# Patient Record
Sex: FEMALE | Race: BLACK OR AFRICAN AMERICAN | NOT HISPANIC OR LATINO
[De-identification: names, ages, dates, MRNs, and addresses within clinical notes are randomized per-mention and may not be internally consistent; named-entity substitution may affect disease eponyms.]

---

## 2017-03-22 ENCOUNTER — APPOINTMENT (OUTPATIENT)
Dept: OTOLARYNGOLOGY | Facility: CLINIC | Age: 77
End: 2017-03-22

## 2017-04-07 ENCOUNTER — APPOINTMENT (OUTPATIENT)
Dept: CARDIOLOGY | Facility: CLINIC | Age: 77
End: 2017-04-07

## 2017-04-07 VITALS — SYSTOLIC BLOOD PRESSURE: 126 MMHG | DIASTOLIC BLOOD PRESSURE: 80 MMHG | HEART RATE: 70 BPM

## 2017-04-07 VITALS — BODY MASS INDEX: 27.29 KG/M2 | WEIGHT: 154 LBS | HEIGHT: 63 IN

## 2017-05-11 ENCOUNTER — APPOINTMENT (OUTPATIENT)
Dept: OTOLARYNGOLOGY | Facility: CLINIC | Age: 77
End: 2017-05-11

## 2017-06-04 ENCOUNTER — EMERGENCY (EMERGENCY)
Facility: HOSPITAL | Age: 77
LOS: 0 days | Discharge: HOME | End: 2017-06-05
Admitting: INTERNAL MEDICINE

## 2017-06-28 ENCOUNTER — APPOINTMENT (OUTPATIENT)
Dept: OTOLARYNGOLOGY | Facility: CLINIC | Age: 77
End: 2017-06-28

## 2017-06-28 DIAGNOSIS — I10 ESSENTIAL (PRIMARY) HYPERTENSION: ICD-10-CM

## 2017-06-28 DIAGNOSIS — F32.9 MAJOR DEPRESSIVE DISORDER, SINGLE EPISODE, UNSPECIFIED: ICD-10-CM

## 2017-06-28 DIAGNOSIS — R42 DIZZINESS AND GIDDINESS: ICD-10-CM

## 2017-06-28 DIAGNOSIS — Z79.82 LONG TERM (CURRENT) USE OF ASPIRIN: ICD-10-CM

## 2017-06-28 DIAGNOSIS — F41.9 ANXIETY DISORDER, UNSPECIFIED: ICD-10-CM

## 2017-06-28 DIAGNOSIS — Z88.8 ALLERGY STATUS TO OTHER DRUGS, MEDICAMENTS AND BIOLOGICAL SUBSTANCES STATUS: ICD-10-CM

## 2017-06-28 DIAGNOSIS — Z79.899 OTHER LONG TERM (CURRENT) DRUG THERAPY: ICD-10-CM

## 2017-07-12 ENCOUNTER — APPOINTMENT (OUTPATIENT)
Dept: OTOLARYNGOLOGY | Facility: CLINIC | Age: 77
End: 2017-07-12

## 2017-08-24 ENCOUNTER — EMERGENCY (EMERGENCY)
Facility: HOSPITAL | Age: 77
LOS: 0 days | Discharge: HOME | End: 2017-08-24

## 2017-08-24 DIAGNOSIS — R42 DIZZINESS AND GIDDINESS: ICD-10-CM

## 2017-08-24 DIAGNOSIS — F32.9 MAJOR DEPRESSIVE DISORDER, SINGLE EPISODE, UNSPECIFIED: ICD-10-CM

## 2017-08-24 DIAGNOSIS — Z79.899 OTHER LONG TERM (CURRENT) DRUG THERAPY: ICD-10-CM

## 2017-08-24 DIAGNOSIS — Z95.828 PRESENCE OF OTHER VASCULAR IMPLANTS AND GRAFTS: ICD-10-CM

## 2017-08-24 DIAGNOSIS — I10 ESSENTIAL (PRIMARY) HYPERTENSION: ICD-10-CM

## 2017-08-24 DIAGNOSIS — E78.5 HYPERLIPIDEMIA, UNSPECIFIED: ICD-10-CM

## 2017-08-24 DIAGNOSIS — Z79.82 LONG TERM (CURRENT) USE OF ASPIRIN: ICD-10-CM

## 2017-08-24 DIAGNOSIS — Z92.21 PERSONAL HISTORY OF ANTINEOPLASTIC CHEMOTHERAPY: ICD-10-CM

## 2017-08-24 DIAGNOSIS — Z88.8 ALLERGY STATUS TO OTHER DRUGS, MEDICAMENTS AND BIOLOGICAL SUBSTANCES: ICD-10-CM

## 2017-08-24 DIAGNOSIS — Z85.42 PERSONAL HISTORY OF MALIGNANT NEOPLASM OF OTHER PARTS OF UTERUS: ICD-10-CM

## 2017-08-28 ENCOUNTER — OUTPATIENT (OUTPATIENT)
Dept: OUTPATIENT SERVICES | Facility: HOSPITAL | Age: 77
LOS: 1 days | Discharge: HOME | End: 2017-08-28

## 2017-08-28 DIAGNOSIS — C55 MALIGNANT NEOPLASM OF UTERUS, PART UNSPECIFIED: ICD-10-CM

## 2017-09-01 ENCOUNTER — APPOINTMENT (OUTPATIENT)
Dept: CARDIOLOGY | Facility: CLINIC | Age: 77
End: 2017-09-01

## 2017-09-01 VITALS — BODY MASS INDEX: 27.29 KG/M2 | WEIGHT: 154 LBS | HEIGHT: 63 IN

## 2017-09-01 VITALS — DIASTOLIC BLOOD PRESSURE: 70 MMHG | SYSTOLIC BLOOD PRESSURE: 134 MMHG | HEART RATE: 64 BPM

## 2017-09-08 ENCOUNTER — OUTPATIENT (OUTPATIENT)
Dept: OUTPATIENT SERVICES | Facility: HOSPITAL | Age: 77
LOS: 1 days | Discharge: HOME | End: 2017-09-08

## 2017-09-08 DIAGNOSIS — Z12.31 ENCOUNTER FOR SCREENING MAMMOGRAM FOR MALIGNANT NEOPLASM OF BREAST: ICD-10-CM

## 2017-09-11 ENCOUNTER — OUTPATIENT (OUTPATIENT)
Dept: OUTPATIENT SERVICES | Facility: HOSPITAL | Age: 77
LOS: 1 days | Discharge: HOME | End: 2017-09-11

## 2017-09-12 ENCOUNTER — OUTPATIENT (OUTPATIENT)
Dept: OUTPATIENT SERVICES | Facility: HOSPITAL | Age: 77
LOS: 1 days | Discharge: HOME | End: 2017-09-12

## 2017-09-12 DIAGNOSIS — F33.1 MAJOR DEPRESSIVE DISORDER, RECURRENT, MODERATE: ICD-10-CM

## 2017-10-06 ENCOUNTER — OUTPATIENT (OUTPATIENT)
Dept: OUTPATIENT SERVICES | Facility: HOSPITAL | Age: 77
LOS: 1 days | Discharge: HOME | End: 2017-10-06

## 2017-10-06 DIAGNOSIS — F33.1 MAJOR DEPRESSIVE DISORDER, RECURRENT, MODERATE: ICD-10-CM

## 2017-10-09 ENCOUNTER — APPOINTMENT (OUTPATIENT)
Dept: OTOLARYNGOLOGY | Facility: CLINIC | Age: 77
End: 2017-10-09
Payer: COMMERCIAL

## 2017-10-09 VITALS — HEIGHT: 63 IN | WEIGHT: 154 LBS | BODY MASS INDEX: 27.29 KG/M2

## 2017-10-09 PROCEDURE — 99212 OFFICE O/P EST SF 10 MIN: CPT | Mod: 25

## 2017-10-09 PROCEDURE — 69210 REMOVE IMPACTED EAR WAX UNI: CPT

## 2017-10-10 ENCOUNTER — OTHER (OUTPATIENT)
Age: 77
End: 2017-10-10

## 2017-10-24 ENCOUNTER — APPOINTMENT (OUTPATIENT)
Dept: OTOLARYNGOLOGY | Facility: CLINIC | Age: 77
End: 2017-10-24
Payer: COMMERCIAL

## 2017-10-24 VITALS — WEIGHT: 160 LBS | BODY MASS INDEX: 27.31 KG/M2 | HEIGHT: 64 IN

## 2017-10-24 PROCEDURE — 69210 REMOVE IMPACTED EAR WAX UNI: CPT

## 2017-11-01 ENCOUNTER — INPATIENT (INPATIENT)
Facility: HOSPITAL | Age: 77
LOS: 0 days | Discharge: HOME | End: 2017-11-02
Attending: EMERGENCY MEDICINE | Admitting: INTERNAL MEDICINE

## 2017-11-01 DIAGNOSIS — E78.00 PURE HYPERCHOLESTEROLEMIA, UNSPECIFIED: ICD-10-CM

## 2017-11-01 DIAGNOSIS — I10 ESSENTIAL (PRIMARY) HYPERTENSION: ICD-10-CM

## 2017-11-01 DIAGNOSIS — R07.89 OTHER CHEST PAIN: ICD-10-CM

## 2017-11-01 DIAGNOSIS — R07.9 CHEST PAIN, UNSPECIFIED: ICD-10-CM

## 2017-11-01 DIAGNOSIS — Z85.42 PERSONAL HISTORY OF MALIGNANT NEOPLASM OF OTHER PARTS OF UTERUS: ICD-10-CM

## 2017-11-13 ENCOUNTER — OUTPATIENT (OUTPATIENT)
Dept: OUTPATIENT SERVICES | Facility: HOSPITAL | Age: 77
LOS: 1 days | Discharge: HOME | End: 2017-11-13

## 2017-11-13 DIAGNOSIS — I25.10 ATHEROSCLEROTIC HEART DISEASE OF NATIVE CORONARY ARTERY WITHOUT ANGINA PECTORIS: ICD-10-CM

## 2017-11-13 DIAGNOSIS — E78.5 HYPERLIPIDEMIA, UNSPECIFIED: ICD-10-CM

## 2017-12-16 ENCOUNTER — EMERGENCY (EMERGENCY)
Facility: HOSPITAL | Age: 77
LOS: 1 days | Discharge: HOME | End: 2017-12-16
Admitting: INTERNAL MEDICINE

## 2017-12-16 DIAGNOSIS — F41.9 ANXIETY DISORDER, UNSPECIFIED: ICD-10-CM

## 2017-12-16 DIAGNOSIS — F32.9 MAJOR DEPRESSIVE DISORDER, SINGLE EPISODE, UNSPECIFIED: ICD-10-CM

## 2017-12-16 DIAGNOSIS — Z79.899 OTHER LONG TERM (CURRENT) DRUG THERAPY: ICD-10-CM

## 2017-12-16 DIAGNOSIS — Z79.82 LONG TERM (CURRENT) USE OF ASPIRIN: ICD-10-CM

## 2017-12-16 DIAGNOSIS — I10 ESSENTIAL (PRIMARY) HYPERTENSION: ICD-10-CM

## 2017-12-16 DIAGNOSIS — E78.5 HYPERLIPIDEMIA, UNSPECIFIED: ICD-10-CM

## 2017-12-16 DIAGNOSIS — Z88.8 ALLERGY STATUS TO OTHER DRUGS, MEDICAMENTS AND BIOLOGICAL SUBSTANCES STATUS: ICD-10-CM

## 2018-01-09 ENCOUNTER — OUTPATIENT (OUTPATIENT)
Dept: OUTPATIENT SERVICES | Facility: HOSPITAL | Age: 78
LOS: 1 days | Discharge: HOME | End: 2018-01-09

## 2018-01-09 DIAGNOSIS — D50.9 IRON DEFICIENCY ANEMIA, UNSPECIFIED: ICD-10-CM

## 2018-01-09 DIAGNOSIS — M06.9 RHEUMATOID ARTHRITIS, UNSPECIFIED: ICD-10-CM

## 2018-01-09 DIAGNOSIS — C54.1 MALIGNANT NEOPLASM OF ENDOMETRIUM: ICD-10-CM

## 2018-01-09 DIAGNOSIS — R64 CACHEXIA: ICD-10-CM

## 2018-01-09 DIAGNOSIS — E55.9 VITAMIN D DEFICIENCY, UNSPECIFIED: ICD-10-CM

## 2018-01-09 DIAGNOSIS — D59.9 ACQUIRED HEMOLYTIC ANEMIA, UNSPECIFIED: ICD-10-CM

## 2018-01-09 DIAGNOSIS — I10 ESSENTIAL (PRIMARY) HYPERTENSION: ICD-10-CM

## 2018-01-10 ENCOUNTER — APPOINTMENT (OUTPATIENT)
Dept: OTOLARYNGOLOGY | Facility: CLINIC | Age: 78
End: 2018-01-10

## 2018-01-17 ENCOUNTER — EMERGENCY (EMERGENCY)
Facility: HOSPITAL | Age: 78
LOS: 0 days | Discharge: HOME | End: 2018-01-17
Admitting: INTERNAL MEDICINE

## 2018-01-17 DIAGNOSIS — I10 ESSENTIAL (PRIMARY) HYPERTENSION: ICD-10-CM

## 2018-01-17 DIAGNOSIS — E78.00 PURE HYPERCHOLESTEROLEMIA, UNSPECIFIED: ICD-10-CM

## 2018-01-17 DIAGNOSIS — Z90.710 ACQUIRED ABSENCE OF BOTH CERVIX AND UTERUS: ICD-10-CM

## 2018-01-17 DIAGNOSIS — R05 COUGH: ICD-10-CM

## 2018-01-17 DIAGNOSIS — F41.8 OTHER SPECIFIED ANXIETY DISORDERS: ICD-10-CM

## 2018-01-17 DIAGNOSIS — I25.10 ATHEROSCLEROTIC HEART DISEASE OF NATIVE CORONARY ARTERY WITHOUT ANGINA PECTORIS: ICD-10-CM

## 2018-01-17 DIAGNOSIS — Z79.899 OTHER LONG TERM (CURRENT) DRUG THERAPY: ICD-10-CM

## 2018-01-17 DIAGNOSIS — Z79.82 LONG TERM (CURRENT) USE OF ASPIRIN: ICD-10-CM

## 2018-01-17 DIAGNOSIS — Z98.890 OTHER SPECIFIED POSTPROCEDURAL STATES: ICD-10-CM

## 2018-01-17 DIAGNOSIS — Z88.8 ALLERGY STATUS TO OTHER DRUGS, MEDICAMENTS AND BIOLOGICAL SUBSTANCES STATUS: ICD-10-CM

## 2018-01-19 ENCOUNTER — APPOINTMENT (OUTPATIENT)
Dept: CARDIOLOGY | Facility: CLINIC | Age: 78
End: 2018-01-19

## 2018-02-05 ENCOUNTER — EMERGENCY (EMERGENCY)
Facility: HOSPITAL | Age: 78
LOS: 0 days | Discharge: HOME | End: 2018-02-05
Attending: EMERGENCY MEDICINE

## 2018-02-05 VITALS
SYSTOLIC BLOOD PRESSURE: 163 MMHG | OXYGEN SATURATION: 99 % | HEART RATE: 62 BPM | TEMPERATURE: 98 F | DIASTOLIC BLOOD PRESSURE: 83 MMHG | RESPIRATION RATE: 20 BRPM

## 2018-02-05 VITALS — WEIGHT: 167.99 LBS

## 2018-02-05 DIAGNOSIS — R10.30 LOWER ABDOMINAL PAIN, UNSPECIFIED: ICD-10-CM

## 2018-02-05 DIAGNOSIS — R14.3 FLATULENCE: ICD-10-CM

## 2018-02-05 DIAGNOSIS — Z95.1 PRESENCE OF AORTOCORONARY BYPASS GRAFT: ICD-10-CM

## 2018-02-05 DIAGNOSIS — I10 ESSENTIAL (PRIMARY) HYPERTENSION: ICD-10-CM

## 2018-02-05 DIAGNOSIS — Z90.710 ACQUIRED ABSENCE OF BOTH CERVIX AND UTERUS: ICD-10-CM

## 2018-02-05 DIAGNOSIS — E78.00 PURE HYPERCHOLESTEROLEMIA, UNSPECIFIED: ICD-10-CM

## 2018-02-05 DIAGNOSIS — Z88.8 ALLERGY STATUS TO OTHER DRUGS, MEDICAMENTS AND BIOLOGICAL SUBSTANCES STATUS: ICD-10-CM

## 2018-02-05 DIAGNOSIS — I25.10 ATHEROSCLEROTIC HEART DISEASE OF NATIVE CORONARY ARTERY WITHOUT ANGINA PECTORIS: ICD-10-CM

## 2018-02-05 DIAGNOSIS — R10.13 EPIGASTRIC PAIN: ICD-10-CM

## 2018-02-05 DIAGNOSIS — Z79.899 OTHER LONG TERM (CURRENT) DRUG THERAPY: ICD-10-CM

## 2018-02-05 DIAGNOSIS — Z98.890 OTHER SPECIFIED POSTPROCEDURAL STATES: Chronic | ICD-10-CM

## 2018-02-05 LAB
ANION GAP SERPL CALC-SCNC: 11 MMOL/L — SIGNIFICANT CHANGE UP (ref 7–14)
APPEARANCE UR: CLEAR — SIGNIFICANT CHANGE UP
APTT BLD: 26.4 SEC — LOW (ref 27–39.2)
BASOPHILS # BLD AUTO: 0.03 K/UL — SIGNIFICANT CHANGE UP (ref 0–0.2)
BASOPHILS NFR BLD AUTO: 0.4 % — SIGNIFICANT CHANGE UP (ref 0–1)
BILIRUB UR-MCNC: NEGATIVE — SIGNIFICANT CHANGE UP
BUN SERPL-MCNC: 20 MG/DL — SIGNIFICANT CHANGE UP (ref 10–20)
CALCIUM SERPL-MCNC: 9.1 MG/DL — SIGNIFICANT CHANGE UP (ref 8.5–10.1)
CHLORIDE SERPL-SCNC: 102 MMOL/L — SIGNIFICANT CHANGE UP (ref 98–110)
CO2 SERPL-SCNC: 28 MMOL/L — SIGNIFICANT CHANGE UP (ref 17–32)
COLOR SPEC: YELLOW — SIGNIFICANT CHANGE UP
CREAT SERPL-MCNC: 0.9 MG/DL — SIGNIFICANT CHANGE UP (ref 0.7–1.5)
DIFF PNL FLD: (no result)
EOSINOPHIL # BLD AUTO: 0.13 K/UL — SIGNIFICANT CHANGE UP (ref 0–0.7)
EOSINOPHIL NFR BLD AUTO: 1.9 % — SIGNIFICANT CHANGE UP (ref 0–8)
GLUCOSE SERPL-MCNC: 77 MG/DL — SIGNIFICANT CHANGE UP (ref 70–110)
GLUCOSE UR QL: NEGATIVE MG/DL — SIGNIFICANT CHANGE UP
HCT VFR BLD CALC: 42.1 % — SIGNIFICANT CHANGE UP (ref 37–47)
HGB BLD-MCNC: 13.8 G/DL — LOW (ref 14–18)
IMM GRANULOCYTES NFR BLD AUTO: 0.4 % — HIGH (ref 0.1–0.3)
INR BLD: 0.95 RATIO — SIGNIFICANT CHANGE UP (ref 0.65–1.3)
KETONES UR-MCNC: NEGATIVE — SIGNIFICANT CHANGE UP
LACTATE SERPL-SCNC: 1.1 MMOL/L — SIGNIFICANT CHANGE UP (ref 0.5–2.2)
LEUKOCYTE ESTERASE UR-ACNC: NEGATIVE — SIGNIFICANT CHANGE UP
LIDOCAIN IGE QN: 21 U/L — SIGNIFICANT CHANGE UP (ref 7–60)
LYMPHOCYTES # BLD AUTO: 2.16 K/UL — SIGNIFICANT CHANGE UP (ref 1.2–3.4)
LYMPHOCYTES # BLD AUTO: 32 % — SIGNIFICANT CHANGE UP (ref 20.5–51.1)
MCHC RBC-ENTMCNC: 27.3 PG — SIGNIFICANT CHANGE UP (ref 27–31)
MCHC RBC-ENTMCNC: 32.8 G/DL — SIGNIFICANT CHANGE UP (ref 32–37)
MCV RBC AUTO: 83.4 FL — SIGNIFICANT CHANGE UP (ref 81–91)
MONOCYTES # BLD AUTO: 0.63 K/UL — HIGH (ref 0.1–0.6)
MONOCYTES NFR BLD AUTO: 9.3 % — SIGNIFICANT CHANGE UP (ref 1.7–9.3)
NEUTROPHILS # BLD AUTO: 3.76 K/UL — SIGNIFICANT CHANGE UP (ref 1.4–6.5)
NEUTROPHILS NFR BLD AUTO: 56 % — SIGNIFICANT CHANGE UP (ref 42.2–75.2)
NITRITE UR-MCNC: NEGATIVE — SIGNIFICANT CHANGE UP
NRBC # BLD: 0 /100 WBCS — SIGNIFICANT CHANGE UP (ref 0–0)
PH UR: 7 — SIGNIFICANT CHANGE UP (ref 5–8)
PLATELET # BLD AUTO: 196 K/UL — SIGNIFICANT CHANGE UP (ref 130–400)
POTASSIUM SERPL-MCNC: 4.6 MMOL/L — SIGNIFICANT CHANGE UP (ref 3.5–5)
POTASSIUM SERPL-SCNC: 4.6 MMOL/L — SIGNIFICANT CHANGE UP (ref 3.5–5)
PROT UR-MCNC: NEGATIVE MG/DL — SIGNIFICANT CHANGE UP
PROTHROM AB SERPL-ACNC: 10.2 SEC — SIGNIFICANT CHANGE UP (ref 9.95–12.87)
RBC # BLD: 5.05 M/UL — SIGNIFICANT CHANGE UP (ref 4.2–5.4)
RBC # FLD: 14.6 % — HIGH (ref 11.5–14.5)
RBC CASTS # UR COMP ASSIST: SIGNIFICANT CHANGE UP /HPF
SODIUM SERPL-SCNC: 141 MMOL/L — SIGNIFICANT CHANGE UP (ref 135–146)
SP GR SPEC: 1.01 — SIGNIFICANT CHANGE UP (ref 1.01–1.03)
UROBILINOGEN FLD QL: 0.2 MG/DL — SIGNIFICANT CHANGE UP (ref 0.2–0.2)
WBC # BLD: 6.74 K/UL — SIGNIFICANT CHANGE UP (ref 4.8–10.8)
WBC # FLD AUTO: 6.74 K/UL — SIGNIFICANT CHANGE UP (ref 4.8–10.8)

## 2018-02-05 RX ORDER — FAMOTIDINE 10 MG/ML
20 INJECTION INTRAVENOUS ONCE
Qty: 0 | Refills: 0 | Status: COMPLETED | OUTPATIENT
Start: 2018-02-05 | End: 2018-02-05

## 2018-02-05 RX ORDER — SODIUM CHLORIDE 9 MG/ML
3 INJECTION INTRAMUSCULAR; INTRAVENOUS; SUBCUTANEOUS ONCE
Qty: 0 | Refills: 0 | Status: COMPLETED | OUTPATIENT
Start: 2018-02-05 | End: 2018-02-05

## 2018-02-05 RX ADMIN — FAMOTIDINE 20 MILLIGRAM(S): 10 INJECTION INTRAVENOUS at 13:24

## 2018-02-05 RX ADMIN — SODIUM CHLORIDE 3 MILLILITER(S): 9 INJECTION INTRAMUSCULAR; INTRAVENOUS; SUBCUTANEOUS at 13:01

## 2018-02-05 NOTE — ED PROVIDER NOTE - PROGRESS NOTE DETAILS
pt states feeling better, ate full meal in ED, labs wnl, plan to d/c, pt to f/u with dr rich. The patient was advised to return to the emergency department if any new symptoms developed, symptoms worsened or for any concerns. The patient was offered the opportunity to ask questions and verbalized that they understand the diagnosis and discharge instructions.

## 2018-02-05 NOTE — ED PROVIDER NOTE - PHYSICAL EXAMINATION
VSS, awake, alert, non-toxic appearing, lying comfortably in stretcher, in NAD, ears clear, no scleral icterus, oropharynx clear, mmm, no JVD or bruit, no jaundice, skin rash or lesions, chest CTAB, non-labored breathing, no w/r/r, +S1/S2, RRR, no m/r/g, abdomen soft, NT, ND, +BS, no scars, hernias or distention, no pulsatile masses or bruits appreciated, no CVA tenderness, no peripheral edema or deformities, alert, clear speech and steady gait.

## 2018-02-05 NOTE — ED PROVIDER NOTE - OBJECTIVE STATEMENT
76 y/o female h/o CAD, HTN, hypercholesterolemia, s/p CABG and SHAAN now presents with lower abdominal pain x 2 days. Pain is sharp, intermittent, lasts minutes, denies radiation, denies modifying factors, last bowel movements was last night, + passing stool and flatus, denies n/v/d, anorexia, respiratory sx, change in bowel habits or urinary sx, vaginal d/c or other associated complaints at present. Denies prior episodes.

## 2018-02-05 NOTE — ED ADULT NURSE NOTE - OBJECTIVE STATEMENT
Pt presents with epigastric burning, burning in mouth, excessive gas/belching, and b/l lower quadrant pain. Denies n/v/d.

## 2018-02-12 ENCOUNTER — APPOINTMENT (OUTPATIENT)
Dept: OTOLARYNGOLOGY | Facility: CLINIC | Age: 78
End: 2018-02-12
Payer: MEDICARE

## 2018-02-12 VITALS — WEIGHT: 160 LBS | BODY MASS INDEX: 27.31 KG/M2 | HEIGHT: 64 IN

## 2018-02-12 DIAGNOSIS — H91.90 UNSPECIFIED HEARING LOSS, UNSPECIFIED EAR: ICD-10-CM

## 2018-02-12 DIAGNOSIS — R05 COUGH: ICD-10-CM

## 2018-02-12 PROCEDURE — 31575 DIAGNOSTIC LARYNGOSCOPY: CPT

## 2018-02-12 PROCEDURE — 69210 REMOVE IMPACTED EAR WAX UNI: CPT

## 2018-02-12 PROCEDURE — 99213 OFFICE O/P EST LOW 20 MIN: CPT | Mod: 25

## 2018-02-12 RX ORDER — AMOXICILLIN 500 MG/1
500 CAPSULE ORAL
Qty: 21 | Refills: 0 | Status: DISCONTINUED | COMMUNITY
Start: 2017-11-17

## 2018-02-12 RX ORDER — ALPRAZOLAM 0.25 MG/1
0.25 TABLET ORAL
Qty: 5 | Refills: 0 | Status: DISCONTINUED | COMMUNITY
Start: 2017-12-22

## 2018-02-12 RX ORDER — CHOLECALCIFEROL (VITAMIN D3) 1250 MCG
1.25 MG CAPSULE ORAL
Qty: 6 | Refills: 0 | Status: DISCONTINUED | COMMUNITY
Start: 2017-09-15

## 2018-02-15 ENCOUNTER — OUTPATIENT (OUTPATIENT)
Dept: OUTPATIENT SERVICES | Facility: HOSPITAL | Age: 78
LOS: 1 days | Discharge: HOME | End: 2018-02-15

## 2018-02-15 DIAGNOSIS — K02.63 DENTAL CARIES ON SMOOTH SURFACE PENETRATING INTO PULP: ICD-10-CM

## 2018-02-15 DIAGNOSIS — Z98.890 OTHER SPECIFIED POSTPROCEDURAL STATES: Chronic | ICD-10-CM

## 2018-03-20 ENCOUNTER — OUTPATIENT (OUTPATIENT)
Dept: OUTPATIENT SERVICES | Facility: HOSPITAL | Age: 78
LOS: 1 days | Discharge: HOME | End: 2018-03-20

## 2018-03-20 DIAGNOSIS — Z98.890 OTHER SPECIFIED POSTPROCEDURAL STATES: Chronic | ICD-10-CM

## 2018-03-20 DIAGNOSIS — F33.1 MAJOR DEPRESSIVE DISORDER, RECURRENT, MODERATE: ICD-10-CM

## 2018-03-23 ENCOUNTER — EMERGENCY (EMERGENCY)
Facility: HOSPITAL | Age: 78
LOS: 0 days | Discharge: HOME | End: 2018-03-23
Attending: EMERGENCY MEDICINE

## 2018-03-23 VITALS
SYSTOLIC BLOOD PRESSURE: 148 MMHG | OXYGEN SATURATION: 100 % | DIASTOLIC BLOOD PRESSURE: 72 MMHG | HEART RATE: 64 BPM | RESPIRATION RATE: 18 BRPM | TEMPERATURE: 98 F

## 2018-03-23 DIAGNOSIS — Z98.41 CATARACT EXTRACTION STATUS, RIGHT EYE: ICD-10-CM

## 2018-03-23 DIAGNOSIS — M79.601 PAIN IN RIGHT ARM: ICD-10-CM

## 2018-03-23 DIAGNOSIS — M25.511 PAIN IN RIGHT SHOULDER: ICD-10-CM

## 2018-03-23 DIAGNOSIS — E78.00 PURE HYPERCHOLESTEROLEMIA, UNSPECIFIED: ICD-10-CM

## 2018-03-23 DIAGNOSIS — Z90.710 ACQUIRED ABSENCE OF BOTH CERVIX AND UTERUS: ICD-10-CM

## 2018-03-23 DIAGNOSIS — E78.5 HYPERLIPIDEMIA, UNSPECIFIED: ICD-10-CM

## 2018-03-23 DIAGNOSIS — R42 DIZZINESS AND GIDDINESS: ICD-10-CM

## 2018-03-23 DIAGNOSIS — I25.10 ATHEROSCLEROTIC HEART DISEASE OF NATIVE CORONARY ARTERY WITHOUT ANGINA PECTORIS: ICD-10-CM

## 2018-03-23 DIAGNOSIS — G62.9 POLYNEUROPATHY, UNSPECIFIED: ICD-10-CM

## 2018-03-23 DIAGNOSIS — Z98.890 OTHER SPECIFIED POSTPROCEDURAL STATES: Chronic | ICD-10-CM

## 2018-03-23 DIAGNOSIS — Z95.1 PRESENCE OF AORTOCORONARY BYPASS GRAFT: ICD-10-CM

## 2018-03-23 DIAGNOSIS — I10 ESSENTIAL (PRIMARY) HYPERTENSION: ICD-10-CM

## 2018-03-23 DIAGNOSIS — Z88.8 ALLERGY STATUS TO OTHER DRUGS, MEDICAMENTS AND BIOLOGICAL SUBSTANCES: ICD-10-CM

## 2018-03-23 LAB
ALBUMIN SERPL ELPH-MCNC: 4.3 G/DL — SIGNIFICANT CHANGE UP (ref 3.5–5.2)
ALP SERPL-CCNC: 67 U/L — SIGNIFICANT CHANGE UP (ref 30–115)
ALT FLD-CCNC: 13 U/L — SIGNIFICANT CHANGE UP (ref 0–41)
ANION GAP SERPL CALC-SCNC: 11 MMOL/L — SIGNIFICANT CHANGE UP (ref 7–14)
AST SERPL-CCNC: 41 U/L — SIGNIFICANT CHANGE UP (ref 0–41)
BASOPHILS # BLD AUTO: 0.04 K/UL — SIGNIFICANT CHANGE UP (ref 0–0.2)
BASOPHILS NFR BLD AUTO: 0.6 % — SIGNIFICANT CHANGE UP (ref 0–1)
BILIRUB SERPL-MCNC: 0.4 MG/DL — SIGNIFICANT CHANGE UP (ref 0.2–1.2)
BUN SERPL-MCNC: 16 MG/DL — SIGNIFICANT CHANGE UP (ref 10–20)
CALCIUM SERPL-MCNC: 8.9 MG/DL — SIGNIFICANT CHANGE UP (ref 8.5–10.1)
CHLORIDE SERPL-SCNC: 103 MMOL/L — SIGNIFICANT CHANGE UP (ref 98–110)
CK SERPL-CCNC: 127 U/L — SIGNIFICANT CHANGE UP (ref 0–225)
CK SERPL-CCNC: 200 U/L — SIGNIFICANT CHANGE UP (ref 0–225)
CO2 SERPL-SCNC: 29 MMOL/L — SIGNIFICANT CHANGE UP (ref 17–32)
CREAT SERPL-MCNC: 0.9 MG/DL — SIGNIFICANT CHANGE UP (ref 0.7–1.5)
EOSINOPHIL # BLD AUTO: 0.09 K/UL — SIGNIFICANT CHANGE UP (ref 0–0.7)
EOSINOPHIL NFR BLD AUTO: 1.3 % — SIGNIFICANT CHANGE UP (ref 0–8)
GAS PNL BLDV: SIGNIFICANT CHANGE UP
GLUCOSE SERPL-MCNC: 85 MG/DL — SIGNIFICANT CHANGE UP (ref 70–99)
HCT VFR BLD CALC: 39.2 % — SIGNIFICANT CHANGE UP (ref 37–47)
HGB BLD-MCNC: 12.6 G/DL — SIGNIFICANT CHANGE UP (ref 12–16)
IMM GRANULOCYTES NFR BLD AUTO: 0.3 % — SIGNIFICANT CHANGE UP (ref 0.1–0.3)
LIDOCAIN IGE QN: 34 U/L — SIGNIFICANT CHANGE UP (ref 7–60)
LYMPHOCYTES # BLD AUTO: 2.65 K/UL — SIGNIFICANT CHANGE UP (ref 1.2–3.4)
LYMPHOCYTES # BLD AUTO: 39.6 % — SIGNIFICANT CHANGE UP (ref 20.5–51.1)
MCHC RBC-ENTMCNC: 26.9 PG — LOW (ref 27–31)
MCHC RBC-ENTMCNC: 32.1 G/DL — SIGNIFICANT CHANGE UP (ref 32–37)
MCV RBC AUTO: 83.6 FL — SIGNIFICANT CHANGE UP (ref 81–99)
MONOCYTES # BLD AUTO: 0.7 K/UL — HIGH (ref 0.1–0.6)
MONOCYTES NFR BLD AUTO: 10.5 % — HIGH (ref 1.7–9.3)
NEUTROPHILS # BLD AUTO: 3.19 K/UL — SIGNIFICANT CHANGE UP (ref 1.4–6.5)
NEUTROPHILS NFR BLD AUTO: 47.7 % — SIGNIFICANT CHANGE UP (ref 42.2–75.2)
PLATELET # BLD AUTO: 190 K/UL — SIGNIFICANT CHANGE UP (ref 130–400)
POTASSIUM SERPL-MCNC: 6.5 MMOL/L — CRITICAL HIGH (ref 3.5–5)
POTASSIUM SERPL-SCNC: 6.5 MMOL/L — CRITICAL HIGH (ref 3.5–5)
PROT SERPL-MCNC: 7.7 G/DL — SIGNIFICANT CHANGE UP (ref 6–8)
RBC # BLD: 4.69 M/UL — SIGNIFICANT CHANGE UP (ref 4.2–5.4)
RBC # FLD: 14.7 % — HIGH (ref 11.5–14.5)
SODIUM SERPL-SCNC: 143 MMOL/L — SIGNIFICANT CHANGE UP (ref 135–146)
TROPONIN T SERPL-MCNC: <0.01 NG/ML — SIGNIFICANT CHANGE UP
TROPONIN T SERPL-MCNC: <0.01 NG/ML — SIGNIFICANT CHANGE UP
WBC # BLD: 6.69 K/UL — SIGNIFICANT CHANGE UP (ref 4.8–10.8)
WBC # FLD AUTO: 6.69 K/UL — SIGNIFICANT CHANGE UP (ref 4.8–10.8)

## 2018-03-23 RX ORDER — ASPIRIN/CALCIUM CARB/MAGNESIUM 324 MG
325 TABLET ORAL ONCE
Qty: 0 | Refills: 0 | Status: COMPLETED | OUTPATIENT
Start: 2018-03-23 | End: 2018-03-23

## 2018-03-23 RX ORDER — SODIUM CHLORIDE 9 MG/ML
3 INJECTION INTRAMUSCULAR; INTRAVENOUS; SUBCUTANEOUS ONCE
Qty: 0 | Refills: 0 | Status: COMPLETED | OUTPATIENT
Start: 2018-03-23 | End: 2018-03-23

## 2018-03-23 RX ADMIN — Medication 325 MILLIGRAM(S): at 15:50

## 2018-03-23 RX ADMIN — SODIUM CHLORIDE 3 MILLILITER(S): 9 INJECTION INTRAMUSCULAR; INTRAVENOUS; SUBCUTANEOUS at 15:50

## 2018-03-23 NOTE — ED ADULT TRIAGE NOTE - CHIEF COMPLAINT QUOTE
c/o dizziness in the shower today, no LOC, denies any dizziness at present time. c/o bilat shoulder pain, worsen upon movement. has hx of arthritis.

## 2018-03-23 NOTE — ED PROVIDER NOTE - PROGRESS NOTE DETAILS
Spoke with Dr Moura, knows pt well; last had nuclear stress test in November of 2017 that was normal. Would not cath based on current presentation. Rec 2 sets, d/c home to f/u with him in the office next week. Pt endorsed to Dr Holly to f/u second EKG and enzymes, dispo Discussed results with patient. Will f/u with Dr Moura this week. Ready for DC.

## 2018-03-23 NOTE — ED ADULT NURSE NOTE - OBJECTIVE STATEMENT
pt c/o dizziness in the shower this am, and she almost fell. pt also has right shoulder and arm pain

## 2018-03-23 NOTE — ED PROVIDER NOTE - NS ED ROS FT
Eyes:  No visual changes  ENMT:  No hearing changes  Cardiac:  No chest pain, SOB  Respiratory:  No cough or respiratory distress  GI:  No nausea, vomiting, diarrhea or abdominal pain.  :  No dysuria, frequency or burning.  MS:  No myalgia, muscle weakness, joint pain or back pain.  Neuro:  No headache or weakness.  No LOC.  Skin:  No skin rash.   Endocrine: No history of thyroid disease or diabetes

## 2018-03-23 NOTE — ED PROVIDER NOTE - ATTENDING CONTRIBUTION TO CARE
78yo woman h/o HTN, HLD, CAD s/p CABG, uterine CA s/p resection and chemotx presented with aching R shoulder pain intermittent over the past several weeks. No associated SOB, sx are nonexertional and relieved with use of heating pad. Pain is worse with movement. She has some discomfort in the L shoulder, but less severe than the right. 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 She became concerned about this unsteadiness because her shoulder has been bothering her and just "thought I should come get checked out." She denies chest pain, SOB, nausea, vomiting, change in energy. Otherwise feeling well. VS, exam as noted, pt very well appearing. EKG sinus tyra without ischemic changes. Shoulder pain seems more likely to be musculoskeletal in origin than cardiac; will check CXR, labs, d/w Dr Moura, reassess.

## 2018-03-23 NOTE — ED PROVIDER NOTE - MEDICAL DECISION MAKING DETAILS
Patient was endorsed to me to follow up second set of cardiac enzymes and d/c home if negative.  The patient remained well, enzymes were negative and she was d/c home, will follow up with Dr Moura, strict return instructions were given.

## 2018-03-23 NOTE — ED PROVIDER NOTE - OBJECTIVE STATEMENT
77 y F pmh including CAD, CABG, HTN, HLD, Uterine CA in remission, follows with [facility] for cardiology, cc dizziness while in shower that resolved as well as right arm pain that also resolved. no cp or sob, no syncope. no vomiting.

## 2018-03-26 ENCOUNTER — APPOINTMENT (OUTPATIENT)
Dept: OTOLARYNGOLOGY | Facility: CLINIC | Age: 78
End: 2018-03-26

## 2018-04-09 ENCOUNTER — APPOINTMENT (OUTPATIENT)
Dept: SURGERY | Facility: CLINIC | Age: 78
End: 2018-04-09
Payer: MEDICARE

## 2018-04-09 VITALS
HEIGHT: 63 IN | BODY MASS INDEX: 28.7 KG/M2 | SYSTOLIC BLOOD PRESSURE: 148 MMHG | WEIGHT: 162 LBS | DIASTOLIC BLOOD PRESSURE: 82 MMHG

## 2018-04-09 PROCEDURE — 99213 OFFICE O/P EST LOW 20 MIN: CPT

## 2018-04-10 ENCOUNTER — OUTPATIENT (OUTPATIENT)
Dept: OUTPATIENT SERVICES | Facility: HOSPITAL | Age: 78
LOS: 1 days | Discharge: HOME | End: 2018-04-10

## 2018-04-10 DIAGNOSIS — Z98.890 OTHER SPECIFIED POSTPROCEDURAL STATES: Chronic | ICD-10-CM

## 2018-04-10 DIAGNOSIS — Z01.21 ENCOUNTER FOR DENTAL EXAMINATION AND CLEANING WITH ABNORMAL FINDINGS: ICD-10-CM

## 2018-04-19 ENCOUNTER — OUTPATIENT (OUTPATIENT)
Dept: OUTPATIENT SERVICES | Facility: HOSPITAL | Age: 78
LOS: 1 days | Discharge: HOME | End: 2018-04-19

## 2018-04-19 VITALS
HEIGHT: 64 IN | SYSTOLIC BLOOD PRESSURE: 167 MMHG | DIASTOLIC BLOOD PRESSURE: 79 MMHG | HEART RATE: 66 BPM | WEIGHT: 160.06 LBS | TEMPERATURE: 98 F | RESPIRATION RATE: 18 BRPM | OXYGEN SATURATION: 100 %

## 2018-04-19 DIAGNOSIS — Z98.890 OTHER SPECIFIED POSTPROCEDURAL STATES: Chronic | ICD-10-CM

## 2018-04-19 DIAGNOSIS — Z01.818 ENCOUNTER FOR OTHER PREPROCEDURAL EXAMINATION: ICD-10-CM

## 2018-04-19 DIAGNOSIS — Z02.1 ENCOUNTER FOR PRE-EMPLOYMENT EXAMINATION: ICD-10-CM

## 2018-04-19 LAB
ALBUMIN SERPL ELPH-MCNC: 4.4 G/DL — SIGNIFICANT CHANGE UP (ref 3.5–5.2)
ALP SERPL-CCNC: 73 U/L — SIGNIFICANT CHANGE UP (ref 30–115)
ALT FLD-CCNC: 10 U/L — SIGNIFICANT CHANGE UP (ref 0–41)
ANION GAP SERPL CALC-SCNC: 13 MMOL/L — SIGNIFICANT CHANGE UP (ref 7–14)
APTT BLD: 27.4 SEC — SIGNIFICANT CHANGE UP (ref 27–39.2)
AST SERPL-CCNC: 19 U/L — SIGNIFICANT CHANGE UP (ref 0–41)
BASOPHILS # BLD AUTO: 0.03 K/UL — SIGNIFICANT CHANGE UP (ref 0–0.2)
BASOPHILS NFR BLD AUTO: 0.4 % — SIGNIFICANT CHANGE UP (ref 0–1)
BILIRUB SERPL-MCNC: 0.3 MG/DL — SIGNIFICANT CHANGE UP (ref 0.2–1.2)
BUN SERPL-MCNC: 22 MG/DL — HIGH (ref 10–20)
CALCIUM SERPL-MCNC: 9 MG/DL — SIGNIFICANT CHANGE UP (ref 8.5–10.1)
CHLORIDE SERPL-SCNC: 99 MMOL/L — SIGNIFICANT CHANGE UP (ref 98–110)
CO2 SERPL-SCNC: 30 MMOL/L — SIGNIFICANT CHANGE UP (ref 17–32)
CREAT SERPL-MCNC: 0.9 MG/DL — SIGNIFICANT CHANGE UP (ref 0.7–1.5)
EOSINOPHIL # BLD AUTO: 0.13 K/UL — SIGNIFICANT CHANGE UP (ref 0–0.7)
EOSINOPHIL NFR BLD AUTO: 1.9 % — SIGNIFICANT CHANGE UP (ref 0–8)
GLUCOSE SERPL-MCNC: 94 MG/DL — SIGNIFICANT CHANGE UP (ref 70–99)
HCT VFR BLD CALC: 39.7 % — SIGNIFICANT CHANGE UP (ref 37–47)
HGB BLD-MCNC: 12.6 G/DL — SIGNIFICANT CHANGE UP (ref 12–16)
IMM GRANULOCYTES NFR BLD AUTO: 0.3 % — SIGNIFICANT CHANGE UP (ref 0.1–0.3)
INR BLD: 0.95 RATIO — SIGNIFICANT CHANGE UP (ref 0.65–1.3)
LYMPHOCYTES # BLD AUTO: 2.37 K/UL — SIGNIFICANT CHANGE UP (ref 1.2–3.4)
LYMPHOCYTES # BLD AUTO: 33.9 % — SIGNIFICANT CHANGE UP (ref 20.5–51.1)
MCHC RBC-ENTMCNC: 26.7 PG — LOW (ref 27–31)
MCHC RBC-ENTMCNC: 31.7 G/DL — LOW (ref 32–37)
MCV RBC AUTO: 84.1 FL — SIGNIFICANT CHANGE UP (ref 81–99)
MONOCYTES # BLD AUTO: 0.68 K/UL — HIGH (ref 0.1–0.6)
MONOCYTES NFR BLD AUTO: 9.7 % — HIGH (ref 1.7–9.3)
NEUTROPHILS # BLD AUTO: 3.77 K/UL — SIGNIFICANT CHANGE UP (ref 1.4–6.5)
NEUTROPHILS NFR BLD AUTO: 53.8 % — SIGNIFICANT CHANGE UP (ref 42.2–75.2)
NRBC # BLD: 0 /100 WBCS — SIGNIFICANT CHANGE UP (ref 0–0)
PLATELET # BLD AUTO: 182 K/UL — SIGNIFICANT CHANGE UP (ref 130–400)
POTASSIUM SERPL-MCNC: 4.6 MMOL/L — SIGNIFICANT CHANGE UP (ref 3.5–5)
POTASSIUM SERPL-SCNC: 4.6 MMOL/L — SIGNIFICANT CHANGE UP (ref 3.5–5)
PROT SERPL-MCNC: 7.1 G/DL — SIGNIFICANT CHANGE UP (ref 6–8)
PROTHROM AB SERPL-ACNC: 10.3 SEC — SIGNIFICANT CHANGE UP (ref 9.95–12.87)
RBC # BLD: 4.72 M/UL — SIGNIFICANT CHANGE UP (ref 4.2–5.4)
RBC # FLD: 14.7 % — HIGH (ref 11.5–14.5)
SODIUM SERPL-SCNC: 142 MMOL/L — SIGNIFICANT CHANGE UP (ref 135–146)
WBC # BLD: 7 K/UL — SIGNIFICANT CHANGE UP (ref 4.8–10.8)
WBC # FLD AUTO: 7 K/UL — SIGNIFICANT CHANGE UP (ref 4.8–10.8)

## 2018-04-20 DIAGNOSIS — C55 MALIGNANT NEOPLASM OF UTERUS, PART UNSPECIFIED: ICD-10-CM

## 2018-04-24 ENCOUNTER — APPOINTMENT (OUTPATIENT)
Dept: CARDIOLOGY | Facility: CLINIC | Age: 78
End: 2018-04-24

## 2018-04-24 VITALS
HEIGHT: 63 IN | HEART RATE: 65 BPM | SYSTOLIC BLOOD PRESSURE: 120 MMHG | DIASTOLIC BLOOD PRESSURE: 70 MMHG | BODY MASS INDEX: 28.7 KG/M2 | WEIGHT: 162 LBS

## 2018-04-24 RX ORDER — NAPROXEN SODIUM 550 MG/1
550 TABLET ORAL
Qty: 10 | Refills: 0 | Status: DISCONTINUED | COMMUNITY
Start: 2017-05-12 | End: 2018-04-24

## 2018-04-24 RX ORDER — ASPIRIN 81 MG
6.5 TABLET, DELAYED RELEASE (ENTERIC COATED) ORAL
Qty: 1 | Refills: 0 | Status: DISCONTINUED | COMMUNITY
Start: 2017-10-09 | End: 2018-04-24

## 2018-04-24 RX ORDER — CEFUROXIME AXETIL 500 MG/1
500 TABLET ORAL
Qty: 20 | Refills: 0 | Status: DISCONTINUED | COMMUNITY
Start: 2017-06-13 | End: 2018-04-24

## 2018-04-24 RX ORDER — AMOXICILLIN 500 MG/1
500 TABLET, FILM COATED ORAL
Qty: 21 | Refills: 0 | Status: DISCONTINUED | COMMUNITY
Start: 2017-05-12 | End: 2018-04-24

## 2018-04-24 RX ORDER — OFLOXACIN 3 MG/ML
0.3 SOLUTION/ DROPS OPHTHALMIC
Qty: 5 | Refills: 0 | Status: DISCONTINUED | COMMUNITY
Start: 2017-11-27 | End: 2018-04-24

## 2018-05-02 ENCOUNTER — OUTPATIENT (OUTPATIENT)
Dept: OUTPATIENT SERVICES | Facility: HOSPITAL | Age: 78
LOS: 1 days | Discharge: HOME | End: 2018-05-02
Payer: COMMERCIAL

## 2018-05-02 ENCOUNTER — RESULT REVIEW (OUTPATIENT)
Age: 78
End: 2018-05-02

## 2018-05-02 VITALS
HEIGHT: 64 IN | SYSTOLIC BLOOD PRESSURE: 145 MMHG | RESPIRATION RATE: 20 BRPM | HEART RATE: 64 BPM | TEMPERATURE: 99 F | OXYGEN SATURATION: 100 % | WEIGHT: 160.06 LBS | DIASTOLIC BLOOD PRESSURE: 70 MMHG

## 2018-05-02 VITALS
SYSTOLIC BLOOD PRESSURE: 158 MMHG | TEMPERATURE: 98 F | RESPIRATION RATE: 21 BRPM | HEART RATE: 60 BPM | DIASTOLIC BLOOD PRESSURE: 65 MMHG

## 2018-05-02 DIAGNOSIS — I25.701 ATHEROSCLEROSIS OF CORONARY ARTERY BYPASS GRAFT(S), UNSPECIFIED, WITH ANGINA PECTORIS WITH DOCUMENTED SPASM: Chronic | ICD-10-CM

## 2018-05-02 DIAGNOSIS — Z98.890 OTHER SPECIFIED POSTPROCEDURAL STATES: Chronic | ICD-10-CM

## 2018-05-02 LAB — SURGICAL PATHOLOGY STUDY: SIGNIFICANT CHANGE UP

## 2018-05-02 PROCEDURE — 12032 INTMD RPR S/A/T/EXT 2.6-7.5: CPT | Mod: 59

## 2018-05-02 PROCEDURE — 36590 REMOVAL TUNNELED CV CATH: CPT

## 2018-05-02 RX ORDER — SODIUM CHLORIDE 9 MG/ML
1000 INJECTION, SOLUTION INTRAVENOUS
Qty: 0 | Refills: 0 | Status: DISCONTINUED | OUTPATIENT
Start: 2018-05-02 | End: 2018-05-17

## 2018-05-02 RX ORDER — ONDANSETRON 8 MG/1
4 TABLET, FILM COATED ORAL ONCE
Qty: 0 | Refills: 0 | Status: DISCONTINUED | OUTPATIENT
Start: 2018-05-02 | End: 2018-05-17

## 2018-05-02 RX ORDER — ACETAMINOPHEN WITH CODEINE 300MG-30MG
1 TABLET ORAL
Qty: 20 | Refills: 0 | OUTPATIENT
Start: 2018-05-02 | End: 2018-05-04

## 2018-05-02 RX ADMIN — SODIUM CHLORIDE 100 MILLILITER(S): 9 INJECTION, SOLUTION INTRAVENOUS at 09:13

## 2018-05-02 NOTE — CHART NOTE - NSCHARTNOTEFT_GEN_A_CORE
77y Female    quinine (Urticaria)      HPI:      PAST MEDICAL & SURGICAL HISTORY:  Glaucoma  Cataract of right eye  High cholesterol  Hypertension  Uterine cancer  Athscl CABG, unsp, w angina pectoris w documented spasm  H/O total hysterectomy        MEDICATIONS  (STANDING):    MEDICATIONS  (PRN):    Home Medications:  amLODIPine 5 mg oral tablet: 1 tab(s) orally once a day (02 May 2018 07:25)  brimonidine 0.1% ophthalmic solution: to each affected eye 2 times a day (02 May 2018 07:25)  dorzolamide 2% ophthalmic solution: 1 drop(s) to each affected eye 2 times a day (02 May 2018 07:25)  latanoprost 0.005% ophthalmic solution: 1 drop(s) to each affected eye 2 times a day (02 May 2018 07:25)  losartan 50 mg oral tablet: 1 tab(s) orally once a day (02 May 2018 07:25)  Low Dose ASA 81 mg oral tablet: 1 tab(s) orally once a day (02 May 2018 07:25)  simvastatin 40 mg oral tablet: 1 tab(s) orally once a day (at bedtime) (02 May 2018 07:25)      Allergies    quinine (Urticaria)    Intolerances        SOCIAL HISTORY:  NSNDND    FAMILY HISTORY:      Vital Signs Last 24 Hrs  T(C): 37.1 (02 May 2018 07:04), Max: 37.1 (02 May 2018 07:04)  T(F): 98.7 (02 May 2018 07:04), Max: 98.7 (02 May 2018 07:04)  HR: 64 (02 May 2018 07:04) (64 - 64)  BP: 145/70 (02 May 2018 07:04) (145/70 - 145/70)  BP(mean): --  RR: 20 (02 May 2018 07:04) (20 - 20)  SpO2: 100% (02 May 2018 07:04) (100% - 100%)    NPO: _x__ Yes  ____ No    Exam:  Drug Dosing Weight  Height (cm): 162.56 (02 May 2018 07:04)  Weight (kg): 72.6 (02 May 2018 07:04)  BMI (kg/m2): 27.5 (02 May 2018 07:04)  BSA (m2): 1.78 (02 May 2018 07:04)    MP: II  Teeth: Missing up front teeth  Mouth opening:    LABS:                        12.6   7.00  )-----------( 182      ( 19 Apr 2018 08:34 )             39.7                         12.6   6.69  )-----------( 190      ( 23 Mar 2018 13:53 )             39.2         04-19    142  |  99  |  22<H>  ----------------------------<  94  4.6   |  30  |  0.9  03-23    143  |  103  |  16  ----------------------------<  85  6.5<HH>   |  29  |  0.9    Ca    9.0      19 Apr 2018 08:34  Ca    8.9      23 Mar 2018 13:53    TPro  7.1  /  Alb  4.4  /  TBili  0.3  /  DBili  x   /  AST  19  /  ALT  10  /  AlkPhos  73  04-19  TPro  7.7  /  Alb  4.3  /  TBili  0.4  /  DBili  x   /  AST  41  /  ALT  13  /  AlkPhos  67  03-23          RADIOLOGY & ADDITIONAL STUDIES:      ASA _III___,  R/B/A MAC discussed with: _x___ patient, ____ guardian, Understand and Accepts,  Question Answered.

## 2018-05-02 NOTE — BRIEF OPERATIVE NOTE - PROCEDURE
<<-----Click on this checkbox to enter Procedure Removal of port from chest  05/02/2018  left chest  Active  [id number]

## 2018-05-04 DIAGNOSIS — I10 ESSENTIAL (PRIMARY) HYPERTENSION: ICD-10-CM

## 2018-05-04 DIAGNOSIS — Z85.42 PERSONAL HISTORY OF MALIGNANT NEOPLASM OF OTHER PARTS OF UTERUS: ICD-10-CM

## 2018-05-04 DIAGNOSIS — Z45.2 ENCOUNTER FOR ADJUSTMENT AND MANAGEMENT OF VASCULAR ACCESS DEVICE: ICD-10-CM

## 2018-05-04 DIAGNOSIS — E78.00 PURE HYPERCHOLESTEROLEMIA, UNSPECIFIED: ICD-10-CM

## 2018-05-07 ENCOUNTER — APPOINTMENT (OUTPATIENT)
Dept: SURGERY | Facility: CLINIC | Age: 78
End: 2018-05-07

## 2018-05-08 ENCOUNTER — MESSAGE (OUTPATIENT)
Age: 78
End: 2018-05-08

## 2018-05-14 ENCOUNTER — APPOINTMENT (OUTPATIENT)
Dept: SURGERY | Facility: CLINIC | Age: 78
End: 2018-05-14
Payer: MEDICARE

## 2018-05-14 VITALS
SYSTOLIC BLOOD PRESSURE: 128 MMHG | DIASTOLIC BLOOD PRESSURE: 76 MMHG | HEIGHT: 63 IN | BODY MASS INDEX: 28.7 KG/M2 | WEIGHT: 162 LBS

## 2018-05-14 PROCEDURE — 99024 POSTOP FOLLOW-UP VISIT: CPT

## 2018-06-12 ENCOUNTER — APPOINTMENT (OUTPATIENT)
Dept: OTOLARYNGOLOGY | Facility: CLINIC | Age: 78
End: 2018-06-12

## 2018-06-13 ENCOUNTER — OUTPATIENT (OUTPATIENT)
Dept: OUTPATIENT SERVICES | Facility: HOSPITAL | Age: 78
LOS: 1 days | Discharge: HOME | End: 2018-06-13

## 2018-06-13 DIAGNOSIS — Z98.890 OTHER SPECIFIED POSTPROCEDURAL STATES: Chronic | ICD-10-CM

## 2018-06-13 DIAGNOSIS — I25.701 ATHEROSCLEROSIS OF CORONARY ARTERY BYPASS GRAFT(S), UNSPECIFIED, WITH ANGINA PECTORIS WITH DOCUMENTED SPASM: Chronic | ICD-10-CM

## 2018-06-15 DIAGNOSIS — K05.4 PERIODONTOSIS: ICD-10-CM

## 2018-06-17 ENCOUNTER — EMERGENCY (EMERGENCY)
Facility: HOSPITAL | Age: 78
LOS: 0 days | Discharge: HOME | End: 2018-06-17
Attending: EMERGENCY MEDICINE | Admitting: EMERGENCY MEDICINE

## 2018-06-17 VITALS
SYSTOLIC BLOOD PRESSURE: 143 MMHG | OXYGEN SATURATION: 97 % | TEMPERATURE: 98 F | HEART RATE: 64 BPM | DIASTOLIC BLOOD PRESSURE: 67 MMHG | RESPIRATION RATE: 18 BRPM

## 2018-06-17 VITALS
OXYGEN SATURATION: 98 % | HEART RATE: 71 BPM | TEMPERATURE: 98 F | SYSTOLIC BLOOD PRESSURE: 137 MMHG | DIASTOLIC BLOOD PRESSURE: 70 MMHG | RESPIRATION RATE: 18 BRPM

## 2018-06-17 DIAGNOSIS — I25.701 ATHEROSCLEROSIS OF CORONARY ARTERY BYPASS GRAFT(S), UNSPECIFIED, WITH ANGINA PECTORIS WITH DOCUMENTED SPASM: Chronic | ICD-10-CM

## 2018-06-17 DIAGNOSIS — F32.9 MAJOR DEPRESSIVE DISORDER, SINGLE EPISODE, UNSPECIFIED: ICD-10-CM

## 2018-06-17 DIAGNOSIS — Z90.710 ACQUIRED ABSENCE OF BOTH CERVIX AND UTERUS: ICD-10-CM

## 2018-06-17 DIAGNOSIS — R10.30 LOWER ABDOMINAL PAIN, UNSPECIFIED: ICD-10-CM

## 2018-06-17 DIAGNOSIS — I10 ESSENTIAL (PRIMARY) HYPERTENSION: ICD-10-CM

## 2018-06-17 DIAGNOSIS — R11.0 NAUSEA: ICD-10-CM

## 2018-06-17 DIAGNOSIS — Z79.899 OTHER LONG TERM (CURRENT) DRUG THERAPY: ICD-10-CM

## 2018-06-17 DIAGNOSIS — Z79.1 LONG TERM (CURRENT) USE OF NON-STEROIDAL ANTI-INFLAMMATORIES (NSAID): ICD-10-CM

## 2018-06-17 DIAGNOSIS — Z88.8 ALLERGY STATUS TO OTHER DRUGS, MEDICAMENTS AND BIOLOGICAL SUBSTANCES: ICD-10-CM

## 2018-06-17 DIAGNOSIS — Z79.2 LONG TERM (CURRENT) USE OF ANTIBIOTICS: ICD-10-CM

## 2018-06-17 DIAGNOSIS — R42 DIZZINESS AND GIDDINESS: ICD-10-CM

## 2018-06-17 DIAGNOSIS — E78.00 PURE HYPERCHOLESTEROLEMIA, UNSPECIFIED: ICD-10-CM

## 2018-06-17 DIAGNOSIS — R53.1 WEAKNESS: ICD-10-CM

## 2018-06-17 DIAGNOSIS — Z98.890 OTHER SPECIFIED POSTPROCEDURAL STATES: Chronic | ICD-10-CM

## 2018-06-17 LAB
ALBUMIN SERPL ELPH-MCNC: 4 G/DL — SIGNIFICANT CHANGE UP (ref 3.5–5.2)
ALP SERPL-CCNC: 70 U/L — SIGNIFICANT CHANGE UP (ref 30–115)
ALT FLD-CCNC: 11 U/L — SIGNIFICANT CHANGE UP (ref 0–41)
ANION GAP SERPL CALC-SCNC: 11 MMOL/L — SIGNIFICANT CHANGE UP (ref 7–14)
APPEARANCE UR: CLEAR — SIGNIFICANT CHANGE UP
AST SERPL-CCNC: 19 U/L — SIGNIFICANT CHANGE UP (ref 0–41)
BASOPHILS # BLD AUTO: 0.02 K/UL — SIGNIFICANT CHANGE UP (ref 0–0.2)
BASOPHILS NFR BLD AUTO: 0.2 % — SIGNIFICANT CHANGE UP (ref 0–1)
BILIRUB SERPL-MCNC: 0.3 MG/DL — SIGNIFICANT CHANGE UP (ref 0.2–1.2)
BILIRUB UR-MCNC: NEGATIVE — SIGNIFICANT CHANGE UP
BUN SERPL-MCNC: 25 MG/DL — HIGH (ref 10–20)
CALCIUM SERPL-MCNC: 8.5 MG/DL — SIGNIFICANT CHANGE UP (ref 8.5–10.1)
CHLORIDE SERPL-SCNC: 102 MMOL/L — SIGNIFICANT CHANGE UP (ref 98–110)
CO2 SERPL-SCNC: 25 MMOL/L — SIGNIFICANT CHANGE UP (ref 17–32)
COLOR SPEC: YELLOW — SIGNIFICANT CHANGE UP
CREAT SERPL-MCNC: 0.9 MG/DL — SIGNIFICANT CHANGE UP (ref 0.7–1.5)
DIFF PNL FLD: (no result)
EOSINOPHIL # BLD AUTO: 0.05 K/UL — SIGNIFICANT CHANGE UP (ref 0–0.7)
EOSINOPHIL NFR BLD AUTO: 0.6 % — SIGNIFICANT CHANGE UP (ref 0–8)
GLUCOSE SERPL-MCNC: 95 MG/DL — SIGNIFICANT CHANGE UP (ref 70–99)
GLUCOSE UR QL: NEGATIVE MG/DL — SIGNIFICANT CHANGE UP
HCT VFR BLD CALC: 36.7 % — LOW (ref 37–47)
HGB BLD-MCNC: 11.9 G/DL — LOW (ref 12–16)
IMM GRANULOCYTES NFR BLD AUTO: 0.7 % — HIGH (ref 0.1–0.3)
KETONES UR-MCNC: NEGATIVE — SIGNIFICANT CHANGE UP
LACTATE SERPL-SCNC: 1.2 MMOL/L — SIGNIFICANT CHANGE UP (ref 0.5–2.2)
LEUKOCYTE ESTERASE UR-ACNC: NEGATIVE — SIGNIFICANT CHANGE UP
LYMPHOCYTES # BLD AUTO: 1.33 K/UL — SIGNIFICANT CHANGE UP (ref 1.2–3.4)
LYMPHOCYTES # BLD AUTO: 15.9 % — LOW (ref 20.5–51.1)
MCHC RBC-ENTMCNC: 27.2 PG — SIGNIFICANT CHANGE UP (ref 27–31)
MCHC RBC-ENTMCNC: 32.4 G/DL — SIGNIFICANT CHANGE UP (ref 32–37)
MCV RBC AUTO: 84 FL — SIGNIFICANT CHANGE UP (ref 81–99)
MONOCYTES # BLD AUTO: 0.61 K/UL — HIGH (ref 0.1–0.6)
MONOCYTES NFR BLD AUTO: 7.3 % — SIGNIFICANT CHANGE UP (ref 1.7–9.3)
NEUTROPHILS # BLD AUTO: 6.28 K/UL — SIGNIFICANT CHANGE UP (ref 1.4–6.5)
NEUTROPHILS NFR BLD AUTO: 75.3 % — HIGH (ref 42.2–75.2)
NITRITE UR-MCNC: NEGATIVE — SIGNIFICANT CHANGE UP
PH UR: 6 — SIGNIFICANT CHANGE UP (ref 5–8)
PLATELET # BLD AUTO: 166 K/UL — SIGNIFICANT CHANGE UP (ref 130–400)
POTASSIUM SERPL-MCNC: 5.1 MMOL/L — HIGH (ref 3.5–5)
POTASSIUM SERPL-SCNC: 5.1 MMOL/L — HIGH (ref 3.5–5)
PROT SERPL-MCNC: 6.7 G/DL — SIGNIFICANT CHANGE UP (ref 6–8)
PROT UR-MCNC: NEGATIVE MG/DL — SIGNIFICANT CHANGE UP
RBC # BLD: 4.37 M/UL — SIGNIFICANT CHANGE UP (ref 4.2–5.4)
RBC # FLD: 14.4 % — SIGNIFICANT CHANGE UP (ref 11.5–14.5)
SODIUM SERPL-SCNC: 138 MMOL/L — SIGNIFICANT CHANGE UP (ref 135–146)
SP GR SPEC: 1.01 — SIGNIFICANT CHANGE UP (ref 1.01–1.03)
TROPONIN T SERPL-MCNC: <0.01 NG/ML — SIGNIFICANT CHANGE UP
UROBILINOGEN FLD QL: 1 MG/DL (ref 0.2–0.2)
WBC # BLD: 8.35 K/UL — SIGNIFICANT CHANGE UP (ref 4.8–10.8)
WBC # FLD AUTO: 8.35 K/UL — SIGNIFICANT CHANGE UP (ref 4.8–10.8)

## 2018-06-17 RX ORDER — SODIUM CHLORIDE 9 MG/ML
1000 INJECTION, SOLUTION INTRAVENOUS ONCE
Qty: 0 | Refills: 0 | Status: COMPLETED | OUTPATIENT
Start: 2018-06-17 | End: 2018-06-17

## 2018-06-17 RX ORDER — ONDANSETRON 8 MG/1
4 TABLET, FILM COATED ORAL ONCE
Qty: 0 | Refills: 0 | Status: COMPLETED | OUTPATIENT
Start: 2018-06-17 | End: 2018-06-17

## 2018-06-17 RX ADMIN — ONDANSETRON 4 MILLIGRAM(S): 8 TABLET, FILM COATED ORAL at 11:56

## 2018-06-17 RX ADMIN — SODIUM CHLORIDE 1000 MILLILITER(S): 9 INJECTION, SOLUTION INTRAVENOUS at 11:56

## 2018-06-17 NOTE — ED ADULT TRIAGE NOTE - CHIEF COMPLAINT QUOTE
patient reports lower abdominal  with nausea onset 1 week ago after starting lyrica. no diarrhea or fever

## 2018-06-17 NOTE — ED PROVIDER NOTE - ATTENDING CONTRIBUTION TO CARE
77y f h/o ?cabg, htn, hl, glaucoma, uterine CA s/p SHAAN & chemo in 2016, glaucoma p/w generalized weakness x 1 week. Accomp by lightheadedness and nausea. Recently started taking lyrica for post-chemo NP a few d ago, as well as amoxicillin & motrin for tooth inf appx 1 week ago. Thinks multiple new meds contributing to her sx. Denies f/c, uri sx, cough, cp/sob, v/d, abd pain, back or flank pain, urinary sx, rash, edema. PE: elderly f wdwn nad, ncat, neck supple, rrr nl s1s2 no mrg, ctab no wrr, abd soft ntnd no palpable masses no rgr, no cvat, ext no cce dpi, neuro aaox3 grossly nf exam. a/p: gen weakness, nausea, lightheadedness, recent polypharmacy - ekg, cxr, labs, urine, reassess.

## 2018-06-17 NOTE — ED ADULT NURSE NOTE - OBJECTIVE STATEMENT
Patient C/O N/V x 1 week after starting new medications ( Amoxicillin, Lyrica, Ibuprofen ). Patient denies CP, SOB Constipation, diarrhea, fevers and chills. Last BM this Morning and asper patient large amount.

## 2018-06-17 NOTE — ED PROVIDER NOTE - NS ED ROS FT
Eyes:  No visual changes, eye pain or discharge.  ENMT:  no sore throat or runny nose. dental extraction one week ago.   Cardiac:  No chest pain, SOB   Respiratory:  No cough or respiratory distress.   GI:  + N, no v/d, + lower abdominal cramping pain x 1 week that has not resolved.   :  No dysuria, frequency or burning. no vaginal bleeding or discharge.   MS:  No , joint pain or back pain.  Neuro:  No headache or weakness.    Skin:  No skin rash.   Endocrine: No history of thyroid disease or diabetes.

## 2018-06-17 NOTE — ED PROVIDER NOTE - PROGRESS NOTE DETAILS
patient reexamined feeling better. WIll discharge, understands to follow up with pmd. return precautions given.

## 2018-06-17 NOTE — ED PROVIDER NOTE - PHYSICAL EXAMINATION
CONSTITUTIONAL: Well-developed; well-nourished; in no acute distress.   SKIN: warm, dry  HEAD: Normocephalic; atraumatic.  EYES: PERRL, no conjunctival erythema  ENT: No nasal discharge; airway clear. + tooth extraction no drainage, erythema or edema   NECK: Supple; non tender.  CARD: S1, S2 normal; Regular rate and rhythm.   RESP: No wheezes, rales or rhonchi.  ABD: soft non distended, mild suprapubic tenderness, no rebound or guarding, no cva tenderness   EXT: Normal ROM.    LYMPH: No acute cervical adenopathy.

## 2018-06-17 NOTE — ED PROVIDER NOTE - OBJECTIVE STATEMENT
76 yo F pmh of HTN, depression, uterine cancer with hysterectomy and chemo now in remission. Coming in with N and abdominal pain. States that she was recently started on lyrica. States that for the last week she has been having lower abdominal pain. no diarrhea, no fever, no vomiting, + Nausea. No cp, no sob. No urinary symptoms, no vaginal symptoms. Also reports dental extraction 1 week ago and was started on amoxicillin and motrin. No dental pain swelling or drainage.

## 2018-06-18 LAB
CULTURE RESULTS: SIGNIFICANT CHANGE UP
SPECIMEN SOURCE: SIGNIFICANT CHANGE UP

## 2018-06-20 ENCOUNTER — OUTPATIENT (OUTPATIENT)
Dept: OUTPATIENT SERVICES | Facility: HOSPITAL | Age: 78
LOS: 1 days | Discharge: HOME | End: 2018-06-20

## 2018-06-20 DIAGNOSIS — K05.4 PERIODONTOSIS: ICD-10-CM

## 2018-06-20 DIAGNOSIS — I25.701 ATHEROSCLEROSIS OF CORONARY ARTERY BYPASS GRAFT(S), UNSPECIFIED, WITH ANGINA PECTORIS WITH DOCUMENTED SPASM: Chronic | ICD-10-CM

## 2018-06-20 DIAGNOSIS — Z98.890 OTHER SPECIFIED POSTPROCEDURAL STATES: Chronic | ICD-10-CM

## 2018-06-25 ENCOUNTER — OUTPATIENT (OUTPATIENT)
Dept: OUTPATIENT SERVICES | Facility: HOSPITAL | Age: 78
LOS: 1 days | Discharge: HOME | End: 2018-06-25

## 2018-06-25 DIAGNOSIS — Z98.890 OTHER SPECIFIED POSTPROCEDURAL STATES: Chronic | ICD-10-CM

## 2018-06-25 DIAGNOSIS — I25.701 ATHEROSCLEROSIS OF CORONARY ARTERY BYPASS GRAFT(S), UNSPECIFIED, WITH ANGINA PECTORIS WITH DOCUMENTED SPASM: Chronic | ICD-10-CM

## 2018-06-27 ENCOUNTER — OUTPATIENT (OUTPATIENT)
Dept: OUTPATIENT SERVICES | Facility: HOSPITAL | Age: 78
LOS: 1 days | Discharge: HOME | End: 2018-06-27

## 2018-06-27 DIAGNOSIS — N39.0 URINARY TRACT INFECTION, SITE NOT SPECIFIED: ICD-10-CM

## 2018-06-27 DIAGNOSIS — I25.10 ATHEROSCLEROTIC HEART DISEASE OF NATIVE CORONARY ARTERY WITHOUT ANGINA PECTORIS: ICD-10-CM

## 2018-06-27 DIAGNOSIS — E78.00 PURE HYPERCHOLESTEROLEMIA, UNSPECIFIED: ICD-10-CM

## 2018-06-27 DIAGNOSIS — I10 ESSENTIAL (PRIMARY) HYPERTENSION: ICD-10-CM

## 2018-06-27 DIAGNOSIS — Z98.890 OTHER SPECIFIED POSTPROCEDURAL STATES: Chronic | ICD-10-CM

## 2018-06-27 DIAGNOSIS — I25.701 ATHEROSCLEROSIS OF CORONARY ARTERY BYPASS GRAFT(S), UNSPECIFIED, WITH ANGINA PECTORIS WITH DOCUMENTED SPASM: Chronic | ICD-10-CM

## 2018-06-29 ENCOUNTER — EMERGENCY (EMERGENCY)
Facility: HOSPITAL | Age: 78
LOS: 0 days | Discharge: HOME | End: 2018-06-29
Attending: EMERGENCY MEDICINE | Admitting: EMERGENCY MEDICINE

## 2018-06-29 VITALS
HEART RATE: 76 BPM | DIASTOLIC BLOOD PRESSURE: 76 MMHG | RESPIRATION RATE: 18 BRPM | OXYGEN SATURATION: 98 % | SYSTOLIC BLOOD PRESSURE: 165 MMHG | TEMPERATURE: 98 F

## 2018-06-29 VITALS
SYSTOLIC BLOOD PRESSURE: 150 MMHG | DIASTOLIC BLOOD PRESSURE: 72 MMHG | OXYGEN SATURATION: 99 % | RESPIRATION RATE: 17 BRPM | HEART RATE: 70 BPM | TEMPERATURE: 98 F

## 2018-06-29 DIAGNOSIS — Z79.1 LONG TERM (CURRENT) USE OF NON-STEROIDAL ANTI-INFLAMMATORIES (NSAID): ICD-10-CM

## 2018-06-29 DIAGNOSIS — Z79.84 LONG TERM (CURRENT) USE OF ORAL HYPOGLYCEMIC DRUGS: ICD-10-CM

## 2018-06-29 DIAGNOSIS — R10.9 UNSPECIFIED ABDOMINAL PAIN: ICD-10-CM

## 2018-06-29 DIAGNOSIS — Z79.811 LONG TERM (CURRENT) USE OF AROMATASE INHIBITORS: ICD-10-CM

## 2018-06-29 DIAGNOSIS — E78.00 PURE HYPERCHOLESTEROLEMIA, UNSPECIFIED: ICD-10-CM

## 2018-06-29 DIAGNOSIS — Z79.01 LONG TERM (CURRENT) USE OF ANTICOAGULANTS: ICD-10-CM

## 2018-06-29 DIAGNOSIS — Z79.2 LONG TERM (CURRENT) USE OF ANTIBIOTICS: ICD-10-CM

## 2018-06-29 DIAGNOSIS — K57.92 DIVERTICULITIS OF INTESTINE, PART UNSPECIFIED, WITHOUT PERFORATION OR ABSCESS WITHOUT BLEEDING: ICD-10-CM

## 2018-06-29 DIAGNOSIS — I25.701 ATHEROSCLEROSIS OF CORONARY ARTERY BYPASS GRAFT(S), UNSPECIFIED, WITH ANGINA PECTORIS WITH DOCUMENTED SPASM: Chronic | ICD-10-CM

## 2018-06-29 DIAGNOSIS — I10 ESSENTIAL (PRIMARY) HYPERTENSION: ICD-10-CM

## 2018-06-29 DIAGNOSIS — Z98.890 OTHER SPECIFIED POSTPROCEDURAL STATES: Chronic | ICD-10-CM

## 2018-06-29 LAB
ALBUMIN SERPL ELPH-MCNC: 3.9 G/DL — SIGNIFICANT CHANGE UP (ref 3.5–5.2)
ALP SERPL-CCNC: 69 U/L — SIGNIFICANT CHANGE UP (ref 30–115)
ALT FLD-CCNC: 10 U/L — SIGNIFICANT CHANGE UP (ref 0–41)
ANION GAP SERPL CALC-SCNC: 11 MMOL/L — SIGNIFICANT CHANGE UP (ref 7–14)
APPEARANCE UR: CLEAR — SIGNIFICANT CHANGE UP
AST SERPL-CCNC: 22 U/L — SIGNIFICANT CHANGE UP (ref 0–41)
BASOPHILS # BLD AUTO: 0.03 K/UL — SIGNIFICANT CHANGE UP (ref 0–0.2)
BASOPHILS NFR BLD AUTO: 0.5 % — SIGNIFICANT CHANGE UP (ref 0–1)
BILIRUB SERPL-MCNC: 0.3 MG/DL — SIGNIFICANT CHANGE UP (ref 0.2–1.2)
BILIRUB UR-MCNC: NEGATIVE — SIGNIFICANT CHANGE UP
BUN SERPL-MCNC: 17 MG/DL — SIGNIFICANT CHANGE UP (ref 10–20)
CALCIUM SERPL-MCNC: 8.6 MG/DL — SIGNIFICANT CHANGE UP (ref 8.5–10.1)
CHLORIDE SERPL-SCNC: 103 MMOL/L — SIGNIFICANT CHANGE UP (ref 98–110)
CO2 SERPL-SCNC: 28 MMOL/L — SIGNIFICANT CHANGE UP (ref 17–32)
COLOR SPEC: YELLOW — SIGNIFICANT CHANGE UP
CREAT SERPL-MCNC: 0.8 MG/DL — SIGNIFICANT CHANGE UP (ref 0.7–1.5)
DIFF PNL FLD: NEGATIVE — SIGNIFICANT CHANGE UP
EOSINOPHIL # BLD AUTO: 0.1 K/UL — SIGNIFICANT CHANGE UP (ref 0–0.7)
EOSINOPHIL NFR BLD AUTO: 1.7 % — SIGNIFICANT CHANGE UP (ref 0–8)
GLUCOSE SERPL-MCNC: 87 MG/DL — SIGNIFICANT CHANGE UP (ref 70–99)
GLUCOSE UR QL: NEGATIVE MG/DL — SIGNIFICANT CHANGE UP
HCT VFR BLD CALC: 36.5 % — LOW (ref 37–47)
HGB BLD-MCNC: 11.7 G/DL — LOW (ref 12–16)
IMM GRANULOCYTES NFR BLD AUTO: 0.3 % — SIGNIFICANT CHANGE UP (ref 0.1–0.3)
KETONES UR-MCNC: NEGATIVE — SIGNIFICANT CHANGE UP
LACTATE SERPL-SCNC: 0.8 MMOL/L — SIGNIFICANT CHANGE UP (ref 0.5–2.2)
LEUKOCYTE ESTERASE UR-ACNC: NEGATIVE — SIGNIFICANT CHANGE UP
LIDOCAIN IGE QN: 38 U/L — SIGNIFICANT CHANGE UP (ref 7–60)
LYMPHOCYTES # BLD AUTO: 2.34 K/UL — SIGNIFICANT CHANGE UP (ref 1.2–3.4)
LYMPHOCYTES # BLD AUTO: 38.6 % — SIGNIFICANT CHANGE UP (ref 20.5–51.1)
MCHC RBC-ENTMCNC: 26.7 PG — LOW (ref 27–31)
MCHC RBC-ENTMCNC: 32.1 G/DL — SIGNIFICANT CHANGE UP (ref 32–37)
MCV RBC AUTO: 83.3 FL — SIGNIFICANT CHANGE UP (ref 81–99)
MONOCYTES # BLD AUTO: 0.68 K/UL — HIGH (ref 0.1–0.6)
MONOCYTES NFR BLD AUTO: 11.2 % — HIGH (ref 1.7–9.3)
NEUTROPHILS # BLD AUTO: 2.89 K/UL — SIGNIFICANT CHANGE UP (ref 1.4–6.5)
NEUTROPHILS NFR BLD AUTO: 47.7 % — SIGNIFICANT CHANGE UP (ref 42.2–75.2)
NITRITE UR-MCNC: NEGATIVE — SIGNIFICANT CHANGE UP
NRBC # BLD: 0 /100 WBCS — SIGNIFICANT CHANGE UP (ref 0–0)
PH UR: 7 — SIGNIFICANT CHANGE UP (ref 5–8)
PLATELET # BLD AUTO: 204 K/UL — SIGNIFICANT CHANGE UP (ref 130–400)
POTASSIUM SERPL-MCNC: 4.7 MMOL/L — SIGNIFICANT CHANGE UP (ref 3.5–5)
POTASSIUM SERPL-SCNC: 4.7 MMOL/L — SIGNIFICANT CHANGE UP (ref 3.5–5)
PROT SERPL-MCNC: 6.9 G/DL — SIGNIFICANT CHANGE UP (ref 6–8)
PROT UR-MCNC: NEGATIVE MG/DL — SIGNIFICANT CHANGE UP
RBC # BLD: 4.38 M/UL — SIGNIFICANT CHANGE UP (ref 4.2–5.4)
RBC # FLD: 14.2 % — SIGNIFICANT CHANGE UP (ref 11.5–14.5)
SODIUM SERPL-SCNC: 142 MMOL/L — SIGNIFICANT CHANGE UP (ref 135–146)
SP GR SPEC: 1.02 — SIGNIFICANT CHANGE UP (ref 1.01–1.03)
UROBILINOGEN FLD QL: 1 MG/DL (ref 0.2–0.2)
WBC # BLD: 6.06 K/UL — SIGNIFICANT CHANGE UP (ref 4.8–10.8)
WBC # FLD AUTO: 6.06 K/UL — SIGNIFICANT CHANGE UP (ref 4.8–10.8)

## 2018-06-29 RX ORDER — MOXIFLOXACIN HYDROCHLORIDE TABLETS, 400 MG 400 MG/1
1 TABLET, FILM COATED ORAL
Qty: 20 | Refills: 0 | OUTPATIENT
Start: 2018-06-29 | End: 2018-07-08

## 2018-06-29 RX ORDER — METRONIDAZOLE 500 MG
1 TABLET ORAL
Qty: 30 | Refills: 0 | OUTPATIENT
Start: 2018-06-29 | End: 2018-07-08

## 2018-06-29 NOTE — ED PROVIDER NOTE - NS ED ROS FT
Constitutional:  See HPI.  Eyes:  No visual changes, eye pain or discharge.  ENMT:  No hearing changes, pain, discharge or infections. No neck pain or stiffness.  Cardiac:  No chest pain, SOB or edema. No chest pain with exertion.  Respiratory:  No cough or respiratory distress. No hemoptysis. No history of asthma or RAD.  GI: see hpi  :  No dysuria, frequency or burning.  MS:  No myalgia, muscle weakness, joint pain or back pain.  Neuro:  No headache or weakness.  No LOC.  Skin:  No skin rash.   Endocrine: No history of thyroid disease or diabetes.  Except as documented in the HPI,  all other systems are negative.

## 2018-06-29 NOTE — ED ADULT NURSE NOTE - CHPI ED SYMPTOMS NEG
no blood in stool/no vomiting/no burning urination/no diarrhea/no abdominal distension/no chills/no hematuria/no nausea/no fever/no dysuria

## 2018-06-29 NOTE — ED ADULT NURSE NOTE - PMH
Cataract of right eye    Glaucoma    High cholesterol    Hypertension    Left lower quadrant pain    Uterine cancer

## 2018-06-29 NOTE — ED PROVIDER NOTE - OBJECTIVE STATEMENT
78 Y/O F HTN, DYSLIPIDEMIA, CAD, S/P CABG, OVARIAN CA S/P SHAAN (S/P CHEMOTHERAPY) C/O 2 DAYS OF LLQ PAIN. PAIN IS MILD AND CONSTANT AND RADIATES TO THE L FLANK. + CONSTIPATION. LAST BM 5 DAYS AGO. PT WITH A H/O CONSTIPATION. NO BPR OR MELENA. NO FEVER, CHILLS. NO N/V. NORMAL URINATION. LAST COLONOSCOPY LAST YEAR WAS REPORTEDLY NORMAL.

## 2018-06-29 NOTE — ED PROVIDER NOTE - PHYSICAL EXAMINATION
CONSTITUTIONAL: Well-appearing; well-nourished; in no apparent distress.   HEAD: Normocephalic; atraumatic.   EYES: PERRL; EOM intact. Conjunctiva normal B/L.   ENT: Normal pharynx with no tonsillar hypertrophy. MMM.  NECK: Supple; non-tender; no cervical lymphadenopathy.   CHEST: Normal chest excursion with respiration.   CARDIOVASCULAR: Normal S1, S2; no murmurs, rubs, or gallops.   RESPIRATORY: Normal chest excursion with respiration; breath sounds clear and equal bilaterally; no wheezes, rhonchi, or rales.  GI/: Normal bowel sounds; non-distended; non-tender.  BACK: No evidence of trauma or deformity. Non-tender to palpation. No CVA tenderness.   EXT: Normal ROM in all four extremities; non-tender to palpation; distal pulses are normal. No leg edema B/L.   SKIN: Normal for age and race; warm; dry; good turgor.  NEURO: A & O x 4; CN 2-12 intact. Grossly unremarkable.

## 2018-06-29 NOTE — ED PROVIDER NOTE - PROGRESS NOTE DETAILS
ALL RESULTS D/W PT. UNABLE TO REACH DR. STARR, PTS GI. PT INSTRUCTED TO FOLLOW UP WITH GI OR OSKAR BURRIS NEXT WEEK AND INSTRUCTED ON ABX FOR DIVERTICULITIS. PT INSTRUCTED TO RETURN TO THE ED FOR WORSENING SXS, FEVER, VOMITING, OR ANY PROBLEMS/ CONCERNS. PT VERBALIZED UNDERSTANDING. CASE D/W DR. BURRIS, WILL FOLLOW PT AS AN OUTPT NEXT WEEK.

## 2018-07-11 ENCOUNTER — OUTPATIENT (OUTPATIENT)
Dept: OUTPATIENT SERVICES | Facility: HOSPITAL | Age: 78
LOS: 1 days | Discharge: HOME | End: 2018-07-11

## 2018-07-11 DIAGNOSIS — I10 ESSENTIAL (PRIMARY) HYPERTENSION: ICD-10-CM

## 2018-07-11 DIAGNOSIS — Z98.890 OTHER SPECIFIED POSTPROCEDURAL STATES: Chronic | ICD-10-CM

## 2018-07-11 DIAGNOSIS — I25.701 ATHEROSCLEROSIS OF CORONARY ARTERY BYPASS GRAFT(S), UNSPECIFIED, WITH ANGINA PECTORIS WITH DOCUMENTED SPASM: Chronic | ICD-10-CM

## 2018-08-13 ENCOUNTER — OUTPATIENT (OUTPATIENT)
Dept: OUTPATIENT SERVICES | Facility: HOSPITAL | Age: 78
LOS: 1 days | Discharge: HOME | End: 2018-08-13

## 2018-08-13 DIAGNOSIS — I25.701 ATHEROSCLEROSIS OF CORONARY ARTERY BYPASS GRAFT(S), UNSPECIFIED, WITH ANGINA PECTORIS WITH DOCUMENTED SPASM: Chronic | ICD-10-CM

## 2018-08-13 DIAGNOSIS — Z98.890 OTHER SPECIFIED POSTPROCEDURAL STATES: Chronic | ICD-10-CM

## 2018-08-13 PROBLEM — H26.9 UNSPECIFIED CATARACT: Chronic | Status: ACTIVE | Noted: 2018-02-05

## 2018-08-13 PROBLEM — E78.00 PURE HYPERCHOLESTEROLEMIA, UNSPECIFIED: Chronic | Status: ACTIVE | Noted: 2018-02-05

## 2018-08-13 PROBLEM — I10 ESSENTIAL (PRIMARY) HYPERTENSION: Chronic | Status: ACTIVE | Noted: 2018-02-05

## 2018-08-13 PROBLEM — H40.9 UNSPECIFIED GLAUCOMA: Chronic | Status: ACTIVE | Noted: 2018-04-19

## 2018-08-16 ENCOUNTER — APPOINTMENT (OUTPATIENT)
Dept: OTOLARYNGOLOGY | Facility: CLINIC | Age: 78
End: 2018-08-16

## 2018-08-29 ENCOUNTER — OUTPATIENT (OUTPATIENT)
Dept: OUTPATIENT SERVICES | Facility: HOSPITAL | Age: 78
LOS: 1 days | Discharge: HOME | End: 2018-08-29

## 2018-08-29 DIAGNOSIS — K08.409 PARTIAL LOSS OF TEETH, UNSPECIFIED CAUSE, UNSPECIFIED CLASS: ICD-10-CM

## 2018-08-29 DIAGNOSIS — Z98.890 OTHER SPECIFIED POSTPROCEDURAL STATES: Chronic | ICD-10-CM

## 2018-08-29 DIAGNOSIS — I25.701 ATHEROSCLEROSIS OF CORONARY ARTERY BYPASS GRAFT(S), UNSPECIFIED, WITH ANGINA PECTORIS WITH DOCUMENTED SPASM: Chronic | ICD-10-CM

## 2018-09-05 ENCOUNTER — INPATIENT (INPATIENT)
Facility: HOSPITAL | Age: 78
LOS: 11 days | Discharge: HOME HEALTH | End: 2018-09-17
Attending: THORACIC SURGERY (CARDIOTHORACIC VASCULAR SURGERY) | Admitting: THORACIC SURGERY (CARDIOTHORACIC VASCULAR SURGERY)
Payer: COMMERCIAL

## 2018-09-05 VITALS
DIASTOLIC BLOOD PRESSURE: 70 MMHG | OXYGEN SATURATION: 100 % | RESPIRATION RATE: 18 BRPM | HEART RATE: 76 BPM | SYSTOLIC BLOOD PRESSURE: 150 MMHG | TEMPERATURE: 99 F

## 2018-09-05 DIAGNOSIS — I25.701 ATHEROSCLEROSIS OF CORONARY ARTERY BYPASS GRAFT(S), UNSPECIFIED, WITH ANGINA PECTORIS WITH DOCUMENTED SPASM: Chronic | ICD-10-CM

## 2018-09-05 DIAGNOSIS — Z98.890 OTHER SPECIFIED POSTPROCEDURAL STATES: Chronic | ICD-10-CM

## 2018-09-05 LAB
ALBUMIN SERPL ELPH-MCNC: 4.1 G/DL — SIGNIFICANT CHANGE UP (ref 3.5–5.2)
ALP SERPL-CCNC: 81 U/L — SIGNIFICANT CHANGE UP (ref 30–115)
ALT FLD-CCNC: 10 U/L — SIGNIFICANT CHANGE UP (ref 0–41)
ANION GAP SERPL CALC-SCNC: 16 MMOL/L — HIGH (ref 7–14)
AST SERPL-CCNC: 18 U/L — SIGNIFICANT CHANGE UP (ref 0–41)
BASOPHILS # BLD AUTO: 0.03 K/UL — SIGNIFICANT CHANGE UP (ref 0–0.2)
BASOPHILS NFR BLD AUTO: 0.5 % — SIGNIFICANT CHANGE UP (ref 0–1)
BILIRUB SERPL-MCNC: <0.2 MG/DL — SIGNIFICANT CHANGE UP (ref 0.2–1.2)
BUN SERPL-MCNC: 25 MG/DL — HIGH (ref 10–20)
CALCIUM SERPL-MCNC: 8.8 MG/DL — SIGNIFICANT CHANGE UP (ref 8.5–10.1)
CHLORIDE SERPL-SCNC: 100 MMOL/L — SIGNIFICANT CHANGE UP (ref 98–110)
CO2 SERPL-SCNC: 24 MMOL/L — SIGNIFICANT CHANGE UP (ref 17–32)
CREAT SERPL-MCNC: 1.1 MG/DL — SIGNIFICANT CHANGE UP (ref 0.7–1.5)
EOSINOPHIL # BLD AUTO: 0.1 K/UL — SIGNIFICANT CHANGE UP (ref 0–0.7)
EOSINOPHIL NFR BLD AUTO: 1.5 % — SIGNIFICANT CHANGE UP (ref 0–8)
GLUCOSE SERPL-MCNC: 96 MG/DL — SIGNIFICANT CHANGE UP (ref 70–99)
HCT VFR BLD CALC: 36.5 % — LOW (ref 37–47)
HGB BLD-MCNC: 11.7 G/DL — LOW (ref 12–16)
IMM GRANULOCYTES NFR BLD AUTO: 0.2 % — SIGNIFICANT CHANGE UP (ref 0.1–0.3)
LIDOCAIN IGE QN: 36 U/L — SIGNIFICANT CHANGE UP (ref 7–60)
LYMPHOCYTES # BLD AUTO: 2.11 K/UL — SIGNIFICANT CHANGE UP (ref 1.2–3.4)
LYMPHOCYTES # BLD AUTO: 31.9 % — SIGNIFICANT CHANGE UP (ref 20.5–51.1)
MCHC RBC-ENTMCNC: 26.7 PG — LOW (ref 27–31)
MCHC RBC-ENTMCNC: 32.1 G/DL — SIGNIFICANT CHANGE UP (ref 32–37)
MCV RBC AUTO: 83.3 FL — SIGNIFICANT CHANGE UP (ref 81–99)
MONOCYTES # BLD AUTO: 0.75 K/UL — HIGH (ref 0.1–0.6)
MONOCYTES NFR BLD AUTO: 11.3 % — HIGH (ref 1.7–9.3)
NEUTROPHILS # BLD AUTO: 3.61 K/UL — SIGNIFICANT CHANGE UP (ref 1.4–6.5)
NEUTROPHILS NFR BLD AUTO: 54.6 % — SIGNIFICANT CHANGE UP (ref 42.2–75.2)
NRBC # BLD: 0 /100 WBCS — SIGNIFICANT CHANGE UP (ref 0–0)
NT-PROBNP SERPL-SCNC: 57 PG/ML — SIGNIFICANT CHANGE UP (ref 0–300)
PLATELET # BLD AUTO: 167 K/UL — SIGNIFICANT CHANGE UP (ref 130–400)
POTASSIUM SERPL-MCNC: 4.3 MMOL/L — SIGNIFICANT CHANGE UP (ref 3.5–5)
POTASSIUM SERPL-SCNC: 4.3 MMOL/L — SIGNIFICANT CHANGE UP (ref 3.5–5)
PROT SERPL-MCNC: 6.9 G/DL — SIGNIFICANT CHANGE UP (ref 6–8)
RBC # BLD: 4.38 M/UL — SIGNIFICANT CHANGE UP (ref 4.2–5.4)
RBC # FLD: 14.5 % — SIGNIFICANT CHANGE UP (ref 11.5–14.5)
SODIUM SERPL-SCNC: 140 MMOL/L — SIGNIFICANT CHANGE UP (ref 135–146)
TROPONIN T SERPL-MCNC: <0.01 NG/ML — SIGNIFICANT CHANGE UP
WBC # BLD: 6.61 K/UL — SIGNIFICANT CHANGE UP (ref 4.8–10.8)
WBC # FLD AUTO: 6.61 K/UL — SIGNIFICANT CHANGE UP (ref 4.8–10.8)

## 2018-09-05 RX ORDER — SIMVASTATIN 20 MG/1
40 TABLET, FILM COATED ORAL AT BEDTIME
Qty: 0 | Refills: 0 | Status: DISCONTINUED | OUTPATIENT
Start: 2018-09-05 | End: 2018-09-11

## 2018-09-05 RX ORDER — DORZOLAMIDE HYDROCHLORIDE 20 MG/ML
1 SOLUTION/ DROPS OPHTHALMIC THREE TIMES A DAY
Qty: 0 | Refills: 0 | Status: DISCONTINUED | OUTPATIENT
Start: 2018-09-05 | End: 2018-09-11

## 2018-09-05 RX ORDER — ACETAMINOPHEN 500 MG
650 TABLET ORAL EVERY 6 HOURS
Qty: 0 | Refills: 0 | Status: DISCONTINUED | OUTPATIENT
Start: 2018-09-05 | End: 2018-09-11

## 2018-09-05 RX ORDER — LOSARTAN POTASSIUM 100 MG/1
50 TABLET, FILM COATED ORAL DAILY
Qty: 0 | Refills: 0 | Status: DISCONTINUED | OUTPATIENT
Start: 2018-09-05 | End: 2018-09-10

## 2018-09-05 RX ORDER — ASPIRIN/CALCIUM CARB/MAGNESIUM 324 MG
325 TABLET ORAL ONCE
Qty: 0 | Refills: 0 | Status: COMPLETED | OUTPATIENT
Start: 2018-09-05 | End: 2018-09-05

## 2018-09-05 RX ORDER — AMLODIPINE BESYLATE 2.5 MG/1
5 TABLET ORAL DAILY
Qty: 0 | Refills: 0 | Status: DISCONTINUED | OUTPATIENT
Start: 2018-09-05 | End: 2018-09-10

## 2018-09-05 RX ORDER — AMOXICILLIN 250 MG/5ML
0 SUSPENSION, RECONSTITUTED, ORAL (ML) ORAL
Qty: 0 | Refills: 0 | COMMUNITY

## 2018-09-05 RX ORDER — ALPRAZOLAM 0.25 MG
0.25 TABLET ORAL ONCE
Qty: 0 | Refills: 0 | Status: DISCONTINUED | OUTPATIENT
Start: 2018-09-05 | End: 2018-09-05

## 2018-09-05 RX ORDER — ASPIRIN/CALCIUM CARB/MAGNESIUM 324 MG
81 TABLET ORAL DAILY
Qty: 0 | Refills: 0 | Status: DISCONTINUED | OUTPATIENT
Start: 2018-09-05 | End: 2018-09-11

## 2018-09-05 RX ORDER — IBUPROFEN 200 MG
0 TABLET ORAL
Qty: 0 | Refills: 0 | COMMUNITY

## 2018-09-05 RX ORDER — BRIMONIDINE TARTRATE 2 MG/MG
1 SOLUTION/ DROPS OPHTHALMIC
Qty: 0 | Refills: 0 | Status: DISCONTINUED | OUTPATIENT
Start: 2018-09-05 | End: 2018-09-11

## 2018-09-05 RX ORDER — HEPARIN SODIUM 5000 [USP'U]/ML
5000 INJECTION INTRAVENOUS; SUBCUTANEOUS EVERY 8 HOURS
Qty: 0 | Refills: 0 | Status: DISCONTINUED | OUTPATIENT
Start: 2018-09-05 | End: 2018-09-11

## 2018-09-05 RX ORDER — LATANOPROST 0.05 MG/ML
1 SOLUTION/ DROPS OPHTHALMIC; TOPICAL AT BEDTIME
Qty: 0 | Refills: 0 | Status: DISCONTINUED | OUTPATIENT
Start: 2018-09-05 | End: 2018-09-11

## 2018-09-05 RX ORDER — ONDANSETRON 8 MG/1
4 TABLET, FILM COATED ORAL EVERY 8 HOURS
Qty: 0 | Refills: 0 | Status: DISCONTINUED | OUTPATIENT
Start: 2018-09-05 | End: 2018-09-11

## 2018-09-05 RX ADMIN — Medication 325 MILLIGRAM(S): at 20:16

## 2018-09-05 NOTE — H&P ADULT - NSHPLABSRESULTS_GEN_ALL_CORE
LABS:                        11.7   6.61  )-----------( 167      ( 05 Sep 2018 20:11 )             36.5     09-05    140  |  100  |  25<H>  ----------------------------<  96  4.3   |  24  |  1.1    Ca    8.8      05 Sep 2018 20:11    TPro  6.9  /  Alb  4.1  /  TBili  <0.2  /  DBili  x   /  AST  18  /  ALT  10  /  AlkPhos  81  09-05          Troponin T, Serum: <0.01 ng/mL (09-05-18 @ 20:11)      CARDIAC MARKERS ( 05 Sep 2018 20:11 )  x     / <0.01 ng/mL / x     / x     / x          RADIOLOGY:

## 2018-09-05 NOTE — ED ADULT NURSE NOTE - OBJECTIVE STATEMENT
patient admitted to the ED with complaints of chest pain.  Patient denies n/v. Patient reports.  Patient denies SOB.

## 2018-09-05 NOTE — ED ADULT NURSE NOTE - NSIMPLEMENTINTERV_GEN_ALL_ED
Implemented All Universal Safety Interventions:  [place] to call system. Call bell, personal items and telephone within reach. Instruct patient to call for assistance. Room bathroom lighting operational. Non-slip footwear when patient is off stretcher. Physically safe environment: no spills, clutter or unnecessary equipment. Stretcher in lowest position, wheels locked, appropriate side rails in place.

## 2018-09-05 NOTE — H&P ADULT - NSHPPHYSICALEXAM_GEN_ALL_CORE
VITALS:   T(F): 99.1  HR: 76  BP: 150/70  RR: 18  SpO2: 100%    PHYSICAL EXAM:  GEN: No acute distress  LUNGS: Clear to auscultation bilaterally   HEART: S1/S2 present. RRR.   ABD: Soft, non-tender, non-distended. Bowel sounds present  EXT: NC/NC/NE/COLVIN  NEURO: AAOX3

## 2018-09-05 NOTE — ED ADULT NURSE NOTE - CHPI ED NUR SYMPTOMS NEG
no diaphoresis/no dizziness/no nausea/no chills/no back pain/no congestion/no vomiting/no shortness of breath

## 2018-09-05 NOTE — H&P ADULT - ATTENDING COMMENTS
Patient seen and examined independently. Agree with resident note/ history / physical exam and plan of care with following exceptions. Case discussed with house-staff, nursing and patient  today no pain, no sob   she states that she had negative stress test last year ( done for similar symptoms)     Vital Signs Last 24 Hrs  T(C): 36.1 (06 Sep 2018 07:28), Max: 37.3 (05 Sep 2018 16:45)  T(F): 97 (06 Sep 2018 07:28), Max: 99.1 (05 Sep 2018 16:45)  HR: 53 (06 Sep 2018 07:28) (53 - 76)  BP: 137/63 (06 Sep 2018 07:28) (137/63 - 181/77)  BP(mean): --  RR: 18 (06 Sep 2018 07:28) (17 - 18)  SpO2: 98% (06 Sep 2018 07:28) (98% - 100%)    Physical exam:   constitutional NAD, AAOX3, Respiratory  lungs CTA, CVS heart RRR, GI: abdomen Soft NT, ND, BS+, skin: intact  neuro exam non focal.                           11.7   6.61  )-----------( 167      ( 05 Sep 2018 20:11 )             36.5   09-05    140  |  100  |  25<H>  ----------------------------<  96  4.3   |  24  |  1.1    Ca    8.8      05 Sep 2018 20:11    TPro  6.9  /  Alb  4.1  /  TBili  <0.2  /  DBili  x   /  AST  18  /  ALT  10  /  AlkPhos  81  09-05    CARDIAC MARKERS ( 06 Sep 2018 09:07 )  x     / <0.01 ng/mL / 118 U/L / x     / 1.9 ng/mL  CARDIAC MARKERS ( 05 Sep 2018 20:11 )  x     / <0.01 ng/mL / x     / x     / x        ECG NL    Nuclear stress test ( nov 2017 )   Impression:   1. NORMAL ADENOSINE / REST MYOCARDIAL PERFUSION TOMOGRAPHY, WITH NO EVIDENCE  FOR ISCHEMIA DURING ADENOSINE INFUSION.   2. NORMAL RESTING LEFT VENTRICULAR WALL MOTION AND WALL THICKENING.  3. LEFT VENTRICULAR EJECTION FRACTION OF  75 % WHICH IS WITHIN RANGE OF NORMAL.    a/p  chest pain, neg for ACS, atypical. high risk pt, neg stress test one year ago  follow up cardio,   dc home when cleared by cardio ( poss dc today)     PAST MEDICAL & SURGICAL HISTORY:    Glaucoma  Cataract of right eye  High cholesterol  Hypertension  Uterine cancer  Athscl CABG, unsp, w angina pectoris w documented spasm  H/O total hysterectomy

## 2018-09-05 NOTE — ED PROVIDER NOTE - NS ED ROS FT
Review of Systems    Constitutional: (-) fever  Cardiovascular: (+) chest pain, (-) syncope  Respiratory: (-) cough, (-) shortness of breath  Gastrointestinal: (-) vomiting, (-) diarrhea  Musculoskeletal: (-) neck pain, (-) back pain  Integumentary: (-) rash

## 2018-09-05 NOTE — H&P ADULT - HISTORY OF PRESENT ILLNESS
78 F w/ pmh of HTN, HLD, CAD s/p CABG, Gluacoma, Diverticulosis, Uterine Ca s/p SHAAN & Chemo presenting to the ED c/o Chest pain. The CP is on the left side, radiated to the L arm, was a/w nausea & lasted for 2 minutes. She denies any sob, vomiting, diaphoresis, fevers, chills, syncope. 78 F w/ pmh of HTN, HLD, CAD s/p CABG, Gluacoma, Diverticulosis, Uterine Ca s/p SHAAN & Chemo presenting to the ED c/o Chest pain. The CP is on the left side, radiated to the L arm, was a/w nausea & lasted for 2 minutes & then resolved. She denies any sob, vomiting, diaphoresis, fevers, chills, syncope. Pt is asymptomatic in the ED.

## 2018-09-05 NOTE — H&P ADULT - NSHPOUTPATIENTPROVIDERS_GEN_ALL_CORE
PMD: Dr. Cruz  CArdiology: Dr. Moura  Oncology: Dr. Az Krueger PMD: Dr. Figueroa  Cardiology: Dr. Moura  Oncology: Dr. Az Krueger

## 2018-09-05 NOTE — ED PROVIDER NOTE - ATTENDING CONTRIBUTION TO CARE
77 yo hx of cad, brief cp episode. sx free in ED. in nad, ctab, s1s2, ab soft, nt. no calf tenderness. non focal. will do acs eval.

## 2018-09-05 NOTE — ED PROVIDER NOTE - PHYSICAL EXAMINATION
PHYSICAL EXAM:    GENERAL: Alert, appears stated age, well appearing, non-toxic  SKIN: Warm, pink and dry. MMM.   EYE: Normal lids/conjunctiva  ENT: Normal hearing, patent oropharynx   NECK: +supple. No meningismus  Pulm: Bilateral BS, normal resp effort, no wheezes, stridor, or retractions  CV: RRR, no M/R/G, 2+ pulses  Abd: soft, non-tender, non-distended, no hepatosplenomegaly. no CVA tenderness.   Mskel: no erythema, cyanosis, edema. no calf tenderness.   Neuro: AAOx3,  5/5 strength throughout. normal gait.

## 2018-09-05 NOTE — H&P ADULT - ASSESSMENT
78 F w/ pmh of HTN, HLD, CAD s/p CABG, Gluacoma, Diverticulosis, Uterine Ca s/p SHAAN & Chemo presenting to the ED c/o Chest pain radiating to L arm, sxs resolved by the time of arrival. VS are wnl, Labs negative ce x1, ekg showed no changes, pt admitted to r/o acs    # R/O ACS, CP resolved  - Repeat CEx2  - C/w ASA 81, pt received 325mg in ED  - Cardio eval    # HO HTN / HLD / CAD / CABG / Gluacoma  - C/w home meds    # DVTppx: Heparin  # DASHdiet  # Full code 78 F w/ pmh of HTN, HLD, CAD s/p CABG, Gluacoma, Diverticulosis, Uterine Ca s/p SHAAN & Chemo presenting to the ED c/o Chest pain radiating to L arm, sxs resolved by the time of arrival. VS are wnl, Labs negative ce x1, ekg showed no changes, pt admitted to r/o acs    # R/O ACS, CP resolved  - Repeat CEx2  - C/w ASA 81, pt received 325mg in ED  - Cardio eval    # HO HTN / HLD / CAD / CABG / Gluacoma  - C/w home meds    # DVTppx: Heparin  # DASHdiet, high fiber  # Full code

## 2018-09-06 LAB
CK MB CFR SERPL CALC: 1.9 NG/ML — SIGNIFICANT CHANGE UP (ref 0.6–6.3)
CK MB CFR SERPL CALC: 1.9 NG/ML — SIGNIFICANT CHANGE UP (ref 0.6–6.3)
CK SERPL-CCNC: 118 U/L — SIGNIFICANT CHANGE UP (ref 0–225)
CK SERPL-CCNC: 191 U/L — SIGNIFICANT CHANGE UP (ref 0–225)
TROPONIN T SERPL-MCNC: <0.01 NG/ML — SIGNIFICANT CHANGE UP

## 2018-09-06 RX ADMIN — HEPARIN SODIUM 5000 UNIT(S): 5000 INJECTION INTRAVENOUS; SUBCUTANEOUS at 13:04

## 2018-09-06 RX ADMIN — Medication 81 MILLIGRAM(S): at 13:04

## 2018-09-06 RX ADMIN — HEPARIN SODIUM 5000 UNIT(S): 5000 INJECTION INTRAVENOUS; SUBCUTANEOUS at 06:18

## 2018-09-06 RX ADMIN — HEPARIN SODIUM 5000 UNIT(S): 5000 INJECTION INTRAVENOUS; SUBCUTANEOUS at 22:46

## 2018-09-06 RX ADMIN — AMLODIPINE BESYLATE 5 MILLIGRAM(S): 2.5 TABLET ORAL at 06:11

## 2018-09-06 RX ADMIN — Medication 0.25 MILLIGRAM(S): at 00:06

## 2018-09-06 RX ADMIN — BRIMONIDINE TARTRATE 1 DROP(S): 2 SOLUTION/ DROPS OPHTHALMIC at 06:18

## 2018-09-06 RX ADMIN — BRIMONIDINE TARTRATE 1 DROP(S): 2 SOLUTION/ DROPS OPHTHALMIC at 18:18

## 2018-09-06 RX ADMIN — LOSARTAN POTASSIUM 50 MILLIGRAM(S): 100 TABLET, FILM COATED ORAL at 06:11

## 2018-09-06 RX ADMIN — SIMVASTATIN 40 MILLIGRAM(S): 20 TABLET, FILM COATED ORAL at 22:43

## 2018-09-06 RX ADMIN — DORZOLAMIDE HYDROCHLORIDE 1 DROP(S): 20 SOLUTION/ DROPS OPHTHALMIC at 06:18

## 2018-09-06 NOTE — PROGRESS NOTE ADULT - SUBJECTIVE AND OBJECTIVE BOX
Patient is a 78y old  Female who presents with a chief complaint of R/o ACS (06 Sep 2018 09:02)      PAST MEDICAL & SURGICAL HISTORY:  Left lower quadrant pain  Glaucoma  Cataract of right eye  High cholesterol  Hypertension  Uterine cancer  Athscl CABG, unsp, w angina pectoris w documented spasm  H/O total hysterectomy      MEDICATIONS  (STANDING):  amLODIPine   Tablet 5 milliGRAM(s) Oral daily  aspirin  chewable 81 milliGRAM(s) Oral daily  brimonidine 0.2% Ophthalmic Solution 1 Drop(s) Both EYES two times a day  dorzolamide 2% Ophthalmic Solution 1 Drop(s) Both EYES three times a day  heparin  Injectable 5000 Unit(s) SubCutaneous every 8 hours  latanoprost 0.005% Ophthalmic Solution 1 Drop(s) Both EYES at bedtime  losartan 50 milliGRAM(s) Oral daily  pregabalin 50 milliGRAM(s) Oral daily  simvastatin 40 milliGRAM(s) Oral at bedtime    MEDICATIONS  (PRN):  acetaminophen   Tablet .. 650 milliGRAM(s) Oral every 6 hours PRN Mild Pain (1 - 3), Moderate Pain (4 - 6)  ondansetron    Tablet 4 milliGRAM(s) Oral every 8 hours PRN Nausea and/or Vomiting      Overnight events:    Vital Signs Last 24 Hrs  T(C): 36.1 (06 Sep 2018 07:28), Max: 37.3 (05 Sep 2018 16:45)  T(F): 97 (06 Sep 2018 07:28), Max: 99.1 (05 Sep 2018 16:45)  HR: 53 (06 Sep 2018 07:28) (53 - 76)  BP: 137/63 (06 Sep 2018 07:28) (137/63 - 181/77)  BP(mean): --  RR: 18 (06 Sep 2018 07:28) (17 - 18)  SpO2: 98% (06 Sep 2018 07:28) (98% - 100%)  CAPILLARY BLOOD GLUCOSE        I&O's Summary    PHYSICAL EXAM:  GENERAL: NAD, speaks in full sentences, no signs of respiratory distress  HEAD:  Atraumatic, Normocephalic  EYES: EOMI, PERRLA, conjunctiva and sclera clear  NECK: Supple, No JVD  CHEST/LUNG: Clear to auscultation bilaterally; No wheeze; No crackles; No accessory muscles used   HEART: Regular rate and rhythm; No murmurs;   ABDOMEN: Soft, Nontender, Nondistended; Bowel sounds present; No guarding  EXTREMITIES:  2+ Peripheral Pulses, No cyanosis or edema  PSYCH: AAOx3  NEUROLOGY: non-focal  SKIN: No rashes or lesions        Labs:                        11.7   6.61  )-----------( 167      ( 05 Sep 2018 20:11 )             36.5             09-05    140  |  100  |  25<H>  ----------------------------<  96  4.3   |  24  |  1.1    Ca    8.8      05 Sep 2018 20:11    TPro  6.9  /  Alb  4.1  /  TBili  <0.2  /  DBili  x   /  AST  18  /  ALT  10  /  AlkPhos  81  09-05    LIVER FUNCTIONS - ( 05 Sep 2018 20:11 )  Alb: 4.1 g/dL / Pro: 6.9 g/dL / ALK PHOS: 81 U/L / ALT: 10 U/L / AST: 18 U/L / GGT: x                   CARDIAC MARKERS ( 05 Sep 2018 20:11 )  x     / <0.01 ng/mL / x     / x     / x                  Imaging:    ECG:    T(C): 36.1 (09-06-18 @ 07:28), Max: 37.3 (09-05-18 @ 16:45)  HR: 53 (09-06-18 @ 07:28) (53 - 76)  BP: 137/63 (09-06-18 @ 07:28) (137/63 - 181/77)  RR: 18 (09-06-18 @ 07:28) (17 - 18)  SpO2: 98% (09-06-18 @ 07:28) (98% - 100%) Patient is a 78y old  Female who presents with a chief complaint of R/o ACS (06 Sep 2018 09:02) has no pain at this time. no sob. no fever,      PAST MEDICAL & SURGICAL HISTORY:  Left lower quadrant pain  Glaucoma  Cataract of right eye  High cholesterol  Hypertension  Uterine cancer  Athscl CABG, unsp, w angina pectoris w documented spasm  H/O total hysterectomy      MEDICATIONS  (STANDING):  amLODIPine   Tablet 5 milliGRAM(s) Oral daily  aspirin  chewable 81 milliGRAM(s) Oral daily  brimonidine 0.2% Ophthalmic Solution 1 Drop(s) Both EYES two times a day  dorzolamide 2% Ophthalmic Solution 1 Drop(s) Both EYES three times a day  heparin  Injectable 5000 Unit(s) SubCutaneous every 8 hours  latanoprost 0.005% Ophthalmic Solution 1 Drop(s) Both EYES at bedtime  losartan 50 milliGRAM(s) Oral daily  pregabalin 50 milliGRAM(s) Oral daily  simvastatin 40 milliGRAM(s) Oral at bedtime    MEDICATIONS  (PRN):  acetaminophen   Tablet .. 650 milliGRAM(s) Oral every 6 hours PRN Mild Pain (1 - 3), Moderate Pain (4 - 6)  ondansetron    Tablet 4 milliGRAM(s) Oral every 8 hours PRN Nausea and/or Vomiting      Overnight events:    Vital Signs Last 24 Hrs  T(C): 36.1 (06 Sep 2018 07:28), Max: 37.3 (05 Sep 2018 16:45)  T(F): 97 (06 Sep 2018 07:28), Max: 99.1 (05 Sep 2018 16:45)  HR: 53 (06 Sep 2018 07:28) (53 - 76)  BP: 137/63 (06 Sep 2018 07:28) (137/63 - 181/77)  BP(mean): --  RR: 18 (06 Sep 2018 07:28) (17 - 18)  SpO2: 98% (06 Sep 2018 07:28) (98% - 100%)  CAPILLARY BLOOD GLUCOSE        I&O's Summary    PHYSICAL EXAM:  GENERAL: NAD, speaks in full sentences, no signs of respiratory distress  HEAD:  Atraumatic, Normocephalic  EYES: EOMI, PERRLA, conjunctiva and sclera clear  NECK: Supple, No JVD  CHEST/LUNG: Clear to auscultation bilaterally; No wheeze; No crackles; No accessory muscles used   HEART: Regular rate and rhythm; No murmurs;   ABDOMEN: Soft, Nontender, Nondistended; Bowel sounds present; No guarding  EXTREMITIES:  2+ Peripheral Pulses, No cyanosis or edema  PSYCH: AAOx3  NEUROLOGY: non-focal  SKIN: No rashes or lesions        Labs:                        11.7   6.61  )-----------( 167      ( 05 Sep 2018 20:11 )             36.5             09-05    140  |  100  |  25<H>  ----------------------------<  96  4.3   |  24  |  1.1    Ca    8.8      05 Sep 2018 20:11    TPro  6.9  /  Alb  4.1  /  TBili  <0.2  /  DBili  x   /  AST  18  /  ALT  10  /  AlkPhos  81  09-05    LIVER FUNCTIONS - ( 05 Sep 2018 20:11 )  Alb: 4.1 g/dL / Pro: 6.9 g/dL / ALK PHOS: 81 U/L / ALT: 10 U/L / AST: 18 U/L / GGT: x                   CARDIAC MARKERS ( 05 Sep 2018 20:11 )  x     / <0.01 ng/mL / x     / x     / x          < from: 12 Lead ECG (09.05.18 @ 16:54) >  Diagnosis Line Normal sinus rhythm    < end of copied text >

## 2018-09-06 NOTE — PROGRESS NOTE ADULT - ASSESSMENT
78 F w/ pmh of HTN, HLD, CAD s/p CABG, Gluacoma, Diverticulosis, Uterine Ca s/p SHAAN & Chemo presenting to the ED c/o Chest pain radiating to L arm, sxs resolved by the time of arrival. VS are wnl, Labs negative ce x1, ekg showed no changes, pt admitted to r/o acs    # Chest Pain R/O ACS  CP now resolved.   - Repeat CEx2  - C/w ASA 81, pt received 325mg in ED  - Cardio eval pending       # DVTppx: Heparin  # DASHdiet, high fiber  # Full code 78 F w/ pmh of HTN, HLD, CAD s/p CABG, Gluacoma, Diverticulosis, Uterine Ca s/p SHAAN & Chemo presenting to the ED c/o Chest pain radiating to L arm, sxs resolved by the time of arrival. VS are wnl, Labs negative ce x1, ekg showed no changes, pt admitted to r/o acs    # Chest Pain R/O ACS  CP now resolved.   - Repeat CEx2  - C/w ASA 81, pt received 325mg in ED  - Cardio eval pending   CTA today ( per cardio)       # DVTppx: Heparin  # DASHdiet, high fiber  # Full code

## 2018-09-06 NOTE — CONSULT NOTE ADULT - ASSESSMENT
78 F w/ pmh of HTN, HLD, CAD s/p double CABG (10/2011), Gluacoma, Diverticulosis, Uterine Ca s/p SHAAN & Chemo(2016,now in remission); presenting to the ED c/o L breast pain radiating to L arm,7/10, lasted for 2 minutes & then resolved. It started while watching TV around 1.30 pm. She denies any sob, nausea, vomiting, diaphoresis, fevers, chills, syncope. Pt didn't have recurrence of chest pain since coming here. Denies any similar complain in past month/any chest pain/SOB on exertion.     A & P:    #Atypical chest pain  - please get 2nd set CE  - serial EKG, c/w telemonitoring  - c/w asa 81, zocor, losartan      - will discuss case with Dr Moura -Chest pain  although atypical, the patient has had multiple visits to ER for chest pain. Nuclear stress test was negative for ischemia <1 year ago.   -CAD Severe LM disease nonobstructive disease of LAD and LCX  -Increased cholesterol.   -HTN  -Uterine CA   _ Diverticular disease ? mild diverticulitis  Plan:  Maintain current medication   In view of multiple admissions for CP, negative nuclear stress test will get CTA of the coronary arteries.   If grafts are patent may discharge and will need work up for noncardiac causes of chest pain  Discussed with fellow

## 2018-09-06 NOTE — CONSULT NOTE ADULT - SUBJECTIVE AND OBJECTIVE BOX
HPI:  78 F w/ pmh of HTN, HLD, CAD s/p double CABG (10/2011), Gluacoma, Diverticulosis, Uterine Ca s/p SHAAN & Chemo(2016,now in remission); presenting to the ED c/o L breast pain radiating to L arm,7/10, lasted for 2 minutes & then resolved. It started while watching TV around 1.30 pm. She denies any sob, nausea, vomiting, diaphoresis, fevers, chills, syncope. Pt didn't have recurrence of chest pain since coming here. Denies any similar complain in past month/any chest pain/SOB on exertion.       PAST MEDICAL & SURGICAL HISTORY  Left lower quadrant pain  Glaucoma  Cataract of right eye  High cholesterol  Hypertension  Uterine cancer  Athscl CABG, unsp, w angina pectoris w documented spasm  H/O total hysterectomy      FAMILY HISTORY:  FAMILY HISTORY:      SOCIAL HISTORY:  []smoker  []Alcohol  []Drug    ALLERGIES:  quinine (Urticaria)      MEDICATIONS:  MEDICATIONS  (STANDING):  amLODIPine   Tablet 5 milliGRAM(s) Oral daily  aspirin  chewable 81 milliGRAM(s) Oral daily  brimonidine 0.2% Ophthalmic Solution 1 Drop(s) Both EYES two times a day  dorzolamide 2% Ophthalmic Solution 1 Drop(s) Both EYES three times a day  heparin  Injectable 5000 Unit(s) SubCutaneous every 8 hours  latanoprost 0.005% Ophthalmic Solution 1 Drop(s) Both EYES at bedtime  losartan 50 milliGRAM(s) Oral daily  pregabalin 50 milliGRAM(s) Oral daily  simvastatin 40 milliGRAM(s) Oral at bedtime    MEDICATIONS  (PRN):  acetaminophen   Tablet .. 650 milliGRAM(s) Oral every 6 hours PRN Mild Pain (1 - 3), Moderate Pain (4 - 6)  ondansetron    Tablet 4 milliGRAM(s) Oral every 8 hours PRN Nausea and/or Vomiting      HOME MEDICATIONS:  Home Medications:  amLODIPine 5 mg oral tablet: 1 tab(s) orally once a day (2018 10:44)  brimonidine 0.1% ophthalmic solution: to each affected eye 2 times a day (2018 10:44)  dorzolamide 2% ophthalmic solution: 1 drop(s) to each affected eye 2 times a day (2018 10:44)  latanoprost 0.005% ophthalmic solution: 1 drop(s) to each affected eye 2 times a day (2018 10:44)  losartan 50 mg oral tablet: 1 tab(s) orally once a day (2018 10:44)  Low Dose ASA 81 mg oral tablet: 1 tab(s) orally once a day (02 May 2018 07:25)  Lyrica 50 mg oral capsule:  (2018 10:44)  simvastatin 40 mg oral tablet: 1 tab(s) orally once a day (at bedtime) (02 May 2018 07:25)      VITALS:   T(F): 97 ( @ 07:28), Max: 99.1 ( @ 16:45)  HR: 53 ( @ 07:28) (53 - 76)  BP: 137/63 ( @ 07:28) (137/63 - 181/77)  BP(mean): --  RR: 18 ( @ 07:28) (17 - 18)  SpO2: 98% ( @ 07:28) (98% - 100%)    I&O's Summary      REVIEW OF SYSTEMS:  CONSTITUTIONAL: No weakness, fevers or chills  EYES/ENT: No visual changes;  No vertigo or throat pain   NECK: No pain or stiffness  RESPIRATORY: No cough, wheezing, hemoptysis; No shortness of breath  CARDIOVASCULAR: see HPI  GASTROINTESTINAL: No abdominal or epigastric pain. No nausea, vomiting, or hematemesis; No diarrhea or constipation. No melena or hematochezia.  GENITOURINARY: No dysuria, frequency or hematuria  NEUROLOGICAL: No numbness or weakness  SKIN: No itching, no rashes    PHYSICAL EXAM:  NEURO: patient is awake , alert and oriented  GEN: Not in acute distress  NECK: no thyroid enlargement, no JVD  LUNGS: Clear to auscultation bilaterally   CARDIOVASCULAR: S1/S2 present, RRR , no murmus or rubs  ABD: Soft, non-tender, non-distended, +BS  EXT: No KASEY  SKIN: Intact    LABS:                        11.7   6.61  )-----------( 167      ( 05 Sep 2018 20:11 )             36.5     09-05    140  |  100  |  25<H>  ----------------------------<  96  4.3   |  24  |  1.1    Ca    8.8      05 Sep 2018 20:11    TPro  6.9  /  Alb  4.1  /  TBili  <0.2  /  DBili  x   /  AST  18  /  ALT  10  /  AlkPhos  81        Troponin T, Serum: <0.01 ng/mL (18 @ 20:11)    CARDIAC MARKERS ( 05 Sep 2018 20:11 )  x     / <0.01 ng/mL / x     / x     / x            Troponin trend:    Serum Pro-Brain Natriuretic Peptide: 57 pg/mL (18 @ 20:11)      Hemoglobin A1C   Thyroid      RADIOLOGY:  -CXR: < from: Xray Chest 1 View AP/PA (18 @ 22:10) >  No radiographic evidence of acute cardiopulmonary disease.    < end of copied text >    -STRESS TEST:--> unremarkable,normal EF  EC bpm, normal axis, NSR    TELEMETRY EVENTS: none HPI:  78 F w/ pmh of HTN, HLD, CAD s/p double CABG (10/2011) LIM[place] to LAD SVG to OM for LM disease, Gluacoma, Diverticulosis, Uterine Ca s/p SHAAN & Chemo(2016,now in remission); presenting to the ED c/o L breast pain radiating to L arm,7/10, lasted for 2 minutes & then resolved. It started while watching TV around 1.30 pm. She denies any sob, nausea, vomiting, diaphoresis, fevers, chills, syncope. Pt didn't have recurrence of chest pain since coming here. Denies any similar complain in past month/any chest pain/SOB on exertion. The pateint has been seen multiple times in the ER for chest pain /arm pain .       PAST MEDICAL & SURGICAL HISTORY  Left lower quadrant pain  Glaucoma  Cataract of right eye  High cholesterol  Hypertension  Uterine cancer  Athscl CABG, unsp, w angina pectoris w documented spasm  H/O total hysterectomy      FAMILY HISTORY:  FAMILY HISTORY:      SOCIAL HISTORY:  []smoker  []Alcohol  []Drug    ALLERGIES:  quinine (Urticaria)      MEDICATIONS:  MEDICATIONS  (STANDING):  amLODIPine   Tablet 5 milliGRAM(s) Oral daily  aspirin  chewable 81 milliGRAM(s) Oral daily  brimonidine 0.2% Ophthalmic Solution 1 Drop(s) Both EYES two times a day  dorzolamide 2% Ophthalmic Solution 1 Drop(s) Both EYES three times a day  heparin  Injectable 5000 Unit(s) SubCutaneous every 8 hours  latanoprost 0.005% Ophthalmic Solution 1 Drop(s) Both EYES at bedtime  losartan 50 milliGRAM(s) Oral daily  pregabalin 50 milliGRAM(s) Oral daily  simvastatin 40 milliGRAM(s) Oral at bedtime    MEDICATIONS  (PRN):  acetaminophen   Tablet .. 650 milliGRAM(s) Oral every 6 hours PRN Mild Pain (1 - 3), Moderate Pain (4 - 6)  ondansetron    Tablet 4 milliGRAM(s) Oral every 8 hours PRN Nausea and/or Vomiting      HOME MEDICATIONS:  Home Medications:  amLODIPine 5 mg oral tablet: 1 tab(s) orally once a day (2018 10:44)  brimonidine 0.1% ophthalmic solution: to each affected eye 2 times a day (2018 10:44)  dorzolamide 2% ophthalmic solution: 1 drop(s) to each affected eye 2 times a day (2018 10:44)  latanoprost 0.005% ophthalmic solution: 1 drop(s) to each affected eye 2 times a day (2018 10:44)  losartan 50 mg oral tablet: 1 tab(s) orally once a day (2018 10:44)  Low Dose ASA 81 mg oral tablet: 1 tab(s) orally once a day (02 May 2018 07:25)  Lyrica 50 mg oral capsule:  (2018 10:44)  simvastatin 40 mg oral tablet: 1 tab(s) orally once a day (at bedtime) (02 May 2018 07:25)      VITALS:   T(F): 97 ( @ 07:28), Max: 99.1 ( @ 16:45)  HR: 53 ( @ 07:28) (53 - 76)  BP: 137/63 ( @ 07:28) (137/63 - 181/77)  BP(mean): --  RR: 18 ( @ 07:28) (17 - 18)  SpO2: 98% ( @ 07:28) (98% - 100%)    I&O's Summary      REVIEW OF SYSTEMS:  CONSTITUTIONAL: No weakness, fevers or chills  EYES/ENT: No visual changes;  No vertigo or throat pain   NECK: No pain or stiffness  RESPIRATORY: No cough, wheezing, hemoptysis; No shortness of breath  CARDIOVASCULAR: see HPI  GASTROINTESTINAL: No abdominal or epigastric pain. No nausea, vomiting, or hematemesis; No diarrhea or constipation. No melena or hematochezia.  GENITOURINARY: No dysuria, frequency or hematuria  NEUROLOGICAL: No numbness or weakness  SKIN: No itching, no rashes    PHYSICAL EXAM:  NEURO: patient is awake , alert and oriented  GEN: Not in acute distress  NECK: no thyroid enlargement, no JVD  LUNGS: Clear to auscultation bilaterally   CARDIOVASCULAR: S1/S2 present, RRR , no murmus or rubs  ABD: Soft, non-tender, non-distended, +BS  EXT: No KASEY  SKIN: Intact    LABS:                        11.7   6.61  )-----------( 167      ( 05 Sep 2018 20:11 )             36.5     -    140  |  100  |  25<H>  ----------------------------<  96  4.3   |  24  |  1.1    Ca    8.8      05 Sep 2018 20:11    TPro  6.9  /  Alb  4.1  /  TBili  <0.2  /  DBili  x   /  AST  18  /  ALT  10  /  AlkPhos  81        Troponin T, Serum: <0.01 ng/mL (18 @ 20:11)    CARDIAC MARKERS ( 05 Sep 2018 20:11 )  x     / <0.01 ng/mL / x     / x     / x            Troponin trend:    Serum Pro-Brain Natriuretic Peptide: 57 pg/mL (18 @ 20:11)      Hemoglobin A1C   Thyroid      RADIOLOGY:  -CXR: < from: Xray Chest 1 View AP/PA (18 @ 22:10) >  No radiographic evidence of acute cardiopulmonary disease.    < end of copied text >    -STRESS TEST:--> unremarkable,normal EF  EC bpm, normal axis, NSR    TELEMETRY EVENTS: none

## 2018-09-07 LAB
ANION GAP SERPL CALC-SCNC: 12 MMOL/L — SIGNIFICANT CHANGE UP (ref 7–14)
BASOPHILS # BLD AUTO: 0.02 K/UL — SIGNIFICANT CHANGE UP (ref 0–0.2)
BASOPHILS NFR BLD AUTO: 0.4 % — SIGNIFICANT CHANGE UP (ref 0–1)
BUN SERPL-MCNC: 19 MG/DL — SIGNIFICANT CHANGE UP (ref 10–20)
CALCIUM SERPL-MCNC: 9 MG/DL — SIGNIFICANT CHANGE UP (ref 8.5–10.1)
CHLORIDE SERPL-SCNC: 104 MMOL/L — SIGNIFICANT CHANGE UP (ref 98–110)
CO2 SERPL-SCNC: 28 MMOL/L — SIGNIFICANT CHANGE UP (ref 17–32)
CREAT SERPL-MCNC: 0.9 MG/DL — SIGNIFICANT CHANGE UP (ref 0.7–1.5)
EOSINOPHIL # BLD AUTO: 0.09 K/UL — SIGNIFICANT CHANGE UP (ref 0–0.7)
EOSINOPHIL NFR BLD AUTO: 1.6 % — SIGNIFICANT CHANGE UP (ref 0–8)
GLUCOSE SERPL-MCNC: 97 MG/DL — SIGNIFICANT CHANGE UP (ref 70–99)
HCT VFR BLD CALC: 38.2 % — SIGNIFICANT CHANGE UP (ref 37–47)
HGB BLD-MCNC: 12.2 G/DL — SIGNIFICANT CHANGE UP (ref 12–16)
IMM GRANULOCYTES NFR BLD AUTO: 0.2 % — SIGNIFICANT CHANGE UP (ref 0.1–0.3)
LYMPHOCYTES # BLD AUTO: 2.12 K/UL — SIGNIFICANT CHANGE UP (ref 1.2–3.4)
LYMPHOCYTES # BLD AUTO: 37.5 % — SIGNIFICANT CHANGE UP (ref 20.5–51.1)
MCHC RBC-ENTMCNC: 26.7 PG — LOW (ref 27–31)
MCHC RBC-ENTMCNC: 31.9 G/DL — LOW (ref 32–37)
MCV RBC AUTO: 83.6 FL — SIGNIFICANT CHANGE UP (ref 81–99)
MONOCYTES # BLD AUTO: 0.76 K/UL — HIGH (ref 0.1–0.6)
MONOCYTES NFR BLD AUTO: 13.5 % — HIGH (ref 1.7–9.3)
NEUTROPHILS # BLD AUTO: 2.65 K/UL — SIGNIFICANT CHANGE UP (ref 1.4–6.5)
NEUTROPHILS NFR BLD AUTO: 46.8 % — SIGNIFICANT CHANGE UP (ref 42.2–75.2)
NRBC # BLD: 0 /100 WBCS — SIGNIFICANT CHANGE UP (ref 0–0)
PLATELET # BLD AUTO: 170 K/UL — SIGNIFICANT CHANGE UP (ref 130–400)
POTASSIUM SERPL-MCNC: 4.5 MMOL/L — SIGNIFICANT CHANGE UP (ref 3.5–5)
POTASSIUM SERPL-SCNC: 4.5 MMOL/L — SIGNIFICANT CHANGE UP (ref 3.5–5)
RBC # BLD: 4.57 M/UL — SIGNIFICANT CHANGE UP (ref 4.2–5.4)
RBC # FLD: 14.5 % — SIGNIFICANT CHANGE UP (ref 11.5–14.5)
SODIUM SERPL-SCNC: 144 MMOL/L — SIGNIFICANT CHANGE UP (ref 135–146)
WBC # BLD: 5.65 K/UL — SIGNIFICANT CHANGE UP (ref 4.8–10.8)
WBC # FLD AUTO: 5.65 K/UL — SIGNIFICANT CHANGE UP (ref 4.8–10.8)

## 2018-09-07 RX ORDER — ALPRAZOLAM 0.25 MG
0.25 TABLET ORAL ONCE
Qty: 0 | Refills: 0 | Status: DISCONTINUED | OUTPATIENT
Start: 2018-09-07 | End: 2018-09-07

## 2018-09-07 RX ADMIN — DORZOLAMIDE HYDROCHLORIDE 1 DROP(S): 20 SOLUTION/ DROPS OPHTHALMIC at 14:36

## 2018-09-07 RX ADMIN — BRIMONIDINE TARTRATE 1 DROP(S): 2 SOLUTION/ DROPS OPHTHALMIC at 05:32

## 2018-09-07 RX ADMIN — Medication 81 MILLIGRAM(S): at 11:31

## 2018-09-07 RX ADMIN — Medication 50 MILLIGRAM(S): at 11:31

## 2018-09-07 RX ADMIN — AMLODIPINE BESYLATE 5 MILLIGRAM(S): 2.5 TABLET ORAL at 06:53

## 2018-09-07 RX ADMIN — HEPARIN SODIUM 5000 UNIT(S): 5000 INJECTION INTRAVENOUS; SUBCUTANEOUS at 14:36

## 2018-09-07 RX ADMIN — Medication 0.25 MILLIGRAM(S): at 12:55

## 2018-09-07 RX ADMIN — HEPARIN SODIUM 5000 UNIT(S): 5000 INJECTION INTRAVENOUS; SUBCUTANEOUS at 05:33

## 2018-09-07 NOTE — PROGRESS NOTE ADULT - SUBJECTIVE AND OBJECTIVE BOX
Patient is a 78y old  Female who presents with a chief complaint of R/o ACS (06 Sep 2018 10:35)      PAST MEDICAL & SURGICAL HISTORY:  Left lower quadrant pain  Glaucoma  Cataract of right eye  High cholesterol  Hypertension  Uterine cancer  Athscl CABG, unsp, w angina pectoris w documented spasm  H/O total hysterectomy      MEDICATIONS  (STANDING):  amLODIPine   Tablet 5 milliGRAM(s) Oral daily  aspirin  chewable 81 milliGRAM(s) Oral daily  brimonidine 0.2% Ophthalmic Solution 1 Drop(s) Both EYES two times a day  dorzolamide 2% Ophthalmic Solution 1 Drop(s) Both EYES three times a day  heparin  Injectable 5000 Unit(s) SubCutaneous every 8 hours  latanoprost 0.005% Ophthalmic Solution 1 Drop(s) Both EYES at bedtime  losartan 50 milliGRAM(s) Oral daily  pregabalin 50 milliGRAM(s) Oral daily  simvastatin 40 milliGRAM(s) Oral at bedtime    MEDICATIONS  (PRN):  acetaminophen   Tablet .. 650 milliGRAM(s) Oral every 6 hours PRN Mild Pain (1 - 3), Moderate Pain (4 - 6)  ondansetron    Tablet 4 milliGRAM(s) Oral every 8 hours PRN Nausea and/or Vomiting      Overnight events:    Vital Signs Last 24 Hrs  T(C): 36.3 (07 Sep 2018 07:12), Max: 36.7 (06 Sep 2018 15:59)  T(F): 97.3 (07 Sep 2018 07:12), Max: 98.1 (06 Sep 2018 15:59)  HR: 56 (07 Sep 2018 07:12) (56 - 70)  BP: 128/62 (07 Sep 2018 07:12) (112/60 - 138/76)  BP(mean): --  RR: 18 (07 Sep 2018 07:12) (18 - 18)  SpO2: 100% (07 Sep 2018 07:12) (99% - 100%)  CAPILLARY BLOOD GLUCOSE        I&O's Summary      Patient is a 78y old  Female who presents with a chief complaint of R/o ACS (06 Sep 2018 10:35)      PAST MEDICAL & SURGICAL HISTORY:  Left lower quadrant pain  Glaucoma  Cataract of right eye  High cholesterol  Hypertension  Uterine cancer  Athscl CABG, unsp, w angina pectoris w documented spasm  H/O total hysterectomy      MEDICATIONS  (STANDING):  amLODIPine   Tablet 5 milliGRAM(s) Oral daily  aspirin  chewable 81 milliGRAM(s) Oral daily  brimonidine 0.2% Ophthalmic Solution 1 Drop(s) Both EYES two times a day  dorzolamide 2% Ophthalmic Solution 1 Drop(s) Both EYES three times a day  heparin  Injectable 5000 Unit(s) SubCutaneous every 8 hours  latanoprost 0.005% Ophthalmic Solution 1 Drop(s) Both EYES at bedtime  losartan 50 milliGRAM(s) Oral daily  pregabalin 50 milliGRAM(s) Oral daily  simvastatin 40 milliGRAM(s) Oral at bedtime    MEDICATIONS  (PRN):  acetaminophen   Tablet .. 650 milliGRAM(s) Oral every 6 hours PRN Mild Pain (1 - 3), Moderate Pain (4 - 6)  ondansetron    Tablet 4 milliGRAM(s) Oral every 8 hours PRN Nausea and/or Vomiting      Overnight events:    Vital Signs Last 24 Hrs  T(C): 36.3 (07 Sep 2018 07:12), Max: 36.7 (06 Sep 2018 15:59)  T(F): 97.3 (07 Sep 2018 07:12), Max: 98.1 (06 Sep 2018 15:59)      PHYSICAL EXAM:  GENERAL: NAD, speaks in full sentences, no signs of respiratory distress  HEAD:  Atraumatic, Normocephalic  EYES: EOMI, PERRLA, conjunctiva and sclera clear  NECK: Supple, No JVD  CHEST/LUNG: Clear to auscultation bilaterally; No wheeze; No crackles; No accessory muscles used. positive substernal chest tenderness on palpation.   HEART: s1s2 no murmur appreciated.    ABDOMEN: Soft, Nontender, Nondistended; Bowel sounds present; No guarding  EXTREMITIES:  2+ Peripheral Pulses, No cyanosis or edema  PSYCH: AAOx3  NEUROLOGY: non-focal  SKIN: No rashes or lesions      HR: 56 (07 Sep 2018 07:12) (56 - 70)  BP: 128/62 (07 Sep 2018 07:12) (112/60 - 138/76)  BP(mean): --  RR: 18 (07 Sep 2018 07:12) (18 - 18)  SpO2: 100% (07 Sep 2018 07:12) (99% - 100%)  CAPILLARY BLOOD GLUCOSE        I&O's Summary          Labs:                        12.2   5.65  )-----------( 170      ( 07 Sep 2018 08:14 )             38.2             09-07    144  |  104  |  19  ----------------------------<  97  4.5   |  28  |  0.9    Ca    9.0      07 Sep 2018 08:14    TPro  6.9  /  Alb  4.1  /  TBili  <0.2  /  DBili  x   /  AST  18  /  ALT  10  /  AlkPhos  81  09-05    LIVER FUNCTIONS - ( 05 Sep 2018 20:11 )  Alb: 4.1 g/dL / Pro: 6.9 g/dL / ALK PHOS: 81 U/L / ALT: 10 U/L / AST: 18 U/L / GGT: x                   CARDIAC MARKERS ( 06 Sep 2018 21:48 )  x     / <0.01 ng/mL / x     / x     / x      CARDIAC MARKERS ( 06 Sep 2018 12:40 )  x     / <0.01 ng/mL / 191 U/L / x     / 1.9 ng/mL  CARDIAC MARKERS ( 06 Sep 2018 09:07 )  x     / <0.01 ng/mL / 118 U/L / x     / 1.9 ng/mL  CARDIAC MARKERS ( 05 Sep 2018 20:11 )  x     / <0.01 ng/mL / x     / x     / x                  Imaging:    ECG:    T(C): 36.3 (09-07-18 @ 07:12), Max: 36.7 (09-06-18 @ 15:59)  HR: 56 (09-07-18 @ 07:12) (56 - 70)  BP: 128/62 (09-07-18 @ 07:12) (112/60 - 138/76)  RR: 18 (09-07-18 @ 07:12) (18 - 18)  SpO2: 100% (09-07-18 @ 07:12) (99% - 100%)            Labs:                        12.2   5.65  )-----------( 170      ( 07 Sep 2018 08:14 )             38.2             09-07    144  |  104  |  19  ----------------------------<  97  4.5   |  28  |  0.9    Ca    9.0      07 Sep 2018 08:14    TPro  6.9  /  Alb  4.1  /  TBili  <0.2  /  DBili  x   /  AST  18  /  ALT  10  /  AlkPhos  81  09-05    LIVER FUNCTIONS - ( 05 Sep 2018 20:11 )  Alb: 4.1 g/dL / Pro: 6.9 g/dL / ALK PHOS: 81 U/L / ALT: 10 U/L / AST: 18 U/L / GGT: x                   CARDIAC MARKERS ( 06 Sep 2018 21:48 )  x     / <0.01 ng/mL / x     / x     / x      CARDIAC MARKERS ( 06 Sep 2018 12:40 )  x     / <0.01 ng/mL / 191 U/L / x     / 1.9 ng/mL  CARDIAC MARKERS ( 06 Sep 2018 09:07 )  x     / <0.01 ng/mL / 118 U/L / x     / 1.9 ng/mL  CARDIAC MARKERS ( 05 Sep 2018 20:11 )  x     / <0.01 ng/mL / x     / x     / x                  Imaging:    ECG:    T(C): 36.3 (09-07-18 @ 07:12), Max: 36.7 (09-06-18 @ 15:59)  HR: 56 (09-07-18 @ 07:12) (56 - 70)  BP: 128/62 (09-07-18 @ 07:12) (112/60 - 138/76)  RR: 18 (09-07-18 @ 07:12) (18 - 18)  SpO2: 100% (09-07-18 @ 07:12) (99% - 100%)

## 2018-09-07 NOTE — PROGRESS NOTE ADULT - ASSESSMENT
78 F w/ pmh of HTN, HLD, CAD s/p CABG, Gluacoma, Diverticulosis, Uterine Ca s/p SHAAN & Chemo presenting to the ED c/o Chest pain radiating to L arm, sxs resolved by the time of arrival. VS are wnl, Labs negative ce x1, ekg showed no changes, pt admitted to r/o acs    # Chest Pain R/O ACS  CP now resolved.   - Repeat CEx2  - C/w ASA 81, pt received 325mg in ED  - Cardio re-evaluation pending followup   CTA today follow with results.       # DVTppx: Heparin  # DASHdiet, high fiber  # Full code

## 2018-09-08 LAB
ANION GAP SERPL CALC-SCNC: 15 MMOL/L — HIGH (ref 7–14)
BASOPHILS # BLD AUTO: 0.02 K/UL — SIGNIFICANT CHANGE UP (ref 0–0.2)
BASOPHILS NFR BLD AUTO: 0.4 % — SIGNIFICANT CHANGE UP (ref 0–1)
BUN SERPL-MCNC: 28 MG/DL — HIGH (ref 10–20)
CALCIUM SERPL-MCNC: 8.7 MG/DL — SIGNIFICANT CHANGE UP (ref 8.5–10.1)
CHLORIDE SERPL-SCNC: 104 MMOL/L — SIGNIFICANT CHANGE UP (ref 98–110)
CO2 SERPL-SCNC: 24 MMOL/L — SIGNIFICANT CHANGE UP (ref 17–32)
CREAT SERPL-MCNC: 1 MG/DL — SIGNIFICANT CHANGE UP (ref 0.7–1.5)
EOSINOPHIL # BLD AUTO: 0.12 K/UL — SIGNIFICANT CHANGE UP (ref 0–0.7)
EOSINOPHIL NFR BLD AUTO: 2.2 % — SIGNIFICANT CHANGE UP (ref 0–8)
GLUCOSE SERPL-MCNC: 101 MG/DL — HIGH (ref 70–99)
HCT VFR BLD CALC: 36.5 % — LOW (ref 37–47)
HGB BLD-MCNC: 11.9 G/DL — LOW (ref 12–16)
IMM GRANULOCYTES NFR BLD AUTO: 0.2 % — SIGNIFICANT CHANGE UP (ref 0.1–0.3)
LYMPHOCYTES # BLD AUTO: 1.86 K/UL — SIGNIFICANT CHANGE UP (ref 1.2–3.4)
LYMPHOCYTES # BLD AUTO: 34.3 % — SIGNIFICANT CHANGE UP (ref 20.5–51.1)
MCHC RBC-ENTMCNC: 27.1 PG — SIGNIFICANT CHANGE UP (ref 27–31)
MCHC RBC-ENTMCNC: 32.6 G/DL — SIGNIFICANT CHANGE UP (ref 32–37)
MCV RBC AUTO: 83.1 FL — SIGNIFICANT CHANGE UP (ref 81–99)
MONOCYTES # BLD AUTO: 0.9 K/UL — HIGH (ref 0.1–0.6)
MONOCYTES NFR BLD AUTO: 16.6 % — HIGH (ref 1.7–9.3)
NEUTROPHILS # BLD AUTO: 2.51 K/UL — SIGNIFICANT CHANGE UP (ref 1.4–6.5)
NEUTROPHILS NFR BLD AUTO: 46.3 % — SIGNIFICANT CHANGE UP (ref 42.2–75.2)
NRBC # BLD: 0 /100 WBCS — SIGNIFICANT CHANGE UP (ref 0–0)
PLATELET # BLD AUTO: 156 K/UL — SIGNIFICANT CHANGE UP (ref 130–400)
POTASSIUM SERPL-MCNC: 4.9 MMOL/L — SIGNIFICANT CHANGE UP (ref 3.5–5)
POTASSIUM SERPL-SCNC: 4.9 MMOL/L — SIGNIFICANT CHANGE UP (ref 3.5–5)
RBC # BLD: 4.39 M/UL — SIGNIFICANT CHANGE UP (ref 4.2–5.4)
RBC # FLD: 14.6 % — HIGH (ref 11.5–14.5)
SODIUM SERPL-SCNC: 143 MMOL/L — SIGNIFICANT CHANGE UP (ref 135–146)
WBC # BLD: 5.42 K/UL — SIGNIFICANT CHANGE UP (ref 4.8–10.8)
WBC # FLD AUTO: 5.42 K/UL — SIGNIFICANT CHANGE UP (ref 4.8–10.8)

## 2018-09-08 RX ADMIN — DORZOLAMIDE HYDROCHLORIDE 1 DROP(S): 20 SOLUTION/ DROPS OPHTHALMIC at 16:25

## 2018-09-08 RX ADMIN — BRIMONIDINE TARTRATE 1 DROP(S): 2 SOLUTION/ DROPS OPHTHALMIC at 17:23

## 2018-09-08 RX ADMIN — BRIMONIDINE TARTRATE 1 DROP(S): 2 SOLUTION/ DROPS OPHTHALMIC at 06:36

## 2018-09-08 RX ADMIN — LATANOPROST 1 DROP(S): 0.05 SOLUTION/ DROPS OPHTHALMIC; TOPICAL at 21:41

## 2018-09-08 RX ADMIN — Medication 50 MILLIGRAM(S): at 11:46

## 2018-09-08 RX ADMIN — LOSARTAN POTASSIUM 50 MILLIGRAM(S): 100 TABLET, FILM COATED ORAL at 06:38

## 2018-09-08 RX ADMIN — DORZOLAMIDE HYDROCHLORIDE 1 DROP(S): 20 SOLUTION/ DROPS OPHTHALMIC at 21:41

## 2018-09-08 RX ADMIN — HEPARIN SODIUM 5000 UNIT(S): 5000 INJECTION INTRAVENOUS; SUBCUTANEOUS at 06:39

## 2018-09-08 RX ADMIN — SIMVASTATIN 40 MILLIGRAM(S): 20 TABLET, FILM COATED ORAL at 21:41

## 2018-09-08 RX ADMIN — HEPARIN SODIUM 5000 UNIT(S): 5000 INJECTION INTRAVENOUS; SUBCUTANEOUS at 21:41

## 2018-09-08 RX ADMIN — HEPARIN SODIUM 5000 UNIT(S): 5000 INJECTION INTRAVENOUS; SUBCUTANEOUS at 16:25

## 2018-09-08 RX ADMIN — Medication 81 MILLIGRAM(S): at 11:46

## 2018-09-08 RX ADMIN — AMLODIPINE BESYLATE 5 MILLIGRAM(S): 2.5 TABLET ORAL at 06:38

## 2018-09-08 NOTE — PROGRESS NOTE ADULT - SUBJECTIVE AND OBJECTIVE BOX
BEKAH NELSON  78y Female    INTERVAL HPI/OVERNIGHT EVENTS:    No chest pain or other complaints.     T(F): 98.1 (09-08-18 @ 06:10), Max: 98.7 (09-07-18 @ 17:13)  HR: 61 (09-08-18 @ 06:10) (57 - 73)  BP: 135/85 (09-08-18 @ 06:10) (116/59 - 135/85)  RR: 18 (09-08-18 @ 06:10) (18 - 18)  SpO2: 98% (09-08-18 @ 01:30) (98% - 98%) on RA      PHYSICAL EXAM:  GENERAL: NAD  HEAD:  Normocephalic  EYES:  conjunctiva and sclera clear  ENMT: Moist mucous membranes  NECK: Supple, No JVD  NERVOUS SYSTEM:  Alert, awake, Good concentration  CHEST/LUNG: Clear to percussion bilaterally; No rales, rhonchi, wheezing  HEART: Regular rate and rhythm; No murmurs  ABDOMEN: Soft, Nontender, Nondistended; Bowel sounds present  EXTREMITIES:  No edema  SKIN: No rashes     Consultant(s) Notes Reviewed:  [x ] YES  [ ] NO  Care Discussed with Consultants/Other Providers [ x] YES  [ ] NO    MEDICATIONS  (STANDING):  amLODIPine   Tablet 5 milliGRAM(s) Oral daily  aspirin  chewable 81 milliGRAM(s) Oral daily  brimonidine 0.2% Ophthalmic Solution 1 Drop(s) Both EYES two times a day  dorzolamide 2% Ophthalmic Solution 1 Drop(s) Both EYES three times a day  heparin  Injectable 5000 Unit(s) SubCutaneous every 8 hours  latanoprost 0.005% Ophthalmic Solution 1 Drop(s) Both EYES at bedtime  losartan 50 milliGRAM(s) Oral daily  pregabalin 50 milliGRAM(s) Oral daily  simvastatin 40 milliGRAM(s) Oral at bedtime    MEDICATIONS  (PRN):  acetaminophen   Tablet .. 650 milliGRAM(s) Oral every 6 hours PRN Mild Pain (1 - 3), Moderate Pain (4 - 6)  ondansetron    Tablet 4 milliGRAM(s) Oral every 8 hours PRN Nausea and/or Vomiting    EKG reviewed  Telemetry reviewed    LABS:                        11.9   5.42  )-----------( 156      ( 08 Sep 2018 06:36 )             36.5     09-08    143  |  104  |  28<H>  ----------------------------<  101<H>  4.9   |  24  |  1.0    Ca    8.7      08 Sep 2018 06:36        CARDIAC MARKERS ( 06 Sep 2018 21:48 )  x     / <0.01 ng/mL / x     / x     / x              RADIOLOGY & ADDITIONAL TESTS:    Imaging or report Personally Reviewed:  [ x] YES  [ ] NO    < from: CT Heart with Coronaries (09.07.18 @ 10:25) >  IMPRESSION:     1. Patent LIMA to LAD graft.    2. Nonvisualization of the SVG to OM graft, which is presumably occluded.    3. Native vessels as above.    Findings discussed with Dr. Moura at 1:20 PM on 9/7/2018.      < end of copied text >      Case discussed with resident    Care discussed with pt

## 2018-09-08 NOTE — PROGRESS NOTE ADULT - ASSESSMENT
77 y/o woman with PMH of HTN, HLD, CAD s/p double CABG (10/2011) LIMA to LAD , SVG to OM for LM disease, Glaucoma, Diverticulosis, Uterine Ca s/p SHAAN & Chemo(2016,now in remission) presenting to the ED c/o Left breast pain radiating to Left arm, intensity 7/10, lasted for 2 minutes & then resolved. It started while watching TV around 1.30 pm.  She had been to the ER multiple times for chest pain. Nuclear stress test was negative for ischemia < 1 year ago per cardiology.    1. Chest pain - now resolved  CTA of coronary arteries showed possible occluded SVG to OM.   cardio f/u re: further management  telemetry    2. CAD s/p CABG - continue medical therapy    3. HTN - controlled    4. Glaucoma - continue eye drops    5. DVT prophylaxis

## 2018-09-09 LAB
ANION GAP SERPL CALC-SCNC: 14 MMOL/L — SIGNIFICANT CHANGE UP (ref 7–14)
BASOPHILS # BLD AUTO: 0.01 K/UL — SIGNIFICANT CHANGE UP (ref 0–0.2)
BASOPHILS NFR BLD AUTO: 0.2 % — SIGNIFICANT CHANGE UP (ref 0–1)
BUN SERPL-MCNC: 23 MG/DL — HIGH (ref 10–20)
CALCIUM SERPL-MCNC: 8.8 MG/DL — SIGNIFICANT CHANGE UP (ref 8.5–10.1)
CHLORIDE SERPL-SCNC: 101 MMOL/L — SIGNIFICANT CHANGE UP (ref 98–110)
CO2 SERPL-SCNC: 25 MMOL/L — SIGNIFICANT CHANGE UP (ref 17–32)
CREAT SERPL-MCNC: 0.9 MG/DL — SIGNIFICANT CHANGE UP (ref 0.7–1.5)
EOSINOPHIL # BLD AUTO: 0.13 K/UL — SIGNIFICANT CHANGE UP (ref 0–0.7)
EOSINOPHIL NFR BLD AUTO: 2.7 % — SIGNIFICANT CHANGE UP (ref 0–8)
GLUCOSE SERPL-MCNC: 98 MG/DL — SIGNIFICANT CHANGE UP (ref 70–99)
HCT VFR BLD CALC: 38.5 % — SIGNIFICANT CHANGE UP (ref 37–47)
HGB BLD-MCNC: 12.3 G/DL — SIGNIFICANT CHANGE UP (ref 12–16)
IMM GRANULOCYTES NFR BLD AUTO: 0.4 % — HIGH (ref 0.1–0.3)
LYMPHOCYTES # BLD AUTO: 2.08 K/UL — SIGNIFICANT CHANGE UP (ref 1.2–3.4)
LYMPHOCYTES # BLD AUTO: 43 % — SIGNIFICANT CHANGE UP (ref 20.5–51.1)
MCHC RBC-ENTMCNC: 26.7 PG — LOW (ref 27–31)
MCHC RBC-ENTMCNC: 31.9 G/DL — LOW (ref 32–37)
MCV RBC AUTO: 83.7 FL — SIGNIFICANT CHANGE UP (ref 81–99)
MONOCYTES # BLD AUTO: 0.57 K/UL — SIGNIFICANT CHANGE UP (ref 0.1–0.6)
MONOCYTES NFR BLD AUTO: 11.8 % — HIGH (ref 1.7–9.3)
NEUTROPHILS # BLD AUTO: 2.03 K/UL — SIGNIFICANT CHANGE UP (ref 1.4–6.5)
NEUTROPHILS NFR BLD AUTO: 41.9 % — LOW (ref 42.2–75.2)
NRBC # BLD: 0 /100 WBCS — SIGNIFICANT CHANGE UP (ref 0–0)
PLATELET # BLD AUTO: 161 K/UL — SIGNIFICANT CHANGE UP (ref 130–400)
POTASSIUM SERPL-MCNC: 4.5 MMOL/L — SIGNIFICANT CHANGE UP (ref 3.5–5)
POTASSIUM SERPL-SCNC: 4.5 MMOL/L — SIGNIFICANT CHANGE UP (ref 3.5–5)
RBC # BLD: 4.6 M/UL — SIGNIFICANT CHANGE UP (ref 4.2–5.4)
RBC # FLD: 14.6 % — HIGH (ref 11.5–14.5)
SODIUM SERPL-SCNC: 140 MMOL/L — SIGNIFICANT CHANGE UP (ref 135–146)
WBC # BLD: 4.84 K/UL — SIGNIFICANT CHANGE UP (ref 4.8–10.8)
WBC # FLD AUTO: 4.84 K/UL — SIGNIFICANT CHANGE UP (ref 4.8–10.8)

## 2018-09-09 RX ADMIN — AMLODIPINE BESYLATE 5 MILLIGRAM(S): 2.5 TABLET ORAL at 05:38

## 2018-09-09 RX ADMIN — Medication 50 MILLIGRAM(S): at 12:29

## 2018-09-09 RX ADMIN — BRIMONIDINE TARTRATE 1 DROP(S): 2 SOLUTION/ DROPS OPHTHALMIC at 05:37

## 2018-09-09 RX ADMIN — LATANOPROST 1 DROP(S): 0.05 SOLUTION/ DROPS OPHTHALMIC; TOPICAL at 21:35

## 2018-09-09 RX ADMIN — BRIMONIDINE TARTRATE 1 DROP(S): 2 SOLUTION/ DROPS OPHTHALMIC at 19:20

## 2018-09-09 RX ADMIN — HEPARIN SODIUM 5000 UNIT(S): 5000 INJECTION INTRAVENOUS; SUBCUTANEOUS at 13:48

## 2018-09-09 RX ADMIN — Medication 81 MILLIGRAM(S): at 11:18

## 2018-09-09 RX ADMIN — DORZOLAMIDE HYDROCHLORIDE 1 DROP(S): 20 SOLUTION/ DROPS OPHTHALMIC at 13:47

## 2018-09-09 RX ADMIN — DORZOLAMIDE HYDROCHLORIDE 1 DROP(S): 20 SOLUTION/ DROPS OPHTHALMIC at 21:35

## 2018-09-09 RX ADMIN — HEPARIN SODIUM 5000 UNIT(S): 5000 INJECTION INTRAVENOUS; SUBCUTANEOUS at 05:38

## 2018-09-09 RX ADMIN — HEPARIN SODIUM 5000 UNIT(S): 5000 INJECTION INTRAVENOUS; SUBCUTANEOUS at 21:36

## 2018-09-09 RX ADMIN — DORZOLAMIDE HYDROCHLORIDE 1 DROP(S): 20 SOLUTION/ DROPS OPHTHALMIC at 05:38

## 2018-09-09 RX ADMIN — SIMVASTATIN 40 MILLIGRAM(S): 20 TABLET, FILM COATED ORAL at 21:35

## 2018-09-09 RX ADMIN — LOSARTAN POTASSIUM 50 MILLIGRAM(S): 100 TABLET, FILM COATED ORAL at 05:38

## 2018-09-09 NOTE — PROGRESS NOTE ADULT - ASSESSMENT
78 F w/ pmh of HTN, HLD, CAD s/p CABG, Gluacoma, Diverticulosis, Uterine Ca s/p SHAAN & Chemo presenting to the ED c/o Chest pain radiating to L arm, sxs resolved by the time of arrival. VS are wnl, Labs negative ce x1, ekg showed no changes, pt admitted to r/o acs    # Chest Pain R/O ACS  CP now resolved.   - Repeat CEx2  - C/w ASA 81, pt received 325mg in ED  - Cardio re-evaluation pending followup   CTA positive will follow with Cardiac Cath on Monday   NPO after midnight       # DVTppx: Heparin  # DASHdiet, high fiber  # Full code 78 F w/ pmh of HTN, HLD, CAD s/p CABG, Gluacoma, Diverticulosis, Uterine Ca s/p SHAAN & Chemo presenting to the ED c/o Chest pain radiating to Left arm, sxs resolved by the time of arrival. VS are wnl, Labs negative ce x1, ekg showed no changes, pt admitted to r/o acs    # Chest Pain R/O ACS  CP now resolved.   - Repeat CEx2  - C/w ASA 81, pt received 325mg in ED  - Cardio re-evaluation pending followup   CTA positive will follow with Cardiac Cath on Monday   NPO after midnight       # DVTppx: Heparin  # DASHdiet, high fiber  # Full code

## 2018-09-09 NOTE — PROGRESS NOTE ADULT - SUBJECTIVE AND OBJECTIVE BOX
Patient is a 78y old  Female who presents with a chief complaint of R/o ACS (08 Sep 2018 14:18)      PAST MEDICAL & SURGICAL HISTORY:  Left lower quadrant pain  Glaucoma  Cataract of right eye  High cholesterol  Hypertension  Uterine cancer  Athscl CABG, unsp, w angina pectoris w documented spasm  H/O total hysterectomy      MEDICATIONS  (STANDING):  amLODIPine   Tablet 5 milliGRAM(s) Oral daily  aspirin  chewable 81 milliGRAM(s) Oral daily  brimonidine 0.2% Ophthalmic Solution 1 Drop(s) Both EYES two times a day  dorzolamide 2% Ophthalmic Solution 1 Drop(s) Both EYES three times a day  heparin  Injectable 5000 Unit(s) SubCutaneous every 8 hours  latanoprost 0.005% Ophthalmic Solution 1 Drop(s) Both EYES at bedtime  losartan 50 milliGRAM(s) Oral daily  pregabalin 50 milliGRAM(s) Oral daily  simvastatin 40 milliGRAM(s) Oral at bedtime    MEDICATIONS  (PRN):  acetaminophen   Tablet .. 650 milliGRAM(s) Oral every 6 hours PRN Mild Pain (1 - 3), Moderate Pain (4 - 6)  ondansetron    Tablet 4 milliGRAM(s) Oral every 8 hours PRN Nausea and/or Vomiting      Overnight events:    Vital Signs Last 24 Hrs  T(C): 36.1 (09 Sep 2018 06:01), Max: 37 (08 Sep 2018 15:02)  T(F): 96.9 (09 Sep 2018 06:01), Max: 98.6 (08 Sep 2018 15:02)  HR: 55 (09 Sep 2018 06:01) (55 - 61)  BP: 117/57 (09 Sep 2018 06:01) (112/62 - 120/58)  BP(mean): --  RR: 18 (09 Sep 2018 06:01) (18 - 18)  SpO2: --  CAPILLARY BLOOD GLUCOSE        I&O's Summary    08 Sep 2018 07:01  -  09 Sep 2018 06:39  --------------------------------------------------------  IN: 360 mL / OUT: 0 mL / NET: 360 mL      PHYSICAL EXAM:  GENERAL: NAD, speaks in full sentences, no signs of respiratory distress  HEAD:  Atraumatic, Normocephalic  EYES: EOMI, PERRLA, conjunctiva and sclera clear  NECK: Supple, No JVD  CHEST/LUNG: Clear to auscultation bilaterally; No wheeze; No crackles; No accessory muscles used  HEART: Regular rate and rhythm; No murmurs;   ABDOMEN: Soft, Nontender, Nondistended; Bowel sounds present; No guarding  EXTREMITIES:  2+ Peripheral Pulses, No cyanosis or edema  PSYCH: AAOx3  NEUROLOGY: non-focal  SKIN: No rashes or lesions        Labs:                        11.9   5.42  )-----------( 156      ( 08 Sep 2018 06:36 )             36.5             09-08    143  |  104  |  28<H>  ----------------------------<  101<H>  4.9   |  24  |  1.0    Ca    8.7      08 Sep 2018 06:36                            Imaging:    ECG:    T(C): 36.1 (09-09-18 @ 06:01), Max: 37 (09-08-18 @ 15:02)  HR: 55 (09-09-18 @ 06:01) (55 - 61)  BP: 117/57 (09-09-18 @ 06:01) (112/62 - 120/58)  RR: 18 (09-09-18 @ 06:01) (18 - 18)  SpO2: --

## 2018-09-09 NOTE — PROGRESS NOTE ADULT - ATTENDING COMMENTS
pt asymptomatic at this time, hemodyanmically stable, ACS is ruled out  CCTA done    if ccta negative, dc home and fu with cardio and pmd as outpt  time spent 35 min
Pt seen and examined independently. For cardiac cath tomorrow per resident note.   NO complaints. Continue telemetry - no events.     I reviewed the resident's note and I agree with the physical exam, assessment and plan with additions as above.

## 2018-09-10 LAB
ALBUMIN SERPL ELPH-MCNC: 4.3 G/DL — SIGNIFICANT CHANGE UP (ref 3.5–5.2)
ALP SERPL-CCNC: 88 U/L — SIGNIFICANT CHANGE UP (ref 30–115)
ALT FLD-CCNC: 14 U/L — SIGNIFICANT CHANGE UP (ref 0–41)
ANION GAP SERPL CALC-SCNC: 16 MMOL/L — HIGH (ref 7–14)
APPEARANCE UR: CLEAR — SIGNIFICANT CHANGE UP
APTT BLD: 32.6 SEC — SIGNIFICANT CHANGE UP (ref 27–39.2)
AST SERPL-CCNC: 21 U/L — SIGNIFICANT CHANGE UP (ref 0–41)
BASOPHILS # BLD AUTO: 0.02 K/UL — SIGNIFICANT CHANGE UP (ref 0–0.2)
BASOPHILS NFR BLD AUTO: 0.3 % — SIGNIFICANT CHANGE UP (ref 0–1)
BILIRUB SERPL-MCNC: 0.3 MG/DL — SIGNIFICANT CHANGE UP (ref 0.2–1.2)
BILIRUB UR-MCNC: NEGATIVE — SIGNIFICANT CHANGE UP
BLD GP AB SCN SERPL QL: SIGNIFICANT CHANGE UP
BUN SERPL-MCNC: 23 MG/DL — HIGH (ref 10–20)
CALCIUM SERPL-MCNC: 9.1 MG/DL — SIGNIFICANT CHANGE UP (ref 8.5–10.1)
CHLORIDE SERPL-SCNC: 99 MMOL/L — SIGNIFICANT CHANGE UP (ref 98–110)
CHOLEST SERPL-MCNC: 180 MG/DL — SIGNIFICANT CHANGE UP (ref 100–200)
CO2 SERPL-SCNC: 27 MMOL/L — SIGNIFICANT CHANGE UP (ref 17–32)
COLOR SPEC: YELLOW — SIGNIFICANT CHANGE UP
CREAT SERPL-MCNC: 0.9 MG/DL — SIGNIFICANT CHANGE UP (ref 0.7–1.5)
DIFF PNL FLD: NEGATIVE — SIGNIFICANT CHANGE UP
EOSINOPHIL # BLD AUTO: 0.05 K/UL — SIGNIFICANT CHANGE UP (ref 0–0.7)
EOSINOPHIL NFR BLD AUTO: 0.7 % — SIGNIFICANT CHANGE UP (ref 0–8)
ESTIMATED AVERAGE GLUCOSE: 123 MG/DL — HIGH (ref 68–114)
GLUCOSE SERPL-MCNC: 110 MG/DL — HIGH (ref 70–99)
GLUCOSE UR QL: NEGATIVE — SIGNIFICANT CHANGE UP
HBA1C BLD-MCNC: 5.9 % — HIGH (ref 4–5.6)
HCT VFR BLD CALC: 41.2 % — SIGNIFICANT CHANGE UP (ref 37–47)
HDLC SERPL-MCNC: 67 MG/DL — SIGNIFICANT CHANGE UP
HGB BLD-MCNC: 13.2 G/DL — SIGNIFICANT CHANGE UP (ref 12–16)
IMM GRANULOCYTES NFR BLD AUTO: 0.4 % — HIGH (ref 0.1–0.3)
INR BLD: 1.01 RATIO — SIGNIFICANT CHANGE UP (ref 0.65–1.3)
KETONES UR-MCNC: NEGATIVE — SIGNIFICANT CHANGE UP
LEUKOCYTE ESTERASE UR-ACNC: NEGATIVE — SIGNIFICANT CHANGE UP
LIPID PNL WITH DIRECT LDL SERPL: 107 MG/DL — SIGNIFICANT CHANGE UP (ref 4–129)
LYMPHOCYTES # BLD AUTO: 1.84 K/UL — SIGNIFICANT CHANGE UP (ref 1.2–3.4)
LYMPHOCYTES # BLD AUTO: 25.6 % — SIGNIFICANT CHANGE UP (ref 20.5–51.1)
MCHC RBC-ENTMCNC: 26.7 PG — LOW (ref 27–31)
MCHC RBC-ENTMCNC: 32 G/DL — SIGNIFICANT CHANGE UP (ref 32–37)
MCV RBC AUTO: 83.4 FL — SIGNIFICANT CHANGE UP (ref 81–99)
MONOCYTES # BLD AUTO: 0.64 K/UL — HIGH (ref 0.1–0.6)
MONOCYTES NFR BLD AUTO: 8.9 % — SIGNIFICANT CHANGE UP (ref 1.7–9.3)
MRSA PCR RESULT.: NEGATIVE — SIGNIFICANT CHANGE UP
NEUTROPHILS # BLD AUTO: 4.6 K/UL — SIGNIFICANT CHANGE UP (ref 1.4–6.5)
NEUTROPHILS NFR BLD AUTO: 64.1 % — SIGNIFICANT CHANGE UP (ref 42.2–75.2)
NITRITE UR-MCNC: NEGATIVE — SIGNIFICANT CHANGE UP
NRBC # BLD: 0 /100 WBCS — SIGNIFICANT CHANGE UP (ref 0–0)
PH UR: 6.5 — SIGNIFICANT CHANGE UP (ref 5–8)
PLATELET # BLD AUTO: 178 K/UL — SIGNIFICANT CHANGE UP (ref 130–400)
POTASSIUM SERPL-MCNC: 4.4 MMOL/L — SIGNIFICANT CHANGE UP (ref 3.5–5)
POTASSIUM SERPL-SCNC: 4.4 MMOL/L — SIGNIFICANT CHANGE UP (ref 3.5–5)
PROT SERPL-MCNC: 7.6 G/DL — SIGNIFICANT CHANGE UP (ref 6–8)
PROT UR-MCNC: NEGATIVE — SIGNIFICANT CHANGE UP
PROTHROM AB SERPL-ACNC: 10.9 SEC — SIGNIFICANT CHANGE UP (ref 9.95–12.87)
RBC # BLD: 4.94 M/UL — SIGNIFICANT CHANGE UP (ref 4.2–5.4)
RBC # FLD: 14.6 % — HIGH (ref 11.5–14.5)
SODIUM SERPL-SCNC: 142 MMOL/L — SIGNIFICANT CHANGE UP (ref 135–146)
SP GR SPEC: >=1.03 — SIGNIFICANT CHANGE UP (ref 1.01–1.03)
TOTAL CHOLESTEROL/HDL RATIO MEASUREMENT: 2.7 RATIO — LOW (ref 4–5.5)
TRIGL SERPL-MCNC: 69 MG/DL — SIGNIFICANT CHANGE UP (ref 10–149)
TYPE + AB SCN PNL BLD: SIGNIFICANT CHANGE UP
UROBILINOGEN FLD QL: 1 (ref 0.2–0.2)
WBC # BLD: 7.18 K/UL — SIGNIFICANT CHANGE UP (ref 4.8–10.8)
WBC # FLD AUTO: 7.18 K/UL — SIGNIFICANT CHANGE UP (ref 4.8–10.8)

## 2018-09-10 PROCEDURE — 93880 EXTRACRANIAL BILAT STUDY: CPT | Mod: 26

## 2018-09-10 RX ORDER — SODIUM CHLORIDE 9 MG/ML
1000 INJECTION INTRAMUSCULAR; INTRAVENOUS; SUBCUTANEOUS
Qty: 0 | Refills: 0 | Status: DISCONTINUED | OUTPATIENT
Start: 2018-09-10 | End: 2018-09-12

## 2018-09-10 RX ORDER — ALPRAZOLAM 0.25 MG
0.25 TABLET ORAL AT BEDTIME
Qty: 0 | Refills: 0 | Status: DISCONTINUED | OUTPATIENT
Start: 2018-09-10 | End: 2018-09-11

## 2018-09-10 RX ORDER — METOPROLOL TARTRATE 50 MG
12.5 TABLET ORAL ONCE
Qty: 0 | Refills: 0 | Status: COMPLETED | OUTPATIENT
Start: 2018-09-11 | End: 2018-09-11

## 2018-09-10 RX ORDER — CHLORHEXIDINE GLUCONATE 213 G/1000ML
15 SOLUTION TOPICAL ONCE
Qty: 0 | Refills: 0 | Status: COMPLETED | OUTPATIENT
Start: 2018-09-10 | End: 2018-09-11

## 2018-09-10 RX ORDER — ALPRAZOLAM 0.25 MG
0.25 TABLET ORAL ONCE
Qty: 0 | Refills: 0 | Status: DISCONTINUED | OUTPATIENT
Start: 2018-09-10 | End: 2018-09-10

## 2018-09-10 RX ORDER — CHLORHEXIDINE GLUCONATE 213 G/1000ML
1 SOLUTION TOPICAL ONCE
Qty: 0 | Refills: 0 | Status: COMPLETED | OUTPATIENT
Start: 2018-09-10 | End: 2018-09-10

## 2018-09-10 RX ADMIN — SODIUM CHLORIDE 125 MILLILITER(S): 9 INJECTION INTRAMUSCULAR; INTRAVENOUS; SUBCUTANEOUS at 11:16

## 2018-09-10 RX ADMIN — HEPARIN SODIUM 5000 UNIT(S): 5000 INJECTION INTRAVENOUS; SUBCUTANEOUS at 21:14

## 2018-09-10 RX ADMIN — BRIMONIDINE TARTRATE 1 DROP(S): 2 SOLUTION/ DROPS OPHTHALMIC at 19:13

## 2018-09-10 RX ADMIN — LOSARTAN POTASSIUM 50 MILLIGRAM(S): 100 TABLET, FILM COATED ORAL at 06:31

## 2018-09-10 RX ADMIN — Medication 81 MILLIGRAM(S): at 07:45

## 2018-09-10 RX ADMIN — LATANOPROST 1 DROP(S): 0.05 SOLUTION/ DROPS OPHTHALMIC; TOPICAL at 21:13

## 2018-09-10 RX ADMIN — DORZOLAMIDE HYDROCHLORIDE 1 DROP(S): 20 SOLUTION/ DROPS OPHTHALMIC at 06:31

## 2018-09-10 RX ADMIN — SIMVASTATIN 40 MILLIGRAM(S): 20 TABLET, FILM COATED ORAL at 21:14

## 2018-09-10 RX ADMIN — Medication 0.25 MILLIGRAM(S): at 12:34

## 2018-09-10 RX ADMIN — BRIMONIDINE TARTRATE 1 DROP(S): 2 SOLUTION/ DROPS OPHTHALMIC at 06:31

## 2018-09-10 RX ADMIN — AMLODIPINE BESYLATE 5 MILLIGRAM(S): 2.5 TABLET ORAL at 06:31

## 2018-09-10 RX ADMIN — CHLORHEXIDINE GLUCONATE 1 APPLICATION(S): 213 SOLUTION TOPICAL at 20:12

## 2018-09-10 RX ADMIN — Medication 0.25 MILLIGRAM(S): at 21:14

## 2018-09-10 RX ADMIN — DORZOLAMIDE HYDROCHLORIDE 1 DROP(S): 20 SOLUTION/ DROPS OPHTHALMIC at 15:38

## 2018-09-10 RX ADMIN — HEPARIN SODIUM 5000 UNIT(S): 5000 INJECTION INTRAVENOUS; SUBCUTANEOUS at 06:31

## 2018-09-10 NOTE — PROGRESS NOTE ADULT - ASSESSMENT
-CAD Severe LM disease nonobstructive disease of LAD and LCX and RI . LIMA to LAD and SVG to LCX are occluded .   -Increased cholesterol.   -HTN  -Uterine CA   _ Diverticular disease ? mild diverticulitis  Plan:  Maintain current medication   Maintain on Telemtry   CT surgical consult for reop CABG .

## 2018-09-10 NOTE — PRE-ANESTHESIA EVALUATION ADULT - NSRADCARDRESULTSFT_GEN_ALL_CORE
Echo reveals  1. Normal global left ventricular systolic function.   2. Mildly increased LV wall thickness.   3. Prominent moderaator band.   4. Mild-moderate tricuspid regurgitation.   5. Psap at least 50.   6. Pulmonic valve regurgitation.   7. There is mild aortic root calcification.

## 2018-09-10 NOTE — PROGRESS NOTE ADULT - SUBJECTIVE AND OBJECTIVE BOX
PRE-OPERATIVE DAY OF PROCEDURE EVALUATION NOTE:  I have personally seen and examined the patient. I agree with the history and physicial which I have reviewed and noted any changes below. signed electronically by Dr Sheng Senior 09-10-18 @ 08:02

## 2018-09-10 NOTE — PROGRESS NOTE ADULT - SUBJECTIVE AND OBJECTIVE BOX
Cardiac Surgery Pre-op Note:  CC: Patient is a 78y old  Female who presents with a chief complaint of R/o ACS (10 Sep 2018 11:27)      Referring Physician:                                                                                             Surgeon:  Procedure: (Date) (Procedure)    Allergies    quinine (Urticaria)    Intolerances      HPI:  78 F w/ pmh of HTN, HLD, CAD s/p CABG, Gluacoma, Diverticulosis, Uterine Ca s/p SHAAN & Chemo presenting to the ED c/o Chest pain. The CP is on the left side, radiated to the L arm, was a/w nausea & lasted for 2 minutes & then resolved. She denies any sob, vomiting, diaphoresis, fevers, chills, syncope. Pt is asymptomatic in the ED. (05 Sep 2018 22:58) Patient r/o for AMI, pt had a CTA which showed patent LIMA and occluded SVG to OM, however when cardiac cath was performed, it showed occluded LIMA to LAD as well as occluded SVG to OM      PAST MEDICAL & SURGICAL HISTORY:  Left lower quadrant pain  Glaucoma  Cataract of right eye  High cholesterol  Hypertension  Uterine cancer  Athscl CABG, unsp, w angina pectoris w documented spasm  H/O total hysterectomy      MEDICATIONS  (STANDING):  aspirin  chewable 81 milliGRAM(s) Oral daily  brimonidine 0.2% Ophthalmic Solution 1 Drop(s) Both EYES two times a day  chlorhexidine 0.12% Liquid 15 milliLiter(s) Swish and Spit once  chlorhexidine 4% Liquid 1 Application(s) Topical once  dorzolamide 2% Ophthalmic Solution 1 Drop(s) Both EYES three times a day  heparin  Injectable 5000 Unit(s) SubCutaneous every 8 hours  latanoprost 0.005% Ophthalmic Solution 1 Drop(s) Both EYES at bedtime  pregabalin 50 milliGRAM(s) Oral daily  simvastatin 40 milliGRAM(s) Oral at bedtime  sodium chloride 0.9%. 1000 milliLiter(s) (125 mL/Hr) IV Continuous <Continuous>    MEDICATIONS  (PRN):  acetaminophen   Tablet .. 650 milliGRAM(s) Oral every 6 hours PRN Mild Pain (1 - 3), Moderate Pain (4 - 6)  ALPRAZolam 0.25 milliGRAM(s) Oral at bedtime PRN anxiety  ondansetron    Tablet 4 milliGRAM(s) Oral every 8 hours PRN Nausea and/or Vomiting      Labs:                        13.2   7.18  )-----------( 178      ( 10 Sep 2018 11:04 )             41.2     09-10    142  |  99  |  23<H>  ----------------------------<  110<H>  4.4   |  27  |  0.9    Ca    9.1      10 Sep 2018 11:04    TPro  7.6  /  Alb  4.3  /  TBili  0.3  /  DBili  x   /  AST  21  /  ALT  14  /  AlkPhos  88  09-10    PT/INR - ( 10 Sep 2018 11:04 )   PT: 10.90 sec;   INR: 1.01 ratio         PTT - ( 10 Sep 2018 11:04 )  PTT:32.6 sec    Blood Type:   HGB A1C:   Prealbumin:   Pro-BNP: Serum Pro-Brain Natriuretic Peptide: 57 pg/mL (09-05 @ 20:11)    Thyroid Panel:   MRSA:  / MSSA:       CXR:     EKG:    Carotid Duplex:      PFT's:    Echocardiogram:    Cardiac catheterization:    Gen: WN/WD NAD  Neuro: A&Ox3, nonfocal  Pulm: CTA B/L  CV: RRR, S1S2 +systolic murmur  Abd: Soft, NT, ND +BS  Ext: No edema, + peripheral pulses    Cardic Surgery Risk Factors  CVA and/or carotid/cerebrovascular disease. Yes  No  Explain if Yes  Aortoiliac disease Yes  No  Explain if Yes  Previous MI Yes  No  Explain if Yes  Previos Cardiac Surgery Yes  No  Explain if Yes  Hemodynamics-Unstable or Shock Yes  No  Explain if Yes  Diabetis Yes  No  Explain if Yes  Hepatic Failure Yes  No  Explain if Yes  Renal failure and/or dialysis Yes  No  Explain if Yes  Heart failure-type-present or past Yes  No  Explain if Yes  COPD Yes  No  Explain if Yes  Immune System Deficiency Yes  No  Explain if Yes  Malignant Ventricular Arrhythmia Yes  No  Explain if Yes    STS Score:     Pt has AICD/PPM [ ] Yes  [x ] No             Brand Name:  Pre-op Beta Blocker ordered within 24 hrs of surgery (CABG ONLY)?  [x ] Yes  [ ] No  If not, Why?  Type & Cross  [ ] Yes  [ ] No  NPO after Midnight [x ] Yes  [ ] No  Pre-op ABX ordered, to be taped on chart:  [ x] Yes  [ ] No     Hibiclens/Peridex ordered [x ] Yes  [ ] No  Intraop on Hold: PRBCs, CXR, ARLIN [x ]   Consent obtained  [x ] Yes  [ ] No Cardiac Surgery Pre-op Note:  CC: Patient is a 78y old  Female who presents with a chief complaint of R/o ACS (10 Sep 2018 11:27)      Referring Physician:                   Dr. Moura                                                                          Surgeon:    Dr. Bah  Procedure: (Date) (Procedure) 9/11/2018  Coronary Artery Bypass Grafting    Allergies    quinine (Urticaria)    Intolerances      HPI:  78 F w/ pmh of HTN, HLD, CAD s/p CABG, Gluacoma, Diverticulosis, Uterine Ca s/p SHAAN & Chemo presenting to the ED c/o Chest pain. The CP is on the left side, radiated to the L arm, was a/w nausea & lasted for 2 minutes & then resolved. She denies any sob, vomiting, diaphoresis, fevers, chills, syncope. Pt is asymptomatic in the ED. (05 Sep 2018 22:58) Patient r/o for AMI, pt had a CTA which showed patent LIMA and occluded SVG to OM, however when cardiac cath was performed, it showed occluded LIMA to LAD as well as occluded SVG to OM      PAST MEDICAL & SURGICAL HISTORY:  Left lower quadrant pain  Glaucoma  Cataract of right eye  High cholesterol  Hypertension  Uterine cancer  Athscl CABG, unsp, w angina pectoris w documented spasm  H/O total hysterectomy      MEDICATIONS  (STANDING):  aspirin  chewable 81 milliGRAM(s) Oral daily  brimonidine 0.2% Ophthalmic Solution 1 Drop(s) Both EYES two times a day  chlorhexidine 0.12% Liquid 15 milliLiter(s) Swish and Spit once  chlorhexidine 4% Liquid 1 Application(s) Topical once  dorzolamide 2% Ophthalmic Solution 1 Drop(s) Both EYES three times a day  heparin  Injectable 5000 Unit(s) SubCutaneous every 8 hours  latanoprost 0.005% Ophthalmic Solution 1 Drop(s) Both EYES at bedtime  pregabalin 50 milliGRAM(s) Oral daily  simvastatin 40 milliGRAM(s) Oral at bedtime  sodium chloride 0.9%. 1000 milliLiter(s) (125 mL/Hr) IV Continuous <Continuous>    MEDICATIONS  (PRN):  acetaminophen   Tablet .. 650 milliGRAM(s) Oral every 6 hours PRN Mild Pain (1 - 3), Moderate Pain (4 - 6)  ALPRAZolam 0.25 milliGRAM(s) Oral at bedtime PRN anxiety  ondansetron    Tablet 4 milliGRAM(s) Oral every 8 hours PRN Nausea and/or Vomiting      Labs:                        13.2   7.18  )-----------( 178      ( 10 Sep 2018 11:04 )             41.2     09-10    142  |  99  |  23<H>  ----------------------------<  110<H>  4.4   |  27  |  0.9    Ca    9.1      10 Sep 2018 11:04    TPro  7.6  /  Alb  4.3  /  TBili  0.3  /  DBili  x   /  AST  21  /  ALT  14  /  AlkPhos  88  09-10    PT/INR - ( 10 Sep 2018 11:04 )   PT: 10.90 sec;   INR: 1.01 ratio         PTT - ( 10 Sep 2018 11:04 )  PTT:32.6 sec    Blood Type:   HGB A1C: pending  Prealbumin:   Pro-BNP: Serum Pro-Brain Natriuretic Peptide: 57 pg/mL (09-05 @ 20:11)    Thyroid Panel:   MRSA:  / MSSA:       CXR: < from: Xray Chest 1 View AP/PA (09.05.18 @ 22:10) >  Impression:      No radiographic evidence of acute cardiopulmonary disease.    < end of copied text >      EKG: < from: 12 Lead ECG (09.05.18 @ 16:54) >  Ventricular Rate 74 BPM    Atrial Rate 74 BPM    P-R Interval 130 ms    QRS Duration 78 ms    Q-T Interval 360 ms    QTC Calculation(Bezet) 399 ms    P Axis 25 degrees    R Axis 24 degrees    T Axis 44 degrees    Diagnosis Line Normal sinus rhythm  Normal ECG    < end of copied text >      Carotid Duplex:  < from: [facility] Duplex Carotid, Bilat (09.10.18 @ 13:21) >  Impression:    20-39% stenosis of the right internal carotid artery.    20-39% stenosis of the left internal carotid artery.    < end of copied text >      PFT's: FEV1 p    Echocardiogram: < from: Transthoracic Echocardiogram (09.10.18 @ 12:20) >  Summary:   1. Normal global left ventricular systolic function.   2. Mildly increased LV wall thickness.   3. Prominent moderaator band.   4. Mild-moderate tricuspid regurgitation.   5. Psap at least 50.   6. Pulmonic valve regurgitation.   7. There ismild aortic root calcification.    PHYSICIAN INTERPRETATION:  Left Ventricle: The left ventricular internal cavity size is normal. Left   ventricular wall thickness is mildly increased. Global LV systolic   function was normal.  Right Ventricle: Normal right ventricular size and function. Prominent   moderaator band.  Left Atrium: Mildly enlarged left atrium.  Right Atrium: Normal right atrial size.  Pericardium: There is no evidence of pericardial effusion.  Mitral Valve: Trace mitral valve regurgitation is seen. The mitral valve   is normal in structure.  Tricuspid Valve: Mild-moderate tricuspid regurgitation is visualized. The   tricuspid valve is normal. Psap at least 50.  Aortic Valve: No evidence of aortic valve regurgitation is seen. The   aortic valve is trileaflet. No evidence of aortic stenosis.  Pulmonic Valve: Mild pulmonic valve regurgitation.  Aorta: The aortic root is normal in size and structure. There is mild   aortic root calcification.    < end of copied text >    CT Chest:     Cardiac catheterization:     Left main: distal 75% lesion                           LAD: ostial 75% lesion                           [employer] has a 75% lesion                           LCX:  normal                           RCA: normal                           PDA:normal                           PLV:normal    Vital Signs Last 24 Hrs  T(C): 36.1 (10 Sep 2018 06:10), Max: 36.7 (09 Sep 2018 20:10)  T(F): 97 (10 Sep 2018 06:10), Max: 98.1 (09 Sep 2018 20:10)  HR: 54 (10 Sep 2018 06:10) (54 - 70)  BP: 140/75 (10 Sep 2018 06:10) (140/75 - 144/69)  RR: 18 (10 Sep 2018 06:10) (18 - 18)    Right arm bp: 153/72 Left arm bp; 169/80    Gen: WN/WD NAD  Neuro: A&Ox3, nonfocal  Pulm: CTA B/L  CV: RRR, S1S2 +systolic murmur  Abd: Soft, NT, ND +BS  Ext: No edema, + peripheral pulses    Cardic Surgery Risk Factors  CVA and/or carotid/cerebrovascular disease. Yes  No  Explain if Yes  Aortoiliac disease Yes  No  Explain if Yes  Previous MI Yes  No  Explain if Yes  Previos Cardiac Surgery Yes  No  Explain if Yes  Hemodynamics-Unstable or Shock Yes  No  Explain if Yes  Diabetis Yes  No  Explain if Yes  Hepatic Failure Yes  No  Explain if Yes  Renal failure and/or dialysis Yes  No  Explain if Yes  Heart failure-type-present or past Yes  No  Explain if Yes  COPD Yes  No  Explain if Yes  Immune System Deficiency Yes  No  Explain if Yes  Malignant Ventricular Arrhythmia Yes  No  Explain if Yes    STS Score:     Pt has AICD/PPM [ ] Yes  [x ] No             Brand Name:  Pre-op Beta Blocker ordered within 24 hrs of surgery (CABG ONLY)?  [x ] Yes  [ ] No  If not, Why?  Type & Cross  [ ] Yes  [ ] No  NPO after Midnight [x ] Yes  [ ] No  Pre-op ABX ordered, to be taped on chart:  [ x] Yes  [ ] No     Hibiclens/Peridex ordered [x ] Yes  [ ] No  Intraop on Hold: PRBCs, CXR, ARLIN [x ]   Consent obtained  [x ] Yes  [ ] No Cardiac Surgery Pre-op Note:  CC: Patient is a 78y old  Female who presents with a chief complaint of R/o ACS (10 Sep 2018 11:27)      Referring Physician:                   Dr. Moura                                                                          Surgeon:    Dr. Bah  Procedure: (Date) (Procedure) 9/11/2018  Coronary Artery Bypass Grafting    Allergies    quinine (Urticaria)    Intolerances      HPI:  78 F w/ pmh of HTN, HLD, CAD s/p CABG, Gluacoma, Diverticulosis, Uterine Ca s/p SHAAN & Chemo presenting to the ED c/o Chest pain. The CP is on the left side, radiated to the L arm, was a/w nausea & lasted for 2 minutes & then resolved. She denies any sob, vomiting, diaphoresis, fevers, chills, syncope. Pt is asymptomatic in the ED. (05 Sep 2018 22:58) Patient r/o for AMI, pt had a CTA which showed patent LIMA and occluded SVG to OM, however when cardiac cath was performed, it showed occluded LIMA to LAD as well as occluded SVG to OM      PAST MEDICAL & SURGICAL HISTORY:  Left lower quadrant pain  Glaucoma  Cataract of right eye  High cholesterol  Hypertension  Uterine cancer  Athscl CABG, unsp, w angina pectoris w documented spasm  H/O total hysterectomy      MEDICATIONS  (STANDING):  aspirin  chewable 81 milliGRAM(s) Oral daily  brimonidine 0.2% Ophthalmic Solution 1 Drop(s) Both EYES two times a day  chlorhexidine 0.12% Liquid 15 milliLiter(s) Swish and Spit once  chlorhexidine 4% Liquid 1 Application(s) Topical once  dorzolamide 2% Ophthalmic Solution 1 Drop(s) Both EYES three times a day  heparin  Injectable 5000 Unit(s) SubCutaneous every 8 hours  latanoprost 0.005% Ophthalmic Solution 1 Drop(s) Both EYES at bedtime  pregabalin 50 milliGRAM(s) Oral daily  simvastatin 40 milliGRAM(s) Oral at bedtime  sodium chloride 0.9%. 1000 milliLiter(s) (125 mL/Hr) IV Continuous <Continuous>    MEDICATIONS  (PRN):  acetaminophen   Tablet .. 650 milliGRAM(s) Oral every 6 hours PRN Mild Pain (1 - 3), Moderate Pain (4 - 6)  ALPRAZolam 0.25 milliGRAM(s) Oral at bedtime PRN anxiety  ondansetron    Tablet 4 milliGRAM(s) Oral every 8 hours PRN Nausea and/or Vomiting      Labs:                        13.2   7.18  )-----------( 178      ( 10 Sep 2018 11:04 )             41.2     09-10    142  |  99  |  23<H>  ----------------------------<  110<H>  4.4   |  27  |  0.9    Ca    9.1      10 Sep 2018 11:04    TPro  7.6  /  Alb  4.3  /  TBili  0.3  /  DBili  x   /  AST  21  /  ALT  14  /  AlkPhos  88  09-10    PT/INR - ( 10 Sep 2018 11:04 )   PT: 10.90 sec;   INR: 1.01 ratio         PTT - ( 10 Sep 2018 11:04 )  PTT:32.6 sec    Blood Type:   HGB A1C: pending  Prealbumin:   Pro-BNP: Serum Pro-Brain Natriuretic Peptide: 57 pg/mL (09-05 @ 20:11)    Thyroid Panel:   MRSA:  / MSSA:       CXR: < from: Xray Chest 1 View AP/PA (09.05.18 @ 22:10) >  Impression:      No radiographic evidence of acute cardiopulmonary disease.    < end of copied text >      EKG: < from: 12 Lead ECG (09.05.18 @ 16:54) >  Ventricular Rate 74 BPM    Atrial Rate 74 BPM    P-R Interval 130 ms    QRS Duration 78 ms    Q-T Interval 360 ms    QTC Calculation(Bezet) 399 ms    P Axis 25 degrees    R Axis 24 degrees    T Axis 44 degrees    Diagnosis Line Normal sinus rhythm  Normal ECG    < end of copied text >      Carotid Duplex:  < from: VA Duplex Carotid, Bilat (09.10.18 @ 13:21) >  Impression:    20-39% stenosis of the right internal carotid artery.    20-39% stenosis of the left internal carotid artery.    < end of copied text >      PFT's: FEV1 p    Echocardiogram: < from: Transthoracic Echocardiogram (09.10.18 @ 12:20) >  Summary:   1. Normal global left ventricular systolic function.   2. Mildly increased LV wall thickness.   3. Prominent moderaator band.   4. Mild-moderate tricuspid regurgitation.   5. Psap at least 50.   6. Pulmonic valve regurgitation.   7. There ismild aortic root calcification.    PHYSICIAN INTERPRETATION:  Left Ventricle: The left ventricular internal cavity size is normal. Left   ventricular wall thickness is mildly increased. Global LV systolic   function was normal.  Right Ventricle: Normal right ventricular size and function. Prominent   moderaator band.  Left Atrium: Mildly enlarged left atrium.  Right Atrium: Normal right atrial size.  Pericardium: There is no evidence of pericardial effusion.  Mitral Valve: Trace mitral valve regurgitation is seen. The mitral valve   is normal in structure.  Tricuspid Valve: Mild-moderate tricuspid regurgitation is visualized. The   tricuspid valve is normal. Psap at least 50.  Aortic Valve: No evidence of aortic valve regurgitation is seen. The   aortic valve is trileaflet. No evidence of aortic stenosis.  Pulmonic Valve: Mild pulmonic valve regurgitation.  Aorta: The aortic root is normal in size and structure. There is mild   aortic root calcification.    < end of copied text >    CT Chest:     Cardiac catheterization:     Left main: distal 75% lesion                           LAD: ostial 75% lesion                           [employer] has a 75% lesion                           LCX:  normal                           RCA: normal                           PDA:normal                           PLV:normal    Vital Signs Last 24 Hrs  T(C): 36.1 (10 Sep 2018 06:10), Max: 36.7 (09 Sep 2018 20:10)  T(F): 97 (10 Sep 2018 06:10), Max: 98.1 (09 Sep 2018 20:10)  HR: 54 (10 Sep 2018 06:10) (54 - 70)  BP: 140/75 (10 Sep 2018 06:10) (140/75 - 144/69)  RR: 18 (10 Sep 2018 06:10) (18 - 18)    Right arm bp: 153/72 Left arm bp; 169/80    5 meter walk:      Gen: WN/WD NAD  Neuro: A&Ox3, nonfocal  Pulm: CTA B/L  CV: RRR, S1S2  Abd: Soft, NT, ND +BS  Ext: No edema, + peripheral pulses    Cardic Surgery Risk Factors  CVA and/or carotid/cerebrovascular disease. Yes  No  Explain if Yes  Aortoiliac disease Yes  No  Explain if Yes  Previous MI Yes  No  Explain if Yes  Previous Cardiac Surgery Yes  No  Explain if Yes CABG 2011  Hemodynamics-Unstable or Shock Yes  No  Explain if Yes  Diabetes Yes  No  Explain if Yes  Hepatic Failure Yes  No  Explain if Yes  Renal failure and/or dialysis Yes  No  Explain if Yes  Heart failure-type-present or past Yes  No  Explain if Yes  COPD Yes  No  Explain if Yes  Immune System Deficiency Yes  No  Explain if Yes  Malignant Ventricular Arrhythmia Yes  No  Explain if Yes    STS Score:      Pt has AICD/PPM [ ] Yes  [x ] No             Brand Name:  Pre-op Beta Blocker ordered within 24 hrs of surgery (CABG ONLY)?  [x ] Yes  [ ] No  If not, Why?  Type & Cross  [ ] Yes  [ ] No  NPO after Midnight [x ] Yes  [ ] No  Pre-op ABX ordered, to be taped on chart:  [ x] Yes  [ ] No     Hibiclens/Peridex ordered [x ] Yes  [ ] No  Intraop on Hold: PRBCs, CXR, ARLIN [x ]   Consent obtained  [x ] Yes  [ ] No Cardiac Surgery Pre-op Note:  CC: Patient is a 78y old  Female who presents with a chief complaint of R/o ACS (10 Sep 2018 11:27)      Referring Physician:                   Dr. Moura                                                                          Surgeon:    Dr. Bah  Procedure: (Date) (Procedure) 9/11/2018  Coronary Artery Bypass Grafting    Allergies    quinine (Urticaria)    Intolerances      HPI:  78 F w/ pmh of HTN, HLD, CAD s/p CABG, Gluacoma, Diverticulosis, Uterine Ca s/p SHAAN & Chemo presenting to the ED c/o Chest pain. The CP is on the left side, radiated to the L arm, was a/w nausea & lasted for 2 minutes & then resolved. She denies any sob, vomiting, diaphoresis, fevers, chills, syncope. Pt is asymptomatic in the ED. (05 Sep 2018 22:58) Patient r/o for AMI, pt had a CTA which showed patent LIMA and occluded SVG to OM, however when cardiac cath was performed, it showed occluded LIMA to LAD as well as occluded SVG to OM.       PAST MEDICAL & SURGICAL HISTORY:  Left lower quadrant pain  Glaucoma  Cataract of right eye  High cholesterol  Hypertension  Uterine cancer  Athscl CABG, unsp, w angina pectoris w documented spasm  H/O total hysterectomy      MEDICATIONS  (STANDING):  aspirin  chewable 81 milliGRAM(s) Oral daily  brimonidine 0.2% Ophthalmic Solution 1 Drop(s) Both EYES two times a day  chlorhexidine 0.12% Liquid 15 milliLiter(s) Swish and Spit once  chlorhexidine 4% Liquid 1 Application(s) Topical once  dorzolamide 2% Ophthalmic Solution 1 Drop(s) Both EYES three times a day  heparin  Injectable 5000 Unit(s) SubCutaneous every 8 hours  latanoprost 0.005% Ophthalmic Solution 1 Drop(s) Both EYES at bedtime  pregabalin 50 milliGRAM(s) Oral daily  simvastatin 40 milliGRAM(s) Oral at bedtime  sodium chloride 0.9%. 1000 milliLiter(s) (125 mL/Hr) IV Continuous <Continuous>    MEDICATIONS  (PRN):  acetaminophen   Tablet .. 650 milliGRAM(s) Oral every 6 hours PRN Mild Pain (1 - 3), Moderate Pain (4 - 6)  ALPRAZolam 0.25 milliGRAM(s) Oral at bedtime PRN anxiety  ondansetron    Tablet 4 milliGRAM(s) Oral every 8 hours PRN Nausea and/or Vomiting      Labs:                        13.2   7.18  )-----------( 178      ( 10 Sep 2018 11:04 )             41.2     09-10    142  |  99  |  23<H>  ----------------------------<  110<H>  4.4   |  27  |  0.9    Ca    9.1      10 Sep 2018 11:04    TPro  7.6  /  Alb  4.3  /  TBili  0.3  /  DBili  x   /  AST  21  /  ALT  14  /  AlkPhos  88  09-10    PT/INR - ( 10 Sep 2018 11:04 )   PT: 10.90 sec;   INR: 1.01 ratio         PTT - ( 10 Sep 2018 11:04 )  PTT:32.6 sec    Blood Type:   HGB A1C: pending  Prealbumin:   Pro-BNP: Serum Pro-Brain Natriuretic Peptide: 57 pg/mL (09-05 @ 20:11)    Thyroid Panel:   MRSA:  / MSSA:       CXR: < from: Xray Chest 1 View AP/PA (09.05.18 @ 22:10) >  Impression:      No radiographic evidence of acute cardiopulmonary disease.    < end of copied text >      EKG: < from: 12 Lead ECG (09.05.18 @ 16:54) >  Ventricular Rate 74 BPM    Atrial Rate 74 BPM    P-R Interval 130 ms    QRS Duration 78 ms    Q-T Interval 360 ms    QTC Calculation(Bezet) 399 ms    P Axis 25 degrees    R Axis 24 degrees    T Axis 44 degrees    Diagnosis Line Normal sinus rhythm  Normal ECG    < end of copied text >      Carotid Duplex:  < from: [facility] Duplex Carotid, Bilat (09.10.18 @ 13:21) >  Impression:    20-39% stenosis of the right internal carotid artery.    20-39% stenosis of the left internal carotid artery.    < end of copied text >      PFT's: FEV1 58%    Echocardiogram: < from: Transthoracic Echocardiogram (09.10.18 @ 12:20) >  Summary:   1. Normal global left ventricular systolic function.   2. Mildly increased LV wall thickness.   3. Prominent moderaator band.   4. Mild-moderate tricuspid regurgitation.   5. Psap at least 50.   6. Pulmonic valve regurgitation.   7. There ismild aortic root calcification.    PHYSICIAN INTERPRETATION:  Left Ventricle: The left ventricular internal cavity size is normal. Left   ventricular wall thickness is mildly increased. Global LV systolic   function was normal.  Right Ventricle: Normal right ventricular size and function. Prominent   moderaator band.  Left Atrium: Mildly enlarged left atrium.  Right Atrium: Normal right atrial size.  Pericardium: There is no evidence of pericardial effusion.  Mitral Valve: Trace mitral valve regurgitation is seen. The mitral valve   is normal in structure.  Tricuspid Valve: Mild-moderate tricuspid regurgitation is visualized. The   tricuspid valve is normal. Psap at least 50.  Aortic Valve: No evidence of aortic valve regurgitation is seen. The   aortic valve is trileaflet. No evidence of aortic stenosis.  Pulmonic Valve: Mild pulmonic valve regurgitation.  Aorta: The aortic root is normal in size and structure. There is mild   aortic root calcification.    < end of copied text >    CT Chest:     Cardiac catheterization:     Left main: distal 75% lesion                           LAD: ostial 75% lesion                           [employer] has a 75% lesion                           LCX:  normal                           RCA: normal                           PDA:normal                           PLV:normal    Vital Signs Last 24 Hrs  T(C): 36.1 (10 Sep 2018 06:10), Max: 36.7 (09 Sep 2018 20:10)  T(F): 97 (10 Sep 2018 06:10), Max: 98.1 (09 Sep 2018 20:10)  HR: 54 (10 Sep 2018 06:10) (54 - 70)  BP: 140/75 (10 Sep 2018 06:10) (140/75 - 144/69)  RR: 18 (10 Sep 2018 06:10) (18 - 18)    Right arm bp: 153/72 Left arm bp; 169/80    5 meter walk:      Gen: WN/WD NAD  Neuro: A&Ox3, nonfocal  Pulm: CTA B/L  CV: RRR, S1S2  Abd: Soft, NT, ND +BS  Ext: No edema, + peripheral pulses    Cardic Surgery Risk Factors  CVA and/or carotid/cerebrovascular disease. Yes  No  Explain if Yes  Aortoiliac disease Yes  No  Explain if Yes  Previous MI Yes  No  Explain if Yes  Previous Cardiac Surgery Yes  No  Explain if Yes CABG 2011  Hemodynamics-Unstable or Shock Yes  No  Explain if Yes  Diabetes Yes  No  Explain if Yes  Hepatic Failure Yes  No  Explain if Yes  Renal failure and/or dialysis Yes  No  Explain if Yes  Heart failure-type-present or past Yes  No  Explain if Yes  COPD Yes  No  Explain if Yes FEV1 58%  Immune System Deficiency Yes  No  Explain if Yes  Malignant Ventricular Arrhythmia Yes  No  Explain if Yes    STS Score:  Risk of Mortality: 3.292%    Morbidity or Mortality: 25.794%    Long Length of Stay: 10.04%    Short Length of Stay: 21.72%    Permanent Stroke: 1.32%    Prolonged Ventilation: 22.524%    DSW Infection: 0.459%    Renal Failure: 4.152%    Reoperation: 7.292%    Pt has AICD/PPM [ ] Yes  [x ] No             Brand Name:  Pre-op Beta Blocker ordered within 24 hrs of surgery (CABG ONLY)?  [x ] Yes  [ ] No  If not, Why?  Type & Cross  [ ] Yes  [ ] No  NPO after Midnight [x ] Yes  [ ] No  Pre-op ABX ordered, to be taped on chart:  [ ] Yes  [ x] No     Hibiclens/Peridex ordered [x ] Yes  [ ] No  Intraop on Hold: PRBCs, CXR, ARLIN [x ]   Consent obtained  [x ] Yes  [ ] No Cardiac Surgery Pre-op Note:  CC: Patient is a 78y old  Female who presents with a chief complaint of R/o ACS (10 Sep 2018 11:27)      Referring Physician:                   Dr. Moura                                                                          Surgeon:    Dr. Bah  Procedure: (Date) (Procedure) 9/11/2018  Coronary Artery Bypass Grafting    Allergies    quinine (Urticaria)    Intolerances      HPI:  78 F w/ pmh of HTN, HLD, CAD s/p CABG, Gluacoma, Diverticulosis, Uterine Ca s/p SHAAN & Chemo presenting to the ED c/o Chest pain. The CP is on the left side, radiated to the L arm, was a/w nausea & lasted for 2 minutes & then resolved. She denies any sob, vomiting, diaphoresis, fevers, chills, syncope. Pt is asymptomatic in the ED. (05 Sep 2018 22:58) Patient r/o for AMI, pt had a CTA which showed patent LIMA and occluded SVG to OM, however when cardiac cath was performed, it showed occluded LIMA to LAD as well as occluded SVG to OM.       PAST MEDICAL & SURGICAL HISTORY:  Left lower quadrant pain  Glaucoma  Cataract of right eye  High cholesterol  Hypertension  Uterine cancer  Athscl CABG, unsp, w angina pectoris w documented spasm  H/O total hysterectomy      MEDICATIONS  (STANDING):  aspirin  chewable 81 milliGRAM(s) Oral daily  brimonidine 0.2% Ophthalmic Solution 1 Drop(s) Both EYES two times a day  chlorhexidine 0.12% Liquid 15 milliLiter(s) Swish and Spit once  chlorhexidine 4% Liquid 1 Application(s) Topical once  dorzolamide 2% Ophthalmic Solution 1 Drop(s) Both EYES three times a day  heparin  Injectable 5000 Unit(s) SubCutaneous every 8 hours  latanoprost 0.005% Ophthalmic Solution 1 Drop(s) Both EYES at bedtime  pregabalin 50 milliGRAM(s) Oral daily  simvastatin 40 milliGRAM(s) Oral at bedtime  sodium chloride 0.9%. 1000 milliLiter(s) (125 mL/Hr) IV Continuous <Continuous>    MEDICATIONS  (PRN):  acetaminophen   Tablet .. 650 milliGRAM(s) Oral every 6 hours PRN Mild Pain (1 - 3), Moderate Pain (4 - 6)  ALPRAZolam 0.25 milliGRAM(s) Oral at bedtime PRN anxiety  ondansetron    Tablet 4 milliGRAM(s) Oral every 8 hours PRN Nausea and/or Vomiting      Labs:                        13.2   7.18  )-----------( 178      ( 10 Sep 2018 11:04 )             41.2     09-10    142  |  99  |  23<H>  ----------------------------<  110<H>  4.4   |  27  |  0.9    Ca    9.1      10 Sep 2018 11:04    TPro  7.6  /  Alb  4.3  /  TBili  0.3  /  DBili  x   /  AST  21  /  ALT  14  /  AlkPhos  88  09-10    PT/INR - ( 10 Sep 2018 11:04 )   PT: 10.90 sec;   INR: 1.01 ratio         PTT - ( 10 Sep 2018 11:04 )  PTT:32.6 sec    Blood Type:   HGB A1C: pending  Prealbumin:   Pro-BNP: Serum Pro-Brain Natriuretic Peptide: 57 pg/mL (09-05 @ 20:11)    Thyroid Panel:   MRSA:  / MSSA:       CXR: < from: Xray Chest 1 View AP/PA (09.05.18 @ 22:10) >  Impression:      No radiographic evidence of acute cardiopulmonary disease.    < end of copied text >      EKG: < from: 12 Lead ECG (09.05.18 @ 16:54) >  Ventricular Rate 74 BPM    Atrial Rate 74 BPM    P-R Interval 130 ms    QRS Duration 78 ms    Q-T Interval 360 ms    QTC Calculation(Bezet) 399 ms    P Axis 25 degrees    R Axis 24 degrees    T Axis 44 degrees    Diagnosis Line Normal sinus rhythm  Normal ECG    < end of copied text >      Carotid Duplex:  < from: VA Duplex Carotid, Bilat (09.10.18 @ 13:21) >  Impression:    20-39% stenosis of the right internal carotid artery.    20-39% stenosis of the left internal carotid artery.    < end of copied text >      PFT's: FEV1 58%    Echocardiogram: < from: Transthoracic Echocardiogram (09.10.18 @ 12:20) >  Summary:   1. Normal global left ventricular systolic function.   2. Mildly increased LV wall thickness.   3. Prominent moderaator band.   4. Mild-moderate tricuspid regurgitation.   5. Psap at least 50.   6. Pulmonic valve regurgitation.   7. There ismild aortic root calcification.    PHYSICIAN INTERPRETATION:  Left Ventricle: The left ventricular internal cavity size is normal. Left   ventricular wall thickness is mildly increased. Global LV systolic   function was normal.  Right Ventricle: Normal right ventricular size and function. Prominent   moderaator band.  Left Atrium: Mildly enlarged left atrium.  Right Atrium: Normal right atrial size.  Pericardium: There is no evidence of pericardial effusion.  Mitral Valve: Trace mitral valve regurgitation is seen. The mitral valve   is normal in structure.  Tricuspid Valve: Mild-moderate tricuspid regurgitation is visualized. The   tricuspid valve is normal. Psap at least 50.  Aortic Valve: No evidence of aortic valve regurgitation is seen. The   aortic valve is trileaflet. No evidence of aortic stenosis.  Pulmonic Valve: Mild pulmonic valve regurgitation.  Aorta: The aortic root is normal in size and structure. There is mild   aortic root calcification.    < end of copied text >    CT Chest:     Cardiac catheterization:     Left main: distal 75% lesion                           LAD: ostial 75% lesion                           [employer] has a 75% lesion                           LCX:  normal                           RCA: normal                           PDA:normal                           PLV:normal    Vital Signs Last 24 Hrs  T(C): 36.1 (10 Sep 2018 06:10), Max: 36.7 (09 Sep 2018 20:10)  T(F): 97 (10 Sep 2018 06:10), Max: 98.1 (09 Sep 2018 20:10)  HR: 54 (10 Sep 2018 06:10) (54 - 70)  BP: 140/75 (10 Sep 2018 06:10) (140/75 - 144/69)  RR: 18 (10 Sep 2018 06:10) (18 - 18)    Right arm bp: 153/72 Left arm bp; 169/80    5 meter walk: 5.09, 5.02, 3.9      Gen: WN/WD NAD  Neuro: A&Ox3, nonfocal  Pulm: CTA B/L  CV: RRR, S1S2  Abd: Soft, NT, ND +BS  Ext: No edema, + peripheral pulses    Cardic Surgery Risk Factors  CVA and/or carotid/cerebrovascular disease. Yes  No  Explain if Yes  Aortoiliac disease Yes  No  Explain if Yes  Previous MI Yes  No  Explain if Yes  Previous Cardiac Surgery Yes  No  Explain if Yes CABG 2011  Hemodynamics-Unstable or Shock Yes  No  Explain if Yes  Diabetes Yes  No  Explain if Yes  Hepatic Failure Yes  No  Explain if Yes  Renal failure and/or dialysis Yes  No  Explain if Yes  Heart failure-type-present or past Yes  No  Explain if Yes  COPD Yes  No  Explain if Yes FEV1 58%  Immune System Deficiency Yes  No  Explain if Yes  Malignant Ventricular Arrhythmia Yes  No  Explain if Yes    STS Score:  Risk of Mortality: 3.292%    Morbidity or Mortality: 25.794%    Long Length of Stay: 10.04%    Short Length of Stay: 21.72%    Permanent Stroke: 1.32%    Prolonged Ventilation: 22.524%    DSW Infection: 0.459%    Renal Failure: 4.152%    Reoperation: 7.292%    Pt has AICD/PPM [ ] Yes  [x ] No             Brand Name:  Pre-op Beta Blocker ordered within 24 hrs of surgery (CABG ONLY)?  [x ] Yes  [ ] No  If not, Why?  Type & Cross  [ ] Yes  [ ] No  NPO after Midnight [x ] Yes  [ ] No  Pre-op ABX ordered, to be taped on chart:  [ ] Yes  [ x] No     Hibiclens/Peridex ordered [x ] Yes  [ ] No  Intraop on Hold: PRBCs, CXR, ARLIN [x ]   Consent obtained  [x ] Yes  [ ] No

## 2018-09-10 NOTE — PROGRESS NOTE ADULT - SUBJECTIVE AND OBJECTIVE BOX
The patient had cath today . LIMA to LAD and SVG to LCX were occluded.     PHYSICAL EXAM:  Vital Signs Last 24 Hrs  T(C): 36.1 (10 Sep 2018 06:10), Max: 37.3 (09 Sep 2018 14:12)  T(F): 97 (10 Sep 2018 06:10), Max: 99.1 (09 Sep 2018 14:12)  HR: 54 (10 Sep 2018 06:10) (54 - 70)  BP: 140/75 (10 Sep 2018 06:10) (118/52 - 144/69)  BP(mean): --  RR: 18 (10 Sep 2018 06:10) (16 - 18)  SpO2: --  Daily     Daily   I&O's Detail    09 Sep 2018 07:01  -  10 Sep 2018 07:00  --------------------------------------------------------  IN:    Oral Fluid: 360 mL  Total IN: 360 mL    OUT:  Total OUT: 0 mL    Total NET: 360 mL        GENERAL: NAD, well-groomed, well-developed  HEAD:  Atraumatic, Normocephalic  EYES: EOMI, PERRLA, conjunctiva and sclera clear  ENMT: No tonsillar erythema, exudates, or enlargement; Moist mucous membranes, Good dentition, No lesions  NECK: Supple, No JVD, Normal thyroid  NERVOUS SYSTEM:  Alert & Oriented X3, Good concentration; Motor Strength 5/5 B/L upper and lower extremities; DTRs 2+ intact and symmetric  CHEST/LUNG: Clear to percussion bilaterally; No rales, rhonchi, wheezing, or rubs  HEART: Regular rate and rhythm;   ABDOMEN: Soft, Nontender, Nondistended; Bowel sounds present  EXTREMITIES:  No edema  LYMPH: No lymphadenopathy noted  SKIN: No rashes or lesion        LABS:                        12.3   4.84  )-----------( 161      ( 09 Sep 2018 06:53 )             38.5     09-09    140  |  101  |  23<H>  ----------------------------<  98  4.5   |  25  |  0.9    Ca    8.8      09 Sep 2018 06:53        MEDICATIONS  (STANDING):  amLODIPine   Tablet 5 milliGRAM(s) Oral daily  aspirin  chewable 81 milliGRAM(s) Oral daily  brimonidine 0.2% Ophthalmic Solution 1 Drop(s) Both EYES two times a day  dorzolamide 2% Ophthalmic Solution 1 Drop(s) Both EYES three times a day  heparin  Injectable 5000 Unit(s) SubCutaneous every 8 hours  latanoprost 0.005% Ophthalmic Solution 1 Drop(s) Both EYES at bedtime  losartan 50 milliGRAM(s) Oral daily  pregabalin 50 milliGRAM(s) Oral daily  simvastatin 40 milliGRAM(s) Oral at bedtime  sodium chloride 0.9%. 1000 milliLiter(s) (125 mL/Hr) IV Continuous <Continuous>    MEDICATIONS  (PRN):  acetaminophen   Tablet .. 650 milliGRAM(s) Oral every 6 hours PRN Mild Pain (1 - 3), Moderate Pain (4 - 6)  ondansetron    Tablet 4 milliGRAM(s) Oral every 8 hours PRN Nausea and/or Vomiting

## 2018-09-10 NOTE — PROGRESS NOTE ADULT - SUBJECTIVE AND OBJECTIVE BOX
Preliminary Cardiac Catherization Post-Procedure Report:    PRE-OP DIAGNOSIS:  cad, s/p cabg, acs    PROCEDURE:     Left Heart Catheterization     LV Angiogram     Selective Coronary Angiogram     Bypass Graft Opacification    Description of Procedure:       Right Femoral Artery, Seldinger Technique       Primary Physician:  Sheng Senior M.D.  Cardiac Fellow:    ANESTHESIA TYPE  [x] Local/Regional    ESTIMATED BLOOD LOSS:     CONDITION  [ x ]Good    SPECIMENS REMOVED (IF APPLICABLE): Not Applicable    IMPLANTS (IF APPLICABLE): Not Applicable      FINDINGS:   Left Heart Catheterization:                         LVEDP: normal                         LV Pressure: 140/10-12                         CA Pressure: 140/80    LV Angiogram:  The LV is [normal] in size.                              There is overall normal lv LV systolic function.                             The EF is approx. 70%                             No mitral valve prolapse, insufficiency or calcification seen.     SELECTIVE CORONARY ANGIOGRAM:                           Left main: distal 75% lesion                           LAD: ostial 75% lesion                           [employer] has a 75% lesion                           LCX:  normal                           RCA: normal                           PDA:normal                           PLV:normal    BYPASS GRAFT OPACIFICATION:                           LIMA to the LAD is occluded                           Venous graft to the LCX is occluded      PERIPHERAL ANGIOGRAM was performed, which revealed no significant aorta-illiac disease.       Post Procedure Diagnosis/Impression: cad, lewft main plus ostial lad and ramus intermedians lesion                               PLAN OF CARE    [] Return to in-patient bed  [ ] CT Surgery consult called

## 2018-09-10 NOTE — PROGRESS NOTE ADULT - ASSESSMENT
HPI:  78 F w/ pmh of HTN, HLD, CAD s/p CABG, Gluacoma, Diverticulosis, Uterine Ca s/p SHAAN & Chemo presenting to the ED c/o Chest pain. The CP is on the left side, radiated to the L arm, was a/w nausea & lasted for 2 minutes & then resolved. She denies any sob, vomiting, diaphoresis, fevers, chills, syncope. Pt is asymptomatic in the ED. (05 Sep 2018 22:58)    # Chest Pain R/O ACS  -CP now resolved.   --Repeat CEx2  - on ASA 81, pt received 325mg in ED  -cardio (Dr. Moura) following- was at cath lab, recs CABG  -CTU c/s (Dr. Bah)- CABG in am  -NPO after midnight       # DVTppx: Heparin  # DASHdiet, high fiber  # Full code

## 2018-09-10 NOTE — PROGRESS NOTE ADULT - SUBJECTIVE AND OBJECTIVE BOX
SUBJECTIVE:    Patient is a 78y old Female who presents with a chief complaint of R/o ACS (10 Sep 2018 10:39)    Currently admitted to medicine with the primary diagnosis of Chest pain     Today is hospital day 5d. This morning she is resting comfortably in bed and reports no new issues or overnight events.     PAST MEDICAL & SURGICAL HISTORY  Left lower quadrant pain  Glaucoma  Cataract of right eye  High cholesterol  Hypertension  Uterine cancer  Athscl CABG, unsp, w angina pectoris w documented spasm  H/O total hysterectomy    SOCIAL HISTORY:  Negative for smoking/alcohol/drug use.     ALLERGIES:  quinine (Urticaria)    MEDICATIONS:  STANDING MEDICATIONS  ALPRAZolam 0.25 milliGRAM(s) Oral once  aspirin  chewable 81 milliGRAM(s) Oral daily  brimonidine 0.2% Ophthalmic Solution 1 Drop(s) Both EYES two times a day  chlorhexidine 0.12% Liquid 15 milliLiter(s) Swish and Spit once  chlorhexidine 4% Liquid 1 Application(s) Topical once  dorzolamide 2% Ophthalmic Solution 1 Drop(s) Both EYES three times a day  heparin  Injectable 5000 Unit(s) SubCutaneous every 8 hours  latanoprost 0.005% Ophthalmic Solution 1 Drop(s) Both EYES at bedtime  pregabalin 50 milliGRAM(s) Oral daily  simvastatin 40 milliGRAM(s) Oral at bedtime  sodium chloride 0.9%. 1000 milliLiter(s) IV Continuous <Continuous>    PRN MEDICATIONS  acetaminophen   Tablet .. 650 milliGRAM(s) Oral every 6 hours PRN  ALPRAZolam 0.25 milliGRAM(s) Oral at bedtime PRN  ondansetron    Tablet 4 milliGRAM(s) Oral every 8 hours PRN    VITALS:   T(F): 97  HR: 54  BP: 140/75  RR: 18  SpO2: --    LABS:                        12.3   4.84  )-----------( 161      ( 09 Sep 2018 06:53 )             38.5     09-09    140  |  101  |  23<H>  ----------------------------<  98  4.5   |  25  |  0.9    Ca    8.8      09 Sep 2018 06:53            RADIOLOGY:    PHYSICAL EXAM:  GEN: No acute distress  LUNGS: Clear to auscultation bilaterally   HEART: S1/S2 present. RRR.   ABD: Soft, non-tender, non-distended. Bowel sounds present  EXT: NC/NC/NE/2+PP/COLVIN  NEURO: AAOX3

## 2018-09-10 NOTE — CONSULT NOTE ADULT - ASSESSMENT
CTS ATTENDING    PATIENT INTERVIEWED AND EXAMINED  PATIENT'S GRAND-DAUGHTER INTERVIEWED BY TELEPHONE  PATIENT'S ANGIOGRAM AND CATH DATA, CTS AND ECG REVIEWED  PATIENT AWAR OF DR. RIVER'S RECOMMENDATION FOR CABG  PROCEDURE, RISKS, BENEFITS AND ALTERNATIVES EXPLAINED AND SHE AGREES TO PROCEED WITH REOP CABG TOMORROW.

## 2018-09-10 NOTE — CONSULT NOTE ADULT - SUBJECTIVE AND OBJECTIVE BOX
Surgeon: /José Antonio/ Olvin    Consult requesting by: Dr. Moura    HISTORY OF PRESENT ILLNESS:  78 F w/ pmh of HTN, HLD, CAD s/p CABG, Gluacoma, Diverticulosis, Uterine Ca s/p SHAAN & Chemo presenting to the ED c/o Chest pain. The CP is on the left side, radiated to the L arm, was a/w nausea & lasted for 2 minutes & then resolved. She denies any sob, vomiting, diaphoresis, fevers, chills, syncope. Pt is asymptomatic in the ED. (05 Sep 2018 22:58) Patient r/o for AMI, pt had a CTA which showed patent LIMA and occluded SVG to OM, however when cardiac cath was performed, it showed occluded LIMA to LAD as well as occluded SVG to ramus.       NYHA functional class    [x ] Class I (no limitation) [ ] Class II (slight limitation) [ ] Class III (marked limitation) [ ] Class IV (symptoms at rest)    PAST MEDICAL & SURGICAL HISTORY:  Left lower quadrant pain  Glaucoma  Cataract of right eye  High cholesterol  Hypertension  Uterine cancer  Athscl CABG, unsp, w angina pectoris w documented spasm  H/O total hysterectomy      MEDICATIONS  (STANDING):  amLODIPine   Tablet 5 milliGRAM(s) Oral daily  aspirin  chewable 81 milliGRAM(s) Oral daily  brimonidine 0.2% Ophthalmic Solution 1 Drop(s) Both EYES two times a day  dorzolamide 2% Ophthalmic Solution 1 Drop(s) Both EYES three times a day  heparin  Injectable 5000 Unit(s) SubCutaneous every 8 hours  latanoprost 0.005% Ophthalmic Solution 1 Drop(s) Both EYES at bedtime  losartan 50 milliGRAM(s) Oral daily  pregabalin 50 milliGRAM(s) Oral daily  simvastatin 40 milliGRAM(s) Oral at bedtime  sodium chloride 0.9%. 1000 milliLiter(s) (125 mL/Hr) IV Continuous <Continuous>    MEDICATIONS  (PRN):  acetaminophen   Tablet .. 650 milliGRAM(s) Oral every 6 hours PRN Mild Pain (1 - 3), Moderate Pain (4 - 6)  ondansetron    Tablet 4 milliGRAM(s) Oral every 8 hours PRN Nausea and/or Vomiting    Antiplatelet therapy:        none                   Last dose/amt:    Allergies    quinine (Urticaria)    Intolerances        SOCIAL HISTORY:  Smoker: [ ] Yes  [x ] No        PACK YEARS:                         WHEN QUIT?  ETOH use: [ ] Yes  [ x] No              FREQUENCY / QUANTITY:  Ilicit Drug use:  [ ] Yes  [ ]x No  Occupation: unemployed  Lives with: son, daughter and grandchildren  Assisted device use: none  5 meter walk test: 1____sec, 2____sec, 3___sec unable to perform at this time.   FAMILY HISTORY:      Review of Systems  CONSTITUTIONAL:  Fevers[ ] chills[ ] sweats[ ] fatigue[ ] weight loss[ ] weight gain [ ]                                     NEGATIVE [X ]   NEURO:  parathesias[ ] seizures [ ]  syncope [ ]  confusion [ ]                                                                                NEGATIVE[ X]   EYES: glasses[ ]  blurry vision[ ]  discharge[ ] pain[ ] glaucoma [ ]                                                                          NEGATIVE[X ]   ENMT:  difficulty hearing [ ]  vertigo[ ]  dysphagia[ ] epistaxis[ ] recent dental work [ ]                                    NEGATIVE[ X]   CV:  chest pain[ ] palpitations[ ] PINON [ ] diaphoresis [ ]                                                                                           NEGATIVE[ X]   RESPIRATORY:  wheezing[ ] SOB[ ] cough [ ] sputum[ ] hemoptysis[ ]                                                                  NEGATIVE[ ]   GI:  nausea[ ]  vommiting [ ]  diarrhea[ ] constipation [ ] melena [ ]                                                                      NEGATIVE[ X]   : hematuria[ ]  dysuria[ ] urgency[ ] incontinence[ ]                                                                                            NEGATIVE[ X]   MUSKULOSKELETAL:  arthritis[ ]  joint swelling [ ] muscle weakness [ ] Hx vein stripping [ ]                             NEGATIVE[X ]   SKIN/BREAST:  rash[ ] itching [ ]  hair loss[ ] masses[ ]                                                                                              NEGATIVE[ X]   PSYCH:  dementia [ ] depresion [ ] anxiety[ ]                                                                                                               NEGATIVE[X ]   HEME/LYMPH:  bruises easily[ ] enlarged lymph nodes[ ] tender lymph nodes[ ]                                               NEGATIVE[ X]   ENDOCRINE:  cold intolerance[ ] heat intolerance[ ] polydipsia[ ]                                                                          NEGATIVE[ X]     PHYSICAL EXAM  Vital Signs Last 24 Hrs  T(C): 36.1 (10 Sep 2018 06:10), Max: 37.3 (09 Sep 2018 14:12)  T(F): 97 (10 Sep 2018 06:10), Max: 99.1 (09 Sep 2018 14:12)  HR: 54 (10 Sep 2018 06:10) (54 - 70)  BP: 140/75 (10 Sep 2018 06:10) (118/52 - 144/69)  RR: 18 (10 Sep 2018 06:10) (16 - 18)  Right arm bp: 153/72 Left arm bp; 169/80    CONSTITUTIONAL:                                                                          WNL[ x]   Neuro: WNL[ x] Normal exam oriented to person/place & time with no focal motor or sensory  deficits. Other                     Eyes: WNL[x ]   Normal exam of conjunctiva & lids, pupils equally reactive. Other     ENT: WNL[ x]    Normal exam of nasal/oral mucosa with absence of cyanosis. Other  Neck: WNL[ x]  Normal exam of jugular veins, trachea & thyroid. Other  Chest: WNLx[ ] Normal lung exam with good air movement absence of wheezes, rales, or rhonchi: Other                                                                                CV:  Auscultation: normal [x ] S3[ ] S4[ ] Irregular [ ] Rub[ ] Clicks[ ]    Murmurs none:[x ]systolic [ ]  diastolic [ ] holosystolic [ ]  Carotids: No Bruits[ x] Other                 Abdominal Aorta: normal [ ] nonpalpable[ ]Other                                                                                      GI:           WNL[x ] Normal exam of abdomen, liver & spleen with no noted masses or tenderness. Other                                                                                                        Extremities: WNL[ x] Normal no evidence of cyanosis or deformity Edema: none[ x]trace[ ]1+[ ]2+[ ]3+[ ]4+[ ]  Lower Extremity Pulses: Right[2+ ] Left[ 2+]Varicosities[ ]  SKIN :WNL[ ] Normal exam to inspection & palation. Other: sternal scar healed well, right lower extremity vein harvest incision healed well                                                           LABS:                        12.3   4.84  )-----------( 161      ( 09 Sep 2018 06:53 )             38.5     09-09    140  |  101  |  23<H>  ----------------------------<  98  4.5   |  25  |  0.9    Ca    8.8      09 Sep 2018 06:53    CTA: < from: CT Heart with Coronaries (09.07.18 @ 10:25) >  IMPRESSION:     1. Patent LIMA to LAD graft.    2. Nonvisualization of the SVG to OM graft, which is presumably occluded.    < end of copied text >    	  Cardiac Cath: occluded LIMA to LAD, occluded SVG to OM    TTE / ARLIN:     Recommendation: (Procedures/Evaluations)  CT HEAD Nonn-Contrast:[  ]  CT Chest with /without contrast [ ]  Carotid Duplex :[x ]  MAGDA/PVR: [ ]  PFT : Simple PFT [x ]  Full [ ]  Renal Consult [ ]  Pulmonary Consult: [ ]   Vascular Consult [ ]    Dental Consult [ ]   Hem-Onc Consult [ ]   GI Consult [ ]   Other Consultations :    STS Score: Risk of Mortality: 2.37%   Morbidity or Mortality: 15.996%   Long Length of Stay: 6.325%   Short Length of Stay: 34.395%   Permanent Stroke: 1.32%   Prolonged Ventilation: 11.381%   DSW Infection: 0.255%   Renal Failure: 2.579%   Reoperation: 6.161%       Impression:    CAD [x ]  Valvular  disease [ ]   Aortic Disease [ ]   AMANDA: Yes[ ] No [ ]   CKD Stage I [ ] , Stage II [ ] , Stage III [ ], Stage IV [ ]   Anemia: Yes [ ], No [ ]  Diabetes :Yes [ ], No [ ]  Acute MI: Yes [ ], No [ ]   Heart Failure: Yes [ ] , No [ ] HFpEF [ ], HFrEF [ ]        Assessment/ Plan:  78 year-old female with PMH as described above, s/p CABGx2 in 2011, presents with left sided chest pain radiating down left arm at rest, and goes away on its own. Pt r/o AMI, cardiac cath revealed occluded LIMA to LAD and occluded SVG to OM; need redo CABG, will pre-op patient and discuss with ct surgeon. Surgeon: /José Antonio/ Olvin    Consult requesting by: Dr. Moura    HISTORY OF PRESENT ILLNESS:  78 F w/ pmh of HTN, HLD, CAD s/p CABG, Gluacoma, Diverticulosis, Uterine Ca s/p SHAAN & Chemo presenting to the ED c/o Chest pain. The CP is on the left side, radiated to the L arm, was a/w nausea & lasted for 2 minutes & then resolved. She denies any sob, vomiting, diaphoresis, fevers, chills, syncope. Pt is asymptomatic in the ED. (05 Sep 2018 22:58) Patient r/o for AMI, pt had a CTA which showed patent LIMA and occluded SVG to OM, however when cardiac cath was performed, it showed occluded LIMA to LAD as well as occluded SVG to Om.       NYHA functional class    [x ] Class I (no limitation) [ ] Class II (slight limitation) [ ] Class III (marked limitation) [ ] Class IV (symptoms at rest)    PAST MEDICAL & SURGICAL HISTORY:  Left lower quadrant pain  Glaucoma  Cataract of right eye  High cholesterol  Hypertension  Uterine cancer  Athscl CABG, unsp, w angina pectoris w documented spasm  H/O total hysterectomy      MEDICATIONS  (STANDING):  amLODIPine   Tablet 5 milliGRAM(s) Oral daily  aspirin  chewable 81 milliGRAM(s) Oral daily  brimonidine 0.2% Ophthalmic Solution 1 Drop(s) Both EYES two times a day  dorzolamide 2% Ophthalmic Solution 1 Drop(s) Both EYES three times a day  heparin  Injectable 5000 Unit(s) SubCutaneous every 8 hours  latanoprost 0.005% Ophthalmic Solution 1 Drop(s) Both EYES at bedtime  losartan 50 milliGRAM(s) Oral daily  pregabalin 50 milliGRAM(s) Oral daily  simvastatin 40 milliGRAM(s) Oral at bedtime  sodium chloride 0.9%. 1000 milliLiter(s) (125 mL/Hr) IV Continuous <Continuous>    MEDICATIONS  (PRN):  acetaminophen   Tablet .. 650 milliGRAM(s) Oral every 6 hours PRN Mild Pain (1 - 3), Moderate Pain (4 - 6)  ondansetron    Tablet 4 milliGRAM(s) Oral every 8 hours PRN Nausea and/or Vomiting    Antiplatelet therapy:        none                   Last dose/amt:    Allergies    quinine (Urticaria)    Intolerances        SOCIAL HISTORY:  Smoker: [ ] Yes  [x ] No        PACK YEARS:                         WHEN QUIT?  ETOH use: [ ] Yes  [ x] No              FREQUENCY / QUANTITY:  Ilicit Drug use:  [ ] Yes  [ ]x No  Occupation: unemployed  Lives with: son, daughter and grandchildren  Assisted device use: none  5 meter walk test: 1____sec, 2____sec, 3___sec unable to perform at this time.   FAMILY HISTORY:      Review of Systems  CONSTITUTIONAL:  Fevers[ ] chills[ ] sweats[ ] fatigue[ ] weight loss[ ] weight gain [ ]                                     NEGATIVE [X ]   NEURO:  parathesias[ ] seizures [ ]  syncope [ ]  confusion [ ]                                                                                NEGATIVE[ X]   EYES: glasses[ ]  blurry vision[ ]  discharge[ ] pain[ ] glaucoma [ ]                                                                          NEGATIVE[X ]   ENMT:  difficulty hearing [ ]  vertigo[ ]  dysphagia[ ] epistaxis[ ] recent dental work [ ]                                    NEGATIVE[ X]   CV:  chest pain[ ] palpitations[ ] PINON [ ] diaphoresis [ ]                                                                                           NEGATIVE[ X]   RESPIRATORY:  wheezing[ ] SOB[ ] cough [ ] sputum[ ] hemoptysis[ ]                                                                  NEGATIVE[ ]   GI:  nausea[ ]  vommiting [ ]  diarrhea[ ] constipation [ ] melena [ ]                                                                      NEGATIVE[ X]   : hematuria[ ]  dysuria[ ] urgency[ ] incontinence[ ]                                                                                            NEGATIVE[ X]   MUSKULOSKELETAL:  arthritis[ ]  joint swelling [ ] muscle weakness [ ] Hx vein stripping [ ]                             NEGATIVE[X ]   SKIN/BREAST:  rash[ ] itching [ ]  hair loss[ ] masses[ ]                                                                                              NEGATIVE[ X]   PSYCH:  dementia [ ] depresion [ ] anxiety[ ]                                                                                                               NEGATIVE[X ]   HEME/LYMPH:  bruises easily[ ] enlarged lymph nodes[ ] tender lymph nodes[ ]                                               NEGATIVE[ X]   ENDOCRINE:  cold intolerance[ ] heat intolerance[ ] polydipsia[ ]                                                                          NEGATIVE[ X]     PHYSICAL EXAM  Vital Signs Last 24 Hrs  T(C): 36.1 (10 Sep 2018 06:10), Max: 37.3 (09 Sep 2018 14:12)  T(F): 97 (10 Sep 2018 06:10), Max: 99.1 (09 Sep 2018 14:12)  HR: 54 (10 Sep 2018 06:10) (54 - 70)  BP: 140/75 (10 Sep 2018 06:10) (118/52 - 144/69)  RR: 18 (10 Sep 2018 06:10) (16 - 18)  Right arm bp: 153/72 Left arm bp; 169/80    CONSTITUTIONAL:                                                                          WNL[ x]   Neuro: WNL[ x] Normal exam oriented to person/place & time with no focal motor or sensory  deficits. Other                     Eyes: WNL[x ]   Normal exam of conjunctiva & lids, pupils equally reactive. Other     ENT: WNL[ x]    Normal exam of nasal/oral mucosa with absence of cyanosis. Other  Neck: WNL[ x]  Normal exam of jugular veins, trachea & thyroid. Other  Chest: WNLx[ ] Normal lung exam with good air movement absence of wheezes, rales, or rhonchi: Other                                                                                CV:  Auscultation: normal [x ] S3[ ] S4[ ] Irregular [ ] Rub[ ] Clicks[ ]    Murmurs none:[x ]systolic [ ]  diastolic [ ] holosystolic [ ]  Carotids: No Bruits[ x] Other                 Abdominal Aorta: normal [ ] nonpalpable[ ]Other                                                                                      GI:           WNL[x ] Normal exam of abdomen, liver & spleen with no noted masses or tenderness. Other                                                                                                        Extremities: WNL[ x] Normal no evidence of cyanosis or deformity Edema: none[ x]trace[ ]1+[ ]2+[ ]3+[ ]4+[ ]  Lower Extremity Pulses: Right[2+ ] Left[ 2+]Varicosities[ ]  SKIN :WNL[ ] Normal exam to inspection & palation. Other: sternal scar healed well, right lower extremity vein harvest incision healed well                                                           LABS:                        12.3   4.84  )-----------( 161      ( 09 Sep 2018 06:53 )             38.5     09-09    140  |  101  |  23<H>  ----------------------------<  98  4.5   |  25  |  0.9    Ca    8.8      09 Sep 2018 06:53    CTA: < from: CT Heart with Coronaries (09.07.18 @ 10:25) >  IMPRESSION:     1. Patent LIMA to LAD graft.    2. Nonvisualization of the SVG to OM graft, which is presumably occluded.    < end of copied text >    	  Cardiac Cath: occluded LIMA to LAD, occluded SVG to OM    TTE / ARLIN:     Recommendation: (Procedures/Evaluations)  CT HEAD Nonn-Contrast:[  ]  CT Chest with /without contrast [ ]  Carotid Duplex :[x ]  MAGDA/PVR: [ ]  PFT : Simple PFT [x ]  Full [ ]  Renal Consult [ ]  Pulmonary Consult: [ ]   Vascular Consult [ ]    Dental Consult [ ]   Hem-Onc Consult [ ]   GI Consult [ ]   Other Consultations :    STS Score: Risk of Mortality: 2.37%   Morbidity or Mortality: 15.996%   Long Length of Stay: 6.325%   Short Length of Stay: 34.395%   Permanent Stroke: 1.32%   Prolonged Ventilation: 11.381%   DSW Infection: 0.255%   Renal Failure: 2.579%   Reoperation: 6.161%       Impression:    CAD [x ]  Valvular  disease [ ]   Aortic Disease [ ]   AMANDA: Yes[ ] No [ ]   CKD Stage I [ ] , Stage II [ ] , Stage III [ ], Stage IV [ ]   Anemia: Yes [ ], No [ ]  Diabetes :Yes [ ], No [ ]  Acute MI: Yes [ ], No [ ]   Heart Failure: Yes [ ] , No [ ] HFpEF [ ], HFrEF [ ]        Assessment/ Plan:  78 year-old female with PMH as described above, s/p CABGx2 in 2011, presents with left sided chest pain radiating down left arm at rest, and goes away on its own. Pt r/o AMI, cardiac cath revealed occluded LIMA to LAD and occluded SVG to OM; need redo CABG, will pre-op patient and discuss with ct surgeon.

## 2018-09-11 ENCOUNTER — TRANSCRIPTION ENCOUNTER (OUTPATIENT)
Age: 78
End: 2018-09-11

## 2018-09-11 LAB
ALBUMIN SERPL ELPH-MCNC: 3.9 G/DL — SIGNIFICANT CHANGE UP (ref 3.5–5.2)
ALBUMIN SERPL ELPH-MCNC: 3.9 G/DL — SIGNIFICANT CHANGE UP (ref 3.5–5.2)
ALP SERPL-CCNC: 32 U/L — SIGNIFICANT CHANGE UP (ref 30–115)
ALP SERPL-CCNC: 76 U/L — SIGNIFICANT CHANGE UP (ref 30–115)
ALT FLD-CCNC: 11 U/L — SIGNIFICANT CHANGE UP (ref 0–41)
ALT FLD-CCNC: 7 U/L — SIGNIFICANT CHANGE UP (ref 0–41)
ANION GAP SERPL CALC-SCNC: 11 MMOL/L — SIGNIFICANT CHANGE UP (ref 7–14)
ANION GAP SERPL CALC-SCNC: 12 MMOL/L — SIGNIFICANT CHANGE UP (ref 7–14)
APTT BLD: 27.8 SEC — SIGNIFICANT CHANGE UP (ref 27–39.2)
AST SERPL-CCNC: 16 U/L — SIGNIFICANT CHANGE UP (ref 0–41)
AST SERPL-CCNC: 17 U/L — SIGNIFICANT CHANGE UP (ref 0–41)
BASOPHILS # BLD AUTO: 0.01 K/UL — SIGNIFICANT CHANGE UP (ref 0–0.2)
BASOPHILS # BLD AUTO: 0.01 K/UL — SIGNIFICANT CHANGE UP (ref 0–0.2)
BASOPHILS NFR BLD AUTO: 0.2 % — SIGNIFICANT CHANGE UP (ref 0–1)
BASOPHILS NFR BLD AUTO: 0.2 % — SIGNIFICANT CHANGE UP (ref 0–1)
BILIRUB SERPL-MCNC: 0.3 MG/DL — SIGNIFICANT CHANGE UP (ref 0.2–1.2)
BILIRUB SERPL-MCNC: 0.5 MG/DL — SIGNIFICANT CHANGE UP (ref 0.2–1.2)
BLD GP AB SCN SERPL QL: SIGNIFICANT CHANGE UP
BUN SERPL-MCNC: 18 MG/DL — SIGNIFICANT CHANGE UP (ref 10–20)
BUN SERPL-MCNC: 26 MG/DL — HIGH (ref 10–20)
CALCIUM SERPL-MCNC: 7.6 MG/DL — LOW (ref 8.5–10.1)
CALCIUM SERPL-MCNC: 8.9 MG/DL — SIGNIFICANT CHANGE UP (ref 8.5–10.1)
CHLORIDE SERPL-SCNC: 105 MMOL/L — SIGNIFICANT CHANGE UP (ref 98–110)
CHLORIDE SERPL-SCNC: 112 MMOL/L — HIGH (ref 98–110)
CO2 SERPL-SCNC: 19 MMOL/L — SIGNIFICANT CHANGE UP (ref 17–32)
CO2 SERPL-SCNC: 28 MMOL/L — SIGNIFICANT CHANGE UP (ref 17–32)
CREAT SERPL-MCNC: 0.7 MG/DL — SIGNIFICANT CHANGE UP (ref 0.7–1.5)
CREAT SERPL-MCNC: 1 MG/DL — SIGNIFICANT CHANGE UP (ref 0.7–1.5)
EOSINOPHIL # BLD AUTO: 0.04 K/UL — SIGNIFICANT CHANGE UP (ref 0–0.7)
EOSINOPHIL # BLD AUTO: 0.12 K/UL — SIGNIFICANT CHANGE UP (ref 0–0.7)
EOSINOPHIL NFR BLD AUTO: 0.6 % — SIGNIFICANT CHANGE UP (ref 0–8)
EOSINOPHIL NFR BLD AUTO: 1.9 % — SIGNIFICANT CHANGE UP (ref 0–8)
GAS PNL BLDA: SIGNIFICANT CHANGE UP
GAS PNL BLDA: SIGNIFICANT CHANGE UP
GLUCOSE BLDC GLUCOMTR-MCNC: 100 MG/DL — HIGH (ref 70–99)
GLUCOSE BLDC GLUCOMTR-MCNC: 119 MG/DL — HIGH (ref 70–99)
GLUCOSE BLDC GLUCOMTR-MCNC: 126 MG/DL — HIGH (ref 70–99)
GLUCOSE BLDC GLUCOMTR-MCNC: 131 MG/DL — HIGH (ref 70–99)
GLUCOSE BLDC GLUCOMTR-MCNC: 139 MG/DL — HIGH (ref 70–99)
GLUCOSE BLDC GLUCOMTR-MCNC: 89 MG/DL — SIGNIFICANT CHANGE UP (ref 70–99)
GLUCOSE SERPL-MCNC: 102 MG/DL — HIGH (ref 70–99)
GLUCOSE SERPL-MCNC: 96 MG/DL — SIGNIFICANT CHANGE UP (ref 70–99)
HCT VFR BLD CALC: 22.4 % — LOW (ref 37–47)
HCT VFR BLD CALC: 22.5 % — LOW (ref 37–47)
HCT VFR BLD CALC: 23.9 % — LOW (ref 37–47)
HCT VFR BLD CALC: 37.8 % — SIGNIFICANT CHANGE UP (ref 37–47)
HGB BLD-MCNC: 12.1 G/DL — SIGNIFICANT CHANGE UP (ref 12–16)
HGB BLD-MCNC: 7.3 G/DL — CRITICAL LOW (ref 12–16)
HGB BLD-MCNC: 7.4 G/DL — CRITICAL LOW (ref 12–16)
HGB BLD-MCNC: 7.9 G/DL — LOW (ref 12–16)
IMM GRANULOCYTES NFR BLD AUTO: 0.2 % — SIGNIFICANT CHANGE UP (ref 0.1–0.3)
IMM GRANULOCYTES NFR BLD AUTO: 0.6 % — HIGH (ref 0.1–0.3)
INR BLD: 1.56 RATIO — HIGH (ref 0.65–1.3)
LYMPHOCYTES # BLD AUTO: 0.75 K/UL — LOW (ref 1.2–3.4)
LYMPHOCYTES # BLD AUTO: 11.8 % — LOW (ref 20.5–51.1)
LYMPHOCYTES # BLD AUTO: 2.3 K/UL — SIGNIFICANT CHANGE UP (ref 1.2–3.4)
LYMPHOCYTES # BLD AUTO: 37.2 % — SIGNIFICANT CHANGE UP (ref 20.5–51.1)
MCHC RBC-ENTMCNC: 26.7 PG — LOW (ref 27–31)
MCHC RBC-ENTMCNC: 27 PG — SIGNIFICANT CHANGE UP (ref 27–31)
MCHC RBC-ENTMCNC: 27 PG — SIGNIFICANT CHANGE UP (ref 27–31)
MCHC RBC-ENTMCNC: 27.3 PG — SIGNIFICANT CHANGE UP (ref 27–31)
MCHC RBC-ENTMCNC: 32 G/DL — SIGNIFICANT CHANGE UP (ref 32–37)
MCHC RBC-ENTMCNC: 32.6 G/DL — SIGNIFICANT CHANGE UP (ref 32–37)
MCHC RBC-ENTMCNC: 32.9 G/DL — SIGNIFICANT CHANGE UP (ref 32–37)
MCHC RBC-ENTMCNC: 33.1 G/DL — SIGNIFICANT CHANGE UP (ref 32–37)
MCV RBC AUTO: 82.1 FL — SIGNIFICANT CHANGE UP (ref 81–99)
MCV RBC AUTO: 82.7 FL — SIGNIFICANT CHANGE UP (ref 81–99)
MCV RBC AUTO: 83 FL — SIGNIFICANT CHANGE UP (ref 81–99)
MCV RBC AUTO: 83.4 FL — SIGNIFICANT CHANGE UP (ref 81–99)
MONOCYTES # BLD AUTO: 0.35 K/UL — SIGNIFICANT CHANGE UP (ref 0.1–0.6)
MONOCYTES # BLD AUTO: 0.71 K/UL — HIGH (ref 0.1–0.6)
MONOCYTES NFR BLD AUTO: 11.5 % — HIGH (ref 1.7–9.3)
MONOCYTES NFR BLD AUTO: 5.5 % — SIGNIFICANT CHANGE UP (ref 1.7–9.3)
NEUTROPHILS # BLD AUTO: 3.04 K/UL — SIGNIFICANT CHANGE UP (ref 1.4–6.5)
NEUTROPHILS # BLD AUTO: 5.15 K/UL — SIGNIFICANT CHANGE UP (ref 1.4–6.5)
NEUTROPHILS NFR BLD AUTO: 49 % — SIGNIFICANT CHANGE UP (ref 42.2–75.2)
NEUTROPHILS NFR BLD AUTO: 81.3 % — HIGH (ref 42.2–75.2)
NRBC # BLD: 0 /100 WBCS — SIGNIFICANT CHANGE UP (ref 0–0)
PLATELET # BLD AUTO: 110 K/UL — LOW (ref 130–400)
PLATELET # BLD AUTO: 171 K/UL — SIGNIFICANT CHANGE UP (ref 130–400)
PLATELET # BLD AUTO: 60 K/UL — LOW (ref 130–400)
PLATELET # BLD AUTO: 73 K/UL — LOW (ref 130–400)
POTASSIUM SERPL-MCNC: 4.2 MMOL/L — SIGNIFICANT CHANGE UP (ref 3.5–5)
POTASSIUM SERPL-MCNC: 4.7 MMOL/L — SIGNIFICANT CHANGE UP (ref 3.5–5)
POTASSIUM SERPL-SCNC: 4.2 MMOL/L — SIGNIFICANT CHANGE UP (ref 3.5–5)
POTASSIUM SERPL-SCNC: 4.7 MMOL/L — SIGNIFICANT CHANGE UP (ref 3.5–5)
PROT SERPL-MCNC: 4.9 G/DL — LOW (ref 6–8)
PROT SERPL-MCNC: 6.9 G/DL — SIGNIFICANT CHANGE UP (ref 6–8)
PROTHROM AB SERPL-ACNC: 16.8 SEC — HIGH (ref 9.95–12.87)
RBC # BLD: 2.7 M/UL — LOW (ref 4.2–5.4)
RBC # BLD: 2.74 M/UL — LOW (ref 4.2–5.4)
RBC # BLD: 2.89 M/UL — LOW (ref 4.2–5.4)
RBC # BLD: 4.53 M/UL — SIGNIFICANT CHANGE UP (ref 4.2–5.4)
RBC # FLD: 14.2 % — SIGNIFICANT CHANGE UP (ref 11.5–14.5)
RBC # FLD: 14.4 % — SIGNIFICANT CHANGE UP (ref 11.5–14.5)
RBC # FLD: 14.5 % — SIGNIFICANT CHANGE UP (ref 11.5–14.5)
RBC # FLD: 14.6 % — HIGH (ref 11.5–14.5)
SODIUM SERPL-SCNC: 143 MMOL/L — SIGNIFICANT CHANGE UP (ref 135–146)
SODIUM SERPL-SCNC: 144 MMOL/L — SIGNIFICANT CHANGE UP (ref 135–146)
TYPE + AB SCN PNL BLD: SIGNIFICANT CHANGE UP
WBC # BLD: 11.29 K/UL — HIGH (ref 4.8–10.8)
WBC # BLD: 6.19 K/UL — SIGNIFICANT CHANGE UP (ref 4.8–10.8)
WBC # BLD: 6.22 K/UL — SIGNIFICANT CHANGE UP (ref 4.8–10.8)
WBC # BLD: 6.34 K/UL — SIGNIFICANT CHANGE UP (ref 4.8–10.8)
WBC # FLD AUTO: 11.29 K/UL — HIGH (ref 4.8–10.8)
WBC # FLD AUTO: 6.19 K/UL — SIGNIFICANT CHANGE UP (ref 4.8–10.8)
WBC # FLD AUTO: 6.22 K/UL — SIGNIFICANT CHANGE UP (ref 4.8–10.8)
WBC # FLD AUTO: 6.34 K/UL — SIGNIFICANT CHANGE UP (ref 4.8–10.8)

## 2018-09-11 RX ORDER — ONDANSETRON 8 MG/1
4 TABLET, FILM COATED ORAL ONCE
Qty: 0 | Refills: 0 | Status: DISCONTINUED | OUTPATIENT
Start: 2018-09-11 | End: 2018-09-17

## 2018-09-11 RX ORDER — PHENYLEPHRINE HYDROCHLORIDE 10 MG/ML
0.1 INJECTION INTRAVENOUS
Qty: 40 | Refills: 0 | Status: DISCONTINUED | OUTPATIENT
Start: 2018-09-11 | End: 2018-09-12

## 2018-09-11 RX ORDER — VASOPRESSIN 20 [USP'U]/ML
0.03 INJECTION INTRAVENOUS
Qty: 100 | Refills: 0 | Status: DISCONTINUED | OUTPATIENT
Start: 2018-09-11 | End: 2018-09-12

## 2018-09-11 RX ORDER — NICARDIPINE HYDROCHLORIDE 30 MG/1
5 CAPSULE, EXTENDED RELEASE ORAL
Qty: 40 | Refills: 0 | Status: DISCONTINUED | OUTPATIENT
Start: 2018-09-11 | End: 2018-09-12

## 2018-09-11 RX ORDER — NITROGLYCERIN 6.5 MG
15 CAPSULE, EXTENDED RELEASE ORAL
Qty: 50 | Refills: 0 | Status: DISCONTINUED | OUTPATIENT
Start: 2018-09-11 | End: 2018-09-12

## 2018-09-11 RX ORDER — CHLORHEXIDINE GLUCONATE 213 G/1000ML
5 SOLUTION TOPICAL EVERY 4 HOURS
Qty: 0 | Refills: 0 | Status: DISCONTINUED | OUTPATIENT
Start: 2018-09-11 | End: 2018-09-12

## 2018-09-11 RX ORDER — IPRATROPIUM BROMIDE 0.2 MG/ML
2 SOLUTION, NON-ORAL INHALATION EVERY 6 HOURS
Qty: 0 | Refills: 0 | Status: DISCONTINUED | OUTPATIENT
Start: 2018-09-11 | End: 2018-09-13

## 2018-09-11 RX ORDER — SODIUM CHLORIDE 9 MG/ML
1000 INJECTION INTRAMUSCULAR; INTRAVENOUS; SUBCUTANEOUS
Qty: 0 | Refills: 0 | Status: DISCONTINUED | OUTPATIENT
Start: 2018-09-11 | End: 2018-09-13

## 2018-09-11 RX ORDER — SENNA PLUS 8.6 MG/1
1 TABLET ORAL EVERY 12 HOURS
Qty: 0 | Refills: 0 | Status: DISCONTINUED | OUTPATIENT
Start: 2018-09-11 | End: 2018-09-17

## 2018-09-11 RX ORDER — DOCUSATE SODIUM 100 MG
100 CAPSULE ORAL THREE TIMES A DAY
Qty: 0 | Refills: 0 | Status: DISCONTINUED | OUTPATIENT
Start: 2018-09-11 | End: 2018-09-17

## 2018-09-11 RX ORDER — INSULIN HUMAN 100 [IU]/ML
1 INJECTION, SOLUTION SUBCUTANEOUS
Qty: 50 | Refills: 0 | Status: DISCONTINUED | OUTPATIENT
Start: 2018-09-11 | End: 2018-09-12

## 2018-09-11 RX ORDER — ASPIRIN/CALCIUM CARB/MAGNESIUM 324 MG
325 TABLET ORAL DAILY
Qty: 0 | Refills: 0 | Status: DISCONTINUED | OUTPATIENT
Start: 2018-09-11 | End: 2018-09-17

## 2018-09-11 RX ORDER — FAMOTIDINE 10 MG/ML
20 INJECTION INTRAVENOUS EVERY 12 HOURS
Qty: 0 | Refills: 0 | Status: DISCONTINUED | OUTPATIENT
Start: 2018-09-11 | End: 2018-09-12

## 2018-09-11 RX ORDER — MEPERIDINE HYDROCHLORIDE 50 MG/ML
25 INJECTION INTRAMUSCULAR; INTRAVENOUS; SUBCUTANEOUS ONCE
Qty: 0 | Refills: 0 | Status: DISCONTINUED | OUTPATIENT
Start: 2018-09-11 | End: 2018-09-12

## 2018-09-11 RX ORDER — MAGNESIUM SULFATE 500 MG/ML
1 VIAL (ML) INJECTION EVERY 12 HOURS
Qty: 0 | Refills: 0 | Status: DISCONTINUED | OUTPATIENT
Start: 2018-09-11 | End: 2018-09-17

## 2018-09-11 RX ORDER — DEXMEDETOMIDINE HYDROCHLORIDE IN 0.9% SODIUM CHLORIDE 4 UG/ML
0.5 INJECTION INTRAVENOUS
Qty: 200 | Refills: 0 | Status: DISCONTINUED | OUTPATIENT
Start: 2018-09-11 | End: 2018-09-12

## 2018-09-11 RX ORDER — NOREPINEPHRINE BITARTRATE/D5W 8 MG/250ML
0.05 PLASTIC BAG, INJECTION (ML) INTRAVENOUS
Qty: 8 | Refills: 0 | Status: DISCONTINUED | OUTPATIENT
Start: 2018-09-11 | End: 2018-09-12

## 2018-09-11 RX ORDER — OXYCODONE HYDROCHLORIDE 5 MG/1
10 TABLET ORAL EVERY 4 HOURS
Qty: 0 | Refills: 0 | Status: DISCONTINUED | OUTPATIENT
Start: 2018-09-11 | End: 2018-09-17

## 2018-09-11 RX ORDER — DEXTROSE 50 % IN WATER 50 %
25 SYRINGE (ML) INTRAVENOUS
Qty: 0 | Refills: 0 | Status: DISCONTINUED | OUTPATIENT
Start: 2018-09-11 | End: 2018-09-14

## 2018-09-11 RX ORDER — CEFAZOLIN SODIUM 1 G
1000 VIAL (EA) INJECTION EVERY 8 HOURS
Qty: 0 | Refills: 0 | Status: COMPLETED | OUTPATIENT
Start: 2018-09-11 | End: 2018-09-12

## 2018-09-11 RX ORDER — OXYCODONE HYDROCHLORIDE 5 MG/1
5 TABLET ORAL EVERY 4 HOURS
Qty: 0 | Refills: 0 | Status: DISCONTINUED | OUTPATIENT
Start: 2018-09-11 | End: 2018-09-17

## 2018-09-11 RX ORDER — ASPIRIN/CALCIUM CARB/MAGNESIUM 324 MG
300 TABLET ORAL ONCE
Qty: 0 | Refills: 0 | Status: COMPLETED | OUTPATIENT
Start: 2018-09-11 | End: 2018-09-11

## 2018-09-11 RX ORDER — ALBUTEROL 90 UG/1
2 AEROSOL, METERED ORAL EVERY 6 HOURS
Qty: 0 | Refills: 0 | Status: DISCONTINUED | OUTPATIENT
Start: 2018-09-11 | End: 2018-09-13

## 2018-09-11 RX ORDER — DEXTROSE 50 % IN WATER 50 %
50 SYRINGE (ML) INTRAVENOUS
Qty: 0 | Refills: 0 | Status: DISCONTINUED | OUTPATIENT
Start: 2018-09-11 | End: 2018-09-14

## 2018-09-11 RX ORDER — PROPOFOL 10 MG/ML
25 INJECTION, EMULSION INTRAVENOUS
Qty: 1000 | Refills: 0 | Status: DISCONTINUED | OUTPATIENT
Start: 2018-09-11 | End: 2018-09-12

## 2018-09-11 RX ADMIN — INSULIN HUMAN 1 UNIT(S)/HR: 100 INJECTION, SOLUTION SUBCUTANEOUS at 17:46

## 2018-09-11 RX ADMIN — CHLORHEXIDINE GLUCONATE 15 MILLILITER(S): 213 SOLUTION TOPICAL at 04:43

## 2018-09-11 RX ADMIN — FAMOTIDINE 20 MILLIGRAM(S): 10 INJECTION INTRAVENOUS at 17:10

## 2018-09-11 RX ADMIN — Medication 2 PUFF(S): at 19:48

## 2018-09-11 RX ADMIN — Medication 100 MILLIGRAM(S): at 23:00

## 2018-09-11 RX ADMIN — Medication 100 MILLIGRAM(S): at 17:09

## 2018-09-11 RX ADMIN — PHENYLEPHRINE HYDROCHLORIDE 1.36 MICROGRAM(S)/KG/MIN: 10 INJECTION INTRAVENOUS at 17:49

## 2018-09-11 RX ADMIN — SODIUM CHLORIDE 20 MILLILITER(S): 9 INJECTION INTRAMUSCULAR; INTRAVENOUS; SUBCUTANEOUS at 17:50

## 2018-09-11 RX ADMIN — Medication 6.81 MICROGRAM(S)/KG/MIN: at 17:49

## 2018-09-11 RX ADMIN — ALBUTEROL 2 PUFF(S): 90 AEROSOL, METERED ORAL at 19:48

## 2018-09-11 RX ADMIN — Medication 12.5 MILLIGRAM(S): at 04:43

## 2018-09-11 RX ADMIN — Medication 100 MILLIGRAM(S): at 23:01

## 2018-09-11 RX ADMIN — Medication 100 GRAM(S): at 17:51

## 2018-09-11 RX ADMIN — VASOPRESSIN 1.8 UNIT(S)/MIN: 20 INJECTION INTRAVENOUS at 17:50

## 2018-09-11 RX ADMIN — CHLORHEXIDINE GLUCONATE 5 MILLILITER(S): 213 SOLUTION TOPICAL at 23:00

## 2018-09-11 RX ADMIN — Medication 4.5 MICROGRAM(S)/MIN: at 17:48

## 2018-09-11 RX ADMIN — BRIMONIDINE TARTRATE 1 DROP(S): 2 SOLUTION/ DROPS OPHTHALMIC at 04:44

## 2018-09-11 RX ADMIN — CHLORHEXIDINE GLUCONATE 5 MILLILITER(S): 213 SOLUTION TOPICAL at 17:09

## 2018-09-11 RX ADMIN — DEXMEDETOMIDINE HYDROCHLORIDE IN 0.9% SODIUM CHLORIDE 9.07 MICROGRAM(S)/KG/HR: 4 INJECTION INTRAVENOUS at 17:46

## 2018-09-11 RX ADMIN — SENNA PLUS 1 TABLET(S): 8.6 TABLET ORAL at 23:00

## 2018-09-11 RX ADMIN — NICARDIPINE HYDROCHLORIDE 25 MG/HR: 30 CAPSULE, EXTENDED RELEASE ORAL at 17:47

## 2018-09-11 NOTE — DISCHARGE NOTE ADULT - PRINCIPAL DIAGNOSIS
Atherosclerosis of coronary artery bypass graft with angina pectoris with documented spasm, unspecified whether native or transplanted heart

## 2018-09-11 NOTE — DISCHARGE NOTE ADULT - MEDICATION SUMMARY - MEDICATIONS TO CHANGE
I will SWITCH the dose or number of times a day I take the medications listed below when I get home from the hospital:    Low Dose ASA 81 mg oral tablet  -- 1 tab(s) by mouth once a day

## 2018-09-11 NOTE — DISCHARGE NOTE ADULT - ABILITY TO HEAR (WITH HEARING AID OR HEARING APPLIANCE IF NORMALLY USED):
ED Fall





- General


Mode of Arrival: Medic


Information source: Patient, Relative - wife


TRAVEL OUTSIDE OF THE [place] IN LAST 30 DAYS: No





- HPI


Patient complains to provider of: Fall and weakness


Occurred: This morning


Context: Fell from sitting


Associated symptoms: Other - see notes above


Location of injury/pain: Other - see notes above





<SAM WINSLOW - Last Filed: 06/17/17 16:03>





<EDA PINK - Last Filed: 06/17/17 19:12>





- General


Chief Complaint: General Weakness


Stated Complaint: GENERAL WEAKNESS/FALLS


Time Seen by Provider: 06/17/17 13:40


Notes: 


71-year-old male with history of diabetes, dementia, CHF, COPD, abdominal 

aortic aneurysm, and a left hip replacement presents to the ED via EMS after 

the patient fell while getting out of his bed and experiencing generalized 

weakness earlier this morning.  Patient's wife states that she heard the 

patient yell at approximately 10:00 this morning and saw that the patient had 

fallen on his stomach with his left leg under his body.  Wife tried to help the 

patient up but was unable to so she called EMS.  EMS arrived and reports a 

blood glucose of 155 and a temperature 101.4F.  Reports that the patient may 

have scraped his right forearm as there is a new ecchymotic bruise there.  

Patient's left hip was replaced 6 months ago and wife is concerned that he may 

have hurt it when he fell.  Patient is currently on Plavix secondary to having 

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 Wife claims that the patient has had 

episodes of weakness like this in the past. Wife also states that the patient 

has had a bad appetite and is losing weight. 








PCP: Dr. Recinos  (SAM WINSLOW)





- Related data


Allergies/Adverse Reactions: 


 





Penicillins Allergy (Verified 12/29/15 12:58)


 unknown reaction from a child











Past Medical History





- General


Information source: Patient





- Social History


Smoking Status: Current Every Day Smoker


Chew tobacco use (# tins/day): No


Frequency of alcohol use: None


Drug Abuse: None


Family History: Reviewed & Not Pertinent





- Past Medical History


Cardiac Medical History: Reports: Hx Congestive Heart Failure, Hx Heart Attack 

- x2, last one approx 4 yrs ago, Hx Hypertension - medicated, Other - Abdominal 

aorta aneurysm


Pulmonary Medical History: Reports: Hx COPD


   Denies: Hx Asthma


Neurological Medical History: Denies: Hx Cerebrovascular Accident, Hx Seizures


Endocrine Medical History: Reports: Hx Diabetes Mellitus Type 2


GI Medical History: Denies: Hx Hepatitis, Hx Hiatal Hernia, Hx Ulcer


Musculoskeltal Medical History: Reports Other - left hip replacement


Infectious Medical History: Denies: Hx Hepatitis


Past Surgical History: Reports: Hx Open Heart Surgery, Hx Orthopedic Surgery - 

left hip replacement.  Denies: Hx Pacemaker





<SAM WINSLOW - Last Filed: 06/17/17 16:03>





Review of Systems





- Review of Systems


Constitutional: See HPI, Weight loss


EENT: No symptoms reported


Cardiovascular: No symptoms reported


Respiratory: No symptoms reported


Gastrointestinal: See HPI, Poor appetite


Genitourinary: No symptoms reported


Male Genitourinary: No symptoms reported


Musculoskeletal: No symptoms reported


Skin: No symptoms reported


Hematologic/Lymphatic: No symptoms reported


Neurological/Psychological: See HPI, Weakness - generalized


-: Yes All other systems reviewed and negative





<SAM WINSLOW - Last Filed: 06/17/17 16:03>





Physical Exam





<SAM WINSLOW - Last Filed: 06/17/17 16:03>





<EDA PINK - Last Filed: 06/17/17 19:12>





- Vital signs


Vitals: 


 











Temp Pulse Resp BP Pulse Ox


 


 100.1 F   115 H  22 H  117/84   94 


 


 06/17/17 12:57  06/17/17 12:57  06/17/17 12:57  06/17/17 12:57  06/17/17 12:57














- Notes


Notes: 


GENERAL: Alert, interacts well. No acute distress.


HEAD: Normocephalic, atraumatic.


EYES: Pupils equal, round, and reactive to light. Extraocular movements intact.


ENT: Oral mucosa moist, tongue midline.


NECK: Full range of motion. Supple. Trachea midline.


LUNGS: Clear to auscultation bilaterally, no wheezes, rales, or rhonchi. No 

respiratory distress.


HEART: Tachycardic with normal rhythm. No murmurs, gallops, or rubs.


ABDOMEN: Soft. Non-distended. Bowel sounds present in all 4 quadrants. 

Tenderness to palpation to the LLQ.


EXTREMITIES: Moves all 4 extremities spontaneously. No edema, radial and 

dorsalis pedis pulses 2/4 bilaterally. No cyanosis.


NEUROLOGICAL: Alert and oriented x3. Normal speech. Biceps and patellar DTRs 2+ 

bilaterally. 5/5 muscle strength to the bilateral lower extremities.


PSYCH: Normal affect, normal mood.


SKIN: Warm, dry, normal turgor. Superficial ecchymosis at various stages of 

healing that are worse on the right arm than left. Ecchymosis to the bilateral 

knees. No break in skin noted. (SAM WINSLOW)





Course





- Laboratory


Result Diagrams: 


 06/17/17 14:03





 06/17/17 14:03





<SAM WINSLOW - Last Filed: 06/17/17 16:03>





- Laboratory


Result Diagrams: 


 06/17/17 14:03





 06/17/17 14:03





<EDA PINK - Last Filed: 06/17/17 19:12>





- Re-evaluation


Re-evalutation: 





06/17/17 16:02


CBC shows leukocytosis of 15.7 with a bandemia of 6%, coags within normal limits

, venous blood gas grossly unremarkable, chemistries show increased creatinine 

at 1.59, lactic acid is elevated at 2.4, cardiac enzymes negative but 

detectable with a troponin of 0.015.  Urinalysis negative.  Chest x-ray shows 

some curly B-lines for some mild interstitial edema, CT scan of the head shows 

old lacunar infarct and small vessel disease but no acute bleed or abnormality.





Patient does fits sepsis criteria with his initial tachycardia and hypoxia as 

well as leukocytosis and elevated lactic acid.  Patient will not be given 30 cc/

kg of fluid at this time as he has a history of congestive heart failure and I 

do not wish to compromise his respiratory status.  Patient does have an EKG 

that shows some mild ischemic changes.  Patient was covered with Levaquin for 

sepsis of unknown origin.  CTA of the chest will be ordered to rule out 

pulmonary embolism as he still requires 4 L via nasal cannula to maintain his 

oxygen saturation at 93%.  CT scan of the abdomen and pelvis with IV contrast 

only will also be ordered as the patient is now complaining of some left lower 

quadrant abdominal pain and he does have left lower quadrant abdominal pain on 

palpation on exam.





Patient and family member were updated on the plan. (EDA PINK)





- Vital Signs


Vital signs: 


 











Temp Pulse Resp BP Pulse Ox


 


 100.1 F   115 H  20   117/84   93 


 


 06/17/17 12:57  06/17/17 12:57  06/17/17 16:00  06/17/17 12:57  06/17/17 16:00














- Laboratory


Laboratory results interpreted by me: 


 











  06/17/17 06/17/17 06/17/17





  14:03 14:03 14:03


 


WBC  15.7 H  


 


RDW  14.7 H  


 


Seg Neuts % (Manual)  86 H  


 


Band Neutrophils %  6 H  


 


Lymphocytes % (Manual)  3 L  


 


Abs Neuts (Manual)  14.4 H  


 


Sodium   135.3 L 


 


Chloride   95 L 


 


Creatinine   1.59 H 


 


Est GFR ( Amer)   52 L 


 


Est GFR (Non-Af Amer)   43 L 


 


Glucose   116 H 


 


Lactic Acid    2.4 H


 


NT-Pro-B Natriuret Pep   














  06/17/17 06/17/17





  14:03 18:30


 


WBC  


 


RDW  


 


Seg Neuts % (Manual)  


 


Band Neutrophils %  


 


Lymphocytes % (Manual)  


 


Abs Neuts (Manual)  


 


Sodium  


 


Chloride  


 


Creatinine  


 


Est GFR ( Amer)  


 


Est GFR (Non-Af Amer)  


 


Glucose  


 


Lactic Acid   2.4 H


 


NT-Pro-B Natriuret Pep  1760 H 














- EKG Interpretation by Me


Additional EKG results interpreted by me: 





06/17/17 14:51


EKG shows sinus rhythm at a rate of 95, normal axis, normal intervals, ST 

segment elevation with T-wave inversions in aVR, no other ST segment elevations

, there are ST segment depressions in V3 through V6, per my interpretation.  

Patient is not having any chest pain with this. (EDA PINK)





Discharge





<SAM WINSLOW - Last Filed: 06/17/17 16:03>





- Discharge


Admitting Provider: Hospitalist - [facility]


Unit Admitted: IMCU





<EDA PINK - Last Filed: 06/17/17 19:12>





- Discharge


Clinical Impression: 


 Acute on chronic respiratory failure with hypoxia





Sepsis


Qualifiers:


 Sepsis type: sepsis due to unspecified organism Qualified Code(s): A41.9 - 

Sepsis, unspecified organism





Acute renal failure


Qualifiers:


 Acute renal failure type: unspecified Qualified Code(s): N17.9 - Acute kidney 

failure, unspecified





Senile dementia


Qualifiers:


 Dementia behavioral disturbance: without behavioral disturbance Qualified Code(

s): F03.90 - Unspecified dementia without behavioral disturbance





Condition: Fair


Disposition: ADMITTED AS INPATIENT


Scribe Attestation: 





06/17/17 19:12


I personally performed the services described in the documentation, reviewed 

and edited the documentation which was dictated to the scribe in my presence, 

and it accurately records my words and actions. (EDA PINK)





Scribe Documentation





- Scribe


Written by Ciara:: Ciara Barakat, 06/17/2017 1446


acting as scribe for :: Peyton





<SAM WINSLOW - Last Filed: 06/17/17 16:03> Adequate: hears normal conversation without difficulty

## 2018-09-11 NOTE — DISCHARGE NOTE ADULT - [id number]_GEN_ALL_CORE
rehabilitation services/medication teaching and assessment/wound care and assessment/observation and assessment/teaching and training

## 2018-09-11 NOTE — DISCHARGE NOTE ADULT - PATIENT PORTAL LINK FT
You can access the [facility] Patient Portal, offered by [facility], by registering with the following website: [email or web address]

## 2018-09-11 NOTE — DISCHARGE NOTE ADULT - MEDICATION SUMMARY - MEDICATIONS TO STOP TAKING
I will STOP taking the medications listed below when I get home from the hospital:    losartan 50 mg oral tablet  -- 1 tab(s) by mouth once a day    amLODIPine 5 mg oral tablet  -- 1 tab(s) by mouth once a day    amoxicillin 500 mg oral capsule    ibuprofen 600 mg oral tablet    Cipro 500 mg oral tablet  -- 1 tab(s) by mouth 2 times a day MDD:2  -- Avoid prolonged or excessive exposure to direct and/or artificial sunlight while taking this medication.  Check with your doctor before becoming pregnant.  Do not take dairy products, antacids, or iron preparations within one hour of this medication.  Finish all this medication unless otherwise directed by prescriber.  Medication should be taken with plenty of water.    Flagyl 500 mg oral tablet  -- 1 tab(s) by mouth every 8 hours   -- Do not drink alcoholic beverages when taking this medication.  Finish all this medication unless otherwise directed by prescriber.  May discolor urine or feces.

## 2018-09-11 NOTE — PACU DISCHARGE NOTE - COMMENTS
patient transferred to CTU with monitors and manual ventilation. Vitals stable throughout transport. Patient signed out to CTU intensivist.  Post-op vitals: BP , HR , RR , O2Sat %, Temp C.O. , C.I.

## 2018-09-11 NOTE — DISCHARGE NOTE ADULT - HOSPITAL COURSE
78 F w/ pmh of HTN, HLD, CAD s/p CABG, Gluacoma, Diverticulosis, Uterine Ca s/p SHAAN & Chemo presenting to the ED c/o Chest pain. The CP is on the left side, radiated to the L arm, was a/w nausea & lasted for 2 minutes & then resolved. She denies any sob, vomiting, diaphoresis, fevers, chills, syncope. Pt is asymptomatic in the ED. (05 Sep 2018 22:58) Patient r/o for AMI, pt had a CTA which showed patent LIMA and occluded SVG to OM, however when cardiac cath was performed, it showed occluded LIMA to LAD as well as occluded SVG to Om.  On 9/11/2018 the patient underwent Re-do Coronary Artery Bypass Grafting x 2 (CABG x2).  Post-operatively ..... 78 F w/ pmh of HTN, HLD, CAD s/p CABG, Gluacoma, Diverticulosis, Uterine Ca s/p SHAAN & Chemo presenting to the ED c/o Chest pain. The CP is on the left side, radiated to the L arm, was a/w nausea & lasted for 2 minutes & then resolved. She denies any sob, vomiting, diaphoresis, fevers, chills, syncope. Pt is asymptomatic in the ED. (05 Sep 2018 22:58) Patient r/o for AMI, pt had a CTA which showed patent LIMA and occluded SVG to OM, however when cardiac cath was performed, it showed occluded LIMA to LAD as well as occluded SVG to Om.  On 9/11/2018 the patient underwent Re-do Coronary Artery Bypass Grafting x 2 (CABG x2).  Post-operatively patient remained hemodynamically stable and physically rehabilitated well. Patient was discharged home on post-operative day 6 with instructions to obtain an PA/Lateral CXR to monitor small, stable right sided pleural effusion.

## 2018-09-11 NOTE — PACU DISCHARGE NOTE - COMMENTS
S/p CABG.  Vitals in CTU, /55, HR82, RR12, Sat 100%, PAP 27/13, Temp 34.2 C, CO 3.5 and CI 2 SVR 1461

## 2018-09-11 NOTE — DISCHARGE NOTE ADULT - PLAN OF CARE
s/p CABG Please take all medications as prescribed and follow up w/CT surgery in 1 week, Cardiologist in 7-10 days and PMD in 2-4 weeks.

## 2018-09-11 NOTE — DISCHARGE NOTE ADULT - CARE PROVIDER_API CALL
Juaquin Bah), Surgery; Thoracic and Cardiac Surgery  [address] [place] [address]  [address]  [place] [address]  Phone: [phone]  Fax: [phone]    Manuel Moura), Cardiovascular Disease; Internal Medicine  [address] [place] [address] [address]  [place] [address]  Phone: [phone]  Fax: [phone]

## 2018-09-11 NOTE — DISCHARGE NOTE ADULT - CARE PLAN
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

## 2018-09-11 NOTE — DISCHARGE NOTE ADULT - NS AS ACTIVITY OBS
Showering allowed/Walking-Indoors allowed/No Heavy lifting/straining/Walking-Outdoors allowed/Do not drive or operate machinery/Stairs allowed

## 2018-09-11 NOTE — DISCHARGE NOTE ADULT - MEDICATION SUMMARY - MEDICATIONS TO TAKE
I will START or STAY ON the medications listed below when I get home from the hospital:    oxyCODONE-acetaminophen 5 mg-325 mg oral tablet  -- 1 tab(s) by mouth every 6 hours, As Needed -for moderate pain MDD:4  -- Caution federal law prohibits the transfer of this drug to any person other  than the person for whom it was prescribed.  May cause drowsiness.  Alcohol may intensify this effect.  Use care when operating dangerous machinery.  This prescription cannot be refilled.  This product contains acetaminophen.  Do not use  with any other product containing acetaminophen to prevent possible liver damage.  Using more of this medication than prescribed may cause serious breathing problems.    -- Indication: For s/p CABG    aspirin 325 mg oral delayed release tablet  -- 1 tab(s) by mouth once a day  -- Indication: For CAD    Lyrica 50 mg oral capsule  -- Indication: For s/p CABG    colchicine 0.6 mg oral tablet  -- 1 tab(s) by mouth every 12 hours  -- Indication: For pericarditis    simvastatin 40 mg oral tablet  -- 1 tab(s) by mouth once a day (at bedtime)  -- Indication: For HLD    metoprolol succinate 25 mg oral tablet, extended release  -- 12.5 milligram(s) by mouth every 12 hours   -- It is very important that you take or use this exactly as directed.  Do not skip doses or discontinue unless directed by your doctor.  May cause drowsiness.  Alcohol may intensify this effect.  Use care when operating dangerous machinery.  Some non-prescription drugs may aggravate your condition.  Read all labels carefully.  If a warning appears, check with your doctor before taking.  Swallow whole.  Do not crush.  Take with food or milk.  This drug may impair the ability to drive or operate machinery.  Use care until you become familiar with its effects.    -- Indication: For HTN    famotidine 20 mg oral tablet  -- 1 tab(s) by mouth 2 times a day  -- Indication: For GI prophylaxis     ferrous sulfate 325 mg (65 mg elemental iron) oral tablet  -- 1 tab(s) by mouth once a day   -- Indication: For Anemia     docusate sodium 100 mg oral capsule  -- 1 cap(s) by mouth 3 times a day  -- Indication: For GI prophylaxis     brimonidine 0.1% ophthalmic solution  -- to each affected eye 2 times a day  -- Indication: For Glaucoma     latanoprost 0.005% ophthalmic solution  -- 1 drop(s) to each affected eye 2 times a day  -- Indication: For Glaucoma     dorzolamide 2% ophthalmic solution  -- 1 drop(s) to each affected eye 2 times a day  -- Indication: For Glaucoma     folic acid 1 mg oral tablet  -- 1 tab(s) by mouth once a day  -- Indication: For Anemia

## 2018-09-12 LAB
ALBUMIN SERPL ELPH-MCNC: 4.1 G/DL — SIGNIFICANT CHANGE UP (ref 3.5–5.2)
ALP SERPL-CCNC: 33 U/L — SIGNIFICANT CHANGE UP (ref 30–115)
ALT FLD-CCNC: 9 U/L — SIGNIFICANT CHANGE UP (ref 0–41)
ANION GAP SERPL CALC-SCNC: 16 MMOL/L — HIGH (ref 7–14)
APTT BLD: 43.6 SEC — HIGH (ref 27–39.2)
AST SERPL-CCNC: 24 U/L — SIGNIFICANT CHANGE UP (ref 0–41)
BASE EXCESS BLDV CALC-SCNC: -7.4 MMOL/L — LOW (ref -2–2)
BASOPHILS # BLD AUTO: 0.01 K/UL — SIGNIFICANT CHANGE UP (ref 0–0.2)
BASOPHILS NFR BLD AUTO: 0.1 % — SIGNIFICANT CHANGE UP (ref 0–1)
BILIRUB SERPL-MCNC: 0.6 MG/DL — SIGNIFICANT CHANGE UP (ref 0.2–1.2)
BUN SERPL-MCNC: 15 MG/DL — SIGNIFICANT CHANGE UP (ref 10–20)
CA-I SERPL-SCNC: 1.01 MMOL/L — LOW (ref 1.12–1.3)
CALCIUM SERPL-MCNC: 7.7 MG/DL — LOW (ref 8.5–10.1)
CHLORIDE SERPL-SCNC: 107 MMOL/L — SIGNIFICANT CHANGE UP (ref 98–110)
CO2 SERPL-SCNC: 18 MMOL/L — SIGNIFICANT CHANGE UP (ref 17–32)
CREAT SERPL-MCNC: 0.8 MG/DL — SIGNIFICANT CHANGE UP (ref 0.7–1.5)
EOSINOPHIL # BLD AUTO: 0 K/UL — SIGNIFICANT CHANGE UP (ref 0–0.7)
EOSINOPHIL NFR BLD AUTO: 0 % — SIGNIFICANT CHANGE UP (ref 0–8)
GAS PNL BLDA: SIGNIFICANT CHANGE UP
GAS PNL BLDV: 142 MMOL/L — SIGNIFICANT CHANGE UP (ref 136–145)
GAS PNL BLDV: SIGNIFICANT CHANGE UP
GLUCOSE BLDC GLUCOMTR-MCNC: 115 MG/DL — HIGH (ref 70–99)
GLUCOSE BLDC GLUCOMTR-MCNC: 118 MG/DL — HIGH (ref 70–99)
GLUCOSE BLDC GLUCOMTR-MCNC: 120 MG/DL — HIGH (ref 70–99)
GLUCOSE BLDC GLUCOMTR-MCNC: 121 MG/DL — HIGH (ref 70–99)
GLUCOSE BLDC GLUCOMTR-MCNC: 124 MG/DL — HIGH (ref 70–99)
GLUCOSE BLDC GLUCOMTR-MCNC: 127 MG/DL — HIGH (ref 70–99)
GLUCOSE BLDC GLUCOMTR-MCNC: 154 MG/DL — HIGH (ref 70–99)
GLUCOSE SERPL-MCNC: 116 MG/DL — HIGH (ref 70–99)
HCO3 BLDV-SCNC: 19 MMOL/L — LOW (ref 22–29)
HCT VFR BLD CALC: 29.4 % — LOW (ref 37–47)
HCT VFR BLDA CALC: 30.4 % — LOW (ref 34–44)
HGB BLD CALC-MCNC: 9.9 G/DL — LOW (ref 14–18)
HGB BLD-MCNC: 9.8 G/DL — LOW (ref 12–16)
IMM GRANULOCYTES NFR BLD AUTO: 0.4 % — HIGH (ref 0.1–0.3)
INR BLD: 1.28 RATIO — SIGNIFICANT CHANGE UP (ref 0.65–1.3)
LACTATE BLDV-MCNC: 0.6 MMOL/L — SIGNIFICANT CHANGE UP (ref 0.5–1.6)
LYMPHOCYTES # BLD AUTO: 0.57 K/UL — LOW (ref 1.2–3.4)
LYMPHOCYTES # BLD AUTO: 7.7 % — LOW (ref 20.5–51.1)
MAGNESIUM SERPL-MCNC: 1.8 MG/DL — SIGNIFICANT CHANGE UP (ref 1.8–2.4)
MCHC RBC-ENTMCNC: 28.1 PG — SIGNIFICANT CHANGE UP (ref 27–31)
MCHC RBC-ENTMCNC: 33.3 G/DL — SIGNIFICANT CHANGE UP (ref 32–37)
MCV RBC AUTO: 84.2 FL — SIGNIFICANT CHANGE UP (ref 81–99)
MONOCYTES # BLD AUTO: 0.78 K/UL — HIGH (ref 0.1–0.6)
MONOCYTES NFR BLD AUTO: 10.5 % — HIGH (ref 1.7–9.3)
NEUTROPHILS # BLD AUTO: 6.01 K/UL — SIGNIFICANT CHANGE UP (ref 1.4–6.5)
NEUTROPHILS NFR BLD AUTO: 81.3 % — HIGH (ref 42.2–75.2)
NRBC # BLD: 0 /100 WBCS — SIGNIFICANT CHANGE UP (ref 0–0)
PCO2 BLDV: 40 MMHG — LOW (ref 41–51)
PH BLDV: 7.28 — SIGNIFICANT CHANGE UP (ref 7.26–7.43)
PLATELET # BLD AUTO: 64 K/UL — LOW (ref 130–400)
PO2 BLDV: 30 MMHG — SIGNIFICANT CHANGE UP (ref 20–40)
POTASSIUM BLDV-SCNC: 3.6 MMOL/L — SIGNIFICANT CHANGE UP (ref 3.3–5.6)
POTASSIUM SERPL-MCNC: 4.5 MMOL/L — SIGNIFICANT CHANGE UP (ref 3.5–5)
POTASSIUM SERPL-SCNC: 4.5 MMOL/L — SIGNIFICANT CHANGE UP (ref 3.5–5)
PROT SERPL-MCNC: 5.5 G/DL — LOW (ref 6–8)
PROTHROM AB SERPL-ACNC: 13.8 SEC — HIGH (ref 9.95–12.87)
RBC # BLD: 3.49 M/UL — LOW (ref 4.2–5.4)
RBC # FLD: 14.4 % — SIGNIFICANT CHANGE UP (ref 11.5–14.5)
SAO2 % BLDV: 55 % — SIGNIFICANT CHANGE UP
SODIUM SERPL-SCNC: 141 MMOL/L — SIGNIFICANT CHANGE UP (ref 135–146)
WBC # BLD: 7.4 K/UL — SIGNIFICANT CHANGE UP (ref 4.8–10.8)
WBC # FLD AUTO: 7.4 K/UL — SIGNIFICANT CHANGE UP (ref 4.8–10.8)

## 2018-09-12 RX ORDER — MAGNESIUM SULFATE 500 MG/ML
2 VIAL (ML) INJECTION ONCE
Qty: 0 | Refills: 0 | Status: COMPLETED | OUTPATIENT
Start: 2018-09-12 | End: 2018-09-12

## 2018-09-12 RX ORDER — METOPROLOL TARTRATE 50 MG
12.5 TABLET ORAL
Qty: 0 | Refills: 0 | Status: DISCONTINUED | OUTPATIENT
Start: 2018-09-12 | End: 2018-09-17

## 2018-09-12 RX ORDER — SIMVASTATIN 20 MG/1
40 TABLET, FILM COATED ORAL AT BEDTIME
Qty: 0 | Refills: 0 | Status: DISCONTINUED | OUTPATIENT
Start: 2018-09-12 | End: 2018-09-17

## 2018-09-12 RX ORDER — FAMOTIDINE 10 MG/ML
20 INJECTION INTRAVENOUS
Qty: 0 | Refills: 0 | Status: DISCONTINUED | OUTPATIENT
Start: 2018-09-12 | End: 2018-09-17

## 2018-09-12 RX ORDER — COLCHICINE 0.6 MG
0.6 TABLET ORAL EVERY 12 HOURS
Qty: 0 | Refills: 0 | Status: DISCONTINUED | OUTPATIENT
Start: 2018-09-12 | End: 2018-09-17

## 2018-09-12 RX ORDER — FENTANYL CITRATE 50 UG/ML
25 INJECTION INTRAVENOUS ONCE
Qty: 0 | Refills: 0 | Status: DISCONTINUED | OUTPATIENT
Start: 2018-09-12 | End: 2018-09-12

## 2018-09-12 RX ORDER — METOPROLOL TARTRATE 50 MG
25 TABLET ORAL
Qty: 0 | Refills: 0 | Status: DISCONTINUED | OUTPATIENT
Start: 2018-09-12 | End: 2018-09-12

## 2018-09-12 RX ORDER — CHLORHEXIDINE GLUCONATE 213 G/1000ML
1 SOLUTION TOPICAL
Qty: 0 | Refills: 0 | Status: DISCONTINUED | OUTPATIENT
Start: 2018-09-12 | End: 2018-09-17

## 2018-09-12 RX ORDER — INSULIN LISPRO 100/ML
VIAL (ML) SUBCUTANEOUS
Qty: 0 | Refills: 0 | Status: DISCONTINUED | OUTPATIENT
Start: 2018-09-12 | End: 2018-09-14

## 2018-09-12 RX ADMIN — Medication 100 MILLIGRAM(S): at 05:14

## 2018-09-12 RX ADMIN — Medication 0.6 MILLIGRAM(S): at 17:03

## 2018-09-12 RX ADMIN — Medication 325 MILLIGRAM(S): at 11:00

## 2018-09-12 RX ADMIN — CHLORHEXIDINE GLUCONATE 5 MILLILITER(S): 213 SOLUTION TOPICAL at 10:34

## 2018-09-12 RX ADMIN — OXYCODONE HYDROCHLORIDE 10 MILLIGRAM(S): 5 TABLET ORAL at 11:02

## 2018-09-12 RX ADMIN — Medication 25 MILLIGRAM(S): at 10:20

## 2018-09-12 RX ADMIN — CHLORHEXIDINE GLUCONATE 5 MILLILITER(S): 213 SOLUTION TOPICAL at 05:14

## 2018-09-12 RX ADMIN — Medication 100 MILLIGRAM(S): at 21:03

## 2018-09-12 RX ADMIN — Medication 50 MILLIGRAM(S): at 11:01

## 2018-09-12 RX ADMIN — OXYCODONE HYDROCHLORIDE 5 MILLIGRAM(S): 5 TABLET ORAL at 13:37

## 2018-09-12 RX ADMIN — Medication 100 GRAM(S): at 17:02

## 2018-09-12 RX ADMIN — FAMOTIDINE 20 MILLIGRAM(S): 10 INJECTION INTRAVENOUS at 17:03

## 2018-09-12 RX ADMIN — OXYCODONE HYDROCHLORIDE 10 MILLIGRAM(S): 5 TABLET ORAL at 10:34

## 2018-09-12 RX ADMIN — Medication 100 MILLIGRAM(S): at 14:52

## 2018-09-12 RX ADMIN — Medication 0.6 MILLIGRAM(S): at 11:02

## 2018-09-12 RX ADMIN — Medication 50 GRAM(S): at 10:20

## 2018-09-12 RX ADMIN — FENTANYL CITRATE 25 MICROGRAM(S): 50 INJECTION INTRAVENOUS at 06:11

## 2018-09-12 RX ADMIN — Medication 100 MILLIGRAM(S): at 05:13

## 2018-09-12 RX ADMIN — Medication 100 GRAM(S): at 05:13

## 2018-09-12 RX ADMIN — FAMOTIDINE 20 MILLIGRAM(S): 10 INJECTION INTRAVENOUS at 05:14

## 2018-09-12 RX ADMIN — OXYCODONE HYDROCHLORIDE 10 MILLIGRAM(S): 5 TABLET ORAL at 03:20

## 2018-09-12 RX ADMIN — CHLORHEXIDINE GLUCONATE 5 MILLILITER(S): 213 SOLUTION TOPICAL at 05:18

## 2018-09-12 RX ADMIN — FENTANYL CITRATE 25 MICROGRAM(S): 50 INJECTION INTRAVENOUS at 00:00

## 2018-09-12 RX ADMIN — OXYCODONE HYDROCHLORIDE 10 MILLIGRAM(S): 5 TABLET ORAL at 06:11

## 2018-09-12 RX ADMIN — OXYCODONE HYDROCHLORIDE 5 MILLIGRAM(S): 5 TABLET ORAL at 15:34

## 2018-09-12 RX ADMIN — SIMVASTATIN 40 MILLIGRAM(S): 20 TABLET, FILM COATED ORAL at 21:02

## 2018-09-12 NOTE — PHYSICAL THERAPY INITIAL EVALUATION ADULT - GENERAL OBSERVATIONS, REHAB EVAL
13:20- Pt encountered sitting in b/s chair with tele, IV, 2 CTs to suction, radial A-line, daughter @ b/s, in NAD. Pt with c/o pain @ surgical site 7/10 on VAS. Pt agreeable to PT after pain meds. 13:20-14:07 Pt encountered sitting in b/s chair with tele, IV, 2 CTs to suction, radial A-line, daughter @ b/s, in NAD. BP: 110/52 Pt with c/o pain @ surgical site 7/10 on VAS. Pt agreeable to PT after pain meds. ESTEBAN Rincon medicated pt. Pt reports pain decreased to 5/10 after pain meds. Pt left same as found with tele, IV, radial A-line, 2 CTs to suction, daughter @ b/s, in NAD. BP: 114/54, HR: 70bpm.

## 2018-09-12 NOTE — PROGRESS NOTE ADULT - SUBJECTIVE AND OBJECTIVE BOX
Over Night Events:  extubated POD#1 CABG      ROS:  See HPI    PHYSICAL EXAM    ICU Vital Signs Last 24 Hrs  T(C): 36.6 (12 Sep 2018 20:00), Max: 37.9 (12 Sep 2018 08:00)  T(F): 97.8 (12 Sep 2018 20:00), Max: 100.2 (12 Sep 2018 08:00)  HR: 78 (12 Sep 2018 20:00) (60 - 80)  BP: 90/46 (12 Sep 2018 16:30) (85/49 - 126/58)  BP(mean): 54 (12 Sep 2018 16:30) (54 - 77)  ABP: 102/46 (12 Sep 2018 20:00) (84/38 - 144/64)  ABP(mean): 64 (12 Sep 2018 20:00) (52 - 96)  RR: 18 (12 Sep 2018 20:00) (12 - 28)  SpO2: 99% (12 Sep 2018 20:00) (92% - 100%)      General:AO x3  HEENT:           Nl     Lymph Nodes: NO cervical LN   Lungs: Bilateral BS  Cardiovascular: Regular   Abdomen: Soft, Positive BS  Extremities: No clubbing   Skin: Nl  Neurological: Nl      LABS:                          9.8    7.40  )-----------( 64       ( 12 Sep 2018 01:50 )             29.4                                               09-12    141  |  107  |  15  ----------------------------<  116<H>  4.5   |  18  |  0.8    Ca    7.7<L>      12 Sep 2018 01:50  Mg     1.8     09-12    TPro  5.5<L>  /  Alb  4.1  /  TBili  0.6  /  DBili  x   /  AST  24  /  ALT  9   /  AlkPhos  33  09-12      PT/INR - ( 12 Sep 2018 01:50 )   PT: 13.80 sec;   INR: 1.28 ratio         PTT - ( 12 Sep 2018 01:50 )  PTT:43.6 sec                                                                                     LIVER FUNCTIONS - ( 12 Sep 2018 01:50 )  Alb: 4.1 g/dL / Pro: 5.5 g/dL / ALK PHOS: 33 U/L / ALT: 9 U/L / AST: 24 U/L / GGT: x                                                                                               Mode: CPAP with PS  FiO2: 35  PEEP: 5  PS: 5                                      ABG - ( 12 Sep 2018 03:40 )  pH, Arterial: 7.43  pH, Blood: x     /  pCO2: 30    /  pO2: 63    / HCO3: 20    / Base Excess: -4.0  /  SaO2: 94                  MEDICATIONS  (STANDING):  ALBUTerol    90 MICROgram(s) HFA Inhaler 2 Puff(s) Inhalation every 6 hours  aspirin enteric coated 325 milliGRAM(s) Oral daily  chlorhexidine 4% Liquid 1 Application(s) Topical <User Schedule>  colchicine 0.6 milliGRAM(s) Oral every 12 hours  dextrose 50% Injectable 50 milliLiter(s) IV Push every 15 minutes  dextrose 50% Injectable 25 milliLiter(s) IV Push every 15 minutes  docusate sodium 100 milliGRAM(s) Oral three times a day  famotidine    Tablet 20 milliGRAM(s) Oral two times a day  insulin lispro (HumaLOG) corrective regimen sliding scale   SubCutaneous three times a day before meals  ipratropium 17 MICROgram(s) HFA Inhaler 2 Puff(s) Inhalation every 6 hours  magnesium sulfate  IVPB 1 Gram(s) IV Intermittent every 12 hours  metoprolol tartrate 12.5 milliGRAM(s) Oral two times a day  pregabalin 50 milliGRAM(s) Oral daily  simvastatin 40 milliGRAM(s) Oral at bedtime  sodium chloride 0.9%. 1000 milliLiter(s) (20 mL/Hr) IV Continuous <Continuous>    MEDICATIONS  (PRN):  ondansetron  IVPB 4 milliGRAM(s) IV Intermittent once PRN Nausea and/or Vomiting  oxyCODONE    IR 5 milliGRAM(s) Oral every 4 hours PRN Mild Pain (1 - 3)  oxyCODONE    IR 10 milliGRAM(s) Oral every 4 hours PRN Moderate Pain (4 - 6)  senna 1 Tablet(s) Oral every 12 hours PRN Constipation      Xrays:                                                                                     ECHO    < from: Xray Chest 1 View- PORTABLE-Routine (09.12.18 @ 04:47) >  IMPRESSION:      Stable pulmonary vascular congestion and left pleural effusion/opacity.    < end of copied text >  IMPRESSION:

## 2018-09-12 NOTE — CONSULT NOTE ADULT - ASSESSMENT
IMPRESSION: Rehab of cardiac debilitation     PRECAUTIONS: [  ] Cardiac  [  ] Respiratory  [  ] Seizures [  ] Contact Isolation  [  ] Droplet Isolation  [  ] Other    Weight Bearing Status:     RECOMMENDATION:    Out of Bed to Chair     DVT/Decubiti Prophylaxis    REHAB PLAN:     [ x  ] Bedside P/T 3-5 times a week   [   ]   Bedside O/T  2-3 times a week             [   ] No Rehab Therapy Indicated                   [   ]  Speech Therapy   Conditioning/ROM                                    ADL  Bed Mobility                                               Conditioning/ROM  Transfers                                                     Bed Mobility  Sitting /Standing Balance                         Transfers                                        Gait Training                                               Sitting/Standing Balance  Stair Training [   ]Applicable                    Home equipment Eval                                                                        Splinting  [   ] Only      GOALS:   ADL   [   ]   Independent                    Transfers  [ x  ] Independent                          Ambulation  [ x  ] Independent     [ x   ] With device                            [   ]  CG                                                         [   ]  CG                                                                  [   ] CG                            [    ] Min A                                                   [   ] Min A                                                              [   ] Min  A          DISCHARGE PLAN:   [   ]  Good candidate for Intensive Rehabilitation/Hospital based-[facility]A [facility]                                             Will tolerate 3hrs Intensive Rehab Daily                                       [  x  ]  Short Term Rehab in Skilled Nursing Facility                               vs        [ x   ]  Home with Outpatient or VN services                                         [    ]  Possible Candidate for Intensive Hospital based Rehab

## 2018-09-12 NOTE — DIETITIAN INITIAL EVALUATION ADULT. - ENERGY NEEDS
Calories: 1423-9303 kcals/day (MSJ x 1.2-1.3)  Protein: 73-87 g/day (1-1.2 g/kg ABW)  Fluids: per CTU team

## 2018-09-12 NOTE — PHYSICAL THERAPY INITIAL EVALUATION ADULT - PERTINENT HX OF CURRENT PROBLEM, REHAB EVAL
Pt presented to ED with c/o chest pain, rules in for MI. cardiac cath was performed, it showed occluded LIMA to LAD as well as occluded SVG to OM. Pt now s/p CABGx2.

## 2018-09-12 NOTE — PROGRESS NOTE ADULT - SUBJECTIVE AND OBJECTIVE BOX
OPERATIVE PROCEDURE(s):                POD #                       SURGEON(s): CASEY Bah  SUBJECTIVE ASSESSMENT:78yFemale patient seen and examined at bedside.    Vital Signs Last 24 Hrs  T(F): 100 (12 Sep 2018 09:00), Max: 100.2 (12 Sep 2018 08:00)  HR: 76 (12 Sep 2018 09:00) (76 - 82)  BP: 126/58 (12 Sep 2018 08:00) (94/53 - 126/58)  BP(mean): 65 (11 Sep 2018 18:30) (65 - 82)  ABP: 144/64 (12 Sep 2018 09:00) (90/48 - 146/78)  ABP(mean): 88 (12 Sep 2018 09:00)  RR: 17 (12 Sep 2018 09:00) (11 - 24)  SpO2: 98% (12 Sep 2018 09:00) (95% - 100%)  CVP(mm Hg): 39 (12 Sep 2018 09:00)  CVP(cm H2O): --  CO: 5.9 (12 Sep 2018 08:00)  CI: 3.3 (12 Sep 2018 08:00)  PA: 34/19 (12 Sep 2018 09:00)  SVR: 934 (12 Sep 2018 08:00)  Mode: CPAP with PS  FiO2: 35  PEEP: 5  PS: 5    I&O's Detail    11 Sep 2018 07:01  -  12 Sep 2018 07:00  --------------------------------------------------------  IN:    Albumin 5%  - 500 mL: 500 mL    dexmedetomidine Infusion: 76.7 mL    insulin Infusion: 10 mL    IV PiggyBack: 400 mL    nitroglycerin  Infusion: 32 mL    norepinephrine Infusion: 33 mL    Packed Red Blood Cells: 250 mL    sodium chloride 0.9%.: 340 mL    vasopressin Infusion: 4.8 mL  Total IN: 1646.5 mL    OUT:    Chest Tube: 145 mL    Chest Tube: 32 mL    Chest Tube: 270 mL    Indwelling Catheter - Urethral: 1315 mL  Total OUT: 1762 mL        Net: I&O's Detail    11 Sep 2018 07:01  -  12 Sep 2018 07:00  --------------------------------------------------------  Total NET: -115.5 mL        CAPILLARY BLOOD GLUCOSE  120 (12 Sep 2018 09:00)  124 (12 Sep 2018 08:00)  127 (12 Sep 2018 05:00)  115 (12 Sep 2018 03:00)  120 (12 Sep 2018 01:00)  118 (12 Sep 2018 00:00)  107 (11 Sep 2018 22:00)  100 (11 Sep 2018 21:00)  89 (11 Sep 2018 20:00)  129 (11 Sep 2018 19:00)  119 (11 Sep 2018 18:00)  139 (11 Sep 2018 17:00)  131 (11 Sep 2018 16:00)  102 (11 Sep 2018 15:30)      POCT Blood Glucose.: 124 mg/dL (12 Sep 2018 08:13)  POCT Blood Glucose.: 121 mg/dL (12 Sep 2018 06:54)  POCT Blood Glucose.: 127 mg/dL (12 Sep 2018 05:06)  POCT Blood Glucose.: 115 mg/dL (12 Sep 2018 03:13)  POCT Blood Glucose.: 120 mg/dL (12 Sep 2018 01:04)  POCT Blood Glucose.: 118 mg/dL (11 Sep 2018 23:58)  POCT Blood Glucose.: 100 mg/dL (11 Sep 2018 21:04)  POCT Blood Glucose.: 89 mg/dL (11 Sep 2018 20:10)  POCT Blood Glucose.: 126 mg/dL (11 Sep 2018 18:51)  POCT Blood Glucose.: 119 mg/dL (11 Sep 2018 17:58)  POCT Blood Glucose.: 139 mg/dL (11 Sep 2018 16:57)  POCT Blood Glucose.: 131 mg/dL (11 Sep 2018 16:15)      Physical Exam:  General: NAD; A&Ox3  Cardiac: S1/S2, RRR, no murmur, no rubs  Lungs: unlabored respirations, CTA b/l, no wheeze, no rales, no crackles  Abdomen: Soft/NT/ND; positive bowel sounds x 4  Sternum: Intact, no click, incision healing well with no drainage  Incisions: Incisions clean/dry/intact  Extremities: No edema b/l lower extremities; good capillary refill; no cyanosis; palpable 1+ pedal pulses b/l    Central Venous Catheter: Yes[]  No[] , If Yes indication:                    Day #  Rowley Catheter: Yes  [] , No  [] , If yes indication:                                 Day #  NGT: Yes [] No [] ,    If Yes Placement:                                                   Day #  EPICARDIAL WIRES:  [] YES [] NO                                                            Day #  BOWEL MOVEMENT:  [] YES [] NO, If No, Timing since last BM Day #  CHEST TUBE(Left/Right):  [] YES [] NO, If yes -  AIR LEAKS:  [] YES [] NO        LABS:                        9.8<L>  7.40  )-----------( 64<L>    ( 12 Sep 2018 01:50 )             29.4<L>                        7.3<LL>  6.22  )-----------( 60<L>    ( 11 Sep 2018 18:33 )             22.4<L>    09-12    141  |  107  |  15  ----------------------------<  116<H>  4.5   |  18  |  0.8  09-11    143  |  112<H>  |  18  ----------------------------<  102<H>  4.2   |  19  |  0.7    Ca    7.7<L>      12 Sep 2018 01:50  Mg     1.8     09-12    TPro  5.5<L> [6.0 - 8.0]  /  Alb  4.1 [3.5 - 5.2]  /  TBili  0.6 [0.2 - 1.2]  /  DBili  x   /  AST  24 [0 - 41]  /  ALT  9 [0 - 41]  /  AlkPhos  33 [30 - 115]  09-12    PT/INR - ( 12 Sep 2018 01:50 )   PT: ;   INR: 1.28 ratio       PT/INR - ( 11 Sep 2018 15:25 )   PT: ;   INR: 1.56 ratio       PTT - ( 12 Sep 2018 01:50 )  PTT:43.6 sec, PTT - ( 11 Sep 2018 15:25 )  PTT:27.8 sec    Urinalysis Basic - ( 10 Sep 2018 18:00 )  Color: Yellow / Appearance: Clear / SG: >=1.030 / pH: x  Gluc: x / Ketone: Negative  / Bili: Negative / Urobili: 1.0   Blood: x / Protein: Negative / Nitrite: Negative   Leuk Esterase: Negative / RBC: x / WBC x   Sq Epi: x / Non Sq Epi: x / Bacteria: x    ABG - ( 12 Sep 2018 03:40 )  pH: 7.43  /  pCO2: 30    /  pO2: 63    / HCO3: 20    / Base Excess: -4.0  /  SaO2: 94    /  LA: 0.9      RADIOLOGY & ADDITIONAL TESTS:  CXR:   EKG:    Allergies  quinine (Urticaria)  Intolerances      MEDICATIONS  (STANDING):  ALBUTerol    90 MICROgram(s) HFA Inhaler 2 Puff(s) Inhalation every 6 hours  aspirin enteric coated 325 milliGRAM(s) Oral daily  chlorhexidine 0.12% Liquid 5 milliLiter(s) Swish and Spit every 4 hours  dexmedetomidine Infusion 0.5 MICROgram(s)/kG/Hr (9.075 mL/Hr) IV Continuous <Continuous>  dextrose 50% Injectable 50 milliLiter(s) IV Push every 15 minutes  dextrose 50% Injectable 25 milliLiter(s) IV Push every 15 minutes  docusate sodium 100 milliGRAM(s) Oral three times a day  famotidine Injectable 20 milliGRAM(s) IV Push every 12 hours  insulin Infusion 1 Unit(s)/Hr (1 mL/Hr) IV Continuous <Continuous>  ipratropium 17 MICROgram(s) HFA Inhaler 2 Puff(s) Inhalation every 6 hours  magnesium sulfate  IVPB 1 Gram(s) IV Intermittent every 12 hours  meperidine     Injectable 25 milliGRAM(s) IV Push once  niCARdipine Infusion 5 mG/Hr (25 mL/Hr) IV Continuous <Continuous>  nitroglycerin  Infusion 15 MICROgram(s)/Min (4.5 mL/Hr) IV Continuous <Continuous>  norepinephrine Infusion 0.05 MICROgram(s)/kG/Min (6.806 mL/Hr) IV Continuous <Continuous>  phenylephrine    Infusion 0.1 MICROgram(s)/kG/Min (1.361 mL/Hr) IV Continuous <Continuous>  propofol Infusion 25 MICROgram(s)/kG/Min (10.89 mL/Hr) IV Continuous <Continuous>  sodium chloride 0.9%. 1000 milliLiter(s) (20 mL/Hr) IV Continuous <Continuous>  sodium chloride 0.9%. 1000 milliLiter(s) (125 mL/Hr) IV Continuous <Continuous>  vasopressin Infusion 0.03 Unit(s)/Min (1.8 mL/Hr) IV Continuous <Continuous>    MEDICATIONS  (PRN):  ondansetron  IVPB 4 milliGRAM(s) IV Intermittent once PRN Nausea and/or Vomiting  oxyCODONE    IR 5 milliGRAM(s) Oral every 4 hours PRN Mild Pain (1 - 3)  oxyCODONE    IR 10 milliGRAM(s) Oral every 4 hours PRN Moderate Pain (4 - 6)  senna 1 Tablet(s) Oral every 12 hours PRN Constipation    Home Medications:  amLODIPine 5 mg oral tablet: 1 tab(s) orally once a day (17 Jun 2018 10:44)  brimonidine 0.1% ophthalmic solution: to each affected eye 2 times a day (17 Jun 2018 10:44)  dorzolamide 2% ophthalmic solution: 1 drop(s) to each affected eye 2 times a day (17 Jun 2018 10:44)  latanoprost 0.005% ophthalmic solution: 1 drop(s) to each affected eye 2 times a day (17 Jun 2018 10:44)  losartan 50 mg oral tablet: 1 tab(s) orally once a day (17 Jun 2018 10:44)  Low Dose ASA 81 mg oral tablet: 1 tab(s) orally once a day (02 May 2018 07:25)  Lyrica 50 mg oral capsule:  (17 Jun 2018 10:44)  simvastatin 40 mg oral tablet: 1 tab(s) orally once a day (at bedtime) (02 May 2018 07:25)    Pharmacologic DVT Prophylaxis [] YES, [] NO: Contraindication:  [] HEPARIN: Dose: XX mg  Q24H     [] LOVENOX: Dose: XX mg  Q24H  SCD's: YES b/l    GI Prophylaxis: Protonix [], Pepcid []    Post-Op Beta-Blockers: [] Yes, [] No: contraindication:  Post-Op Nitrate: [] Yes, [] No: contraindication:  Post-Op Aspirin:  [] Yes, [] No: contraindication:  Post-Op Statin:  [] Yes, [] No: contraindication:    Ambulation/Activity Status:    Assessment/Plan:  78y Female status-post  - Case and plan discussed with CTU Intensivist and CT Surgeon - Dr. Padilla/Olvin/José Antonio   - Continue CTU supportive care and ongoing plan of care as per continuing CTU rounds.   - Continue DVT/GI prophylaxis  - Incentive Spirometry 10 times an hour  - Continue to advance physical activity as tolerated and continue PT/OT as directed  1. CAD: Continue ASA, statin, BB  2. HTN:   3. A. Fib:   4. COPD/Hypoxia:   5. DM/Glucose Control:     Social Service Disposition: OPERATIVE PROCEDURE(s): s/p resternotomy for [id number]               POD # 1                      SURGEON(s): CASEY Bah  SUBJECTIVE ASSESSMENT: 78y Female patient seen and examined at bedside. Patient denies any acute complaints at this time.    Vital Signs Last 24 Hrs  T(F): 100 (12 Sep 2018 09:00), Max: 100.2 (12 Sep 2018 08:00)  HR: 76 (12 Sep 2018 09:00) (76 - 82)  BP: 126/58 (12 Sep 2018 08:00) (94/53 - 126/58)  BP(mean): 65 (11 Sep 2018 18:30) (65 - 82)  ABP: 144/64 (12 Sep 2018 09:00) (90/48 - 146/78)  ABP(mean): 88 (12 Sep 2018 09:00)  RR: 17 (12 Sep 2018 09:00) (11 - 24)  SpO2: 98% (12 Sep 2018 09:00) (95% - 100%)    Mode: CPAP with PS  FiO2: 35  PEEP: 5  PS: 5    I&O's Detail    11 Sep 2018 07:01  -  12 Sep 2018 07:00  --------------------------------------------------------  IN:    Albumin 5%  - 500 mL: 500 mL    dexmedetomidine Infusion: 76.7 mL    insulin Infusion: 10 mL    IV PiggyBack: 400 mL    nitroglycerin  Infusion: 32 mL    norepinephrine Infusion: 33 mL    Packed Red Blood Cells: 250 mL    sodium chloride 0.9%.: 340 mL    vasopressin Infusion: 4.8 mL  Total IN: 1646.5 mL    OUT:    Chest Tube: 145 mL    Chest Tube: 32 mL    Chest Tube: 270 mL    Indwelling Catheter - Urethral: 1315 mL  Total OUT: 1762 mL    Net: I&O's Detail    11 Sep 2018 07:01  -  12 Sep 2018 07:00  --------------------------------------------------------  Total NET: -115.5 mL        CAPILLARY BLOOD GLUCOSE  120 (12 Sep 2018 09:00)  124 (12 Sep 2018 08:00)  127 (12 Sep 2018 05:00)  115 (12 Sep 2018 03:00)  120 (12 Sep 2018 01:00)  118 (12 Sep 2018 00:00)  107 (11 Sep 2018 22:00)  100 (11 Sep 2018 21:00)  89 (11 Sep 2018 20:00)  129 (11 Sep 2018 19:00)  119 (11 Sep 2018 18:00)  139 (11 Sep 2018 17:00)  131 (11 Sep 2018 16:00)  102 (11 Sep 2018 15:30)      POCT Blood Glucose.: 124 mg/dL (12 Sep 2018 08:13)  POCT Blood Glucose.: 121 mg/dL (12 Sep 2018 06:54)  POCT Blood Glucose.: 127 mg/dL (12 Sep 2018 05:06)  POCT Blood Glucose.: 115 mg/dL (12 Sep 2018 03:13)  POCT Blood Glucose.: 120 mg/dL (12 Sep 2018 01:04)  POCT Blood Glucose.: 118 mg/dL (11 Sep 2018 23:58)  POCT Blood Glucose.: 100 mg/dL (11 Sep 2018 21:04)  POCT Blood Glucose.: 89 mg/dL (11 Sep 2018 20:10)  POCT Blood Glucose.: 126 mg/dL (11 Sep 2018 18:51)  POCT Blood Glucose.: 119 mg/dL (11 Sep 2018 17:58)  POCT Blood Glucose.: 139 mg/dL (11 Sep 2018 16:57)  POCT Blood Glucose.: 131 mg/dL (11 Sep 2018 16:15)      Physical Exam:  General: NAD; A&Ox3  Cardiac: S1/S2, RRR, no murmur, no rubs  Lungs: unlabored respirations, CTA b/l, no wheeze, no rales, no crackles  Abdomen: Soft/NT/ND; positive bowel sounds x 4  Sternum: Intact, no click, incision healing well with no drainage  Incisions: Incisions clean/dry/intact  Extremities: No edema b/l lower extremities; good capillary refill; no cyanosis; palpable 1+ pedal pulses b/l    Central Venous Catheter: Yes[]  No[] , If Yes indication:                    Day #  Rowley Catheter: Yes  [] , No  [] , If yes indication:                                 Day #  NGT: Yes [] No [] ,    If Yes Placement:                                                   Day #  EPICARDIAL WIRES:  [] YES [] NO                                                            Day #  BOWEL MOVEMENT:  [] YES [] NO, If No, Timing since last BM Day #  CHEST TUBE(Left/Right):  [] YES [] NO, If yes -  AIR LEAKS:  [] YES [] NO        LABS:                        9.8<L>  7.40  )-----------( 64<L>    ( 12 Sep 2018 01:50 )             29.4<L>                        7.3<LL>  6.22  )-----------( 60<L>    ( 11 Sep 2018 18:33 )             22.4<L>    09-12    141  |  107  |  15  ----------------------------<  116<H>  4.5   |  18  |  0.8  09-11    143  |  112<H>  |  18  ----------------------------<  102<H>  4.2   |  19  |  0.7    Ca    7.7<L>      12 Sep 2018 01:50  Mg     1.8     09-12    TPro  5.5<L> [6.0 - 8.0]  /  Alb  4.1 [3.5 - 5.2]  /  TBili  0.6 [0.2 - 1.2]  /  DBili  x   /  AST  24 [0 - 41]  /  ALT  9 [0 - 41]  /  AlkPhos  33 [30 - 115]  09-12    PT/INR - ( 12 Sep 2018 01:50 )   PT: ;   INR: 1.28 ratio       PT/INR - ( 11 Sep 2018 15:25 )   PT: ;   INR: 1.56 ratio       PTT - ( 12 Sep 2018 01:50 )  PTT:43.6 sec, PTT - ( 11 Sep 2018 15:25 )  PTT:27.8 sec    Urinalysis Basic - ( 10 Sep 2018 18:00 )  Color: Yellow / Appearance: Clear / SG: >=1.030 / pH: x  Gluc: x / Ketone: Negative  / Bili: Negative / Urobili: 1.0   Blood: x / Protein: Negative / Nitrite: Negative   Leuk Esterase: Negative / RBC: x / WBC x   Sq Epi: x / Non Sq Epi: x / Bacteria: x    ABG - ( 12 Sep 2018 03:40 )  pH: 7.43  /  pCO2: 30    /  pO2: 63    / HCO3: 20    / Base Excess: -4.0  /  SaO2: 94    /  LA: 0.9      RADIOLOGY & ADDITIONAL TESTS:  CXR:   EKG:    Allergies  quinine (Urticaria)  Intolerances      MEDICATIONS  (STANDING):  ALBUTerol    90 MICROgram(s) HFA Inhaler 2 Puff(s) Inhalation every 6 hours  aspirin enteric coated 325 milliGRAM(s) Oral daily  chlorhexidine 0.12% Liquid 5 milliLiter(s) Swish and Spit every 4 hours  dexmedetomidine Infusion 0.5 MICROgram(s)/kG/Hr (9.075 mL/Hr) IV Continuous <Continuous>  dextrose 50% Injectable 50 milliLiter(s) IV Push every 15 minutes  dextrose 50% Injectable 25 milliLiter(s) IV Push every 15 minutes  docusate sodium 100 milliGRAM(s) Oral three times a day  famotidine Injectable 20 milliGRAM(s) IV Push every 12 hours  insulin Infusion 1 Unit(s)/Hr (1 mL/Hr) IV Continuous <Continuous>  ipratropium 17 MICROgram(s) HFA Inhaler 2 Puff(s) Inhalation every 6 hours  magnesium sulfate  IVPB 1 Gram(s) IV Intermittent every 12 hours  meperidine     Injectable 25 milliGRAM(s) IV Push once  niCARdipine Infusion 5 mG/Hr (25 mL/Hr) IV Continuous <Continuous>  nitroglycerin  Infusion 15 MICROgram(s)/Min (4.5 mL/Hr) IV Continuous <Continuous>  norepinephrine Infusion 0.05 MICROgram(s)/kG/Min (6.806 mL/Hr) IV Continuous <Continuous>  phenylephrine    Infusion 0.1 MICROgram(s)/kG/Min (1.361 mL/Hr) IV Continuous <Continuous>  propofol Infusion 25 MICROgram(s)/kG/Min (10.89 mL/Hr) IV Continuous <Continuous>  sodium chloride 0.9%. 1000 milliLiter(s) (20 mL/Hr) IV Continuous <Continuous>  sodium chloride 0.9%. 1000 milliLiter(s) (125 mL/Hr) IV Continuous <Continuous>  vasopressin Infusion 0.03 Unit(s)/Min (1.8 mL/Hr) IV Continuous <Continuous>    MEDICATIONS  (PRN):  ondansetron  IVPB 4 milliGRAM(s) IV Intermittent once PRN Nausea and/or Vomiting  oxyCODONE    IR 5 milliGRAM(s) Oral every 4 hours PRN Mild Pain (1 - 3)  oxyCODONE    IR 10 milliGRAM(s) Oral every 4 hours PRN Moderate Pain (4 - 6)  senna 1 Tablet(s) Oral every 12 hours PRN Constipation    Home Medications:  amLODIPine 5 mg oral tablet: 1 tab(s) orally once a day (17 Jun 2018 10:44)  brimonidine 0.1% ophthalmic solution: to each affected eye 2 times a day (17 Jun 2018 10:44)  dorzolamide 2% ophthalmic solution: 1 drop(s) to each affected eye 2 times a day (17 Jun 2018 10:44)  latanoprost 0.005% ophthalmic solution: 1 drop(s) to each affected eye 2 times a day (17 Jun 2018 10:44)  losartan 50 mg oral tablet: 1 tab(s) orally once a day (17 Jun 2018 10:44)  Low Dose ASA 81 mg oral tablet: 1 tab(s) orally once a day (02 May 2018 07:25)  Lyrica 50 mg oral capsule:  (17 Jun 2018 10:44)  simvastatin 40 mg oral tablet: 1 tab(s) orally once a day (at bedtime) (02 May 2018 07:25)    Pharmacologic DVT Prophylaxis [] YES, [] NO: Contraindication:  [] HEPARIN: Dose: XX mg  Q24H     [] LOVENOX: Dose: XX mg  Q24H  SCD's: YES b/l    GI Prophylaxis: Protonix [], Pepcid []    Post-Op Beta-Blockers: [] Yes, [] No: contraindication:  Post-Op Nitrate: [] Yes, [] No: contraindication:  Post-Op Aspirin:  [] Yes, [] No: contraindication:  Post-Op Statin:  [] Yes, [] No: contraindication:    Ambulation/Activity Status:    Assessment/Plan:  78y Female status-post  - Case and plan discussed with CTU Intensivist and CT Surgeon - Dr. Padilla/Olvin/José Antonio   - Continue CTU supportive care and ongoing plan of care as per continuing CTU rounds.   - Continue DVT/GI prophylaxis  - Incentive Spirometry 10 times an hour  - Continue to advance physical activity as tolerated and continue PT/OT as directed  1. CAD: Continue ASA, statin, BB  2. HTN:   3. A. Fib:   4. COPD/Hypoxia:   5. DM/Glucose Control:     Social Service Disposition: OPERATIVE PROCEDURE(s): s/p resternotomy for [id number]               POD # 1                      SURGEON(s): CASEY Bah  SUBJECTIVE ASSESSMENT: 78y Female patient seen and examined at bedside. Patient denies any acute complaints at this time.    Vital Signs Last 24 Hrs  T(F): 100 (12 Sep 2018 09:00), Max: 100.2 (12 Sep 2018 08:00)  HR: 76 (12 Sep 2018 09:00) (76 - 82)  BP: 126/58 (12 Sep 2018 08:00) (94/53 - 126/58)  BP(mean): 65 (11 Sep 2018 18:30) (65 - 82)  ABP: 144/64 (12 Sep 2018 09:00) (90/48 - 146/78)  ABP(mean): 88 (12 Sep 2018 09:00)  RR: 17 (12 Sep 2018 09:00) (11 - 24)  SpO2: 98% (12 Sep 2018 09:00) (95% - 100%)    Mode: CPAP with PS  FiO2: 35  PEEP: 5  PS: 5    I&O's Detail    11 Sep 2018 07:01  -  12 Sep 2018 07:00  --------------------------------------------------------  IN:    Albumin 5%  - 500 mL: 500 mL    dexmedetomidine Infusion: 76.7 mL    insulin Infusion: 10 mL    IV PiggyBack: 400 mL    nitroglycerin  Infusion: 32 mL    norepinephrine Infusion: 33 mL    Packed Red Blood Cells: 250 mL    sodium chloride 0.9%.: 340 mL    vasopressin Infusion: 4.8 mL  Total IN: 1646.5 mL    OUT:    Chest Tube: 145 mL    Chest Tube: 32 mL    Chest Tube: 270 mL    Indwelling Catheter - Urethral: 1315 mL  Total OUT: 1762 mL    Net: I&O's Detail    11 Sep 2018 07:01  -  12 Sep 2018 07:00  --------------------------------------------------------  Total NET: -115.5 mL        CAPILLARY BLOOD GLUCOSE  120 (12 Sep 2018 09:00)  124 (12 Sep 2018 08:00)  127 (12 Sep 2018 05:00)  115 (12 Sep 2018 03:00)  120 (12 Sep 2018 01:00)  118 (12 Sep 2018 00:00)  107 (11 Sep 2018 22:00)  100 (11 Sep 2018 21:00)  89 (11 Sep 2018 20:00)  129 (11 Sep 2018 19:00)  119 (11 Sep 2018 18:00)  139 (11 Sep 2018 17:00)  131 (11 Sep 2018 16:00)  102 (11 Sep 2018 15:30)      POCT Blood Glucose.: 124 mg/dL (12 Sep 2018 08:13)  POCT Blood Glucose.: 121 mg/dL (12 Sep 2018 06:54)  POCT Blood Glucose.: 127 mg/dL (12 Sep 2018 05:06)  POCT Blood Glucose.: 115 mg/dL (12 Sep 2018 03:13)  POCT Blood Glucose.: 120 mg/dL (12 Sep 2018 01:04)  POCT Blood Glucose.: 118 mg/dL (11 Sep 2018 23:58)  POCT Blood Glucose.: 100 mg/dL (11 Sep 2018 21:04)  POCT Blood Glucose.: 89 mg/dL (11 Sep 2018 20:10)  POCT Blood Glucose.: 126 mg/dL (11 Sep 2018 18:51)  POCT Blood Glucose.: 119 mg/dL (11 Sep 2018 17:58)  POCT Blood Glucose.: 139 mg/dL (11 Sep 2018 16:57)  POCT Blood Glucose.: 131 mg/dL (11 Sep 2018 16:15)      Physical Exam:  General: NAD; A&Ox3  Cardiac: S1/S2, RRR, no murmur, no rubs  Lungs: unlabored respirations, CTA b/l, no wheeze, no rales, no crackles  Abdomen: Soft/NT/ND; positive bowel sounds x 4  Sternum: Intact, no click, incision healing well with no drainage  Incisions: Incisions clean/dry/intact  Extremities: No edema b/l lower extremities; good capillary refill; no cyanosis; palpable 1+ pedal pulses b/l    Central Venous Catheter: Yes[]  No[] , If Yes indication:                    Day #  Rowley Catheter: Yes  [] , No  [] , If yes indication:                                 Day #  NGT: Yes [] No [] ,    If Yes Placement:                                                   Day #  EPICARDIAL WIRES:  [] YES [] NO                                                            Day #  BOWEL MOVEMENT:  [] YES [] NO, If No, Timing since last BM Day #  CHEST TUBE(Left/Right):  [] YES [] NO, If yes -  AIR LEAKS:  [] YES [] NO        LABS:                        9.8<L>  7.40  )-----------( 64<L>    ( 12 Sep 2018 01:50 )             29.4<L>                        7.3<LL>  6.22  )-----------( 60<L>    ( 11 Sep 2018 18:33 )             22.4<L>    09-12    141  |  107  |  15  ----------------------------<  116<H>  4.5   |  18  |  0.8  09-11    143  |  112<H>  |  18  ----------------------------<  102<H>  4.2   |  19  |  0.7    Ca    7.7<L>      12 Sep 2018 01:50  Mg     1.8     09-12    TPro  5.5<L> [6.0 - 8.0]  /  Alb  4.1 [3.5 - 5.2]  /  TBili  0.6 [0.2 - 1.2]  /  DBili  x   /  AST  24 [0 - 41]  /  ALT  9 [0 - 41]  /  AlkPhos  33 [30 - 115]  09-12    PT/INR - ( 12 Sep 2018 01:50 )   PT: ;   INR: 1.28 ratio       PT/INR - ( 11 Sep 2018 15:25 )   PT: ;   INR: 1.56 ratio       PTT - ( 12 Sep 2018 01:50 )  PTT:43.6 sec, PTT - ( 11 Sep 2018 15:25 )  PTT:27.8 sec    Urinalysis Basic - ( 10 Sep 2018 18:00 )  Color: Yellow / Appearance: Clear / SG: >=1.030 / pH: x  Gluc: x / Ketone: Negative  / Bili: Negative / Urobili: 1.0   Blood: x / Protein: Negative / Nitrite: Negative   Leuk Esterase: Negative / RBC: x / WBC x   Sq Epi: x / Non Sq Epi: x / Bacteria: x    ABG - ( 12 Sep 2018 03:40 )  pH: 7.43  /  pCO2: 30    /  pO2: 63    / HCO3: 20    / Base Excess: -4.0  /  SaO2: 94    /  LA: 0.9      RADIOLOGY & ADDITIONAL TESTS:  CXR: no pTX  EKG: < from: 12 Lead ECG (09.12.18 @ 09:42) >  Ventricular Rate 75 BPM    Atrial Rate 75 BPM    P-R Interval 132 ms    QRS Duration 68 ms    Q-T Interval 360 ms    QTC Calculation(Bezet) 402 ms    R Axis 0 degrees    T Axis 15 degrees    Diagnosis Line Normal sinus rhythm  ST elevation consideranterolateral injury or acute infarct  ** ** ACUTE MI / STEMI ** **  Abnormal ECG    Confirmed by Tyler Brennan [id number]) on 9/12/2018 11:18:47 AM    < end of copied text >      Allergies  quinine (Urticaria)  Intolerances      MEDICATIONS  (STANDING):  ALBUTerol    90 MICROgram(s) HFA Inhaler 2 Puff(s) Inhalation every 6 hours  aspirin enteric coated 325 milliGRAM(s) Oral daily  chlorhexidine 0.12% Liquid 5 milliLiter(s) Swish and Spit every 4 hours  dexmedetomidine Infusion 0.5 MICROgram(s)/kG/Hr (9.075 mL/Hr) IV Continuous <Continuous>  dextrose 50% Injectable 50 milliLiter(s) IV Push every 15 minutes  dextrose 50% Injectable 25 milliLiter(s) IV Push every 15 minutes  docusate sodium 100 milliGRAM(s) Oral three times a day  famotidine Injectable 20 milliGRAM(s) IV Push every 12 hours  insulin Infusion 1 Unit(s)/Hr (1 mL/Hr) IV Continuous <Continuous>  ipratropium 17 MICROgram(s) HFA Inhaler 2 Puff(s) Inhalation every 6 hours  magnesium sulfate  IVPB 1 Gram(s) IV Intermittent every 12 hours  meperidine     Injectable 25 milliGRAM(s) IV Push once  niCARdipine Infusion 5 mG/Hr (25 mL/Hr) IV Continuous <Continuous>  nitroglycerin  Infusion 15 MICROgram(s)/Min (4.5 mL/Hr) IV Continuous <Continuous>  norepinephrine Infusion 0.05 MICROgram(s)/kG/Min (6.806 mL/Hr) IV Continuous <Continuous>  phenylephrine    Infusion 0.1 MICROgram(s)/kG/Min (1.361 mL/Hr) IV Continuous <Continuous>  propofol Infusion 25 MICROgram(s)/kG/Min (10.89 mL/Hr) IV Continuous <Continuous>  sodium chloride 0.9%. 1000 milliLiter(s) (20 mL/Hr) IV Continuous <Continuous>  sodium chloride 0.9%. 1000 milliLiter(s) (125 mL/Hr) IV Continuous <Continuous>  vasopressin Infusion 0.03 Unit(s)/Min (1.8 mL/Hr) IV Continuous <Continuous>    MEDICATIONS  (PRN):  ondansetron  IVPB 4 milliGRAM(s) IV Intermittent once PRN Nausea and/or Vomiting  oxyCODONE    IR 5 milliGRAM(s) Oral every 4 hours PRN Mild Pain (1 - 3)  oxyCODONE    IR 10 milliGRAM(s) Oral every 4 hours PRN Moderate Pain (4 - 6)  senna 1 Tablet(s) Oral every 12 hours PRN Constipation    Home Medications:  amLODIPine 5 mg oral tablet: 1 tab(s) orally once a day (17 Jun 2018 10:44)  brimonidine 0.1% ophthalmic solution: to each affected eye 2 times a day (17 Jun 2018 10:44)  dorzolamide 2% ophthalmic solution: 1 drop(s) to each affected eye 2 times a day (17 Jun 2018 10:44)  latanoprost 0.005% ophthalmic solution: 1 drop(s) to each affected eye 2 times a day (17 Jun 2018 10:44)  losartan 50 mg oral tablet: 1 tab(s) orally once a day (17 Jun 2018 10:44)  Low Dose ASA 81 mg oral tablet: 1 tab(s) orally once a day (02 May 2018 07:25)  Lyrica 50 mg oral capsule:  (17 Jun 2018 10:44)  simvastatin 40 mg oral tablet: 1 tab(s) orally once a day (at bedtime) (02 May 2018 07:25)    Pharmacologic DVT Prophylaxis [] YES, [x] NO: Contraindication: thrombocytopenia   [] HEPARIN: Dose: XX mg  Q24H     [] LOVENOX: Dose: XX mg  Q24H  SCD's: YES b/l    GI Prophylaxis: famotidine Injectable 20 milliGRAM(s) IV Push every 12 hours    Post-Op Beta-Blockers: [x] Yes, [] No: contraindication:  Post-Op Aspirin:  [] Yes, [x] No: contraindication: thrombocytopenia   Post-Op Statin:  [x] Yes, [] No: contraindication:    Ambulation/Activity Status:    Assessment/Plan:  78y Female status-post resternotomy for [id number]               POD # 1   - Case and plan discussed with CTU Intensivist and CT Surgeon - Dr. Bah   - Continue CTU supportive care and ongoing plan of care as per continuing CTU rounds.   - Continue DVT/GI prophylaxis  - Incentive Spirometry 10 times an hour  - Continue to advance physical activity as tolerated and continue PT/OT as directed  1. CAD: Continue to hold ASA for thrombocytopenia, start statin and BB  2. A. Fib ppx: continue magnesium sulfate  IVPB 1 Gram(s) IV Intermittent every 12 hours   4. DM/Glucose Control: continue insulin gtt.    Social Service Disposition: TBD.

## 2018-09-12 NOTE — PROGRESS NOTE ADULT - ASSESSMENT
Assessment/Plan:  78y Female PO tresternotomy for [id number]               POD # 1   - Continue CTU supportive care and ongoing plan of care as per continuing CTU rounds.   - Continue DVT/GI prophylaxis  - Incentive Spirometry 10 times an hour  - Continue to advance physical activity as tolerated and continue PT/OT as directed    1. CAD: Continue to hold ASA for thrombocytopenia, start statin and BBlockers  2. A. Fib prophylaxis : continue magnesium sulfate  IVPB 1 Gram(s) IV Intermittent every 12 hours   4. DM/Glucose Control: continue insulin drip   5- Thrombocytopenia  moniter Plt daily  6- Anemia of acute blood loss  monitor cbc daily

## 2018-09-12 NOTE — DIETITIAN INITIAL EVALUATION ADULT. - OTHER INFO
RD assessment as LOS. Pt s/p CABG yesterday, with poor appetite/PO intake today. However prior to surgery, pt had good heather/PO intake, consumed 100% of documented meals. Pt has poor dentition, would benefit from mechanical soft diet. Reports  lbs- wt stable. NKFA. Provided education on heart healthy diet, however pt not feeling well, will reinforce as needed at f/u. +BM 9/11.

## 2018-09-12 NOTE — CONSULT NOTE ADULT - SUBJECTIVE AND OBJECTIVE BOX
HPI:  78 F w/ pmh of HTN, HLD, CAD s/p CABG, [place], Diverticulosis, Uterine Ca s/p SHAAN & Chemo presenting to the ED c/o Chest pain. The CP is on the left side, radiated to the L arm, was a/w nausea & lasted for 2 minutes & then resolved. She denies any sob, vomiting, diaphoresis, fevers, chills, syncope. Pt is asymptomatic in the ED. (05 Sep 2018 22:58)      PAST MEDICAL & SURGICAL HISTORY:  Left lower quadrant pain  Glaucoma  Cataract of right eye  High cholesterol  Hypertension  Uterine cancer  Athscl CABG, unsp, w angina pectoris w documented spasm  H/O total hysterectomy      Hospital Course:    TODAY'S SUBJECTIVE & REVIEW OF SYMPTOMS:     Constitutional WNL   Cardio WNL   Resp WNL   GI WNL  Heme WNL  Endo WNL  Skin WNL  MSK pain  Neuro WNL  Cognitive WNL  Psych WNL      MEDICATIONS  (STANDING):  ALBUTerol    90 MICROgram(s) HFA Inhaler 2 Puff(s) Inhalation every 6 hours  aspirin enteric coated 325 milliGRAM(s) Oral daily  chlorhexidine 0.12% Liquid 5 milliLiter(s) Swish and Spit every 4 hours  colchicine 0.6 milliGRAM(s) Oral every 12 hours  dexmedetomidine Infusion 0.5 MICROgram(s)/kG/Hr (9.075 mL/Hr) IV Continuous <Continuous>  dextrose 50% Injectable 50 milliLiter(s) IV Push every 15 minutes  dextrose 50% Injectable 25 milliLiter(s) IV Push every 15 minutes  docusate sodium 100 milliGRAM(s) Oral three times a day  famotidine Injectable 20 milliGRAM(s) IV Push every 12 hours  insulin Infusion 1 Unit(s)/Hr (1 mL/Hr) IV Continuous <Continuous>  ipratropium 17 MICROgram(s) HFA Inhaler 2 Puff(s) Inhalation every 6 hours  magnesium sulfate  IVPB 1 Gram(s) IV Intermittent every 12 hours  meperidine     Injectable 25 milliGRAM(s) IV Push once  metoprolol tartrate 25 milliGRAM(s) Oral two times a day  niCARdipine Infusion 5 mG/Hr (25 mL/Hr) IV Continuous <Continuous>  nitroglycerin  Infusion 15 MICROgram(s)/Min (4.5 mL/Hr) IV Continuous <Continuous>  norepinephrine Infusion 0.05 MICROgram(s)/kG/Min (6.806 mL/Hr) IV Continuous <Continuous>  phenylephrine    Infusion 0.1 MICROgram(s)/kG/Min (1.361 mL/Hr) IV Continuous <Continuous>  pregabalin 50 milliGRAM(s) Oral daily  propofol Infusion 25 MICROgram(s)/kG/Min (10.89 mL/Hr) IV Continuous <Continuous>  simvastatin 40 milliGRAM(s) Oral at bedtime  sodium chloride 0.9%. 1000 milliLiter(s) (20 mL/Hr) IV Continuous <Continuous>  sodium chloride 0.9%. 1000 milliLiter(s) (125 mL/Hr) IV Continuous <Continuous>  vasopressin Infusion 0.03 Unit(s)/Min (1.8 mL/Hr) IV Continuous <Continuous>    MEDICATIONS  (PRN):  ondansetron  IVPB 4 milliGRAM(s) IV Intermittent once PRN Nausea and/or Vomiting  oxyCODONE    IR 5 milliGRAM(s) Oral every 4 hours PRN Mild Pain (1 - 3)  oxyCODONE    IR 10 milliGRAM(s) Oral every 4 hours PRN Moderate Pain (4 - 6)  senna 1 Tablet(s) Oral every 12 hours PRN Constipation      FAMILY HISTORY:      Allergies    quinine (Urticaria)    Intolerances        SOCIAL HISTORY:    [  ] Etoh  [  ] Smoking  [  ] Substance abuse     Home Environment:  [  ] Home Alone  [x  ] Lives with Family  [  ] Home Health Aid    Dwelling:  [  ] Apartment  [x  ] Private House  [  ] Adult Home  [  ] Skilled Nursing Facility      [  ] Short Term  [  ] Long Term  [ x ] Stairs       Elevator [  ]    FUNCTIONAL STATUS PTA: (Check all that apply)  Ambulation: [ x  ]Independent    [  ] Dependent     [  ] Non-Ambulatory  Assistive Device: [  ] SA Cane  [  ]  Q Cane  [  ] Walker  [  ]  Wheelchair  ADL : [ x ] Independent  [  ]  Dependent       Vital Signs Last 24 Hrs  T(C): 37.8 (12 Sep 2018 10:00), Max: 37.9 (12 Sep 2018 08:00)  T(F): 100 (12 Sep 2018 10:00), Max: 100.2 (12 Sep 2018 08:00)  HR: 78 (12 Sep 2018 10:00) (76 - 82)  BP: 126/58 (12 Sep 2018 08:00) (94/53 - 126/58)  BP(mean): 65 (11 Sep 2018 18:30) (65 - 82)  RR: 22 (12 Sep 2018 10:00) (11 - 24)  SpO2: 98% (12 Sep 2018 10:00) (95% - 100%)      PHYSICAL EXAM: Alert & Oriented X3  GENERAL: NAD, well-groomed, well-developed  HEAD:  Atraumatic, Normocephalic  CHEST/LUNG: Clear   HEART: S1S2+  ABDOMEN: Soft, Nontender  EXTREMITIES: no calf tenderness    NERVOUS SYSTEM:  Cranial Nerves 2-12 intact [  ] Abnormal  [  ]  ROM: WFL all extremities [x  ]  Abnormal [  ]  Motor Strength: WFL all extremities  [x  ]  Abnormal [  ]  Sensation: intact to light touch [ x ] Abnormal [  ]  Reflexes: Symmetric [  ]  Abnormal [  ]    FUNCTIONAL STATUS:  Bed Mobility: Independent [  ]  Supervision [  ]  Needs Assistance [x  ]  N/A [  ]  Transfers: Independent [  ]  Supervision [  ]  Needs Assistance [x  ]  N/A [  ]   Ambulation: Independent [  ]  Supervision [  ]  Needs Assistance [  ]  N/A [  ]  ADL: Independent [  ] Requires Assistance [  ] N/A [  ]      LABS:                        9.8    7.40  )-----------( 64       ( 12 Sep 2018 01:50 )             29.4     09-12    141  |  107  |  15  ----------------------------<  116<H>  4.5   |  18  |  0.8    Ca    7.7<L>      12 Sep 2018 01:50  Mg     1.8     09-12    TPro  5.5<L>  /  Alb  4.1  /  TBili  0.6  /  DBili  x   /  AST  24  /  ALT  9   /  AlkPhos  33  09-12    PT/INR - ( 12 Sep 2018 01:50 )   PT: 13.80 sec;   INR: 1.28 ratio         PTT - ( 12 Sep 2018 01:50 )  PTT:43.6 sec  Urinalysis Basic - ( 10 Sep 2018 18:00 )    Color: Yellow / Appearance: Clear / SG: >=1.030 / pH: x  Gluc: x / Ketone: Negative  / Bili: Negative / Urobili: 1.0   Blood: x / Protein: Negative / Nitrite: Negative   Leuk Esterase: Negative / RBC: x / WBC x   Sq Epi: x / Non Sq Epi: x / Bacteria: x        RADIOLOGY & ADDITIONAL STUDIES:    Assesment:

## 2018-09-12 NOTE — DIETITIAN INITIAL EVALUATION ADULT. - NS AS NUTRI INTERV MEALS SNACK
Add [facility] soft diet w/ thins to current order. will provide PO supplement at f/u if good PO intake does not resume

## 2018-09-13 LAB
ANION GAP SERPL CALC-SCNC: 13 MMOL/L — SIGNIFICANT CHANGE UP (ref 7–14)
BASOPHILS # BLD AUTO: 0.01 K/UL — SIGNIFICANT CHANGE UP (ref 0–0.2)
BASOPHILS NFR BLD AUTO: 0.1 % — SIGNIFICANT CHANGE UP (ref 0–1)
BUN SERPL-MCNC: 22 MG/DL — HIGH (ref 10–20)
CALCIUM SERPL-MCNC: 8.2 MG/DL — LOW (ref 8.5–10.1)
CHLORIDE SERPL-SCNC: 103 MMOL/L — SIGNIFICANT CHANGE UP (ref 98–110)
CO2 SERPL-SCNC: 21 MMOL/L — SIGNIFICANT CHANGE UP (ref 17–32)
CREAT SERPL-MCNC: 1.1 MG/DL — SIGNIFICANT CHANGE UP (ref 0.7–1.5)
EOSINOPHIL # BLD AUTO: 0.01 K/UL — SIGNIFICANT CHANGE UP (ref 0–0.7)
EOSINOPHIL NFR BLD AUTO: 0.1 % — SIGNIFICANT CHANGE UP (ref 0–8)
GAS PNL BLDA: SIGNIFICANT CHANGE UP
GLUCOSE BLDC GLUCOMTR-MCNC: 107 MG/DL — HIGH (ref 70–99)
GLUCOSE BLDC GLUCOMTR-MCNC: 120 MG/DL — HIGH (ref 70–99)
GLUCOSE BLDC GLUCOMTR-MCNC: 127 MG/DL — HIGH (ref 70–99)
GLUCOSE BLDC GLUCOMTR-MCNC: 127 MG/DL — HIGH (ref 70–99)
GLUCOSE BLDC GLUCOMTR-MCNC: 135 MG/DL — HIGH (ref 70–99)
GLUCOSE BLDC GLUCOMTR-MCNC: 137 MG/DL — HIGH (ref 70–99)
GLUCOSE BLDC GLUCOMTR-MCNC: 93 MG/DL — SIGNIFICANT CHANGE UP (ref 70–99)
GLUCOSE BLDC GLUCOMTR-MCNC: 97 MG/DL — SIGNIFICANT CHANGE UP (ref 70–99)
GLUCOSE SERPL-MCNC: 125 MG/DL — HIGH (ref 70–99)
HCT VFR BLD CALC: 28.8 % — LOW (ref 37–47)
HGB BLD-MCNC: 9.6 G/DL — LOW (ref 12–16)
IMM GRANULOCYTES NFR BLD AUTO: 0.6 % — HIGH (ref 0.1–0.3)
LYMPHOCYTES # BLD AUTO: 1.18 K/UL — LOW (ref 1.2–3.4)
LYMPHOCYTES # BLD AUTO: 10.3 % — LOW (ref 20.5–51.1)
MAGNESIUM SERPL-MCNC: 3.1 MG/DL — CRITICAL HIGH (ref 1.8–2.4)
MCHC RBC-ENTMCNC: 27.8 PG — SIGNIFICANT CHANGE UP (ref 27–31)
MCHC RBC-ENTMCNC: 33.3 G/DL — SIGNIFICANT CHANGE UP (ref 32–37)
MCV RBC AUTO: 83.5 FL — SIGNIFICANT CHANGE UP (ref 81–99)
MONOCYTES # BLD AUTO: 1.75 K/UL — HIGH (ref 0.1–0.6)
MONOCYTES NFR BLD AUTO: 15.2 % — HIGH (ref 1.7–9.3)
NEUTROPHILS # BLD AUTO: 8.49 K/UL — HIGH (ref 1.4–6.5)
NEUTROPHILS NFR BLD AUTO: 73.7 % — SIGNIFICANT CHANGE UP (ref 42.2–75.2)
NRBC # BLD: 0 /100 WBCS — SIGNIFICANT CHANGE UP (ref 0–0)
PLATELET # BLD AUTO: 81 K/UL — LOW (ref 130–400)
POTASSIUM SERPL-MCNC: 4.8 MMOL/L — SIGNIFICANT CHANGE UP (ref 3.5–5)
POTASSIUM SERPL-SCNC: 4.8 MMOL/L — SIGNIFICANT CHANGE UP (ref 3.5–5)
RBC # BLD: 3.45 M/UL — LOW (ref 4.2–5.4)
RBC # FLD: 15.1 % — HIGH (ref 11.5–14.5)
SODIUM SERPL-SCNC: 137 MMOL/L — SIGNIFICANT CHANGE UP (ref 135–146)
TSH RECEP AB FLD-ACNC: <0.5 IU/L — SIGNIFICANT CHANGE UP (ref 0–1.75)
WBC # BLD: 11.51 K/UL — HIGH (ref 4.8–10.8)
WBC # FLD AUTO: 11.51 K/UL — HIGH (ref 4.8–10.8)

## 2018-09-13 RX ORDER — HEPARIN SODIUM 5000 [USP'U]/ML
5000 INJECTION INTRAVENOUS; SUBCUTANEOUS EVERY 8 HOURS
Qty: 0 | Refills: 0 | Status: DISCONTINUED | OUTPATIENT
Start: 2018-09-13 | End: 2018-09-17

## 2018-09-13 RX ORDER — IPRATROPIUM/ALBUTEROL SULFATE 18-103MCG
3 AEROSOL WITH ADAPTER (GRAM) INHALATION EVERY 6 HOURS
Qty: 0 | Refills: 0 | Status: DISCONTINUED | OUTPATIENT
Start: 2018-09-13 | End: 2018-09-17

## 2018-09-13 RX ADMIN — CHLORHEXIDINE GLUCONATE 1 APPLICATION(S): 213 SOLUTION TOPICAL at 21:49

## 2018-09-13 RX ADMIN — Medication 0.6 MILLIGRAM(S): at 05:46

## 2018-09-13 RX ADMIN — Medication 325 MILLIGRAM(S): at 11:56

## 2018-09-13 RX ADMIN — Medication 12.5 MILLIGRAM(S): at 18:16

## 2018-09-13 RX ADMIN — SIMVASTATIN 40 MILLIGRAM(S): 20 TABLET, FILM COATED ORAL at 21:49

## 2018-09-13 RX ADMIN — FAMOTIDINE 20 MILLIGRAM(S): 10 INJECTION INTRAVENOUS at 18:16

## 2018-09-13 RX ADMIN — FAMOTIDINE 20 MILLIGRAM(S): 10 INJECTION INTRAVENOUS at 05:46

## 2018-09-13 RX ADMIN — Medication 100 MILLIGRAM(S): at 21:49

## 2018-09-13 RX ADMIN — OXYCODONE HYDROCHLORIDE 10 MILLIGRAM(S): 5 TABLET ORAL at 02:00

## 2018-09-13 RX ADMIN — OXYCODONE HYDROCHLORIDE 10 MILLIGRAM(S): 5 TABLET ORAL at 03:00

## 2018-09-13 RX ADMIN — HEPARIN SODIUM 5000 UNIT(S): 5000 INJECTION INTRAVENOUS; SUBCUTANEOUS at 13:28

## 2018-09-13 RX ADMIN — Medication 3 MILLILITER(S): at 19:53

## 2018-09-13 RX ADMIN — Medication 0.6 MILLIGRAM(S): at 18:16

## 2018-09-13 RX ADMIN — Medication 100 MILLIGRAM(S): at 13:28

## 2018-09-13 RX ADMIN — HEPARIN SODIUM 5000 UNIT(S): 5000 INJECTION INTRAVENOUS; SUBCUTANEOUS at 21:49

## 2018-09-13 RX ADMIN — Medication 50 MILLIGRAM(S): at 11:56

## 2018-09-13 RX ADMIN — OXYCODONE HYDROCHLORIDE 10 MILLIGRAM(S): 5 TABLET ORAL at 13:28

## 2018-09-13 RX ADMIN — OXYCODONE HYDROCHLORIDE 10 MILLIGRAM(S): 5 TABLET ORAL at 14:00

## 2018-09-13 RX ADMIN — Medication 100 MILLIGRAM(S): at 05:46

## 2018-09-13 RX ADMIN — Medication 12.5 MILLIGRAM(S): at 05:46

## 2018-09-13 NOTE — PROGRESS NOTE ADULT - SUBJECTIVE AND OBJECTIVE BOX
OPERATIVE PROCEDURE(s):                POD #                       SURGEON(s): CASEY Bah  SUBJECTIVE ASSESSMENT: 78y Female patient seen and examined at bedside.    Vital Signs Last 24 Hrs  T(F): 98.7 (13 Sep 2018 08:00), Max: 100 (12 Sep 2018 10:00)  HR: 72 (13 Sep 2018 09:00) (60 - 78)  BP: 99/51 (13 Sep 2018 08:00) (85/49 - 114/54)  BP(mean): 63 (13 Sep 2018 08:00) (54 - 77)  ABP: 112/44 (13 Sep 2018 09:00) (84/38 - 136/60)  ABP(mean): 66 (13 Sep 2018 09:00)  RR: 28 (13 Sep 2018 09:00) (9 - 28)  SpO2: 93% (13 Sep 2018 09:00) (92% - 100%)    I&O's Detail    12 Sep 2018 07:01  -  13 Sep 2018 07:00  --------------------------------------------------------  IN:    insulin Infusion: 8 mL    IV PiggyBack: 200 mL    Oral Fluid: 240 mL    sodium chloride 0.9%: 100 mL  Total IN: 548 mL    OUT:    Chest Tube: 230 mL    Chest Tube: 138 mL    Indwelling Catheter - Urethral: 174 mL    Voided: 200 mL  Total OUT: 742 mL    Net: I&O's Detail    11 Sep 2018 07:01  -  12 Sep 2018 07:00  --------------------------------------------------------  Total NET: -115.5 mL    12 Sep 2018 07:01  -  13 Sep 2018 07:00  --------------------------------------------------------  Total NET: -194 mL      CAPILLARY BLOOD GLUCOSE  127 (13 Sep 2018 07:00)  154 (12 Sep 2018 23:00)  135 (12 Sep 2018 16:00)  97 (12 Sep 2018 11:00)  97 (12 Sep 2018 10:43)  96 (12 Sep 2018 10:00)  120 (12 Sep 2018 09:00)  124 (12 Sep 2018 08:00)  127 (12 Sep 2018 05:00)      POCT Blood Glucose.: 127 mg/dL (13 Sep 2018 06:37)  POCT Blood Glucose.: 154 mg/dL (12 Sep 2018 23:11)      Physical Exam:  General: NAD; A&Ox3  Cardiac: S1/S2, RRR, no murmur, no rubs  Lungs: unlabored respirations, CTA b/l, no wheeze, no rales, no crackles  Abdomen: Soft/NT/ND; positive bowel sounds x 4  Sternum: Intact, no click, incision healing well with no drainage  Incisions: Incisions clean/dry/intact  Extremities: No edema b/l lower extremities; good capillary refill; no cyanosis; palpable 1+ pedal pulses b/l    Central Venous Catheter: Yes[x]  No[] , If Yes indication:                    Day #  Rowley Catheter: Yes  [] , No  [x] , If yes indication:                                 Day #  NGT: Yes [] No [x] ,    If Yes Placement:                                                   Day #  EPICARDIAL WIRES:  [x] YES [] NO                                                            Day #  BOWEL MOVEMENT:  [] YES [x] NO, If No, Timing since last BM Day #  CHEST TUBE(Left/Right):  [x] YES [] NO: R Chest Tube: 230 mL/ L Chest Tube: 138 mL, If yes -  AIR LEAKS:  [] YES [] NO        LABS:                        9.6<L>  11.51<H> )-----------( 81<L>    ( 13 Sep 2018 01:52 )             28.8<L>                        9.8<L>  7.40  )-----------( 64<L>    ( 12 Sep 2018 01:50 )             29.4<L>    09-13    137  |  103  |  22<H>  ----------------------------<  125<H>  4.8   |  21  |  1.1  09-12    141  |  107  |  15  ----------------------------<  116<H>  4.5   |  18  |  0.8    Ca    8.2<L>      13 Sep 2018 01:52  Mg     3.1     09-13    TPro  5.5<L> [6.0 - 8.0]  /  Alb  4.1 [3.5 - 5.2]  /  TBili  0.6 [0.2 - 1.2]  /  DBili  x   /  AST  24 [0 - 41]  /  ALT  9 [0 - 41]  /  AlkPhos  33 [30 - 115]  09-12    PT/INR - ( 12 Sep 2018 01:50 )   PT: ;   INR: 1.28 ratio       PT/INR - ( 11 Sep 2018 15:25 )   PT: ;   INR: 1.56 ratio       PTT - ( 12 Sep 2018 01:50 )  PTT:43.6 sec, PTT - ( 11 Sep 2018 15:25 )  PTT:27.8 sec    ABG - ( 13 Sep 2018 04:50 )  pH: 7.35  /  pCO2: 38    /  pO2: 73    / HCO3: 21    / Base Excess: -4.1  /  SaO2: 95    /  LA: 0.8        RADIOLOGY & ADDITIONAL TESTS:  CXR:   EKG:    Allergies  quinine (Urticaria)  Intolerances      MEDICATIONS  (STANDING):  ALBUTerol    90 MICROgram(s) HFA Inhaler 2 Puff(s) Inhalation every 6 hours  aspirin enteric coated 325 milliGRAM(s) Oral daily  chlorhexidine 4% Liquid 1 Application(s) Topical <User Schedule>  colchicine 0.6 milliGRAM(s) Oral every 12 hours  dextrose 50% Injectable 50 milliLiter(s) IV Push every 15 minutes  dextrose 50% Injectable 25 milliLiter(s) IV Push every 15 minutes  docusate sodium 100 milliGRAM(s) Oral three times a day  famotidine    Tablet 20 milliGRAM(s) Oral two times a day  insulin lispro (HumaLOG) corrective regimen sliding scale   SubCutaneous three times a day before meals  ipratropium 17 MICROgram(s) HFA Inhaler 2 Puff(s) Inhalation every 6 hours  magnesium sulfate  IVPB 1 Gram(s) IV Intermittent every 12 hours  metoprolol tartrate 12.5 milliGRAM(s) Oral two times a day  pregabalin 50 milliGRAM(s) Oral daily  simvastatin 40 milliGRAM(s) Oral at bedtime  sodium chloride 0.9%. 1000 milliLiter(s) (20 mL/Hr) IV Continuous <Continuous>    MEDICATIONS  (PRN):  ondansetron  IVPB 4 milliGRAM(s) IV Intermittent once PRN Nausea and/or Vomiting  oxyCODONE    IR 5 milliGRAM(s) Oral every 4 hours PRN Mild Pain (1 - 3)  oxyCODONE    IR 10 milliGRAM(s) Oral every 4 hours PRN Moderate Pain (4 - 6)  senna 1 Tablet(s) Oral every 12 hours PRN Constipation      Pharmacologic DVT Prophylaxis: [] YES, []NO: Contraindication:   [] HEPARIN: Dose: XX mg  Q24H    [] LOVENOX: Dose: XX mg  Q24H                 SCD's: YES b/l    GI Prophylaxis: Protonix [], Pepcid []    Post-Op Beta-Blockers: []Yes, []No: contraindication:  Post-Op Nitrate: []Yes, []No: contraindication:  Post-Op Aspirin: []Yes,  []No: contraindication:  Post-Op Statin: []Yes, []No: contraindication:      Ambulation/Activity Status:    Assessment/Plan:  78y Female status-post  - Case and plan discussed with CTU Intensivist and CT Surgeon - Dr. Padilla/Olvin/José Antonio   - Continue CTU supportive care and ongoing plan of care as per continuing CTU rounds.    - Continue DVT/GI prophylaxis  - Incentive Spirometry 10 times an hour  - Continue to advance physical activity as tolerated and continue PT/OT as directed  1. CAD: Continue ASA, statin, BB  2. HTN:   3. A. Fib:   4. COPD/Hypoxia:   5. DM/Glucose Control:     Social Service Disposition: OPERATIVE PROCEDURE(s):  s/p resternotomy for [id number]               POD # 2                      SURGEON(s): CASEY Bah  SUBJECTIVE ASSESSMENT: 78y Female patient seen and examined at bedside. Patient denies any acute complaints at this time.    Vital Signs Last 24 Hrs  T(F): 98.7 (13 Sep 2018 08:00), Max: 100 (12 Sep 2018 10:00)  HR: 72 (13 Sep 2018 09:00) (60 - 78)  BP: 99/51 (13 Sep 2018 08:00) (85/49 - 114/54)  BP(mean): 63 (13 Sep 2018 08:00) (54 - 77)  ABP: 112/44 (13 Sep 2018 09:00) (84/38 - 136/60)  ABP(mean): 66 (13 Sep 2018 09:00)  RR: 28 (13 Sep 2018 09:00) (9 - 28)  SpO2: 93% (13 Sep 2018 09:00) (92% - 100%)    I&O's Detail    12 Sep 2018 07:01  -  13 Sep 2018 07:00  --------------------------------------------------------  IN:    insulin Infusion: 8 mL    IV PiggyBack: 200 mL    Oral Fluid: 240 mL    sodium chloride 0.9%: 100 mL  Total IN: 548 mL    OUT:    Chest Tube: 230 mL    Chest Tube: 138 mL    Indwelling Catheter - Urethral: 174 mL    Voided: 200 mL  Total OUT: 742 mL    Net: I&O's Detail    11 Sep 2018 07:01  -  12 Sep 2018 07:00  --------------------------------------------------------  Total NET: -115.5 mL    12 Sep 2018 07:01  -  13 Sep 2018 07:00  --------------------------------------------------------  Total NET: -194 mL      CAPILLARY BLOOD GLUCOSE  127 (13 Sep 2018 07:00)  154 (12 Sep 2018 23:00)  135 (12 Sep 2018 16:00)  97 (12 Sep 2018 11:00)  97 (12 Sep 2018 10:43)  96 (12 Sep 2018 10:00)  120 (12 Sep 2018 09:00)  124 (12 Sep 2018 08:00)  127 (12 Sep 2018 05:00)      POCT Blood Glucose.: 127 mg/dL (13 Sep 2018 06:37)  POCT Blood Glucose.: 154 mg/dL (12 Sep 2018 23:11)      Physical Exam:  General: NAD; A&Ox3  Cardiac: S1/S2, RRR, no murmur, no rubs  Lungs: unlabored respirations, CTA b/l, no wheeze, no rales, no crackles  Abdomen: Soft/NT/ND; positive bowel sounds x 4  Sternum: Intact, no click, incision healing well with no drainage  Incisions: Incisions clean/dry/intact  Extremities: + trace edema b/l lower extremities; good capillary refill; no cyanosis; palpable 1+ pedal pulses b/l    Central Venous Catheter: Yes[x]  No[] , If Yes indication: IV Access      Day # 2  Rowley Catheter: Yes  [] , No  [x] , If yes indication:                               Day #  NGT: Yes [] No [x] ,    If Yes Placement:                                             Day #  EPICARDIAL WIRES:  [x] YES [] NO                                                     Day # 2  BOWEL MOVEMENT:  [] YES [x] NO, If No, Timing since last BM Day #  CHEST TUBE(Left/Right):  [x] YES [] NO: R Chest Tube: 230 mL/ L Chest Tube: 138 mL, If yes -  AIR LEAKS:  [] YES [x] NO        LABS:                        9.6<L>  11.51<H> )-----------( 81<L>    ( 13 Sep 2018 01:52 )             28.8<L>                        9.8<L>  7.40  )-----------( 64<L>    ( 12 Sep 2018 01:50 )             29.4<L>    09-13    137  |  103  |  22<H>  ----------------------------<  125<H>  4.8   |  21  |  1.1  09-12    141  |  107  |  15  ----------------------------<  116<H>  4.5   |  18  |  0.8    Ca    8.2<L>      13 Sep 2018 01:52  Mg     3.1     09-13    TPro  5.5<L> [6.0 - 8.0]  /  Alb  4.1 [3.5 - 5.2]  /  TBili  0.6 [0.2 - 1.2]  /  DBili  x   /  AST  24 [0 - 41]  /  ALT  9 [0 - 41]  /  AlkPhos  33 [30 - 115]  09-12    PT/INR - ( 12 Sep 2018 01:50 )   PT: ;   INR: 1.28 ratio       PT/INR - ( 11 Sep 2018 15:25 )   PT: ;   INR: 1.56 ratio       PTT - ( 12 Sep 2018 01:50 )  PTT:43.6 sec, PTT - ( 11 Sep 2018 15:25 )  PTT:27.8 sec    ABG - ( 13 Sep 2018 04:50 )  pH: 7.35  /  pCO2: 38    /  pO2: 73    / HCO3: 21    / Base Excess: -4.1  /  SaO2: 95    /  LA: 0.8        RADIOLOGY & ADDITIONAL TESTS:  CXR: < from: Xray Chest 1 View-PORTABLE IMMEDIATE (09.13.18 @ 12:20) >  INTERPRETATION:  Clinical History / Reason for exam: Shortness of breath.  Comparison : Chest radiograph September 13, 2018 at 4:53 AM.  Technique/Positioning: Single AP view.  Findings:  Support devices: Left IJ sheath, unchanged. Telemetry leads overlie chest.  Cardiac/mediastinum/hilum: Post sternotomy and CABG, unchanged.  Lung parenchyma/Pleura: Low lung volumes with bibasilar opacities, unchanged. No pneumothorax.  Skeleton/soft tissues: Unchanged.  Impression:    Low lung volumes and bibasilar opacities, unchanged.  < end of copied text >    EKG: < from: 12 Lead ECG (09.13.18 @ 08:24) >  Ventricular Rate 70 BPM  Atrial Rate 70 BPM  P-R Interval 126 ms  QRS Duration 80 ms  Q-T Interval 354 ms  QTC Calculation(Bezet) 382 ms  P Axis -10 degrees  R Axis -14 degrees  T Axis 7 degrees  Diagnosis Line Normal sinus rhythm  Minimal voltage criteria for LVH, may be normal variant  ST elevation, consider early repolarization, pericarditis, or injury  Borderline ECG  Confirmed by Kirill Rodríguez ([id number]) on 9/13/2018 9:23:15 AM  < end of copied text >      Allergies  quinine (Urticaria)  Intolerances      MEDICATIONS  (STANDING):  ALBUTerol    90 MICROgram(s) HFA Inhaler 2 Puff(s) Inhalation every 6 hours  aspirin enteric coated 325 milliGRAM(s) Oral daily  chlorhexidine 4% Liquid 1 Application(s) Topical <User Schedule>  colchicine 0.6 milliGRAM(s) Oral every 12 hours  dextrose 50% Injectable 50 milliLiter(s) IV Push every 15 minutes  dextrose 50% Injectable 25 milliLiter(s) IV Push every 15 minutes  docusate sodium 100 milliGRAM(s) Oral three times a day  famotidine    Tablet 20 milliGRAM(s) Oral two times a day  insulin lispro (HumaLOG) corrective regimen sliding scale   SubCutaneous three times a day before meals  ipratropium 17 MICROgram(s) HFA Inhaler 2 Puff(s) Inhalation every 6 hours  magnesium sulfate  IVPB 1 Gram(s) IV Intermittent every 12 hours  metoprolol tartrate 12.5 milliGRAM(s) Oral two times a day  pregabalin 50 milliGRAM(s) Oral daily  simvastatin 40 milliGRAM(s) Oral at bedtime  sodium chloride 0.9%. 1000 milliLiter(s) (20 mL/Hr) IV Continuous <Continuous>    MEDICATIONS  (PRN):  ondansetron  IVPB 4 milliGRAM(s) IV Intermittent once PRN Nausea and/or Vomiting  oxyCODONE    IR 5 milliGRAM(s) Oral every 4 hours PRN Mild Pain (1 - 3)  oxyCODONE    IR 10 milliGRAM(s) Oral every 4 hours PRN Moderate Pain (4 - 6)  senna 1 Tablet(s) Oral every 12 hours PRN Constipation      Pharmacologic DVT Prophylaxis: [x] YES, []NO: Contraindication:   [x] HEPARIN subq  [] LOVENOX: Dose: XX mg  Q24H                 SCD's: YES b/l    GI Prophylaxis: famotidine    Tablet 20 milliGRAM(s) Oral two times a day    Post-Op Beta-Blockers: [x]Yes, []No: contraindication:  Post-Op Aspirin: [x]Yes,  []No: contraindication:  Post-Op Statin: [x]Yes, []No: contraindication:      Ambulation/Activity Status: OOB/AMB    Assessment/Plan:  78y Female status-post resternotomy for [id number]               POD # 2   - Case and plan discussed with CTU Intensivist and CT Surgeon - Dr. Bah   - Continue CTU supportive care and ongoing plan of care as per continuing CTU rounds.   - Continue DVT/GI prophylaxis  - Incentive Spirometry 10 times an hour  - Continue to advance physical activity as tolerated and continue PT/OT as directed  1. CAD: Continue ASA, statin and BB  2. A. Fib ppx: continue magnesium sulfate  IVPB 1 Gram(s) IV Intermittent every 12 hours   4. DM/Glucose Control: continue RSS.    Social Service Disposition: TBD.

## 2018-09-13 NOTE — PROGRESS NOTE ADULT - ASSESSMENT
CTS ATTENDING    POD#2, S/P REOP CABGx2  AWAKE AND ALERT  TUBES AND LINES REMOVED  AMBULATED  GOOD COUGH EFFORT  GOOD U/O  PROGRESSING WELL, DAY#2

## 2018-09-13 NOTE — PROGRESS NOTE ADULT - SUBJECTIVE AND OBJECTIVE BOX
CTU Attending Progress Daily Note     13 Sep 2018 09:15  POD#        2   Procedure:  CABG    Patient seen as post-op critical care follow-up    HPI:  78 F w/ pmh of HTN, HLD, CAD s/p CABG, Gluacoma, Diverticulosis, Uterine Ca s/p SHAAN & Chemo presenting to the ED c/o Chest pain. The CP is on the left side, radiated to the L arm, was a/w nausea & lasted for 2 minutes & then resolved. She denies any sob, vomiting, diaphoresis, fevers, chills, syncope. Pt is asymptomatic in the ED. (05 Sep 2018 22:58)      Interval event for past 24 hr:  BEKAH NELSON  78y had no event.     Current Complains:  BEKAH NELSON has no new complaints    REVIEW OF SYSTEMS:  CONSTITUTIONAL:  [-] weakness, [-] fevers, [-] chills  EYES/ENT: [-] visual changes, [-] vertigo, [-] throat pain   NECK: [-] pain, [-] stiffness  RESPIRATORY: [-] cough, [-] wheezing, [-] hemoptysis, [-] shortness of breath  CARDIOVASCULAR: [-] chest pain, [-] palpitations, [-] orthopnea  GASTROINTESTINAL:    [-]abdominal pain, [-] nausea, [-] vomiting, [-] hematemesis, [-] diarrhea, [-] constipation, [-] melena, [-] hematochezia.  GENITOURINARY: [-] dysuria, [-] frequency, [-] hematuria  NEUROLOGICAL: [-] numbness, [-] weakness  SKIN: [-] itching, [-] burning, [-] rashes, [-] lesions   All other review of systems is negative unless indicated above.    [  ] Unable to assess ROS because :    OBJECTIVE:  ICU Vital Signs Last 24 Hrs  T(C): 37.1 (13 Sep 2018 08:00), Max: 37.8 (12 Sep 2018 10:00)  T(F): 98.7 (13 Sep 2018 08:00), Max: 100 (12 Sep 2018 10:00)  HR: 72 (13 Sep 2018 09:00) (60 - 78)  BP: 99/51 (13 Sep 2018 08:00) (85/49 - 114/54)  BP(mean): 63 (13 Sep 2018 08:00) (54 - 77)  ABP: 112/44 (13 Sep 2018 09:00) (84/38 - 136/60)  ABP(mean): 66 (13 Sep 2018 09:00) (52 - 84)  RR: 28 (13 Sep 2018 09:00) (9 - 28)  SpO2: 93% (13 Sep 2018 09:00) (92% - 100%)      I&O's Summary    12 Sep 2018 07:01  -  13 Sep 2018 07:00  --------------------------------------------------------  IN: 548 mL / OUT: 742 mL / NET: -194 mL    13 Sep 2018 07:01  -  13 Sep 2018 09:15  --------------------------------------------------------  IN: 240 mL / OUT: 34 mL / NET: 206 mL      Adult Advanced Hemodynamics Last 24 Hrs  CVP(mm Hg): --  CVP(cm H2O): --  CO: --  CI: --  PA: --  PA(mean): --  PCWP: --  SVR: --  SVRI: --  PVR: --  PVRI: --      PHYSICAL EXAM:  General: WN/WD NAD    HEENT:     [+] NCAT  [+] EOMI  [-] Conjuctival edema   [-] Icterus   [-] Thrush   [-] ETT  [-] NGT/OGT    Neck:         [+] FROM   [-] JVD     [-] Nodes     [-] Masses    [+] Mid-line trachea    [-] Tracheostomy    Chest:         [-] Sternal click   [-] Sternal drainage   [+] Pacing wires   [+] Chest tubes   [-] SubQ emphysema    Lungs:          [+] CTA   [-] Rhonchi   [-] Rales    [-] Wheezing    [-] Decreased BS    [-] Dullness R L    Cardiac:       [+] S1 [+] S2    [+] RRR   [-] Irregular   [-] S3   [-] S4    [-] Murmurs    [-] Rub    Abdomen:    [+] BS    [+] Soft    [+] Non-tender     [-] Distended    [-] Organomegaly  [-] PEG    Extremities:   [-] Cyanosis U/L   [-] Clubbing  U/L  [-] LE/UE Edema   [+] Capillary refill    [+] Pulses     Neuro:        [+] Awake   [+]  Alert   [-] Confused   [-] Lethargic   [-] Sedated   [-] Generalized Weakness    Skin:        [-] Rashes    [-] Erythema   [+] Normal incisions   [+] IV sites intact   [-] Sacral decubitus    Tubes: pleural  LINES: central    CAPILLARY BLOOD GLUCOSE  127 (13 Sep 2018 07:00)      POCT Blood Glucose.: 127 mg/dL (13 Sep 2018 06:37)    CAPILLARY BLOOD GLUCOSE  127 (13 Sep 2018 07:00)  154 (12 Sep 2018 23:00)  135 (12 Sep 2018 16:00)  97 (12 Sep 2018 11:00)  97 (12 Sep 2018 10:43)  96 (12 Sep 2018 10:00)      POCT Blood Glucose.: 127 mg/dL (13 Sep 2018 06:37)  POCT Blood Glucose.: 154 mg/dL (12 Sep 2018 23:11)      HOSPITAL MEDICATIONS:  MEDICATIONS  (STANDING):  ALBUTerol    90 MICROgram(s) HFA Inhaler 2 Puff(s) Inhalation every 6 hours  aspirin enteric coated 325 milliGRAM(s) Oral daily  chlorhexidine 4% Liquid 1 Application(s) Topical <User Schedule>  colchicine 0.6 milliGRAM(s) Oral every 12 hours  dextrose 50% Injectable 50 milliLiter(s) IV Push every 15 minutes  dextrose 50% Injectable 25 milliLiter(s) IV Push every 15 minutes  docusate sodium 100 milliGRAM(s) Oral three times a day  famotidine    Tablet 20 milliGRAM(s) Oral two times a day  insulin lispro (HumaLOG) corrective regimen sliding scale   SubCutaneous three times a day before meals  ipratropium 17 MICROgram(s) HFA Inhaler 2 Puff(s) Inhalation every 6 hours  magnesium sulfate  IVPB 1 Gram(s) IV Intermittent every 12 hours  metoprolol tartrate 12.5 milliGRAM(s) Oral two times a day  pregabalin 50 milliGRAM(s) Oral daily  simvastatin 40 milliGRAM(s) Oral at bedtime  sodium chloride 0.9%. 1000 milliLiter(s) (20 mL/Hr) IV Continuous <Continuous>    MEDICATIONS  (PRN):  ondansetron  IVPB 4 milliGRAM(s) IV Intermittent once PRN Nausea and/or Vomiting  oxyCODONE    IR 5 milliGRAM(s) Oral every 4 hours PRN Mild Pain (1 - 3)  oxyCODONE    IR 10 milliGRAM(s) Oral every 4 hours PRN Moderate Pain (4 - 6)  senna 1 Tablet(s) Oral every 12 hours PRN Constipation      LABS:  ABG - ( 13 Sep 2018 04:50 )  pH, Arterial: 7.35  pH, Blood: x     /  pCO2: 38    /  pO2: 73    / HCO3: 21    / Base Excess: -4.1  /  SaO2: 95                                      9.6    11.51 )-----------( 81       ( 13 Sep 2018 01:52 )             28.8     09-13    137  |  103  |  22<H>  ----------------------------<  125<H>  4.8   |  21  |  1.1    Ca    8.2<L>      13 Sep 2018 01:52  Mg     3.1     09-13    TPro  5.5<L>  /  Alb  4.1  /  TBili  0.6  /  DBili  x   /  AST  24  /  ALT  9   /  AlkPhos  33  09-12    PT/INR - ( 12 Sep 2018 01:50 )   PT: 13.80 sec;   INR: 1.28 ratio         PTT - ( 12 Sep 2018 01:50 )  PTT:43.6 sec        RADIOLOGY:  Reviewed and interpreted by me  CXR from 09-13-18 shows [+] mild congestion, [-] pneumothorax, [-] R/L effusion, [-] cardiomegaly,   NGT in place, S-G Catheter in place, R/L TLC in place, R/L Chest Tubes in place    ECG:  Reviewed and interpreted by me: Acute pericarditis      Assessment:  CAD SP CABG  Acute pericarditis  Acute blood loss anemia    PLAN:  Neuro: Pain control  Pulm: Encourage coughing, deep breathing and use of incentive spirometry. Wean off supplemental oxygen as able. Daily CXR.   Cardio: Monitor telemetry/alarms. Continue cardiac meds  GI: Tolerating diet. Continue stool softeners. Continue GI prophylaxis  Renal: monitor urine output, supplement electrolytes as needed  Vasc: Heparin SC/SCDs for DVT prophylaxis  Heme: Monitor H/H.   ID: Off antibiotics. Stable.  Endocrine: Monitor finger stick blood sugar  Physical Therapy: OOB/ambulate  Tubes: Monitor Chest tube output      Discussed with Cardiothoracic Team at AM rounds.

## 2018-09-14 LAB
ANION GAP SERPL CALC-SCNC: 13 MMOL/L — SIGNIFICANT CHANGE UP (ref 7–14)
BASOPHILS # BLD AUTO: 0.02 K/UL — SIGNIFICANT CHANGE UP (ref 0–0.2)
BASOPHILS NFR BLD AUTO: 0.2 % — SIGNIFICANT CHANGE UP (ref 0–1)
BUN SERPL-MCNC: 25 MG/DL — HIGH (ref 10–20)
CALCIUM SERPL-MCNC: 8.4 MG/DL — LOW (ref 8.5–10.1)
CHLORIDE SERPL-SCNC: 101 MMOL/L — SIGNIFICANT CHANGE UP (ref 98–110)
CO2 SERPL-SCNC: 22 MMOL/L — SIGNIFICANT CHANGE UP (ref 17–32)
CREAT SERPL-MCNC: 1 MG/DL — SIGNIFICANT CHANGE UP (ref 0.7–1.5)
EOSINOPHIL # BLD AUTO: 0.03 K/UL — SIGNIFICANT CHANGE UP (ref 0–0.7)
EOSINOPHIL NFR BLD AUTO: 0.3 % — SIGNIFICANT CHANGE UP (ref 0–8)
GLUCOSE BLDC GLUCOMTR-MCNC: 102 MG/DL — HIGH (ref 70–99)
GLUCOSE BLDC GLUCOMTR-MCNC: 80 MG/DL — SIGNIFICANT CHANGE UP (ref 70–99)
GLUCOSE SERPL-MCNC: 108 MG/DL — HIGH (ref 70–99)
HCT VFR BLD CALC: 27.9 % — LOW (ref 37–47)
HGB BLD-MCNC: 9.3 G/DL — LOW (ref 12–16)
IMM GRANULOCYTES NFR BLD AUTO: 0.7 % — HIGH (ref 0.1–0.3)
LYMPHOCYTES # BLD AUTO: 1.88 K/UL — SIGNIFICANT CHANGE UP (ref 1.2–3.4)
LYMPHOCYTES # BLD AUTO: 18.4 % — LOW (ref 20.5–51.1)
MAGNESIUM SERPL-MCNC: 2.7 MG/DL — HIGH (ref 1.8–2.4)
MCHC RBC-ENTMCNC: 27.7 PG — SIGNIFICANT CHANGE UP (ref 27–31)
MCHC RBC-ENTMCNC: 33.3 G/DL — SIGNIFICANT CHANGE UP (ref 32–37)
MCV RBC AUTO: 83 FL — SIGNIFICANT CHANGE UP (ref 81–99)
MONOCYTES # BLD AUTO: 1.34 K/UL — HIGH (ref 0.1–0.6)
MONOCYTES NFR BLD AUTO: 13.1 % — HIGH (ref 1.7–9.3)
NEUTROPHILS # BLD AUTO: 6.86 K/UL — HIGH (ref 1.4–6.5)
NEUTROPHILS NFR BLD AUTO: 67.3 % — SIGNIFICANT CHANGE UP (ref 42.2–75.2)
NRBC # BLD: 0 /100 WBCS — SIGNIFICANT CHANGE UP (ref 0–0)
PLATELET # BLD AUTO: 89 K/UL — LOW (ref 130–400)
POTASSIUM SERPL-MCNC: 4.4 MMOL/L — SIGNIFICANT CHANGE UP (ref 3.5–5)
POTASSIUM SERPL-SCNC: 4.4 MMOL/L — SIGNIFICANT CHANGE UP (ref 3.5–5)
RBC # BLD: 3.36 M/UL — LOW (ref 4.2–5.4)
RBC # FLD: 15.3 % — HIGH (ref 11.5–14.5)
SODIUM SERPL-SCNC: 136 MMOL/L — SIGNIFICANT CHANGE UP (ref 135–146)
WBC # BLD: 10.2 K/UL — SIGNIFICANT CHANGE UP (ref 4.8–10.8)
WBC # FLD AUTO: 10.2 K/UL — SIGNIFICANT CHANGE UP (ref 4.8–10.8)

## 2018-09-14 RX ORDER — BRIMONIDINE TARTRATE 2 MG/MG
1 SOLUTION/ DROPS OPHTHALMIC EVERY 12 HOURS
Qty: 0 | Refills: 0 | Status: DISCONTINUED | OUTPATIENT
Start: 2018-09-14 | End: 2018-09-17

## 2018-09-14 RX ORDER — LATANOPROST 0.05 MG/ML
1 SOLUTION/ DROPS OPHTHALMIC; TOPICAL
Qty: 0 | Refills: 0 | Status: DISCONTINUED | OUTPATIENT
Start: 2018-09-14 | End: 2018-09-17

## 2018-09-14 RX ORDER — MAGNESIUM HYDROXIDE 400 MG/1
30 TABLET, CHEWABLE ORAL DAILY
Qty: 0 | Refills: 0 | Status: DISCONTINUED | OUTPATIENT
Start: 2018-09-14 | End: 2018-09-17

## 2018-09-14 RX ORDER — DORZOLAMIDE HYDROCHLORIDE 20 MG/ML
1 SOLUTION/ DROPS OPHTHALMIC
Qty: 0 | Refills: 0 | Status: DISCONTINUED | OUTPATIENT
Start: 2018-09-14 | End: 2018-09-17

## 2018-09-14 RX ADMIN — Medication 50 MILLIGRAM(S): at 13:09

## 2018-09-14 RX ADMIN — FAMOTIDINE 20 MILLIGRAM(S): 10 INJECTION INTRAVENOUS at 05:49

## 2018-09-14 RX ADMIN — DORZOLAMIDE HYDROCHLORIDE 1 DROP(S): 20 SOLUTION/ DROPS OPHTHALMIC at 17:32

## 2018-09-14 RX ADMIN — Medication 100 MILLIGRAM(S): at 21:37

## 2018-09-14 RX ADMIN — Medication 3 MILLILITER(S): at 01:11

## 2018-09-14 RX ADMIN — Medication 100 MILLIGRAM(S): at 05:49

## 2018-09-14 RX ADMIN — MAGNESIUM HYDROXIDE 30 MILLILITER(S): 400 TABLET, CHEWABLE ORAL at 13:03

## 2018-09-14 RX ADMIN — Medication 325 MILLIGRAM(S): at 13:05

## 2018-09-14 RX ADMIN — Medication 100 MILLIGRAM(S): at 13:07

## 2018-09-14 RX ADMIN — HEPARIN SODIUM 5000 UNIT(S): 5000 INJECTION INTRAVENOUS; SUBCUTANEOUS at 05:49

## 2018-09-14 RX ADMIN — Medication 3 MILLILITER(S): at 07:58

## 2018-09-14 RX ADMIN — Medication 100 GRAM(S): at 05:48

## 2018-09-14 RX ADMIN — FAMOTIDINE 20 MILLIGRAM(S): 10 INJECTION INTRAVENOUS at 17:31

## 2018-09-14 RX ADMIN — SIMVASTATIN 40 MILLIGRAM(S): 20 TABLET, FILM COATED ORAL at 21:37

## 2018-09-14 RX ADMIN — Medication 12.5 MILLIGRAM(S): at 17:31

## 2018-09-14 RX ADMIN — LATANOPROST 1 DROP(S): 0.05 SOLUTION/ DROPS OPHTHALMIC; TOPICAL at 17:32

## 2018-09-14 RX ADMIN — Medication 3 MILLILITER(S): at 20:01

## 2018-09-14 RX ADMIN — HEPARIN SODIUM 5000 UNIT(S): 5000 INJECTION INTRAVENOUS; SUBCUTANEOUS at 13:07

## 2018-09-14 RX ADMIN — Medication 100 GRAM(S): at 17:33

## 2018-09-14 RX ADMIN — Medication 0.6 MILLIGRAM(S): at 17:31

## 2018-09-14 RX ADMIN — SENNA PLUS 1 TABLET(S): 8.6 TABLET ORAL at 13:08

## 2018-09-14 RX ADMIN — HEPARIN SODIUM 5000 UNIT(S): 5000 INJECTION INTRAVENOUS; SUBCUTANEOUS at 21:37

## 2018-09-14 RX ADMIN — BRIMONIDINE TARTRATE 1 DROP(S): 2 SOLUTION/ DROPS OPHTHALMIC at 17:32

## 2018-09-14 RX ADMIN — Medication 12.5 MILLIGRAM(S): at 05:49

## 2018-09-14 RX ADMIN — Medication 0.6 MILLIGRAM(S): at 05:49

## 2018-09-14 NOTE — PROGRESS NOTE ADULT - ASSESSMENT
CTS ATTENDING    POD#3 AFTER REOP CABG  AFEBRILE  AWAKE AND ALERT  TOLERATING DIET  AMBULATED  NEEDS TO COUGH AND MOBILIZE FURTHER  PLATELET COUNT RISING, NOW 89K    I ANTICIPATE PATIENT WILL BE DISCHARGE HOME APPROXIMATELY MONDAY.

## 2018-09-14 NOTE — PROGRESS NOTE ADULT - SUBJECTIVE AND OBJECTIVE BOX
OPERATIVE PROCEDURE(s):                POD #                       SURGEON(s): CASEY Bah  SUBJECTIVE ASSESSMENT: 78y Female patient seen and examined at bedside.    Vital Signs Last 24 Hrs  T(F): 99.2 (14 Sep 2018 05:01), Max: 99.2 (14 Sep 2018 05:01)  HR: 82 (14 Sep 2018 06:01) (68 - 84)  BP: 142/72 (14 Sep 2018 06:01) (91/53 - 146/70)  BP(mean): 103 (14 Sep 2018 06:01) (65 - 103)  ABP: 98/40 (13 Sep 2018 11:00) (98/40 - 118/44)  ABP(mean): 56 (13 Sep 2018 11:00)  RR: 19 (14 Sep 2018 06:01) (11 - 23)  SpO2: 97% (14 Sep 2018 06:01) (94% - 97%)    I&O's Detail    13 Sep 2018 07:01  -  14 Sep 2018 07:00  --------------------------------------------------------  IN:    IV PiggyBack: 100 mL    Oral Fluid: 980 mL  Total IN: 1080 mL    OUT:    Chest Tube: 30 mL    Chest Tube: 4 mL    Voided: 1150 mL  Total OUT: 1184 mL    Net: I&O's Detail    12 Sep 2018 07:01  -  13 Sep 2018 07:00  --------------------------------------------------------  Total NET: -194 mL    13 Sep 2018 07:01  -  14 Sep 2018 07:00  --------------------------------------------------------  Total NET: -104 mL      CAPILLARY BLOOD GLUCOSE  127 (13 Sep 2018 16:00)  137 (13 Sep 2018 11:00)  127 (13 Sep 2018 07:00)      POCT Blood Glucose.: 80 mg/dL (14 Sep 2018 05:30)  POCT Blood Glucose.: 107 mg/dL (13 Sep 2018 21:55)  POCT Blood Glucose.: 127 mg/dL (13 Sep 2018 16:47)  POCT Blood Glucose.: 137 mg/dL (13 Sep 2018 11:35)      Physical Exam:  General: NAD; A&Ox3  Cardiac: S1/S2, RRR, no murmur, no rubs  Lungs: unlabored respirations, CTA b/l, no wheeze, no rales, no crackles  Abdomen: Soft/NT/ND; positive bowel sounds x 4  Sternum: Intact, no click, incision healing well with no drainage  Incisions: Incisions clean/dry/intact  Extremities: No edema b/l lower extremities; good capillary refill; no cyanosis; palpable 1+ pedal pulses b/l    Central Venous Catheter: Yes[]  No[] , If Yes indication:                    Day #  Rowley Catheter: Yes  [] , No  [] , If yes indication:                                 Day #  NGT: Yes [] No [] ,    If Yes Placement:                                                   Day #  EPICARDIAL WIRES:  [] YES [] NO                                                            Day #  BOWEL MOVEMENT:  [x] YES [] NO, If No, Timing since last BM Day #  CHEST TUBE(Left/Right):  [] YES [] NO, If yes -  AIR LEAKS:  [] YES [] NO        LABS:                        9.3<L>  10.20 )-----------( 89<L>    ( 14 Sep 2018 01:35 )             27.9<L>                        9.6<L>  11.51<H> )-----------( 81<L>    ( 13 Sep 2018 01:52 )             28.8<L>    09-14    136  |  101  |  25<H>  ----------------------------<  108<H>  4.4   |  22  |  1.0  09-13    137  |  103  |  22<H>  ----------------------------<  125<H>  4.8   |  21  |  1.1    Ca    8.4<L>      14 Sep 2018 01:35  Mg     2.7     09-14      ABG - ( 13 Sep 2018 04:50 )  pH: 7.35  /  pCO2: 38    /  pO2: 73    / HCO3: 21    / Base Excess: -4.1  /  SaO2: 95    /  LA: 0.8        RADIOLOGY & ADDITIONAL TESTS:  CXR:   EKG:  Allergies    quinine (Urticaria)    Intolerances      MEDICATIONS  (STANDING):  ALBUTerol/ipratropium for Nebulization 3 milliLiter(s) Nebulizer every 6 hours  aspirin enteric coated 325 milliGRAM(s) Oral daily  chlorhexidine 4% Liquid 1 Application(s) Topical <User Schedule>  colchicine 0.6 milliGRAM(s) Oral every 12 hours  dextrose 50% Injectable 50 milliLiter(s) IV Push every 15 minutes  dextrose 50% Injectable 25 milliLiter(s) IV Push every 15 minutes  docusate sodium 100 milliGRAM(s) Oral three times a day  famotidine    Tablet 20 milliGRAM(s) Oral two times a day  heparin  Injectable 5000 Unit(s) SubCutaneous every 8 hours  insulin lispro (HumaLOG) corrective regimen sliding scale   SubCutaneous three times a day before meals  magnesium sulfate  IVPB 1 Gram(s) IV Intermittent every 12 hours  metoprolol tartrate 12.5 milliGRAM(s) Oral two times a day  pregabalin 50 milliGRAM(s) Oral daily  simvastatin 40 milliGRAM(s) Oral at bedtime    MEDICATIONS  (PRN):  ondansetron  IVPB 4 milliGRAM(s) IV Intermittent once PRN Nausea and/or Vomiting  oxyCODONE    IR 5 milliGRAM(s) Oral every 4 hours PRN Mild Pain (1 - 3)  oxyCODONE    IR 10 milliGRAM(s) Oral every 4 hours PRN Moderate Pain (4 - 6)  senna 1 Tablet(s) Oral every 12 hours PRN Constipation      Pharmacologic DVT Prophylaxis: [x] YES, []NO: Contraindication:   [x] HEPARIN: heparin  Injectable 5000 Unit(s) SubCutaneous every 8 hours  [] LOVENOX: Dose: XX mg  Q24H                 SCD's: YES b/l    GI Prophylaxis: famotidine    Tablet 20 milliGRAM(s) Oral two times a day    Post-Op Beta-Blockers: [x]Yes, []No: contraindication:  Post-Op Aspirin: [x]Yes,  []No: contraindication:  Post-Op Statin: [x]Yes, []No: contraindication:      Ambulation/Activity Status:    Assessment/Plan:  78y Female status-post  - Case and plan discussed with CTU Intensivist and CT Surgeon - Dr. Padilla/Olvin/José Antonio   - Continue CTU supportive care and ongoing plan of care as per continuing CTU rounds.    - Continue DVT/GI prophylaxis  - Incentive Spirometry 10 times an hour  - Continue to advance physical activity as tolerated and continue PT/OT as directed  1. CAD: Continue ASA, statin, BB  2. HTN:   3. A. Fib:   4. COPD/Hypoxia:   5. DM/Glucose Control:     Social Service Disposition: OPERATIVE PROCEDURE(s):  s/p resternotomy for [id number]               POD # 3                      SURGEON(s): CASEY Bah  SUBJECTIVE ASSESSMENT: 78y Female patient seen and examined at bedside. Patient denies any acute complaints at this time.      Vital Signs Last 24 Hrs  T(F): 99.2 (14 Sep 2018 05:01), Max: 99.2 (14 Sep 2018 05:01)  HR: 82 (14 Sep 2018 06:01) (68 - 84)  BP: 142/72 (14 Sep 2018 06:01) (91/53 - 146/70)  BP(mean): 103 (14 Sep 2018 06:01) (65 - 103)  ABP: 98/40 (13 Sep 2018 11:00) (98/40 - 118/44)  ABP(mean): 56 (13 Sep 2018 11:00)  RR: 19 (14 Sep 2018 06:01) (11 - 23)  SpO2: 97% (14 Sep 2018 06:01) (94% - 97%)    I&O's Detail    13 Sep 2018 07:01  -  14 Sep 2018 07:00  --------------------------------------------------------  IN:    IV PiggyBack: 100 mL    Oral Fluid: 980 mL  Total IN: 1080 mL    OUT:    Chest Tube: 30 mL    Chest Tube: 4 mL    Voided: 1150 mL  Total OUT: 1184 mL    Net: I&O's Detail    12 Sep 2018 07:01  -  13 Sep 2018 07:00  --------------------------------------------------------  Total NET: -194 mL    13 Sep 2018 07:01  -  14 Sep 2018 07:00  --------------------------------------------------------  Total NET: -104 mL      CAPILLARY BLOOD GLUCOSE  127 (13 Sep 2018 16:00)  137 (13 Sep 2018 11:00)  127 (13 Sep 2018 07:00)      POCT Blood Glucose.: 80 mg/dL (14 Sep 2018 05:30)  POCT Blood Glucose.: 107 mg/dL (13 Sep 2018 21:55)  POCT Blood Glucose.: 127 mg/dL (13 Sep 2018 16:47)  POCT Blood Glucose.: 137 mg/dL (13 Sep 2018 11:35)      Physical Exam:  General: NAD; A&Ox3  Cardiac: S1/S2, RRR, no murmur, no rubs  Lungs: unlabored respirations, CTA b/l, no wheeze, no rales, no crackles  Abdomen: Soft/NT/ND; positive bowel sounds x 4  Sternum: Intact, no click, incision healing well with no drainage  Incisions: Incisions clean/dry/intact  Extremities: + trace edema b/l lower extremities; good capillary refill; no cyanosis; palpable 1+ pedal pulses b/l    Central Venous Catheter: Yes[x]  No[] , If Yes indication: IV Access      Day # 3  Rowley Catheter: Yes  [] , No  [x] , If yes indication:                               Day #  NGT: Yes [] No [x] ,    If Yes Placement:                                             Day #  EPICARDIAL WIRES:  [x] YES [] NO                                                     Day # 3  BOWEL MOVEMENT:  [] YES [x] NO, If No, Timing since last BM Day #  CHEST TUBE(Left/Right):  [] YES [x] NO, If yes -  AIR LEAKS:  [] YES [] NO        LABS:                        9.3<L>  10.20 )-----------( 89<L>    ( 14 Sep 2018 01:35 )             27.9<L>                        9.6<L>  11.51<H> )-----------( 81<L>    ( 13 Sep 2018 01:52 )             28.8<L>    09-14    136  |  101  |  25<H>  ----------------------------<  108<H>  4.4   |  22  |  1.0  09-13    137  |  103  |  22<H>  ----------------------------<  125<H>  4.8   |  21  |  1.1    Ca    8.4<L>      14 Sep 2018 01:35  Mg     2.7     09-14    ABG - ( 13 Sep 2018 04:50 )  pH: 7.35  /  pCO2: 38    /  pO2: 73    / HCO3: 21    / Base Excess: -4.1  /  SaO2: 95    /  LA: 0.8        RADIOLOGY & ADDITIONAL TESTS:  CXR:   EKG: < from: 12 Lead ECG (09.14.18 @ 05:17) >  Ventricular Rate 85 BPM  Atrial Rate 85 BPM  P-R Interval 129 ms  QRS Duration 79 ms  Q-T Interval 400 ms  QTC Calculation(Bezet) 476 ms  P Axis 24 degrees  R Axis 32 degrees  T Axis 37 degrees  Diagnosis Line Normal sinus rhythm  Possible Acute pericarditis  Nonspecific T wave abnormality  Prolonged QT  Abnormal ECG    Confirmed by Kirill Rodríguez ([id number]) on 9/14/2018 5:41:47 AM    < end of copied text >      Allergies  quinine (Urticaria)  Intolerances      MEDICATIONS  (STANDING):  ALBUTerol/ipratropium for Nebulization 3 milliLiter(s) Nebulizer every 6 hours  aspirin enteric coated 325 milliGRAM(s) Oral daily  chlorhexidine 4% Liquid 1 Application(s) Topical <User Schedule>  colchicine 0.6 milliGRAM(s) Oral every 12 hours  dextrose 50% Injectable 50 milliLiter(s) IV Push every 15 minutes  dextrose 50% Injectable 25 milliLiter(s) IV Push every 15 minutes  docusate sodium 100 milliGRAM(s) Oral three times a day  famotidine    Tablet 20 milliGRAM(s) Oral two times a day  heparin  Injectable 5000 Unit(s) SubCutaneous every 8 hours  insulin lispro (HumaLOG) corrective regimen sliding scale   SubCutaneous three times a day before meals  magnesium sulfate  IVPB 1 Gram(s) IV Intermittent every 12 hours  metoprolol tartrate 12.5 milliGRAM(s) Oral two times a day  pregabalin 50 milliGRAM(s) Oral daily  simvastatin 40 milliGRAM(s) Oral at bedtime    MEDICATIONS  (PRN):  ondansetron  IVPB 4 milliGRAM(s) IV Intermittent once PRN Nausea and/or Vomiting  oxyCODONE    IR 5 milliGRAM(s) Oral every 4 hours PRN Mild Pain (1 - 3)  oxyCODONE    IR 10 milliGRAM(s) Oral every 4 hours PRN Moderate Pain (4 - 6)  senna 1 Tablet(s) Oral every 12 hours PRN Constipation      Pharmacologic DVT Prophylaxis: [x] YES, []NO: Contraindication:   [x] HEPARIN: heparin  Injectable 5000 Unit(s) SubCutaneous every 8 hours  [] LOVENOX: Dose: XX mg  Q24H                 SCD's: YES b/l    GI Prophylaxis: famotidine    Tablet 20 milliGRAM(s) Oral two times a day    Post-Op Beta-Blockers: [x]Yes, []No: contraindication:  Post-Op Aspirin: [x]Yes,  []No: contraindication:  Post-Op Statin: [x]Yes, []No: contraindication:      Ambulation/Activity Status:    Assessment/Plan:  78y Female status-post resternotomy for [id number]               POD # 3   - Case and plan discussed with CTU Intensivist and CT Surgeon - Dr. Bah   - Continue CTU supportive care and ongoing plan of care as per continuing CTU rounds.   - Continue DVT/GI prophylaxis  - Incentive Spirometry 10 times an hour  - Continue to advance physical activity as tolerated and continue PT/OT as directed  1. CAD: Continue ASA, statin and BB  2. A. Fib ppx: continue magnesium sulfate  IVPB 1 Gram(s) IV Intermittent every 12 hours   3. d/c RIJ cordis.  4. DM/Glucose Control: d/c RSS.    Social Service Disposition: TBD.

## 2018-09-14 NOTE — PROGRESS NOTE ADULT - SUBJECTIVE AND OBJECTIVE BOX
CTU Attending Progress Daily Note     14 Sep 2018 09:01  POD#   3            Procedure:  CABG    Patient seen as post-op critical care follow-up    HPI:  78 F w/ pmh of HTN, HLD, CAD s/p CABG, Gluacoma, Diverticulosis, Uterine Ca s/p SHAAN & Chemo presenting to the ED c/o Chest pain. The CP is on the left side, radiated to the L arm, was a/w nausea & lasted for 2 minutes & then resolved. She denies any sob, vomiting, diaphoresis, fevers, chills, syncope. Pt is asymptomatic in the ED. (05 Sep 2018 22:58)      Interval event for past 24 hr:  BEKAH NELSON  78y had no event.     Current Complains:  BEKAH NELSON has no new complaints    REVIEW OF SYSTEMS:  CONSTITUTIONAL:  [-] weakness, [-] fevers, [-] chills  EYES/ENT: [-] visual changes, [-] vertigo, [-] throat pain   NECK: [-] pain, [-] stiffness  RESPIRATORY: [-] cough, [-] wheezing, [-] hemoptysis, [-] shortness of breath  CARDIOVASCULAR: [-] chest pain, [-] palpitations, [-] orthopnea  GASTROINTESTINAL:    [-]abdominal pain, [-] nausea, [-] vomiting, [-] hematemesis, [-] diarrhea, [-] constipation, [-] melena, [-] hematochezia.  GENITOURINARY: [-] dysuria, [-] frequency, [-] hematuria  NEUROLOGICAL: [-] numbness, [-] weakness  SKIN: [-] itching, [-] burning, [-] rashes, [-] lesions   All other review of systems is negative unless indicated above.    [  ] Unable to assess ROS because :    OBJECTIVE:  ICU Vital Signs Last 24 Hrs  T(C): 37.3 (14 Sep 2018 05:01), Max: 37.3 (14 Sep 2018 05:01)  T(F): 99.2 (14 Sep 2018 05:01), Max: 99.2 (14 Sep 2018 05:01)  HR: 82 (14 Sep 2018 06:01) (68 - 84)  BP: 142/72 (14 Sep 2018 06:01) (91/53 - 146/70)  BP(mean): 103 (14 Sep 2018 06:01) (65 - 103)  ABP: 98/40 (13 Sep 2018 11:00) (98/40 - 118/44)  ABP(mean): 56 (13 Sep 2018 11:00) (56 - 68)  RR: 19 (14 Sep 2018 06:01) (11 - 23)  SpO2: 97% (14 Sep 2018 06:01) (94% - 97%)      I&O's Summary    13 Sep 2018 07:01  -  14 Sep 2018 07:00  --------------------------------------------------------  IN: 1080 mL / OUT: 1184 mL / NET: -104 mL      Adult Advanced Hemodynamics Last 24 Hrs  CVP(mm Hg): --  CVP(cm H2O): --  CO: --  CI: --  PA: --  PA(mean): --  PCWP: --  SVR: --  SVRI: --  PVR: --  PVRI: --      PHYSICAL EXAM:  General: WN/WD NAD    HEENT:     [+] NCAT  [+] EOMI  [-] Conjuctival edema   [-] Icterus   [-] Thrush   [-] ETT  [-] NGT/OGT    Neck:         [+] FROM   [-] JVD     [-] Nodes     [-] Masses    [+] Mid-line trachea    [-] Tracheostomy    Chest:         [-] Sternal click   [-] Sternal drainage   [+] Pacing wires   [+] Chest tubes   [-] SubQ emphysema    Lungs:          [+] CTA   [-] Rhonchi   [-] Rales    [-] Wheezing    [-] Decreased BS    [-] Dullness R L    Cardiac:       [+] S1 [+] S2    [+] RRR   [-] Irregular   [-] S3   [-] S4    [-] Murmurs    [-] Rub    Abdomen:    [+] BS    [+] Soft    [+] Non-tender     [-] Distended    [-] Organomegaly  [-] PEG    Extremities:   [-] Cyanosis U/L   [-] Clubbing  U/L  [-] LE/UE Edema   [+] Capillary refill    [+] Pulses     Neuro:        [+] Awake   [+]  Alert   [-] Confused   [-] Lethargic   [-] Sedated   [-] Generalized Weakness    Skin:        [-] Rashes    [-] Erythema   [+] Normal incisions   [+] IV sites intact   [-] Sacral decubitus      LINES: central    CAPILLARY BLOOD GLUCOSE  127 (13 Sep 2018 16:00)      POCT Blood Glucose.: 80 mg/dL (14 Sep 2018 05:30)    CAPILLARY BLOOD GLUCOSE  127 (13 Sep 2018 16:00)  137 (13 Sep 2018 11:00)      POCT Blood Glucose.: 80 mg/dL (14 Sep 2018 05:30)  POCT Blood Glucose.: 107 mg/dL (13 Sep 2018 21:55)  POCT Blood Glucose.: 127 mg/dL (13 Sep 2018 16:47)  POCT Blood Glucose.: 137 mg/dL (13 Sep 2018 11:35)      HOSPITAL MEDICATIONS:  MEDICATIONS  (STANDING):  ALBUTerol/ipratropium for Nebulization 3 milliLiter(s) Nebulizer every 6 hours  aspirin enteric coated 325 milliGRAM(s) Oral daily  chlorhexidine 4% Liquid 1 Application(s) Topical <User Schedule>  colchicine 0.6 milliGRAM(s) Oral every 12 hours  dextrose 50% Injectable 50 milliLiter(s) IV Push every 15 minutes  dextrose 50% Injectable 25 milliLiter(s) IV Push every 15 minutes  docusate sodium 100 milliGRAM(s) Oral three times a day  famotidine    Tablet 20 milliGRAM(s) Oral two times a day  heparin  Injectable 5000 Unit(s) SubCutaneous every 8 hours  insulin lispro (HumaLOG) corrective regimen sliding scale   SubCutaneous three times a day before meals  magnesium sulfate  IVPB 1 Gram(s) IV Intermittent every 12 hours  metoprolol tartrate 12.5 milliGRAM(s) Oral two times a day  pregabalin 50 milliGRAM(s) Oral daily  simvastatin 40 milliGRAM(s) Oral at bedtime    MEDICATIONS  (PRN):  ondansetron  IVPB 4 milliGRAM(s) IV Intermittent once PRN Nausea and/or Vomiting  oxyCODONE    IR 5 milliGRAM(s) Oral every 4 hours PRN Mild Pain (1 - 3)  oxyCODONE    IR 10 milliGRAM(s) Oral every 4 hours PRN Moderate Pain (4 - 6)  senna 1 Tablet(s) Oral every 12 hours PRN Constipation      LABS:  ABG - ( 13 Sep 2018 04:50 )  pH, Arterial: 7.35  pH, Blood: x     /  pCO2: 38    /  pO2: 73    / HCO3: 21    / Base Excess: -4.1  /  SaO2: 95                                      9.3    10.20 )-----------( 89       ( 14 Sep 2018 01:35 )             27.9     09-14    136  |  101  |  25<H>  ----------------------------<  108<H>  4.4   |  22  |  1.0    Ca    8.4<L>      14 Sep 2018 01:35  Mg     2.7     09-14              RADIOLOGY:  Reviewed and interpreted by me  CXR from 09-14-18 shows [+] mild congestion, [-] pneumothorax, [-] R/L effusion, [-] cardiomegaly,       ECG:  Reviewed and interpreted by me:   QTC:    Assessment:  CAD SP CABG    PAST MEDICAL & SURGICAL HISTORY:  Left lower quadrant pain  Glaucoma  Cataract of right eye  High cholesterol  Hypertension  Uterine cancer  Athscl CABG, unsp, w angina pectoris w documented spasm  H/O total hysterectomy      PLAN:  Neuro: Pain control  Pulm: Encourage coughing, deep breathing and use of incentive spirometry. Wean off supplemental oxygen as able. Daily CXR.   Cardio: Monitor telemetry/alarms. Continue cardiac meds  GI: Tolerating diet. Continue stool softeners. Continue GI prophylaxis  Renal: monitor urine output, supplement electrolytes as needed  Vasc: Heparin SC/SCDs for DVT prophylaxis  Heme: Monitor H/H.   ID: Off antibiotics. Stable.  Endocrine: Monitor finger stick blood sugar  Physical Therapy: OOB/ambulate        Discussed with Cardiothoracic Team at AM rounds.

## 2018-09-15 LAB
ANION GAP SERPL CALC-SCNC: 12 MMOL/L — SIGNIFICANT CHANGE UP (ref 7–14)
BASOPHILS # BLD AUTO: 0.02 K/UL — SIGNIFICANT CHANGE UP (ref 0–0.2)
BASOPHILS NFR BLD AUTO: 0.3 % — SIGNIFICANT CHANGE UP (ref 0–1)
BUN SERPL-MCNC: 21 MG/DL — HIGH (ref 10–20)
CALCIUM SERPL-MCNC: 7.9 MG/DL — LOW (ref 8.5–10.1)
CHLORIDE SERPL-SCNC: 105 MMOL/L — SIGNIFICANT CHANGE UP (ref 98–110)
CO2 SERPL-SCNC: 24 MMOL/L — SIGNIFICANT CHANGE UP (ref 17–32)
CREAT SERPL-MCNC: 0.8 MG/DL — SIGNIFICANT CHANGE UP (ref 0.7–1.5)
EOSINOPHIL # BLD AUTO: 0.27 K/UL — SIGNIFICANT CHANGE UP (ref 0–0.7)
EOSINOPHIL NFR BLD AUTO: 3.7 % — SIGNIFICANT CHANGE UP (ref 0–8)
GLUCOSE BLDC GLUCOMTR-MCNC: 99 MG/DL — SIGNIFICANT CHANGE UP (ref 70–99)
GLUCOSE SERPL-MCNC: 102 MG/DL — HIGH (ref 70–99)
HCT VFR BLD CALC: 24.2 % — LOW (ref 37–47)
HGB BLD-MCNC: 8.1 G/DL — LOW (ref 12–16)
IMM GRANULOCYTES NFR BLD AUTO: 1.1 % — HIGH (ref 0.1–0.3)
LYMPHOCYTES # BLD AUTO: 1.4 K/UL — SIGNIFICANT CHANGE UP (ref 1.2–3.4)
LYMPHOCYTES # BLD AUTO: 19.3 % — LOW (ref 20.5–51.1)
MAGNESIUM SERPL-MCNC: 2.6 MG/DL — HIGH (ref 1.8–2.4)
MCHC RBC-ENTMCNC: 27.5 PG — SIGNIFICANT CHANGE UP (ref 27–31)
MCHC RBC-ENTMCNC: 33.5 G/DL — SIGNIFICANT CHANGE UP (ref 32–37)
MCV RBC AUTO: 82 FL — SIGNIFICANT CHANGE UP (ref 81–99)
MONOCYTES # BLD AUTO: 1.03 K/UL — HIGH (ref 0.1–0.6)
MONOCYTES NFR BLD AUTO: 14.2 % — HIGH (ref 1.7–9.3)
NEUTROPHILS # BLD AUTO: 4.47 K/UL — SIGNIFICANT CHANGE UP (ref 1.4–6.5)
NEUTROPHILS NFR BLD AUTO: 61.4 % — SIGNIFICANT CHANGE UP (ref 42.2–75.2)
NRBC # BLD: 0 /100 WBCS — SIGNIFICANT CHANGE UP (ref 0–0)
PLATELET # BLD AUTO: 118 K/UL — LOW (ref 130–400)
POTASSIUM SERPL-MCNC: 4 MMOL/L — SIGNIFICANT CHANGE UP (ref 3.5–5)
POTASSIUM SERPL-SCNC: 4 MMOL/L — SIGNIFICANT CHANGE UP (ref 3.5–5)
RBC # BLD: 2.95 M/UL — LOW (ref 4.2–5.4)
RBC # FLD: 15.5 % — HIGH (ref 11.5–14.5)
SODIUM SERPL-SCNC: 141 MMOL/L — SIGNIFICANT CHANGE UP (ref 135–146)
WBC # BLD: 7.27 K/UL — SIGNIFICANT CHANGE UP (ref 4.8–10.8)
WBC # FLD AUTO: 7.27 K/UL — SIGNIFICANT CHANGE UP (ref 4.8–10.8)

## 2018-09-15 RX ORDER — FERROUS SULFATE 325(65) MG
325 TABLET ORAL DAILY
Qty: 0 | Refills: 0 | Status: DISCONTINUED | OUTPATIENT
Start: 2018-09-15 | End: 2018-09-17

## 2018-09-15 RX ORDER — FOLIC ACID 0.8 MG
1 TABLET ORAL DAILY
Qty: 0 | Refills: 0 | Status: DISCONTINUED | OUTPATIENT
Start: 2018-09-15 | End: 2018-09-17

## 2018-09-15 RX ADMIN — FAMOTIDINE 20 MILLIGRAM(S): 10 INJECTION INTRAVENOUS at 17:52

## 2018-09-15 RX ADMIN — Medication 3 MILLILITER(S): at 08:48

## 2018-09-15 RX ADMIN — HEPARIN SODIUM 5000 UNIT(S): 5000 INJECTION INTRAVENOUS; SUBCUTANEOUS at 22:25

## 2018-09-15 RX ADMIN — Medication 1 MILLIGRAM(S): at 15:26

## 2018-09-15 RX ADMIN — LATANOPROST 1 DROP(S): 0.05 SOLUTION/ DROPS OPHTHALMIC; TOPICAL at 17:52

## 2018-09-15 RX ADMIN — SIMVASTATIN 40 MILLIGRAM(S): 20 TABLET, FILM COATED ORAL at 22:25

## 2018-09-15 RX ADMIN — Medication 100 MILLIGRAM(S): at 06:02

## 2018-09-15 RX ADMIN — Medication 325 MILLIGRAM(S): at 12:12

## 2018-09-15 RX ADMIN — Medication 3 MILLILITER(S): at 15:10

## 2018-09-15 RX ADMIN — Medication 0.6 MILLIGRAM(S): at 17:53

## 2018-09-15 RX ADMIN — HEPARIN SODIUM 5000 UNIT(S): 5000 INJECTION INTRAVENOUS; SUBCUTANEOUS at 06:03

## 2018-09-15 RX ADMIN — Medication 100 GRAM(S): at 06:05

## 2018-09-15 RX ADMIN — Medication 12.5 MILLIGRAM(S): at 07:05

## 2018-09-15 RX ADMIN — FAMOTIDINE 20 MILLIGRAM(S): 10 INJECTION INTRAVENOUS at 06:03

## 2018-09-15 RX ADMIN — Medication 325 MILLIGRAM(S): at 15:26

## 2018-09-15 RX ADMIN — CHLORHEXIDINE GLUCONATE 1 APPLICATION(S): 213 SOLUTION TOPICAL at 22:20

## 2018-09-15 RX ADMIN — BRIMONIDINE TARTRATE 1 DROP(S): 2 SOLUTION/ DROPS OPHTHALMIC at 17:52

## 2018-09-15 RX ADMIN — Medication 0.6 MILLIGRAM(S): at 07:06

## 2018-09-15 RX ADMIN — Medication 100 GRAM(S): at 17:51

## 2018-09-15 RX ADMIN — Medication 50 MILLIGRAM(S): at 12:11

## 2018-09-15 RX ADMIN — HEPARIN SODIUM 5000 UNIT(S): 5000 INJECTION INTRAVENOUS; SUBCUTANEOUS at 15:26

## 2018-09-15 RX ADMIN — DORZOLAMIDE HYDROCHLORIDE 1 DROP(S): 20 SOLUTION/ DROPS OPHTHALMIC at 06:02

## 2018-09-15 RX ADMIN — Medication 12.5 MILLIGRAM(S): at 17:52

## 2018-09-15 RX ADMIN — BRIMONIDINE TARTRATE 1 DROP(S): 2 SOLUTION/ DROPS OPHTHALMIC at 06:02

## 2018-09-15 RX ADMIN — LATANOPROST 1 DROP(S): 0.05 SOLUTION/ DROPS OPHTHALMIC; TOPICAL at 06:03

## 2018-09-15 NOTE — PROGRESS NOTE ADULT - SUBJECTIVE AND OBJECTIVE BOX
CTU Attending Progress Daily Note     15 Sep 2018 14:05      Patient seen as post-op critical care follow-up    HPI:  78 F w/ pmh of HTN, HLD, CAD s/p CABG, Gluacoma, Diverticulosis, Uterine Ca s/p SHAAN & Chemo presenting to the ED c/o Chest pain. The CP is on the left side, radiated to the L arm, was a/w nausea & lasted for 2 minutes & then resolved. She denies any sob, vomiting, diaphoresis, fevers, chills, syncope. Pt is asymptomatic in the ED. (05 Sep 2018 22:58)      Interval event for past 24 hr:  BEKAH NELSON  78y had no event.     Current Complains:  BEKAH NELSON has no new complaints    REVIEW OF SYSTEMS:  CONSTITUTIONAL:  [-] weakness, [-] fevers, [-] chills  EYES/ENT: [-] visual changes, [-] vertigo, [-] throat pain   NECK: [-] pain, [-] stiffness  RESPIRATORY: [-] cough, [-] wheezing, [-] hemoptysis, [-] shortness of breath  CARDIOVASCULAR: [-] chest pain, [-] palpitations, [-] orthopnea  GASTROINTESTINAL:    [-]abdominal pain, [-] nausea, [-] vomiting, [-] hematemesis, [-] diarrhea, [-] constipation, [-] melena, [-] hematochezia.  GENITOURINARY: [-] dysuria, [-] frequency, [-] hematuria  NEUROLOGICAL: [-] numbness, [-] weakness  SKIN: [-] itching, [-] burning, [-] rashes, [-] lesions   All other review of systems is negative unless indicated above.    [  ] Unable to assess ROS because :    OBJECTIVE:  ICU Vital Signs Last 24 Hrs  T(C): 37.3 (15 Sep 2018 08:00), Max: 37.4 (14 Sep 2018 20:00)  T(F): 99.2 (15 Sep 2018 08:00), Max: 99.4 (14 Sep 2018 20:00)  HR: 80 (15 Sep 2018 13:00) (74 - 84)  BP: 146/63 (15 Sep 2018 13:00) (92/52 - 146/63)  BP(mean): 82 (15 Sep 2018 13:00) (61 - 108)  ABP: --  ABP(mean): --  RR: 20 (15 Sep 2018 13:00) (12 - 24)  SpO2: 100% (15 Sep 2018 13:00) (96% - 100%)      I&O's Summary    14 Sep 2018 07:01  -  15 Sep 2018 07:00  --------------------------------------------------------  IN: 1000 mL / OUT: 1250 mL / NET: -250 mL    15 Sep 2018 07:01  -  15 Sep 2018 14:05  --------------------------------------------------------  IN: 120 mL / OUT: 0 mL / NET: 120 mL      Adult Advanced Hemodynamics Last 24 Hrs  CVP(mm Hg): --  CVP(cm H2O): --  CO: --  CI: --  PA: --  PA(mean): --  PCWP: --  SVR: --  SVRI: --  PVR: --  PVRI: --      PHYSICAL EXAM:  General: WN/WD NAD    HEENT:     [+] NCAT  [+] EOMI  [-] Conjuctival edema   [-] Icterus   [-] Thrush   [-] ETT  [-] NGT/OGT    Neck:         [+] FROM   [-] JVD     [-] Nodes     [-] Masses    [+] Mid-line trachea    [-] Tracheostomy    Chest:         [-] Sternal click   [-] Sternal drainage   [-] Pacing wires   [-] Chest tubes   [-] SubQ emphysema    Lungs:          [+] CTA   [-] Rhonchi   [-] Rales    [-] Wheezing    [-] Decreased BS    [-] Dullness R L    Cardiac:       [+] S1 [+] S2    [+] RRR   [-] Irregular   [-] S3   [-] S4    [-] Murmurs    [-] Rub    Abdomen:    [+] BS    [+] Soft    [+] Non-tender     [-] Distended    [-] Organomegaly  [-] PEG    Extremities:   [-] Cyanosis U/L   [-] Clubbing  U/L  [-] LE/UE Edema   [+] Capillary refill    [+] Pulses     Neuro:        [+] Awake   [+]  Alert   [-] Confused   [-] Lethargic   [-] Sedated   [-] Generalized Weakness    Skin:        [-] Rashes    [-] Erythema   [+] Normal incisions   [+] IV sites intact   [-] Sacral decubitus        CAPILLARY BLOOD GLUCOSE  127 (13 Sep 2018 16:00)      POCT Blood Glucose.: 99 mg/dL (15 Sep 2018 06:25)    CAPILLARY BLOOD GLUCOSE      POCT Blood Glucose.: 99 mg/dL (15 Sep 2018 06:25)  POCT Blood Glucose.: 102 mg/dL (14 Sep 2018 21:34)      HOSPITAL MEDICATIONS:  MEDICATIONS  (STANDING):  ALBUTerol/ipratropium for Nebulization 3 milliLiter(s) Nebulizer every 6 hours  aspirin enteric coated 325 milliGRAM(s) Oral daily  brimonidine 0.2% Ophthalmic Solution 1 Drop(s) Both EYES every 12 hours  chlorhexidine 4% Liquid 1 Application(s) Topical <User Schedule>  colchicine 0.6 milliGRAM(s) Oral every 12 hours  docusate sodium 100 milliGRAM(s) Oral three times a day  dorzolamide 2% Ophthalmic Solution 1 Drop(s) Both EYES <User Schedule>  famotidine    Tablet 20 milliGRAM(s) Oral two times a day  ferrous    sulfate 325 milliGRAM(s) Oral daily  folic acid 1 milliGRAM(s) Oral daily  heparin  Injectable 5000 Unit(s) SubCutaneous every 8 hours  latanoprost 0.005% Ophthalmic Solution 1 Drop(s) Both EYES <User Schedule>  magnesium sulfate  IVPB 1 Gram(s) IV Intermittent every 12 hours  metoprolol tartrate 12.5 milliGRAM(s) Oral two times a day  pregabalin 50 milliGRAM(s) Oral daily  simvastatin 40 milliGRAM(s) Oral at bedtime    MEDICATIONS  (PRN):  magnesium hydroxide Suspension 30 milliLiter(s) Oral daily PRN Constipation  ondansetron  IVPB 4 milliGRAM(s) IV Intermittent once PRN Nausea and/or Vomiting  oxyCODONE    IR 5 milliGRAM(s) Oral every 4 hours PRN Mild Pain (1 - 3)  oxyCODONE    IR 10 milliGRAM(s) Oral every 4 hours PRN Moderate Pain (4 - 6)  senna 1 Tablet(s) Oral every 12 hours PRN Constipation      LABS:                          8.1    7.27  )-----------( 118      ( 15 Sep 2018 02:41 )             24.2     09-15    141  |  105  |  21<H>  ----------------------------<  102<H>  4.0   |  24  |  0.8    Ca    7.9<L>      15 Sep 2018 02:41  Mg     2.6     09-15              RADIOLOGY:  Reviewed and interpreted by me  CXR from 09-15-18 shows [+] mild congestion, [-] pneumothorax, [-] R/L effusion, [-] cardiomegaly,       Assessment:  CAD SP CABG    PAST MEDICAL & SURGICAL HISTORY:  Left lower quadrant pain  Glaucoma  Cataract of right eye  High cholesterol  Hypertension  Uterine cancer  Athscl CABG, unsp, w angina pectoris w documented spasm  H/O total hysterectomy      PLAN:  Neuro: Pain control  Pulm: Encourage coughing, deep breathing and use of incentive spirometry. Wean off supplemental oxygen as able. Daily CXR.   Cardio: Monitor telemetry/alarms. Continue cardiac meds  GI: Tolerating diet. Continue stool softeners. Continue GI prophylaxis  Renal: monitor urine output, supplement electrolytes as needed  Vasc: Heparin SC/SCDs for DVT prophylaxis  Heme: Monitor H/H.   ID: Off antibiotics. Stable.  Endocrine: Monitor finger stick blood sugar  Physical Therapy: OOB/ambulate        Discussed with Cardiothoracic Team at AM rounds.

## 2018-09-15 NOTE — PROGRESS NOTE ADULT - SUBJECTIVE AND OBJECTIVE BOX
OPERATIVE PROCEDURE(s):                POD #                       SURGEON(s): CASEY Bah  SUBJECTIVE ASSESSMENT: 78y Female patient seen and examined at bedside.    Vital Signs Last 24 Hrs  T(F): 98.6 (15 Sep 2018 03:00), Max: 99.4 (14 Sep 2018 20:00)  HR: 74 (15 Sep 2018 08:00) (74 - 86)  BP: 115/56 (15 Sep 2018 08:00) (92/52 - 135/61)  BP(mean): 79 (15 Sep 2018 08:00) (61 - 108)  RR: 17 (15 Sep 2018 08:00) (12 - 24)  SpO2: 99% (15 Sep 2018 08:00) (96% - 99%)    I&O's Detail    14 Sep 2018 07:01  -  15 Sep 2018 07:00  --------------------------------------------------------  IN:    IV PiggyBack: 200 mL    Oral Fluid: 800 mL  Total IN: 1000 mL    OUT:    Voided: 1250 mL  Total OUT: 1250 mL    Net: I&O's Detail    13 Sep 2018 07:01  -  14 Sep 2018 07:00  --------------------------------------------------------  Total NET: -104 mL    14 Sep 2018 07:01  -  15 Sep 2018 07:00  --------------------------------------------------------  Total NET: -250 mL      CAPILLARY BLOOD GLUCOSE  POCT Blood Glucose.: 99 mg/dL (15 Sep 2018 06:25)  POCT Blood Glucose.: 102 mg/dL (14 Sep 2018 21:34)      Physical Exam:  General: NAD; A&Ox3  Cardiac: S1/S2, RRR, no murmur, no rubs  Lungs: unlabored respirations, CTA b/l, no wheeze, no rales, no crackles  Abdomen: Soft/NT/ND; positive bowel sounds x 4  Sternum: Intact, no click, incision healing well with no drainage  Incisions: Incisions clean/dry/intact  Extremities: No edema b/l lower extremities; good capillary refill; no cyanosis; palpable 1+ pedal pulses b/l    Central Venous Catheter: Yes[]  No[] , If Yes indication:                    Day #  Rowley Catheter: Yes  [] , No  [] , If yes indication:                                 Day #  NGT: Yes [] No [] ,    If Yes Placement:                                                   Day #  EPICARDIAL WIRES:  [] YES [] NO                                                            Day #  BOWEL MOVEMENT:  [x] YES [] NO, If No, Timing since last BM Day #  CHEST TUBE(Left/Right):  [] YES [] NO, If yes -  AIR LEAKS:  [] YES [] NO        LABS:                        8.1<L>  7.27  )-----------( 118<L>    ( 15 Sep 2018 02:41 )             24.2<L>                        9.3<L>  10.20 )-----------( 89<L>    ( 14 Sep 2018 01:35 )             27.9<L>    09-15    141  |  105  |  21<H>  ----------------------------<  102<H>  4.0   |  24  |  0.8  09-14    136  |  101  |  25<H>  ----------------------------<  108<H>  4.4   |  22  |  1.0    Ca    7.9<L>      15 Sep 2018 02:41  Mg     2.6     09-15            RADIOLOGY & ADDITIONAL TESTS:  CXR:   EKG:  Allergies    quinine (Urticaria)    Intolerances      MEDICATIONS  (STANDING):  ALBUTerol/ipratropium for Nebulization 3 milliLiter(s) Nebulizer every 6 hours  aspirin enteric coated 325 milliGRAM(s) Oral daily  brimonidine 0.2% Ophthalmic Solution 1 Drop(s) Both EYES every 12 hours  chlorhexidine 4% Liquid 1 Application(s) Topical <User Schedule>  colchicine 0.6 milliGRAM(s) Oral every 12 hours  docusate sodium 100 milliGRAM(s) Oral three times a day  dorzolamide 2% Ophthalmic Solution 1 Drop(s) Both EYES <User Schedule>  famotidine    Tablet 20 milliGRAM(s) Oral two times a day  heparin  Injectable 5000 Unit(s) SubCutaneous every 8 hours  latanoprost 0.005% Ophthalmic Solution 1 Drop(s) Both EYES <User Schedule>  magnesium sulfate  IVPB 1 Gram(s) IV Intermittent every 12 hours  metoprolol tartrate 12.5 milliGRAM(s) Oral two times a day  pregabalin 50 milliGRAM(s) Oral daily  simvastatin 40 milliGRAM(s) Oral at bedtime    MEDICATIONS  (PRN):  magnesium hydroxide Suspension 30 milliLiter(s) Oral daily PRN Constipation  ondansetron  IVPB 4 milliGRAM(s) IV Intermittent once PRN Nausea and/or Vomiting  oxyCODONE    IR 5 milliGRAM(s) Oral every 4 hours PRN Mild Pain (1 - 3)  oxyCODONE    IR 10 milliGRAM(s) Oral every 4 hours PRN Moderate Pain (4 - 6)  senna 1 Tablet(s) Oral every 12 hours PRN Constipation      Pharmacologic DVT Prophylaxis: [] YES, []NO: Contraindication:   [] HEPARIN: Dose: XX mg  Q24H    [] LOVENOX: Dose: XX mg  Q24H                 SCD's: YES b/l    GI Prophylaxis: Protonix [], Pepcid []    Post-Op Beta-Blockers: []Yes, []No: contraindication:  Post-Op Nitrate: []Yes, []No: contraindication:  Post-Op Aspirin: []Yes,  []No: contraindication:  Post-Op Statin: []Yes, []No: contraindication:      Ambulation/Activity Status:    Assessment/Plan:  78y Female status-post  - Case and plan discussed with CTU Intensivist and CT Surgeon - Dr. Padilla/Olvin/José Antonio   - Continue CTU supportive care and ongoing plan of care as per continuing CTU rounds.    - Continue DVT/GI prophylaxis  - Incentive Spirometry 10 times an hour  - Continue to advance physical activity as tolerated and continue PT/OT as directed  1. CAD: Continue ASA, statin, BB  2. HTN:   3. A. Fib:   4. COPD/Hypoxia:   5. DM/Glucose Control:     Social Service Disposition: OPERATIVE PROCEDURE(s): s/p resternotomy for [id number]               POD # 4                      SURGEON(s): CASEY Bah  SUBJECTIVE ASSESSMENT: 78y Female patient seen and examined at bedside. Patient Ambulating well w/out walker and denies any acute complaints at this time.    Vital Signs Last 24 Hrs  T(F): 98.6 (15 Sep 2018 03:00), Max: 99.4 (14 Sep 2018 20:00)  HR: 74 (15 Sep 2018 08:00) (74 - 86)  BP: 115/56 (15 Sep 2018 08:00) (92/52 - 135/61)  BP(mean): 79 (15 Sep 2018 08:00) (61 - 108)  RR: 17 (15 Sep 2018 08:00) (12 - 24)  SpO2: 99% (15 Sep 2018 08:00) (96% - 99%)    I&O's Detail    14 Sep 2018 07:01  -  15 Sep 2018 07:00  --------------------------------------------------------  IN:    IV PiggyBack: 200 mL    Oral Fluid: 800 mL  Total IN: 1000 mL    OUT:    Voided: 1250 mL  Total OUT: 1250 mL    Net: I&O's Detail    13 Sep 2018 07:01  -  14 Sep 2018 07:00  --------------------------------------------------------  Total NET: -104 mL    14 Sep 2018 07:01  -  15 Sep 2018 07:00  --------------------------------------------------------  Total NET: -250 mL      CAPILLARY BLOOD GLUCOSE  POCT Blood Glucose.: 99 mg/dL (15 Sep 2018 06:25)  POCT Blood Glucose.: 102 mg/dL (14 Sep 2018 21:34)      Physical Exam:  General: NAD; A&Ox3  Cardiac: S1/S2, RRR, no murmur, no rubs  Lungs: unlabored respirations, CTA b/l, no wheeze, no rales, no crackles  Abdomen: Soft/NT/ND; positive bowel sounds x 4  Sternum: Intact, no click, incision healing well with no drainage  Incisions: Incisions clean/dry/intact  Extremities: + trace edema b/l lower extremities; good capillary refill; no cyanosis; palpable 1+ pedal pulses b/l    Central Venous Catheter: Yes[x]  No[] , If Yes indication:                     Day #   Rowley Catheter: Yes  [] , No  [x] , If yes indication:                               Day #  NGT: Yes [] No [x] ,    If Yes Placement:                                             Day #  EPICARDIAL WIRES:  [x] YES [] NO                                                     Day # 4  BOWEL MOVEMENT:  [] YES [x] NO, If No, Timing since last BM Day #  CHEST TUBE(Left/Right):  [] YES [x] NO, If yes -  AIR LEAKS:  [] YES [] NO        LABS:                        8.1<L>  7.27  )-----------( 118<L>    ( 15 Sep 2018 02:41 )             24.2<L>                        9.3<L>  10.20 )-----------( 89<L>    ( 14 Sep 2018 01:35 )             27.9<L>    09-15    141  |  105  |  21<H>  ----------------------------<  102<H>  4.0   |  24  |  0.8  09-14    136  |  101  |  25<H>  ----------------------------<  108<H>  4.4   |  22  |  1.0    Ca    7.9<L>      15 Sep 2018 02:41  Mg     2.6     09-15            RADIOLOGY & ADDITIONAL TESTS:  CXR:   EKG:< from: 12 Lead ECG (09.15.18 @ 09:04) >  Ventricular Rate 81 BPM    Atrial Rate 81 BPM    P-R Interval 124 ms    QRS Duration 82 ms    Q-T Interval 352 ms    QTC Calculation(Bezet) 408 ms    P Axis 30 degrees    R Axis 9 degrees    T Axis 1 degrees    Diagnosis Line Normal sinus rhythm  ST elevation consider lateral injury or acute infarct  ** ** ACUTE MI / STEMI ** ** Possible  Abnormal ECG    Confirmed by FOZIA OVALLE MD ([id number]) on 9/15/2018 9:44:22 AM    < end of copied text >    Allergies    quinine (Urticaria)    Intolerances      MEDICATIONS  (STANDING):  ALBUTerol/ipratropium for Nebulization 3 milliLiter(s) Nebulizer every 6 hours  aspirin enteric coated 325 milliGRAM(s) Oral daily  brimonidine 0.2% Ophthalmic Solution 1 Drop(s) Both EYES every 12 hours  chlorhexidine 4% Liquid 1 Application(s) Topical <User Schedule>  colchicine 0.6 milliGRAM(s) Oral every 12 hours  docusate sodium 100 milliGRAM(s) Oral three times a day  dorzolamide 2% Ophthalmic Solution 1 Drop(s) Both EYES <User Schedule>  famotidine    Tablet 20 milliGRAM(s) Oral two times a day  heparin  Injectable 5000 Unit(s) SubCutaneous every 8 hours  latanoprost 0.005% Ophthalmic Solution 1 Drop(s) Both EYES <User Schedule>  magnesium sulfate  IVPB 1 Gram(s) IV Intermittent every 12 hours  metoprolol tartrate 12.5 milliGRAM(s) Oral two times a day  pregabalin 50 milliGRAM(s) Oral daily  simvastatin 40 milliGRAM(s) Oral at bedtime    MEDICATIONS  (PRN):  magnesium hydroxide Suspension 30 milliLiter(s) Oral daily PRN Constipation  ondansetron  IVPB 4 milliGRAM(s) IV Intermittent once PRN Nausea and/or Vomiting  oxyCODONE    IR 5 milliGRAM(s) Oral every 4 hours PRN Mild Pain (1 - 3)  oxyCODONE    IR 10 milliGRAM(s) Oral every 4 hours PRN Moderate Pain (4 - 6)  senna 1 Tablet(s) Oral every 12 hours PRN Constipation      Pharmacologic DVT Prophylaxis: [x] YES, []NO: Contraindication:   [x] HEPARIN: heparin  Injectable 5000 Unit(s) SubCutaneous every 8 hours  [] LOVENOX: Dose: XX mg  Q24H                 SCD's: YES b/l    GI Prophylaxis: famotidine    Tablet 20 milliGRAM(s) Oral two times a day    Post-Op Beta-Blockers: [x]Yes, []No: contraindication:  Post-Op Aspirin: [x]Yes,  []No: contraindication:  Post-Op Statin: [x]Yes, []No: contraindication:      Ambulation/Activity Status:    Assessment/Plan:  78y Female status-post resternotomy for [id number]               POD # 4   - Case and plan discussed with CTU Intensivist and CT Surgeon - Dr. Bah   - Continue CTU supportive care and ongoing plan of care as per continuing CTU rounds.   - Continue DVT/GI prophylaxis  - Incentive Spirometry 10 times an hour  - Continue to advance physical activity as tolerated and continue PT/OT as directed  1. CAD: Continue ASA, statin and BB  2. A. Fib ppx: continue magnesium sulfate  IVPB 1 Gram(s) IV Intermittent every 12 hours   3. d/c RIJ cordis.  4. DM/Glucose Control: d/c RSS.    Social Service Disposition: Anticipate d/c Home 9/17

## 2018-09-15 NOTE — CHART NOTE - NSCHARTNOTEFT_GEN_A_CORE
Registered Dietitian Follow-Up (CTU-103a)    ***Scroll to the bottom for RD recommendation***    Patient Profile Reviewed                           Yes [x]   No []  Nutrition History Previously Obtained        Yes [x]  No []   Family at bedside      PERTINENT MEDICAL INFORMATIONS:  (1) admitted for ACS. p/w ED c.o chest pain on L side that radiated to L arm a/w nausea.  (2) s/p CABG, tolerating diet on stool softener, monitoring h/h. Off abx. monitoring telemetry.       PERTINENT SUBJECTIVE INFORMATION:  (1) Pt reported appetite has improved to 70% now, close to baseline, and working towards it. Doing well overall and denied supplements.      DIET ORDER:   CC snack with DASH/TLC        ANTHROPOMETRICS:  - Ht.  162.56cm  - Wt. 72.6 kg (no new weight, likely stable)  - BMI. 27.5  - IBW       PERTINENT LAB DATA:  PERTINENT MEDS:       PHYSICAL FINDINGS  - APPEARANCE:        alert and oriented hugging the heart pillow. No edema.   - GI FUNCTION:        none reported. LBM this 9/15 am  - TUBES:                       - ORAL/MOUTH:      none reported.  - SKIN:                        Surgical incision        NUTRITION REQUIREMENTS  WEIGHT USED:                          ABW 72.6 kg  ESTIMATED ENERGY NEEDS:       CONTINUE [  x]      ADJUST [  ]    ESTIMATED ENERGY NEEDS:         1535-3902 kcals/day (MSJ x 1.2-1.3)  ESTIMATED PROTEIN NEEDS:        73-87 g/day (1-1.2 g/kg ABW)  ESTIMATED FLUID NEEDS:             per CTU team    CURRENT NUTRIENT NEEDS:    likely meeting          [ x ] PREVIOUS NUTRITION DIAGNOSIS:   (1) Inadequate oral intake - much improved/ resolved            [  ] ONGOING        [  x] RESOLVED        NUTRITION INTERVENTION:    [  x] ORAL        [ ] EN/TF     GOAL/EXPECTED OUTCOME:     pt to continue to consume and tolerate >75% of all meals and snacks through LOS.  INDICATOR/MONITORING:       RD to monitor diet order, energy intake, body composition, nutrition focused physical findings  PATIENT's INTERVENTION:        Meals and snacks.     RECS: (1) Continue current diet.
The patient had a CTA of the coronary arteries performed. Discussed results with Dr. Whitt of radiology and with patient. CAMPBELL to LAD is patent.  SVG to OM was not visualized . The LM was heavily calcified and there is at least moderate stenosis . In view of recurrent admissions for CP, previously normal nuclear stress test,  will opt for cath . This is scheduled for monday . NPO after midnight sunday

## 2018-09-16 LAB
ANION GAP SERPL CALC-SCNC: 13 MMOL/L — SIGNIFICANT CHANGE UP (ref 7–14)
BASOPHILS # BLD AUTO: 0.03 K/UL — SIGNIFICANT CHANGE UP (ref 0–0.2)
BASOPHILS NFR BLD AUTO: 0.5 % — SIGNIFICANT CHANGE UP (ref 0–1)
BUN SERPL-MCNC: 16 MG/DL — SIGNIFICANT CHANGE UP (ref 10–20)
CALCIUM SERPL-MCNC: 8.2 MG/DL — LOW (ref 8.5–10.1)
CHLORIDE SERPL-SCNC: 106 MMOL/L — SIGNIFICANT CHANGE UP (ref 98–110)
CO2 SERPL-SCNC: 23 MMOL/L — SIGNIFICANT CHANGE UP (ref 17–32)
CREAT SERPL-MCNC: 0.7 MG/DL — SIGNIFICANT CHANGE UP (ref 0.7–1.5)
EOSINOPHIL # BLD AUTO: 0.35 K/UL — SIGNIFICANT CHANGE UP (ref 0–0.7)
EOSINOPHIL NFR BLD AUTO: 5.3 % — SIGNIFICANT CHANGE UP (ref 0–8)
GLUCOSE SERPL-MCNC: 105 MG/DL — HIGH (ref 70–99)
HCT VFR BLD CALC: 25.3 % — LOW (ref 37–47)
HGB BLD-MCNC: 8.7 G/DL — LOW (ref 12–16)
IMM GRANULOCYTES NFR BLD AUTO: 2.9 % — HIGH (ref 0.1–0.3)
LYMPHOCYTES # BLD AUTO: 1.35 K/UL — SIGNIFICANT CHANGE UP (ref 1.2–3.4)
LYMPHOCYTES # BLD AUTO: 20.4 % — LOW (ref 20.5–51.1)
MAGNESIUM SERPL-MCNC: 2.5 MG/DL — HIGH (ref 1.8–2.4)
MCHC RBC-ENTMCNC: 28.2 PG — SIGNIFICANT CHANGE UP (ref 27–31)
MCHC RBC-ENTMCNC: 34.4 G/DL — SIGNIFICANT CHANGE UP (ref 32–37)
MCV RBC AUTO: 82.1 FL — SIGNIFICANT CHANGE UP (ref 81–99)
MONOCYTES # BLD AUTO: 1.3 K/UL — HIGH (ref 0.1–0.6)
MONOCYTES NFR BLD AUTO: 19.6 % — HIGH (ref 1.7–9.3)
NEUTROPHILS # BLD AUTO: 3.4 K/UL — SIGNIFICANT CHANGE UP (ref 1.4–6.5)
NEUTROPHILS NFR BLD AUTO: 51.3 % — SIGNIFICANT CHANGE UP (ref 42.2–75.2)
NRBC # BLD: 0 /100 WBCS — SIGNIFICANT CHANGE UP (ref 0–0)
PLATELET # BLD AUTO: 147 K/UL — SIGNIFICANT CHANGE UP (ref 130–400)
POTASSIUM SERPL-MCNC: 4 MMOL/L — SIGNIFICANT CHANGE UP (ref 3.5–5)
POTASSIUM SERPL-SCNC: 4 MMOL/L — SIGNIFICANT CHANGE UP (ref 3.5–5)
RBC # BLD: 3.08 M/UL — LOW (ref 4.2–5.4)
RBC # FLD: 15.7 % — HIGH (ref 11.5–14.5)
SODIUM SERPL-SCNC: 142 MMOL/L — SIGNIFICANT CHANGE UP (ref 135–146)
WBC # BLD: 6.62 K/UL — SIGNIFICANT CHANGE UP (ref 4.8–10.8)
WBC # FLD AUTO: 6.62 K/UL — SIGNIFICANT CHANGE UP (ref 4.8–10.8)

## 2018-09-16 RX ORDER — ALPRAZOLAM 0.25 MG
0.25 TABLET ORAL EVERY 8 HOURS
Qty: 0 | Refills: 0 | Status: DISCONTINUED | OUTPATIENT
Start: 2018-09-16 | End: 2018-09-17

## 2018-09-16 RX ADMIN — Medication 100 GRAM(S): at 06:12

## 2018-09-16 RX ADMIN — Medication 50 MILLIGRAM(S): at 13:48

## 2018-09-16 RX ADMIN — LATANOPROST 1 DROP(S): 0.05 SOLUTION/ DROPS OPHTHALMIC; TOPICAL at 06:12

## 2018-09-16 RX ADMIN — HEPARIN SODIUM 5000 UNIT(S): 5000 INJECTION INTRAVENOUS; SUBCUTANEOUS at 21:48

## 2018-09-16 RX ADMIN — HEPARIN SODIUM 5000 UNIT(S): 5000 INJECTION INTRAVENOUS; SUBCUTANEOUS at 06:12

## 2018-09-16 RX ADMIN — OXYCODONE HYDROCHLORIDE 5 MILLIGRAM(S): 5 TABLET ORAL at 12:54

## 2018-09-16 RX ADMIN — Medication 12.5 MILLIGRAM(S): at 06:13

## 2018-09-16 RX ADMIN — SIMVASTATIN 40 MILLIGRAM(S): 20 TABLET, FILM COATED ORAL at 21:48

## 2018-09-16 RX ADMIN — Medication 325 MILLIGRAM(S): at 13:49

## 2018-09-16 RX ADMIN — Medication 3 MILLILITER(S): at 13:37

## 2018-09-16 RX ADMIN — Medication 1 MILLIGRAM(S): at 13:49

## 2018-09-16 RX ADMIN — Medication 12.5 MILLIGRAM(S): at 18:37

## 2018-09-16 RX ADMIN — Medication 3 MILLILITER(S): at 21:03

## 2018-09-16 RX ADMIN — CHLORHEXIDINE GLUCONATE 1 APPLICATION(S): 213 SOLUTION TOPICAL at 21:48

## 2018-09-16 RX ADMIN — Medication 100 GRAM(S): at 18:37

## 2018-09-16 RX ADMIN — Medication 3 MILLILITER(S): at 07:46

## 2018-09-16 RX ADMIN — FAMOTIDINE 20 MILLIGRAM(S): 10 INJECTION INTRAVENOUS at 18:37

## 2018-09-16 RX ADMIN — BRIMONIDINE TARTRATE 1 DROP(S): 2 SOLUTION/ DROPS OPHTHALMIC at 18:38

## 2018-09-16 RX ADMIN — FAMOTIDINE 20 MILLIGRAM(S): 10 INJECTION INTRAVENOUS at 06:12

## 2018-09-16 RX ADMIN — Medication 0.6 MILLIGRAM(S): at 06:11

## 2018-09-16 RX ADMIN — BRIMONIDINE TARTRATE 1 DROP(S): 2 SOLUTION/ DROPS OPHTHALMIC at 06:11

## 2018-09-16 RX ADMIN — Medication 325 MILLIGRAM(S): at 13:48

## 2018-09-16 RX ADMIN — Medication 0.25 MILLIGRAM(S): at 09:15

## 2018-09-16 RX ADMIN — Medication 0.6 MILLIGRAM(S): at 18:37

## 2018-09-16 RX ADMIN — OXYCODONE HYDROCHLORIDE 5 MILLIGRAM(S): 5 TABLET ORAL at 13:30

## 2018-09-16 NOTE — PROGRESS NOTE ADULT - SUBJECTIVE AND OBJECTIVE BOX
Patient is a 78y old  Female who presents with a chief complaint of R/o ACS (16 Sep 2018 13:51)      HPI:  78 F w/ pmh of HTN, HLD, CAD s/p CABG, Gluacoma, Diverticulosis, Uterine Ca s/p SHAAN & Chemo presenting to the ED c/o Chest pain. The CP is on the left side, radiated to the L arm, was a/w nausea & lasted for 2 minutes & then resolved. She denies any sob, vomiting, diaphoresis, fevers, chills, syncope. Pt is asymptomatic in the ED. (05 Sep 2018 22:58)    REVIEW OF SYSTEMS:  CONSTITUTIONAL:  [-] weakness, [-] fevers, [-] chills  EYES/ENT: [-] visual changes, [-] vertigo, [-] throat pain   NECK: [-] pain, [-] stiffness  RESPIRATORY: [-] cough, [-] wheezing, [-] hemoptysis, [-] shortness of breath  CARDIOVASCULAR: [-] chest pain, [-] palpitations, [-] orthopnea  GASTROINTESTINAL:    [-]abdominal pain, [-] nausea, [-] vomiting, [-] hematemesis, [-] diarrhea, [-] constipation, [-] melena, [-] hematochezia.  GENITOURINARY: [-] dysuria, [-] frequency, [-] hematuria  NEUROLOGICAL: [-] numbness, [-] weakness  SKIN: [-] itching, [-] burning, [-] rashes, [-] lesions   All other review of systems is negative unless indicated above.  PAST MEDICAL & SURGICAL HISTORY:  Left lower quadrant pain  Glaucoma  Cataract of right eye  High cholesterol  Hypertension  Uterine cancer  Athscl CABG, unsp, w angina pectoris w documented spasm  H/O total hysterectomy      SOCIAL HX:   Smoking                         ETOH                            Other        	    Allergies    quinine (Urticaria)    Intolerances          PHYSICAL EXAM    ICU Vital Signs Last 24 Hrs  T(C): 37.1 (16 Sep 2018 16:00), Max: 37.7 (16 Sep 2018 01:00)  T(F): 98.8 (16 Sep 2018 16:00), Max: 99.8 (16 Sep 2018 01:00)  HR: 78 (16 Sep 2018 19:00) (70 - 98)  BP: 98/59 (16 Sep 2018 19:00) (98/59 - 138/70)  BP(mean): 76 (16 Sep 2018 19:00) (64 - 103)  ABP: --  ABP(mean): --  RR: 25 (16 Sep 2018 15:00) (13 - 31)  SpO2: 100% (16 Sep 2018 19:00) (98% - 100%)            LABS:                          8.7    6.62  )-----------( 147      ( 16 Sep 2018 02:00 )             25.3                                               09-16    142  |  106  |  16  ----------------------------<  105<H>  4.0   |  23  |  0.7    Ca    8.2<L>      16 Sep 2018 02:00  Mg     2.5     09-16                                                                                                                                                                                                                           X-Rays                                                                                     ECHO  R effusion small      MEDICATIONS  (STANDING):  ALBUTerol/ipratropium for Nebulization 3 milliLiter(s) Nebulizer every 6 hours  aspirin enteric coated 325 milliGRAM(s) Oral daily  brimonidine 0.2% Ophthalmic Solution 1 Drop(s) Both EYES every 12 hours  chlorhexidine 4% Liquid 1 Application(s) Topical <User Schedule>  colchicine 0.6 milliGRAM(s) Oral every 12 hours  docusate sodium 100 milliGRAM(s) Oral three times a day  dorzolamide 2% Ophthalmic Solution 1 Drop(s) Both EYES <User Schedule>  famotidine    Tablet 20 milliGRAM(s) Oral two times a day  ferrous    sulfate 325 milliGRAM(s) Oral daily  folic acid 1 milliGRAM(s) Oral daily  heparin  Injectable 5000 Unit(s) SubCutaneous every 8 hours  latanoprost 0.005% Ophthalmic Solution 1 Drop(s) Both EYES <User Schedule>  magnesium sulfate  IVPB 1 Gram(s) IV Intermittent every 12 hours  metoprolol tartrate 12.5 milliGRAM(s) Oral two times a day  pregabalin 50 milliGRAM(s) Oral daily  simvastatin 40 milliGRAM(s) Oral at bedtime    MEDICATIONS  (PRN):  ALPRAZolam 0.25 milliGRAM(s) Oral every 8 hours PRN anxiety  magnesium hydroxide Suspension 30 milliLiter(s) Oral daily PRN Constipation  ondansetron  IVPB 4 milliGRAM(s) IV Intermittent once PRN Nausea and/or Vomiting  oxyCODONE    IR 5 milliGRAM(s) Oral every 4 hours PRN Mild Pain (1 - 3)  oxyCODONE    IR 10 milliGRAM(s) Oral every 4 hours PRN Moderate Pain (4 - 6)  senna 1 Tablet(s) Oral every 12 hours PRN Constipation      IMPRESSION:    PHYSICAL EXAM:  General: WN/WD NAD    HEENT:     [+] NCAT  [+] EOMI  [-] Conjuctival edema   [-] Icterus   [-] Thrush   [-] ETT  [-] NGT/OGT    Neck:         [+] FROM   [-] JVD     [-] Nodes     [-] Masses    [+] Mid-line trachea    [-] Tracheostomy    Chest:         [-] Sternal click   [-] Sternal drainage   [-] Pacing wires   [-] Chest tubes   [-] SubQ emphysema    Lungs:          [+] CTA   [-] Rhonchi   [-] Rales    [-] Wheezing    [-] Decreased BS    [-] Dullness R L    Cardiac:       [+] S1 [+] S2    [+] RRR   [-] Irregular   [-] S3   [-] S4    [-] Murmurs    [-] Rub    Abdomen:    [+] BS    [+] Soft    [+] Non-tender     [-] Distended    [-] Organomegaly  [-] PEG    Extremities:   [-] Cyanosis U/L   [-] Clubbing  U/L  [-] LE/UE Edema   [+] Capillary refill    [+] Pulses     Neuro:        [+] Awake   [+]  Alert   [-] Confused   [-] Lethargic   [-] Sedated   [-] Generalized Weakness    Skin:        [-] Rashes    [-] Erythema   [+] Normal incisions   [+] IV sites intact   [-] Sacral decubitus

## 2018-09-16 NOTE — PROGRESS NOTE ADULT - SUBJECTIVE AND OBJECTIVE BOX
OPERATIVE PROCEDURE(s):    Re-op  CABGx2           POD #      5                 78yFemale  SURGEON(s): CASEY Bah  SUBJECTIVE ASSESSMENT:  Patient has no complaints at this time.    Vital Signs Last 24 Hrs  T(F): 99.1 (16 Sep 2018 08:15), Max: 99.8 (16 Sep 2018 01:00)  HR: 72 (16 Sep 2018 09:00) (70 - 98)  BP: 113/54 (16 Sep 2018 09:00) (111/61 - 142/61)  BP(mean): 64 (16 Sep 2018 09:) (64 - 100)  RR: 31 (16 Sep 2018 06:00) (13 - 36)  SpO2: 100% (16 Sep 2018 09:) (98% - 100%)    I&O's Detail    15 Sep 2018 07:  -  16 Sep 2018 07:00  --------------------------------------------------------  IN:    IV PiggyBack: 100 mL    Oral Fluid: 240 mL  Total IN: 340 mL    OUT:    Voided: 400 mL  Total OUT: 400 mL        Net:   I&O's Detail    14 Sep 2018 07:01  -  15 Sep 2018 07:00  --------------------------------------------------------  Total NET: -250 mL      15 Sep 2018 07:  -  16 Sep 2018 07:00  --------------------------------------------------------  Total NET: -60 mL        Physical Exam:  General: NAD; A&Ox3  Cardiac: S1/S2, RRR, no murmur, no rubs  Lungs: unlabored respirations, CTA b/l, no wheeze, no rales, no crackles  Abdomen: Soft/NT/ND; positive bowel sounds x 4  Extremities: No edema b/l lower extremities; good capillary refill; no cyanosis; palpable 1+ pedal pulses b/l    Central Venous Catheter: Yes[]  No[x] , If Yes indication:           Day #  Rowley Catheter: Yes  [] , No  [x] , If yes indication:                      Day #  NGT: Yes [] No [x] ,    If Yes Placement:                                     Day #  EPICARDIAL WIRES:  [x] YES [] NO                                              Day #5 D/C'd today  BOWEL MOVEMENT:  [x] YES [] NO, If No, Timing since last BM:      Day #  CHEST TUBE(Left/Right):  [] YES [x] NO, If yes -  AIR LEAKS:  [] YES [] NO        LABS:                        8.7<L>  6.62  )-----------( 147      ( 16 Sep 2018 02:00 )             25.3<L>                        8.1<L>  7.27  )-----------( 118<L>    ( 15 Sep 2018 02:41 )             24.2<L>        142  |  106  |  16  ----------------------------<  105<H>  4.0   |  23  |  0.7  09-15    141  |  105  |  21<H>  ----------------------------<  102<H>  4.0   |  24  |  0.8    Ca    8.2<L>      16 Sep 2018 02:00  Mg     2.5           RADIOLOGY & ADDITIONAL TESTS:  CXR: Xray Chest 1 View- PORTABLE-Routine:   EXAM:  XR CHEST PORTABLE ROUTINE 1V            PROCEDURE DATE:  2018            INTERPRETATION:  Clinical History / Reason for exam: Shortness of breath.    Comparison : Chest radiograph September 15, 2018.    Technique/Positioning: Satisfactory.    Findings:    Support devices: None.    Cardiac/mediastinum/hilum: Stable. Status post a median sternotomy and   cardiomegaly.    Lung parenchyma/Pleura: Bilateral opacities, unchanged. No pneumothorax   is seen.    Skeleton/soft tissues: Stable    Impression:      Status post a median sternotomy and cardiomegaly.    Bilateral opacities.    No significant change from September 15, 2018.                  SARANYA GONZALEZ M.D., ATTENDING RADIOLOGIST  This document has been electronically signed. Sep 16 2018 10:43AM             (18 @ 04:04)    EK Lead ECG:   Ventricular Rate 73 BPM    Atrial Rate 73 BPM    P-R Interval 134 ms    QRS Duration 78 ms    Q-T Interval 370 ms    QTC Calculation(Bezet) 407 ms    P Axis 10 degrees    R Axis 10 degrees    T Axis -1 degrees    Diagnosis Line Normal sinus rhythm  ST elevation, consider early repolarization, pericarditis, or injury  Abnormal ECG    Confirmed by FOZIA OVALLE MD ([id number]) on 2018 12:06:19 PM (18 @ 09:25)    MEDICATIONS  (STANDING):  ALBUTerol/ipratropium for Nebulization 3 milliLiter(s) Nebulizer every 6 hours  aspirin enteric coated 325 milliGRAM(s) Oral daily  brimonidine 0.2% Ophthalmic Solution 1 Drop(s) Both EYES every 12 hours  chlorhexidine 4% Liquid 1 Application(s) Topical <User Schedule>  colchicine 0.6 milliGRAM(s) Oral every 12 hours  docusate sodium 100 milliGRAM(s) Oral three times a day  dorzolamide 2% Ophthalmic Solution 1 Drop(s) Both EYES <User Schedule>  famotidine    Tablet 20 milliGRAM(s) Oral two times a day  ferrous    sulfate 325 milliGRAM(s) Oral daily  folic acid 1 milliGRAM(s) Oral daily  heparin  Injectable 5000 Unit(s) SubCutaneous every 8 hours  latanoprost 0.005% Ophthalmic Solution 1 Drop(s) Both EYES <User Schedule>  magnesium sulfate  IVPB 1 Gram(s) IV Intermittent every 12 hours  metoprolol tartrate 12.5 milliGRAM(s) Oral two times a day  pregabalin 50 milliGRAM(s) Oral daily  simvastatin 40 milliGRAM(s) Oral at bedtime    MEDICATIONS  (PRN):  ALPRAZolam 0.25 milliGRAM(s) Oral every 8 hours PRN anxiety  magnesium hydroxide Suspension 30 milliLiter(s) Oral daily PRN Constipation  ondansetron  IVPB 4 milliGRAM(s) IV Intermittent once PRN Nausea and/or Vomiting  oxyCODONE    IR 5 milliGRAM(s) Oral every 4 hours PRN Mild Pain (1 - 3)  oxyCODONE    IR 10 milliGRAM(s) Oral every 4 hours PRN Moderate Pain (4 - 6)  senna 1 Tablet(s) Oral every 12 hours PRN Constipation    HEPARIN:  [x] YES [] NO  Dose: 5000 UNITS Q8H  SCD's: YES b/l  GI Prophylaxis: Protonix [], Pepcid [x], None [], (Contra-indication:.....)    Post-Op Aspirin: Yes [x],  No [], If No, then contra-indication:  Post-Op Statin: Yes [x], No[], If No, then contra-indication:  Post-Op Beta-Blockers: Yes [x], No [], If No, then contra-indication:    Allergies:  quinine (Urticaria)      Ambulation/Activity Status: Ambulates several times daily without assistance.    Assessment/Plan:  78y Female status-post re-op CABG  - Case and plan discussed with CTU Intensivist and CT Surgeon - Dr. Padilla/Olvin  - Continue CTU supportive care    - Continue DVT/GI prophylaxis  - Incentive Spirometry 10 times an hour  - Continue to advance physical activity as tolerated and continue PT/OT as directed  1. CAD: Continue ASA, statin, BB  2. Stairs today with rehab team  3. D/C planning to home tomorrow    Social Service Disposition:  Home

## 2018-09-16 NOTE — PROGRESS NOTE ADULT - ASSESSMENT
Assessment:  CAD S/P CABG    PAST MEDICAL & SURGICAL HISTORY:  Left lower quadrant pain  Glaucoma  Cataract of right eye  High cholesterol  Hypertension  Uterine cancer  Athscl CABG, unsp, w angina pectoris w documented spasm  H/O total hysterectomy      PLAN:  Neuro: Pain control  Pulm: Encourage coughing, deep breathing and use of incentive spirometry. Wean off supplemental oxygen as able. Daily CXR.   Cardio: Monitor telemetry/alarms. Continue cardiac meds  GI: Tolerating diet. Continue stool softeners. Continue GI prophylaxis  Renal: monitor urine output, supplement electrolytes as needed  Vasc: Heparin SC/SCDs for DVT prophylaxis  Heme: Monitor H/H.   ID: Off antibiotics. Stable.  Endocrine: Monitor finger stick blood sugar  Physical Therapy: OOB/ambulate

## 2018-09-17 VITALS
OXYGEN SATURATION: 96 % | SYSTOLIC BLOOD PRESSURE: 118 MMHG | HEART RATE: 76 BPM | RESPIRATION RATE: 27 BRPM | DIASTOLIC BLOOD PRESSURE: 78 MMHG | TEMPERATURE: 99 F

## 2018-09-17 LAB
ANION GAP SERPL CALC-SCNC: 15 MMOL/L — HIGH (ref 7–14)
BASOPHILS # BLD AUTO: 0.03 K/UL — SIGNIFICANT CHANGE UP (ref 0–0.2)
BASOPHILS NFR BLD AUTO: 0.3 % — SIGNIFICANT CHANGE UP (ref 0–1)
BUN SERPL-MCNC: 17 MG/DL — SIGNIFICANT CHANGE UP (ref 10–20)
CALCIUM SERPL-MCNC: 8.5 MG/DL — SIGNIFICANT CHANGE UP (ref 8.5–10.1)
CHLORIDE SERPL-SCNC: 101 MMOL/L — SIGNIFICANT CHANGE UP (ref 98–110)
CO2 SERPL-SCNC: 25 MMOL/L — SIGNIFICANT CHANGE UP (ref 17–32)
CREAT SERPL-MCNC: 0.8 MG/DL — SIGNIFICANT CHANGE UP (ref 0.7–1.5)
EOSINOPHIL # BLD AUTO: 0.34 K/UL — SIGNIFICANT CHANGE UP (ref 0–0.7)
EOSINOPHIL NFR BLD AUTO: 3.9 % — SIGNIFICANT CHANGE UP (ref 0–8)
GLUCOSE SERPL-MCNC: 100 MG/DL — HIGH (ref 70–99)
HCT VFR BLD CALC: 30.2 % — LOW (ref 37–47)
HGB BLD-MCNC: 10.1 G/DL — LOW (ref 12–16)
IMM GRANULOCYTES NFR BLD AUTO: 2.7 % — HIGH (ref 0.1–0.3)
LYMPHOCYTES # BLD AUTO: 1.99 K/UL — SIGNIFICANT CHANGE UP (ref 1.2–3.4)
LYMPHOCYTES # BLD AUTO: 22.7 % — SIGNIFICANT CHANGE UP (ref 20.5–51.1)
MAGNESIUM SERPL-MCNC: 2.3 MG/DL — SIGNIFICANT CHANGE UP (ref 1.8–2.4)
MCHC RBC-ENTMCNC: 28 PG — SIGNIFICANT CHANGE UP (ref 27–31)
MCHC RBC-ENTMCNC: 33.4 G/DL — SIGNIFICANT CHANGE UP (ref 32–37)
MCV RBC AUTO: 83.7 FL — SIGNIFICANT CHANGE UP (ref 81–99)
MONOCYTES # BLD AUTO: 1.35 K/UL — HIGH (ref 0.1–0.6)
MONOCYTES NFR BLD AUTO: 15.4 % — HIGH (ref 1.7–9.3)
NEUTROPHILS # BLD AUTO: 4.82 K/UL — SIGNIFICANT CHANGE UP (ref 1.4–6.5)
NEUTROPHILS NFR BLD AUTO: 55 % — SIGNIFICANT CHANGE UP (ref 42.2–75.2)
NRBC # BLD: 0 /100 WBCS — SIGNIFICANT CHANGE UP (ref 0–0)
PLATELET # BLD AUTO: 188 K/UL — SIGNIFICANT CHANGE UP (ref 130–400)
POTASSIUM SERPL-MCNC: 4.4 MMOL/L — SIGNIFICANT CHANGE UP (ref 3.5–5)
POTASSIUM SERPL-SCNC: 4.4 MMOL/L — SIGNIFICANT CHANGE UP (ref 3.5–5)
RBC # BLD: 3.61 M/UL — LOW (ref 4.2–5.4)
RBC # FLD: 15.6 % — HIGH (ref 11.5–14.5)
SODIUM SERPL-SCNC: 141 MMOL/L — SIGNIFICANT CHANGE UP (ref 135–146)
WBC # BLD: 8.77 K/UL — SIGNIFICANT CHANGE UP (ref 4.8–10.8)
WBC # FLD AUTO: 8.77 K/UL — SIGNIFICANT CHANGE UP (ref 4.8–10.8)

## 2018-09-17 RX ORDER — SIMVASTATIN 20 MG/1
1 TABLET, FILM COATED ORAL
Qty: 30 | Refills: 0
Start: 2018-09-17 | End: 2018-10-16

## 2018-09-17 RX ORDER — FOLIC ACID 0.8 MG
1 TABLET ORAL
Qty: 30 | Refills: 0
Start: 2018-09-17 | End: 2018-10-16

## 2018-09-17 RX ORDER — LOSARTAN POTASSIUM 100 MG/1
1 TABLET, FILM COATED ORAL
Qty: 0 | Refills: 0 | COMMUNITY

## 2018-09-17 RX ORDER — METOPROLOL TARTRATE 50 MG
1 TABLET ORAL
Qty: 0 | Refills: 0 | DISCHARGE
Start: 2018-09-17 | End: 2018-10-16

## 2018-09-17 RX ORDER — DOCUSATE SODIUM 100 MG
1 CAPSULE ORAL
Qty: 90 | Refills: 0
Start: 2018-09-17 | End: 2018-10-16

## 2018-09-17 RX ORDER — COLCHICINE 0.6 MG
1 TABLET ORAL
Qty: 28 | Refills: 0
Start: 2018-09-17 | End: 2018-09-30

## 2018-09-17 RX ORDER — ASPIRIN/CALCIUM CARB/MAGNESIUM 324 MG
1 TABLET ORAL
Qty: 0 | Refills: 0 | COMMUNITY

## 2018-09-17 RX ORDER — ASPIRIN/CALCIUM CARB/MAGNESIUM 324 MG
1 TABLET ORAL
Qty: 30 | Refills: 0
Start: 2018-09-17 | End: 2018-10-16

## 2018-09-17 RX ORDER — AMLODIPINE BESYLATE 2.5 MG/1
1 TABLET ORAL
Qty: 0 | Refills: 0 | COMMUNITY

## 2018-09-17 RX ORDER — METOPROLOL TARTRATE 50 MG
12.5 TABLET ORAL
Qty: 60 | Refills: 0
Start: 2018-09-17 | End: 2018-10-16

## 2018-09-17 RX ORDER — SIMVASTATIN 20 MG/1
1 TABLET, FILM COATED ORAL
Qty: 0 | Refills: 0 | COMMUNITY

## 2018-09-17 RX ORDER — FAMOTIDINE 10 MG/ML
1 INJECTION INTRAVENOUS
Qty: 60 | Refills: 0
Start: 2018-09-17 | End: 2018-10-16

## 2018-09-17 RX ORDER — FERROUS SULFATE 325(65) MG
1 TABLET ORAL
Qty: 30 | Refills: 0
Start: 2018-09-17 | End: 2018-10-16

## 2018-09-17 RX ADMIN — Medication 12.5 MILLIGRAM(S): at 05:08

## 2018-09-17 RX ADMIN — Medication 325 MILLIGRAM(S): at 12:25

## 2018-09-17 RX ADMIN — Medication 0.6 MILLIGRAM(S): at 05:07

## 2018-09-17 RX ADMIN — HEPARIN SODIUM 5000 UNIT(S): 5000 INJECTION INTRAVENOUS; SUBCUTANEOUS at 12:26

## 2018-09-17 RX ADMIN — OXYCODONE HYDROCHLORIDE 10 MILLIGRAM(S): 5 TABLET ORAL at 12:32

## 2018-09-17 RX ADMIN — HEPARIN SODIUM 5000 UNIT(S): 5000 INJECTION INTRAVENOUS; SUBCUTANEOUS at 05:08

## 2018-09-17 RX ADMIN — LATANOPROST 1 DROP(S): 0.05 SOLUTION/ DROPS OPHTHALMIC; TOPICAL at 05:10

## 2018-09-17 RX ADMIN — Medication 1 MILLIGRAM(S): at 11:44

## 2018-09-17 RX ADMIN — OXYCODONE HYDROCHLORIDE 5 MILLIGRAM(S): 5 TABLET ORAL at 05:45

## 2018-09-17 RX ADMIN — Medication 100 MILLIGRAM(S): at 05:08

## 2018-09-17 RX ADMIN — FAMOTIDINE 20 MILLIGRAM(S): 10 INJECTION INTRAVENOUS at 05:09

## 2018-09-17 RX ADMIN — OXYCODONE HYDROCHLORIDE 5 MILLIGRAM(S): 5 TABLET ORAL at 05:16

## 2018-09-17 RX ADMIN — BRIMONIDINE TARTRATE 1 DROP(S): 2 SOLUTION/ DROPS OPHTHALMIC at 05:07

## 2018-09-17 RX ADMIN — Medication 325 MILLIGRAM(S): at 11:44

## 2018-09-17 RX ADMIN — Medication 100 GRAM(S): at 06:23

## 2018-09-17 RX ADMIN — Medication 50 MILLIGRAM(S): at 11:48

## 2018-09-17 RX ADMIN — Medication 0.25 MILLIGRAM(S): at 12:32

## 2018-09-17 RX ADMIN — Medication 100 MILLIGRAM(S): at 12:26

## 2018-09-17 NOTE — PROGRESS NOTE ADULT - SUBJECTIVE AND OBJECTIVE BOX
CTU Attending Progress Daily Note     17 Sep 2018 09:34  POD#      6         Procedure:  C2 DIANA    Patient seen as post-op critical care follow-up    HPI:  78 F w/ pmh of HTN, HLD, CAD s/p CABG, Gluacoma, Diverticulosis, Uterine Ca s/p SHAAN & Chemo presenting to the ED c/o Chest pain. The CP is on the left side, radiated to the L arm, was a/w nausea & lasted for 2 minutes & then resolved. She denies any sob, vomiting, diaphoresis, fevers, chills, syncope. Pt is asymptomatic in the ED. (05 Sep 2018 22:58)      Interval event for past 24 hr:  BEKAH NELSON  78y had no event.     Current Complains:  BEKAH NELSON has no new complaints    REVIEW OF SYSTEMS:  CONSTITUTIONAL:  [-] weakness, [-] fevers, [-] chills  EYES/ENT: [-] visual changes, [-] vertigo, [-] throat pain   NECK: [-] pain, [-] stiffness  RESPIRATORY: [-] cough, [-] wheezing, [-] hemoptysis, [-] shortness of breath  CARDIOVASCULAR: [-] chest pain, [-] palpitations, [-] orthopnea  GASTROINTESTINAL:    [-]abdominal pain, [-] nausea, [-] vomiting, [-] hematemesis, [-] diarrhea, [-] constipation, [-] melena, [-] hematochezia.  GENITOURINARY: [-] dysuria, [-] frequency, [-] hematuria  NEUROLOGICAL: [-] numbness, [-] weakness  SKIN: [-] itching, [-] burning, [-] rashes, [-] lesions   All other review of systems is negative unless indicated above.    [  ] Unable to assess ROS because :    OBJECTIVE:  ICU Vital Signs Last 24 Hrs  T(C): 37.3 (17 Sep 2018 07:00), Max: 37.3 (16 Sep 2018 21:00)  T(F): 99.2 (17 Sep 2018 07:00), Max: 99.2 (16 Sep 2018 21:00)  HR: 66 (17 Sep 2018 08:00) (66 - 84)  BP: 122/65 (17 Sep 2018 08:00) (94/59 - 138/70)  BP(mean): 89 (17 Sep 2018 08:00) (69 - 103)  ABP: --  ABP(mean): --  RR: 17 (17 Sep 2018 08:00) (14 - 25)  SpO2: 98% (17 Sep 2018 08:00) (97% - 100%)      I&O's Summary    16 Sep 2018 07:01  -  17 Sep 2018 07:00  --------------------------------------------------------  IN: 1040 mL / OUT: 1200 mL / NET: -160 mL      Adult Advanced Hemodynamics Last 24 Hrs  CVP(mm Hg): --  CVP(cm H2O): --  CO: --  CI: --  PA: --  PA(mean): --  PCWP: --  SVR: --  SVRI: --  PVR: --  PVRI: --      PHYSICAL EXAM:  General: WN/WD NAD    HEENT:     [+] NCAT  [+] EOMI  [-] Conjuctival edema   [-] Icterus   [-] Thrush   [-] ETT  [-] NGT/OGT    Neck:         [+] FROM   [-] JVD     [-] Nodes     [-] Masses    [+] Mid-line trachea    [-] Tracheostomy    Chest:         [-] Sternal click   [-] Sternal drainage      [-] SubQ emphysema    Lungs:          [+] CTA   [-] Rhonchi   [-] Rales    [-] Wheezing    [-] Decreased BS    [-] Dullness R L    Cardiac:       [+] S1 [+] S2    [+] RRR   [-] Irregular   [-] S3   [-] S4    [-] Murmurs    [-] Rub    Abdomen:    [+] BS    [+] Soft    [+] Non-tender     [-] Distended    [-] Organomegaly  [-] PEG    Extremities:   [-] Cyanosis U/L   [-] Clubbing  U/L  [-] LE/UE Edema   [+] Capillary refill    [+] Pulses     Neuro:        [+] Awake   [+]  Alert   [-] Confused   [-] Lethargic   [-] Sedated   [-] Generalized Weakness    Skin:        [-] Rashes    [-] Erythema   [+] Normal incisions   [+] IV sites intact   [-] Sacral decubitus      HOSPITAL MEDICATIONS:  MEDICATIONS  (STANDING):  ALBUTerol/ipratropium for Nebulization 3 milliLiter(s) Nebulizer every 6 hours  aspirin enteric coated 325 milliGRAM(s) Oral daily  brimonidine 0.2% Ophthalmic Solution 1 Drop(s) Both EYES every 12 hours  chlorhexidine 4% Liquid 1 Application(s) Topical <User Schedule>  colchicine 0.6 milliGRAM(s) Oral every 12 hours  docusate sodium 100 milliGRAM(s) Oral three times a day  dorzolamide 2% Ophthalmic Solution 1 Drop(s) Both EYES <User Schedule>  famotidine    Tablet 20 milliGRAM(s) Oral two times a day  ferrous    sulfate 325 milliGRAM(s) Oral daily  folic acid 1 milliGRAM(s) Oral daily  heparin  Injectable 5000 Unit(s) SubCutaneous every 8 hours  latanoprost 0.005% Ophthalmic Solution 1 Drop(s) Both EYES <User Schedule>  magnesium sulfate  IVPB 1 Gram(s) IV Intermittent every 12 hours  metoprolol tartrate 12.5 milliGRAM(s) Oral two times a day  pregabalin 50 milliGRAM(s) Oral daily  simvastatin 40 milliGRAM(s) Oral at bedtime    MEDICATIONS  (PRN):  ALPRAZolam 0.25 milliGRAM(s) Oral every 8 hours PRN anxiety  magnesium hydroxide Suspension 30 milliLiter(s) Oral daily PRN Constipation  ondansetron  IVPB 4 milliGRAM(s) IV Intermittent once PRN Nausea and/or Vomiting  oxyCODONE    IR 5 milliGRAM(s) Oral every 4 hours PRN Mild Pain (1 - 3)  oxyCODONE    IR 10 milliGRAM(s) Oral every 4 hours PRN Moderate Pain (4 - 6)  senna 1 Tablet(s) Oral every 12 hours PRN Constipation      LABS:                          10.1   8.77  )-----------( 188      ( 17 Sep 2018 02:00 )             30.2     09-17    141  |  101  |  17  ----------------------------<  100<H>  4.4   |  25  |  0.8    Ca    8.5      17 Sep 2018 02:00  Mg     2.3     09-17              RADIOLOGY:  Reviewed and interpreted by me  CXR from 09-17-18 shows [+] mild congestion, [-] pneumothorax, [+] R effusion, [-] cardiomegaly,       ECG:  Reviewed and interpreted by me:       Assessment:  CAD SP CABG    PAST MEDICAL & SURGICAL HISTORY:  Left lower quadrant pain  Glaucoma  Cataract of right eye  High cholesterol  Hypertension  Uterine cancer  Athscl CABG, unsp, w angina pectoris w documented spasm  H/O total hysterectomy      PLAN:  Neuro: Pain control  Pulm: Encourage coughing, deep breathing and use of incentive spirometry. Daily CXR.   Cardio: Monitor telemetry/alarms. Continue cardiac meds  GI: Tolerating diet. Continue stool softeners. Continue GI prophylaxis  Renal: monitor urine output, supplement electrolytes as needed  Vasc: Heparin SC/SCDs for DVT prophylaxis  Heme: Monitor H/H.   ID: Off antibiotics. Stable.  Endocrine: Monitor finger stick blood sugar  Physical Therapy: OOB/ambulate        Discussed with Cardiothoracic Team at AM rounds.

## 2018-09-17 NOTE — PROGRESS NOTE ADULT - REASON FOR ADMISSION
R/o ACS

## 2018-09-17 NOTE — PROGRESS NOTE ADULT - SUBJECTIVE AND OBJECTIVE BOX
OPERATIVE PROCEDURE(s):                POD #                       SURGEON(s): CASEY Bah  SUBJECTIVE ASSESSMENT:78yFemale patient seen and examined at bedside.    Vital Signs Last 24 Hrs  T(F): 99.2 (17 Sep 2018 07:00), Max: 99.2 (16 Sep 2018 21:00)  HR: 66 (17 Sep 2018 08:00) (66 - 84)  BP: 122/65 (17 Sep 2018 08:00) (94/59 - 138/70)  BP(mean): 89 (17 Sep 2018 08:00) (69 - 103)  RR: 17 (17 Sep 2018 08:00) (14 - 25)  SpO2: 98% (17 Sep 2018 08:00) (97% - 100%)    I&O's Detail    16 Sep 2018 07:01  -  17 Sep 2018 07:00  --------------------------------------------------------  IN:    IV PiggyBack: 200 mL    Oral Fluid: 840 mL  Total IN: 1040 mL    OUT:    Voided: 1200 mL  Total OUT: 1200 mL    Net: I&O's Detail    15 Sep 2018 07:01  -  16 Sep 2018 07:00  --------------------------------------------------------  Total NET: -60 mL      16 Sep 2018 07:01  -  17 Sep 2018 07:00  --------------------------------------------------------  Total NET: -160 mL      Physical Exam:  General: NAD; A&Ox3  Cardiac: S1/S2, RRR, no murmur, no rubs  Lungs: unlabored respirations, CTA b/l, no wheeze, no rales, no crackles  Abdomen: Soft/NT/ND; positive bowel sounds x 4  Sternum: Intact, no click, incision healing well with no drainage  Incisions: Incisions clean/dry/intact  Extremities: No edema b/l lower extremities; good capillary refill; no cyanosis; palpable 1+ pedal pulses b/l    Central Venous Catheter: Yes[]  No[] , If Yes indication:                    Day #  Rowley Catheter: Yes  [] , No  [] , If yes indication:                                 Day #  NGT: Yes [] No [] ,    If Yes Placement:                                                   Day #  EPICARDIAL WIRES:  [] YES [] NO                                                            Day #  BOWEL MOVEMENT:  [] YES [] NO, If No, Timing since last BM Day #  CHEST TUBE(Left/Right):  [] YES [] NO, If yes -  AIR LEAKS:  [] YES [] NO        LABS:                        10.1<L>  8.77  )-----------( 188      ( 17 Sep 2018 02:00 )             30.2<L>                        8.7<L>  6.62  )-----------( 147      ( 16 Sep 2018 02:00 )             25.3<L>    09-17    141  |  101  |  17  ----------------------------<  100<H>  4.4   |  25  |  0.8    09-16    142  |  106  |  16  ----------------------------<  105<H>  4.0   |  23  |  0.7    Ca    8.5      17 Sep 2018 02:00  Mg     2.3     09-17            RADIOLOGY & ADDITIONAL TESTS:  CXR:   EKG:    Allergies  quinine (Urticaria)  Intolerances      MEDICATIONS  (STANDING):  ALBUTerol/ipratropium for Nebulization 3 milliLiter(s) Nebulizer every 6 hours  aspirin enteric coated 325 milliGRAM(s) Oral daily  brimonidine 0.2% Ophthalmic Solution 1 Drop(s) Both EYES every 12 hours  chlorhexidine 4% Liquid 1 Application(s) Topical <User Schedule>  colchicine 0.6 milliGRAM(s) Oral every 12 hours  docusate sodium 100 milliGRAM(s) Oral three times a day  dorzolamide 2% Ophthalmic Solution 1 Drop(s) Both EYES <User Schedule>  famotidine    Tablet 20 milliGRAM(s) Oral two times a day  ferrous    sulfate 325 milliGRAM(s) Oral daily  folic acid 1 milliGRAM(s) Oral daily  heparin  Injectable 5000 Unit(s) SubCutaneous every 8 hours  latanoprost 0.005% Ophthalmic Solution 1 Drop(s) Both EYES <User Schedule>  magnesium sulfate  IVPB 1 Gram(s) IV Intermittent every 12 hours  metoprolol tartrate 12.5 milliGRAM(s) Oral two times a day  pregabalin 50 milliGRAM(s) Oral daily  simvastatin 40 milliGRAM(s) Oral at bedtime    MEDICATIONS  (PRN):  ALPRAZolam 0.25 milliGRAM(s) Oral every 8 hours PRN anxiety  magnesium hydroxide Suspension 30 milliLiter(s) Oral daily PRN Constipation  ondansetron  IVPB 4 milliGRAM(s) IV Intermittent once PRN Nausea and/or Vomiting  oxyCODONE    IR 5 milliGRAM(s) Oral every 4 hours PRN Mild Pain (1 - 3)  oxyCODONE    IR 10 milliGRAM(s) Oral every 4 hours PRN Moderate Pain (4 - 6)  senna 1 Tablet(s) Oral every 12 hours PRN Constipation    Home Medications:  amLODIPine 5 mg oral tablet: 1 tab(s) orally once a day (17 Jun 2018 10:44)  brimonidine 0.1% ophthalmic solution: to each affected eye 2 times a day (17 Jun 2018 10:44)  dorzolamide 2% ophthalmic solution: 1 drop(s) to each affected eye 2 times a day (17 Jun 2018 10:44)  latanoprost 0.005% ophthalmic solution: 1 drop(s) to each affected eye 2 times a day (17 Jun 2018 10:44)  losartan 50 mg oral tablet: 1 tab(s) orally once a day (17 Jun 2018 10:44)  Low Dose ASA 81 mg oral tablet: 1 tab(s) orally once a day (02 May 2018 07:25)  Lyrica 50 mg oral capsule:  (17 Jun 2018 10:44)  simvastatin 40 mg oral tablet: 1 tab(s) orally once a day (at bedtime) (02 May 2018 07:25)    Pharmacologic DVT Prophylaxis [] YES, [] NO: Contraindication:  [] HEPARIN: Dose: XX mg  Q24H     [] LOVENOX: Dose: XX mg  Q24H  SCD's: YES b/l    GI Prophylaxis: Protonix [], Pepcid []    Post-Op Beta-Blockers: [] Yes, [] No: contraindication:  Post-Op Nitrate: [] Yes, [] No: contraindication:  Post-Op Aspirin: [] Yes, [] No: contraindication:  Post-Op Statin: [] Yes, [] No: contraindication:    Ambulation/Activity Status:    Assessment/Plan:  78y Female status-post  - Case and plan discussed with CTU Intensivist and CT Surgeon - Dr. Padilla/Olvin/José Antonio   - Continue CTU supportive care and ongoing plan of care as per continuing CTU rounds.   - Continue DVT/GI prophylaxis  - Incentive Spirometry 10 times an hour  - Continue to advance physical activity as tolerated and continue PT/OT as directed  1. CAD: Continue ASA, statin, BB  2. HTN:   3. A. Fib:   4. COPD/Hypoxia:   5. DM/Glucose Control:     Social Service Disposition:      amLODIPine 5 mg oral tablet: 1 tab(s) orally once a day  brimonidine 0.1% ophthalmic solution: to each affected eye 2 times a day  dorzolamide 2% ophthalmic solution: 1 drop(s) to each affected eye 2 times a day  latanoprost 0.005% ophthalmic solution: 1 drop(s) to each affected eye 2 times a day  losartan 50 mg oral tablet: 1 tab(s) orally once a day  Low Dose ASA 81 mg oral tablet: 1 tab(s) orally once a day  Lyrica 50 mg oral capsule:   simvastatin 40 mg oral tablet: 1 tab(s) orally once a day (at bedtime) OPERATIVE PROCEDURE(s):s/p resternotomy for [id number]               POD # 6                      SURGEON(s): CASEY Bah  SUBJECTIVE ASSESSMENT: 78y Female patient seen and examined at bedside. Patient Ambulating well w/out walker and denies any acute complaints at this time.    Vital Signs Last 24 Hrs  T(F): 99.2 (17 Sep 2018 07:00), Max: 99.2 (16 Sep 2018 21:00)  HR: 66 (17 Sep 2018 08:00) (66 - 84)  BP: 122/65 (17 Sep 2018 08:00) (94/59 - 138/70)  BP(mean): 89 (17 Sep 2018 08:00) (69 - 103)  RR: 17 (17 Sep 2018 08:00) (14 - 25)  SpO2: 98% (17 Sep 2018 08:00) (97% - 100%)    I&O's Detail    16 Sep 2018 07:01  -  17 Sep 2018 07:00  --------------------------------------------------------  IN:    IV PiggyBack: 200 mL    Oral Fluid: 840 mL  Total IN: 1040 mL    OUT:    Voided: 1200 mL  Total OUT: 1200 mL    Net: I&O's Detail    15 Sep 2018 07:01  -  16 Sep 2018 07:00  --------------------------------------------------------  Total NET: -60 mL    16 Sep 2018 07:01  -  17 Sep 2018 07:00  --------------------------------------------------------  Total NET: -160 mL      Physical Exam:  General: NAD; A&Ox3  Cardiac: S1/S2, RRR, no murmur, no rubs  Lungs: unlabored respirations, slightly decreased at right base, no wheeze, no rales, no crackles  Abdomen: Soft/NT/ND; positive bowel sounds x 4  Sternum: Intact, no click, incision healing well with no drainage  Incisions: Incisions clean/dry/intact  Extremities: + trace edema b/l lower extremities; good capillary refill; no cyanosis; palpable 1+ pedal pulses b/l    Central Venous Catheter: Yes[x]  No[] , If Yes indication:                     Day #   Rowley Catheter: Yes  [] , No  [x] , If yes indication:                               Day #  NGT: Yes [] No [x] ,    If Yes Placement:                                             Day #  EPICARDIAL WIRES:  [] YES [x] NO                                                     Day #   BOWEL MOVEMENT:  [] YES [x] NO, If No, Timing since last BM Day #  CHEST TUBE(Left/Right):  [] YES [x] NO, If yes -  AIR LEAKS:  [] YES [] NO         LABS:                        10.1<L>  8.77  )-----------( 188      ( 17 Sep 2018 02:00 )             30.2<L>                        8.7<L>  6.62  )-----------( 147      ( 16 Sep 2018 02:00 )             25.3<L>    09-17  141  |  101  |  17  ----------------------------<  100<H>  4.4   |  25  |  0.8    09-16  142  |  106  |  16  ----------------------------<  105<H>  4.0   |  23  |  0.7    Ca    8.5      17 Sep 2018 02:00  Mg     2.3     09-17      RADIOLOGY & ADDITIONAL TESTS:  CXR: < from: Xray Chest 1 View- PORTABLE-Routine (09.17.18 @ 05:07) >  Impression:      Unchanged bibasilar atelectasis.    < end of copied text >    EKG: NSR/QTC: 410 ms    Allergies  quinine (Urticaria)  Intolerances      MEDICATIONS  (STANDING):  ALBUTerol/ipratropium for Nebulization 3 milliLiter(s) Nebulizer every 6 hours  aspirin enteric coated 325 milliGRAM(s) Oral daily  brimonidine 0.2% Ophthalmic Solution 1 Drop(s) Both EYES every 12 hours  chlorhexidine 4% Liquid 1 Application(s) Topical <User Schedule>  colchicine 0.6 milliGRAM(s) Oral every 12 hours  docusate sodium 100 milliGRAM(s) Oral three times a day  dorzolamide 2% Ophthalmic Solution 1 Drop(s) Both EYES <User Schedule>  famotidine    Tablet 20 milliGRAM(s) Oral two times a day  ferrous    sulfate 325 milliGRAM(s) Oral daily  folic acid 1 milliGRAM(s) Oral daily  heparin  Injectable 5000 Unit(s) SubCutaneous every 8 hours  latanoprost 0.005% Ophthalmic Solution 1 Drop(s) Both EYES <User Schedule>  magnesium sulfate  IVPB 1 Gram(s) IV Intermittent every 12 hours  metoprolol tartrate 12.5 milliGRAM(s) Oral two times a day  pregabalin 50 milliGRAM(s) Oral daily  simvastatin 40 milliGRAM(s) Oral at bedtime    MEDICATIONS  (PRN):  ALPRAZolam 0.25 milliGRAM(s) Oral every 8 hours PRN anxiety  magnesium hydroxide Suspension 30 milliLiter(s) Oral daily PRN Constipation  ondansetron  IVPB 4 milliGRAM(s) IV Intermittent once PRN Nausea and/or Vomiting  oxyCODONE    IR 5 milliGRAM(s) Oral every 4 hours PRN Mild Pain (1 - 3)  oxyCODONE    IR 10 milliGRAM(s) Oral every 4 hours PRN Moderate Pain (4 - 6)  senna 1 Tablet(s) Oral every 12 hours PRN Constipation    Home Medications:  amLODIPine 5 mg oral tablet: 1 tab(s) orally once a day (17 Jun 2018 10:44)  brimonidine 0.1% ophthalmic solution: to each affected eye 2 times a day (17 Jun 2018 10:44)  dorzolamide 2% ophthalmic solution: 1 drop(s) to each affected eye 2 times a day (17 Jun 2018 10:44)  latanoprost 0.005% ophthalmic solution: 1 drop(s) to each affected eye 2 times a day (17 Jun 2018 10:44)  losartan 50 mg oral tablet: 1 tab(s) orally once a day (17 Jun 2018 10:44)  Low Dose ASA 81 mg oral tablet: 1 tab(s) orally once a day (02 May 2018 07:25)  Lyrica 50 mg oral capsule:  (17 Jun 2018 10:44)  simvastatin 40 mg oral tablet: 1 tab(s) orally once a day (at bedtime) (02 May 2018 07:25)    Pharmacologic DVT Prophylaxis: [x] YES, []NO: Contraindication:   [x] HEPARIN: heparin  Injectable 5000 Unit(s) SubCutaneous every 8 hours  [] LOVENOX: Dose: XX mg  Q24H                 SCD's: YES b/l    GI Prophylaxis: famotidine    Tablet 20 milliGRAM(s) Oral two times a day    Post-Op Beta-Blockers: [x]Yes, []No: contraindication:  Post-Op Aspirin: [x]Yes,  []No: contraindication:  Post-Op Statin: [x]Yes, []No: contraindication:      Ambulation/Activity Status:OOB.AMB    Assessment/Plan:  78y Female status-post resternotomy for [id number]               POD # 6   - Case and plan discussed with CTU Intensivist and CT Surgeon - Dr. Bah   - Continue CTU supportive care and ongoing plan of care as per continuing CTU rounds.   - Continue DVT/GI prophylaxis  - Incentive Spirometry 10 times an hour  - Continue to advance physical activity as tolerated and continue PT/OT as directed  1. CAD: Continue ASA, statin and BB.  2. Discharge home with follow up PA/LAteral CXR for stable small right pleural effusion

## 2018-09-26 DIAGNOSIS — J90 PLEURAL EFFUSION, NOT ELSEWHERE CLASSIFIED: ICD-10-CM

## 2018-09-26 DIAGNOSIS — Z92.21 PERSONAL HISTORY OF ANTINEOPLASTIC CHEMOTHERAPY: ICD-10-CM

## 2018-09-26 DIAGNOSIS — E78.00 PURE HYPERCHOLESTEROLEMIA, UNSPECIFIED: ICD-10-CM

## 2018-09-26 DIAGNOSIS — R07.9 CHEST PAIN, UNSPECIFIED: ICD-10-CM

## 2018-09-26 DIAGNOSIS — H26.9 UNSPECIFIED CATARACT: ICD-10-CM

## 2018-09-26 DIAGNOSIS — I30.9 ACUTE PERICARDITIS, UNSPECIFIED: ICD-10-CM

## 2018-09-26 DIAGNOSIS — Z85.42 PERSONAL HISTORY OF MALIGNANT NEOPLASM OF OTHER PARTS OF UTERUS: ICD-10-CM

## 2018-09-26 DIAGNOSIS — D69.6 THROMBOCYTOPENIA, UNSPECIFIED: ICD-10-CM

## 2018-09-26 DIAGNOSIS — Z90.710 ACQUIRED ABSENCE OF BOTH CERVIX AND UTERUS: ICD-10-CM

## 2018-09-26 DIAGNOSIS — I25.729 ATHEROSCLEROSIS OF AUTOLOGOUS ARTERY CORONARY ARTERY BYPASS GRAFT(S) WITH UNSPECIFIED ANGINA PECTORIS: ICD-10-CM

## 2018-09-26 DIAGNOSIS — I10 ESSENTIAL (PRIMARY) HYPERTENSION: ICD-10-CM

## 2018-09-26 DIAGNOSIS — I25.119 ATHEROSCLEROTIC HEART DISEASE OF NATIVE CORONARY ARTERY WITH UNSPECIFIED ANGINA PECTORIS: ICD-10-CM

## 2018-09-26 DIAGNOSIS — I25.84 CORONARY ATHEROSCLEROSIS DUE TO CALCIFIED CORONARY LESION: ICD-10-CM

## 2018-09-26 DIAGNOSIS — I25.719 ATHEROSCLEROSIS OF AUTOLOGOUS VEIN CORONARY ARTERY BYPASS GRAFT(S) WITH UNSPECIFIED ANGINA PECTORIS: ICD-10-CM

## 2018-09-26 DIAGNOSIS — H40.9 UNSPECIFIED GLAUCOMA: ICD-10-CM

## 2018-09-26 DIAGNOSIS — D62 ACUTE POSTHEMORRHAGIC ANEMIA: ICD-10-CM

## 2018-10-02 ENCOUNTER — APPOINTMENT (OUTPATIENT)
Dept: CARDIOLOGY | Facility: CLINIC | Age: 78
End: 2018-10-02

## 2018-10-02 VITALS — SYSTOLIC BLOOD PRESSURE: 130 MMHG | DIASTOLIC BLOOD PRESSURE: 74 MMHG | HEART RATE: 70 BPM

## 2018-10-02 VITALS — HEIGHT: 63 IN | BODY MASS INDEX: 27.29 KG/M2 | WEIGHT: 154 LBS

## 2018-10-03 ENCOUNTER — OUTPATIENT (OUTPATIENT)
Dept: OUTPATIENT SERVICES | Facility: HOSPITAL | Age: 78
LOS: 1 days | Discharge: HOME | End: 2018-10-03

## 2018-10-03 DIAGNOSIS — Z98.890 OTHER SPECIFIED POSTPROCEDURAL STATES: Chronic | ICD-10-CM

## 2018-10-03 DIAGNOSIS — I25.701 ATHEROSCLEROSIS OF CORONARY ARTERY BYPASS GRAFT(S), UNSPECIFIED, WITH ANGINA PECTORIS WITH DOCUMENTED SPASM: Chronic | ICD-10-CM

## 2018-10-15 ENCOUNTER — EMERGENCY (EMERGENCY)
Facility: HOSPITAL | Age: 78
LOS: 0 days | Discharge: HOME | End: 2018-10-15
Attending: EMERGENCY MEDICINE | Admitting: EMERGENCY MEDICINE

## 2018-10-15 VITALS
TEMPERATURE: 98 F | DIASTOLIC BLOOD PRESSURE: 73 MMHG | HEART RATE: 84 BPM | RESPIRATION RATE: 18 BRPM | SYSTOLIC BLOOD PRESSURE: 142 MMHG | OXYGEN SATURATION: 98 %

## 2018-10-15 DIAGNOSIS — I25.701 ATHEROSCLEROSIS OF CORONARY ARTERY BYPASS GRAFT(S), UNSPECIFIED, WITH ANGINA PECTORIS WITH DOCUMENTED SPASM: Chronic | ICD-10-CM

## 2018-10-15 DIAGNOSIS — Z88.8 ALLERGY STATUS TO OTHER DRUGS, MEDICAMENTS AND BIOLOGICAL SUBSTANCES: ICD-10-CM

## 2018-10-15 DIAGNOSIS — K21.9 GASTRO-ESOPHAGEAL REFLUX DISEASE WITHOUT ESOPHAGITIS: ICD-10-CM

## 2018-10-15 DIAGNOSIS — I25.10 ATHEROSCLEROTIC HEART DISEASE OF NATIVE CORONARY ARTERY WITHOUT ANGINA PECTORIS: ICD-10-CM

## 2018-10-15 DIAGNOSIS — R19.04 LEFT LOWER QUADRANT ABDOMINAL SWELLING, MASS AND LUMP: ICD-10-CM

## 2018-10-15 DIAGNOSIS — Z98.890 OTHER SPECIFIED POSTPROCEDURAL STATES: ICD-10-CM

## 2018-10-15 DIAGNOSIS — Z95.1 PRESENCE OF AORTOCORONARY BYPASS GRAFT: ICD-10-CM

## 2018-10-15 DIAGNOSIS — I10 ESSENTIAL (PRIMARY) HYPERTENSION: ICD-10-CM

## 2018-10-15 DIAGNOSIS — Z79.891 LONG TERM (CURRENT) USE OF OPIATE ANALGESIC: ICD-10-CM

## 2018-10-15 DIAGNOSIS — Z98.890 OTHER SPECIFIED POSTPROCEDURAL STATES: Chronic | ICD-10-CM

## 2018-10-15 DIAGNOSIS — R19.03 RIGHT LOWER QUADRANT ABDOMINAL SWELLING, MASS AND LUMP: ICD-10-CM

## 2018-10-15 DIAGNOSIS — Z79.899 OTHER LONG TERM (CURRENT) DRUG THERAPY: ICD-10-CM

## 2018-10-15 DIAGNOSIS — E78.5 HYPERLIPIDEMIA, UNSPECIFIED: ICD-10-CM

## 2018-10-15 DIAGNOSIS — Z79.82 LONG TERM (CURRENT) USE OF ASPIRIN: ICD-10-CM

## 2018-10-15 NOTE — ED PROVIDER NOTE - PROGRESS NOTE DETAILS
I personally evaluated the patient. I reviewed the Resident’s or Physician Assistant’s note (as assigned above), and agree with the findings and plan except as documented in my note.   79 Y/O F HTN, DYSLIPIDEMIA, ENDOMETRIAL CA, CAD, S/P CABG 9/11/18 C/O "KNOT IN MY STOMACH," WHICH SHE NOTED LAST NIGHT. MASS WHICH WAS IN LLQ IS NO LONGER PALPABLE. NO PAIN IN AREA. NO LLE EDEMA, PAIN, PARESTHESIAS. NO BACK PAIN. NO RASH. PT WITH NO COMPLAINTS CURRENTLY. VITALS NOTED. ALERT OX3 NAD LUNGS CLEAR B/L. RRR S1S2. STERNOTOMY HEALING WELL. NO EVIDENCE OF INFECTION. ABD- SOFT FLAT NONTENDER. NO MASSES. NO HERNIAS. NO TENDERNESS OVER INGUINAL CANAL NO INGUINAL LAD. LLE NVI. NO LEG EDEMA.2+ PEDAL PULSES B/L.

## 2018-10-15 NOTE — ED ADULT TRIAGE NOTE - CHIEF COMPLAINT QUOTE
feels a "knot" in her stomach since last night, had open heart surgery on september 11th, denies chest pain feels a "knot" in her left lower hip since last night, had open heart surgery on september 11th, denies chest pain

## 2018-10-15 NOTE — ED PROVIDER NOTE - PHYSICAL EXAMINATION
GEN: Well appearing, in no apparent distress.    HEAD:  Normocephalic, atraumatic.    EYES:  Clear conjunctivae without injection, drainage or discharge.    ENMT:  Nasal mucosa moist; mouth moist without ulcerations or lesions; throat moist without erythema, exudate, ulcerations or lesions.    NECK:  Supple, no masses. Normal ROM.    CARDIAC:  RRR, normal S1 and S2, no murmurs, rubs or gallops.    RESP:  Respiratory rate and effort appear normal; lungs are clear to auscultation bilaterally; no rhonchi, rales or wheezes.    ABDOMEN:  Soft, non-tender, non-distended, no masses. Normal BS throughout.    MUSCULOSKELETAL: No leg erythema, warmth, swelling, no calf tenderness. Patient able to ambulate without difficulty. No palpable mass.   NEURO:  AAO x 3. Motor 5/5. Sensory intact. CN II – XII intact.     SKIN:  Normal skin color for age and race, well-perfused; warm and dry.

## 2018-10-15 NOTE — ED PROVIDER NOTE - MEDICAL DECISION MAKING DETAILS
79 Y/O F WITH RLQ "LUMP" WHICH HAD RESOLVED UPON PRESENTATION TO THE ED. PHYSICAL EXAM NORMAL. PT WILL FOLLOW WITH PMD.

## 2018-10-15 NOTE — ED PROVIDER NOTE - OBJECTIVE STATEMENT
77 yo female with PMHx HTN, DLD, endometrial CA, CAD s/p CABG 9/11, GERD presents for "knot" in left hip. Patient states she was sitting and felt her hip and small few mm knot in the area. Patient states knot is gone now and she cannot palpate anything at this time. Knot was never painful. Patient denies fevers, chest pain, SOB, abdominal pain, nausea, vomiting, diarrhea, constipation, dizziness, weakness, recent travel, leg pain/swelling, changes in PO intake, changes in urine output, changes in mental status.

## 2018-10-15 NOTE — ED ADULT NURSE NOTE - CHIEF COMPLAINT QUOTE
feels a "knot" in her left lower hip since last night, had open heart surgery on september 11th, denies chest pain

## 2018-10-19 ENCOUNTER — LABORATORY RESULT (OUTPATIENT)
Age: 78
End: 2018-10-19

## 2018-10-19 ENCOUNTER — OUTPATIENT (OUTPATIENT)
Dept: OUTPATIENT SERVICES | Facility: HOSPITAL | Age: 78
LOS: 1 days | Discharge: HOME | End: 2018-10-19

## 2018-10-19 DIAGNOSIS — I25.701 ATHEROSCLEROSIS OF CORONARY ARTERY BYPASS GRAFT(S), UNSPECIFIED, WITH ANGINA PECTORIS WITH DOCUMENTED SPASM: Chronic | ICD-10-CM

## 2018-10-19 DIAGNOSIS — I10 ESSENTIAL (PRIMARY) HYPERTENSION: ICD-10-CM

## 2018-10-19 DIAGNOSIS — Z98.890 OTHER SPECIFIED POSTPROCEDURAL STATES: Chronic | ICD-10-CM

## 2018-10-19 DIAGNOSIS — E78.5 HYPERLIPIDEMIA, UNSPECIFIED: ICD-10-CM

## 2018-10-30 ENCOUNTER — APPOINTMENT (OUTPATIENT)
Dept: CARDIOLOGY | Facility: CLINIC | Age: 78
End: 2018-10-30

## 2018-10-30 VITALS
HEART RATE: 80 BPM | WEIGHT: 153 LBS | HEIGHT: 63 IN | BODY MASS INDEX: 27.11 KG/M2 | DIASTOLIC BLOOD PRESSURE: 72 MMHG | SYSTOLIC BLOOD PRESSURE: 120 MMHG

## 2018-11-01 ENCOUNTER — APPOINTMENT (OUTPATIENT)
Dept: OTOLARYNGOLOGY | Facility: CLINIC | Age: 78
End: 2018-11-01

## 2018-11-05 ENCOUNTER — MEDICATION RENEWAL (OUTPATIENT)
Age: 78
End: 2018-11-05

## 2018-11-19 ENCOUNTER — OUTPATIENT (OUTPATIENT)
Dept: OUTPATIENT SERVICES | Facility: HOSPITAL | Age: 78
LOS: 1 days | Discharge: HOME | End: 2018-11-19

## 2018-11-19 DIAGNOSIS — Z98.890 OTHER SPECIFIED POSTPROCEDURAL STATES: Chronic | ICD-10-CM

## 2018-11-19 DIAGNOSIS — I25.701 ATHEROSCLEROSIS OF CORONARY ARTERY BYPASS GRAFT(S), UNSPECIFIED, WITH ANGINA PECTORIS WITH DOCUMENTED SPASM: Chronic | ICD-10-CM

## 2018-11-19 DIAGNOSIS — F33.1 MAJOR DEPRESSIVE DISORDER, RECURRENT, MODERATE: ICD-10-CM

## 2018-11-21 ENCOUNTER — OUTPATIENT (OUTPATIENT)
Dept: OUTPATIENT SERVICES | Facility: HOSPITAL | Age: 78
LOS: 1 days | Discharge: HOME | End: 2018-11-21

## 2018-11-21 DIAGNOSIS — I25.701 ATHEROSCLEROSIS OF CORONARY ARTERY BYPASS GRAFT(S), UNSPECIFIED, WITH ANGINA PECTORIS WITH DOCUMENTED SPASM: Chronic | ICD-10-CM

## 2018-11-21 DIAGNOSIS — Z98.890 OTHER SPECIFIED POSTPROCEDURAL STATES: Chronic | ICD-10-CM

## 2018-12-03 ENCOUNTER — OUTPATIENT (OUTPATIENT)
Dept: OUTPATIENT SERVICES | Facility: HOSPITAL | Age: 78
LOS: 1 days | Discharge: HOME | End: 2018-12-03

## 2018-12-03 DIAGNOSIS — I25.701 ATHEROSCLEROSIS OF CORONARY ARTERY BYPASS GRAFT(S), UNSPECIFIED, WITH ANGINA PECTORIS WITH DOCUMENTED SPASM: Chronic | ICD-10-CM

## 2018-12-03 DIAGNOSIS — Z98.890 OTHER SPECIFIED POSTPROCEDURAL STATES: Chronic | ICD-10-CM

## 2018-12-03 DIAGNOSIS — F33.1 MAJOR DEPRESSIVE DISORDER, RECURRENT, MODERATE: ICD-10-CM

## 2018-12-04 ENCOUNTER — APPOINTMENT (OUTPATIENT)
Dept: CARDIOTHORACIC SURGERY | Facility: CLINIC | Age: 78
End: 2018-12-04

## 2018-12-04 VITALS
TEMPERATURE: 98.5 F | HEIGHT: 64 IN | RESPIRATION RATE: 12 BRPM | WEIGHT: 169 LBS | HEART RATE: 70 BPM | SYSTOLIC BLOOD PRESSURE: 145 MMHG | OXYGEN SATURATION: 97 % | DIASTOLIC BLOOD PRESSURE: 82 MMHG | BODY MASS INDEX: 28.85 KG/M2

## 2018-12-04 RX ORDER — CHLORHEXIDINE GLUCONATE 4 %
325 (65 FE) LIQUID (ML) TOPICAL DAILY
Refills: 0 | Status: DISCONTINUED | COMMUNITY
End: 2018-12-04

## 2018-12-04 RX ORDER — FOLIC ACID 1 MG/1
1 TABLET ORAL DAILY
Refills: 0 | Status: DISCONTINUED | COMMUNITY
End: 2018-12-04

## 2018-12-14 ENCOUNTER — OUTPATIENT (OUTPATIENT)
Dept: OUTPATIENT SERVICES | Facility: HOSPITAL | Age: 78
LOS: 1 days | Discharge: HOME | End: 2018-12-14

## 2018-12-14 DIAGNOSIS — I25.701 ATHEROSCLEROSIS OF CORONARY ARTERY BYPASS GRAFT(S), UNSPECIFIED, WITH ANGINA PECTORIS WITH DOCUMENTED SPASM: Chronic | ICD-10-CM

## 2018-12-14 DIAGNOSIS — K05.6 PERIODONTAL DISEASE, UNSPECIFIED: ICD-10-CM

## 2018-12-14 DIAGNOSIS — Z98.890 OTHER SPECIFIED POSTPROCEDURAL STATES: Chronic | ICD-10-CM

## 2019-01-03 ENCOUNTER — OUTPATIENT (OUTPATIENT)
Dept: OUTPATIENT SERVICES | Facility: HOSPITAL | Age: 79
LOS: 1 days | Discharge: HOME | End: 2019-01-03

## 2019-01-03 ENCOUNTER — LABORATORY RESULT (OUTPATIENT)
Age: 79
End: 2019-01-03

## 2019-01-03 DIAGNOSIS — E78.00 PURE HYPERCHOLESTEROLEMIA, UNSPECIFIED: ICD-10-CM

## 2019-01-03 DIAGNOSIS — Z98.890 OTHER SPECIFIED POSTPROCEDURAL STATES: Chronic | ICD-10-CM

## 2019-01-03 DIAGNOSIS — I10 ESSENTIAL (PRIMARY) HYPERTENSION: ICD-10-CM

## 2019-01-03 DIAGNOSIS — I25.701 ATHEROSCLEROSIS OF CORONARY ARTERY BYPASS GRAFT(S), UNSPECIFIED, WITH ANGINA PECTORIS WITH DOCUMENTED SPASM: Chronic | ICD-10-CM

## 2019-01-03 DIAGNOSIS — C55 MALIGNANT NEOPLASM OF UTERUS, PART UNSPECIFIED: ICD-10-CM

## 2019-01-03 DIAGNOSIS — I70.90 UNSPECIFIED ATHEROSCLEROSIS: ICD-10-CM

## 2019-01-03 DIAGNOSIS — Z00.00 ENCOUNTER FOR GENERAL ADULT MEDICAL EXAMINATION WITHOUT ABNORMAL FINDINGS: ICD-10-CM

## 2019-01-04 ENCOUNTER — APPOINTMENT (OUTPATIENT)
Dept: CARDIOLOGY | Facility: CLINIC | Age: 79
End: 2019-01-04

## 2019-01-04 VITALS
HEART RATE: 64 BPM | SYSTOLIC BLOOD PRESSURE: 120 MMHG | DIASTOLIC BLOOD PRESSURE: 66 MMHG | WEIGHT: 154 LBS | HEIGHT: 64 IN | BODY MASS INDEX: 26.29 KG/M2

## 2019-01-04 DIAGNOSIS — F32.9 MAJOR DEPRESSIVE DISORDER, SINGLE EPISODE, UNSPECIFIED: ICD-10-CM

## 2019-01-04 RX ORDER — SIMVASTATIN 80 MG/1
TABLET, FILM COATED ORAL
Refills: 0 | Status: DISCONTINUED | COMMUNITY
End: 2019-01-04

## 2019-01-04 NOTE — PHYSICAL EXAM
[General Appearance - Well Developed] : well developed [Normal Appearance] : normal appearance [Well Groomed] : well groomed [General Appearance - Well Nourished] : well nourished [No Deformities] : no deformities [General Appearance - In No Acute Distress] : no acute distress [Normal Conjunctiva] : the conjunctiva exhibited no abnormalities [Eyelids - No Xanthelasma] : the eyelids demonstrated no xanthelasmas [Normal Oral Mucosa] : normal oral mucosa [No Oral Pallor] : no oral pallor [No Oral Cyanosis] : no oral cyanosis [Respiration, Rhythm And Depth] : normal respiratory rhythm and effort [Exaggerated Use Of Accessory Muscles For Inspiration] : no accessory muscle use [Auscultation Breath Sounds / Voice Sounds] : lungs were clear to auscultation bilaterally [Abdomen Soft] : soft [Abdomen Tenderness] : non-tender [Abdomen Mass (___ Cm)] : no abdominal mass palpated [Abnormal Walk] : normal gait [Gait - Sufficient For Exercise Testing] : the gait was sufficient for exercise testing [Nail Clubbing] : no clubbing of the fingernails [Cyanosis, Localized] : no localized cyanosis [Petechial Hemorrhages (___cm)] : no petechial hemorrhages [] : no ischemic changes [Oriented To Time, Place, And Person] : oriented to person, place, and time [Affect] : the affect was normal [Mood] : the mood was normal [No Anxiety] : not feeling anxious [Normal Rate] : normal [Rhythm Regular] : regular [No Murmur] : no murmurs heard [1+] : left 1+ [No Pitting Edema] : no pitting edema present [FreeTextEntry1] : No JVD  [Rt] : no varicose veins of the right leg

## 2019-01-04 NOTE — HISTORY OF PRESENT ILLNESS
[FreeTextEntry1] : The patient was admitted for atypical chest gurvinder .Work up had lead to a cardiac cath . CAMPBELL was atretic and not functional. Had LM and ostial RI . The patient had redo CABG DIANA to LAD and SVG to OM. No complications. The patient was seen by CT surgery for apparent musculoskeletal pain . The patient had reproducible pain when turning in certain directions. She was given gabapentin with improvement

## 2019-01-04 NOTE — REASON FOR VISIT
[Coronary Artery Disease] : coronary artery disease [CABG Follow-up] : bypass graft [Hyperlipidemia] : hyperlipidemia [Hypertension] : hypertension

## 2019-01-04 NOTE — ASSESSMENT
[FreeTextEntry1] : The patient has had chest pan which is reproducible and thought to be frm the sternotomy wound. No angina like discomfort. SHe has had some depression . SHe is seeing psychiatry for this. The patient is not qute at goal with her cholesterol. She hasnot been taking her statin on a regular basis.

## 2019-01-04 NOTE — REVIEW OF SYSTEMS
[Feeling Fatigued] : feeling fatigued [Negative] : Heme/Lymph [Fever] : no fever [Headache] : no headache [Chills] : no chills

## 2019-01-07 ENCOUNTER — EMERGENCY (EMERGENCY)
Facility: HOSPITAL | Age: 79
LOS: 0 days | Discharge: HOME | End: 2019-01-08
Attending: EMERGENCY MEDICINE | Admitting: EMERGENCY MEDICINE

## 2019-01-07 VITALS
SYSTOLIC BLOOD PRESSURE: 138 MMHG | RESPIRATION RATE: 18 BRPM | DIASTOLIC BLOOD PRESSURE: 78 MMHG | TEMPERATURE: 99 F | HEART RATE: 76 BPM | OXYGEN SATURATION: 100 %

## 2019-01-07 VITALS
OXYGEN SATURATION: 100 % | DIASTOLIC BLOOD PRESSURE: 63 MMHG | TEMPERATURE: 99 F | HEART RATE: 60 BPM | SYSTOLIC BLOOD PRESSURE: 133 MMHG | RESPIRATION RATE: 18 BRPM

## 2019-01-07 DIAGNOSIS — R07.9 CHEST PAIN, UNSPECIFIED: ICD-10-CM

## 2019-01-07 DIAGNOSIS — I25.701 ATHEROSCLEROSIS OF CORONARY ARTERY BYPASS GRAFT(S), UNSPECIFIED, WITH ANGINA PECTORIS WITH DOCUMENTED SPASM: Chronic | ICD-10-CM

## 2019-01-07 DIAGNOSIS — Z79.891 LONG TERM (CURRENT) USE OF OPIATE ANALGESIC: ICD-10-CM

## 2019-01-07 DIAGNOSIS — Z02.9 ENCOUNTER FOR ADMINISTRATIVE EXAMINATIONS, UNSPECIFIED: ICD-10-CM

## 2019-01-07 DIAGNOSIS — Z79.84 LONG TERM (CURRENT) USE OF ORAL HYPOGLYCEMIC DRUGS: ICD-10-CM

## 2019-01-07 DIAGNOSIS — Z98.890 OTHER SPECIFIED POSTPROCEDURAL STATES: Chronic | ICD-10-CM

## 2019-01-07 DIAGNOSIS — I25.10 ATHEROSCLEROTIC HEART DISEASE OF NATIVE CORONARY ARTERY WITHOUT ANGINA PECTORIS: ICD-10-CM

## 2019-01-07 DIAGNOSIS — R07.89 OTHER CHEST PAIN: ICD-10-CM

## 2019-01-07 DIAGNOSIS — Z79.899 OTHER LONG TERM (CURRENT) DRUG THERAPY: ICD-10-CM

## 2019-01-07 DIAGNOSIS — Z95.1 PRESENCE OF AORTOCORONARY BYPASS GRAFT: ICD-10-CM

## 2019-01-07 DIAGNOSIS — E78.00 PURE HYPERCHOLESTEROLEMIA, UNSPECIFIED: ICD-10-CM

## 2019-01-07 DIAGNOSIS — K21.9 GASTRO-ESOPHAGEAL REFLUX DISEASE WITHOUT ESOPHAGITIS: ICD-10-CM

## 2019-01-07 DIAGNOSIS — I10 ESSENTIAL (PRIMARY) HYPERTENSION: ICD-10-CM

## 2019-01-07 DIAGNOSIS — Z79.82 LONG TERM (CURRENT) USE OF ASPIRIN: ICD-10-CM

## 2019-01-07 DIAGNOSIS — Z85.42 PERSONAL HISTORY OF MALIGNANT NEOPLASM OF OTHER PARTS OF UTERUS: ICD-10-CM

## 2019-01-07 LAB
ALBUMIN SERPL ELPH-MCNC: 4.1 G/DL
ALBUMIN SERPL ELPH-MCNC: 4.2 G/DL — SIGNIFICANT CHANGE UP (ref 3.5–5.2)
ALP BLD-CCNC: 81 U/L
ALP SERPL-CCNC: 86 U/L — SIGNIFICANT CHANGE UP (ref 30–115)
ALT FLD-CCNC: 10 U/L — SIGNIFICANT CHANGE UP (ref 0–41)
ALT SERPL-CCNC: 8 U/L
ANION GAP SERPL CALC-SCNC: 12 MMOL/L
ANION GAP SERPL CALC-SCNC: 17 MMOL/L — HIGH (ref 7–14)
AST SERPL-CCNC: 21 U/L
AST SERPL-CCNC: 25 U/L — SIGNIFICANT CHANGE UP (ref 0–41)
BASOPHILS # BLD AUTO: 0.02 K/UL — SIGNIFICANT CHANGE UP (ref 0–0.2)
BASOPHILS # BLD AUTO: 0.05 K/UL
BASOPHILS NFR BLD AUTO: 0.3 % — SIGNIFICANT CHANGE UP (ref 0–1)
BASOPHILS NFR BLD AUTO: 0.8 %
BILIRUB SERPL-MCNC: 0.4 MG/DL
BILIRUB SERPL-MCNC: <0.2 MG/DL — SIGNIFICANT CHANGE UP (ref 0.2–1.2)
BUN SERPL-MCNC: 20 MG/DL
BUN SERPL-MCNC: 25 MG/DL — HIGH (ref 10–20)
CALCIUM SERPL-MCNC: 8.6 MG/DL
CALCIUM SERPL-MCNC: 9 MG/DL — SIGNIFICANT CHANGE UP (ref 8.5–10.1)
CHLORIDE SERPL-SCNC: 97 MMOL/L — LOW (ref 98–110)
CHLORIDE SERPL-SCNC: 99 MMOL/L
CHOLEST SERPL-MCNC: 172 MG/DL
CHOLEST/HDLC SERPL: 2.6 RATIO
CO2 SERPL-SCNC: 25 MMOL/L — SIGNIFICANT CHANGE UP (ref 17–32)
CO2 SERPL-SCNC: 29 MMOL/L
CREAT SERPL-MCNC: 0.9 MG/DL
CREAT SERPL-MCNC: 1.1 MG/DL — SIGNIFICANT CHANGE UP (ref 0.7–1.5)
EOSINOPHIL # BLD AUTO: 0.07 K/UL
EOSINOPHIL # BLD AUTO: 0.07 K/UL — SIGNIFICANT CHANGE UP (ref 0–0.7)
EOSINOPHIL NFR BLD AUTO: 1.1 %
EOSINOPHIL NFR BLD AUTO: 1.1 % — SIGNIFICANT CHANGE UP (ref 0–8)
GLUCOSE SERPL-MCNC: 112 MG/DL — HIGH (ref 70–99)
GLUCOSE SERPL-MCNC: 83 MG/DL
HCT VFR BLD CALC: 39.8 % — SIGNIFICANT CHANGE UP (ref 37–47)
HCT VFR BLD CALC: 42.1 %
HDLC SERPL-MCNC: 65 MG/DL
HGB BLD-MCNC: 12.6 G/DL — SIGNIFICANT CHANGE UP (ref 12–16)
HGB BLD-MCNC: 13.3 G/DL
IMM GRANULOCYTES NFR BLD AUTO: 0.2 %
IMM GRANULOCYTES NFR BLD AUTO: 0.2 % — SIGNIFICANT CHANGE UP (ref 0.1–0.3)
LDLC SERPL CALC-MCNC: 104 MG/DL
LYMPHOCYTES # BLD AUTO: 2.22 K/UL — SIGNIFICANT CHANGE UP (ref 1.2–3.4)
LYMPHOCYTES # BLD AUTO: 2.6 K/UL
LYMPHOCYTES # BLD AUTO: 35.2 % — SIGNIFICANT CHANGE UP (ref 20.5–51.1)
LYMPHOCYTES NFR BLD AUTO: 40.1 %
MAN DIFF?: NORMAL
MCHC RBC-ENTMCNC: 25.7 PG — LOW (ref 27–31)
MCHC RBC-ENTMCNC: 26.5 PG
MCHC RBC-ENTMCNC: 31.6 G/DL
MCHC RBC-ENTMCNC: 31.7 G/DL — LOW (ref 32–37)
MCV RBC AUTO: 81.2 FL — SIGNIFICANT CHANGE UP (ref 81–99)
MCV RBC AUTO: 84 FL
MONOCYTES # BLD AUTO: 0.52 K/UL
MONOCYTES # BLD AUTO: 0.64 K/UL — HIGH (ref 0.1–0.6)
MONOCYTES NFR BLD AUTO: 10.1 % — HIGH (ref 1.7–9.3)
MONOCYTES NFR BLD AUTO: 8 %
NEUTROPHILS # BLD AUTO: 3.23 K/UL
NEUTROPHILS # BLD AUTO: 3.35 K/UL — SIGNIFICANT CHANGE UP (ref 1.4–6.5)
NEUTROPHILS NFR BLD AUTO: 49.8 %
NEUTROPHILS NFR BLD AUTO: 53.1 % — SIGNIFICANT CHANGE UP (ref 42.2–75.2)
PLATELET # BLD AUTO: 192 K/UL — SIGNIFICANT CHANGE UP (ref 130–400)
PLATELET # BLD AUTO: 215 K/UL
POTASSIUM SERPL-MCNC: 4.6 MMOL/L — SIGNIFICANT CHANGE UP (ref 3.5–5)
POTASSIUM SERPL-SCNC: 4.6 MMOL/L — SIGNIFICANT CHANGE UP (ref 3.5–5)
POTASSIUM SERPL-SCNC: 4.8 MMOL/L
PROT SERPL-MCNC: 7.3 G/DL
PROT SERPL-MCNC: 7.3 G/DL — SIGNIFICANT CHANGE UP (ref 6–8)
RBC # BLD: 4.9 M/UL — SIGNIFICANT CHANGE UP (ref 4.2–5.4)
RBC # BLD: 5.01 M/UL
RBC # FLD: 15.1 % — HIGH (ref 11.5–14.5)
RBC # FLD: 15.3 %
SODIUM SERPL-SCNC: 139 MMOL/L — SIGNIFICANT CHANGE UP (ref 135–146)
SODIUM SERPL-SCNC: 140 MMOL/L
TRIGL SERPL-MCNC: 67 MG/DL
TROPONIN T SERPL-MCNC: <0.01 NG/ML — SIGNIFICANT CHANGE UP
TROPONIN T SERPL-MCNC: <0.01 NG/ML — SIGNIFICANT CHANGE UP
WBC # BLD: 6.31 K/UL — SIGNIFICANT CHANGE UP (ref 4.8–10.8)
WBC # FLD AUTO: 6.31 K/UL — SIGNIFICANT CHANGE UP (ref 4.8–10.8)
WBC # FLD AUTO: 6.48 K/UL

## 2019-01-07 RX ORDER — ACETAMINOPHEN 500 MG
650 TABLET ORAL ONCE
Qty: 0 | Refills: 0 | Status: COMPLETED | OUTPATIENT
Start: 2019-01-07 | End: 2019-01-07

## 2019-01-07 RX ADMIN — Medication 650 MILLIGRAM(S): at 22:59

## 2019-01-07 NOTE — ED PROVIDER NOTE - ATTENDING CONTRIBUTION TO CARE
77 yo female with PMH CAD/CABG (9/11/2018) presents c/o right side chest pan. Pt sates the pain started today, lasting several minutes. Denies any change with movement or inspiration. Worse with palpation. no fevers, chills, cough or URI sx. Denies any dizziness, weakness, N/V/D or abdominal pain. Tolerating PO as usual.     VITAL SIGNS: noted  CONSTITUTIONAL: Well-developed; well-nourished; in no acute distress  HEAD: Normocephalic; atraumatic  EYES: PERRL, EOM intact; conjunctiva and sclera clear  ENT: No nasal discharge; airway clear. MMM  NECK: Supple; non tender. No JVD  CARD: S1, S2 normal; no murmurs, gallops, or rubs. Regular rate and rhythm  CHEST: + mild tenderness to right chest wall, no crepitus  RESP: CTAB/L, no wheezes, rales or rhonchi  ABD: Normal bowel sounds; soft; non-distended; non-tender; no hepatosplenomegaly. No CVA tenderness  EXT: Normal ROM. No calf tenderness or edema. Distal pulses intact  NEURO: Alert, oriented. Grossly unremarkable. No focal deficits  MS: No midline spinal tenderness

## 2019-01-07 NOTE — ED PROVIDER NOTE - NS ED ROS FT
Constitutional: No fever  Eyes:  No visual changes  ENMT:  No neck pain  Cardiac:  +chest pain. No palpitations  Respiratory:  No cough, SOB  GI:  No nausea, vomiting, diarrhea, abdominal pain.  :  No dysuria  MS:  No back pain.  Neuro:  No headache or lightheadedness  Skin:  No skin rash  Endocrine: No history of thyroid disease or diabetes  Except as documented in the HPI,  all other systems are negative

## 2019-01-07 NOTE — ED PROVIDER NOTE - PHYSICAL EXAMINATION
Vital signs reviewed  GENERAL: Patient nontoxic appearing, NAD  HEAD: NCAT  EYES: Anicteric  ENT: MMM  NECK: Supple, non tender  RESPIRATORY: Normal respiratory effort. CTA B/L. No wheezing, rales, rhonchi  CARDIOVASCULAR: Regular rate and rhythm.  ABDOMEN: Soft. Nondistended. Nontender. No guarding or rebound.   MUSCULOSKELETAL/EXTREMITIES: Brisk cap refill. 2+ radial pulses. No leg edema. Tenderness along mid-right sternum  SKIN:  Warm and dry. Sternotomy incision well healed, no erythema or drainage  NEURO: AAOx3. No gross FND.  PSYCHIATRIC: Cooperative. Affect appropriate.

## 2019-01-07 NOTE — ED PROVIDER NOTE - PROGRESS NOTE DETAILS
Pt was signed out to me by Dr. Walker at 4705.  All workup prior to this time was initiated by that provider.  Plan to review labs and imaging and reassess. Pt s/o to  Dr. Clemons to follow up repeat troponin, reassess and dispo. Anticipate possible discharge with close outpt follow up. rpt trop neg.  Pt states that her pain is very intermittent.  Does still have point ttp over R chest wall, but states that the pain is moving all over her chest.  Requested something more for pain.  Also rechecked EKG, no new changes.  Will reassess. Pt states pain resolved completely with toradol. Labs and imaging reviewed with pt. Return precautions given. Pt to f/u with cardiologist.

## 2019-01-07 NOTE — ED PROVIDER NOTE - CARE PROVIDER_API CALL
Manuel Moura), Cardiovascular Disease; Internal Medicine  [address] [place] [address]  Phone: [phone]  Fax: [phone]

## 2019-01-08 RX ORDER — KETOROLAC TROMETHAMINE 30 MG/ML
15 SYRINGE (ML) INJECTION ONCE
Qty: 0 | Refills: 0 | Status: DISCONTINUED | OUTPATIENT
Start: 2019-01-08 | End: 2019-01-08

## 2019-01-08 RX ADMIN — Medication 15 MILLIGRAM(S): at 00:28

## 2019-01-08 NOTE — ED ADULT NURSE NOTE - NSIMPLEMENTINTERV_GEN_ALL_ED
Implemented All Universal Safety Interventions:  [place] to call system. Call bell, personal items and telephone within reach. Instruct patient to call for assistance. Room bathroom lighting operational. Non-slip footwear when patient is off stretcher. Physically safe environment: no spills, clutter or unnecessary equipment. Stretcher in lowest position, wheels locked, appropriate side rails in place.

## 2019-01-16 ENCOUNTER — APPOINTMENT (OUTPATIENT)
Dept: OTOLARYNGOLOGY | Facility: CLINIC | Age: 79
End: 2019-01-16

## 2019-02-07 ENCOUNTER — APPOINTMENT (OUTPATIENT)
Dept: OTOLARYNGOLOGY | Facility: CLINIC | Age: 79
End: 2019-02-07

## 2019-02-08 ENCOUNTER — EMERGENCY (EMERGENCY)
Facility: HOSPITAL | Age: 79
LOS: 0 days | Discharge: LEFT BEFORE DISCHARGE | End: 2019-02-08
Admitting: EMERGENCY MEDICINE

## 2019-02-08 VITALS
TEMPERATURE: 98 F | OXYGEN SATURATION: 98 % | SYSTOLIC BLOOD PRESSURE: 140 MMHG | RESPIRATION RATE: 20 BRPM | HEART RATE: 86 BPM | DIASTOLIC BLOOD PRESSURE: 70 MMHG

## 2019-02-08 DIAGNOSIS — I25.701 ATHEROSCLEROSIS OF CORONARY ARTERY BYPASS GRAFT(S), UNSPECIFIED, WITH ANGINA PECTORIS WITH DOCUMENTED SPASM: Chronic | ICD-10-CM

## 2019-02-08 DIAGNOSIS — Z79.2 LONG TERM (CURRENT) USE OF ANTIBIOTICS: ICD-10-CM

## 2019-02-08 DIAGNOSIS — Z79.891 LONG TERM (CURRENT) USE OF OPIATE ANALGESIC: ICD-10-CM

## 2019-02-08 DIAGNOSIS — Z88.8 ALLERGY STATUS TO OTHER DRUGS, MEDICAMENTS AND BIOLOGICAL SUBSTANCES: ICD-10-CM

## 2019-02-08 DIAGNOSIS — Z98.890 OTHER SPECIFIED POSTPROCEDURAL STATES: Chronic | ICD-10-CM

## 2019-02-08 DIAGNOSIS — R53.1 WEAKNESS: ICD-10-CM

## 2019-02-08 DIAGNOSIS — Z79.899 OTHER LONG TERM (CURRENT) DRUG THERAPY: ICD-10-CM

## 2019-02-08 DIAGNOSIS — Z79.82 LONG TERM (CURRENT) USE OF ASPIRIN: ICD-10-CM

## 2019-02-08 DIAGNOSIS — Z79.1 LONG TERM (CURRENT) USE OF NON-STEROIDAL ANTI-INFLAMMATORIES (NSAID): ICD-10-CM

## 2019-04-03 ENCOUNTER — OUTPATIENT (OUTPATIENT)
Dept: OUTPATIENT SERVICES | Facility: HOSPITAL | Age: 79
LOS: 1 days | Discharge: HOME | End: 2019-04-03

## 2019-04-03 DIAGNOSIS — R07.9 CHEST PAIN, UNSPECIFIED: ICD-10-CM

## 2019-04-03 DIAGNOSIS — I70.90 UNSPECIFIED ATHEROSCLEROSIS: ICD-10-CM

## 2019-04-03 DIAGNOSIS — H61.23 IMPACTED CERUMEN, BILATERAL: ICD-10-CM

## 2019-04-03 DIAGNOSIS — Z98.890 OTHER SPECIFIED POSTPROCEDURAL STATES: Chronic | ICD-10-CM

## 2019-04-03 DIAGNOSIS — I25.10 ATHEROSCLEROTIC HEART DISEASE OF NATIVE CORONARY ARTERY WITHOUT ANGINA PECTORIS: ICD-10-CM

## 2019-04-03 DIAGNOSIS — I25.701 ATHEROSCLEROSIS OF CORONARY ARTERY BYPASS GRAFT(S), UNSPECIFIED, WITH ANGINA PECTORIS WITH DOCUMENTED SPASM: Chronic | ICD-10-CM

## 2019-04-03 DIAGNOSIS — F32.9 MAJOR DEPRESSIVE DISORDER, SINGLE EPISODE, UNSPECIFIED: ICD-10-CM

## 2019-04-03 DIAGNOSIS — I48.91 UNSPECIFIED ATRIAL FIBRILLATION: ICD-10-CM

## 2019-04-03 DIAGNOSIS — E78.5 HYPERLIPIDEMIA, UNSPECIFIED: ICD-10-CM

## 2019-04-05 LAB
ALBUMIN SERPL ELPH-MCNC: 4.3 G/DL
ALP BLD-CCNC: 73 U/L
ALT SERPL-CCNC: 10 U/L
ANION GAP SERPL CALC-SCNC: 13 MMOL/L
AST SERPL-CCNC: 18 U/L
BASOPHILS # BLD AUTO: 0.02 K/UL
BASOPHILS NFR BLD AUTO: 0.4 %
BILIRUB SERPL-MCNC: 0.4 MG/DL
BUN SERPL-MCNC: 20 MG/DL
CALCIUM SERPL-MCNC: 8.7 MG/DL
CHLORIDE SERPL-SCNC: 102 MMOL/L
CHOLEST SERPL-MCNC: 153 MG/DL
CHOLEST/HDLC SERPL: 2.6 RATIO
CO2 SERPL-SCNC: 29 MMOL/L
CREAT SERPL-MCNC: 0.8 MG/DL
EOSINOPHIL # BLD AUTO: 0.1 K/UL
EOSINOPHIL NFR BLD AUTO: 1.9 %
GLUCOSE SERPL-MCNC: 87 MG/DL
HCT VFR BLD CALC: 40.8 %
HDLC SERPL-MCNC: 59 MG/DL
HGB BLD-MCNC: 12.9 G/DL
IMM GRANULOCYTES NFR BLD AUTO: 0.2 %
LDLC SERPL CALC-MCNC: 89 MG/DL
LYMPHOCYTES # BLD AUTO: 1.86 K/UL
LYMPHOCYTES NFR BLD AUTO: 35 %
MAN DIFF?: NORMAL
MCHC RBC-ENTMCNC: 26.8 PG
MCHC RBC-ENTMCNC: 31.6 G/DL
MCV RBC AUTO: 84.6 FL
MONOCYTES # BLD AUTO: 0.58 K/UL
MONOCYTES NFR BLD AUTO: 10.9 %
NEUTROPHILS # BLD AUTO: 2.75 K/UL
NEUTROPHILS NFR BLD AUTO: 51.6 %
PLATELET # BLD AUTO: 175 K/UL
POTASSIUM SERPL-SCNC: 4.6 MMOL/L
PROT SERPL-MCNC: 7 G/DL
RBC # BLD: 4.82 M/UL
RBC # FLD: 15.8 %
SODIUM SERPL-SCNC: 144 MMOL/L
T4 FREE SERPL-MCNC: 1.2 NG/DL
TRIGL SERPL-MCNC: 68 MG/DL
TSH SERPL-ACNC: 1.92 UIU/ML
WBC # FLD AUTO: 5.32 K/UL

## 2019-04-25 ENCOUNTER — OUTPATIENT (OUTPATIENT)
Dept: OUTPATIENT SERVICES | Facility: HOSPITAL | Age: 79
LOS: 1 days | Discharge: HOME | End: 2019-04-25

## 2019-04-25 DIAGNOSIS — I25.701 ATHEROSCLEROSIS OF CORONARY ARTERY BYPASS GRAFT(S), UNSPECIFIED, WITH ANGINA PECTORIS WITH DOCUMENTED SPASM: Chronic | ICD-10-CM

## 2019-04-25 DIAGNOSIS — Z98.890 OTHER SPECIFIED POSTPROCEDURAL STATES: Chronic | ICD-10-CM

## 2019-04-25 DIAGNOSIS — Z00.00 ENCOUNTER FOR GENERAL ADULT MEDICAL EXAMINATION WITHOUT ABNORMAL FINDINGS: ICD-10-CM

## 2019-05-15 ENCOUNTER — OUTPATIENT (OUTPATIENT)
Dept: OUTPATIENT SERVICES | Facility: HOSPITAL | Age: 79
LOS: 1 days | Discharge: HOME | End: 2019-05-15
Payer: MEDICARE

## 2019-05-15 DIAGNOSIS — Z98.890 OTHER SPECIFIED POSTPROCEDURAL STATES: Chronic | ICD-10-CM

## 2019-05-15 DIAGNOSIS — Z12.31 ENCOUNTER FOR SCREENING MAMMOGRAM FOR MALIGNANT NEOPLASM OF BREAST: ICD-10-CM

## 2019-05-15 DIAGNOSIS — I25.701 ATHEROSCLEROSIS OF CORONARY ARTERY BYPASS GRAFT(S), UNSPECIFIED, WITH ANGINA PECTORIS WITH DOCUMENTED SPASM: Chronic | ICD-10-CM

## 2019-05-15 PROCEDURE — 77067 SCR MAMMO BI INCL CAD: CPT | Mod: 26

## 2019-05-15 PROCEDURE — 77063 BREAST TOMOSYNTHESIS BI: CPT | Mod: 26

## 2019-05-18 ENCOUNTER — APPOINTMENT (OUTPATIENT)
Dept: CARDIOLOGY | Facility: CLINIC | Age: 79
End: 2019-05-18
Payer: MEDICARE

## 2019-05-19 ENCOUNTER — EMERGENCY (EMERGENCY)
Facility: HOSPITAL | Age: 79
LOS: 0 days | Discharge: HOME | End: 2019-05-19
Attending: EMERGENCY MEDICINE | Admitting: EMERGENCY MEDICINE
Payer: MEDICARE

## 2019-05-19 VITALS
HEART RATE: 68 BPM | SYSTOLIC BLOOD PRESSURE: 135 MMHG | OXYGEN SATURATION: 98 % | DIASTOLIC BLOOD PRESSURE: 64 MMHG | RESPIRATION RATE: 18 BRPM

## 2019-05-19 VITALS
RESPIRATION RATE: 18 BRPM | TEMPERATURE: 98 F | HEART RATE: 65 BPM | DIASTOLIC BLOOD PRESSURE: 62 MMHG | SYSTOLIC BLOOD PRESSURE: 137 MMHG | OXYGEN SATURATION: 99 %

## 2019-05-19 DIAGNOSIS — Z98.890 OTHER SPECIFIED POSTPROCEDURAL STATES: Chronic | ICD-10-CM

## 2019-05-19 DIAGNOSIS — R42 DIZZINESS AND GIDDINESS: ICD-10-CM

## 2019-05-19 DIAGNOSIS — Z79.52 LONG TERM (CURRENT) USE OF SYSTEMIC STEROIDS: ICD-10-CM

## 2019-05-19 DIAGNOSIS — Z79.82 LONG TERM (CURRENT) USE OF ASPIRIN: ICD-10-CM

## 2019-05-19 DIAGNOSIS — E78.00 PURE HYPERCHOLESTEROLEMIA, UNSPECIFIED: ICD-10-CM

## 2019-05-19 DIAGNOSIS — Z79.899 OTHER LONG TERM (CURRENT) DRUG THERAPY: ICD-10-CM

## 2019-05-19 DIAGNOSIS — I10 ESSENTIAL (PRIMARY) HYPERTENSION: ICD-10-CM

## 2019-05-19 DIAGNOSIS — I25.701 ATHEROSCLEROSIS OF CORONARY ARTERY BYPASS GRAFT(S), UNSPECIFIED, WITH ANGINA PECTORIS WITH DOCUMENTED SPASM: Chronic | ICD-10-CM

## 2019-05-19 DIAGNOSIS — Z79.891 LONG TERM (CURRENT) USE OF OPIATE ANALGESIC: ICD-10-CM

## 2019-05-19 DIAGNOSIS — Z79.84 LONG TERM (CURRENT) USE OF ORAL HYPOGLYCEMIC DRUGS: ICD-10-CM

## 2019-05-19 DIAGNOSIS — Z79.02 LONG TERM (CURRENT) USE OF ANTITHROMBOTICS/ANTIPLATELETS: ICD-10-CM

## 2019-05-19 LAB
ALBUMIN SERPL ELPH-MCNC: 4 G/DL — SIGNIFICANT CHANGE UP (ref 3.5–5.2)
ALP SERPL-CCNC: 77 U/L — SIGNIFICANT CHANGE UP (ref 30–115)
ALT FLD-CCNC: 10 U/L — SIGNIFICANT CHANGE UP (ref 0–41)
ANION GAP SERPL CALC-SCNC: 12 MMOL/L — SIGNIFICANT CHANGE UP (ref 7–14)
APPEARANCE UR: CLEAR — SIGNIFICANT CHANGE UP
AST SERPL-CCNC: 19 U/L — SIGNIFICANT CHANGE UP (ref 0–41)
BILIRUB SERPL-MCNC: <0.2 MG/DL — SIGNIFICANT CHANGE UP (ref 0.2–1.2)
BILIRUB UR-MCNC: NEGATIVE — SIGNIFICANT CHANGE UP
BUN SERPL-MCNC: 23 MG/DL — HIGH (ref 10–20)
CALCIUM SERPL-MCNC: 8.6 MG/DL — SIGNIFICANT CHANGE UP (ref 8.5–10.1)
CHLORIDE SERPL-SCNC: 106 MMOL/L — SIGNIFICANT CHANGE UP (ref 98–110)
CO2 SERPL-SCNC: 24 MMOL/L — SIGNIFICANT CHANGE UP (ref 17–32)
COLOR SPEC: YELLOW — SIGNIFICANT CHANGE UP
CREAT SERPL-MCNC: 0.9 MG/DL — SIGNIFICANT CHANGE UP (ref 0.7–1.5)
DIFF PNL FLD: NEGATIVE — SIGNIFICANT CHANGE UP
GLUCOSE SERPL-MCNC: 88 MG/DL — SIGNIFICANT CHANGE UP (ref 70–99)
GLUCOSE UR QL: NEGATIVE MG/DL — SIGNIFICANT CHANGE UP
HCT VFR BLD CALC: 39.8 % — SIGNIFICANT CHANGE UP (ref 37–47)
HGB BLD-MCNC: 12.6 G/DL — SIGNIFICANT CHANGE UP (ref 12–16)
KETONES UR-MCNC: NEGATIVE — SIGNIFICANT CHANGE UP
LEUKOCYTE ESTERASE UR-ACNC: NEGATIVE — SIGNIFICANT CHANGE UP
MAGNESIUM SERPL-MCNC: 2.1 MG/DL — SIGNIFICANT CHANGE UP (ref 1.8–2.4)
MCHC RBC-ENTMCNC: 27 PG — SIGNIFICANT CHANGE UP (ref 27–31)
MCHC RBC-ENTMCNC: 31.7 G/DL — LOW (ref 32–37)
MCV RBC AUTO: 85.2 FL — SIGNIFICANT CHANGE UP (ref 81–99)
NITRITE UR-MCNC: NEGATIVE — SIGNIFICANT CHANGE UP
NRBC # BLD: 0 /100 WBCS — SIGNIFICANT CHANGE UP (ref 0–0)
PH UR: 7 — SIGNIFICANT CHANGE UP (ref 5–8)
PLATELET # BLD AUTO: 158 K/UL — SIGNIFICANT CHANGE UP (ref 130–400)
POTASSIUM SERPL-MCNC: 5 MMOL/L — SIGNIFICANT CHANGE UP (ref 3.5–5)
POTASSIUM SERPL-SCNC: 5 MMOL/L — SIGNIFICANT CHANGE UP (ref 3.5–5)
PROT SERPL-MCNC: 6.8 G/DL — SIGNIFICANT CHANGE UP (ref 6–8)
PROT UR-MCNC: NEGATIVE MG/DL — SIGNIFICANT CHANGE UP
RBC # BLD: 4.67 M/UL — SIGNIFICANT CHANGE UP (ref 4.2–5.4)
RBC # FLD: 15.1 % — HIGH (ref 11.5–14.5)
SODIUM SERPL-SCNC: 142 MMOL/L — SIGNIFICANT CHANGE UP (ref 135–146)
SP GR SPEC: 1.01 — SIGNIFICANT CHANGE UP (ref 1.01–1.03)
TROPONIN T SERPL-MCNC: <0.01 NG/ML — SIGNIFICANT CHANGE UP
UROBILINOGEN FLD QL: 0.2 MG/DL — SIGNIFICANT CHANGE UP (ref 0.2–0.2)
WBC # BLD: 5.6 K/UL — SIGNIFICANT CHANGE UP (ref 4.8–10.8)
WBC # FLD AUTO: 5.6 K/UL — SIGNIFICANT CHANGE UP (ref 4.8–10.8)

## 2019-05-19 PROCEDURE — 71046 X-RAY EXAM CHEST 2 VIEWS: CPT | Mod: 26

## 2019-05-19 PROCEDURE — 70450 CT HEAD/BRAIN W/O DYE: CPT | Mod: 26

## 2019-05-19 PROCEDURE — 93010 ELECTROCARDIOGRAM REPORT: CPT

## 2019-05-19 PROCEDURE — 99285 EMERGENCY DEPT VISIT HI MDM: CPT

## 2019-05-19 RX ORDER — HYDROXYZINE HCL 10 MG
1 TABLET ORAL
Qty: 30 | Refills: 0
Start: 2019-05-19 | End: 2019-05-28

## 2019-05-19 RX ORDER — SODIUM CHLORIDE 9 MG/ML
1000 INJECTION, SOLUTION INTRAVENOUS ONCE
Refills: 0 | Status: COMPLETED | OUTPATIENT
Start: 2019-05-19 | End: 2019-05-19

## 2019-05-19 RX ORDER — MECLIZINE HCL 12.5 MG
25 TABLET ORAL ONCE
Refills: 0 | Status: COMPLETED | OUTPATIENT
Start: 2019-05-19 | End: 2019-05-19

## 2019-05-19 RX ADMIN — SODIUM CHLORIDE 1000 MILLILITER(S): 9 INJECTION, SOLUTION INTRAVENOUS at 17:45

## 2019-05-19 RX ADMIN — Medication 25 MILLIGRAM(S): at 17:45

## 2019-05-19 NOTE — CONSULT NOTE ADULT - ASSESSMENT
Impression:  77 y/o female w PMH HTN, Hyperlipidemia, endometrial ca s/p chemo in 2016 presents with 2 episodes of dizziness while walking today. Also w/ chronic b/l LE neuropathy post chemotherapy in 2016.    Plan:  recommended MRI brain without and w renzo and head and neck vessel imaging, orthostatic vitals because of pt's risk factors as well as lack of clarity provided through pt's description of complaint. She is unwilling to stay in observation unit to complete testing and is leaving AMA. Discussed need for proper follow-up.

## 2019-05-19 NOTE — ED PROVIDER NOTE - CARE PROVIDER_API CALL
Jewel Perze)  EEGEpilepsy; Neurology  [address] [place] [address], [address]  [place] [address]  Phone: [phone]  Fax: [phone]  Follow Up Time: 7-10 Days

## 2019-05-19 NOTE — CONSULT NOTE ADULT - SUBJECTIVE AND OBJECTIVE BOX
HPI:  79 y/o female w PMH HTN, Hyperlipidemia, endometrial ca s/p chemo in 2016 presents with 2 episodes of dizziness while walking today. Both episodes lasted 5 min, no otalgia tinnitus, vision changes, nausea nor vomiting. + chronic b/l LE neuropathy post chemotherapy in 2016 for endometrial ca. Has seen Dr. Villarreal in the past, tried medication which pt is unsure of name of but it did not improve sx. At different times during evaluation pt described sx differently, at times describing dizziness as imbalance and at other times stating that sx were only because of decreased sensation in feet.    ROS:  Constitutional, Neurological, Psychiatric, Eyes, ENT, Cardiovascular, Respiratory, Gastrointestinal, Genitourinary, Musculoskeletal, Integumentary, Endocrine and Heme/Lymph are otherwise negative.     Vital Signs Last 24 Hrs  T(C): 36.7 (19 May 2019 16:23), Max: 36.7 (19 May 2019 16:23)  T(F): 98.1 (19 May 2019 16:23), Max: 98.1 (19 May 2019 16:23)  HR: 68 (19 May 2019 19:30) (65 - 68)  BP: 135/64 (19 May 2019 19:30) (132/61 - 137/62)  BP(mean): --  RR: 18 (19 May 2019 19:30) (16 - 18)  SpO2: 98% (19 May 2019 19:30) (98% - 100%)    Neuro Exam:  Orientation: oriented to person, oriented to place and oriented to time.   Attention: normal concentrating ability and visual attention was not decreased.   Language: no difficulty naming common objects, no difficulty repeating a phrase  Cranial Nerves: visual acuity intact bilaterally, visual fields full to confrontation, left pupil irregularly shaped-chronic post surgery, extraocular motion intact, facial sensation intact symmetrically, face symmetrical, hearing was intact bilaterally, tongue and palate midline, head turning and shoulder shrug symmetric and there was no tongue deviation with protrusion.   Motor: muscle tone was normal in all four extremities, muscle strength was normal in all four extremities and normal bulk in all four extremities.   Sensory exam: light touch was intact diminished on dorsum b/l feet. proprioception intact, pinprick intact, temp intact  Coordination: balance was intact. there was no past-pointing. no tremor present.   Deep tendon reflexes:   Biceps right 2+. Biceps left 2+.    Triceps right 2+. Triceps left 2+.  LOC  Brachioradialis right 2+. Brachioradialis left 2+.    Patella right 1+. Patella left 1+.    Ankle jerk right 0. Ankle jerk left 0.     Allergies    quinine (Urticaria)    Intolerances      MEDICATIONS  (STANDING):    MEDICATIONS  (PRN):      LABS:                        12.6   5.60  )-----------( 158      ( 19 May 2019 17:35 )             39.8     05-19    142  |  106  |  23<H>  ----------------------------<  88  5.0   |  24  |  0.9    Ca    8.6      19 May 2019 17:35  Mg     2.1     05-19    TPro  6.8  /  Alb  4.0  /  TBili  <0.2  /  DBili  x   /  AST  19  /  ALT  10  /  AlkPhos  77  05-19        Neuro Imaging:    < from: CT Head No Cont (05.19.19 @ 19:38) >    The ventricles and cerebral sulci are age-appropriate.    There is no evidence of acute intracranial hemorrhage, mass effect or   midline shift.    There are patchy subcortical white matter hypodensities consistent with   mild age-related chronic microvascular changes.    There is no fracture to the calvarium. Mastoid air cells are well aerated   bilaterally. The visualized portions of the sinuses are unremarkable.      IMPRESSION:    No CT evidence of acute intracranial pathology.     Chronic microvascular ischemic changes.    < end of copied text >

## 2019-05-19 NOTE — ED ADULT NURSE NOTE - CHPI ED NUR SYMPTOMS NEG
no loss of consciousness/no fever/no confusion/no vomiting/no numbness/no change in level of consciousness/no weakness/no blurred vision/no nausea

## 2019-05-19 NOTE — ED PROVIDER NOTE - PROGRESS NOTE DETAILS
ATTENDING NOTE:  77 y/o F with PMH depression, presents for eval of dizziness since yesterday. Pt reports she had 1 episode of dizziness that began while she was walking from living room to her bedroom whenshe started to feel off balanced, episode last 5 minutes then resolved. Pt had another episode of dizziness today with similar conditions so decided to come to the ED. No CP or fevers. Pt notes her  depression has worsened since February when her granddaughter was hit by a car and passed, also notes losing 2 of her nephews. Pt takes medication for depression. No SI/HI. PE: Well appearing, awake and alert. Cardio – S1S2. Resp - CTAB. Abdomen – soft, NTND. Ext - no calf swelling. Neuro – grossly unremarkable. A/P: Will get labs, CT, and reassess. ATTENDING NOTE:  79 y/o F with PMH depression, presents for eval of dizziness since yesterday. Pt reports she had 1 episode of dizziness that began while she was walking from living room to her bedroom whenshe started to feel off balanced, episode last 5 minutes then resolved. Pt had another episode of dizziness today with similar conditions so decided to come to the ED. No CP or fevers. Pt notes her  depression has worsened since February when her granddaughter was hit by a car and passed, also notes losing 2 of her nephews. Pt takes medication for depression. No SI/HI. PE: Well appearing, awake and alert. Cardio – S1S2. Resp - CTAB. Abdomen – soft, NTND. Ext - no calf swelling. Neuro  normal gait, motor 5/5 X4, sensation grossly intact. impression depression pt has mental health she follows with, no si/hi. dizzyness, current nihss zero. plan for labs, CT, and reassess. pt d/w attending re: need for observation for suspected tia. patient refused obs. discussed risks/benefits, will ama. pt refusing to sign ama paperwork

## 2019-05-19 NOTE — ED PROVIDER NOTE - OBJECTIVE STATEMENT
78yF with PMH CABG, HTN, anxiety on alprazolam, p/w depression and complaint of dizziness and buzzing in the ears. pt states she is unsure if dizziness is related to her depression but she has had hearing changes she attributed to depression in past. for instance she heard doorbell ringing in past only to find there is no one there. otherwise no avh, si, hi. no fnd ams fever chills .

## 2019-05-19 NOTE — ED PROVIDER NOTE - CLINICAL SUMMARY MEDICAL DECISION MAKING FREE TEXT BOX
dizzyness, ct head negative. plan to admit to [facility] for marlon arnold. pt to nyla shook  The patient wishes to leave against medical advice.  I have discussed the risks, benefits and alternatives (including the possibility of worsening of disease, pain, permanent disability, and/or death) with the patient and his/her family (if available).  The patient voices understanding of these risks, benefits, and alternatives and still wishes to sign out against medical advice.  The patient is awake, alert, oriented  x 3 and has demonstrated capacity to refuse/direct care.  I have advised the patient that they can and should return immediately should they develop any worse/different/additional symptoms, or if they change their mind and want to continue their care..  pt refused to sign paperwork

## 2019-05-19 NOTE — ED ADULT NURSE NOTE - NSIMPLEMENTINTERV_GEN_ALL_ED
Implemented All Universal Safety Interventions:  [place] to call system. Call bell, personal items and telephone within reach. Instruct patient to call for assistance. Room bathroom lighting operational. Non-slip footwear when patient is off stretcher. Physically safe environment: no spills, clutter or unnecessary equipment. Stretcher in lowest position, wheels locked, appropriate side rails in place.

## 2019-05-19 NOTE — ED ADULT TRIAGE NOTE - CHIEF COMPLAINT QUOTE
dizziness that started yesterday, states she just lost her grandchild in February and has depression as well and goes to the mental health clinic out patient , denies suicidal ideations and denies homicidal ideations, pt accompanied by family, pt concerned about her dizziness

## 2019-05-19 NOTE — ED ADULT NURSE REASSESSMENT NOTE - NS ED NURSE REASSESS COMMENT FT1
Patient in ct now . Patient assesses before sending to CT ,denied any dizziness,walked to bathroom ,iv patent . First litre of fluid finished .

## 2019-06-06 ENCOUNTER — APPOINTMENT (OUTPATIENT)
Dept: OTOLARYNGOLOGY | Facility: CLINIC | Age: 79
End: 2019-06-06
Payer: MEDICARE

## 2019-06-06 DIAGNOSIS — H61.23 IMPACTED CERUMEN, BILATERAL: ICD-10-CM

## 2019-06-06 PROCEDURE — 69210 REMOVE IMPACTED EAR WAX UNI: CPT

## 2019-06-06 PROCEDURE — 99212 OFFICE O/P EST SF 10 MIN: CPT | Mod: 25

## 2019-06-06 NOTE — HISTORY OF PRESENT ILLNESS
[FreeTextEntry1] : Patient presents today with c/o clogged ears. Patient denies any otalgia. No tinnitus.  \par She has a h/o cerumen impaction and last had her ears cleaned over 1 year ago.

## 2019-06-06 NOTE — ASSESSMENT
[FreeTextEntry1] : patient reports improvement in symptoms once ears cleaned. She will f/u annually.

## 2019-06-06 NOTE — PHYSICAL EXAM
[Normal] : mucosa is normal [Midline] : trachea located in midline position [de-identified] : bilateral cerumen impaction

## 2019-07-10 ENCOUNTER — OUTPATIENT (OUTPATIENT)
Dept: OUTPATIENT SERVICES | Facility: HOSPITAL | Age: 79
LOS: 1 days | Discharge: HOME | End: 2019-07-10

## 2019-07-10 DIAGNOSIS — K08.409 PARTIAL LOSS OF TEETH, UNSPECIFIED CAUSE, UNSPECIFIED CLASS: ICD-10-CM

## 2019-07-10 DIAGNOSIS — I25.701 ATHEROSCLEROSIS OF CORONARY ARTERY BYPASS GRAFT(S), UNSPECIFIED, WITH ANGINA PECTORIS WITH DOCUMENTED SPASM: Chronic | ICD-10-CM

## 2019-07-10 DIAGNOSIS — Z98.890 OTHER SPECIFIED POSTPROCEDURAL STATES: Chronic | ICD-10-CM

## 2019-07-10 DIAGNOSIS — K08.109 COMPLETE LOSS OF TEETH, UNSPECIFIED CAUSE, UNSPECIFIED CLASS: ICD-10-CM

## 2019-09-05 ENCOUNTER — INPATIENT (INPATIENT)
Facility: HOSPITAL | Age: 79
LOS: 0 days | Discharge: HOME | End: 2019-09-06
Attending: INTERNAL MEDICINE | Admitting: INTERNAL MEDICINE
Payer: MEDICARE

## 2019-09-05 VITALS
RESPIRATION RATE: 18 BRPM | TEMPERATURE: 98 F | SYSTOLIC BLOOD PRESSURE: 157 MMHG | HEART RATE: 64 BPM | DIASTOLIC BLOOD PRESSURE: 74 MMHG | OXYGEN SATURATION: 100 %

## 2019-09-05 DIAGNOSIS — Z98.890 OTHER SPECIFIED POSTPROCEDURAL STATES: Chronic | ICD-10-CM

## 2019-09-05 DIAGNOSIS — I25.701 ATHEROSCLEROSIS OF CORONARY ARTERY BYPASS GRAFT(S), UNSPECIFIED, WITH ANGINA PECTORIS WITH DOCUMENTED SPASM: Chronic | ICD-10-CM

## 2019-09-05 LAB
ALBUMIN SERPL ELPH-MCNC: 4.5 G/DL — SIGNIFICANT CHANGE UP (ref 3.5–5.2)
ALP SERPL-CCNC: 73 U/L — SIGNIFICANT CHANGE UP (ref 30–115)
ALT FLD-CCNC: 14 U/L — SIGNIFICANT CHANGE UP (ref 0–41)
ANION GAP SERPL CALC-SCNC: 12 MMOL/L — SIGNIFICANT CHANGE UP (ref 7–14)
AST SERPL-CCNC: 34 U/L — SIGNIFICANT CHANGE UP (ref 0–41)
BILIRUB SERPL-MCNC: 0.3 MG/DL — SIGNIFICANT CHANGE UP (ref 0.2–1.2)
BUN SERPL-MCNC: 22 MG/DL — HIGH (ref 10–20)
CALCIUM SERPL-MCNC: 8.9 MG/DL — SIGNIFICANT CHANGE UP (ref 8.5–10.1)
CHLORIDE SERPL-SCNC: 101 MMOL/L — SIGNIFICANT CHANGE UP (ref 98–110)
CO2 SERPL-SCNC: 29 MMOL/L — SIGNIFICANT CHANGE UP (ref 17–32)
CREAT SERPL-MCNC: 1.1 MG/DL — SIGNIFICANT CHANGE UP (ref 0.7–1.5)
GLUCOSE SERPL-MCNC: 98 MG/DL — SIGNIFICANT CHANGE UP (ref 70–99)
HCT VFR BLD CALC: 43.2 % — SIGNIFICANT CHANGE UP (ref 37–47)
HGB BLD-MCNC: 13.6 G/DL — SIGNIFICANT CHANGE UP (ref 12–16)
MCHC RBC-ENTMCNC: 26.5 PG — LOW (ref 27–31)
MCHC RBC-ENTMCNC: 31.5 G/DL — LOW (ref 32–37)
MCV RBC AUTO: 84.2 FL — SIGNIFICANT CHANGE UP (ref 81–99)
NRBC # BLD: 0 /100 WBCS — SIGNIFICANT CHANGE UP (ref 0–0)
PLATELET # BLD AUTO: 203 K/UL — SIGNIFICANT CHANGE UP (ref 130–400)
POTASSIUM SERPL-MCNC: 5.3 MMOL/L — HIGH (ref 3.5–5)
POTASSIUM SERPL-SCNC: 5.3 MMOL/L — HIGH (ref 3.5–5)
PROT SERPL-MCNC: 7.7 G/DL — SIGNIFICANT CHANGE UP (ref 6–8)
RBC # BLD: 5.13 M/UL — SIGNIFICANT CHANGE UP (ref 4.2–5.4)
RBC # FLD: 14.6 % — HIGH (ref 11.5–14.5)
SODIUM SERPL-SCNC: 142 MMOL/L — SIGNIFICANT CHANGE UP (ref 135–146)
TROPONIN T SERPL-MCNC: <0.01 NG/ML — SIGNIFICANT CHANGE UP
WBC # BLD: 6.5 K/UL — SIGNIFICANT CHANGE UP (ref 4.8–10.8)
WBC # FLD AUTO: 6.5 K/UL — SIGNIFICANT CHANGE UP (ref 4.8–10.8)

## 2019-09-05 PROCEDURE — 71046 X-RAY EXAM CHEST 2 VIEWS: CPT | Mod: 26

## 2019-09-05 PROCEDURE — 99285 EMERGENCY DEPT VISIT HI MDM: CPT

## 2019-09-05 PROCEDURE — 93010 ELECTROCARDIOGRAM REPORT: CPT

## 2019-09-05 RX ORDER — SERTRALINE 25 MG/1
25 TABLET, FILM COATED ORAL DAILY
Refills: 0 | Status: DISCONTINUED | OUTPATIENT
Start: 2019-09-05 | End: 2019-09-06

## 2019-09-05 RX ORDER — LATANOPROST 0.05 MG/ML
1 SOLUTION/ DROPS OPHTHALMIC; TOPICAL AT BEDTIME
Refills: 0 | Status: DISCONTINUED | OUTPATIENT
Start: 2019-09-05 | End: 2019-09-06

## 2019-09-05 RX ORDER — BRIMONIDINE TARTRATE 2 MG/MG
1 SOLUTION/ DROPS OPHTHALMIC
Refills: 0 | Status: DISCONTINUED | OUTPATIENT
Start: 2019-09-05 | End: 2019-09-06

## 2019-09-05 RX ORDER — ALPRAZOLAM 0.25 MG
0.5 TABLET ORAL ONCE
Refills: 0 | Status: DISCONTINUED | OUTPATIENT
Start: 2019-09-05 | End: 2019-09-05

## 2019-09-05 RX ORDER — ENOXAPARIN SODIUM 100 MG/ML
40 INJECTION SUBCUTANEOUS DAILY
Refills: 0 | Status: DISCONTINUED | OUTPATIENT
Start: 2019-09-05 | End: 2019-09-06

## 2019-09-05 RX ORDER — DORZOLAMIDE HYDROCHLORIDE 20 MG/ML
1 SOLUTION/ DROPS OPHTHALMIC EVERY 8 HOURS
Refills: 0 | Status: DISCONTINUED | OUTPATIENT
Start: 2019-09-05 | End: 2019-09-06

## 2019-09-05 RX ORDER — HYDROXYZINE HCL 10 MG
25 TABLET ORAL EVERY 8 HOURS
Refills: 0 | Status: DISCONTINUED | OUTPATIENT
Start: 2019-09-05 | End: 2019-09-06

## 2019-09-05 RX ORDER — ATORVASTATIN CALCIUM 80 MG/1
40 TABLET, FILM COATED ORAL AT BEDTIME
Refills: 0 | Status: DISCONTINUED | OUTPATIENT
Start: 2019-09-05 | End: 2019-09-06

## 2019-09-05 RX ORDER — ASPIRIN/CALCIUM CARB/MAGNESIUM 324 MG
324 TABLET ORAL DAILY
Refills: 0 | Status: DISCONTINUED | OUTPATIENT
Start: 2019-09-05 | End: 2019-09-06

## 2019-09-05 RX ORDER — CHLORHEXIDINE GLUCONATE 213 G/1000ML
1 SOLUTION TOPICAL
Refills: 0 | Status: DISCONTINUED | OUTPATIENT
Start: 2019-09-05 | End: 2019-09-06

## 2019-09-05 RX ORDER — AMLODIPINE BESYLATE 2.5 MG/1
5 TABLET ORAL DAILY
Refills: 0 | Status: DISCONTINUED | OUTPATIENT
Start: 2019-09-05 | End: 2019-09-06

## 2019-09-05 RX ORDER — METOPROLOL TARTRATE 50 MG
25 TABLET ORAL DAILY
Refills: 0 | Status: DISCONTINUED | OUTPATIENT
Start: 2019-09-05 | End: 2019-09-06

## 2019-09-05 RX ADMIN — Medication 324 MILLIGRAM(S): at 21:37

## 2019-09-05 RX ADMIN — Medication 0.5 MILLIGRAM(S): at 23:22

## 2019-09-05 NOTE — ED ADULT NURSE NOTE - CHPI ED NUR SYMPTOMS NEG
no syncope/no vomiting/no back pain/no fever/no nausea/no chills/no dizziness/no congestion/no diaphoresis/no shortness of breath

## 2019-09-05 NOTE — H&P ADULT - NSICDXPASTSURGICALHX_GEN_ALL_CORE_FT
PAST SURGICAL HISTORY:  Athscl CABG, unsp, w angina pectoris w documented spasm     H/O total hysterectomy

## 2019-09-05 NOTE — ED PROVIDER NOTE - PROGRESS NOTE DETAILS
AL: patient seen and evaluated by me, presenting with left sided chest pain since this afternoon. significant cardiac history with CABG, multiple stents, HTN, HLD. case endorsed to BRENDA William.

## 2019-09-05 NOTE — H&P ADULT - NSHPSOCIALHISTORY_GEN_ALL_CORE
Negative for cigarettes, EtOH, or illicit drugs.     Patient lives at home with family. Has home health aid. Is fully functional at baseline and ambulates independently.

## 2019-09-05 NOTE — H&P ADULT - ASSESSMENT
78 y/o  F with PMH of HTN, DLD, CAD s/p CABG X2, Glaucoma, Diverticulosis, Uterine Ca s/p SHAAN & Chemo - now in remission, presenting to the ED complaining of left sided, atypical chest pain.    # Atypical chest pain that occurred after lifting heavy pot - ddx: musculoskeletal vs. unstable angina - rule out ACS  # CAD s/p CABG X2,  - CP now resolved. Lasted for <2 minutes.   - serial ekg   - 1st set of cardiac enzymes negative. f/u next 2 sets  - f/u TTE  - patient does not take aspirin at home. Received 325 mg in ED  - Continue statin and beta-blocker  - f/u cardiology [place], Dr Moura    # HTN - stable  - continue home meds    # DLD  - f/u lipid profile  - continue statin     # Anxiety   - stat dose of xanax  - continue hydroxyzine     # Gluacoma  - continue home meds     # hx of Uterine Ca s/p SHAAN & Chemo - in remission  - outpatient follow up    # Obesity  - patient counselled on healthy diet and exercise.     DVT ppx: lovenox  gi ppx: not indicated  out of bed to chair  DASH diet   Dispo: from home  Full code

## 2019-09-05 NOTE — ED ADULT NURSE NOTE - OBJECTIVE STATEMENT
Pt. presents to ED with complaints of mild left sided suprasternal chest pain that started this am and lasted less then 2 minutes. Pt. with a past medical history of CABG, HTN, DLD, GERD, Depression and Uterine Ca. Pt. concerned that she might have another MI and decided to come to ED for eval. Denies any SOB or distress. Pt. Denies any headaches or dizziness.

## 2019-09-05 NOTE — ED PROVIDER NOTE - PHYSICAL EXAMINATION
Constitutional: Well developed, well nourished. NAD  Head: Normocephalic, atraumatic.  Eyes: PERRL, EOMI.  ENT: No nasal discharge. Mucous membranes dry.  Neck: Supple. Painless ROM.  Cardiovascular:  Regular rate and rhythm; minimal pain on palpation of anterior chest wall;   Pulmonary:  Lungs clear to auscultation bilaterally.   Abdominal: Soft. Nondistended. No rebound, guarding, rigidity.  Extremities. Pelvis stable. No lower extremity edema, symmetric calves.  Skin: No rashes, cyanosis.  Neuro: AAOx3. No focal neurological deficits.  Psych: Normal mood. Normal affect.

## 2019-09-05 NOTE — H&P ADULT - NSHPLABSRESULTS_GEN_ALL_CORE
13.6   6.50  )-----------( 203      ( 05 Sep 2019 19:50 )             43.2       09-05    142  |  101  |  22<H>  ----------------------------<  98  5.3<H>   |  29  |  1.1    Ca    8.9      05 Sep 2019 19:50    TPro  7.7  /  Alb  4.5  /  TBili  0.3  /  DBili  x   /  AST  34  /  ALT  14  /  AlkPhos  73  09-05

## 2019-09-05 NOTE — ED ADULT NURSE NOTE - NSIMPLEMENTINTERV_GEN_ALL_ED
Implemented All Universal Safety Interventions:  [place] to call system. Call bell, personal items and telephone within reach. Instruct patient to call for assistance. Room bathroom lighting operational. Non-slip footwear when patient is off stretcher. Physically safe environment: no spills, clutter or unnecessary equipment. Stretcher in lowest position, wheels locked, appropriate side rails in place.

## 2019-09-05 NOTE — H&P ADULT - NSICDXPASTMEDICALHX_GEN_ALL_CORE_FT
PAST MEDICAL HISTORY:  Cataract of right eye     Coronary artery disease s/p CABG X2    Glaucoma     High cholesterol     Hypertension     Uterine cancer s/p SHAAN and chemo

## 2019-09-05 NOTE — ED PROVIDER NOTE - OBJECTIVE STATEMENT
79 yold female to Ed Pmhx Cad with CaBG, Htn, DLD, Gerd, depression, hx uterine Ca s/p hossein c/o mild left side suprasternal cp started this am brief lasted <2 minutes - pt hx of cad became concerned about MI and came to ED for evaluation; pt denies n/v/diaphoresis, sob or radiation of pain; pt with cad on most recent ccta - currently being medically managed by Dr. Enrique;

## 2019-09-05 NOTE — H&P ADULT - HISTORY OF PRESENT ILLNESS
80 y/o  F with PMH of HTN, DLD, CAD s/p CABG X2, Gluacoma, Diverticulosis, Uterine Ca s/p SHAAN & Chemo - now in remission, presenting to the ED complaining of left sided chest pain. The episode occurred today after she lifted a heavy pot while cooking. The pain lasted approx 2 minutes and self-resolved. No recurrence of the pain. She said the pain did not radiate anyway. Was not associated with nausea, diaphoretic, or SOB. She denied fever/chills, headache, sore throat, palpitations, abdominal pain, nausea/vomiting/constipation, urinary symptoms or lower extremity swelling. She said she last saw her cardiologist, Dr Moura in his office 2 months ago.     In ED, patient was hemodynamically stable. 1st set of troponins were negative. She denied recurrence of her pain. To be admitted for further workup.

## 2019-09-05 NOTE — H&P ADULT - ATTENDING COMMENTS
Agree with resident's note, HPI, PE, assessment and plan.  Pt was seen and examined with resident during morning rounds.     80 y/o  F with PMH of HTN, DLD, CAD s/p CABG X2, Glaucoma, Diverticulosis, Uterine Ca s/p SHAAN & Chemo - now in remission, presenting to the ED complaining of sudden onset of left sided chest pain after she  heavy pot. Pain was 1/10, not radiating, she can't describe the quality of pain. Pain lasted about 30 min and completely resolved after she took Alive. At the time of interview no chest pain.     ROS: CONSTITUTIONAL: No weakness, fevers or chills  EYES/ENT: No visual changes;  No vertigo or throat pain   NECK: No pain or stiffness  RESPIRATORY: No cough, wheezing, hemoptysis; No shortness of breath  CARDIOVASCULAR: No chest pain or palpitations  GASTROINTESTINAL: No abdominal or epigastric pain. No nausea, vomiting, or hematemesis; No diarrhea or constipation. No melena or hematochezia.  GENITOURINARY: No dysuria, frequency or hematuria  NEUROLOGICAL: No numbness or weakness  SKIN: No itching, rashes     T(C): 36.6  HR: 63  BP: 123/66  RR: 20  SpO2: 98%    Physical exam:  NAD, looks very comfortable  HEENT: PERRL  NECK: supple, no JVD  RESP: CTA b/l, no crackles, rhonchi  CVS: S1S2, RRR, no chest wall tenderness  GI: abdomen soft NT, ND  Extremities: no c/c/edema  NEURO: AOx3, no focal deficit  H/L: no enlarged LN noted     LABS: Troponin T, Serum: <0.01 ng/mL (09-06-19 @ 05:52)  Troponin T, Serum: <0.01 ng/mL (09-05-19 @ 19:50)    CXR: No radiographic evidence of acute cardiopulmonary disease.    EKG: sinus bradycardia, no acute ischemic changes    A/P: # Noncardiac chest pain, ACS ruled out by negative CE and no changes on EKG. pain likely musculoskeletal   - pt is pain free  - cont home meds  - pt is clinically stable for discharge home with OP follow up with cardiologist

## 2019-09-05 NOTE — H&P ADULT - NSHPPHYSICALEXAM_GEN_ALL_CORE
GENERAL: Obese female in NAD, speaks in full sentences, no signs of respiratory distress, slightly anxious on exam  HEAD: Atraumatic  NECK: Supple  CHEST/LUNG: Clear to auscultation bilaterally; No wheeze or crackles  HEART: S1, S2; RRR; No murmurs, rubs, or gallops  ABDOMEN: BS+; Soft, Non-tender, Non-distended  EXTREMITIES:  2+ Peripheral Pulses, No clubbing, cyanosis, or edema  PSYCH: AAOx3, slightly anxious  NEUROLOGY: non-focal  SKIN: No rashes or lesions

## 2019-09-05 NOTE — ED PROVIDER NOTE - ATTENDING CONTRIBUTION TO CARE
80 yo f with pmh of cad with cabg, htn, gerd, uterine CA s/p hysterectomy, presents with c/o left chest pain.  pt says sharp and squeezing, lasted less than one minute.  came for eval of cp which has now resolved.  pt denies feeling dizzy, sob, nausea, or radiation of pain.  pt f/u with dr. anderson for cardio.  pt says was recently seen a few months ago and was told everything was fine.  exam: nad, ncat, perrl, eomi, mmm, rrr, ctab, abd soft, nt,nd aox3, no leg swelling or calf tenderness imp: pt with cad, cabg, here with cp, will r/o acs, ekg, cxr labs tele admission.

## 2019-09-06 ENCOUNTER — TRANSCRIPTION ENCOUNTER (OUTPATIENT)
Age: 79
End: 2019-09-06

## 2019-09-06 VITALS
RESPIRATION RATE: 20 BRPM | DIASTOLIC BLOOD PRESSURE: 66 MMHG | HEART RATE: 63 BPM | TEMPERATURE: 98 F | SYSTOLIC BLOOD PRESSURE: 123 MMHG

## 2019-09-06 LAB
ALBUMIN SERPL ELPH-MCNC: 4.5 G/DL — SIGNIFICANT CHANGE UP (ref 3.5–5.2)
ALP SERPL-CCNC: 79 U/L — SIGNIFICANT CHANGE UP (ref 30–115)
ALT FLD-CCNC: 11 U/L — SIGNIFICANT CHANGE UP (ref 0–41)
ANION GAP SERPL CALC-SCNC: 13 MMOL/L — SIGNIFICANT CHANGE UP (ref 7–14)
AST SERPL-CCNC: 20 U/L — SIGNIFICANT CHANGE UP (ref 0–41)
BASOPHILS # BLD AUTO: 0.02 K/UL — SIGNIFICANT CHANGE UP (ref 0–0.2)
BASOPHILS NFR BLD AUTO: 0.3 % — SIGNIFICANT CHANGE UP (ref 0–1)
BILIRUB SERPL-MCNC: 0.4 MG/DL — SIGNIFICANT CHANGE UP (ref 0.2–1.2)
BUN SERPL-MCNC: 18 MG/DL — SIGNIFICANT CHANGE UP (ref 10–20)
CALCIUM SERPL-MCNC: 9 MG/DL — SIGNIFICANT CHANGE UP (ref 8.5–10.1)
CHLORIDE SERPL-SCNC: 104 MMOL/L — SIGNIFICANT CHANGE UP (ref 98–110)
CHOLEST SERPL-MCNC: 162 MG/DL — SIGNIFICANT CHANGE UP (ref 100–200)
CO2 SERPL-SCNC: 27 MMOL/L — SIGNIFICANT CHANGE UP (ref 17–32)
CREAT SERPL-MCNC: 0.8 MG/DL — SIGNIFICANT CHANGE UP (ref 0.7–1.5)
EOSINOPHIL # BLD AUTO: 0.06 K/UL — SIGNIFICANT CHANGE UP (ref 0–0.7)
EOSINOPHIL NFR BLD AUTO: 1 % — SIGNIFICANT CHANGE UP (ref 0–8)
ESTIMATED AVERAGE GLUCOSE: 134 MG/DL — HIGH (ref 68–114)
GLUCOSE SERPL-MCNC: 100 MG/DL — HIGH (ref 70–99)
HBA1C BLD-MCNC: 6.3 % — HIGH (ref 4–5.6)
HCT VFR BLD CALC: 43.8 % — SIGNIFICANT CHANGE UP (ref 37–47)
HDLC SERPL-MCNC: 62 MG/DL — SIGNIFICANT CHANGE UP
HGB BLD-MCNC: 14.1 G/DL — SIGNIFICANT CHANGE UP (ref 12–16)
IMM GRANULOCYTES NFR BLD AUTO: 0.2 % — SIGNIFICANT CHANGE UP (ref 0.1–0.3)
LIPID PNL WITH DIRECT LDL SERPL: 83 MG/DL — SIGNIFICANT CHANGE UP (ref 4–129)
LYMPHOCYTES # BLD AUTO: 2.06 K/UL — SIGNIFICANT CHANGE UP (ref 1.2–3.4)
LYMPHOCYTES # BLD AUTO: 34.9 % — SIGNIFICANT CHANGE UP (ref 20.5–51.1)
MAGNESIUM SERPL-MCNC: 2.1 MG/DL — SIGNIFICANT CHANGE UP (ref 1.8–2.4)
MCHC RBC-ENTMCNC: 26.8 PG — LOW (ref 27–31)
MCHC RBC-ENTMCNC: 32.2 G/DL — SIGNIFICANT CHANGE UP (ref 32–37)
MCV RBC AUTO: 83.3 FL — SIGNIFICANT CHANGE UP (ref 81–99)
MONOCYTES # BLD AUTO: 0.52 K/UL — SIGNIFICANT CHANGE UP (ref 0.1–0.6)
MONOCYTES NFR BLD AUTO: 8.8 % — SIGNIFICANT CHANGE UP (ref 1.7–9.3)
NEUTROPHILS # BLD AUTO: 3.23 K/UL — SIGNIFICANT CHANGE UP (ref 1.4–6.5)
NEUTROPHILS NFR BLD AUTO: 54.8 % — SIGNIFICANT CHANGE UP (ref 42.2–75.2)
NRBC # BLD: 0 /100 WBCS — SIGNIFICANT CHANGE UP (ref 0–0)
PLATELET # BLD AUTO: 183 K/UL — SIGNIFICANT CHANGE UP (ref 130–400)
POTASSIUM SERPL-MCNC: 4.3 MMOL/L — SIGNIFICANT CHANGE UP (ref 3.5–5)
POTASSIUM SERPL-SCNC: 4.3 MMOL/L — SIGNIFICANT CHANGE UP (ref 3.5–5)
PROT SERPL-MCNC: 7.5 G/DL — SIGNIFICANT CHANGE UP (ref 6–8)
RBC # BLD: 5.26 M/UL — SIGNIFICANT CHANGE UP (ref 4.2–5.4)
RBC # FLD: 14.4 % — SIGNIFICANT CHANGE UP (ref 11.5–14.5)
SODIUM SERPL-SCNC: 144 MMOL/L — SIGNIFICANT CHANGE UP (ref 135–146)
TOTAL CHOLESTEROL/HDL RATIO MEASUREMENT: 2.6 RATIO — LOW (ref 4–5.5)
TRIGL SERPL-MCNC: 68 MG/DL — SIGNIFICANT CHANGE UP (ref 10–149)
TROPONIN T SERPL-MCNC: <0.01 NG/ML — SIGNIFICANT CHANGE UP
WBC # BLD: 5.9 K/UL — SIGNIFICANT CHANGE UP (ref 4.8–10.8)
WBC # FLD AUTO: 5.9 K/UL — SIGNIFICANT CHANGE UP (ref 4.8–10.8)

## 2019-09-06 PROCEDURE — 99236 HOSP IP/OBS SAME DATE HI 85: CPT

## 2019-09-06 RX ORDER — INFLUENZA VIRUS VACCINE 15; 15; 15; 15 UG/.5ML; UG/.5ML; UG/.5ML; UG/.5ML
0.5 SUSPENSION INTRAMUSCULAR ONCE
Refills: 0 | Status: DISCONTINUED | OUTPATIENT
Start: 2019-09-06 | End: 2019-09-06

## 2019-09-06 RX ORDER — ASPIRIN/CALCIUM CARB/MAGNESIUM 324 MG
1 TABLET ORAL
Qty: 30 | Refills: 0
Start: 2019-09-06 | End: 2019-10-05

## 2019-09-06 RX ADMIN — SERTRALINE 25 MILLIGRAM(S): 25 TABLET, FILM COATED ORAL at 13:36

## 2019-09-06 RX ADMIN — DORZOLAMIDE HYDROCHLORIDE 1 DROP(S): 20 SOLUTION/ DROPS OPHTHALMIC at 13:39

## 2019-09-06 RX ADMIN — Medication 324 MILLIGRAM(S): at 13:38

## 2019-09-06 RX ADMIN — AMLODIPINE BESYLATE 5 MILLIGRAM(S): 2.5 TABLET ORAL at 05:40

## 2019-09-06 RX ADMIN — ENOXAPARIN SODIUM 40 MILLIGRAM(S): 100 INJECTION SUBCUTANEOUS at 13:38

## 2019-09-06 RX ADMIN — BRIMONIDINE TARTRATE 1 DROP(S): 2 SOLUTION/ DROPS OPHTHALMIC at 05:40

## 2019-09-06 RX ADMIN — DORZOLAMIDE HYDROCHLORIDE 1 DROP(S): 20 SOLUTION/ DROPS OPHTHALMIC at 05:40

## 2019-09-06 NOTE — DISCHARGE NOTE NURSING/CASE MANAGEMENT/SOCIAL WORK - PATIENT PORTAL LINK FT
You can access the FollowMyHealth Patient Portal offered by [facility] by registering at the following website: [email or web address]. By joining [facility]’s FollowMyHealth portal, you will also be able to view your health information using other applications (apps) compatible with our system.

## 2019-09-06 NOTE — DISCHARGE NOTE PROVIDER - NSDCFUSCHEDAPPT_GEN_ALL_CORE_FT
BEKAH NELSON ; 10/10/2019 ; [facility] [address] BEKAH NELSON ; 10/10/2019 ; [facility] [address] BEKAH NELSON ; 10/10/2019 ; [facility] [address]

## 2019-09-06 NOTE — DISCHARGE NOTE PROVIDER - PROVIDER TOKENS
PROVIDER:[TOKEN:[[id number]],FOLLOWUP:[1-3 days]],PROVIDER:[TOKEN:[[id number]],FOLLOWUP:[2 weeks]]

## 2019-09-06 NOTE — DISCHARGE NOTE PROVIDER - NSDCCPCAREPLAN_GEN_ALL_CORE_FT
PRINCIPAL DISCHARGE DIAGNOSIS  Diagnosis: Chest pain  Assessment and Plan of Treatment: Your chest pain is likely caused by musculoskeletal etiology from lifting heavy object. Symptom has resolved with pain medication and your cardiac work up has been negative so far. Cardiology has evaluated you and it is PRINCIPAL DISCHARGE DIAGNOSIS  Diagnosis: Chest pain  Assessment and Plan of Treatment: Your chest pain is likely caused by musculoskeletal etiology from lifting heavy object. Symptom has resolved with pain medication and your cardiac work up has been negative so far. There is no events on telemetry monitor. You are stable to be discharged home and need to follow up outpatient with your PCP and Cardiologist.

## 2019-09-06 NOTE — DISCHARGE NOTE PROVIDER - HOSPITAL COURSE
78 y/o  F with PMH of HTN, DLD, CAD s/p CABG X2, Gluacoma, Diverticulosis, Uterine Ca s/p SHAAN & Chemo - now in remission, presenting to the ED complaining of left sided chest pain. The episode occurred today after she lifted a heavy pot while cooking. The pain lasted approx 2 minutes and self-resolved. No recurrence of the pain. She said the pain did not radiate anyway. Was not associated with nausea, diaphoretic, or SOB. She denied fever/chills, headache, sore throat, palpitations, abdominal pain, nausea/vomiting/constipation, urinary symptoms or lower extremity swelling. She said she last saw her cardiologist, Dr Moura in his office 2 months ago.         In ED, patient was hemodynamically stable. 1st set of troponin was negative. She denied recurrence of her pain. To be admitted for further workup.         Patient has been asymptomatic since admission. She denies any shortness of breath, nausea/vomiting, or diaphoresis. This pain was different than the cardiac chest pain she had when she had her bypass. Both sets of troponin were negative. Dr. Moura has evaluated her and thought that this is likely musculoskeletal. Patient is stable to be discharged home with outpatient follow up with PCP and Cardiologist. 78 y/o  F with PMH of HTN, DLD, CAD s/p CABG X2, Glu[place], Diverticulosis, Uterine Ca s/p SHAAN & Chemo - now in remission, presenting to the ED complaining of left sided chest pain. The episode occurred today after she lifted a heavy pot while cooking. The pain lasted approx 2 minutes and self-resolved. No recurrence of the pain. She said the pain did not radiate anyway. Was not associated with nausea, diaphoretic, or SOB. She denied fever/chills, headache, sore throat, palpitations, abdominal pain, nausea/vomiting/constipation, urinary symptoms or lower extremity swelling. She said she last saw her cardiologist, Dr Moura in his office 2 months ago.         In ED, patient was hemodynamically stable. 1st set of troponin was negative. She denied recurrence of her pain. To be admitted for further workup.         Patient has been asymptomatic since admission. She denies any shortness of breath, nausea/vomiting, or diaphoresis. This pain was different than the cardiac chest pain she had when she had her bypass. Both sets of troponin were negative. Therefore the symptom is likely musculoskeletal. Patient is stable to be discharged home with outpatient follow up with PCP and Cardiologist.

## 2019-09-06 NOTE — CONSULT NOTE ADULT - SUBJECTIVE AND OBJECTIVE BOX
HPI:  78 y/o  F with PMH of HTN, DLD, CAD s/p CABG X2, [place], Diverticulosis, Uterine Ca s/p SHAAN & Chemo - now in remission, presenting to the ED complaining of left sided chest pain. The episode occurred today after she lifted a heavy pot while cooking. The pain lasted approx 2 minutes and self-resolved. No recurrence of the pain. She said the pain did not radiate anyway. Was not associated with nausea, diaphoretic, or SOB. She denied fever/chills, headache, sore throat, palpitations, abdominal pain, nausea/vomiting/constipation, urinary symptoms or lower extremity swelling. She said she last saw her cardiologist, Dr Moura in his office 2 months ago.     In ED, patient was hemodynamically stable. 1st set of troponins were negative. She denied recurrence of her pain. To be admitted for further workup. (05 Sep 2019 23:11)      PAST MEDICAL & SURGICAL HISTORY  Coronary artery disease: s/p CABG X2  Glaucoma  Cataract of right eye  High cholesterol  Hypertension  Uterine cancer: s/p SHAAN and chemo  Athscl CABG, unsp, w angina pectoris w documented spasm  H/O total hysterectomy      FAMILY HISTORY:  FAMILY HISTORY:      SOCIAL HISTORY:  []smoker  []Alcohol  []Drug    ALLERGIES:  quinine (Urticaria)      MEDICATIONS:  MEDICATIONS  (STANDING):  amLODIPine   Tablet 5 milliGRAM(s) Oral daily  aspirin  chewable 324 milliGRAM(s) Oral daily  atorvastatin 40 milliGRAM(s) Oral at bedtime  brimonidine 0.2% Ophthalmic Solution 1 Drop(s) Both EYES two times a day  chlorhexidine 4% Liquid 1 Application(s) Topical <User Schedule>  dorzolamide 2% Ophthalmic Solution 1 Drop(s) Both EYES every 8 hours  enoxaparin Injectable 40 milliGRAM(s) SubCutaneous daily  influenza   Vaccine 0.5 milliLiter(s) IntraMuscular once  latanoprost 0.005% Ophthalmic Solution 1 Drop(s) Both EYES at bedtime  metoprolol succinate ER 25 milliGRAM(s) Oral daily  sertraline 25 milliGRAM(s) Oral daily    MEDICATIONS  (PRN):  hydrOXYzine hydrochloride 25 milliGRAM(s) Oral every 8 hours PRN Anxiety      HOME MEDICATIONS:  Home Medications:  amLODIPine 5 mg oral tablet: 1 tab(s) orally once a day (05 Sep 2019 23:18)  brimonidine 0.1% ophthalmic solution: to each affected eye 2 times a day (05 Sep 2019 23:18)  dorzolamide 2% ophthalmic solution: 1 drop(s) to each affected eye 2 times a day (05 Sep 2019 23:18)  latanoprost 0.005% ophthalmic solution: 1 drop(s) to each affected eye 2 times a day (05 Sep 2019 23:18)  metoprolol succinate 25 mg oral tablet, extended release: 1 tab(s) orally once a day (05 Sep 2019 23:20)  rosuvastatin 10 mg oral tablet: 1 tab(s) orally once a day (05 Sep 2019 23:18)  sertraline 25 mg oral tablet: 1 tab(s) orally once a day (05 Sep 2019 23:18)      VITALS:   T(F): 96.7 (09-06 @ 05:43), Max: 98.5 (09-05 @ 19:10)  HR: 61 (09-06 @ 05:43) (53 - 68)  BP: 147/100 (09-06 @ 05:43) (127/80 - 157/74)  BP(mean): --  RR: 18 (09-06 @ 05:43) (18 - 18)  SpO2: 98% (09-06 @ 08:44) (98% - 100%)    I&O's Summary      REVIEW OF SYSTEMS:  CONSTITUTIONAL: No weakness, fevers or chills  EYES: No visual changes  ENT: No vertigo or throat pain   NECK: No pain or stiffness  RESPIRATORY: No cough, wheezing, hemoptysis; No shortness of breath  CARDIOVASCULAR: No chest pain or palpitations  GASTROINTESTINAL: No abdominal or epigastric pain. No nausea, vomiting, or hematemesis; No diarrhea or constipation. No melena or hematochezia.  GENITOURINARY: No dysuria, frequency or hematuria  NEUROLOGICAL: No numbness or weakness  SKIN: No itching, no rashes  MSK: no    PHYSICAL EXAM:  NEURO: patient is awake , alert and oriented  GEN: Not in acute distress  NECK: no thyroid enlargement, no JVD  LUNGS: Clear to auscultation bilaterally   CARDIOVASCULAR: S1/S2 present, RRR , no murmurs or rubs, no carotid bruits,  + PP bilaterally  ABD: Soft, non-tender, non-distended, +BS  EXT: No KASEY  SKIN: Intact    LABS:                        14.1   5.90  )-----------( 183      ( 06 Sep 2019 05:52 )             43.8     09-06    144  |  104  |  18  ----------------------------<  100<H>  4.3   |  27  |  0.8    Ca    9.0      06 Sep 2019 05:52  Mg     2.1     09-06    TPro  7.5  /  Alb  4.5  /  TBili  0.4  /  DBili  x   /  AST  20  /  ALT  11  /  AlkPhos  79  09-06      Troponin T, Serum: <0.01 ng/mL (09-06-19 @ 05:52)  Troponin T, Serum: <0.01 ng/mL (09-05-19 @ 19:50)    CARDIAC MARKERS ( 06 Sep 2019 05:52 )  x     / <0.01 ng/mL / x     / x     / x      CARDIAC MARKERS ( 05 Sep 2019 19:50 )  x     / <0.01 ng/mL / x     / x     / x        Troponin trend:      09-06 Chol 162 LDL 83 HDL 62 Trig 68      RADIOLOGY:    CXR:   No radiographic evidence of acute cardiopulmonary disease.  Stress Test: 4-14 Persantine Thallium No ischemia.  11-17 Adenosine Thallium No ischemia   Echo: 6-3-15 Normal LV systolic function mild MR and TR RVSPw as 34 mmhg.    9-10-18 Normal LV systolic function mild TR RVSP was at least 50 mmhg   Cardiac Cath: 70% LM 60% LAD 50% prox LCX 60% RI RCA ok   CABG: LIMA to LAD SVG to OM   9-18 DIANA to LAD and SVG to om     ECG:  EKG: NSR Normal ECG      TELEMETRY EVENTS:    None

## 2019-09-06 NOTE — CONSULT NOTE ADULT - ASSESSMENT
78 y/o  F with PMH of HTN, DLD, CAD s/p CABG X2, Glaucoma Diverticulosis, Uterine Ca s/p SHAAN & Chemo presenting for evaluation of chest pain.  Last seen 01/04/19 in office, has appointment in one week.     IMPRESSION:     CAD s/p CABG x 2  Atypical chest pain    RECOMMENDATIONS:   Atypical chest pain that occurred after lifting heavy pot, likely non cardiac given point tenderness during event, relief with Naproxen at home.    CP now resolved, lasted for <2 minutes, No significant EKG changes, CE negative x 2.  She is prescribed to take 325 mg daily aspirin at home, counseled on compliance as she indicates she does not take this currently.    Continue statin and beta-blocker 78 y/o  F with PMH of HTN, DLD, CAD s/p CABG X2, Glaucoma Diverticulosis, Uterine Ca s/p SHAAN & Chemo presenting for evaluation of chest pain.  Last seen 01/04/19 in office, has appointment in one week.     IMPRESSION:     CAD s/p CABG x 2  Atypical chest pain    RECOMMENDATIONS:   Atypical chest pain that occurred after lifting heavy pot, likely non cardiac given point tenderness during event, relief with Naproxen at home.    CP now resolved, lasted for <2 minutes, No significant EKG changes, CE negative x 2.  She is prescribed to take 325 mg daily aspirin at home, counseled on compliance as she indicates she does not take this currently.    Continue statin and beta-blocker    ATTENDING NOTE TO FOLLOW

## 2019-09-06 NOTE — DISCHARGE NOTE PROVIDER - CARE PROVIDER_API CALL
Ashley Figueroa ()  Internal Medicine  [address] [facility], [address]  [place] [address]  Phone: [phone]  Fax: [phone]  Follow Up Time: 1-3 days    Manuel Moura)  Cardiovascular Disease; Internal Medicine  [address] [place] [address], [facility] [address]  [place] [address]  Phone: [phone]  Fax: [phone]  Follow Up Time: 2 weeks

## 2019-09-10 DIAGNOSIS — F41.9 ANXIETY DISORDER, UNSPECIFIED: ICD-10-CM

## 2019-09-10 DIAGNOSIS — Z92.21 PERSONAL HISTORY OF ANTINEOPLASTIC CHEMOTHERAPY: ICD-10-CM

## 2019-09-10 DIAGNOSIS — R07.89 OTHER CHEST PAIN: ICD-10-CM

## 2019-09-10 DIAGNOSIS — Z88.8 ALLERGY STATUS TO OTHER DRUGS, MEDICAMENTS AND BIOLOGICAL SUBSTANCES STATUS: ICD-10-CM

## 2019-09-10 DIAGNOSIS — K57.90 DIVERTICULOSIS OF INTESTINE, PART UNSPECIFIED, WITHOUT PERFORATION OR ABSCESS WITHOUT BLEEDING: ICD-10-CM

## 2019-09-10 DIAGNOSIS — E78.00 PURE HYPERCHOLESTEROLEMIA, UNSPECIFIED: ICD-10-CM

## 2019-09-10 DIAGNOSIS — I25.10 ATHEROSCLEROTIC HEART DISEASE OF NATIVE CORONARY ARTERY WITHOUT ANGINA PECTORIS: ICD-10-CM

## 2019-09-10 DIAGNOSIS — E66.9 OBESITY, UNSPECIFIED: ICD-10-CM

## 2019-09-10 DIAGNOSIS — F32.9 MAJOR DEPRESSIVE DISORDER, SINGLE EPISODE, UNSPECIFIED: ICD-10-CM

## 2019-09-10 DIAGNOSIS — K21.9 GASTRO-ESOPHAGEAL REFLUX DISEASE WITHOUT ESOPHAGITIS: ICD-10-CM

## 2019-09-10 DIAGNOSIS — H40.9 UNSPECIFIED GLAUCOMA: ICD-10-CM

## 2019-09-10 DIAGNOSIS — I10 ESSENTIAL (PRIMARY) HYPERTENSION: ICD-10-CM

## 2019-09-10 DIAGNOSIS — Z85.42 PERSONAL HISTORY OF MALIGNANT NEOPLASM OF OTHER PARTS OF UTERUS: ICD-10-CM

## 2019-09-10 DIAGNOSIS — R07.9 CHEST PAIN, UNSPECIFIED: ICD-10-CM

## 2019-09-10 DIAGNOSIS — H26.9 UNSPECIFIED CATARACT: ICD-10-CM

## 2019-09-10 DIAGNOSIS — Z95.1 PRESENCE OF AORTOCORONARY BYPASS GRAFT: ICD-10-CM

## 2019-09-10 DIAGNOSIS — Z90.710 ACQUIRED ABSENCE OF BOTH CERVIX AND UTERUS: ICD-10-CM

## 2019-10-10 ENCOUNTER — APPOINTMENT (OUTPATIENT)
Dept: CARDIOLOGY | Facility: CLINIC | Age: 79
End: 2019-10-10
Payer: MEDICARE

## 2019-10-10 VITALS
HEIGHT: 64 IN | BODY MASS INDEX: 27.49 KG/M2 | HEART RATE: 58 BPM | SYSTOLIC BLOOD PRESSURE: 140 MMHG | WEIGHT: 161 LBS | DIASTOLIC BLOOD PRESSURE: 80 MMHG

## 2019-10-10 DIAGNOSIS — I73.9 PERIPHERAL VASCULAR DISEASE, UNSPECIFIED: ICD-10-CM

## 2019-10-10 PROBLEM — I25.10 ATHEROSCLEROTIC HEART DISEASE OF NATIVE CORONARY ARTERY WITHOUT ANGINA PECTORIS: Chronic | Status: ACTIVE | Noted: 2019-09-05

## 2019-10-10 PROBLEM — C55 MALIGNANT NEOPLASM OF UTERUS, PART UNSPECIFIED: Chronic | Status: ACTIVE | Noted: 2018-02-05

## 2019-10-10 PROCEDURE — 99214 OFFICE O/P EST MOD 30 MIN: CPT

## 2019-10-10 PROCEDURE — 93000 ELECTROCARDIOGRAM COMPLETE: CPT

## 2019-10-10 RX ORDER — ASPIRIN 325 MG/1
325 TABLET, FILM COATED ORAL DAILY
Refills: 0 | Status: DISCONTINUED | COMMUNITY
End: 2019-10-10

## 2019-10-10 NOTE — ASSESSMENT
[FreeTextEntry1] : The patient has improved depression and anxiety after starting Sertraline. . The patient has had atypical chest pain. She has had 2 CABG procedures the last being LIMA to LAD and SVG to OM in 9-18 . . CP eas atypical and usually during a time of anxiety .  .

## 2019-10-10 NOTE — REASON FOR VISIT
[CABG Follow-up] : bypass graft [Coronary Artery Disease] : coronary artery disease [Hyperlipidemia] : hyperlipidemia [Hypertension] : hypertension

## 2019-10-10 NOTE — REVIEW OF SYSTEMS
[Feeling Fatigued] : feeling fatigued [Negative] : Heme/Lymph [Headache] : no headache [Fever] : no fever [Chills] : no chills

## 2019-10-10 NOTE — HISTORY OF PRESENT ILLNESS
[FreeTextEntry1] : The patient was admitted for atypical chest gurvinder n.Work up had lead to a cardiac cath . CAMPBELL was atretic and not functional. Had LM and ostial RI . The patient had redo CABG DIANA to LAD and SVG to OM. No complications. The patient was seen by CT surgery for apparent musculoskeletal pain . The patient had reproducible pain when turning in certain directions. She was given gabapentin with improvement \par The patient was seen again in the ER . Had fleeting chest pain which was not thought to be cardiac in origin . No further episodes . The patient has had anxiety and depression . She was started on Sertraline . This has improved her .

## 2019-10-10 NOTE — PHYSICAL EXAM
[General Appearance - Well Developed] : well developed [Normal Appearance] : normal appearance [Well Groomed] : well groomed [General Appearance - Well Nourished] : well nourished [No Deformities] : no deformities [General Appearance - In No Acute Distress] : no acute distress [Normal Conjunctiva] : the conjunctiva exhibited no abnormalities [Eyelids - No Xanthelasma] : the eyelids demonstrated no xanthelasmas [Normal Oral Mucosa] : normal oral mucosa [No Oral Pallor] : no oral pallor [No Oral Cyanosis] : no oral cyanosis [Respiration, Rhythm And Depth] : normal respiratory rhythm and effort [Exaggerated Use Of Accessory Muscles For Inspiration] : no accessory muscle use [Auscultation Breath Sounds / Voice Sounds] : lungs were clear to auscultation bilaterally [Abdomen Tenderness] : non-tender [Abdomen Soft] : soft [Abdomen Mass (___ Cm)] : no abdominal mass palpated [Abnormal Walk] : normal gait [Nail Clubbing] : no clubbing of the fingernails [Gait - Sufficient For Exercise Testing] : the gait was sufficient for exercise testing [Cyanosis, Localized] : no localized cyanosis [] : no ischemic changes [Petechial Hemorrhages (___cm)] : no petechial hemorrhages [Oriented To Time, Place, And Person] : oriented to person, place, and time [Affect] : the affect was normal [Mood] : the mood was normal [No Anxiety] : not feeling anxious [Normal Rate] : normal [Rhythm Regular] : regular [No Murmur] : no murmurs heard [1+] : right 1+ [No Pitting Edema] : no pitting edema present [FreeTextEntry1] : No JVD  [Rt] : no varicose veins of the right leg

## 2019-10-25 ENCOUNTER — EMERGENCY (EMERGENCY)
Facility: HOSPITAL | Age: 79
LOS: 0 days | Discharge: HOME | End: 2019-10-25
Attending: EMERGENCY MEDICINE | Admitting: EMERGENCY MEDICINE
Payer: MEDICARE

## 2019-10-25 VITALS
TEMPERATURE: 98 F | WEIGHT: 160.94 LBS | HEART RATE: 75 BPM | SYSTOLIC BLOOD PRESSURE: 145 MMHG | OXYGEN SATURATION: 100 % | DIASTOLIC BLOOD PRESSURE: 69 MMHG | RESPIRATION RATE: 18 BRPM

## 2019-10-25 VITALS
SYSTOLIC BLOOD PRESSURE: 137 MMHG | OXYGEN SATURATION: 100 % | RESPIRATION RATE: 18 BRPM | DIASTOLIC BLOOD PRESSURE: 63 MMHG | TEMPERATURE: 97 F | HEART RATE: 70 BPM

## 2019-10-25 DIAGNOSIS — Z88.8 ALLERGY STATUS TO OTHER DRUGS, MEDICAMENTS AND BIOLOGICAL SUBSTANCES STATUS: ICD-10-CM

## 2019-10-25 DIAGNOSIS — R10.9 UNSPECIFIED ABDOMINAL PAIN: ICD-10-CM

## 2019-10-25 DIAGNOSIS — Z98.890 OTHER SPECIFIED POSTPROCEDURAL STATES: Chronic | ICD-10-CM

## 2019-10-25 DIAGNOSIS — R14.0 ABDOMINAL DISTENSION (GASEOUS): ICD-10-CM

## 2019-10-25 DIAGNOSIS — R10.84 GENERALIZED ABDOMINAL PAIN: ICD-10-CM

## 2019-10-25 DIAGNOSIS — I25.701 ATHEROSCLEROSIS OF CORONARY ARTERY BYPASS GRAFT(S), UNSPECIFIED, WITH ANGINA PECTORIS WITH DOCUMENTED SPASM: Chronic | ICD-10-CM

## 2019-10-25 LAB
ALBUMIN SERPL ELPH-MCNC: 4.2 G/DL — SIGNIFICANT CHANGE UP (ref 3.5–5.2)
ALP SERPL-CCNC: 71 U/L — SIGNIFICANT CHANGE UP (ref 30–115)
ALT FLD-CCNC: 10 U/L — SIGNIFICANT CHANGE UP (ref 0–41)
ANION GAP SERPL CALC-SCNC: 10 MMOL/L — SIGNIFICANT CHANGE UP (ref 7–14)
AST SERPL-CCNC: 20 U/L — SIGNIFICANT CHANGE UP (ref 0–41)
BASOPHILS # BLD AUTO: 0.04 K/UL — SIGNIFICANT CHANGE UP (ref 0–0.2)
BASOPHILS NFR BLD AUTO: 0.5 % — SIGNIFICANT CHANGE UP (ref 0–1)
BILIRUB SERPL-MCNC: <0.2 MG/DL — SIGNIFICANT CHANGE UP (ref 0.2–1.2)
BUN SERPL-MCNC: 20 MG/DL — SIGNIFICANT CHANGE UP (ref 10–20)
CALCIUM SERPL-MCNC: 8.4 MG/DL — LOW (ref 8.5–10.1)
CHLORIDE SERPL-SCNC: 104 MMOL/L — SIGNIFICANT CHANGE UP (ref 98–110)
CO2 SERPL-SCNC: 28 MMOL/L — SIGNIFICANT CHANGE UP (ref 17–32)
CREAT SERPL-MCNC: 0.9 MG/DL — SIGNIFICANT CHANGE UP (ref 0.7–1.5)
EOSINOPHIL # BLD AUTO: 0.11 K/UL — SIGNIFICANT CHANGE UP (ref 0–0.7)
EOSINOPHIL NFR BLD AUTO: 1.5 % — SIGNIFICANT CHANGE UP (ref 0–8)
GLUCOSE SERPL-MCNC: 111 MG/DL — HIGH (ref 70–99)
HCT VFR BLD CALC: 39.9 % — SIGNIFICANT CHANGE UP (ref 37–47)
HGB BLD-MCNC: 12.9 G/DL — SIGNIFICANT CHANGE UP (ref 12–16)
IMM GRANULOCYTES NFR BLD AUTO: 0.1 % — SIGNIFICANT CHANGE UP (ref 0.1–0.3)
LACTATE SERPL-SCNC: 1.2 MMOL/L — SIGNIFICANT CHANGE UP (ref 0.5–2.2)
LIDOCAIN IGE QN: 93 U/L — HIGH (ref 7–60)
LYMPHOCYTES # BLD AUTO: 2.07 K/UL — SIGNIFICANT CHANGE UP (ref 1.2–3.4)
LYMPHOCYTES # BLD AUTO: 28.4 % — SIGNIFICANT CHANGE UP (ref 20.5–51.1)
MCHC RBC-ENTMCNC: 27.6 PG — SIGNIFICANT CHANGE UP (ref 27–31)
MCHC RBC-ENTMCNC: 32.3 G/DL — SIGNIFICANT CHANGE UP (ref 32–37)
MCV RBC AUTO: 85.3 FL — SIGNIFICANT CHANGE UP (ref 81–99)
MONOCYTES # BLD AUTO: 0.71 K/UL — HIGH (ref 0.1–0.6)
MONOCYTES NFR BLD AUTO: 9.8 % — HIGH (ref 1.7–9.3)
NEUTROPHILS # BLD AUTO: 4.34 K/UL — SIGNIFICANT CHANGE UP (ref 1.4–6.5)
NEUTROPHILS NFR BLD AUTO: 59.7 % — SIGNIFICANT CHANGE UP (ref 42.2–75.2)
NRBC # BLD: 0 /100 WBCS — SIGNIFICANT CHANGE UP (ref 0–0)
PLATELET # BLD AUTO: 177 K/UL — SIGNIFICANT CHANGE UP (ref 130–400)
POTASSIUM SERPL-MCNC: 4.2 MMOL/L — SIGNIFICANT CHANGE UP (ref 3.5–5)
POTASSIUM SERPL-SCNC: 4.2 MMOL/L — SIGNIFICANT CHANGE UP (ref 3.5–5)
PROT SERPL-MCNC: 7.1 G/DL — SIGNIFICANT CHANGE UP (ref 6–8)
RBC # BLD: 4.68 M/UL — SIGNIFICANT CHANGE UP (ref 4.2–5.4)
RBC # FLD: 14.6 % — HIGH (ref 11.5–14.5)
SODIUM SERPL-SCNC: 142 MMOL/L — SIGNIFICANT CHANGE UP (ref 135–146)
WBC # BLD: 7.28 K/UL — SIGNIFICANT CHANGE UP (ref 4.8–10.8)
WBC # FLD AUTO: 7.28 K/UL — SIGNIFICANT CHANGE UP (ref 4.8–10.8)

## 2019-10-25 PROCEDURE — 74177 CT ABD & PELVIS W/CONTRAST: CPT | Mod: 26

## 2019-10-25 PROCEDURE — 93010 ELECTROCARDIOGRAM REPORT: CPT

## 2019-10-25 PROCEDURE — 99284 EMERGENCY DEPT VISIT MOD MDM: CPT

## 2019-10-25 RX ORDER — CLONAZEPAM 1 MG
0.25 TABLET ORAL ONCE
Refills: 0 | Status: DISCONTINUED | OUTPATIENT
Start: 2019-10-25 | End: 2019-10-25

## 2019-10-25 RX ORDER — SODIUM CHLORIDE 9 MG/ML
1000 INJECTION INTRAMUSCULAR; INTRAVENOUS; SUBCUTANEOUS ONCE
Refills: 0 | Status: COMPLETED | OUTPATIENT
Start: 2019-10-25 | End: 2019-10-25

## 2019-10-25 RX ORDER — ACETAMINOPHEN 500 MG
650 TABLET ORAL ONCE
Refills: 0 | Status: COMPLETED | OUTPATIENT
Start: 2019-10-25 | End: 2019-10-25

## 2019-10-25 RX ADMIN — Medication 650 MILLIGRAM(S): at 06:53

## 2019-10-25 RX ADMIN — Medication 0.25 MILLIGRAM(S): at 05:06

## 2019-10-25 RX ADMIN — Medication 650 MILLIGRAM(S): at 04:50

## 2019-10-25 RX ADMIN — SODIUM CHLORIDE 1000 MILLILITER(S): 9 INJECTION INTRAMUSCULAR; INTRAVENOUS; SUBCUTANEOUS at 05:06

## 2019-10-25 NOTE — ED PROVIDER NOTE - CARE PROVIDER_API CALL
Ashley Figueroa (DO)  Internal Medicine  [address] [facility], [address]  [place] [address]  Phone: [phone]  Fax: [phone]  Follow Up Time:

## 2019-10-25 NOTE — ED ADULT NURSE NOTE - PMH
Cataract of right eye    Coronary artery disease  s/p CABG X2  Glaucoma    High cholesterol    Hypertension    Uterine cancer  s/p SHAAN and chemo

## 2019-10-25 NOTE — ED PROVIDER NOTE - PATIENT PORTAL LINK FT
You can access the FollowMyHealth Patient Portal offered by [facility] by registering at the following website: [email or web address]. By joining [employer]’s FollowMyHealth portal, you will also be able to view your health information using other applications (apps) compatible with our system.

## 2019-10-25 NOTE — ED PROVIDER NOTE - OBJECTIVE STATEMENT
79yoF with h/o HTN, HLD, CAD s/p CABG, presents with LLQ abdominal pain since yesterday, nonradiating. Associated increased flatulence and gassiness. Has h/o constipation but since using citramag 1 wk ago has not felt constipated. Denies vomiting, diarrhea, hematochezia, fever, dys/hematuria or other urinary symptoms, and all other symptoms. Also notes that she has depression and anxiety and has h/o anxiety attacks and has been having her usual attacks recently, has psychiatry f/u and has been treated with meds - no acute exacerbation today.

## 2019-10-25 NOTE — ED PROVIDER NOTE - PROGRESS NOTE DETAILS
Character and pt appearance of low suspicion for mesenteric ischemia or dissection. Character of low suspicion for pyelo or UTI. Abdomen benign with low suspicion for complicated diverticulitis. Pt with her chronic anxiety symptoms with no acute presentation, and has psych f/u. Pt very well appearing. Signed off care to Dr. CARLOS Diaz who will f/u CT and reassess.

## 2019-10-25 NOTE — ED ADULT NURSE NOTE - CHPI ED NUR SYMPTOMS NEG
no dysuria/no diarrhea/no fever/no hematuria/no nausea/no chills/no abdominal distension/no burning urination/no blood in stool/no vomiting

## 2019-10-25 NOTE — ED ADULT NURSE NOTE - NSIMPLEMENTINTERV_GEN_ALL_ED
Implemented All Universal Safety Interventions:  [place] to call system. Call bell, personal items and telephone within reach. Instruct patient to call for assistance. Room bathroom lighting operational. Non-slip footwear when patient is off stretcher. Physically safe environment: no spills, clutter or unnecessary equipment. Stretcher in lowest position, wheels locked, appropriate side rails in place.

## 2019-10-25 NOTE — ED PROCEDURE NOTE - ATTENDING CONTRIBUTION TO CARE
I was not physically present during this procedure, but agree with its being done. It states above that I was present only as that is a radio button that must be clicked in order for me to save this note.

## 2019-10-25 NOTE — ED PROVIDER NOTE - PHYSICAL EXAMINATION
Afebrile, hemodynamically stable, saturating well  NAD, well appearing  Head NCAT  EOMI grossly, anicteric  MMM  RRR, nml S1/S2, no m/r/g  Lungs CTAB, no w/r/r  Abd soft, NT, ND, nml BS, no rebound or guarding, no CVAT  AAO, CN's 3-12 grossly intact  COLVIN spontaneously, no leg cyanosis or edema  Skin warm, well perfused, no rashes or hives

## 2019-10-25 NOTE — ED PROVIDER NOTE - CLINICAL SUMMARY MEDICAL DECISION MAKING FREE TEXT BOX
78 yo woman with "upset stomach" for the past day.  Diffuse discomfort.  No nausea or vomiting.  No urinary symptoms.  Workup in ED ok.  In ED, states I feel completely better on reassessment.  She would like to go home and follow up with Dr. Figueroa and her psychiatrist as she feels this is related to her depression.  Considering normal labs and CT scan, cleared to go home.  Return for any new or worsening symptoms.

## 2019-12-12 ENCOUNTER — EMERGENCY (EMERGENCY)
Facility: HOSPITAL | Age: 79
LOS: 0 days | Discharge: HOME | End: 2019-12-12
Attending: EMERGENCY MEDICINE | Admitting: EMERGENCY MEDICINE
Payer: MEDICARE

## 2019-12-12 VITALS
DIASTOLIC BLOOD PRESSURE: 61 MMHG | OXYGEN SATURATION: 100 % | SYSTOLIC BLOOD PRESSURE: 116 MMHG | TEMPERATURE: 99 F | HEART RATE: 64 BPM | RESPIRATION RATE: 16 BRPM | WEIGHT: 164.02 LBS

## 2019-12-12 DIAGNOSIS — R35.0 FREQUENCY OF MICTURITION: ICD-10-CM

## 2019-12-12 DIAGNOSIS — R10.9 UNSPECIFIED ABDOMINAL PAIN: ICD-10-CM

## 2019-12-12 DIAGNOSIS — Z98.890 OTHER SPECIFIED POSTPROCEDURAL STATES: Chronic | ICD-10-CM

## 2019-12-12 DIAGNOSIS — I25.701 ATHEROSCLEROSIS OF CORONARY ARTERY BYPASS GRAFT(S), UNSPECIFIED, WITH ANGINA PECTORIS WITH DOCUMENTED SPASM: Chronic | ICD-10-CM

## 2019-12-12 DIAGNOSIS — Z88.8 ALLERGY STATUS TO OTHER DRUGS, MEDICAMENTS AND BIOLOGICAL SUBSTANCES STATUS: ICD-10-CM

## 2019-12-12 DIAGNOSIS — R10.30 LOWER ABDOMINAL PAIN, UNSPECIFIED: ICD-10-CM

## 2019-12-12 LAB
ALBUMIN SERPL ELPH-MCNC: 4.1 G/DL — SIGNIFICANT CHANGE UP (ref 3.5–5.2)
ALP SERPL-CCNC: 65 U/L — SIGNIFICANT CHANGE UP (ref 30–115)
ALT FLD-CCNC: 10 U/L — SIGNIFICANT CHANGE UP (ref 0–41)
ANION GAP SERPL CALC-SCNC: 13 MMOL/L — SIGNIFICANT CHANGE UP (ref 7–14)
APPEARANCE UR: CLEAR — SIGNIFICANT CHANGE UP
AST SERPL-CCNC: 22 U/L — SIGNIFICANT CHANGE UP (ref 0–41)
BACTERIA # UR AUTO: NEGATIVE — SIGNIFICANT CHANGE UP
BASOPHILS # BLD AUTO: 0.03 K/UL — SIGNIFICANT CHANGE UP (ref 0–0.2)
BASOPHILS NFR BLD AUTO: 0.5 % — SIGNIFICANT CHANGE UP (ref 0–1)
BILIRUB DIRECT SERPL-MCNC: <0.2 MG/DL — SIGNIFICANT CHANGE UP (ref 0–0.2)
BILIRUB INDIRECT FLD-MCNC: >0 MG/DL — LOW (ref 0.2–1.2)
BILIRUB SERPL-MCNC: 0.2 MG/DL — SIGNIFICANT CHANGE UP (ref 0.2–1.2)
BILIRUB UR-MCNC: NEGATIVE — SIGNIFICANT CHANGE UP
BUN SERPL-MCNC: 15 MG/DL — SIGNIFICANT CHANGE UP (ref 10–20)
CALCIUM SERPL-MCNC: 8.7 MG/DL — SIGNIFICANT CHANGE UP (ref 8.5–10.1)
CHLORIDE SERPL-SCNC: 105 MMOL/L — SIGNIFICANT CHANGE UP (ref 98–110)
CO2 SERPL-SCNC: 24 MMOL/L — SIGNIFICANT CHANGE UP (ref 17–32)
COLOR SPEC: YELLOW — SIGNIFICANT CHANGE UP
CREAT SERPL-MCNC: 0.8 MG/DL — SIGNIFICANT CHANGE UP (ref 0.7–1.5)
DIFF PNL FLD: ABNORMAL
EOSINOPHIL # BLD AUTO: 0.07 K/UL — SIGNIFICANT CHANGE UP (ref 0–0.7)
EOSINOPHIL NFR BLD AUTO: 1.3 % — SIGNIFICANT CHANGE UP (ref 0–8)
EPI CELLS # UR: 6 /HPF — HIGH (ref 0–5)
GLUCOSE SERPL-MCNC: 98 MG/DL — SIGNIFICANT CHANGE UP (ref 70–99)
GLUCOSE UR QL: NEGATIVE — SIGNIFICANT CHANGE UP
HCT VFR BLD CALC: 42 % — SIGNIFICANT CHANGE UP (ref 37–47)
HGB BLD-MCNC: 13.4 G/DL — SIGNIFICANT CHANGE UP (ref 12–16)
HYALINE CASTS # UR AUTO: 0 /LPF — SIGNIFICANT CHANGE UP (ref 0–7)
IMM GRANULOCYTES NFR BLD AUTO: 0.2 % — SIGNIFICANT CHANGE UP (ref 0.1–0.3)
KETONES UR-MCNC: NEGATIVE — SIGNIFICANT CHANGE UP
LACTATE SERPL-SCNC: 0.8 MMOL/L — SIGNIFICANT CHANGE UP (ref 0.7–2)
LEUKOCYTE ESTERASE UR-ACNC: NEGATIVE — SIGNIFICANT CHANGE UP
LIDOCAIN IGE QN: 28 U/L — SIGNIFICANT CHANGE UP (ref 7–60)
LYMPHOCYTES # BLD AUTO: 1.82 K/UL — SIGNIFICANT CHANGE UP (ref 1.2–3.4)
LYMPHOCYTES # BLD AUTO: 32.6 % — SIGNIFICANT CHANGE UP (ref 20.5–51.1)
MCHC RBC-ENTMCNC: 27.2 PG — SIGNIFICANT CHANGE UP (ref 27–31)
MCHC RBC-ENTMCNC: 31.9 G/DL — LOW (ref 32–37)
MCV RBC AUTO: 85.2 FL — SIGNIFICANT CHANGE UP (ref 81–99)
MONOCYTES # BLD AUTO: 0.65 K/UL — HIGH (ref 0.1–0.6)
MONOCYTES NFR BLD AUTO: 11.6 % — HIGH (ref 1.7–9.3)
NEUTROPHILS # BLD AUTO: 3 K/UL — SIGNIFICANT CHANGE UP (ref 1.4–6.5)
NEUTROPHILS NFR BLD AUTO: 53.8 % — SIGNIFICANT CHANGE UP (ref 42.2–75.2)
NITRITE UR-MCNC: NEGATIVE — SIGNIFICANT CHANGE UP
NRBC # BLD: 0 /100 WBCS — SIGNIFICANT CHANGE UP (ref 0–0)
PH UR: 6 — SIGNIFICANT CHANGE UP (ref 5–8)
PLATELET # BLD AUTO: 137 K/UL — SIGNIFICANT CHANGE UP (ref 130–400)
POTASSIUM SERPL-MCNC: 5 MMOL/L — SIGNIFICANT CHANGE UP (ref 3.5–5)
POTASSIUM SERPL-SCNC: 5 MMOL/L — SIGNIFICANT CHANGE UP (ref 3.5–5)
PROT SERPL-MCNC: 6.9 G/DL — SIGNIFICANT CHANGE UP (ref 6–8)
PROT UR-MCNC: SIGNIFICANT CHANGE UP
RBC # BLD: 4.93 M/UL — SIGNIFICANT CHANGE UP (ref 4.2–5.4)
RBC # FLD: 14.5 % — SIGNIFICANT CHANGE UP (ref 11.5–14.5)
RBC CASTS # UR COMP ASSIST: 8 /HPF — HIGH (ref 0–4)
SODIUM SERPL-SCNC: 142 MMOL/L — SIGNIFICANT CHANGE UP (ref 135–146)
SP GR SPEC: 1.02 — SIGNIFICANT CHANGE UP (ref 1.01–1.02)
UROBILINOGEN FLD QL: SIGNIFICANT CHANGE UP
WBC # BLD: 5.58 K/UL — SIGNIFICANT CHANGE UP (ref 4.8–10.8)
WBC # FLD AUTO: 5.58 K/UL — SIGNIFICANT CHANGE UP (ref 4.8–10.8)
WBC UR QL: 1 /HPF — SIGNIFICANT CHANGE UP (ref 0–5)

## 2019-12-12 PROCEDURE — 74018 RADEX ABDOMEN 1 VIEW: CPT | Mod: 26

## 2019-12-12 PROCEDURE — 74177 CT ABD & PELVIS W/CONTRAST: CPT | Mod: 26

## 2019-12-12 PROCEDURE — 99284 EMERGENCY DEPT VISIT MOD MDM: CPT

## 2019-12-12 RX ORDER — SODIUM CHLORIDE 9 MG/ML
1000 INJECTION, SOLUTION INTRAVENOUS ONCE
Refills: 0 | Status: COMPLETED | OUTPATIENT
Start: 2019-12-12 | End: 2019-12-12

## 2019-12-12 RX ORDER — ACETAMINOPHEN 500 MG
650 TABLET ORAL ONCE
Refills: 0 | Status: COMPLETED | OUTPATIENT
Start: 2019-12-12 | End: 2019-12-12

## 2019-12-12 RX ADMIN — SODIUM CHLORIDE 2000 MILLILITER(S): 9 INJECTION, SOLUTION INTRAVENOUS at 11:05

## 2019-12-12 RX ADMIN — Medication 650 MILLIGRAM(S): at 11:05

## 2019-12-12 NOTE — ED PROVIDER NOTE - CARE PROVIDER_API CALL
Nestor Luna)  Gastroenterology; Internal Medicine  [address] [place] [address]  Phone: [phone]  Fax: [phone]  Follow Up Time:     Pattie Calero)  Gastroenterology  [address] [place] [address]  Phone: [phone]  Fax: [phone]  Follow Up Time:

## 2019-12-12 NOTE — ED PROVIDER NOTE - NS ED ROS FT
Constitutional:  See HPI.   Eyes:  No visual changes, eye pain or discharge.  ENMT:  No hearing changes, pain, discharge or infections. No neck pain or stiffness.  Cardiac:  No chest pain, SOB or edema. No chest pain with exertion.  Respiratory:  No cough or respiratory distress. No hemoptysis.  GI:  No nausea, vomiting, diarrhea, + abdominal pain.  :  No dysuria, + frequency, +hematuria  MS:  No joint pain or back pain.  Neuro:  No LOC. No headache or weakness.    Skin:  No skin rash.  Except as in HPI, all other review of systems is negative

## 2019-12-12 NOTE — ED ADULT NURSE NOTE - NSIMPLEMENTINTERV_GEN_ALL_ED
Implemented All Universal Safety Interventions:  [place] to call system. Call bell, personal items and telephone within reach. Instruct patient to call for assistance. Room bathroom lighting operational. Non-slip footwear when patient is off stretcher. Physically safe environment: no spills, clutter or unnecessary equipment. Stretcher in lowest position, wheels locked, appropriate side rails in place.

## 2019-12-12 NOTE — ED PROVIDER NOTE - PATIENT PORTAL LINK FT
You can access the FollowMyHealth Patient Portal offered by [facility] by registering at the following website: [email or web address]. By joining [employer]’s FollowMyHealth portal, you will also be able to view your health information using other applications (apps) compatible with our system.

## 2019-12-12 NOTE — ED PROVIDER NOTE - PROGRESS NOTE DETAILS
ATTENDING NOTE: I personally evaluated the patient. I reviewed the Resident’s note (as assigned above), and agree with the findings and plan except as documented in my note.   80 y/o F with PMH of HTN, HLD, CAD s/p CAGB, uterine CA s/p hysterectomy in 2016, constipation, last took stool softener Tuesday and has a bowel movement, p/w abdominal discomfort whenever she tries to use the bathroom. Reports no SX at this time but due to HX of uterine CA, whenever she feels slight discomfort she comes straight to ED. Pt additionally reports HX of depression and anxiety, and anxiety episodes secondary to HX of uterine CA, has psych follow up has been treated with meds but currently denies any of these SX and doesn’t want to see psych. No alcohol or drug abuse. Last CT was 10/25 negative for acute pathology. No fever, chills, n/v, cp, sob, pleuritic cp, palpitations, diaphoresis, cough, ha/lh/dizziness, numbness/tingling, neck pain/ stiffness, diarrhea, constipation, melena/brbpr, urinary symptoms, trauma, weakness, edema, calf pain/swelling/erythema, sick contacts, recent travel or rash, no SI/HI, visual/auditory hallucinations.     Vital Signs: I have reviewed the initial vital signs. Constitutional: WDWN in nad. Sitting on stretcher in nad. Integumentary: No rash. ENT: MMM NECK: Supple, non-tender, no meningeal signs. Cardiovascular: RRR, radial pulses 2/4 b/l. No JVD. Respiratory: BS present b/l, ctabl, no wheezing or crackles, good resp effort and excursion, good air exchange,  no accessory muscle use, no stridor. Gastrointestinal: BS present throughout all 4 quadrants, soft, nd, nt, no rebound tenderness or guarding, no cvat. Musculoskeletal: FROM, no edema, no calf pain/swelling/erythema. Neurologic: AAOx3, motor 5/5 and sensation intact throughout UE and LE ext, CN II-XII intact, No facial droop or slurring of speech. No focal deficits.  Plan for labs, urine, IVF, KUB, reassess. Urine reviewed, will CT. Pt resting comfortably ATTENDING NOTE: I personally evaluated the patient. I reviewed the Resident’s note (as assigned above), and agree with the findings and plan except as documented in my note.   80 y/o F with PMH of HTN, HLD, CAD s/p CABG, uterine CA s/p hysterectomy in 2016, constipation, last took stool softener Tuesday and has a bowel movement, p/w abdominal discomfort whenever she tries to use the bathroom. Reports no SX at this time but due to HX of uterine CA, whenever she feels slight discomfort she comes straight to ED. Pt additionally reports HX of depression and anxiety, and anxiety episodes secondary to HX of uterine CA, has psych follow up has been treated with meds but currently denies any of these SX and doesn’t want to see psych. No alcohol or drug abuse. Last CT was 10/25 negative for acute pathology. No fever, chills, n/v, cp, sob, pleuritic cp, palpitations, diaphoresis, cough, ha/lh/dizziness, numbness/tingling, neck pain/ stiffness, diarrhea, constipation, melena/brbpr, urinary symptoms, trauma, weakness, edema, calf pain/swelling/erythema, sick contacts, recent travel or rash, no SI/HI, visual/auditory hallucinations. No Vaginal bleeding.  Vital Signs: I have reviewed the initial vital signs. Constitutional: WDWN in nad. Sitting on stretcher in nad. Integumentary: No rash. ENT: MMM NECK: Supple, non-tender, no meningeal signs. Cardiovascular: RRR, radial pulses 2/4 b/l. No JVD. Respiratory: BS present b/l, ctabl, no wheezing or crackles, no accessory muscle use, no stridor. Gastrointestinal: BS present throughout all 4 quadrants, soft, nd, nt, no rebound tenderness or guarding, no cvat. Musculoskeletal: FROM, no edema, no calf pain/swelling/erythema. Neurologic: AAOx3, motor 5/5 and sensation intact throughout UE and LE ext, CN II-XII intact, No facial droop or slurring of speech. No focal deficits.  Plan for labs, urine, IVF, KUB, reassess. pt aware of all labs, urine, pending ct results, resting comfortably, will continue to monitor and reassess. CT results reviewed, pt aware of results. will DC with urology follow up. Pt agrees with plan. Ready for DC.

## 2019-12-12 NOTE — ED PROVIDER NOTE - PHYSICAL EXAMINATION
CONSTITUTIONAL: Well-developed; well-nourished; in no acute distress.   SKIN: warm, dry  HEAD: Normocephalic; atraumatic.  EYES: PERRL, EOMI, no conjunctival erythema  ENT: No nasal discharge; airway clear.  NECK: Supple; non tender.  CARD: S1, S2 normal; no murmurs, gallops, or rubs. Regular rate and rhythm.   RESP: No wheezes, rales or rhonchi.  ABD: + Suprapubic tenderness  EXT: Normal ROM.  No clubbing, cyanosis or edema.   LYMPH: No acute cervical adenopathy.  NEURO: Alert, oriented, grossly unremarkable  PSYCH: Cooperative, appropriate.

## 2019-12-12 NOTE — ED PROVIDER NOTE - CLINICAL SUMMARY MEDICAL DECISION MAKING FREE TEXT BOX
pt aware of all labs and imaging,  reports no symptoms at this time, abd re exam soft ntnd no r/g/ tolerated po, aware of signs and symptoms to return for, follow up with pmd and gi, reports will follow up.

## 2019-12-12 NOTE — ED PROVIDER NOTE - OBJECTIVE STATEMENT
80 yo F with h/o HTN, HLD, CAD s/p CABG, Total hysterectomy from uterine cancer presents with Suprapubic abdominal pain since today. Pt reports sudden onset, non radiating, + urinary frequency no fever, no CP/SOB, no recent travel. Pt states her last bowel movement was tuesday but does not feel like this is the constipation.

## 2019-12-14 LAB
CULTURE RESULTS: SIGNIFICANT CHANGE UP
SPECIMEN SOURCE: SIGNIFICANT CHANGE UP

## 2019-12-22 ENCOUNTER — EMERGENCY (EMERGENCY)
Facility: HOSPITAL | Age: 79
LOS: 0 days | Discharge: HOME | End: 2019-12-22
Attending: STUDENT IN AN ORGANIZED HEALTH CARE EDUCATION/TRAINING PROGRAM | Admitting: STUDENT IN AN ORGANIZED HEALTH CARE EDUCATION/TRAINING PROGRAM
Payer: MEDICARE

## 2019-12-22 VITALS
TEMPERATURE: 98 F | OXYGEN SATURATION: 99 % | WEIGHT: 162.04 LBS | HEART RATE: 66 BPM | DIASTOLIC BLOOD PRESSURE: 83 MMHG | HEIGHT: 64 IN | SYSTOLIC BLOOD PRESSURE: 138 MMHG | RESPIRATION RATE: 17 BRPM

## 2019-12-22 VITALS
OXYGEN SATURATION: 100 % | SYSTOLIC BLOOD PRESSURE: 162 MMHG | HEART RATE: 63 BPM | DIASTOLIC BLOOD PRESSURE: 74 MMHG | TEMPERATURE: 97 F | RESPIRATION RATE: 18 BRPM

## 2019-12-22 DIAGNOSIS — Z79.82 LONG TERM (CURRENT) USE OF ASPIRIN: ICD-10-CM

## 2019-12-22 DIAGNOSIS — Z79.899 OTHER LONG TERM (CURRENT) DRUG THERAPY: ICD-10-CM

## 2019-12-22 DIAGNOSIS — R07.89 OTHER CHEST PAIN: ICD-10-CM

## 2019-12-22 DIAGNOSIS — Z98.890 OTHER SPECIFIED POSTPROCEDURAL STATES: Chronic | ICD-10-CM

## 2019-12-22 DIAGNOSIS — R07.9 CHEST PAIN, UNSPECIFIED: ICD-10-CM

## 2019-12-22 DIAGNOSIS — I10 ESSENTIAL (PRIMARY) HYPERTENSION: ICD-10-CM

## 2019-12-22 DIAGNOSIS — Z88.8 ALLERGY STATUS TO OTHER DRUGS, MEDICAMENTS AND BIOLOGICAL SUBSTANCES STATUS: ICD-10-CM

## 2019-12-22 DIAGNOSIS — I25.701 ATHEROSCLEROSIS OF CORONARY ARTERY BYPASS GRAFT(S), UNSPECIFIED, WITH ANGINA PECTORIS WITH DOCUMENTED SPASM: Chronic | ICD-10-CM

## 2019-12-22 LAB
ALBUMIN SERPL ELPH-MCNC: 4.4 G/DL — SIGNIFICANT CHANGE UP (ref 3.5–5.2)
ALP SERPL-CCNC: 78 U/L — SIGNIFICANT CHANGE UP (ref 30–115)
ALT FLD-CCNC: 10 U/L — SIGNIFICANT CHANGE UP (ref 0–41)
ANION GAP SERPL CALC-SCNC: 14 MMOL/L — SIGNIFICANT CHANGE UP (ref 7–14)
AST SERPL-CCNC: 27 U/L — SIGNIFICANT CHANGE UP (ref 0–41)
BASOPHILS # BLD AUTO: 0.04 K/UL — SIGNIFICANT CHANGE UP (ref 0–0.2)
BASOPHILS NFR BLD AUTO: 0.5 % — SIGNIFICANT CHANGE UP (ref 0–1)
BILIRUB SERPL-MCNC: 0.3 MG/DL — SIGNIFICANT CHANGE UP (ref 0.2–1.2)
BUN SERPL-MCNC: 21 MG/DL — HIGH (ref 10–20)
CALCIUM SERPL-MCNC: 9 MG/DL — SIGNIFICANT CHANGE UP (ref 8.5–10.1)
CHLORIDE SERPL-SCNC: 102 MMOL/L — SIGNIFICANT CHANGE UP (ref 98–110)
CO2 SERPL-SCNC: 25 MMOL/L — SIGNIFICANT CHANGE UP (ref 17–32)
CREAT SERPL-MCNC: 0.9 MG/DL — SIGNIFICANT CHANGE UP (ref 0.7–1.5)
EOSINOPHIL # BLD AUTO: 0.18 K/UL — SIGNIFICANT CHANGE UP (ref 0–0.7)
EOSINOPHIL NFR BLD AUTO: 2.5 % — SIGNIFICANT CHANGE UP (ref 0–8)
GLUCOSE SERPL-MCNC: 97 MG/DL — SIGNIFICANT CHANGE UP (ref 70–99)
HCT VFR BLD CALC: 40.3 % — SIGNIFICANT CHANGE UP (ref 37–47)
HGB BLD-MCNC: 12.8 G/DL — SIGNIFICANT CHANGE UP (ref 12–16)
IMM GRANULOCYTES NFR BLD AUTO: 0.3 % — SIGNIFICANT CHANGE UP (ref 0.1–0.3)
LYMPHOCYTES # BLD AUTO: 2.19 K/UL — SIGNIFICANT CHANGE UP (ref 1.2–3.4)
LYMPHOCYTES # BLD AUTO: 29.9 % — SIGNIFICANT CHANGE UP (ref 20.5–51.1)
MCHC RBC-ENTMCNC: 27.4 PG — SIGNIFICANT CHANGE UP (ref 27–31)
MCHC RBC-ENTMCNC: 31.8 G/DL — LOW (ref 32–37)
MCV RBC AUTO: 86.1 FL — SIGNIFICANT CHANGE UP (ref 81–99)
MONOCYTES # BLD AUTO: 0.75 K/UL — HIGH (ref 0.1–0.6)
MONOCYTES NFR BLD AUTO: 10.2 % — HIGH (ref 1.7–9.3)
NEUTROPHILS # BLD AUTO: 4.15 K/UL — SIGNIFICANT CHANGE UP (ref 1.4–6.5)
NEUTROPHILS NFR BLD AUTO: 56.6 % — SIGNIFICANT CHANGE UP (ref 42.2–75.2)
NRBC # BLD: 0 /100 WBCS — SIGNIFICANT CHANGE UP (ref 0–0)
NT-PROBNP SERPL-SCNC: 98 PG/ML — SIGNIFICANT CHANGE UP (ref 0–300)
PLATELET # BLD AUTO: 186 K/UL — SIGNIFICANT CHANGE UP (ref 130–400)
POTASSIUM SERPL-MCNC: 5.6 MMOL/L — HIGH (ref 3.5–5)
POTASSIUM SERPL-SCNC: 5.6 MMOL/L — HIGH (ref 3.5–5)
PROT SERPL-MCNC: 7.2 G/DL — SIGNIFICANT CHANGE UP (ref 6–8)
RBC # BLD: 4.68 M/UL — SIGNIFICANT CHANGE UP (ref 4.2–5.4)
RBC # FLD: 14.1 % — SIGNIFICANT CHANGE UP (ref 11.5–14.5)
SODIUM SERPL-SCNC: 141 MMOL/L — SIGNIFICANT CHANGE UP (ref 135–146)
TROPONIN T SERPL-MCNC: <0.01 NG/ML — SIGNIFICANT CHANGE UP
TROPONIN T SERPL-MCNC: <0.01 NG/ML — SIGNIFICANT CHANGE UP
WBC # BLD: 7.33 K/UL — SIGNIFICANT CHANGE UP (ref 4.8–10.8)
WBC # FLD AUTO: 7.33 K/UL — SIGNIFICANT CHANGE UP (ref 4.8–10.8)

## 2019-12-22 PROCEDURE — 93010 ELECTROCARDIOGRAM REPORT: CPT

## 2019-12-22 PROCEDURE — 71046 X-RAY EXAM CHEST 2 VIEWS: CPT | Mod: 26

## 2019-12-22 PROCEDURE — 99285 EMERGENCY DEPT VISIT HI MDM: CPT

## 2019-12-22 RX ORDER — ASPIRIN/CALCIUM CARB/MAGNESIUM 324 MG
162 TABLET ORAL ONCE
Refills: 0 | Status: COMPLETED | OUTPATIENT
Start: 2019-12-22 | End: 2019-12-22

## 2019-12-22 RX ORDER — ALPRAZOLAM 0.25 MG
0.25 TABLET ORAL ONCE
Refills: 0 | Status: DISCONTINUED | OUTPATIENT
Start: 2019-12-22 | End: 2019-12-22

## 2019-12-22 RX ADMIN — Medication 162 MILLIGRAM(S): at 13:22

## 2019-12-22 RX ADMIN — Medication 0.25 MILLIGRAM(S): at 14:50

## 2019-12-22 NOTE — ED PROVIDER NOTE - CLINICAL SUMMARY MEDICAL DECISION MAKING FREE TEXT BOX
pt w/ chest pain. pt remained pain free for entire course. Trop neg x1, EKG neg. CP was atypical, does not sound cardiac. Discussed all available results.  Pt and/ or family understand results, plan of care, outpt follow up as discussed, and signs and symptoms for ED return.  Pt/ family is comfortable with discharge.

## 2019-12-22 NOTE — ED PROVIDER NOTE - PATIENT PORTAL LINK FT
You can access the FollowMyHealth Patient Portal offered by [facility] by registering at the following website: [email or web address]. By joining [employer]’s FollowMyHealth portal, you will also be able to view your health information using other applications (apps) compatible with our system.

## 2019-12-22 NOTE — ED PROVIDER NOTE - NSFOLLOWUPINSTRUCTIONS_ED_ALL_ED_FT
Chest Pain    Chest pain can be caused by many different conditions which may or may not be dangerous. Causes include heartburn, lung infections, heart attack, blood clot in lungs, skin infections, strain or damage to muscle, cartilage, or bones, etc. In addition to a history and physical examination, an electrocardiogram (ECG) or other lab tests may have been performed to determine the cause of your chest pain. Follow up with your primary care provider or with a cardiologist as instructed.     SEEK IMMEDIATE MEDICAL CARE IF YOU HAVE ANY OF THE FOLLOWING SYMPTOMS: worsening chest pain, coughing up blood, unexplained back/neck/jaw pain, severe abdominal pain, dizziness or lightheadedness, fainting, shortness of breath, sweaty or clammy skin, vomiting, or racing heart beat. These symptoms may represent a serious problem that is an emergency. Do not wait to see if the symptoms will go away. Get medical help right away. Call 911 and do not drive yourself to the hospital.      Cough    Coughing is a reflex that clears your throat and your airways. Coughing helps to heal and protect your lungs. It is normal to cough occasionally, but a cough that happens with other symptoms or lasts a long time may be a sign of a condition that needs treatment. Coughing may be caused by infections, asthma or COPD, smoking, postnasal drip, gastroesophageal reflux, as well as other medical conditions. Take medicines only as instructed by your health care provider. Avoid environments or triggers that causes you to cough at work or at home.    SEEK IMMEDIATE MEDICAL CARE IF YOU HAVE ANY OF THE FOLLOWING SYMPTOMS: coughing up blood, shortness of breath, rapid heart rate, chest pain, unexplained weight loss or night sweats.

## 2019-12-22 NOTE — ED PROVIDER NOTE - PROGRESS NOTE DETAILS
SPOKE TO CARDIOLOGY FELLOW WHO STATES JAMILAIN NOT AVAILABLE TODAY. Remains asymptomatic in ED. Instructed to return to ed for continued chest pain and to follow up with dr anderson.

## 2019-12-22 NOTE — ED ADULT TRIAGE NOTE - CHIEF COMPLAINT QUOTE
"i'm having pain right in here (left side of chest)and im coughing a lot. I had open heart surgery twice so I had to come in"

## 2019-12-22 NOTE — ED ADULT NURSE NOTE - PRO INTERPRETER NEED 2
Called patient to notify. Patient's  asked that we call the Celebrex to [facility] instead. Called [facility] to cancel. Sent to [facility]   English

## 2019-12-22 NOTE — ED PROVIDER NOTE - OBJECTIVE STATEMENT
78 y/o F pmh CAD s/p cabg X2, HTN, DLD, p/w  L sided sharp, gradual onset, non-radiating chest pain, this 8am lasting for < 10 min the resolved. no other sx/ had similar sensation last night, resolved after moments. No leg swelling ,fever, cough, trauma. Pt has remained pain free. Cardio: [place] 78 y/o F pmh CAD s/p cabg X2, HTN, DLD, p/w  L sided sharp, gradual onset, non-radiating chest pain, this 8am lasting for < 10 min the resolved. no other sx/ had similar sensation last night, resolved after moments. No leg swelling ,fever, cough, trauma. Pt has remained pain free. Cardio: [place]

## 2019-12-22 NOTE — ED PROVIDER NOTE - CARE PROVIDER_API CALL
Manuel Moura)  Cardiovascular Disease; Internal Medicine  [address] [place] [address]  Phone: [phone]  Fax: [phone]  Follow Up Time:

## 2019-12-22 NOTE — ED ADULT NURSE NOTE - CHIEF COMPLAINT QUOTE
"i'm having pain right in here (left side of chest)and im coughing a lot. I had open heart surgery twice so I had to come in" ABI miller

## 2019-12-22 NOTE — ED ADULT NURSE NOTE - OBJECTIVE STATEMENT
Pt presents complaining of chest pressure/ pain on left side since yesterday. Pt states she had open heart surgery so she wanted to get checked out. Denies other symptoms.

## 2020-01-07 ENCOUNTER — OUTPATIENT (OUTPATIENT)
Dept: OUTPATIENT SERVICES | Facility: HOSPITAL | Age: 80
LOS: 1 days | Discharge: HOME | End: 2020-01-07
Payer: MEDICARE

## 2020-01-07 DIAGNOSIS — F41.9 ANXIETY DISORDER, UNSPECIFIED: ICD-10-CM

## 2020-01-07 DIAGNOSIS — I25.701 ATHEROSCLEROSIS OF CORONARY ARTERY BYPASS GRAFT(S), UNSPECIFIED, WITH ANGINA PECTORIS WITH DOCUMENTED SPASM: Chronic | ICD-10-CM

## 2020-01-07 DIAGNOSIS — Z98.890 OTHER SPECIFIED POSTPROCEDURAL STATES: Chronic | ICD-10-CM

## 2020-01-07 PROCEDURE — 99214 OFFICE O/P EST MOD 30 MIN: CPT | Mod: GC

## 2020-01-13 ENCOUNTER — APPOINTMENT (OUTPATIENT)
Dept: OTOLARYNGOLOGY | Facility: CLINIC | Age: 80
End: 2020-01-13

## 2020-02-04 ENCOUNTER — EMERGENCY (EMERGENCY)
Facility: HOSPITAL | Age: 80
LOS: 0 days | Discharge: HOME | End: 2020-02-05
Attending: EMERGENCY MEDICINE | Admitting: EMERGENCY MEDICINE
Payer: MEDICARE

## 2020-02-04 VITALS
TEMPERATURE: 98 F | HEART RATE: 69 BPM | OXYGEN SATURATION: 99 % | SYSTOLIC BLOOD PRESSURE: 133 MMHG | DIASTOLIC BLOOD PRESSURE: 65 MMHG | RESPIRATION RATE: 18 BRPM

## 2020-02-04 DIAGNOSIS — R07.9 CHEST PAIN, UNSPECIFIED: ICD-10-CM

## 2020-02-04 DIAGNOSIS — R07.89 OTHER CHEST PAIN: ICD-10-CM

## 2020-02-04 DIAGNOSIS — I25.701 ATHEROSCLEROSIS OF CORONARY ARTERY BYPASS GRAFT(S), UNSPECIFIED, WITH ANGINA PECTORIS WITH DOCUMENTED SPASM: Chronic | ICD-10-CM

## 2020-02-04 DIAGNOSIS — Z98.890 OTHER SPECIFIED POSTPROCEDURAL STATES: Chronic | ICD-10-CM

## 2020-02-04 DIAGNOSIS — Z95.1 PRESENCE OF AORTOCORONARY BYPASS GRAFT: ICD-10-CM

## 2020-02-04 DIAGNOSIS — I25.10 ATHEROSCLEROTIC HEART DISEASE OF NATIVE CORONARY ARTERY WITHOUT ANGINA PECTORIS: ICD-10-CM

## 2020-02-04 DIAGNOSIS — I10 ESSENTIAL (PRIMARY) HYPERTENSION: ICD-10-CM

## 2020-02-04 DIAGNOSIS — E78.5 HYPERLIPIDEMIA, UNSPECIFIED: ICD-10-CM

## 2020-02-04 DIAGNOSIS — Z95.1 PRESENCE OF AORTOCORONARY BYPASS GRAFT: Chronic | ICD-10-CM

## 2020-02-04 LAB
ALBUMIN SERPL ELPH-MCNC: 4.3 G/DL — SIGNIFICANT CHANGE UP (ref 3.5–5.2)
ALP SERPL-CCNC: 69 U/L — SIGNIFICANT CHANGE UP (ref 30–115)
ALT FLD-CCNC: 10 U/L — SIGNIFICANT CHANGE UP (ref 0–41)
ANION GAP SERPL CALC-SCNC: 13 MMOL/L — SIGNIFICANT CHANGE UP (ref 7–14)
AST SERPL-CCNC: 24 U/L — SIGNIFICANT CHANGE UP (ref 0–41)
BILIRUB SERPL-MCNC: 0.2 MG/DL — SIGNIFICANT CHANGE UP (ref 0.2–1.2)
BUN SERPL-MCNC: 21 MG/DL — HIGH (ref 10–20)
CALCIUM SERPL-MCNC: 8.8 MG/DL — SIGNIFICANT CHANGE UP (ref 8.5–10.1)
CHLORIDE SERPL-SCNC: 103 MMOL/L — SIGNIFICANT CHANGE UP (ref 98–110)
CO2 SERPL-SCNC: 24 MMOL/L — SIGNIFICANT CHANGE UP (ref 17–32)
CREAT SERPL-MCNC: 1 MG/DL — SIGNIFICANT CHANGE UP (ref 0.7–1.5)
GLUCOSE SERPL-MCNC: 97 MG/DL — SIGNIFICANT CHANGE UP (ref 70–99)
HCT VFR BLD CALC: 38.6 % — SIGNIFICANT CHANGE UP (ref 37–47)
HGB BLD-MCNC: 12.6 G/DL — SIGNIFICANT CHANGE UP (ref 12–16)
MCHC RBC-ENTMCNC: 27.8 PG — SIGNIFICANT CHANGE UP (ref 27–31)
MCHC RBC-ENTMCNC: 32.6 G/DL — SIGNIFICANT CHANGE UP (ref 32–37)
MCV RBC AUTO: 85.2 FL — SIGNIFICANT CHANGE UP (ref 81–99)
NRBC # BLD: 0 /100 WBCS — SIGNIFICANT CHANGE UP (ref 0–0)
PLATELET # BLD AUTO: 171 K/UL — SIGNIFICANT CHANGE UP (ref 130–400)
POTASSIUM SERPL-MCNC: 4.2 MMOL/L — SIGNIFICANT CHANGE UP (ref 3.5–5)
POTASSIUM SERPL-SCNC: 4.2 MMOL/L — SIGNIFICANT CHANGE UP (ref 3.5–5)
PROT SERPL-MCNC: 7.2 G/DL — SIGNIFICANT CHANGE UP (ref 6–8)
RBC # BLD: 4.53 M/UL — SIGNIFICANT CHANGE UP (ref 4.2–5.4)
RBC # FLD: 14.2 % — SIGNIFICANT CHANGE UP (ref 11.5–14.5)
SODIUM SERPL-SCNC: 140 MMOL/L — SIGNIFICANT CHANGE UP (ref 135–146)
TROPONIN T SERPL-MCNC: <0.01 NG/ML — SIGNIFICANT CHANGE UP
WBC # BLD: 5.45 K/UL — SIGNIFICANT CHANGE UP (ref 4.8–10.8)
WBC # FLD AUTO: 5.45 K/UL — SIGNIFICANT CHANGE UP (ref 4.8–10.8)

## 2020-02-04 PROCEDURE — 93010 ELECTROCARDIOGRAM REPORT: CPT

## 2020-02-04 PROCEDURE — 99220: CPT

## 2020-02-04 PROCEDURE — 71046 X-RAY EXAM CHEST 2 VIEWS: CPT | Mod: 26

## 2020-02-04 RX ORDER — CLONAZEPAM 1 MG
0.5 TABLET ORAL ONCE
Refills: 0 | Status: DISCONTINUED | OUTPATIENT
Start: 2020-02-04 | End: 2020-02-04

## 2020-02-04 RX ORDER — ASPIRIN/CALCIUM CARB/MAGNESIUM 324 MG
324 TABLET ORAL ONCE
Refills: 0 | Status: COMPLETED | OUTPATIENT
Start: 2020-02-04 | End: 2020-02-04

## 2020-02-04 RX ADMIN — Medication 0.5 MILLIGRAM(S): at 21:44

## 2020-02-04 RX ADMIN — Medication 324 MILLIGRAM(S): at 21:44

## 2020-02-04 NOTE — ED PROVIDER NOTE - NS ED ROS FT
Constitutional: no fever, chills, no recent weight loss, change in appetite or malaise  Eyes: no redness/discharge/pain/vision changes  ENT: no rhinorrhea/ear pain/sore throat  Cardiac: see HPI  Respiratory: No cough or respiratory distress  GI: No nausea, vomiting, diarrhea or abdominal pain.  : No dysuria, frequency, urgency or hematuria  MS: no pain to back or extremities, no loss of ROM, no weakness  Neuro: No headache or weakness. No LOC.  Skin: No skin rash.  Endocrine: No history of thyroid disease or diabetes.

## 2020-02-04 NOTE — ED CDU PROVIDER INITIAL DAY NOTE - OBJECTIVE STATEMENT
79y/o female with pmh of htn, hld, cad, cabg in 2011, pt. c/o left sided cp which started earlier today while at rest. denies sob, fever, cough, vomiting, abdominal pain. pt. is not a smoker. no family hx of cad. cardiologist dr. Moura.

## 2020-02-04 NOTE — ED PROVIDER NOTE - CLINICAL SUMMARY MEDICAL DECISION MAKING FREE TEXT BOX
79yo F history of HTN DL CAD CABG presenting with L sided AM since today. Intermittent. No exac/relieving factors. Self resolves. No shortness of breath. No dyspnea on exertion, orthopnea, weight gain, lower extremity edema. No history DVT/PE, no lower extremity swelling/pain, no recent travel/trauma, no exogenous hormone use, no known active malignancy. labs ekg imaging reviewed. will obs

## 2020-02-04 NOTE — ED CDU PROVIDER INITIAL DAY NOTE - ATTENDING CONTRIBUTION TO CARE
80 year old female placed in the EDOU for chest pain eval, had unremarkable cardiac enzymes, ekg and nuclear stress test. Patient is a candidate for OP follow up. No cp/sob, no n/v/d on discharge    CONSTITUTIONAL: Well-developed; well-nourished; in no acute distress. Sitting up and providing appropriate history and physical examination  SKIN: skin exam is warm and dry, no acute rash.  HEAD: Normocephalic; atraumatic.  EYES: PERRL, 3 mm bilateral, no nystagmus, EOM intact; conjunctiva and sclera clear.  ENT: No nasal discharge; airway clear.  NECK: Supple; non tender. + full passive ROM in all directions. No JVD  CARD: S1, S2 normal; no murmurs, gallops, or rubs. Regular rate and rhythm. + Symmetric Strong Pulses  RESP: No wheezes, rales or rhonchi. Good air movement bilaterally  ABD: soft; non-distended; non-tender. No Rebound, No Guarding, No signs of peritonitis, No CVA tenderness. No pulsatile abdominal mass. + Strong and Symmetric Pulses  EXT: Normal ROM. No clubbing, cyanosis or edema. Dp and Pt Pulses intact. Cap refill less than 3 seconds  NEURO: CN 2-12 intact, normal finger to nose, normal romberg, stable gait, no sensory or motor deficits, Alert, oriented, grossly unremarkable. No Focal deficits. GCS 15. NIH 0  PSYCH: Cooperative, appropriate.

## 2020-02-04 NOTE — ED PROVIDER NOTE - OBJECTIVE STATEMENT
81 yo female hx of HTN/HLD/CAD s/p CABG in 2016 present c/o left sided chest pain off/on since yesterday. pain usually lasted for 1-2 minutes. denies SOB/diaphoresis/weakness assoc with chest pain. denies pain in ED now. denies exogenous hormone use/recent hospitalization/hx of DVT. denies recent illness/fever/chill/HA/dizziness/sob/abd pain/n/v/d/urinary sxs.   Dr Moura is her cardiologist. last seen about 2 months ago. reported stress test last year was negative. 81 yo female hx of HTN/HLD/CAD s/p CABG in 2016 present c/o left sided chest pain off/on since yesterday. pain usually lasted for 1-2 minutes. denies SOB/diaphoresis/weakness assoc with chest pain. denies pain in ED now. denies exogenous hormone use/recent hospitalization/hx of DVT. denies recent illness/fever/chill/HA/dizziness/sob/abd pain/n/v/d/urinary sxs. Gradual onset, moderate.   Dr Moura is her cardiologist. last seen about 2 months ago. reported stress test last year was negative.

## 2020-02-04 NOTE — ED PROVIDER NOTE - ATTENDING CONTRIBUTION TO CARE
79yo F history of HTN DL CAD CABG presenting with L sided AM since today. Intermittent. No exac/relieving factors. Self resolves. No shortness of breath. No dyspnea on exertion, orthopnea, weight gain, lower extremity edema. No history DVT/PE, no lower extremity swelling/pain, no recent travel/trauma, no exogenous hormone use, no known active malignancy.     Constitutional: Well appearing. No acute distress. Non toxic.   Eyes: PERRLA. Extraocular movements intact, no entrapment. Conjunctiva normal.   ENT: No nasal discharge. Moist mucus membranes.  Neck: Supple, non tender, full range of motion.  CV: RRR no murmurs, rubs, or gallops. +S1S2.   Pulm: Clear to auscultation bilaterally. Normal work of breathing.  Abd: soft NT ND +BS.   Ext: Warm and well perfused x4, moving all extremities, no edema.   Psy: Cooperative, appropriate.   Skin: Warm, dry, no rash  Neuro: CN2-12 grossly intact no sensory or motor deficits throughout, no drift, no ataxia

## 2020-02-04 NOTE — ED CDU PROVIDER INITIAL DAY NOTE - MEDICAL DECISION MAKING DETAILS
80 year old female placed in the EDOU for chest pain eval, had unremarkable cardiac enzymes, ekg and nuclear stress test. Patient is a candidate for OP follow up. No cp/sob, no n/v/d on discharge

## 2020-02-05 VITALS
SYSTOLIC BLOOD PRESSURE: 138 MMHG | RESPIRATION RATE: 18 BRPM | OXYGEN SATURATION: 98 % | DIASTOLIC BLOOD PRESSURE: 71 MMHG | TEMPERATURE: 98 F | HEART RATE: 64 BPM

## 2020-02-05 LAB — TROPONIN T SERPL-MCNC: <0.01 NG/ML — SIGNIFICANT CHANGE UP

## 2020-02-05 PROCEDURE — 99217: CPT

## 2020-02-05 PROCEDURE — 93018 CV STRESS TEST I&R ONLY: CPT

## 2020-02-05 PROCEDURE — 93010 ELECTROCARDIOGRAM REPORT: CPT

## 2020-02-05 PROCEDURE — 78452 HT MUSCLE IMAGE SPECT MULT: CPT | Mod: 26

## 2020-02-05 PROCEDURE — 93016 CV STRESS TEST SUPVJ ONLY: CPT | Mod: 59

## 2020-02-05 NOTE — ED CDU PROVIDER DISPOSITION NOTE - NSFOLLOWUPINSTRUCTIONS_ED_ALL_ED_FT
F/U PMD and Dr. Gallo    Chest Pain    Chest pain can be caused by many different conditions which may or may not be dangerous. Causes include heartburn, lung infections, heart attack, blood clot in lungs, skin infections, strain or damage to muscle, cartilage, or bones, etc. In addition to a history and physical examination, an electrocardiogram (ECG) or other lab tests may have been performed to determine the cause of your chest pain. Follow up with your primary care provider or with a cardiologist as instructed.     SEEK IMMEDIATE MEDICAL CARE IF YOU HAVE ANY OF THE FOLLOWING SYMPTOMS: worsening chest pain, coughing up blood, unexplained back/neck/jaw pain, severe abdominal pain, dizziness or lightheadedness, fainting, shortness of breath, sweaty or clammy skin, vomiting, or racing heart beat. These symptoms may represent a serious problem that is an emergency. Do not wait to see if the symptoms will go away. Get medical help right away. Call 911 and do not drive yourself to the hospital.

## 2020-02-05 NOTE — ED CDU PROVIDER DISPOSITION NOTE - PATIENT PORTAL LINK FT
You can access the FollowMyHealth Patient Portal offered by [facility] by registering at the following website: [email or web address]. By joining [facility]’s FollowMyHealth portal, you will also be able to view your health information using other applications (apps) compatible with our system.

## 2020-02-05 NOTE — ED ADULT NURSE REASSESSMENT NOTE - STATUS
Pt on continuous cardiac monitoring and pulse ox monitoring WNL, NO complaints offered at this time.

## 2020-02-05 NOTE — ED CDU PROVIDER SUBSEQUENT DAY NOTE - PROGRESS NOTE DETAILS
pt. in nad, resting comfortably. no complaints at this hour. 2nd trop wnl. will continue to reassess. pt is NAD, no complaints, NO CP, NO SOB. Negative cardiac enzymes x2 and nl ekg. pt scheduled to go for Pharm Nuc. Will continue to monitor patient. pts pharm nuclear results back normal no stenosis, negative trops, nl ekg, will dispo and follow up with Dr. Moura out patient. pt denies cough , fever, chills , URI sx- f/u regarding x-ray suspicion of PNA

## 2020-02-10 NOTE — ED ADULT NURSE NOTE - NSFALLRSKUNASSIST_ED_ALL_ED
EXAM note    History  Capo Berrios is a 68year old male who presents to the office for:  Chief Complaint   Patient presents with   â¢ Pain     pt's neck and back pain today is 7/10. Pt states his anxiety has increased d/t increased pain. Presents to the office today follow-up of multiple chronic medical problems    Out of a 25 minutes face-to-face visit greater than 50% of this spent in counseling with patient and wife regarding medications and side effects expectations and options for chronic pain in the context of depression each her to care to the management of the other. Not feel that medication adjustments would be of value I would like to continue current plan    The following issues are considered  Encounter Diagnoses   Name Primary? â¢ Encounter for long-term (current) use of other medications Yes   â¢ Major depressive disorder with single episode, in partial remission (CMS/HCC)    â¢ Chronic bilateral low back pain with right-sided sciatica    â¢ Cervical facet joint syndrome    â¢ Stenosis, cervical spine, worse from C3-4 to C5-6    â¢ Cervical myelopathy (CMS/HCC)          As of this date of service, I have reviewed the past medical, family and social history sections, including the medications and allergies listed in the above medical record as well as the nursing notes as found in EPIC.     Patient Active Problem List   Diagnosis   â¢ Chronic airway obstruction, not elsewhere classified   â¢ Essential hypertension   â¢ Obesity, unspecified   â¢ Sprain and strain of other specified sites of shoulder and upper arm   â¢ Degeneration of lumbar intervertebral disc   â¢ Arthropathy of cervical facet joint   â¢ Cervical facet joint syndrome   â¢ Stenosis, cervical spine, worse from C3-4 to C5-6   â¢ Mild depression (CMS/HCC)   â¢ Fusion of spine of cervical region   â¢ Right knee pain   â¢ Syncopal episodes   â¢ Fusion of spine of lumbar region L3-4 and L4-5 10/4/2018, Dr. Chairez See Urinary retention   â¢ Steroid-induced hyperglycemia     No problems updated. Outpatient Medications Marked as Taking for the 2/7/20 encounter (Office Visit) with Jamarcus Kirkpatrick MD   Medication Sig Dispense Refill   â¢ DULoxetine (CYMBALTA) 30 MG capsule Take 1 capsule by mouth 2 times daily. 180 capsule 1   â¢ fluticasone (FLONASE) 50 MCG/ACT nasal spray USE 1 SPRAY IN EACH NOSTRIL DAILY 32 g 0   â¢ gabapentin (NEURONTIN) 300 MG capsule Take 1 capsule by mouth 2 times daily. 180 capsule 1   â¢ omeprazole (PRILOSEC) 40 MG capsule Take 1 capsule by mouth daily. 90 capsule 1   â¢ hydrochlorothiazide (HYDRODIURIL) 12.5 MG tablet Take 1 tablet by mouth daily. 90 tablet 1   â¢ fluticasone-salmeterol (ADVAIR DISKUS) 250-50 MCG/DOSE inhaler Inhale 1 puff into the lungs two times daily. Indications: Chronic Obstructive Lung Disease 180 each 3   â¢ umeclidinium bromide (INCRUSE ELLIPTA) 62.5 MCG/INH inhaler Inhale 1 puff into the lungs daily. Indications: Chronic Obstructive Lung Disease 90 each 3   â¢ albuterol 108 (90 Base) MCG/ACT inhaler Inhale 2 puffs into the lungs every 4 hours as needed for Shortness of Breath or Wheezing. Indications: Chronic Obstructive Lung Disease 3 Inhaler 3   â¢ lisinopril (ZESTRIL) 20 MG tablet Take 1 tablet by mouth 2 times daily. 180 tablet 1   â¢ albuterol (VENTOLIN) (2.5 MG/3ML) 0.083% nebulizer solution Take 3 mLs by nebulization every 4 hours as needed for Wheezing or Shortness of Breath. 360 mL 11   â¢ predniSONE (DELTASONE) 20 MG tablet Take 1 tablet by mouth daily. (Patient taking differently: Take 20 mg by mouth as needed. ) 7 tablet 0   â¢ acetaminophen (TYLENOL) 650 MG CR tablet Take 650 mg by mouth daily. 30 tablet 0   â¢ cholecalciferol (VITAMIN D3) 1000 UNITS tablet Take 1,000 Units by mouth daily. â¢ ARTIFICIAL TEARS 0.1-0.3 % Solution Place 1-2 drops into both eyes nightly as needed. â¢ Multiple Vitamin (MULTI VITAMIN MENS PO) Take 1 tablet by mouth daily.           Tobacco history:   reports that he quit smoking about 34 years ago. His smoking use included pipe and cigars. He quit after 4.00 years of use. He has never used smokeless tobacco.    Body mass index is 30.65 kg/mÂ². BMI ASSESSMENT/PLAN:  Patient is obese.     Follow-up in Six months       Last four GAD7 Assessments       GAD7 2/7/2020 2/6/2019 12/10/2018 11/2/2018   GAD7 Score 19 21 19 5   Feeling nervous, anxious or on edge Nearly every day Nearly every day Nearly every day Several days   Not being able to stop or control worrying Nearly every day Nearly every day Nearly every day Not at all   Worrying too much about different things Nearly every day Nearly every day Nearly every day Not at all   Trouble relaxing Nearly every day Nearly every day Nearly every day Nearly every day   Being so restless that it's hard to sit still More than half the days Nearly every day Nearly every day Not at all   Becoming easily annoyed or irritable Nearly every day Nearly every day Nearly every day Several days   Feeling afraid as if something awful might happen More than half the days Nearly every day Several days Not at all   Ability to handle work, home and other people - Very difficult Very difficult Somewhat difficult       Last four PHQ 2/9 Test Results  0: Not at all  1: Several days  2: More than half the days  3: Nearly every day       PHQ9 SCREENING FLOWSHEET 2/7/2020 7/31/2019 2/6/2019 12/10/2018   Adult PHQ2 Score 4 4 5 4   Adult PHQ9 Score 18 13 21 14   Little interest or pleasure in activity 2 2 3 Nearly every day   Feeling down, depressed or hopeless 2 2 2 Several days   Trouble falling or staying asleep or sleeping all the time 3 2 3 Nearly every day   Feeling tired or having little energy 3 3 3 Nearly every day   Poor appetite or overeating 2 0 3 More than half the days   Feeling bad about yourself or that you are a failure or have let yourself or family down 2 1 2 Not at all   Trouble concentrating on things such as reading hte newspaper or watching TV 2 1 3 Not at all   Moving or speaking slowly that other people have noticed or the opposite - being so fidgety or restless that you have been moving around a lot more than usual 2 2 2 More than half the days   Thoughts that you would be better off dead or of hurting yourself in some way 0 0 0 Not at all       Over the last 2 weeks, how often have you been bothered by the following problems? PHQ2 Score:  4  1. Little interest or pleasure in doing things?:  2  2. Feeling down, depressed, or hopeless?:  2     PHQ9 Score:  18  3. Trouble falling, staying asleep or sleeping too much?:  3  4. Feeling tired or having little energy?:  3  5. Poor appetite or overeating?:  2  6. Feeling bad about yourself - or that you are a failure or that you have let yourself or your family down?:  2  7. Trouble concentrating on things such as reading a newspaper or watching television?:  2  8. Moving or speaking so slowly that other people could have noticed? Or the opposite - being so fidgety or restless that you were moving around a lot more than usual?:  2  9. Thoughts that you would be better off dead, or of hurting yourself in some way?:  0         DEPRESSION ASSESSMENT/PLAN:  Start and/or continue medication. See orders for details. PAST SURGICAL HISTORY  Past Surgical History:   Procedure Laterality Date   â¢ Anterior cervical discectomy w/ fusion  3/2016    Dr. Ulises Howell, [facility], [facility] C5-6 extended from previous fusion C3-4, C4-5   â¢ Cervical fusion  05/15    C-3,c4   â¢ Colonoscopy w biopsy  09/30/2010, 11/11/15    repeat in 5 years (2020), 3 polyps   â¢ Hernia repair     â¢ Knee arthroscopy w/ laser Right 6/29/16   â¢ Lumbar fusion  10/04/2018    L3-4, L4-5 laminectomy and posterior fusion, Dr. Ulises Howell   â¢ Pain clinic      injections   â¢ [place] tooth extraction         FAMILY HISTORY  family history includes Arthritis in his father, mother, and sister; Asthma in his sister; Diabetes in his mother and sister;  Heart disease in his father; Kidney disease in his father; Stroke in his father. Recent laboratories include  [facility] on 07/26/2019   Component Date Value Ref Range Status   â¢ WBC 07/26/2019 6.7  4.2 - 11.0 K/mcL Final   â¢ RBC 07/26/2019 4.65  4.50 - 5.90 mil/mcL Final   â¢ HGB 07/26/2019 12.9* 13.0 - 17.0 g/dL Final   â¢ HCT 07/26/2019 40.9  39.0 - 51.0 % Final   â¢ MCV 07/26/2019 88.0  78.0 - 100.0 fl Final   â¢ MCH 07/26/2019 27.7  26.0 - 34.0 pg Final   â¢ MCHC 07/26/2019 31.5* 32.0 - 36.5 g/dL Final   â¢ RDW-CV 07/26/2019 14.6  11.0 - 15.0 % Final   â¢ PLT 07/26/2019 207  140 - 450 K/mcL Final   â¢ DIFF TYPE 07/26/2019 AUTOMATED DIFFERENTIAL   Final   â¢ Neutrophil 07/26/2019 50  % Final   â¢ LYMPH 07/26/2019 35  % Final   â¢ MONO 07/26/2019 9  % Final   â¢ EOSIN 07/26/2019 6  % Final   â¢ BASO 07/26/2019 0  % Final   â¢ Absolute Neutrophil 07/26/2019 3.4  1.8 - 7.7 K/mcL Final   â¢ Absolute Lymph 07/26/2019 2.3  1.0 - 4.0 K/mcL Final   â¢ Absolute Mono 07/26/2019 0.6  0.3 - 0.9 K/mcL Final   â¢ Absolute Eos 07/26/2019 0.4  0.1 - 0.5 K/mcL Final   â¢ Absolute Baso 07/26/2019 0.0  0.0 - 0.3 K/mcL Final   â¢ Fasting Status 07/26/2019 7  hrs Final   â¢ Sodium 07/26/2019 143  135 - 145 mmol/L Final   â¢ Potassium 07/26/2019 4.6  3.4 - 5.1 mmol/L Final   â¢ Chloride 07/26/2019 107  98 - 107 mmol/L Final   â¢ Carbon Dioxide 07/26/2019 27  21 - 32 mmol/L Final   â¢ Anion Gap 07/26/2019 14  10 - 20 mmol/L Final   â¢ Glucose 07/26/2019 94  65 - 99 mg/dL Final   â¢ BUN 07/26/2019 27* 6 - 20 mg/dL Final   â¢ Creatinine 07/26/2019 1.37* 0.67 - 1.17 mg/dL Final   â¢ GFR Estimate,  07/26/2019 57   Final    eGFR 30-59 mL/min/1.73m2 = Moderate decrease in kidney function. Stage 3 CKD (chronic kidney disease) or moderate kidney disease. â¢ GFR Estimate, Non  07/26/2019 49   Final    eGFR 30-59 mL/min/1.73m2 = Moderate decrease in kidney function. Stage 3 CKD (chronic kidney disease) or moderate kidney disease.    â¢ BUN/Creatinine Ratio "07/26/2019 20  7 - 25 Final   â¢ CALCIUM 07/26/2019 9.0  8.4 - 10.2 mg/dL Final   â¢ TOTAL BILIRUBIN 07/26/2019 0.4  0.2 - 1.0 mg/dL Final   â¢ AST/SGOT 07/26/2019 18  <38 Units/L Final   â¢ ALT/SGPT 07/26/2019 24  <64 Units/L Final   â¢ ALK PHOSPHATASE 07/26/2019 106  45 - 117 Units/L Final   â¢ TOTAL PROTEIN 07/26/2019 6.2* 6.4 - 8.2 g/dL Final   â¢ Albumin 07/26/2019 3.5* 3.6 - 5.1 g/dL Final   â¢ GLOBULIN 07/26/2019 2.7  2.0 - 4.0 g/dL Final   â¢ A/G Ratio, Serum 07/26/2019 1.3  1.0 - 2.4 Final       No results found for this or any previous visit (from the past 24 hour(s)). Review of systems    No headache numbness tingling or weakness        Physical Exam  Vital Signs:    Vitals:    02/07/20 1107   BP: 108/60   Pulse: 80   Weight: 96.9 kg   Height: 5' 10"" (1.778 m)     Body mass index is 30.65 kg/mÂ². Constitutional:  NAD  Integument:  Warm. Dry. HENT:  Normocephalic. Atraumatic. Neck: Supple. Eyes:      Cardiovascular:  Normal heart rate. Normal rhythm. Respiratory:   No respiratory distress. Abdomen: No obvious distention. Neurologic:  Alert . Ambulation and speech are intact  Musculoskeletal:    Psychiatric: Conversational.  Mental status grossly intact with normal mood and affect      Assessment and Plan  No problem-specific Assessment & Plan notes found for this encounter. Summer Weaver was seen today for pain. Diagnoses and all orders for this visit:    Encounter for long-term (current) use of other medications  -     LIPID PANEL WITH REFLEX; Future  -     COMPREHENSIVE METABOLIC PANEL; Future    Major depressive disorder with single episode, in partial remission (CMS/HCC)  -     DULoxetine (CYMBALTA) 30 MG capsule; Take 1 capsule by mouth 2 times daily. Chronic bilateral low back pain with right-sided sciatica    Cervical facet joint syndrome  -     DULoxetine (CYMBALTA) 30 MG capsule; Take 1 capsule by mouth 2 times daily.     Stenosis, cervical spine, worse from C3-4 to C5-6  -     " DULoxetine (CYMBALTA) 30 MG capsule; Take 1 capsule by mouth 2 times daily. Cervical myelopathy (CMS/HCC)  -     DULoxetine (CYMBALTA) 30 MG capsule; Take 1 capsule by mouth 2 times daily. May have medication refills x6 months  Health maintenance issues reviewed  Health Maintenance   Topic Date Due   â¢ Shingles Vaccine (1 of 2) 08/17/1991   â¢ DTaP/Tdap/Td Vaccine (1 - Tdap) 08/27/2009   â¢ DM/CKD Microalbumin  06/07/2019   â¢ Medicare Wellness 65+  07/31/2020   â¢ DM/CKD GFR  07/26/2020   â¢ Influenza Vaccine  Completed   â¢ Pneumococcal Vaccine 65+  Completed   â¢ Meningococcal Vaccine  Aged Out       The 10-year ASCVD risk score (Mayur Mclaughlin., et al., 2013) is: 26.4%    Values used to calculate the score:      Age: 68 years      Sex: Male      Is Non- : No      Diabetic: No      Tobacco smoker: No      Systolic Blood Pressure: 240 mmHg      Is BP treated: Yes      HDL Cholesterol: 39 mg/dL      Total Cholesterol: 163 mg/dL    Return in about 6 months (around 8/7/2020) for [facility] sub, Med check long fasting labs 2 days prior, flp, cmp. There are no Patient Instructions on file for this visit.     14  2/7/2020 11:08 AM  2/7/2020 12:18 PM      . no

## 2020-02-18 ENCOUNTER — APPOINTMENT (OUTPATIENT)
Dept: CARDIOLOGY | Facility: CLINIC | Age: 80
End: 2020-02-18
Payer: MEDICAID

## 2020-02-18 VITALS
HEIGHT: 64 IN | SYSTOLIC BLOOD PRESSURE: 120 MMHG | DIASTOLIC BLOOD PRESSURE: 70 MMHG | HEART RATE: 58 BPM | BODY MASS INDEX: 28 KG/M2 | WEIGHT: 164 LBS

## 2020-02-18 PROCEDURE — 99214 OFFICE O/P EST MOD 30 MIN: CPT

## 2020-02-18 PROCEDURE — 93000 ELECTROCARDIOGRAM COMPLETE: CPT

## 2020-02-18 RX ORDER — GABAPENTIN 100 MG/1
100 CAPSULE ORAL 3 TIMES DAILY
Qty: 21 | Refills: 0 | Status: DISCONTINUED | COMMUNITY
Start: 2018-12-04 | End: 2020-02-18

## 2020-02-18 RX ORDER — OXYCODONE AND ACETAMINOPHEN 5; 325 MG/1; MG/1
5-325 TABLET ORAL
Qty: 24 | Refills: 0 | Status: DISCONTINUED | COMMUNITY
End: 2020-02-18

## 2020-02-18 NOTE — PHYSICAL EXAM
[General Appearance - Well Developed] : well developed [Well Groomed] : well groomed [Normal Appearance] : normal appearance [General Appearance - Well Nourished] : well nourished [No Deformities] : no deformities [General Appearance - In No Acute Distress] : no acute distress [Normal Conjunctiva] : the conjunctiva exhibited no abnormalities [Normal Oral Mucosa] : normal oral mucosa [Eyelids - No Xanthelasma] : the eyelids demonstrated no xanthelasmas [No Oral Pallor] : no oral pallor [No Oral Cyanosis] : no oral cyanosis [Respiration, Rhythm And Depth] : normal respiratory rhythm and effort [Exaggerated Use Of Accessory Muscles For Inspiration] : no accessory muscle use [Auscultation Breath Sounds / Voice Sounds] : lungs were clear to auscultation bilaterally [Abdomen Soft] : soft [Abdomen Tenderness] : non-tender [Abdomen Mass (___ Cm)] : no abdominal mass palpated [Gait - Sufficient For Exercise Testing] : the gait was sufficient for exercise testing [Abnormal Walk] : normal gait [Nail Clubbing] : no clubbing of the fingernails [Cyanosis, Localized] : no localized cyanosis [Petechial Hemorrhages (___cm)] : no petechial hemorrhages [] : no ischemic changes [Oriented To Time, Place, And Person] : oriented to person, place, and time [Affect] : the affect was normal [Mood] : the mood was normal [Normal Rate] : normal [Rhythm Regular] : regular [No Anxiety] : not feeling anxious [No Murmur] : no murmurs heard [1+] : right 1+ [No Pitting Edema] : no pitting edema present [FreeTextEntry1] : No JVD  [Rt] : no varicose veins of the right leg

## 2020-02-18 NOTE — HISTORY OF PRESENT ILLNESS
[FreeTextEntry1] : The patient was seen in the ER 2 weeks ago with atypical CP . Had nuclear stress test which was said to be normal . HAs had CABG x2 . DIANA to LAD and SVG to [place] . at last cath . Known LM disease . Had nuclear stress test which was normal;. She has not had chest pain since that time The patient is still grieving the loss of her granchild who was killed by at car on [place] .

## 2020-02-18 NOTE — ASSESSMENT
[FreeTextEntry1] : The patient has had CABG twice . She had atypical chest pain . this may be musculoskeletal , although she has had occlusion of her grafts after the first CABG . She has been lifting heavy things and she was advised not to do this anymore .  .  .

## 2020-02-19 ENCOUNTER — APPOINTMENT (OUTPATIENT)
Dept: GASTROENTEROLOGY | Facility: CLINIC | Age: 80
End: 2020-02-19

## 2020-03-16 NOTE — ED ADULT TRIAGE NOTE - CCCP TRG CHIEF CMPLNT
Thank you for choosing [facility] and [facility] for your     care. You may receive a survey about your visit. We appreciate you taking time     to complete this survey as we use your feedback to improve our services. We     realize we are not perfect, but we strive to provide excellent care. abdominal pain

## 2020-05-26 ENCOUNTER — EMERGENCY (EMERGENCY)
Facility: HOSPITAL | Age: 80
LOS: 0 days | Discharge: HOME | End: 2020-05-27
Attending: EMERGENCY MEDICINE | Admitting: EMERGENCY MEDICINE
Payer: MEDICARE

## 2020-05-26 VITALS
WEIGHT: 188.94 LBS | TEMPERATURE: 99 F | SYSTOLIC BLOOD PRESSURE: 138 MMHG | OXYGEN SATURATION: 100 % | HEART RATE: 71 BPM | RESPIRATION RATE: 16 BRPM | DIASTOLIC BLOOD PRESSURE: 80 MMHG

## 2020-05-26 DIAGNOSIS — Z98.890 OTHER SPECIFIED POSTPROCEDURAL STATES: Chronic | ICD-10-CM

## 2020-05-26 DIAGNOSIS — I10 ESSENTIAL (PRIMARY) HYPERTENSION: ICD-10-CM

## 2020-05-26 DIAGNOSIS — R06.02 SHORTNESS OF BREATH: ICD-10-CM

## 2020-05-26 DIAGNOSIS — I25.10 ATHEROSCLEROTIC HEART DISEASE OF NATIVE CORONARY ARTERY WITHOUT ANGINA PECTORIS: ICD-10-CM

## 2020-05-26 DIAGNOSIS — Z95.1 PRESENCE OF AORTOCORONARY BYPASS GRAFT: Chronic | ICD-10-CM

## 2020-05-26 DIAGNOSIS — I25.701 ATHEROSCLEROSIS OF CORONARY ARTERY BYPASS GRAFT(S), UNSPECIFIED, WITH ANGINA PECTORIS WITH DOCUMENTED SPASM: Chronic | ICD-10-CM

## 2020-05-26 DIAGNOSIS — E78.5 HYPERLIPIDEMIA, UNSPECIFIED: ICD-10-CM

## 2020-05-26 DIAGNOSIS — F41.9 ANXIETY DISORDER, UNSPECIFIED: ICD-10-CM

## 2020-05-26 LAB
ALBUMIN SERPL ELPH-MCNC: 3.9 G/DL — SIGNIFICANT CHANGE UP (ref 3.5–5.2)
ALP SERPL-CCNC: 63 U/L — SIGNIFICANT CHANGE UP (ref 30–115)
ALT FLD-CCNC: 8 U/L — SIGNIFICANT CHANGE UP (ref 0–41)
ANION GAP SERPL CALC-SCNC: 10 MMOL/L — SIGNIFICANT CHANGE UP (ref 7–14)
AST SERPL-CCNC: 18 U/L — SIGNIFICANT CHANGE UP (ref 0–41)
BILIRUB SERPL-MCNC: <0.2 MG/DL — SIGNIFICANT CHANGE UP (ref 0.2–1.2)
BUN SERPL-MCNC: 20 MG/DL — SIGNIFICANT CHANGE UP (ref 10–20)
CALCIUM SERPL-MCNC: 8.4 MG/DL — LOW (ref 8.5–10.1)
CHLORIDE SERPL-SCNC: 104 MMOL/L — SIGNIFICANT CHANGE UP (ref 98–110)
CO2 SERPL-SCNC: 27 MMOL/L — SIGNIFICANT CHANGE UP (ref 17–32)
CREAT SERPL-MCNC: 1 MG/DL — SIGNIFICANT CHANGE UP (ref 0.7–1.5)
D DIMER BLD IA.RAPID-MCNC: 1174 NG/ML DDU — HIGH (ref 0–230)
GLUCOSE SERPL-MCNC: 109 MG/DL — HIGH (ref 70–99)
HCT VFR BLD CALC: 35.5 % — LOW (ref 37–47)
HGB BLD-MCNC: 11.7 G/DL — LOW (ref 12–16)
MCHC RBC-ENTMCNC: 27.9 PG — SIGNIFICANT CHANGE UP (ref 27–31)
MCHC RBC-ENTMCNC: 33 G/DL — SIGNIFICANT CHANGE UP (ref 32–37)
MCV RBC AUTO: 84.7 FL — SIGNIFICANT CHANGE UP (ref 81–99)
NRBC # BLD: 0 /100 WBCS — SIGNIFICANT CHANGE UP (ref 0–0)
NT-PROBNP SERPL-SCNC: 74 PG/ML — SIGNIFICANT CHANGE UP (ref 0–300)
PLATELET # BLD AUTO: 181 K/UL — SIGNIFICANT CHANGE UP (ref 130–400)
POTASSIUM SERPL-MCNC: 4.3 MMOL/L — SIGNIFICANT CHANGE UP (ref 3.5–5)
POTASSIUM SERPL-SCNC: 4.3 MMOL/L — SIGNIFICANT CHANGE UP (ref 3.5–5)
PROT SERPL-MCNC: 6.4 G/DL — SIGNIFICANT CHANGE UP (ref 6–8)
RBC # BLD: 4.19 M/UL — LOW (ref 4.2–5.4)
RBC # FLD: 14.8 % — HIGH (ref 11.5–14.5)
SODIUM SERPL-SCNC: 141 MMOL/L — SIGNIFICANT CHANGE UP (ref 135–146)
TROPONIN T SERPL-MCNC: <0.01 NG/ML — SIGNIFICANT CHANGE UP
WBC # BLD: 5.46 K/UL — SIGNIFICANT CHANGE UP (ref 4.8–10.8)
WBC # FLD AUTO: 5.46 K/UL — SIGNIFICANT CHANGE UP (ref 4.8–10.8)

## 2020-05-26 PROCEDURE — 36000 PLACE NEEDLE IN VEIN: CPT

## 2020-05-26 PROCEDURE — 93010 ELECTROCARDIOGRAM REPORT: CPT

## 2020-05-26 PROCEDURE — 99285 EMERGENCY DEPT VISIT HI MDM: CPT | Mod: 25

## 2020-05-26 PROCEDURE — 71045 X-RAY EXAM CHEST 1 VIEW: CPT | Mod: 26

## 2020-05-26 NOTE — ED ADULT NURSE NOTE - CAS EDN DISCHARGE ASSESSMENT
[FreeTextEntry1] : 1.Chlamydia: test of cure shows not eradicated, will treat with doxycycline 100 mg bid x 7 days. retest in 3-4 weeks. Patient counseled on safe sex practices. \par \par 2. Mononucleosis: recovering, d/w him supportive treatment. Given letter for return work. \par \par 
Alert and oriented to person, place and time

## 2020-05-26 NOTE — ED ADULT TRIAGE NOTE - CHIEF COMPLAINT QUOTE
Pt requests the med evaluation "I am taking deep breaths, is it normal?", denies SOB, no chest pain, no cough, no fever, able to speak in full sentences. Pt asks something for her anxiety and depression. Denies suicidal or homicidal ideation.

## 2020-05-27 PROCEDURE — 71275 CT ANGIOGRAPHY CHEST: CPT | Mod: 26

## 2020-05-27 NOTE — ED PROVIDER NOTE - PATIENT PORTAL LINK FT
You can access the FollowMyHealth Patient Portal offered by [facility] by registering at the following website: [email or web address]. By joining [employer]’s FollowMyHealth portal, you will also be able to view your health information using other applications (apps) compatible with our system.

## 2020-05-27 NOTE — ED PROVIDER NOTE - NS ED ROS FT
Review of Systems:  	•	CONSTITUTIONAL - no fever, no diaphoresis, no chills  	•	SKIN - no rash  	•	HEMATOLOGIC - no bleeding, no bruising  	•	EYES - no eye pain, no blurry vision  	•	ENT - no change in hearing, no sore throat, no ear pain or tinnitus  	•	RESPIRATORY -+ shortness of breath, no cough  	•	CARDIAC - no chest pain, no palpitations  	•	GI - no abd pain, no nausea, no vomiting, no diarrhea, no constipation  	•	GENITO-URINARY - no discharge, no dysuria; no hematuria, no increased urinary frequency  	•	MUSCULOSKELETAL - no joint paint, no swelling, no redness  	•	NEUROLOGIC - no weakness, no headache, no paresthesias, no LOC  	•	PSYCH - no anxiety, non suicidal, non homicidal, no hallucination, no depression

## 2020-05-27 NOTE — ED PROVIDER NOTE - PHYSICAL EXAMINATION
CONST: Well appearing in NAD  EYES: PERRL, EOMI, Sclera and conjunctiva clear  ENT: No nasal discharge. Oropharynx normal appearing, no erythema or exudates. No abscess or swelling. Uvula midline.   NECK: Non-tender, no meningeal signs. normal ROM. supple   CARD: Normal S1 S2; Normal rate and rhythm  RESP: Equal BS B/L, No wheezes, rhonchi or rales. No distress  GI: Soft, non-tender, non-distended. no cva tenderness. normal BS  MS: Normal ROM in all extremities. No midline spinal tenderness. pulses 2 +. no calf tenderness or swelling  SKIN: Warm, dry, no acute rashes. Good turgor  NEURO: A&Ox3, No focal deficits. Strength 5/5 with no sensory deficits. Steady gait.

## 2020-05-27 NOTE — ED PROVIDER NOTE - OBJECTIVE STATEMENT
80 year old female with pmhx of cad, anxiety and htn presents for evaluation. pt admits has been "taking deep breaths " over last few days, and I unsure if it is her anxiety. no chest pain, calf pain or swelling, fever, chills, cough, abd pain, or nausea, vomiting, diarrhea.

## 2020-05-27 NOTE — ED PROVIDER NOTE - ATTENDING CONTRIBUTION TO CARE
I personally evaluated the patient. I reviewed the Resident’s or Physician Assistant’s note (as assigned above), and agree with the findings and plan except as documented in my note.    80 year old female with pmhx of cad, anxiety and htn presents for evaluation. pt admits has been "taking deep breaths " over last few days, and I unsure if it is her anxiety. No CP, SOB. No nausea, vomiting. No leg pain. No abd pain. No palpitations.     CONSTITUTIONAL: Well-developed; well-nourished; in no acute distress. Sitting up and providing appropriate history and physical examination  SKIN: skin exam is warm and dry, no acute rash.  HEAD: Normocephalic; atraumatic.  EYES: PERRL, 3 mm bilateral, no nystagmus, EOM intact; conjunctiva and sclera clear.  ENT: No nasal discharge; airway clear.  NECK: Supple; non tender.+ full passive ROM in all directions. No JVD  CARD: S1, S2 normal; no murmurs, gallops, or rubs. Regular rate and rhythm. + Symmetric Strong Pulses  RESP: No wheezes, rales or rhonchi. Good air movement bilaterally  ABD: soft; non-distended; non-tender. No Rebound, No Gaurding, No signs of peritnitis, No CVA tenderness  EXT: Normal ROM. No clubbing, cyanosis or edema. Dp and Pt Pulses intact. Cap refill less than 3 seconds  NEURO: CN 2-12 intact, normal finger to nose, normal romberg, stable gait, no sensory or motor deficits, Alert, oriented, grossly unremarkable. No Focal deficits. GCS 15. NIH 0  PSYCH: Cooperative, appropriate.    Plan- labs, CXR, ECG, reassess

## 2020-06-09 ENCOUNTER — APPOINTMENT (OUTPATIENT)
Dept: CARDIOLOGY | Facility: CLINIC | Age: 80
End: 2020-06-09

## 2020-06-21 ENCOUNTER — RX RENEWAL (OUTPATIENT)
Age: 80
End: 2020-06-21

## 2020-06-21 RX ORDER — ASPIRIN 81 MG/1
81 TABLET, COATED ORAL
Qty: 90 | Refills: 3 | Status: ACTIVE | COMMUNITY
Start: 2020-06-21 | End: 1900-01-01

## 2020-07-07 ENCOUNTER — EMERGENCY (EMERGENCY)
Facility: HOSPITAL | Age: 80
LOS: 0 days | Discharge: HOME | End: 2020-07-07
Attending: EMERGENCY MEDICINE | Admitting: EMERGENCY MEDICINE
Payer: MEDICARE

## 2020-07-07 VITALS
WEIGHT: 186.95 LBS | TEMPERATURE: 98 F | RESPIRATION RATE: 20 BRPM | SYSTOLIC BLOOD PRESSURE: 145 MMHG | DIASTOLIC BLOOD PRESSURE: 75 MMHG | HEART RATE: 84 BPM | HEIGHT: 63 IN | OXYGEN SATURATION: 98 %

## 2020-07-07 DIAGNOSIS — I25.701 ATHEROSCLEROSIS OF CORONARY ARTERY BYPASS GRAFT(S), UNSPECIFIED, WITH ANGINA PECTORIS WITH DOCUMENTED SPASM: Chronic | ICD-10-CM

## 2020-07-07 DIAGNOSIS — Z85.42 PERSONAL HISTORY OF MALIGNANT NEOPLASM OF OTHER PARTS OF UTERUS: ICD-10-CM

## 2020-07-07 DIAGNOSIS — I25.10 ATHEROSCLEROTIC HEART DISEASE OF NATIVE CORONARY ARTERY WITHOUT ANGINA PECTORIS: ICD-10-CM

## 2020-07-07 DIAGNOSIS — Z88.8 ALLERGY STATUS TO OTHER DRUGS, MEDICAMENTS AND BIOLOGICAL SUBSTANCES: ICD-10-CM

## 2020-07-07 DIAGNOSIS — R10.9 UNSPECIFIED ABDOMINAL PAIN: ICD-10-CM

## 2020-07-07 DIAGNOSIS — Z95.1 PRESENCE OF AORTOCORONARY BYPASS GRAFT: Chronic | ICD-10-CM

## 2020-07-07 DIAGNOSIS — Z95.1 PRESENCE OF AORTOCORONARY BYPASS GRAFT: ICD-10-CM

## 2020-07-07 DIAGNOSIS — E78.5 HYPERLIPIDEMIA, UNSPECIFIED: ICD-10-CM

## 2020-07-07 DIAGNOSIS — Z98.890 OTHER SPECIFIED POSTPROCEDURAL STATES: Chronic | ICD-10-CM

## 2020-07-07 DIAGNOSIS — I10 ESSENTIAL (PRIMARY) HYPERTENSION: ICD-10-CM

## 2020-07-07 DIAGNOSIS — R14.2 ERUCTATION: ICD-10-CM

## 2020-07-07 LAB
ALBUMIN SERPL ELPH-MCNC: 4.1 G/DL — SIGNIFICANT CHANGE UP (ref 3.5–5.2)
ALP SERPL-CCNC: 66 U/L — SIGNIFICANT CHANGE UP (ref 30–115)
ALT FLD-CCNC: 7 U/L — SIGNIFICANT CHANGE UP (ref 0–41)
ANION GAP SERPL CALC-SCNC: 10 MMOL/L — SIGNIFICANT CHANGE UP (ref 7–14)
AST SERPL-CCNC: 19 U/L — SIGNIFICANT CHANGE UP (ref 0–41)
BILIRUB DIRECT SERPL-MCNC: <0.2 MG/DL — SIGNIFICANT CHANGE UP (ref 0–0.2)
BILIRUB INDIRECT FLD-MCNC: >0 MG/DL — LOW (ref 0.2–1.2)
BILIRUB SERPL-MCNC: 0.2 MG/DL — SIGNIFICANT CHANGE UP (ref 0.2–1.2)
BUN SERPL-MCNC: 18 MG/DL — SIGNIFICANT CHANGE UP (ref 10–20)
CALCIUM SERPL-MCNC: 9.1 MG/DL — SIGNIFICANT CHANGE UP (ref 8.5–10.1)
CHLORIDE SERPL-SCNC: 105 MMOL/L — SIGNIFICANT CHANGE UP (ref 98–110)
CO2 SERPL-SCNC: 28 MMOL/L — SIGNIFICANT CHANGE UP (ref 17–32)
CREAT SERPL-MCNC: 0.9 MG/DL — SIGNIFICANT CHANGE UP (ref 0.7–1.5)
GLUCOSE SERPL-MCNC: 111 MG/DL — HIGH (ref 70–99)
HCT VFR BLD CALC: 41.2 % — SIGNIFICANT CHANGE UP (ref 37–47)
HGB BLD-MCNC: 13.2 G/DL — SIGNIFICANT CHANGE UP (ref 12–16)
LACTATE SERPL-SCNC: 0.9 MMOL/L — SIGNIFICANT CHANGE UP (ref 0.7–2)
MCHC RBC-ENTMCNC: 26.8 PG — LOW (ref 27–31)
MCHC RBC-ENTMCNC: 32 G/DL — SIGNIFICANT CHANGE UP (ref 32–37)
MCV RBC AUTO: 83.7 FL — SIGNIFICANT CHANGE UP (ref 81–99)
NRBC # BLD: 0 /100 WBCS — SIGNIFICANT CHANGE UP (ref 0–0)
PLATELET # BLD AUTO: 232 K/UL — SIGNIFICANT CHANGE UP (ref 130–400)
POTASSIUM SERPL-MCNC: 4 MMOL/L — SIGNIFICANT CHANGE UP (ref 3.5–5)
POTASSIUM SERPL-SCNC: 4 MMOL/L — SIGNIFICANT CHANGE UP (ref 3.5–5)
PROT SERPL-MCNC: 6.7 G/DL — SIGNIFICANT CHANGE UP (ref 6–8)
RBC # BLD: 4.92 M/UL — SIGNIFICANT CHANGE UP (ref 4.2–5.4)
RBC # FLD: 14.6 % — HIGH (ref 11.5–14.5)
SODIUM SERPL-SCNC: 143 MMOL/L — SIGNIFICANT CHANGE UP (ref 135–146)
TROPONIN T SERPL-MCNC: <0.01 NG/ML — SIGNIFICANT CHANGE UP
WBC # BLD: 6.73 K/UL — SIGNIFICANT CHANGE UP (ref 4.8–10.8)
WBC # FLD AUTO: 6.73 K/UL — SIGNIFICANT CHANGE UP (ref 4.8–10.8)

## 2020-07-07 PROCEDURE — 74174 CTA ABD&PLVS W/CONTRAST: CPT | Mod: 26

## 2020-07-07 PROCEDURE — 99285 EMERGENCY DEPT VISIT HI MDM: CPT

## 2020-07-07 PROCEDURE — 93010 ELECTROCARDIOGRAM REPORT: CPT

## 2020-07-07 RX ORDER — FAMOTIDINE 10 MG/ML
40 INJECTION INTRAVENOUS ONCE
Refills: 0 | Status: DISCONTINUED | OUTPATIENT
Start: 2020-07-07 | End: 2020-07-07

## 2020-07-07 RX ORDER — FAMOTIDINE 10 MG/ML
20 INJECTION INTRAVENOUS ONCE
Refills: 0 | Status: COMPLETED | OUTPATIENT
Start: 2020-07-07 | End: 2020-07-07

## 2020-07-07 RX ADMIN — FAMOTIDINE 104 MILLIGRAM(S): 10 INJECTION INTRAVENOUS at 01:52

## 2020-07-07 NOTE — ED PROVIDER NOTE - PROGRESS NOTE DETAILS
Abd pain resolved. Pt feels better. Repeat abd exam- soft, nt. No guarding or rigidity. Patient is pain free now. Eager to leave. Will DC w PMD and GI f/u. Full DC instructions discussed and patient knows when to seek immediate medical attention. Patient has proper follow-up. All results discussed with the patient they may require further work-up. Limitations of ED work-up discussed. All  questions and concerns from patient or family addressed. Understanding of insturctions verbalized

## 2020-07-07 NOTE — ASU DISCHARGE PLAN (ADULT/PEDIATRIC). - LEAVE STERI-STRIP INTACT
Statement Selected Consent (Lip)/Introductory Paragraph: The rationale for Mohs was explained to the patient and consent was obtained. The risks, benefits and alternatives to therapy were discussed in detail. Specifically, the risks of lip deformity, changes in the oral aperture, infection, scarring, bleeding, prolonged wound healing, incomplete removal, allergy to anesthesia, nerve injury and recurrence were addressed. Prior to the procedure, the treatment site was clearly identified and confirmed by the patient. All components of Universal Protocol/PAUSE Rule completed.

## 2020-07-07 NOTE — ED ADULT NURSE NOTE - OBJECTIVE STATEMENT
Pt is 80 yr old female c/o generalized abd pain. PMH hyperlipidemia, CAD, HTN. Allergy to chloroquine.

## 2020-07-07 NOTE — ED PROVIDER NOTE - PATIENT PORTAL LINK FT
You can access the FollowMyHealth Patient Portal offered by [facility] by registering at the following website: [email or web address]. By joining [employer]’s FollowMyHealth portal, you will also be able to view your health information using other applications (apps) compatible with our system.

## 2020-07-07 NOTE — ED PROVIDER NOTE - OBJECTIVE STATEMENT
79 y/o F w pmh of uterine cancer, CABG presents w abd pain for few hrs. Describes it as having gasses. Reports belching. No n/v. No back pain.

## 2020-07-07 NOTE — ED PROVIDER NOTE - CARE PROVIDER_API CALL
Sd Wells  Gastroenterology  [address] [place] [address]  Phone: [phone]  Fax: [phone]  Follow Up Time:

## 2020-07-19 ENCOUNTER — EMERGENCY (EMERGENCY)
Facility: HOSPITAL | Age: 80
LOS: 0 days | Discharge: HOME | End: 2020-07-20
Attending: EMERGENCY MEDICINE | Admitting: EMERGENCY MEDICINE
Payer: MEDICARE

## 2020-07-19 VITALS — DIASTOLIC BLOOD PRESSURE: 74 MMHG | TEMPERATURE: 98 F | HEART RATE: 80 BPM | SYSTOLIC BLOOD PRESSURE: 145 MMHG

## 2020-07-19 VITALS
OXYGEN SATURATION: 98 % | WEIGHT: 154.98 LBS | DIASTOLIC BLOOD PRESSURE: 71 MMHG | HEIGHT: 63 IN | TEMPERATURE: 98 F | RESPIRATION RATE: 18 BRPM | SYSTOLIC BLOOD PRESSURE: 130 MMHG | HEART RATE: 92 BPM

## 2020-07-19 DIAGNOSIS — Z85.42 PERSONAL HISTORY OF MALIGNANT NEOPLASM OF OTHER PARTS OF UTERUS: ICD-10-CM

## 2020-07-19 DIAGNOSIS — E78.5 HYPERLIPIDEMIA, UNSPECIFIED: ICD-10-CM

## 2020-07-19 DIAGNOSIS — Z88.8 ALLERGY STATUS TO OTHER DRUGS, MEDICAMENTS AND BIOLOGICAL SUBSTANCES: ICD-10-CM

## 2020-07-19 DIAGNOSIS — I25.701 ATHEROSCLEROSIS OF CORONARY ARTERY BYPASS GRAFT(S), UNSPECIFIED, WITH ANGINA PECTORIS WITH DOCUMENTED SPASM: Chronic | ICD-10-CM

## 2020-07-19 DIAGNOSIS — I25.10 ATHEROSCLEROTIC HEART DISEASE OF NATIVE CORONARY ARTERY WITHOUT ANGINA PECTORIS: ICD-10-CM

## 2020-07-19 DIAGNOSIS — Z92.21 PERSONAL HISTORY OF ANTINEOPLASTIC CHEMOTHERAPY: ICD-10-CM

## 2020-07-19 DIAGNOSIS — R10.31 RIGHT LOWER QUADRANT PAIN: ICD-10-CM

## 2020-07-19 DIAGNOSIS — Z98.890 OTHER SPECIFIED POSTPROCEDURAL STATES: Chronic | ICD-10-CM

## 2020-07-19 DIAGNOSIS — Z86.59 PERSONAL HISTORY OF OTHER MENTAL AND BEHAVIORAL DISORDERS: ICD-10-CM

## 2020-07-19 DIAGNOSIS — Z95.1 PRESENCE OF AORTOCORONARY BYPASS GRAFT: ICD-10-CM

## 2020-07-19 DIAGNOSIS — Z95.1 PRESENCE OF AORTOCORONARY BYPASS GRAFT: Chronic | ICD-10-CM

## 2020-07-19 LAB
ALBUMIN SERPL ELPH-MCNC: 4.1 G/DL — SIGNIFICANT CHANGE UP (ref 3.5–5.2)
ALP SERPL-CCNC: 63 U/L — SIGNIFICANT CHANGE UP (ref 30–115)
ALT FLD-CCNC: 13 U/L — SIGNIFICANT CHANGE UP (ref 0–41)
ANION GAP SERPL CALC-SCNC: 12 MMOL/L — SIGNIFICANT CHANGE UP (ref 7–14)
APPEARANCE UR: CLEAR — SIGNIFICANT CHANGE UP
AST SERPL-CCNC: 41 U/L — SIGNIFICANT CHANGE UP (ref 0–41)
BASOPHILS # BLD AUTO: 0.03 K/UL — SIGNIFICANT CHANGE UP (ref 0–0.2)
BASOPHILS NFR BLD AUTO: 0.5 % — SIGNIFICANT CHANGE UP (ref 0–1)
BILIRUB DIRECT SERPL-MCNC: <0.2 MG/DL — SIGNIFICANT CHANGE UP (ref 0–0.2)
BILIRUB INDIRECT FLD-MCNC: SIGNIFICANT CHANGE UP MG/DL (ref 0.2–1.2)
BILIRUB SERPL-MCNC: <0.2 MG/DL — SIGNIFICANT CHANGE UP (ref 0.2–1.2)
BILIRUB UR-MCNC: NEGATIVE — SIGNIFICANT CHANGE UP
BUN SERPL-MCNC: 16 MG/DL — SIGNIFICANT CHANGE UP (ref 10–20)
CALCIUM SERPL-MCNC: 8.1 MG/DL — LOW (ref 8.5–10.1)
CHLORIDE SERPL-SCNC: 99 MMOL/L — SIGNIFICANT CHANGE UP (ref 98–110)
CO2 SERPL-SCNC: 27 MMOL/L — SIGNIFICANT CHANGE UP (ref 17–32)
COLOR SPEC: COLORLESS — SIGNIFICANT CHANGE UP
CREAT SERPL-MCNC: 1.1 MG/DL — SIGNIFICANT CHANGE UP (ref 0.7–1.5)
DIFF PNL FLD: SIGNIFICANT CHANGE UP
EOSINOPHIL # BLD AUTO: 0.1 K/UL — SIGNIFICANT CHANGE UP (ref 0–0.7)
EOSINOPHIL NFR BLD AUTO: 1.7 % — SIGNIFICANT CHANGE UP (ref 0–8)
GLUCOSE SERPL-MCNC: 115 MG/DL — HIGH (ref 70–99)
GLUCOSE UR QL: NEGATIVE — SIGNIFICANT CHANGE UP
HCT VFR BLD CALC: 38.3 % — SIGNIFICANT CHANGE UP (ref 37–47)
HGB BLD-MCNC: 12.4 G/DL — SIGNIFICANT CHANGE UP (ref 12–16)
IMM GRANULOCYTES NFR BLD AUTO: 0.2 % — SIGNIFICANT CHANGE UP (ref 0.1–0.3)
KETONES UR-MCNC: NEGATIVE — SIGNIFICANT CHANGE UP
LACTATE SERPL-SCNC: 1.5 MMOL/L — SIGNIFICANT CHANGE UP (ref 0.7–2)
LEUKOCYTE ESTERASE UR-ACNC: NEGATIVE — SIGNIFICANT CHANGE UP
LIDOCAIN IGE QN: 19 U/L — SIGNIFICANT CHANGE UP (ref 7–60)
LYMPHOCYTES # BLD AUTO: 1.11 K/UL — LOW (ref 1.2–3.4)
LYMPHOCYTES # BLD AUTO: 18.6 % — LOW (ref 20.5–51.1)
MCHC RBC-ENTMCNC: 27.6 PG — SIGNIFICANT CHANGE UP (ref 27–31)
MCHC RBC-ENTMCNC: 32.4 G/DL — SIGNIFICANT CHANGE UP (ref 32–37)
MCV RBC AUTO: 85.1 FL — SIGNIFICANT CHANGE UP (ref 81–99)
MONOCYTES # BLD AUTO: 0.79 K/UL — HIGH (ref 0.1–0.6)
MONOCYTES NFR BLD AUTO: 13.2 % — HIGH (ref 1.7–9.3)
NEUTROPHILS # BLD AUTO: 3.93 K/UL — SIGNIFICANT CHANGE UP (ref 1.4–6.5)
NEUTROPHILS NFR BLD AUTO: 65.8 % — SIGNIFICANT CHANGE UP (ref 42.2–75.2)
NITRITE UR-MCNC: NEGATIVE — SIGNIFICANT CHANGE UP
NRBC # BLD: 0 /100 WBCS — SIGNIFICANT CHANGE UP (ref 0–0)
PH UR: 7 — SIGNIFICANT CHANGE UP (ref 5–8)
PLATELET # BLD AUTO: 255 K/UL — SIGNIFICANT CHANGE UP (ref 130–400)
POTASSIUM SERPL-MCNC: 5.5 MMOL/L — HIGH (ref 3.5–5)
POTASSIUM SERPL-SCNC: 5.5 MMOL/L — HIGH (ref 3.5–5)
PROT SERPL-MCNC: 7 G/DL — SIGNIFICANT CHANGE UP (ref 6–8)
PROT UR-MCNC: NEGATIVE — SIGNIFICANT CHANGE UP
RBC # BLD: 4.5 M/UL — SIGNIFICANT CHANGE UP (ref 4.2–5.4)
RBC # FLD: 14.3 % — SIGNIFICANT CHANGE UP (ref 11.5–14.5)
SODIUM SERPL-SCNC: 138 MMOL/L — SIGNIFICANT CHANGE UP (ref 135–146)
SP GR SPEC: 1.02 — SIGNIFICANT CHANGE UP (ref 1.01–1.02)
TROPONIN T SERPL-MCNC: <0.01 NG/ML — SIGNIFICANT CHANGE UP
UROBILINOGEN FLD QL: SIGNIFICANT CHANGE UP
WBC # BLD: 5.97 K/UL — SIGNIFICANT CHANGE UP (ref 4.8–10.8)
WBC # FLD AUTO: 5.97 K/UL — SIGNIFICANT CHANGE UP (ref 4.8–10.8)

## 2020-07-19 PROCEDURE — 71045 X-RAY EXAM CHEST 1 VIEW: CPT | Mod: 26

## 2020-07-19 PROCEDURE — 74177 CT ABD & PELVIS W/CONTRAST: CPT | Mod: 26

## 2020-07-19 PROCEDURE — 99285 EMERGENCY DEPT VISIT HI MDM: CPT

## 2020-07-19 PROCEDURE — 93010 ELECTROCARDIOGRAM REPORT: CPT

## 2020-07-19 RX ORDER — SODIUM CHLORIDE 9 MG/ML
500 INJECTION INTRAMUSCULAR; INTRAVENOUS; SUBCUTANEOUS ONCE
Refills: 0 | Status: COMPLETED | OUTPATIENT
Start: 2020-07-19 | End: 2020-07-19

## 2020-07-19 RX ORDER — MORPHINE SULFATE 50 MG/1
2 CAPSULE, EXTENDED RELEASE ORAL ONCE
Refills: 0 | Status: DISCONTINUED | OUTPATIENT
Start: 2020-07-19 | End: 2020-07-19

## 2020-07-19 RX ORDER — IOHEXOL 300 MG/ML
30 INJECTION, SOLUTION INTRAVENOUS ONCE
Refills: 0 | Status: COMPLETED | OUTPATIENT
Start: 2020-07-19 | End: 2020-07-19

## 2020-07-19 RX ADMIN — IOHEXOL 30 MILLILITER(S): 300 INJECTION, SOLUTION INTRAVENOUS at 20:34

## 2020-07-19 RX ADMIN — SODIUM CHLORIDE 250 MILLILITER(S): 9 INJECTION INTRAMUSCULAR; INTRAVENOUS; SUBCUTANEOUS at 20:34

## 2020-07-19 RX ADMIN — MORPHINE SULFATE 2 MILLIGRAM(S): 50 CAPSULE, EXTENDED RELEASE ORAL at 20:34

## 2020-07-19 NOTE — ED ADULT NURSE NOTE - OBJECTIVE STATEMENT
Patient presents to ER in [facility] A&OX4 complaining of umbilical abdominal pain since July 7, 2020. Patient states was seen in ER for same complaint and was supposed to follow up with gastro but couldn't get  appointment till 07/28. Patient denies any sob, chest pain or fevers at this time.

## 2020-07-19 NOTE — ED ADULT TRIAGE NOTE - CHIEF COMPLAINT QUOTE
Patient complaining of umbilical abdominal pain x2weeks. Was seen ion ED for same complaint and told to see GI but can't get appointment till 07/28. Patient reports constipation.

## 2020-07-20 NOTE — ED PROVIDER NOTE - CARE PROVIDER_API CALL
YOUR GI DOCTOR,   Dr. Mariscal       07/28/2020  Phone: (   )    -  Fax: (   )    -  Follow Up Time:

## 2020-07-20 NOTE — ED PROVIDER NOTE - NSFOLLOWUPINSTRUCTIONS_ED_ALL_ED_FT
Acute Abdominal Pain    WHAT YOU NEED TO KNOW:    The cause of your abdominal pain may not be found. If a cause is found, treatment will depend on what the cause is.     DISCHARGE INSTRUCTIONS:    Return to the emergency department if:        You vomit blood or cannot stop vomiting.       You have blood in your bowel movement or it looks like tar.        You have bleeding from your rectum.        Your abdomen is larger than usual, more painful, and hard.        You have severe pain in your abdomen.        You stop passing gas and having bowel movements.        You feel weak, dizzy, or faint.    Contact your healthcare provider if:        You have a fever.       You have new signs and symptoms.       Your symptoms do not get better with treatment.        You have questions or concerns about your condition or care.    Medicines may be given to decrease pain, treat an infection, and manage your symptoms. Take your medicine as directed. Call your healthcare provider if you think your medicine is not helping or if you have side effects. Tell him if you are allergic to any medicine. Keep a list of the medicines, vitamins, and herbs you take. Include the amounts, and when and why you take them. Bring the list or the pill bottles to follow-up visits. Carry your medicine list with you in case of an emergency.    Manage your symptoms:        Apply heat on your abdomen for 20 to 30 minutes every 2 hours for as many days as directed. Heat helps decrease pain and muscle spasms.        Manage your stress. Stress may cause abdominal pain. Your healthcare provider may recommend relaxation techniques and deep breathing exercises to help decrease your stress. Your healthcare provider may recommend you talk to someone about your stress or anxiety, such as a counselor or a trusted friend. Get plenty of sleep and exercise regularly.        Limit or do not drink alcohol. Alcohol can make your abdominal pain worse. Ask your healthcare provider if it is safe for you to drink alcohol. Also ask how much is safe for you to drink.        Do not smoke. Nicotine and other chemicals in cigarettes can damage your esophagus and stomach. Ask your healthcare provider for information if you currently smoke and need help to quit. E-cigarettes or smokeless tobacco still contain nicotine. Talk to your healthcare provider before you use these products.        Make changes to the food you eat as directed: Do not eat foods that cause abdominal pain or other symptoms. Eat small meals more often.        Eat more high-fiber foods if you are constipated. High-fiber foods include fruits, vegetables, whole-grain foods, and legumes.        Do not eat foods that cause gas if you have bloating. Examples include broccoli, cabbage, and cauliflower. Do not drink soda or carbonated drinks, because these may also cause gas.        Do not eat foods or drinks that contain sorbitol or fructose if you have diarrhea and bloating. Some examples are fruit juices, candy, jelly, and sugar-free gum.        Do not eat high-fat foods, such as fried foods, cheeseburgers, hot dogs, and desserts.       Limit or do not drink caffeine. Caffeine may make symptoms, such as heart burn or nausea, worse.        Drink plenty of liquids to prevent dehydration from diarrhea or vomiting. Ask your healthcare provider how much liquid to drink each day and which liquids are best for you.     Follow up with your healthcare provider as directed: Write down your questions so you remember to ask them during your visits.

## 2020-07-20 NOTE — ED PROVIDER NOTE - PATIENT PORTAL LINK FT
You can access the FollowMyHealth Patient Portal offered by [facility] by registering at the following website: [email or web address]. By joining [employer]’s FollowMyHealth portal, you will also be able to view your health information using other applications (apps) compatible with our system.

## 2020-07-20 NOTE — ED PROVIDER NOTE - NS ED ROS FT
CONST: No fever, chills or bodyaches  EYES: No pain, redness, drainage or visual changes.  ENT: No ear pain or discharge, nasal discharge or congestion. No sore throat  CARD: No chest pain, palpitations  RESP: No SOB, cough, hemoptysis. No hx of asthma or COPD  GI: (+) abdominal pain, No N/V/D  : (+) discomfort while urinating. No blood in the urine.   MS: No joint pain, back pain or extremity pain/injury  SKIN: No rashes  NEURO: No headache, dizziness, paresthesias or LOC

## 2020-07-20 NOTE — ED PROVIDER NOTE - OBJECTIVE STATEMENT
82 y/o female with a PMH of utertine ca in 2016 removed by SHAAN and 5 months of chemoterapy, CABG and depression presents to the ED for evaluation of intermittent nonradiating dull rlq pain that began earlier today. Pt states she has had intermittent abdominal pain x 1 month and was seen in the ED ON 07/7/2020 for similar symptoms, and had a CTA of the abdomen which showed moderate ascites. Pt has an appt with Dr. Virgen on 07/28. Pt reports yesterday she took milk of magnesia which allowed her to have multiple bowel movements that relieved her pain. Pt reports discomfort while urinating. Pt denies fever, chills, back pain, chest pain, sob, n/v/d/c, recent trauma, recent abd surgeries, or sob.

## 2020-07-20 NOTE — ED PROVIDER NOTE - PHYSICAL EXAMINATION
Physical Exam    Vital Signs: I have reviewed the initial vital signs.  Constitutional: well-nourished, appears stated age, no acute distress  Eyes: Conjunctiva pink, Sclera clear  Cardiovascular: S1 and S2, regular rate, regular rhythm, well-perfused extremities, radial pulses equal and 2+ b/l.   Respiratory: unlabored respiratory effort, clear to auscultation bilaterally no wheezing, rales and rhonchi. pt is speaking full sentences. no accessory muscle use.   Gastrointestinal: soft, mild rlq tenderness, nondistended abdomen, no pulsatile mass, normal bowl sounds, no rebound, no guarding, negative psoas, negative obturator, negative murphys. no cva tenderness.  Musculoskeletal: supple neck, no lower extremity edema, no calf tenderness  Integumentary: warm, dry, no rash  Neurologic: awake, alert

## 2020-07-20 NOTE — ED PROVIDER NOTE - ATTENDING CONTRIBUTION TO CARE
Patient is c/o abd pain, RLQ area, associated with constipation, denies f/c/n/v/cp/sob.   Lungs: CTA  abd: +BS, +RLQ tenderness, ND, soft  a/P: Abdominal pain  labs, CT, reevaluation

## 2020-07-20 NOTE — ED PROVIDER NOTE - PROGRESS NOTE DETAILS
discussed radiology and lab results with pt. pt abdominal pain has resolved. abdominal exam is benign without tenderness to palpation. discussed preliminary results with pt. discussed with pt if there are changes she will be called. pt agrees to dc. f/u with her gi dr. szymanski on 07/28/2020. advised of return precaution such as fever, chills, vomiting, nausea, diarrhea, constipation, or urinary symptoms.

## 2020-07-20 NOTE — ED PROVIDER NOTE - PROVIDER TOKENS
FREE:[LAST:[YOUR GI DOCTOR],PHONE:[(   )    -],FAX:[(   )    -],ADDRESS:[Dr. Mariscal       07/28/2020]]

## 2020-07-23 RX ORDER — CEFUROXIME AXETIL 250 MG
1 TABLET ORAL
Qty: 14 | Refills: 0
Start: 2020-07-23 | End: 2020-07-29

## 2020-07-24 ENCOUNTER — EMERGENCY (EMERGENCY)
Facility: HOSPITAL | Age: 80
LOS: 0 days | Discharge: HOME | End: 2020-07-24
Attending: EMERGENCY MEDICINE | Admitting: EMERGENCY MEDICINE
Payer: COMMERCIAL

## 2020-07-24 VITALS
RESPIRATION RATE: 18 BRPM | DIASTOLIC BLOOD PRESSURE: 98 MMHG | HEART RATE: 79 BPM | SYSTOLIC BLOOD PRESSURE: 138 MMHG | OXYGEN SATURATION: 100 % | HEIGHT: 63 IN | TEMPERATURE: 99 F
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
## 2020-07-24 DIAGNOSIS — Z88.8 ALLERGY STATUS TO OTHER DRUGS, MEDICAMENTS AND BIOLOGICAL SUBSTANCES: ICD-10-CM
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
## 2020-07-24 DIAGNOSIS — S82.001A UNSPECIFIED FRACTURE OF RIGHT PATELLA, INITIAL ENCOUNTER FOR CLOSED FRACTURE: ICD-10-CM

## 2020-07-24 DIAGNOSIS — M54.2 CERVICALGIA: ICD-10-CM

## 2020-07-24 DIAGNOSIS — I25.701 ATHEROSCLEROSIS OF CORONARY ARTERY BYPASS GRAFT(S), UNSPECIFIED, WITH ANGINA PECTORIS WITH DOCUMENTED SPASM: Chronic | ICD-10-CM

## 2020-07-24 DIAGNOSIS — Z95.1 PRESENCE OF AORTOCORONARY BYPASS GRAFT: Chronic | ICD-10-CM

## 2020-07-24 DIAGNOSIS — Z98.890 OTHER SPECIFIED POSTPROCEDURAL STATES: Chronic | ICD-10-CM

## 2020-07-24 DIAGNOSIS — Z95.5 PRESENCE OF CORONARY ANGIOPLASTY IMPLANT AND GRAFT: ICD-10-CM

## 2020-07-24 DIAGNOSIS — F32.9 MAJOR DEPRESSIVE DISORDER, SINGLE EPISODE, UNSPECIFIED: ICD-10-CM

## 2020-07-24 DIAGNOSIS — I10 ESSENTIAL (PRIMARY) HYPERTENSION: ICD-10-CM

## 2020-07-24 DIAGNOSIS — E78.5 HYPERLIPIDEMIA, UNSPECIFIED: ICD-10-CM

## 2020-07-24 PROCEDURE — 70450 CT HEAD/BRAIN W/O DYE: CPT | Mod: 26

## 2020-07-24 PROCEDURE — 73562 X-RAY EXAM OF KNEE 3: CPT | Mod: 26,RT

## 2020-07-24 PROCEDURE — 71045 X-RAY EXAM CHEST 1 VIEW: CPT | Mod: 26

## 2020-07-24 PROCEDURE — 99291 CRITICAL CARE FIRST HOUR: CPT

## 2020-07-24 PROCEDURE — 72170 X-RAY EXAM OF PELVIS: CPT | Mod: 26

## 2020-07-24 PROCEDURE — 72125 CT NECK SPINE W/O DYE: CPT | Mod: 26

## 2020-07-24 RX ORDER — ACETAMINOPHEN 500 MG
2 TABLET ORAL
Qty: 56 | Refills: 0
Start: 2020-07-24 | End: 2020-07-30

## 2020-07-24 RX ORDER — METHOCARBAMOL 500 MG/1
1 TABLET, FILM COATED ORAL
Qty: 14 | Refills: 0
Start: 2020-07-24 | End: 2020-07-30

## 2020-07-24 RX ORDER — ACETAMINOPHEN 500 MG
975 TABLET ORAL ONCE
Refills: 0 | Status: COMPLETED | OUTPATIENT
Start: 2020-07-24 | End: 2020-07-24

## 2020-07-24 RX ORDER — METHOCARBAMOL 500 MG/1
1000 TABLET, FILM COATED ORAL ONCE
Refills: 0 | Status: COMPLETED | OUTPATIENT
Start: 2020-07-24 | End: 2020-07-24

## 2020-07-24 RX ADMIN — METHOCARBAMOL 1000 MILLIGRAM(S): 500 TABLET, FILM COATED ORAL at 11:54

## 2020-07-24 RX ADMIN — Medication 975 MILLIGRAM(S): at 11:53
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
## 2020-07-24 NOTE — ED PROVIDER NOTE - CARE PROVIDER_API CALL
Josemanuel Ledezma  Orthopaedic Surgery  [address] [place] [address]  Phone: [phone]  Fax: [phone]  Follow Up Time:     Clifton Garcia  ORTHOPAEDIC SURGERY  [address] [place] [id number]  Phone: [phone]  Fax: [phone]  Follow Up Time:

## 2020-07-24 NOTE — ED ADULT NURSE NOTE - NSIMPLEMENTINTERV_GEN_ALL_ED
Implemented All Fall with Harm Risk Interventions:  [place] to call system. Call bell, personal items and telephone within reach. Instruct patient to call for assistance. Room bathroom lighting operational. Non-slip footwear when patient is off stretcher. Physically safe environment: no spills, clutter or unnecessary equipment. Stretcher in lowest position, wheels locked, appropriate side rails in place. Provide visual cue, wrist band, yellow gown, etc. Monitor gait and stability. Monitor for mental status changes and reorient to person, place, and time. Review medications for side effects contributing to fall risk. Reinforce activity limits and safety measures with patient and family. Provide visual clues: red socks.

## 2020-07-24 NOTE — ED PROVIDER NOTE - CARE PROVIDERS DIRECT ADDRESSES
,DirectAddress_Unknown,[email or web address]

## 2020-07-24 NOTE — ED PROVIDER NOTE - NS ED ROS FT
Review of Systems:  	•	CONSTITUTIONAL - no fever, no diaphoresis  	•	SKIN - no rash, no lesions  	•	HEMATOLOGIC - no bleeding, no bruising  	•	EYES - no discharge, no injection  	•	ENT - no sore throat, no runny nose  	•	RESPIRATORY - no shortness of breath, no cough  	•	CARDIAC - no chest pain, no palpitations  	•	GI - no abd pain, no nausea, no vomiting, no diarrhea  	•	GENITO-URINARY - no dysuria, no hematuria  	•	MUSCULOSKELETAL - +neck pain, +knee pain  	•	NEUROLOGIC - no dizziness, no headache

## 2020-07-24 NOTE — ED PROVIDER NOTE - CARE PLAN
Assessment and plan of treatment:	Plan: Pain control, CT head, neck, cxr, pelvic xray, reassess. Principal Discharge DX:	Patellar fracture  Assessment and plan of treatment:	Plan: Pain control, CT head, neck, cxr, pelvic xray, reassess.

## 2020-07-24 NOTE — ED PROCEDURE NOTE - CPROC ED POST PROC CARE GUIDE1
Verbal/written post procedure instructions were given to patient/caregiver./Elevate the injured extremity as instructed./f/u ortho/Keep the cast/splint/dressing clean and dry.

## 2020-07-24 NOTE — ED PROCEDURE NOTE - NS ED PERI VASCULAR NEG
capillary refill time < 2 seconds/no cyanosis of extremity/no swelling/fingers/toes warm to touch/no paresthesia
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
## 2020-07-24 NOTE — ED PROVIDER NOTE - PATIENT PORTAL LINK FT
You can access the FollowMyHealth Patient Portal offered by [facility] by registering at the following website: [email or web address]. By joining [employer]’s FollowMyHealth portal, you will also be able to view your health information using other applications (apps) compatible with our system.

## 2020-08-06 ENCOUNTER — INPATIENT (INPATIENT)
Facility: HOSPITAL | Age: 80
LOS: 8 days | Discharge: HOME | End: 2020-08-15
Attending: INTERNAL MEDICINE | Admitting: INTERNAL MEDICINE
Payer: MEDICARE

## 2020-08-06 VITALS
TEMPERATURE: 99 F | RESPIRATION RATE: 20 BRPM | HEART RATE: 121 BPM | DIASTOLIC BLOOD PRESSURE: 69 MMHG | SYSTOLIC BLOOD PRESSURE: 126 MMHG | OXYGEN SATURATION: 98 % | WEIGHT: 151.9 LBS

## 2020-08-06 DIAGNOSIS — Z98.890 OTHER SPECIFIED POSTPROCEDURAL STATES: Chronic | ICD-10-CM

## 2020-08-06 DIAGNOSIS — I25.701 ATHEROSCLEROSIS OF CORONARY ARTERY BYPASS GRAFT(S), UNSPECIFIED, WITH ANGINA PECTORIS WITH DOCUMENTED SPASM: Chronic | ICD-10-CM

## 2020-08-06 DIAGNOSIS — Z95.1 PRESENCE OF AORTOCORONARY BYPASS GRAFT: Chronic | ICD-10-CM

## 2020-08-06 LAB
ALBUMIN SERPL ELPH-MCNC: 3.4 G/DL — LOW (ref 3.5–5.2)
ALP SERPL-CCNC: 68 U/L — SIGNIFICANT CHANGE UP (ref 30–115)
ALT FLD-CCNC: 12 U/L — SIGNIFICANT CHANGE UP (ref 0–41)
ANION GAP SERPL CALC-SCNC: 16 MMOL/L — HIGH (ref 7–14)
ANISOCYTOSIS BLD QL: SLIGHT — SIGNIFICANT CHANGE UP
APPEARANCE UR: CLEAR — SIGNIFICANT CHANGE UP
AST SERPL-CCNC: 33 U/L — SIGNIFICANT CHANGE UP (ref 0–41)
BASOPHILS # BLD AUTO: 0 K/UL — SIGNIFICANT CHANGE UP (ref 0–0.2)
BASOPHILS NFR BLD AUTO: 0 % — SIGNIFICANT CHANGE UP (ref 0–1)
BILIRUB DIRECT SERPL-MCNC: <0.2 MG/DL — SIGNIFICANT CHANGE UP (ref 0–0.2)
BILIRUB INDIRECT FLD-MCNC: >0.1 MG/DL — LOW (ref 0.2–1.2)
BILIRUB SERPL-MCNC: 0.3 MG/DL — SIGNIFICANT CHANGE UP (ref 0.2–1.2)
BILIRUB UR-MCNC: NEGATIVE — SIGNIFICANT CHANGE UP
BUN SERPL-MCNC: 16 MG/DL — SIGNIFICANT CHANGE UP (ref 10–20)
CALCIUM SERPL-MCNC: 8.5 MG/DL — SIGNIFICANT CHANGE UP (ref 8.5–10.1)
CHLORIDE SERPL-SCNC: 91 MMOL/L — LOW (ref 98–110)
CO2 SERPL-SCNC: 24 MMOL/L — SIGNIFICANT CHANGE UP (ref 17–32)
COLOR SPEC: SIGNIFICANT CHANGE UP
CREAT SERPL-MCNC: 1.1 MG/DL — SIGNIFICANT CHANGE UP (ref 0.7–1.5)
D DIMER BLD IA.RAPID-MCNC: 5329 NG/ML DDU — HIGH (ref 0–230)
DIFF PNL FLD: SIGNIFICANT CHANGE UP
EOSINOPHIL # BLD AUTO: 0.1 K/UL — SIGNIFICANT CHANGE UP (ref 0–0.7)
EOSINOPHIL NFR BLD AUTO: 1 % — SIGNIFICANT CHANGE UP (ref 0–8)
GIANT PLATELETS BLD QL SMEAR: PRESENT — SIGNIFICANT CHANGE UP
GLUCOSE SERPL-MCNC: 149 MG/DL — HIGH (ref 70–99)
GLUCOSE UR QL: NEGATIVE — SIGNIFICANT CHANGE UP
HCT VFR BLD CALC: 39.8 % — SIGNIFICANT CHANGE UP (ref 37–47)
HGB BLD-MCNC: 13.2 G/DL — SIGNIFICANT CHANGE UP (ref 12–16)
KETONES UR-MCNC: NEGATIVE — SIGNIFICANT CHANGE UP
LACTATE SERPL-SCNC: 2.2 MMOL/L — HIGH (ref 0.7–2)
LEUKOCYTE ESTERASE UR-ACNC: NEGATIVE — SIGNIFICANT CHANGE UP
LIDOCAIN IGE QN: 20 U/L — SIGNIFICANT CHANGE UP (ref 7–60)
LYMPHOCYTES # BLD AUTO: 0.32 K/UL — LOW (ref 1.2–3.4)
LYMPHOCYTES # BLD AUTO: 3.1 % — LOW (ref 20.5–51.1)
MACROCYTES BLD QL: SLIGHT — SIGNIFICANT CHANGE UP
MANUAL SMEAR VERIFICATION: SIGNIFICANT CHANGE UP
MCHC RBC-ENTMCNC: 26.2 PG — LOW (ref 27–31)
MCHC RBC-ENTMCNC: 33.2 G/DL — SIGNIFICANT CHANGE UP (ref 32–37)
MCV RBC AUTO: 79.1 FL — LOW (ref 81–99)
MICROCYTES BLD QL: SLIGHT — SIGNIFICANT CHANGE UP
MONOCYTES # BLD AUTO: 1.92 K/UL — HIGH (ref 0.1–0.6)
MONOCYTES NFR BLD AUTO: 18.6 % — HIGH (ref 1.7–9.3)
NEUTROPHILS # BLD AUTO: 7.64 K/UL — HIGH (ref 1.4–6.5)
NEUTROPHILS NFR BLD AUTO: 73.2 % — SIGNIFICANT CHANGE UP (ref 42.2–75.2)
NEUTS BAND # BLD: 1 % — SIGNIFICANT CHANGE UP (ref 0–6)
NITRITE UR-MCNC: NEGATIVE — SIGNIFICANT CHANGE UP
OVALOCYTES BLD QL SMEAR: SLIGHT — SIGNIFICANT CHANGE UP
PH UR: 6 — SIGNIFICANT CHANGE UP (ref 5–8)
PLAT MORPH BLD: NORMAL — SIGNIFICANT CHANGE UP
PLATELET # BLD AUTO: 553 K/UL — HIGH (ref 130–400)
POIKILOCYTOSIS BLD QL AUTO: SIGNIFICANT CHANGE UP
POTASSIUM SERPL-MCNC: 4.8 MMOL/L — SIGNIFICANT CHANGE UP (ref 3.5–5)
POTASSIUM SERPL-SCNC: 4.8 MMOL/L — SIGNIFICANT CHANGE UP (ref 3.5–5)
PROT SERPL-MCNC: 6.5 G/DL — SIGNIFICANT CHANGE UP (ref 6–8)
PROT UR-MCNC: NEGATIVE — SIGNIFICANT CHANGE UP
RBC # BLD: 5.03 M/UL — SIGNIFICANT CHANGE UP (ref 4.2–5.4)
RBC # FLD: 14.1 % — SIGNIFICANT CHANGE UP (ref 11.5–14.5)
RBC BLD AUTO: ABNORMAL
SMUDGE CELLS # BLD: PRESENT — SIGNIFICANT CHANGE UP
SODIUM SERPL-SCNC: 131 MMOL/L — LOW (ref 135–146)
SP GR SPEC: 1.03 — HIGH (ref 1.01–1.02)
TROPONIN T SERPL-MCNC: <0.01 NG/ML — SIGNIFICANT CHANGE UP
UROBILINOGEN FLD QL: SIGNIFICANT CHANGE UP
VARIANT LYMPHS # BLD: 3.1 % — SIGNIFICANT CHANGE UP (ref 0–5)
WBC # BLD: 10.3 K/UL — SIGNIFICANT CHANGE UP (ref 4.8–10.8)
WBC # FLD AUTO: 10.3 K/UL — SIGNIFICANT CHANGE UP (ref 4.8–10.8)

## 2020-08-06 PROCEDURE — 93010 ELECTROCARDIOGRAM REPORT: CPT

## 2020-08-06 PROCEDURE — 74177 CT ABD & PELVIS W/CONTRAST: CPT | Mod: 26

## 2020-08-06 PROCEDURE — 71275 CT ANGIOGRAPHY CHEST: CPT | Mod: 26

## 2020-08-06 PROCEDURE — 99285 EMERGENCY DEPT VISIT HI MDM: CPT

## 2020-08-06 PROCEDURE — 71045 X-RAY EXAM CHEST 1 VIEW: CPT | Mod: 26

## 2020-08-06 RX ORDER — ACETAMINOPHEN 500 MG
975 TABLET ORAL ONCE
Refills: 0 | Status: COMPLETED | OUTPATIENT
Start: 2020-08-06 | End: 2020-08-06

## 2020-08-06 RX ORDER — SODIUM CHLORIDE 9 MG/ML
2000 INJECTION, SOLUTION INTRAVENOUS ONCE
Refills: 0 | Status: COMPLETED | OUTPATIENT
Start: 2020-08-06 | End: 2020-08-06

## 2020-08-06 RX ORDER — ATORVASTATIN CALCIUM 80 MG/1
40 TABLET, FILM COATED ORAL AT BEDTIME
Refills: 0 | Status: DISCONTINUED | OUTPATIENT
Start: 2020-08-06 | End: 2020-08-09

## 2020-08-06 RX ORDER — METOPROLOL TARTRATE 50 MG
25 TABLET ORAL DAILY
Refills: 0 | Status: DISCONTINUED | OUTPATIENT
Start: 2020-08-06 | End: 2020-08-15

## 2020-08-06 RX ORDER — ASPIRIN/CALCIUM CARB/MAGNESIUM 324 MG
81 TABLET ORAL DAILY
Refills: 0 | Status: DISCONTINUED | OUTPATIENT
Start: 2020-08-06 | End: 2020-08-15

## 2020-08-06 RX ORDER — CLONAZEPAM 1 MG
0.5 TABLET ORAL DAILY
Refills: 0 | Status: DISCONTINUED | OUTPATIENT
Start: 2020-08-06 | End: 2020-08-10

## 2020-08-06 RX ORDER — ENOXAPARIN SODIUM 100 MG/ML
70 INJECTION SUBCUTANEOUS ONCE
Refills: 0 | Status: COMPLETED | OUTPATIENT
Start: 2020-08-06 | End: 2020-08-06

## 2020-08-06 RX ORDER — AMLODIPINE BESYLATE 2.5 MG/1
5 TABLET ORAL DAILY
Refills: 0 | Status: DISCONTINUED | OUTPATIENT
Start: 2020-08-06 | End: 2020-08-15

## 2020-08-06 RX ADMIN — Medication 975 MILLIGRAM(S): at 16:12

## 2020-08-06 RX ADMIN — ENOXAPARIN SODIUM 70 MILLIGRAM(S): 100 INJECTION SUBCUTANEOUS at 21:15

## 2020-08-06 RX ADMIN — SODIUM CHLORIDE 2000 MILLILITER(S): 9 INJECTION, SOLUTION INTRAVENOUS at 16:11

## 2020-08-06 NOTE — ED PROVIDER NOTE - NS ED ROS FT
General: No fever, chills, or weakness.  Eyes:  No visual changes, eye pain or discharge.  ENMT:  No hearing changes, pain, no sore throat or runny nose, no difficulty swallowing  Cardiac:  No chest pain, edema. No chest pain with exertion.  Respiratory: Dyspnea. no cough.  GI: Abd pain. No n/v/d.   :  No dysuria, frequency or burning.  MS:  No myalgia, muscle weakness, joint pain or back pain.  Neuro:  No headache.  No LOC. No change in ambulation. No dizziness.  Skin:  No skin rash.

## 2020-08-06 NOTE — ED PROVIDER NOTE - PHYSICAL EXAMINATION
Constitutional: Uncomfortable. NAD.   Head: Atraumatic.  Eyes: PERRLA. EOMI without discomfort.   ENT: No nasal discharge. Mucous membranes moist.  Neck: Supple. Painless ROM.  Cardiovascular: Regular rhythm. Regular rate. Normal S1 and S2. No murmurs. 2+ pulses in all extremities.   Pulmonary: RR 28. Lungs clear to auscultation bilaterally. No wheezing, rales, or rhonchi. Bilateral, equal lung expansion.   Abdominal: Soft. Nondistended. L sided tenderness.  Extremities. Pelvis stable. No lower extremity edema. Symmetric calves.  Skin: No rashes.   Neuro: AAOx3. No focal neurological deficits.

## 2020-08-06 NOTE — H&P ADULT - HISTORY OF PRESENT ILLNESS
This is a 81 year old F patient with past medical history of Uterine cancer s/p SHAAN, CAD s/p CABG, HTN and depression present to the ED for Shortness of breath and abdominal pain and distension. The history goes back to This is a 81 year old F patient with past medical history of Uterine cancer s/p SHAAN, CAD s/p CABG, HTN and depression present to the ED for abdominal pain. The history goes back to 2 weeks ago when the patient started to notice that her abdomen is getting bigger and that it was associated with discomfort that was generalized but worst in the flanks area. She saw her PMD at that time who prescribed antibiotics (Cipro and flagyl PO) which did not help at all as per the patient). the discomfort has been progressively getting worse until the day of admission where the patient started complaining of severe pain that interfere with her daily activity. She tried seeing her primary [facility] doctor but was not able to so she came to the ED. She denied any nausea, vomiting, constipation, fever, chills, night sweats, weight loss or any other symptoms.     In the ED patient had a CTA of the chest as she was complaining of Chest discomfort and she was found to have an Acute PE in the right Upper pulmonary artery segmental branch     In the ED Vital Signs   T(C): 36.9 (06 Aug 2020 23:31), Max: 37.2 (06 Aug 2020 15:11)  T(F): 98.4 (06 Aug 2020 23:31), Max: 98.9 (06 Aug 2020 15:11)  HR: 101 (06 Aug 2020 23:31) (101 - 121)  BP: 114/63 (06 Aug 2020 23:31) (114/63 - 127/72)  RR: 18 (06 Aug 2020 23:31) (18 - 20)  SpO2: 98% (06 Aug 2020 23:31) (98% - 99%)
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
## 2020-08-06 NOTE — H&P ADULT - ATTENDING COMMENTS
80 YO F with a PMH of Uterine CA s/p SHAAN, CAD s/p CABG, HTN, and depression who presented to the hospital with a c/o ABD pain and distention for the past x 2 weeks. Denies any N/V/D, hematemesis, black/bloody stools, fevers/chills, CP, SOB, or rashes. Denies any EtOH abuse. In the ED, CT-CAP w/ IV contrast showed right-sided PE, no right heart strain, and cardiac enzymes were negative. Pt started on therapeutic lovenox.     Physical exam shows pt in mild distress. Tachycardic (101), afebrile, not hypoxic on RA. A&Ox3. Non-focal neuro exam. Muscle strength/sensation intact. CTA B/L with no W/C/R. RRR, no M/G/R. ABD is distended and diffusely tender to palpation, not warm, no rashes, normoactive BSs. LEs without swelling. No rashes. Labs and radiology as above.     ABD pain and distention with large ascites, needs malignancy vs infection rule out. Pt states she is in immense pain despite pain meds and would like for us to drain her ABD. Explained the risks of a paracentesis, including the possibility of bleeding and she states "that is up to god, but I need this fluid removed". Consent signed and paracentesis performed without incident. Samples sent to lab.   -GI consult   -Repeat coags and CBC in the AM  -Monitor for signs of bleeding  -Monitor VSs.   -Monitor electrolytes and replace PRN    Incidental pulmonary embolism, likely provoked. High suspicion for underlying malignancy. No right heart strain. No cardiac enzyme elevation. Perform echo. Resume therapeutic AC in the AM.    Mild HAGMA from lactic acidosis. IVFs (LR). Repeat lactate and BMP in the AM.     Diabetes mellitus with hyperglycemia. A1c. FSs. Insulin PRN.     Hx of Uterine CA s/p SHAAN, CAD s/p CABG, HTN, and depression. Restart home meds. DVT PPX. Inform PCP of pt's admission to hospital. My note supersedes the residents note.

## 2020-08-06 NOTE — H&P ADULT - ASSESSMENT
This is a 81 year old F patient with past medical history of CAD s/p CABG, Uterine cancer s/p SHAAN, HTN and depression present to the ED for abdominal pain and chest discomfort.    #Abdominal pain and distension   -Patient has a distended abdomen with new ascites on the Ct scan of the abdomen   -patient has a history of Uterine cancer, would cell fluid for cytology   -Patient follows up with Dr. Mccain as an outpatient, she requested to see [facility]   -Patient does not have a history of liver disease, she did not take any new medication and no identifiable cause was identified in the history   -Liver enzymes are WNL, Will get a coagulation studies   - This is a 81 year old F patient with past medical history of CAD s/p CABG, Uterine cancer s/p SHAAN, HTN and depression present to the ED for abdominal pain and chest discomfort.    #Abdominal pain and distension   -Patient has a distended abdomen with new ascites on the Ct scan of the abdomen   -patient has a history of Uterine cancer, would cell fluid for cytology   -Patient follows up with Dr. Mccain as an outpatient, she requested to see [facility]   -Patient does not have a history of liver disease, she did not take any new medication and no identifiable cause was identified in the history   -Liver enzymes are WNL, Will get a coagulation studies   - GI consult     #Pulmonary embolism   - Segmental PE with normal Vital signs, Troponin is negative   -Start on Lovenox 70 mg BID     #CAD s/p CABG   -Continue with ASA     #HTN  -Continue with amlodipine and Metoprolol     #DLD  -Continue with statin, Liver enzymes are normal       DVT ppx: Lovenox   GI ppx: Protonix   Acute   DASH Diet

## 2020-08-06 NOTE — H&P ADULT - NSICDXPASTMEDICALHX_GEN_ALL_CORE_FT
PAST MEDICAL HISTORY:  CAD (coronary artery disease)     HLD (hyperlipidemia)     HTN (hypertension) Shoulder Immobilizer

## 2020-08-06 NOTE — ED ADULT TRIAGE NOTE - CHIEF COMPLAINT QUOTE
abdominal pain x 2 weeks. pt was diagnosed with UTI on cipro & flagyl. pt states " My GI told me I have water in my stomach and need to be drained"
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
## 2020-08-06 NOTE — H&P ADULT - NSHPPHYSICALEXAM_GEN_ALL_CORE
General:  Head and neck:  Heart:   Chest:  Abdomen:   Lower extremities:  Neuro:  Psych: General: NAD  Head and neck: No JVD, No LN felt   Heart: S1,S2 appreciated, no murmurs  Chest: Bilateral equal breath sounds, no wheeze   Abdomen: Soft, distended, BS+, nontender   Lower extremities:   Neuro: Nonfocal   Psych: Normal affect

## 2020-08-06 NOTE — H&P ADULT - NSHPLABSRESULTS_GEN_ALL_CORE
13.2   10.30 )-----------( 553      ( 06 Aug 2020 16:04 )             39.8       08-06    131<L>  |  91<L>  |  16  ----------------------------<  149<H>  4.8   |  24  |  1.1    Ca    8.5      06 Aug 2020 16:04    TPro  6.5  /  Alb  3.4<L>  /  TBili  0.3  /  DBili  <0.2  /  AST  33  /  ALT  12  /  AlkPhos  68  08-              Urinalysis Basic - ( 06 Aug 2020 19:35 )    Color: Light Yellow / Appearance: Clear / S.030 / pH: x  Gluc: x / Ketone: Negative  / Bili: Negative / Urobili: <2 mg/dL   Blood: x / Protein: Negative / Nitrite: Negative   Leuk Esterase: Negative / RBC: x / WBC x   Sq Epi: x / Non Sq Epi: x / Bacteria: x      Lactate Trend   @ 16:04 Lactate:2.2       CARDIAC MARKERS ( 06 Aug 2020 16:04 )  x     / <0.01 ng/mL / x     / x     / x        Culture Results:   10,000 - 49,000 CFU/mL Escherichia coli  <10,000 CFU/ml Normal Urogenital kayla present ( @ 23:25)      EXAM:  CT ABDOMEN AND PELVIS IC        EXAM:  CT ANGIO CHEST (W)AW IC            PROCEDURE DATE:  2020      IMPRESSION:    1. Intraluminal filling defect is seen within the segmental branches of the right lower lobe pulmonary artery, indicating acute pulmonary embolism. (-138; 10/51-). Filling defect in segmental branch of the right upper lobe pulmonaryartery. () There is a small filling defect within a left lower lobe pulmonary artery segmental branch (147; 10/59). No evidence of right heart strain. The right ventricle measures 3.5 cm; the left ventricle measures 3.7 cm. (RV:LV<1)    2. Compared with the previous CT scan of 2020, there is now a large amount of ascites throughout the abdomen and pelvis.

## 2020-08-06 NOTE — H&P ADULT - NEGATIVE CARDIOVASCULAR SYMPTOMS
no paroxysmal nocturnal dyspnea/no peripheral edema/no palpitations/no claudication/no orthopnea/no chest pain/no dyspnea on exertion

## 2020-08-07 LAB
ALBUMIN FLD-MCNC: 2.4 G/DL — SIGNIFICANT CHANGE UP
ALBUMIN SERPL ELPH-MCNC: 2.7 G/DL — LOW (ref 3.5–5.2)
ALP SERPL-CCNC: 54 U/L — SIGNIFICANT CHANGE UP (ref 30–115)
ALT FLD-CCNC: 9 U/L — SIGNIFICANT CHANGE UP (ref 0–41)
ANION GAP SERPL CALC-SCNC: 14 MMOL/L — SIGNIFICANT CHANGE UP (ref 7–14)
AST SERPL-CCNC: 21 U/L — SIGNIFICANT CHANGE UP (ref 0–41)
B PERT IGG+IGM PNL SER: ABNORMAL
BILIRUB SERPL-MCNC: 0.3 MG/DL — SIGNIFICANT CHANGE UP (ref 0.2–1.2)
BUN SERPL-MCNC: 12 MG/DL — SIGNIFICANT CHANGE UP (ref 10–20)
CALCIUM SERPL-MCNC: 8 MG/DL — LOW (ref 8.5–10.1)
CHLORIDE SERPL-SCNC: 98 MMOL/L — SIGNIFICANT CHANGE UP (ref 98–110)
CO2 SERPL-SCNC: 24 MMOL/L — SIGNIFICANT CHANGE UP (ref 17–32)
COLOR FLD: YELLOW — SIGNIFICANT CHANGE UP
COMMENT - FLUIDS: SIGNIFICANT CHANGE UP
CREAT SERPL-MCNC: 0.8 MG/DL — SIGNIFICANT CHANGE UP (ref 0.7–1.5)
FLUID INTAKE SUBSTANCE CLASS: SIGNIFICANT CHANGE UP
FLUID SEGMENTED GRANULOCYTES: SIGNIFICANT CHANGE UP %
GLUCOSE FLD-MCNC: 85 MG/DL — SIGNIFICANT CHANGE UP
GLUCOSE SERPL-MCNC: 97 MG/DL — SIGNIFICANT CHANGE UP (ref 70–99)
GRAM STN FLD: SIGNIFICANT CHANGE UP
HCT VFR BLD CALC: 33.1 % — LOW (ref 37–47)
HGB BLD-MCNC: 11.1 G/DL — LOW (ref 12–16)
LDH SERPL L TO P-CCNC: 491 U/L — SIGNIFICANT CHANGE UP
LYMPHOCYTES # FLD: SIGNIFICANT CHANGE UP
MCHC RBC-ENTMCNC: 26.2 PG — LOW (ref 27–31)
MCHC RBC-ENTMCNC: 33.5 G/DL — SIGNIFICANT CHANGE UP (ref 32–37)
MCV RBC AUTO: 78.1 FL — LOW (ref 81–99)
MESOTHL CELL # FLD: SIGNIFICANT CHANGE UP %
NRBC # BLD: 0 /100 WBCS — SIGNIFICANT CHANGE UP (ref 0–0)
PLATELET # BLD AUTO: 492 K/UL — HIGH (ref 130–400)
POTASSIUM SERPL-MCNC: 4.4 MMOL/L — SIGNIFICANT CHANGE UP (ref 3.5–5)
POTASSIUM SERPL-SCNC: 4.4 MMOL/L — SIGNIFICANT CHANGE UP (ref 3.5–5)
PROT FLD-MCNC: 4.2 G/DL — SIGNIFICANT CHANGE UP
PROT SERPL-MCNC: 5 G/DL — LOW (ref 6–8)
RBC # BLD: 4.24 M/UL — SIGNIFICANT CHANGE UP (ref 4.2–5.4)
RBC # FLD: 14.2 % — SIGNIFICANT CHANGE UP (ref 11.5–14.5)
RCV VOL RI: 4000 /UL — HIGH (ref 0–0)
SARS-COV-2 RNA SPEC QL NAA+PROBE: SIGNIFICANT CHANGE UP
SODIUM SERPL-SCNC: 136 MMOL/L — SIGNIFICANT CHANGE UP (ref 135–146)
SPECIMEN SOURCE: SIGNIFICANT CHANGE UP
TOTAL NUCLEATED CELL COUNT, BODY FLUID: 707 /UL — SIGNIFICANT CHANGE UP
TUBE TYPE: SIGNIFICANT CHANGE UP
WBC # BLD: 8.94 K/UL — SIGNIFICANT CHANGE UP (ref 4.8–10.8)
WBC # FLD AUTO: 8.94 K/UL — SIGNIFICANT CHANGE UP (ref 4.8–10.8)

## 2020-08-07 PROCEDURE — 99223 1ST HOSP IP/OBS HIGH 75: CPT | Mod: AI,25

## 2020-08-07 PROCEDURE — 49083 ABD PARACENTESIS W/IMAGING: CPT

## 2020-08-07 RX ORDER — APIXABAN 2.5 MG/1
2 TABLET, FILM COATED ORAL
Qty: 120 | Refills: 0
Start: 2020-08-07 | End: 2020-09-05

## 2020-08-07 RX ORDER — SODIUM CHLORIDE 9 MG/ML
1000 INJECTION INTRAMUSCULAR; INTRAVENOUS; SUBCUTANEOUS
Refills: 0 | Status: DISCONTINUED | OUTPATIENT
Start: 2020-08-07 | End: 2020-08-12

## 2020-08-07 RX ORDER — APIXABAN 2.5 MG/1
2 TABLET, FILM COATED ORAL
Qty: 28 | Refills: 0
Start: 2020-08-07 | End: 2020-08-13

## 2020-08-07 RX ORDER — ENOXAPARIN SODIUM 100 MG/ML
70 INJECTION SUBCUTANEOUS EVERY 12 HOURS
Refills: 0 | Status: DISCONTINUED | OUTPATIENT
Start: 2020-08-07 | End: 2020-08-11

## 2020-08-07 RX ADMIN — ENOXAPARIN SODIUM 70 MILLIGRAM(S): 100 INJECTION SUBCUTANEOUS at 05:31

## 2020-08-07 RX ADMIN — Medication 25 MILLIGRAM(S): at 05:32

## 2020-08-07 RX ADMIN — SODIUM CHLORIDE 60 MILLILITER(S): 9 INJECTION INTRAMUSCULAR; INTRAVENOUS; SUBCUTANEOUS at 19:03

## 2020-08-07 RX ADMIN — SODIUM CHLORIDE 60 MILLILITER(S): 9 INJECTION INTRAMUSCULAR; INTRAVENOUS; SUBCUTANEOUS at 03:13

## 2020-08-07 RX ADMIN — AMLODIPINE BESYLATE 5 MILLIGRAM(S): 2.5 TABLET ORAL at 05:32

## 2020-08-07 RX ADMIN — Medication 81 MILLIGRAM(S): at 16:11

## 2020-08-07 RX ADMIN — ENOXAPARIN SODIUM 70 MILLIGRAM(S): 100 INJECTION SUBCUTANEOUS at 19:01

## 2020-08-07 RX ADMIN — SODIUM CHLORIDE 60 MILLILITER(S): 9 INJECTION INTRAMUSCULAR; INTRAVENOUS; SUBCUTANEOUS at 21:01

## 2020-08-07 RX ADMIN — ATORVASTATIN CALCIUM 40 MILLIGRAM(S): 80 TABLET, FILM COATED ORAL at 21:36

## 2020-08-07 NOTE — PROGRESS NOTE ADULT - ASSESSMENT
Assessment and Plan:     # endometrial cancer locally advanced with 1 LN +.  s/p SHAAN/BSO... carbo and taxol>>> SCOTT   Last PET scan in 2018.    #now with Abdominal pain and distension    with ascite.  paracentesis  for dx and tx of symptoms.  check  and CEA    #Pulmonary embolism due to malignancies   -on Lovenox 70 mg BID   venous doppler to recommend    #CAD s/p CABG   -Continue with ASA     #HTN  -Continue with amlodipine and Metoprolol     #DLD  -Continue with statin, Liver enzymes are normal

## 2020-08-07 NOTE — PROGRESS NOTE ADULT - ASSESSMENT
This is a 81 year old F patient with past medical history of CAD s/p CABG, Uterine cancer s/p SHAAN, HTN and depression present to the ED for abdominal pain and chest discomfort.    #Ascites   - new onset ascites s/p 3 liters of cloudy bloody fluid drainage  - no evidence of metabolic encephalopathy  / most probably secondary to malignancy   - patient not known to have liver cirrhosis  -Patient has a distended abdomen with new ascites on the Ct scan of the abdomen   -f/u cytology   -gram stain did not show any organism  -fluid count showed RBC + lymphocytosis   -Liver enzymes are WNL  -d-dimers 5329   -malignancy is high on the differential especially in the setting of PE     #Pulmonary embolism   - Segmental PE with normal Vital signs, Troponin is negative   - start on lovenox 70 mg SC bid AND Will switch to apixaban 10 mg BID for 7 days then 5 mg bid if there is no intervention     #CAD s/p CABG   -Continue with ASA     #HTN  -Continue with amlodipine and Metoprolol     #DLD  -Continue with statin    DVT ppx: Lovenox   GI ppx: Protonix   Acute   DASH Diet

## 2020-08-07 NOTE — PROCEDURE NOTE - ADDITIONAL PROCEDURE DETAILS
Pt explained risks of procedure, including the possibility of infection, bleeding, and circulatory collapse. Pt acknowledged and performed teach back of her understanding of these risks. Consent signed and placed in the chart.

## 2020-08-08 ENCOUNTER — RESULT REVIEW (OUTPATIENT)
Age: 80
End: 2020-08-08

## 2020-08-08 LAB
AFP-TM SERPL-MCNC: <1.8 NG/ML — SIGNIFICANT CHANGE UP
ANION GAP SERPL CALC-SCNC: 14 MMOL/L — SIGNIFICANT CHANGE UP (ref 7–14)
BASOPHILS # BLD AUTO: 0.08 K/UL — SIGNIFICANT CHANGE UP (ref 0–0.2)
BASOPHILS NFR BLD AUTO: 0.9 % — SIGNIFICANT CHANGE UP (ref 0–1)
BUN SERPL-MCNC: 10 MG/DL — SIGNIFICANT CHANGE UP (ref 10–20)
CALCIUM SERPL-MCNC: 7.8 MG/DL — LOW (ref 8.5–10.1)
CEA SERPL-MCNC: 0.7 NG/ML — SIGNIFICANT CHANGE UP (ref 0–3.8)
CHLORIDE SERPL-SCNC: 101 MMOL/L — SIGNIFICANT CHANGE UP (ref 98–110)
CO2 SERPL-SCNC: 23 MMOL/L — SIGNIFICANT CHANGE UP (ref 17–32)
CREAT SERPL-MCNC: 0.7 MG/DL — SIGNIFICANT CHANGE UP (ref 0.7–1.5)
CULTURE RESULTS: SIGNIFICANT CHANGE UP
EOSINOPHIL # BLD AUTO: 0.18 K/UL — SIGNIFICANT CHANGE UP (ref 0–0.7)
EOSINOPHIL NFR BLD AUTO: 2.1 % — SIGNIFICANT CHANGE UP (ref 0–8)
GLUCOSE SERPL-MCNC: 79 MG/DL — SIGNIFICANT CHANGE UP (ref 70–99)
HCT VFR BLD CALC: 34 % — LOW (ref 37–47)
HGB BLD-MCNC: 11.1 G/DL — LOW (ref 12–16)
IMM GRANULOCYTES NFR BLD AUTO: 0.9 % — HIGH (ref 0.1–0.3)
LYMPHOCYTES # BLD AUTO: 0.79 K/UL — LOW (ref 1.2–3.4)
LYMPHOCYTES # BLD AUTO: 9.2 % — LOW (ref 20.5–51.1)
MCHC RBC-ENTMCNC: 26.4 PG — LOW (ref 27–31)
MCHC RBC-ENTMCNC: 32.6 G/DL — SIGNIFICANT CHANGE UP (ref 32–37)
MCV RBC AUTO: 80.8 FL — LOW (ref 81–99)
MONOCYTES # BLD AUTO: 1.48 K/UL — HIGH (ref 0.1–0.6)
MONOCYTES NFR BLD AUTO: 17.1 % — HIGH (ref 1.7–9.3)
NEUTROPHILS # BLD AUTO: 6.02 K/UL — SIGNIFICANT CHANGE UP (ref 1.4–6.5)
NEUTROPHILS NFR BLD AUTO: 69.8 % — SIGNIFICANT CHANGE UP (ref 42.2–75.2)
NRBC # BLD: 0 /100 WBCS — SIGNIFICANT CHANGE UP (ref 0–0)
PLATELET # BLD AUTO: 464 K/UL — HIGH (ref 130–400)
POTASSIUM SERPL-MCNC: 4.9 MMOL/L — SIGNIFICANT CHANGE UP (ref 3.5–5)
POTASSIUM SERPL-SCNC: 4.9 MMOL/L — SIGNIFICANT CHANGE UP (ref 3.5–5)
RBC # BLD: 4.21 M/UL — SIGNIFICANT CHANGE UP (ref 4.2–5.4)
RBC # FLD: 14.4 % — SIGNIFICANT CHANGE UP (ref 11.5–14.5)
SODIUM SERPL-SCNC: 138 MMOL/L — SIGNIFICANT CHANGE UP (ref 135–146)
SPECIMEN SOURCE: SIGNIFICANT CHANGE UP
WBC # BLD: 8.63 K/UL — SIGNIFICANT CHANGE UP (ref 4.8–10.8)
WBC # FLD AUTO: 8.63 K/UL — SIGNIFICANT CHANGE UP (ref 4.8–10.8)

## 2020-08-08 PROCEDURE — 88305 TISSUE EXAM BY PATHOLOGIST: CPT | Mod: 26

## 2020-08-08 PROCEDURE — 99233 SBSQ HOSP IP/OBS HIGH 50: CPT

## 2020-08-08 PROCEDURE — 88342 IMHCHEM/IMCYTCHM 1ST ANTB: CPT | Mod: 26,59

## 2020-08-08 PROCEDURE — 88360 TUMOR IMMUNOHISTOCHEM/MANUAL: CPT | Mod: 26

## 2020-08-08 PROCEDURE — 88112 CYTOPATH CELL ENHANCE TECH: CPT | Mod: 26

## 2020-08-08 PROCEDURE — 88341 IMHCHEM/IMCYTCHM EA ADD ANTB: CPT | Mod: 26,59

## 2020-08-08 PROCEDURE — 93970 EXTREMITY STUDY: CPT | Mod: 26

## 2020-08-08 RX ADMIN — AMLODIPINE BESYLATE 5 MILLIGRAM(S): 2.5 TABLET ORAL at 06:07

## 2020-08-08 RX ADMIN — Medication 25 MILLIGRAM(S): at 06:07

## 2020-08-08 RX ADMIN — ENOXAPARIN SODIUM 70 MILLIGRAM(S): 100 INJECTION SUBCUTANEOUS at 06:06

## 2020-08-08 RX ADMIN — ATORVASTATIN CALCIUM 40 MILLIGRAM(S): 80 TABLET, FILM COATED ORAL at 21:30

## 2020-08-08 RX ADMIN — Medication 81 MILLIGRAM(S): at 11:09

## 2020-08-08 RX ADMIN — ENOXAPARIN SODIUM 70 MILLIGRAM(S): 100 INJECTION SUBCUTANEOUS at 17:21

## 2020-08-08 NOTE — PROGRESS NOTE ADULT - SUBJECTIVE AND OBJECTIVE BOX
PROGRESS NOTE  Chief Complaint:  Patient is a 81y old  Female who presents with a chief complaint of abdominal pain (07 Aug 2020 18:16)      HPI:  This is a 81 year old F patient with past medical history of Uterine cancer s/p SHAAN, CAD s/p CABG, HTN and depression present to the ED for abdominal pain. The history goes back to 2 weeks ago when the patient started to notice that her abdomen is getting bigger and that it was associated with discomfort that was generalized but worst in the flanks area. She saw her PMD at that time who prescribed antibiotics (Cipro and flagyl PO) which did not help at all as per the patient). the discomfort has been progressively getting worse until the day of admission where the patient started complaining of severe pain that interfere with her daily activity. She tried seeing her primary [facility] doctor but was not able to so she came to the ED. She denied any nausea, vomiting, constipation, fever, chills, night sweats, weight loss or any other symptoms.     In the ED patient had a CTA of the chest as she was complaining of Chest discomfort and she was found to have an Acute PE in the right Upper pulmonary artery segmental branch     ALLERGIES:  chloroquine (Hives)      HOSPITAL MEDICATIONS:  MEDICATIONS  (STANDING):  amLODIPine   Tablet 5 milliGRAM(s) Oral daily  aspirin enteric coated 81 milliGRAM(s) Oral daily  atorvastatin 40 milliGRAM(s) Oral at bedtime  enoxaparin Injectable 70 milliGRAM(s) SubCutaneous every 12 hours  metoprolol succinate ER 25 milliGRAM(s) Oral daily  sodium chloride 0.9%. 1000 milliLiter(s) (60 mL/Hr) IV Continuous <Continuous>    MEDICATIONS  (PRN):  clonazePAM  Tablet 0.5 milliGRAM(s) Oral daily PRN anxiety      PMHX/PSHX:  HLD (hyperlipidemia)  HTN (hypertension)  CAD (coronary artery disease)  No pertinent past medical history  History of coronary artery bypass surgery      FAMILY HISTORY:  No pertinent family history in first degree relatives      SOCIAL HISTORY:    REVIEW OF SYSTEMS:     General:  No wt loss, fevers, chills, night sweats, fatigue,   Eyes:  Good vision, no reported pain  ENT:  No sore throat, pain, runny nose, dysphagia  CV:  No pain, palpitations, hypo/hypertension  Resp:  No dyspnea, cough, tachypnea, wheezing  GI:  No pain, No nausea, No vomiting, No diarrhea, No constipation, No weight loss, No fever, No pruritis, No rectal bleeding, No tarry stools, No dysphagia,  :  No pain, bleeding, incontinence, nocturia  Muscle:  No pain, weakness  Neuro:  No weakness, tingling, memory problems  Psych:  No fatigue, insomnia, mood problems, depression  Endocrine:  No polyuria, polydipsia, cold/heat intolerance  Heme:  No petechiae, ecchymosis, easy bruisability  Skin:  No rash, tattoos, scars, edema      PHYSICAL EXAM:   Vital Signs Last 24 Hrs  T(C): 36.7 (08 Aug 2020 05:49), Max: 37.7 (07 Aug 2020 15:56)  T(F): 98 (08 Aug 2020 05:49), Max: 99.8 (07 Aug 2020 15:56)  HR: 100 (08 Aug 2020 05:49) (94 - 100)  BP: 103/62 (08 Aug 2020 05:49) (103/62 - 137/77)  BP(mean): --  RR: 18 (08 Aug 2020 05:49) (18 - 20)  SpO2: 96% (07 Aug 2020 20:58) (96% - 100%)    GENERAL:  Appears stated age, well-groomed, well-nourished, no distress  HEENT:  NC/AT,  conjunctivae clear and pink, no thyromegaly, nodules, adenopathy, no JVD, sclera -anicteric  CHEST:  Full & symmetric excursion, no increased effort, breath sounds clear  HEART:  Regular rhythm, S1, S2, no murmur/rub/S3/S4, no abdominal bruit, no edema  ABDOMEN:  Soft, non-tender, non-distended, normoactive bowel sounds,  no masses ,no hepato-splenomegaly, no signs of chronic liver disease  EXTEREMITIES:  no cyanosis,clubbing or edema  SKIN:  No rash/erythema/ecchymoses/petechiae/wounds/abscess/warm/dry  NEURO:  Alert, oriented, no asterixis, no tremor, no encephalopathy    LABS:                        11.1   8.63  )-----------( 464      ( 08 Aug 2020 05:17 )             34.0     08-08    138  |  101  |  10  ----------------------------<  79  4.9   |  23  |  0.7    Ca    7.8<L>      08 Aug 2020 05:17    TPro  5.0<L>  /  Alb  2.7<L>  /  TBili  0.3  /  DBili  x   /  AST  21  /  ALT  9   /  AlkPhos  54  08-07    LIVER FUNCTIONS - ( 07 Aug 2020 05:54 )  Alb: 2.7 g/dL / Pro: 5.0 g/dL / ALK PHOS: 54 U/L / ALT: 9 U/L / AST: 21 U/L / GGT: x             Urinalysis Basic - ( 06 Aug 2020 19:35 )    Color: Light Yellow / Appearance: Clear / S.030 / pH: x  Gluc: x / Ketone: Negative  / Bili: Negative / Urobili: <2 mg/dL   Blood: x / Protein: Negative / Nitrite: Negative   Leuk Esterase: Negative / RBC: x / WBC x   Sq Epi: x / Non Sq Epi: x / Bacteria: x

## 2020-08-08 NOTE — PROGRESS NOTE ADULT - ASSESSMENT
1. This is a 81 year old F patient with past medical history of CAD s/p CABG, Uterine cancer s/p SHAAN, HTN and depression present to the ED for abdominal pain and chest discomfort.    #Abdominal pain and distension   -Patient has a distended abdomen with new ascites on the Ct scan of the abdomen   -patient has a history of Uterine cancer   -Patient follows up with Dr. Mccain as an outpatient, she requested to see [facility]   -Patient does not have a history of liver disease, she did not take any new medication and no identifiable cause was identified in the history   -Liver enzymes are WNL, Will get a coagulation studies   - GI consult pending   - S/P paracenteses - Fluid to follow      #Pulmonary embolism   - Segmental PE with normal Vital signs, Troponin is negative   -Start on Lovenox 70 mg BID     Follow up GI eval

## 2020-08-08 NOTE — PROGRESS NOTE ADULT - ASSESSMENT
This is a 81 year old F patient with past medical history of CAD s/p CABG, Uterine cancer s/p SHAAN, HTN and depression present to the ED for abdominal pain and chest discomfort.    #Ascites   - new onset ascites s/p 3 liters of cloudy bloody fluid drainage  - no evidence of metabolic encephalopathy  / most probably secondary to malignancy   - patient not known to have liver cirrhosis  -Patient has a distended abdomen with new ascites on the Ct scan of the abdomen   -f/u cytology   -gram stain did not show any organism  -fluid count showed RBC + lymphocytosis   -Liver enzymes are WNL  -d-dimers 5329   -malignancy is high on the differential especially in the setting of PE   - f/u GI    #Pulmonary embolism   - Segmental PE with normal Vital signs, Troponin is negative   - start on lovenox 70 mg SC bid AND Will switch to apixaban 10 mg BID for 7 days then 5 mg bid if there is no intervention   - f/u LE duplex    #CAD s/p CABG   -Continue with ASA     #HTN  -Continue with amlodipine and Metoprolol     #DLD  -Continue with statin      - d/c tele

## 2020-08-08 NOTE — PROGRESS NOTE ADULT - SUBJECTIVE AND OBJECTIVE BOX
History of Present Illness:  Reason for Admission: abdominal pain	  History of Present Illness: 	  This is a 81 year old F patient with past medical history of Uterine cancer s/p SHAAN, CAD s/p CABG, HTN and depression present to the ED for abdominal pain.  Pt underwent chemotherapy carbo and taxol 6 cycles and achieved SCOTT.  Pt had seen dr. Abdullahi last in 2018 without further f/p with nl PET scan.    The history goes back to 2 weeks ago when the patient started to notice that her abdomen is getting bigger and that it was associated with discomfort that was generalized but worst in the flanks area. She saw her PMD at that time who prescribed antibiotics (Cipro and flagyl PO) which did not help at all as per the patient). the discomfort has been progressively getting worse until the day of admission where the patient started complaining of severe pain that interfere with her daily activity. Pt states that she had UTI.   she was also  complaining of Chest discomfort and she was found to have an Acute PE in the right Upper pulmonary artery segmental branch .    Pt in tears today with possible recurrent dx              Physical Exam:   Physical Exam: General: NAD  Head and neck: No JVD, No LN felt   Heart: S1,S2 appreciated, no murmurs  Chest: Bilateral equal breath sounds, no wheeze   Abdomen: Soft, distended, BS+, nontender . improved after ascite   Lower extremities:   Neuro: Nonfocal  Psych: Normal affect	       Labs and Results:  Labs, Radiology, Cardiology, and Other Results:              13.2 >>11  10.30 )-----------( 553                  39.8     08-06   131<L>  |  91<L>  |  16  ----------------------------<  149<H>  4.8   |  24  |  1.1   Ca    8.5      06 Aug 2020 16:04   TPro  6.5  /  Alb  3.4<L>  /  TBili  0.3  /  DBili  <0.2  /  AST  33  /  ALT  12  /  AlkPhos  68  08-06

## 2020-08-08 NOTE — PROGRESS NOTE ADULT - SUBJECTIVE AND OBJECTIVE BOX
SUBJECTIVE:    Patient is a 81y old Female who presents with a chief complaint of abdominal pain (08 Aug 2020 12:45)    Currently admitted to medicine with the primary diagnosis of Pulmonary embolism     Today is hospital day 2d. This morning she is resting comfortably in bed and reports no new issues or overnight events.     PAST MEDICAL & SURGICAL HISTORY  HLD (hyperlipidemia)  HTN (hypertension)  CAD (coronary artery disease)  History of coronary artery bypass surgery    SOCIAL HISTORY:  Negative for smoking/alcohol/drug use.     ALLERGIES:  chloroquine (Hives)    MEDICATIONS:  STANDING MEDICATIONS  amLODIPine   Tablet 5 milliGRAM(s) Oral daily  aspirin enteric coated 81 milliGRAM(s) Oral daily  atorvastatin 40 milliGRAM(s) Oral at bedtime  enoxaparin Injectable 70 milliGRAM(s) SubCutaneous every 12 hours  metoprolol succinate ER 25 milliGRAM(s) Oral daily  sodium chloride 0.9%. 1000 milliLiter(s) IV Continuous <Continuous>    PRN MEDICATIONS  clonazePAM  Tablet 0.5 milliGRAM(s) Oral daily PRN    VITALS:   T(F): 98.7  HR: 101  BP: 130/58  RR: 18  SpO2: 96%    LABS:                        11.1   8.63  )-----------( 464      ( 08 Aug 2020 05:17 )             34.0     08-08    138  |  101  |  10  ----------------------------<  79  4.9   |  23  |  0.7    Ca    7.8<L>      08 Aug 2020 05:17    TPro  5.0<L>  /  Alb  2.7<L>  /  TBili  0.3  /  DBili  x   /  AST  21  /  ALT  9   /  AlkPhos  54  08-07      Urinalysis Basic - ( 06 Aug 2020 19:35 )    Color: Light Yellow / Appearance: Clear / S.030 / pH: x  Gluc: x / Ketone: Negative  / Bili: Negative / Urobili: <2 mg/dL   Blood: x / Protein: Negative / Nitrite: Negative   Leuk Esterase: Negative / RBC: x / WBC x   Sq Epi: x / Non Sq Epi: x / Bacteria: x            Culture - Body Fluid with Gram Stain (collected 07 Aug 2020 01:20)  Source: .Body Fluid Peritoneal Fluid  Gram Stain (07 Aug 2020 07:07):    polymorphonuclear leukocytes seen    No organisms seen    by cytocentrifuge  Preliminary Report (08 Aug 2020 08:41):    No growth      CARDIAC MARKERS ( 06 Aug 2020 16:04 )  x     / <0.01 ng/mL / x     / x     / x          RADIOLOGY:    PHYSICAL EXAM:  GEN: No acute distress  LUNGS: Clear to auscultation bilaterally   HEART: Regular  ABD: distended.  EXT: NC/NC/NE/2+PP/COLVIN/Skin Intact.   NEURO: AAOX3    Intravenous access:   NG tube:   Rowley Catheter:

## 2020-08-08 NOTE — PROGRESS NOTE ADULT - ASSESSMENT
Assessment and Plan:     # endometrial cancer locally advanced with 1 LN +.  s/p SHAAN/BSO... carbo and taxol>>> SCOTT   Last PET scan in 2018.    #now with Abdominal pain and distension    with ascite.  paracentesis  for dx and tx of symptoms... bloody   check  and CEA    #Pulmonary embolism due to malignancies   -on Lovenox 70 mg BID   venous doppler to recommend    #CAD s/p CABG   -Continue with ASA     #HTN  -Continue with amlodipine and Metoprolol     #DLD  -Continue with statin, Liver enzymes are normal Assessment and Plan:     # endometrial cancer locally advanced with 1 LN +.  s/p SHAAN/BSO... carbo and taxol>>> SCOTT   Last PET scan in 2018.    #now with Abdominal pain and distension    with ascite.  paracentesis  for dx and tx of symptoms... bloody   check  and CEA    #Pulmonary embolism due to malignancy   no respiratory distress   -on Lovenox 70 mg BID >>> to DOAC   venous doppler to recommend    #CAD s/p CABG   -Continue with ASA     #HTN  -Continue with amlodipine and Metoprolol     #DLD  -Continue with statin, Liver enzymes are normal     # plan to d/c and f/p as outpt with dr. Abdullahi once stable.   pt wishes to go back home if the disease is too far advanced.   discussed role and benefit of chemo.  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

## 2020-08-09 LAB
ALBUMIN SERPL ELPH-MCNC: 2.6 G/DL — LOW (ref 3.5–5.2)
ALP SERPL-CCNC: 50 U/L — SIGNIFICANT CHANGE UP (ref 30–115)
ALT FLD-CCNC: 9 U/L — SIGNIFICANT CHANGE UP (ref 0–41)
ANION GAP SERPL CALC-SCNC: 14 MMOL/L — SIGNIFICANT CHANGE UP (ref 7–14)
AST SERPL-CCNC: 23 U/L — SIGNIFICANT CHANGE UP (ref 0–41)
BASOPHILS # BLD AUTO: 0.05 K/UL — SIGNIFICANT CHANGE UP (ref 0–0.2)
BASOPHILS NFR BLD AUTO: 0.6 % — SIGNIFICANT CHANGE UP (ref 0–1)
BILIRUB SERPL-MCNC: 0.3 MG/DL — SIGNIFICANT CHANGE UP (ref 0.2–1.2)
BUN SERPL-MCNC: 7 MG/DL — LOW (ref 10–20)
CALCIUM SERPL-MCNC: 7.5 MG/DL — LOW (ref 8.5–10.1)
CHLORIDE SERPL-SCNC: 102 MMOL/L — SIGNIFICANT CHANGE UP (ref 98–110)
CO2 SERPL-SCNC: 21 MMOL/L — SIGNIFICANT CHANGE UP (ref 17–32)
CREAT SERPL-MCNC: 0.7 MG/DL — SIGNIFICANT CHANGE UP (ref 0.7–1.5)
EOSINOPHIL # BLD AUTO: 0.1 K/UL — SIGNIFICANT CHANGE UP (ref 0–0.7)
EOSINOPHIL NFR BLD AUTO: 1.2 % — SIGNIFICANT CHANGE UP (ref 0–8)
GLUCOSE SERPL-MCNC: 90 MG/DL — SIGNIFICANT CHANGE UP (ref 70–99)
HCT VFR BLD CALC: 34.2 % — LOW (ref 37–47)
HGB BLD-MCNC: 11.2 G/DL — LOW (ref 12–16)
IMM GRANULOCYTES NFR BLD AUTO: 0.5 % — HIGH (ref 0.1–0.3)
LYMPHOCYTES # BLD AUTO: 0.87 K/UL — LOW (ref 1.2–3.4)
LYMPHOCYTES # BLD AUTO: 10.6 % — LOW (ref 20.5–51.1)
MAGNESIUM SERPL-MCNC: 1.7 MG/DL — LOW (ref 1.8–2.4)
MCHC RBC-ENTMCNC: 25.9 PG — LOW (ref 27–31)
MCHC RBC-ENTMCNC: 32.7 G/DL — SIGNIFICANT CHANGE UP (ref 32–37)
MCV RBC AUTO: 79 FL — LOW (ref 81–99)
MONOCYTES # BLD AUTO: 1.15 K/UL — HIGH (ref 0.1–0.6)
MONOCYTES NFR BLD AUTO: 14 % — HIGH (ref 1.7–9.3)
NEUTROPHILS # BLD AUTO: 5.99 K/UL — SIGNIFICANT CHANGE UP (ref 1.4–6.5)
NEUTROPHILS NFR BLD AUTO: 73.1 % — SIGNIFICANT CHANGE UP (ref 42.2–75.2)
NRBC # BLD: 0 /100 WBCS — SIGNIFICANT CHANGE UP (ref 0–0)
PLATELET # BLD AUTO: 477 K/UL — HIGH (ref 130–400)
POTASSIUM SERPL-MCNC: 4.2 MMOL/L — SIGNIFICANT CHANGE UP (ref 3.5–5)
POTASSIUM SERPL-SCNC: 4.2 MMOL/L — SIGNIFICANT CHANGE UP (ref 3.5–5)
PROT SERPL-MCNC: 5 G/DL — LOW (ref 6–8)
RBC # BLD: 4.33 M/UL — SIGNIFICANT CHANGE UP (ref 4.2–5.4)
RBC # FLD: 14.3 % — SIGNIFICANT CHANGE UP (ref 11.5–14.5)
SODIUM SERPL-SCNC: 137 MMOL/L — SIGNIFICANT CHANGE UP (ref 135–146)
WBC # BLD: 8.2 K/UL — SIGNIFICANT CHANGE UP (ref 4.8–10.8)
WBC # FLD AUTO: 8.2 K/UL — SIGNIFICANT CHANGE UP (ref 4.8–10.8)

## 2020-08-09 PROCEDURE — 99233 SBSQ HOSP IP/OBS HIGH 50: CPT

## 2020-08-09 RX ADMIN — ENOXAPARIN SODIUM 70 MILLIGRAM(S): 100 INJECTION SUBCUTANEOUS at 05:22

## 2020-08-09 RX ADMIN — ENOXAPARIN SODIUM 70 MILLIGRAM(S): 100 INJECTION SUBCUTANEOUS at 17:52

## 2020-08-09 RX ADMIN — Medication 81 MILLIGRAM(S): at 11:18

## 2020-08-09 RX ADMIN — AMLODIPINE BESYLATE 5 MILLIGRAM(S): 2.5 TABLET ORAL at 05:22

## 2020-08-09 RX ADMIN — Medication 0.5 MILLIGRAM(S): at 01:11

## 2020-08-09 RX ADMIN — Medication 25 MILLIGRAM(S): at 05:22

## 2020-08-09 NOTE — PROGRESS NOTE ADULT - SUBJECTIVE AND OBJECTIVE BOX
History of Present Illness:  Reason for Admission: abdominal pain	  History of Present Illness: 	  This is a 81 year old F patient with past medical history of Uterine cancer s/p SHAAN, CAD s/p CABG, HTN and depression present to the ED for abdominal pain.  Pt underwent chemotherapy carbo and taxol 6 cycles and achieved SCOTT.  Pt had seen dr. Abdullahi last in 2018 without further f/p with nl PET scan.    The history goes back to 2 weeks ago when the patient started to notice that her abdomen is getting bigger and that it was associated with discomfort that was generalized but worst in the flanks area. She saw her PMD at that time who prescribed antibiotics (Cipro and flagyl PO) which did not help at all as per the patient). the discomfort has been progressively getting worse until the day of admission where the patient started complaining of severe pain that interfere with her daily activity. Pt states that she had UTI.   she was also  complaining of Chest discomfort and she was found to have an Acute PE in the right Upper pulmonary artery segmental branch .    Pt in tears today with possible recurrent dx              Physical Exam:   Physical Exam:  VSS General: NAD  Head and neck: No JVD, No LN felt   Heart: S1,S2 appreciated, no murmurs  Chest: Bilateral equal breath sounds, no wheeze   Abdomen: Soft, distended, BS+, nontender . improved after ascite   Lower extremities:   Neuro: Nonfocal  Psych: Normal affect	       Labs and Results:  Labs, Radiology, Cardiology, and Other Results:              13.2 >>11  10.30 )-----------( 553                  39.8     08-06   131<L>  |  91<L>  |  16  ----------------------------<  149<H>  4.8   |  24  |  1.1   Ca    8.5      06 Aug 2020 16:04   TPro  6.5  /  Alb  3.4<L>  /  TBili  0.3  /  DBili  <0.2  /  AST  33  /  ALT  12  /  AlkPhos  68  08-06

## 2020-08-09 NOTE — PROGRESS NOTE ADULT - SUBJECTIVE AND OBJECTIVE BOX
PROGRESS NOTE  Chief Complaint:  Patient is a 81y old  Female who presents with a chief complaint of abdominal pain (07 Aug 2020 18:16)      HPI:  This is a 81 year old F patient with past medical history of Uterine cancer s/p SHAAN, CAD s/p CABG, HTN and depression present to the ED for abdominal pain. The history goes back to 2 weeks ago when the patient started to notice that her abdomen is getting bigger and that it was associated with discomfort that was generalized but worst in the flanks area. She saw her PMD at that time who prescribed antibiotics (Cipro and flagyl PO) which did not help at all as per the patient). the discomfort has been progressively getting worse until the day of admission where the patient started complaining of severe pain that interfere with her daily activity. She tried seeing her primary [facility] doctor but was not able to so she came to the ED. She denied any nausea, vomiting, constipation, fever, chills, night sweats, weight loss or any other symptoms.     In the ED patient had a CTA of the chest as she was complaining of Chest discomfort and she was found to have an Acute PE in the right Upper pulmonary artery segmental branch     ALLERGIES:  chloroquine (Hives)      HOSPITAL MEDICATIONS:  MEDICATIONS  (STANDING):  amLODIPine   Tablet 5 milliGRAM(s) Oral daily  aspirin enteric coated 81 milliGRAM(s) Oral daily  atorvastatin 40 milliGRAM(s) Oral at bedtime  enoxaparin Injectable 70 milliGRAM(s) SubCutaneous every 12 hours  metoprolol succinate ER 25 milliGRAM(s) Oral daily  sodium chloride 0.9%. 1000 milliLiter(s) (60 mL/Hr) IV Continuous <Continuous>    MEDICATIONS  (PRN):  clonazePAM  Tablet 0.5 milliGRAM(s) Oral daily PRN anxiety      PMHX/PSHX:  HLD (hyperlipidemia)  HTN (hypertension)  CAD (coronary artery disease)  No pertinent past medical history  History of coronary artery bypass surgery      FAMILY HISTORY:  No pertinent family history in first degree relatives      SOCIAL HISTORY:    REVIEW OF SYSTEMS:     General:  No wt loss, fevers, chills, night sweats, fatigue,   Eyes:  Good vision, no reported pain  ENT:  No sore throat, pain, runny nose, dysphagia  CV:  No pain, palpitations, hypo/hypertension  Resp:  No dyspnea, cough, tachypnea, wheezing  GI:  No pain, No nausea, No vomiting, No diarrhea, No constipation, No weight loss, No fever, No pruritis, No rectal bleeding, No tarry stools, No dysphagia,  :  No pain, bleeding, incontinence, nocturia  Muscle:  No pain, weakness  Neuro:  No weakness, tingling, memory problems  Psych:  No fatigue, insomnia, mood problems, depression  Endocrine:  No polyuria, polydipsia, cold/heat intolerance  Heme:  No petechiae, ecchymosis, easy bruisability  Skin:  No rash, tattoos, scars, edema      PHYSICAL EXAM:   Vital Signs Last 24 Hrs  T(C): 37 (09 Aug 2020 08:00), Max: 37.5 (08 Aug 2020 16:06)  T(F): 98.6 (09 Aug 2020 08:00), Max: 99.5 (08 Aug 2020 16:06)  HR: 97 (09 Aug 2020 08:00) (95 - 103)  BP: 116/56 (09 Aug 2020 08:00) (116/56 - 136/65)  BP(mean): --  RR: 18 (09 Aug 2020 08:00) (16 - 18)  SpO2: --      GENERAL:  Tired looking   HEENT:  NC/AT,  conjunctivae clear and pink, no thyromegaly, nodules, adenopathy, no JVD, sclera -anicteric  CHEST:  Full & symmetric excursion, no increased effort, breath sounds clear  HEART:  Regular rhythm, S1, S2, no murmur/rub/S3/S4, no abdominal bruit, no edema  ABDOMEN:  distended   EXTREMITIES  no cyanosis, clubbing or edema  SKIN:  No rash/erythema/ecchymoses/petechiae/wounds/abscess/warm/dry  NEURO:  Alert, oriented, no asterixis, no tremor, no encephalopathy    LABS:                        11.1   8.63  )-----------( 464      ( 08 Aug 2020 05:17 )             34.0     08-08    138  |  101  |  10  ----------------------------<  79  4.9   |  23  |  0.7    Ca    7.8<L>      08 Aug 2020 05:17    TPro  5.0<L>  /  Alb  2.7<L>  /  TBili  0.3  /  DBili  x   /  AST  21  /  ALT  9   /  AlkPhos  54  08-07    LIVER FUNCTIONS - ( 07 Aug 2020 05:54 )  Alb: 2.7 g/dL / Pro: 5.0 g/dL / ALK PHOS: 54 U/L / ALT: 9 U/L / AST: 21 U/L / GGT: x

## 2020-08-09 NOTE — PROGRESS NOTE ADULT - ASSESSMENT
1. This is a 81 year old F patient with past medical history of CAD s/p CABG, Uterine cancer s/p SHAAN, HTN and depression present to the ED for abdominal pain and chest discomfort.    #Abdominal pain and distension   -Patient has a distended abdomen with new ascites on the Ct scan of the abdomen   -patient has a history of Uterine cancer   -Patient follows up with Dr. Mccain as an outpatient, she requested to see [facility]   -Patient does not have a history of liver disease, she did not take any new medication and no identifiable cause was identified in the history   - S/P paracenteses - Fluid to follow      #Pulmonary embolism   - Segmental PE with normal Vital signs, Troponin is negative   -Start on Lovenox 70 mg BID       s/p hemo onc - Follow CEA and    D/W patient in details

## 2020-08-09 NOTE — PROGRESS NOTE ADULT - ASSESSMENT
Assessment and Plan:     # endometrial cancer locally advanced with 1 LN +.  s/p SHAAN/BSO... carbo and taxol>>> SCOTT   Last PET scan in 2018.    #now with Abdominal pain and distension    with ascite.  paracentesis  for dx and tx of symptoms... bloody and pending cytology   check  and CEA    #Pulmonary embolism due to malignancy   no respiratory distress   -on Lovenox 70 mg BID >>> to DOAC   venous doppler ..neg    #CAD s/p CABG   -Continue with ASA     #HTN  -Continue with amlodipine and Metoprolol     #DLD  -Continue with statin, Liver enzymes are normal     # plan to d/c and f/p as outpt with dr. Abdullahi once stable.   Pt states that previous chemo took long time prior to its start due to delayed port placement.  pt wishes to go back home if the disease is too far advanced.   discussed role and benefit of chemo.  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   Informed pt that I will address above with dr. Abdullahi.

## 2020-08-10 LAB
ALBUMIN SERPL ELPH-MCNC: 2.5 G/DL — LOW (ref 3.5–5.2)
ALP SERPL-CCNC: 44 U/L — SIGNIFICANT CHANGE UP (ref 30–115)
ALT FLD-CCNC: 11 U/L — SIGNIFICANT CHANGE UP (ref 0–41)
ANION GAP SERPL CALC-SCNC: 13 MMOL/L — SIGNIFICANT CHANGE UP (ref 7–14)
AST SERPL-CCNC: 29 U/L — SIGNIFICANT CHANGE UP (ref 0–41)
BASOPHILS # BLD AUTO: 0.06 K/UL — SIGNIFICANT CHANGE UP (ref 0–0.2)
BASOPHILS NFR BLD AUTO: 0.8 % — SIGNIFICANT CHANGE UP (ref 0–1)
BILIRUB SERPL-MCNC: 0.2 MG/DL — SIGNIFICANT CHANGE UP (ref 0.2–1.2)
BUN SERPL-MCNC: 7 MG/DL — LOW (ref 10–20)
CALCIUM SERPL-MCNC: 7.3 MG/DL — LOW (ref 8.5–10.1)
CANCER AG125 SERPL-ACNC: 217 U/ML — HIGH
CEA SERPL-MCNC: 0.7 NG/ML — SIGNIFICANT CHANGE UP (ref 0–3.8)
CHLORIDE SERPL-SCNC: 103 MMOL/L — SIGNIFICANT CHANGE UP (ref 98–110)
CO2 SERPL-SCNC: 23 MMOL/L — SIGNIFICANT CHANGE UP (ref 17–32)
CREAT SERPL-MCNC: 0.6 MG/DL — LOW (ref 0.7–1.5)
EOSINOPHIL # BLD AUTO: 0.1 K/UL — SIGNIFICANT CHANGE UP (ref 0–0.7)
EOSINOPHIL NFR BLD AUTO: 1.4 % — SIGNIFICANT CHANGE UP (ref 0–8)
GLUCOSE SERPL-MCNC: 92 MG/DL — SIGNIFICANT CHANGE UP (ref 70–99)
HCT VFR BLD CALC: 34.7 % — LOW (ref 37–47)
HGB BLD-MCNC: 11.3 G/DL — LOW (ref 12–16)
IMM GRANULOCYTES NFR BLD AUTO: 0.7 % — HIGH (ref 0.1–0.3)
LYMPHOCYTES # BLD AUTO: 0.83 K/UL — LOW (ref 1.2–3.4)
LYMPHOCYTES # BLD AUTO: 11.5 % — LOW (ref 20.5–51.1)
MCHC RBC-ENTMCNC: 25.7 PG — LOW (ref 27–31)
MCHC RBC-ENTMCNC: 32.6 G/DL — SIGNIFICANT CHANGE UP (ref 32–37)
MCV RBC AUTO: 79 FL — LOW (ref 81–99)
MONOCYTES # BLD AUTO: 1.03 K/UL — HIGH (ref 0.1–0.6)
MONOCYTES NFR BLD AUTO: 14.3 % — HIGH (ref 1.7–9.3)
NEUTROPHILS # BLD AUTO: 5.13 K/UL — SIGNIFICANT CHANGE UP (ref 1.4–6.5)
NEUTROPHILS NFR BLD AUTO: 71.3 % — SIGNIFICANT CHANGE UP (ref 42.2–75.2)
NRBC # BLD: 0 /100 WBCS — SIGNIFICANT CHANGE UP (ref 0–0)
PLATELET # BLD AUTO: 424 K/UL — HIGH (ref 130–400)
POTASSIUM SERPL-MCNC: 4.1 MMOL/L — SIGNIFICANT CHANGE UP (ref 3.5–5)
POTASSIUM SERPL-SCNC: 4.1 MMOL/L — SIGNIFICANT CHANGE UP (ref 3.5–5)
PROT SERPL-MCNC: 4.9 G/DL — LOW (ref 6–8)
RBC # BLD: 4.39 M/UL — SIGNIFICANT CHANGE UP (ref 4.2–5.4)
RBC # FLD: 14.1 % — SIGNIFICANT CHANGE UP (ref 11.5–14.5)
SODIUM SERPL-SCNC: 139 MMOL/L — SIGNIFICANT CHANGE UP (ref 135–146)
WBC # BLD: 7.2 K/UL — SIGNIFICANT CHANGE UP (ref 4.8–10.8)
WBC # FLD AUTO: 7.2 K/UL — SIGNIFICANT CHANGE UP (ref 4.8–10.8)

## 2020-08-10 PROCEDURE — 99223 1ST HOSP IP/OBS HIGH 75: CPT

## 2020-08-10 PROCEDURE — 99233 SBSQ HOSP IP/OBS HIGH 50: CPT

## 2020-08-10 RX ORDER — LACTULOSE 10 G/15ML
10 SOLUTION ORAL
Refills: 0 | Status: COMPLETED | OUTPATIENT
Start: 2020-08-10 | End: 2020-08-11

## 2020-08-10 RX ADMIN — Medication 25 MILLIGRAM(S): at 05:26

## 2020-08-10 RX ADMIN — Medication 81 MILLIGRAM(S): at 11:18

## 2020-08-10 RX ADMIN — LACTULOSE 10 GRAM(S): 10 SOLUTION ORAL at 17:16

## 2020-08-10 RX ADMIN — ENOXAPARIN SODIUM 70 MILLIGRAM(S): 100 INJECTION SUBCUTANEOUS at 17:16

## 2020-08-10 RX ADMIN — ENOXAPARIN SODIUM 70 MILLIGRAM(S): 100 INJECTION SUBCUTANEOUS at 05:26

## 2020-08-10 RX ADMIN — AMLODIPINE BESYLATE 5 MILLIGRAM(S): 2.5 TABLET ORAL at 05:26

## 2020-08-10 RX ADMIN — Medication 0.5 MILLIGRAM(S): at 00:50

## 2020-08-10 NOTE — PHYSICAL THERAPY INITIAL EVALUATION ADULT - SPECIFY REASON(S)
Upon assessment , pt is independent with bed mobility, transfers and ambulation without an Assistive Device ; will not require skilled PT

## 2020-08-10 NOTE — PROGRESS NOTE ADULT - ATTENDING COMMENTS
Endometrial cancer with ascites complicated with new acute PE - Lovenox     D/W Dr. Osullivan/ Dr. Abdullahi - [place] and missed management of her treatment   Patient requested new and service Oncology. D/W Dr. Jaiden Osullivan - group. It is teresita to be followed with service     D/W GI - No intervention necessary now     Follow up oncology in hospital service

## 2020-08-10 NOTE — CONSULT NOTE ADULT - ASSESSMENT
This is a 81 year old F patient with past medical history of Uterine cancer s/p SHAAN, CAD s/p CABG, HTN and depression present to the ED for abdominal pain. GI service is consulted for management of the ascites.     # Ascites  DDx: malignancy is high on the list ( history of endometrial cancer, new onset PE), rule out others:  - patient is hemodynamically stable  - s/p paracentesis with removal of 3 L of fluid  - This is a 81 year old F patient with past medical history of Uterine cancer s/p SHAAN, CAD s/p CABG, HTN and depression present to the ED for abdominal pain. GI service is consulted for management of the ascites.     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 GI service is consulted for management of the ascites.     # New onset Ascites  DDx: malignancy is high on the list ( history of endometrial cancer, new onset PE), rule out others:  - patient is hemodynamically stable  - s/p paracentesis with removal of 3 L of fluid  - SBP ruled out, SAAG 0.2 (goes with peritoneal carcinomatosis), no cytology was sent on the first tap   - CT abdomen with normal liver    recommendations:  - repeat diagnostic paracentesis for cytology  - follow up CEA,     * Discussion with attending to follow This is a 81 year old F patient with past medical history of Uterine cancer s/p SHAAN, CAD s/p CABG, HTN and depression present to the ED for abdominal pain. GI service is consulted for management of the ascites.     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 GI service is consulted for management of the ascites.     # New onset Ascites  DDx: malignancy is high on the list ( history of endometrial cancer, new onset PE: thrombophilia), rule out others:  - patient is hemodynamically stable  - s/p paracentesis with removal of 3 L of fluid, awaiting cytology  - SBP ruled out, SAAG 0.2 which is in favor of non portal HTN ascites  - CT abdomen with normal liver, and possible peritoneal implants detected on previous CT scan.     recommendations:  - follow up cytology  - follow up CEA,   if cytology negative, pt will need diagnostic laparoscopy for tissue biopsy   Might benefit from EGD, colonoscopy to r/o primary GI malignancy unless cytology results dictates otherwise.

## 2020-08-10 NOTE — PHYSICAL THERAPY INITIAL EVALUATION ADULT - DIAGNOSIS, PT EVAL
Rehab of Debility , PE Alzheimer's dementia    Hypothyroid    Paranoid schizophrenia    Upper GI bleed    UTI (urinary tract infection)

## 2020-08-10 NOTE — PROGRESS NOTE ADULT - SUBJECTIVE AND OBJECTIVE BOX
History of Present Illness:  Reason for Admission: abdominal pain	  History of Present Illness: 	  This is a 81 year old F patient with past medical history of Uterine cancer s/p SHAAN, CAD s/p CABG, HTN and depression present to the ED for abdominal pain.  Pt underwent chemotherapy carbo and taxol 6 cycles and achieved SCOTT.  Pt had seen dr. Abdullahi last in 2018 without further f/p with nl PET scan.    The history goes back to 2 weeks ago when the patient started to notice that her abdomen is getting bigger and that it was associated with discomfort that was generalized but worst in the flanks area. She saw her PMD at that time who prescribed antibiotics (Cipro and flagyl PO) which did not help at all as per the patient). the discomfort has been progressively getting worse until the day of admission where the patient started complaining of severe pain that interfere with her daily activity. Pt states that she had UTI.   she was also  complaining of Chest discomfort and she was found to have an Acute PE in the right Upper pulmonary artery segmental branch .    Pt waiting to OOB.               Physical Exam:   Physical Exam:  VSS General: NAD  Head and neck: No JVD, No LN felt   Heart: S1,S2 appreciated, no murmurs  Chest: Bilateral equal breath sounds, no wheeze   Abdomen: Soft, distended, BS+, nontender . improved after ascite   Lower extremities:   Neuro: Nonfocal  Psych: Normal affect	       Labs and Results:  Labs, Radiology, Cardiology, and Other Results:              13.2 >>11  10.30 )-----------( 553                  39.8     08-06   131<L>  |  91<L>  |  16  ----------------------------<  149<H>  4.8   |  24  |  1.1   Ca    8.5      06 Aug 2020 16:04   TPro  6.5  /  Alb  3.4<L>  /  TBili  0.3  /  DBili  <0.2  /  AST  33  /  ALT  12  /  AlkPhos  68  08-06

## 2020-08-10 NOTE — PROGRESS NOTE ADULT - ASSESSMENT
Assessment and Plan:     # endometrial cancer locally advanced with 1 LN +.  s/p SHAAN/BSO... carbo and taxol>>> SCOTT   Last PET scan in 2018.    #now with Abdominal pain and distension    with ascite.  paracentesis  for dx and tx of symptoms... bloody and pending cytology   check    CEA unremarkable     #Pulmonary embolism due to malignancy   no respiratory distress   -on Lovenox 70 mg BID >>> to DOAC   venous doppler ..neg    #CAD s/p CABG   -Continue with ASA     #HTN  -Continue with amlodipine and Metoprolol     # OOB and ambulate   ? d/c planning.     # plan to d/c and f/p as outpt with dr. Abdullahi once stable.   Pt states that previous chemo took long time prior to its start due to delayed port placement.  pt wishes to go back home if the disease is too far advanced.   discussed role and benefit of chemo.  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   Informed pt that I will address above with dr. Abdullahi.

## 2020-08-10 NOTE — CONSULT NOTE ADULT - SUBJECTIVE AND OBJECTIVE BOX
Gastroenterology Consultation:    Patient is a 81y old  Female who presents with a chief complaint of abdominal pain (10 Aug 2020 08:33)    Admitted on: 08-06-20  HPI:  This is a 81 year old F patient with past medical history of Uterine cancer s/p SHAAN, CAD s/p CABG, HTN and depression present to the ED for abdominal pain. The history goes back to 2 weeks ago when the patient started to notice that her abdomen is getting bigger and that it was associated with discomfort that was generalized but worst in the flanks area. She saw her PMD at that time who prescribed antibiotics (Cipro and flagyl PO) which did not help at all as per the patient). the discomfort has been progressively getting worse until the day of admission where the patient started complaining of severe pain that interfere with her daily activity. She tried seeing her primary [facility] doctor but was not able to so she came to the ED. She denied any nausea, vomiting, constipation, fever, chills, night sweats, weight loss or any other symptoms. In the ED patient had a CTA of the chest as she was complaining of Chest discomfort and she was found to have an Acute PE in the right Upper pulmonary artery segmental branch     GI: patient denies nausea, vomiting, hematemesis, melena or hematochezia. endorses progressive abdominal distention. denies family history of cancer.     In the ED Vital Signs   T(C): 36.9 (06 Aug 2020 23:31), Max: 37.2 (06 Aug 2020 15:11)  T(F): 98.4 (06 Aug 2020 23:31), Max: 98.9 (06 Aug 2020 15:11)  HR: 101 (06 Aug 2020 23:31) (101 - 121)  BP: 114/63 (06 Aug 2020 23:31) (114/63 - 127/72)  RR: 18 (06 Aug 2020 23:31) (18 - 20)  SpO2: 98% (06 Aug 2020 23:31) (98% - 99%) (06 Aug 2020 23:18)      Prior records Reviewed (Y/N): Y  History obtained from person other than patient (Y/N): N    Prior EGD: none on records  Prior Colonoscopy:  none on records      PAST MEDICAL & SURGICAL HISTORY:  HLD (hyperlipidemia)  HTN (hypertension)  CAD (coronary artery disease)  History of coronary artery bypass surgery      FAMILY HISTORY:  No pertinent family history in first degree relatives      Social History:  Tobacco: denies  Alcohol: denies   Drugs: denies    Home Medications:  amLODIPine 5 mg oral tablet: 1 tab(s) orally once a day (06 Aug 2020 23:50)  aspirin 81 mg oral delayed release tablet: 1 tab(s) orally once a day (06 Aug 2020 23:50)  clonazePAM 0.5 mg oral tablet: 1 tab(s) orally once a day (06 Aug 2020 23:51)  metoprolol succinate 25 mg oral tablet, extended release: 1 tab(s) orally once a day (06 Aug 2020 23:50)  rosuvastatin 10 mg oral tablet: 1 tab(s) orally once a day (06 Aug 2020 23:51)    MEDICATIONS  (STANDING):  amLODIPine   Tablet 5 milliGRAM(s) Oral daily  aspirin enteric coated 81 milliGRAM(s) Oral daily  enoxaparin Injectable 70 milliGRAM(s) SubCutaneous every 12 hours  metoprolol succinate ER 25 milliGRAM(s) Oral daily  sodium chloride 0.9%. 1000 milliLiter(s) (60 mL/Hr) IV Continuous <Continuous>    MEDICATIONS  (PRN):  clonazePAM  Tablet 0.5 milliGRAM(s) Oral daily PRN anxiety      Allergies  chloroquine (Hives)      Review of Systems:   Constitutional:  No Fever, No Chills  ENT/Mouth:  No Hearing Changes,  No Difficulty Swallowing  Eyes:  No Eye Pain, No Vision Changes  Cardiovascular:  No Chest Pain, No Palpitations  Respiratory:  No Cough, No Dyspnea  Gastrointestinal:  As described in HPI  Musculoskeletal:  No Joint Swelling, No Back Pain  Skin:  No Skin Lesions, No Jaundice  Neuro:  No Syncope, No Dizziness  Heme/Lymph:  No Bruising, No Bleeding.          Physical Examination:  T(C): 37.4 (08-10-20 @ 07:44), Max: 37.4 (08-10-20 @ 07:44)  HR: 105 (08-10-20 @ 07:44) (96 - 105)  BP: 117/60 (08-10-20 @ 07:44) (109/54 - 138/80)  RR: 20 (08-10-20 @ 07:44) (18 - 20)  SpO2: 97% (08-10-20 @ 07:44) (97% - 97%)      08-08-20 @ 07:01  -  08-09-20 @ 07:00  --------------------------------------------------------  IN: 1220 mL / OUT: 3 mL / NET: 1217 mL    08-09-20 @ 07:01  -  08-10-20 @ 07:00  --------------------------------------------------------  IN: 480 mL / OUT: 0 mL / NET: 480 mL        Constitutional: No acute distress.  Eyes:. Conjunctivae are clear, Sclera is non-icteric.  Ears Nose and Throat: The external ears are normal appearing,  Oral mucosa is pink and moist.  Respiratory:  No signs of respiratory distress. Lung sounds are clear bilaterally.  Cardiovascular:  S1 S2, Regular rate and rhythm.  GI: Abdomen is soft, symmetric, and non-tender , with moderate distention. There are no visible lesions. Bowel sounds are present and normoactive in all four quadrants.   Neuro: No Tremor, No involuntary movements  Skin: No rashes, No Jaundice.          Data: (reviewed by attending)                        11.2   8.20  )-----------( 477      ( 09 Aug 2020 11:10 )             34.2     Hgb Trend:  11.2  08-09-20 @ 11:10  11.1  08-08-20 @ 05:17        08-09    137  |  102  |  7<L>  ----------------------------<  90  4.2   |  21  |  0.7    Ca    7.5<L>      09 Aug 2020 04:30  Mg     1.7     08-09    TPro  5.0<L>  /  Alb  2.6<L>  /  TBili  0.3  /  DBili  x   /  AST  23  /  ALT  9   /  AlkPhos  50  08-09    Liver panel trend:  TBili 0.3   /   AST 23   /   ALT 9   /   AlkP 50   /   Tptn 5.0   /   Alb 2.6    /   DBili --      08-09  TBili 0.3   /   AST 21   /   ALT 9   /   AlkP 54   /   Tptn 5.0   /   Alb 2.7    /   DBili --      08-07  TBili 0.3   /   AST 33   /   ALT 12   /   AlkP 68   /   Tptn 6.5   /   Alb 3.4    /   DBili <0.2      08-06        Radiology:(reviewed by attending)      < from: CT Abdomen and Pelvis w/ IV Cont (08.06.20 @ 19:33) >  IMPRESSION:    1. Intraluminal filling defect is seen within the segmental branches of the right lower lobe pulmonary artery, indicating acute pulmonary embolism. (6/132-138; 10/51-55). Filling defect in segmental branch of the right upper lobe pulmonaryartery. (6/77) There is a small filling defect within a left lower lobe pulmonary artery segmental branch (6/147; 10/59). No evidence of right heart strain. The right ventricle measures 3.5 cm; the left ventricle measures 3.7 cm. (RV:LV<1)    2. Compared with the previous CT scan of 5/27/2020, there is now a large amount of ascites throughout the abdomen and pelvis.    < end of copied text >

## 2020-08-10 NOTE — PHYSICAL THERAPY INITIAL EVALUATION ADULT - GENERAL OBSERVATIONS, REHAB EVAL
[id number] am Chart reviewed. Pt. seen semirecline in bed , in No apparent distress, + ascites, IV lock, Pt. agreed to activity/therapy.

## 2020-08-10 NOTE — PHYSICAL THERAPY INITIAL EVALUATION ADULT - PERTINENT HX OF CURRENT PROBLEM, REHAB EVAL
81 year old F patient with past medical history of CAD s/p CABG, Uterine cancer s/p SHAAN, HTN and depression present to the ED for abdominal pain and chest discomfort, found out with PE and now on Oral AC

## 2020-08-10 NOTE — PROGRESS NOTE ADULT - SUBJECTIVE AND OBJECTIVE BOX
Hospital Day:  4d    Patient is a 81y old  Female who presents with a chief complaint of 4d    Subjective:  pt seen and examined at bedside. No significant overnight events. pt reports being constipated in the past few days despite taking PRN laxative. Will start on scheduled dose of laxatives.     Past Medical Hx:   HLD (hyperlipidemia)  HTN (hypertension)  CAD (coronary artery disease)  No pertinent past medical history    Past Sx:  History of coronary artery bypass surgery    Allergies:  chloroquine (Hives)    Current Meds:   Standng Meds:  amLODIPine   Tablet 5 milliGRAM(s) Oral daily  aspirin enteric coated 81 milliGRAM(s) Oral daily  enoxaparin Injectable 70 milliGRAM(s) SubCutaneous every 12 hours  metoprolol succinate ER 25 milliGRAM(s) Oral daily  sodium chloride 0.9%. 1000 milliLiter(s) (60 mL/Hr) IV Continuous <Continuous>    PRN Meds:  clonazePAM  Tablet 0.5 milliGRAM(s) Oral daily PRN anxiety    HOME MEDICATIONS:  amLODIPine 5 mg oral tablet: 1 tab(s) orally once a day  aspirin 81 mg oral delayed release tablet: 1 tab(s) orally once a day  clonazePAM 0.5 mg oral tablet: 1 tab(s) orally once a day  metoprolol succinate 25 mg oral tablet, extended release: 1 tab(s) orally once a day  rosuvastatin 10 mg oral tablet: 1 tab(s) orally once a day      Vital Signs:   T(F): 99.4 (08-10-20 @ 07:44), Max: 99.4 (08-10-20 @ 07:44)  HR: 105 (08-10-20 @ 07:44) (96 - 105)  BP: 117/60 (08-10-20 @ 07:44) (109/54 - 138/80)  RR: 20 (08-10-20 @ 07:44) (18 - 20)  SpO2: 97% (08-10-20 @ 07:44) (97% - 97%)      20 @ 07:01  -  08-10-20 @ 07:00  --------------------------------------------------------  IN: 480 mL / OUT: 0 mL / NET: 480 mL        Physical Exam:   GENERAL: NAD  HEENT: NCAT  CHEST/LUNG: CTAB  HEART: Regular rate and rhythm; s1 s2 appreciated, No murmurs, rubs, or gallops  ABDOMEN: Soft, Nontender, Nondistended; Bowel sounds present  EXTREMITIES: No LE edema b/l  SKIN: no rashes, no new lesions  NERVOUS SYSTEM:  Alert & Oriented X3  LINES/CATHETERS:        Labs:                         11.2   8.20  )-----------( 477      ( 09 Aug 2020 11:10 )             34.2     Neutophil% 73.1, Lymphocyte% 10.6, Monocyte% 14.0, Bands% 0.5 20 @ 11:10    09 Aug 2020 04:30    137    |  102    |  7      ----------------------------<  90     4.2     |  21     |  0.7      Ca    7.5        09 Aug 2020 04:30  Mg     1.7       09 Aug 2020 04:30    TPro  5.0    /  Alb  2.6    /  TBili  0.3    /  DBili  x      /  AST  23     /  ALT  9      /  AlkPhos  50     09 Aug 2020 04:30        Amylase --, Lipase 20, 20 @ 16:04        Troponin <0.01, CKMB --, CK -- 20 @ 16:04        Urinalysis Basic - ( 06 Aug 2020 19:35 )    Color: Light Yellow / Appearance: Clear / S.030 / pH: x  Gluc: x / Ketone: Negative  / Bili: Negative / Urobili: <2 mg/dL   Blood: x / Protein: Negative / Nitrite: Negative   Leuk Esterase: Negative / RBC: x / WBC x   Sq Epi: x / Non Sq Epi: x / Bacteria: x    Radiology:   < from: CT Abdomen and Pelvis w/ IV Cont (20 @ 19:33) >  PRESSION:    1. Intraluminal filling defect is seen within the segmental branches of the right lower lobe pulmonary artery, indicating acute pulmonary embolism. (-138; 10/51-). Filling defect in segmental branch of the right upper lobe pulmonaryartery. () There is a small filling defect within a left lower lobe pulmonary artery segmental branch (; 10/59). No evidence of right heart strain. The right ventricle measures 3.5 cm; the left ventricle measures 3.7 cm. (RV:LV<1)    2. Compared with the previous CT scan of 2020, there is now a large amount of ascites throughout the abdomen and pelvis.        < end of copied text >    < from: [facility] Duplex Lower Ext Vein Scan, Bilat (08.08.20 @ 10:04) >  Impression:    No evidence of deep venous thrombosis or superficial thrombophlebitis in the bilateral lower extremities.  Left popliteal fossa fluid collection as measured above likely Baker's cyst    < end of copied text >      Assessment and Plan:   1. This is a 81 year old F patient with past medical history of CAD s/p CABG, Uterine cancer s/p SHAAN, HTN and depression present to the ED for abdominal pain and chest discomfort.    #Abdominal pain and distension   - new onset ascites s/p 3 liters of cloudy bloody fluid drainage  - no evidence of metabolic encephalopathy  / most probably secondary to malignancy   - patient not known to have liver cirrhosis  -Patient has a distended abdomen with new ascites on the Ct scan of the abdomen   -f/u cytology   -gram stain did not show any organism  -fluid count showed RBC + lymphocytosis   -Liver enzymes are WNL  -d-dimers 5329   -malignancy is high on the differential especially in the setting of PE   - f/u GI   -patient has a history of Uterine cancer   -Patient follows up with Dr. Mccain as an outpatient, she requested to see [facility] , Hem/onc  -Patient does not have a history of liver disease, she did not take any new medication and no identifiable cause was identified in the history.  -s/p hemo onc - Follow [facility]A and      #Pulmonary embolism   - Segmental PE with normal Vital signs, Troponin is negative   - on Lovenox 70 mg BID     #CAD s/p CABG   -Continue with ASA     #HTN  -Continue with amlodipine and Metoprolol     #DLD  -Continue with statin    DVT ppx: Enoxaparin 70 BID   GI ppx:  not needed  Code Status:  Full code  DISPO: acute for now Hospital Day:  4d    Patient is a 81y old  Female who presents with a chief complaint of 4d    Subjective:  pt seen and examined at bedside. No significant overnight events. pt reports being constipated in the past few days despite taking PRN laxative. Will start on scheduled dose of laxatives.     Past Medical Hx:   HLD (hyperlipidemia)  HTN (hypertension)  CAD (coronary artery disease)  No pertinent past medical history    Past Sx:  History of coronary artery bypass surgery    Allergies:  chloroquine (Hives)    Current Meds:   Standng Meds:  amLODIPine   Tablet 5 milliGRAM(s) Oral daily  aspirin enteric coated 81 milliGRAM(s) Oral daily  enoxaparin Injectable 70 milliGRAM(s) SubCutaneous every 12 hours  metoprolol succinate ER 25 milliGRAM(s) Oral daily  sodium chloride 0.9%. 1000 milliLiter(s) (60 mL/Hr) IV Continuous <Continuous>    PRN Meds:  clonazePAM  Tablet 0.5 milliGRAM(s) Oral daily PRN anxiety    HOME MEDICATIONS:  amLODIPine 5 mg oral tablet: 1 tab(s) orally once a day  aspirin 81 mg oral delayed release tablet: 1 tab(s) orally once a day  clonazePAM 0.5 mg oral tablet: 1 tab(s) orally once a day  metoprolol succinate 25 mg oral tablet, extended release: 1 tab(s) orally once a day  rosuvastatin 10 mg oral tablet: 1 tab(s) orally once a day      Vital Signs:   T(F): 99.4 (08-10-20 @ 07:44), Max: 99.4 (08-10-20 @ 07:44)  HR: 105 (08-10-20 @ 07:44) (96 - 105)  BP: 117/60 (08-10-20 @ 07:44) (109/54 - 138/80)  RR: 20 (08-10-20 @ 07:44) (18 - 20)  SpO2: 97% (08-10-20 @ 07:44) (97% - 97%)      20 @ 07:01  -  08-10-20 @ 07:00  --------------------------------------------------------  IN: 480 mL / OUT: 0 mL / NET: 480 mL        Physical Exam:   GENERAL: NAD, sitting comfortably in bed  HEENT: NCAT  CHEST/LUNG: CTAB  HEART: Regular rate and rhythm; s1 s2 appreciated, No murmurs, rubs, or gallops  ABDOMEN: Soft, Nontender, distended; Bowel sounds present  EXTREMITIES: No LE edema b/l  SKIN: no rashes, no new lesions  NERVOUS SYSTEM:  Alert & Oriented X3      Labs:                         11.2   8.20  )-----------( 477      ( 09 Aug 2020 11:10 )             34.2     Neutophil% 73.1, Lymphocyte% 10.6, Monocyte% 14.0, Bands% 0.5 20 @ 11:10    09 Aug 2020 04:30    137    |  102    |  7      ----------------------------<  90     4.2     |  21     |  0.7      Ca    7.5        09 Aug 2020 04:30  Mg     1.7       09 Aug 2020 04:30    TPro  5.0    /  Alb  2.6    /  TBili  0.3    /  DBili  x      /  AST  23     /  ALT  9      /  AlkPhos  50     09 Aug 2020 04:30        Amylase --, Lipase 20, 20 @ 16:04        Troponin <0.01, CKMB --, CK -- 20 @ 16:04        Urinalysis Basic - ( 06 Aug 2020 19:35 )    Color: Light Yellow / Appearance: Clear / S.030 / pH: x  Gluc: x / Ketone: Negative  / Bili: Negative / Urobili: <2 mg/dL   Blood: x / Protein: Negative / Nitrite: Negative   Leuk Esterase: Negative / RBC: x / WBC x   Sq Epi: x / Non Sq Epi: x / Bacteria: x    Radiology:   < from: CT Abdomen and Pelvis w/ IV Cont (20 @ 19:33) >  PRESSION:    1. Intraluminal filling defect is seen within the segmental branches of the right lower lobe pulmonary artery, indicating acute pulmonary embolism. (-138; 10/51-). Filling defect in segmental branch of the right upper lobe pulmonaryartery. () There is a small filling defect within a left lower lobe pulmonary artery segmental branch (6/147; 10/59). No evidence of right heart strain. The right ventricle measures 3.5 cm; the left ventricle measures 3.7 cm. (RV:LV<1)    2. Compared with the previous CT scan of 2020, there is now a large amount of ascites throughout the abdomen and pelvis.        < end of copied text >    < from: [facility] Duplex Lower Ext Vein Scan, Bilat (08.08.20 @ 10:04) >  Impression:    No evidence of deep venous thrombosis or superficial thrombophlebitis in the bilateral lower extremities.  Left popliteal fossa fluid collection as measured above likely Baker's cyst    < end of copied text >      Assessment and Plan:   1. This is a 81 year old F patient with past medical history of CAD s/p CABG, Uterine cancer s/p SHAAN, HTN and depression present to the ED for abdominal pain and chest discomfort.    #Abdominal pain and distension   - new onset ascites s/p 3 liters of cloudy bloody fluid drainage  - no evidence of metabolic encephalopathy  / most probably secondary to malignancy   - patient not known to have liver cirrhosis  -Patient has a distended abdomen with new ascites on the Ct scan of the abdomen   -f/u cytology   -gram stain did not show any organism  -fluid count showed RBC + lymphocytosis   -Liver enzymes are WNL  -d-dimers 5329   -malignancy is high on the differential especially in the setting of PE   - f/u GI   -patient has a history of Uterine cancer   -Patient follows up with Dr. Mccain as an outpatient, she requested to see [facility] , Hem/onc  -Patient does not have a history of liver disease, she did not take any new medication and no identifiable cause was identified in the history.  -s/p hemo onc - Follow [facility] and      #Pulmonary embolism   - Segmental PE with normal Vital signs, Troponin is negative   - on Lovenox 70 mg BID     #CAD s/p CABG   -Continue with ASA     #HTN  -Continue with amlodipine and Metoprolol     #DLD  -Continue with statin    DVT ppx: Enoxaparin 70 BID   GI ppx:  not needed  Code Status:  Full code  DISPO: acute for now

## 2020-08-11 LAB
ALBUMIN SERPL ELPH-MCNC: 2.7 G/DL — LOW (ref 3.5–5.2)
ALP SERPL-CCNC: 47 U/L — SIGNIFICANT CHANGE UP (ref 30–115)
ALT FLD-CCNC: 12 U/L — SIGNIFICANT CHANGE UP (ref 0–41)
ANION GAP SERPL CALC-SCNC: 13 MMOL/L — SIGNIFICANT CHANGE UP (ref 7–14)
APTT BLD: 36 SEC — SIGNIFICANT CHANGE UP (ref 27–39.2)
AST SERPL-CCNC: 29 U/L — SIGNIFICANT CHANGE UP (ref 0–41)
BASOPHILS # BLD AUTO: 0.06 K/UL — SIGNIFICANT CHANGE UP (ref 0–0.2)
BASOPHILS NFR BLD AUTO: 0.8 % — SIGNIFICANT CHANGE UP (ref 0–1)
BILIRUB SERPL-MCNC: 0.2 MG/DL — SIGNIFICANT CHANGE UP (ref 0.2–1.2)
BUN SERPL-MCNC: 6 MG/DL — LOW (ref 10–20)
CALCIUM SERPL-MCNC: 7.6 MG/DL — LOW (ref 8.5–10.1)
CHLORIDE SERPL-SCNC: 104 MMOL/L — SIGNIFICANT CHANGE UP (ref 98–110)
CO2 SERPL-SCNC: 23 MMOL/L — SIGNIFICANT CHANGE UP (ref 17–32)
CREAT SERPL-MCNC: 0.6 MG/DL — LOW (ref 0.7–1.5)
EOSINOPHIL # BLD AUTO: 0.2 K/UL — SIGNIFICANT CHANGE UP (ref 0–0.7)
EOSINOPHIL NFR BLD AUTO: 2.8 % — SIGNIFICANT CHANGE UP (ref 0–8)
GLUCOSE SERPL-MCNC: 90 MG/DL — SIGNIFICANT CHANGE UP (ref 70–99)
HCT VFR BLD CALC: 35.5 % — LOW (ref 37–47)
HGB BLD-MCNC: 11.6 G/DL — LOW (ref 12–16)
IMM GRANULOCYTES NFR BLD AUTO: 1 % — HIGH (ref 0.1–0.3)
INR BLD: 1.36 RATIO — HIGH (ref 0.65–1.3)
LYMPHOCYTES # BLD AUTO: 1.23 K/UL — SIGNIFICANT CHANGE UP (ref 1.2–3.4)
LYMPHOCYTES # BLD AUTO: 17.3 % — LOW (ref 20.5–51.1)
MCHC RBC-ENTMCNC: 26 PG — LOW (ref 27–31)
MCHC RBC-ENTMCNC: 32.7 G/DL — SIGNIFICANT CHANGE UP (ref 32–37)
MCV RBC AUTO: 79.4 FL — LOW (ref 81–99)
MONOCYTES # BLD AUTO: 0.98 K/UL — HIGH (ref 0.1–0.6)
MONOCYTES NFR BLD AUTO: 13.7 % — HIGH (ref 1.7–9.3)
NEUTROPHILS # BLD AUTO: 4.59 K/UL — SIGNIFICANT CHANGE UP (ref 1.4–6.5)
NEUTROPHILS NFR BLD AUTO: 64.4 % — SIGNIFICANT CHANGE UP (ref 42.2–75.2)
NRBC # BLD: 0 /100 WBCS — SIGNIFICANT CHANGE UP (ref 0–0)
PLATELET # BLD AUTO: 425 K/UL — HIGH (ref 130–400)
POTASSIUM SERPL-MCNC: 4.1 MMOL/L — SIGNIFICANT CHANGE UP (ref 3.5–5)
POTASSIUM SERPL-SCNC: 4.1 MMOL/L — SIGNIFICANT CHANGE UP (ref 3.5–5)
PROT SERPL-MCNC: 5.1 G/DL — LOW (ref 6–8)
PROTHROM AB SERPL-ACNC: 15.6 SEC — HIGH (ref 9.95–12.87)
RBC # BLD: 4.47 M/UL — SIGNIFICANT CHANGE UP (ref 4.2–5.4)
RBC # FLD: 14.3 % — SIGNIFICANT CHANGE UP (ref 11.5–14.5)
SODIUM SERPL-SCNC: 140 MMOL/L — SIGNIFICANT CHANGE UP (ref 135–146)
WBC # BLD: 7.13 K/UL — SIGNIFICANT CHANGE UP (ref 4.8–10.8)
WBC # FLD AUTO: 7.13 K/UL — SIGNIFICANT CHANGE UP (ref 4.8–10.8)

## 2020-08-11 PROCEDURE — 99223 1ST HOSP IP/OBS HIGH 75: CPT

## 2020-08-11 PROCEDURE — 99233 SBSQ HOSP IP/OBS HIGH 50: CPT

## 2020-08-11 RX ORDER — ENOXAPARIN SODIUM 100 MG/ML
70 INJECTION SUBCUTANEOUS EVERY 12 HOURS
Refills: 0 | Status: DISCONTINUED | OUTPATIENT
Start: 2020-08-12 | End: 2020-08-15

## 2020-08-11 RX ADMIN — Medication 81 MILLIGRAM(S): at 14:47

## 2020-08-11 RX ADMIN — Medication 25 MILLIGRAM(S): at 05:29

## 2020-08-11 RX ADMIN — AMLODIPINE BESYLATE 5 MILLIGRAM(S): 2.5 TABLET ORAL at 05:29

## 2020-08-11 RX ADMIN — ENOXAPARIN SODIUM 70 MILLIGRAM(S): 100 INJECTION SUBCUTANEOUS at 05:30

## 2020-08-11 RX ADMIN — LACTULOSE 10 GRAM(S): 10 SOLUTION ORAL at 05:30

## 2020-08-11 NOTE — CONSULT NOTE ADULT - ASSESSMENT
# New segmental PE and Abdominal pain/distension secondary to new onset ascites most likely secondary to malignancy.   # H/O Endometrial Cancer locally advanced with 1 LN+, S/p SHAAN/BSO and carbo and taxol>>> [facility]   - new onset ascites s/p 3 liters of cloudy bloody fluid drainage, bloody with SAAG 0.3   - Follow up cytology, CEA and .  -on Lovenox 70 mg BID >>> to [facility]   - Last PET scan in 2018. This is a 81 year old F patient with past medical history of Uterine cancer s/p SHAAN, CAD s/p CABG, HTN and depression present to the ED for abdominal pain and chest discomfort. found to have new onset ascites, s/p paracentesis with 3L removal, SAAG 0.3 and cytology is pending   In the ED patient had a CTA of the chest as she was complaining of Chest discomfort and she was found to have an Acute PE in the right Upper pulmonary artery segmental branch, started on Lovenox.     # New segmental PE and Abdominal pain/distension secondary to new onset ascites most likely secondary to malignancy.   # H/O Endometrial Cancer locally advanced with 1 LN+, S/p SHAAN/BSO and carbo and taxol for 5 months in 2016   - new onset ascites s/p 3 liters of cloudy bloody fluid drainage, bloody with SAAG 0.3   - Follow up cytology, CEA and .  - on Lovenox 70 mg BID, will recommend to continue with LOVENOX anticipating the need of recurrent paracentesis    - Last PET scan in 2018.  - will follow up as outpatient with cytology results. This is a 81 year old F patient with past medical history of Uterine cancer s/p SHAAN, CAD s/p CABG, HTN and depression present to the ED for abdominal pain and chest discomfort. found to have new onset ascites, s/p paracentesis with 3L removal, SAAG 0.3 and cytology is pending   In the ED patient had a CTA of the chest as she was complaining of Chest discomfort and she was found to have an Acute PE in the right Upper pulmonary artery segmental branch, started on Lovenox.     # New segmental PE and Abdominal pain/distension secondary to new onset ascites most likely secondary to malignancy. No DVT in b/l LE    # H/O Endometrial Cancer locally advanced with 1 LN+, S/p SHAAN/BSO and carbo and taxol for 5 months in 2016   - new onset ascites now s/p 3 liters of cloudy bloody fluid drainage, bloody with SAAG 0.3   - Follow up cytology, CEA and .  - on Lovenox 70 mg BID, will recommend to continue with LOVENOX anticipating the need of recurrent paracentesis    - Last PET scan in 2018.  If she requires another paracentesis please send repeat pathology as well.     After discharge she can follow with Dr Gutierrez within a week for further management

## 2020-08-11 NOTE — PROGRESS NOTE ADULT - ASSESSMENT
This is a 81 year old F patient with past medical history of Uterine cancer s/p SHAAN, CAD s/p CABG, HTN and depression present to the ED for abdominal pain. GI service is consulted for management of the ascites.     # Ascites  DDx: malignancy is high on the list ( history of endometrial cancer, new onset PE: thrombophilia), rule out others:  - s/p paracentesis with removal of 3 L of fluid, awaiting cytology  - SBP ruled out, SAAG 0.2 which is in favor of non portal HTN ascites  - CT abdomen with normal liver, and possible peritoneal implants detected on previous CT scan.   - Cancer Antigen, 125: 217: Method: U/mL (08.09.20 @ 16:18)  - Carcinoembryonic Antigen: 0.7    recommendations:  - follow up cytology  - if cytology negative, pt will need diagnostic laparoscopy for tissue biopsy   - Might benefit from EGD, colonoscopy to r/o primary GI malignancy unless cytology results dictates otherwise This is a 81 year old F patient with past medical history of Uterine cancer s/p SHAAN, CAD s/p CABG, HTN and depression present to the ED for abdominal pain. GI service is consulted for management of the ascites.     # Ascites  DDx: malignancy is high on the list ( history of endometrial cancer, new onset PE: thrombophilia), rule out others:  - s/p paracentesis with removal of 3 L of fluid, awaiting cytology  - SBP ruled out, SAAG 0.2 which is in favor of non portal HTN ascites  - CT abdomen with normal liver, and possible peritoneal implants detected on previous CT scan.   - Cancer Antigen, 125: 217: Method: U/mL (08.09.20 @ 16:18) > goes more with malignant process   - Carcinoembryonic Antigen: 0.7    recommendations:  - follow up cytology  - if cytology negative, pt will need diagnostic laparoscopy for tissue biopsy   - Might benefit from EGD, colonoscopy to r/o primary GI malignancy unless cytology results dictates otherwise    * Discussion with attending to follow This is a 81 year old F patient with past medical history of Uterine cancer s/p SHAAN, CAD s/p CABG, HTN and depression present to the ED for abdominal pain. GI service is consulted for management of the ascites.     # Ascites  DDx: malignancy is high on the list ( history of endometrial cancer, new onset PE: thrombophilia), rule out others:  - s/p paracentesis with removal of 3 L of fluid, awaiting cytology  - SBP ruled out, SAAG 0.2 which is in favor of non portal HTN ascites  - CT abdomen with normal liver, and possible peritoneal implants detected on previous CT scan.   - Cancer Antigen, 125: 217: Method: U/mL (08.09.20 @ 16:18) > goes more with malignant process   - Carcinoembryonic Antigen: 0.7    recommendations:  - follow up cytology  - if cytology negative, pt will need diagnostic laparoscopy for tissue biopsy   - Might benefit from EGD, colonoscopy to r/o primary GI malignancy unless cytology results dictates otherwise    * Discussed with attending This is a 81 year old F patient with past medical history of Uterine cancer s/p SHAAN, CAD s/p CABG, HTN and depression present to the ED for abdominal pain. GI service is consulted for management of the ascites.     # Ascites  DDx: malignancy is high on the list ( history of endometrial cancer, new onset PE: thrombophilia), rule out others:  - s/p paracentesis with removal of 3 L of fluid, awaiting cytology  - SBP ruled out, SAAG 0.2 which is in favor of non portal HTN ascites  - CT abdomen with normal liver, and possible peritoneal implants detected on previous CT scan.   - Cancer Antigen, 125: 217: Method: U/mL (08.09.20 @ 16:18) > goes more with malignant process   - Carcinoembryonic Antigen: 0.7    recommendations:  - follow up cytology  - if cytology negative, pt will need diagnostic laparoscopy for tissue biopsy   - Might benefit from EGD, colonoscopy to r/o primary GI malignancy unless cytology results dictates otherwise

## 2020-08-11 NOTE — PROGRESS NOTE ADULT - SUBJECTIVE AND OBJECTIVE BOX
Gastroenterology progress note:     Patient is a 81y old  Female who presents with a chief complaint of abdominal pain (10 Aug 2020 10:27)       Admitted on: 08-06-20    We are following the patient for: Ascites     Interval History: no over night events.     Patient's medical problems are stable    Prior records reviewed (Y/N): Y  History obtained from someone other than patient (Y/N): N      PAST MEDICAL & SURGICAL HISTORY:  HLD (hyperlipidemia)  HTN (hypertension)  CAD (coronary artery disease)  History of coronary artery bypass surgery      MEDICATIONS  (STANDING):  amLODIPine   Tablet 5 milliGRAM(s) Oral daily  aspirin enteric coated 81 milliGRAM(s) Oral daily  enoxaparin Injectable 70 milliGRAM(s) SubCutaneous every 12 hours  metoprolol succinate ER 25 milliGRAM(s) Oral daily  sodium chloride 0.9%. 1000 milliLiter(s) (60 mL/Hr) IV Continuous <Continuous>    MEDICATIONS  (PRN):  clonazePAM  Tablet 0.5 milliGRAM(s) Oral daily PRN anxiety      Allergies  chloroquine (Hives)      Review of Systems:   Constitutional:  No Fever, No Chills  ENT/Mouth:  No Hearing Changes,  No Difficulty Swallowing  Eyes:  No Eye Pain, No Vision Changes  Cardiovascular:  No Chest Pain, No Palpitations  Respiratory:  No Cough, No Dyspnea  Gastrointestinal:  As described in HPI  Musculoskeletal:  No Joint Swelling, No Back Pain  Skin:  No Skin Lesions, No Jaundice  Neuro:  No Syncope, No Dizziness  Heme/Lymph:  No Bruising, No Bleeding.    Physical Examination:  T(C): 37.5 (08-11-20 @ 07:40), Max: 37.5 (08-11-20 @ 04:49)  HR: 95 (08-11-20 @ 07:40) (93 - 103)  BP: 115/59 (08-11-20 @ 07:40) (114/57 - 122/57)  RR: 19 (08-11-20 @ 07:40) (19 - 20)  SpO2: --      08-10-20 @ 07:01  -  08-11-20 @ 07:00  --------------------------------------------------------  IN: 1170 mL / OUT: 0 mL / NET: 1170 mL      Constitutional: No acute distress.  Eyes:. Conjunctivae are clear, Sclera is non-icteric.  Ears Nose and Throat: The external ears are normal appearing,  Oral mucosa is pink and moist.  Respiratory:  No signs of respiratory distress. Lung sounds are clear bilaterally.  Cardiovascular:  S1 S2, Regular rate and rhythm.  GI: Abdomen is soft, symmetric, and non-tender , with moderate distention. There are no visible lesions. Bowel sounds are present and normoactive in all four quadrants.   Neuro: No Tremor, No involuntary movements  Skin: No rashes, No Jaundice.        Data: (reviewed by attending)                        11.6   7.13  )-----------( 425      ( 11 Aug 2020 05:45 )             35.5     Hgb trend:  11.6  08-11-20 @ 05:45  11.3  08-10-20 @ 11:14  11.2  08-09-20 @ 11:10        08-11    140  |  104  |  6<L>  ----------------------------<  90  4.1   |  23  |  0.6<L>    Ca    7.6<L>      11 Aug 2020 05:45    TPro  5.1<L>  /  Alb  2.7<L>  /  TBili  0.2  /  DBili  x   /  AST  29  /  ALT  12  /  AlkPhos  47  08-11    Liver panel trend:  TBili 0.2   /   AST 29   /   ALT 12   /   AlkP 47   /   Tptn 5.1   /   Alb 2.7    /   DBili --      08-11  TBili 0.2   /   AST 29   /   ALT 11   /   AlkP 44   /   Tptn 4.9   /   Alb 2.5    /   DBili --      08-10  TBili 0.3   /   AST 23   /   ALT 9   /   AlkP 50   /   Tptn 5.0   /   Alb 2.6    /   DBili --      08-09  TBili 0.3   /   AST 21   /   ALT 9   /   AlkP 54   /   Tptn 5.0   /   Alb 2.7    /   DBili --      08-07  TBili 0.3   /   AST 33   /   ALT 12   /   AlkP 68   /   Tptn 6.5   /   Alb 3.4    /   DBili <0.2      08-06      PT/INR - ( 11 Aug 2020 05:45 )   PT: 15.60 sec;   INR: 1.36 ratio         PTT - ( 11 Aug 2020 05:45 )  PTT:36.0 sec       Radiology: (reviewed by attending)    < from: CT Abdomen and Pelvis w/ IV Cont (08.06.20 @ 19:33) >  IMPRESSION:    1. Intraluminal filling defect is seen within the segmental branches of the right lower lobe pulmonary artery, indicating acute pulmonary embolism. (6/132-138; 10/51-55). Filling defect in segmental branch of the right upper lobe pulmonaryartery. (6/77) There is a small filling defect within a left lower lobe pulmonary artery segmental branch (6/147; 10/59). No evidence of right heart strain. The right ventricle measures 3.5 cm; the left ventricle measures 3.7 cm. (RV:LV<1)    2. Compared with the previous CT scan of 5/27/2020, there is now a large amount of ascites throughout the abdomen and pelvis.    < end of copied text >

## 2020-08-11 NOTE — CONSULT NOTE ADULT - SUBJECTIVE AND OBJECTIVE BOX
HEMATOLOGY ONCOLOGY CONSULT     Patient is a 81y old  Female who presents with a chief complaint of abdominal pain (11 Aug 2020 08:24)      HPI:  This is a 81 year old F patient with past medical history of Uterine cancer s/p SHAAN, CAD s/p CABG, HTN and depression present to the ED for abdominal pain and chest discomfort. found to have new onset ascites, s/p paracentesis with 3L removal, SAAG 0.3 and cytology is pending   In the ED patient had a CTA of the chest as she was complaining of Chest discomfort and she was found to have an Acute PE in the right Upper pulmonary artery segmental branch, started on Lovenox.     Uterine Cancer History     In the ED Vital Signs   T(C): 36.9 (06 Aug 2020 23:31), Max: 37.2 (06 Aug 2020 15:11)  T(F): 98.4 (06 Aug 2020 23:31), Max: 98.9 (06 Aug 2020 15:11)  HR: 101 (06 Aug 2020 23:31) (101 - 121)  BP: 114/63 (06 Aug 2020 23:31) (114/63 - 127/72)  RR: 18 (06 Aug 2020 23:31) (18 - 20)  SpO2: 98% (06 Aug 2020 23:31) (98% - 99%) (06 Aug 2020 23:18)       ROS:  Negative except for what mentioned in HPI    PAST MEDICAL & SURGICAL HISTORY:  HLD (hyperlipidemia)  HTN (hypertension)  CAD (coronary artery disease)  History of coronary artery bypass surgery      SOCIAL HISTORY:    FAMILY HISTORY:  No pertinent family history in first degree relatives      MEDICATIONS  (STANDING):  amLODIPine   Tablet 5 milliGRAM(s) Oral daily  aspirin enteric coated 81 milliGRAM(s) Oral daily  enoxaparin Injectable 70 milliGRAM(s) SubCutaneous every 12 hours  metoprolol succinate ER 25 milliGRAM(s) Oral daily  sodium chloride 0.9%. 1000 milliLiter(s) (60 mL/Hr) IV Continuous <Continuous>    MEDICATIONS  (PRN):  clonazePAM  Tablet 0.5 milliGRAM(s) Oral daily PRN anxiety      Allergies    chloroquine (Hives)    Intolerances        Vital Signs Last 24 Hrs  T(C): 37.5 (11 Aug 2020 07:40), Max: 37.5 (11 Aug 2020 04:49)  T(F): 99.5 (11 Aug 2020 07:40), Max: 99.5 (11 Aug 2020 04:49)  HR: 95 (11 Aug 2020 07:40) (93 - 103)  BP: 115/59 (11 Aug 2020 07:40) (114/57 - 122/57)  BP(mean): --  RR: 19 (11 Aug 2020 07:40) (19 - 20)  SpO2: --    PHYSICAL EXAM  General: adult in NAD  HEENT: clear oropharynx, anicteric sclera, pink conjunctiva  Neck: supple  CV: normal S1/S2 with no murmur rubs or gallops  Lungs: positive air movement b/l ant lungs,clear to auscultation, no wheezes, no rales  Abdomen: soft non-tender non-distended, no hepatosplenomegaly  Ext: no clubbing cyanosis or edema  Skin: no rashes and no petechiae  Neuro: alert and oriented X 4, no focal deficits      08-10-20 @ 07:01  -  08-11-20 @ 07:00  --------------------------------------------------------  IN: 1170 mL / OUT: 0 mL / NET: 1170 mL      LABS:                          11.6   7.13  )-----------( 425      ( 11 Aug 2020 05:45 )             35.5         Mean Cell Volume : 79.4 fL  Mean Cell Hemoglobin : 26.0 pg  Mean Cell Hemoglobin Concentration : 32.7 g/dL  Auto Neutrophil # : 4.59 K/uL  Auto Lymphocyte # : 1.23 K/uL  Auto Monocyte # : 0.98 K/uL  Auto Eosinophil # : 0.20 K/uL  Auto Basophil # : 0.06 K/uL  Auto Neutrophil % : 64.4 %  Auto Lymphocyte % : 17.3 %  Auto Monocyte % : 13.7 %  Auto Eosinophil % : 2.8 %  Auto Basophil % : 0.8 %      08-11    140  |  104  |  6<L>  ----------------------------<  90  4.1   |  23  |  0.6<L>    Ca    7.6<L>      11 Aug 2020 05:45    TPro  5.1<L>  /  Alb  2.7<L>  /  TBili  0.2  /  DBili  x   /  AST  29  /  ALT  12  /  AlkPhos  47  08-11      PT/INR - ( 11 Aug 2020 05:45 )   PT: 15.60 sec;   INR: 1.36 ratio         PTT - ( 11 Aug 2020 05:45 )  PTT:36.0 sec                BLOOD SMEAR INTERPRETATION:       RADIOLOGY & ADDITIONAL STUDIES:      < from: CT Abdomen and Pelvis w/ IV Cont (08.06.20 @ 19:33) >  1. Intraluminal filling defect is seen within the segmental branches of the right lower lobe pulmonary artery, indicating acute pulmonary embolism. (6/132-138; 10/51-55). Filling defect in segmental branch of the right upper lobe pulmonaryartery. (6/77) There is a small filling defect within a left lower lobe pulmonary artery segmental branch (6/147; 10/59). No evidence of right heart strain. The right ventricle measures 3.5 cm; the left ventricle measures 3.7 cm. (RV:LV<1)    2. Compared with the previous CT scan of 5/27/2020, there is now a large amount of ascites throughout the abdomen and pelvis. HEMATOLOGY ONCOLOGY CONSULT     Patient is a 81y old  Female who presents with a chief complaint of abdominal pain (11 Aug 2020 08:24)      HPI:  This is a 81 year old F patient with past medical history of Uterine cancer s/p SHAAN, CAD s/p CABG, HTN and depression present to the ED for abdominal pain and chest discomfort. found to have new onset ascites, s/p paracentesis with 3L removal, SAAG 0.3 and cytology is pending   In the ED patient had a CTA of the chest as she was complaining of Chest discomfort and she was found to have an Acute PE in the right Upper pulmonary artery segmental branch, started on Lovenox.     Uterine Cancer History     In the ED Vital Signs   T(C): 36.9 (06 Aug 2020 23:31), Max: 37.2 (06 Aug 2020 15:11)  T(F): 98.4 (06 Aug 2020 23:31), Max: 98.9 (06 Aug 2020 15:11)  HR: 101 (06 Aug 2020 23:31) (101 - 121)  BP: 114/63 (06 Aug 2020 23:31) (114/63 - 127/72)  RR: 18 (06 Aug 2020 23:31) (18 - 20)  SpO2: 98% (06 Aug 2020 23:31) (98% - 99%) (06 Aug 2020 23:18)       ROS:  Negative except for what mentioned in HPI    PAST MEDICAL & SURGICAL HISTORY:  HLD (hyperlipidemia)  HTN (hypertension)  CAD (coronary artery disease)  History of coronary artery bypass surgery      SOCIAL HISTORY:    FAMILY HISTORY:  No pertinent family history in first degree relatives      MEDICATIONS  (STANDING):  amLODIPine   Tablet 5 milliGRAM(s) Oral daily  aspirin enteric coated 81 milliGRAM(s) Oral daily  enoxaparin Injectable 70 milliGRAM(s) SubCutaneous every 12 hours  metoprolol succinate ER 25 milliGRAM(s) Oral daily  sodium chloride 0.9%. 1000 milliLiter(s) (60 mL/Hr) IV Continuous <Continuous>    MEDICATIONS  (PRN):  clonazePAM  Tablet 0.5 milliGRAM(s) Oral daily PRN anxiety      Allergies    chloroquine (Hives)    Intolerances        Vital Signs Last 24 Hrs  T(C): 37.5 (11 Aug 2020 07:40), Max: 37.5 (11 Aug 2020 04:49)  T(F): 99.5 (11 Aug 2020 07:40), Max: 99.5 (11 Aug 2020 04:49)  HR: 95 (11 Aug 2020 07:40) (93 - 103)  BP: 115/59 (11 Aug 2020 07:40) (114/57 - 122/57)  BP(mean): --  RR: 19 (11 Aug 2020 07:40) (19 - 20)  SpO2: --    PHYSICAL EXAM  General: adult in NAD  HEENT: clear oropharynx, anicteric sclera, pink conjunctiva  Neck: supple  CV: normal S1/S2 with no murmur rubs or gallops  Lungs: positive air movement b/l ant lungs,clear to auscultation, no wheezes, no rales  Abdomen: distended no tenderness  Ext: no clubbing cyanosis or edema  Skin: no rashes and no petechiae  Neuro: alert and oriented X 4, no focal deficits      08-10-20 @ 07:01  -  08-11-20 @ 07:00  --------------------------------------------------------  IN: 1170 mL / OUT: 0 mL / NET: 1170 mL      LABS:                          11.6   7.13  )-----------( 425      ( 11 Aug 2020 05:45 )             35.5         Mean Cell Volume : 79.4 fL  Mean Cell Hemoglobin : 26.0 pg  Mean Cell Hemoglobin Concentration : 32.7 g/dL  Auto Neutrophil # : 4.59 K/uL  Auto Lymphocyte # : 1.23 K/uL  Auto Monocyte # : 0.98 K/uL  Auto Eosinophil # : 0.20 K/uL  Auto Basophil # : 0.06 K/uL  Auto Neutrophil % : 64.4 %  Auto Lymphocyte % : 17.3 %  Auto Monocyte % : 13.7 %  Auto Eosinophil % : 2.8 %  Auto Basophil % : 0.8 %      08-11    140  |  104  |  6<L>  ----------------------------<  90  4.1   |  23  |  0.6<L>    Ca    7.6<L>      11 Aug 2020 05:45    TPro  5.1<L>  /  Alb  2.7<L>  /  TBili  0.2  /  DBili  x   /  AST  29  /  ALT  12  /  AlkPhos  47  08-11      PT/INR - ( 11 Aug 2020 05:45 )   PT: 15.60 sec;   INR: 1.36 ratio         PTT - ( 11 Aug 2020 05:45 )  PTT:36.0 sec                BLOOD SMEAR INTERPRETATION:       RADIOLOGY & ADDITIONAL STUDIES:      < from: CT Abdomen and Pelvis w/ IV Cont (08.06.20 @ 19:33) >  1. Intraluminal filling defect is seen within the segmental branches of the right lower lobe pulmonary artery, indicating acute pulmonary embolism. (6/132-138; 10/51-55). Filling defect in segmental branch of the right upper lobe pulmonaryartery. (6/77) There is a small filling defect within a left lower lobe pulmonary artery segmental branch (6/147; 10/59). No evidence of right heart strain. The right ventricle measures 3.5 cm; the left ventricle measures 3.7 cm. (RV:LV<1)    2. Compared with the previous CT scan of 5/27/2020, there is now a large amount of ascites throughout the abdomen and pelvis.

## 2020-08-11 NOTE — PROGRESS NOTE ADULT - SUBJECTIVE AND OBJECTIVE BOX
Hospital Day:  4d    Patient is a 81y old  Female who presents with a chief complaint of abdominal distention     Subjective:  pt seen and examined at bedside. No significant overnight events. pt reports being constipated in the past few days despite taking PRN laxative. on laxatives.     Past Medical Hx:   HLD (hyperlipidemia)  HTN (hypertension)  CAD (coronary artery disease)  No pertinent past medical history    Past Sx:  History of coronary artery bypass surgery    Allergies:  chloroquine (Hives)    Current Meds:   Standng Meds:  amLODIPine   Tablet 5 milliGRAM(s) Oral daily  aspirin enteric coated 81 milliGRAM(s) Oral daily  enoxaparin Injectable 70 milliGRAM(s) SubCutaneous every 12 hours  metoprolol succinate ER 25 milliGRAM(s) Oral daily  sodium chloride 0.9%. 1000 milliLiter(s) (60 mL/Hr) IV Continuous <Continuous>    PRN Meds:  clonazePAM  Tablet 0.5 milliGRAM(s) Oral daily PRN anxiety    HOME MEDICATIONS:  amLODIPine 5 mg oral tablet: 1 tab(s) orally once a day  aspirin 81 mg oral delayed release tablet: 1 tab(s) orally once a day  clonazePAM 0.5 mg oral tablet: 1 tab(s) orally once a day  metoprolol succinate 25 mg oral tablet, extended release: 1 tab(s) orally once a day  rosuvastatin 10 mg oral tablet: 1 tab(s) orally once a day    Vital Signs Last 24 Hrs  T(C): 37.5 (11 Aug 2020 07:40), Max: 37.5 (11 Aug 2020 04:49)  T(F): 99.5 (11 Aug 2020 07:40), Max: 99.5 (11 Aug 2020 04:49)  HR: 95 (11 Aug 2020 07:40) (93 - 103)  BP: 115/59 (11 Aug 2020 07:40) (114/57 - 122/57)  BP(mean): --  RR: 19 (11 Aug 2020 07:40) (19 - 20)  --        Physical Exam:   GENERAL: NAD, sitting comfortably in bed  HEENT: NCAT  CHEST/LUNG: CTAB  HEART: Regular rate and rhythm; s1 s2 appreciated, No murmurs, rubs, or gallops  ABDOMEN: Soft, Nontender, distended; Bowel sounds present  EXTREMITIES: No LE edema b/l  SKIN: no rashes, no new lesions  NERVOUS SYSTEM:  Alert & Oriented X3      Labs:                         11.2   8.20  )-----------( 477      ( 09 Aug 2020 11:10 )             34.2     Neutophil% 73.1, Lymphocyte% 10.6, Monocyte% 14.0, Bands% 0.5 20 @ 11:10    09 Aug 2020 04:30    137    |  102    |  7      ----------------------------<  90     4.2     |  21     |  0.7      Ca    7.5        09 Aug 2020 04:30  Mg     1.7       09 Aug 2020 04:30    TPro  5.0    /  Alb  2.6    /  TBili  0.3    /  DBili  x      /  AST  23     /  ALT  9      /  AlkPhos  50     09 Aug 2020 04:30        Amylase --, Lipase 20, 20 @ 16:04        Troponin <0.01, CKMB --, CK -- 20 @ 16:04        Urinalysis Basic - ( 06 Aug 2020 19:35 )    Color: Light Yellow / Appearance: Clear / S.030 / pH: x  Gluc: x / Ketone: Negative  / Bili: Negative / Urobili: <2 mg/dL   Blood: x / Protein: Negative / Nitrite: Negative   Leuk Esterase: Negative / RBC: x / WBC x   Sq Epi: x / Non Sq Epi: x / Bacteria: x    Radiology:   < from: CT Abdomen and Pelvis w/ IV Cont (20 @ 19:33) >  PRESSION:    1. Intraluminal filling defect is seen within the segmental branches of the right lower lobe pulmonary artery, indicating acute pulmonary embolism. (132-138; 10/51-). Filling defect in segmental branch of the right upper lobe pulmonaryartery. () There is a small filling defect within a left lower lobe pulmonary artery segmental branch (147; 10/59). No evidence of right heart strain. The right ventricle measures 3.5 cm; the left ventricle measures 3.7 cm. (RV:LV<1)    2. Compared with the previous CT scan of 2020, there is now a large amount of ascites throughout the abdomen and pelvis.        < end of copied text >    < from: [facility] Duplex Lower Ext Vein Scan, Bilat (20 @ 10:04) >  Impression:    No evidence of deep venous thrombosis or superficial thrombophlebitis in the bilateral lower extremities.  Left popliteal fossa fluid collection as measured above likely Baker's cyst    < end of copied text >      Assessment and Plan:   1. This is a 81 year old F patient with past medical history of CAD s/p CABG, Uterine cancer s/p SHAAN, HTN and depression present to the ED for abdominal pain and chest discomfort.    #Abdominal pain and distension   - new onset ascites s/p 3 liters of cloudy bloody fluid drainage  - no evidence of metabolic encephalopathy  / most probably secondary to malignancy   - patient not known to have liver cirrhosis  -Patient has a distended abdomen with new ascites on the Ct scan of the abdomen   -f/u cytology   -gram stain did not show any organism  -fluid count showed RBC + lymphocytosis   -Liver enzymes are WNL  -d-dimers 5329   -malignancy is high on the differential especially in the setting of PE   - f/u GI   -patient has a history of Uterine cancer   -Patient follows up with Dr. Mccain as an outpatient, she requested to see [facility] , Hem/onc  -Patient does not have a history of liver disease, she did not take any new medication and no identifiable cause was identified in the history.  -s/p hemo onc - Follow [facility] and      #Pulmonary embolism   - Segmental PE with normal Vital signs, Troponin is negative   - on Lovenox 70 mg BID     #CAD s/p CABG   -Continue with ASA     #HTN  -Continue with amlodipine and Metoprolol     #DLD  -Continue with statin    DVT ppx: Enoxaparin 70 BID   GI ppx:  not needed  Code Status:  Full code  DISPO: acute for now     positive. Consult OB gynecology along with oncology following the patient     Gastroenterology and hematology note appreciated   With the follow up of the cytology from the ascites fluid will determine the need of colonoscopy and EGD.   Dr. Ortega/  office contacted for the records. Patient is no longer followed by Dr. Ortega office . Spoken to Dr. Abdullahi.

## 2020-08-11 NOTE — PROGRESS NOTE ADULT - SUBJECTIVE AND OBJECTIVE BOX
Hospital Day:  5d    Patient is a 81y old  Female who presents with a chief complaint of 5d    Subjective:  pt seen and examined at bedside. no significant events overnight.  pt reports feeling uncomfortable from her belly being distended but not having any breathing difficulty. She wants a therapeutic paracentesis if possible.     Past Medical Hx:   HLD (hyperlipidemia)  HTN (hypertension)  CAD (coronary artery disease)  No pertinent past medical history    Past Sx:  History of coronary artery bypass surgery    Allergies:  chloroquine (Hives)    Current Meds:   Standng Meds:  amLODIPine   Tablet 5 milliGRAM(s) Oral daily  aspirin enteric coated 81 milliGRAM(s) Oral daily  enoxaparin Injectable 70 milliGRAM(s) SubCutaneous every 12 hours  metoprolol succinate ER 25 milliGRAM(s) Oral daily  sodium chloride 0.9%. 1000 milliLiter(s) (60 mL/Hr) IV Continuous <Continuous>    PRN Meds:  clonazePAM  Tablet 0.5 milliGRAM(s) Oral daily PRN anxiety    HOME MEDICATIONS:  amLODIPine 5 mg oral tablet: 1 tab(s) orally once a day  aspirin 81 mg oral delayed release tablet: 1 tab(s) orally once a day  clonazePAM 0.5 mg oral tablet: 1 tab(s) orally once a day  metoprolol succinate 25 mg oral tablet, extended release: 1 tab(s) orally once a day  rosuvastatin 10 mg oral tablet: 1 tab(s) orally once a day      Vital Signs:   T(F): 99.5 (08-11-20 @ 07:40), Max: 99.5 (08-11-20 @ 04:49)  HR: 95 (08-11-20 @ 07:40) (93 - 103)  BP: 115/59 (08-11-20 @ 07:40) (114/57 - 122/57)  RR: 19 (08-11-20 @ 07:40) (19 - 20)  SpO2: --      08-10-20 @ 07:01  -  08-11-20 @ 07:00  --------------------------------------------------------  IN: 1170 mL / OUT: 0 mL / NET: 1170 mL        Physical Exam:   GENERAL: NAD  HEENT: NCAT  CHEST/LUNG: CTAB  HEART: Regular rate and rhythm; s1 s2 appreciated, No murmurs, rubs, or gallops  ABDOMEN: Soft, Nontender, distended; Bowel sounds present  EXTREMITIES: No LE edema b/l  SKIN: no rashes, no new lesions  NERVOUS SYSTEM:  Alert & Oriented X3        Labs:                         11.6   7.13  )-----------( 425      ( 11 Aug 2020 05:45 )             35.5     Neutophil% 64.4, Lymphocyte% 17.3, Monocyte% 13.7, Bands% 1.0 08-11-20 @ 05:45    11 Aug 2020 05:45    140    |  104    |  6      ----------------------------<  90     4.1     |  23     |  0.6      Ca    7.6        11 Aug 2020 05:45    TPro  5.1    /  Alb  2.7    /  TBili  0.2    /  DBili  x      /  AST  29     /  ALT  12     /  AlkPhos  47     11 Aug 2020 05:45       pTT    36.0             ----< 1.36 INR  (08-11-20 @ 05:45)    15.60        PT    Amylase --, Lipase 20, 08-06-20 @ 16:04      Cancer Antigen, 125 (08.09.20 @ 16:18)    Cancer Antigen, 125: 217: Method: Roche Electrochemiluminescence Immuno Assay  Values obtained with different assay methods or kits cannot be  used interchangeably.  Results cannot be interpreted as absolute evidence of the presence  or absence of malignant disease. U/mL    Carcinoembryonic Antigen: 0.7: Method: Roche Electrochemiluminescence Immuno Assay  Values obtained with different assay methods or kits cannot be  used interchangeably.  Results cannot be interpreted as absolute evidence of the presence  or absence of malignant disease.   [facility] Normal Ranges   _________________   Non-smoker: less than 3.9 ng/mL       Smoker: less than 5.5 ng/mL ng/mL (08.09.20 @ 16:18)        Radiology:   sono guided paracentesis to be attempted today if enough fluid is present.       Assessment and Plan:   1. This is a 81 year old F patient with past medical history of CAD s/p CABG, Uterine cancer s/p SHAAN, HTN and depression present to the ED for abdominal pain and chest discomfort.    #Abdominal pain and distension   - new onset ascites s/p 3 liters of cloudy bloody fluid drainage  - no evidence of metabolic encephalopathy  / most probably secondary to malignancy   - patient not known to have liver cirrhosis  -Patient has a distended abdomen with new ascites on the Ct scan of the abdomen   -f/u cytology   -gram stain did not show any organism  -fluid count showed RBC + lymphocytosis   -Liver enzymes are WNL  -d-dimers 5329   -malignancy is high on the differential especially in the setting of PE   - f/u GI   -patient has a history of Uterine cancer   -Patient follows up with Dr. Mccain as an outpatient, she requested to see [facility] , Hem/onc  -Patient does not have a history of liver disease, she did not take any new medication and no identifiable cause was identified in the history.  -s/p hemo onc   - positive. Consult OB gynecology along with oncology following the patient   - will attempt to do paracentesis today if ultrasound shows enough fluid. (pt reports being uncomfortable from abdominal today).    #Pulmonary embolism   - Segmental PE with normal Vital signs, Troponin is negative   - on Lovenox 70 mg BID     #CAD s/p CABG   -Continue with ASA     #HTN  -Continue with amlodipine and Metoprolol     #DLD  -Continue with statin    DVT ppx: Enoxaparin 70 BID   GI ppx:  not needed  Code Status:  Full code  DISPO: acute for now        Gastroenterology and hematology note appreciated   With the follow up of the cytology from the ascites fluid will determine the need of colonoscopy and EGD.   Dr. Ortega/  office contacted for the records. Patient is no longer followed by Dr. Ortega office . Spoken to Dr. Abdullahi.     DVT ppx:    GI ppx:     Code Status:     DISPO:

## 2020-08-12 LAB
ALBUMIN SERPL ELPH-MCNC: 2.6 G/DL — LOW (ref 3.5–5.2)
ALP SERPL-CCNC: 48 U/L — SIGNIFICANT CHANGE UP (ref 30–115)
ALT FLD-CCNC: 15 U/L — SIGNIFICANT CHANGE UP (ref 0–41)
ANION GAP SERPL CALC-SCNC: 12 MMOL/L — SIGNIFICANT CHANGE UP (ref 7–14)
AST SERPL-CCNC: 31 U/L — SIGNIFICANT CHANGE UP (ref 0–41)
BASOPHILS # BLD AUTO: 0.05 K/UL — SIGNIFICANT CHANGE UP (ref 0–0.2)
BASOPHILS NFR BLD AUTO: 0.9 % — SIGNIFICANT CHANGE UP (ref 0–1)
BILIRUB SERPL-MCNC: 0.3 MG/DL — SIGNIFICANT CHANGE UP (ref 0.2–1.2)
BUN SERPL-MCNC: 6 MG/DL — LOW (ref 10–20)
CALCIUM SERPL-MCNC: 7.7 MG/DL — LOW (ref 8.5–10.1)
CHLORIDE SERPL-SCNC: 102 MMOL/L — SIGNIFICANT CHANGE UP (ref 98–110)
CO2 SERPL-SCNC: 25 MMOL/L — SIGNIFICANT CHANGE UP (ref 17–32)
CREAT SERPL-MCNC: 0.6 MG/DL — LOW (ref 0.7–1.5)
CULTURE RESULTS: SIGNIFICANT CHANGE UP
EOSINOPHIL # BLD AUTO: 0.16 K/UL — SIGNIFICANT CHANGE UP (ref 0–0.7)
EOSINOPHIL NFR BLD AUTO: 2.7 % — SIGNIFICANT CHANGE UP (ref 0–8)
GLUCOSE BLDC GLUCOMTR-MCNC: 100 MG/DL — HIGH (ref 70–99)
GLUCOSE SERPL-MCNC: 86 MG/DL — SIGNIFICANT CHANGE UP (ref 70–99)
HCT VFR BLD CALC: 35.5 % — LOW (ref 37–47)
HGB BLD-MCNC: 11.6 G/DL — LOW (ref 12–16)
IMM GRANULOCYTES NFR BLD AUTO: 0.5 % — HIGH (ref 0.1–0.3)
LYMPHOCYTES # BLD AUTO: 0.9 K/UL — LOW (ref 1.2–3.4)
LYMPHOCYTES # BLD AUTO: 15.3 % — LOW (ref 20.5–51.1)
MCHC RBC-ENTMCNC: 25.9 PG — LOW (ref 27–31)
MCHC RBC-ENTMCNC: 32.7 G/DL — SIGNIFICANT CHANGE UP (ref 32–37)
MCV RBC AUTO: 79.2 FL — LOW (ref 81–99)
MONOCYTES # BLD AUTO: 0.79 K/UL — HIGH (ref 0.1–0.6)
MONOCYTES NFR BLD AUTO: 13.4 % — HIGH (ref 1.7–9.3)
NEUTROPHILS # BLD AUTO: 3.95 K/UL — SIGNIFICANT CHANGE UP (ref 1.4–6.5)
NEUTROPHILS NFR BLD AUTO: 67.2 % — SIGNIFICANT CHANGE UP (ref 42.2–75.2)
NON-GYNECOLOGICAL CYTOLOGY STUDY: SIGNIFICANT CHANGE UP
NRBC # BLD: 0 /100 WBCS — SIGNIFICANT CHANGE UP (ref 0–0)
PLATELET # BLD AUTO: 430 K/UL — HIGH (ref 130–400)
POTASSIUM SERPL-MCNC: 4 MMOL/L — SIGNIFICANT CHANGE UP (ref 3.5–5)
POTASSIUM SERPL-SCNC: 4 MMOL/L — SIGNIFICANT CHANGE UP (ref 3.5–5)
PROT SERPL-MCNC: 5 G/DL — LOW (ref 6–8)
RBC # BLD: 4.48 M/UL — SIGNIFICANT CHANGE UP (ref 4.2–5.4)
RBC # FLD: 14.3 % — SIGNIFICANT CHANGE UP (ref 11.5–14.5)
SODIUM SERPL-SCNC: 139 MMOL/L — SIGNIFICANT CHANGE UP (ref 135–146)
SPECIMEN SOURCE: SIGNIFICANT CHANGE UP
WBC # BLD: 5.88 K/UL — SIGNIFICANT CHANGE UP (ref 4.8–10.8)
WBC # FLD AUTO: 5.88 K/UL — SIGNIFICANT CHANGE UP (ref 4.8–10.8)

## 2020-08-12 PROCEDURE — 99233 SBSQ HOSP IP/OBS HIGH 50: CPT

## 2020-08-12 PROCEDURE — 99213 OFFICE O/P EST LOW 20 MIN: CPT

## 2020-08-12 RX ADMIN — ENOXAPARIN SODIUM 70 MILLIGRAM(S): 100 INJECTION SUBCUTANEOUS at 05:17

## 2020-08-12 RX ADMIN — ENOXAPARIN SODIUM 70 MILLIGRAM(S): 100 INJECTION SUBCUTANEOUS at 17:07

## 2020-08-12 RX ADMIN — Medication 81 MILLIGRAM(S): at 12:19

## 2020-08-12 RX ADMIN — Medication 25 MILLIGRAM(S): at 05:16

## 2020-08-12 RX ADMIN — AMLODIPINE BESYLATE 5 MILLIGRAM(S): 2.5 TABLET ORAL at 05:16

## 2020-08-12 NOTE — PROGRESS NOTE ADULT - SUBJECTIVE AND OBJECTIVE BOX
Hospital Day:  6d    Patient is a 81y old  Female who presents with a chief complaint of 6d    Subjective:    Past Medical Hx:   HLD (hyperlipidemia)  HTN (hypertension)  CAD (coronary artery disease)  No pertinent past medical history    Past Sx:  History of coronary artery bypass surgery    Allergies:  chloroquine (Hives)    Current Meds:   Standng Meds:  amLODIPine   Tablet 5 milliGRAM(s) Oral daily  aspirin enteric coated 81 milliGRAM(s) Oral daily  enoxaparin Injectable 70 milliGRAM(s) SubCutaneous every 12 hours  metoprolol succinate ER 25 milliGRAM(s) Oral daily    PRN Meds:  clonazePAM  Tablet 0.5 milliGRAM(s) Oral daily PRN anxiety    HOME MEDICATIONS:  amLODIPine 5 mg oral tablet: 1 tab(s) orally once a day  aspirin 81 mg oral delayed release tablet: 1 tab(s) orally once a day  clonazePAM 0.5 mg oral tablet: 1 tab(s) orally once a day  metoprolol succinate 25 mg oral tablet, extended release: 1 tab(s) orally once a day  rosuvastatin 10 mg oral tablet: 1 tab(s) orally once a day      Vital Signs:   T(F): 98.7 (08-12-20 @ 07:53), Max: 98.7 (08-12-20 @ 07:53)  HR: 90 (08-12-20 @ 07:53) (85 - 94)  BP: 112/55 (08-12-20 @ 07:53) (112/55 - 128/79)  RR: 19 (08-12-20 @ 07:53) (19 - 19)  SpO2: --        Physical Exam:   GENERAL: NAD  HEENT: NCAT  CHEST/LUNG: CTAB  HEART: Regular rate and rhythm; s1 s2 appreciated, No murmurs, rubs, or gallops  ABDOMEN: Soft, Nontender, Nondistended; Bowel sounds present  EXTREMITIES: No LE edema b/l  SKIN: no rashes, no new lesions  NERVOUS SYSTEM:  Alert & Oriented X3  LINES/CATHETERS:        Labs:                         11.6   5.88  )-----------( 430      ( 12 Aug 2020 05:30 )             35.5     Neutophil% 67.2, Lymphocyte% 15.3, Monocyte% 13.4, Bands% 0.5 08-12-20 @ 05:30    12 Aug 2020 05:30    139    |  102    |  6      ----------------------------<  86     4.0     |  25     |  0.6      Ca    7.7        12 Aug 2020 05:30    TPro  5.0    /  Alb  2.6    /  TBili  0.3    /  DBili  x      /  AST  31     /  ALT  15     /  AlkPhos  48     12 Aug 2020 05:30    Radiology:       Assessment and Plan:   This is a 81 year old F patient with past medical history of CAD s/p CABG, Uterine cancer s/p SHAAN, HTN and depression present to the ED for abdominal pain and chest discomfort.    #Abdominal pain and distension   - new onset ascites s/p 3 liters of cloudy bloody fluid drainage  - no evidence of metabolic encephalopathy  / most probably secondary to malignancy   - patient not known to have liver cirrhosis  -Patient has a distended abdomen with new ascites on the Ct scan of the abdomen   -f/u cytology   -gram stain did not show any organism  -fluid count showed RBC + lymphocytosis   -Liver enzymes are WNL  -d-dimers 5329   -malignancy is high on the differential especially in the setting of PE   - f/u GI   -patient has a history of Uterine cancer   -Patient follows up with Dr. Mccain as an outpatient, she requested to see [facility] , Hem/onc  -Patient does not have a history of liver disease, she did not take any new medication and no identifiable cause was identified in the history.  -s/p hemo onc   - positive. Consult OB gynecology along with oncology following the patient   - will attempt to do paracentesis today if ultrasound shows enough fluid. (pt reports being uncomfortable from abdominal today).    #Pulmonary embolism   - Segmental PE with normal Vital signs, Troponin is negative   - on Lovenox 70 mg BID     #CAD s/p CABG   -Continue with ASA     #HTN  -Continue with amlodipine and Metoprolol     #DLD  -Continue with statin    DVT ppx: Enoxaparin 70 BID   GI ppx:  not needed  Code Status:  Full code  DISPO: acute for now        Gastroenterology and hematology note appreciated   With the follow up of the cytology from the ascites fluid will determine the need of colonoscopy and EGD.   Dr. Ortega/  office contacted for the records. Patient is no longer followed by Dr. Ortega office . Spoken to Dr. Abdullahi. Hospital Day:  6d    Patient is a 81y old  Female who presents with a chief complaint of 6d    Subjective:  pt seen and examined at bedside. s/p 4 liters paracentesis yesterday.   she reports feeling more comfortable, with no complaints or pain overnight  .  Past Medical Hx:   HLD (hyperlipidemia)  HTN (hypertension)  CAD (coronary artery disease)  No pertinent past medical history    Past Sx:  History of coronary artery bypass surgery    Allergies:  chloroquine (Hives)    Current Meds:   Standng Meds:  amLODIPine   Tablet 5 milliGRAM(s) Oral daily  aspirin enteric coated 81 milliGRAM(s) Oral daily  enoxaparin Injectable 70 milliGRAM(s) SubCutaneous every 12 hours  metoprolol succinate ER 25 milliGRAM(s) Oral daily    PRN Meds:  clonazePAM  Tablet 0.5 milliGRAM(s) Oral daily PRN anxiety    HOME MEDICATIONS:  amLODIPine 5 mg oral tablet: 1 tab(s) orally once a day  aspirin 81 mg oral delayed release tablet: 1 tab(s) orally once a day  clonazePAM 0.5 mg oral tablet: 1 tab(s) orally once a day  metoprolol succinate 25 mg oral tablet, extended release: 1 tab(s) orally once a day  rosuvastatin 10 mg oral tablet: 1 tab(s) orally once a day      Vital Signs:   T(F): 98.7 (08-12-20 @ 07:53), Max: 98.7 (08-12-20 @ 07:53)  HR: 90 (08-12-20 @ 07:53) (85 - 94)  BP: 112/55 (08-12-20 @ 07:53) (112/55 - 128/79)  RR: 19 (08-12-20 @ 07:53) (19 - 19)  SpO2: --      Physical Exam:   GENERAL: NAD  HEENT: NCAT  CHEST/LUNG: CTAB  HEART: Regular rate and rhythm; s1 s2 appreciated, No murmurs, rubs, or gallops  ABDOMEN: Soft, Nontender, distended; Bowel sounds present  EXTREMITIES: No LE edema b/l  SKIN: no rashes, no new lesions  NERVOUS SYSTEM:  Alert & Oriented X3  LINES/CATHETERS: midline in right arm.         Labs:                         11.6   5.88  )-----------( 430      ( 12 Aug 2020 05:30 )             35.5     Neutophil% 67.2, Lymphocyte% 15.3, Monocyte% 13.4, Bands% 0.5 08-12-20 @ 05:30    12 Aug 2020 05:30    139    |  102    |  6      ----------------------------<  86     4.0     |  25     |  0.6      Ca    7.7        12 Aug 2020 05:30    TPro  5.0    /  Alb  2.6    /  TBili  0.3    /  DBili  x      /  AST  31     /  ALT  15     /  AlkPhos  48     12 Aug 2020 05:30    Radiology:       Assessment and Plan:   This is a 81 year old F patient with past medical history of CAD s/p CABG, Uterine cancer s/p SHAAN, HTN and depression present to the ED for abdominal pain and chest discomfort.    #Abdominal pain and distension   - new onset ascites s/p 3 liters of cloudy bloody fluid drainage  - no evidence of metabolic encephalopathy  / most probably secondary to malignancy   - patient not known to have liver cirrhosis  -Patient has a distended abdomen with new ascites on the Ct scan of the abdomen   -f/u cytology   -gram stain did not show any organism  -fluid count showed RBC + lymphocytosis   -Liver enzymes are WNL  -d-dimers 5329   -malignancy is high on the differential especially in the setting of PE   - f/u GI   -patient has a history of Uterine cancer   -Patient follows up with Dr. Mccain as an outpatient, she requested to see [facility] , Hem/onc  -Patient does not have a history of liver disease, she did not take any new medication and no identifiable cause was identified in the history.  -s/p hemo onc   - positive. Consult OB gynecology  along with oncology following the patient placed .  - Oncology will follow up as outpatient ( Dr. Gutierrez). No surgical intervention for now.  - will follow GYN onc.  - S/P 2 paracentesis (8/7: 3 liters and ,8/11 4 liters) .   - paracentesis as needed for abdominal discomfort. , next paracentesis send more cytology for more cytopathologic tests.     #Pulmonary embolism   - Segmental PE with normal Vital signs, Troponin is negative , diagnosed on admission  - on Lovenox 70 mg BID     #CAD s/p CABG   -Continue with ASA     #HTN  -Continue with amlodipine and Metoprolol     #DLD  -Continue with statin    DVT ppx: Enoxaparin 70 BID   GI ppx:  not needed  Code Status:  Full code  DISPO: acute for now    \ Hospital Day:  6d    Patient is a 81y old  Female who presents with a chief complaint of 6d    Subjective:  pt seen and examined at bedside. s/p 4 liters paracentesis yesterday.   she reports feeling more comfortable, with no complaints or pain overnight  .  Past Medical Hx:   HLD (hyperlipidemia)  HTN (hypertension)  CAD (coronary artery disease)  No pertinent past medical history    Past Sx:  History of coronary artery bypass surgery    Allergies:  chloroquine (Hives)    Current Meds:   Standng Meds:  amLODIPine   Tablet 5 milliGRAM(s) Oral daily  aspirin enteric coated 81 milliGRAM(s) Oral daily  enoxaparin Injectable 70 milliGRAM(s) SubCutaneous every 12 hours  metoprolol succinate ER 25 milliGRAM(s) Oral daily    PRN Meds:  clonazePAM  Tablet 0.5 milliGRAM(s) Oral daily PRN anxiety    HOME MEDICATIONS:  amLODIPine 5 mg oral tablet: 1 tab(s) orally once a day  aspirin 81 mg oral delayed release tablet: 1 tab(s) orally once a day  clonazePAM 0.5 mg oral tablet: 1 tab(s) orally once a day  metoprolol succinate 25 mg oral tablet, extended release: 1 tab(s) orally once a day  rosuvastatin 10 mg oral tablet: 1 tab(s) orally once a day      Vital Signs:   T(F): 98.7 (08-12-20 @ 07:53), Max: 98.7 (08-12-20 @ 07:53)  HR: 90 (08-12-20 @ 07:53) (85 - 94)  BP: 112/55 (08-12-20 @ 07:53) (112/55 - 128/79)  RR: 19 (08-12-20 @ 07:53) (19 - 19)  SpO2: --      Physical Exam:   GENERAL: NAD  HEENT: NCAT  CHEST/LUNG: CTAB  HEART: Regular rate and rhythm; s1 s2 appreciated, No murmurs, rubs, or gallops  ABDOMEN: Soft, Nontender, distended; Bowel sounds present  EXTREMITIES: No LE edema b/l  SKIN: no rashes, no new lesions  NERVOUS SYSTEM:  Alert & Oriented X3  LINES/CATHETERS: midline in right arm.         Labs:                         11.6   5.88  )-----------( 430      ( 12 Aug 2020 05:30 )             35.5     Neutophil% 67.2, Lymphocyte% 15.3, Monocyte% 13.4, Bands% 0.5 08-12-20 @ 05:30    12 Aug 2020 05:30    139    |  102    |  6      ----------------------------<  86     4.0     |  25     |  0.6      Ca    7.7        12 Aug 2020 05:30    TPro  5.0    /  Alb  2.6    /  TBili  0.3    /  DBili  x      /  AST  31     /  ALT  15     /  AlkPhos  48     12 Aug 2020 05:30    Radiology:       Assessment and Plan:   This is a 81 year old F patient with past medical history of CAD s/p CABG, Uterine cancer s/p SHAAN, HTN and depression present to the ED for abdominal pain and chest discomfort.    #Abdominal pain and distension   - new onset ascites s/p 3 liters of cloudy bloody fluid drainage  - no evidence of metabolic encephalopathy  / most probably secondary to malignancy   - patient not known to have liver cirrhosis  -Patient has a distended abdomen with new ascites on the Ct scan of the abdomen   -f/u cytology   -gram stain did not show any organism  -fluid count showed RBC + lymphocytosis   -Liver enzymes are WNL  -d-dimers 5329   -malignancy is high on the differential especially in the setting of PE   - f/u GI   -patient has a history of Uterine cancer   -Patient follows up with Dr. Mccain as an outpatient, she requested to see [facility] , Hem/onc  -Patient does not have a history of liver disease, she did not take any new medication and no identifiable cause was identified in the history.  -s/p hemo onc   - positive. Consult OB gynecology  along with oncology following the patient placed .  - Oncology will follow up as outpatient ( Dr. Gutierrez). No surgical intervention for now.  - will follow GYN onc.  - S/P 2 paracentesis (8/7: 3 liters and ,8/11 4 liters) .   - paracentesis as needed for abdominal discomfort. , next paracentesis send more cytology for more cytopathologic tests.     #Pulmonary embolism   - Segmental PE with normal Vital signs, Troponin is negative , diagnosed on admission  - on Lovenox 70 mg BID     #CAD s/p CABG   -Continue with ASA     #HTN  -Continue with amlodipine and Metoprolol     #DLD  -Continue with statin    DVT ppx: Enoxaparin 70 BID   GI ppx:  not needed  Code Status:  Full code  DISPO: Anticipate discharge in AM after paracentesis. Pt would require MAP appointment and needs to be setup with IR for outpatient paracentesis as she would frequently require that.

## 2020-08-12 NOTE — CONSULT NOTE ADULT - ASSESSMENT
80yo P1 with PMH of CAD s/p CABG x2, endometrial carcinoma s/p SHAAN/BSO in 2016, and HTN now with ascites s/p paracentesis, acute PE on lovenox, clinically and hemodynamically stable  -Patient currently has no pelvic organs and no gyn complaints, elevated Ca 125 is nonspecific and likely secondary to inflammation from severe ascites, no acute gyn intervention at this time  -requires outpatient heme/onc follow up  -f/u with gyn outpatient for further workup  -continue care per primary team    Dr. Vital and Dr. Wilson aware. 82yo P1 with PMH of CAD s/p CABG x2, endometrial carcinoma s/p SHAAN/BSO in 2016, and HTN now with ascites s/p paracentesis, acute PE on lovenox, clinically and hemodynamically stable  -Patient currently has no pelvic organs and no gyn complaints, elevated Ca 125 is nonspecific and likely secondary to inflammation from severe ascites, no acute gyn intervention at this time  -requires outpatient heme/onc follow up  -f/u with gyn outpatient for further workup  -f/u paracentesis cytology  -continue care per primary team    Dr. Vital and Dr. Wilson aware. 80yo P1 with PMH of CAD s/p CABG x2, endometrial carcinoma s/p SHAAN/BSO in 2016, and HTN now with ascites s/p paracentesis showing malignancy, acute PE on lovenox, clinically and hemodynamically stable  -Patient currently has no pelvic organs and no gyn complaints, elevated Ca 125 is nonspecific, no acute gyn intervention at this time  -requires outpatient heme/onc follow up  -f/u with gyn outpatient for further workup  -continue care per primary team    Dr. Vital and Dr. Wilson aware. 82yo P1 with PMH of CAD s/p CABG x2, endometrial carcinoma s/p SHAAN/BSO in 2016, and HTN now with ascites s/p paracentesis showing malignancy, acute PE on lovenox, clinically and hemodynamically stable  -Patient currently has no pelvic organs and no gyn complaints, elevated Ca 125 is nonspecific, no acute gyn intervention at this time  -requires outpatient heme/onc follow up  -recommend gyn/onc consult (gyn will let gyn/onc team know)  -continue care per primary team    Dr. Vital and Dr. Wilson aware. 82yo P1 with PMH of CAD s/p CABG x2, endometrial carcinoma s/p SHAAN/BSO in 2016, and HTN now with ascites s/p paracentesis showing malignancy suggestive of recurrence, acute PE on lovenox, clinically and hemodynamically stable  -requires outpatient heme/onc follow up  -recommend gyn/onc consult (gyn will let gyn/onc team know)  -continue care per primary team    Dr. Vital and Dr. Wilson aware.

## 2020-08-12 NOTE — CONSULT NOTE ADULT - ATTENDING COMMENTS
82 yo female with h/o endometrial cancer treated with SHAAN and BSO and adjuvant Carbo/Taxol in 2016, now with new ascites, s/p 3L removed, pathology is pending. patient reports feeling better after paracentesis, may require additional therapeutic procedure.   Additional PE was noted and currently patient is being management with Lovenox, Lovenox is probably the safest option as patient may require additional paracentesis procedures as well as initiation on chemotherapy shortly.   Patient was being care for by Dr. Osullivan, at this point she is interested in switching care closer to home.   Will follow closely.
Agree with resident note. 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 May be a recurrence of endometrial cancer. Will contact gyn onc for evaluation. Otherwise asymptomatic.
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
## 2020-08-13 ENCOUNTER — RESULT REVIEW (OUTPATIENT)
Age: 80
End: 2020-08-13

## 2020-08-13 LAB
ALBUMIN SERPL ELPH-MCNC: 2.5 G/DL — LOW (ref 3.5–5.2)
ALP SERPL-CCNC: 46 U/L — SIGNIFICANT CHANGE UP (ref 30–115)
ALT FLD-CCNC: 12 U/L — SIGNIFICANT CHANGE UP (ref 0–41)
ANION GAP SERPL CALC-SCNC: 12 MMOL/L — SIGNIFICANT CHANGE UP (ref 7–14)
AST SERPL-CCNC: 23 U/L — SIGNIFICANT CHANGE UP (ref 0–41)
BASOPHILS # BLD AUTO: 0.05 K/UL — SIGNIFICANT CHANGE UP (ref 0–0.2)
BASOPHILS NFR BLD AUTO: 0.9 % — SIGNIFICANT CHANGE UP (ref 0–1)
BILIRUB SERPL-MCNC: 0.2 MG/DL — SIGNIFICANT CHANGE UP (ref 0.2–1.2)
BUN SERPL-MCNC: 8 MG/DL — LOW (ref 10–20)
CALCIUM SERPL-MCNC: 7.4 MG/DL — LOW (ref 8.5–10.1)
CHLORIDE SERPL-SCNC: 103 MMOL/L — SIGNIFICANT CHANGE UP (ref 98–110)
CO2 SERPL-SCNC: 23 MMOL/L — SIGNIFICANT CHANGE UP (ref 17–32)
CREAT SERPL-MCNC: 0.6 MG/DL — LOW (ref 0.7–1.5)
EOSINOPHIL # BLD AUTO: 0.15 K/UL — SIGNIFICANT CHANGE UP (ref 0–0.7)
EOSINOPHIL NFR BLD AUTO: 2.7 % — SIGNIFICANT CHANGE UP (ref 0–8)
GLUCOSE SERPL-MCNC: 82 MG/DL — SIGNIFICANT CHANGE UP (ref 70–99)
HCT VFR BLD CALC: 36.2 % — LOW (ref 37–47)
HGB BLD-MCNC: 11.9 G/DL — LOW (ref 12–16)
IMM GRANULOCYTES NFR BLD AUTO: 0.7 % — HIGH (ref 0.1–0.3)
LYMPHOCYTES # BLD AUTO: 1.11 K/UL — LOW (ref 1.2–3.4)
LYMPHOCYTES # BLD AUTO: 19.8 % — LOW (ref 20.5–51.1)
MCHC RBC-ENTMCNC: 26.2 PG — LOW (ref 27–31)
MCHC RBC-ENTMCNC: 32.9 G/DL — SIGNIFICANT CHANGE UP (ref 32–37)
MCV RBC AUTO: 79.6 FL — LOW (ref 81–99)
MONOCYTES # BLD AUTO: 0.89 K/UL — HIGH (ref 0.1–0.6)
MONOCYTES NFR BLD AUTO: 15.9 % — HIGH (ref 1.7–9.3)
NEUTROPHILS # BLD AUTO: 3.37 K/UL — SIGNIFICANT CHANGE UP (ref 1.4–6.5)
NEUTROPHILS NFR BLD AUTO: 60 % — SIGNIFICANT CHANGE UP (ref 42.2–75.2)
NRBC # BLD: 0 /100 WBCS — SIGNIFICANT CHANGE UP (ref 0–0)
PLATELET # BLD AUTO: 313 K/UL — SIGNIFICANT CHANGE UP (ref 130–400)
POTASSIUM SERPL-MCNC: 4.2 MMOL/L — SIGNIFICANT CHANGE UP (ref 3.5–5)
POTASSIUM SERPL-SCNC: 4.2 MMOL/L — SIGNIFICANT CHANGE UP (ref 3.5–5)
PROT SERPL-MCNC: 4.8 G/DL — LOW (ref 6–8)
RBC # BLD: 4.55 M/UL — SIGNIFICANT CHANGE UP (ref 4.2–5.4)
RBC # FLD: 14.4 % — SIGNIFICANT CHANGE UP (ref 11.5–14.5)
SODIUM SERPL-SCNC: 138 MMOL/L — SIGNIFICANT CHANGE UP (ref 135–146)
WBC # BLD: 5.61 K/UL — SIGNIFICANT CHANGE UP (ref 4.8–10.8)
WBC # FLD AUTO: 5.61 K/UL — SIGNIFICANT CHANGE UP (ref 4.8–10.8)

## 2020-08-13 PROCEDURE — 99233 SBSQ HOSP IP/OBS HIGH 50: CPT

## 2020-08-13 PROCEDURE — 99232 SBSQ HOSP IP/OBS MODERATE 35: CPT

## 2020-08-13 RX ADMIN — Medication 81 MILLIGRAM(S): at 11:16

## 2020-08-13 RX ADMIN — ENOXAPARIN SODIUM 70 MILLIGRAM(S): 100 INJECTION SUBCUTANEOUS at 17:07

## 2020-08-13 RX ADMIN — ENOXAPARIN SODIUM 70 MILLIGRAM(S): 100 INJECTION SUBCUTANEOUS at 05:44

## 2020-08-13 RX ADMIN — Medication 25 MILLIGRAM(S): at 05:44

## 2020-08-13 RX ADMIN — AMLODIPINE BESYLATE 5 MILLIGRAM(S): 2.5 TABLET ORAL at 05:44

## 2020-08-13 NOTE — DIETITIAN INITIAL EVALUATION ADULT. - RD TO REMAIN AVAILABLE
Interventions: meals and snacks. Monitor/Evaluate: RD to monitor energy intake, body comp, NFPF./yes
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
## 2020-08-13 NOTE — PROGRESS NOTE ADULT - ASSESSMENT
This is a 81 year old F patient with past medical history of Uterine cancer s/p SHAAN, CAD s/p CABG, HTN and depression present to the ED for abdominal pain. GI service is consulted for management of the ascites.     # Ascites  DDx: malignancy is high on the list ( history of endometrial cancer, new onset PE: thrombophilia), rule out others:  - s/p paracentesis with removal of 3 L of fluid, and repeat para with removal of 4 L  - SBP ruled out, SAAG 0.2 which is in favor of non portal HTN ascites  - CT abdomen with normal liver, and possible peritoneal implants detected on previous CT scan.   - Cancer Antigen, 125: 217: Method: U/mL (08.09.20 @ 16:18) > goes more with malignant process   - Carcinoembryonic Antigen: 0.7  - Cytology is positive for malignancy cells and favor mullerian origin    recommendations:  - follow up Oncology for planned chemotherapy  - follow up with GI as outpatient for age appropriate colon cancer screening    Will sign off  Recall PRN

## 2020-08-13 NOTE — DIETITIAN INITIAL EVALUATION ADULT. - OTHER INFO
Pertinent Medical Information: Abdominal pain and distension; new onset of ascites s/p 3 liters and 4 liters of cloudy bloody fluid drainage, no evidence of metabolic encephalopathy/most probably 2/2 malignancy pt not known to have liver cirrhosis, GI following. Pulmonary embolism. H/O Endometrial Cancer locally advanced; pt to transfer to  for chemo per onc/hem.    Pertinent Subjective Information: Per pt, she had poor appetite for 2 weeks PTA due to ascites. UBW of 160 lbs prior to ascites and reports wt gain of ~9 lbs (unknown time frame) d/t ascites. NKFA. No cultural/Church food preferences noted. NKFA. Pt followed regular diet PTA. Per pt. her appetite has improved after admission, consumes >75% of meals.

## 2020-08-13 NOTE — PROGRESS NOTE ADULT - ASSESSMENT
This is a 81 year old F patient with past medical history of Uterine cancer s/p SHAAN, CAD s/p CABG, HTN and depression present to the ED for abdominal pain and chest discomfort. found to have new onset ascites, s/p paracentesis with 3L removal, SAAG 0.3 and cytology is pending   In the ED patient had a CTA of the chest as she was complaining of Chest discomfort and she was found to have an Acute PE in the right Upper pulmonary artery segmental branch, started on Lovenox.     # New segmental PE and Abdominal pain/distension secondary to new onset ascites most likely secondary to malignancy. No DVT in b/l LE.      on Lovenox 70 mg BID, will recommend to continue with LOVENOX anticipating the need of recurrent paracentesis        # H/O Endometrial Cancer locally advanced with 1 LN+, S/p SHAAN/BSO and carbo and taxol for 5 months in 2016 now with new ascites with path suggestive of Favor metastatic adenocarcinoma consistent with Mullerian origin.      CEA normal. - 217   - Last PET scan in 2018.  She had 2 sessions of paracentesis so far     We discussed her pathology with her and she is aware she has recurrent endometrial Ca   We would do 1st dose chemo inpatient with weekly carboplatin and taxol - She agreed to it. Will do chemo tomorrow   Please transfer her to 3B     DVT ppx- on therapeutic lovenox.  Post chemo tomorrow, she can be discharged from Oncology standpoint     She should f/u with Dr Gutierrez in a week at [facility]

## 2020-08-13 NOTE — PROGRESS NOTE ADULT - SUBJECTIVE AND OBJECTIVE BOX
Patient is a 81y old  Female who presents with a chief complaint of abdominal pain (13 Aug 2020 09:18)      Subjective: No new complaints       Vital Signs Last 24 Hrs  T(C): 36.7 (13 Aug 2020 07:46), Max: 36.7 (12 Aug 2020 16:00)  T(F): 98 (13 Aug 2020 07:46), Max: 98 (12 Aug 2020 16:00)  HR: 94 (13 Aug 2020 07:46) (86 - 96)  BP: 115/64 (13 Aug 2020 07:46) (105/69 - 131/64)  BP(mean): --  RR: 18 (13 Aug 2020 07:46) (18 - 19)  SpO2: 97% (13 Aug 2020 10:07) (97% - 97%)    PHYSICAL EXAM  General: adult in NAD  HEENT: clear oropharynx, anicteric sclera, pink conjunctiva  Neck: supple  CV: normal S1/S2 with no murmur rubs or gallops  Lungs: positive air movement b/l ant lungs, clear to auscultation, no wheezes, no rales  Abdomen: distended no tenderness  Ext: no clubbing cyanosis or edema  Skin: no rashes and no petechiae  Neuro: alert and oriented X 4, no focal deficits      MEDICATIONS  (STANDING):  amLODIPine   Tablet 5 milliGRAM(s) Oral daily  aspirin enteric coated 81 milliGRAM(s) Oral daily  enoxaparin Injectable 70 milliGRAM(s) SubCutaneous every 12 hours  metoprolol succinate ER 25 milliGRAM(s) Oral daily    MEDICATIONS  (PRN):  clonazePAM  Tablet 0.5 milliGRAM(s) Oral daily PRN anxiety      LABS:                          11.9   5.61  )-----------( 313      ( 13 Aug 2020 06:18 )             36.2         Mean Cell Volume : 79.6 fL  Mean Cell Hemoglobin : 26.2 pg  Mean Cell Hemoglobin Concentration : 32.9 g/dL  Auto Neutrophil # : 3.37 K/uL  Auto Lymphocyte # : 1.11 K/uL  Auto Monocyte # : 0.89 K/uL  Auto Eosinophil # : 0.15 K/uL  Auto Basophil # : 0.05 K/uL  Auto Neutrophil % : 60.0 %  Auto Lymphocyte % : 19.8 %  Auto Monocyte % : 15.9 %  Auto Eosinophil % : 2.7 %  Auto Basophil % : 0.9 %      Serial CBC's  08-13 @ 06:18  Hct-36.2 / Hgb-11.9 / Plat-313 / RBC-4.55 / WBC-5.61  Serial CBC's  08-12 @ 05:30  Hct-35.5 / Hgb-11.6 / Plat-430 / RBC-4.48 / WBC-5.88  Serial CBC's  08-11 @ 05:45  Hct-35.5 / Hgb-11.6 / Plat-425 / RBC-4.47 / WBC-7.13  Serial CBC's  08-10 @ 11:14  Hct-34.7 / Hgb-11.3 / Plat-424 / RBC-4.39 / WBC-7.20  Serial CBC's  08-09 @ 11:10  Hct-34.2 / Hgb-11.2 / Plat-477 / RBC-4.33 / WBC-8.20      08-13    138  |  103  |  8<L>  ----------------------------<  82  4.2   |  23  |  0.6<L>    Ca    7.4<L>      13 Aug 2020 06:18    TPro  4.8<L>  /  Alb  2.5<L>  /  TBili  0.2  /  DBili  x   /  AST  23  /  ALT  12  /  AlkPhos  46  08-13    Cytopathology - Non Gyn Report (08.08.20 @ 09:31)    Cytopathology - Non Gyn Report:   ACCESSION No:  [id number]    BEKAH NELSON                         2        Cytopathology Report            Specimen(s) Submitted  Peritoneal fluid      Clinical History  hx of Uterine carcinoma, comes with ascites      Gross Description  The specimen is received fresh labeled with the patient's name  and consists of 70 ml of rian fluid. One monolayer slide (Thin  Prep) is prepared and stained with Papanicolaou stain. One cell  block is made.      Final Diagnosis  PERITONEAL FLUID    POSITIVE FOR MALIGNANT CELLS.  Favor metastatic adenocacinoma, consistent with Mullerian origin  in this patient with a known history of uterine cancer.    Immunohistochemistry studies were performed at [facility] on cell block  1A and the results are as follows:  CK7       Positive  CK20           Negative  P53       Positive (strong and diffuse)  PAX8           Positive (few cells)  ER        Positive (weak)    Comment: There is no previous material (uterine cancer) available  for review. Pending MMR.    Note:  All controls show appropriate reactivity.    This test was developed and its performance characteristics  determined by [facility].  It has not been  cleared or approved by the U.[place]  Food and Drug Administration.  The FDA does not require this test to go through premarket FDA  review. This test is used for clinical purposes.  It should not  be regarded as investigational or for research. This assay has  not been validated for decalcified tissues.  Results should be  interpreted with caution given the likelihood of false negativity  on decalcified specimens.  This laboratory is regulated under the  Clinical Laboratory Improvement Amendments of 1988 (CLIA) as  qualified to perform high complexity clinical

## 2020-08-13 NOTE — PROGRESS NOTE ADULT - SUBJECTIVE AND OBJECTIVE BOX
Hospital Day:  7d    Patient is a 81y old  Female who presents with a chief complaint of 7d    Subjective:    Past Medical Hx:   HLD (hyperlipidemia)  HTN (hypertension)  CAD (coronary artery disease)  No pertinent past medical history    Past Sx:  History of coronary artery bypass surgery    Allergies:  chloroquine (Hives)    Current Meds:   Standng Meds:  amLODIPine   Tablet 5 milliGRAM(s) Oral daily  aspirin enteric coated 81 milliGRAM(s) Oral daily  enoxaparin Injectable 70 milliGRAM(s) SubCutaneous every 12 hours  metoprolol succinate ER 25 milliGRAM(s) Oral daily    PRN Meds:  clonazePAM  Tablet 0.5 milliGRAM(s) Oral daily PRN anxiety    HOME MEDICATIONS:  amLODIPine 5 mg oral tablet: 1 tab(s) orally once a day  aspirin 81 mg oral delayed release tablet: 1 tab(s) orally once a day  clonazePAM 0.5 mg oral tablet: 1 tab(s) orally once a day  metoprolol succinate 25 mg oral tablet, extended release: 1 tab(s) orally once a day  rosuvastatin 10 mg oral tablet: 1 tab(s) orally once a day      Vital Signs:   T(F): 98 (08-13-20 @ 07:46), Max: 98 (08-12-20 @ 16:00)  HR: 94 (08-13-20 @ 07:46) (86 - 96)  BP: 115/64 (08-13-20 @ 07:46) (105/69 - 131/64)  RR: 18 (08-13-20 @ 07:46) (18 - 19)  SpO2: 97% (08-13-20 @ 10:07) (97% - 97%)        Physical Exam:   GENERAL: NAD  HEENT: NCAT  CHEST/LUNG: CTAB  HEART: Regular rate and rhythm; s1 s2 appreciated, No murmurs, rubs, or gallops  ABDOMEN: Soft, Nontender, Nondistended; Bowel sounds present  EXTREMITIES: No LE edema b/l  SKIN: no rashes, no new lesions  NERVOUS SYSTEM:  Alert & Oriented X3  LINES/CATHETERS:        Labs:                         11.9   5.61  )-----------( 313      ( 13 Aug 2020 06:18 )             36.2     Neutophil% 60.0, Lymphocyte% 19.8, Monocyte% 15.9, Bands% 0.7 08-13-20 @ 06:18    13 Aug 2020 06:18    138    |  103    |  8      ----------------------------<  82     4.2     |  23     |  0.6      Ca    7.4        13 Aug 2020 06:18    TPro  4.8    /  Alb  2.5    /  TBili  0.2    /  DBili  x      /  AST  23     /  ALT  12     /  AlkPhos  46     13 Aug 2020 06:18        Radiology:       Assessment and Plan:   This is a 81 year old F patient with past medical history of CAD s/p CABG, Uterine cancer s/p SHAAN, HTN and depression present to the ED for abdominal pain and chest discomfort.    #Abdominal pain and distension   - new onset ascites s/p 3 liters of cloudy bloody fluid drainage  - no evidence of metabolic encephalopathy  / most probably secondary to malignancy   - patient not known to have liver cirrhosis  -Patient has a distended abdomen with new ascites on the Ct scan of the abdomen   -cytology positive for malignant cells.   -gram stain did not show any organism  -fluid count showed RBC + lymphocytosis   -Liver enzymes are WNL  -d-dimers 5329   -malignancy is high on the differential especially in the setting of PE   - f/u GI   -patient has a history of Uterine cancer   -Patient follows up with Dr. Mccain as an outpatient, she requested to see Service GI , Hem/onc  -Patient does not have a history of liver disease, she did not take any new medication and no identifiable cause was identified in the history.  -s/p hemo onc   - positive. Consult OB gynecology  along with oncology following the patient placed .  - Oncology will follow up as outpatient ( Dr. Gutierrez). No surgical intervention for now.  - will follow GYN onc.  - S/P 2 paracentesis (8/7: 3 liters and ,8/11 4 liters) . another paracentesis done today draining liters. another cytology sent today for additional markers. F/U hem/onc, f/u cytology.   - paracentesis as needed for abdominal discomfort.    #Pulmonary embolism   - Segmental PE with normal Vital signs, Troponin is negative , diagnosed on admission  - on Lovenox 70 mg BID     #CAD s/p CABG   -Continue with ASA     #HTN  -Continue with amlodipine and Metoprolol     #DLD  -Continue with statin    DVT ppx: Enoxaparin 70 BID   GI ppx:  not needed  Code Status:  Full code  Dispo: acute, 3B for chemotherapy. on DC pt will need IR appointments for possible recurrent paracentesis and MAP clinic appointment. Hospital Day:  7d    Patient is a 81y old  Female who presents with a chief complaint of 7d    Subjective:    Past Medical Hx:   HLD (hyperlipidemia)  HTN (hypertension)  CAD (coronary artery disease)  No pertinent past medical history    Past Sx:  History of coronary artery bypass surgery    Allergies:  chloroquine (Hives)    Current Meds:   Standng Meds:  amLODIPine   Tablet 5 milliGRAM(s) Oral daily  aspirin enteric coated 81 milliGRAM(s) Oral daily  enoxaparin Injectable 70 milliGRAM(s) SubCutaneous every 12 hours  metoprolol succinate ER 25 milliGRAM(s) Oral daily    PRN Meds:  clonazePAM  Tablet 0.5 milliGRAM(s) Oral daily PRN anxiety    HOME MEDICATIONS:  amLODIPine 5 mg oral tablet: 1 tab(s) orally once a day  aspirin 81 mg oral delayed release tablet: 1 tab(s) orally once a day  clonazePAM 0.5 mg oral tablet: 1 tab(s) orally once a day  metoprolol succinate 25 mg oral tablet, extended release: 1 tab(s) orally once a day  rosuvastatin 10 mg oral tablet: 1 tab(s) orally once a day      Vital Signs:   T(F): 98 (08-13-20 @ 07:46), Max: 98 (08-12-20 @ 16:00)  HR: 94 (08-13-20 @ 07:46) (86 - 96)  BP: 115/64 (08-13-20 @ 07:46) (105/69 - 131/64)  RR: 18 (08-13-20 @ 07:46) (18 - 19)  SpO2: 97% (08-13-20 @ 10:07) (97% - 97%)        Physical Exam:   GENERAL: NAD  HEENT: NCAT  CHEST/LUNG: CTAB  HEART: Regular rate and rhythm; s1 s2 appreciated, No murmurs, rubs, or gallops  ABDOMEN: Soft, Nontender, Nondistended; Bowel sounds present  EXTREMITIES: No LE edema b/l  SKIN: no rashes, no new lesions  NERVOUS SYSTEM:  Alert & Oriented X3  LINES/CATHETERS:        Labs:                         11.9   5.61  )-----------( 313      ( 13 Aug 2020 06:18 )             36.2     Neutophil% 60.0, Lymphocyte% 19.8, Monocyte% 15.9, Bands% 0.7 08-13-20 @ 06:18    13 Aug 2020 06:18    138    |  103    |  8      ----------------------------<  82     4.2     |  23     |  0.6      Ca    7.4        13 Aug 2020 06:18    TPro  4.8    /  Alb  2.5    /  TBili  0.2    /  DBili  x      /  AST  23     /  ALT  12     /  AlkPhos  46     13 Aug 2020 06:18        Radiology:       Assessment and Plan:   This is a 81 year old F patient with past medical history of CAD s/p CABG, Uterine cancer s/p SHAAN, HTN and depression present to the ED for abdominal pain and chest discomfort.    #Abdominal pain and distension   Suspected metastatic adenocarcinoma consistent with Mullerian origin.    - new onset ascites s/p 3 liters of cloudy bloody fluid drainage  - no evidence of metabolic encephalopathy  / most probably secondary to malignancy   - patient not known to have liver cirrhosis  -Patient has a distended abdomen with new ascites on the Ct scan of the abdomen   -cytology positive for malignant cells.   -gram stain did not show any organism  -fluid count showed RBC + lymphocytosis   -Liver enzymes are WNL  -d-dimers 5329   -malignancy is high on the differential especially in the setting of PE   - f/u GI   -patient has a history of Uterine cancer   -Patient follows up with Dr. Mccain as an outpatient, she requested to see Service GI , Hem/onc  -Patient does not have a history of liver disease, she did not take any new medication and no identifiable cause was identified in the history.  -s/p hemo onc   - positive. Consult OB gynecology  along with oncology following the patient placed .  - will follow GYN onc.  - S/P 2 paracentesis (8/7: 3 liters and ,8/11 4 liters) . another paracentesis done today draining liters. another cytology sent today for additional markers. F/U hem/onc, f/u cytology.   - paracentesis as needed for abdominal discomfort.    #Pulmonary embolism   Provoked by malignnacy  - Segmental PE with normal Vital signs, Troponin is negative , diagnosed on admission  - on Lovenox 70 mg BID     #CAD s/p CABG   -Continue with ASA     #HTN  -Continue with amlodipine and Metoprolol     #DLD  -Continue with statin    DVT ppx: Enoxaparin 70 BID   GI ppx:  not needed  Code Status:  Full code  Dispo:   Transfer to  for 1st dose chemo inpatient with weekly carboplatin and taxol  Post chemo tomorrow, she can be discharged if no acute adverse effects.  fu  with Dr Guteirrez in a week at [facility]   on DC pt will need IR appointments for  recurrent paracentesis and MAP clinic appointment.

## 2020-08-13 NOTE — CONSULT NOTE ADULT - ASSESSMENT
82yo P1 with PMH of CAD s/p CABG x2, endometrial carcinoma s/p SHAAN/BSO in 2016, and HTN now with ascites s/p paracentesis showing malignancy suggestive of recurrence, acute PE on lovenox, clinically and hemodynamically stable     - f/u with hemonc    - no acute GYNONC intervention at this time  - f/u with [facility] as outpatient (information for [facility] given)   - management per primary team     Dr. Brown at bedside.

## 2020-08-13 NOTE — DIETITIAN INITIAL EVALUATION ADULT. - ENERGY NEEDS
Calories: 0673-7996 kcal/day (30-35 kcal/kg IBW for h/o endometrial cancer on active treatment)  Protein: 62-78 g/day (1.2-1.5 g/kg IBW for above reason)  Fluids: 1 ml/kcal or per LIP

## 2020-08-13 NOTE — PROGRESS NOTE ADULT - ATTENDING COMMENTS
Ascites pos for malignant cells, mullerian origin, please add MSI testing.   Plan to start on weekly Carbo/Taxol, patient is at risk of Carbo hypersensitivity reaction given h/o prior exposure.   May require additional therapeutic para, recommend d/c planning with Lovenox.   Case was discussed with patient's primary Dr. Figueroa.

## 2020-08-13 NOTE — PROCEDURE NOTE - NSPROCDETAILS_GEN_ALL_CORE
location identified, sterile technique used, catheter introduced, fluid drained
location identified, sterile technique used, catheter introduced, fluid drained

## 2020-08-13 NOTE — PROGRESS NOTE ADULT - SUBJECTIVE AND OBJECTIVE BOX
Gastroenterology progress note:     Patient is a 81y old  Female who presents with a chief complaint of abdominal pain (13 Aug 2020 10:19)       Admitted on: 08-06-20    We are following the patient for:     Interval History:    Patient's medical problems are improving/stable/not improving/unstable/   Prior records reviewed (Y/N):  History obtained from someone other than patient (Y/N):      PAST MEDICAL & SURGICAL HISTORY:  HLD (hyperlipidemia)  HTN (hypertension)  CAD (coronary artery disease)  History of coronary artery bypass surgery      MEDICATIONS  (STANDING):  amLODIPine   Tablet 5 milliGRAM(s) Oral daily  aspirin enteric coated 81 milliGRAM(s) Oral daily  enoxaparin Injectable 70 milliGRAM(s) SubCutaneous every 12 hours  metoprolol succinate ER 25 milliGRAM(s) Oral daily    MEDICATIONS  (PRN):  clonazePAM  Tablet 0.5 milliGRAM(s) Oral daily PRN anxiety      Allergies  chloroquine (Hives)      Review of Systems:   Cardiovascular:  No Chest Pain, No Palpitations  Respiratory:  No Cough, No Dyspnea  Gastrointestinal:  As described in HPI    Physical Examination:  T(C): 36.7 (08-13-20 @ 07:46), Max: 36.7 (08-12-20 @ 16:00)  HR: 94 (08-13-20 @ 07:46) (86 - 96)  BP: 115/64 (08-13-20 @ 07:46) (105/69 - 131/64)  RR: 18 (08-13-20 @ 07:46) (18 - 19)  SpO2: 97% (08-13-20 @ 10:07) (97% - 97%)      Constitutional: No acute distress.  Respiratory:  No signs of respiratory distress. Lung sounds are clear bilaterally.  Cardiovascular:  S1 S2, Regular rate and rhythm.  Abdominal: Abdomen is soft, symmetric, and non-tender without distention. There are no visible lesions. Bowel sounds are present and normoactive in all four quadrants. No masses, hepatomegaly, or splenomegaly are noted.   Skin: No rashes, No Jaundice.        Data: (reviewed by attending)                        11.9   5.61  )-----------( 313      ( 13 Aug 2020 06:18 )             36.2     Hgb trend:  11.9  08-13-20 @ 06:18  11.6  08-12-20 @ 05:30  11.6  08-11-20 @ 05:45  11.3  08-10-20 @ 11:14        08-13    138  |  103  |  8<L>  ----------------------------<  82  4.2   |  23  |  0.6<L>    Ca    7.4<L>      13 Aug 2020 06:18    TPro  4.8<L>  /  Alb  2.5<L>  /  TBili  0.2  /  DBili  x   /  AST  23  /  ALT  12  /  AlkPhos  46  08-13    Liver panel trend:  TBili 0.2   /   AST 23   /   ALT 12   /   AlkP 46   /   Tptn 4.8   /   Alb 2.5    /   DBili --      08-13  TBili 0.3   /   AST 31   /   ALT 15   /   AlkP 48   /   Tptn 5.0   /   Alb 2.6    /   DBili --      08-12  TBili 0.2   /   AST 29   /   ALT 12   /   AlkP 47   /   Tptn 5.1   /   Alb 2.7    /   DBili --      08-11  TBili 0.2   /   AST 29   /   ALT 11   /   AlkP 44   /   Tptn 4.9   /   Alb 2.5    /   DBili --      08-10  TBili 0.3   /   AST 23   /   ALT 9   /   AlkP 50   /   Tptn 5.0   /   Alb 2.6    /   DBili --      08-09  TBili 0.3   /   AST 21   /   ALT 9   /   AlkP 54   /   Tptn 5.0   /   Alb 2.7    /   DBili --      08-07  TBili 0.3   /   AST 33   /   ALT 12   /   AlkP 68   /   Tptn 6.5   /   Alb 3.4    /   DBili <0.2      08-06             Radiology: (reviewed by attending)

## 2020-08-13 NOTE — DIETITIAN INITIAL EVALUATION ADULT. - PHYSICAL APPEARANCE
BMI 29.1; AAOx4; no edema noted; no chewing/swallowing difficulties noted; no GI issues noted, LBM 8/11 (per pt); skin-intact./overweight

## 2020-08-13 NOTE — DIETITIAN INITIAL EVALUATION ADULT. - MALNUTRITION
No physical signs of muscle/fat wasting noted. Pt currently does not meet at least 2 malnutrition criteria.

## 2020-08-14 ENCOUNTER — TRANSCRIPTION ENCOUNTER (OUTPATIENT)
Age: 80
End: 2020-08-14

## 2020-08-14 LAB
ALBUMIN SERPL ELPH-MCNC: 2.5 G/DL — LOW (ref 3.5–5.2)
ALP SERPL-CCNC: 44 U/L — SIGNIFICANT CHANGE UP (ref 30–115)
ALT FLD-CCNC: 11 U/L — SIGNIFICANT CHANGE UP (ref 0–41)
ANION GAP SERPL CALC-SCNC: 10 MMOL/L — SIGNIFICANT CHANGE UP (ref 7–14)
AST SERPL-CCNC: 21 U/L — SIGNIFICANT CHANGE UP (ref 0–41)
BASOPHILS # BLD AUTO: 0.06 K/UL — SIGNIFICANT CHANGE UP (ref 0–0.2)
BASOPHILS NFR BLD AUTO: 1.1 % — HIGH (ref 0–1)
BILIRUB SERPL-MCNC: 0.2 MG/DL — SIGNIFICANT CHANGE UP (ref 0.2–1.2)
BUN SERPL-MCNC: 9 MG/DL — LOW (ref 10–20)
CALCIUM SERPL-MCNC: 7.8 MG/DL — LOW (ref 8.5–10.1)
CHLORIDE SERPL-SCNC: 102 MMOL/L — SIGNIFICANT CHANGE UP (ref 98–110)
CO2 SERPL-SCNC: 28 MMOL/L — SIGNIFICANT CHANGE UP (ref 17–32)
CREAT SERPL-MCNC: 0.6 MG/DL — LOW (ref 0.7–1.5)
EOSINOPHIL # BLD AUTO: 0.14 K/UL — SIGNIFICANT CHANGE UP (ref 0–0.7)
EOSINOPHIL NFR BLD AUTO: 2.5 % — SIGNIFICANT CHANGE UP (ref 0–8)
GLUCOSE BLDC GLUCOMTR-MCNC: 114 MG/DL — HIGH (ref 70–99)
GLUCOSE BLDC GLUCOMTR-MCNC: 159 MG/DL — HIGH (ref 70–99)
GLUCOSE BLDC GLUCOMTR-MCNC: 170 MG/DL — HIGH (ref 70–99)
GLUCOSE BLDC GLUCOMTR-MCNC: 82 MG/DL — SIGNIFICANT CHANGE UP (ref 70–99)
GLUCOSE SERPL-MCNC: 91 MG/DL — SIGNIFICANT CHANGE UP (ref 70–99)
HCT VFR BLD CALC: 35 % — LOW (ref 37–47)
HGB BLD-MCNC: 11.3 G/DL — LOW (ref 12–16)
IMM GRANULOCYTES NFR BLD AUTO: 0.9 % — HIGH (ref 0.1–0.3)
LYMPHOCYTES # BLD AUTO: 0.77 K/UL — LOW (ref 1.2–3.4)
LYMPHOCYTES # BLD AUTO: 13.8 % — LOW (ref 20.5–51.1)
MCHC RBC-ENTMCNC: 26.3 PG — LOW (ref 27–31)
MCHC RBC-ENTMCNC: 32.3 G/DL — SIGNIFICANT CHANGE UP (ref 32–37)
MCV RBC AUTO: 81.6 FL — SIGNIFICANT CHANGE UP (ref 81–99)
MONOCYTES # BLD AUTO: 0.89 K/UL — HIGH (ref 0.1–0.6)
MONOCYTES NFR BLD AUTO: 16 % — HIGH (ref 1.7–9.3)
NEUTROPHILS # BLD AUTO: 3.66 K/UL — SIGNIFICANT CHANGE UP (ref 1.4–6.5)
NEUTROPHILS NFR BLD AUTO: 65.7 % — SIGNIFICANT CHANGE UP (ref 42.2–75.2)
NON-GYNECOLOGICAL CYTOLOGY STUDY: SIGNIFICANT CHANGE UP
NRBC # BLD: 0 /100 WBCS — SIGNIFICANT CHANGE UP (ref 0–0)
PLATELET # BLD AUTO: 343 K/UL — SIGNIFICANT CHANGE UP (ref 130–400)
POTASSIUM SERPL-MCNC: 3.9 MMOL/L — SIGNIFICANT CHANGE UP (ref 3.5–5)
POTASSIUM SERPL-SCNC: 3.9 MMOL/L — SIGNIFICANT CHANGE UP (ref 3.5–5)
PROT SERPL-MCNC: 4.8 G/DL — LOW (ref 6–8)
RBC # BLD: 4.29 M/UL — SIGNIFICANT CHANGE UP (ref 4.2–5.4)
RBC # FLD: 14.4 % — SIGNIFICANT CHANGE UP (ref 11.5–14.5)
SODIUM SERPL-SCNC: 140 MMOL/L — SIGNIFICANT CHANGE UP (ref 135–146)
WBC # BLD: 5.57 K/UL — SIGNIFICANT CHANGE UP (ref 4.8–10.8)
WBC # FLD AUTO: 5.57 K/UL — SIGNIFICANT CHANGE UP (ref 4.8–10.8)

## 2020-08-14 PROCEDURE — 99233 SBSQ HOSP IP/OBS HIGH 50: CPT

## 2020-08-14 PROCEDURE — 99232 SBSQ HOSP IP/OBS MODERATE 35: CPT | Mod: GC

## 2020-08-14 RX ORDER — PROCHLORPERAZINE MALEATE 5 MG
1 TABLET ORAL
Qty: 120 | Refills: 0
Start: 2020-08-14 | End: 2020-09-12

## 2020-08-14 RX ORDER — ONDANSETRON 8 MG/1
16 TABLET, FILM COATED ORAL ONCE
Refills: 0 | Status: COMPLETED | OUTPATIENT
Start: 2020-08-14 | End: 2020-08-14

## 2020-08-14 RX ORDER — DEXAMETHASONE 0.5 MG/5ML
1 ELIXIR ORAL
Qty: 0 | Refills: 0 | DISCHARGE
Start: 2020-08-14

## 2020-08-14 RX ORDER — ONDANSETRON 8 MG/1
2 TABLET, FILM COATED ORAL
Qty: 180 | Refills: 0
Start: 2020-08-14 | End: 2020-09-12

## 2020-08-14 RX ORDER — ALPRAZOLAM 0.25 MG
0.5 TABLET ORAL EVERY 8 HOURS
Refills: 0 | Status: DISCONTINUED | OUTPATIENT
Start: 2020-08-14 | End: 2020-08-15

## 2020-08-14 RX ORDER — DEXAMETHASONE 0.5 MG/5ML
8 ELIXIR ORAL ONCE
Refills: 0 | Status: COMPLETED | OUTPATIENT
Start: 2020-08-14 | End: 2020-08-14

## 2020-08-14 RX ORDER — DEXAMETHASONE 0.5 MG/5ML
4 ELIXIR ORAL
Refills: 0 | Status: DISCONTINUED | OUTPATIENT
Start: 2020-08-15 | End: 2020-08-15

## 2020-08-14 RX ORDER — FOSAPREPITANT DIMEGLUMINE 150 MG/5ML
150 INJECTION, POWDER, LYOPHILIZED, FOR SOLUTION INTRAVENOUS ONCE
Refills: 0 | Status: COMPLETED | OUTPATIENT
Start: 2020-08-14 | End: 2020-08-14

## 2020-08-14 RX ORDER — ENOXAPARIN SODIUM 100 MG/ML
70 INJECTION SUBCUTANEOUS
Qty: 0 | Refills: 0 | DISCHARGE
Start: 2020-08-14

## 2020-08-14 RX ORDER — CARBOPLATIN 50 MG
220 VIAL (EA) INTRAVENOUS ONCE
Refills: 0 | Status: COMPLETED | OUTPATIENT
Start: 2020-08-14 | End: 2020-08-14

## 2020-08-14 RX ORDER — ACETAMINOPHEN 500 MG
650 TABLET ORAL ONCE
Refills: 0 | Status: COMPLETED | OUTPATIENT
Start: 2020-08-14 | End: 2020-08-14

## 2020-08-14 RX ORDER — FAMOTIDINE 10 MG/ML
20 INJECTION INTRAVENOUS ONCE
Refills: 0 | Status: COMPLETED | OUTPATIENT
Start: 2020-08-14 | End: 2020-08-14

## 2020-08-14 RX ORDER — PACLITAXEL 6 MG/ML
100 INJECTION, SOLUTION, CONCENTRATE INTRAVENOUS ONCE
Refills: 0 | Status: COMPLETED | OUTPATIENT
Start: 2020-08-14 | End: 2020-08-14

## 2020-08-14 RX ORDER — DIPHENHYDRAMINE HCL 50 MG
25 CAPSULE ORAL ONCE
Refills: 0 | Status: COMPLETED | OUTPATIENT
Start: 2020-08-14 | End: 2020-08-14

## 2020-08-14 RX ADMIN — Medication 650 MILLIGRAM(S): at 13:15

## 2020-08-14 RX ADMIN — Medication 101 MILLIGRAM(S): at 12:00

## 2020-08-14 RX ADMIN — FOSAPREPITANT DIMEGLUMINE 310 MILLIGRAM(S): 150 INJECTION, POWDER, LYOPHILIZED, FOR SOLUTION INTRAVENOUS at 11:59

## 2020-08-14 RX ADMIN — Medication 272 MILLIGRAM(S): at 16:50

## 2020-08-14 RX ADMIN — Medication 650 MILLIGRAM(S): at 12:00

## 2020-08-14 RX ADMIN — ENOXAPARIN SODIUM 70 MILLIGRAM(S): 100 INJECTION SUBCUTANEOUS at 05:16

## 2020-08-14 RX ADMIN — PACLITAXEL 172.22 MILLIGRAM(S): 6 INJECTION, SOLUTION, CONCENTRATE INTRAVENOUS at 13:43

## 2020-08-14 RX ADMIN — Medication 101.6 MILLIGRAM(S): at 11:59

## 2020-08-14 RX ADMIN — Medication 81 MILLIGRAM(S): at 11:13

## 2020-08-14 RX ADMIN — ONDANSETRON 116 MILLIGRAM(S): 8 TABLET, FILM COATED ORAL at 11:59

## 2020-08-14 RX ADMIN — Medication 0.5 MILLIGRAM(S): at 09:07

## 2020-08-14 RX ADMIN — AMLODIPINE BESYLATE 5 MILLIGRAM(S): 2.5 TABLET ORAL at 05:15

## 2020-08-14 RX ADMIN — ENOXAPARIN SODIUM 70 MILLIGRAM(S): 100 INJECTION SUBCUTANEOUS at 17:04

## 2020-08-14 RX ADMIN — FAMOTIDINE 104 MILLIGRAM(S): 10 INJECTION INTRAVENOUS at 12:00

## 2020-08-14 RX ADMIN — Medication 25 MILLIGRAM(S): at 05:15

## 2020-08-14 NOTE — DISCHARGE NOTE PROVIDER - NSDCMRMEDTOKEN_GEN_ALL_CORE_FT
amLODIPine 5 mg oral tablet: 1 tab(s) orally once a day  aspirin 81 mg oral delayed release tablet: 1 tab(s) orally once a day  cefuroxime 250 mg oral tablet: 1 tab(s) orally 2 times a day   clonazePAM 0.5 mg oral tablet: 1 tab(s) orally once a day  Eliquis Starter Pack for Treatment of DVT and PE 5 mg oral tablet: 2 tab(s) orally 2 times a day for 1 week  then 1 tab twice a day  metoprolol succinate 25 mg oral tablet, extended release: 1 tab(s) orally once a day  Robaxin-750 oral tablet: 1 tab(s) orally 2 times a day   rosuvastatin 10 mg oral tablet: 1 tab(s) orally once a day  Tylenol 325 mg oral capsule: 2 cap(s) orally every 6 hours   WHEELCHAIR: 1 application transdermal once a day amLODIPine 5 mg oral tablet: 1 tab(s) orally once a day  aspirin 81 mg oral delayed release tablet: 1 tab(s) orally once a day  clonazePAM 0.5 mg oral tablet: 1 tab(s) orally once a day  enoxaparin: 70 milligram(s) intramuscularly 2 times a day  metoprolol succinate 25 mg oral tablet, extended release: 1 tab(s) orally once a day  Robaxin-750 oral tablet: 1 tab(s) orally 2 times a day   rosuvastatin 10 mg oral tablet: 1 tab(s) orally once a day  Tylenol 325 mg oral capsule: 2 cap(s) orally every 6 hours amLODIPine 5 mg oral tablet: 1 tab(s) orally once a day  aspirin 81 mg oral delayed release tablet: 1 tab(s) orally once a day  clonazePAM 0.5 mg oral tablet: 1 tab(s) orally once a day  dexamethasone 4 mg oral tablet: 1 tab(s) orally 2 times a day  Lovenox 80 mg/0.8 mL injectable solution: 70 milligram(s) subcutaneously 2 times a day   metoprolol succinate 25 mg oral tablet, extended release: 1 tab(s) orally once a day  ondansetron 4 mg oral tablet, disintegratin tab(s) orally 3 times a day, As Needed MDD:24 mg per day  prochlorperazine 10 mg oral tablet: 1 tab(s) orally every 6 hours, As Needed   Robaxin-750 oral tablet: 1 tab(s) orally 2 times a day   rosuvastatin 10 mg oral tablet: 1 tab(s) orally once a day  Tylenol 325 mg oral capsule: 2 cap(s) orally every 6 hours

## 2020-08-14 NOTE — DISCHARGE NOTE PROVIDER - CARE PROVIDER_API CALL
Christine Figueroaorah  INTERNAL MEDICINE  [address], [address]  [place] [address]  Phone: [phone]  Fax: [phone]  Follow Up Time:     Jimena Gutierrez  HEMATOLOGY/ONCOLOGY  [address] [place] [address]  Phone: [phone]  Fax: [phone]  Follow Up Time:

## 2020-08-14 NOTE — DISCHARGE NOTE PROVIDER - NSDCCPCAREPLAN_GEN_ALL_CORE_FT
PRINCIPAL DISCHARGE DIAGNOSIS  Diagnosis: Metastatic adenocarcinoma  Assessment and Plan of Treatment: you are started on chemotherapy. please continue to take your medications as prescribed and follow up with oncologist.      SECONDARY DISCHARGE DIAGNOSES  Diagnosis: Pulmonary embolism  Assessment and Plan of Treatment: you are treated with Lovenox. please continue to take medications as prescribed and follow up with you PMD and hem/oncologist.    Diagnosis: Ascites  Assessment and Plan of Treatment: You had paracentesis 3 times during admission. please follow up with interventional radiology for outpatient paracentesis.

## 2020-08-14 NOTE — PROGRESS NOTE ADULT - ASSESSMENT
This is a 81 year old F patient with past medical history of CAD s/p CABG, Uterine cancer s/p SHAAN, HTN and depression present to the ED for abdominal pain and chest discomfort.    #Abdominal pain and distension   Suspected metastatic adenocarcinoma consistent with Mullerian origin.    - Malignant ascites.  - s/p paracentesis x 3 times. last paracentesis yesterday. total ~ 10 L removed.  - cytology positive for malignant cells.   - fluid count showed RBC + lymphocytosis   - d-dimers 5329    - s/p hemo onc: started on chemo. 1st cycle today.  -  positive. Consult OB gynecology  along with oncology following the patient placed .  - seen by GYN onc: no acute intervention. f/u as outpt.      #Pulmonary embolism   Provoked by malignnacy  - Segmental PE with normal Vital signs, Troponin is negative , diagnosed on admission  - on Lovenox 70 mg BID     #CAD s/p CABG   -Continue with ASA     #HTN  -Continue with amlodipine and Metoprolol     #DLD  -Continue with statin    #Activity/Mobility: 24/23    DVT ppx: Enoxaparin 70 BID   GI ppx:  not needed  Code Status:  Full code  Dispo:   likely d/c tomorrow. This is a 81 year old F patient with past medical history of CAD s/p CABG, Uterine cancer s/p SHAAN, HTN and depression present to the ED for abdominal pain and chest discomfort.    #Abdominal pain and distension   Suspected metastatic adenocarcinoma consistent with Mullerian origin.    - Malignant ascites.  - s/p paracentesis x 3 times. last paracentesis yesterday. total ~ 10 L removed.  - cytology positive for malignant cells.   - fluid count showed RBC + lymphocytosis   - d-dimers 5329    - s/p hemo onc: started on chemo. 1st cycle today.  -  positive. Consult OB gynecology  along with oncology following the patient placed .  - seen by GYN onc: no acute intervention. f/u as outpt.      #Pulmonary embolism   Provoked by malignnacy  - Segmental PE with normal Vital signs, Troponin is negative , diagnosed on admission  - on Lovenox 70 mg BID     #CAD s/p CABG   -Continue with ASA     #HTN  -Continue with amlodipine and Metoprolol     #DLD  -Continue with statin    #Activity/Mobility: 24/23    DVT ppx: Enoxaparin 70 BID   GI ppx:  not needed  Code Status:  Full code  Dispo:   Anticipated for discharge tomorrow if no reported acute issues secondary to chemo

## 2020-08-14 NOTE — DISCHARGE NOTE PROVIDER - HOSPITAL COURSE
81 year old F patient with pmh of Uterine cancer s/p SHAAN, CAD s/p CABG, HTN and depression presented to the ED for abdominal pain and abdominal distension x 2 weeks.         In the ED patient had a CTA of the chest as she was complaining of Chest discomfort and she was found to have an Acute PE in the right Upper pulmonary artery segmental branch        #Abdominal pain and distension: pathology favor metastatic adenocarcinoma consistent with Mullerian origin.      - new onset ascites: s/p paracentesis x 3 with total ~ 10 L removed. pathology favor metastatic adenocarcinoma    -  positive    - hemo/onc: chemo 1st cycle on 8/14 inpatient.    - IR follow up for outpt paracentesis.        #Pulmonary embolism     Provoked by malignancy    - on Lovenox 70 mg BID. per hem/onc recommendations discharge on Lovenox. 81 year old F patient with pmh of Uterine cancer s/p SHAAN, CAD s/p CABG, HTN and depression presented to the ED for abdominal pain and abdominal distension x 2 weeks.         In the ED patient had a CTA of the chest as she was complaining of Chest discomfort and she was found to have an Acute PE in the right Upper pulmonary artery segmental branch        #Abdominal pain and distension: pathology favor metastatic adenocarcinoma consistent with Mullerian origin.      - new onset ascites: s/p paracentesis x 3 with total ~ 10 L removed. pathology favor metastatic adenocarcinoma    -  positive    - hemo/onc: chemo 1st cycle on 8/14 inpatient. no complications with chemo.    - IR to follow up for outpt paracentesis.        #Pulmonary embolism     Provoked by malignancy    - on Lovenox 70 mg BID. per hem/onc recommendations discharge on Lovenox. 81 year old F patient with pmh of Uterine cancer s/p SHAAN, CAD s/p CABG, HTN and depression presented to the ED for abdominal pain and abdominal distension x 2 weeks.         In the ED patient had a CTA of the chest as she was complaining of Chest discomfort and she was found to have an Acute PE in the right Upper pulmonary artery segmental branch        #Abdominal pain and distension: pathology favor metastatic adenocarcinoma consistent with Mullerian origin.      - new onset ascites: s/p paracentesis x 3 with total ~ 10 L removed. pathology favor metastatic adenocarcinoma    -  positive    - hemo/onc: chemo 1st cycle on 8/14 inpatient. no complications with chemo.    - IR to follow up for outpt paracentesis.        #Pulmonary embolism     Provoked by malignancy    - on Lovenox 70 mg BID. per hem/onc recommendations discharge on Lovenox.        Attending Attestation:    Patient was seen & examined independently. At least 10 systems were reviewed in ROS. All systems reviewed  are within normal limits. Latest vital signs and labs were reviewed today. Case was discussed with house staff in morning rounds for assessment and plan.  Patient is medically stable for discharge . About 40 mins spent on discharge disposition.

## 2020-08-14 NOTE — PROGRESS NOTE ADULT - SUBJECTIVE AND OBJECTIVE BOX
SUBJECTIVE:    Patient is a 81y old Female who presents with a chief complaint of abdominal pain (14 Aug 2020 11:48)    Currently admitted to medicine with the primary diagnosis of Metastatic adenocarcinoma.     Today is hospital day 8d. This morning she is resting comfortably in bed and reports no new issues or overnight events.    Admit Diagnosis:  PULMONARY EMBOLISM ASCITES      PAST MEDICAL & SURGICAL HISTORY  HLD (hyperlipidemia)  HTN (hypertension)  CAD (coronary artery disease)  History of coronary artery bypass surgery    SOCIAL HISTORY:  Negative for smoking/alcohol/drug use.     ALLERGIES:  chloroquine (Hives)    MEDICATIONS:  STANDING MEDICATIONS  amLODIPine   Tablet 5 milliGRAM(s) Oral daily  aspirin enteric coated 81 milliGRAM(s) Oral daily  CARBOplatin IVPB (eMAR) 220 milliGRAM(s) IV Intermittent once  enoxaparin Injectable 70 milliGRAM(s) SubCutaneous every 12 hours  metoprolol succinate ER 25 milliGRAM(s) Oral daily  PACLitaxel IVPB (eMAR) 100 milliGRAM(s) IV Intermittent once    PRN MEDICATIONS  ALPRAZolam 0.5 milliGRAM(s) Oral every 8 hours PRN    VITALS:   T(F): 98.6  HR: 72  BP: 107/55  RR: 18  SpO2: 96%    I&Os:    PHYSICAL EXAM:  GEN: No acute distress  LUNGS: Clear to auscultation bilaterally   HEART: S1/S2 present. RRR.   ABD: Soft, NT/ND. BS +  EXT: NC/NC/NE/2+PP/COLVIN  NEURO: AAOX3    LABS:                        11.3   5.57  )-----------( 343      ( 14 Aug 2020 06:55 )             35.0     08-14    140  |  102  |  9<L>  ----------------------------<  91  3.9   |  28  |  0.6<L>    Ca    7.8<L>      14 Aug 2020 06:55    TPro  4.8<L>  /  Alb  2.5<L>  /  TBili  0.2  /  DBili  x   /  AST  21  /  ALT  11  /  AlkPhos  44  08-14      RADIOLOGY:

## 2020-08-14 NOTE — PROGRESS NOTE ADULT - ASSESSMENT
This is a 81 year old F patient with past medical history of Uterine cancer s/p SHAAN, CAD s/p CABG, HTN and depression present to the ED for abdominal pain and chest discomfort. found to have new onset ascites, s/p paracentesis with 3L removal, SAAG 0.3 and cytology is pending   In the ED patient had a CTA of the chest as she was complaining of Chest discomfort and she was found to have an Acute PE in the right Upper pulmonary artery segmental branch, started on Lovenox.     # New segmental PE and Abdominal pain/distension secondary to new onset ascites most likely secondary to malignancy. No DVT in b/l LE.      on Lovenox 70 mg BID, will recommend to continue with LOVENOX on DC as well anticipating the need of recurrent paracentesis        # H/O Endometrial Cancer locally advanced with 1 LN+, S/p SHAAN/BSO and carbo and taxol for 5 months in 2016 now with new ascites with path suggestive of Favor metastatic adenocarcinoma consistent with Mullerian origin.      CEA normal. - 217   - Last PET scan in 2018.  She had 3 sessions of paracentesis so far     She will het her C1D1 chemo with Carboplatin AUC 2and Taxol 60 mg/M2 today   Side effects were discussed and consent form was signed   She should be discharged on anti nausea meds - Zofran 8mg ODT 3times a day prn and Zofran 10 mg PO q6h prn   She should be discharged on Dexa 4mg PO q12h for 2 days as well     Spoke to IR- She will arranged to follow up with them next week for possible paracentesis as outpt   DVT ppx- on therapeutic lovenox.    Post chemo she can be discharged from Onc standpoint and she will follow with Dr Gutierrez next Friday Aug 21st for her next chemotherapy    Discussed with Medical resident

## 2020-08-14 NOTE — PROGRESS NOTE ADULT - SUBJECTIVE AND OBJECTIVE BOX
Patient is a 81y old  Female who presents with a chief complaint of abdominal pain (14 Aug 2020 10:54)      Subjective:  No new complaints   She had paracentesis done yesterday and 2liters removed     Vital Signs Last 24 Hrs  T(C): 37 (14 Aug 2020 04:34), Max: 37.3 (13 Aug 2020 20:33)  T(F): 98.6 (14 Aug 2020 04:34), Max: 99.2 (13 Aug 2020 20:33)  HR: 72 (14 Aug 2020 04:34) (72 - 97)  BP: 107/55 (14 Aug 2020 04:34) (97/57 - 118/55)  BP(mean): --  RR: 18 (14 Aug 2020 04:34) (17 - 18)  SpO2: 96% (14 Aug 2020 07:44) (95% - 98%)    PHYSICAL EXAM  General: adult in NAD  HEENT: clear oropharynx  Neck: supple  CV: normal S1/S2  Lungs: positive air movement b/l ant lungs,clear to auscultation, no wheezes, no rales  Abdomen: soft , very mild distension today   Ext: no clubbing cyanosis or edema  Skin: no rashes and no petechiae  Neuro: alert and oriented X 4, no focal deficits    MEDICATIONS  (STANDING):  acetaminophen   Tablet .. 650 milliGRAM(s) Oral once  amLODIPine   Tablet 5 milliGRAM(s) Oral daily  aspirin enteric coated 81 milliGRAM(s) Oral daily  CARBOplatin IVPB (eMAR) 220 milliGRAM(s) IV Intermittent once  dexAMETHasone  IVPB 8 milliGRAM(s) IV Intermittent once  diphenhydrAMINE  IVPB 25 milliGRAM(s) IV Intermittent once  enoxaparin Injectable 70 milliGRAM(s) SubCutaneous every 12 hours  famotidine  IVPB 20 milliGRAM(s) IV Intermittent once  fosaprepitant IVPB 150 milliGRAM(s) IV Intermittent once  metoprolol succinate ER 25 milliGRAM(s) Oral daily  ondansetron  IVPB 16 milliGRAM(s) IV Intermittent once  PACLitaxel IVPB (eMAR) 100 milliGRAM(s) IV Intermittent once    MEDICATIONS  (PRN):  ALPRAZolam 0.5 milliGRAM(s) Oral every 8 hours PRN anxiety      LABS:                          11.3   5.57  )-----------( 343      ( 14 Aug 2020 06:55 )             35.0         Mean Cell Volume : 81.6 fL  Mean Cell Hemoglobin : 26.3 pg  Mean Cell Hemoglobin Concentration : 32.3 g/dL  Auto Neutrophil # : 3.66 K/uL  Auto Lymphocyte # : 0.77 K/uL  Auto Monocyte # : 0.89 K/uL  Auto Eosinophil # : 0.14 K/uL  Auto Basophil # : 0.06 K/uL  Auto Neutrophil % : 65.7 %  Auto Lymphocyte % : 13.8 %  Auto Monocyte % : 16.0 %  Auto Eosinophil % : 2.5 %  Auto Basophil % : 1.1 %      Serial CBC's  08-14 @ 06:55  Hct-35.0 / Hgb-11.3 / Plat-343 / RBC-4.29 / WBC-5.57  Serial CBC's  08-13 @ 06:18  Hct-36.2 / Hgb-11.9 / Plat-313 / RBC-4.55 / WBC-5.61  Serial CBC's  08-12 @ 05:30  Hct-35.5 / Hgb-11.6 / Plat-430 / RBC-4.48 / WBC-5.88  Serial CBC's  08-11 @ 05:45  Hct-35.5 / Hgb-11.6 / Plat-425 / RBC-4.47 / WBC-7.13      08-14    140  |  102  |  9<L>  ----------------------------<  91  3.9   |  28  |  0.6<L>    Ca    7.8<L>      14 Aug 2020 06:55    TPro  4.8<L>  /  Alb  2.5<L>  /  TBili  0.2  /  DBili  x   /  AST  21  /  ALT  11  /  AlkPhos  44  08-14                                  BLOOD SMEAR INTERPRETATION:       RADIOLOGY & ADDITIONAL STUDIES:

## 2020-08-14 NOTE — PROGRESS NOTE ADULT - ATTENDING COMMENTS
Plan to continue chemotherapy as outpatient, patient will require education for Lovenox injections.   We will arrange for outpatient paracentesis if necessary.   Follow up for additional chemotherapy at the [facility].

## 2020-08-15 ENCOUNTER — TRANSCRIPTION ENCOUNTER (OUTPATIENT)
Age: 80
End: 2020-08-15

## 2020-08-15 VITALS
TEMPERATURE: 98 F | SYSTOLIC BLOOD PRESSURE: 112 MMHG | RESPIRATION RATE: 18 BRPM | DIASTOLIC BLOOD PRESSURE: 61 MMHG | HEART RATE: 76 BPM

## 2020-08-15 LAB
ALBUMIN SERPL ELPH-MCNC: 3 G/DL — LOW (ref 3.5–5.2)
ALP SERPL-CCNC: 58 U/L — SIGNIFICANT CHANGE UP (ref 30–115)
ALT FLD-CCNC: 17 U/L — SIGNIFICANT CHANGE UP (ref 0–41)
ANION GAP SERPL CALC-SCNC: 10 MMOL/L — SIGNIFICANT CHANGE UP (ref 7–14)
AST SERPL-CCNC: 33 U/L — SIGNIFICANT CHANGE UP (ref 0–41)
BASOPHILS # BLD AUTO: 0.01 K/UL — SIGNIFICANT CHANGE UP (ref 0–0.2)
BASOPHILS NFR BLD AUTO: 0.1 % — SIGNIFICANT CHANGE UP (ref 0–1)
BILIRUB SERPL-MCNC: <0.2 MG/DL — SIGNIFICANT CHANGE UP (ref 0.2–1.2)
BUN SERPL-MCNC: 12 MG/DL — SIGNIFICANT CHANGE UP (ref 10–20)
CALCIUM SERPL-MCNC: 7.7 MG/DL — LOW (ref 8.5–10.1)
CHLORIDE SERPL-SCNC: 103 MMOL/L — SIGNIFICANT CHANGE UP (ref 98–110)
CO2 SERPL-SCNC: 26 MMOL/L — SIGNIFICANT CHANGE UP (ref 17–32)
CREAT SERPL-MCNC: 0.7 MG/DL — SIGNIFICANT CHANGE UP (ref 0.7–1.5)
EOSINOPHIL # BLD AUTO: 0 K/UL — SIGNIFICANT CHANGE UP (ref 0–0.7)
EOSINOPHIL NFR BLD AUTO: 0 % — SIGNIFICANT CHANGE UP (ref 0–8)
GLUCOSE BLDC GLUCOMTR-MCNC: 105 MG/DL — HIGH (ref 70–99)
GLUCOSE BLDC GLUCOMTR-MCNC: 122 MG/DL — HIGH (ref 70–99)
GLUCOSE SERPL-MCNC: 144 MG/DL — HIGH (ref 70–99)
HCT VFR BLD CALC: 37.3 % — SIGNIFICANT CHANGE UP (ref 37–47)
HGB BLD-MCNC: 11.9 G/DL — LOW (ref 12–16)
IMM GRANULOCYTES NFR BLD AUTO: 0.6 % — HIGH (ref 0.1–0.3)
LYMPHOCYTES # BLD AUTO: 0.63 K/UL — LOW (ref 1.2–3.4)
LYMPHOCYTES # BLD AUTO: 7.9 % — LOW (ref 20.5–51.1)
MCHC RBC-ENTMCNC: 26 PG — LOW (ref 27–31)
MCHC RBC-ENTMCNC: 31.9 G/DL — LOW (ref 32–37)
MCV RBC AUTO: 81.6 FL — SIGNIFICANT CHANGE UP (ref 81–99)
MONOCYTES # BLD AUTO: 0.17 K/UL — SIGNIFICANT CHANGE UP (ref 0.1–0.6)
MONOCYTES NFR BLD AUTO: 2.1 % — SIGNIFICANT CHANGE UP (ref 1.7–9.3)
NEUTROPHILS # BLD AUTO: 7.16 K/UL — HIGH (ref 1.4–6.5)
NEUTROPHILS NFR BLD AUTO: 89.3 % — HIGH (ref 42.2–75.2)
NRBC # BLD: 0 /100 WBCS — SIGNIFICANT CHANGE UP (ref 0–0)
PLATELET # BLD AUTO: 352 K/UL — SIGNIFICANT CHANGE UP (ref 130–400)
POTASSIUM SERPL-MCNC: 4.6 MMOL/L — SIGNIFICANT CHANGE UP (ref 3.5–5)
POTASSIUM SERPL-SCNC: 4.6 MMOL/L — SIGNIFICANT CHANGE UP (ref 3.5–5)
PROT SERPL-MCNC: 5.5 G/DL — LOW (ref 6–8)
RBC # BLD: 4.57 M/UL — SIGNIFICANT CHANGE UP (ref 4.2–5.4)
RBC # FLD: 14.3 % — SIGNIFICANT CHANGE UP (ref 11.5–14.5)
SODIUM SERPL-SCNC: 139 MMOL/L — SIGNIFICANT CHANGE UP (ref 135–146)
WBC # BLD: 8.02 K/UL — SIGNIFICANT CHANGE UP (ref 4.8–10.8)
WBC # FLD AUTO: 8.02 K/UL — SIGNIFICANT CHANGE UP (ref 4.8–10.8)

## 2020-08-15 PROCEDURE — 99239 HOSP IP/OBS DSCHRG MGMT >30: CPT

## 2020-08-15 RX ORDER — ENOXAPARIN SODIUM 100 MG/ML
70 INJECTION SUBCUTANEOUS
Qty: 1960 | Refills: 0
Start: 2020-08-15 | End: 2020-08-28

## 2020-08-15 RX ADMIN — Medication 81 MILLIGRAM(S): at 12:56

## 2020-08-15 RX ADMIN — AMLODIPINE BESYLATE 5 MILLIGRAM(S): 2.5 TABLET ORAL at 05:24

## 2020-08-15 RX ADMIN — Medication 25 MILLIGRAM(S): at 05:24

## 2020-08-15 RX ADMIN — Medication 4 MILLIGRAM(S): at 05:24

## 2020-08-15 RX ADMIN — ENOXAPARIN SODIUM 70 MILLIGRAM(S): 100 INJECTION SUBCUTANEOUS at 05:25

## 2020-08-15 NOTE — PROGRESS NOTE ADULT - PROVIDER SPECIALTY LIST ADULT
Gastroenterology
Heme/Onc
Hospitalist
Hospitalist
Internal Medicine
Gastroenterology

## 2020-08-15 NOTE — PROGRESS NOTE ADULT - SUBJECTIVE AND OBJECTIVE BOX
SUBJECTIVE:    Patient is a 81y old Female who presents with a chief complaint of abdominal pain (14 Aug 2020 13:38)    Currently admitted to medicine with the primary diagnosis of Metastatic adenocarcinoma     Today is hospital day 9d. This morning she is resting comfortably in bed and reports no new issues or overnight events.     Admit Diagnosis:  PULMONARY EMBOLISM ASCITES      PAST MEDICAL & SURGICAL HISTORY  HLD (hyperlipidemia)  HTN (hypertension)  CAD (coronary artery disease)  History of coronary artery bypass surgery    SOCIAL HISTORY:  Negative for smoking/alcohol/drug use.     ALLERGIES:  chloroquine (Hives)    MEDICATIONS:  STANDING MEDICATIONS  amLODIPine   Tablet 5 milliGRAM(s) Oral daily  aspirin enteric coated 81 milliGRAM(s) Oral daily  dexAMETHasone     Tablet 4 milliGRAM(s) Oral two times a day  enoxaparin Injectable 70 milliGRAM(s) SubCutaneous every 12 hours  metoprolol succinate ER 25 milliGRAM(s) Oral daily    PRN MEDICATIONS  ALPRAZolam 0.5 milliGRAM(s) Oral every 8 hours PRN    VITALS:   T(F): 98.1  HR: 79  BP: 108/58  RR: 18  SpO2: --    I&Os:    PHYSICAL EXAM:  GEN: No acute distress  LUNGS: Clear to auscultation bilaterally   HEART: S1/S2 present. RRR.   ABD: Soft, NT/ND. BS +  EXT: NC/NC/NE/2+PP/COLVIN  NEURO: AAOX3    LABS:                        11.9   8.02  )-----------( 352      ( 15 Aug 2020 05:57 )             37.3     08-15    139  |  103  |  12  ----------------------------<  144<H>  4.6   |  26  |  0.7    Ca    7.7<L>      15 Aug 2020 05:57    TPro  5.5<L>  /  Alb  3.0<L>  /  TBili  <0.2  /  DBili  x   /  AST  33  /  ALT  17  /  AlkPhos  58  08-15    RADIOLOGY:

## 2020-08-15 NOTE — DISCHARGE NOTE NURSING/CASE MANAGEMENT/SOCIAL WORK - PATIENT PORTAL LINK FT
You can access the FollowMyHealth Patient Portal offered by [facility] by registering at the following website: [email or web address]. By joining [facility]’s FollowMyHealth portal, you will also be able to view your health information using other applications (apps) compatible with our system.

## 2020-08-15 NOTE — PROGRESS NOTE ADULT - ASSESSMENT
This is a 81 year old F patient with past medical history of CAD s/p CABG, Uterine cancer s/p SHAAN, HTN and depression present to the ED for abdominal pain and chest discomfort.    #Abdominal pain and distension   Suspected metastatic adenocarcinoma consistent with Mullerian origin.    - Malignant ascites.  - s/p paracentesis x 3 times. last paracentesis yesterday. total ~ 10 L removed.  - cytology positive for malignant cells.   - fluid count showed RBC + lymphocytosis   - d-dimers 5329    - hemo onc f/u: started on chemo. 1st cycle on 8/14. ok to d/c from hem/onc.  scheduled to see Dr. Gutierrez on 8/21.  -  positive.  - seen by GYN onc: no acute intervention. f/u as outpt.      #Pulmonary embolism   Provoked by malignnacy  - Segmental PE with normal Vital signs, Troponin is negative , diagnosed on admission  - on Lovenox 70 mg BID     #CAD s/p CABG   -Continue with ASA     #HTN  -Continue with amlodipine and Metoprolol     #DLD  -Continue with statin    DVT ppx: Enoxaparin 70 BID   GI ppx:  not needed  Code Status:  Full code  Dispo: discharge today

## 2020-08-16 RX ORDER — DEXAMETHASONE 0.5 MG/5ML
1 ELIXIR ORAL
Qty: 2 | Refills: 0
Start: 2020-08-16 | End: 2020-08-16

## 2020-08-18 ENCOUNTER — OUTPATIENT (OUTPATIENT)
Dept: OUTPATIENT SERVICES | Facility: HOSPITAL | Age: 80
LOS: 1 days | Discharge: HOME | End: 2020-08-18

## 2020-08-18 ENCOUNTER — LABORATORY RESULT (OUTPATIENT)
Age: 80
End: 2020-08-18

## 2020-08-18 ENCOUNTER — APPOINTMENT (OUTPATIENT)
Dept: HEMATOLOGY ONCOLOGY | Facility: CLINIC | Age: 80
End: 2020-08-18

## 2020-08-18 ENCOUNTER — APPOINTMENT (OUTPATIENT)
Dept: HEMATOLOGY ONCOLOGY | Facility: CLINIC | Age: 80
End: 2020-08-18
Payer: MEDICARE

## 2020-08-18 VITALS
TEMPERATURE: 98.3 F | HEIGHT: 64 IN | DIASTOLIC BLOOD PRESSURE: 66 MMHG | SYSTOLIC BLOOD PRESSURE: 101 MMHG | BODY MASS INDEX: 24.92 KG/M2 | WEIGHT: 146 LBS | HEART RATE: 80 BPM | RESPIRATION RATE: 14 BRPM

## 2020-08-18 DIAGNOSIS — Z98.890 OTHER SPECIFIED POSTPROCEDURAL STATES: Chronic | ICD-10-CM

## 2020-08-18 DIAGNOSIS — Z95.1 PRESENCE OF AORTOCORONARY BYPASS GRAFT: Chronic | ICD-10-CM

## 2020-08-18 DIAGNOSIS — I25.701 ATHEROSCLEROSIS OF CORONARY ARTERY BYPASS GRAFT(S), UNSPECIFIED, WITH ANGINA PECTORIS WITH DOCUMENTED SPASM: Chronic | ICD-10-CM

## 2020-08-18 DIAGNOSIS — Z11.59 ENCOUNTER FOR SCREENING FOR OTHER VIRAL DISEASES: ICD-10-CM

## 2020-08-18 PROCEDURE — 99215 OFFICE O/P EST HI 40 MIN: CPT

## 2020-08-18 RX ORDER — ALPRAZOLAM 0.25 MG/1
0.25 TABLET ORAL
Refills: 0 | Status: COMPLETED | COMMUNITY
End: 2020-08-18

## 2020-08-18 RX ORDER — ASPIRIN ENTERIC COATED TABLETS 81 MG 81 MG/1
81 TABLET, DELAYED RELEASE ORAL
Qty: 90 | Refills: 3 | Status: DISCONTINUED | COMMUNITY
Start: 2019-10-10 | End: 2020-08-18

## 2020-08-18 RX ORDER — DOCUSATE SODIUM 100 MG/1
100 CAPSULE, LIQUID FILLED ORAL 3 TIMES DAILY
Refills: 0 | Status: COMPLETED | COMMUNITY
End: 2020-08-18

## 2020-08-19 ENCOUNTER — RECORD ABSTRACTING (OUTPATIENT)
Age: 80
End: 2020-08-19

## 2020-08-19 ENCOUNTER — APPOINTMENT (OUTPATIENT)
Dept: INFUSION THERAPY | Facility: CLINIC | Age: 80
End: 2020-08-19

## 2020-08-19 LAB
ALBUMIN SERPL ELPH-MCNC: 3.3 G/DL
ALP BLD-CCNC: 53 U/L
ALT SERPL-CCNC: 25 U/L
ANION GAP SERPL CALC-SCNC: 12 MMOL/L
AST SERPL-CCNC: 32 U/L
BILIRUB SERPL-MCNC: 0.3 MG/DL
BUN SERPL-MCNC: 19 MG/DL
CALCIUM SERPL-MCNC: 7.8 MG/DL
CHLORIDE SERPL-SCNC: 100 MMOL/L
CO2 SERPL-SCNC: 27 MMOL/L
CREAT SERPL-MCNC: 0.7 MG/DL
GLUCOSE SERPL-MCNC: 112 MG/DL
HCT VFR BLD CALC: 37.1 %
HGB BLD-MCNC: 11.9 G/DL
MCHC RBC-ENTMCNC: 25.9 PG
MCHC RBC-ENTMCNC: 32.1 G/DL
MCV RBC AUTO: 80.7 FL
PLATELET # BLD AUTO: 302 K/UL
PMV BLD: 10.4 FL
POTASSIUM SERPL-SCNC: 3.9 MMOL/L
PROT SERPL-MCNC: 5.9 G/DL
RBC # BLD: 4.6 M/UL
RBC # FLD: 14.6 %
SODIUM SERPL-SCNC: 139 MMOL/L
WBC # FLD AUTO: 4.4 K/UL

## 2020-08-21 ENCOUNTER — APPOINTMENT (OUTPATIENT)
Dept: INFUSION THERAPY | Facility: CLINIC | Age: 80
End: 2020-08-21

## 2020-08-21 RX ORDER — CARBOPLATIN 50 MG
200 VIAL (EA) INTRAVENOUS ONCE
Refills: 0 | Status: COMPLETED | OUTPATIENT
Start: 2020-08-21 | End: 2020-08-21

## 2020-08-21 RX ORDER — DIPHENHYDRAMINE HCL 50 MG
25 CAPSULE ORAL ONCE
Refills: 0 | Status: COMPLETED | OUTPATIENT
Start: 2020-08-21 | End: 2020-08-21

## 2020-08-21 RX ORDER — FOSAPREPITANT DIMEGLUMINE 150 MG/5ML
150 INJECTION, POWDER, LYOPHILIZED, FOR SOLUTION INTRAVENOUS ONCE
Refills: 0 | Status: COMPLETED | OUTPATIENT
Start: 2020-08-21 | End: 2020-08-21

## 2020-08-21 RX ORDER — PACLITAXEL 6 MG/ML
100 INJECTION, SOLUTION, CONCENTRATE INTRAVENOUS ONCE
Refills: 0 | Status: COMPLETED | OUTPATIENT
Start: 2020-08-21 | End: 2020-08-21

## 2020-08-21 RX ORDER — FAMOTIDINE 10 MG/ML
20 INJECTION INTRAVENOUS ONCE
Refills: 0 | Status: COMPLETED | OUTPATIENT
Start: 2020-08-21 | End: 2020-08-21

## 2020-08-21 RX ORDER — DEXAMETHASONE 0.5 MG/5ML
12 ELIXIR ORAL ONCE
Refills: 0 | Status: COMPLETED | OUTPATIENT
Start: 2020-08-21 | End: 2020-08-21

## 2020-08-21 RX ORDER — ACETAMINOPHEN 500 MG
650 TABLET ORAL ONCE
Refills: 0 | Status: COMPLETED | OUTPATIENT
Start: 2020-08-21 | End: 2020-08-21

## 2020-08-21 RX ADMIN — Medication 270 MILLIGRAM(S): at 12:12

## 2020-08-21 RX ADMIN — Medication 101 MILLIGRAM(S): at 11:12

## 2020-08-21 RX ADMIN — PACLITAXEL 266.67 MILLIGRAM(S): 6 INJECTION, SOLUTION, CONCENTRATE INTRAVENOUS at 14:12

## 2020-08-21 RX ADMIN — FOSAPREPITANT DIMEGLUMINE 150 MILLIGRAM(S): 150 INJECTION, POWDER, LYOPHILIZED, FOR SOLUTION INTRAVENOUS at 11:52

## 2020-08-21 RX ADMIN — Medication 650 MILLIGRAM(S): at 10:33

## 2020-08-21 RX ADMIN — FAMOTIDINE 20 MILLIGRAM(S): 10 INJECTION INTRAVENOUS at 11:12

## 2020-08-21 RX ADMIN — FOSAPREPITANT DIMEGLUMINE 300 MILLIGRAM(S): 150 INJECTION, POWDER, LYOPHILIZED, FOR SOLUTION INTRAVENOUS at 11:32

## 2020-08-21 RX ADMIN — Medication 25 MILLIGRAM(S): at 11:32

## 2020-08-21 RX ADMIN — Medication 12 MILLIGRAM(S): at 10:52

## 2020-08-21 RX ADMIN — Medication 200 MILLIGRAM(S): at 14:00

## 2020-08-21 RX ADMIN — PACLITAXEL 100 MILLIGRAM(S): 6 INJECTION, SOLUTION, CONCENTRATE INTRAVENOUS at 15:12

## 2020-08-21 RX ADMIN — Medication 122 MILLIGRAM(S): at 10:32

## 2020-08-21 RX ADMIN — FAMOTIDINE 104 MILLIGRAM(S): 10 INJECTION INTRAVENOUS at 10:52

## 2020-08-26 NOTE — ASSESSMENT
[FreeTextEntry1] : Recurrent adenocarcinoma Mullerian origin, malignant ascites diagnosed on 8/8/2020\par --Prior SHAAN BSO in 2016, adjuvant chemotherapy, obtain records from Dr. Abdullahi \par --Started on weekly carbo/taxol weekly 3 on 1 off on 8/14/2020 \par --PET CT ordered on 8/18/2020 \par --Labwork today\par \par Malignant ascites required high volume paracentesis \par --May require paracentesis PRN as out patient  \par \par PE in the setting of malignancy diagnosed on 8/6/2020\par --Doppler was negative for DVT on 8/8/2020\par --On BID LOvenox for now, given additional paracentesis may be required \par --Also on ASA, h/o CABG \par \par Follow up in 3 weeks or earlier if needed

## 2020-08-26 NOTE — REASON FOR VISIT
[Initial Consultation] : an initial consultation [FreeTextEntry2] : Recurrent endometrial cancer, HFU

## 2020-08-26 NOTE — PHYSICAL EXAM
[Ambulatory and capable of all self care but unable to carry out any work activities] : Status 2- Ambulatory and capable of all self care but unable to carry out any work activities. Up and about more than 50% of waking hours [Normal] : affect appropriate [de-identified] : mildly distended overall improved compared to prior, non-tender

## 2020-08-26 NOTE — REVIEW OF SYSTEMS
[Fatigue] : fatigue [Recent Change In Weight] : ~T recent weight change [SOB on Exertion] : shortness of breath during exertion [Negative] : Allergic/Immunologic [Chills] : no chills [Night Sweats] : no night sweats [Fever] : no fever [Shortness Of Breath] : no shortness of breath [Wheezing] : no wheezing [Cough] : no cough [Vomiting] : no vomiting [Constipation] : no constipation [Abdominal Pain] : no abdominal pain [Confused] : no confusion [Diarrhea] : no diarrhea [Fainting] : no fainting [Difficulty Walking] : no difficulty walking [Dizziness] : no dizziness [FreeTextEntry7] : early satiety [de-identified] : minor preexisting neuropathy

## 2020-08-26 NOTE — HISTORY OF PRESENT ILLNESS
[de-identified] : 8/18/2020: She returns today for follow up, she reports improved SOB, improved abdominal swelling, still come positional chest discomfort, she is compliant with BID Lovenox, she remains on the ASA. She reports improved appetite, she reports that appetite is decreased with a large amount of fluid on the abdomen. She is due for C1D8 of Carbo/Taxol on 8/21/2020. CBC was reviewed. \par Side effects of chemotherapy including, but not limited to, cytopenias, that may require blood product transfusions and lead to increased risk of infection, requiring hospitalization, allergic reactions, that can rarely be life-threatening, skin reactions, nausea/vomiting, mucositis, diarrhea/constipation were discussed at length, patient and family voice undestaging, all questions were answered to patient's satisfaction.\par Images were personally reviewed by MD Matt and discussed with patient.\par \par  [de-identified] : Trav is a shekhar 81 year old lady I initially met in the hospital on 8/11/2020. Her past medical history is significant for uterine cancer s/p SHAAN BSO as per patient s/p adjuvant chemotherapy in 2016, CAD s/p CABG, HTN and depression, she presented to the ED for abdominal pain and chest discomfort. 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 \par \par PAST MEDICAL & SURGICAL HISTORY:\par HLD (hyperlipidemia)\par HTN (hypertension)\par CAD (coronary artery disease)\par History of coronary artery bypass surgery\par \par FAMILY HISTORY:\par No pertinent family history in first degree relatives\par \par Allergies\par chloroquine (Hives)\par \par CTA C/A/P on 8/6/2020 revealed \par 1. Intraluminal filling defect is seen within the segmental branches of the right lower lobe pulmonary artery, indicating acute pulmonary embolism. (6/132-138; 10/51-55). Filling defect in segmental branch of the right upper lobe pulmonary artery. (6/77) There is a small filling defect within a left lower lobe pulmonary artery segmental branch (6/147; 10/59). No evidence of right heart strain. The right ventricle measures 3.5 cm; the left ventricle measures 3.7 cm. (RV:LV<1) \par 2. Compared with the previous CT scan of 5/27/2020, there is now a large amount of ascites throughout the abdomen and pelvis. \par \par LE Doppler on 8/8/2020 revealed \par No evidence of deep venous thrombosis or superficial thrombophlebitis in the bilateral lower extremities. \par Left popliteal fossa fluid collection as measured above likely Baker's cyst\par \par During her admission she has required 3 episodes of paracentesis on 8/7 3L, 8/12 4L, 8/13 2.2L, once pathology became available patient was started on weekly Carbo/Taxol she has tolerated chemotherapy well.

## 2020-08-27 DIAGNOSIS — Z95.1 PRESENCE OF AORTOCORONARY BYPASS GRAFT: ICD-10-CM

## 2020-08-27 DIAGNOSIS — F32.9 MAJOR DEPRESSIVE DISORDER, SINGLE EPISODE, UNSPECIFIED: ICD-10-CM

## 2020-08-27 DIAGNOSIS — Z90.722 ACQUIRED ABSENCE OF OVARIES, BILATERAL: ICD-10-CM

## 2020-08-27 DIAGNOSIS — I10 ESSENTIAL (PRIMARY) HYPERTENSION: ICD-10-CM

## 2020-08-27 DIAGNOSIS — E87.2 ACIDOSIS: ICD-10-CM

## 2020-08-27 DIAGNOSIS — C54.1 MALIGNANT NEOPLASM OF ENDOMETRIUM: ICD-10-CM

## 2020-08-27 DIAGNOSIS — R18.0 MALIGNANT ASCITES: ICD-10-CM

## 2020-08-27 DIAGNOSIS — D68.59 OTHER PRIMARY THROMBOPHILIA: ICD-10-CM

## 2020-08-27 DIAGNOSIS — I25.10 ATHEROSCLEROTIC HEART DISEASE OF NATIVE CORONARY ARTERY WITHOUT ANGINA PECTORIS: ICD-10-CM

## 2020-08-27 DIAGNOSIS — E11.65 TYPE 2 DIABETES MELLITUS WITH HYPERGLYCEMIA: ICD-10-CM

## 2020-08-27 DIAGNOSIS — K59.00 CONSTIPATION, UNSPECIFIED: ICD-10-CM

## 2020-08-27 DIAGNOSIS — Z90.710 ACQUIRED ABSENCE OF BOTH CERVIX AND UTERUS: ICD-10-CM

## 2020-08-27 DIAGNOSIS — I26.99 OTHER PULMONARY EMBOLISM WITHOUT ACUTE COR PULMONALE: ICD-10-CM

## 2020-08-27 DIAGNOSIS — Z79.4 LONG TERM (CURRENT) USE OF INSULIN: ICD-10-CM

## 2020-08-27 DIAGNOSIS — C78.6 SECONDARY MALIGNANT NEOPLASM OF RETROPERITONEUM AND PERITONEUM: ICD-10-CM

## 2020-08-27 DIAGNOSIS — E78.5 HYPERLIPIDEMIA, UNSPECIFIED: ICD-10-CM

## 2020-08-28 ENCOUNTER — LABORATORY RESULT (OUTPATIENT)
Age: 80
End: 2020-08-28

## 2020-08-28 ENCOUNTER — APPOINTMENT (OUTPATIENT)
Dept: INFUSION THERAPY | Facility: CLINIC | Age: 80
End: 2020-08-28

## 2020-08-28 LAB
HCT VFR BLD CALC: 34 %
HGB BLD-MCNC: 11 G/DL
MCHC RBC-ENTMCNC: 26.3 PG
MCHC RBC-ENTMCNC: 32.4 G/DL
MCV RBC AUTO: 81.3 FL
PLATELET # BLD AUTO: 198 K/UL
PMV BLD: 10 FL
RBC # BLD: 4.18 M/UL
RBC # FLD: 15.8 %
WBC # FLD AUTO: 4.3 K/UL

## 2020-08-28 RX ORDER — FAMOTIDINE 10 MG/ML
20 INJECTION INTRAVENOUS ONCE
Refills: 0 | Status: COMPLETED | OUTPATIENT
Start: 2020-08-28 | End: 2020-08-28

## 2020-08-28 RX ORDER — ACETAMINOPHEN 500 MG
650 TABLET ORAL ONCE
Refills: 0 | Status: COMPLETED | OUTPATIENT
Start: 2020-08-28 | End: 2020-08-28

## 2020-08-28 RX ORDER — CARBOPLATIN 50 MG
200 VIAL (EA) INTRAVENOUS ONCE
Refills: 0 | Status: COMPLETED | OUTPATIENT
Start: 2020-08-28 | End: 2020-08-28

## 2020-08-28 RX ORDER — DEXAMETHASONE 0.5 MG/5ML
12 ELIXIR ORAL ONCE
Refills: 0 | Status: COMPLETED | OUTPATIENT
Start: 2020-08-28 | End: 2020-08-28

## 2020-08-28 RX ORDER — FOSAPREPITANT DIMEGLUMINE 150 MG/5ML
150 INJECTION, POWDER, LYOPHILIZED, FOR SOLUTION INTRAVENOUS ONCE
Refills: 0 | Status: COMPLETED | OUTPATIENT
Start: 2020-08-28 | End: 2020-08-28

## 2020-08-28 RX ORDER — DIPHENHYDRAMINE HCL 50 MG
25 CAPSULE ORAL ONCE
Refills: 0 | Status: COMPLETED | OUTPATIENT
Start: 2020-08-28 | End: 2020-08-28

## 2020-08-28 RX ORDER — PACLITAXEL 6 MG/ML
100 INJECTION, SOLUTION, CONCENTRATE INTRAVENOUS ONCE
Refills: 0 | Status: COMPLETED | OUTPATIENT
Start: 2020-08-28 | End: 2020-08-28

## 2020-08-28 RX ADMIN — FOSAPREPITANT DIMEGLUMINE 150 MILLIGRAM(S): 150 INJECTION, POWDER, LYOPHILIZED, FOR SOLUTION INTRAVENOUS at 13:02

## 2020-08-28 RX ADMIN — PACLITAXEL 100 MILLIGRAM(S): 6 INJECTION, SOLUTION, CONCENTRATE INTRAVENOUS at 14:05

## 2020-08-28 RX ADMIN — FAMOTIDINE 20 MILLIGRAM(S): 10 INJECTION INTRAVENOUS at 12:22

## 2020-08-28 RX ADMIN — Medication 101 MILLIGRAM(S): at 12:22

## 2020-08-28 RX ADMIN — FOSAPREPITANT DIMEGLUMINE 300 MILLIGRAM(S): 150 INJECTION, POWDER, LYOPHILIZED, FOR SOLUTION INTRAVENOUS at 12:42

## 2020-08-28 RX ADMIN — Medication 12 MILLIGRAM(S): at 12:02

## 2020-08-28 RX ADMIN — Medication 270 MILLIGRAM(S): at 14:05

## 2020-08-28 RX ADMIN — Medication 650 MILLIGRAM(S): at 11:42

## 2020-08-28 RX ADMIN — Medication 25 MILLIGRAM(S): at 12:42

## 2020-08-28 RX ADMIN — Medication 122 MILLIGRAM(S): at 11:42

## 2020-08-28 RX ADMIN — Medication 200 MILLIGRAM(S): at 15:05

## 2020-08-28 RX ADMIN — FAMOTIDINE 104 MILLIGRAM(S): 10 INJECTION INTRAVENOUS at 12:02

## 2020-08-28 RX ADMIN — PACLITAXEL 266.67 MILLIGRAM(S): 6 INJECTION, SOLUTION, CONCENTRATE INTRAVENOUS at 13:05

## 2020-09-08 ENCOUNTER — RESULT REVIEW (OUTPATIENT)
Age: 80
End: 2020-09-08

## 2020-09-08 ENCOUNTER — OUTPATIENT (OUTPATIENT)
Dept: OUTPATIENT SERVICES | Facility: HOSPITAL | Age: 80
LOS: 1 days | Discharge: HOME | End: 2020-09-08
Payer: MEDICARE

## 2020-09-08 DIAGNOSIS — Z98.890 OTHER SPECIFIED POSTPROCEDURAL STATES: Chronic | ICD-10-CM

## 2020-09-08 DIAGNOSIS — C55 MALIGNANT NEOPLASM OF UTERUS, PART UNSPECIFIED: ICD-10-CM

## 2020-09-08 DIAGNOSIS — I25.701 ATHEROSCLEROSIS OF CORONARY ARTERY BYPASS GRAFT(S), UNSPECIFIED, WITH ANGINA PECTORIS WITH DOCUMENTED SPASM: Chronic | ICD-10-CM

## 2020-09-08 DIAGNOSIS — Z95.1 PRESENCE OF AORTOCORONARY BYPASS GRAFT: Chronic | ICD-10-CM

## 2020-09-08 LAB — GLUCOSE BLDC GLUCOMTR-MCNC: 83 MG/DL — SIGNIFICANT CHANGE UP (ref 70–99)

## 2020-09-08 PROCEDURE — 78815 PET IMAGE W/CT SKULL-THIGH: CPT | Mod: 26,PI

## 2020-09-10 ENCOUNTER — LABORATORY RESULT (OUTPATIENT)
Age: 80
End: 2020-09-10

## 2020-09-10 ENCOUNTER — APPOINTMENT (OUTPATIENT)
Dept: HEMATOLOGY ONCOLOGY | Facility: CLINIC | Age: 80
End: 2020-09-10
Payer: MEDICARE

## 2020-09-10 ENCOUNTER — INPATIENT (INPATIENT)
Facility: HOSPITAL | Age: 80
LOS: 4 days | Discharge: HOME | End: 2020-09-15
Attending: INTERNAL MEDICINE | Admitting: INTERNAL MEDICINE
Payer: MEDICARE

## 2020-09-10 VITALS
HEART RATE: 75 BPM | WEIGHT: 143 LBS | BODY MASS INDEX: 24.41 KG/M2 | TEMPERATURE: 97.7 F | DIASTOLIC BLOOD PRESSURE: 65 MMHG | HEIGHT: 64 IN | SYSTOLIC BLOOD PRESSURE: 120 MMHG

## 2020-09-10 VITALS
RESPIRATION RATE: 16 BRPM | TEMPERATURE: 99 F | DIASTOLIC BLOOD PRESSURE: 58 MMHG | HEART RATE: 88 BPM | WEIGHT: 145.06 LBS | OXYGEN SATURATION: 98 % | SYSTOLIC BLOOD PRESSURE: 122 MMHG

## 2020-09-10 DIAGNOSIS — I25.701 ATHEROSCLEROSIS OF CORONARY ARTERY BYPASS GRAFT(S), UNSPECIFIED, WITH ANGINA PECTORIS WITH DOCUMENTED SPASM: Chronic | ICD-10-CM

## 2020-09-10 DIAGNOSIS — Z95.1 PRESENCE OF AORTOCORONARY BYPASS GRAFT: Chronic | ICD-10-CM

## 2020-09-10 DIAGNOSIS — Z98.890 OTHER SPECIFIED POSTPROCEDURAL STATES: Chronic | ICD-10-CM

## 2020-09-10 DIAGNOSIS — Z00.00 ENCOUNTER FOR GENERAL ADULT MEDICAL EXAMINATION W/OUT ABNORMAL FINDINGS: ICD-10-CM

## 2020-09-10 LAB
HCT VFR BLD CALC: 33.6 %
HGB BLD-MCNC: 11 G/DL
MCHC RBC-ENTMCNC: 26.6 PG
MCHC RBC-ENTMCNC: 32.7 G/DL
MCV RBC AUTO: 81.2 FL
PLATELET # BLD AUTO: 207 K/UL
PMV BLD: 9.7 FL
RBC # BLD: 4.14 M/UL
RBC # FLD: 17.8 %
WBC # FLD AUTO: 5.48 K/UL

## 2020-09-10 PROCEDURE — 99285 EMERGENCY DEPT VISIT HI MDM: CPT

## 2020-09-10 PROCEDURE — 99214 OFFICE O/P EST MOD 30 MIN: CPT

## 2020-09-10 NOTE — ED ADULT TRIAGE NOTE - CHIEF COMPLAINT QUOTE
pt states she is having pain to B/L sides of her abdomen pt states she is having pain to B/L sides of her torso, more when she takes a deep breath

## 2020-09-11 ENCOUNTER — APPOINTMENT (OUTPATIENT)
Dept: INFUSION THERAPY | Facility: CLINIC | Age: 80
End: 2020-09-11

## 2020-09-11 LAB
ALBUMIN SERPL ELPH-MCNC: 3.7 G/DL — SIGNIFICANT CHANGE UP (ref 3.5–5.2)
ALBUMIN SERPL ELPH-MCNC: 3.8 G/DL — SIGNIFICANT CHANGE UP (ref 3.5–5.2)
ALBUMIN SERPL ELPH-MCNC: 3.9 G/DL
ALBUMIN SERPL ELPH-MCNC: 4.6 G/DL — SIGNIFICANT CHANGE UP (ref 3.5–5.2)
ALP BLD-CCNC: 77 U/L
ALP SERPL-CCNC: 73 U/L — SIGNIFICANT CHANGE UP (ref 30–115)
ALP SERPL-CCNC: 75 U/L — SIGNIFICANT CHANGE UP (ref 30–115)
ALP SERPL-CCNC: 75 U/L — SIGNIFICANT CHANGE UP (ref 30–115)
ALT FLD-CCNC: 15 U/L — SIGNIFICANT CHANGE UP (ref 0–41)
ALT FLD-CCNC: 7 U/L — SIGNIFICANT CHANGE UP (ref 0–41)
ALT FLD-CCNC: 7 U/L — SIGNIFICANT CHANGE UP (ref 0–41)
ALT SERPL-CCNC: 8 U/L
ANION GAP SERPL CALC-SCNC: 10 MMOL/L — SIGNIFICANT CHANGE UP (ref 7–14)
ANION GAP SERPL CALC-SCNC: 12 MMOL/L
ANION GAP SERPL CALC-SCNC: 13 MMOL/L — SIGNIFICANT CHANGE UP (ref 7–14)
ANION GAP SERPL CALC-SCNC: 9 MMOL/L — SIGNIFICANT CHANGE UP (ref 7–14)
APPEARANCE UR: CLEAR — SIGNIFICANT CHANGE UP
APTT BLD: 30.8 SEC — SIGNIFICANT CHANGE UP (ref 27–39.2)
APTT BLD: 91 SEC — CRITICAL HIGH (ref 27–39.2)
AST SERPL-CCNC: 14 U/L — SIGNIFICANT CHANGE UP (ref 0–41)
AST SERPL-CCNC: 14 U/L — SIGNIFICANT CHANGE UP (ref 0–41)
AST SERPL-CCNC: 15 U/L
AST SERPL-CCNC: 57 U/L — HIGH (ref 0–41)
BACTERIA # UR AUTO: NEGATIVE — SIGNIFICANT CHANGE UP
BASOPHILS # BLD AUTO: 0 K/UL — SIGNIFICANT CHANGE UP (ref 0–0.2)
BASOPHILS # BLD AUTO: 0.03 K/UL — SIGNIFICANT CHANGE UP (ref 0–0.2)
BASOPHILS NFR BLD AUTO: 0 % — SIGNIFICANT CHANGE UP (ref 0–1)
BASOPHILS NFR BLD AUTO: 0.4 % — SIGNIFICANT CHANGE UP (ref 0–1)
BILIRUB SERPL-MCNC: 0.3 MG/DL
BILIRUB SERPL-MCNC: 0.3 MG/DL — SIGNIFICANT CHANGE UP (ref 0.2–1.2)
BILIRUB SERPL-MCNC: 0.4 MG/DL — SIGNIFICANT CHANGE UP (ref 0.2–1.2)
BILIRUB SERPL-MCNC: 0.4 MG/DL — SIGNIFICANT CHANGE UP (ref 0.2–1.2)
BILIRUB UR-MCNC: NEGATIVE — SIGNIFICANT CHANGE UP
BUN SERPL-MCNC: 11 MG/DL — SIGNIFICANT CHANGE UP (ref 10–20)
BUN SERPL-MCNC: 13 MG/DL — SIGNIFICANT CHANGE UP (ref 10–20)
BUN SERPL-MCNC: 14 MG/DL
BUN SERPL-MCNC: 15 MG/DL — SIGNIFICANT CHANGE UP (ref 10–20)
CALCIUM SERPL-MCNC: 8.1 MG/DL — LOW (ref 8.5–10.1)
CALCIUM SERPL-MCNC: 8.2 MG/DL — LOW (ref 8.5–10.1)
CALCIUM SERPL-MCNC: 8.3 MG/DL
CALCIUM SERPL-MCNC: 8.5 MG/DL — SIGNIFICANT CHANGE UP (ref 8.5–10.1)
CANCER AG125 SERPL-ACNC: 22 U/ML
CHLORIDE SERPL-SCNC: 101 MMOL/L — SIGNIFICANT CHANGE UP (ref 98–110)
CHLORIDE SERPL-SCNC: 102 MMOL/L — SIGNIFICANT CHANGE UP (ref 98–110)
CHLORIDE SERPL-SCNC: 103 MMOL/L
CHLORIDE SERPL-SCNC: 104 MMOL/L — SIGNIFICANT CHANGE UP (ref 98–110)
CO2 SERPL-SCNC: 25 MMOL/L — SIGNIFICANT CHANGE UP (ref 17–32)
CO2 SERPL-SCNC: 25 MMOL/L — SIGNIFICANT CHANGE UP (ref 17–32)
CO2 SERPL-SCNC: 26 MMOL/L
CO2 SERPL-SCNC: 28 MMOL/L — SIGNIFICANT CHANGE UP (ref 17–32)
COLOR SPEC: SIGNIFICANT CHANGE UP
CREAT SERPL-MCNC: 0.7 MG/DL
CREAT SERPL-MCNC: 0.7 MG/DL — SIGNIFICANT CHANGE UP (ref 0.7–1.5)
CREAT SERPL-MCNC: 0.7 MG/DL — SIGNIFICANT CHANGE UP (ref 0.7–1.5)
CREAT SERPL-MCNC: 0.8 MG/DL — SIGNIFICANT CHANGE UP (ref 0.7–1.5)
DIFF PNL FLD: ABNORMAL
EOSINOPHIL # BLD AUTO: 0 K/UL — SIGNIFICANT CHANGE UP (ref 0–0.7)
EOSINOPHIL # BLD AUTO: 0.04 K/UL — SIGNIFICANT CHANGE UP (ref 0–0.7)
EOSINOPHIL NFR BLD AUTO: 0 % — SIGNIFICANT CHANGE UP (ref 0–8)
EOSINOPHIL NFR BLD AUTO: 0.6 % — SIGNIFICANT CHANGE UP (ref 0–8)
EPI CELLS # UR: 1 /HPF — SIGNIFICANT CHANGE UP (ref 0–5)
GLUCOSE SERPL-MCNC: 103 MG/DL
GLUCOSE SERPL-MCNC: 115 MG/DL — HIGH (ref 70–99)
GLUCOSE SERPL-MCNC: 115 MG/DL — HIGH (ref 70–99)
GLUCOSE SERPL-MCNC: 95 MG/DL — SIGNIFICANT CHANGE UP (ref 70–99)
GLUCOSE UR QL: NEGATIVE — SIGNIFICANT CHANGE UP
HCT VFR BLD CALC: 34.2 % — LOW (ref 37–47)
HCT VFR BLD CALC: 39.7 % — SIGNIFICANT CHANGE UP (ref 37–47)
HGB BLD-MCNC: 10.7 G/DL — LOW (ref 12–16)
HGB BLD-MCNC: 12.4 G/DL — SIGNIFICANT CHANGE UP (ref 12–16)
HYALINE CASTS # UR AUTO: 0 /LPF — SIGNIFICANT CHANGE UP (ref 0–7)
IMM GRANULOCYTES NFR BLD AUTO: 0.1 % — SIGNIFICANT CHANGE UP (ref 0.1–0.3)
INR BLD: 1.52 RATIO — HIGH (ref 0.65–1.3)
INR BLD: 1.63 RATIO — HIGH (ref 0.65–1.3)
KETONES UR-MCNC: NEGATIVE — SIGNIFICANT CHANGE UP
LEUKOCYTE ESTERASE UR-ACNC: NEGATIVE — SIGNIFICANT CHANGE UP
LYMPHOCYTES # BLD AUTO: 1.26 K/UL — SIGNIFICANT CHANGE UP (ref 1.2–3.4)
LYMPHOCYTES # BLD AUTO: 1.55 K/UL — SIGNIFICANT CHANGE UP (ref 1.2–3.4)
LYMPHOCYTES # BLD AUTO: 19 % — LOW (ref 20.5–51.1)
LYMPHOCYTES # BLD AUTO: 22.5 % — SIGNIFICANT CHANGE UP (ref 20.5–51.1)
MAGNESIUM SERPL-MCNC: 1.9 MG/DL — SIGNIFICANT CHANGE UP (ref 1.8–2.4)
MANUAL SMEAR VERIFICATION: SIGNIFICANT CHANGE UP
MCHC RBC-ENTMCNC: 26.1 PG — LOW (ref 27–31)
MCHC RBC-ENTMCNC: 26.6 PG — LOW (ref 27–31)
MCHC RBC-ENTMCNC: 31.2 G/DL — LOW (ref 32–37)
MCHC RBC-ENTMCNC: 31.3 G/DL — LOW (ref 32–37)
MCV RBC AUTO: 83.6 FL — SIGNIFICANT CHANGE UP (ref 81–99)
MCV RBC AUTO: 84.9 FL — SIGNIFICANT CHANGE UP (ref 81–99)
MONOCYTES # BLD AUTO: 0.8 K/UL — HIGH (ref 0.1–0.6)
MONOCYTES # BLD AUTO: 1.14 K/UL — HIGH (ref 0.1–0.6)
MONOCYTES NFR BLD AUTO: 12 % — HIGH (ref 1.7–9.3)
MONOCYTES NFR BLD AUTO: 16.6 % — HIGH (ref 1.7–9.3)
NEUTROPHILS # BLD AUTO: 4.11 K/UL — SIGNIFICANT CHANGE UP (ref 1.4–6.5)
NEUTROPHILS # BLD AUTO: 4.58 K/UL — SIGNIFICANT CHANGE UP (ref 1.4–6.5)
NEUTROPHILS NFR BLD AUTO: 59.8 % — SIGNIFICANT CHANGE UP (ref 42.2–75.2)
NEUTROPHILS NFR BLD AUTO: 69 % — SIGNIFICANT CHANGE UP (ref 42.2–75.2)
NITRITE UR-MCNC: NEGATIVE — SIGNIFICANT CHANGE UP
NRBC # BLD: 0 /100 WBCS — SIGNIFICANT CHANGE UP (ref 0–0)
NRBC # BLD: 0 /100 — SIGNIFICANT CHANGE UP (ref 0–0)
NRBC # BLD: SIGNIFICANT CHANGE UP /100 WBCS (ref 0–0)
NT-PROBNP SERPL-SCNC: 227 PG/ML — SIGNIFICANT CHANGE UP (ref 0–300)
PH UR: 7 — SIGNIFICANT CHANGE UP (ref 5–8)
PLAT MORPH BLD: NORMAL — SIGNIFICANT CHANGE UP
PLATELET # BLD AUTO: 201 K/UL — SIGNIFICANT CHANGE UP (ref 130–400)
PLATELET # BLD AUTO: 227 K/UL — SIGNIFICANT CHANGE UP (ref 130–400)
POTASSIUM SERPL-MCNC: 4.2 MMOL/L — SIGNIFICANT CHANGE UP (ref 3.5–5)
POTASSIUM SERPL-MCNC: 4.2 MMOL/L — SIGNIFICANT CHANGE UP (ref 3.5–5)
POTASSIUM SERPL-MCNC: SIGNIFICANT CHANGE UP MMOL/L (ref 3.5–5)
POTASSIUM SERPL-SCNC: 4.2 MMOL/L — SIGNIFICANT CHANGE UP (ref 3.5–5)
POTASSIUM SERPL-SCNC: 4.2 MMOL/L — SIGNIFICANT CHANGE UP (ref 3.5–5)
POTASSIUM SERPL-SCNC: 4.3 MMOL/L
POTASSIUM SERPL-SCNC: SIGNIFICANT CHANGE UP MMOL/L (ref 3.5–5)
PROT SERPL-MCNC: 6.1 G/DL — SIGNIFICANT CHANGE UP (ref 6–8)
PROT SERPL-MCNC: 6.1 G/DL — SIGNIFICANT CHANGE UP (ref 6–8)
PROT SERPL-MCNC: 6.2 G/DL
PROT SERPL-MCNC: 8.2 G/DL — HIGH (ref 6–8)
PROT UR-MCNC: SIGNIFICANT CHANGE UP
PROTHROM AB SERPL-ACNC: 17.5 SEC — HIGH (ref 9.95–12.87)
PROTHROM AB SERPL-ACNC: 18.7 SEC — HIGH (ref 9.95–12.87)
RBC # BLD: 4.03 M/UL — LOW (ref 4.2–5.4)
RBC # BLD: 4.75 M/UL — SIGNIFICANT CHANGE UP (ref 4.2–5.4)
RBC # FLD: 18 % — HIGH (ref 11.5–14.5)
RBC # FLD: 18.6 % — HIGH (ref 11.5–14.5)
RBC BLD AUTO: NORMAL — SIGNIFICANT CHANGE UP
RBC CASTS # UR COMP ASSIST: 9 /HPF — HIGH (ref 0–4)
SARS-COV-2 RNA SPEC QL NAA+PROBE: SIGNIFICANT CHANGE UP
SODIUM SERPL-SCNC: 139 MMOL/L — SIGNIFICANT CHANGE UP (ref 135–146)
SODIUM SERPL-SCNC: 141 MMOL/L
SP GR SPEC: >1.05 (ref 1.01–1.02)
TROPONIN T SERPL-MCNC: <0.01 NG/ML — SIGNIFICANT CHANGE UP
TROPONIN T SERPL-MCNC: <0.01 NG/ML — SIGNIFICANT CHANGE UP
UROBILINOGEN FLD QL: SIGNIFICANT CHANGE UP
WBC # BLD: 6.64 K/UL — SIGNIFICANT CHANGE UP (ref 4.8–10.8)
WBC # BLD: 6.88 K/UL — SIGNIFICANT CHANGE UP (ref 4.8–10.8)
WBC # FLD AUTO: 6.64 K/UL — SIGNIFICANT CHANGE UP (ref 4.8–10.8)
WBC # FLD AUTO: 6.88 K/UL — SIGNIFICANT CHANGE UP (ref 4.8–10.8)
WBC UR QL: 0 /HPF — SIGNIFICANT CHANGE UP (ref 0–5)

## 2020-09-11 PROCEDURE — 99223 1ST HOSP IP/OBS HIGH 75: CPT

## 2020-09-11 PROCEDURE — 71275 CT ANGIOGRAPHY CHEST: CPT | Mod: 26

## 2020-09-11 PROCEDURE — 93010 ELECTROCARDIOGRAM REPORT: CPT

## 2020-09-11 PROCEDURE — 71045 X-RAY EXAM CHEST 1 VIEW: CPT | Mod: 26

## 2020-09-11 RX ORDER — CHLORHEXIDINE GLUCONATE 213 G/1000ML
1 SOLUTION TOPICAL
Refills: 0 | Status: DISCONTINUED | OUTPATIENT
Start: 2020-09-11 | End: 2020-09-15

## 2020-09-11 RX ORDER — ASPIRIN/CALCIUM CARB/MAGNESIUM 324 MG
81 TABLET ORAL DAILY
Refills: 0 | Status: DISCONTINUED | OUTPATIENT
Start: 2020-09-11 | End: 2020-09-15

## 2020-09-11 RX ORDER — MORPHINE SULFATE 50 MG/1
4 CAPSULE, EXTENDED RELEASE ORAL ONCE
Refills: 0 | Status: DISCONTINUED | OUTPATIENT
Start: 2020-09-11 | End: 2020-09-11

## 2020-09-11 RX ORDER — HEPARIN SODIUM 5000 [USP'U]/ML
1000 INJECTION INTRAVENOUS; SUBCUTANEOUS
Qty: 25000 | Refills: 0 | Status: DISCONTINUED | OUTPATIENT
Start: 2020-09-11 | End: 2020-09-11

## 2020-09-11 RX ORDER — ACETAMINOPHEN 500 MG
650 TABLET ORAL ONCE
Refills: 0 | Status: COMPLETED | OUTPATIENT
Start: 2020-09-11 | End: 2020-09-11

## 2020-09-11 RX ORDER — INFLUENZA VIRUS VACCINE 15; 15; 15; 15 UG/.5ML; UG/.5ML; UG/.5ML; UG/.5ML
0.5 SUSPENSION INTRAMUSCULAR ONCE
Refills: 0 | Status: COMPLETED | OUTPATIENT
Start: 2020-09-11 | End: 2020-09-11

## 2020-09-11 RX ORDER — AMLODIPINE BESYLATE 2.5 MG/1
5 TABLET ORAL DAILY
Refills: 0 | Status: DISCONTINUED | OUTPATIENT
Start: 2020-09-11 | End: 2020-09-15

## 2020-09-11 RX ORDER — RIVAROXABAN 15 MG-20MG
15 KIT ORAL
Refills: 0 | Status: DISCONTINUED | OUTPATIENT
Start: 2020-09-11 | End: 2020-09-15

## 2020-09-11 RX ORDER — KETOROLAC TROMETHAMINE 30 MG/ML
30 SYRINGE (ML) INJECTION ONCE
Refills: 0 | Status: DISCONTINUED | OUTPATIENT
Start: 2020-09-11 | End: 2020-09-11

## 2020-09-11 RX ORDER — ATORVASTATIN CALCIUM 80 MG/1
40 TABLET, FILM COATED ORAL AT BEDTIME
Refills: 0 | Status: DISCONTINUED | OUTPATIENT
Start: 2020-09-11 | End: 2020-09-15

## 2020-09-11 RX ORDER — METOPROLOL TARTRATE 50 MG
25 TABLET ORAL DAILY
Refills: 0 | Status: DISCONTINUED | OUTPATIENT
Start: 2020-09-11 | End: 2020-09-15

## 2020-09-11 RX ORDER — ACETAMINOPHEN 500 MG
650 TABLET ORAL EVERY 6 HOURS
Refills: 0 | Status: DISCONTINUED | OUTPATIENT
Start: 2020-09-11 | End: 2020-09-13

## 2020-09-11 RX ORDER — HEPARIN SODIUM 5000 [USP'U]/ML
1100 INJECTION INTRAVENOUS; SUBCUTANEOUS
Qty: 25000 | Refills: 0 | Status: DISCONTINUED | OUTPATIENT
Start: 2020-09-11 | End: 2020-09-11

## 2020-09-11 RX ADMIN — MORPHINE SULFATE 4 MILLIGRAM(S): 50 CAPSULE, EXTENDED RELEASE ORAL at 00:49

## 2020-09-11 RX ADMIN — HEPARIN SODIUM 10 UNIT(S)/HR: 5000 INJECTION INTRAVENOUS; SUBCUTANEOUS at 14:24

## 2020-09-11 RX ADMIN — Medication 650 MILLIGRAM(S): at 10:45

## 2020-09-11 RX ADMIN — AMLODIPINE BESYLATE 5 MILLIGRAM(S): 2.5 TABLET ORAL at 06:46

## 2020-09-11 RX ADMIN — Medication 25 MILLIGRAM(S): at 10:15

## 2020-09-11 RX ADMIN — ATORVASTATIN CALCIUM 40 MILLIGRAM(S): 80 TABLET, FILM COATED ORAL at 21:32

## 2020-09-11 RX ADMIN — MORPHINE SULFATE 4 MILLIGRAM(S): 50 CAPSULE, EXTENDED RELEASE ORAL at 00:10

## 2020-09-11 RX ADMIN — HEPARIN SODIUM 11 UNIT(S)/HR: 5000 INJECTION INTRAVENOUS; SUBCUTANEOUS at 06:46

## 2020-09-11 RX ADMIN — Medication 650 MILLIGRAM(S): at 10:15

## 2020-09-11 RX ADMIN — Medication 81 MILLIGRAM(S): at 11:16

## 2020-09-11 RX ADMIN — Medication 650 MILLIGRAM(S): at 21:35

## 2020-09-11 RX ADMIN — MORPHINE SULFATE 4 MILLIGRAM(S): 50 CAPSULE, EXTENDED RELEASE ORAL at 03:01

## 2020-09-11 NOTE — ED ADULT NURSE NOTE - NSIMPLEMENTINTERV_GEN_ALL_ED
Implemented All Fall Risk Interventions:  [place] to call system. Call bell, personal items and telephone within reach. Instruct patient to call for assistance. Room bathroom lighting operational. Non-slip footwear when patient is off stretcher. Physically safe environment: no spills, clutter or unnecessary equipment. Stretcher in lowest position, wheels locked, appropriate side rails in place. Provide visual cue, wrist band, yellow gown, etc. Monitor gait and stability. Monitor for mental status changes and reorient to person, place, and time. Review medications for side effects contributing to fall risk. Reinforce activity limits and safety measures with patient and family.

## 2020-09-11 NOTE — H&P ADULT - HISTORY OF PRESENT ILLNESS
81 year old F patient with past medical history of Uterine cancer s/p SHAAN, malignant ascites on therapeutic paracentesis, CAD s/p CABG, segmental pulmonary embolism on Lovenox, HTN, depression, comes in with chest pain.      Pt was recently admitted on 8/7/20 for abdominal pain and distention, was found to have malignant ascites and underwent multiple paracentesis, hospital course was complicated by pulmonary embolism. Pt was discharged on Xarelto     In the ED, Temp 99.2, /58, HR 88, RR 16 and saturating well on RA. CTA chest shows acute on chronic pulmonary embolism. New right lower lobar segmental branch opacification is seen, and other previously seen emboli have decreased or resolved. No CT evidence of right heart strain or saddle embolus. Pt received pain management in ED. 81 year old F patient with past medical history of Uterine cancer s/p SHAAN, malignant ascites on therapeutic paracentesis, CAD s/p CABG, segmental pulmonary embolism on Lovenox recently switched to xarelto, HTN, depression, comes in with chest pain.  Pt complains of bilateral lower chest pain, located at 8th to 10th rib at mid axillary region since yesterday morning. Pt describes pain as sharp, 8/10, continuous, aggravated by inspiration and relieved only after taking pain medication in the ED.  Pt was recently admitted on 8/7/20 for abdominal pain and distention, was found to have malignant ascites and underwent multiple paracentesis, hospital course was complicated by pulmonary embolism. Pt was discharged on Lovenox which was switched changed to Xarelto yesterday.   Denies any SOB, cough, fever, n/v/d, or lower extremity swelling.  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 9L of fluid was removed via paracentesis. Pt started on carbo/taxol weekly 3 on 1 off regimen every Friday chemotherapy regimen by Dr. Gutierrez.         In the ED, Temp 99.2, /58, HR 88, RR 16 and saturating well on RA. CTA chest shows acute on chronic pulmonary embolism. New right lower lobar segmental branch opacification is seen, and other previously seen emboli have decreased or resolved. No CT evidence of right heart strain or saddle embolus. Pt received pain management in ED.

## 2020-09-11 NOTE — ED PROVIDER NOTE - OBJECTIVE STATEMENT
82 yo f on chemo, hx of PE, c/o b/l lower rib pain with deep inspiration. no sob, perdue, leg pain or swelling. last chemo was 2 weeks ago. no fever or chills. no n, v. no ab pain.

## 2020-09-11 NOTE — ED PROVIDER NOTE - PROGRESS NOTE DETAILS
case dw Dr Warren of [facility] for acute on chronic PE in a pt on po anticoag. she rec admission, will see in am. also rec. heparin gtt at this time and dc xarelto. PTT was qns, will resend. will start heparin when lab results. signed out to the [facility].

## 2020-09-11 NOTE — H&P ADULT - ASSESSMENT
This is a 81 year old F patient with past medical history of CAD s/p CABG, Uterine cancer s/p SHAAN, HTN and depression present to the ED for abdominal pain and chest discomfort.    # New segmental Pulmonary embolism  - CTA chest shows new right lower lobar segmental branch opacification is seen, and other previously seen emboli have decreased or resolved. No CT evidence of right heart strain or saddle embolus. Pt received pain management in ED.   - no right heart strain, trop WNL,   - stop Xarelto and start heparin  - f/u BNP and trop  - follow up pulmonary     # H/O Endometrial Cancer locally advanced with 1 LN+, S/p SHAAN/BSO and cytopathology favor metastatic adenocarcinoma consistent with Mullerian origin.  # malignant ascites diagnosed on 8/8/202    - CEA normal. - 217   - recent PET scan 9/9/20 Mild diffuse FDG uptake throughout the mesentery along with peritoneal stranding and nodularity compatible with peritoneal carcinomatosis (SUV up to 3.9).  - She had 3 sessions of paracentesis so far   - may require additional paracentesis     # CAD s/p CABG   -Continue with ASA     # HTN  -Continue with amlodipine and Metoprolol     # DLD  -Continue with statin    # MDD  - c/w home medications    # Diet: DASH/TLC  # DVT Prophylaxis: heparin ggt  # GI Prophylaxis: Protonix  # Activity: IAT  # IV Fluids:  # Dispo: Acute  # Code Status: Fullcode  # CHG wash This is a 81 year old F patient with past medical history of CAD s/p CABG, Uterine cancer s/p SHAAN, HTN and depression present to the ED for abdominal pain and chest discomfort.    # New segmental Pulmonary embolism  - CTA chest shows new right lower lobar segmental branch opacification is seen, and other previously seen emboli have decreased or resolved. No CT evidence of right heart strain or saddle embolus. Pt received pain management in ED.   - no right heart strain, trop WNL,   - stop Xarelto and start heparin  - f/u BNP and trop  - follow up pulmonary     # H/O Endometrial Cancer locally advanced with 1 LN+, S/p SHAAN/BSO and cytopathology favor metastatic adenocarcinoma consistent with Mullerian origin.  # malignant ascites diagnosed on 8/8/202    - CEA normal. - 217   - recent PET scan 9/9/20 Mild diffuse FDG uptake throughout the mesentery along with peritoneal stranding and nodularity compatible with peritoneal carcinomatosis (SUV up to 3.9).  - Started on weekly carbo/taxol weekly 3 on 1 off on 8/14/2020   - She had 3 sessions of paracentesis so far   - may require additional paracentesis     # CAD s/p CABG   -Continue with ASA     # HTN  -Continue with amlodipine and Metoprolol     # DLD  -Continue with statin    # MDD  - c/w home medications    # Diet: DASH/TLC  # DVT Prophylaxis: heparin ggt  # GI Prophylaxis: Protonix  # Activity: IAT  # IV Fluids:  # Dispo: Acute  # Code Status: Fullcode  # CHG wash This is a 81 year old F patient with past medical history of CAD s/p CABG, Uterine cancer s/p SHAAN, HTN and depression present to the ED for abdominal pain and chest discomfort.    # New segmental Pulmonary embolism  - CTA chest shows new right lower lobar segmental branch opacification is seen, and other previously seen emboli have decreased or resolved. No CT evidence of right heart strain or saddle embolus. Pt received pain management in ED.   - no right heart strain, trop WNL,   - stop Xarelto and start heparin drip  - f/u PTT, BNP and trop  - follow up pulmonary     # H/O Endometrial Cancer locally advanced with 1 LN+, S/p SHAAN/BSO and cytopathology favor metastatic adenocarcinoma consistent with Mullerian origin.  # malignant ascites diagnosed on 8/8/202    - CEA normal. - 217 8/9  - recent PET scan 9/9/20 Mild diffuse FDG uptake throughout the mesentery along with peritoneal stranding and nodularity compatible with peritoneal carcinomatosis (SUV up to 3.9).  - Started on weekly carbo/taxol weekly 3 on 1 off on 8/14/2020   - She had 3 sessions of paracentesis so far   - may require additional paracentesis   - consult heme/onc for chemo    # CAD s/p CABG   -Continue with ASA     # HTN  -Continue with amlodipine and Metoprolol     # DLD  -Continue with statin    # MDD  - c/w home medications    # Diet: DASH/TLC  # DVT Prophylaxis: heparin ggt  # GI Prophylaxis: Protonix  # Activity: IAT  # Dispo: Acute  # Code Status: Fullcode

## 2020-09-11 NOTE — ED ADULT NURSE NOTE - CAS EDN DISCHARGE INTERVENTIONS
----- Message from Renato Mccoy sent at 7/17/2019  3:56 PM CDT -----  Contact: JENY GARCIA [[id number]]    Who called:pt's brother  mike bryan [phone]     What is the request in detail:pt wants to discuss test results. Call back     Can the clinic reply by [facility]?     Would the patient rather a call back or a response via [facility]? Call back     Best call back number:pt's brother  mike bryan [phone] call brother pt phone is not working and brother is has bad vision   Additional Information:      Arm band on/IV intact

## 2020-09-11 NOTE — ED PROVIDER NOTE - NS ED ROS FT
CONSTITUTIONAL: Negatve   SKIN: Negatve   HEAD: Negatve   EYES: Negatve   ENT: Negatve   NECK: Negatve   CARD:Negatve   RESP: see hpi  ABD: Negatve   EXT: Negatve  LYMPH: Negatve   NEURO: Negatve   PSYCH: Negatve

## 2020-09-11 NOTE — ED PROVIDER NOTE - CLINICAL SUMMARY MEDICAL DECISION MAKING FREE TEXT BOX
b/l pleuritic cp. labs, imaging done. b/l pleuritic cp. labs, imaging done. acute on chronic PE. no evidence of RH strain. neg trop. hemodyn stable. will admit to floor. pulmonary consulted.

## 2020-09-11 NOTE — CONSULT NOTE ADULT - ASSESSMENT
Recurrent adenocarcinoma Mullerian origin, malignant ascites dx on 08/08/2020  -Prior TAHBSO in 2016, with adjuvant chemotherapy  -Started on weekly Carbo/taxol weekly 3 on 1 off, started on 08/14/20    Malignant ascites s/p large volume paracentesis     new PE,   hx of PE in setting of malignancy dx on 08/06/2020  -completed lovenox BID until 09/01/2020  -Doppler was negative for DVT on 08/08/2020  -Also on aspirin as hx for CABG   -switched to xarelto recently Recurrent adenocarcinoma Mullerian origin, malignant ascites dx on 08/08/2020  -Prior TAHBSO in 2016, with adjuvant chemotherapy  -Started on weekly Carbo/taxol weekly 3 on 1 off, started on 08/14/20      new PE,   hx of PE in setting of malignancy dx on 08/06/2020  -completed lovenox BID until 09/01/2020  -Doppler was negative for DVT on 08/08/2020  -Also on aspirin as hx for CABG   -switched to xarelto yesterday   -would consider placing patient on lovenox again    Malignant ascites s/p previous large tap   now comfortable     ******************** ATTENDING RCDS TO FOLLOW ************************ Patient is a 81 year old F patient with past medical history of Uterine cancer s/p SHAAN, malignant ascites on therapeutic paracentesis, CAD s/p CABG, segmental pulmonary embolism on Lovenox recently switched to xarelto, HTN, depression, comes in with chest pain, found to have acute on chronic PE.     #) Acute on Chronic PE in setting of known Malignancy   - Likely Provoked, patient stopped taking her Lovenox for one week as she ran out of medications   - She received Xarelto yesterday  - Please switch to PO Xarelto for discharge     #) Recurrent adenocarcinoma Mullerian origin, malignant ascites dx on 08/08/2020  - Prior TAHBSO in 2016, with adjuvant chemotherapy  - Started on weekly Carbo/taxol weekly 3 on 1 off, started on 08/14/20  - Patient was due for chemo today on 9/11  - Transfer to  for chemo. She will receive Carbo AUC 2 and Taxol. Once chemo is received, she can be discharged from oncology standpoint and follow up as outpatient Patient is a 81 year old F patient with past medical history of Uterine cancer s/p SHAAN, malignant ascites on therapeutic paracentesis, CAD s/p CABG, segmental pulmonary embolism on Lovenox recently switched to xarelto, HTN, depression, comes in with chest pain, found to have acute on chronic PE.     #) Acute on Chronic PE in setting of known Malignancy   - Likely Provoked, patient stopped taking her Lovenox for one week as she ran out of medications   - She received Xarelto yesterday  - Please switch to PO Xarelto for discharge. She will need 15mg BID for 21 dose (initial dosing) followed by 20mg daily (as maintenance dosing)     #) Recurrent adenocarcinoma Mullerian origin, malignant ascites dx on 08/08/2020  - Prior TAHBSO in 2016, with adjuvant chemotherapy  - Started on weekly Carbo/taxol weekly 3 on 1 off, started on 08/14/20  - Patient was due for chemo today on 9/11  - Transfer to  for chemo. She will receive Carbo AUC 2 and Taxol. Once chemo is received, she can be discharged from oncology standpoint and follow up as outpatient Patient is a 81 year old F patient with past medical history of Uterine cancer s/p SHAAN, malignant ascites on therapeutic paracentesis, CAD s/p CABG, segmental pulmonary embolism on Lovenox recently switched to xarelto, HTN, depression, comes in with chest pain, found to have acute on chronic PE.     #) Acute on Chronic PE in setting of known Malignancy   - Likely Provoked, patient stopped taking her Lovenox for one week as she ran out of medications   - She received Xarelto yesterday  - Please switch to PO Xarelto for discharge. She will need 15mg BID for 21 days (initial dosing) followed by 20mg daily (as maintenance dosing)     #) Recurrent adenocarcinoma Mullerian origin, malignant ascites dx on 08/08/2020  - Prior TAHBSO in 2016, with adjuvant chemotherapy  - Started on weekly Carbo/taxol weekly 3 on 1 off, started on 08/14/20  - Patient was due for chemo today on 9/11  - Transfer to  for chemo. She will receive Carbo AUC 2 and Taxol. Once chemo is received, she can be discharged from oncology standpoint and follow up as outpatient

## 2020-09-11 NOTE — CONSULT NOTE ADULT - ASSESSMENT
Impression:  Acute on chronic RLL segmental PE - hemodynamically stable  Stage IV endometrial cancer on chemo (carbo/taxol). Last dose 2 weeks ago.  Malignant ascites s/p paracentesis X3    Plan:  - stop heparin drip and switch back to loading dose of Xarelto. Continue Xarelto long-term.  - f/u BNCP  - f/u with Dr. Gutierrez Impression:  Acute on chronic RLL segmental PE - hemodynamically stable  Stage IV endometrial cancer on chemo (carbo/taxol). Last dose 8/14/20  Malignant ascites s/p paracentesis X3 - now comfortable    Plan:  - stop heparin drip and switch back to loading dose of Xarelto. Continue Xarelto long-term. Alternative would be continue therapeutic lovenox BID.  - f/u BNCP  - f/u with Dr. Gutierrez Impression:  Acute on chronic RLL segmental PE (low intermediate risk)  Stage IV endometrial cancer on chemo (carbo/taxol). Last dose 8/14/20  Malignant ascites s/p paracentesis X3 - now comfortable    Plan:  - stop heparin drip and switch back to loading dose of Xarelto. Continue Xarelto long-term. Alternative would be continue therapeutic lovenox BID.  - f/u with Dr. Gutierrez Impression:  Acute on chronic RLL segmental PE (intermediate low risk)  Stage IV endometrial cancer on chemo (carbo/taxol). Last dose 8/14/20  Malignant ascites s/p paracentesis X3 - now comfortable    Plan:  - stop heparin drip and switch back to loading dose of Xarelto. Continue Xarelto long-term. Alternative would be continue therapeutic lovenox BID.  - f/u with Dr. Gutierrez Impression:  Acute on chronic RLL segmental PE (intermediate low risk)  Stage IV endometrial cancer on chemo (carbo/taxol). Last dose 8/14/20  Malignant ascites s/p paracentesis X3 - now comfortable    Plan:  - stop heparin drip and switch back to loading dose of Xarelto. Continue Xarelto long-term. Alternative would be continue therapeutic lovenox BID.  - f/u LE duplex.  - f/u with Dr. Gutierrez

## 2020-09-11 NOTE — H&P ADULT - NSHPLABSRESULTS_GEN_ALL_CORE
(09-11 @ 00:12)                      12.4  6.88 )-----------( 227                 39.7    Neutrophils = 4.11 (59.8%)  Lymphocytes = 1.55 (22.5%)  Eosinophils = 0.04 (0.6%)  Basophils = 0.03 (0.4%)  Monocytes = 1.14 (16.6%)  Bands = --%    09-11    139  |  102  |  13  ----------------------------<  115<H>  4.2   |  28  |  0.7    Ca    8.1<L>      11 Sep 2020 01:36    TPro  6.1  /  Alb  3.7  /  TBili  0.3  /  DBili  x   /  AST  14  /  ALT  7   /  AlkPhos  73  09-11      CARDIAC MARKERS ( 11 Sep 2020 00:12 )  Trop <0.01 ng/mL / CK x     / CKMB x           < from: CT Angio Chest w/ IV Cont (09.11.20 @ 03:02) >    IMPRESSION:    Redemonstrated right lower lobe segmental pulmonary artery thrombus, slightly increased in size since prior exam, now extending into the subsegmentalbranches.    No new central, lobar, or segmental pulmonary emboli.    Previously seen right upper lobe and left lower lobe segmental pulmonary emboli are not identified on this exam.    Additional Findings/Recommendations After Attending Radiologist Review:  Acute on chronic pulmonary embolism. New right lower lobar segmental branch opacification is seen, and other previously seen emboli have decreased or resolved. No CT evidence of right heart strain or saddle embolus. Cardiomegaly is noted.    Spoke with TABATHA FLANNERY DO on 9/11/2020 4:03 AM with readback.    < end of copied text >

## 2020-09-11 NOTE — CONSULT NOTE ADULT - SUBJECTIVE AND OBJECTIVE BOX
Patient is a 80 y/o Female who presents with a chief complaint of Chest pain. (11 Sep 2020 04:45)      HPI:  81 year old F patient with past medical history of endometrial cancer s/p SHAAN, malignant ascites on therapeutic paracentesis, CAD s/p CABG, segmental pulmonary embolism on Lovenox recently switched to xarelto, HTN, depression, comes in with chest pain.  Pt complains of bilateral lower chest pain, located at 8th to 10th rib at mid axillary region since yesterday morning. Pt describes pain as sharp, 8/10, continuous, aggravated by inspiration and relieved only after taking pain medication in the ED.  Pt was recently admitted on 8/7/20 for abdominal pain and distention, was found to have malignant ascites and underwent multiple paracentesis, hospital course was complicated by pulmonary embolism. Pt was discharged on Lovenox which was switched changed to Xarelto yesterday.   Denies any SOB, cough, fever, n/v/d, or lower extremity swelling.  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 9L of fluid was removed via paracentesis. Pt started on carbo/taxol weekly 3 on 1 off regimen every Friday chemotherapy regimen by Dr. Gutierrez.         In the ED, Temp 99.2, /58, HR 88, RR 16 and saturating well on RA. CTA chest shows acute on chronic pulmonary embolism. New right lower lobar segmental branch opacification is seen, and other previously seen emboli have decreased or resolved. No CT evidence of right heart strain or saddle embolus. Pt received pain management in ED. (11 Sep 2020 04:45)    Pulmonary History:  81 year old F patient with past medical history of endometrial cancer s/p SHAAN (first diagnosed in 2016. Follows with Dr Gutierrez), malignant ascites s/p 3 paracentesis, CAD s/p CABG, segmental pulmonary embolism (dx: Aug 6th) on Lovenox switched to xarelto yesterday, HTN, depression, presents to the ED with pleuritic chest pain. Patient is hemodynamically stable (BP 91/52, HR 95, improved to 117/68, ). Had a low-grade fever this morning to 100.9. Was started on a heparin drip in the ED. States her symptoms are significantly improved this morning.     PAST MEDICAL & SURGICAL HISTORY:  HLD (hyperlipidemia)  HTN (hypertension)  CAD (coronary artery disease)  History of coronary artery bypass surgery      SOCIAL HX:   Never smoker                        Denies ETOH                            Other    FAMILY HISTORY:  .  No cardiovascular or pulmonary family history     REVIEW OF SYSTEMS:    All ROS are negative except per HPI     Allergies    chloroquine (Hives)    Intolerances          PHYSICAL EXAM  Vital Signs Last 24 Hrs  T(C): 38.3 (11 Sep 2020 10:14), Max: 38.3 (11 Sep 2020 10:14)  T(F): 100.9 (11 Sep 2020 10:14), Max: 100.9 (11 Sep 2020 10:14)  HR: 105 (11 Sep 2020 08:00) (88 - 105)  BP: 117/68 (11 Sep 2020 08:00) (91/52 - 122/58)  BP(mean): --  RR: 18 (11 Sep 2020 08:00) (16 - 18)  SpO2: 96% (11 Sep 2020 08:00) (96% - 98%)    CONSTITUTIONAL:  Well nourished.  NAD    ENT:   Airway patent,   No thrush    EYES:   Clear bilaterally,   pupils equal,   round and reactive to light.    CARDIAC:   Normal rate,   regular rhythm.    no edema      CAROTID:   normal systolic impulse  no bruits    RESPIRATORY:   No wheezing  Nnormal chest expansion  Not tachypneic,  No use of accessory muscles    GASTROINTESTINAL:  Abdomen soft,   non-tender,   no guarding,   + BS    GENITOURINARY  normal genitalia for sex  no edema    MUSCULOSKELETAL:   range of motion is not limited,  no clubbing, cyanosis    NEUROLOGICAL:   Alert and oriented   no motor deficits.    SKIN:   Skin normal color for race,   No evidence of rash.    PSYCHIATRIC:   normal mood and affect.   no apparent risk to self or others.    HEME LYMPH:   No cervical  lymphadenopathy.  no inguinal lymphadenopathy          LABS:                          12.4   6.88  )-----------( 227      ( 11 Sep 2020 00:12 )             39.7                                               09-11    139  |  102  |  13  ----------------------------<  115<H>  4.2   |  28  |  0.7    Ca    8.1<L>      11 Sep 2020 01:36    TPro  6.1  /  Alb  3.7  /  TBili  0.3  /  DBili  x   /  AST  14  /  ALT  7   /  AlkPhos  73  09-11      PT/INR - ( 11 Sep 2020 04:45 )   PT: 18.70 sec;   INR: 1.63 ratio         PTT - ( 11 Sep 2020 04:45 )  PTT:30.8 sec                                       Urinalysis Basic - ( 11 Sep 2020 09:30 )    Color: Light Yellow / Appearance: Clear / SG: >1.050 / pH: x  Gluc: x / Ketone: Negative  / Bili: Negative / Urobili: <2 mg/dL   Blood: x / Protein: Trace / Nitrite: Negative   Leuk Esterase: Negative / RBC: 9 /HPF / WBC 0 /HPF   Sq Epi: x / Non Sq Epi: 1 /HPF / Bacteria: Negative        CARDIAC MARKERS ( 11 Sep 2020 00:12 )  x     / <0.01 ng/mL / x     / x     / x                                                LIVER FUNCTIONS - ( 11 Sep 2020 01:36 )  Alb: 3.7 g/dL / Pro: 6.1 g/dL / ALK PHOS: 73 U/L / ALT: 7 U/L / AST: 14 U/L / GGT: x                                                                                                MEDICATIONS  (STANDING):  amLODIPine   Tablet 5 milliGRAM(s) Oral daily  aspirin enteric coated 81 milliGRAM(s) Oral daily  atorvastatin 40 milliGRAM(s) Oral at bedtime  chlorhexidine 4% Liquid 1 Application(s) Topical <User Schedule>  heparin  Infusion 1100 Unit(s)/Hr (11 mL/Hr) IV Continuous <Continuous>  metoprolol succinate ER 25 milliGRAM(s) Oral daily    MEDICATIONS  (PRN):      X-Rays reviewed:    CXR interpreted by me:

## 2020-09-11 NOTE — H&P ADULT - ATTENDING COMMENTS
Patient seen and examined independently. Agree with resident note/ history / physical exam and plan of care with following exceptions/additions/updates. Case discussed with patient/pt decision maker, house-staff and nursing.     pt is feeling better,   [facility] onc fellow: they will see pt and discuss ac options as well as next chemo session ( pt was on lovenox and has a new PE, today is her chemo day)   full code  for now she is on heparin drip, considering coumadin vs xarolto ( ? lovnox failure? pt states she is very compliant with lovenox)

## 2020-09-11 NOTE — CONSULT NOTE ADULT - SUBJECTIVE AND OBJECTIVE BOX
Patient is a 81y old  Female who presents with a chief complaint of Chest pain. (11 Sep 2020 10:44)      HPI:  81 year old F patient with past medical history of Uterine cancer s/p SHAAN, malignant ascites on therapeutic paracentesis, CAD s/p CABG, segmental pulmonary embolism on Lovenox recently switched to xarelto, HTN, depression, comes in with chest pain.  Pt complains of bilateral lower chest pain, located at 8th to 10th rib at mid axillary region since yesterday morning. Pt describes pain as sharp, 8/10, continuous, aggravated by inspiration and relieved only after taking pain medication in the ED.  Pt was recently admitted on 8/7/20 for abdominal pain and distention, was found to have malignant ascites and underwent multiple paracentesis, hospital course was complicated by pulmonary embolism. Pt was discharged on Lovenox which was switched changed to Xarelto yesterday.   Denies any SOB, cough, fever, n/v/d, or lower extremity swelling.  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 9L of fluid was removed via paracentesis. Pt started on carbo/taxol weekly 3 on 1 off regimen every Friday chemotherapy regimen by Dr. Gutierrez.         In the ED, Temp 99.2, /58, HR 88, RR 16 and saturating well on RA. CTA chest shows acute on chronic pulmonary embolism. New right lower lobar segmental branch opacification is seen, and other previously seen emboli have decreased or resolved. No CT evidence of right heart strain or saddle embolus. Pt received pain management in ED. (11 Sep 2020 04:45)       ROS:  Negative except for:    PAST MEDICAL & SURGICAL HISTORY:  HLD (hyperlipidemia)  HTN (hypertension)  CAD (coronary artery disease)  History of coronary artery bypass surgery      SOCIAL HISTORY:    FAMILY HISTORY:      MEDICATIONS  (STANDING):  amLODIPine   Tablet 5 milliGRAM(s) Oral daily  aspirin enteric coated 81 milliGRAM(s) Oral daily  atorvastatin 40 milliGRAM(s) Oral at bedtime  chlorhexidine 4% Liquid 1 Application(s) Topical <User Schedule>  heparin  Infusion 1100 Unit(s)/Hr (11 mL/Hr) IV Continuous <Continuous>  metoprolol succinate ER 25 milliGRAM(s) Oral daily    MEDICATIONS  (PRN):      Weight (kg): [id number] (09-10-20 @ 22:33)  Allergies    chloroquine (Hives)    Intolerances        Vital Signs Last 24 Hrs  T(C): 38.3 (11 Sep 2020 10:14), Max: 38.3 (11 Sep 2020 10:14)  T(F): 100.9 (11 Sep 2020 10:14), Max: 100.9 (11 Sep 2020 10:14)  HR: 105 (11 Sep 2020 08:00) (88 - 105)  BP: 117/68 (11 Sep 2020 08:00) (91/52 - 122/58)  BP(mean): --  RR: 18 (11 Sep 2020 08:00) (16 - 18)  SpO2: 96% (11 Sep 2020 08:00) (96% - 98%)    PHYSICAL EXAM  General: adult in NAD  HEENT: clear oropharynx, anicteric sclera, pink conjunctiva  Neck: supple  CV: normal S1/S2 with no murmur rubs or gallops  Lungs: positive air movement b/l ant lungs,clear to auscultation, no wheezes, no rales  Abdomen: soft non-tender non-distended, no hepatosplenomegaly  Ext: no clubbing cyanosis or edema  Skin: no rashes and no petechiae  Neuro: alert and oriented X 4, no focal deficits      LABS:                          12.4   6.88  )-----------( 227      ( 11 Sep 2020 00:12 )             39.7         Mean Cell Volume : 83.6 fL  Mean Cell Hemoglobin : 26.1 pg  Mean Cell Hemoglobin Concentration : 31.2 g/dL  Auto Neutrophil # : 4.11 K/uL  Auto Lymphocyte # : 1.55 K/uL  Auto Monocyte # : 1.14 K/uL  Auto Eosinophil # : 0.04 K/uL  Auto Basophil # : 0.03 K/uL  Auto Neutrophil % : 59.8 %  Auto Lymphocyte % : 22.5 %  Auto Monocyte % : 16.6 %  Auto Eosinophil % : 0.6 %  Auto Basophil % : 0.4 %      Serial CBC's  09-11 @ 00:12  Hct-39.7 / Hgb-12.4 / Plat-227 / RBC-4.75 / WBC-6.88      09-11    139  |  102  |  13  ----------------------------<  115<H>  4.2   |  28  |  0.7    Ca    8.1<L>      11 Sep 2020 01:36    TPro  6.1  /  Alb  3.7  /  TBili  0.3  /  DBili  x   /  AST  14  /  ALT  7   /  AlkPhos  73  09-11      PT/INR - ( 11 Sep 2020 11:38 )   PT: 17.50 sec;   INR: 1.52 ratio         PTT - ( 11 Sep 2020 11:38 )  PTT:91.0 sec    BLOOD SMEAR INTERPRETATION:     RADIOLOGY & ADDITIONAL STUDIES:    < from: CT Angio Chest w/ IV Cont (09.11.20 @ 03:02) >    IMPRESSION:    Redemonstrated right lower lobe segmental pulmonary artery thrombus, slightly increased in size since prior exam, now extending into the subsegmentalbranches.    No new central, lobar, or segmental pulmonary emboli.    Previously seen right upper lobe and left lower lobe segmental pulmonary emboli are not identified on this exam.    Additional Findings/Recommendations After Attending Radiologist Review:  Acute on chronic pulmonary embolism. New right lower lobar segmental branch opacification is seen, and other previously seen emboli have decreased or resolved. No CT evidence of right heart strain or saddle embolus. Cardiomegaly is noted.    Spoke with TABATHA FLANNERY DO on 9/11/2020 4:03 AM with readback.    < end of copied text > Patient is a 81y old  Female who presents with a chief complaint of Chest pain. (11 Sep 2020 10:44)      HPI:  81 year old F patient with past medical history of Uterine cancer s/p SHAAN, malignant ascites on therapeutic paracentesis, CAD s/p CABG, segmental pulmonary embolism on Lovenox recently switched to xarelto, HTN, depression, comes in with chest pain.  Pt complains of bilateral lower chest pain, located at 8th to 10th rib at mid axillary region since yesterday morning. Pt describes pain as sharp, 8/10, continuous, aggravated by inspiration and relieved only after taking pain medication in the ED.  Pt was recently admitted on 8/7/20 for abdominal pain and distention, was found to have malignant ascites and underwent multiple paracentesis, hospital course was complicated by pulmonary embolism. Pt was discharged on Lovenox which was switched changed to Xarelto yesterday.   Denies any SOB, cough, fever, n/v/d, or lower extremity swelling.  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 9L of fluid was removed via paracentesis. Pt started on carbo/taxol weekly 3 on 1 off regimen every Friday chemotherapy regimen by Dr. Gutierrez.         In the ED, Temp 99.2, /58, HR 88, RR 16 and saturating well on RA. CTA chest shows acute on chronic pulmonary embolism. New right lower lobar segmental branch opacification is seen, and other previously seen emboli have decreased or resolved. No CT evidence of right heart strain or saddle embolus. Pt received pain management in ED. (11 Sep 2020 04:45)       ROS:  Negative except for:    PAST MEDICAL & SURGICAL HISTORY:  HLD (hyperlipidemia)  HTN (hypertension)  CAD (coronary artery disease)  History of coronary artery bypass surgery      SOCIAL HISTORY:    FAMILY HISTORY:      MEDICATIONS  (STANDING):  amLODIPine   Tablet 5 milliGRAM(s) Oral daily  aspirin enteric coated 81 milliGRAM(s) Oral daily  atorvastatin 40 milliGRAM(s) Oral at bedtime  chlorhexidine 4% Liquid 1 Application(s) Topical <User Schedule>  heparin  Infusion 1100 Unit(s)/Hr (11 mL/Hr) IV Continuous <Continuous>  metoprolol succinate ER 25 milliGRAM(s) Oral daily    MEDICATIONS  (PRN):      Weight (kg): 65.34478042 (09-10-20 @ 22:33)  Allergies    chloroquine (Hives)    Intolerances        Vital Signs Last 24 Hrs  T(C): 38.3 (11 Sep 2020 10:14), Max: 38.3 (11 Sep 2020 10:14)  T(F): 100.9 (11 Sep 2020 10:14), Max: 100.9 (11 Sep 2020 10:14)  HR: 105 (11 Sep 2020 08:00) (88 - 105)  BP: 117/68 (11 Sep 2020 08:00) (91/52 - 122/58)  BP(mean): --  RR: 18 (11 Sep 2020 08:00) (16 - 18)  SpO2: 96% (11 Sep 2020 08:00) (96% - 98%)    PHYSICAL EXAM  General: not in resp distress   HEENT: clear oropharynx, anicteric sclera, pink conjunctiva  Neck: supple  CV: normal S1/S2 with no murmur rubs or gallops  Lungs: clear to auscultation  Abdomen: soft, non-tender, no hepatosplenomegaly  Ext: no clubbing cyanosis or edema  Skin: no rashes and no petechiae  Neuro: alert and oriented X 4, no focal deficits      LABS:                          12.4   6.88  )-----------( 227      ( 11 Sep 2020 00:12 )             39.7         Mean Cell Volume : 83.6 fL  Mean Cell Hemoglobin : 26.1 pg  Mean Cell Hemoglobin Concentration : 31.2 g/dL  Auto Neutrophil # : 4.11 K/uL  Auto Lymphocyte # : 1.55 K/uL  Auto Monocyte # : 1.14 K/uL  Auto Eosinophil # : 0.04 K/uL  Auto Basophil # : 0.03 K/uL  Auto Neutrophil % : 59.8 %  Auto Lymphocyte % : 22.5 %  Auto Monocyte % : 16.6 %  Auto Eosinophil % : 0.6 %  Auto Basophil % : 0.4 %      Serial CBC's  09-11 @ 00:12  Hct-39.7 / Hgb-12.4 / Plat-227 / RBC-4.75 / WBC-6.88      09-11    139  |  102  |  13  ----------------------------<  115<H>  4.2   |  28  |  0.7    Ca    8.1<L>      11 Sep 2020 01:36    TPro  6.1  /  Alb  3.7  /  TBili  0.3  /  DBili  x   /  AST  14  /  ALT  7   /  AlkPhos  73  09-11      PT/INR - ( 11 Sep 2020 11:38 )   PT: 17.50 sec;   INR: 1.52 ratio         PTT - ( 11 Sep 2020 11:38 )  PTT:91.0 sec    BLOOD SMEAR INTERPRETATION:     RADIOLOGY & ADDITIONAL STUDIES:    < from: CT Angio Chest w/ IV Cont (09.11.20 @ 03:02) >    IMPRESSION:    Redemonstrated right lower lobe segmental pulmonary artery thrombus, slightly increased in size since prior exam, now extending into the subsegmentalbranches.    No new central, lobar, or segmental pulmonary emboli.    Previously seen right upper lobe and left lower lobe segmental pulmonary emboli are not identified on this exam.    Additional Findings/Recommendations After Attending Radiologist Review:  Acute on chronic pulmonary embolism. New right lower lobar segmental branch opacification is seen, and other previously seen emboli have decreased or resolved. No CT evidence of right heart strain or saddle embolus. Cardiomegaly is noted.    Spoke with TABATHA FLANNERY DO on 9/11/2020 4:03 AM with readback.    < end of copied text >

## 2020-09-11 NOTE — H&P ADULT - NSHPPHYSICALEXAM_GEN_ALL_CORE
PHYSICAL EXAM:  GENERAL: NAD, lying in bed comfortably  HEAD:  Atraumatic, Normocephalic  EYES: EOMI, PERRLA, conjunctiva and sclera clear  ENT: Moist mucous membranes  NECK: Supple, No JVD  CHEST/LUNG: Clear to auscultation bilaterally; No rales, rhonchi, wheezing, or rubs. Unlabored respirations  HEART: Regular rate and rhythm; No murmurs, rubs, or gallops  ABDOMEN: Bowel sounds present; Soft, Nontender, Nondistended. No hepatomegally  EXTREMITIES:  2+ Peripheral Pulses, brisk capillary refill. No clubbing, cyanosis, or edema  NERVOUS SYSTEM:  Alert & Oriented X3, speech clear. No deficits   MSK: FROM all 4 extremities, full and equal strength  SKIN: No rashes or lesions

## 2020-09-12 LAB
ANION GAP SERPL CALC-SCNC: 13 MMOL/L — SIGNIFICANT CHANGE UP (ref 7–14)
APPEARANCE UR: CLEAR — SIGNIFICANT CHANGE UP
APTT BLD: 32.9 SEC — SIGNIFICANT CHANGE UP (ref 27–39.2)
BACTERIA # UR AUTO: NEGATIVE — SIGNIFICANT CHANGE UP
BASOPHILS # BLD AUTO: 0.03 K/UL — SIGNIFICANT CHANGE UP (ref 0–0.2)
BASOPHILS NFR BLD AUTO: 0.4 % — SIGNIFICANT CHANGE UP (ref 0–1)
BILIRUB UR-MCNC: NEGATIVE — SIGNIFICANT CHANGE UP
BUN SERPL-MCNC: 11 MG/DL — SIGNIFICANT CHANGE UP (ref 10–20)
CALCIUM SERPL-MCNC: 8.8 MG/DL — SIGNIFICANT CHANGE UP (ref 8.5–10.1)
CHLORIDE SERPL-SCNC: 100 MMOL/L — SIGNIFICANT CHANGE UP (ref 98–110)
CO2 SERPL-SCNC: 26 MMOL/L — SIGNIFICANT CHANGE UP (ref 17–32)
COLOR SPEC: YELLOW — SIGNIFICANT CHANGE UP
CREAT SERPL-MCNC: 0.7 MG/DL — SIGNIFICANT CHANGE UP (ref 0.7–1.5)
DIFF PNL FLD: ABNORMAL
EOSINOPHIL # BLD AUTO: 0.03 K/UL — SIGNIFICANT CHANGE UP (ref 0–0.7)
EOSINOPHIL NFR BLD AUTO: 0.4 % — SIGNIFICANT CHANGE UP (ref 0–8)
EPI CELLS # UR: 2 /HPF — SIGNIFICANT CHANGE UP (ref 0–5)
GLUCOSE SERPL-MCNC: 108 MG/DL — HIGH (ref 70–99)
GLUCOSE UR QL: NEGATIVE — SIGNIFICANT CHANGE UP
HCT VFR BLD CALC: 34.8 % — LOW (ref 37–47)
HGB BLD-MCNC: 11 G/DL — LOW (ref 12–16)
HYALINE CASTS # UR AUTO: 1 /LPF — SIGNIFICANT CHANGE UP (ref 0–7)
IMM GRANULOCYTES NFR BLD AUTO: 0.4 % — HIGH (ref 0.1–0.3)
KETONES UR-MCNC: SIGNIFICANT CHANGE UP
LEUKOCYTE ESTERASE UR-ACNC: NEGATIVE — SIGNIFICANT CHANGE UP
LYMPHOCYTES # BLD AUTO: 1.13 K/UL — LOW (ref 1.2–3.4)
LYMPHOCYTES # BLD AUTO: 14.1 % — LOW (ref 20.5–51.1)
MCHC RBC-ENTMCNC: 26.5 PG — LOW (ref 27–31)
MCHC RBC-ENTMCNC: 31.6 G/DL — LOW (ref 32–37)
MCV RBC AUTO: 83.9 FL — SIGNIFICANT CHANGE UP (ref 81–99)
MONOCYTES # BLD AUTO: 1.59 K/UL — HIGH (ref 0.1–0.6)
MONOCYTES NFR BLD AUTO: 19.9 % — HIGH (ref 1.7–9.3)
NEUTROPHILS # BLD AUTO: 5.18 K/UL — SIGNIFICANT CHANGE UP (ref 1.4–6.5)
NEUTROPHILS NFR BLD AUTO: 64.8 % — SIGNIFICANT CHANGE UP (ref 42.2–75.2)
NITRITE UR-MCNC: NEGATIVE — SIGNIFICANT CHANGE UP
NRBC # BLD: 0 /100 WBCS — SIGNIFICANT CHANGE UP (ref 0–0)
PH UR: 6 — SIGNIFICANT CHANGE UP (ref 5–8)
PLATELET # BLD AUTO: 175 K/UL — SIGNIFICANT CHANGE UP (ref 130–400)
POTASSIUM SERPL-MCNC: 4.3 MMOL/L — SIGNIFICANT CHANGE UP (ref 3.5–5)
POTASSIUM SERPL-SCNC: 4.3 MMOL/L — SIGNIFICANT CHANGE UP (ref 3.5–5)
PROT UR-MCNC: SIGNIFICANT CHANGE UP
RBC # BLD: 4.15 M/UL — LOW (ref 4.2–5.4)
RBC # FLD: 17.9 % — HIGH (ref 11.5–14.5)
RBC CASTS # UR COMP ASSIST: 16 /HPF — HIGH (ref 0–4)
SODIUM SERPL-SCNC: 139 MMOL/L — SIGNIFICANT CHANGE UP (ref 135–146)
SP GR SPEC: 1.02 — SIGNIFICANT CHANGE UP (ref 1.01–1.02)
UROBILINOGEN FLD QL: ABNORMAL
WBC # BLD: 7.99 K/UL — SIGNIFICANT CHANGE UP (ref 4.8–10.8)
WBC # FLD AUTO: 7.99 K/UL — SIGNIFICANT CHANGE UP (ref 4.8–10.8)
WBC UR QL: 1 /HPF — SIGNIFICANT CHANGE UP (ref 0–5)

## 2020-09-12 PROCEDURE — 71045 X-RAY EXAM CHEST 1 VIEW: CPT | Mod: 26

## 2020-09-12 PROCEDURE — 93970 EXTREMITY STUDY: CPT | Mod: 26

## 2020-09-12 PROCEDURE — 99233 SBSQ HOSP IP/OBS HIGH 50: CPT

## 2020-09-12 RX ORDER — DIPHENHYDRAMINE HCL 50 MG
25 CAPSULE ORAL ONCE
Refills: 0 | Status: DISCONTINUED | OUTPATIENT
Start: 2020-09-12 | End: 2020-09-12

## 2020-09-12 RX ORDER — CLONAZEPAM 1 MG
0.5 TABLET ORAL DAILY
Refills: 0 | Status: DISCONTINUED | OUTPATIENT
Start: 2020-09-12 | End: 2020-09-15

## 2020-09-12 RX ORDER — RIVAROXABAN 15 MG-20MG
1 KIT ORAL
Qty: 60 | Refills: 0
Start: 2020-09-12 | End: 2020-10-11

## 2020-09-12 RX ORDER — DEXAMETHASONE 0.5 MG/5ML
8 ELIXIR ORAL ONCE
Refills: 0 | Status: DISCONTINUED | OUTPATIENT
Start: 2020-09-12 | End: 2020-09-12

## 2020-09-12 RX ORDER — CARBOPLATIN 50 MG
180 VIAL (EA) INTRAVENOUS ONCE
Refills: 0 | Status: DISCONTINUED | OUTPATIENT
Start: 2020-09-12 | End: 2020-09-12

## 2020-09-12 RX ORDER — LACTULOSE 10 G/15ML
20 SOLUTION ORAL DAILY
Refills: 0 | Status: DISCONTINUED | OUTPATIENT
Start: 2020-09-12 | End: 2020-09-15

## 2020-09-12 RX ORDER — ACETAMINOPHEN 500 MG
650 TABLET ORAL ONCE
Refills: 0 | Status: DISCONTINUED | OUTPATIENT
Start: 2020-09-12 | End: 2020-09-12

## 2020-09-12 RX ORDER — ONDANSETRON 8 MG/1
16 TABLET, FILM COATED ORAL ONCE
Refills: 0 | Status: DISCONTINUED | OUTPATIENT
Start: 2020-09-12 | End: 2020-09-12

## 2020-09-12 RX ORDER — FAMOTIDINE 10 MG/ML
20 INJECTION INTRAVENOUS ONCE
Refills: 0 | Status: DISCONTINUED | OUTPATIENT
Start: 2020-09-12 | End: 2020-09-12

## 2020-09-12 RX ORDER — PACLITAXEL 6 MG/ML
100 INJECTION, SOLUTION, CONCENTRATE INTRAVENOUS ONCE
Refills: 0 | Status: DISCONTINUED | OUTPATIENT
Start: 2020-09-12 | End: 2020-09-12

## 2020-09-12 RX ORDER — FOSAPREPITANT DIMEGLUMINE 150 MG/5ML
150 INJECTION, POWDER, LYOPHILIZED, FOR SOLUTION INTRAVENOUS ONCE
Refills: 0 | Status: DISCONTINUED | OUTPATIENT
Start: 2020-09-12 | End: 2020-09-12

## 2020-09-12 RX ADMIN — LACTULOSE 20 GRAM(S): 10 SOLUTION ORAL at 13:00

## 2020-09-12 RX ADMIN — RIVAROXABAN 15 MILLIGRAM(S): KIT at 10:11

## 2020-09-12 RX ADMIN — Medication 650 MILLIGRAM(S): at 18:29

## 2020-09-12 RX ADMIN — CHLORHEXIDINE GLUCONATE 1 APPLICATION(S): 213 SOLUTION TOPICAL at 00:00

## 2020-09-12 RX ADMIN — RIVAROXABAN 15 MILLIGRAM(S): KIT at 18:01

## 2020-09-12 RX ADMIN — AMLODIPINE BESYLATE 5 MILLIGRAM(S): 2.5 TABLET ORAL at 06:00

## 2020-09-12 RX ADMIN — Medication 81 MILLIGRAM(S): at 11:42

## 2020-09-12 RX ADMIN — Medication 25 MILLIGRAM(S): at 06:00

## 2020-09-12 RX ADMIN — Medication 650 MILLIGRAM(S): at 20:53

## 2020-09-12 RX ADMIN — Medication 0.5 MILLIGRAM(S): at 13:05

## 2020-09-12 RX ADMIN — ATORVASTATIN CALCIUM 40 MILLIGRAM(S): 80 TABLET, FILM COATED ORAL at 20:53

## 2020-09-12 NOTE — PROGRESS NOTE ADULT - SUBJECTIVE AND OBJECTIVE BOX
Patient is a 81y old  Female who presents with a chief complaint of Chest pain. (12 Sep 2020 12:11)      Subjective: Pt seen and examined , lying on bed . Reports feels fine but did develop dry cough this morning . Had a high grade fever this morning of 101 . Will get ID w/u .       Vital Signs Last 24 Hrs  T(C): 37.7 (12 Sep 2020 12:28), Max: 38.8 (11 Sep 2020 21:00)  T(F): 99.9 (12 Sep 2020 12:28), Max: 101.8 (11 Sep 2020 21:00)  HR: 92 (12 Sep 2020 12:) (92 - 98)  BP: 110/63 (12 Sep 2020 12:28) (110/63 - 138/63)  BP(mean): --  RR: 18 (12 Sep 2020 12:28) (18 - 18)  SpO2: --    PHYSICAL EXAM  General: lying on bed , comfortable and cooperative .  HEENT: clear oropharynx, anicteric sclera, pink conjunctiva  CV: normal S1/S2 with no murmur rubs or gallops  Lungs: clear to auscultation  Abdomen: soft, non-tender, no hepatosplenomegaly  Ext: no clubbing cyanosis or edema  Skin: no rashes and no petechiae  Neuro: alert and oriented X 4, no focal deficits    MEDICATIONS  (STANDING):  amLODIPine   Tablet 5 milliGRAM(s) Oral daily  aspirin enteric coated 81 milliGRAM(s) Oral daily  atorvastatin 40 milliGRAM(s) Oral at bedtime  chlorhexidine 4% Liquid 1 Application(s) Topical <User Schedule>  influenza   Vaccine 0.5 milliLiter(s) IntraMuscular once  lactulose Syrup 20 Gram(s) Oral daily  metoprolol succinate ER 25 milliGRAM(s) Oral daily  rivaroxaban 15 milliGRAM(s) Oral two times a day with meals    MEDICATIONS  (PRN):  acetaminophen   Tablet .. 650 milliGRAM(s) Oral every 6 hours PRN Temp greater or equal to 38C (100.4F)  clonazePAM  Tablet 0.5 milliGRAM(s) Oral daily PRN anxiety      LABS:                          11.0   7.99  )-----------( 175      ( 12 Sep 2020 06:04 )             34.8         Mean Cell Volume : 83.9 fL  Mean Cell Hemoglobin : 26.5 pg  Mean Cell Hemoglobin Concentration : 31.6 g/dL  Auto Neutrophil # : 5.18 K/uL  Auto Lymphocyte # : 1.13 K/uL  Auto Monocyte # : 1.59 K/uL  Auto Eosinophil # : 0.03 K/uL  Auto Basophil # : 0.03 K/uL  Auto Neutrophil % : 64.8 %  Auto Lymphocyte % : 14.1 %  Auto Monocyte % : 19.9 %  Auto Eosinophil % : 0.4 %  Auto Basophil % : 0.4 %      Serial CBC's   @ 06:04  Hct-34.8 / Hgb-11.0 / Plat-175 / RBC-4.15 / WBC-7.99  Serial CBC's   @ 11:38  Hct-34.2 / Hgb-10.7 / Plat-201 / RBC-4.03 / WBC-6.64  Serial CBC's   @ 00:12  Hct-39.7 / Hgb-12.4 / Plat-227 / RBC-4.75 / WBC-6.88          139  |  100  |  11  ----------------------------<  108<H>  4.3   |  26  |  0.7    Ca    8.8      12 Sep 2020 06:04  Mg     1.9         TPro  6.1  /  Alb  3.8  /  TBili  0.4  /  DBili  x   /  AST  14  /  ALT  7   /  AlkPhos  75        PT/INR - ( 11 Sep 2020 11:38 )   PT: 17.50 sec;   INR: 1.52 ratio         PTT - ( 12 Sep 2020 06:04 )  PTT:32.9 sec                Urinalysis Basic - ( 12 Sep 2020 13:10 )    Color: Yellow / Appearance: Clear / S.019 / pH: x  Gluc: x / Ketone: Trace  / Bili: Negative / Urobili: 3 mg/dL   Blood: x / Protein: Trace / Nitrite: Negative   Leuk Esterase: Negative / RBC: 16 /HPF / WBC 1 /HPF   Sq Epi: x / Non Sq Epi: 2 /HPF / Bacteria: Negative                BLOOD SMEAR INTERPRETATION:       RADIOLOGY & ADDITIONAL STUDIES:

## 2020-09-12 NOTE — PROGRESS NOTE ADULT - SUBJECTIVE AND OBJECTIVE BOX
HOSPITALIST ATTENDING NOTE    BEKAH NELSON  81y Female  [id number]    INTERVAL HPI/OVERNIGHT EVENTS: + fever, mild pain, no shortness of breath, no chest pain     T(C): 38.8 (09-11-20 @ 21:00), Max: 38.8 (09-11-20 @ 21:00)  HR: 98 (09-11-20 @ 21:00) (93 - 98)  BP: 117/55 (09-11-20 @ 21:00) (117/55 - 138/63)  RR: 18 (09-11-20 @ 21:00) (18 - 18)  SpO2: --  Wt(kg): --      PHYSICAL EXAM:  GENERAL: NAD, elderly   HEAD:  Atraumatic, Normocephalic  EYES: EOMI, PERRLA, conjunctiva and sclera clear  ENMT: No tonsillar erythema, exudates, or enlargement  NECK: Supple, No JVD, Normal thyroid  NERVOUS SYSTEM:  Alert & Oriented X3, Good concentration; Non-focal- Motor Strength 5/5 B/L upper and lower extremities;  CHEST/LUNG: Clear to ascultation bilaterally; No rales, rhonchi, wheezing,   HEART: Regular rate and rhythm; No murmurs, rubs, or gallops  ABDOMEN: Soft, Nontender, mild distended; Bowel sounds present  EXTREMITIES: No clubbing, cyanosis, or edema  PSYCH: No suicidal/homicial ideation/hallucinations    Consultant(s) Notes Reviewed:  [x ] YES  [ ] NO  Care Discussed with Consultants/Other Providers/ Housestaff [ x] YES  [ ] NO    LABS:                        11.0   7.99  )-----------( 175      ( 12 Sep 2020 06:04 )             34.8     09-12    139  |  100  |  11  ----------------------------<  108<H>  4.3   |  26  |  0.7    Ca    8.8      12 Sep 2020 06:04  Mg     1.9     09-11    TPro  6.1  /  Alb  3.8  /  TBili  0.4  /  DBili  x   /  AST  14  /  ALT  7   /  AlkPhos  75  09-11          RADIOLOGY & ADDITIONAL TESTS:    Imaging or report Personally Reviewed:  [ ] YES  [ ] NO    Case discussed with resident    Care discussed with pt/family      HEALTH ISSUES - PROBLEM Dx:

## 2020-09-12 NOTE — PROGRESS NOTE ADULT - ASSESSMENT
Patient is a 81 year old F patient with past medical history of Uterine cancer s/p SHAAN, malignant ascites on therapeutic paracentesis, CAD s/p CABG, segmental pulmonary embolism on Lovenox recently switched to xarelto, HTN, depression, comes in with chest pain, found to have acute on chronic PE.     #) Fever :  - Get urine and blood culture , chest xray .  - will hold off on treatment for today .  - Start empiric abx if continues to spike fever .    #) Acute on Chronic PE in setting of known Malignancy   - Likely Provoked, patient stopped taking her Lovenox for one week as she ran out of medications   - She received Xarelto yesterday  - Please switch to PO Xarelto for discharge. She will need 15mg BID for 21 days (initial dosing) followed by 20mg daily (as maintenance dosing)     #) Recurrent adenocarcinoma Mullerian origin, malignant ascites dx on 08/08/2020  - Prior TAHBSO in 2016, with adjuvant chemotherapy  - Started on weekly Carbo/taxol weekly 3 on 1 off, started on 08/14/20  - Patient was due for chemo today on 9/11  - Will hold off on chemo today as pt is spiking fever.      Plan :  will hold off on chemo for today .  Get UA , UC , Blood culture and chest x ray .

## 2020-09-12 NOTE — PROGRESS NOTE ADULT - SUBJECTIVE AND OBJECTIVE BOX
BEKAH NELSON 81y Female  MRN#: [id number]   CODE STATUS:FULL      SUBJECTIVE  Patient is a 81y old woman with PMH uterine cancer (s/p SHAAN) complicated by malignant ascites (with therapeutic paracentesis), CAD (s/p CABG), provoked PE (previously on Lovenox), HTN, and depression who presented with chest pain. CTA revealed acute on chronic PE. Patient stated she was not compliant with lovenox outpatient due to running out of medication.     Patient examined this morning. Febrile last night; likely due to PE but infectious work-up performed. Endorsing pain in right lower chest, otherwise no complaints.    OBJECTIVE  PAST MEDICAL & SURGICAL HISTORY  HLD (hyperlipidemia)    HTN (hypertension)    CAD (coronary artery disease)    History of coronary artery bypass surgery      ALLERGIES:  chloroquine (Hives)    MEDICATIONS:  STANDING MEDICATIONS  amLODIPine   Tablet 5 milliGRAM(s) Oral daily  aspirin enteric coated 81 milliGRAM(s) Oral daily  atorvastatin 40 milliGRAM(s) Oral at bedtime  chlorhexidine 4% Liquid 1 Application(s) Topical <User Schedule>  influenza   Vaccine 0.5 milliLiter(s) IntraMuscular once  lactulose Syrup 20 Gram(s) Oral daily  metoprolol succinate ER 25 milliGRAM(s) Oral daily  rivaroxaban 15 milliGRAM(s) Oral two times a day with meals    PRN MEDICATIONS  acetaminophen   Tablet .. 650 milliGRAM(s) Oral every 6 hours PRN  clonazePAM  Tablet 0.5 milliGRAM(s) Oral daily PRN      VITAL SIGNS: Last 24 Hours  T(C): 38.8 (11 Sep 2020 21:00), Max: 38.8 (11 Sep 2020 21:00)  T(F): 101.8 (11 Sep 2020 21:00), Max: 101.8 (11 Sep 2020 21:00)  HR: 98 (11 Sep 2020 21:00) (93 - 98)  BP: 117/55 (11 Sep 2020 21:00) (117/55 - 138/63)  BP(mean): --  RR: 18 (11 Sep 2020 21:00) (18 - 18)  SpO2: --    LABS:                        11.0   7.99  )-----------( 175      ( 12 Sep 2020 06:04 )             34.8     09-12    139  |  100  |  11  ----------------------------<  108<H>  4.3   |  26  |  0.7    Ca    8.8      12 Sep 2020 06:04  Mg     1.9     09-11    TPro  6.1  /  Alb  3.8  /  TBili  0.4  /  DBili  x   /  AST  14  /  ALT  7   /  AlkPhos  75  09-11    PT/INR - ( 11 Sep 2020 11:38 )   PT: 17.50 sec;   INR: 1.52 ratio         PTT - ( 12 Sep 2020 06:04 )  PTT:32.9 sec  Urinalysis Basic - ( 11 Sep 2020 09:30 )    Color: Light Yellow / Appearance: Clear / SG: >1.050 / pH: x  Gluc: x / Ketone: Negative  / Bili: Negative / Urobili: <2 mg/dL   Blood: x / Protein: Trace / Nitrite: Negative   Leuk Esterase: Negative / RBC: 9 /HPF / WBC 0 /HPF   Sq Epi: x / Non Sq Epi: 1 /HPF / Bacteria: Negative            CARDIAC MARKERS ( 11 Sep 2020 11:38 )  x     / <0.01 ng/mL / x     / x     / x      CARDIAC MARKERS ( 11 Sep 2020 00:12 )  x     / <0.01 ng/mL / x     / x     / x          RADIOLOGY:    < from: CT Angio Chest w/ IV Cont (09.11.20 @ 03:02) >  IMPRESSION:    Redemonstrated right lower lobe segmental pulmonary artery thrombus, slightly increased in size since prior exam, now extending into the subsegmentalbranches.    No new central, lobar, or segmental pulmonary emboli.    Previously seen right upper lobe and left lower lobe segmental pulmonary emboli are not identified on this exam.    Additional Findings/Recommendations After Attending Radiologist Review:  Acute on chronic pulmonary embolism. New right lower lobar segmental branch opacification is seen, and other previously seen emboli have decreased or resolved. No CT evidence of right heart strain or saddle embolus. Cardiomegaly is noted.    Spoke with TABATHA FLANNERY DO on 9/11/2020 4:03 AM with readback.    < end of copied text >      PHYSICAL EXAM:    GENERAL: NAD, well-developed, AAOx3  HEENT:  Atraumatic, Normocephalic. EOMI, PERRLA, conjunctiva and sclera clear, No JVD  PULMONARY: Clear to auscultation bilaterally; No wheeze  CARDIOVASCULAR: Regular rate and rhythm; No murmurs, rubs, or gallops  GASTROINTESTINAL: Soft, Nontender, Nondistended; Bowel sounds present  MUSCULOSKELETAL:  2+ Peripheral Pulses, No clubbing, cyanosis, or edema  NEUROLOGY: non-focal  SKIN: No rashes or lesions      ASSESSMENT & PLAN    Patient is a 81y old woman with PMH uterine cancer (s/p SHAAN) complicated by malignant ascites (with therapeutic paracentesis), CAD (s/p CABG), provoked PE (previously on Lovenox), HTN, and depression who presented with chest pain found to have PE.    1. Pulmonary embolism  -Patient with previously provoked PE discharged on Lovenox and switched to Xarelto day prior to presentation, however was non-compliant due to running out of lovenox  -CTA reveals acute on chronic PE  -Pulm and heme/onc consulted; intiated xarelto loading dose  -Xarelto 15mg BID for 21 days, followed by 20 QD   -F/U LE doppler    2. Uterine adenocarcinoma complicated with malignant ascites  -S/P TAHBSO in 2016  -On carbo/taxol 3:1   - Will receive chemotherapy by oncology during stay    3. Fever  -Febrile to 101.8  -F/U BCX, U/A, CXR    4. CAD (s/p CABG)  -ASA 81    5. HTN  -Amlodipine 5mg QD  -Metoprolol 25mg QD    6. DLD  -Statin 40 QD    7. Anxiety  -Klonopin 0.5 QD PRN for anxiety    # Diet: DASH/TLC  # DVT Prophylaxis: xarelto  # GI Prophylaxis: Protonix  # Activity: IAT  # Dispo: Acute  # Code Status: Fullcode

## 2020-09-12 NOTE — PROGRESS NOTE ADULT - ASSESSMENT
#acute on chronic PE - patient  was off AC for 2 weeks after finishing lovenox before switching to xarelto   now on xarelto loading dose   hypercoagulable due to malignacy    #uterine CA with perintenal carcinomatosis/malignant ascites - on carbo/taxol - hemonc to initiate chemo - but now febrile - follow up with onc    #fever - no clear source - check bcx, ucx, - if still spiking again would start empiric abx coverage to cover peritonitis       # CAd - cont home meds    #constipation - start lactulose - monitor BM    discussed with pt and team

## 2020-09-13 LAB
ALBUMIN SERPL ELPH-MCNC: 3.3 G/DL — LOW (ref 3.5–5.2)
ALP SERPL-CCNC: 70 U/L — SIGNIFICANT CHANGE UP (ref 30–115)
ALT FLD-CCNC: 5 U/L — SIGNIFICANT CHANGE UP (ref 0–41)
ANION GAP SERPL CALC-SCNC: 14 MMOL/L — SIGNIFICANT CHANGE UP (ref 7–14)
AST SERPL-CCNC: 18 U/L — SIGNIFICANT CHANGE UP (ref 0–41)
BASOPHILS # BLD AUTO: 0.02 K/UL — SIGNIFICANT CHANGE UP (ref 0–0.2)
BASOPHILS NFR BLD AUTO: 0.3 % — SIGNIFICANT CHANGE UP (ref 0–1)
BILIRUB SERPL-MCNC: 0.4 MG/DL — SIGNIFICANT CHANGE UP (ref 0.2–1.2)
BUN SERPL-MCNC: 12 MG/DL — SIGNIFICANT CHANGE UP (ref 10–20)
CALCIUM SERPL-MCNC: 8.2 MG/DL — LOW (ref 8.5–10.1)
CHLORIDE SERPL-SCNC: 101 MMOL/L — SIGNIFICANT CHANGE UP (ref 98–110)
CO2 SERPL-SCNC: 22 MMOL/L — SIGNIFICANT CHANGE UP (ref 17–32)
CREAT SERPL-MCNC: 0.8 MG/DL — SIGNIFICANT CHANGE UP (ref 0.7–1.5)
EOSINOPHIL # BLD AUTO: 0.06 K/UL — SIGNIFICANT CHANGE UP (ref 0–0.7)
EOSINOPHIL NFR BLD AUTO: 1 % — SIGNIFICANT CHANGE UP (ref 0–8)
GLUCOSE SERPL-MCNC: 82 MG/DL — SIGNIFICANT CHANGE UP (ref 70–99)
HCT VFR BLD CALC: 31.9 % — LOW (ref 37–47)
HGB BLD-MCNC: 10.2 G/DL — LOW (ref 12–16)
IMM GRANULOCYTES NFR BLD AUTO: 0.3 % — SIGNIFICANT CHANGE UP (ref 0.1–0.3)
LYMPHOCYTES # BLD AUTO: 1.03 K/UL — LOW (ref 1.2–3.4)
LYMPHOCYTES # BLD AUTO: 17.3 % — LOW (ref 20.5–51.1)
MAGNESIUM SERPL-MCNC: 2.1 MG/DL — SIGNIFICANT CHANGE UP (ref 1.8–2.4)
MCHC RBC-ENTMCNC: 26.3 PG — LOW (ref 27–31)
MCHC RBC-ENTMCNC: 32 G/DL — SIGNIFICANT CHANGE UP (ref 32–37)
MCV RBC AUTO: 82.2 FL — SIGNIFICANT CHANGE UP (ref 81–99)
MONOCYTES # BLD AUTO: 1.28 K/UL — HIGH (ref 0.1–0.6)
MONOCYTES NFR BLD AUTO: 21.4 % — HIGH (ref 1.7–9.3)
NEUTROPHILS # BLD AUTO: 3.56 K/UL — SIGNIFICANT CHANGE UP (ref 1.4–6.5)
NEUTROPHILS NFR BLD AUTO: 59.7 % — SIGNIFICANT CHANGE UP (ref 42.2–75.2)
NRBC # BLD: 0 /100 WBCS — SIGNIFICANT CHANGE UP (ref 0–0)
PLATELET # BLD AUTO: 194 K/UL — SIGNIFICANT CHANGE UP (ref 130–400)
POTASSIUM SERPL-MCNC: 4.7 MMOL/L — SIGNIFICANT CHANGE UP (ref 3.5–5)
POTASSIUM SERPL-SCNC: 4.7 MMOL/L — SIGNIFICANT CHANGE UP (ref 3.5–5)
PROT SERPL-MCNC: 5.6 G/DL — LOW (ref 6–8)
RBC # BLD: 3.88 M/UL — LOW (ref 4.2–5.4)
RBC # FLD: 17.9 % — HIGH (ref 11.5–14.5)
SODIUM SERPL-SCNC: 137 MMOL/L — SIGNIFICANT CHANGE UP (ref 135–146)
WBC # BLD: 5.97 K/UL — SIGNIFICANT CHANGE UP (ref 4.8–10.8)
WBC # FLD AUTO: 5.97 K/UL — SIGNIFICANT CHANGE UP (ref 4.8–10.8)

## 2020-09-13 PROCEDURE — 99233 SBSQ HOSP IP/OBS HIGH 50: CPT

## 2020-09-13 PROCEDURE — 99232 SBSQ HOSP IP/OBS MODERATE 35: CPT

## 2020-09-13 RX ORDER — ACETAMINOPHEN 500 MG
650 TABLET ORAL EVERY 6 HOURS
Refills: 0 | Status: DISCONTINUED | OUTPATIENT
Start: 2020-09-13 | End: 2020-09-15

## 2020-09-13 RX ORDER — ACETAMINOPHEN 500 MG
650 TABLET ORAL ONCE
Refills: 0 | Status: COMPLETED | OUTPATIENT
Start: 2020-09-13 | End: 2020-09-13

## 2020-09-13 RX ADMIN — Medication 650 MILLIGRAM(S): at 07:17

## 2020-09-13 RX ADMIN — RIVAROXABAN 15 MILLIGRAM(S): KIT at 08:18

## 2020-09-13 RX ADMIN — Medication 25 MILLIGRAM(S): at 05:20

## 2020-09-13 RX ADMIN — AMLODIPINE BESYLATE 5 MILLIGRAM(S): 2.5 TABLET ORAL at 05:20

## 2020-09-13 RX ADMIN — ATORVASTATIN CALCIUM 40 MILLIGRAM(S): 80 TABLET, FILM COATED ORAL at 21:03

## 2020-09-13 RX ADMIN — Medication 81 MILLIGRAM(S): at 11:28

## 2020-09-13 RX ADMIN — RIVAROXABAN 15 MILLIGRAM(S): KIT at 17:09

## 2020-09-13 RX ADMIN — Medication 650 MILLIGRAM(S): at 06:03

## 2020-09-13 RX ADMIN — Medication 650 MILLIGRAM(S): at 17:09

## 2020-09-13 RX ADMIN — LACTULOSE 20 GRAM(S): 10 SOLUTION ORAL at 11:29

## 2020-09-13 NOTE — PROGRESS NOTE ADULT - ASSESSMENT
Patient is a 81 year old F patient with past medical history of Uterine cancer s/p SHAAN, malignant ascites on therapeutic paracentesis, CAD s/p CABG, segmental pulmonary embolism on Lovenox recently switched to xarelto, HTN, depression, comes in with chest pain, found to have acute on chronic PE.     #) Fever : xray showing right sided opacity ( new )  - will start IV abx .   - ua unremarkable , blood cultures pending .   - will hold off on treatment for today .    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       Plan :  will hold off on chemo for today .  IV abx .

## 2020-09-13 NOTE — PROGRESS NOTE ADULT - ASSESSMENT
#acute on chronic PE - patient  was off AC for 2 weeks after finishing lovenox before switching to xarelto   now on xarelto loading dose   hypercoagulable due to malignacy    #uterine CA with perintenal carcinomatosis/malignant ascites - on carbo/taxol - hemonc held chemo yesterday due to fever - no signs of infections - UA negative , no apparent PNA on CXR, bcx pending -only low grade temps today - hem onc to give chemo today -     # CAd - cont home meds    #constipation - start lactulose - monitor BM    discussed with pt and team     dc planning after chemo and cleared by onc in 24-48 hrs

## 2020-09-13 NOTE — PROGRESS NOTE ADULT - SUBJECTIVE AND OBJECTIVE BOX
HOSPITALIST ATTENDING NOTE    BEKAH NELSON  81y Female  [id number]    INTERVAL HPI/OVERNIGHT EVENTS: feels much better today, mild pain on deep inspiration , nonproductive cough, low grade temps    Vital Signs Last 24 Hrs  T(C): 37.5 (13 Sep 2020 04:42), Max: 38.3 (12 Sep 2020 18:26)  T(F): 99.5 (13 Sep 2020 04:42), Max: 100.9 (12 Sep 2020 18:26)  HR: 105 (13 Sep 2020 05:19) (69 - 105)  BP: 113/59 (13 Sep 2020 04:42) (100/48 - 113/59)  BP(mean): --  RR: 18 (13 Sep 2020 04:42) (18 - 18)  SpO2: 95% (12 Sep 2020 20:53) (95% - 95%)    PHYSICAL EXAM:  GENERAL: NAD, elderly   HEAD:  Atraumatic, Normocephalic  EYES: EOMI, PERRLA, conjunctiva and sclera clear  ENMT: No tonsillar erythema, exudates, or enlargement  NECK: Supple, No JVD, Normal thyroid  NERVOUS SYSTEM:  Alert & Oriented X3, Good concentration; Non-focal- Motor Strength 5/5 B/L upper and lower extremities;  CHEST/LUNG: Clear to ascultation bilaterally; No rales, rhonchi, wheezing,   HEART: Regular rate and rhythm; No murmurs, rubs, or gallops  ABDOMEN: Soft, Nontender, mild distended; Bowel sounds present  EXTREMITIES: No clubbing, cyanosis, or edema  PSYCH: No suicidal/homicial ideation/hallucinations    Consultant(s) Notes Reviewed:  [x ] YES  [ ] NO  Care Discussed with Consultants/Other Providers/ Housestaff [ x] YES  [ ] NO    LABS:                          10.2   5.97  )-----------( 194      ( 13 Sep 2020 07:40 )             31.9   09-12    139  |  100  |  11  ----------------------------<  108<H>  4.3   |  26  |  0.7    Ca    8.8      12 Sep 2020 06:04  Mg     1.9     09-11    TPro  6.1  /  Alb  3.8  /  TBili  0.4  /  DBili  x   /  AST  14  /  ALT  7   /  AlkPhos  75  09-11          RADIOLOGY & ADDITIONAL TESTS:    Imaging or report Personally Reviewed:  [ ] YES  [ ] NO    Case discussed with resident    Care discussed with pt/family      HEALTH ISSUES - PROBLEM Dx:

## 2020-09-13 NOTE — PROGRESS NOTE ADULT - SUBJECTIVE AND OBJECTIVE BOX
Patient is a 81y old  Female who presents with a chief complaint of Chest pain. (13 Sep 2020 10:44)      Subjective: Pt seen and examined , lying on bed . Chemo was held yesterday as pt had fever , w/u showed right sided opacity . last fever was 100.9 yesterday .       Vital Signs Last 24 Hrs  T(C): 37.8 (13 Sep 2020 13:27), Max: 38.3 (12 Sep 2020 18:26)  T(F): 100 (13 Sep 2020 13:27), Max: 100.9 (12 Sep 2020 18:26)  HR: 86 (13 Sep 2020 13:) (69 - 105)  BP: 106/58 (13 Sep 2020 13:) (100/48 - 113/59)  BP(mean): --  RR: 18 (13 Sep 2020 13:27) (18 - 18)  SpO2: 95% (12 Sep 2020 20:53) (95% - 95%)    PHYSICAL EXAM  General: lying on bed , comfortable and cooperative .  HEENT: clear oropharynx, anicteric sclera, pink conjunctiva  CV: normal S1/S2 with no murmur rubs or gallops  Lungs: clear to auscultation  Abdomen: soft, non-tender, no hepatosplenomegaly  Ext: no clubbing cyanosis or edema  Skin: no rashes and no petechiae  Neuro: alert and oriented X 4, no focal deficits    MEDICATIONS  (STANDING):  amLODIPine   Tablet 5 milliGRAM(s) Oral daily  aspirin enteric coated 81 milliGRAM(s) Oral daily  atorvastatin 40 milliGRAM(s) Oral at bedtime  chlorhexidine 4% Liquid 1 Application(s) Topical <User Schedule>  influenza   Vaccine 0.5 milliLiter(s) IntraMuscular once  lactulose Syrup 20 Gram(s) Oral daily  levoFLOXacin IVPB      metoprolol succinate ER 25 milliGRAM(s) Oral daily  rivaroxaban 15 milliGRAM(s) Oral two times a day with meals    MEDICATIONS  (PRN):  acetaminophen   Tablet .. 650 milliGRAM(s) Oral every 6 hours PRN Temp greater or equal to 38C (100.4F)  clonazePAM  Tablet 0.5 milliGRAM(s) Oral daily PRN anxiety      LABS:                          10.2   5.97  )-----------( 194      ( 13 Sep 2020 07:40 )             31.9         Mean Cell Volume : 82.2 fL  Mean Cell Hemoglobin : 26.3 pg  Mean Cell Hemoglobin Concentration : 32.0 g/dL  Auto Neutrophil # : 3.56 K/uL  Auto Lymphocyte # : 1.03 K/uL  Auto Monocyte # : 1.28 K/uL  Auto Eosinophil # : 0.06 K/uL  Auto Basophil # : 0.02 K/uL  Auto Neutrophil % : 59.7 %  Auto Lymphocyte % : 17.3 %  Auto Monocyte % : 21.4 %  Auto Eosinophil % : 1.0 %  Auto Basophil % : 0.3 %      Serial CBC's   @ 07:40  Hct-31.9 / Hgb-10.2 / Plat-194 / RBC-3.88 / WBC-5.97  Serial CBC's   @ 06:04  Hct-34.8 / Hgb-11.0 / Plat-175 / RBC-4.15 / WBC-7.99  Serial CBC's   @ 11:38  Hct-34.2 / Hgb-10.7 / Plat-201 / RBC-4.03 / WBC-6.64  Serial CBC's   @ 00:12  Hct-39.7 / Hgb-12.4 / Plat-227 / RBC-4.75 / WBC-6.88          137  |  101  |  12  ----------------------------<  82  4.7   |  22  |  0.8    Ca    8.2<L>      13 Sep 2020 07:40  Mg     2.1         TPro  5.6<L>  /  Alb  3.3<L>  /  TBili  0.4  /  DBili  x   /  AST  18  /  ALT  5   /  AlkPhos  70  -13      PTT - ( 12 Sep 2020 06:04 )  PTT:32.9 sec                Urinalysis Basic - ( 12 Sep 2020 13:10 )    Color: Yellow / Appearance: Clear / S.019 / pH: x  Gluc: x / Ketone: Trace  / Bili: Negative / Urobili: 3 mg/dL   Blood: x / Protein: Trace / Nitrite: Negative   Leuk Esterase: Negative / RBC: 16 /HPF / WBC 1 /HPF   Sq Epi: x / Non Sq Epi: 2 /HPF / Bacteria: Negative                BLOOD SMEAR INTERPRETATION:       RADIOLOGY & ADDITIONAL STUDIES:    < from: Xray Chest 1 View- PORTABLE-Urgent (20 @ 11:10) >    Impression:    New patchy right lower lobe opacity.            < end of copied text >

## 2020-09-14 LAB
ALBUMIN SERPL ELPH-MCNC: 3.2 G/DL — LOW (ref 3.5–5.2)
ALP SERPL-CCNC: 74 U/L — SIGNIFICANT CHANGE UP (ref 30–115)
ALT FLD-CCNC: 8 U/L — SIGNIFICANT CHANGE UP (ref 0–41)
ANION GAP SERPL CALC-SCNC: 16 MMOL/L — HIGH (ref 7–14)
ANION GAP SERPL CALC-SCNC: 9 MMOL/L — SIGNIFICANT CHANGE UP (ref 7–14)
AST SERPL-CCNC: 31 U/L — SIGNIFICANT CHANGE UP (ref 0–41)
BASOPHILS # BLD AUTO: 0.02 K/UL — SIGNIFICANT CHANGE UP (ref 0–0.2)
BASOPHILS NFR BLD AUTO: 0.5 % — SIGNIFICANT CHANGE UP (ref 0–1)
BILIRUB SERPL-MCNC: 0.4 MG/DL — SIGNIFICANT CHANGE UP (ref 0.2–1.2)
BUN SERPL-MCNC: 10 MG/DL — SIGNIFICANT CHANGE UP (ref 10–20)
BUN SERPL-MCNC: 13 MG/DL — SIGNIFICANT CHANGE UP (ref 10–20)
CALCIUM SERPL-MCNC: 8.2 MG/DL — LOW (ref 8.5–10.1)
CALCIUM SERPL-MCNC: 8.8 MG/DL — SIGNIFICANT CHANGE UP (ref 8.5–10.1)
CHLORIDE SERPL-SCNC: 101 MMOL/L — SIGNIFICANT CHANGE UP (ref 98–110)
CHLORIDE SERPL-SCNC: 105 MMOL/L — SIGNIFICANT CHANGE UP (ref 98–110)
CO2 SERPL-SCNC: 22 MMOL/L — SIGNIFICANT CHANGE UP (ref 17–32)
CO2 SERPL-SCNC: 26 MMOL/L — SIGNIFICANT CHANGE UP (ref 17–32)
CREAT SERPL-MCNC: 0.8 MG/DL — SIGNIFICANT CHANGE UP (ref 0.7–1.5)
CREAT SERPL-MCNC: 0.8 MG/DL — SIGNIFICANT CHANGE UP (ref 0.7–1.5)
EOSINOPHIL # BLD AUTO: 0.06 K/UL — SIGNIFICANT CHANGE UP (ref 0–0.7)
EOSINOPHIL NFR BLD AUTO: 1.4 % — SIGNIFICANT CHANGE UP (ref 0–8)
GLUCOSE SERPL-MCNC: 103 MG/DL — HIGH (ref 70–99)
GLUCOSE SERPL-MCNC: 106 MG/DL — HIGH (ref 70–99)
HCT VFR BLD CALC: 33.9 % — LOW (ref 37–47)
HGB BLD-MCNC: 10.8 G/DL — LOW (ref 12–16)
IMM GRANULOCYTES NFR BLD AUTO: 0.5 % — HIGH (ref 0.1–0.3)
LYMPHOCYTES # BLD AUTO: 1.25 K/UL — SIGNIFICANT CHANGE UP (ref 1.2–3.4)
LYMPHOCYTES # BLD AUTO: 29.8 % — SIGNIFICANT CHANGE UP (ref 20.5–51.1)
MAGNESIUM SERPL-MCNC: 2.1 MG/DL — SIGNIFICANT CHANGE UP (ref 1.8–2.4)
MCHC RBC-ENTMCNC: 26.2 PG — LOW (ref 27–31)
MCHC RBC-ENTMCNC: 31.9 G/DL — LOW (ref 32–37)
MCV RBC AUTO: 82.1 FL — SIGNIFICANT CHANGE UP (ref 81–99)
MONOCYTES # BLD AUTO: 0.89 K/UL — HIGH (ref 0.1–0.6)
MONOCYTES NFR BLD AUTO: 21.2 % — HIGH (ref 1.7–9.3)
NEUTROPHILS # BLD AUTO: 1.96 K/UL — SIGNIFICANT CHANGE UP (ref 1.4–6.5)
NEUTROPHILS NFR BLD AUTO: 46.6 % — SIGNIFICANT CHANGE UP (ref 42.2–75.2)
NRBC # BLD: 0 /100 WBCS — SIGNIFICANT CHANGE UP (ref 0–0)
PLATELET # BLD AUTO: 213 K/UL — SIGNIFICANT CHANGE UP (ref 130–400)
POTASSIUM SERPL-MCNC: 4 MMOL/L — SIGNIFICANT CHANGE UP (ref 3.5–5)
POTASSIUM SERPL-MCNC: 5.9 MMOL/L — HIGH (ref 3.5–5)
POTASSIUM SERPL-SCNC: 4 MMOL/L — SIGNIFICANT CHANGE UP (ref 3.5–5)
POTASSIUM SERPL-SCNC: 5.9 MMOL/L — HIGH (ref 3.5–5)
PROT SERPL-MCNC: 6.2 G/DL — SIGNIFICANT CHANGE UP (ref 6–8)
RBC # BLD: 4.13 M/UL — LOW (ref 4.2–5.4)
RBC # FLD: 17.8 % — HIGH (ref 11.5–14.5)
SODIUM SERPL-SCNC: 139 MMOL/L — SIGNIFICANT CHANGE UP (ref 135–146)
SODIUM SERPL-SCNC: 140 MMOL/L — SIGNIFICANT CHANGE UP (ref 135–146)
WBC # BLD: 4.2 K/UL — LOW (ref 4.8–10.8)
WBC # FLD AUTO: 4.2 K/UL — LOW (ref 4.8–10.8)

## 2020-09-14 PROCEDURE — 99233 SBSQ HOSP IP/OBS HIGH 50: CPT

## 2020-09-14 PROCEDURE — 99232 SBSQ HOSP IP/OBS MODERATE 35: CPT

## 2020-09-14 RX ADMIN — LACTULOSE 20 GRAM(S): 10 SOLUTION ORAL at 12:28

## 2020-09-14 RX ADMIN — ATORVASTATIN CALCIUM 40 MILLIGRAM(S): 80 TABLET, FILM COATED ORAL at 21:10

## 2020-09-14 RX ADMIN — RIVAROXABAN 15 MILLIGRAM(S): KIT at 08:07

## 2020-09-14 RX ADMIN — Medication 650 MILLIGRAM(S): at 03:30

## 2020-09-14 RX ADMIN — Medication 25 MILLIGRAM(S): at 05:58

## 2020-09-14 RX ADMIN — Medication 81 MILLIGRAM(S): at 12:28

## 2020-09-14 RX ADMIN — RIVAROXABAN 15 MILLIGRAM(S): KIT at 17:47

## 2020-09-14 RX ADMIN — AMLODIPINE BESYLATE 5 MILLIGRAM(S): 2.5 TABLET ORAL at 05:58

## 2020-09-14 RX ADMIN — Medication 0.5 MILLIGRAM(S): at 08:06

## 2020-09-14 NOTE — PROGRESS NOTE ADULT - ASSESSMENT
#acute on chronic PE - patient  was off AC for 2 weeks after finishing lovenox before switching to xarelto   now on xarelto loading dose   hypercoagulable due to malignacy    #uterine CA with perintenal carcinomatosis/malignant ascites - on carbo/taxol - hemonc held chemo yesterday due to fever  started on levaquin   UA negative , possible GNR PNA on CXR, bcx pending -only low grade temps today     follow up with hem onc for chemo plan - inpt vs outpt    # CAd - cont home meds    #constipation - start lactulose - monitor BM    discussed with pt and team     dc planning after chemo or outpt chemo and cleared by onc in 24-48 hrs

## 2020-09-14 NOTE — PROGRESS NOTE ADULT - SUBJECTIVE AND OBJECTIVE BOX
----------Daily Progress Note----------    HISTORY OF PRESENT ILLNESS:  Patient is a 81y old Female who presents with a chief complaint of Chest pain. (14 Sep 2020 12:37)    Currently admitted to medicine with the primary diagnosis of Pulmonary emboli       Today is hospital day 3d.     INTERVAL HOSPITAL COURSE / OVERNIGHT EVENTS:    Patient was examined and seen at bedside. This morning she is resting comfortably in bed and reports no new issues or overnight events. States her flank pain and shortness of breath has improved.    Review of Systems: Otherwise unremarkable     <<<<<PAST MEDICAL & SURGICAL HISTORY>>>>>  HLD (hyperlipidemia)    HTN (hypertension)    CAD (coronary artery disease)    History of coronary artery bypass surgery      ALLERGIES  chloroquine (Hives)    MEDICATIONS  STANDING MEDICATIONS  amLODIPine   Tablet 5 milliGRAM(s) Oral daily  aspirin enteric coated 81 milliGRAM(s) Oral daily  atorvastatin 40 milliGRAM(s) Oral at bedtime  chlorhexidine 4% Liquid 1 Application(s) Topical <User Schedule>  influenza   Vaccine 0.5 milliLiter(s) IntraMuscular once  lactulose Syrup 20 Gram(s) Oral daily  levoFLOXacin IVPB      levoFLOXacin IVPB 750 milliGRAM(s) IV Intermittent every 24 hours  metoprolol succinate ER 25 milliGRAM(s) Oral daily  rivaroxaban 15 milliGRAM(s) Oral two times a day with meals    PRN MEDICATIONS  acetaminophen   Tablet .. 650 milliGRAM(s) Oral every 6 hours PRN  clonazePAM  Tablet 0.5 milliGRAM(s) Oral daily PRN    VITALS:  T(F): 98.1  HR: 91  BP: 104/51  RR: 18  SpO2: 94%    <<<<<PHYSICAL EXAM>>>>>  GENERAL: Well developed, well nourished and in no acute distress. Resting comfortably in bed.  HEENT: Normocephalic, atraumatic, mucous membrances moist, EOMI, PERRLA, bilateral sclera anicteric, no conjunctival injection  Neck: Supple, non-tender, no lymphadenopathy.  PULMONARY: Clear to auscultation bilaterally. No rales, ronchi, or wheezing.  CARDIOVASCULAR: Regular rate and rhythm, S1-S2, no murmurs  GASTROINTESTINAL: Soft, non-tender, non-distended, no guarding. Bowel sounds present in all 4 quadarants.  SKIN/EXTREMITIES: No clubbing or edema  NEUROLOGIC/MUSCULOSKELETAL: AOx4, cranial nerves II-XII grossly intact. no focal deficits    <<<<<LABS>>>>>                        10.8   4.20  )-----------( 213      ( 14 Sep 2020 06:38 )             33.9     09-14    139  |  101  |  10  ----------------------------<  103<H>  5.9<H>   |  22  |  0.8    Ca    8.8      14 Sep 2020 06:38  Mg     2.1     09-14    TPro  6.2  /  Alb  3.2<L>  /  TBili  0.4  /  DBili  x   /  AST  31  /  ALT  8   /  AlkPhos  74  09-14              Culture - Blood (collected 12 Sep 2020 16:00)  Source: .Blood Blood-Peripheral  Preliminary Report (14 Sep 2020 01:02):    No growth to date.        <<<<<RADIOLOGY>>>>>    ASSESSMENT & PLAN    Patient is a 81y old woman with PMH uterine cancer (s/p SHAAN) complicated by malignant ascites (with therapeutic paracentesis), CAD (s/p CABG), provoked PE (previously on Lovenox), HTN, and depression who presented with chest pain found to have PE.    1. Pulmonary embolism-provoked  -Patient with previously provoked PE discharged on Lovenox and switched to Xarelto day prior to presentation, however was non-compliant due to running out of lovenox  -CTA reveals acute on chronic PE  -Pulm and heme/onc consulted; intiated xarelto loading dose  -Xarelto 15mg BID for 21 days, followed by 20 QD   -LE doppler negative    2. Uterine adenocarcinoma complicated with malignant ascites  - S/P TAHBSO in 2016  - On weekly carbo/taxol 3:1, started 8/14/20   - Will hold off on chemo as per heme/onc due to possible PNA. Pending recs for inpatient vs. outpatient plan.    3. RLL opacity possible PNA  - Xray Chest 1 View- PORTABLE-Urgent (09.12.20 @ 11:10) New patchy right lower lobe opacity.  - No fever in the last 24 hours  - UA negative, blood cx negative  - continue levofloxacin, hold chemo as per heme/onc    4. CAD (s/p CABG)  -ASA 81    5. HTN  -Amlodipine 5mg QD  -Metoprolol 25mg QD    6. DLD  -Statin 40 QD    7. Anxiety  -Klonopin 0.5 QD PRN for anxiety    # Diet: DASH/TLC  # DVT Prophylaxis: xarelto  # GI Prophylaxis: Protonix  # Activity: IAT  # Dispo: Acute  # Code Status: Fullcode

## 2020-09-14 NOTE — PROGRESS NOTE ADULT - SUBJECTIVE AND OBJECTIVE BOX
Patient is a 81y old  Female who presents with a chief complaint of Chest pain. (13 Sep 2020 14:20)      Subjective: No fevers in the last 24 hrs   Her cough is better and she feels her pain in her chest is better as well       Vital Signs Last 24 Hrs  T(C): 36.2 (14 Sep 2020 05:40), Max: 37.8 (13 Sep 2020 13:27)  T(F): 97.2 (14 Sep 2020 05:40), Max: 100 (13 Sep 2020 13:27)  HR: 87 (14 Sep 2020 05:40) (76 - 87)  BP: 115/56 (14 Sep 2020 05:40) (96/55 - 115/56)  BP(mean): --  RR: 18 (14 Sep 2020 05:40) (18 - 18)  SpO2: 94% (13 Sep 2020 20:11) (94% - 94%)    PHYSICAL EXAM  General: adult in NAD  HEENT: clear oropharynx  Neck: supple  CV: normal S1/S2   Lungs: positive air movement b/l ant lungs  Abdomen: soft non-tender   Ext: no clubbing cyanosis or edema  Neuro: alert and oriented X 4, no focal deficits    MEDICATIONS  (STANDING):  amLODIPine   Tablet 5 milliGRAM(s) Oral daily  aspirin enteric coated 81 milliGRAM(s) Oral daily  atorvastatin 40 milliGRAM(s) Oral at bedtime  chlorhexidine 4% Liquid 1 Application(s) Topical <User Schedule>  influenza   Vaccine 0.5 milliLiter(s) IntraMuscular once  lactulose Syrup 20 Gram(s) Oral daily  levoFLOXacin IVPB      metoprolol succinate ER 25 milliGRAM(s) Oral daily  rivaroxaban 15 milliGRAM(s) Oral two times a day with meals    MEDICATIONS  (PRN):  acetaminophen   Tablet .. 650 milliGRAM(s) Oral every 6 hours PRN Temp greater or equal to 38C (100.4F), Mild Pain (1 - 3)  clonazePAM  Tablet 0.5 milliGRAM(s) Oral daily PRN anxiety      LABS:                          10.8   4.20  )-----------( 213      ( 14 Sep 2020 06:38 )             33.9         Mean Cell Volume : 82.1 fL  Mean Cell Hemoglobin : 26.2 pg  Mean Cell Hemoglobin Concentration : 31.9 g/dL  Auto Neutrophil # : 1.96 K/uL  Auto Lymphocyte # : 1.25 K/uL  Auto Monocyte # : 0.89 K/uL  Auto Eosinophil # : 0.06 K/uL  Auto Basophil # : 0.02 K/uL  Auto Neutrophil % : 46.6 %  Auto Lymphocyte % : 29.8 %  Auto Monocyte % : 21.2 %  Auto Eosinophil % : 1.4 %  Auto Basophil % : 0.5 %      Serial CBC's   @ 06:38  Hct-33.9 / Hgb-10.8 / Plat-213 / RBC-4.13 / WBC-4.20  Serial CBC's   @ 07:40  Hct-31.9 / Hgb-10.2 / Plat-194 / RBC-3.88 / WBC-5.97  Serial CBC's   @ 06:04  Hct-34.8 / Hgb-11.0 / Plat-175 / RBC-4.15 / WBC-7.99  Serial CBC's   @ 11:38  Hct-34.2 / Hgb-10.7 / Plat-201 / RBC-4.03 / WBC-6.64  Serial CBC's   @ 00:12  Hct-39.7 / Hgb-12.4 / Plat-227 / RBC-4.75 / WBC-6.88          139  |  101  |  10  ----------------------------<  103<H>  5.9<H>   |  22  |  0.8    Ca    8.8      14 Sep 2020 06:38  Mg     2.1         TPro  6.2  /  Alb  3.2<L>  /  TBili  0.4  /  DBili  x   /  AST  31  /  ALT  8   /  AlkPhos  74                        Urinalysis Basic - ( 12 Sep 2020 13:10 )    Color: Yellow / Appearance: Clear / S.019 / pH: x  Gluc: x / Ketone: Trace  / Bili: Negative / Urobili: 3 mg/dL   Blood: x / Protein: Trace / Nitrite: Negative   Leuk Esterase: Negative / RBC: 16 /HPF / WBC 1 /HPF   Sq Epi: x / Non Sq Epi: 2 /HPF / Bacteria: Negative          Culture - Blood (collected 12 Sep 2020 16:00)  Source: .Blood Blood-Peripheral  Preliminary Report (14 Sep 2020 01:02):    No growth to date.            BLOOD SMEAR INTERPRETATION:       RADIOLOGY & ADDITIONAL STUDIES:

## 2020-09-14 NOTE — PROGRESS NOTE ADULT - ASSESSMENT
Patient is a 81 year old F patient with past medical history of Uterine cancer s/p SHAAN, malignant ascites on therapeutic paracentesis, CAD s/p CABG, segmental pulmonary embolism on Lovenox recently switched to xarelto, HTN, depression, comes in with chest pain, found to have acute on chronic PE.     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 Her next appt for chemo outpt is on Sep 18th   .       Plan :  will hold off on chemo for now  c/w abx and monitor   Will need to discuss with attending about inpt vs outpt chemo plan

## 2020-09-14 NOTE — PROGRESS NOTE ADULT - SUBJECTIVE AND OBJECTIVE BOX
HOSPITALIST ATTENDING NOTE    BEKAH NELSON  81y Female  [id number]    INTERVAL HPI/OVERNIGHT EVENTS: low grade temps, started on abxs, mild pain    Vital Signs Last 24 Hrs  T(C): 36.2 (14 Sep 2020 05:40), Max: 37.8 (13 Sep 2020 13:27)  T(F): 97.2 (14 Sep 2020 05:40), Max: 100 (13 Sep 2020 13:27)  HR: 87 (14 Sep 2020 05:40) (76 - 87)  BP: 115/56 (14 Sep 2020 05:40) (96/55 - 115/56)  BP(mean): --  RR: 18 (14 Sep 2020 05:40) (18 - 18)  SpO2: 94% (13 Sep 2020 20:11) (94% - 94%)        PHYSICAL EXAM:  GENERAL: NAD, elderly   HEAD:  Atraumatic, Normocephalic  EYES: EOMI, PERRLA, conjunctiva and sclera clear  ENMT: No tonsillar erythema, exudates, or enlargement  NECK: Supple, No JVD, Normal thyroid  NERVOUS SYSTEM:  Alert & Oriented X3, Good concentration; Non-focal- Motor Strength 5/5 B/L upper and lower extremities;  CHEST/LUNG: Clear to ascultation bilaterally; No rales, rhonchi, wheezing,   HEART: Regular rate and rhythm; No murmurs, rubs, or gallops  ABDOMEN: Soft, Nontender, mild distended; Bowel sounds present  EXTREMITIES: No clubbing, cyanosis, or edema  PSYCH: No suicidal/homicial ideation/hallucinations    Consultant(s) Notes Reviewed:  [x ] YES  [ ] NO  Care Discussed with Consultants/Other Providers/ Housestaff [ x] YES  [ ] NO    LABS:                                           10.8   4.20  )-----------( 213      ( 14 Sep 2020 06:38 )             33.9   09-14    139  |  101  |  10  ----------------------------<  103<H>  5.9<H>   |  22  |  0.8    Ca    8.8      14 Sep 2020 06:38  Mg     2.1     09-14    TPro  6.2  /  Alb  3.2<L>  /  TBili  0.4  /  DBili  x   /  AST  31  /  ALT  8   /  AlkPhos  74  09-14          RADIOLOGY & ADDITIONAL TESTS:    Imaging or report Personally Reviewed:  [ ] YES  [ ] NO    Case discussed with resident    Care discussed with pt/family      HEALTH ISSUES - PROBLEM Dx:

## 2020-09-15 ENCOUNTER — TRANSCRIPTION ENCOUNTER (OUTPATIENT)
Age: 80
End: 2020-09-15

## 2020-09-15 VITALS
RESPIRATION RATE: 18 BRPM | TEMPERATURE: 98 F | DIASTOLIC BLOOD PRESSURE: 64 MMHG | HEART RATE: 84 BPM | SYSTOLIC BLOOD PRESSURE: 106 MMHG

## 2020-09-15 LAB
ANION GAP SERPL CALC-SCNC: 11 MMOL/L — SIGNIFICANT CHANGE UP (ref 7–14)
BASOPHILS # BLD AUTO: 0.02 K/UL — SIGNIFICANT CHANGE UP (ref 0–0.2)
BASOPHILS NFR BLD AUTO: 0.5 % — SIGNIFICANT CHANGE UP (ref 0–1)
BUN SERPL-MCNC: 13 MG/DL — SIGNIFICANT CHANGE UP (ref 10–20)
CALCIUM SERPL-MCNC: 8.6 MG/DL — SIGNIFICANT CHANGE UP (ref 8.5–10.1)
CHLORIDE SERPL-SCNC: 104 MMOL/L — SIGNIFICANT CHANGE UP (ref 98–110)
CO2 SERPL-SCNC: 27 MMOL/L — SIGNIFICANT CHANGE UP (ref 17–32)
CREAT SERPL-MCNC: 0.8 MG/DL — SIGNIFICANT CHANGE UP (ref 0.7–1.5)
EOSINOPHIL # BLD AUTO: 0.1 K/UL — SIGNIFICANT CHANGE UP (ref 0–0.7)
EOSINOPHIL NFR BLD AUTO: 2.4 % — SIGNIFICANT CHANGE UP (ref 0–8)
GLUCOSE SERPL-MCNC: 95 MG/DL — SIGNIFICANT CHANGE UP (ref 70–99)
HCT VFR BLD CALC: 34.2 % — LOW (ref 37–47)
HGB BLD-MCNC: 10.8 G/DL — LOW (ref 12–16)
IMM GRANULOCYTES NFR BLD AUTO: 0.7 % — HIGH (ref 0.1–0.3)
LYMPHOCYTES # BLD AUTO: 1.08 K/UL — LOW (ref 1.2–3.4)
LYMPHOCYTES # BLD AUTO: 25.9 % — SIGNIFICANT CHANGE UP (ref 20.5–51.1)
MAGNESIUM SERPL-MCNC: 2 MG/DL — SIGNIFICANT CHANGE UP (ref 1.8–2.4)
MCHC RBC-ENTMCNC: 26.3 PG — LOW (ref 27–31)
MCHC RBC-ENTMCNC: 31.6 G/DL — LOW (ref 32–37)
MCV RBC AUTO: 83.4 FL — SIGNIFICANT CHANGE UP (ref 81–99)
MONOCYTES # BLD AUTO: 1.1 K/UL — HIGH (ref 0.1–0.6)
MONOCYTES NFR BLD AUTO: 26.4 % — HIGH (ref 1.7–9.3)
NEUTROPHILS # BLD AUTO: 1.84 K/UL — SIGNIFICANT CHANGE UP (ref 1.4–6.5)
NEUTROPHILS NFR BLD AUTO: 44.1 % — SIGNIFICANT CHANGE UP (ref 42.2–75.2)
NRBC # BLD: 0 /100 WBCS — SIGNIFICANT CHANGE UP (ref 0–0)
PLATELET # BLD AUTO: 228 K/UL — SIGNIFICANT CHANGE UP (ref 130–400)
POTASSIUM SERPL-MCNC: 4.1 MMOL/L — SIGNIFICANT CHANGE UP (ref 3.5–5)
POTASSIUM SERPL-SCNC: 4.1 MMOL/L — SIGNIFICANT CHANGE UP (ref 3.5–5)
RBC # BLD: 4.1 M/UL — LOW (ref 4.2–5.4)
RBC # FLD: 17.8 % — HIGH (ref 11.5–14.5)
SODIUM SERPL-SCNC: 142 MMOL/L — SIGNIFICANT CHANGE UP (ref 135–146)
WBC # BLD: 4.17 K/UL — LOW (ref 4.8–10.8)
WBC # FLD AUTO: 4.17 K/UL — LOW (ref 4.8–10.8)

## 2020-09-15 PROCEDURE — 99239 HOSP IP/OBS DSCHRG MGMT >30: CPT

## 2020-09-15 RX ORDER — GUAIFENESIN/DEXTROMETHORPHAN 600MG-30MG
5 TABLET, EXTENDED RELEASE 12 HR ORAL ONCE
Refills: 0 | Status: COMPLETED | OUTPATIENT
Start: 2020-09-15 | End: 2020-09-15

## 2020-09-15 RX ORDER — RIVAROXABAN 15 MG-20MG
1 KIT ORAL
Qty: 0 | Refills: 0 | DISCHARGE
Start: 2020-09-15

## 2020-09-15 RX ORDER — RIVAROXABAN 15 MG-20MG
1 KIT ORAL
Qty: 60 | Refills: 0
Start: 2020-09-15 | End: 2020-10-14

## 2020-09-15 RX ORDER — CIPROFLOXACIN LACTATE 400MG/40ML
1 VIAL (ML) INTRAVENOUS
Qty: 5 | Refills: 0
Start: 2020-09-15 | End: 2020-09-19

## 2020-09-15 RX ORDER — GUAIFENESIN/DEXTROMETHORPHAN 600MG-30MG
10 TABLET, EXTENDED RELEASE 12 HR ORAL THREE TIMES A DAY
Refills: 0 | Status: DISCONTINUED | OUTPATIENT
Start: 2020-09-15 | End: 2020-09-15

## 2020-09-15 RX ADMIN — Medication 5 MILLILITER(S): at 06:07

## 2020-09-15 RX ADMIN — AMLODIPINE BESYLATE 5 MILLIGRAM(S): 2.5 TABLET ORAL at 05:09

## 2020-09-15 RX ADMIN — Medication 650 MILLIGRAM(S): at 05:11

## 2020-09-15 RX ADMIN — LACTULOSE 20 GRAM(S): 10 SOLUTION ORAL at 11:15

## 2020-09-15 RX ADMIN — RIVAROXABAN 15 MILLIGRAM(S): KIT at 08:08

## 2020-09-15 RX ADMIN — Medication 650 MILLIGRAM(S): at 06:15

## 2020-09-15 RX ADMIN — Medication 81 MILLIGRAM(S): at 11:15

## 2020-09-15 RX ADMIN — Medication 25 MILLIGRAM(S): at 05:09

## 2020-09-15 RX ADMIN — Medication 0.5 MILLIGRAM(S): at 08:08

## 2020-09-15 NOTE — DISCHARGE NOTE PROVIDER - NSDCMRMEDTOKEN_GEN_ALL_CORE_FT
amLODIPine 5 mg oral tablet: 1 tab(s) orally once a day  aspirin 81 mg oral delayed release tablet: 1 tab(s) orally once a day  metoprolol succinate 25 mg oral tablet, extended release: 1 tab(s) orally once a day  rivaroxaban 15 mg oral tablet: 1 tab(s) orally 2 times a day   rosuvastatin 10 mg oral tablet: 1 tab(s) orally once a day  Xarelto Starter Pack 15 mg-20 mg oral kit: 1 tab(s) orally 2 times a day   amLODIPine 5 mg oral tablet: 1 tab(s) orally once a day  aspirin 81 mg oral delayed release tablet: 1 tab(s) orally once a day  levoFLOXacin 750 mg oral tablet: 1 tab(s) orally once a day MDD:mg   metoprolol succinate 25 mg oral tablet, extended release: 1 tab(s) orally once a day  rosuvastatin 10 mg oral tablet: 1 tab(s) orally once a day  Xarelto Starter Pack 15 mg-20 mg oral kit: 1 tab(s) orally 2 times a day   amLODIPine 5 mg oral tablet: 1 tab(s) orally once a day  aspirin 81 mg oral delayed release tablet: 1 tab(s) orally once a day  levoFLOXacin 750 mg oral tablet: 1 tab(s) orally once a day MDD:mg   metoprolol succinate 25 mg oral tablet, extended release: 1 tab(s) orally once a day  rivaroxaban 15 mg oral tablet: 1 tab(s) orally 2 times a day (with meals)  rosuvastatin 10 mg oral tablet: 1 tab(s) orally once a day

## 2020-09-15 NOTE — DISCHARGE NOTE PROVIDER - HOSPITAL COURSE
81 year old F patient with past medical history of Uterine cancer s/p SHAAN, malignant ascites on therapeutic paracentesis, CAD s/p CABG, segmental pulmonary embolism on Lovenox recently switched to xarelto, HTN, depression, comes in with chest pain.  Pt complains of bilateral lower chest pain, located at 8th to 10th rib at mid axillary region since yesterday morning. Pt describes pain as sharp, 8/10, continuous, aggravated by inspiration and relieved only after taking pain medication in the ED.  Pt was recently admitted on 8/7/20 for abdominal pain and distention, was found to have malignant ascites and underwent multiple paracentesis, hospital course was complicated by pulmonary embolism. Pt was discharged on Lovenox which was switched changed to Xarelto yesterday.   Denies any SOB, cough, fever, n/v/d, or lower extremity swelling.  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 9L of fluid was removed via paracentesis. Pt started on carbo/taxol weekly 3 on 1 off regimen every Friday chemotherapy regimen by Dr. Gutierrez.         In the ED, Temp 99.2, /58, HR 88, RR 16 and saturating well on RA. CTA chest shows acute on chronic pulmonary embolism. New right lower lobar segmental branch opacification is seen, and other previously seen emboli have decreased or resolved. No CT evidence of right heart strain or saddle embolus. Pt received pain management in ED.     Patient was started on heparin drip and then switched to loading dose of xarelto. LE doppler was negative. Heme/onc was consulted for uterine adenocarcinoma and recommended chemotherapy. Patient was plan to start chemotherapy inpatient however    81 year old F patient with past medical history of Uterine cancer s/p SHAAN, malignant ascites on therapeutic paracentesis, CAD s/p CABG, segmental pulmonary embolism on Lovenox recently switched to xarelto, HTN, depression, comes in with chest pain.  Pt complains of bilateral lower chest pain, located at 8th to 10th rib at mid axillary region since yesterday morning. Pt describes pain as sharp, 8/10, continuous, aggravated by inspiration and relieved only after taking pain medication in the ED.  Pt was recently admitted on 8/7/20 for abdominal pain and distention, was found to have malignant ascites and underwent multiple paracentesis, hospital course was complicated by pulmonary embolism. Pt was discharged on Lovenox which was switched changed to Xarelto yesterday.   Denies any SOB, cough, fever, n/v/d, or lower extremity swelling.  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 9L of fluid was removed via paracentesis. Pt started on carbo/taxol weekly 3 on 1 off regimen every Friday chemotherapy regimen by Dr. Gutierrez.         In the ED, Temp 99.2, /58, HR 88, RR 16 and saturating well on RA. CTA chest shows acute on chronic pulmonary embolism. New right lower lobar segmental branch opacification is seen, and other previously seen emboli have decreased or resolved. No CT evidence of right heart strain or saddle embolus. Pt received pain management in ED.     Patient was started on heparin drip and then switched to loading dose of xarelto. LE doppler was negative. Heme/onc was consulted for uterine adenocarcinoma and recommended chemotherapy. Patient was plan to start chemotherapy inpatient however she developed a RLL opacity and was started on levofloxacin for possible PNA. Patient is stable for discharge and will receive her chemo outpatient.

## 2020-09-15 NOTE — CHART NOTE - NSCHARTNOTEFT_GEN_A_CORE
<<<RESIDENT DISCHARGE NOTE>>>     BEKAH NELSON  MRN-[id number]    VITAL SIGNS:  T(F): 98.2 (09-15-20 @ 13:29), Max: 99 (09-15-20 @ 05:30)  HR: 84 (09-15-20 @ 13:29)  BP: 106/64 (09-15-20 @ 13:29)  SpO2: 98% (09-15-20 @ 08:14)      PHYSICAL EXAMINATION:  GEN: NAD, Resting comfortably in bed  PULM: Clear to auscultation bilaterally, No wheezes  CVS: Regular rate and rhythm, S1-S2, no murmurs  ABD: Soft, non-tender, non-distended, no guarding  EXT: No edema  NEURO: AAOx3, no focal deficits    TEST RESULTS:                        10.8   4.17  )-----------( 228      ( 15 Sep 2020 07:07 )             34.2       09-15    142  |  104  |  13  ----------------------------<  95  4.1   |  27  |  0.8    Ca    8.6      15 Sep 2020 07:07  Mg     2.0     09-15    TPro  6.2  /  Alb  3.2<L>  /  TBili  0.4  /  DBili  x   /  AST  31  /  ALT  8   /  AlkPhos  74  09-14      Patient seen and evaluated at bedside. No overnight acute events. Patient is ready for discharge.    FINAL DISCHARGE INTERVIEW:  Resident(s) Present: Madelin Laura MD    DISPOSITION:   [ x ] Home,    [  ] Home with Visiting Nursing Services,   [    ]  SNF/ NH,    [   ] Acute Rehab (4A),   [   ] Other (Specify:_________)

## 2020-09-15 NOTE — DISCHARGE NOTE NURSING/CASE MANAGEMENT/SOCIAL WORK - PATIENT PORTAL LINK FT
You can access the FollowMyHealth Patient Portal offered by [facility] by registering at the following website: [email or web address]. By joining [employer]’s FollowMyHealth portal, you will also be able to view your health information using other applications (apps) compatible with our system.

## 2020-09-15 NOTE — DISCHARGE NOTE PROVIDER - PROVIDER TOKENS
PROVIDER:[TOKEN:[[id number]],FOLLOWUP:[1-3 days]],PROVIDER:[TOKEN:[[id number]],FOLLOWUP:[1 week]]

## 2020-09-15 NOTE — DISCHARGE NOTE PROVIDER - NSDCCPCAREPLAN_GEN_ALL_CORE_FT
PRINCIPAL DISCHARGE DIAGNOSIS  Diagnosis: Pulmonary emboli  Assessment and Plan of Treatment: Your symptoms of chest pain were due to the fact that you stopped taking your blood thinning medication for the clot in your lungs and you developed another clot in your lungs. It is important that you take this medication every day for the rest of your life due to your cancer and increased risk of developing these symptoms again. Please follow up with your primary care doctor in one week to see how you are doing.      SECONDARY DISCHARGE DIAGNOSES  Diagnosis: PNA (pneumonia)  Assessment and Plan of Treatment: You developed an infection in your lungs and were started on antibiotics. Make sure you finish all the antibiotics course. Follow up with your [facility] care doctor in one week to see if infection has improved.    Diagnosis: Uterine cancer  Assessment and Plan of Treatment: You were planned to receive chemotherapy for your cancer but due to the infection in your lungs, it was put on hold. You have an appointment with the oncologist on 9/18 at 10am for chemotherapy. Please make sure to follow up.

## 2020-09-15 NOTE — DISCHARGE NOTE PROVIDER - CARE PROVIDER_API CALL
Kayode Pryor  HEMATOLOGY  [address] [place] [id number]  Phone: [phone]  Fax: [phone]  Follow Up Time: 1-3 days    Ashley Figueroa  INTERNAL MEDICINE  [address], [address]  [place] [address]  Phone: [phone]  Fax: [phone]  Follow Up Time: 1 week

## 2020-09-17 RX ORDER — CLONAZEPAM 1 MG
0.5 TABLET ORAL
Qty: 15 | Refills: 0
Start: 2020-09-17 | End: 2020-10-16

## 2020-09-18 ENCOUNTER — LABORATORY RESULT (OUTPATIENT)
Age: 80
End: 2020-09-18

## 2020-09-18 ENCOUNTER — APPOINTMENT (OUTPATIENT)
Dept: INFUSION THERAPY | Facility: CLINIC | Age: 80
End: 2020-09-18

## 2020-09-18 DIAGNOSIS — K59.00 CONSTIPATION, UNSPECIFIED: ICD-10-CM

## 2020-09-18 DIAGNOSIS — J18.9 PNEUMONIA, UNSPECIFIED ORGANISM: ICD-10-CM

## 2020-09-18 DIAGNOSIS — I26.99 OTHER PULMONARY EMBOLISM WITHOUT ACUTE COR PULMONALE: ICD-10-CM

## 2020-09-18 DIAGNOSIS — E78.5 HYPERLIPIDEMIA, UNSPECIFIED: ICD-10-CM

## 2020-09-18 DIAGNOSIS — F41.9 ANXIETY DISORDER, UNSPECIFIED: ICD-10-CM

## 2020-09-18 DIAGNOSIS — Z95.1 PRESENCE OF AORTOCORONARY BYPASS GRAFT: ICD-10-CM

## 2020-09-18 DIAGNOSIS — Z90.710 ACQUIRED ABSENCE OF BOTH CERVIX AND UTERUS: ICD-10-CM

## 2020-09-18 DIAGNOSIS — Z79.82 LONG TERM (CURRENT) USE OF ASPIRIN: ICD-10-CM

## 2020-09-18 DIAGNOSIS — C78.6 SECONDARY MALIGNANT NEOPLASM OF RETROPERITONEUM AND PERITONEUM: ICD-10-CM

## 2020-09-18 DIAGNOSIS — F32.9 MAJOR DEPRESSIVE DISORDER, SINGLE EPISODE, UNSPECIFIED: ICD-10-CM

## 2020-09-18 DIAGNOSIS — I10 ESSENTIAL (PRIMARY) HYPERTENSION: ICD-10-CM

## 2020-09-18 DIAGNOSIS — C55 MALIGNANT NEOPLASM OF UTERUS, PART UNSPECIFIED: ICD-10-CM

## 2020-09-18 DIAGNOSIS — I25.10 ATHEROSCLEROTIC HEART DISEASE OF NATIVE CORONARY ARTERY WITHOUT ANGINA PECTORIS: ICD-10-CM

## 2020-09-18 LAB
CULTURE RESULTS: SIGNIFICANT CHANGE UP
SPECIMEN SOURCE: SIGNIFICANT CHANGE UP

## 2020-09-18 RX ORDER — DEXAMETHASONE 0.5 MG/5ML
12 ELIXIR ORAL ONCE
Refills: 0 | Status: COMPLETED | OUTPATIENT
Start: 2020-09-18 | End: 2020-09-18

## 2020-09-18 RX ORDER — DIPHENHYDRAMINE HCL 50 MG
25 CAPSULE ORAL ONCE
Refills: 0 | Status: COMPLETED | OUTPATIENT
Start: 2020-09-18 | End: 2020-09-18

## 2020-09-18 RX ORDER — FOSAPREPITANT DIMEGLUMINE 150 MG/5ML
150 INJECTION, POWDER, LYOPHILIZED, FOR SOLUTION INTRAVENOUS ONCE
Refills: 0 | Status: COMPLETED | OUTPATIENT
Start: 2020-09-18 | End: 2020-09-18

## 2020-09-18 RX ORDER — CARBOPLATIN 50 MG
185 VIAL (EA) INTRAVENOUS ONCE
Refills: 0 | Status: COMPLETED | OUTPATIENT
Start: 2020-09-18 | End: 2020-09-18

## 2020-09-18 RX ORDER — FAMOTIDINE 10 MG/ML
20 INJECTION INTRAVENOUS ONCE
Refills: 0 | Status: COMPLETED | OUTPATIENT
Start: 2020-09-18 | End: 2020-09-18

## 2020-09-18 RX ORDER — PACLITAXEL 6 MG/ML
100 INJECTION, SOLUTION, CONCENTRATE INTRAVENOUS ONCE
Refills: 0 | Status: COMPLETED | OUTPATIENT
Start: 2020-09-18 | End: 2020-09-18

## 2020-09-18 RX ORDER — ACETAMINOPHEN 500 MG
650 TABLET ORAL ONCE
Refills: 0 | Status: COMPLETED | OUTPATIENT
Start: 2020-09-18 | End: 2020-09-18

## 2020-09-18 RX ADMIN — Medication 101 MILLIGRAM(S): at 11:22

## 2020-09-18 RX ADMIN — Medication 650 MILLIGRAM(S): at 11:26

## 2020-09-18 RX ADMIN — Medication 268.5 MILLIGRAM(S): at 12:16

## 2020-09-18 RX ADMIN — Medication 650 MILLIGRAM(S): at 11:25

## 2020-09-18 RX ADMIN — PACLITAXEL 266.67 MILLIGRAM(S): 6 INJECTION, SOLUTION, CONCENTRATE INTRAVENOUS at 12:15

## 2020-09-18 RX ADMIN — FOSAPREPITANT DIMEGLUMINE 300 MILLIGRAM(S): 150 INJECTION, POWDER, LYOPHILIZED, FOR SOLUTION INTRAVENOUS at 11:22

## 2020-09-18 RX ADMIN — FAMOTIDINE 104 MILLIGRAM(S): 10 INJECTION INTRAVENOUS at 11:22

## 2020-09-18 RX ADMIN — Medication 122 MILLIGRAM(S): at 11:22

## 2020-09-23 LAB
HCT VFR BLD CALC: 33 %
HGB BLD-MCNC: 10.8 G/DL
MCHC RBC-ENTMCNC: 26.5 PG
MCHC RBC-ENTMCNC: 32.7 G/DL
MCV RBC AUTO: 81.1 FL
PLATELET # BLD AUTO: 256 K/UL
PMV BLD: 9.8 FL
RBC # BLD: 4.07 M/UL
RBC # FLD: 17.9 %
WBC # FLD AUTO: 6.13 K/UL

## 2020-09-25 ENCOUNTER — APPOINTMENT (OUTPATIENT)
Dept: INFUSION THERAPY | Facility: CLINIC | Age: 80
End: 2020-09-25

## 2020-09-25 ENCOUNTER — LABORATORY RESULT (OUTPATIENT)
Age: 80
End: 2020-09-25

## 2020-09-25 LAB
HCT VFR BLD CALC: 34.8 %
HGB BLD-MCNC: 11.2 G/DL
MCHC RBC-ENTMCNC: 26.2 PG
MCHC RBC-ENTMCNC: 32.2 G/DL
MCV RBC AUTO: 81.3 FL
PLATELET # BLD AUTO: 259 K/UL
PMV BLD: 9.7 FL
RBC # BLD: 4.28 M/UL
RBC # FLD: 18.4 %
WBC # FLD AUTO: 5.99 K/UL

## 2020-09-25 RX ORDER — FOSAPREPITANT DIMEGLUMINE 150 MG/5ML
150 INJECTION, POWDER, LYOPHILIZED, FOR SOLUTION INTRAVENOUS ONCE
Refills: 0 | Status: COMPLETED | OUTPATIENT
Start: 2020-09-25 | End: 2020-09-25

## 2020-09-25 RX ORDER — FAMOTIDINE 10 MG/ML
20 INJECTION INTRAVENOUS ONCE
Refills: 0 | Status: COMPLETED | OUTPATIENT
Start: 2020-09-25 | End: 2020-09-25

## 2020-09-25 RX ORDER — DIPHENHYDRAMINE HCL 50 MG
25 CAPSULE ORAL ONCE
Refills: 0 | Status: COMPLETED | OUTPATIENT
Start: 2020-09-25 | End: 2020-09-25

## 2020-09-25 RX ORDER — ACETAMINOPHEN 500 MG
650 TABLET ORAL ONCE
Refills: 0 | Status: COMPLETED | OUTPATIENT
Start: 2020-09-25 | End: 2020-09-25

## 2020-09-25 RX ORDER — PACLITAXEL 6 MG/ML
100 INJECTION, SOLUTION, CONCENTRATE INTRAVENOUS ONCE
Refills: 0 | Status: COMPLETED | OUTPATIENT
Start: 2020-09-25 | End: 2020-09-25

## 2020-09-25 RX ORDER — DEXAMETHASONE 0.5 MG/5ML
12 ELIXIR ORAL ONCE
Refills: 0 | Status: COMPLETED | OUTPATIENT
Start: 2020-09-25 | End: 2020-09-25

## 2020-09-25 RX ORDER — CARBOPLATIN 50 MG
185 VIAL (EA) INTRAVENOUS ONCE
Refills: 0 | Status: COMPLETED | OUTPATIENT
Start: 2020-09-25 | End: 2020-09-25

## 2020-09-25 RX ADMIN — FOSAPREPITANT DIMEGLUMINE 300 MILLIGRAM(S): 150 INJECTION, POWDER, LYOPHILIZED, FOR SOLUTION INTRAVENOUS at 10:51

## 2020-09-25 RX ADMIN — PACLITAXEL 266.67 MILLIGRAM(S): 6 INJECTION, SOLUTION, CONCENTRATE INTRAVENOUS at 12:30

## 2020-09-25 RX ADMIN — FOSAPREPITANT DIMEGLUMINE 150 MILLIGRAM(S): 150 INJECTION, POWDER, LYOPHILIZED, FOR SOLUTION INTRAVENOUS at 12:20

## 2020-09-25 RX ADMIN — Medication 25 MILLIGRAM(S): at 11:52

## 2020-09-25 RX ADMIN — Medication 12 MILLIGRAM(S): at 10:12

## 2020-09-25 RX ADMIN — Medication 101 MILLIGRAM(S): at 10:51

## 2020-09-25 RX ADMIN — FAMOTIDINE 20 MILLIGRAM(S): 10 INJECTION INTRAVENOUS at 11:32

## 2020-09-25 RX ADMIN — Medication 650 MILLIGRAM(S): at 10:51

## 2020-09-25 RX ADMIN — FAMOTIDINE 104 MILLIGRAM(S): 10 INJECTION INTRAVENOUS at 10:51

## 2020-09-25 RX ADMIN — Medication 268.5 MILLIGRAM(S): at 11:35

## 2020-09-25 RX ADMIN — Medication 122 MILLIGRAM(S): at 10:51

## 2020-09-28 NOTE — END OF VISIT
[FreeTextEntry3] : Patient was seen seen examined with NP Annie, agree with above plan of care.\par

## 2020-09-28 NOTE — REVIEW OF SYSTEMS
[Fatigue] : fatigue [Recent Change In Weight] : ~T recent weight change [SOB on Exertion] : shortness of breath during exertion [Negative] : Allergic/Immunologic [Fever] : no fever [Chills] : no chills [Night Sweats] : no night sweats [Shortness Of Breath] : no shortness of breath [Wheezing] : no wheezing [Cough] : no cough [Abdominal Pain] : no abdominal pain [Vomiting] : no vomiting [Constipation] : no constipation [Diarrhea] : no diarrhea [Confused] : no confusion [Dizziness] : no dizziness [Fainting] : no fainting [Difficulty Walking] : no difficulty walking [FreeTextEntry7] : early satiety [de-identified] : minor preexisting neuropathy

## 2020-09-28 NOTE — HISTORY OF PRESENT ILLNESS
[de-identified] : Trav is a shekhar 81 year old lady I initially met in the hospital on 8/11/2020. Her past medical history is significant for uterine cancer s/p SHAAN BSO as per patient s/p adjuvant chemotherapy in 2016, CAD s/p CABG, HTN and depression, she presented to the ED for abdominal pain and chest discomfort. 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 \par \par PAST MEDICAL & SURGICAL HISTORY:\par HLD (hyperlipidemia)\par HTN (hypertension)\par CAD (coronary artery disease)\par History of coronary artery bypass surgery\par \par FAMILY HISTORY:\par No pertinent family history in first degree relatives\par \par Allergies\par chloroquine (Hives)\par \par CTA C/A/P on 8/6/2020 revealed \par 1. Intraluminal filling defect is seen within the segmental branches of the right lower lobe pulmonary artery, indicating acute pulmonary embolism. (6/132-138; 10/51-55). Filling defect in segmental branch of the right upper lobe pulmonary artery. (6/77) There is a small filling defect within a left lower lobe pulmonary artery segmental branch (6/147; 10/59). No evidence of right heart strain. The right ventricle measures 3.5 cm; the left ventricle measures 3.7 cm. (RV:LV<1) \par 2. Compared with the previous CT scan of 5/27/2020, there is now a large amount of ascites throughout the abdomen and pelvis. \par \par LE Doppler on 8/8/2020 revealed \par No evidence of deep venous thrombosis or superficial thrombophlebitis in the bilateral lower extremities. \par Left popliteal fossa fluid collection as measured above likely Baker's cyst\par \par During her admission she has required 3 episodes of paracentesis on 8/7 3L, 8/12 4L, 8/13 2.2L, once pathology became available patient was started on weekly Carbo/Taxol she has tolerated chemotherapy well.  [de-identified] : 8/18/2020: She returns today for follow up, she reports improved SOB, improved abdominal swelling, still come positional chest discomfort, she is compliant with BID Lovenox, she remains on the ASA. She reports improved appetite, she reports that appetite is decreased with a large amount of fluid on the abdomen. She is due for C1D8 of Carbo/Taxol on 8/21/2020. CBC was reviewed. \par Side effects of chemotherapy including, but not limited to, cytopenias, that may require blood product transfusions and lead to increased risk of infection, requiring hospitalization, allergic reactions, that can rarely be life-threatening, skin reactions, nausea/vomiting, mucositis, diarrhea/constipation were discussed at length, patient and family voice undestaging, all questions were answered to patient's satisfaction.\par Images were personally reviewed by MD Matt and discussed with patient.\par \par 9/10/2020: Patient is 80 years old female came  today for follow up for  Recurrent adenocarcinoma Mullerian origin, malignant ascites diagnosed on 8/8/2020, she S/P one cycle of weekly Carbo/taxol she tolerated well. She  reports improved SOB, improved abdominal swelling, she is ran out of Lovenox, which she is supposed to be taking BID, she didn't call for the renewal, she remains on the ASA. She  has been without Lovenox for like 7 days now, we will restart her on AC immediately. Xarelto was prescribed. She was educated again that she will need to remain on long-term anticoagulation. She reports good  appetite  She is due to start the new cycle of chemo on 9/11/20.\par PET Scan reviewed from 9/8/20 which revealed\par Mild diffuse FDG uptake throughout the mesentery along with peritoneal stranding and nodularity compatible with peritoneal carcinomatosis (SUV up to 3.9). \par No additional sites of abnormal FDG uptake to suggest biologic tumor activity.\par Images were personally reviewed by MD Matt and discussed with patient. \par

## 2020-09-28 NOTE — ASSESSMENT
[FreeTextEntry1] : Recurrent adenocarcinoma Mullerian origin, malignant ascites diagnosed on 8/8/2020\par --Prior SHAAN BSO in 2016, adjuvant chemotherapy, obtain records from Dr. Abdullahi \par --Started on weekly carbo/taxol weekly 3 on 1 off on 8/14/2020 \par --PET CT reviewed from 9/8/20. \par --Lab work today with Stable blood count  from 9/10/20.\par \par Malignant ascites required high volume paracentesis \par --May require paracentesis PRN as out patient  \par \par PE in the setting of malignancy diagnosed on 8/6/2020\par --Doppler was negative for DVT on 8/8/2020\par --On BID Lovenox, but ran out on 9/1/2020\par --Also on ASA, h/o CABG . \par --Start on Xarelto with starter pack immediately \par \par Follow up in 3 weeks or earlier if needed.\par Patient seen and examined by Dr Gutierrez who agreed for the above plan of care.

## 2020-09-28 NOTE — PHYSICAL EXAM
[Ambulatory and capable of all self care but unable to carry out any work activities] : Status 2- Ambulatory and capable of all self care but unable to carry out any work activities. Up and about more than 50% of waking hours [Normal] : affect appropriate [de-identified] : mildly distended overall improved compared to prior, non-tender

## 2020-10-02 ENCOUNTER — APPOINTMENT (OUTPATIENT)
Dept: INFUSION THERAPY | Facility: CLINIC | Age: 80
End: 2020-10-02

## 2020-10-02 ENCOUNTER — LABORATORY RESULT (OUTPATIENT)
Age: 80
End: 2020-10-02

## 2020-10-02 RX ORDER — PACLITAXEL 6 MG/ML
100 INJECTION, SOLUTION, CONCENTRATE INTRAVENOUS ONCE
Refills: 0 | Status: COMPLETED | OUTPATIENT
Start: 2020-10-02 | End: 2020-10-02

## 2020-10-02 RX ORDER — CARBOPLATIN 50 MG
185 VIAL (EA) INTRAVENOUS ONCE
Refills: 0 | Status: COMPLETED | OUTPATIENT
Start: 2020-10-02 | End: 2020-10-02

## 2020-10-02 RX ORDER — DEXAMETHASONE 0.5 MG/5ML
12 ELIXIR ORAL ONCE
Refills: 0 | Status: COMPLETED | OUTPATIENT
Start: 2020-10-02 | End: 2020-10-02

## 2020-10-02 RX ORDER — FOSAPREPITANT DIMEGLUMINE 150 MG/5ML
150 INJECTION, POWDER, LYOPHILIZED, FOR SOLUTION INTRAVENOUS ONCE
Refills: 0 | Status: COMPLETED | OUTPATIENT
Start: 2020-10-02 | End: 2020-10-02

## 2020-10-02 RX ORDER — FAMOTIDINE 10 MG/ML
20 INJECTION INTRAVENOUS ONCE
Refills: 0 | Status: COMPLETED | OUTPATIENT
Start: 2020-10-02 | End: 2020-10-02

## 2020-10-02 RX ORDER — ACETAMINOPHEN 500 MG
650 TABLET ORAL ONCE
Refills: 0 | Status: COMPLETED | OUTPATIENT
Start: 2020-10-02 | End: 2020-10-02

## 2020-10-02 RX ORDER — DIPHENHYDRAMINE HCL 50 MG
25 CAPSULE ORAL ONCE
Refills: 0 | Status: COMPLETED | OUTPATIENT
Start: 2020-10-02 | End: 2020-10-02

## 2020-10-02 RX ADMIN — Medication 101 MILLIGRAM(S): at 11:15

## 2020-10-02 RX ADMIN — FAMOTIDINE 104 MILLIGRAM(S): 10 INJECTION INTRAVENOUS at 10:55

## 2020-10-02 RX ADMIN — Medication 268.5 MILLIGRAM(S): at 12:20

## 2020-10-02 RX ADMIN — FOSAPREPITANT DIMEGLUMINE 300 MILLIGRAM(S): 150 INJECTION, POWDER, LYOPHILIZED, FOR SOLUTION INTRAVENOUS at 11:35

## 2020-10-02 RX ADMIN — FAMOTIDINE 20 MILLIGRAM(S): 10 INJECTION INTRAVENOUS at 11:15

## 2020-10-02 RX ADMIN — Medication 25 MILLIGRAM(S): at 11:35

## 2020-10-02 RX ADMIN — FOSAPREPITANT DIMEGLUMINE 150 MILLIGRAM(S): 150 INJECTION, POWDER, LYOPHILIZED, FOR SOLUTION INTRAVENOUS at 11:55

## 2020-10-02 RX ADMIN — Medication 12 MILLIGRAM(S): at 10:55

## 2020-10-02 RX ADMIN — PACLITAXEL 266.67 MILLIGRAM(S): 6 INJECTION, SOLUTION, CONCENTRATE INTRAVENOUS at 12:20

## 2020-10-02 RX ADMIN — Medication 122 MILLIGRAM(S): at 10:35

## 2020-10-02 RX ADMIN — Medication 650 MILLIGRAM(S): at 10:35

## 2020-10-06 LAB
HCT VFR BLD CALC: 31.5 %
HGB BLD-MCNC: 10.4 G/DL
MCHC RBC-ENTMCNC: 27.1 PG
MCHC RBC-ENTMCNC: 33 G/DL
MCV RBC AUTO: 82 FL
PLATELET # BLD AUTO: 187 K/UL
PMV BLD: 10.5 FL
RBC # BLD: 3.84 M/UL
RBC # FLD: 19.6 %
WBC # FLD AUTO: 3.09 K/UL

## 2020-10-07 RX ORDER — CLONAZEPAM 1 MG
1 TABLET ORAL
Qty: 0 | Refills: 0 | DISCHARGE

## 2020-10-07 RX ORDER — RIVAROXABAN 15 MG-20MG
1 KIT ORAL
Qty: 0 | Refills: 0 | DISCHARGE

## 2020-10-11 ENCOUNTER — EMERGENCY (EMERGENCY)
Facility: HOSPITAL | Age: 80
LOS: 0 days | Discharge: HOME | End: 2020-10-12
Attending: STUDENT IN AN ORGANIZED HEALTH CARE EDUCATION/TRAINING PROGRAM | Admitting: STUDENT IN AN ORGANIZED HEALTH CARE EDUCATION/TRAINING PROGRAM
Payer: MEDICARE

## 2020-10-11 VITALS
DIASTOLIC BLOOD PRESSURE: 74 MMHG | HEART RATE: 92 BPM | RESPIRATION RATE: 17 BRPM | TEMPERATURE: 99 F | SYSTOLIC BLOOD PRESSURE: 135 MMHG | OXYGEN SATURATION: 99 % | HEIGHT: 60.3 IN

## 2020-10-11 DIAGNOSIS — Z98.890 OTHER SPECIFIED POSTPROCEDURAL STATES: Chronic | ICD-10-CM

## 2020-10-11 DIAGNOSIS — I25.701 ATHEROSCLEROSIS OF CORONARY ARTERY BYPASS GRAFT(S), UNSPECIFIED, WITH ANGINA PECTORIS WITH DOCUMENTED SPASM: Chronic | ICD-10-CM

## 2020-10-11 DIAGNOSIS — E78.00 PURE HYPERCHOLESTEROLEMIA, UNSPECIFIED: ICD-10-CM

## 2020-10-11 DIAGNOSIS — R07.9 CHEST PAIN, UNSPECIFIED: ICD-10-CM

## 2020-10-11 DIAGNOSIS — Z88.8 ALLERGY STATUS TO OTHER DRUGS, MEDICAMENTS AND BIOLOGICAL SUBSTANCES: ICD-10-CM

## 2020-10-11 DIAGNOSIS — Z95.1 PRESENCE OF AORTOCORONARY BYPASS GRAFT: Chronic | ICD-10-CM

## 2020-10-11 DIAGNOSIS — Z85.038 PERSONAL HISTORY OF OTHER MALIGNANT NEOPLASM OF LARGE INTESTINE: ICD-10-CM

## 2020-10-11 DIAGNOSIS — I10 ESSENTIAL (PRIMARY) HYPERTENSION: ICD-10-CM

## 2020-10-11 DIAGNOSIS — R07.89 OTHER CHEST PAIN: ICD-10-CM

## 2020-10-11 DIAGNOSIS — Z95.1 PRESENCE OF AORTOCORONARY BYPASS GRAFT: ICD-10-CM

## 2020-10-11 DIAGNOSIS — E78.5 HYPERLIPIDEMIA, UNSPECIFIED: ICD-10-CM

## 2020-10-11 DIAGNOSIS — I25.10 ATHEROSCLEROTIC HEART DISEASE OF NATIVE CORONARY ARTERY WITHOUT ANGINA PECTORIS: ICD-10-CM

## 2020-10-11 LAB
ALBUMIN SERPL ELPH-MCNC: 4.1 G/DL — SIGNIFICANT CHANGE UP (ref 3.5–5.2)
ALP SERPL-CCNC: 68 U/L — SIGNIFICANT CHANGE UP (ref 30–115)
ALT FLD-CCNC: 9 U/L — SIGNIFICANT CHANGE UP (ref 0–41)
ANION GAP SERPL CALC-SCNC: 10 MMOL/L — SIGNIFICANT CHANGE UP (ref 7–14)
APTT BLD: 32.3 SEC — SIGNIFICANT CHANGE UP (ref 27–39.2)
AST SERPL-CCNC: 33 U/L — SIGNIFICANT CHANGE UP (ref 0–41)
BASOPHILS # BLD AUTO: 0.01 K/UL — SIGNIFICANT CHANGE UP (ref 0–0.2)
BASOPHILS NFR BLD AUTO: 0.3 % — SIGNIFICANT CHANGE UP (ref 0–1)
BILIRUB SERPL-MCNC: <0.2 MG/DL — SIGNIFICANT CHANGE UP (ref 0.2–1.2)
BUN SERPL-MCNC: 16 MG/DL — SIGNIFICANT CHANGE UP (ref 10–20)
CALCIUM SERPL-MCNC: 8.2 MG/DL — LOW (ref 8.5–10.1)
CHLORIDE SERPL-SCNC: 106 MMOL/L — SIGNIFICANT CHANGE UP (ref 98–110)
CO2 SERPL-SCNC: 25 MMOL/L — SIGNIFICANT CHANGE UP (ref 17–32)
CREAT SERPL-MCNC: 0.8 MG/DL — SIGNIFICANT CHANGE UP (ref 0.7–1.5)
EOSINOPHIL # BLD AUTO: 0.03 K/UL — SIGNIFICANT CHANGE UP (ref 0–0.7)
EOSINOPHIL NFR BLD AUTO: 1 % — SIGNIFICANT CHANGE UP (ref 0–8)
GAS PNL BLDV: SIGNIFICANT CHANGE UP
GLUCOSE SERPL-MCNC: 118 MG/DL — HIGH (ref 70–99)
HCT VFR BLD CALC: 35.6 % — LOW (ref 37–47)
HGB BLD-MCNC: 11 G/DL — LOW (ref 12–16)
IMM GRANULOCYTES NFR BLD AUTO: 0.3 % — SIGNIFICANT CHANGE UP (ref 0.1–0.3)
INR BLD: 1.38 RATIO — HIGH (ref 0.65–1.3)
LYMPHOCYTES # BLD AUTO: 1.25 K/UL — SIGNIFICANT CHANGE UP (ref 1.2–3.4)
LYMPHOCYTES # BLD AUTO: 40.3 % — SIGNIFICANT CHANGE UP (ref 20.5–51.1)
MCHC RBC-ENTMCNC: 26.8 PG — LOW (ref 27–31)
MCHC RBC-ENTMCNC: 30.9 G/DL — LOW (ref 32–37)
MCV RBC AUTO: 86.8 FL — SIGNIFICANT CHANGE UP (ref 81–99)
MONOCYTES # BLD AUTO: 0.52 K/UL — SIGNIFICANT CHANGE UP (ref 0.1–0.6)
MONOCYTES NFR BLD AUTO: 16.8 % — HIGH (ref 1.7–9.3)
NEUTROPHILS # BLD AUTO: 1.28 K/UL — LOW (ref 1.4–6.5)
NEUTROPHILS NFR BLD AUTO: 41.3 % — LOW (ref 42.2–75.2)
NRBC # BLD: 0 /100 WBCS — SIGNIFICANT CHANGE UP (ref 0–0)
NT-PROBNP SERPL-SCNC: 56 PG/ML — SIGNIFICANT CHANGE UP (ref 0–300)
PLATELET # BLD AUTO: 207 K/UL — SIGNIFICANT CHANGE UP (ref 130–400)
POTASSIUM SERPL-MCNC: 5.5 MMOL/L — HIGH (ref 3.5–5)
POTASSIUM SERPL-SCNC: 5.5 MMOL/L — HIGH (ref 3.5–5)
PROT SERPL-MCNC: 6.5 G/DL — SIGNIFICANT CHANGE UP (ref 6–8)
PROTHROM AB SERPL-ACNC: 15.9 SEC — HIGH (ref 9.95–12.87)
RBC # BLD: 4.1 M/UL — LOW (ref 4.2–5.4)
RBC # FLD: 21.6 % — HIGH (ref 11.5–14.5)
SODIUM SERPL-SCNC: 141 MMOL/L — SIGNIFICANT CHANGE UP (ref 135–146)
TROPONIN T SERPL-MCNC: <0.01 NG/ML — SIGNIFICANT CHANGE UP
WBC # BLD: 3.1 K/UL — LOW (ref 4.8–10.8)
WBC # FLD AUTO: 3.1 K/UL — LOW (ref 4.8–10.8)

## 2020-10-11 PROCEDURE — 99285 EMERGENCY DEPT VISIT HI MDM: CPT

## 2020-10-11 PROCEDURE — 71045 X-RAY EXAM CHEST 1 VIEW: CPT | Mod: 26

## 2020-10-11 PROCEDURE — 93010 ELECTROCARDIOGRAM REPORT: CPT

## 2020-10-11 PROCEDURE — 71260 CT THORAX DX C+: CPT | Mod: 26

## 2020-10-11 RX ADMIN — Medication 0.5 MILLIGRAM(S): at 21:27

## 2020-10-11 NOTE — ED ADULT NURSE NOTE - PSH
Athscl CABG, unsp, w angina pectoris w documented spasm    H/O total hysterectomy    History of coronary artery bypass surgery

## 2020-10-11 NOTE — ED PROVIDER NOTE - CLINICAL SUMMARY MEDICAL DECISION MAKING FREE TEXT BOX
Anesthesia confirms case reviewed for anesthesia risk alert.
80 yr old f who presents with chest pain. pt had a negative stress in 2/20 and recently diagnosed with PE, on anticoagulation. ct, labs, cxr obtained. ct showed signs of improvement of pe. pt was told about findings. pt will be discharged with cardiology and pcp follow up. pt given strict return precautions.

## 2020-10-11 NOTE — ED PROVIDER NOTE - PATIENT PORTAL LINK FT
You can access the FollowMyHealth Patient Portal offered by [facility] by registering at the following website: [email or web address]. By joining [employer]’s FollowMyHealth portal, you will also be able to view your health information using other applications (apps) compatible with our system.

## 2020-10-11 NOTE — ED PROVIDER NOTE - ATTENDING CONTRIBUTION TO CARE
80 yr old f w/ a pmh significant for colon cancer (last chemo a week ago), pe/dvt (on eliquis) who presents today with chest pain. Pt states that the pain started yesterday, however, pt states that the pain is similar to the pain she developed when she was diagnosed with a pe. Pt denies any fevers, chills, nausea, vomiting, or any other complaints.     Review of Systems    Constitutional: (-) fever  Cardiovascular: (+) chest pain, (-) syncope  Respiratory: (-) cough, (-) shortness of breath  Gastrointestinal: (-) vomiting, (-) diarrhea, (-) abdominal pain  Musculoskeletal: (-) neck pain, (-) back pain, (-) joint pain  Integumentary: (-) rash, (-) edema  Neurological: (-) headache, (-) altered mental status    Except as documented in the HPI, all other systems are negative.    VITAL SIGNS: I have reviewed nursing notes and confirm.  CONSTITUTIONAL: non-toxic, well appearing  SKIN: no rash, no petechiae.  EYES: PERRL, EOMI, pink conjunctiva, anicteric  ENT: tongue midline, no exudates, MMM  NECK: Supple; no meningismus, no JVD  CARD: RRR, no murmurs, equal radial pulses bilaterally 2+  RESP: CTAB, no respiratory distress  ABD: Soft, non-tender, non-distended, no peritoneal signs, no HSM, no CVA tenderness  EXT: Normal ROM x4. No edema. No calves tenderness  NEURO: Alert, oriented. CN2-12 intact, equal strength bilaterally, nl gait.  PSYCH: Cooperative, appropriate.    a/p  80 yr old f that presents today with chest pain, concern for PE   -labs  -imaging  -pain management  -dispo as per above

## 2020-10-11 NOTE — ED ADULT NURSE NOTE - PMH
CAD (coronary artery disease)    Cataract of right eye    Coronary artery disease  s/p CABG X2  Glaucoma    High cholesterol    HLD (hyperlipidemia)    HTN (hypertension)    Hypertension    Uterine cancer  s/p SHAAN and chemo

## 2020-10-11 NOTE — ED PROVIDER NOTE - OBJECTIVE STATEMENT
80 year old female with pmhx of cad, PE 80 year old female with pmhx of cad, dvt/pe on eliquis, colon ca , on chemotherapy (last chemo 1 week ago), presents with left sided chest pain x 1 day. pt admits has had this pain intermittently since diagnosed with PE over a month ago. pt denies sob, fever, chills, calf pain or swelling, abd pain, or nausea, vomiting, diarrhea.

## 2020-10-11 NOTE — ED ADULT NURSE NOTE - OBJECTIVE STATEMENT
Pt. complains of chest pain for one day. Pt. states that she has a history of blood clots, so she was worried and that why she came in

## 2020-10-12 PROBLEM — I25.10 ATHEROSCLEROTIC HEART DISEASE OF NATIVE CORONARY ARTERY WITHOUT ANGINA PECTORIS: Chronic | Status: ACTIVE | Noted: 2020-02-04

## 2020-10-12 PROBLEM — E78.5 HYPERLIPIDEMIA, UNSPECIFIED: Chronic | Status: ACTIVE | Noted: 2020-02-04

## 2020-10-12 PROBLEM — I10 ESSENTIAL (PRIMARY) HYPERTENSION: Chronic | Status: ACTIVE | Noted: 2020-02-04

## 2020-10-12 LAB — TROPONIN T SERPL-MCNC: <0.01 NG/ML — SIGNIFICANT CHANGE UP

## 2020-10-15 ENCOUNTER — LABORATORY RESULT (OUTPATIENT)
Age: 80
End: 2020-10-15

## 2020-10-15 ENCOUNTER — APPOINTMENT (OUTPATIENT)
Dept: HEMATOLOGY ONCOLOGY | Facility: CLINIC | Age: 80
End: 2020-10-15
Payer: MEDICARE

## 2020-10-15 VITALS
WEIGHT: 143 LBS | BODY MASS INDEX: 24.41 KG/M2 | SYSTOLIC BLOOD PRESSURE: 108 MMHG | HEART RATE: 70 BPM | DIASTOLIC BLOOD PRESSURE: 67 MMHG | RESPIRATION RATE: 14 BRPM | TEMPERATURE: 99.2 F | HEIGHT: 64 IN

## 2020-10-15 DIAGNOSIS — K59.00 CONSTIPATION, UNSPECIFIED: ICD-10-CM

## 2020-10-15 PROCEDURE — 99213 OFFICE O/P EST LOW 20 MIN: CPT

## 2020-10-15 RX ORDER — SENNOSIDES 8.6 MG/1
8.6 CAPSULE, GELATIN COATED ORAL
Qty: 30 | Refills: 4 | Status: ACTIVE | COMMUNITY
Start: 2020-10-15 | End: 1900-01-01

## 2020-10-15 RX ORDER — DOCUSATE SODIUM 100 MG/1
100 CAPSULE ORAL
Qty: 60 | Refills: 4 | Status: ACTIVE | COMMUNITY
Start: 2020-10-15 | End: 1900-01-01

## 2020-10-16 ENCOUNTER — APPOINTMENT (OUTPATIENT)
Dept: INFUSION THERAPY | Facility: CLINIC | Age: 80
End: 2020-10-16

## 2020-10-16 LAB
HCT VFR BLD CALC: 32.5 %
HGB BLD-MCNC: 10.6 G/DL
MCHC RBC-ENTMCNC: 27.6 PG
MCHC RBC-ENTMCNC: 32.6 G/DL
MCV RBC AUTO: 84.6 FL
PLATELET # BLD AUTO: 202 K/UL
PMV BLD: 9.9 FL
RBC # BLD: 3.84 M/UL
RBC # FLD: 21.2 %
WBC # FLD AUTO: 3.51 K/UL

## 2020-10-16 RX ORDER — CARBOPLATIN 50 MG
185 VIAL (EA) INTRAVENOUS ONCE
Refills: 0 | Status: COMPLETED | OUTPATIENT
Start: 2020-10-16 | End: 2020-10-16

## 2020-10-16 RX ORDER — DEXAMETHASONE 0.5 MG/5ML
12 ELIXIR ORAL ONCE
Refills: 0 | Status: COMPLETED | OUTPATIENT
Start: 2020-10-16 | End: 2020-10-16

## 2020-10-16 RX ORDER — FAMOTIDINE 10 MG/ML
20 INJECTION INTRAVENOUS ONCE
Refills: 0 | Status: COMPLETED | OUTPATIENT
Start: 2020-10-16 | End: 2020-10-16

## 2020-10-16 RX ORDER — ACETAMINOPHEN 500 MG
650 TABLET ORAL ONCE
Refills: 0 | Status: COMPLETED | OUTPATIENT
Start: 2020-10-16 | End: 2020-10-16

## 2020-10-16 RX ORDER — DIPHENHYDRAMINE HCL 50 MG
25 CAPSULE ORAL ONCE
Refills: 0 | Status: COMPLETED | OUTPATIENT
Start: 2020-10-16 | End: 2020-10-16

## 2020-10-16 RX ORDER — FOSAPREPITANT DIMEGLUMINE 150 MG/5ML
150 INJECTION, POWDER, LYOPHILIZED, FOR SOLUTION INTRAVENOUS ONCE
Refills: 0 | Status: COMPLETED | OUTPATIENT
Start: 2020-10-16 | End: 2020-10-16

## 2020-10-16 RX ORDER — PACLITAXEL 6 MG/ML
100 INJECTION, SOLUTION, CONCENTRATE INTRAVENOUS ONCE
Refills: 0 | Status: COMPLETED | OUTPATIENT
Start: 2020-10-16 | End: 2020-10-16

## 2020-10-16 RX ADMIN — Medication 101 MILLIGRAM(S): at 12:28

## 2020-10-16 RX ADMIN — FOSAPREPITANT DIMEGLUMINE 300 MILLIGRAM(S): 150 INJECTION, POWDER, LYOPHILIZED, FOR SOLUTION INTRAVENOUS at 12:28

## 2020-10-16 RX ADMIN — Medication 268.5 MILLIGRAM(S): at 12:38

## 2020-10-16 RX ADMIN — FAMOTIDINE 104 MILLIGRAM(S): 10 INJECTION INTRAVENOUS at 12:28

## 2020-10-16 RX ADMIN — PACLITAXEL 266.67 MILLIGRAM(S): 6 INJECTION, SOLUTION, CONCENTRATE INTRAVENOUS at 12:38

## 2020-10-16 RX ADMIN — Medication 122 MILLIGRAM(S): at 12:28

## 2020-10-16 RX ADMIN — Medication 650 MILLIGRAM(S): at 12:29

## 2020-10-19 LAB
ALBUMIN SERPL ELPH-MCNC: 3.7 G/DL
ALP BLD-CCNC: 75 U/L
ALT SERPL-CCNC: 7 U/L
ANION GAP SERPL CALC-SCNC: 10 MMOL/L
AST SERPL-CCNC: 17 U/L
BILIRUB SERPL-MCNC: 1 MG/DL
BUN SERPL-MCNC: 11 MG/DL
CALCIUM SERPL-MCNC: 8.2 MG/DL
CANCER AG125 SERPL-ACNC: 9 U/ML
CHLORIDE SERPL-SCNC: 108 MMOL/L
CO2 SERPL-SCNC: 23 MMOL/L
CREAT SERPL-MCNC: 0.8 MG/DL
GLUCOSE SERPL-MCNC: 80 MG/DL
INR PPP: 1.6 RATIO
MAGNESIUM SERPL-MCNC: 2.1 MG/DL
POTASSIUM SERPL-SCNC: 4.4 MMOL/L
PROT SERPL-MCNC: 6.1 G/DL
PT BLD: 18.4 SEC
SODIUM SERPL-SCNC: 141 MMOL/L

## 2020-10-20 ENCOUNTER — APPOINTMENT (OUTPATIENT)
Dept: CARDIOLOGY | Facility: CLINIC | Age: 80
End: 2020-10-20
Payer: MEDICARE

## 2020-10-20 ENCOUNTER — APPOINTMENT (OUTPATIENT)
Dept: HEMATOLOGY ONCOLOGY | Facility: CLINIC | Age: 80
End: 2020-10-20

## 2020-10-20 ENCOUNTER — LABORATORY RESULT (OUTPATIENT)
Age: 80
End: 2020-10-20

## 2020-10-20 VITALS
WEIGHT: 144 LBS | HEIGHT: 64 IN | DIASTOLIC BLOOD PRESSURE: 64 MMHG | HEART RATE: 78 BPM | SYSTOLIC BLOOD PRESSURE: 120 MMHG | BODY MASS INDEX: 24.59 KG/M2 | TEMPERATURE: 97.1 F

## 2020-10-20 DIAGNOSIS — R07.9 CHEST PAIN, UNSPECIFIED: ICD-10-CM

## 2020-10-20 PROCEDURE — 93000 ELECTROCARDIOGRAM COMPLETE: CPT

## 2020-10-20 PROCEDURE — 99214 OFFICE O/P EST MOD 30 MIN: CPT

## 2020-10-20 NOTE — REASON FOR VISIT
[Coronary Artery Disease] : coronary artery disease [CABG Follow-up] : bypass graft [Hypertension] : hypertension [Hyperlipidemia] : hyperlipidemia

## 2020-10-20 NOTE — HISTORY OF PRESENT ILLNESS
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 Found to Mullerian orgin . To get chemo; She was found to have a PE during admission in August . She was on Lovenox then placed on Xarelto . Seeing hematology . To have chemotherapy

## 2020-10-20 NOTE — ASSESSMENT
[FreeTextEntry1] : The patient unfortuantely has recurrent malignancy . She has had Uterine CA in the past with TAHBSO . The patient had a PE in August . She has been on Lovenox , now on xarelot. Will have pulmonary evaluate . She has lost 20 pounds . She is to have chemotherapy

## 2020-10-20 NOTE — PHYSICAL EXAM
[Normal Appearance] : normal appearance [General Appearance - Well Developed] : well developed [Well Groomed] : well groomed [General Appearance - Well Nourished] : well nourished [No Deformities] : no deformities [General Appearance - In No Acute Distress] : no acute distress [Normal Conjunctiva] : the conjunctiva exhibited no abnormalities [Eyelids - No Xanthelasma] : the eyelids demonstrated no xanthelasmas [No Oral Pallor] : no oral pallor [Normal Oral Mucosa] : normal oral mucosa [No Oral Cyanosis] : no oral cyanosis [Respiration, Rhythm And Depth] : normal respiratory rhythm and effort [Exaggerated Use Of Accessory Muscles For Inspiration] : no accessory muscle use [Auscultation Breath Sounds / Voice Sounds] : lungs were clear to auscultation bilaterally [Abdomen Tenderness] : non-tender [Abdomen Soft] : soft [Abdomen Mass (___ Cm)] : no abdominal mass palpated [Abnormal Walk] : normal gait [Gait - Sufficient For Exercise Testing] : the gait was sufficient for exercise testing [Nail Clubbing] : no clubbing of the fingernails [Cyanosis, Localized] : no localized cyanosis [Petechial Hemorrhages (___cm)] : no petechial hemorrhages [] : no ischemic changes [Oriented To Time, Place, And Person] : oriented to person, place, and time [Affect] : the affect was normal [No Anxiety] : not feeling anxious [Mood] : the mood was normal [Normal Rate] : normal [No Murmur] : no murmurs heard [Rhythm Regular] : regular [1+] : right 1+ [No Pitting Edema] : no pitting edema present [FreeTextEntry1] : No JVD  [Rt] : no varicose veins of the right leg

## 2020-10-20 NOTE — REVIEW OF SYSTEMS
[Feeling Fatigued] : feeling fatigued [Negative] : Heme/Lymph [Headache] : no headache [Chills] : no chills [Fever] : no fever

## 2020-10-21 ENCOUNTER — INPATIENT (INPATIENT)
Facility: HOSPITAL | Age: 80
LOS: 0 days | Discharge: HOME | End: 2020-10-22
Attending: INTERNAL MEDICINE | Admitting: INTERNAL MEDICINE
Payer: MEDICARE

## 2020-10-21 VITALS
HEIGHT: 60.3 IN | WEIGHT: 139.99 LBS | OXYGEN SATURATION: 98 % | DIASTOLIC BLOOD PRESSURE: 90 MMHG | SYSTOLIC BLOOD PRESSURE: 205 MMHG | RESPIRATION RATE: 17 BRPM | HEART RATE: 98 BPM | TEMPERATURE: 97 F

## 2020-10-21 DIAGNOSIS — Z98.890 OTHER SPECIFIED POSTPROCEDURAL STATES: Chronic | ICD-10-CM

## 2020-10-21 DIAGNOSIS — I26.99 OTHER PULMONARY EMBOLISM WITHOUT ACUTE COR PULMONALE: ICD-10-CM

## 2020-10-21 DIAGNOSIS — I25.701 ATHEROSCLEROSIS OF CORONARY ARTERY BYPASS GRAFT(S), UNSPECIFIED, WITH ANGINA PECTORIS WITH DOCUMENTED SPASM: Chronic | ICD-10-CM

## 2020-10-21 DIAGNOSIS — Z95.1 PRESENCE OF AORTOCORONARY BYPASS GRAFT: Chronic | ICD-10-CM

## 2020-10-21 LAB
ALBUMIN SERPL ELPH-MCNC: 3.8 G/DL — SIGNIFICANT CHANGE UP (ref 3.5–5.2)
ALP SERPL-CCNC: 69 U/L — SIGNIFICANT CHANGE UP (ref 30–115)
ALT FLD-CCNC: 7 U/L — SIGNIFICANT CHANGE UP (ref 0–41)
ANION GAP SERPL CALC-SCNC: 11 MMOL/L — SIGNIFICANT CHANGE UP (ref 7–14)
APPEARANCE UR: ABNORMAL
APTT BLD: 27.9 SEC — SIGNIFICANT CHANGE UP (ref 27–39.2)
AST SERPL-CCNC: 21 U/L — SIGNIFICANT CHANGE UP (ref 0–41)
BACTERIA # UR AUTO: NEGATIVE — SIGNIFICANT CHANGE UP
BASOPHILS # BLD AUTO: 0.02 K/UL — SIGNIFICANT CHANGE UP (ref 0–0.2)
BASOPHILS NFR BLD AUTO: 0.6 % — SIGNIFICANT CHANGE UP (ref 0–1)
BILIRUB SERPL-MCNC: 0.3 MG/DL — SIGNIFICANT CHANGE UP (ref 0.2–1.2)
BILIRUB UR-MCNC: NEGATIVE — SIGNIFICANT CHANGE UP
BLD GP AB SCN SERPL QL: SIGNIFICANT CHANGE UP
BUN SERPL-MCNC: 16 MG/DL — SIGNIFICANT CHANGE UP (ref 10–20)
CALCIUM SERPL-MCNC: 7.9 MG/DL — LOW (ref 8.5–10.1)
CHLORIDE SERPL-SCNC: 105 MMOL/L — SIGNIFICANT CHANGE UP (ref 98–110)
CO2 SERPL-SCNC: 22 MMOL/L — SIGNIFICANT CHANGE UP (ref 17–32)
COLOR SPEC: ABNORMAL
CREAT SERPL-MCNC: 0.8 MG/DL — SIGNIFICANT CHANGE UP (ref 0.7–1.5)
DIFF PNL FLD: ABNORMAL
EOSINOPHIL # BLD AUTO: 0.02 K/UL — SIGNIFICANT CHANGE UP (ref 0–0.7)
EOSINOPHIL NFR BLD AUTO: 0.6 % — SIGNIFICANT CHANGE UP (ref 0–8)
EPI CELLS # UR: 11 /HPF — HIGH (ref 0–5)
GLUCOSE SERPL-MCNC: 114 MG/DL — HIGH (ref 70–99)
GLUCOSE UR QL: NEGATIVE — SIGNIFICANT CHANGE UP
HCT VFR BLD CALC: 32.5 % — LOW (ref 37–47)
HGB BLD-MCNC: 10.2 G/DL — LOW (ref 12–16)
HYALINE CASTS # UR AUTO: 49 /LPF — HIGH (ref 0–7)
IMM GRANULOCYTES NFR BLD AUTO: 0.6 % — HIGH (ref 0.1–0.3)
INR BLD: 1.25 RATIO — SIGNIFICANT CHANGE UP (ref 0.65–1.3)
KETONES UR-MCNC: NEGATIVE — SIGNIFICANT CHANGE UP
LACTATE SERPL-SCNC: 1.5 MMOL/L — SIGNIFICANT CHANGE UP (ref 0.7–2)
LEUKOCYTE ESTERASE UR-ACNC: NEGATIVE — SIGNIFICANT CHANGE UP
LIDOCAIN IGE QN: 25 U/L — SIGNIFICANT CHANGE UP (ref 7–60)
LYMPHOCYTES # BLD AUTO: 1.03 K/UL — LOW (ref 1.2–3.4)
LYMPHOCYTES # BLD AUTO: 28.4 % — SIGNIFICANT CHANGE UP (ref 20.5–51.1)
MCHC RBC-ENTMCNC: 27.3 PG — SIGNIFICANT CHANGE UP (ref 27–31)
MCHC RBC-ENTMCNC: 31.4 G/DL — LOW (ref 32–37)
MCV RBC AUTO: 86.9 FL — SIGNIFICANT CHANGE UP (ref 81–99)
MONOCYTES # BLD AUTO: 0.37 K/UL — SIGNIFICANT CHANGE UP (ref 0.1–0.6)
MONOCYTES NFR BLD AUTO: 10.2 % — HIGH (ref 1.7–9.3)
NEUTROPHILS # BLD AUTO: 2.17 K/UL — SIGNIFICANT CHANGE UP (ref 1.4–6.5)
NEUTROPHILS NFR BLD AUTO: 59.6 % — SIGNIFICANT CHANGE UP (ref 42.2–75.2)
NITRITE UR-MCNC: NEGATIVE — SIGNIFICANT CHANGE UP
NRBC # BLD: 0 /100 WBCS — SIGNIFICANT CHANGE UP (ref 0–0)
PH UR: 8 — SIGNIFICANT CHANGE UP (ref 5–8)
PLATELET # BLD AUTO: 194 K/UL — SIGNIFICANT CHANGE UP (ref 130–400)
POTASSIUM SERPL-MCNC: 4.9 MMOL/L — SIGNIFICANT CHANGE UP (ref 3.5–5)
POTASSIUM SERPL-SCNC: 4.9 MMOL/L — SIGNIFICANT CHANGE UP (ref 3.5–5)
PROT SERPL-MCNC: 6.6 G/DL — SIGNIFICANT CHANGE UP (ref 6–8)
PROT UR-MCNC: ABNORMAL
PROTHROM AB SERPL-ACNC: 14.4 SEC — HIGH (ref 9.95–12.87)
RBC # BLD: 3.74 M/UL — LOW (ref 4.2–5.4)
RBC # FLD: 21.2 % — HIGH (ref 11.5–14.5)
RBC CASTS # UR COMP ASSIST: >720 /HPF — HIGH (ref 0–4)
SODIUM SERPL-SCNC: 138 MMOL/L — SIGNIFICANT CHANGE UP (ref 135–146)
SP GR SPEC: 1.02 — SIGNIFICANT CHANGE UP (ref 1.01–1.03)
UROBILINOGEN FLD QL: SIGNIFICANT CHANGE UP
WBC # BLD: 3.63 K/UL — LOW (ref 4.8–10.8)
WBC # FLD AUTO: 3.63 K/UL — LOW (ref 4.8–10.8)
WBC UR QL: 20 /HPF — HIGH (ref 0–5)

## 2020-10-21 PROCEDURE — 99285 EMERGENCY DEPT VISIT HI MDM: CPT

## 2020-10-21 PROCEDURE — 74177 CT ABD & PELVIS W/CONTRAST: CPT | Mod: 26

## 2020-10-21 PROCEDURE — 99223 1ST HOSP IP/OBS HIGH 75: CPT

## 2020-10-21 RX ORDER — ATORVASTATIN CALCIUM 80 MG/1
40 TABLET, FILM COATED ORAL AT BEDTIME
Refills: 0 | Status: DISCONTINUED | OUTPATIENT
Start: 2020-10-21 | End: 2020-10-22

## 2020-10-21 RX ORDER — CEFTRIAXONE 500 MG/1
1000 INJECTION, POWDER, FOR SOLUTION INTRAMUSCULAR; INTRAVENOUS ONCE
Refills: 0 | Status: COMPLETED | OUTPATIENT
Start: 2020-10-21 | End: 2020-10-21

## 2020-10-21 RX ORDER — CEFTRIAXONE 500 MG/1
1000 INJECTION, POWDER, FOR SOLUTION INTRAMUSCULAR; INTRAVENOUS EVERY 24 HOURS
Refills: 0 | Status: DISCONTINUED | OUTPATIENT
Start: 2020-10-22 | End: 2020-10-22

## 2020-10-21 RX ORDER — ROSUVASTATIN CALCIUM 5 MG/1
1 TABLET ORAL
Qty: 0 | Refills: 0 | DISCHARGE

## 2020-10-21 RX ORDER — AMLODIPINE BESYLATE 2.5 MG/1
5 TABLET ORAL DAILY
Refills: 0 | Status: DISCONTINUED | OUTPATIENT
Start: 2020-10-21 | End: 2020-10-22

## 2020-10-21 RX ORDER — SERTRALINE 25 MG/1
25 TABLET, FILM COATED ORAL DAILY
Refills: 0 | Status: DISCONTINUED | OUTPATIENT
Start: 2020-10-21 | End: 2020-10-22

## 2020-10-21 RX ORDER — ASPIRIN/CALCIUM CARB/MAGNESIUM 324 MG
81 TABLET ORAL DAILY
Refills: 0 | Status: DISCONTINUED | OUTPATIENT
Start: 2020-10-21 | End: 2020-10-22

## 2020-10-21 RX ORDER — BRIMONIDINE TARTRATE 2 MG/MG
1 SOLUTION/ DROPS OPHTHALMIC AT BEDTIME
Refills: 0 | Status: DISCONTINUED | OUTPATIENT
Start: 2020-10-21 | End: 2020-10-22

## 2020-10-21 RX ORDER — AMLODIPINE BESYLATE 2.5 MG/1
1 TABLET ORAL
Qty: 0 | Refills: 0 | DISCHARGE

## 2020-10-21 RX ORDER — LATANOPROST 0.05 MG/ML
1 SOLUTION/ DROPS OPHTHALMIC; TOPICAL AT BEDTIME
Refills: 0 | Status: DISCONTINUED | OUTPATIENT
Start: 2020-10-21 | End: 2020-10-22

## 2020-10-21 RX ORDER — DORZOLAMIDE HYDROCHLORIDE 20 MG/ML
1 SOLUTION/ DROPS OPHTHALMIC THREE TIMES A DAY
Refills: 0 | Status: DISCONTINUED | OUTPATIENT
Start: 2020-10-21 | End: 2020-10-22

## 2020-10-21 RX ORDER — RIVAROXABAN 15 MG-20MG
20 KIT ORAL
Refills: 0 | Status: DISCONTINUED | OUTPATIENT
Start: 2020-10-21 | End: 2020-10-22

## 2020-10-21 RX ORDER — METOPROLOL TARTRATE 50 MG
25 TABLET ORAL DAILY
Refills: 0 | Status: DISCONTINUED | OUTPATIENT
Start: 2020-10-21 | End: 2020-10-22

## 2020-10-21 RX ADMIN — CEFTRIAXONE 100 MILLIGRAM(S): 500 INJECTION, POWDER, FOR SOLUTION INTRAMUSCULAR; INTRAVENOUS at 13:32

## 2020-10-21 NOTE — ED ADULT NURSE REASSESSMENT NOTE - NS ED NURSE REASSESS COMMENT FT1
Assumed care from previous day shift RN c/o UTI , hematuria  x 2 days , medicine admit , awaiting for medicine bed , pt AO x 4 , denies any pain as of this time , no grimaced face noted , no labored breathing noted , no vomiting noted , no SOB , will continue to monitor

## 2020-10-21 NOTE — H&P ADULT - HISTORY OF PRESENT ILLNESS
79 yo F w/ PMH of Uterine cancer s/p SHAAN w/ recurrence currently on chemo, malignant ascites on therapeutic paracentesis, CAD s/p CABG, segmental pulmonary embolism on xarelto, HTN, depression 81 yo F w/ PMH of Uterine cancer s/p SHAAN w/ recurrence currently on chemo, malignant ascites s/p therapeutic paracentesis, CAD s/p CABG on aspirin, segmental pulmonary embolism on xarelto, HTN and depression presents to the hospital complaining of blood-tinged urine. Pt states she started having blood-tinged urine since yesterday. She denies fever, chill, flank pain or dysuria.     Of note, Pt's last chemo was on Friday of last week, currently receiving chemo every Friday Pt was planned for PICC line on 10/22/2020.     Vital Signs Last 24 Hrs  T(C): 37.3 (21 Oct 2020 07:38), Max: 37.3 (21 Oct 2020 07:38)  T(F): 99.1 (21 Oct 2020 07:38), Max: 99.1 (21 Oct 2020 07:38)  HR: 85 (21 Oct 2020 07:38) (85 - 98)  BP: 107/64 (21 Oct 2020 07:38) (107/64 - 205/90)  BP(mean): --  RR: 20 (21 Oct 2020 07:38) (17 - 20)  SpO2: 96% (21 Oct 2020 07:38) (96% - 98%)    In ED, pt found to have +UA. Recommended by heme/onc to admit pt for complicated UTI and PICC placement for chemo in hospital.

## 2020-10-21 NOTE — ED PROVIDER NOTE - PROGRESS NOTE DETAILS
SR: spoke with heme onc fellow Dr. Guzman will come and evaulate , and will dc home with antibiotics SR: spoke with heme onc fellow Dr. Megan de la cruz come and evaulate SR:dr. kim evaluated patient recommends admission for IV antibiotics and will require picc during admission

## 2020-10-21 NOTE — H&P ADULT - ASSESSMENT
79 yo F w/ PMH of Uterine cancer s/p SHAAN w/ recurrence currently on chemo, malignant ascites s/p therapeutic paracentesis, CAD s/p CABG on aspirin, segmental pulmonary embolism on xarelto, HTN and depression presents to the hospital complaining of blood-tinged urine.     # Blood-tinged urine secondary to cystitis    - UA positive, f/u urine culture for sensitivity  - c/w ceftriaxone 1 g q24h  - monitor for fever and trend WBC     # Uterine CA s/p SHAAN and currently on chemo  # Malignant ascites s/p therapeutic paracentesis  - c/w chemo as per heme/onc  - no indication/ascites to be tap at this time   - PICC line scheduled for tomorrow (notified IR)    # Hx of segmental PE on xarelto  - c/w xarelto, no need to hold for PICC    # CAD s/p CABG  - c/w aspirin   - c/w metoprolol    # HTN  - c/w home anti-hypertensive  - DASH diet    Diet: DASH diet  GI ppx: N/A  DVT ppx: Xarelto  Activity: Increase as tolerated  Code status: Full Code ( X ) / DNR (  ) / DNI (  )  Disposition: from home, requires PICC and on IV abx

## 2020-10-21 NOTE — ED PROVIDER NOTE - CLINICAL SUMMARY MEDICAL DECISION MAKING FREE TEXT BOX
80 year old female with a pmh of CAD s/p CABG, HTN HLD Glaucoma, PE/DVT on eliquis and colon ca currently on chemotherapy (last chemotherapy was Friday and follows with Dr. Mansfield) presents here for bloody urine. Patient noticed it several days ago but had another episode which prompted her to come to the ED. No fever chills cough cp sob n/v/d abdominal pain urinary frequency urgency or burning. Vs reviewed. Labs imaging obtained. Patient found to have a UTI. Spoke with Heme onc dr. mansfield who recommended admission

## 2020-10-21 NOTE — H&P ADULT - NSHPLABSRESULTS_GEN_ALL_CORE
LABS:                          10.2   3.63  )-----------( 194      ( 21 Oct 2020 07:26 )             32.5     10-21    138  |  105  |  16  ----------------------------<  114<H>  4.9   |  22  |  0.8    Ca    7.9<L>      21 Oct 2020 07:26    TPro  6.6  /  Alb  3.8  /  TBili  0.3  /  DBili  x   /  AST  21  /  ALT  7   /  AlkPhos  69  10-21          Urinalysis Basic - ( 21 Oct 2020 07:30 )    Color: Dark Brown / Appearance: Turbid / S.017 / pH: x  Gluc: x / Ketone: Negative  / Bili: Negative / Urobili: <2 mg/dL   Blood: x / Protein: >600 mg/dL / Nitrite: Negative   Leuk Esterase: Negative / RBC: >720 /HPF / WBC 20 /HPF   Sq Epi: x / Non Sq Epi: 11 /HPF / Bacteria: Negative      PT/INR - ( 21 Oct 2020 07:26 )   PT: 14.40 sec;   INR: 1.25 ratio         PTT - ( 21 Oct 2020 07:26 )  PTT:27.9 sec  Lactate Trend  10-21 @ 07:26 Lactate:1.5         CAPILLARY BLOOD GLUCOSE            RADIOLOGY:    < from: CT Abdomen and Pelvis w/ IV Cont (10.21.20 @ 10:27) >  IMPRESSION:  Enhancement of the left ureter with mild inflammatory changes of the urinary bladder dome may reflect evidence of ascending infection and cystitis. In the appropriate clinical setting, inflammation of the ureter may also be secondary to radiation-induced changes      EKGS:    e

## 2020-10-21 NOTE — H&P ADULT - NSHPREVIEWOFSYSTEMS_GEN_ALL_CORE
REVIEW OF SYSTEMS:    CONSTITUTIONAL: No weakness, fevers or chills  EYES/ENT: No visual changes;  No vertigo or throat pain   NECK: No pain or stiffness  RESPIRATORY: No cough, wheezing, hemoptysis; No shortness of breath  CARDIOVASCULAR: No chest pain or palpitations  GASTROINTESTINAL: No abdominal or epigastric pain. No nausea, vomiting, or hematemesis; No diarrhea or constipation. No melena or hematochezia.  GENITOURINARY: No dysuria, frequency. Admits to hematuria  NEUROLOGICAL: No numbness or weakness  SKIN: No itching, rashes

## 2020-10-21 NOTE — PROGRESS NOTE ADULT - ASSESSMENT
Patient is a 81 year old F patient with past medical history of Uterine cancer s/p SHAAN, malignant ascites on therapeutic paracentesis, CAD s/p CABG, segmental pulmonary embolism on Lovenox recently switched to xarelto, HTN, depression, comes in hematuria and suprapubic discomfort.     #) Symptomatic UTI  - UA grossly positive   - Cont with IV Rocephin for now  - Follow up urine cultures  - If patient improved in AM, will transition to PO antibiotics     #) Recurrent adenocarcinoma Mullerian origin, malignant ascites dx on 08/08/2020  - Prior TAHBSO in 2016, with adjuvant chemotherapy  - Started on weekly Carbo/taxol weekly 3 on 1 off, started on 08/14/20  - Patient due for chemo on 10/23. Will hold give her current symptomatic UTI   - PICC placement by IR tomorrow on 10/21    #) Acute on Chronic PE in setting of known Malignancy   - Cont with Xarelto     # CAD s/p CABG  - c/w aspirin   - c/w metoprolol    # HTN  - c/w home anti-hypertensive  - DASH diet    #DVT PPx; On Xarelto  #Dispo: Anticipate D/C in 24-48 hours

## 2020-10-21 NOTE — ED ADULT NURSE NOTE - OBJECTIVE STATEMENT
Pt is a 80 yr old female c/o hematuria [id number]. Pt states she started taking xarelto a few months ago due to pulmonary embol. PMH uterine ca on chem last chemo was friday, HTN. Pt denies dysuria, sob, weakness and dizziness. Pt states "I went to my pmd and had a urinary test done there but when I woke up this morning and saw blood again I got so nervous and came to the ER".

## 2020-10-21 NOTE — H&P ADULT - NSICDXPASTSURGICALHX_GEN_ALL_CORE_FT
PAST SURGICAL HISTORY:  Athscl CABG, unsp, w angina pectoris w documented spasm     H/O total hysterectomy     History of coronary artery bypass surgery

## 2020-10-21 NOTE — PROGRESS NOTE ADULT - SUBJECTIVE AND OBJECTIVE BOX
Patient is a 80y old  Female who presents with a chief complaint of Blood in urine (21 Oct 2020 14:18)      Subjective: Patient feels well today. She just reports some anxiety as she saw that there was blood in her urine       Vital Signs Last 24 Hrs  T(C): 37.2 (21 Oct 2020 17:41), Max: 37.3 (21 Oct 2020 07:38)  T(F): 98.9 (21 Oct 2020 17:41), Max: 99.1 (21 Oct 2020 07:38)  HR: 78 (21 Oct 2020 17:41) (78 - 98)  BP: 123/60 (21 Oct 2020 17:41) (107/64 - 205/90)  BP(mean): --  RR: 18 (21 Oct 2020 17:41) (17 - 20)  SpO2: 99% (21 Oct 2020 17:41) (96% - 99%)    PHYSICAL EXAM  General: elderly female in NAD  HEENT: anicteric sclera, pink conjunctiva  Neck: supple  CV: normal S1/S2 with no murmur rubs or gallops  Lungs: CTABL  Abdomen: soft non-tender non-distended   Ext: no edema  Skin: no rashes   Neuro: alert and oriented X 4, no focal deficits    MEDICATIONS  (STANDING):  amLODIPine   Tablet 5 milliGRAM(s) Oral daily  aspirin enteric coated 81 milliGRAM(s) Oral daily  atorvastatin 40 milliGRAM(s) Oral at bedtime  brimonidine 0.2% Ophthalmic Solution 1 Drop(s) Both EYES at bedtime  dorzolamide 2% Ophthalmic Solution 1 Drop(s) Both EYES three times a day  latanoprost 0.005% Ophthalmic Solution 1 Drop(s) Both EYES at bedtime  metoprolol succinate ER 25 milliGRAM(s) Oral daily  rivaroxaban 20 milliGRAM(s) Oral with dinner  sertraline 25 milliGRAM(s) Oral daily    MEDICATIONS  (PRN):      LABS:                          10.2   3.63  )-----------( 194      ( 21 Oct 2020 07:26 )             32.5         Mean Cell Volume : 86.9 fL  Mean Cell Hemoglobin : 27.3 pg  Mean Cell Hemoglobin Concentration : 31.4 g/dL  Auto Neutrophil # : 2.17 K/uL  Auto Lymphocyte # : 1.03 K/uL  Auto Monocyte # : 0.37 K/uL  Auto Eosinophil # : 0.02 K/uL  Auto Basophil # : 0.02 K/uL  Auto Neutrophil % : 59.6 %  Auto Lymphocyte % : 28.4 %  Auto Monocyte % : 10.2 %  Auto Eosinophil % : 0.6 %  Auto Basophil % : 0.6 %      Serial CBC's  10-21 @ 07:26  Hct-32.5 / Hgb-10.2 / Plat-194 / RBC-3.74 / WBC-3.63      10-21    138  |  105  |  16  ----------------------------<  114<H>  4.9   |  22  |  0.8    Ca    7.9<L>      21 Oct 2020 07:26    TPro  6.6  /  Alb  3.8  /  TBili  0.3  /  DBili  x   /  AST  21  /  ALT  7   /  AlkPhos  69  10-21      PT/INR - ( 21 Oct 2020 07:26 )   PT: 14.40 sec;   INR: 1.25 ratio         PTT - ( 21 Oct 2020 07:26 )  PTT:27.9 sec                Urinalysis Basic - ( 21 Oct 2020 07:30 )    Color: Dark Brown / Appearance: Turbid / S.017 / pH: x  Gluc: x / Ketone: Negative  / Bili: Negative / Urobili: <2 mg/dL   Blood: x / Protein: >600 mg/dL / Nitrite: Negative   Leuk Esterase: Negative / RBC: >720 /HPF / WBC 20 /HPF   Sq Epi: x / Non Sq Epi: 11 /HPF / Bacteria: Negative                BLOOD SMEAR INTERPRETATION:       RADIOLOGY & ADDITIONAL STUDIES:

## 2020-10-21 NOTE — PROGRESS NOTE ADULT - ATTENDING COMMENTS
Patient was seen and examined with fellow, keep for observation and IV antibiotics, continue with Eliquis.   For PICC placement tomorrow.

## 2020-10-21 NOTE — ED ADULT TRIAGE NOTE - CHIEF COMPLAINT QUOTE
pt with bright red urine  pt states unsure if urine is discolored because of chemo drug or if the urine is bloody

## 2020-10-21 NOTE — H&P ADULT - NSHPPHYSICALEXAM_GEN_ALL_CORE
PHYSICAL EXAM:  GENERAL: NAD, AAO x 4, 80y F  HEAD:  Atraumatic, Normocephalic  EYES: EOMI, conjunctiva clear and sclera white  NECK: Supple, No JVD  CHEST/LUNG: Clear to auscultation bilaterally; No wheeze; No crackles; No accessory muscles used  HEART: Regular rate and rhythm; No murmurs;   ABDOMEN: Soft, Nontender, Nondistended; Bowel sounds present; No guarding  EXTREMITIES:  2+ Peripheral Pulses, No cyanosis or edema  NEUROLOGY: non-focal

## 2020-10-21 NOTE — ED PROVIDER NOTE - NS ED ROS FT
Constitutional: See HPI.  Eyes: No visual changes  ENMT: No neck pain   Cardiac: No cp  Respiratory: No cough   GI: No nausea, vomiting, diarrhea or abdominal pain.  : see hpi  MS: No myalgia, muscle weakness, joint pain or back pain.  Psych: No suicidal or homicidal ideations.  Neuro: No headache  Skin: No skin rash.

## 2020-10-21 NOTE — H&P ADULT - NSICDXPASTMEDICALHX_GEN_ALL_CORE_FT
PAST MEDICAL HISTORY:  CAD (coronary artery disease)     Cataract of right eye     Coronary artery disease s/p CABG X2    Glaucoma     High cholesterol     HLD (hyperlipidemia)     HTN (hypertension)     Hypertension     Uterine cancer s/p SHAAN and chemo

## 2020-10-21 NOTE — ED PROVIDER NOTE - OBJECTIVE STATEMENT
80 year old female with a pmh of CAD s/p CABG, HTN HLD Glaucoma, PE/DVT on eliquis and colon ca currently on chemotherapy (last chemotherapy was Friday and follows with Dr. Mansfield) presents here for bloody urine. Patient noticed it several days ago but had another episode which prompted her to come to the ED. No fever chills cough cp sob n/v/d abdominal pain urinary frequency urgency or burning.

## 2020-10-22 ENCOUNTER — TRANSCRIPTION ENCOUNTER (OUTPATIENT)
Age: 80
End: 2020-10-22

## 2020-10-22 VITALS — SYSTOLIC BLOOD PRESSURE: 124 MMHG | DIASTOLIC BLOOD PRESSURE: 63 MMHG | HEART RATE: 68 BPM | TEMPERATURE: 100 F

## 2020-10-22 LAB
ALBUMIN SERPL ELPH-MCNC: 3.6 G/DL — SIGNIFICANT CHANGE UP (ref 3.5–5.2)
ALP SERPL-CCNC: 67 U/L — SIGNIFICANT CHANGE UP (ref 30–115)
ALT FLD-CCNC: 7 U/L — SIGNIFICANT CHANGE UP (ref 0–41)
ANION GAP SERPL CALC-SCNC: 9 MMOL/L — SIGNIFICANT CHANGE UP (ref 7–14)
APPEARANCE: ABNORMAL
APTT BLD: 29.4 SEC — SIGNIFICANT CHANGE UP (ref 27–39.2)
AST SERPL-CCNC: 14 U/L — SIGNIFICANT CHANGE UP (ref 0–41)
BACTERIA UR CULT: NORMAL
BASOPHILS # BLD AUTO: 0.01 K/UL — SIGNIFICANT CHANGE UP (ref 0–0.2)
BASOPHILS NFR BLD AUTO: 0.2 % — SIGNIFICANT CHANGE UP (ref 0–1)
BILIRUB SERPL-MCNC: 0.3 MG/DL — SIGNIFICANT CHANGE UP (ref 0.2–1.2)
BILIRUBIN URINE: NEGATIVE
BLOOD URINE: ABNORMAL
BUN SERPL-MCNC: 19 MG/DL — SIGNIFICANT CHANGE UP (ref 10–20)
CALCIUM SERPL-MCNC: 8 MG/DL — LOW (ref 8.5–10.1)
CHLORIDE SERPL-SCNC: 105 MMOL/L — SIGNIFICANT CHANGE UP (ref 98–110)
CO2 SERPL-SCNC: 27 MMOL/L — SIGNIFICANT CHANGE UP (ref 17–32)
COLOR: ABNORMAL
CREAT SERPL-MCNC: 0.8 MG/DL — SIGNIFICANT CHANGE UP (ref 0.7–1.5)
EOSINOPHIL # BLD AUTO: 0.03 K/UL — SIGNIFICANT CHANGE UP (ref 0–0.7)
EOSINOPHIL NFR BLD AUTO: 0.7 % — SIGNIFICANT CHANGE UP (ref 0–8)
GLUCOSE QUALITATIVE U: NEGATIVE
GLUCOSE SERPL-MCNC: 115 MG/DL — HIGH (ref 70–99)
HCT VFR BLD CALC: 31.3 % — LOW (ref 37–47)
HCT VFR BLD CALC: 34.2 %
HGB BLD-MCNC: 11.3 G/DL
HGB BLD-MCNC: 9.8 G/DL — LOW (ref 12–16)
IMM GRANULOCYTES NFR BLD AUTO: 0.7 % — HIGH (ref 0.1–0.3)
INR BLD: 1.24 RATIO — SIGNIFICANT CHANGE UP (ref 0.65–1.3)
KETONES URINE: NEGATIVE
LACTATE SERPL-SCNC: 0.9 MMOL/L — SIGNIFICANT CHANGE UP (ref 0.7–2)
LEUKOCYTE ESTERASE URINE: ABNORMAL
LYMPHOCYTES # BLD AUTO: 1.51 K/UL — SIGNIFICANT CHANGE UP (ref 1.2–3.4)
LYMPHOCYTES # BLD AUTO: 35.8 % — SIGNIFICANT CHANGE UP (ref 20.5–51.1)
MAGNESIUM SERPL-MCNC: 2.2 MG/DL — SIGNIFICANT CHANGE UP (ref 1.8–2.4)
MCHC RBC-ENTMCNC: 27.1 PG — SIGNIFICANT CHANGE UP (ref 27–31)
MCHC RBC-ENTMCNC: 27.6 PG
MCHC RBC-ENTMCNC: 31.3 G/DL — LOW (ref 32–37)
MCHC RBC-ENTMCNC: 33 G/DL
MCV RBC AUTO: 83.4 FL
MCV RBC AUTO: 86.5 FL — SIGNIFICANT CHANGE UP (ref 81–99)
MONOCYTES # BLD AUTO: 0.55 K/UL — SIGNIFICANT CHANGE UP (ref 0.1–0.6)
MONOCYTES NFR BLD AUTO: 13 % — HIGH (ref 1.7–9.3)
NEUTROPHILS # BLD AUTO: 2.09 K/UL — SIGNIFICANT CHANGE UP (ref 1.4–6.5)
NEUTROPHILS NFR BLD AUTO: 49.6 % — SIGNIFICANT CHANGE UP (ref 42.2–75.2)
NITRITE URINE: NEGATIVE
NRBC # BLD: 0 /100 WBCS — SIGNIFICANT CHANGE UP (ref 0–0)
PH URINE: 8.5
PLATELET # BLD AUTO: 176 K/UL
PLATELET # BLD AUTO: 176 K/UL — SIGNIFICANT CHANGE UP (ref 130–400)
PMV BLD: 10.1 FL
POTASSIUM SERPL-MCNC: 4.6 MMOL/L — SIGNIFICANT CHANGE UP (ref 3.5–5)
POTASSIUM SERPL-SCNC: 4.6 MMOL/L — SIGNIFICANT CHANGE UP (ref 3.5–5)
PROT SERPL-MCNC: 5.7 G/DL — LOW (ref 6–8)
PROTEIN URINE: 100
PROTHROM AB SERPL-ACNC: 14.3 SEC — HIGH (ref 9.95–12.87)
RBC # BLD: 3.62 M/UL — LOW (ref 4.2–5.4)
RBC # BLD: 4.1 M/UL
RBC # FLD: 21 %
RBC # FLD: 21.1 % — HIGH (ref 11.5–14.5)
SARS-COV-2 RNA SPEC QL NAA+PROBE: SIGNIFICANT CHANGE UP
SODIUM SERPL-SCNC: 141 MMOL/L — SIGNIFICANT CHANGE UP (ref 135–146)
SPECIFIC GRAVITY URINE: 1.01
UROBILINOGEN URINE: 0.2 (ref 0.2–?)
WBC # BLD: 4.22 K/UL — LOW (ref 4.8–10.8)
WBC # FLD AUTO: 4.2 K/UL
WBC # FLD AUTO: 4.22 K/UL — LOW (ref 4.8–10.8)

## 2020-10-22 PROCEDURE — 99232 SBSQ HOSP IP/OBS MODERATE 35: CPT

## 2020-10-22 PROCEDURE — 36573 INSJ PICC RS&I 5 YR+: CPT

## 2020-10-22 RX ORDER — CEFTRIAXONE 500 MG/1
1000 INJECTION, POWDER, FOR SOLUTION INTRAMUSCULAR; INTRAVENOUS EVERY 24 HOURS
Refills: 0 | Status: DISCONTINUED | OUTPATIENT
Start: 2020-10-22 | End: 2020-10-22

## 2020-10-22 RX ADMIN — CEFTRIAXONE 100 MILLIGRAM(S): 500 INJECTION, POWDER, FOR SOLUTION INTRAMUSCULAR; INTRAVENOUS at 00:01

## 2020-10-22 RX ADMIN — CEFTRIAXONE 100 MILLIGRAM(S): 500 INJECTION, POWDER, FOR SOLUTION INTRAMUSCULAR; INTRAVENOUS at 12:44

## 2020-10-22 RX ADMIN — Medication 25 MILLIGRAM(S): at 05:01

## 2020-10-22 RX ADMIN — DORZOLAMIDE HYDROCHLORIDE 1 DROP(S): 20 SOLUTION/ DROPS OPHTHALMIC at 05:02

## 2020-10-22 RX ADMIN — AMLODIPINE BESYLATE 5 MILLIGRAM(S): 2.5 TABLET ORAL at 05:01

## 2020-10-22 NOTE — DISCHARGE NOTE NURSING/CASE MANAGEMENT/SOCIAL WORK - PATIENT PORTAL LINK FT
You can access the FollowMyHealth Patient Portal offered by [facility] by registering at the following website: [email or web address]. By joining [facility]’s FollowMyHealth portal, you will also be able to view your health information using other applications (apps) compatible with our system.

## 2020-10-22 NOTE — PROGRESS NOTE ADULT - SUBJECTIVE AND OBJECTIVE BOX
BEKAH NELSON 80y Female  MRN#: [id number]   CODE STATUS: Full      SUBJECTIVE  Patient is a 80y old Female who presents with a chief complaint of Blood in urine (21 Oct 2020 17:44)  Currently admitted to medicine with the primary diagnosis of UTI (urinary tract infection)     Today is hospital day 1d, and this morning she is appears normal and comfortable and reports no fever, dysuria, flank pain, nausea, vomiting or night sweats  or any other concerning overnight events.   The patient's last chemo session was Friday of last week, she receives chemotherapy every Friday and on 10/22/2020, the patient is planned to receive a PICC line.     OBJECTIVE  PAST MEDICAL & SURGICAL HISTORY  HLD (hyperlipidemia)  HTN (hypertension)  CAD (coronary artery disease)  Coronary artery disease  s/p CABG X2  Glaucoma  Cataract of right eye  High cholesterol  Hypertension  Uterine cancer  s/p SHAAN and chemo  History of coronary artery bypass surgery  Athscl CABG, unsp, w angina pectoris w documented spasm  H/O total hysterectomy      ALLERGIES:  chloroquine (Hives)  quinine (Urticaria)    MEDICATIONS:  STANDING MEDICATIONS  amLODIPine   Tablet 5 milliGRAM(s) Oral daily  aspirin enteric coated 81 milliGRAM(s) Oral daily  atorvastatin 40 milliGRAM(s) Oral at bedtime  brimonidine 0.2% Ophthalmic Solution 1 Drop(s) Both EYES at bedtime  cefTRIAXone   IVPB 1000 milliGRAM(s) IV Intermittent every 24 hours  dorzolamide 2% Ophthalmic Solution 1 Drop(s) Both EYES three times a day  latanoprost 0.005% Ophthalmic Solution 1 Drop(s) Both EYES at bedtime  metoprolol succinate ER 25 milliGRAM(s) Oral daily  rivaroxaban 20 milliGRAM(s) Oral with dinner  sertraline 25 milliGRAM(s) Oral daily    PRN MEDICATIONS      VITAL SIGNS: Last 24 Hours  T(C): 36.7 (22 Oct 2020 06:20), Max: 37.2 (21 Oct 2020 17:41)  T(F): 98.1 (22 Oct 2020 06:20), Max: 98.9 (21 Oct 2020 17:41)  HR: 86 (22 Oct 2020 06:20) (76 - 86)  BP: 133/70 (22 Oct 2020 06:20) (112/58 - 133/70)  BP(mean): --  RR: 18 (22 Oct 2020 06:20) (17 - 18)  SpO2: 100% (22 Oct 2020 05:03) (99% - 100%)    LABS:                        9.8    4.22  )-----------( 176      ( 22 Oct 2020 05:53 )             31.3     10-22    141  |  105  |  19  ----------------------------<  115<H>  4.6   |  27  |  0.8    Ca    8.0<L>      22 Oct 2020 05:53  Mg     2.2     10-22    TPro  5.7<L>  /  Alb  3.6  /  TBili  0.3  /  DBili  x   /  AST  14  /  ALT  7   /  AlkPhos  67  10-22    PT/INR - ( 22 Oct 2020 05:53 )   PT: 14.30 sec;   INR: 1.24 ratio         PTT - ( 22 Oct 2020 05:53 )  PTT:29.4 sec  Urinalysis Basic - ( 21 Oct 2020 07:30 )    Color: Dark Brown / Appearance: Turbid / S.017 / pH: x  Gluc: x / Ketone: Negative  / Bili: Negative / Urobili: <2 mg/dL   Blood: x / Protein: >600 mg/dL / Nitrite: Negative   Leuk Esterase: Negative / RBC: >720 /HPF / WBC 20 /HPF   Sq Epi: x / Non Sq Epi: 11 /HPF / Bacteria: Negative        Lactate, Blood: 0.9 mmol/L (10-22-20 @ 05:53)          RADIOLOGY:  < from: CT Abdomen and Pelvis w/ IV Cont (10.21.20 @ 10:27) >  XAM:  CT ABDOMEN AND PELVIS IC            PROCEDURE DATE:  10/21/2020      INTERPRETATION:  Clinical History / Reason for exam: Hematuria    Technique:  Contiguous axial CT images of the abdomen and pelvis were obtained following administration of intravenous contrast.  Oral contrast was not administered.  Reformatted images in the coronal and sagittal planes were acquired.    Comparison CT: 2020    FINDINGS:    LOWER CHEST: Mild cardiomegaly. Poststernotomy    HEPATOBILIARY: The liver is normal in appearance.  No evidence of intra or extrahepatic biliary dilatation. The gallbladder is suboptimally imaged.    SPLEEN: Unremarkable.    PANCREAS: Unremarkable.    ADRENAL GLANDS: Unremarkable.    KIDNEYS: Symmetric pattern of renal enhancement. There is mild dilatation of the left ureter with mild left hydronephrosis. This is associated with mucosal enhancement of the entire left ureter. No definite obstructing calculus or mass is seen.    ABDOMINOPELVIC NODES: No enlarged abdominal or pelvic lymph nodes.    PELVIC ORGANS: The bladder is distended with fluid. There are mild inflammatory changes of the bladder wall    PERITONEUM/MESENTERY/BOWEL: No bowel obstruction, ascites or free intraperitoneal air. Appendix is normal in caliber. There is colonic diverticulosis without evidence of diverticulitis.    BONES/SOFT TISSUES: There are degenerative changes of the spine. Grade 1 L4-L5 spondylolisthesis with associated lower lumbar facet arthropathy.    VASCULAR:  The aorta is normal in caliber. Mild atherosclerotic calcifications are noted    IMPRESSION:  Enhancement of the left ureter with mild inflammatory changes of the urinary bladder dome may reflect evidence of ascending infection and cystitis. In the appropriate clinical setting, inflammation of the ureter may also be secondary to radiation-induced changes    TIO MATTA M.D., ATTENDING RADIOLOGIST  This document has been electronically signed. Oct 21 2020 11:03AM    < end of copied text >          PHYSICAL EXAM:    GENERAL: NAD, well-developed, AAOx3  HEENT:  Atraumatic, Normocephalic. EOMI, PERRLA, conjunctiva and sclera clear, No JVD  PULMONARY: Clear to auscultation bilaterally; No wheeze  CARDIOVASCULAR: Regular rate and rhythm; No murmurs, rubs, or gallops  GASTROINTESTINAL: Soft, Nontender, Nondistended; Bowel sounds present  MUSCULOSKELETAL:  2+ Peripheral Pulses, No clubbing, cyanosis, or edema  NEUROLOGY: non-focal  SKIN: No rashes or lesions      ASSESSMENT & PLAN    #TI(URINARY TRACT INFECTION)      3. HLD (hyperlipidemia)    HTN (hypertension)    CAD (coronary artery disease)    Coronary artery disease  s/p CABG X2    Glaucoma    Cataract of right eye    High cholesterol    Hypertension    Uterine cancer  s/p SHAAN and chemo         BEKAH NELSON 80y Female  MRN#: [id number]   CODE STATUS: Full      SUBJECTIVE  Patient is a 80y old Female who presents with a chief complaint of Blood in urine (21 Oct 2020 17:44)  Currently admitted to medicine with the primary diagnosis of UTI (urinary tract infection)     Today is hospital day 1d, and this morning she is appears normal and comfortable and reports no fever, dysuria, flank pain, nausea, vomiting or night sweats  or any other concerning overnight events.   The patient's last chemo session was Friday of last week, she receives chemotherapy every Friday and on 10/22/2020, the patient is planned to receive a PICC line.     OBJECTIVE  PAST MEDICAL & SURGICAL HISTORY  HLD (hyperlipidemia)  HTN (hypertension)  CAD (coronary artery disease)  Coronary artery disease  s/p CABG X2  Glaucoma  Cataract of right eye  High cholesterol  Hypertension  Uterine cancer  s/p SHAAN and chemo  History of coronary artery bypass surgery  Athscl CABG, unsp, w angina pectoris w documented spasm  H/O total hysterectomy      ALLERGIES:  chloroquine (Hives)  quinine (Urticaria)    MEDICATIONS:  STANDING MEDICATIONS  amLODIPine   Tablet 5 milliGRAM(s) Oral daily  aspirin enteric coated 81 milliGRAM(s) Oral daily  atorvastatin 40 milliGRAM(s) Oral at bedtime  brimonidine 0.2% Ophthalmic Solution 1 Drop(s) Both EYES at bedtime  cefTRIAXone   IVPB 1000 milliGRAM(s) IV Intermittent every 24 hours  dorzolamide 2% Ophthalmic Solution 1 Drop(s) Both EYES three times a day  latanoprost 0.005% Ophthalmic Solution 1 Drop(s) Both EYES at bedtime  metoprolol succinate ER 25 milliGRAM(s) Oral daily  rivaroxaban 20 milliGRAM(s) Oral with dinner  sertraline 25 milliGRAM(s) Oral daily    PRN MEDICATIONS      VITAL SIGNS: Last 24 Hours  T(C): 36.7 (22 Oct 2020 06:20), Max: 37.2 (21 Oct 2020 17:41)  T(F): 98.1 (22 Oct 2020 06:20), Max: 98.9 (21 Oct 2020 17:41)  HR: 86 (22 Oct 2020 06:20) (76 - 86)  BP: 133/70 (22 Oct 2020 06:20) (112/58 - 133/70)  BP(mean): --  RR: 18 (22 Oct 2020 06:20) (17 - 18)  SpO2: 100% (22 Oct 2020 05:03) (99% - 100%)    LABS:                        9.8    4.22  )-----------( 176      ( 22 Oct 2020 05:53 )             31.3     10-22    141  |  105  |  19  ----------------------------<  115<H>  4.6   |  27  |  0.8    Ca    8.0<L>      22 Oct 2020 05:53  Mg     2.2     10-22    TPro  5.7<L>  /  Alb  3.6  /  TBili  0.3  /  DBili  x   /  AST  14  /  ALT  7   /  AlkPhos  67  10-22    PT/INR - ( 22 Oct 2020 05:53 )   PT: 14.30 sec;   INR: 1.24 ratio         PTT - ( 22 Oct 2020 05:53 )  PTT:29.4 sec  Urinalysis Basic - ( 21 Oct 2020 07:30 )    Color: Dark Brown / Appearance: Turbid / S.017 / pH: x  Gluc: x / Ketone: Negative  / Bili: Negative / Urobili: <2 mg/dL   Blood: x / Protein: >600 mg/dL / Nitrite: Negative   Leuk Esterase: Negative / RBC: >720 /HPF / WBC 20 /HPF   Sq Epi: x / Non Sq Epi: 11 /HPF / Bacteria: Negative        Lactate, Blood: 0.9 mmol/L (10-22-20 @ 05:53)          RADIOLOGY:  < from: CT Abdomen and Pelvis w/ IV Cont (10.21.20 @ 10:27) >  XAM:  CT ABDOMEN AND PELVIS IC            PROCEDURE DATE:  10/21/2020      INTERPRETATION:  Clinical History / Reason for exam: Hematuria    Technique:  Contiguous axial CT images of the abdomen and pelvis were obtained following administration of intravenous contrast.  Oral contrast was not administered.  Reformatted images in the coronal and sagittal planes were acquired.    Comparison CT: 2020    FINDINGS:    LOWER CHEST: Mild cardiomegaly. Poststernotomy    HEPATOBILIARY: The liver is normal in appearance.  No evidence of intra or extrahepatic biliary dilatation. The gallbladder is suboptimally imaged.    SPLEEN: Unremarkable.    PANCREAS: Unremarkable.    ADRENAL GLANDS: Unremarkable.    KIDNEYS: Symmetric pattern of renal enhancement. There is mild dilatation of the left ureter with mild left hydronephrosis. This is associated with mucosal enhancement of the entire left ureter. No definite obstructing calculus or mass is seen.    ABDOMINOPELVIC NODES: No enlarged abdominal or pelvic lymph nodes.    PELVIC ORGANS: The bladder is distended with fluid. There are mild inflammatory changes of the bladder wall    PERITONEUM/MESENTERY/BOWEL: No bowel obstruction, ascites or free intraperitoneal air. Appendix is normal in caliber. There is colonic diverticulosis without evidence of diverticulitis.    BONES/SOFT TISSUES: There are degenerative changes of the spine. Grade 1 L4-L5 spondylolisthesis with associated lower lumbar facet arthropathy.    VASCULAR:  The aorta is normal in caliber. Mild atherosclerotic calcifications are noted    IMPRESSION:  Enhancement of the left ureter with mild inflammatory changes of the urinary bladder dome may reflect evidence of ascending infection and cystitis. In the appropriate clinical setting, inflammation of the ureter may also be secondary to radiation-induced changes    TIO MATTA M.D., ATTENDING RADIOLOGIST  This document has been electronically signed. Oct 21 2020 11:03AM    < end of copied text >          PHYSICAL EXAM:    GENERAL: NAD, well-developed, AAOx3  HEENT:  Atraumatic, Normocephalic. EOMI, PERRLA, conjunctiva and sclera clear, No JVD  PULMONARY: Clear to auscultation bilaterally; No wheeze  CARDIOVASCULAR: Regular rate and rhythm; No murmurs, rubs, or gallops  GASTROINTESTINAL: Soft, Nontender, Nondistended; Bowel sounds present  MUSCULOSKELETAL:  2+ Peripheral Pulses, No clubbing, cyanosis, or edema  NEUROLOGY: non-focal  SKIN: No rashes or lesions      ASSESSMENT & PLAN    #Urinary Tract Infection  - continue ceftriaxone IVPB- 1000mg IV intermittent every 24 hours infuse every 30 minutes for suspected UTI with gross hematuria     #Pulmonary embolism-provoked  -Patient had segmental Pulmonary Embolism provoked and is currently on Xarelto to manage it  -CTA on 2020 reveals Acute on chronic pulmonary embolism. New right lower lobar segmental branch opacification is seen, and other previously seen emboli have decreased or resolved. No CT evidence of right heart strain or saddle embolus. Cardiomegaly is noted.  -Xarelto 15mg BID for 21 days, followed by 20 QD   -LE doppler negative    #Uterine adenocarcinoma complicated with malignant ascites  - S/P TAHBSO in 2016  - On weekly carbo/taxol 3:1, started 20   - Patient will receive a PICC line today to continue chemotherapy       #CAD (s/p CABG)  -continue with aspirin 81mg    #HTN  -continue with Amlodipine 5mg QD  -continue with Metoprolol 25mg QD     #DLD  -continue with Statin 40 QD    #Anxiety  -continue with Klonopin 0.5 QD PRN for anxiety    # Diet: DASH/TLC  # DVT Prophylaxis: xarelto  # GI Prophylaxis: Protonix  # Activity: IAT  # Dispo: Acute  # Code Status: Fullcode       BEKAH NELSON 80y Female  MRN#: [id number]   CODE STATUS: Full      SUBJECTIVE  Patient is a 80y old Female who presents with a chief complaint of Blood in urine (21 Oct 2020 17:44)  Currently admitted to medicine with the primary diagnosis of UTI (urinary tract infection)     Today is hospital day 1d, and this morning she is appears normal and comfortable and reports no fever, dysuria, flank pain, nausea, vomiting or night sweats  or any other concerning overnight events.   The patient's last chemo session was Friday of last week, she receives chemotherapy every Friday and on 10/22/2020, the patient is planned to receive a PICC line today.      OBJECTIVE  PAST MEDICAL & SURGICAL HISTORY  HLD (hyperlipidemia)  HTN (hypertension)  CAD (coronary artery disease)  Coronary artery disease  s/p CABG X2  Glaucoma  Cataract of right eye  High cholesterol  Hypertension  Uterine cancer  s/p SHAAN and chemo  History of coronary artery bypass surgery  Athscl CABG, unsp, w angina pectoris w documented spasm  H/O total hysterectomy      ALLERGIES:  chloroquine (Hives)  quinine (Urticaria)    MEDICATIONS:  STANDING MEDICATIONS  amLODIPine   Tablet 5 milliGRAM(s) Oral daily  aspirin enteric coated 81 milliGRAM(s) Oral daily  atorvastatin 40 milliGRAM(s) Oral at bedtime  brimonidine 0.2% Ophthalmic Solution 1 Drop(s) Both EYES at bedtime  cefTRIAXone   IVPB 1000 milliGRAM(s) IV Intermittent every 24 hours  dorzolamide 2% Ophthalmic Solution 1 Drop(s) Both EYES three times a day  latanoprost 0.005% Ophthalmic Solution 1 Drop(s) Both EYES at bedtime  metoprolol succinate ER 25 milliGRAM(s) Oral daily  rivaroxaban 20 milliGRAM(s) Oral with dinner  sertraline 25 milliGRAM(s) Oral daily    PRN MEDICATIONS      VITAL SIGNS: Last 24 Hours  T(C): 36.7 (22 Oct 2020 06:20), Max: 37.2 (21 Oct 2020 17:41)  T(F): 98.1 (22 Oct 2020 06:20), Max: 98.9 (21 Oct 2020 17:41)  HR: 86 (22 Oct 2020 06:20) (76 - 86)  BP: 133/70 (22 Oct 2020 06:20) (112/58 - 133/70)  BP(mean): --  RR: 18 (22 Oct 2020 06:20) (17 - 18)  SpO2: 100% (22 Oct 2020 05:03) (99% - 100%)    LABS:                        9.8    4.22  )-----------( 176      ( 22 Oct 2020 05:53 )             31.3     10-22    141  |  105  |  19  ----------------------------<  115<H>  4.6   |  27  |  0.8    Ca    8.0<L>      22 Oct 2020 05:53  Mg     2.2     10-22    TPro  5.7<L>  /  Alb  3.6  /  TBili  0.3  /  DBili  x   /  AST  14  /  ALT  7   /  AlkPhos  67  10-22    PT/INR - ( 22 Oct 2020 05:53 )   PT: 14.30 sec;   INR: 1.24 ratio         PTT - ( 22 Oct 2020 05:53 )  PTT:29.4 sec  Urinalysis Basic - ( 21 Oct 2020 07:30 )    Color: Dark Brown / Appearance: Turbid / S.017 / pH: x  Gluc: x / Ketone: Negative  / Bili: Negative / Urobili: <2 mg/dL   Blood: x / Protein: >600 mg/dL / Nitrite: Negative   Leuk Esterase: Negative / RBC: >720 /HPF / WBC 20 /HPF   Sq Epi: x / Non Sq Epi: 11 /HPF / Bacteria: Negative        Lactate, Blood: 0.9 mmol/L (10-22-20 @ 05:53)          RADIOLOGY:  < from: CT Abdomen and Pelvis w/ IV Cont (10.21.20 @ 10:27) >  XAM:  CT ABDOMEN AND PELVIS IC            PROCEDURE DATE:  10/21/2020      INTERPRETATION:  Clinical History / Reason for exam: Hematuria    Technique:  Contiguous axial CT images of the abdomen and pelvis were obtained following administration of intravenous contrast.  Oral contrast was not administered.  Reformatted images in the coronal and sagittal planes were acquired.    Comparison CT: 2020    FINDINGS:    LOWER CHEST: Mild cardiomegaly. Poststernotomy    HEPATOBILIARY: The liver is normal in appearance.  No evidence of intra or extrahepatic biliary dilatation. The gallbladder is suboptimally imaged.    SPLEEN: Unremarkable.    PANCREAS: Unremarkable.    ADRENAL GLANDS: Unremarkable.    KIDNEYS: Symmetric pattern of renal enhancement. There is mild dilatation of the left ureter with mild left hydronephrosis. This is associated with mucosal enhancement of the entire left ureter. No definite obstructing calculus or mass is seen.    ABDOMINOPELVIC NODES: No enlarged abdominal or pelvic lymph nodes.    PELVIC ORGANS: The bladder is distended with fluid. There are mild inflammatory changes of the bladder wall    PERITONEUM/MESENTERY/BOWEL: No bowel obstruction, ascites or free intraperitoneal air. Appendix is normal in caliber. There is colonic diverticulosis without evidence of diverticulitis.    BONES/SOFT TISSUES: There are degenerative changes of the spine. Grade 1 L4-L5 spondylolisthesis with associated lower lumbar facet arthropathy.    VASCULAR:  The aorta is normal in caliber. Mild atherosclerotic calcifications are noted    IMPRESSION:  Enhancement of the left ureter with mild inflammatory changes of the urinary bladder dome may reflect evidence of ascending infection and cystitis. In the appropriate clinical setting, inflammation of the ureter may also be secondary to radiation-induced changes    TIO MATTA M.D., ATTENDING RADIOLOGIST  This document has been electronically signed. Oct 21 2020 11:03AM    < end of copied text >          PHYSICAL EXAM:  GENERAL: NAD, well-developed, AAOx3  HEENT:  Atraumatic, Normocephalic. conjunctiva and sclera clear, No JVD  PULMONARY: Clear to auscultation bilaterally; No wheeze  CARDIOVASCULAR: Regular rate and rhythm; No murmurs, rubs, or gallops  GASTROINTESTINAL: Soft, Nontender, Nondistended; Bowel sounds present  MUSCULOSKELETAL:  No clubbing, cyanosis, or edema  NEUROLOGY: non-focal  SKIN: No rashes or lesions      ASSESSMENT & PLAN  79 yo F w/ PMH of Uterine cancer s/p SHAAN w/ recurrence currently on chemo, malignant ascites s/p therapeutic paracentesis, CAD s/p CABG on aspirin, segmental pulmonary embolism on xarelto, HTN and depression presents to the hospital complaining of blood-tinged urine. Pt states she started having blood-tinged urine since yesterday. She denies fever, chill, flank pain or dysuria.     #Hematuria most likely 2/2 Urinary Tract Infection  - UA:  Leuk Esterase: Negative / RBC: >720 /HPF / WBC 20 /HPF, neg nitrites, neg bacteria   - CT abd/pelvis: Enhancement of the left ureter with mild inflammatory changes of the urinary bladder dome may reflect evidence of ascending infection and cystitis.  - continue ceftriaxone 1g IV qd   - F/u urine culture     # h/o Provoked Pulmonary embolism  - CTA on 2020 reveals Acute on chronic pulmonary embolism. New right lower lobar segmental branch opacification is seen, and other previously seen emboli have decreased or resolved. No CT evidence of right heart strain or saddle embolus. Cardiomegaly is noted.  - LE doppler negative on previous visit   - continue Xarelto     # Uterine adenocarcinoma complicated with malignant ascites  - S/P TAHBSO in 2016  - On weekly carbo/taxol 3:1, started 20   - Plan for PICC line today bu IR for chemotherapy     #CAD (s/p CABG)  -continue with aspirin 81mg    #HTN  -continue with Amlodipine 5mg QD  -continue with Metoprolol 25mg QD     #DLD  -continue with Statin 40 QD    #Anxiety  -continue with Klonopin 0.5 QD PRN for anxiety    # Diet: DASH/TLC  # DVT Prophylaxis: xarelto  # GI Prophylaxis: Protonix  # Activity: IAT  # Dispo: PICC today   # Code Status: Fullcode

## 2020-10-22 NOTE — DISCHARGE NOTE PROVIDER - HOSPITAL COURSE
81 yo F w/ PMH of Uterine cancer s/p SHAAN w/ recurrence currently on chemo, malignant ascites s/p therapeutic paracentesis, CAD s/p CABG on aspirin, segmental pulmonary embolism on xarelto, HTN and depression presents to the hospital complaining of blood-tinged urine. Pt states she started having blood-tinged urine since yesterday. She denies fever, chill, flank pain or dysuria.   Of note, Pt's last chemo was on Friday of last week, currently receiving chemo every Friday Pt was planned for PICC line on 10/22/2020.   In ED, pt found to have +UA. Recommended by heme/onc to admit pt for complicated UTI and PICC placement for chemo in hospital.   Patient had 2 doses of ceftriaxone for suspected UTI. Patient is to follow up with Dr. Gutierrez as an outpatient.

## 2020-10-22 NOTE — DISCHARGE NOTE PROVIDER - NSDCCPCAREPLAN_GEN_ALL_CORE_FT
PRINCIPAL DISCHARGE DIAGNOSIS  Diagnosis: UTI (urinary tract infection)  Assessment and Plan of Treatment: You were having blood in your urine most likely due to an infection. Upon imaging, your bladder was inflammed which is a sign of infection as well. You were started on antibiotics. Please follow up with Dr. Gutierrez for further treatment.

## 2020-10-22 NOTE — DISCHARGE NOTE PROVIDER - CARE PROVIDER_API CALL
Jimena Gutierrez  HEMATOLOGY/ONCOLOGY  [address] [address]  Phone: [phone]  Fax: [phone]  Follow Up Time:     Ashley Figueroa  INTERNAL MEDICINE  [address], [address]  [place] [address]  Phone: [phone]  Fax: [phone]  Follow Up Time:

## 2020-10-22 NOTE — DISCHARGE NOTE PROVIDER - NSDCFUSCHEDAPPT_GEN_ALL_CORE_FT
BEKAH NELSON ; 10/22/2020 ; [facility]  BEKAH NELSON ; 10/23/2020 ; [facility] BEKAH Pham ; 10/30/2020 ; [facility] BEKAH Pham ; 11/12/2020 ; [facility] BEKAH Banuelos ; 11/13/2020 ; [facility] BEKAH Pham ; 11/20/2020 ; [facility] BEKAH Pham ; 11/27/2020 ; [facility] CUN[facility]

## 2020-10-22 NOTE — PROGRESS NOTE ADULT - ASSESSMENT
Patient is a 81 year old F patient with past medical history of Uterine cancer s/p SHAAN, malignant ascites on therapeutic paracentesis, CAD s/p CABG, segmental pulmonary embolism on Lovenox recently switched to xarelto, HTN, depression, comes in hematuria and suprapubic discomfort.     #) Symptomatic UTI  - UA grossly positive   - Cont with IV Rocephin for now  - Follow up urine cultures  - Transition to PO antibiotics prior to DC    #) Recurrent adenocarcinoma Mullerian origin, malignant ascites dx on 08/08/2020  - Prior TAHBSO in 2016, with adjuvant chemotherapy  - Started on weekly Carbo/taxol weekly 3 on 1 off, started on 08/14/20  - Patient due for chemo on 10/23. Will hold give her current symptomatic UTI   - PICC placement by IR today on 10/21    #) Acute on Chronic PE in setting of known Malignancy   - Cont with Xarelto     # CAD s/p CABG  - c/w aspirin   - c/w metoprolol    # HTN  - c/w home anti-hypertensive  - DASH diet    #DVT PPx; On Xarelto  #Dispo: Anticipate D/C to home today after PICC placement

## 2020-10-22 NOTE — PROCEDURE NOTE - NSPOSTPRCRAD_GEN_A_CORE
line adjusted to depth of insertion/line in appropriate postion/chest radiograph/depth of insertion/fluoroscopy/ultrasound

## 2020-10-22 NOTE — DISCHARGE NOTE PROVIDER - NSDCMRMEDTOKEN_GEN_ALL_CORE_FT
amLODIPine 5 mg oral tablet: 1 tab(s) orally once a day  aspirin 81 mg oral delayed release tablet: 1 tab(s) orally once a day  brimonidine 0.1% ophthalmic solution: to each affected eye 2 times a day  dorzolamide 2% ophthalmic solution: 1 drop(s) to each affected eye 2 times a day  hydrOXYzine hydrochloride 25 mg oral tablet: 1 tab(s) orally every 8 hours, As Needed -for anxiety   KlonoPIN 0.5 mg oral tablet: 0.5 tab(s) orally once a day x 30 days MDD:1mg  latanoprost 0.005% ophthalmic solution: 1 drop(s) to each affected eye 2 times a day  metoprolol succinate 25 mg oral tablet, extended release: 1 tab(s) orally once a day  rivaroxaban 20 mg oral tablet: 1 tab(s) orally once a day (in the evening)  rosuvastatin 10 mg oral tablet: 1 tab(s) orally once a day  sertraline 25 mg oral tablet: 1 tab(s) orally once a day

## 2020-10-22 NOTE — ED ADULT NURSE REASSESSMENT NOTE - NS ED NURSE REASSESS COMMENT FT1
Pt transferred to 3 B , pt AO x 4 , denies any pain , denies chest pain , denies headache , denies abdominal pain , no labored breathing , denies dizziness , denies nausea , no vomiting noted , IVL intact ,all belongings sent , endorsed pt to 3B nurse , pt denies any complaints as of this time

## 2020-10-22 NOTE — PROGRESS NOTE ADULT - SUBJECTIVE AND OBJECTIVE BOX
Patient is a 80y old  Female who presents with a chief complaint of Blood in urine (22 Oct 2020 09:27)      Subjective: Patient feels well today.       Vital Signs Last 24 Hrs  T(C): 36.7 (22 Oct 2020 06:20), Max: 37.2 (21 Oct 2020 17:41)  T(F): 98.1 (22 Oct 2020 06:20), Max: 98.9 (21 Oct 2020 17:41)  HR: 86 (22 Oct 2020 06:20) (76 - 86)  BP: 133/70 (22 Oct 2020 06:20) (112/58 - 133/70)  BP(mean): --  RR: 18 (22 Oct 2020 06:20) (17 - 18)  SpO2: 100% (22 Oct 2020 05:03) (99% - 100%)    PHYSICAL EXAM  General: elderly female in NAD  HEENT: anicteric sclera, pink conjunctiva  Neck: supple  CV: normal S1/S2 with no murmur rubs or gallops  Lungs: CTABL  Abdomen: soft non-tender non-distended   Ext: no edema  Skin: no rashes   Neuro: alert and oriented X 4, no focal deficits    MEDICATIONS  (STANDING):  amLODIPine   Tablet 5 milliGRAM(s) Oral daily  aspirin enteric coated 81 milliGRAM(s) Oral daily  atorvastatin 40 milliGRAM(s) Oral at bedtime  brimonidine 0.2% Ophthalmic Solution 1 Drop(s) Both EYES at bedtime  cefTRIAXone   IVPB 1000 milliGRAM(s) IV Intermittent every 24 hours  dorzolamide 2% Ophthalmic Solution 1 Drop(s) Both EYES three times a day  latanoprost 0.005% Ophthalmic Solution 1 Drop(s) Both EYES at bedtime  metoprolol succinate ER 25 milliGRAM(s) Oral daily  rivaroxaban 20 milliGRAM(s) Oral with dinner  sertraline 25 milliGRAM(s) Oral daily    MEDICATIONS  (PRN):      LABS:                          9.8    4.22  )-----------( 176      ( 22 Oct 2020 05:53 )             31.3         Mean Cell Volume : 86.5 fL  Mean Cell Hemoglobin : 27.1 pg  Mean Cell Hemoglobin Concentration : 31.3 g/dL  Auto Neutrophil # : 2.09 K/uL  Auto Lymphocyte # : 1.51 K/uL  Auto Monocyte # : 0.55 K/uL  Auto Eosinophil # : 0.03 K/uL  Auto Basophil # : 0.01 K/uL  Auto Neutrophil % : 49.6 %  Auto Lymphocyte % : 35.8 %  Auto Monocyte % : 13.0 %  Auto Eosinophil % : 0.7 %  Auto Basophil % : 0.2 %      Serial CBC's  10-22 @ 05:53  Hct-31.3 / Hgb-9.8 / Plat-176 / RBC-3.62 / WBC-4.22  Serial CBC's  10-21 @ 07:26  Hct-32.5 / Hgb-10.2 / Plat-194 / RBC-3.74 / WBC-3.63      10-22    141  |  105  |  19  ----------------------------<  115<H>  4.6   |  27  |  0.8    Ca    8.0<L>      22 Oct 2020 05:53  Mg     2.2     10-22    TPro  5.7<L>  /  Alb  3.6  /  TBili  0.3  /  DBili  x   /  AST  14  /  ALT  7   /  AlkPhos  67  10-22      PT/INR - ( 22 Oct 2020 05:53 )   PT: 14.30 sec;   INR: 1.24 ratio         PTT - ( 22 Oct 2020 05:53 )  PTT:29.4 sec      Urinalysis Basic - ( 21 Oct 2020 07:30 )    Color: Dark Brown / Appearance: Turbid / S.017 / pH: x  Gluc: x / Ketone: Negative  / Bili: Negative / Urobili: <2 mg/dL   Blood: x / Protein: >600 mg/dL / Nitrite: Negative   Leuk Esterase: Negative / RBC: >720 /HPF / WBC 20 /HPF   Sq Epi: x / Non Sq Epi: 11 /HPF / Bacteria: Negative      BLOOD SMEAR INTERPRETATION:       RADIOLOGY & ADDITIONAL STUDIES:

## 2020-10-22 NOTE — PROCEDURE NOTE - NSPROCDETAILS_GEN_ALL_CORE
supine position/sterile technique, catheter placed/location identified, draped/prepped, sterile technique used/sterile dressing applied/ultrasound guidance

## 2020-10-22 NOTE — PATIENT PROFILE ADULT - REASON FOR REFUSAL (REFER PATIENT TO HEALTHCARE PROVIDER FOR FOLLOW-UP):
patient receiving chemotherapy every friday at [facility]

## 2020-10-23 ENCOUNTER — LABORATORY RESULT (OUTPATIENT)
Age: 80
End: 2020-10-23

## 2020-10-23 ENCOUNTER — APPOINTMENT (OUTPATIENT)
Dept: INFUSION THERAPY | Facility: CLINIC | Age: 80
End: 2020-10-23

## 2020-10-23 LAB
CULTURE RESULTS: SIGNIFICANT CHANGE UP
SPECIMEN SOURCE: SIGNIFICANT CHANGE UP

## 2020-10-23 RX ORDER — FAMOTIDINE 10 MG/ML
20 INJECTION INTRAVENOUS ONCE
Refills: 0 | Status: COMPLETED | OUTPATIENT
Start: 2020-10-23 | End: 2020-10-23

## 2020-10-23 RX ORDER — ACETAMINOPHEN 500 MG
650 TABLET ORAL ONCE
Refills: 0 | Status: COMPLETED | OUTPATIENT
Start: 2020-10-23 | End: 2020-10-23

## 2020-10-23 RX ORDER — DIPHENHYDRAMINE HCL 50 MG
25 CAPSULE ORAL ONCE
Refills: 0 | Status: COMPLETED | OUTPATIENT
Start: 2020-10-23 | End: 2020-10-23

## 2020-10-23 RX ORDER — PACLITAXEL 6 MG/ML
100 INJECTION, SOLUTION, CONCENTRATE INTRAVENOUS ONCE
Refills: 0 | Status: COMPLETED | OUTPATIENT
Start: 2020-10-23 | End: 2020-10-23

## 2020-10-23 RX ORDER — CARBOPLATIN 50 MG
185 VIAL (EA) INTRAVENOUS ONCE
Refills: 0 | Status: COMPLETED | OUTPATIENT
Start: 2020-10-23 | End: 2020-10-23

## 2020-10-23 RX ORDER — DEXAMETHASONE 0.5 MG/5ML
12 ELIXIR ORAL ONCE
Refills: 0 | Status: COMPLETED | OUTPATIENT
Start: 2020-10-23 | End: 2020-10-23

## 2020-10-23 RX ORDER — FOSAPREPITANT DIMEGLUMINE 150 MG/5ML
150 INJECTION, POWDER, LYOPHILIZED, FOR SOLUTION INTRAVENOUS ONCE
Refills: 0 | Status: COMPLETED | OUTPATIENT
Start: 2020-10-23 | End: 2020-10-23

## 2020-10-23 RX ADMIN — FAMOTIDINE 20 MILLIGRAM(S): 10 INJECTION INTRAVENOUS at 13:55

## 2020-10-23 RX ADMIN — Medication 122 MILLIGRAM(S): at 13:15

## 2020-10-23 RX ADMIN — Medication 101 MILLIGRAM(S): at 13:55

## 2020-10-23 RX ADMIN — Medication 650 MILLIGRAM(S): at 13:15

## 2020-10-23 RX ADMIN — Medication 25 MILLIGRAM(S): at 14:15

## 2020-10-23 RX ADMIN — FOSAPREPITANT DIMEGLUMINE 150 MILLIGRAM(S): 150 INJECTION, POWDER, LYOPHILIZED, FOR SOLUTION INTRAVENOUS at 14:35

## 2020-10-23 RX ADMIN — PACLITAXEL 100 MILLIGRAM(S): 6 INJECTION, SOLUTION, CONCENTRATE INTRAVENOUS at 16:40

## 2020-10-23 RX ADMIN — FOSAPREPITANT DIMEGLUMINE 300 MILLIGRAM(S): 150 INJECTION, POWDER, LYOPHILIZED, FOR SOLUTION INTRAVENOUS at 14:15

## 2020-10-23 RX ADMIN — PACLITAXEL 266.67 MILLIGRAM(S): 6 INJECTION, SOLUTION, CONCENTRATE INTRAVENOUS at 15:40

## 2020-10-23 RX ADMIN — Medication 12 MILLIGRAM(S): at 13:35

## 2020-10-23 RX ADMIN — Medication 185 MILLIGRAM(S): at 17:45

## 2020-10-23 RX ADMIN — FAMOTIDINE 104 MILLIGRAM(S): 10 INJECTION INTRAVENOUS at 13:35

## 2020-10-23 RX ADMIN — Medication 650 MILLIGRAM(S): at 15:42

## 2020-10-23 RX ADMIN — Medication 268.5 MILLIGRAM(S): at 15:45

## 2020-10-26 DIAGNOSIS — Z90.710 ACQUIRED ABSENCE OF BOTH CERVIX AND UTERUS: ICD-10-CM

## 2020-10-26 DIAGNOSIS — I10 ESSENTIAL (PRIMARY) HYPERTENSION: ICD-10-CM

## 2020-10-26 DIAGNOSIS — Z88.8 ALLERGY STATUS TO OTHER DRUGS, MEDICAMENTS AND BIOLOGICAL SUBSTANCES: ICD-10-CM

## 2020-10-26 DIAGNOSIS — E78.5 HYPERLIPIDEMIA, UNSPECIFIED: ICD-10-CM

## 2020-10-26 DIAGNOSIS — Z95.1 PRESENCE OF AORTOCORONARY BYPASS GRAFT: ICD-10-CM

## 2020-10-26 DIAGNOSIS — F32.9 MAJOR DEPRESSIVE DISORDER, SINGLE EPISODE, UNSPECIFIED: ICD-10-CM

## 2020-10-26 DIAGNOSIS — H26.9 UNSPECIFIED CATARACT: ICD-10-CM

## 2020-10-26 DIAGNOSIS — Z79.82 LONG TERM (CURRENT) USE OF ASPIRIN: ICD-10-CM

## 2020-10-26 DIAGNOSIS — C55 MALIGNANT NEOPLASM OF UTERUS, PART UNSPECIFIED: ICD-10-CM

## 2020-10-26 DIAGNOSIS — Z79.01 LONG TERM (CURRENT) USE OF ANTICOAGULANTS: ICD-10-CM

## 2020-10-26 DIAGNOSIS — N39.0 URINARY TRACT INFECTION, SITE NOT SPECIFIED: ICD-10-CM

## 2020-10-26 DIAGNOSIS — Z86.711 PERSONAL HISTORY OF PULMONARY EMBOLISM: ICD-10-CM

## 2020-10-26 DIAGNOSIS — I25.10 ATHEROSCLEROTIC HEART DISEASE OF NATIVE CORONARY ARTERY WITHOUT ANGINA PECTORIS: ICD-10-CM

## 2020-10-26 DIAGNOSIS — Z86.718 PERSONAL HISTORY OF OTHER VENOUS THROMBOSIS AND EMBOLISM: ICD-10-CM

## 2020-10-26 DIAGNOSIS — H40.9 UNSPECIFIED GLAUCOMA: ICD-10-CM

## 2020-10-26 DIAGNOSIS — F41.9 ANXIETY DISORDER, UNSPECIFIED: ICD-10-CM

## 2020-10-26 LAB
ALBUMIN SERPL ELPH-MCNC: 3.8 G/DL
ALP BLD-CCNC: 69 U/L
ALT SERPL-CCNC: 6 U/L
ANION GAP SERPL CALC-SCNC: 10 MMOL/L
AST SERPL-CCNC: 13 U/L
BILIRUB SERPL-MCNC: 0.3 MG/DL
BUN SERPL-MCNC: 18 MG/DL
CALCIUM SERPL-MCNC: 8.5 MG/DL
CHLORIDE SERPL-SCNC: 102 MMOL/L
CO2 SERPL-SCNC: 26 MMOL/L
CREAT SERPL-MCNC: 0.9 MG/DL
GLUCOSE SERPL-MCNC: 89 MG/DL
HCT VFR BLD CALC: 32.4 %
HGB BLD-MCNC: 10.5 G/DL
MAGNESIUM SERPL-MCNC: 2.1 MG/DL
MCHC RBC-ENTMCNC: 27.5 PG
MCHC RBC-ENTMCNC: 32.4 G/DL
MCV RBC AUTO: 84.8 FL
PLATELET # BLD AUTO: 182 K/UL
PMV BLD: 10.3 FL
POTASSIUM SERPL-SCNC: 4.4 MMOL/L
PROT SERPL-MCNC: 6.2 G/DL
RBC # BLD: 3.82 M/UL
RBC # FLD: 21.1 %
SODIUM SERPL-SCNC: 138 MMOL/L
WBC # FLD AUTO: 6.05 K/UL

## 2020-10-27 ENCOUNTER — APPOINTMENT (OUTPATIENT)
Dept: INFUSION THERAPY | Facility: CLINIC | Age: 80
End: 2020-10-27

## 2020-10-27 ENCOUNTER — INPATIENT (INPATIENT)
Facility: HOSPITAL | Age: 80
LOS: 9 days | Discharge: HOME | End: 2020-11-06
Attending: HOSPITALIST | Admitting: HOSPITALIST
Payer: MEDICARE

## 2020-10-27 ENCOUNTER — LABORATORY RESULT (OUTPATIENT)
Age: 80
End: 2020-10-27

## 2020-10-27 VITALS
OXYGEN SATURATION: 100 % | WEIGHT: 143.96 LBS | HEIGHT: 63 IN | DIASTOLIC BLOOD PRESSURE: 81 MMHG | SYSTOLIC BLOOD PRESSURE: 127 MMHG | TEMPERATURE: 98 F | RESPIRATION RATE: 18 BRPM | HEART RATE: 82 BPM

## 2020-10-27 DIAGNOSIS — I25.701 ATHEROSCLEROSIS OF CORONARY ARTERY BYPASS GRAFT(S), UNSPECIFIED, WITH ANGINA PECTORIS WITH DOCUMENTED SPASM: Chronic | ICD-10-CM

## 2020-10-27 DIAGNOSIS — Z98.890 OTHER SPECIFIED POSTPROCEDURAL STATES: Chronic | ICD-10-CM

## 2020-10-27 DIAGNOSIS — Z95.1 PRESENCE OF AORTOCORONARY BYPASS GRAFT: Chronic | ICD-10-CM

## 2020-10-27 LAB
ALBUMIN SERPL ELPH-MCNC: 3.9 G/DL — SIGNIFICANT CHANGE UP (ref 3.5–5.2)
ALP SERPL-CCNC: 67 U/L — SIGNIFICANT CHANGE UP (ref 30–115)
ALT FLD-CCNC: 6 U/L — SIGNIFICANT CHANGE UP (ref 0–41)
ANION GAP SERPL CALC-SCNC: 8 MMOL/L — SIGNIFICANT CHANGE UP (ref 7–14)
APPEARANCE UR: ABNORMAL
APTT BLD: 36.6 SEC — SIGNIFICANT CHANGE UP (ref 27–39.2)
AST SERPL-CCNC: 15 U/L — SIGNIFICANT CHANGE UP (ref 0–41)
BACTERIA # UR AUTO: NEGATIVE — SIGNIFICANT CHANGE UP
BASOPHILS # BLD AUTO: 0.01 K/UL — SIGNIFICANT CHANGE UP (ref 0–0.2)
BASOPHILS NFR BLD AUTO: 0.2 % — SIGNIFICANT CHANGE UP (ref 0–1)
BILIRUB SERPL-MCNC: 0.3 MG/DL — SIGNIFICANT CHANGE UP (ref 0.2–1.2)
BILIRUB UR-MCNC: NEGATIVE — SIGNIFICANT CHANGE UP
BLD GP AB SCN SERPL QL: SIGNIFICANT CHANGE UP
BUN SERPL-MCNC: 13 MG/DL — SIGNIFICANT CHANGE UP (ref 10–20)
CALCIUM SERPL-MCNC: 8 MG/DL — LOW (ref 8.5–10.1)
CHLORIDE SERPL-SCNC: 105 MMOL/L — SIGNIFICANT CHANGE UP (ref 98–110)
CO2 SERPL-SCNC: 26 MMOL/L — SIGNIFICANT CHANGE UP (ref 17–32)
COLOR SPEC: ABNORMAL
CREAT SERPL-MCNC: 0.9 MG/DL — SIGNIFICANT CHANGE UP (ref 0.7–1.5)
DIFF PNL FLD: ABNORMAL
EOSINOPHIL # BLD AUTO: 0.02 K/UL — SIGNIFICANT CHANGE UP (ref 0–0.7)
EOSINOPHIL NFR BLD AUTO: 0.4 % — SIGNIFICANT CHANGE UP (ref 0–8)
EPI CELLS # UR: 0 /HPF — SIGNIFICANT CHANGE UP (ref 0–5)
GLUCOSE SERPL-MCNC: 105 MG/DL — HIGH (ref 70–99)
GLUCOSE UR QL: NEGATIVE — SIGNIFICANT CHANGE UP
HCT VFR BLD CALC: 27.9 % — LOW (ref 37–47)
HGB BLD-MCNC: 8.7 G/DL — LOW (ref 12–16)
HYALINE CASTS # UR AUTO: 6 /LPF — SIGNIFICANT CHANGE UP (ref 0–7)
IMM GRANULOCYTES NFR BLD AUTO: 0.2 % — SIGNIFICANT CHANGE UP (ref 0.1–0.3)
INR BLD: 1.72 RATIO — HIGH (ref 0.65–1.3)
KETONES UR-MCNC: NEGATIVE — SIGNIFICANT CHANGE UP
LEUKOCYTE ESTERASE UR-ACNC: ABNORMAL
LYMPHOCYTES # BLD AUTO: 1.3 K/UL — SIGNIFICANT CHANGE UP (ref 1.2–3.4)
LYMPHOCYTES # BLD AUTO: 28.1 % — SIGNIFICANT CHANGE UP (ref 20.5–51.1)
MCHC RBC-ENTMCNC: 27 PG — SIGNIFICANT CHANGE UP (ref 27–31)
MCHC RBC-ENTMCNC: 31.2 G/DL — LOW (ref 32–37)
MCV RBC AUTO: 86.6 FL — SIGNIFICANT CHANGE UP (ref 81–99)
MONOCYTES # BLD AUTO: 0.27 K/UL — SIGNIFICANT CHANGE UP (ref 0.1–0.6)
MONOCYTES NFR BLD AUTO: 5.8 % — SIGNIFICANT CHANGE UP (ref 1.7–9.3)
NEUTROPHILS # BLD AUTO: 3.01 K/UL — SIGNIFICANT CHANGE UP (ref 1.4–6.5)
NEUTROPHILS NFR BLD AUTO: 65.3 % — SIGNIFICANT CHANGE UP (ref 42.2–75.2)
NITRITE UR-MCNC: NEGATIVE — SIGNIFICANT CHANGE UP
NRBC # BLD: 0 /100 WBCS — SIGNIFICANT CHANGE UP (ref 0–0)
PH UR: 6.5 — SIGNIFICANT CHANGE UP (ref 5–8)
PLATELET # BLD AUTO: 194 K/UL — SIGNIFICANT CHANGE UP (ref 130–400)
POTASSIUM SERPL-MCNC: 4.1 MMOL/L — SIGNIFICANT CHANGE UP (ref 3.5–5)
POTASSIUM SERPL-SCNC: 4.1 MMOL/L — SIGNIFICANT CHANGE UP (ref 3.5–5)
PROT SERPL-MCNC: 6.3 G/DL — SIGNIFICANT CHANGE UP (ref 6–8)
PROT UR-MCNC: ABNORMAL
PROTHROM AB SERPL-ACNC: 19.8 SEC — HIGH (ref 9.95–12.87)
RBC # BLD: 3.22 M/UL — LOW (ref 4.2–5.4)
RBC # FLD: 20.8 % — HIGH (ref 11.5–14.5)
RBC CASTS # UR COMP ASSIST: >720 /HPF — HIGH (ref 0–4)
SARS-COV-2 RNA SPEC QL NAA+PROBE: SIGNIFICANT CHANGE UP
SODIUM SERPL-SCNC: 139 MMOL/L — SIGNIFICANT CHANGE UP (ref 135–146)
SP GR SPEC: 1.01 — SIGNIFICANT CHANGE UP (ref 1.01–1.03)
UROBILINOGEN FLD QL: SIGNIFICANT CHANGE UP
WBC # BLD: 4.62 K/UL — LOW (ref 4.8–10.8)
WBC # FLD AUTO: 4.62 K/UL — LOW (ref 4.8–10.8)
WBC UR QL: 129 /HPF — HIGH (ref 0–5)

## 2020-10-27 PROCEDURE — 99222 1ST HOSP IP/OBS MODERATE 55: CPT

## 2020-10-27 PROCEDURE — 99285 EMERGENCY DEPT VISIT HI MDM: CPT

## 2020-10-27 PROCEDURE — 99223 1ST HOSP IP/OBS HIGH 75: CPT | Mod: AI

## 2020-10-27 RX ORDER — CEFTRIAXONE 500 MG/1
1000 INJECTION, POWDER, FOR SOLUTION INTRAMUSCULAR; INTRAVENOUS ONCE
Refills: 0 | Status: COMPLETED | OUTPATIENT
Start: 2020-10-27 | End: 2020-10-27

## 2020-10-27 RX ORDER — SERTRALINE 25 MG/1
25 TABLET, FILM COATED ORAL DAILY
Refills: 0 | Status: DISCONTINUED | OUTPATIENT
Start: 2020-10-27 | End: 2020-11-02

## 2020-10-27 RX ORDER — ASPIRIN/CALCIUM CARB/MAGNESIUM 324 MG
81 TABLET ORAL DAILY
Refills: 0 | Status: DISCONTINUED | OUTPATIENT
Start: 2020-10-27 | End: 2020-11-02

## 2020-10-27 RX ORDER — BRIMONIDINE TARTRATE 2 MG/MG
1 SOLUTION/ DROPS OPHTHALMIC
Refills: 0 | Status: DISCONTINUED | OUTPATIENT
Start: 2020-10-27 | End: 2020-11-02

## 2020-10-27 RX ORDER — OXYCODONE AND ACETAMINOPHEN 5; 325 MG/1; MG/1
1 TABLET ORAL EVERY 6 HOURS
Refills: 0 | Status: DISCONTINUED | OUTPATIENT
Start: 2020-10-27 | End: 2020-11-02

## 2020-10-27 RX ORDER — AMLODIPINE BESYLATE 2.5 MG/1
5 TABLET ORAL DAILY
Refills: 0 | Status: DISCONTINUED | OUTPATIENT
Start: 2020-10-27 | End: 2020-11-02

## 2020-10-27 RX ORDER — METOPROLOL TARTRATE 50 MG
25 TABLET ORAL DAILY
Refills: 0 | Status: DISCONTINUED | OUTPATIENT
Start: 2020-10-27 | End: 2020-11-02

## 2020-10-27 RX ORDER — ATORVASTATIN CALCIUM 80 MG/1
40 TABLET, FILM COATED ORAL AT BEDTIME
Refills: 0 | Status: DISCONTINUED | OUTPATIENT
Start: 2020-10-27 | End: 2020-11-02

## 2020-10-27 RX ORDER — DORZOLAMIDE HYDROCHLORIDE 20 MG/ML
1 SOLUTION/ DROPS OPHTHALMIC THREE TIMES A DAY
Refills: 0 | Status: DISCONTINUED | OUTPATIENT
Start: 2020-10-27 | End: 2020-11-02

## 2020-10-27 RX ADMIN — OXYCODONE AND ACETAMINOPHEN 1 TABLET(S): 5; 325 TABLET ORAL at 18:00

## 2020-10-27 RX ADMIN — BRIMONIDINE TARTRATE 1 DROP(S): 2 SOLUTION/ DROPS OPHTHALMIC at 19:20

## 2020-10-27 RX ADMIN — ATORVASTATIN CALCIUM 40 MILLIGRAM(S): 80 TABLET, FILM COATED ORAL at 22:01

## 2020-10-27 RX ADMIN — CEFTRIAXONE 100 MILLIGRAM(S): 500 INJECTION, POWDER, FOR SOLUTION INTRAMUSCULAR; INTRAVENOUS at 14:30

## 2020-10-27 RX ADMIN — DORZOLAMIDE HYDROCHLORIDE 1 DROP(S): 20 SOLUTION/ DROPS OPHTHALMIC at 22:05

## 2020-10-27 RX ADMIN — OXYCODONE AND ACETAMINOPHEN 1 TABLET(S): 5; 325 TABLET ORAL at 17:39

## 2020-10-27 NOTE — H&P ADULT - ATTENDING COMMENTS
patient seen and examined , agree with pgy 3 assesment and plan except as indicated above,   GEN Lying in no acute distress  HEENT Pupils equal and reactive to light and accommodationSupple Neck  PULM Clear to auscultation bilaterally  CV s1s2 regular rate and rhythm  GI + bowel sounds nontnender  EXT no cyanosis or edema  PSYCH awake alert and oriented x 3  INTEG No Lesions  NEURO Moves all extremities  #Hematuria with h/o d Uterine cancer s/p SHAAN w/ recurrence , on chemotherapy with last chemotherapy on friday in hospital , unremitting hematuria despite treatment for uti   #Mild-Mod tricuspid regurgitation   #CKD 2  Past records reviewed  Attempted to review Goals of Care with patient however patient does not want to speak about it at this time   Progress Note Handoff    Pending:  urology consult , hem onc , ultrasound pelvis  Family discussion: patient verbalized understanding and agreeable to plan of care     Disposition: Home___

## 2020-10-27 NOTE — CONSULT NOTE ADULT - ASSESSMENT
Patient is a 81 year old F patient with past medical history of Uterine cancer s/p SHAAN, malignant ascites on therapeutic paracentesis, CAD s/p CABG, segmental pulmonary embolism on Lovenox recently switched to xarelto, HTN, depression, recent admission for hematuria/UTI is referred from oncology clinic for worsening hematuria.      #) Hematuria: Recently admitted for hematuria/UTI and discharged last week on PO antibiotics. She was seen in the clinic today for worsening hematuria so referred to the ED.  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  - Prior TAHBSO in 2016, with adjuvant chemotherapy  - Started on weekly Carbo/taxol weekly 3 on 1 off, started on 08/14/20  - PICC placement by IR  on 10/21  - Patient's chemo on 10/23 held due to UTI   - Next chemo due 10/30    #) Acute on Chronic PE in setting of known Malignancy   - Switch to heparin gtt (was on Xarelto)   Patient is a 81 year old F patient with past medical history of Uterine cancer s/p SHAAN, malignant ascites on therapeutic paracentesis, CAD s/p CABG, segmental pulmonary embolism on Lovenox recently switched to xarelto, HTN, depression, recent admission for hematuria/UTI is referred from oncology clinic for worsening hematuria.      #) Hematuria: Recently admitted for hematuria/UTI and discharged last week on Augment. She was seen in the clinic today and was noted to have worsening hematuria and a drop in hemoglobin so referred to the ED for further workup. 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

## 2020-10-27 NOTE — H&P ADULT - NSHPLABSRESULTS_GEN_ALL_CORE
LABS:                 8.7    4.62  )-----------( 194      ( 27 Oct 2020 13:31 )             27.9     10    139  |  105  |  13  ----------------------------<  105<H>  4.1   |  26  |  0.9    Ca    8.0<L>      27 Oct 2020 13:31    TPro  6.3  /  Alb  3.9  /  TBili  0.3  /  DBili  x   /  AST  15  /  ALT  6   /  AlkPhos  67  10-27          Urinalysis Basic - ( 27 Oct 2020 13:31 )  Color: Dark Brown / Appearance: Turbid / S.009 / pH: x  Gluc: x / Ketone: Negative  / Bili: Negative / Urobili: <2 mg/dL   Blood: x / Protein: 100 mg/dL / Nitrite: Negative   Leuk Esterase: Moderate / RBC: >720 /HPF /  /HPF   Sq Epi: x / Non Sq Epi: 0 /HPF / Bacteria: Negative      PT/INR - ( 27 Oct 2020 13:31 )   PT: 19.80 sec;   INR: 1.72 ratio    PTT - ( 27 Oct 2020 13:31 )  PTT:36.6 sec    RADIOLOGY:  CT Abdomen and Pelvis w/ IV Cont (10.21.20 @ 10:27) >  Enhancement of the left ureter with mild inflammatory changes of the urinary bladder dome may reflect evidence of ascending infection and cystitis. In the appropriate clinical setting, inflammation of the ureter may also be secondary to radiation-induced changes

## 2020-10-27 NOTE — ED PROVIDER NOTE - PROGRESS NOTE DETAILS
Assessed by Dr. Gutierrez who wants patient admitted to hospitalist service with urology consult. Recommends keeping patient on heparin drip instead of xarelto. -DC

## 2020-10-27 NOTE — H&P ADULT - ASSESSMENT
81 year old lady with past medical history of Uterine cancer s/p SHAAN in 2016 with adjuvant therapy and recent recurrence with malignant ascites on therapeutic paracentesis and chemotherapy every Friday ,CAD s/p CABG, history of PE (unprovoked) diagnosed on August for which Lovenox was started then recently switched to Xarelto, HTN, depression, recent admission for hematuria/UTI presented today from oncology clinic for worsening hematuria.    #Acute Hematuria in the setting of pulmonary embolism and anticoagulation  -Likely secondary to anticoagulation with Xarelto as the patient was shifted to Xarelto recently but underlying structural causes should be ruled out 9bladder polyp, mass..) in a high risk cancer patient  -Recent admission for hematuria and treatment for cystitis with Augmentin/ No history of kidney stones  -CT Abdomen and Pelvis w/ IV Cont (10.21.20 @ 10:27) >Enhancement of the left ureter with mild inflammatory changes of the urinary bladder dome may reflect evidence of ascending infection and cystitis. 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  - s/p TAHBSO in 2016 with adjuvant chemotherapy  - Started on weekly Carbo/taxol weekly 3 on 1 off, started on 08/14/20 after found to have malignant ascites on paracentesis (4L removed per patient)  - PICC placement by IR  on 10/21  - Patient's chemo on 10/23 held due to UTI   - Next chemo due 10/30    #Pulmonary embolism in the setting of malignancy  -High risk patient needs to be on anticoagulation life long  -stop Xarelto and shift to heparin IV tomorrow  -If patient didn't tolerate Anticoagulation, might need IVC filter eventually  -Currently asymptomatic/ Off O2    #CAD s/p CABG  -On aspirin and statin with beta blocker  -Continue home medications    #Depression  -Continue home medication  -Patient has low mood, anxious    #Misc  -Hold anticoagulation  -D: DASH  -A: as tolerated  -No need for PPI

## 2020-10-27 NOTE — ED ADULT NURSE NOTE - CHIEF COMPLAINT QUOTE
sent in from [facility] for worsening hematuria x 1 week, hgb dropping, currently at 8.8

## 2020-10-27 NOTE — ED PROVIDER NOTE - CARE PLAN
Principal Discharge DX:	Hematuria  Secondary Diagnosis:	Anemia   Principal Discharge DX:	Hematuria  Secondary Diagnosis:	Anemia  Secondary Diagnosis:	UTI (urinary tract infection)

## 2020-10-27 NOTE — H&P ADULT - NSHPPHYSICALEXAM_GEN_ALL_CORE
Vital Signs  T(C): 36.9 (27 Oct 2020 11:40), Max: 36.9 (27 Oct 2020 11:40)  T(F): 98.4 (27 Oct 2020 11:40), Max: 98.4 (27 Oct 2020 11:40)  HR: 82 (27 Oct 2020 11:40) (82 - 82)  BP: 127/81 (27 Oct 2020 11:40) (127/81 - 127/81)  SpO2: 100% (27 Oct 2020 11:40) (100% - 100%)    PHYSICAL EXAM:  General: looks anxious and depressed mood, crying during encounter  PULM: Clear to auscultation bilaterally, no significant sputum production.  CVS: Regular rate and rhythm, no murmurs, rubs, or gallops/ CABG scar  ABD: Soft, nondistended, nontender, no masses.  EXT: No edema, nontender. PICC line inserted Right sided arm  SKIN: Warm and well perfused, no rashes noted

## 2020-10-27 NOTE — ED PROVIDER NOTE - CLINICAL SUMMARY MEDICAL DECISION MAKING FREE TEXT BOX
82 y/o F PMHx Uterine cancer s/p SHAAN in 2016 with adjuvant therapy and recent recurrence with malignant ascites on therapeutic paracentesis and chemotherapy every Friday ,CAD s/p CABG, history of unprovoked PE diagnosed on August for which Lovenox was started then recently switched to Xarelto, HTN, depression, recent admission for hematuria/UTI presented today from oncology clinic for worsening hematuria.  Dr. Mansfield sent patient to the ED today after seeing a huge amount of blood in urine with clots. Patient is complicated because she cannot stop her anticoagulation as she previously had a PE.    History goes back to 1 week ago when the patient started to have hematuria, she was admitted and treated for a UTI with Augmentin that she continued for 4 days after discharge. According to patient hematuria didn't improve until today when she noticed that her urine is starting to clear up , there was no clots, no urinary symptoms of dysuria, urgency, frequency or straining. No history of renal stones and no history of trauma.    During last admission 1 week ago CT AP showed inflammation of the ureter that was suggestive of  UTI vs radiation induced, but patient had no history of radiotherapy. Review of system was negative for fatigue, SOB, chest pain, epistaxis, melena, GI or  symptoms.  On exam, VS reviewed. Patient non-toxic appearing. No abdominal tenderness. No CVA tenderness. In the ED patient was found to have Hb 8.7 with her base line around 10, INR 1.7. Patient was admitted for further management, Urology evaluation.

## 2020-10-27 NOTE — H&P ADULT - HISTORY OF PRESENT ILLNESS
81 year old lady with past medical history of Uterine cancer s/p SHAAN in 2016 with adjuvant therapy and recent recurrence with malignant ascites on therapeutic paracentesis and chemotherapy every Friday ,CAD s/p CABG, history of PE (unprovoked) diagnosed on August for which Lovenox was started then recently switched to Xarelto, HTN, depression, recent admission for hematuria/UTI presented today from oncology clinic for worsening hematuria.  History goes back to 1 week ago when the patient started to have hematuria, she was admitted and treated for a UTI with Augmentin that she continued for 4 days after discharge. According to patient hematuria didn't improve until today when she noticed that her urine is starting to clear up , there was no clots, no urinary symptoms of dysuria, urgency, frequency or straining. No history of renal stones and no history of trauma.  During last admission 1 week ago CT AP showed inflammation of the ureter that was suggestive of  UTI vs radiation induced, but patient had no history of radiotherapy. Review of system was negative for fatigue, SOB, chest pain, epistaxis, melena, GI or  symptoms.  In the ED patient was found to have Hb 8.7 with her base line around 10, INR 1.7. Patient was admitted for further management.

## 2020-10-27 NOTE — CONSULT NOTE ADULT - SUBJECTIVE AND OBJECTIVE BOX
HPI: Patient is a 81 year old F patient with past medical history of Uterine cancer s/p SHAAN, malignant ascites on therapeutic paracentesis, CAD s/p CABG, segmental pulmonary embolism on Lovenox recently switched to xarelto, HTN, depression, recent admission for hematuria/UTI is referred from oncology clinic for worsening hematuria.         ROS negative except for the above.     PAST MEDICAL & SURGICAL HISTORY:  HLD (hyperlipidemia)    HTN (hypertension)    CAD (coronary artery disease)    Coronary artery disease  s/p CABG X2    Glaucoma    Cataract of right eye    High cholesterol    Hypertension    Uterine cancer  s/p SHAAN and chemo    History of coronary artery bypass surgery    Athscl CABG, unsp, w angina pectoris w documented spasm    H/O total hysterectomy        SOCIAL HISTORY:    FAMILY HISTORY:      MEDICATIONS  (STANDING):    MEDICATIONS  (PRN):    Height (cm): 160 (10-27-20 @ 11:40)  Weight (kg): 65.3 (10-27-20 @ 11:40)  BMI (kg/m2): 25.5 (10-27-20 @ 11:40)  BSA (m2): 1.68 (10-27-20 @ 11:40)  Allergies    chloroquine (Hives)  quinine (Urticaria)    Intolerances        Vital Signs Last 24 Hrs  T(C): 36.9 (27 Oct 2020 11:40), Max: 36.9 (27 Oct 2020 11:40)  T(F): 98.4 (27 Oct 2020 11:40), Max: 98.4 (27 Oct 2020 11:40)  HR: 82 (27 Oct 2020 11:40) (82 - 82)  BP: 127/81 (27 Oct 2020 11:40) (127/81 - 127/81)  BP(mean): --  RR: 18 (27 Oct 2020 11:40) (18 - 18)  SpO2: 100% (27 Oct 2020 11:40) (100% - 100%)    PHYSICAL EXAM  General: NAD  HEENT: NCAT, EOMI  Neck: supple  CV: Regular rate and rhythm  Lungs: Clear to ascultation bilaterally, no respiratory distress  Abdomen: soft non-tender non-distended  Ext: No edema  Skin: no rashes and no petechiae  Neuro: alert and oriented, no focal deficits      LABS:                                        BLOOD SMEAR INTERPRETATION:       RADIOLOGY & ADDITIONAL STUDIES:    < from: CT Abdomen and Pelvis w/ IV Cont (10.21.20 @ 10:27) >    Enhancement of the left ureter with mild inflammatory changes of the urinary bladder dome may reflect evidence of ascending infection and cystitis. In the appropriate clinical setting, inflammation of the ureter may also be secondary to radiation-induced changes    < end of copied text >       HPI: Patient is a 81 year old F patient with past medical history of Uterine cancer s/p SHAAN 2016 with adjuvant therapy then had recurrence with malignant ascites on therapeutic paracentesis and restarted on chemo, CAD s/p CABG, segmental pulmonary embolism on Lovenox recently switched to xarelto, HTN, depression, recent admission for hematuria/UTI is referred from oncology clinic for worsening hematuria.  She was admitted last week for hematuria and treated for a UTI with Augmentin. Her CT AP showed inflammation of the ureter, likely UTI vs radiation induced, however the patient has not had radiation. She completed her abx and returned to the oncology clinic today, still having slick hematuria with a drop in her hemoglobin so she was sent back to the ED for further workup.      ROS negative except for the above.     PAST MEDICAL & SURGICAL HISTORY:  HLD (hyperlipidemia)    HTN (hypertension)    CAD (coronary artery disease)    Coronary artery disease  s/p CABG X2    Glaucoma    Cataract of right eye    High cholesterol    Hypertension    Uterine cancer  s/p SHAAN and chemo    History of coronary artery bypass surgery    Athscl CABG, unsp, w angina pectoris w documented spasm    H/O total hysterectomy        SOCIAL HISTORY:    FAMILY HISTORY:      MEDICATIONS  (STANDING):    MEDICATIONS  (PRN):    Height (cm): 160 (10-27-20 @ 11:40)  Weight (kg): 65.3 (10-27-20 @ 11:40)  BMI (kg/m2): 25.5 (10-27-20 @ 11:40)  BSA (m2): 1.68 (10-27-20 @ 11:40)  Allergies    chloroquine (Hives)  quinine (Urticaria)    Intolerances        Vital Signs Last 24 Hrs  T(C): 36.9 (27 Oct 2020 11:40), Max: 36.9 (27 Oct 2020 11:40)  T(F): 98.4 (27 Oct 2020 11:40), Max: 98.4 (27 Oct 2020 11:40)  HR: 82 (27 Oct 2020 11:40) (82 - 82)  BP: 127/81 (27 Oct 2020 11:40) (127/81 - 127/81)  BP(mean): --  RR: 18 (27 Oct 2020 11:40) (18 - 18)  SpO2: 100% (27 Oct 2020 11:40) (100% - 100%)    PHYSICAL EXAM  General: NAD  HEENT: NCAT, EOMI  Neck: supple  CV: Regular rate and rhythm  Lungs: Clear to ascultation bilaterally, no respiratory distress  Abdomen: soft non-tender non-distended  Ext: No edema  Skin: no rashes and no petechiae  Neuro: alert and oriented, no focal deficits      LABS:                                        BLOOD SMEAR INTERPRETATION:       RADIOLOGY & ADDITIONAL STUDIES:    < from: CT Abdomen and Pelvis w/ IV Cont (10.21.20 @ 10:27) >    Enhancement of the left ureter with mild inflammatory changes of the urinary bladder dome may reflect evidence of ascending infection and cystitis. In the appropriate clinical setting, inflammation of the ureter may also be secondary to radiation-induced changes    < end of copied text >

## 2020-10-27 NOTE — ED PROVIDER NOTE - OBJECTIVE STATEMENT
Patient is a 79 yo F w/ hx of uterine cancer s/p SHAAN w/ recurrence on chemo, HTN, DVT/PE on xarelto, CAD s/p CABG on ASA referred to ED by Dr. Gutierrez for hematuria. Patient has had slick hematuria x 2 weeks that has been progressively getting worse associated with drop in hemoglobin to 8 on outpatient labs. Was recently treated for UTI after admission last week with CT showing inflammatory changes to the bladder and left ureter. No fevers, no abdominal pain, no N/V/D, no back pain.

## 2020-10-27 NOTE — ED PROVIDER NOTE - ATTENDING CONTRIBUTION TO CARE
I personally evaluated patient. I agree with the findings and plan with all documentation on chart except as documented  in my note.    80 y/o F PMHx Uterine cancer s/p SHAAN in 2016 with adjuvant therapy and recent recurrence with malignant ascites on therapeutic paracentesis and chemotherapy every Friday ,CAD s/p CABG, history of unprovoked PE diagnosed on August for which Lovenox was started then recently switched to Xarelto, HTN, depression, recent admission for hematuria/UTI presented today from oncology clinic for worsening hematuria.  Dr. Mansfield sent patient to the ED today after seeing a huge amount of blood in urine with clots. Patient is complicated because she cannot stop her anticoagulation as she previously had a PE.    History goes back to 1 week ago when the patient started to have hematuria, she was admitted and treated for a UTI with Augmentin that she continued for 4 days after discharge. According to patient hematuria didn't improve until today when she noticed that her urine is starting to clear up , there was no clots, no urinary symptoms of dysuria, urgency, frequency or straining. No history of renal stones and no history of trauma.    During last admission 1 week ago CT AP showed inflammation of the ureter that was suggestive of  UTI vs radiation induced, but patient had no history of radiotherapy. Review of system was negative for fatigue, SOB, chest pain, epistaxis, melena, GI or  symptoms.  On exam, VS reviewed. Patient non-toxic appearing. No abdominal tenderness. No CVA tenderness. In the ED patient was found to have Hb 8.7 with her base line around 10, INR 1.7. Patient was admitted for further management, Urology evaluation.

## 2020-10-27 NOTE — ED PROVIDER NOTE - NS ED ROS FT
Constitutional: No fevers.   Eyes:  No visual changes, eye pain or discharge.  ENMT:  No sore throat or runny nose.  Cardiac:  No chest pain, SOB or edema.   Respiratory:  No cough or respiratory distress. No hemoptysis. No history of asthma or RAD.  GI:  No nausea, vomiting, diarrhea or abdominal pain.  :  +hematuria.   MS:  No myalgia, muscle weakness, joint pain or back pain.  Neuro:  No headache or weakness.  No LOC.  Skin:  No skin rash.   Endocrine: No history of thyroid disease or diabetes.

## 2020-10-27 NOTE — ED ADULT TRIAGE NOTE - CHIEF COMPLAINT QUOTE
sent in from [facility] for worsening hematuria x 1 week, hgb dropping, currently at 8.8

## 2020-10-27 NOTE — ED PROVIDER NOTE - PHYSICAL EXAMINATION
CONSTITUTIONAL: Well-developed; well-nourished; in no acute distress.   SKIN: warm, dry  HEAD: Normocephalic; atraumatic.  EYES: PERRL, EOMI, no conjunctival erythema  ENT: No nasal discharge; airway clear.  NECK: Supple; non tender.  CARD: S1, S2 normal; no murmurs, gallops, or rubs. Regular rate and rhythm.   RESP: No wheezes, rales or rhonchi.  ABD: soft ntnd.  EXT: Normal ROM.  No clubbing, cyanosis or edema.   NEURO: Alert, oriented, grossly unremarkable.  PSYCH: Cooperative, appropriate.

## 2020-10-28 ENCOUNTER — TRANSCRIPTION ENCOUNTER (OUTPATIENT)
Age: 80
End: 2020-10-28

## 2020-10-28 LAB
ALBUMIN SERPL ELPH-MCNC: 3.6 G/DL — SIGNIFICANT CHANGE UP (ref 3.5–5.2)
ALP SERPL-CCNC: 65 U/L — SIGNIFICANT CHANGE UP (ref 30–115)
ALT FLD-CCNC: 6 U/L — SIGNIFICANT CHANGE UP (ref 0–41)
ANION GAP SERPL CALC-SCNC: 9 MMOL/L — SIGNIFICANT CHANGE UP (ref 7–14)
AST SERPL-CCNC: 15 U/L — SIGNIFICANT CHANGE UP (ref 0–41)
BASOPHILS # BLD AUTO: 0.01 K/UL — SIGNIFICANT CHANGE UP (ref 0–0.2)
BASOPHILS NFR BLD AUTO: 0.3 % — SIGNIFICANT CHANGE UP (ref 0–1)
BILIRUB SERPL-MCNC: 0.2 MG/DL — SIGNIFICANT CHANGE UP (ref 0.2–1.2)
BUN SERPL-MCNC: 14 MG/DL — SIGNIFICANT CHANGE UP (ref 10–20)
CALCIUM SERPL-MCNC: 8.3 MG/DL — LOW (ref 8.5–10.1)
CHLORIDE SERPL-SCNC: 103 MMOL/L — SIGNIFICANT CHANGE UP (ref 98–110)
CO2 SERPL-SCNC: 26 MMOL/L — SIGNIFICANT CHANGE UP (ref 17–32)
CREAT SERPL-MCNC: 0.9 MG/DL — SIGNIFICANT CHANGE UP (ref 0.7–1.5)
CULTURE RESULTS: NO GROWTH — SIGNIFICANT CHANGE UP
EOSINOPHIL # BLD AUTO: 0.04 K/UL — SIGNIFICANT CHANGE UP (ref 0–0.7)
EOSINOPHIL NFR BLD AUTO: 1 % — SIGNIFICANT CHANGE UP (ref 0–8)
GLUCOSE SERPL-MCNC: 129 MG/DL — HIGH (ref 70–99)
HCT VFR BLD CALC: 26.9 % — LOW (ref 37–47)
HGB BLD-MCNC: 8.5 G/DL — LOW (ref 12–16)
IMM GRANULOCYTES NFR BLD AUTO: 0.5 % — HIGH (ref 0.1–0.3)
LYMPHOCYTES # BLD AUTO: 1.58 K/UL — SIGNIFICANT CHANGE UP (ref 1.2–3.4)
LYMPHOCYTES # BLD AUTO: 41 % — SIGNIFICANT CHANGE UP (ref 20.5–51.1)
MAGNESIUM SERPL-MCNC: 1.9 MG/DL — SIGNIFICANT CHANGE UP (ref 1.8–2.4)
MCHC RBC-ENTMCNC: 27.2 PG — SIGNIFICANT CHANGE UP (ref 27–31)
MCHC RBC-ENTMCNC: 31.6 G/DL — LOW (ref 32–37)
MCV RBC AUTO: 86.2 FL — SIGNIFICANT CHANGE UP (ref 81–99)
MONOCYTES # BLD AUTO: 0.37 K/UL — SIGNIFICANT CHANGE UP (ref 0.1–0.6)
MONOCYTES NFR BLD AUTO: 9.6 % — HIGH (ref 1.7–9.3)
NEUTROPHILS # BLD AUTO: 1.83 K/UL — SIGNIFICANT CHANGE UP (ref 1.4–6.5)
NEUTROPHILS NFR BLD AUTO: 47.6 % — SIGNIFICANT CHANGE UP (ref 42.2–75.2)
NRBC # BLD: 0 /100 WBCS — SIGNIFICANT CHANGE UP (ref 0–0)
PLATELET # BLD AUTO: 183 K/UL — SIGNIFICANT CHANGE UP (ref 130–400)
POTASSIUM SERPL-MCNC: 4.2 MMOL/L — SIGNIFICANT CHANGE UP (ref 3.5–5)
POTASSIUM SERPL-SCNC: 4.2 MMOL/L — SIGNIFICANT CHANGE UP (ref 3.5–5)
PROT SERPL-MCNC: 5.9 G/DL — LOW (ref 6–8)
RBC # BLD: 3.12 M/UL — LOW (ref 4.2–5.4)
RBC # FLD: 20.6 % — HIGH (ref 11.5–14.5)
SODIUM SERPL-SCNC: 138 MMOL/L — SIGNIFICANT CHANGE UP (ref 135–146)
SPECIMEN SOURCE: SIGNIFICANT CHANGE UP
WBC # BLD: 3.85 K/UL — LOW (ref 4.8–10.8)
WBC # FLD AUTO: 3.85 K/UL — LOW (ref 4.8–10.8)

## 2020-10-28 PROCEDURE — 76770 US EXAM ABDO BACK WALL COMP: CPT | Mod: 26

## 2020-10-28 PROCEDURE — 99232 SBSQ HOSP IP/OBS MODERATE 35: CPT

## 2020-10-28 PROCEDURE — 99233 SBSQ HOSP IP/OBS HIGH 50: CPT

## 2020-10-28 RX ORDER — CLONAZEPAM 1 MG
0.5 TABLET ORAL DAILY
Refills: 0 | Status: DISCONTINUED | OUTPATIENT
Start: 2020-10-28 | End: 2020-11-02

## 2020-10-28 RX ORDER — LACTULOSE 10 G/15ML
10 SOLUTION ORAL ONCE
Refills: 0 | Status: COMPLETED | OUTPATIENT
Start: 2020-10-28 | End: 2020-10-28

## 2020-10-28 RX ORDER — HEPARIN SODIUM 5000 [USP'U]/ML
1100 INJECTION INTRAVENOUS; SUBCUTANEOUS
Qty: 25000 | Refills: 0 | Status: DISCONTINUED | OUTPATIENT
Start: 2020-10-28 | End: 2020-10-28

## 2020-10-28 RX ORDER — POLYETHYLENE GLYCOL 3350 17 G/17G
17 POWDER, FOR SOLUTION ORAL AT BEDTIME
Refills: 0 | Status: DISCONTINUED | OUTPATIENT
Start: 2020-10-28 | End: 2020-11-02

## 2020-10-28 RX ORDER — ENOXAPARIN SODIUM 100 MG/ML
60 INJECTION SUBCUTANEOUS
Qty: 3600 | Refills: 0
Start: 2020-10-28 | End: 2020-11-26

## 2020-10-28 RX ORDER — SENNA PLUS 8.6 MG/1
2 TABLET ORAL AT BEDTIME
Refills: 0 | Status: DISCONTINUED | OUTPATIENT
Start: 2020-10-28 | End: 2020-11-02

## 2020-10-28 RX ORDER — ENOXAPARIN SODIUM 100 MG/ML
60 INJECTION SUBCUTANEOUS EVERY 12 HOURS
Refills: 0 | Status: DISCONTINUED | OUTPATIENT
Start: 2020-10-28 | End: 2020-10-29

## 2020-10-28 RX ADMIN — SENNA PLUS 2 TABLET(S): 8.6 TABLET ORAL at 22:58

## 2020-10-28 RX ADMIN — ATORVASTATIN CALCIUM 40 MILLIGRAM(S): 80 TABLET, FILM COATED ORAL at 22:58

## 2020-10-28 RX ADMIN — ENOXAPARIN SODIUM 60 MILLIGRAM(S): 100 INJECTION SUBCUTANEOUS at 18:24

## 2020-10-28 RX ADMIN — OXYCODONE AND ACETAMINOPHEN 1 TABLET(S): 5; 325 TABLET ORAL at 14:50

## 2020-10-28 RX ADMIN — Medication 25 MILLIGRAM(S): at 06:29

## 2020-10-28 RX ADMIN — DORZOLAMIDE HYDROCHLORIDE 1 DROP(S): 20 SOLUTION/ DROPS OPHTHALMIC at 22:58

## 2020-10-28 RX ADMIN — DORZOLAMIDE HYDROCHLORIDE 1 DROP(S): 20 SOLUTION/ DROPS OPHTHALMIC at 08:14

## 2020-10-28 RX ADMIN — BRIMONIDINE TARTRATE 1 DROP(S): 2 SOLUTION/ DROPS OPHTHALMIC at 06:29

## 2020-10-28 RX ADMIN — DORZOLAMIDE HYDROCHLORIDE 1 DROP(S): 20 SOLUTION/ DROPS OPHTHALMIC at 14:51

## 2020-10-28 RX ADMIN — OXYCODONE AND ACETAMINOPHEN 1 TABLET(S): 5; 325 TABLET ORAL at 14:20

## 2020-10-28 RX ADMIN — BRIMONIDINE TARTRATE 1 DROP(S): 2 SOLUTION/ DROPS OPHTHALMIC at 18:24

## 2020-10-28 RX ADMIN — AMLODIPINE BESYLATE 5 MILLIGRAM(S): 2.5 TABLET ORAL at 06:29

## 2020-10-28 RX ADMIN — Medication 81 MILLIGRAM(S): at 12:27

## 2020-10-28 RX ADMIN — SERTRALINE 25 MILLIGRAM(S): 25 TABLET, FILM COATED ORAL at 12:27

## 2020-10-28 RX ADMIN — LACTULOSE 10 GRAM(S): 10 SOLUTION ORAL at 18:27

## 2020-10-28 NOTE — PROGRESS NOTE ADULT - ASSESSMENT
Patient is a 81 year old F patient with past medical history of Uterine cancer s/p SHAAN, malignant ascites on therapeutic paracentesis, CAD s/p CABG, segmental pulmonary embolism on Lovenox recently switched to xarelto, HTN, depression, recent admission for hematuria/UTI is referred from oncology clinic for worsening hematuria.      #) Hematuria: Recently admitted for hematuria/UTI and discharged last week on Augmentin  - Patient was evaluated in clinic and was noted to have worsening hematuria and a drop in hemoglobin so referred to the ED  - She cannot come off AC as she recently had a PE also with history of DVT   - Check UA, UCx (previous culture from a week ago non-diagnostic)  - Urology evaluation   - Lovenox subQ 1mg/kg (she likely will need to be discharged on this   - Recent CT AP showed inflammation of the ureter    #) Recurrent adenocarcinoma Mullerian origin, malignant ascites dx on 08/08/2020  - s/p TAHBSO in 2016 with adjuvant chemotherapy  - Started on weekly Carbo/taxol weekly 3 on 1 off, started on 08/14/20 after found to have malignant ascites on paracentesis (4L removed per patient)  - PICC placement by IR  on 10/21  - Patient's chemo on 10/23 held due to UTI   - Next chemo due 10/30    #) Acute on Chronic PE in setting of known Malignancy   - On Lovenox, therapeutic dosing     If patient does not bleed and is cleared by urology, she can be discharged from hematology perspective and receive chemo outpatient on 10/30

## 2020-10-28 NOTE — PROGRESS NOTE ADULT - ASSESSMENT
ASSESSMENT & PLAN    Patient is a 80y old woman with PMH uterine cancer (s/p SHAAN in 2016 and adjuvant therapy, recurrence with malignant ascites currently undergoing therapeutic paracentesis/chemotherapy on Fridays), CAD (s/p CABG), history of PE (August 2020; was on Xarelto OP), HTN, depression who presents for hematuria.    1. Hematuria  -Previously discharged for similar presentation; completed course of Augmentin for presumed cystitis   -Resolving today  -CT A/P from prior admission showing inflamed left ureter indicative of potential cystitis   -Ultrasound kidney showing mild left hydronephrosis  - Hemoglobin today 8.5 (from 8.7)  -Urology consult appreciated  -Keep active T+S  -F/U UCX  -F/U CBC    2. Adenocarcinoma of mullerian origin  -S/P SHAAN in 2016 with adjuvant chemotherapy  -Currently on carbo/taxol weekly 3on 1off  -PICC in RUE for chemo  -Chemotherapy scheduled 10/30    3. PE  -Initiated on lovenox 60mg BID    4. CAD  -Continue aspirin/statin/beta blocker    5. Anxiety  -Klonopin 0.5 q24hrs    MISC:  -DVT PPX: lovenox  -GI PPX: none  -Activity: As tolerated  -Diet: DASH  -CODE: FULL

## 2020-10-28 NOTE — PROGRESS NOTE ADULT - SUBJECTIVE AND OBJECTIVE BOX
Patient is a 80y old  Female who presents with a chief complaint of hematuria (27 Oct 2020 15:27)      Subjective: Patient feels well today.       Vital Signs Last 24 Hrs  T(C): 37.6 (28 Oct 2020 13:33), Max: 37.6 (28 Oct 2020 13:33)  T(F): 99.6 (28 Oct 2020 13:33), Max: 99.6 (28 Oct 2020 13:33)  HR: 85 (28 Oct 2020 13:33) (72 - 89)  BP: 100/58 (28 Oct 2020 13:33) (100/58 - 130/68)  BP(mean): --  RR: 18 (28 Oct 2020 13:33) (18 - 18)  SpO2: 100% (28 Oct 2020 12:23) (98% - 100%)    PHYSICAL EXAM  General: adult in NAD  HEENT: clear oropharynx, anicteric sclera, pink conjunctiva  Neck: supple  CV: normal S1/S2 with no murmur rubs or gallops  Lungs: positive air movement b/l ant lungs,clear to auscultation, no wheezes, no rales  Abdomen: soft non-tender non-distended, no hepatosplenomegaly  Ext: no clubbing cyanosis or edema  Skin: no rashes and no petechiae  Neuro: alert and oriented X 4, no focal deficits    MEDICATIONS  (STANDING):  amLODIPine   Tablet 5 milliGRAM(s) Oral daily  aspirin enteric coated 81 milliGRAM(s) Oral daily  atorvastatin 40 milliGRAM(s) Oral at bedtime  brimonidine 0.2% Ophthalmic Solution 1 Drop(s) Both EYES two times a day  dorzolamide 2% Ophthalmic Solution 1 Drop(s) Both EYES three times a day  enoxaparin Injectable 60 milliGRAM(s) SubCutaneous every 12 hours  metoprolol succinate ER 25 milliGRAM(s) Oral daily  sertraline 25 milliGRAM(s) Oral daily    MEDICATIONS  (PRN):  clonazePAM  Tablet 0.5 milliGRAM(s) Oral daily PRN anxiety  oxycodone    5 mG/acetaminophen 325 mG 1 Tablet(s) Oral every 6 hours PRN Moderate Pain (4 - 6)      LABS:                          8.7    4.62  )-----------( 194      ( 27 Oct 2020 13:31 )             27.9         Mean Cell Volume : 86.6 fL  Mean Cell Hemoglobin : 27.0 pg  Mean Cell Hemoglobin Concentration : 31.2 g/dL  Auto Neutrophil # : 3.01 K/uL  Auto Lymphocyte # : 1.30 K/uL  Auto Monocyte # : 0.27 K/uL  Auto Eosinophil # : 0.02 K/uL  Auto Basophil # : 0.01 K/uL  Auto Neutrophil % : 65.3 %  Auto Lymphocyte % : 28.1 %  Auto Monocyte % : 5.8 %  Auto Eosinophil % : 0.4 %  Auto Basophil % : 0.2 %      Serial CBC's  10-27 @ 13:31  Hct-27.9 / Hgb-8.7 / Plat-194 / RBC-3.22 / WBC-4.62      10-27    139  |  105  |  13  ----------------------------<  105<H>  4.1   |  26  |  0.9    Ca    8.0<L>      27 Oct 2020 13:31    TPro  6.3  /  Alb  3.9  /  TBili  0.3  /  DBili  x   /  AST  15  /  ALT  6   /  AlkPhos  67  10-27      PT/INR - ( 27 Oct 2020 13:31 )   PT: 19.80 sec;   INR: 1.72 ratio         PTT - ( 27 Oct 2020 13:31 )  PTT:36.6 sec                Urinalysis Basic - ( 27 Oct 2020 13:31 )    Color: Dark Brown / Appearance: Turbid / S.009 / pH: x  Gluc: x / Ketone: Negative  / Bili: Negative / Urobili: <2 mg/dL   Blood: x / Protein: 100 mg/dL / Nitrite: Negative   Leuk Esterase: Moderate / RBC: >720 /HPF /  /HPF   Sq Epi: x / Non Sq Epi: 0 /HPF / Bacteria: Negative                BLOOD SMEAR INTERPRETATION:       RADIOLOGY & ADDITIONAL STUDIES:

## 2020-10-28 NOTE — CONSULT NOTE ADULT - ASSESSMENT
Pt is a 81 yo F with a PMHx of uterine ca s/p SHAAN currently on chemotx, recent PE and DVT on Eliquis at home a/w hematuria, currently resolved; RBUS showing mild L hydronephrosis.  Cr 0.9     ·	f/u UCx, urine cytology   ·	Cystoscopy as OP  ·	Monitor h/h; transfuse prn   ·	will d/w attng

## 2020-10-28 NOTE — DISCHARGE NOTE NURSING/CASE MANAGEMENT/SOCIAL WORK - PATIENT PORTAL LINK FT
You can access the FollowMyHealth Patient Portal offered by [facility] by registering at the following website: [email or web address]. By joining [employer]’s FollowMyHealth portal, you will also be able to view your health information using other applications (apps) compatible with our system.

## 2020-10-28 NOTE — CONSULT NOTE ADULT - SUBJECTIVE AND OBJECTIVE BOX
UROLOGY CONSULT:    Pt is a 79 yo F with a PMHx of uterine ca s/p SHAAN currently on chemotx, recent PE and DVT on Eliquis at home a/w hematuria, currently resolved. Pt seen and examined at bedside. States she first noted hematuria last Thursday, blood tinged urine without clots. Denies fevers, chills, flank pain.     PAST MEDICAL & SURGICAL HISTORY:  HLD (hyperlipidemia)  HTN (hypertension)  Coronary artery disease, s/p CABG X2  Glaucoma  Cataract of right eye  High cholesterol   Hypertension  Uterine cancer; s/p SHAAN and chemo  History of coronary artery bypass surgery  Athscl CABG, unsp, w angina pectoris w documented spasm    MEDICATIONS  (STANDING):  amLODIPine   Tablet 5 milliGRAM(s) Oral daily  aspirin enteric coated 81 milliGRAM(s) Oral daily  atorvastatin 40 milliGRAM(s) Oral at bedtime  brimonidine 0.2% Ophthalmic Solution 1 Drop(s) Both EYES two times a day  dorzolamide 2% Ophthalmic Solution 1 Drop(s) Both EYES three times a day  enoxaparin Injectable 60 milliGRAM(s) SubCutaneous every 12 hours  lactulose Syrup 10 Gram(s) Oral once  metoprolol succinate ER 25 milliGRAM(s) Oral daily  polyethylene glycol 3350 17 Gram(s) Oral at bedtime  senna 2 Tablet(s) Oral at bedtime  sertraline 25 milliGRAM(s) Oral daily    MEDICATIONS  (PRN):  clonazePAM  Tablet 0.5 milliGRAM(s) Oral daily PRN anxiety  oxycodone    5 mG/acetaminophen 325 mG 1 Tablet(s) Oral every 6 hours PRN Moderate Pain (4 - 6)    Allergies  chloroquine (Hives)  quinine (Urticaria)    SOCIAL HISTORY: No illicit drug use    FAMILY HISTORY:    `Review of Systems:  [X] A ten point review of systems was otherwise negative except as noted.  [ ] Due to altered mental status/ intubation, subjective information was not able to be obtained from the patient. History was obtained, to the extent possible, from review of the chart and collateral sources of information     Vital Signs Last 24 Hrs  T(C): 37.6 (28 Oct 2020 13:33), Max: 37.6 (28 Oct 2020 13:33)  T(F): 99.6 (28 Oct 2020 13:33), Max: 99.6 (28 Oct 2020 13:33)  HR: 85 (28 Oct 2020 13:33) (72 - 89)  BP: 100/58 (28 Oct 2020 13:33) (100/58 - 130/68)  RR: 18 (28 Oct 2020 13:33) (18 - 18)  SpO2: 100% (28 Oct 2020 12:23) (98% - 100%)    PHYSICAL EXAM:  GEN: NAD  NEURO: Alert and oriented   SKIN: Good color, non diaphoretic.  HEENT: NC/AT  RESP: Non-labored breathing  CARDIO: +S1/S2  ABDO: Soft, ND  BACK: No CVAT B/L  : Pt voiding by self, yellow urine     LABS:                        8.5    3.85  )-----------( 183      ( 28 Oct 2020 15:18 )             26.9     10    138  |  103  |  14  ----------------------------<  129<H>  4.2   |  26  |  0.9    Ca    8.3<L>      28 Oct 2020 15:18  Mg     1.9     10-28    TPro  5.9<L>  /  Alb  3.6  /  TBili  0.2  /  DBili  x   /  AST  15  /  ALT  6   /  AlkPhos  65  10    PT/INR - ( 27 Oct 2020 13:31 )   PT: 19.80 sec;   INR: 1.72 ratio         PTT - ( 27 Oct 2020 13:31 )  PTT:36.6 sec  Urinalysis Basic - ( 27 Oct 2020 13:31 )    Color: Dark Brown / Appearance: Turbid / S.009 / pH: x  Gluc: x / Ketone: Negative  / Bili: Negative / Urobili: <2 mg/dL   Blood: x / Protein: 100 mg/dL / Nitrite: Negative   Leuk Esterase: Moderate / RBC: >720 /HPF /  /HPF   Sq Epi: x / Non Sq Epi: 0 /HPF / Bacteria: Negative    RADIOLOGY & ADDITIONAL STUDIES:  < from: US Kidney and Bladder (10.28.20 @ 07:35) >  EXAM:  US KIDNEYS AND BLADDER        PROCEDURE DATE:  10/28/2020      INTERPRETATION:  CLINICAL INFORMATION: Hematuria.    COMPARISON: CT of the abdomen and pelvis on 10/21/2020.    TECHNIQUE: Sonography of the kidneys and bladder.    FINDINGS:    Right kidney: 8.9 (cm). No renal mass, hydronephrosis or calculi.    Left kidney:  8.1 (cm). No stones seen by ultrasound. Mild left hydronephrosis.    Urinary bladder: No debris or calculus.  Prevoid volume of approximately 122 cc.  Postvoid volume is approximately 0 cc.      IMPRESSION:    No stones seen by ultrasound.    Again seen mild left hydronephrosis.      ARMANDO BOYD MD; Attending Radiologist  This document has been electronically signed. Oct 28 2020 10:02AM    < end of copied text >

## 2020-10-28 NOTE — PATIENT PROFILE ADULT - NSPROPTRIGHTNOTIFY_GEN_A_NUR
Telephone Encounter by Deena Saunders ATC at 10/19/17 05:29 PM     Author:  Deena Saunders ATC Service:  (none) Author Type:  Trainer     Filed:  10/19/17 05:31 PM Encounter Date:  10/19/2017 Status:  Signed     :  Deena Saunders ATC (Technician)            Called and spoke to patient's mother. Per Morton he would like the patient to have a surgical consult with Dr. Ferreira. Patient's mom was offered an appointment tomorrow at 11-20-17 @ 1:30 pm at the [facility] with Dr. Ferreira. Patient's mother agreed and verbalized understanding.[JW1.1M]       Revision History        User Key Date/Time User Provider Type Action    > JW1.1 10/19/17 05:31 PM Deena Saunders ATC Technician Sign    M - Manual             declines

## 2020-10-28 NOTE — PROGRESS NOTE ADULT - SUBJECTIVE AND OBJECTIVE BOX
BEKAH NELSON 80y Female  MRN#: [id number]   CODE STATUS: FULL       SUBJECTIVE  Patient is a 80y old woman with PMH uterine cancer (s/p SHAAN in 2016 and adjuvant therapy, recurrence with malignant ascites currently undergoing therapeutic paracentesis/chemotherapy on ), CAD (s/p CABG), history of PE (2020; was on Xarelto OP), HTN, depression who presents for hematuria.    Patient examined at bedside. Hematuria resolving. No other events.     OBJECTIVE  PAST MEDICAL & SURGICAL HISTORY  HLD (hyperlipidemia)    HTN (hypertension)    CAD (coronary artery disease)    Coronary artery disease  s/p CABG X2    Glaucoma    Cataract of right eye    High cholesterol    Hypertension    Uterine cancer  s/p SHAAN and chemo    History of coronary artery bypass surgery    Athscl CABG, unsp, w angina pectoris w documented spasm    H/O total hysterectomy      ALLERGIES:  chloroquine (Hives)  quinine (Urticaria)    MEDICATIONS:  STANDING MEDICATIONS  amLODIPine   Tablet 5 milliGRAM(s) Oral daily  aspirin enteric coated 81 milliGRAM(s) Oral daily  atorvastatin 40 milliGRAM(s) Oral at bedtime  brimonidine 0.2% Ophthalmic Solution 1 Drop(s) Both EYES two times a day  dorzolamide 2% Ophthalmic Solution 1 Drop(s) Both EYES three times a day  enoxaparin Injectable 60 milliGRAM(s) SubCutaneous every 12 hours  metoprolol succinate ER 25 milliGRAM(s) Oral daily  polyethylene glycol 3350 17 Gram(s) Oral at bedtime  senna 2 Tablet(s) Oral at bedtime  sertraline 25 milliGRAM(s) Oral daily    PRN MEDICATIONS  clonazePAM  Tablet 0.5 milliGRAM(s) Oral daily PRN  oxycodone    5 mG/acetaminophen 325 mG 1 Tablet(s) Oral every 6 hours PRN      VITAL SIGNS: Last 24 Hours  T(C): 37.6 (28 Oct 2020 13:33), Max: 37.6 (28 Oct 2020 13:33)  T(F): 99.6 (28 Oct 2020 13:33), Max: 99.6 (28 Oct 2020 13:33)  HR: 85 (28 Oct 2020 13:33) (72 - 89)  BP: 100/58 (28 Oct 2020 13:33) (100/58 - 130/68)  BP(mean): --  RR: 18 (28 Oct 2020 13:33) (18 - 18)  SpO2: 100% (28 Oct 2020 12:23) (98% - 100%)    LABS:                        8.5    3.85  )-----------( 183      ( 28 Oct 2020 15:18 )             26.9     10    138  |  103  |  14  ----------------------------<  129<H>  4.2   |  26  |  0.9    Ca    8.3<L>      28 Oct 2020 15:18  Mg     1.9     10-28    TPro  5.9<L>  /  Alb  3.6  /  TBili  0.2  /  DBili  x   /  AST  15  /  ALT  6   /  AlkPhos  65  10-28    PT/INR - ( 27 Oct 2020 13:31 )   PT: 19.80 sec;   INR: 1.72 ratio         PTT - ( 27 Oct 2020 13:31 )  PTT:36.6 sec  Urinalysis Basic - ( 27 Oct 2020 13:31 )    Color: Dark Brown / Appearance: Turbid / S.009 / pH: x  Gluc: x / Ketone: Negative  / Bili: Negative / Urobili: <2 mg/dL   Blood: x / Protein: 100 mg/dL / Nitrite: Negative   Leuk Esterase: Moderate / RBC: >720 /HPF /  /HPF   Sq Epi: x / Non Sq Epi: 0 /HPF / Bacteria: Negative      RADIOLOGY:    < from: US Kidney and Bladder (10.28.20 @ 07:35) >  IMPRESSION:    No stones seen by ultrasound.    Again seen mild left hydronephrosis.          < end of copied text >    < from: CT Abdomen and Pelvis w/ IV Cont (10.21.20 @ 10:27) >  IMPRESSION:    Enhancement of the left ureter with mild inflammatory changes of the urinary bladder dome may reflect evidence of ascending infection and cystitis. In the appropriate clinical setting, inflammation of the ureter may also be secondary to radiation-induced changes      < end of copied text >      PHYSICAL EXAM:    GENERAL: NAD, well-developed, AAOx3  HEENT:  Atraumatic, Normocephalic. EOMI, PERRLA, conjunctiva and sclera clear, No JVD  PULMONARY: Clear to auscultation bilaterally; No wheeze  CARDIOVASCULAR: Regular rate and rhythm; No murmurs, rubs, or gallops  GASTROINTESTINAL: Soft, Nontender, Nondistended; Bowel sounds present  MUSCULOSKELETAL:  2+ Peripheral Pulses, No clubbing, cyanosis, or edema, PICC line RUE  NEUROLOGY: non-focal  SKIN: No rashes or lesions        ASSESSMENT & PLAN    Patient is a 80y old woman with PMH uterine cancer (s/p SHAAN in  and adjuvant therapy, recurrence with malignant ascites currently undergoing therapeutic paracentesis/chemotherapy on ), CAD (s/p CABG), history of PE (2020; was on Xarelto OP), HTN, depression who presents for hematuria.    1. Hematuria  -Previously discharged for similar presentation; completed course of Augmentin for presumed cystitis   -Resolving today  -CT A/P from prior admission showing inflamed left ureter indicative of potential cystitis   -Ultrasound kidney showing mild left hydronephrosis  - Hemoglobin today 8.5 (from 8.7)  -Urology consult appreciated  -Keep active T+S  -F/U UCX  -F/U CBC    2. Adenocarcinoma of mullerian origin  -S/P SHAAN in  with adjuvant chemotherapy  -Currently on carbo/taxol weekly 3on 1off  -PICC in RUE for chemo  -Chemotherapy scheduled 10/30    3. PE  -Initiated on lovenox 60mg BID    4. CAD  -Continue aspirin/statin/beta blocker    5. Anxiety  -Klonopin 0.5 q24hrs    MISC:  -DVT PPX: lovenox  -GI PPX: none  -Activity: As tolerated  -Diet: DASH  -CODE: FULL     BEKAH NELSON 80y Female  MRN#: [id number]   CODE STATUS: FULL       SUBJECTIVE  Patient is a 80y old woman with PMH uterine cancer (s/p SHAAN in 2016 and adjuvant therapy, recurrence with malignant ascites currently undergoing therapeutic paracentesis/chemotherapy on ), CAD (s/p CABG), history of PE (2020; was on Xarelto OP), HTN, depression who presents for hematuria.    Patient examined at bedside. Hematuria resolving. No other events.     OBJECTIVE  PAST MEDICAL & SURGICAL HISTORY  HLD (hyperlipidemia)    HTN (hypertension)    CAD (coronary artery disease)    Coronary artery disease  s/p CABG X2    Glaucoma    Cataract of right eye    High cholesterol    Hypertension    Uterine cancer  s/p SHAAN and chemo    History of coronary artery bypass surgery    Athscl CABG, unsp, w angina pectoris w documented spasm    H/O total hysterectomy      ALLERGIES:  chloroquine (Hives)  quinine (Urticaria)    MEDICATIONS:  STANDING MEDICATIONS  amLODIPine   Tablet 5 milliGRAM(s) Oral daily  aspirin enteric coated 81 milliGRAM(s) Oral daily  atorvastatin 40 milliGRAM(s) Oral at bedtime  brimonidine 0.2% Ophthalmic Solution 1 Drop(s) Both EYES two times a day  dorzolamide 2% Ophthalmic Solution 1 Drop(s) Both EYES three times a day  enoxaparin Injectable 60 milliGRAM(s) SubCutaneous every 12 hours  metoprolol succinate ER 25 milliGRAM(s) Oral daily  polyethylene glycol 3350 17 Gram(s) Oral at bedtime  senna 2 Tablet(s) Oral at bedtime  sertraline 25 milliGRAM(s) Oral daily    PRN MEDICATIONS  clonazePAM  Tablet 0.5 milliGRAM(s) Oral daily PRN  oxycodone    5 mG/acetaminophen 325 mG 1 Tablet(s) Oral every 6 hours PRN      VITAL SIGNS: Last 24 Hours  T(C): 37.6 (28 Oct 2020 13:33), Max: 37.6 (28 Oct 2020 13:33)  T(F): 99.6 (28 Oct 2020 13:33), Max: 99.6 (28 Oct 2020 13:33)  HR: 85 (28 Oct 2020 13:33) (72 - 89)  BP: 100/58 (28 Oct 2020 13:33) (100/58 - 130/68)  BP(mean): --  RR: 18 (28 Oct 2020 13:33) (18 - 18)  SpO2: 100% (28 Oct 2020 12:23) (98% - 100%)    LABS:                        8.5    3.85  )-----------( 183      ( 28 Oct 2020 15:18 )             26.9     10    138  |  103  |  14  ----------------------------<  129<H>  4.2   |  26  |  0.9    Ca    8.3<L>      28 Oct 2020 15:18  Mg     1.9     10-28    TPro  5.9<L>  /  Alb  3.6  /  TBili  0.2  /  DBili  x   /  AST  15  /  ALT  6   /  AlkPhos  65  10-28    PT/INR - ( 27 Oct 2020 13:31 )   PT: 19.80 sec;   INR: 1.72 ratio         PTT - ( 27 Oct 2020 13:31 )  PTT:36.6 sec  Urinalysis Basic - ( 27 Oct 2020 13:31 )    Color: Dark Brown / Appearance: Turbid / S.009 / pH: x  Gluc: x / Ketone: Negative  / Bili: Negative / Urobili: <2 mg/dL   Blood: x / Protein: 100 mg/dL / Nitrite: Negative   Leuk Esterase: Moderate / RBC: >720 /HPF /  /HPF   Sq Epi: x / Non Sq Epi: 0 /HPF / Bacteria: Negative      RADIOLOGY:    < from: US Kidney and Bladder (10.28.20 @ 07:35) >  IMPRESSION:    No stones seen by ultrasound.    Again seen mild left hydronephrosis.          < end of copied text >    < from: CT Abdomen and Pelvis w/ IV Cont (10.21.20 @ 10:27) >  IMPRESSION:    Enhancement of the left ureter with mild inflammatory changes of the urinary bladder dome may reflect evidence of ascending infection and cystitis. In the appropriate clinical setting, inflammation of the ureter may also be secondary to radiation-induced changes      < end of copied text >      PHYSICAL EXAM:    GENERAL: NAD, well-developed, AAOx3  HEENT:  Atraumatic, Normocephalic. EOMI, PERRLA, conjunctiva and sclera clear, No JVD  PULMONARY: Clear to auscultation bilaterally; No wheeze  CARDIOVASCULAR: Regular rate and rhythm; No murmurs, rubs, or gallops  GASTROINTESTINAL: Soft, Nontender, Nondistended; Bowel sounds present  MUSCULOSKELETAL:  2+ Peripheral Pulses, No clubbing, cyanosis, or edema, PICC line RUE  NEUROLOGY: non-focal  SKIN: No rashes or lesions

## 2020-10-29 LAB
ANION GAP SERPL CALC-SCNC: 11 MMOL/L — SIGNIFICANT CHANGE UP (ref 7–14)
APPEARANCE UR: ABNORMAL
APPEARANCE: ABNORMAL
BACTERIA # UR AUTO: 0 — SIGNIFICANT CHANGE UP
BACTERIA UR CULT: NORMAL
BASOPHILS # BLD AUTO: 0.02 K/UL — SIGNIFICANT CHANGE UP (ref 0–0.2)
BASOPHILS NFR BLD AUTO: 0.4 % — SIGNIFICANT CHANGE UP (ref 0–1)
BILIRUB UR-MCNC: NEGATIVE — SIGNIFICANT CHANGE UP
BILIRUBIN URINE: NEGATIVE
BLD GP AB SCN SERPL QL: SIGNIFICANT CHANGE UP
BLOOD URINE: ABNORMAL
BUN SERPL-MCNC: 12 MG/DL — SIGNIFICANT CHANGE UP (ref 10–20)
CALCIUM SERPL-MCNC: 8.3 MG/DL — LOW (ref 8.5–10.1)
CHLORIDE SERPL-SCNC: 104 MMOL/L — SIGNIFICANT CHANGE UP (ref 98–110)
CO2 SERPL-SCNC: 25 MMOL/L — SIGNIFICANT CHANGE UP (ref 17–32)
COLOR SPEC: ABNORMAL
COLOR: YELLOW
CREAT SERPL-MCNC: 0.9 MG/DL — SIGNIFICANT CHANGE UP (ref 0.7–1.5)
DIFF PNL FLD: ABNORMAL
EOSINOPHIL # BLD AUTO: 0.04 K/UL — SIGNIFICANT CHANGE UP (ref 0–0.7)
EOSINOPHIL NFR BLD AUTO: 0.9 % — SIGNIFICANT CHANGE UP (ref 0–8)
EPI CELLS # UR: 0 /HPF — SIGNIFICANT CHANGE UP (ref 0–5)
GLUCOSE BLDC GLUCOMTR-MCNC: 100 MG/DL — HIGH (ref 70–99)
GLUCOSE QUALITATIVE U: NORMAL
GLUCOSE SERPL-MCNC: 110 MG/DL — HIGH (ref 70–99)
GLUCOSE UR QL: SIGNIFICANT CHANGE UP
HCT VFR BLD CALC: 28 %
HCT VFR BLD CALC: 28.3 % — LOW (ref 37–47)
HGB BLD-MCNC: 8.8 G/DL
HGB BLD-MCNC: 9 G/DL — LOW (ref 12–16)
HYALINE CASTS # UR AUTO: >145 /LPF — HIGH (ref 0–7)
IMM GRANULOCYTES NFR BLD AUTO: 0.7 % — HIGH (ref 0.1–0.3)
KETONES UR-MCNC: NEGATIVE — SIGNIFICANT CHANGE UP
KETONES URINE: NEGATIVE
LEUKOCYTE ESTERASE UR-ACNC: ABNORMAL
LEUKOCYTE ESTERASE URINE: NEGATIVE
LYMPHOCYTES # BLD AUTO: 1.73 K/UL — SIGNIFICANT CHANGE UP (ref 1.2–3.4)
LYMPHOCYTES # BLD AUTO: 37.7 % — SIGNIFICANT CHANGE UP (ref 20.5–51.1)
MAGNESIUM SERPL-MCNC: 1.9 MG/DL — SIGNIFICANT CHANGE UP (ref 1.8–2.4)
MCHC RBC-ENTMCNC: 27 PG
MCHC RBC-ENTMCNC: 27.3 PG — SIGNIFICANT CHANGE UP (ref 27–31)
MCHC RBC-ENTMCNC: 31.4 G/DL
MCHC RBC-ENTMCNC: 31.8 G/DL — LOW (ref 32–37)
MCV RBC AUTO: 85.8 FL — SIGNIFICANT CHANGE UP (ref 81–99)
MCV RBC AUTO: 85.9 FL
MONOCYTES # BLD AUTO: 0.38 K/UL — SIGNIFICANT CHANGE UP (ref 0.1–0.6)
MONOCYTES NFR BLD AUTO: 8.3 % — SIGNIFICANT CHANGE UP (ref 1.7–9.3)
NEUTROPHILS # BLD AUTO: 2.39 K/UL — SIGNIFICANT CHANGE UP (ref 1.4–6.5)
NEUTROPHILS NFR BLD AUTO: 52 % — SIGNIFICANT CHANGE UP (ref 42.2–75.2)
NITRITE UR-MCNC: NEGATIVE — SIGNIFICANT CHANGE UP
NITRITE URINE: NEGATIVE
NRBC # BLD: 0 /100 WBCS — SIGNIFICANT CHANGE UP (ref 0–0)
PH UR: 6.5 — SIGNIFICANT CHANGE UP (ref 5–8)
PH URINE: 7
PLATELET # BLD AUTO: 183 K/UL
PLATELET # BLD AUTO: 213 K/UL — SIGNIFICANT CHANGE UP (ref 130–400)
PMV BLD: 9.7 FL
POTASSIUM SERPL-MCNC: 4 MMOL/L — SIGNIFICANT CHANGE UP (ref 3.5–5)
POTASSIUM SERPL-SCNC: 4 MMOL/L — SIGNIFICANT CHANGE UP (ref 3.5–5)
PROT UR-MCNC: ABNORMAL
PROTEIN URINE: ABNORMAL
RBC # BLD: 3.26 M/UL
RBC # BLD: 3.3 M/UL — LOW (ref 4.2–5.4)
RBC # FLD: 20.5 % — HIGH (ref 11.5–14.5)
RBC # FLD: 20.9 %
RBC CASTS # UR COMP ASSIST: >720 /HPF — HIGH (ref 0–4)
SODIUM SERPL-SCNC: 140 MMOL/L — SIGNIFICANT CHANGE UP (ref 135–146)
SP GR SPEC: 1.01 — SIGNIFICANT CHANGE UP (ref 1.01–1.03)
SPECIFIC GRAVITY URINE: 1.02
UROBILINOGEN FLD QL: SIGNIFICANT CHANGE UP
UROBILINOGEN URINE: NORMAL
WBC # BLD: 4.59 K/UL — LOW (ref 4.8–10.8)
WBC # FLD AUTO: 4.26 K/UL
WBC # FLD AUTO: 4.59 K/UL — LOW (ref 4.8–10.8)
WBC UR QL: 135 /HPF — HIGH (ref 0–5)

## 2020-10-29 PROCEDURE — 99222 1ST HOSP IP/OBS MODERATE 55: CPT

## 2020-10-29 PROCEDURE — 99233 SBSQ HOSP IP/OBS HIGH 50: CPT

## 2020-10-29 PROCEDURE — 37191 INS ENDOVAS VENA CAVA FILTR: CPT

## 2020-10-29 PROCEDURE — 99232 SBSQ HOSP IP/OBS MODERATE 35: CPT

## 2020-10-29 RX ORDER — LACTULOSE 10 G/15ML
15 SOLUTION ORAL ONCE
Refills: 0 | Status: COMPLETED | OUTPATIENT
Start: 2020-10-29 | End: 2020-10-29

## 2020-10-29 RX ADMIN — ENOXAPARIN SODIUM 60 MILLIGRAM(S): 100 INJECTION SUBCUTANEOUS at 06:13

## 2020-10-29 RX ADMIN — BRIMONIDINE TARTRATE 1 DROP(S): 2 SOLUTION/ DROPS OPHTHALMIC at 06:12

## 2020-10-29 RX ADMIN — AMLODIPINE BESYLATE 5 MILLIGRAM(S): 2.5 TABLET ORAL at 06:12

## 2020-10-29 RX ADMIN — DORZOLAMIDE HYDROCHLORIDE 1 DROP(S): 20 SOLUTION/ DROPS OPHTHALMIC at 21:33

## 2020-10-29 RX ADMIN — ATORVASTATIN CALCIUM 40 MILLIGRAM(S): 80 TABLET, FILM COATED ORAL at 21:34

## 2020-10-29 RX ADMIN — BRIMONIDINE TARTRATE 1 DROP(S): 2 SOLUTION/ DROPS OPHTHALMIC at 17:24

## 2020-10-29 RX ADMIN — OXYCODONE AND ACETAMINOPHEN 1 TABLET(S): 5; 325 TABLET ORAL at 17:53

## 2020-10-29 RX ADMIN — SERTRALINE 25 MILLIGRAM(S): 25 TABLET, FILM COATED ORAL at 12:07

## 2020-10-29 RX ADMIN — Medication 81 MILLIGRAM(S): at 12:07

## 2020-10-29 RX ADMIN — Medication 25 MILLIGRAM(S): at 06:12

## 2020-10-29 RX ADMIN — DORZOLAMIDE HYDROCHLORIDE 1 DROP(S): 20 SOLUTION/ DROPS OPHTHALMIC at 06:12

## 2020-10-29 RX ADMIN — LACTULOSE 15 GRAM(S): 10 SOLUTION ORAL at 02:27

## 2020-10-29 RX ADMIN — OXYCODONE AND ACETAMINOPHEN 1 TABLET(S): 5; 325 TABLET ORAL at 17:23

## 2020-10-29 RX ADMIN — Medication 0.5 MILLIGRAM(S): at 06:21

## 2020-10-29 NOTE — CONSULT NOTE ADULT - SUBJECTIVE AND OBJECTIVE BOX
INTERVENTIONAL RADIOLOGY CONSULT:     Procedure Requested: IVC Filter placment    HPI:  81 year old lady with past medical history of Uterine cancer s/p SHAAN in 2016 with adjuvant therapy and recent recurrence with malignant ascites on therapeutic paracentesis and chemotherapy every Friday ,CAD s/p CABG, history of PE (unprovoked) diagnosed on August for which Lovenox was started then recently switched to Xarelto, HTN, depression, recent admission for hematuria/UTI presented today from oncology clinic for worsening hematuria.  History goes back to 1 week ago when the patient started to have hematuria, she was admitted and treated for a UTI with Augmentin that she continued for 4 days after discharge. According to patient hematuria didn't improve until today when she noticed that her urine is starting to clear up , there was no clots, no urinary symptoms of dysuria, urgency, frequency or straining. No history of renal stones and no history of trauma.  During last admission 1 week ago CT AP showed inflammation of the ureter that was suggestive of  UTI vs radiation induced, but patient had no history of radiotherapy. Review of system was negative for fatigue, SOB, chest pain, epistaxis, melena, GI or  symptoms.  In the ED patient was found to have Hb 8.7 with her base line around 10, INR 1.7. Patient was admitted for further management.    PAST MEDICAL & SURGICAL HISTORY:  HLD (hyperlipidemia)  HTN (hypertension)  CAD (coronary artery disease)  Coronary artery disease  s/p CABG X2  Glaucoma  Cataract of right eye  High cholesterol  Hypertension  Uterine cancer  s/p SHAAN and chemo  History of coronary artery bypass surgery  Athscl CABG, unsp, w angina pectoris w documented spasm  H/O total hysterectomy    MEDICATIONS  (STANDING):  amLODIPine   Tablet 5 milliGRAM(s) Oral daily  aspirin enteric coated 81 milliGRAM(s) Oral daily  atorvastatin 40 milliGRAM(s) Oral at bedtime  brimonidine 0.2% Ophthalmic Solution 1 Drop(s) Both EYES two times a day  dorzolamide 2% Ophthalmic Solution 1 Drop(s) Both EYES three times a day  metoprolol succinate ER 25 milliGRAM(s) Oral daily  polyethylene glycol 3350 17 Gram(s) Oral at bedtime  senna 2 Tablet(s) Oral at bedtime  sertraline 25 milliGRAM(s) Oral daily    MEDICATIONS  (PRN):  clonazePAM  Tablet 0.5 milliGRAM(s) Oral daily PRN anxiety  oxycodone    5 mG/acetaminophen 325 mG 1 Tablet(s) Oral every 6 hours PRN Moderate Pain (4 - 6)    Allergies  chloroquine (Hives)  quinine (Urticaria)    Physical Exam:   Vital Signs Last 24 Hrs  T(C): 37.1 (29 Oct 2020 05:28), Max: 37.6 (28 Oct 2020 13:33)  T(F): 98.8 (29 Oct 2020 05:28), Max: 99.6 (28 Oct 2020 13:33)  HR: 96 (29 Oct 2020 05:28) (77 - 96)  BP: 135/64 (29 Oct 2020 05:28) (100/58 - 135/64)  BP(mean): --  RR: 18 (29 Oct 2020 05:28) (18 - 18)  SpO2: 100% (28 Oct 2020 12:23) (100% - 100%)    Labs:                         9.0    4.59  )-----------( 213      ( 29 Oct 2020 05:30 )             28.3     10-29    140  |  104  |  12  ----------------------------<  110<H>  4.0   |  25  |  0.9    Ca    8.3<L>      29 Oct 2020 05:30  Mg     1.9     10-29    TPro  5.9<L>  /  Alb  3.6  /  TBili  0.2  /  DBili  x   /  AST  15  /  ALT  6   /  AlkPhos  65  10-28    PT/INR - ( 27 Oct 2020 13:31 )   PT: 19.80 sec;   INR: 1.72 ratio         PTT - ( 27 Oct 2020 13:31 )  PTT:36.6 sec    Pertinent labs:                      9.0    4.59  )-----------( 213      ( 29 Oct 2020 05:30 )             28.3       10-29    140  |  104  |  12  ----------------------------<  110<H>  4.0   |  25  |  0.9    Ca    8.3<L>      29 Oct 2020 05:30  Mg     1.9     10-29    TPro  5.9<L>  /  Alb  3.6  /  TBili  0.2  /  DBili  x   /  AST  15  /  ALT  6   /  AlkPhos  65  10-28      PT/INR - ( 27 Oct 2020 13:31 )   PT: 19.80 sec;   INR: 1.72 ratio         PTT - ( 27 Oct 2020 13:31 )  PTT:36.6 sec    Radiology & Additional Studies:   Radiology imaging reviewed.       ASSESSMENT/ PLAN:   79 yo F with history of uterine CA, DVT and PE currently on Xarelto (previously Lovenox). Patient now presenting with persistent hematuria. IR consulted for IVC filter placement given new contraindication to anticoagulation.    Plan:  - IVC filter placement today, 10/29  - Continue plan of care as dictated by primary team        Risks, benefits, and alternatives to treatment discussed. All questions answered with understanding.    Thank you for the courtesy of this consult, please call [phone] with any further questions.    INTERVENTIONAL RADIOLOGY CONSULT:     Procedure Requested: IVC Filter placment    HPI:  81 year old lady with past medical history of Uterine cancer s/p SHAAN in 2016 with adjuvant therapy and recent recurrence with malignant ascites on therapeutic paracentesis and chemotherapy every Friday ,CAD s/p CABG, history of PE (unprovoked) diagnosed on August for which Lovenox was started then recently switched to Xarelto, HTN, depression, recent admission for hematuria/UTI presented today from oncology clinic for worsening hematuria.  History goes back to 1 week ago when the patient started to have hematuria, she was admitted and treated for a UTI with Augmentin that she continued for 4 days after discharge. According to patient hematuria didn't improve until today when she noticed that her urine is starting to clear up , there was no clots, no urinary symptoms of dysuria, urgency, frequency or straining. No history of renal stones and no history of trauma.  During last admission 1 week ago CT AP showed inflammation of the ureter that was suggestive of  UTI vs radiation induced, but patient had no history of radiotherapy. Review of system was negative for fatigue, SOB, chest pain, epistaxis, melena, GI or  symptoms.  In the ED patient was found to have Hb 8.7 with her base line around 10, INR 1.7. Patient was admitted for further management.    PAST MEDICAL & SURGICAL HISTORY:  HLD (hyperlipidemia)  HTN (hypertension)  CAD (coronary artery disease)  Coronary artery disease  s/p CABG X2  Glaucoma  Cataract of right eye  High cholesterol  Hypertension  Uterine cancer  s/p SHAAN and chemo  History of coronary artery bypass surgery  Athscl CABG, unsp, w angina pectoris w documented spasm  H/O total hysterectomy    MEDICATIONS  (STANDING):  amLODIPine   Tablet 5 milliGRAM(s) Oral daily  aspirin enteric coated 81 milliGRAM(s) Oral daily  atorvastatin 40 milliGRAM(s) Oral at bedtime  brimonidine 0.2% Ophthalmic Solution 1 Drop(s) Both EYES two times a day  dorzolamide 2% Ophthalmic Solution 1 Drop(s) Both EYES three times a day  metoprolol succinate ER 25 milliGRAM(s) Oral daily  polyethylene glycol 3350 17 Gram(s) Oral at bedtime  senna 2 Tablet(s) Oral at bedtime  sertraline 25 milliGRAM(s) Oral daily    MEDICATIONS  (PRN):  clonazePAM  Tablet 0.5 milliGRAM(s) Oral daily PRN anxiety  oxycodone    5 mG/acetaminophen 325 mG 1 Tablet(s) Oral every 6 hours PRN Moderate Pain (4 - 6)    Allergies  chloroquine (Hives)  quinine (Urticaria)    Physical Exam:   Vital Signs Last 24 Hrs  T(C): 37.1 (29 Oct 2020 05:28), Max: 37.6 (28 Oct 2020 13:33)  T(F): 98.8 (29 Oct 2020 05:28), Max: 99.6 (28 Oct 2020 13:33)  HR: 96 (29 Oct 2020 05:28) (77 - 96)  BP: 135/64 (29 Oct 2020 05:28) (100/58 - 135/64)  BP(mean): --  RR: 18 (29 Oct 2020 05:28) (18 - 18)  SpO2: 100% (28 Oct 2020 12:23) (100% - 100%)    Labs:                         9.0    4.59  )-----------( 213      ( 29 Oct 2020 05:30 )             28.3     10-29    140  |  104  |  12  ----------------------------<  110<H>  4.0   |  25  |  0.9    Ca    8.3<L>      29 Oct 2020 05:30  Mg     1.9     10-29    TPro  5.9<L>  /  Alb  3.6  /  TBili  0.2  /  DBili  x   /  AST  15  /  ALT  6   /  AlkPhos  65  10-28    PT/INR - ( 27 Oct 2020 13:31 )   PT: 19.80 sec;   INR: 1.72 ratio         PTT - ( 27 Oct 2020 13:31 )  PTT:36.6 sec    Pertinent labs:                      9.0    4.59  )-----------( 213      ( 29 Oct 2020 05:30 )             28.3       10-29    140  |  104  |  12  ----------------------------<  110<H>  4.0   |  25  |  0.9    Ca    8.3<L>      29 Oct 2020 05:30  Mg     1.9     10-29    TPro  5.9<L>  /  Alb  3.6  /  TBili  0.2  /  DBili  x   /  AST  15  /  ALT  6   /  AlkPhos  65  10-28      PT/INR - ( 27 Oct 2020 13:31 )   PT: 19.80 sec;   INR: 1.72 ratio         PTT - ( 27 Oct 2020 13:31 )  PTT:36.6 sec    Radiology & Additional Studies:   Radiology imaging reviewed.       ASSESSMENT/ PLAN:   79 yo F with history of uterine CA, DVT and PE currently on Xarelto (previously Lovenox). Patient now presenting with persistent hematuria. IR consulted for IVC filter placement given new contraindication to anticoagulation.    Plan:  - IVC filter placement today, 10/29  - NPO for procedure  - Continue plan of care as dictated by primary team    Risks, benefits, and alternatives to treatment discussed. All questions answered with understanding.    Thank you for the courtesy of this consult, please call [phone] with any further questions.

## 2020-10-29 NOTE — REVIEW OF SYSTEMS
[Fatigue] : fatigue [Recent Change In Weight] : ~T recent weight change [SOB on Exertion] : shortness of breath during exertion [Negative] : Allergic/Immunologic [Fever] : no fever [Chills] : no chills [Night Sweats] : no night sweats [Shortness Of Breath] : no shortness of breath [Wheezing] : no wheezing [Cough] : no cough [Abdominal Pain] : no abdominal pain [Vomiting] : no vomiting [Constipation] : no constipation [Diarrhea] : no diarrhea [Confused] : no confusion [Dizziness] : no dizziness [Fainting] : no fainting [Difficulty Walking] : no difficulty walking [FreeTextEntry7] : early satiety [de-identified] : minor preexisting neuropathy

## 2020-10-29 NOTE — PROGRESS NOTE ADULT - ASSESSMENT
Patient is a 81 year old F patient with past medical history of Uterine cancer s/p SHAAN, malignant ascites on therapeutic paracentesis, CAD s/p CABG, segmental pulmonary embolism on Lovenox recently switched to xarelto, HTN, depression, recent admission for hematuria/UTI is referred from oncology clinic for worsening hematuria.      #) Hematuria: Recently admitted for hematuria/UTI and discharged last week on Augmentin  - Patient was evaluated in clinic and was noted to have worsening hematuria and a drop in hemoglobin so referred to the ED  - Urology attending eval pending   - Patient is high risk to be off AC as she recently had a PE also with history of DVT   - Given she had bleeding on Lovenox, decision made to hold anticoagulation. If hematuria resolves, she can be started on IV heparin and monitored closely. 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

## 2020-10-29 NOTE — PROGRESS NOTE ADULT - CONVERSATION DETAILS
The plan of care discussed with pt at length today, she is aware of the fact that her cancer advanced, willing to proceed with cancer directed therapy, she consented  for IVC filter, considering inpt cystoscopy.

## 2020-10-29 NOTE — PROGRESS NOTE ADULT - SUBJECTIVE AND OBJECTIVE BOX
80y old woman with PMH uterine cancer (s/p SHAAN in 2016 and adjuvant therapy, recurrence with malignant ascites currently undergoing therapeutic paracentesis/chemotherapy on ), CAD (s/p CABG), history of PE (2020; was on Xarelto OP), HTN, depression who presents for hematuria.  Oral AC was d/c, pt was started on SQ Lovenox on 10/28, shortly after developed hematuria again. She was consulted by  today, will try to make arrangements for inpt cystoscopy, IR was called today, IVC filter placed. Case d/w medical oncology team.   Today pt is very upset about recurrent hematuria, asking about the plan of care.     OBJECTIVE  PAST MEDICAL & SURGICAL HISTORY  HLD (hyperlipidemia)    HTN (hypertension)    CAD (coronary artery disease)    Coronary artery disease  s/p CABG X2    Glaucoma    Cataract of right eye    High cholesterol    Hypertension    Uterine cancer  s/p SHAAN and chemo    History of coronary artery bypass surgery    Athscl CABG, unsp, w angina pectoris w documented spasm    H/O total hysterectomy      ALLERGIES:  chloroquine (Hives)  quinine (Urticaria)      VITAL SIGNS: Last 24 Hours  T(C): 36.4 (29 Oct 2020 12:25), Max: 37.4 (28 Oct 2020 19:37)  T(F): 97.6 (29 Oct 2020 12:25), Max: 99.4 (28 Oct 2020 19:37)  HR: 84 (29 Oct 2020 12:25) (77 - 96)  BP: 118/60 (29 Oct 2020 12:25) (108/64 - 135/64)  BP(mean): --  RR: 18 (29 Oct 2020 12:25) (18 - 18)  SpO2: --    PHYSICAL EXAM:  GENERAL: NAD,  AAOx3 with temporal muscle waisting   HEENT:  Atraumatic, Normocephalic. EOMI, PERRLA, conjunctiva and sclera clear, No JVD  PULMONARY: Clear to auscultation bilaterally; No wheeze  CARDIOVASCULAR: Regular rate and rhythm; No murmurs, rubs, or gallops  GASTROINTESTINAL: Soft, Nontender, Nondistended; Bowel sounds present  MUSCULOSKELETAL:  2+ Peripheral Pulses, No clubbing, cyanosis, or edema, PICC line RUE  NEUROLOGY: non-focal  SKIN: No rashes or lesions    LABS:                        9.0    4.59  )-----------( 213      ( 29 Oct 2020 05:30 )             28.3     10    140  |  104  |  12  ----------------------------<  110<H>  4.0   |  25  |  0.9    Ca    8.3<L>      29 Oct 2020 05:30  Mg     1.9     10-29    TPro  5.9<L>  /  Alb  3.6  /  TBili  0.2  /  DBili  x   /  AST  15  /  ALT  6   /  AlkPhos  65  10-28    PT/INR - ( 27 Oct 2020 13:31 )   PT: 19.80 sec;   INR: 1.72 ratio         PTT - ( 27 Oct 2020 13:31 )  PTT:36.6 sec  Urinalysis Basic - ( 29 Oct 2020 10:00 )    Color: Dark Brown / Appearance: Turbid / S.012 / pH: x  Gluc: x / Ketone: Negative  / Bili: Negative / Urobili: <2 mg/dL   Blood: x / Protein: 300 mg/dL / Nitrite: Negative   Leuk Esterase: Moderate / RBC: >720 /HPF /  /HPF   Sq Epi: x / Non Sq Epi: 0 /HPF / Bacteria: 0.0    Culture - Urine (collected 27 Oct 2020 13:31)  Source: .Urine Clean Catch (Midstream)  Final Report (28 Oct 2020 18:19):    No growth    RADIOLOGY:    < from: US Kidney and Bladder (10.28.20 @ 07:35) >  IMPRESSION:    No stones seen by ultrasound.    Again seen mild left hydronephrosis.    MEDICATIONS  (STANDING):  amLODIPine   Tablet 5 milliGRAM(s) Oral daily  aspirin enteric coated 81 milliGRAM(s) Oral daily  atorvastatin 40 milliGRAM(s) Oral at bedtime  brimonidine 0.2% Ophthalmic Solution 1 Drop(s) Both EYES two times a day  dorzolamide 2% Ophthalmic Solution 1 Drop(s) Both EYES three times a day  metoprolol succinate ER 25 milliGRAM(s) Oral daily  polyethylene glycol 3350 17 Gram(s) Oral at bedtime  senna 2 Tablet(s) Oral at bedtime  sertraline 25 milliGRAM(s) Oral daily    MEDICATIONS  (PRN):  clonazePAM  Tablet 0.5 milliGRAM(s) Oral daily PRN anxiety  oxycodone    5 mG/acetaminophen 325 mG 1 Tablet(s) Oral every 6 hours PRN Moderate Pain (4 - 6)                  ASSESSMENT & PLAN    Patient is a 80y old woman with PMH uterine cancer (s/p SHAAN in  and adjuvant therapy, recurrence with malignant ascites currently undergoing therapeutic paracentesis/chemotherapy on ), CAD (s/p CABG), history of PE (2020; was on Xarelto OP), HTN, depression who presents for hematuria.    1. Hematuria  -Previously discharged for similar presentation; completed course of Augmentin for presumed cystitis   -Today again with slick hematuria  -CT A/P from prior admission showing inflamed left ureter indicative of potential cystitis   -Ultrasound kidney showing mild left hydronephrosis  - Hemoglobin today 9.0 (from 8.5)  -Urology consult appreciated; will attempt for inpatient cystoscopy  -Will hold anticoagulation  -IR consult for IVC filter in setting of previous PE appreciated  -Keep active T+S  -UCX NGTD  -F/U CBC    2. Adenocarcinoma of mullerian origin  -S/P SHAAN in  with adjuvant chemotherapy  -Currently on carbo/taxol weekly 3 on 1 off  -PICC in RUE for chemo  -Chemotherapy scheduled 10/30    3. PE  -Lovenox stopped in light of bleed  -IR consult appreciated for IVC filter    4. CAD  -Continue aspirin/statin/beta blocker    5. Anxiety  -Klonopin 0.5 q24hrs    MISC:  -DVT PPX: Heparin  -GI PPX: none  -Activity: As tolerated  -Diet: DASH  -CODE: FULL

## 2020-10-29 NOTE — PROGRESS NOTE ADULT - SUBJECTIVE AND OBJECTIVE BOX
INTERVENTIONAL RADIOLOGY BRIEF-OPERATIVE NOTE    Procedure: Image guided IVC filter placement     Pre-Op Diagnosis: DVT, failed anticoagulation     Post-Op Diagnosis: same    Attending: Greta  Resident: Mary    Anesthesia (type):  [ ] General Anesthesia  [ ] Sedation  [ ] Spinal Anesthesia  [x] Local/Regional    Contrast: 30cc    Estimated Blood Loss: 10cc    Condition:   [ ] Critical  [ ] Serious  [ ] Fair   [x] Good    Findings/Follow up Plan of Care: Venogram demonstrated normal caliber IVC without evidence for thrombus. Status post infrarenal IVC filter placement.     Specimens Removed: none    Implants: Option Elite IVC filter    Complications: none    Disposition:   Recovery then floor. Bedrest and keep right leg straight for 4 hours. Filter can be evaluated for removal in 6 weeks.      Please call Interventional Radiology [phone] with any questions, concerns, or issues.

## 2020-10-29 NOTE — PHYSICAL EXAM
[Ambulatory and capable of all self care but unable to carry out any work activities] : Status 2- Ambulatory and capable of all self care but unable to carry out any work activities. Up and about more than 50% of waking hours [Normal] : affect appropriate [de-identified] : mildly distended overall improved compared to prior, non-tender

## 2020-10-29 NOTE — HISTORY OF PRESENT ILLNESS
[de-identified] : Trav is a shekhar 81 year old lady I initially met in the hospital on 8/11/2020. Her past medical history is significant for uterine cancer s/p SHAAN BSO as per patient s/p adjuvant chemotherapy in 2016, CAD s/p CABG, HTN and depression, she presented to the ED for abdominal pain and chest discomfort. 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 \par \par PAST MEDICAL & SURGICAL HISTORY:\par HLD (hyperlipidemia)\par HTN (hypertension)\par CAD (coronary artery disease)\par History of coronary artery bypass surgery\par \par FAMILY HISTORY:\par No pertinent family history in first degree relatives\par \par Allergies\par chloroquine (Hives)\par \par CTA C/A/P on 8/6/2020 revealed \par 1. Intraluminal filling defect is seen within the segmental branches of the right lower lobe pulmonary artery, indicating acute pulmonary embolism. (6/132-138; 10/51-55). Filling defect in segmental branch of the right upper lobe pulmonary artery. (6/77) There is a small filling defect within a left lower lobe pulmonary artery segmental branch (6/147; 10/59). No evidence of right heart strain. The right ventricle measures 3.5 cm; the left ventricle measures 3.7 cm. (RV:LV<1) \par 2. Compared with the previous CT scan of 5/27/2020, there is now a large amount of ascites throughout the abdomen and pelvis. \par \par LE Doppler on 8/8/2020 revealed \par No evidence of deep venous thrombosis or superficial thrombophlebitis in the bilateral lower extremities. \par Left popliteal fossa fluid collection as measured above likely Baker's cyst\par \par During her admission she has required 3 episodes of paracentesis on 8/7 3L, 8/12 4L, 8/13 2.2L, once pathology became available patient was started on weekly Carbo/Taxol she has tolerated chemotherapy well.  [de-identified] : 8/18/2020: She returns today for follow up, she reports improved SOB, improved abdominal swelling, still come positional chest discomfort, she is compliant with BID Lovenox, she remains on the ASA. She reports improved appetite, she reports that appetite is decreased with a large amount of fluid on the abdomen. She is due for C1D8 of Carbo/Taxol on 8/21/2020. CBC was reviewed. \par Side effects of chemotherapy including, but not limited to, cytopenias, that may require blood product transfusions and lead to increased risk of infection, requiring hospitalization, allergic reactions, that can rarely be life-threatening, skin reactions, nausea/vomiting, mucositis, diarrhea/constipation were discussed at length, patient and family voice undestaging, all questions were answered to patient's satisfaction.\par Images were personally reviewed by MD Matt and discussed with patient.\par \par 9/10/2020: Patient is 80 years old female came  today for follow up for  Recurrent adenocarcinoma Mullerian origin, malignant ascites diagnosed on 8/8/2020, she S/P one cycle of weekly Carbo/taxol she tolerated well. She  reports improved SOB, improved abdominal swelling, she is ran out of Lovenox, which she is supposed to be taking BID, she didn't call for the renewal, she remains on the ASA. She  has been without Lovenox for like 7 days now, we will restart her on AC immediately. Xarelto was prescribed. She was educated again that she will need to remain on long-term anticoagulation. She reports good  appetite  She is due to start the new cycle of chemo on 9/11/20.\par PET Scan reviewed from 9/8/20 which revealed\par Mild diffuse FDG uptake throughout the mesentery along with peritoneal stranding and nodularity compatible with peritoneal carcinomatosis (SUV up to 3.9). \par No additional sites of abnormal FDG uptake to suggest biologic tumor activity.\par Images were personally reviewed by MD Matt and discussed with patient. \par \par 10/15/2020: Trav present for follow up, her next chemotherapy is for tomorrow, we will need to place PICC line, this will be arrange as outpatient. \par CT chest on 10/11/2020 revealed When compared to prior study dated 9/11/2020: \par Although not a dedicated PE study, redemonstration of a right lower lobe segmental/subsegmental pulmonary embolus which appears overall decreased in size from one month prior. \par No evidence for new consolidation, pleural effusion or pneumothorax. \par Trav reports mild neuropathy, not worsening with chemotherapy, CBC was reviewed. Overall clinically she demonstrates great response to therapy.

## 2020-10-29 NOTE — PROGRESS NOTE ADULT - SUBJECTIVE AND OBJECTIVE BOX
BEKAH NELSON 80y Female  MRN#: [id number]   CODE STATUS:FULL      SUBJECTIVE  Patient is a 80y old woman with PMH uterine cancer (s/p SHAAN in 2016 and adjuvant therapy, recurrence with malignant ascites currently undergoing therapeutic paracentesis/chemotherapy on ), CAD (s/p CABG), history of PE (2020; was on Xarelto OP), HTN, depression who presents for hematuria.    Patient examined at bedside. Today again having hematuria. States it was a lot of blood; no clotting. No other events overnight.    OBJECTIVE  PAST MEDICAL & SURGICAL HISTORY  HLD (hyperlipidemia)    HTN (hypertension)    CAD (coronary artery disease)    Coronary artery disease  s/p CABG X2    Glaucoma    Cataract of right eye    High cholesterol    Hypertension    Uterine cancer  s/p SHAAN and chemo    History of coronary artery bypass surgery    Athscl CABG, unsp, w angina pectoris w documented spasm    H/O total hysterectomy      ALLERGIES:  chloroquine (Hives)  quinine (Urticaria)    MEDICATIONS:  STANDING MEDICATIONS  amLODIPine   Tablet 5 milliGRAM(s) Oral daily  aspirin enteric coated 81 milliGRAM(s) Oral daily  atorvastatin 40 milliGRAM(s) Oral at bedtime  brimonidine 0.2% Ophthalmic Solution 1 Drop(s) Both EYES two times a day  dorzolamide 2% Ophthalmic Solution 1 Drop(s) Both EYES three times a day  metoprolol succinate ER 25 milliGRAM(s) Oral daily  polyethylene glycol 3350 17 Gram(s) Oral at bedtime  senna 2 Tablet(s) Oral at bedtime  sertraline 25 milliGRAM(s) Oral daily    PRN MEDICATIONS  clonazePAM  Tablet 0.5 milliGRAM(s) Oral daily PRN  oxycodone    5 mG/acetaminophen 325 mG 1 Tablet(s) Oral every 6 hours PRN      VITAL SIGNS: Last 24 Hours  T(C): 37.1 (29 Oct 2020 05:28), Max: 37.6 (28 Oct 2020 13:33)  T(F): 98.8 (29 Oct 2020 05:28), Max: 99.6 (28 Oct 2020 13:33)  HR: 96 (29 Oct 2020 05:28) (77 - 96)  BP: 135/64 (29 Oct 2020 05:28) (100/58 - 135/64)  BP(mean): --  RR: 18 (29 Oct 2020 05:28) (18 - 18)  SpO2: 100% (28 Oct 2020 12:23) (100% - 100%)    LABS:                        9.0    4.59  )-----------( 213      ( 29 Oct 2020 05:30 )             28.3     10-29    140  |  104  |  12  ----------------------------<  110<H>  4.0   |  25  |  0.9    Ca    8.3<L>      29 Oct 2020 05:30  Mg     1.9     10    TPro  5.9<L>  /  Alb  3.6  /  TBili  0.2  /  DBili  x   /  AST  15  /  ALT  6   /  AlkPhos  65  10-28    PT/INR - ( 27 Oct 2020 13:31 )   PT: 19.80 sec;   INR: 1.72 ratio         PTT - ( 27 Oct 2020 13:31 )  PTT:36.6 sec  Urinalysis Basic - ( 29 Oct 2020 10:00 )    Color: Dark Brown / Appearance: Turbid / S.012 / pH: x  Gluc: x / Ketone: Negative  / Bili: Negative / Urobili: <2 mg/dL   Blood: x / Protein: 300 mg/dL / Nitrite: Negative   Leuk Esterase: Moderate / RBC: >720 /HPF /  /HPF   Sq Epi: x / Non Sq Epi: 0 /HPF / Bacteria: 0.0            Culture - Urine (collected 27 Oct 2020 13:31)  Source: .Urine Clean Catch (Midstream)  Final Report (28 Oct 2020 18:19):    No growth          RADIOLOGY:    < from: US Kidney and Bladder (10.28.20 @ 07:35) >  IMPRESSION:    No stones seen by ultrasound.    Again seen mild left hydronephrosis.    < end of copied text >      PHYSICAL EXAM:    GENERAL: NAD, well-developed, AAOx3  HEENT:  Atraumatic, Normocephalic. EOMI, PERRLA, conjunctiva and sclera clear, No JVD  PULMONARY: Clear to auscultation bilaterally; No wheeze  CARDIOVASCULAR: Regular rate and rhythm; No murmurs, rubs, or gallops  GASTROINTESTINAL: Soft, Nontender, Nondistended; Bowel sounds present  MUSCULOSKELETAL:  2+ Peripheral Pulses, No clubbing, cyanosis, or edema, PICC line RUE  NEUROLOGY: non-focal  SKIN: No rashes or lesions          ASSESSMENT & PLAN    Patient is a 80y old woman with PMH uterine cancer (s/p SHAAN in 2016 and adjuvant therapy, recurrence with malignant ascites currently undergoing therapeutic paracentesis/chemotherapy on ), CAD (s/p CABG), history of PE (2020; was on Xarelto OP), HTN, depression who presents for hematuria.    1. Hematuria  -Previously discharged for similar presentation; completed course of Augmentin for presumed cystitis   -Today again with slick hematuria  -CT A/P from prior admission showing inflamed left ureter indicative of potential cystitis   -Ultrasound kidney showing mild left hydronephrosis  - Hemoglobin today 9.0 (from 8.5)  -Urology consult appreciated; will attempt for inpatient cystoscopy  -Will hold anticoagulation  -IR consult for IVC filter in setting of previous PE appreciated  -Keep active T+S  -UCX NGTD  -F/U CBC    2. Adenocarcinoma of mullerian origin  -S/P SHAAN in  with adjuvant chemotherapy  -Currently on carbo/taxol weekly 3 on 1 off  -PICC in RUE for chemo  -Chemotherapy scheduled 10/30    3. PE  -Lovenox stopped in light of bleed  -IR consult appreciated for IVC filter    4. CAD  -Continue aspirin/statin/beta blocker    5. Anxiety  -Klonopin 0.5 q24hrs    MISC:  -DVT PPX: Heparin  -GI PPX: none  -Activity: As tolerated  -Diet: DASH  -CODE: FULL

## 2020-10-29 NOTE — PROGRESS NOTE ADULT - SUBJECTIVE AND OBJECTIVE BOX
Patient is a 80y old  Female who presents with a chief complaint of hematuria (28 Oct 2020 17:58)      Subjective: Patient reports she had a repeat episode of hematuria       Vital Signs Last 24 Hrs  T(C): 37.1 (29 Oct 2020 05:28), Max: 37.6 (28 Oct 2020 13:33)  T(F): 98.8 (29 Oct 2020 05:28), Max: 99.6 (28 Oct 2020 13:33)  HR: 96 (29 Oct 2020 05:28) (77 - 96)  BP: 135/64 (29 Oct 2020 05:28) (100/58 - 135/64)  BP(mean): --  RR: 18 (29 Oct 2020 05:28) (18 - 18)  SpO2: 100% (28 Oct 2020 12:23) (100% - 100%)    PHYSICAL EXAM  General: adult in NAD  HEENT: anicteric sclera, pink conjunctiva  Neck: supple  CV: normal S1/S2 with no murmur rubs or gallops  Lungs: CTABL  Abdomen: soft non-tender non-distended   Ext: no edema  Skin: no rashes  Neuro: alert and oriented X 4, no focal deficits    MEDICATIONS  (STANDING):  amLODIPine   Tablet 5 milliGRAM(s) Oral daily  aspirin enteric coated 81 milliGRAM(s) Oral daily  atorvastatin 40 milliGRAM(s) Oral at bedtime  brimonidine 0.2% Ophthalmic Solution 1 Drop(s) Both EYES two times a day  dorzolamide 2% Ophthalmic Solution 1 Drop(s) Both EYES three times a day  metoprolol succinate ER 25 milliGRAM(s) Oral daily  polyethylene glycol 3350 17 Gram(s) Oral at bedtime  senna 2 Tablet(s) Oral at bedtime  sertraline 25 milliGRAM(s) Oral daily    MEDICATIONS  (PRN):  clonazePAM  Tablet 0.5 milliGRAM(s) Oral daily PRN anxiety  oxycodone    5 mG/acetaminophen 325 mG 1 Tablet(s) Oral every 6 hours PRN Moderate Pain (4 - 6)      LABS:                          9.0    4.59  )-----------( 213      ( 29 Oct 2020 05:30 )             28.3         Mean Cell Volume : 85.8 fL  Mean Cell Hemoglobin : 27.3 pg  Mean Cell Hemoglobin Concentration : 31.8 g/dL  Auto Neutrophil # : 2.39 K/uL  Auto Lymphocyte # : 1.73 K/uL  Auto Monocyte # : 0.38 K/uL  Auto Eosinophil # : 0.04 K/uL  Auto Basophil # : 0.02 K/uL  Auto Neutrophil % : 52.0 %  Auto Lymphocyte % : 37.7 %  Auto Monocyte % : 8.3 %  Auto Eosinophil % : 0.9 %  Auto Basophil % : 0.4 %      Serial CBC's  10-29 @ 05:30  Hct-28.3 / Hgb-9.0 / Plat-213 / RBC-3.30 / WBC-4.59  Serial CBC's  10-28 @ 15:18  Hct-26.9 / Hgb-8.5 / Plat-183 / RBC-3.12 / WBC-3.85  Serial CBC's  10-27 @ 13:31  Hct-27.9 / Hgb-8.7 / Plat-194 / RBC-3.22 / WBC-4.62      10-29    140  |  104  |  12  ----------------------------<  110<H>  4.0   |  25  |  0.9    Ca    8.3<L>      29 Oct 2020 05:30  Mg     1.9     10-29    TPro  5.9<L>  /  Alb  3.6  /  TBili  0.2  /  DBili  x   /  AST  15  /  ALT  6   /  AlkPhos  65  10-28      PT/INR - ( 27 Oct 2020 13:31 )   PT: 19.80 sec;   INR: 1.72 ratio         PTT - ( 27 Oct 2020 13:31 )  PTT:36.6 sec                Urinalysis Basic - ( 29 Oct 2020 10:00 )    Color: Dark Brown / Appearance: Turbid / S.012 / pH: x  Gluc: x / Ketone: Negative  / Bili: Negative / Urobili: <2 mg/dL   Blood: x / Protein: 300 mg/dL / Nitrite: Negative   Leuk Esterase: Moderate / RBC: x / WBC x   Sq Epi: x / Non Sq Epi: x / Bacteria: x          Culture - Urine (collected 27 Oct 2020 13:31)  Source: .Urine Clean Catch (Midstream)  Final Report (28 Oct 2020 18:19):    No growth            BLOOD SMEAR INTERPRETATION:       RADIOLOGY & ADDITIONAL STUDIES:

## 2020-10-29 NOTE — PROGRESS NOTE ADULT - SUBJECTIVE AND OBJECTIVE BOX
Subjective:   Pt. seen and examined at bedside IR PACU together with Dr. Singletary. Pt. states she voids bloody urine. denies dysuria, hesitancy, fever, chills.    called to evaluate Pt. for inpatient cystoscopy.     ROS:   [ x ] A 10 Point Review of Systems was negative except where noted  [    ] Due to altered mental status/intubation, subjective information was not able to be obtained from the patient. History was obtained to the extent possible from review of the chart and collateral sources of information.     amLODIPine   Tablet 5 milliGRAM(s) Oral daily  aspirin enteric coated 81 milliGRAM(s) Oral daily  atorvastatin 40 milliGRAM(s) Oral at bedtime  brimonidine 0.2% Ophthalmic Solution 1 Drop(s) Both EYES two times a day  clonazePAM  Tablet 0.5 milliGRAM(s) Oral daily PRN  dorzolamide 2% Ophthalmic Solution 1 Drop(s) Both EYES three times a day  metoprolol succinate ER 25 milliGRAM(s) Oral daily  oxycodone    5 mG/acetaminophen 325 mG 1 Tablet(s) Oral every 6 hours PRN  polyethylene glycol 3350 17 Gram(s) Oral at bedtime  senna 2 Tablet(s) Oral at bedtime  sertraline 25 milliGRAM(s) Oral daily        Vital Signs Last 24 Hrs  T(C): 36.4 (29 Oct 2020 12:25), Max: 37.4 (28 Oct 2020 19:37)  T(F): 97.6 (29 Oct 2020 12:25), Max: 99.4 (28 Oct 2020 19:37)  HR: 84 (29 Oct 2020 12:25) (77 - 96)  BP: 118/60 (29 Oct 2020 12:25) (108/64 - 135/64)  RR: 18 (29 Oct 2020 12:25) (18 - 18)       PE  Constitutional: NAD, age- appropriate developed, well nourished   HEENT: NC/AT,  EOMI  Neck: no pain  Back: No CVA tenderness B/L   Respiratory: No accessory respiratory muscle use  Abd: Soft, NT/ND, no suprapubic ttp.  Extremities: no edema  Neurological: A/O x 3  Psychiatric: Normal mood, normal affect  Skin: No rashes    LABS:                        9.0    4.59  )-----------( 213      ( 29 Oct 2020 05:30 )             28.3     10-29    140  |  104  |  12  ----------------------------<  110<H>  4.0   |  25  |  0.9    Ca    8.3<L>      29 Oct 2020 05:30  Mg     1.9     10-29    TPro  5.9<L>  /  Alb  3.6  /  TBili  0.2  /  DBili  x   /  AST  15  /  ALT  6   /  AlkPhos  65  10-28     Urinalysis (10.29.20 @ 10:00)    pH Urine: 6.5    Glucose Qualitative, Urine: Trace    Blood, Urine: Large    Color: Dark Brown    Urine Appearance: Turbid    Bilirubin: Negative    Ketone - Urine: Negative    Specific Gravity: 1.012    Protein, Urine: 300 mg/dL    Urobilinogen: <2 mg/dL    Nitrite: Negative    Leukocyte Esterase Concentration: Moderate    Urine Microscopic-Add On (NC) (10.29.20 @ 10:00)   Red Blood Cell - Urine: >720 /HPF   White Blood Cell - Urine: 135 /HPF   Hyaline Casts: >145 /LPF   Bacteria: 0.0   Epithelial Cells: 0 /HPF   Culture - Urine (10.27.20 @ 13:31)    Specimen Source: .Urine Clean Catch (Midstream)    Culture Results:   No growth        RADIOLOGY & ADDITIONAL STUDIES:     < from: US Kidney and Bladder (10.28.20 @ 07:35) >  EXAM:  US KIDNEYS AND BLADDER            PROCEDURE DATE:  10/28/2020            INTERPRETATION:  CLINICAL INFORMATION: Hematuria.    COMPARISON: CT of the abdomen and pelvis on 10/21/2020.    TECHNIQUE: Sonography of the kidneys and bladder.    FINDINGS:    Right kidney: 8.9 (cm). No renal mass, hydronephrosis or calculi.    Left kidney:  8.1 (cm). No stones seen by ultrasound. Mild left hydronephrosis.    Urinary bladder: No debris or calculus.  Prevoid volume of approximately 122 cc.  Postvoid volume is approximately 0 cc.        IMPRESSION:    No stones seen by ultrasound.    Again seen mild left hydronephrosis.      ARMANDO BOYD MD; Attending Radiologist  This document has been electronically signed. Oct 28 2020 10:02AM    < end of copied text >

## 2020-10-29 NOTE — PROGRESS NOTE ADULT - ASSESSMENT
Pt is a 81 yo F with a PMHx of uterine ca s/p SHAAN currently on chemotx, recent PE and DVT on Eliquis at home a/w hematuria, RBUS showing mild L hydronephrosis.       Plan:  No acute  intervention at this time   Plans for cystoscopy as an outpatient, If hematuria worsens or drop in H/H possible inpatient cysto   Urine cx   Urine cytology  Monitor H/H, transfuse prn

## 2020-10-29 NOTE — PROGRESS NOTE ADULT - ASSESSMENT
ASSESSMENT & PLAN  Patient is a 80y old woman with PMH uterine cancer (s/p SHAAN in 2016 and adjuvant therapy, recurrence with malignant ascites currently undergoing therapeutic paracentesis/chemotherapy on Fridays), CAD (s/p CABG), history of PE (August 2020; was on Xarelto OP), HTN, depression who presents for hematuria.    # Recurrent  Hematuria/ acute blood loss anemia   - hold Lovenox for now   -  follow up to schedule inpt cystoscopy ( oncology has clinical suspicion for local invasion) , case d/w  attending today   -Ultrasound kidney showing mild left hydronephrosis  - Hemoglobin is stable for now   - monitor H/H, keep Hb above 7.5 and active type/cross     #  Adenocarcinoma of mullerian origin  - medical oncology is following, recommendation noted   -S/P SHAAN in 2016 with adjuvant chemotherapy  - on carbo/taxol weekly 3 on 1 off  - prognosis guarded   - GOC discussed with pt today, she is willing to proceed with cancer directed therapy   -Chemotherapy scheduled for tomorrow     # h/o DVT and PE  - hold AC  - IR consulted, s/p IVC filter today     # CAD/ h/o CABG   -Continue aspirin/statin/beta blocker    #  Anxiety  -Klonopin 0.5 q24hrs    MISC:  -DVT PPX: Heparin  -GI PPX: none  -Activity: As tolerated  -Diet: DASH    #Progress Note Handoff  Pending (specify):   f/u to schedule inpt cystoscopy, monitor H/H, chemo tomorrow   Family discussion: I spoke with pt, she agreed with a plan of care   Disposition: Home__x _/SNF___/Other________/Unknown at this time________

## 2020-10-30 ENCOUNTER — APPOINTMENT (OUTPATIENT)
Dept: INFUSION THERAPY | Facility: CLINIC | Age: 80
End: 2020-10-30

## 2020-10-30 LAB
ALBUMIN SERPL ELPH-MCNC: 3.5 G/DL — SIGNIFICANT CHANGE UP (ref 3.5–5.2)
ALP SERPL-CCNC: 65 U/L — SIGNIFICANT CHANGE UP (ref 30–115)
ALT FLD-CCNC: 6 U/L — SIGNIFICANT CHANGE UP (ref 0–41)
ANION GAP SERPL CALC-SCNC: 10 MMOL/L — SIGNIFICANT CHANGE UP (ref 7–14)
AST SERPL-CCNC: 14 U/L — SIGNIFICANT CHANGE UP (ref 0–41)
BASOPHILS # BLD AUTO: 0.01 K/UL — SIGNIFICANT CHANGE UP (ref 0–0.2)
BASOPHILS NFR BLD AUTO: 0.3 % — SIGNIFICANT CHANGE UP (ref 0–1)
BILIRUB SERPL-MCNC: 0.9 MG/DL — SIGNIFICANT CHANGE UP (ref 0.2–1.2)
BUN SERPL-MCNC: 11 MG/DL — SIGNIFICANT CHANGE UP (ref 10–20)
CALCIUM SERPL-MCNC: 8.4 MG/DL — LOW (ref 8.5–10.1)
CHLORIDE SERPL-SCNC: 101 MMOL/L — SIGNIFICANT CHANGE UP (ref 98–110)
CO2 SERPL-SCNC: 26 MMOL/L — SIGNIFICANT CHANGE UP (ref 17–32)
CREAT SERPL-MCNC: 0.9 MG/DL — SIGNIFICANT CHANGE UP (ref 0.7–1.5)
CULTURE RESULTS: NO GROWTH — SIGNIFICANT CHANGE UP
EOSINOPHIL # BLD AUTO: 0.03 K/UL — SIGNIFICANT CHANGE UP (ref 0–0.7)
EOSINOPHIL NFR BLD AUTO: 0.9 % — SIGNIFICANT CHANGE UP (ref 0–8)
GLUCOSE SERPL-MCNC: 99 MG/DL — SIGNIFICANT CHANGE UP (ref 70–99)
HCT VFR BLD CALC: 27.2 % — LOW (ref 37–47)
HGB BLD-MCNC: 8.6 G/DL — LOW (ref 12–16)
IMM GRANULOCYTES NFR BLD AUTO: 0.6 % — HIGH (ref 0.1–0.3)
LYMPHOCYTES # BLD AUTO: 1.3 K/UL — SIGNIFICANT CHANGE UP (ref 1.2–3.4)
LYMPHOCYTES # BLD AUTO: 37.2 % — SIGNIFICANT CHANGE UP (ref 20.5–51.1)
MAGNESIUM SERPL-MCNC: 1.8 MG/DL — SIGNIFICANT CHANGE UP (ref 1.8–2.4)
MCHC RBC-ENTMCNC: 27.6 PG — SIGNIFICANT CHANGE UP (ref 27–31)
MCHC RBC-ENTMCNC: 31.6 G/DL — LOW (ref 32–37)
MCV RBC AUTO: 87.2 FL — SIGNIFICANT CHANGE UP (ref 81–99)
MONOCYTES # BLD AUTO: 0.49 K/UL — SIGNIFICANT CHANGE UP (ref 0.1–0.6)
MONOCYTES NFR BLD AUTO: 14 % — HIGH (ref 1.7–9.3)
NEUTROPHILS # BLD AUTO: 1.64 K/UL — SIGNIFICANT CHANGE UP (ref 1.4–6.5)
NEUTROPHILS NFR BLD AUTO: 47 % — SIGNIFICANT CHANGE UP (ref 42.2–75.2)
NRBC # BLD: 0 /100 WBCS — SIGNIFICANT CHANGE UP (ref 0–0)
PLATELET # BLD AUTO: 203 K/UL — SIGNIFICANT CHANGE UP (ref 130–400)
POTASSIUM SERPL-MCNC: 4.1 MMOL/L — SIGNIFICANT CHANGE UP (ref 3.5–5)
POTASSIUM SERPL-SCNC: 4.1 MMOL/L — SIGNIFICANT CHANGE UP (ref 3.5–5)
PROT SERPL-MCNC: 5.7 G/DL — LOW (ref 6–8)
RBC # BLD: 3.12 M/UL — LOW (ref 4.2–5.4)
RBC # FLD: 20.6 % — HIGH (ref 11.5–14.5)
SODIUM SERPL-SCNC: 137 MMOL/L — SIGNIFICANT CHANGE UP (ref 135–146)
SPECIMEN SOURCE: SIGNIFICANT CHANGE UP
WBC # BLD: 3.49 K/UL — LOW (ref 4.8–10.8)
WBC # FLD AUTO: 3.49 K/UL — LOW (ref 4.8–10.8)

## 2020-10-30 PROCEDURE — 99233 SBSQ HOSP IP/OBS HIGH 50: CPT

## 2020-10-30 PROCEDURE — 93970 EXTREMITY STUDY: CPT | Mod: 26

## 2020-10-30 PROCEDURE — 99232 SBSQ HOSP IP/OBS MODERATE 35: CPT

## 2020-10-30 RX ORDER — ONDANSETRON 8 MG/1
16 TABLET, FILM COATED ORAL ONCE
Refills: 0 | Status: COMPLETED | OUTPATIENT
Start: 2020-10-30 | End: 2020-10-30

## 2020-10-30 RX ORDER — FAMOTIDINE 10 MG/ML
20 INJECTION INTRAVENOUS ONCE
Refills: 0 | Status: COMPLETED | OUTPATIENT
Start: 2020-10-30 | End: 2020-10-30

## 2020-10-30 RX ORDER — ENOXAPARIN SODIUM 100 MG/ML
40 INJECTION SUBCUTANEOUS ONCE
Refills: 0 | Status: DISCONTINUED | OUTPATIENT
Start: 2020-10-30 | End: 2020-10-30

## 2020-10-30 RX ORDER — PACLITAXEL 6 MG/ML
100 INJECTION, SOLUTION, CONCENTRATE INTRAVENOUS ONCE
Refills: 0 | Status: COMPLETED | OUTPATIENT
Start: 2020-10-30 | End: 2020-10-30

## 2020-10-30 RX ORDER — CARBOPLATIN 50 MG
150 VIAL (EA) INTRAVENOUS ONCE
Refills: 0 | Status: COMPLETED | OUTPATIENT
Start: 2020-10-30 | End: 2020-10-30

## 2020-10-30 RX ORDER — FOSAPREPITANT DIMEGLUMINE 150 MG/5ML
150 INJECTION, POWDER, LYOPHILIZED, FOR SOLUTION INTRAVENOUS ONCE
Refills: 0 | Status: COMPLETED | OUTPATIENT
Start: 2020-10-30 | End: 2020-10-30

## 2020-10-30 RX ORDER — ACETAMINOPHEN 500 MG
650 TABLET ORAL ONCE
Refills: 0 | Status: COMPLETED | OUTPATIENT
Start: 2020-10-30 | End: 2020-10-30

## 2020-10-30 RX ORDER — DEXAMETHASONE 0.5 MG/5ML
12 ELIXIR ORAL ONCE
Refills: 0 | Status: COMPLETED | OUTPATIENT
Start: 2020-10-30 | End: 2020-10-30

## 2020-10-30 RX ORDER — DIPHENHYDRAMINE HCL 50 MG
25 CAPSULE ORAL ONCE
Refills: 0 | Status: COMPLETED | OUTPATIENT
Start: 2020-10-30 | End: 2020-10-30

## 2020-10-30 RX ORDER — HEPARIN SODIUM 5000 [USP'U]/ML
5000 INJECTION INTRAVENOUS; SUBCUTANEOUS EVERY 12 HOURS
Refills: 0 | Status: DISCONTINUED | OUTPATIENT
Start: 2020-10-30 | End: 2020-11-01

## 2020-10-30 RX ADMIN — ATORVASTATIN CALCIUM 40 MILLIGRAM(S): 80 TABLET, FILM COATED ORAL at 21:25

## 2020-10-30 RX ADMIN — ONDANSETRON 116 MILLIGRAM(S): 8 TABLET, FILM COATED ORAL at 12:58

## 2020-10-30 RX ADMIN — AMLODIPINE BESYLATE 5 MILLIGRAM(S): 2.5 TABLET ORAL at 06:05

## 2020-10-30 RX ADMIN — Medication 650 MILLIGRAM(S): at 13:49

## 2020-10-30 RX ADMIN — SENNA PLUS 2 TABLET(S): 8.6 TABLET ORAL at 21:25

## 2020-10-30 RX ADMIN — DORZOLAMIDE HYDROCHLORIDE 1 DROP(S): 20 SOLUTION/ DROPS OPHTHALMIC at 06:05

## 2020-10-30 RX ADMIN — FAMOTIDINE 104 MILLIGRAM(S): 10 INJECTION INTRAVENOUS at 12:57

## 2020-10-30 RX ADMIN — HEPARIN SODIUM 5000 UNIT(S): 5000 INJECTION INTRAVENOUS; SUBCUTANEOUS at 17:25

## 2020-10-30 RX ADMIN — FOSAPREPITANT DIMEGLUMINE 300 MILLIGRAM(S): 150 INJECTION, POWDER, LYOPHILIZED, FOR SOLUTION INTRAVENOUS at 12:58

## 2020-10-30 RX ADMIN — Medication 265 MILLIGRAM(S): at 15:51

## 2020-10-30 RX ADMIN — SERTRALINE 25 MILLIGRAM(S): 25 TABLET, FILM COATED ORAL at 11:32

## 2020-10-30 RX ADMIN — Medication 81 MILLIGRAM(S): at 11:32

## 2020-10-30 RX ADMIN — BRIMONIDINE TARTRATE 1 DROP(S): 2 SOLUTION/ DROPS OPHTHALMIC at 06:05

## 2020-10-30 RX ADMIN — BRIMONIDINE TARTRATE 1 DROP(S): 2 SOLUTION/ DROPS OPHTHALMIC at 17:24

## 2020-10-30 RX ADMIN — DORZOLAMIDE HYDROCHLORIDE 1 DROP(S): 20 SOLUTION/ DROPS OPHTHALMIC at 21:25

## 2020-10-30 RX ADMIN — DORZOLAMIDE HYDROCHLORIDE 1 DROP(S): 20 SOLUTION/ DROPS OPHTHALMIC at 13:19

## 2020-10-30 RX ADMIN — PACLITAXEL 266.67 MILLIGRAM(S): 6 INJECTION, SOLUTION, CONCENTRATE INTRAVENOUS at 14:43

## 2020-10-30 RX ADMIN — Medication 101 MILLIGRAM(S): at 12:58

## 2020-10-30 RX ADMIN — Medication 106 MILLIGRAM(S): at 12:58

## 2020-10-30 RX ADMIN — Medication 25 MILLIGRAM(S): at 06:05

## 2020-10-30 NOTE — PROGRESS NOTE ADULT - SUBJECTIVE AND OBJECTIVE BOX
Patient is a 80y old  Female who presents with a chief complaint of hematuria (29 Oct 2020 17:41)      Subjective: Patient feels okay today but still reports she has hematuria       Vital Signs Last 24 Hrs  T(C): 37.2 (30 Oct 2020 05:02), Max: 37.3 (29 Oct 2020 18:54)  T(F): 99 (30 Oct 2020 05:02), Max: 99.1 (29 Oct 2020 18:54)  HR: 87 (30 Oct 2020 05:02) (71 - 87)  BP: 125/59 (30 Oct 2020 05:02) (113/58 - 125/59)  BP(mean): --  RR: 18 (30 Oct 2020 05:02) (18 - 18)  SpO2: --    PHYSICAL EXAM  General: adult in NAD  HEENT: anicteric sclera, pink conjunctiva  Neck: supple  CV: normal S1/S2 with no murmur rubs or gallops  Lungs: CTABL  Abdomen: soft non-tender non-distended   Ext: no edema  Skin: no rashes  Neuro: alert and oriented X 4, no focal deficits    MEDICATIONS  (STANDING):  amLODIPine   Tablet 5 milliGRAM(s) Oral daily  aspirin enteric coated 81 milliGRAM(s) Oral daily  atorvastatin 40 milliGRAM(s) Oral at bedtime  brimonidine 0.2% Ophthalmic Solution 1 Drop(s) Both EYES two times a day  dorzolamide 2% Ophthalmic Solution 1 Drop(s) Both EYES three times a day  metoprolol succinate ER 25 milliGRAM(s) Oral daily  polyethylene glycol 3350 17 Gram(s) Oral at bedtime  senna 2 Tablet(s) Oral at bedtime  sertraline 25 milliGRAM(s) Oral daily    MEDICATIONS  (PRN):  clonazePAM  Tablet 0.5 milliGRAM(s) Oral daily PRN anxiety  oxycodone    5 mG/acetaminophen 325 mG 1 Tablet(s) Oral every 6 hours PRN Moderate Pain (4 - 6)      LABS:                          8.6    3.49  )-----------( 203      ( 30 Oct 2020 05:30 )             27.2         Mean Cell Volume : 87.2 fL  Mean Cell Hemoglobin : 27.6 pg  Mean Cell Hemoglobin Concentration : 31.6 g/dL  Auto Neutrophil # : 1.64 K/uL  Auto Lymphocyte # : 1.30 K/uL  Auto Monocyte # : 0.49 K/uL  Auto Eosinophil # : 0.03 K/uL  Auto Basophil # : 0.01 K/uL  Auto Neutrophil % : 47.0 %  Auto Lymphocyte % : 37.2 %  Auto Monocyte % : 14.0 %  Auto Eosinophil % : 0.9 %  Auto Basophil % : 0.3 %      Serial CBC's  10-30 @ 05:30  Hct-27.2 / Hgb-8.6 / Plat-203 / RBC-3.12 / WBC-3.49  Serial CBC's  10-29 @ 05:30  Hct-28.3 / Hgb-9.0 / Plat-213 / RBC-3.30 / WBC-4.59  Serial CBC's  10-28 @ 15:18  Hct-26.9 / Hgb-8.5 / Plat-183 / RBC-3.12 / WBC-3.85  Serial CBC's  10-27 @ 13:31  Hct-27.9 / Hgb-8.7 / Plat-194 / RBC-3.22 / WBC-4.62      10-30    137  |  101  |  11  ----------------------------<  99  4.1   |  26  |  0.9    Ca    8.4<L>      30 Oct 2020 05:30  Mg     1.8     10-30    TPro  5.7<L>  /  Alb  3.5  /  TBili  0.9  /  DBili  x   /  AST  14  /  ALT  6   /  AlkPhos  65  10-30        Urinalysis Basic - ( 29 Oct 2020 10:00 )    Color: Dark Brown / Appearance: Turbid / S.012 / pH: x  Gluc: x / Ketone: Negative  / Bili: Negative / Urobili: <2 mg/dL   Blood: x / Protein: 300 mg/dL / Nitrite: Negative   Leuk Esterase: Moderate / RBC: >720 /HPF /  /HPF   Sq Epi: x / Non Sq Epi: 0 /HPF / Bacteria: 0.0          Culture - Urine (collected 27 Oct 2020 13:31)  Source: .Urine Clean Catch (Midstream)  Final Report (28 Oct 2020 18:19):    No growth            BLOOD SMEAR INTERPRETATION:       RADIOLOGY & ADDITIONAL STUDIES:

## 2020-10-30 NOTE — PROGRESS NOTE ADULT - SUBJECTIVE AND OBJECTIVE BOX
BEKAH NELSON 80y Female  MRN#: [id number]   CODE STATUS:FULL      SUBJECTIVE    HPI and Hospital Course    Patient is a 80y old woman with PMH uterine cancer (s/p SHAAN in 2016 and adjuvant therapy, recurrence with malignant ascites currently undergoing therapeutic paracentesis/chemotherapy on ), CAD (s/p CABG), history of PE (2020; was on Xarelto OP), HTN, depression who presents for hematuria.    Patient with IVC filter placement 10/29.    Interval Events    Patient examined at bedside. No events overnight.    OBJECTIVE  PAST MEDICAL & SURGICAL HISTORY  HLD (hyperlipidemia)    HTN (hypertension)    CAD (coronary artery disease)    Coronary artery disease  s/p CABG X2    Glaucoma    Cataract of right eye    High cholesterol    Hypertension    Uterine cancer  s/p SHAAN and chemo    History of coronary artery bypass surgery    Athscl CABG, unsp, w angina pectoris w documented spasm    H/O total hysterectomy      ALLERGIES:  chloroquine (Hives)  quinine (Urticaria)    MEDICATIONS:  STANDING MEDICATIONS  acetaminophen   Tablet .. 650 milliGRAM(s) Oral once  amLODIPine   Tablet 5 milliGRAM(s) Oral daily  aspirin enteric coated 81 milliGRAM(s) Oral daily  atorvastatin 40 milliGRAM(s) Oral at bedtime  brimonidine 0.2% Ophthalmic Solution 1 Drop(s) Both EYES two times a day  CARBOplatin IVPB (eMAR) 150 milliGRAM(s) IV Intermittent once  dorzolamide 2% Ophthalmic Solution 1 Drop(s) Both EYES three times a day  enoxaparin Injectable 40 milliGRAM(s) SubCutaneous once  metoprolol succinate ER 25 milliGRAM(s) Oral daily  PACLitaxel IVPB (eMAR) 100 milliGRAM(s) IV Intermittent once  polyethylene glycol 3350 17 Gram(s) Oral at bedtime  senna 2 Tablet(s) Oral at bedtime  sertraline 25 milliGRAM(s) Oral daily    PRN MEDICATIONS  clonazePAM  Tablet 0.5 milliGRAM(s) Oral daily PRN  oxycodone    5 mG/acetaminophen 325 mG 1 Tablet(s) Oral every 6 hours PRN      VITAL SIGNS: Last 24 Hours  T(C): 37.2 (30 Oct 2020 05:02), Max: 37.3 (29 Oct 2020 18:54)  T(F): 99 (30 Oct 2020 05:02), Max: 99.1 (29 Oct 2020 18:54)  HR: 87 (30 Oct 2020 05:02) (71 - 87)  BP: 125/59 (30 Oct 2020 05:02) (113/58 - 125/59)  BP(mean): --  RR: 18 (30 Oct 2020 05:02) (18 - 18)  SpO2: --    LABS:                        8.6    3.49  )-----------( 203      ( 30 Oct 2020 05:30 )             27.2     10-30    137  |  101  |  11  ----------------------------<  99  4.1   |  26  |  0.9    Ca    8.4<L>      30 Oct 2020 05:30  Mg     1.8     10-30    TPro  5.7<L>  /  Alb  3.5  /  TBili  0.9  /  DBili  x   /  AST  14  /  ALT  6   /  AlkPhos  65  10-30      Urinalysis Basic - ( 29 Oct 2020 10:00 )    Color: Dark Brown / Appearance: Turbid / S.012 / pH: x  Gluc: x / Ketone: Negative  / Bili: Negative / Urobili: <2 mg/dL   Blood: x / Protein: 300 mg/dL / Nitrite: Negative   Leuk Esterase: Moderate / RBC: >720 /HPF /  /HPF   Sq Epi: x / Non Sq Epi: 0 /HPF / Bacteria: 0.0      RADIOLOGY:    < from: [facility] Duplex Lower Ext Vein Scan, Bilat (10.30.20 @ 08:31) >  Impression:    No evidence of deep venous thrombosis in the bilateral lower extremities.    ICD-10: M79.89        < end of copied text >      PHYSICAL EXAM:    GENERAL: NAD, well-developed, AAOx3  HEENT:  Atraumatic, Normocephalic. EOMI, PERRLA, conjunctiva and sclera clear, No JVD  PULMONARY: Clear to auscultation bilaterally; No wheeze  CARDIOVASCULAR: Regular rate and rhythm; No murmurs, rubs, or gallops  GASTROINTESTINAL: Soft, Nontender, Nondistended; Bowel sounds present  MUSCULOSKELETAL:  2+ Peripheral Pulses, No clubbing, cyanosis, or edema, PICC line RUE  NEUROLOGY: non-focal  SKIN: No rashes or lesions        ASSESSMENT & PLAN    Patient is a 80y old woman with PMH uterine cancer (s/p SHAAN in 2016 and adjuvant therapy, recurrence with malignant ascites currently undergoing therapeutic paracentesis/chemotherapy on ), CAD (s/p CABG), history of PE (2020; was on Xarelto OP), HTN, depression who presents for hematuria.    1. Hematuria  -Previously discharged for similar presentation; completed course of Augmentin for presumed cystitis   -Yesterday again with slick hematuria (10/29)  -CT A/P from prior admission showing inflamed left ureter indicative of potential cystitis   -Ultrasound kidney showing mild left hydronephrosis  - Hemoglobin today 8.6 (from 9.0)  -Urology consult appreciated; will attempt for inpatient cystoscopy  -IR consult for IVC filter in setting of previous PE appreciated; placed 10/29  -Keep active T+S  -UCX NGTD  -F/U CBC    2. Adenocarcinoma of mullerian origin  -S/P SHAAN in  with adjuvant chemotherapy  -Currently on carbo/taxol weekly 3 on 1 off  -PICC in RUE for chemo  -Chemotherapy scheduled 10/30    3. PE  -Lovenox stopped in light of bleed  -IR consult appreciated for IVC filter  -IVC filtered placed 10/29    4. CAD  -Continue aspirin/statin/beta blocker    5. Anxiety  -Klonopin 0.5 q24hrs    MISC:  -DVT PPX: lovenox  -GI PPX: none  -Activity: As tolerated  -Diet: DASH  -CODE: FULL

## 2020-10-30 NOTE — CHART NOTE - NSCHARTNOTEFT_GEN_A_CORE
Dr. Singletary discussed case with Dr. Zuniga. Pt to be transferred to [facility] for cystoscopy, possible biopsy, possible fulguration on Monday 11/2/20 with Dr. Kumar. Medicine resident aware of plan.

## 2020-10-30 NOTE — PROGRESS NOTE ADULT - ASSESSMENT
ASSESSMENT & PLAN  Patient is a 80y old woman with PMH uterine cancer (s/p SHAAN in 2016 and adjuvant therapy, recurrence with malignant ascites currently undergoing therapeutic paracentesis/chemotherapy on Fridays), CAD (s/p CABG), history of PE (August 2020; was on Xarelto OP), HTN, depression who presents for hematuria.    # Recurrent  Hematuria/ acute blood loss anemia   - will start Heparin SQ Q 12 hours ( lower dose for DVT prophylaxis)   -  followed up today, case d/w  attending, pt was scheduled for cystoscopy on Monday at [facility], will arrange transportation on Monday ( please, confirm the time with )    - Ultrasound kidney showing mild left hydronephrosis  - monitor H/H, keep Hb above 7.5 and active type/cross     #  Adenocarcinoma of mullerian origin  - medical oncology is following, recommendation noted   -S/P SHAAN in 2016 with adjuvant chemotherapy  - on carbo/taxol weekly 3 on 1 off  - chemotherapy today   - prognosis guarded   - pt is willing to proceed with cancer directed therapy     # h/o DVT and PE  - hold AC  -  s/p IVC filter on 10/29 by IR     # CAD/ h/o CABG   -Continue aspirin/statin/beta blocker    #  Anxiety  -Klonopin 0.5 q24hrs    MISC:  -DVT PPX: Heparin  -GI PPX: none  -Activity: As tolerated  -Diet: DASH    #Progress Note Handoff  Pending (specify):  cystoscopy on Monday at [facility], Heparin SQ Q 12 hour for DVT prophylaxis, chemotherapy today, monitor H/H    Family discussion: I spoke with pt, she agreed with a plan of care   Disposition: Home__x _/SNF___/Other________/Unknown at this time________

## 2020-10-30 NOTE — PROGRESS NOTE ADULT - ASSESSMENT
Patient is a 81 year old F patient with past medical history of Uterine cancer s/p SHAAN, malignant ascites on therapeutic paracentesis, CAD s/p CABG, segmental pulmonary embolism on Lovenox recently switched to xarelto, HTN, depression, recent admission for hematuria/UTI is referred from oncology clinic for worsening hematuria.      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  - s/p TAHBSO in 2016 with adjuvant chemotherapy  - Started on weekly Carbo/taxol weekly 3 on 1 off, started on 08/14/20 after found to have malignant ascites on paracentesis (4L removed per patient)  - PICC placement by IR  on 10/21  - Patient's chemo on 10/23 held due to UTI   - Next chemo due 10/30, chemo to be given today     #) Acute on Chronic PE in setting of known Malignancy   - s/p IVC filter placement      Patient is a 81 year old F patient with past medical history of Uterine cancer s/p SHAAN, malignant ascites on therapeutic paracentesis, CAD s/p CABG, segmental pulmonary embolism on Lovenox recently switched to xarelto, HTN, depression, recent admission for hematuria/UTI is referred from oncology clinic for worsening hematuria.      #) Hematuria: Recently admitted for hematuria/UTI and discharged last week on Augmentin  - Patient was evaluated in clinic and was noted to have worsening hematuria and a drop in hemoglobin so referred to the ED  - Urology eval appreciated; given she continues to have intermittent hematuria, would opt for inpatient cystoscopy to rule out possible bladder involvement   - Recent CT AP showed inflammation of the ureter    #) Recurrent adenocarcinoma Mullerian origin, malignant ascites dx on 08/08/2020  - s/p TAHBSO in 2016 with adjuvant chemotherapy  - Started on weekly Carbo/taxol weekly 3 on 1 off, started on 08/14/20 after found to have malignant ascites on paracentesis (4L removed per patient)  - PICC placement by IR  on 10/21  - Patient's chemo on 10/23 held due to UTI   - Next chemo due 10/30, chemo to be given today     #) Acute on Chronic PE in setting of known Malignancy   - Patient is high risk to be off AC as she recently had a PE also with history of DVT; s/p IVC filter placement; awaiting resolution of hematuria before resuming full dose anticoagulation

## 2020-10-31 LAB
ALBUMIN SERPL ELPH-MCNC: 3.9 G/DL — SIGNIFICANT CHANGE UP (ref 3.5–5.2)
ALP SERPL-CCNC: 72 U/L — SIGNIFICANT CHANGE UP (ref 30–115)
ALT FLD-CCNC: 7 U/L — SIGNIFICANT CHANGE UP (ref 0–41)
ANION GAP SERPL CALC-SCNC: 10 MMOL/L — SIGNIFICANT CHANGE UP (ref 7–14)
AST SERPL-CCNC: 15 U/L — SIGNIFICANT CHANGE UP (ref 0–41)
BASOPHILS # BLD AUTO: 0 K/UL — SIGNIFICANT CHANGE UP (ref 0–0.2)
BASOPHILS NFR BLD AUTO: 0 % — SIGNIFICANT CHANGE UP (ref 0–1)
BILIRUB SERPL-MCNC: 0.2 MG/DL — SIGNIFICANT CHANGE UP (ref 0.2–1.2)
BUN SERPL-MCNC: 23 MG/DL — HIGH (ref 10–20)
CALCIUM SERPL-MCNC: 8.3 MG/DL — LOW (ref 8.5–10.1)
CHLORIDE SERPL-SCNC: 106 MMOL/L — SIGNIFICANT CHANGE UP (ref 98–110)
CO2 SERPL-SCNC: 25 MMOL/L — SIGNIFICANT CHANGE UP (ref 17–32)
CREAT SERPL-MCNC: 1.3 MG/DL — SIGNIFICANT CHANGE UP (ref 0.7–1.5)
EOSINOPHIL # BLD AUTO: 0 K/UL — SIGNIFICANT CHANGE UP (ref 0–0.7)
EOSINOPHIL NFR BLD AUTO: 0 % — SIGNIFICANT CHANGE UP (ref 0–8)
GLUCOSE SERPL-MCNC: 144 MG/DL — HIGH (ref 70–99)
HCT VFR BLD CALC: 27.6 % — LOW (ref 37–47)
HGB BLD-MCNC: 8.9 G/DL — LOW (ref 12–16)
IMM GRANULOCYTES NFR BLD AUTO: 0.5 % — HIGH (ref 0.1–0.3)
LYMPHOCYTES # BLD AUTO: 0.85 K/UL — LOW (ref 1.2–3.4)
LYMPHOCYTES # BLD AUTO: 19.4 % — LOW (ref 20.5–51.1)
MAGNESIUM SERPL-MCNC: 1.9 MG/DL — SIGNIFICANT CHANGE UP (ref 1.8–2.4)
MCHC RBC-ENTMCNC: 27.7 PG — SIGNIFICANT CHANGE UP (ref 27–31)
MCHC RBC-ENTMCNC: 32.2 G/DL — SIGNIFICANT CHANGE UP (ref 32–37)
MCV RBC AUTO: 86 FL — SIGNIFICANT CHANGE UP (ref 81–99)
MONOCYTES # BLD AUTO: 0.15 K/UL — SIGNIFICANT CHANGE UP (ref 0.1–0.6)
MONOCYTES NFR BLD AUTO: 3.4 % — SIGNIFICANT CHANGE UP (ref 1.7–9.3)
NEUTROPHILS # BLD AUTO: 3.36 K/UL — SIGNIFICANT CHANGE UP (ref 1.4–6.5)
NEUTROPHILS NFR BLD AUTO: 76.7 % — HIGH (ref 42.2–75.2)
NRBC # BLD: 0 /100 WBCS — SIGNIFICANT CHANGE UP (ref 0–0)
PLATELET # BLD AUTO: 217 K/UL — SIGNIFICANT CHANGE UP (ref 130–400)
POTASSIUM SERPL-MCNC: 4.6 MMOL/L — SIGNIFICANT CHANGE UP (ref 3.5–5)
POTASSIUM SERPL-SCNC: 4.6 MMOL/L — SIGNIFICANT CHANGE UP (ref 3.5–5)
PROT SERPL-MCNC: 6.3 G/DL — SIGNIFICANT CHANGE UP (ref 6–8)
RBC # BLD: 3.21 M/UL — LOW (ref 4.2–5.4)
RBC # FLD: 20.2 % — HIGH (ref 11.5–14.5)
SODIUM SERPL-SCNC: 141 MMOL/L — SIGNIFICANT CHANGE UP (ref 135–146)
WBC # BLD: 4.38 K/UL — LOW (ref 4.8–10.8)
WBC # FLD AUTO: 4.38 K/UL — LOW (ref 4.8–10.8)

## 2020-10-31 PROCEDURE — 99233 SBSQ HOSP IP/OBS HIGH 50: CPT

## 2020-10-31 PROCEDURE — 99231 SBSQ HOSP IP/OBS SF/LOW 25: CPT

## 2020-10-31 RX ORDER — SODIUM CHLORIDE 9 MG/ML
1000 INJECTION INTRAMUSCULAR; INTRAVENOUS; SUBCUTANEOUS
Refills: 0 | Status: DISCONTINUED | OUTPATIENT
Start: 2020-10-31 | End: 2020-11-02

## 2020-10-31 RX ADMIN — DORZOLAMIDE HYDROCHLORIDE 1 DROP(S): 20 SOLUTION/ DROPS OPHTHALMIC at 13:21

## 2020-10-31 RX ADMIN — Medication 81 MILLIGRAM(S): at 11:30

## 2020-10-31 RX ADMIN — SERTRALINE 25 MILLIGRAM(S): 25 TABLET, FILM COATED ORAL at 11:30

## 2020-10-31 RX ADMIN — DORZOLAMIDE HYDROCHLORIDE 1 DROP(S): 20 SOLUTION/ DROPS OPHTHALMIC at 05:22

## 2020-10-31 RX ADMIN — SENNA PLUS 2 TABLET(S): 8.6 TABLET ORAL at 22:06

## 2020-10-31 RX ADMIN — AMLODIPINE BESYLATE 5 MILLIGRAM(S): 2.5 TABLET ORAL at 05:22

## 2020-10-31 RX ADMIN — HEPARIN SODIUM 5000 UNIT(S): 5000 INJECTION INTRAVENOUS; SUBCUTANEOUS at 05:22

## 2020-10-31 RX ADMIN — BRIMONIDINE TARTRATE 1 DROP(S): 2 SOLUTION/ DROPS OPHTHALMIC at 17:48

## 2020-10-31 RX ADMIN — DORZOLAMIDE HYDROCHLORIDE 1 DROP(S): 20 SOLUTION/ DROPS OPHTHALMIC at 22:06

## 2020-10-31 RX ADMIN — BRIMONIDINE TARTRATE 1 DROP(S): 2 SOLUTION/ DROPS OPHTHALMIC at 05:22

## 2020-10-31 RX ADMIN — ATORVASTATIN CALCIUM 40 MILLIGRAM(S): 80 TABLET, FILM COATED ORAL at 22:06

## 2020-10-31 RX ADMIN — Medication 25 MILLIGRAM(S): at 05:22

## 2020-10-31 RX ADMIN — SODIUM CHLORIDE 75 MILLILITER(S): 9 INJECTION INTRAMUSCULAR; INTRAVENOUS; SUBCUTANEOUS at 09:59

## 2020-10-31 RX ADMIN — HEPARIN SODIUM 5000 UNIT(S): 5000 INJECTION INTRAVENOUS; SUBCUTANEOUS at 17:48

## 2020-10-31 NOTE — PROGRESS NOTE ADULT - ASSESSMENT
Patient is a 81 year old F patient with past medical history of Uterine cancer s/p SHAAN, malignant ascites on therapeutic paracentesis, CAD s/p CABG, segmental pulmonary embolism on Lovenox recently switched to xarelto, HTN, depression, recent admission for hematuria/UTI is referred from oncology clinic for worsening hematuria.      #) Hematuria: Recently admitted for hematuria/UTI and discharged last week on Augmentin  - Patient was evaluated in clinic and was noted to have worsening hematuria and a drop in hemoglobin so referred to the ED  - Recent CT AP showed inflammation of the ureter  - Will transfer to [facility] for cystoscopy, possible biopsy, possible fulguration    #) Recurrent adenocarcinoma Mullerian origin, malignant ascites dx on 08/08/2020  - s/p TAHBSO in 2016 with adjuvant chemotherapy  - Started on weekly Carbo/taxol weekly 3 on 1 off, started on 08/14/20 after found to have malignant ascites on paracentesis (4L removed per patient)  - PICC placement by IR  on 10/21  - Patient's chemo on 10/23 held due to UTI   - Received carbo taxol 10/30, tolerated it well     #) Acute on Chronic PE in setting of known Malignancy   - Patient is high risk to be off AC as she recently had a PE also with history of DVT; s/p IVC filter placement; awaiting resolution of hematuria before resuming full dose anticoagulation

## 2020-10-31 NOTE — PROGRESS NOTE ADULT - SUBJECTIVE AND OBJECTIVE BOX
80y old woman with PMH uterine cancer (s/p SHAAN in 2016 and adjuvant therapy, recurrence with malignant ascites currently undergoing therapeutic paracentesis/chemotherapy on ), CAD (s/p CABG), history of PE (2020; was on Xarelto OP), HTN, depression who presents for hematuria.  Oral AC was d/c, pt was started on SQ Lovenox on 10/28, shortly after developed hematuria again. She was consulted by  , cystoscopy was scheduled on Monday at [facility]. IVC filter was placed by IR on 10/29, pt had a  chemotherapy on 10/30  Pt denies abdominal pain, urine is pink today.     OBJECTIVE  PAST MEDICAL & SURGICAL HISTORY  HLD (hyperlipidemia)    HTN (hypertension)    CAD (coronary artery disease)    Coronary artery disease  s/p CABG X2    Glaucoma    Cataract of right eye    High cholesterol    Hypertension    Uterine cancer  s/p SHAAN and chemo    History of coronary artery bypass surgery    Athscl CABG, unsp, w angina pectoris w documented spasm    H/O total hysterectomy      ALLERGIES:  chloroquine (Hives)  quinine (Urticaria)      VITAL SIGNS: Last 24 Hours  T(C): 36.4 (31 Oct 2020 05:40), Max: 36.5 (30 Oct 2020 21:12)  T(F): 97.5 (31 Oct 2020 05:40), Max: 97.7 (30 Oct 2020 21:12)  HR: 72 (31 Oct 2020 09:13) (72 - 89)  BP: 132/63 (31 Oct 2020 05:40) (131/64 - 132/63)  BP(mean): --  RR: 18 (31 Oct 2020 09:13) (18 - 18)  SpO2: 100% (31 Oct 2020 09:13) (100% - 100%)      PHYSICAL EXAM:  GENERAL: NAD,  AAOx3 with temporal muscle waisting   HEENT:  Atraumatic, Normocephalic. EOMI, PERRLA, conjunctiva and sclera clear, No JVD  PULMONARY: Clear to auscultation bilaterally; No wheeze  CARDIOVASCULAR: Regular rate and rhythm; No murmurs, rubs, or gallops  GASTROINTESTINAL: Soft, Nontender, Nondistended; Bowel sounds present  MUSCULOSKELETAL:  2+ Peripheral Pulses, No clubbing, cyanosis, or edema, PICC line RUE  NEUROLOGY: non-focal  SKIN: No rashes or lesions    LABS:                                   8.9    4.38  )-----------( 217      ( 31 Oct 2020 05:20 )             27.6   10-31    141  |  106  |  23<H>  ----------------------------<  144<H>  4.6   |  25  |  1.3    Ca    8.3<L>      31 Oct 2020 05:20  Mg     1.9     10-31    TPro  6.3  /  Alb  3.9  /  TBili  0.2  /  DBili  x   /  AST  15  /  ALT  7   /  AlkPhos  72  10-31           PTT - ( 27 Oct 2020 13:31 )  PTT:36.6 sec  Urinalysis Basic - ( 29 Oct 2020 10:00 )    Color: Dark Brown / Appearance: Turbid / S.012 / pH: x  Gluc: x / Ketone: Negative  / Bili: Negative / Urobili: <2 mg/dL   Blood: x / Protein: 300 mg/dL / Nitrite: Negative   Leuk Esterase: Moderate / RBC: >720 /HPF /  /HPF   Sq Epi: x / Non Sq Epi: 0 /HPF / Bacteria: 0.0    Culture - Urine (collected 27 Oct 2020 13:31)  Source: .Urine Clean Catch (Midstream)  Final Report (28 Oct 2020 18:19):    No growth    RADIOLOGY:    < from: US Kidney and Bladder (10.28.20 @ 07:35) >  IMPRESSION:    No stones seen by ultrasound.    Again seen mild left hydronephrosis.    MEDICATIONS  (STANDING):  amLODIPine   Tablet 5 milliGRAM(s) Oral daily  aspirin enteric coated 81 milliGRAM(s) Oral daily  atorvastatin 40 milliGRAM(s) Oral at bedtime  brimonidine 0.2% Ophthalmic Solution 1 Drop(s) Both EYES two times a day  dorzolamide 2% Ophthalmic Solution 1 Drop(s) Both EYES three times a day  heparin   Injectable 5000 Unit(s) SubCutaneous every 12 hours  metoprolol succinate ER 25 milliGRAM(s) Oral daily  polyethylene glycol 3350 17 Gram(s) Oral at bedtime  senna 2 Tablet(s) Oral at bedtime  sertraline 25 milliGRAM(s) Oral daily  sodium chloride 0.9%. 1000 milliLiter(s) (75 mL/Hr) IV Continuous <Continuous>    MEDICATIONS  (PRN):  clonazePAM  Tablet 0.5 milliGRAM(s) Oral daily PRN anxiety  oxycodone    5 mG/acetaminophen 325 mG 1 Tablet(s) Oral every 6 hours PRN Moderate Pain (4 - 6)

## 2020-10-31 NOTE — PROGRESS NOTE ADULT - SUBJECTIVE AND OBJECTIVE BOX
BEKAH NELSON 80y Female  MRN#: [id number]   CODE STATUS:FULL      SUBJECTIVE  HPI and Hospital Course    Patient is a 80y old woman with PMH uterine cancer (s/p HSAAN in 2016 and adjuvant therapy, recurrence with malignant ascites currently undergoing therapeutic paracentesis/chemotherapy on Fridays), CAD (s/p CABG), history of PE (August 2020; was on Xarelto OP), HTN, depression who presents for hematuria.    Patient with IVC filter placement 10/29.    Interval Events    Patient examined at bedside. No events overnight. Still endorsing hematuria.      OBJECTIVE  PAST MEDICAL & SURGICAL HISTORY  HLD (hyperlipidemia)    HTN (hypertension)    CAD (coronary artery disease)    Coronary artery disease  s/p CABG X2    Glaucoma    Cataract of right eye    High cholesterol    Hypertension    Uterine cancer  s/p SHAAN and chemo    History of coronary artery bypass surgery    Athscl CABG, unsp, w angina pectoris w documented spasm    H/O total hysterectomy      ALLERGIES:  chloroquine (Hives)  quinine (Urticaria)    MEDICATIONS:  STANDING MEDICATIONS  amLODIPine   Tablet 5 milliGRAM(s) Oral daily  aspirin enteric coated 81 milliGRAM(s) Oral daily  atorvastatin 40 milliGRAM(s) Oral at bedtime  brimonidine 0.2% Ophthalmic Solution 1 Drop(s) Both EYES two times a day  dorzolamide 2% Ophthalmic Solution 1 Drop(s) Both EYES three times a day  heparin   Injectable 5000 Unit(s) SubCutaneous every 12 hours  metoprolol succinate ER 25 milliGRAM(s) Oral daily  polyethylene glycol 3350 17 Gram(s) Oral at bedtime  senna 2 Tablet(s) Oral at bedtime  sertraline 25 milliGRAM(s) Oral daily  sodium chloride 0.9%. 1000 milliLiter(s) IV Continuous <Continuous>    PRN MEDICATIONS  clonazePAM  Tablet 0.5 milliGRAM(s) Oral daily PRN  oxycodone    5 mG/acetaminophen 325 mG 1 Tablet(s) Oral every 6 hours PRN      VITAL SIGNS: Last 24 Hours  T(C): 36.8 (31 Oct 2020 14:20), Max: 36.8 (31 Oct 2020 14:20)  T(F): 98.2 (31 Oct 2020 14:20), Max: 98.2 (31 Oct 2020 14:20)  HR: 77 (31 Oct 2020 14:20) (72 - 89)  BP: 107/55 (31 Oct 2020 14:20) (107/55 - 132/63)  BP(mean): --  RR: 18 (31 Oct 2020 09:13) (18 - 18)  SpO2: 100% (31 Oct 2020 09:13) (100% - 100%)    LABS:                        8.9    4.38  )-----------( 217      ( 31 Oct 2020 05:20 )             27.6     10-31    141  |  106  |  23<H>  ----------------------------<  144<H>  4.6   |  25  |  1.3    Ca    8.3<L>      31 Oct 2020 05:20  Mg     1.9     10-31    TPro  6.3  /  Alb  3.9  /  TBili  0.2  /  DBili  x   /  AST  15  /  ALT  7   /  AlkPhos  72  10-31              Culture - Urine (collected 29 Oct 2020 10:00)  Source: .Urine Clean Catch (Midstream)  Final Report (30 Oct 2020 18:22):    No growth    PHYSICAL EXAM:    GENERAL: NAD, well-developed, AAOx3  HEENT:  Atraumatic, Normocephalic. EOMI, PERRLA, conjunctiva and sclera clear, No JVD  PULMONARY: Clear to auscultation bilaterally; No wheeze  CARDIOVASCULAR: Regular rate and rhythm; No murmurs, rubs, or gallops  GASTROINTESTINAL: Soft, Nontender, Nondistended; Bowel sounds present  MUSCULOSKELETAL:  2+ Peripheral Pulses, No clubbing, cyanosis, or edema, PICC line RUE  NEUROLOGY: non-focal  SKIN: No rashes or lesions      ASSESSMENT & PLAN    Patient is a 80y old woman with PMH uterine cancer (s/p SHAAN in 2016 and adjuvant therapy, recurrence with malignant ascites currently undergoing therapeutic paracentesis/chemotherapy on Fridays), CAD (s/p CABG), history of PE (August 2020; was on Xarelto OP), HTN, depression who presents for hematuria.    1. Hematuria  -Previously discharged for similar presentation; completed course of Augmentin for presumed cystitis   -Again with slick hematuria (10/29-today)  -CT A/P from prior admission showing inflamed left ureter indicative of potential cystitis   -Ultrasound kidney showing mild left hydronephrosis  - Hemoglobin today 8.9 (from 8.6)  -IR consult for IVC filter in setting of previous PE appreciated; placed 10/29  -Urology consult appreciated; will attempt for inpatient cystoscopy Monday at [place]  -UCX NGTD  -F/U CBC  -Keep active T+S    2. Adenocarcinoma of mullerian origin  -S/P SHAAN in 2016 with adjuvant chemotherapy  -Currently on carbo/taxol weekly 3 on 1 off  -PICC in RUE for chemo  -Chemotherapy scheduled 10/30    3. PE  -Lovenox stopped in light of bleed  -Heparin subq prophylaxis  -IR consult appreciated for IVC filter  -IVC filtered placed 10/29    4. CAD  -Continue aspirin/statin/beta blocker    5. Anxiety  -Klonopin 0.5 q24hrs    MISC:  -DVT PPX: lovenox  -GI PPX: none  -Activity: As tolerated  -Diet: DASH  -CODE: FULL     BEKAH NELSON 80y Female  MRN#: [id number]   CODE STATUS:FULL      SUBJECTIVE  HPI and Hospital Course    Patient is a 80y old woman with PMH uterine cancer (s/p SHAAN in 2016 and adjuvant therapy, recurrence with malignant ascites currently undergoing therapeutic paracentesis/chemotherapy on Fridays), CAD (s/p CABG), history of PE (August 2020; was on Xarelto OP), HTN, depression who presents for hematuria.    Patient with IVC filter placement 10/29.    Interval Events    Patient examined at bedside. No events overnight. Still endorsing hematuria.      OBJECTIVE  PAST MEDICAL & SURGICAL HISTORY  HLD (hyperlipidemia)    HTN (hypertension)    CAD (coronary artery disease)    Coronary artery disease  s/p CABG X2    Glaucoma    Cataract of right eye    High cholesterol    Hypertension    Uterine cancer  s/p SHAAN and chemo    History of coronary artery bypass surgery    Athscl CABG, unsp, w angina pectoris w documented spasm    H/O total hysterectomy      ALLERGIES:  chloroquine (Hives)  quinine (Urticaria)    MEDICATIONS:  STANDING MEDICATIONS  amLODIPine   Tablet 5 milliGRAM(s) Oral daily  aspirin enteric coated 81 milliGRAM(s) Oral daily  atorvastatin 40 milliGRAM(s) Oral at bedtime  brimonidine 0.2% Ophthalmic Solution 1 Drop(s) Both EYES two times a day  dorzolamide 2% Ophthalmic Solution 1 Drop(s) Both EYES three times a day  heparin   Injectable 5000 Unit(s) SubCutaneous every 12 hours  metoprolol succinate ER 25 milliGRAM(s) Oral daily  polyethylene glycol 3350 17 Gram(s) Oral at bedtime  senna 2 Tablet(s) Oral at bedtime  sertraline 25 milliGRAM(s) Oral daily  sodium chloride 0.9%. 1000 milliLiter(s) IV Continuous <Continuous>    PRN MEDICATIONS  clonazePAM  Tablet 0.5 milliGRAM(s) Oral daily PRN  oxycodone    5 mG/acetaminophen 325 mG 1 Tablet(s) Oral every 6 hours PRN      VITAL SIGNS: Last 24 Hours  T(C): 36.8 (31 Oct 2020 14:20), Max: 36.8 (31 Oct 2020 14:20)  T(F): 98.2 (31 Oct 2020 14:20), Max: 98.2 (31 Oct 2020 14:20)  HR: 77 (31 Oct 2020 14:20) (72 - 89)  BP: 107/55 (31 Oct 2020 14:20) (107/55 - 132/63)  BP(mean): --  RR: 18 (31 Oct 2020 09:13) (18 - 18)  SpO2: 100% (31 Oct 2020 09:13) (100% - 100%)    LABS:                        8.9    4.38  )-----------( 217      ( 31 Oct 2020 05:20 )             27.6     10-31    141  |  106  |  23<H>  ----------------------------<  144<H>  4.6   |  25  |  1.3    Ca    8.3<L>      31 Oct 2020 05:20  Mg     1.9     10-31    TPro  6.3  /  Alb  3.9  /  TBili  0.2  /  DBili  x   /  AST  15  /  ALT  7   /  AlkPhos  72  10-31              Culture - Urine (collected 29 Oct 2020 10:00)  Source: .Urine Clean Catch (Midstream)  Final Report (30 Oct 2020 18:22):    No growth    PHYSICAL EXAM:    GENERAL: NAD, well-developed, AAOx3  HEENT:  Atraumatic, Normocephalic. EOMI, PERRLA, conjunctiva and sclera clear, No JVD  PULMONARY: Clear to auscultation bilaterally; No wheeze  CARDIOVASCULAR: Regular rate and rhythm; No murmurs, rubs, or gallops  GASTROINTESTINAL: Soft, Nontender, Nondistended; Bowel sounds present  MUSCULOSKELETAL:  2+ Peripheral Pulses, No clubbing, cyanosis, or edema, PICC line RUE  NEUROLOGY: non-focal  SKIN: No rashes or lesions      ASSESSMENT & PLAN    Patient is a 80y old woman with PMH uterine cancer (s/p SHAAN in 2016 and adjuvant therapy, recurrence with malignant ascites currently undergoing therapeutic paracentesis/chemotherapy on Fridays), CAD (s/p CABG), history of PE (August 2020; was on Xarelto OP), HTN, depression who presents for hematuria.    1. Hematuria  -Previously discharged for similar presentation; completed course of Augmentin for presumed cystitis   -Again with slick hematuria (10/29-today)  -CT A/P from prior admission showing inflamed left ureter indicative of potential cystitis   -Ultrasound kidney showing mild left hydronephrosis  - Hemoglobin today 8.9 (from 8.6)  -IR consult for IVC filter in setting of previous PE appreciated; placed 10/29  -Urology consult appreciated; will attempt for inpatient cystoscopy Monday at [place]  -UCX NGTD  -F/U CBC  -Keep active T+S    2. AMANDA  -Creatinine 1.3 today from 0.9  -Will give fluids  -Bladder scan not showing retention    3. Adenocarcinoma of mullerian origin  -S/P SHAAN in 2016 with adjuvant chemotherapy  -Currently on carbo/taxol weekly 3 on 1 off  -PICC in RUE for chemo  -Chemotherapy scheduled 10/30    4. PE  -Lovenox stopped in light of bleed  -Heparin subq prophylaxis  -IR consult appreciated for IVC filter  -IVC filtered placed 10/29    5. CAD  -Continue aspirin/statin/beta blocker    6. Anxiety  -Klonopin 0.5 q24hrs    MISC:  -DVT PPX: lovenox  -GI PPX: none  -Activity: As tolerated  -Diet: DASH  -CODE: FULL

## 2020-10-31 NOTE — PROGRESS NOTE ADULT - SUBJECTIVE AND OBJECTIVE BOX
24H events:  Hg stable  Still c/o hematuria  No acute events    PAST MEDICAL & SURGICAL HISTORY  HLD (hyperlipidemia)    HTN (hypertension)    CAD (coronary artery disease)    Coronary artery disease  s/p CABG X2    Glaucoma    Cataract of right eye    High cholesterol    Hypertension    Uterine cancer  s/p SHAAN and chemo    History of coronary artery bypass surgery    Athscl CABG, unsp, w angina pectoris w documented spasm    H/O total hysterectomy      SOCIAL HISTORY:  Negative for smoking/alcohol/drug use.     ALLERGIES:  chloroquine (Hives)  quinine (Urticaria)    MEDICATIONS:  STANDING MEDICATIONS  amLODIPine   Tablet 5 milliGRAM(s) Oral daily  aspirin enteric coated 81 milliGRAM(s) Oral daily  atorvastatin 40 milliGRAM(s) Oral at bedtime  brimonidine 0.2% Ophthalmic Solution 1 Drop(s) Both EYES two times a day  dorzolamide 2% Ophthalmic Solution 1 Drop(s) Both EYES three times a day  heparin   Injectable 5000 Unit(s) SubCutaneous every 12 hours  metoprolol succinate ER 25 milliGRAM(s) Oral daily  polyethylene glycol 3350 17 Gram(s) Oral at bedtime  senna 2 Tablet(s) Oral at bedtime  sertraline 25 milliGRAM(s) Oral daily  sodium chloride 0.9%. 1000 milliLiter(s) IV Continuous <Continuous>    PRN MEDICATIONS  clonazePAM  Tablet 0.5 milliGRAM(s) Oral daily PRN  oxycodone    5 mG/acetaminophen 325 mG 1 Tablet(s) Oral every 6 hours PRN    VITALS:   T(F): 97.5  HR: 72  BP: 132/63  RR: 18  SpO2: 100%    LABS:                        8.9    4.38  )-----------( 217      ( 31 Oct 2020 05:20 )             27.6     10-31    141  |  106  |  23<H>  ----------------------------<  144<H>  4.6   |  25  |  1.3    Ca    8.3<L>      31 Oct 2020 05:20  Mg     1.9     10-31    TPro  6.3  /  Alb  3.9  /  TBili  0.2  /  DBili  x   /  AST  15  /  ALT  7   /  AlkPhos  72  10-31              Culture - Urine (collected 29 Oct 2020 10:00)  Source: .Urine Clean Catch (Midstream)  Final Report (30 Oct 2020 18:22):    No growth          RADIOLOGY:    PHYSICAL EXAM:  GEN: No acute distress  HENT: NCAT, EOMI  LYMPH: No appreciable adenopathy  LUNGS: No respiratory distress, clear to auscultation bilaterally   HEART: regular rate and rhythm  ABD: Soft, non-tender, non-distended  SKIN: No rash  EXT: No edema  NEURO: AAOX3

## 2020-10-31 NOTE — PROGRESS NOTE ADULT - ASSESSMENT
ASSESSMENT & PLAN  Patient is a 80y old woman with PMH uterine cancer (s/p SHAAN in 2016 and adjuvant therapy, recurrence with malignant ascites currently undergoing therapeutic paracentesis/chemotherapy on Fridays), CAD (s/p CABG), history of PE (August 2020; was on Xarelto OP), HTN, depression who presents for hematuria.    # Recurrent  Hematuria/ acute blood loss anemia   - resolving   - dose of  Heparin SQ was decreased to  Q 12 hours (  for DVT prophylaxis)   -  followed up , case d/w  attending, pt was scheduled for cystoscopy on Monday at [facility], will arrange transportation on Monday at 6 AM   - Ultrasound kidney showing mild left hydronephrosis  - monitor H/H, keep Hb above 7.5 and active type/cross     # AMANDA  - r/o obstructive uropathy   - will scan bladder to r/o retention   - start IV fluids for 24 hours   - monitor urine output, BUN/cr   - will consider bladder US in 24 hours     #  Adenocarcinoma of mullerian origin  - medical oncology is following, recommendation noted   -S/P SHAAN in 2016 with adjuvant chemotherapy  - on carbo/taxol weekly 3 on 1 off  -  last chemotherapy was on 10/30  - prognosis guarded   - pt is willing to proceed with cancer directed therapy     # h/o DVT and PE  -  off AC  -  s/p IVC filter on 10/29 by IR     # CAD/ h/o CABG   -Continue aspirin/statin/beta blocker    #  Anxiety  -Klonopin 0.5 q24hrs    MISC:  -DVT PPX: Heparin  -GI PPX: none  -Activity: As tolerated  -Diet: DASH    #Progress Note Handoff  Pending (specify):  cystoscopy on Monday at [facility], NPO after midnight on Sunday, start IV hydration, monitor urine output, BUN/cr, get bladder scan   Family discussion: I spoke with pt, she agreed with a plan of care   Disposition: Home__x _/SNF___/Other________/Unknown at this time________

## 2020-11-01 LAB — BLD GP AB SCN SERPL QL: SIGNIFICANT CHANGE UP

## 2020-11-01 PROCEDURE — 99233 SBSQ HOSP IP/OBS HIGH 50: CPT

## 2020-11-01 RX ORDER — LACTULOSE 10 G/15ML
20 SOLUTION ORAL DAILY
Refills: 0 | Status: DISCONTINUED | OUTPATIENT
Start: 2020-11-01 | End: 2020-11-02

## 2020-11-01 RX ORDER — MAGNESIUM SULFATE 500 MG/ML
1 VIAL (ML) INJECTION ONCE
Refills: 0 | Status: COMPLETED | OUTPATIENT
Start: 2020-11-01 | End: 2020-11-01

## 2020-11-01 RX ADMIN — Medication 81 MILLIGRAM(S): at 11:48

## 2020-11-01 RX ADMIN — DORZOLAMIDE HYDROCHLORIDE 1 DROP(S): 20 SOLUTION/ DROPS OPHTHALMIC at 23:00

## 2020-11-01 RX ADMIN — SERTRALINE 25 MILLIGRAM(S): 25 TABLET, FILM COATED ORAL at 11:48

## 2020-11-01 RX ADMIN — DORZOLAMIDE HYDROCHLORIDE 1 DROP(S): 20 SOLUTION/ DROPS OPHTHALMIC at 06:56

## 2020-11-01 RX ADMIN — Medication 25 MILLIGRAM(S): at 06:52

## 2020-11-01 RX ADMIN — HEPARIN SODIUM 5000 UNIT(S): 5000 INJECTION INTRAVENOUS; SUBCUTANEOUS at 06:52

## 2020-11-01 RX ADMIN — AMLODIPINE BESYLATE 5 MILLIGRAM(S): 2.5 TABLET ORAL at 06:52

## 2020-11-01 RX ADMIN — DORZOLAMIDE HYDROCHLORIDE 1 DROP(S): 20 SOLUTION/ DROPS OPHTHALMIC at 14:38

## 2020-11-01 RX ADMIN — Medication 100 GRAM(S): at 18:45

## 2020-11-01 RX ADMIN — SENNA PLUS 2 TABLET(S): 8.6 TABLET ORAL at 23:00

## 2020-11-01 RX ADMIN — Medication 0.5 MILLIGRAM(S): at 14:38

## 2020-11-01 RX ADMIN — LACTULOSE 20 GRAM(S): 10 SOLUTION ORAL at 04:42

## 2020-11-01 RX ADMIN — SODIUM CHLORIDE 75 MILLILITER(S): 9 INJECTION INTRAMUSCULAR; INTRAVENOUS; SUBCUTANEOUS at 00:03

## 2020-11-01 RX ADMIN — BRIMONIDINE TARTRATE 1 DROP(S): 2 SOLUTION/ DROPS OPHTHALMIC at 18:08

## 2020-11-01 RX ADMIN — ATORVASTATIN CALCIUM 40 MILLIGRAM(S): 80 TABLET, FILM COATED ORAL at 23:00

## 2020-11-01 RX ADMIN — BRIMONIDINE TARTRATE 1 DROP(S): 2 SOLUTION/ DROPS OPHTHALMIC at 06:52

## 2020-11-01 NOTE — PROGRESS NOTE ADULT - SUBJECTIVE AND OBJECTIVE BOX
80y old woman with PMH uterine cancer (s/p SHAAN in 2016 and adjuvant therapy, recurrence with malignant ascites currently undergoing therapeutic paracentesis/chemotherapy on ), CAD (s/p CABG), history of PE (2020; was on Xarelto OP), HTN, depression who presents for hematuria.  Oral AC was d/c, pt was started on SQ Lovenox on 10/28, shortly after developed hematuria again. She was consulted by  , cystoscopy was scheduled on Monday at [facility]. IVC filter was placed by IR on 10/29, pt had a  chemotherapy on 10/30  Today pt is comfortable, she denies abdominal pain, constipation resolved, has pink urine.     OBJECTIVE  PAST MEDICAL & SURGICAL HISTORY  HLD (hyperlipidemia)    HTN (hypertension)    CAD (coronary artery disease)    Coronary artery disease  s/p CABG X2    Glaucoma    Cataract of right eye    High cholesterol    Hypertension    Uterine cancer  s/p SHAAN and chemo    History of coronary artery bypass surgery    Athscl CABG, unsp, w angina pectoris w documented spasm    H/O total hysterectomy      ALLERGIES:  chloroquine (Hives)  quinine (Urticaria)      VITAL SIGNS: Last 24 Hours  T(C): 36.1 (2020 04:58), Max: 36.8 (31 Oct 2020 14:20)  T(F): 96.9 (2020 04:58), Max: 98.2 (31 Oct 2020 14:20)  HR: 78 (2020 04:58) (65 - 78)  BP: 116/62 (2020 04:58) (107/55 - 120/62)  BP(mean): --  RR: 18 (2020 04:58) (18 - 18)  SpO2: 99% (31 Oct 2020 23:15) (99% - 99%)      PHYSICAL EXAM:  GENERAL: NAD,  AAOx3 with temporal muscle waisting   HEENT:  Atraumatic, Normocephalic. EOMI, PERRLA, conjunctiva and sclera clear, No JVD  PULMONARY: Clear to auscultation bilaterally; No wheeze  CARDIOVASCULAR: Regular rate and rhythm; No murmurs, rubs, or gallops  GASTROINTESTINAL: Soft, Nontender, Nondistended; Bowel sounds present  MUSCULOSKELETAL:  2+ Peripheral Pulses, No clubbing, cyanosis, or edema, PICC line RUE  NEUROLOGY: non-focal  SKIN: No rashes or lesions    LABS:                              no new labs for today, will f/u              8.9    4.38  )-----------( 217      ( 31 Oct 2020 05:20 )             27.6   10-31    141  |  106  |  23<H>  ----------------------------<  144<H>  4.6   |  25  |  1.3    Ca    8.3<L>      31 Oct 2020 05:20  Mg     1.9     10-31    TPro  6.3  /  Alb  3.9  /  TBili  0.2  /  DBili  x   /  AST  15  /  ALT  7   /  AlkPhos  72  10-31           PTT - ( 27 Oct 2020 13:31 )  PTT:36.6 sec  Urinalysis Basic - ( 29 Oct 2020 10:00 )    Color: Dark Brown / Appearance: Turbid / S.012 / pH: x  Gluc: x / Ketone: Negative  / Bili: Negative / Urobili: <2 mg/dL   Blood: x / Protein: 300 mg/dL / Nitrite: Negative   Leuk Esterase: Moderate / RBC: >720 /HPF /  /HPF   Sq Epi: x / Non Sq Epi: 0 /HPF / Bacteria: 0.0    Culture - Urine (collected 27 Oct 2020 13:31)  Source: .Urine Clean Catch (Midstream)  Final Report (28 Oct 2020 18:19):    No growth    RADIOLOGY:    < from: US Kidney and Bladder (10.28.20 @ 07:35) >  IMPRESSION:    No stones seen by ultrasound.    Again seen mild left hydronephrosis.    MEDICATIONS  (STANDING):  amLODIPine   Tablet 5 milliGRAM(s) Oral daily  aspirin enteric coated 81 milliGRAM(s) Oral daily  atorvastatin 40 milliGRAM(s) Oral at bedtime  brimonidine 0.2% Ophthalmic Solution 1 Drop(s) Both EYES two times a day  dorzolamide 2% Ophthalmic Solution 1 Drop(s) Both EYES three times a day  heparin   Injectable 5000 Unit(s) SubCutaneous every 12 hours  lactulose Syrup 20 Gram(s) Oral daily  metoprolol succinate ER 25 milliGRAM(s) Oral daily  polyethylene glycol 3350 17 Gram(s) Oral at bedtime  senna 2 Tablet(s) Oral at bedtime  sertraline 25 milliGRAM(s) Oral daily  sodium chloride 0.9%. 1000 milliLiter(s) (75 mL/Hr) IV Continuous <Continuous>    MEDICATIONS  (PRN):  clonazePAM  Tablet 0.5 milliGRAM(s) Oral daily PRN anxiety  oxycodone    5 mG/acetaminophen 325 mG 1 Tablet(s) Oral every 6 hours PRN Moderate Pain (4 - 6)

## 2020-11-01 NOTE — PROGRESS NOTE ADULT - ASSESSMENT
ASSESSMENT & PLAN  Patient is a 80y old woman with PMH uterine cancer (s/p SHAAN in 2016 and adjuvant therapy, recurrence with malignant ascites currently undergoing therapeutic paracentesis/chemotherapy on Fridays), CAD (s/p CABG), history of PE (August 2020; was on Xarelto OP), HTN, depression who presents for hematuria.    # Recurrent  Hematuria/ acute blood loss anemia   - resolving   - hold SQ Heparin   - NPO after midnight for cystoscopy on Monday  at [facility] by Dr Kumar,  will arrange transportation on Monday at 6 AM   - Ultrasound kidney showing mild left hydronephrosis  - monitor H/H, keep Hb above 7.5 and active type/cross     # AMANDA  - r/o obstructive uropathy   - on  IV fluids for now   - monitor urine output, BUN/cr   - avoid nephrotoxic medications     #  Adenocarcinoma of mullerian origin  - medical oncology is following, recommendation noted   -S/P SHAAN in 2016 with adjuvant chemotherapy  - on carbo/taxol weekly 3 on 1 off  -  last chemotherapy was on 10/30  - prognosis guarded   - pt is willing to proceed with cancer directed therapy     # h/o DVT and PE  -  off AC  -  s/p IVC filter on 10/29 by IR     # CAD/ h/o CABG   -Continue aspirin/statin/beta blocker    #  Anxiety  -Klonopin 0.5 q24hrs    MISC:  -DVT PPX: SCD   -GI PPX: none  -Activity: As tolerated  -Diet: DASH    #Progress Note Handoff  Pending (specify):  cystoscopy on Monday at [facility], NPO after midnight , d/c SQ Heparin  IV hydration, monitor urine output, BUN/cr, SCD for DVT prophylaxis   Family discussion: I spoke with pt, she agreed with a plan of care   Disposition: Home__x _/SNF___/Other________/Unknown at this time________

## 2020-11-02 LAB
ALBUMIN SERPL ELPH-MCNC: 3.2 G/DL — LOW (ref 3.5–5.2)
ALP SERPL-CCNC: 61 U/L — SIGNIFICANT CHANGE UP (ref 30–115)
ALT FLD-CCNC: 6 U/L — SIGNIFICANT CHANGE UP (ref 0–41)
ANION GAP SERPL CALC-SCNC: 10 MMOL/L — SIGNIFICANT CHANGE UP (ref 7–14)
APTT BLD: 25.4 SEC — LOW (ref 27–39.2)
AST SERPL-CCNC: 13 U/L — SIGNIFICANT CHANGE UP (ref 0–41)
BASOPHILS # BLD AUTO: 0.01 K/UL — SIGNIFICANT CHANGE UP (ref 0–0.2)
BASOPHILS NFR BLD AUTO: 0.3 % — SIGNIFICANT CHANGE UP (ref 0–1)
BILIRUB SERPL-MCNC: 0.2 MG/DL — SIGNIFICANT CHANGE UP (ref 0.2–1.2)
BLD GP AB SCN SERPL QL: SIGNIFICANT CHANGE UP
BUN SERPL-MCNC: 16 MG/DL — SIGNIFICANT CHANGE UP (ref 10–20)
CALCIUM SERPL-MCNC: 7.6 MG/DL — LOW (ref 8.5–10.1)
CHLORIDE SERPL-SCNC: 111 MMOL/L — HIGH (ref 98–110)
CO2 SERPL-SCNC: 23 MMOL/L — SIGNIFICANT CHANGE UP (ref 17–32)
CREAT SERPL-MCNC: 0.9 MG/DL — SIGNIFICANT CHANGE UP (ref 0.7–1.5)
EOSINOPHIL # BLD AUTO: 0.05 K/UL — SIGNIFICANT CHANGE UP (ref 0–0.7)
EOSINOPHIL NFR BLD AUTO: 1.6 % — SIGNIFICANT CHANGE UP (ref 0–8)
GLUCOSE SERPL-MCNC: 103 MG/DL — HIGH (ref 70–99)
HCT VFR BLD CALC: 24.9 % — LOW (ref 37–47)
HGB BLD-MCNC: 7.7 G/DL — LOW (ref 12–16)
IMM GRANULOCYTES NFR BLD AUTO: 0.3 % — SIGNIFICANT CHANGE UP (ref 0.1–0.3)
INR BLD: 1.04 RATIO — SIGNIFICANT CHANGE UP (ref 0.65–1.3)
LYMPHOCYTES # BLD AUTO: 0.92 K/UL — LOW (ref 1.2–3.4)
LYMPHOCYTES # BLD AUTO: 29.5 % — SIGNIFICANT CHANGE UP (ref 20.5–51.1)
MAGNESIUM SERPL-MCNC: 1.8 MG/DL — SIGNIFICANT CHANGE UP (ref 1.8–2.4)
MCHC RBC-ENTMCNC: 27.5 PG — SIGNIFICANT CHANGE UP (ref 27–31)
MCHC RBC-ENTMCNC: 30.9 G/DL — LOW (ref 32–37)
MCV RBC AUTO: 88.9 FL — SIGNIFICANT CHANGE UP (ref 81–99)
MONOCYTES # BLD AUTO: 0.24 K/UL — SIGNIFICANT CHANGE UP (ref 0.1–0.6)
MONOCYTES NFR BLD AUTO: 7.7 % — SIGNIFICANT CHANGE UP (ref 1.7–9.3)
NEUTROPHILS # BLD AUTO: 1.89 K/UL — SIGNIFICANT CHANGE UP (ref 1.4–6.5)
NEUTROPHILS NFR BLD AUTO: 60.6 % — SIGNIFICANT CHANGE UP (ref 42.2–75.2)
NRBC # BLD: 0 /100 WBCS — SIGNIFICANT CHANGE UP (ref 0–0)
PLATELET # BLD AUTO: 189 K/UL — SIGNIFICANT CHANGE UP (ref 130–400)
POTASSIUM SERPL-MCNC: 4.2 MMOL/L — SIGNIFICANT CHANGE UP (ref 3.5–5)
POTASSIUM SERPL-SCNC: 4.2 MMOL/L — SIGNIFICANT CHANGE UP (ref 3.5–5)
PROT SERPL-MCNC: 5.3 G/DL — LOW (ref 6–8)
PROTHROM AB SERPL-ACNC: 12 SEC — SIGNIFICANT CHANGE UP (ref 9.95–12.87)
RBC # BLD: 2.8 M/UL — LOW (ref 4.2–5.4)
RBC # FLD: 21.1 % — HIGH (ref 11.5–14.5)
SODIUM SERPL-SCNC: 144 MMOL/L — SIGNIFICANT CHANGE UP (ref 135–146)
WBC # BLD: 3.12 K/UL — LOW (ref 4.8–10.8)
WBC # FLD AUTO: 3.12 K/UL — LOW (ref 4.8–10.8)

## 2020-11-02 PROCEDURE — 99233 SBSQ HOSP IP/OBS HIGH 50: CPT

## 2020-11-02 PROCEDURE — 52214 CYSTOSCOPY AND TREATMENT: CPT

## 2020-11-02 RX ORDER — MORPHINE SULFATE 50 MG/1
1 CAPSULE, EXTENDED RELEASE ORAL
Refills: 0 | Status: DISCONTINUED | OUTPATIENT
Start: 2020-11-02 | End: 2020-11-06

## 2020-11-02 RX ORDER — ASPIRIN/CALCIUM CARB/MAGNESIUM 324 MG
81 TABLET ORAL DAILY
Refills: 0 | Status: DISCONTINUED | OUTPATIENT
Start: 2020-11-02 | End: 2020-11-05

## 2020-11-02 RX ORDER — AMLODIPINE BESYLATE 2.5 MG/1
5 TABLET ORAL DAILY
Refills: 0 | Status: DISCONTINUED | OUTPATIENT
Start: 2020-11-02 | End: 2020-11-06

## 2020-11-02 RX ORDER — ONDANSETRON 8 MG/1
4 TABLET, FILM COATED ORAL ONCE
Refills: 0 | Status: DISCONTINUED | OUTPATIENT
Start: 2020-11-02 | End: 2020-11-06

## 2020-11-02 RX ORDER — CEFAZOLIN SODIUM 1 G
VIAL (EA) INJECTION
Refills: 0 | Status: DISCONTINUED | OUTPATIENT
Start: 2020-11-02 | End: 2020-11-06

## 2020-11-02 RX ORDER — SENNA PLUS 8.6 MG/1
2 TABLET ORAL AT BEDTIME
Refills: 0 | Status: DISCONTINUED | OUTPATIENT
Start: 2020-11-02 | End: 2020-11-06

## 2020-11-02 RX ORDER — BRIMONIDINE TARTRATE 2 MG/MG
1 SOLUTION/ DROPS OPHTHALMIC
Refills: 0 | Status: DISCONTINUED | OUTPATIENT
Start: 2020-11-02 | End: 2020-11-06

## 2020-11-02 RX ORDER — OXYCODONE AND ACETAMINOPHEN 5; 325 MG/1; MG/1
1 TABLET ORAL EVERY 6 HOURS
Refills: 0 | Status: DISCONTINUED | OUTPATIENT
Start: 2020-11-02 | End: 2020-11-06

## 2020-11-02 RX ORDER — DORZOLAMIDE HYDROCHLORIDE 20 MG/ML
1 SOLUTION/ DROPS OPHTHALMIC THREE TIMES A DAY
Refills: 0 | Status: DISCONTINUED | OUTPATIENT
Start: 2020-11-02 | End: 2020-11-06

## 2020-11-02 RX ORDER — SODIUM CHLORIDE 9 MG/ML
1000 INJECTION, SOLUTION INTRAVENOUS
Refills: 0 | Status: DISCONTINUED | OUTPATIENT
Start: 2020-11-02 | End: 2020-11-06

## 2020-11-02 RX ORDER — CEFAZOLIN SODIUM 1 G
500 VIAL (EA) INJECTION EVERY 8 HOURS
Refills: 0 | Status: DISCONTINUED | OUTPATIENT
Start: 2020-11-03 | End: 2020-11-06

## 2020-11-02 RX ORDER — POLYETHYLENE GLYCOL 3350 17 G/17G
17 POWDER, FOR SOLUTION ORAL AT BEDTIME
Refills: 0 | Status: DISCONTINUED | OUTPATIENT
Start: 2020-11-02 | End: 2020-11-05

## 2020-11-02 RX ORDER — METOPROLOL TARTRATE 50 MG
25 TABLET ORAL DAILY
Refills: 0 | Status: DISCONTINUED | OUTPATIENT
Start: 2020-11-02 | End: 2020-11-06

## 2020-11-02 RX ORDER — HYDROMORPHONE HYDROCHLORIDE 2 MG/ML
0.5 INJECTION INTRAMUSCULAR; INTRAVENOUS; SUBCUTANEOUS
Refills: 0 | Status: DISCONTINUED | OUTPATIENT
Start: 2020-11-02 | End: 2020-11-06

## 2020-11-02 RX ORDER — ATORVASTATIN CALCIUM 80 MG/1
40 TABLET, FILM COATED ORAL AT BEDTIME
Refills: 0 | Status: DISCONTINUED | OUTPATIENT
Start: 2020-11-02 | End: 2020-11-06

## 2020-11-02 RX ORDER — LACTULOSE 10 G/15ML
20 SOLUTION ORAL DAILY
Refills: 0 | Status: DISCONTINUED | OUTPATIENT
Start: 2020-11-02 | End: 2020-11-05

## 2020-11-02 RX ORDER — CLONAZEPAM 1 MG
0.5 TABLET ORAL DAILY
Refills: 0 | Status: DISCONTINUED | OUTPATIENT
Start: 2020-11-02 | End: 2020-11-06

## 2020-11-02 RX ORDER — MEPERIDINE HYDROCHLORIDE 50 MG/ML
12.5 INJECTION INTRAMUSCULAR; INTRAVENOUS; SUBCUTANEOUS
Refills: 0 | Status: DISCONTINUED | OUTPATIENT
Start: 2020-11-02 | End: 2020-11-06

## 2020-11-02 RX ORDER — MORPHINE SULFATE 50 MG/1
2 CAPSULE, EXTENDED RELEASE ORAL
Refills: 0 | Status: DISCONTINUED | OUTPATIENT
Start: 2020-11-02 | End: 2020-11-04

## 2020-11-02 RX ORDER — SODIUM CHLORIDE 9 MG/ML
1000 INJECTION INTRAMUSCULAR; INTRAVENOUS; SUBCUTANEOUS
Refills: 0 | Status: DISCONTINUED | OUTPATIENT
Start: 2020-11-02 | End: 2020-11-02

## 2020-11-02 RX ORDER — CEFAZOLIN SODIUM 1 G
500 VIAL (EA) INJECTION ONCE
Refills: 0 | Status: COMPLETED | OUTPATIENT
Start: 2020-11-02 | End: 2020-11-02

## 2020-11-02 RX ORDER — SODIUM CHLORIDE 9 MG/ML
1000 INJECTION, SOLUTION INTRAVENOUS
Refills: 0 | Status: DISCONTINUED | OUTPATIENT
Start: 2020-11-02 | End: 2020-11-02

## 2020-11-02 RX ORDER — SERTRALINE 25 MG/1
25 TABLET, FILM COATED ORAL DAILY
Refills: 0 | Status: DISCONTINUED | OUTPATIENT
Start: 2020-11-02 | End: 2020-11-06

## 2020-11-02 RX ADMIN — DORZOLAMIDE HYDROCHLORIDE 1 DROP(S): 20 SOLUTION/ DROPS OPHTHALMIC at 05:17

## 2020-11-02 RX ADMIN — Medication 81 MILLIGRAM(S): at 18:31

## 2020-11-02 RX ADMIN — DORZOLAMIDE HYDROCHLORIDE 1 DROP(S): 20 SOLUTION/ DROPS OPHTHALMIC at 22:07

## 2020-11-02 RX ADMIN — BRIMONIDINE TARTRATE 1 DROP(S): 2 SOLUTION/ DROPS OPHTHALMIC at 05:16

## 2020-11-02 RX ADMIN — ATORVASTATIN CALCIUM 40 MILLIGRAM(S): 80 TABLET, FILM COATED ORAL at 22:07

## 2020-11-02 RX ADMIN — SERTRALINE 25 MILLIGRAM(S): 25 TABLET, FILM COATED ORAL at 18:31

## 2020-11-02 RX ADMIN — Medication 100 MILLIGRAM(S): at 18:35

## 2020-11-02 RX ADMIN — SODIUM CHLORIDE 100 MILLILITER(S): 9 INJECTION, SOLUTION INTRAVENOUS at 16:04

## 2020-11-02 RX ADMIN — SODIUM CHLORIDE 75 MILLILITER(S): 9 INJECTION INTRAMUSCULAR; INTRAVENOUS; SUBCUTANEOUS at 07:06

## 2020-11-02 RX ADMIN — BRIMONIDINE TARTRATE 1 DROP(S): 2 SOLUTION/ DROPS OPHTHALMIC at 18:31

## 2020-11-02 NOTE — PROGRESS NOTE ADULT - ASSESSMENT
ASSESSMENT & PLAN  Patient is a 80y old woman with PMH uterine cancer (s/p SHAAN in 2016 and adjuvant therapy, recurrence with malignant ascites currently undergoing therapeutic paracentesis/chemotherapy on Fridays), CAD (s/p CABG), history of PE (August 2020; was on Xarelto OP), HTN, depression who presents for hematuria.    # Recurrent  Hematuria/ acute blood loss anemia   - s/p  cystoscopy with fulguration today at  [facility] by Dr Kumar  - start IV Ancef post op empirically ( pt is immunocompromised on chemotherapy)   - monitor H/H, keep Hb above 7.5 and active type/cross   - hold off with AC     # AMANDA  - resolved after IV hydration   - on  IV fluids for now   - monitor urine output, BUN/cr   - avoid nephrotoxic medications     #  Adenocarcinoma of mullerian origin  - medical oncology is following, recommendation noted   -S/P SHAAN in 2016 with adjuvant chemotherapy  - on carbo/taxol weekly 3 on 1 off  -  last chemotherapy was on 10/30  - prognosis guarded   - pt is willing to proceed with cancer directed therapy     # h/o DVT and PE  -  off AC  -  s/p IVC filter on 10/29 by IR     # CAD/ h/o CABG   -Continue aspirin/statin/beta blocker    #  Anxiety  -Klonopin 0.5 q24hrs    MISC:  -DVT PPX: SCD   -GI PPX: none  -Activity: As tolerated  -Diet: DASH    #Progress Note Handoff  Pending (specify):  start IV Ancef, f/u biopsy, urine cytology, oncology f/u in 24 hours , monitor BUN/cr and H/H   Family discussion: I spoke with pt, she agreed with a plan of care   Disposition: Home__x _/SNF___/Other________/Unknown at this time________

## 2020-11-02 NOTE — PROGRESS NOTE ADULT - SUBJECTIVE AND OBJECTIVE BOX
POST OP NOTE:    Patient is a 81 yo F with PMH uterine cancer (s/p SHAAN in 2016 and adjuvant therapy, recurrence with malignant ascites currently undergoing therapeutic paracentesis/chemotherapy on Fridays), CAD (s/p CABG), history of PE (August 2020; was on Xarelto OP) s/p IVC filter by IR on 10/29/20, HTN, depression a/w hematuria s/p cystoscopy and fulguration POD #0. No bladder lesion visualized on cystoscopy per Dr. Kumar. Pt seen and examined at bedside. Pt lying in bed comfortably and has no complaints at this time. Denies difficulty voiding, dysuria, abdominal pain, back pain, fever, chills, N/V or SOB.     Vital Signs Last 24 Hrs  T(C): 36.2 (02 Nov 2020 19:40), Max: 37.1 (02 Nov 2020 05:19)  T(F): 97.2 (02 Nov 2020 19:40), Max: 98.8 (02 Nov 2020 05:19)  HR: 73 (02 Nov 2020 19:40) (69 - 88)  BP: 125/56 (02 Nov 2020 19:40) (119/63 - 145/71)  RR: 16 (02 Nov 2020 16:13) (16 - 18)  SpO2: 97% (02 Nov 2020 20:49) (96% - 100%)    REVIEW OF SYSTEMS:  [ x ] A 10 Point Review of Systems was negative except where noted  [    ] Due to altered mental status/intubation, subjective information was not able to be obtained from the patient. History was obtained to the extent possible from review of the chart and collateral sources of information.     PHYSICAL EXAM:  Constitutional: NAD, well-developed, awake/alert  Skin: Good color, non diaphoretic  HEENT: NCAT, EOMI  Back: No CVA tenderness B/L  Respiratory: No accessory respiratory muscle use  Abd: Soft, NT/ND, bladder non palpable, no suprapubic tenderness  : Voiding freely  Extremities: COLVIN x4  Psychiatric: Normal mood, normal affect    Allergies  chloroquine (Hives)  quinine (Urticaria)    LABS:             7.7    3.12  )-----------( 189      ( 02 Nov 2020 06:20 )             24.9     11-02  144  |  111<H>  |  16  ----------------------------<  103<H>  4.2   |  23  |  0.9    PT/INR - ( 02 Nov 2020 06:20 )   PT: 12.00 sec;   INR: 1.04 ratio    PTT - ( 02 Nov 2020 06:20 )  PTT:25.4 sec  Urinalysis (10.29.20 @ 10:00)   pH Urine: 6.5   Glucose Qualitative, Urine: Trace   Blood, Urine: Large   Color: Dark Brown   Urine Appearance: Turbid   Bilirubin: Negative   Ketone - Urine: Negative   Specific Gravity: 1.012   Protein, Urine: 300 mg/dL   Urobilinogen: <2 mg/dL   Nitrite: Negative   Leukocyte Esterase Concentration: Moderate   Urine Microscopic-Add On (NC) (10.29.20 @ 10:00)   Bacteria: 0.0   Epithelial Cells: 0 /HPF   Red Blood Cell - Urine: >720 /HPF   White Blood Cell - Urine: 135 /HPF   Hyaline Casts: >145 /LPF   Culture - Urine (10.27.20 @ 13:31)   Specimen Source: .Urine Clean Catch (Midstream)   Culture Results:   No growth   Culture - Urine (10.29.20 @ 10:00)   Specimen Source: .Urine Clean Catch (Midstream)   Culture Results:   No growth  POST OP NOTE:    Patient is a 79 yo F with PMH uterine cancer (s/p SHAAN in 2016 and adjuvant therapy, recurrence with malignant ascites currently undergoing therapeutic paracentesis/chemotherapy on Fridays), CAD (s/p CABG), history of PE (August 2020; was on Xarelto OP) s/p IVC filter by IR on 10/29/20, HTN, depression a/w hematuria s/p cystoscopy and fulguration POD #0. No bladder lesion visualized on cystoscopy per Dr. Kuamr. Pt seen and examined at bedside. Pt lying in bed comfortably and has no complaints at this time. Denies difficulty voiding, dysuria, abdominal pain, back pain, fever, chills, N/V or SOB.     Vital Signs Last 24 Hrs  T(C): 36.2 (02 Nov 2020 19:40), Max: 37.1 (02 Nov 2020 05:19)  T(F): 97.2 (02 Nov 2020 19:40), Max: 98.8 (02 Nov 2020 05:19)  HR: 73 (02 Nov 2020 19:40) (69 - 88)  BP: 125/56 (02 Nov 2020 19:40) (119/63 - 145/71)  RR: 16 (02 Nov 2020 16:13) (16 - 18)  SpO2: 97% (02 Nov 2020 20:49) (96% - 100%)    REVIEW OF SYSTEMS:  [ x ] A 10 Point Review of Systems was negative except where noted    PHYSICAL EXAM:  Constitutional: NAD, well-developed, awake/alert  Skin: Good color, non diaphoretic  HEENT: NCAT, EOMI  Back: No CVA tenderness B/L  Respiratory: No accessory respiratory muscle use  Abd: Soft, NT/ND, bladder non palpable, no suprapubic tenderness  : Voiding freely  Extremities: COLVIN x4  Psychiatric: Normal mood, normal affect    Allergies  chloroquine (Hives)  quinine (Urticaria)    LABS:             7.7    3.12  )-----------( 189      ( 02 Nov 2020 06:20 )             24.9     11-02  144  |  111<H>  |  16  ----------------------------<  103<H>  4.2   |  23  |  0.9    PT/INR - ( 02 Nov 2020 06:20 )   PT: 12.00 sec;   INR: 1.04 ratio    PTT - ( 02 Nov 2020 06:20 )  PTT:25.4 sec  Urinalysis (10.29.20 @ 10:00)   pH Urine: 6.5   Glucose Qualitative, Urine: Trace   Blood, Urine: Large   Color: Dark Brown   Urine Appearance: Turbid   Bilirubin: Negative   Ketone - Urine: Negative   Specific Gravity: 1.012   Protein, Urine: 300 mg/dL   Urobilinogen: <2 mg/dL   Nitrite: Negative   Leukocyte Esterase Concentration: Moderate   Urine Microscopic-Add On (NC) (10.29.20 @ 10:00)   Bacteria: 0.0   Epithelial Cells: 0 /HPF   Red Blood Cell - Urine: >720 /HPF   White Blood Cell - Urine: 135 /HPF   Hyaline Casts: >145 /LPF   Culture - Urine (10.27.20 @ 13:31)   Specimen Source: .Urine Clean Catch (Midstream)   Culture Results:   No growth   Culture - Urine (10.29.20 @ 10:00)   Specimen Source: .Urine Clean Catch (Midstream)   Culture Results:   No growth

## 2020-11-02 NOTE — PROGRESS NOTE ADULT - SUBJECTIVE AND OBJECTIVE BOX
80y old woman with PMH uterine cancer (s/p SHAAN in 2016 and adjuvant therapy, recurrence with malignant ascites currently undergoing therapeutic paracentesis/chemotherapy on ), CAD (s/p CABG), history of PE (2020; was on Xarelto OP), HTN, depression who presents for hematuria.  Oral AC was d/c, pt was started on SQ Lovenox on 10/28, shortly after developed hematuria again. She was consulted by  , cystoscopy was performed today with fulguration, will give IV Ancef post op.  IVC filter was placed by IR on 10/29, pt had a  chemotherapy on 10/30  Today pt is comfortable, she feels tired after a long day at [facility], denies pain.     OBJECTIVE  PAST MEDICAL & SURGICAL HISTORY  HLD (hyperlipidemia)    HTN (hypertension)    CAD (coronary artery disease)    Coronary artery disease  s/p CABG X2    Glaucoma    Cataract of right eye    High cholesterol    Hypertension    Uterine cancer  s/p SHAAN and chemo    History of coronary artery bypass surgery    Athscl CABG, unsp, w angina pectoris w documented spasm    H/O total hysterectomy      ALLERGIES:  chloroquine (Hives)  quinine (Urticaria)      VITAL SIGNS: Last 24 Hours  T(C): 35.8 (2020 17:23), Max: 37.1 (2020 05:19)  T(F): 96.5 (2020 17:23), Max: 98.8 (2020 05:19)  HR: 69 (2020 17:23) (69 - 88)  BP: 145/71 (2020 17:23) (101/60 - 145/71)  BP(mean): --  RR: 16 (2020 16:13) (16 - 18)  SpO2: 96% (2020 17:36) (96% - 100%)      PHYSICAL EXAM:  GENERAL: NAD,  AAOx3 with temporal muscle waisting   HEENT:  Atraumatic, Normocephalic. EOMI, PERRLA, conjunctiva and sclera clear, No JVD  PULMONARY: Clear to auscultation bilaterally; No wheeze  CARDIOVASCULAR: Regular rate and rhythm; No murmurs, rubs, or gallops  GASTROINTESTINAL: Soft, Nontender, Nondistended; Bowel sounds present  MUSCULOSKELETAL:  2+ Peripheral Pulses, No clubbing, cyanosis, or edema, PICC line RUE  NEUROLOGY: non-focal  SKIN: No rashes or lesions    LABS:                        7.7    3.12  )-----------( 189      ( 2020 06:20 )             24.9   11-    144  |  111<H>  |  16  ----------------------------<  103<H>  4.2   |  23  |  0.9    Ca    7.6<L>      2020 06:20  Mg     1.8         TPro  5.3<L>  /  Alb  3.2<L>  /  TBili  0.2  /  DBili  x   /  AST  13  /  ALT  6   /  AlkPhos  61    PTT - ( 27 Oct 2020 13:31 )  PTT:36.6 sec  Urinalysis Basic - ( 29 Oct 2020 10:00 )    Color: Dark Brown / Appearance: Turbid / S.012 / pH: x  Gluc: x / Ketone: Negative  / Bili: Negative / Urobili: <2 mg/dL   Blood: x / Protein: 300 mg/dL / Nitrite: Negative   Leuk Esterase: Moderate / RBC: >720 /HPF /  /HPF   Sq Epi: x / Non Sq Epi: 0 /HPF / Bacteria: 0.0    Culture - Urine (collected 27 Oct 2020 13:31)  Source: .Urine Clean Catch (Midstream)  Final Report (28 Oct 2020 18:19):    No growth    RADIOLOGY:    < from: US Kidney and Bladder (10.28.20 @ 07:35) >  IMPRESSION:    No stones seen by ultrasound.    Again seen mild left hydronephrosis.    MEDICATIONS  (STANDING):  amLODIPine   Tablet 5 milliGRAM(s) Oral daily  aspirin enteric coated 81 milliGRAM(s) Oral daily  atorvastatin 40 milliGRAM(s) Oral at bedtime  brimonidine 0.2% Ophthalmic Solution 1 Drop(s) Both EYES two times a day  ceFAZolin   IVPB 500 milliGRAM(s) IV Intermittent once  ceFAZolin   IVPB      dorzolamide 2% Ophthalmic Solution 1 Drop(s) Both EYES three times a day  lactated ringers. 1000 milliLiter(s) (100 mL/Hr) IV Continuous <Continuous>  lactulose Syrup 20 Gram(s) Oral daily  metoprolol succinate ER 25 milliGRAM(s) Oral daily  polyethylene glycol 3350 17 Gram(s) Oral at bedtime  senna 2 Tablet(s) Oral at bedtime  sertraline 25 milliGRAM(s) Oral daily  sodium chloride 0.9%. 1000 milliLiter(s) (75 mL/Hr) IV Continuous <Continuous>    MEDICATIONS  (PRN):  clonazePAM  Tablet 0.5 milliGRAM(s) Oral daily PRN anxiety  HYDROmorphone  Injectable 0.5 milliGRAM(s) IV Push every 10 minutes PRN Moderate Pain (4 - 6)  meperidine     Injectable 12.5 milliGRAM(s) IV Push every 10 minutes PRN Shivering  morphine  - Injectable 2 milliGRAM(s) IV Push every 10 minutes PRN Severe Pain (7 - 10)  morphine  - Injectable 1 milliGRAM(s) IV Push every 10 minutes PRN Moderate Pain (4 - 6)  ondansetron Injectable 4 milliGRAM(s) IV Push once PRN Nausea and/or Vomiting  ondansetron Injectable 4 milliGRAM(s) IV Push once PRN Nausea and/or Vomiting  oxycodone    5 mG/acetaminophen 325 mG 1 Tablet(s) Oral every 6 hours PRN Moderate Pain (4 - 6)

## 2020-11-02 NOTE — PROGRESS NOTE ADULT - SUBJECTIVE AND OBJECTIVE BOX
BEKAH NELSON   [id number]  80y   Female PMHx of ANEMIA, URINARY TRACT INFECTION    ^ANEMIA, URINARY TRACT INFECTION    SUBJECTIVE:  Patient is a 80y old Female who presents with a chief complaint of hematuria (01 Nov 2020 13:21)    Today is hospital day 6d. This morning she is resting comfortably in bed and reports no new issues or overnight events. Pt seen and detailed exam done @time of authoring this note as pt was taken to [facility] for cystoscopy this AM just after I arrived in her room.   Currently admitted to medicine with the primary diagnosis of Hematuria    PAST MEDICAL & SURGICAL HISTORY  HLD (hyperlipidemia)    HTN (hypertension)    CAD (coronary artery disease)    Coronary artery disease  s/p CABG X2    Glaucoma    Cataract of right eye    High cholesterol    Hypertension    Uterine cancer  s/p SHAAN and chemo    History of coronary artery bypass surgery    Athscl CABG, unsp, w angina pectoris w documented spasm    H/O total hysterectomy      ALLERGIES:  chloroquine (Hives)  quinine (Urticaria)    MEDICATIONS:  STANDING MEDICATIONS  lactated ringers. 1000 milliLiter(s) IV Continuous <Continuous>    PRN MEDICATIONS  HYDROmorphone  Injectable 0.5 milliGRAM(s) IV Push every 10 minutes PRN  meperidine     Injectable 12.5 milliGRAM(s) IV Push every 10 minutes PRN  morphine  - Injectable 2 milliGRAM(s) IV Push every 10 minutes PRN  morphine  - Injectable 1 milliGRAM(s) IV Push every 10 minutes PRN  ondansetron Injectable 4 milliGRAM(s) IV Push once PRN  ondansetron Injectable 4 milliGRAM(s) IV Push once PRN    VITALS:   T(F): 96.5  HR: 69  BP: 145/71  RR: 16  SpO2: 96%    LABS:                        7.7    3.12  )-----------( 189      ( 02 Nov 2020 06:20 )             24.9     Hemoglobin: 7.7 g/dL (11-02 @ 06:20)  Hemoglobin: 8.1 g/dL (11-01 @ 14:22)  Hemoglobin: 8.9 g/dL (10-31 @ 05:20)  Hemoglobin: 8.6 g/dL (10-30 @ 05:30)  Hemoglobin: 9.0 g/dL (10-29 @ 05:30)    11-02    144  |  111<H>  |  16  ----------------------------<  103<H>  4.2   |  23  |  0.9    Ca    7.6<L>      02 Nov 2020 06:20  Mg     1.8     11-02    TPro  5.3<L>  /  Alb  3.2<L>  /  TBili  0.2  /  DBili  x   /  AST  13  /  ALT  6   /  AlkPhos  61  11-02    Potassium Trend: 4.2<--, 3.8<--, 4.6<--, 4.1<--, 4.0<--  SODIUM TREND:  Sodium 144 [11-02 @ 06:20]  Sodium 143 [11-01 @ 14:22]  Sodium 141 [10-31 @ 05:20]  Sodium 137 [10-30 @ 05:30]  Sodium 140 [10-29 @ 05:30]  Sodium 138 [10-28 @ 15:18]  Sodium 139 [10-27 @ 13:31]  Sodium 141 [10-22 @ 05:53]  Sodium 138 [10-21 @ 07:26]  Sodium 141 [10-11 @ 19:41]    Creatinine Trend: 0.9<--, 1.1<--, 1.3<--, 0.9<--, 0.9<--, 0.9<--  PT/INR - ( 02 Nov 2020 06:20 )   PT: 12.00 sec;   INR: 1.04 ratio         PTT - ( 02 Nov 2020 06:20 )  PTT:25.4 sec    COVID  10-27-20 @ 13:31  COVID -   NotDetec  10-21-20 @ 12:55  COVID -   NotDetec  09-11-20 @ 04:45  COVID -   NotDetec  08-06-20 @ 22:10  COVID -   NotDetec      RADIOLOGY:    PHYSICAL EXAM:  GENERAL: NAD, well-developed, AAOx3  HEENT:  Atraumatic, Normocephalic. EOMI, PERRLA, conjunctiva and sclera clear, No JVD  PULMONARY: Clear to auscultation bilaterally; No wheeze  CARDIOVASCULAR: Regular rate and rhythm; No murmurs, rubs, or gallops  GASTROINTESTINAL: Soft, Nontender, Nondistended; Bowel sounds present  MUSCULOSKELETAL:  2+ Peripheral Pulses, No clubbing, cyanosis, or edema, PICC line RUE  NEUROLOGY: non-focal  SKIN: No rashes or lesions

## 2020-11-02 NOTE — PROGRESS NOTE ADULT - ASSESSMENT
Patient is a 79 yo F with hemorrhagic cystitis s/p cystoscopy and fulguration, POD #0 - pt doing well, stable.     Plan  - No further acute  intervention at this time  - Continue care as per medical team  - F/u outpatient with urology  - Monitor H/H, transfuse prn  - Monitor urine output  - Recall urology PRN  - Will d/w attending Patient is a 79 yo F with hemorrhagic cystitis s/p cystoscopy and fulguration, POD #0 - pt doing well, stable.     Plan  - No further acute  intervention at this time  - Continue care as per medical team  - On IV Ancef post op 2/2 immunocompromised on chemo  - F/u outpatient with urology  - Monitor H/H, transfuse prn  - Monitor urine output and VS  - Will d/w attending Patient is a 79 yo F with hemorrhagic cystitis s/p cystoscopy and fulguration, POD #0 - pt doing well, stable.     Plan  - No further acute  intervention at this time  - Continue care as per medical team  - On IV Ancef post op 2/2 immunocompromised on chemo  - F/u outpatient with urology  - Monitor H/H, transfuse prn  - Monitor urine output and VS  - Will d/w attending    - pt seen 11/03/2020 on evening rounds  she had no c/o urine had been clear and exam showed no cvat or abd discomfort  from a gu view point she can go home  f/u as outpatient with dr villalobos

## 2020-11-02 NOTE — PROGRESS NOTE ADULT - ASSESSMENT
ASSESSMENT & PLAN    Patient is a 80y old woman with PMH uterine cancer (s/p SHAAN in 2016 and adjuvant therapy, recurrence with malignant ascites currently undergoing therapeutic paracentesis/chemotherapy on Fridays), CAD (s/p CABG), history of PE (August 2020; was on Xarelto OP), HTN, depression who presents for hematuria.    1. Hematuria - secondary to hemorrhagic cystitis   -UCX NGTD  -continue to trend CBC  -Keep active T+S    2. AMANDA - resolving  -Creatinine Trend: 0.9<--, 1.1<--, 1.3<--, 0.9<--, 0.9<--, 0.9<--    3. Adenocarcinoma of mullerian origin  -S/P SHAAN in 2016 with adjuvant chemotherapy  -Currently on carbo/taxol weekly 3 on 1 off  -PICC in RUE for chemo  -Chemotherapy per schedule    4. PE  -Lovenox stopped in light of bleed  -Heparin subq prophylaxis  -IVC filtered placed 10/29    5. CAD  -Continue aspirin/statin/beta blocker    6. Anxiety  -Klonopin 0.5 q24hrs    MISC:  -DVT PPX: lovenox  -GI PPX: none  -Activity: As tolerated  -Diet: DASH  -CODE: FULL   patient c/o stuffed nose, and full feeling in head. states she thinks she has a sinus infection

## 2020-11-03 ENCOUNTER — APPOINTMENT (OUTPATIENT)
Dept: INFUSION THERAPY | Facility: CLINIC | Age: 80
End: 2020-11-03

## 2020-11-03 LAB
ALBUMIN SERPL ELPH-MCNC: 3.4 G/DL — LOW (ref 3.5–5.2)
ALP SERPL-CCNC: 66 U/L — SIGNIFICANT CHANGE UP (ref 30–115)
ALT FLD-CCNC: 6 U/L — SIGNIFICANT CHANGE UP (ref 0–41)
ANION GAP SERPL CALC-SCNC: 9 MMOL/L — SIGNIFICANT CHANGE UP (ref 7–14)
APTT BLD: 26.9 SEC — LOW (ref 27–39.2)
AST SERPL-CCNC: 14 U/L — SIGNIFICANT CHANGE UP (ref 0–41)
BASOPHILS # BLD AUTO: 0.01 K/UL — SIGNIFICANT CHANGE UP (ref 0–0.2)
BASOPHILS NFR BLD AUTO: 0.3 % — SIGNIFICANT CHANGE UP (ref 0–1)
BILIRUB SERPL-MCNC: 0.3 MG/DL — SIGNIFICANT CHANGE UP (ref 0.2–1.2)
BUN SERPL-MCNC: 15 MG/DL — SIGNIFICANT CHANGE UP (ref 10–20)
CALCIUM SERPL-MCNC: 7.9 MG/DL — LOW (ref 8.5–10.1)
CHLORIDE SERPL-SCNC: 108 MMOL/L — SIGNIFICANT CHANGE UP (ref 98–110)
CO2 SERPL-SCNC: 23 MMOL/L — SIGNIFICANT CHANGE UP (ref 17–32)
CREAT SERPL-MCNC: 0.9 MG/DL — SIGNIFICANT CHANGE UP (ref 0.7–1.5)
EOSINOPHIL # BLD AUTO: 0.04 K/UL — SIGNIFICANT CHANGE UP (ref 0–0.7)
EOSINOPHIL NFR BLD AUTO: 1.3 % — SIGNIFICANT CHANGE UP (ref 0–8)
GLUCOSE SERPL-MCNC: 90 MG/DL — SIGNIFICANT CHANGE UP (ref 70–99)
HCT VFR BLD CALC: 25.6 % — LOW (ref 37–47)
HGB BLD-MCNC: 8 G/DL — LOW (ref 12–16)
IMM GRANULOCYTES NFR BLD AUTO: 0.3 % — SIGNIFICANT CHANGE UP (ref 0.1–0.3)
INR BLD: 1.1 RATIO — SIGNIFICANT CHANGE UP (ref 0.65–1.3)
LYMPHOCYTES # BLD AUTO: 0.85 K/UL — LOW (ref 1.2–3.4)
LYMPHOCYTES # BLD AUTO: 27.6 % — SIGNIFICANT CHANGE UP (ref 20.5–51.1)
MAGNESIUM SERPL-MCNC: 1.6 MG/DL — LOW (ref 1.8–2.4)
MCHC RBC-ENTMCNC: 27.6 PG — SIGNIFICANT CHANGE UP (ref 27–31)
MCHC RBC-ENTMCNC: 31.3 G/DL — LOW (ref 32–37)
MCV RBC AUTO: 88.3 FL — SIGNIFICANT CHANGE UP (ref 81–99)
MONOCYTES # BLD AUTO: 0.24 K/UL — SIGNIFICANT CHANGE UP (ref 0.1–0.6)
MONOCYTES NFR BLD AUTO: 7.8 % — SIGNIFICANT CHANGE UP (ref 1.7–9.3)
NEUTROPHILS # BLD AUTO: 1.93 K/UL — SIGNIFICANT CHANGE UP (ref 1.4–6.5)
NEUTROPHILS NFR BLD AUTO: 62.7 % — SIGNIFICANT CHANGE UP (ref 42.2–75.2)
NRBC # BLD: 0 /100 WBCS — SIGNIFICANT CHANGE UP (ref 0–0)
PHOSPHATE SERPL-MCNC: 3.2 MG/DL — SIGNIFICANT CHANGE UP (ref 2.1–4.9)
PLATELET # BLD AUTO: 190 K/UL — SIGNIFICANT CHANGE UP (ref 130–400)
POTASSIUM SERPL-MCNC: 4.1 MMOL/L — SIGNIFICANT CHANGE UP (ref 3.5–5)
POTASSIUM SERPL-SCNC: 4.1 MMOL/L — SIGNIFICANT CHANGE UP (ref 3.5–5)
PROT SERPL-MCNC: 5.6 G/DL — LOW (ref 6–8)
PROTHROM AB SERPL-ACNC: 12.6 SEC — SIGNIFICANT CHANGE UP (ref 9.95–12.87)
RBC # BLD: 2.9 M/UL — LOW (ref 4.2–5.4)
RBC # FLD: 21.4 % — HIGH (ref 11.5–14.5)
SODIUM SERPL-SCNC: 140 MMOL/L — SIGNIFICANT CHANGE UP (ref 135–146)
WBC # BLD: 3.08 K/UL — LOW (ref 4.8–10.8)
WBC # FLD AUTO: 3.08 K/UL — LOW (ref 4.8–10.8)

## 2020-11-03 PROCEDURE — 99233 SBSQ HOSP IP/OBS HIGH 50: CPT

## 2020-11-03 PROCEDURE — 99232 SBSQ HOSP IP/OBS MODERATE 35: CPT

## 2020-11-03 PROCEDURE — 99232 SBSQ HOSP IP/OBS MODERATE 35: CPT | Mod: GC

## 2020-11-03 RX ORDER — MAGNESIUM SULFATE 500 MG/ML
2 VIAL (ML) INJECTION ONCE
Refills: 0 | Status: COMPLETED | OUTPATIENT
Start: 2020-11-03 | End: 2020-11-03

## 2020-11-03 RX ADMIN — DORZOLAMIDE HYDROCHLORIDE 1 DROP(S): 20 SOLUTION/ DROPS OPHTHALMIC at 21:14

## 2020-11-03 RX ADMIN — LACTULOSE 20 GRAM(S): 10 SOLUTION ORAL at 11:24

## 2020-11-03 RX ADMIN — BRIMONIDINE TARTRATE 1 DROP(S): 2 SOLUTION/ DROPS OPHTHALMIC at 06:46

## 2020-11-03 RX ADMIN — Medication 81 MILLIGRAM(S): at 11:24

## 2020-11-03 RX ADMIN — Medication 100 MILLIGRAM(S): at 17:07

## 2020-11-03 RX ADMIN — Medication 100 MILLIGRAM(S): at 21:16

## 2020-11-03 RX ADMIN — SENNA PLUS 2 TABLET(S): 8.6 TABLET ORAL at 21:14

## 2020-11-03 RX ADMIN — ATORVASTATIN CALCIUM 40 MILLIGRAM(S): 80 TABLET, FILM COATED ORAL at 21:14

## 2020-11-03 RX ADMIN — Medication 100 MILLIGRAM(S): at 06:46

## 2020-11-03 RX ADMIN — BRIMONIDINE TARTRATE 1 DROP(S): 2 SOLUTION/ DROPS OPHTHALMIC at 17:09

## 2020-11-03 RX ADMIN — Medication 25 GRAM(S): at 10:53

## 2020-11-03 RX ADMIN — DORZOLAMIDE HYDROCHLORIDE 1 DROP(S): 20 SOLUTION/ DROPS OPHTHALMIC at 06:46

## 2020-11-03 RX ADMIN — Medication 25 MILLIGRAM(S): at 06:46

## 2020-11-03 RX ADMIN — AMLODIPINE BESYLATE 5 MILLIGRAM(S): 2.5 TABLET ORAL at 06:46

## 2020-11-03 RX ADMIN — POLYETHYLENE GLYCOL 3350 17 GRAM(S): 17 POWDER, FOR SOLUTION ORAL at 21:15

## 2020-11-03 RX ADMIN — SERTRALINE 25 MILLIGRAM(S): 25 TABLET, FILM COATED ORAL at 11:24

## 2020-11-03 NOTE — PROGRESS NOTE ADULT - SUBJECTIVE AND OBJECTIVE BOX
80y old woman with PMH uterine cancer (s/p SHAAN in 2016 and adjuvant therapy, recurrence with malignant ascites currently undergoing therapeutic paracentesis/chemotherapy on ), CAD (s/p CABG), history of PE (2020; was on Xarelto OP), HTN, depression who presents for hematuria.  Oral AC was d/c, pt was started on SQ Lovenox on 10/28, shortly after developed hematuria again. She was consulted by  , cystoscopy was performed on  with fulguration for bladder irritation, on  IV Ancef post op.  IVC filter was placed by IR on 10/29, pt had a  chemotherapy on 10/30. 24 hours after cystoscopy pt is still bleeding, on ASA for CAD, will consult cardiology and clarify if pt has to c/w ASA.   Today pt is comfortable, denies abdominal pain, c/o bloody urine.     OBJECTIVE  PAST MEDICAL & SURGICAL HISTORY  HLD (hyperlipidemia)    HTN (hypertension)    CAD (coronary artery disease)    Coronary artery disease  s/p CABG X2    Glaucoma    Cataract of right eye    High cholesterol    Hypertension    Uterine cancer  s/p SHAAN and chemo    History of coronary artery bypass surgery    Athscl CABG, unsp, w angina pectoris w documented spasm    H/O total hysterectomy      ALLERGIES:  chloroquine (Hives)  quinine (Urticaria)      VITAL SIGNS: Last 24 Hours  T(C): 37.2 (2020 14:24), Max: 37.6 (2020 05:00)  T(F): 98.9 (2020 14:24), Max: 99.6 (2020 05:00)  HR: 80 (2020 14:24) (69 - 87)  BP: 141/59 (2020 14:24) (123/62 - 145/71)  BP(mean): --  RR: 19 (2020 14:24) (18 - 19)  SpO2: 97% (2020 20:49) (96% - 97%)      PHYSICAL EXAM:  GENERAL: NAD,  AAOx3 with temporal muscle waisting   HEENT:  Atraumatic, Normocephalic. EOMI, PERRLA, conjunctiva and sclera clear, No JVD  PULMONARY: Clear to auscultation bilaterally; No wheeze  CARDIOVASCULAR: Regular rate and rhythm; No murmurs, rubs, or gallops  GASTROINTESTINAL: Soft, Nontender, Nondistended; Bowel sounds present  MUSCULOSKELETAL:  2+ Peripheral Pulses, No clubbing, cyanosis, or edema, PICC line RUE  NEUROLOGY: non-focal  SKIN: No rashes or lesions    LABS:                                   8.0    3.08  )-----------( 190      ( 2020 06:48 )             25.6   11-03    140  |  108  |  15  ----------------------------<  90  4.1   |  23  |  0.9    Ca    7.9<L>      2020 06:48  Phos  3.2     11-  Mg     1.6     -    TPro  5.6<L>  /  Alb  3.4<L>  /  TBili  0.3  /  DBili  x   /  AST  14  /  ALT  6   /  AlkPhos  66  11-03    PTT - ( 27 Oct 2020 13:31 )  PTT:36.6 sec  Urinalysis Basic - ( 29 Oct 2020 10:00 )    Color: Dark Brown / Appearance: Turbid / S.012 / pH: x  Gluc: x / Ketone: Negative  / Bili: Negative / Urobili: <2 mg/dL   Blood: x / Protein: 300 mg/dL / Nitrite: Negative   Leuk Esterase: Moderate / RBC: >720 /HPF /  /HPF   Sq Epi: x / Non Sq Epi: 0 /HPF / Bacteria: 0.0    Culture - Urine (collected 27 Oct 2020 13:31)  Source: .Urine Clean Catch (Midstream)  Final Report (28 Oct 2020 18:19):    No growth    RADIOLOGY:    < from: US Kidney and Bladder (10.28.20 @ 07:35) >  IMPRESSION:    No stones seen by ultrasound.    Again seen mild left hydronephrosis.    MEDICATIONS  (STANDING):    amLODIPine   Tablet 5 milliGRAM(s) Oral daily  aspirin enteric coated 81 milliGRAM(s) Oral daily  atorvastatin 40 milliGRAM(s) Oral at bedtime  brimonidine 0.2% Ophthalmic Solution 1 Drop(s) Both EYES two times a day  ceFAZolin   IVPB 500 milliGRAM(s) IV Intermittent every 8 hours  ceFAZolin   IVPB      dorzolamide 2% Ophthalmic Solution 1 Drop(s) Both EYES three times a day  lactated ringers. 1000 milliLiter(s) (75 mL/Hr) IV Continuous <Continuous>  lactulose Syrup 20 Gram(s) Oral daily  metoprolol succinate ER 25 milliGRAM(s) Oral daily  polyethylene glycol 3350 17 Gram(s) Oral at bedtime  senna 2 Tablet(s) Oral at bedtime  sertraline 25 milliGRAM(s) Oral daily    MEDICATIONS  (PRN):  clonazePAM  Tablet 0.5 milliGRAM(s) Oral daily PRN anxiety  HYDROmorphone  Injectable 0.5 milliGRAM(s) IV Push every 10 minutes PRN Moderate Pain (4 - 6)  meperidine     Injectable 12.5 milliGRAM(s) IV Push every 10 minutes PRN Shivering  morphine  - Injectable 2 milliGRAM(s) IV Push every 10 minutes PRN Severe Pain (7 - 10)  morphine  - Injectable 1 milliGRAM(s) IV Push every 10 minutes PRN Moderate Pain (4 - 6)  ondansetron Injectable 4 milliGRAM(s) IV Push once PRN Nausea and/or Vomiting  ondansetron Injectable 4 milliGRAM(s) IV Push once PRN Nausea and/or Vomiting  oxycodone    5 mG/acetaminophen 325 mG 1 Tablet(s) Oral every 6 hours PRN Moderate Pain (4 - 6)

## 2020-11-03 NOTE — DIETITIAN INITIAL EVALUATION ADULT. - PERSON TAUGHT/METHOD
Pt aware of importance of adequate intake to meet increased nutritional needs. Aware of REgular diet, no further d/c ed./patient instructed/verbal instruction

## 2020-11-03 NOTE — PROGRESS NOTE ADULT - ASSESSMENT
ASSESSMENT & PLAN  Patient is a 80y old woman with PMH uterine cancer (s/p SHAAN in 2016 and adjuvant therapy, recurrence with malignant ascites currently undergoing therapeutic paracentesis/chemotherapy on Fridays), CAD (s/p CABG), history of PE (August 2020; was on Xarelto OP), HTN, depression who presents for hematuria.    # Recurrent  Hematuria/ acute blood loss anemia   - s/p  cystoscopy with fulguration on 11/2  at  [facility] by Dr Kumar  - on  IV Ancef post op empirically ( pt is immunocompromised on chemotherapy)   - monitor H/H, keep Hb above 7.5 and active type/cross   - hold off with AC   - send urine for cytology     # AMANDA  - resolved after IV hydration   - monitor urine output, BUN/cr   - avoid nephrotoxic medications     #  Adenocarcinoma of mullerian origin  - medical oncology is following, recommendation noted   -S/P SHAAN in 2016 with adjuvant chemotherapy  - on carbo/taxol weekly 3 on 1 off  -  last chemotherapy was on 10/30  - prognosis guarded   - pt is willing to proceed with cancer directed therapy     # h/o DVT and PE  -  off AC  -  s/p IVC filter on 10/29 by IR     # CAD/ h/o CABG   -Continue aspirin/statin/beta blocker  - consult cardiology to comment on ASA ( pt has persistent hematuria)     #  Anxiety  -Klonopin 0.5 q24hrs    MISC:  -DVT PPX: SCD   -GI PPX: none  -Activity: As tolerated  -Diet: DASH    #Progress Note Handoff  Pending (specify):  c/w  IV Ancef for one more day,  urine cytology, cardiac consult , monitor BUN/cr and H/H   Family discussion: I spoke with pt, she agreed with a plan of care   Disposition: Home__x _/SNF___/Other________/Unknown at this time________

## 2020-11-03 NOTE — DIETITIAN INITIAL EVALUATION ADULT. - PERTINENT MEDS FT
aspirin, abx, amlodipine, atorvastatin, dilaudid, lactulose, metoprolol, zofran, oxy, mrialax, senna

## 2020-11-03 NOTE — PROGRESS NOTE ADULT - ASSESSMENT
ASSESSMENT & PLAN  Patient is a 80y old woman with PMH uterine cancer (s/p SHAAN in 2016 and adjuvant therapy, recurrence with malignant ascites currently undergoing therapeutic paracentesis/chemotherapy on Fridays), CAD (s/p CABG), history of PE (August 2020; was on Xarelto OP), HTN, depression who presents for hematuria.    # Recurrent  Hematuria/ acute blood loss anemia   - s/p  cystoscopy with fulguration 11/2 at  [facility] by Dr Kumar  - c/w empiric Ancef post op as pt is immunocompromised on chemotherapy)   - monitor H/H, keep Hb above 7.5 and active type/cross   - hold off with AC for now  - no further  interventions at this time, urology reccs appreciated     # AMANDA  - resolved after IV hydration   - c/w  IV fluids   - monitor urine output, BUN/cr   - avoid nephrotoxic medications     #  Adenocarcinoma of mullerian origin  - medical oncology is following, recommendation noted   - S/P SHAAN in 2016 with adjuvant chemotherapy  - on carbo/taxol weekly 3 on 1 off  -  last chemotherapy was on 10/30  - prognosis guarded   - pt is willing to proceed with cancer directed therapy     # h/o DVT and PE  -  off AC  -  s/p IVC filter on 10/29 by IR     # CAD/ h/o CABG   -Continue aspirin/statin/beta blocker    #  Anxiety  -Klonopin 0.5 q24hrs    MISC:  -DVT PPX: SCD   -GI PPX: none  -Activity: As tolerated  -Diet: DASH ASSESSMENT & PLAN  Patient is a 80y old woman with PMH uterine cancer (s/p SHAAN in 2016 and adjuvant therapy, recurrence with malignant ascites currently undergoing therapeutic paracentesis/chemotherapy on Fridays), CAD (s/p CABG), history of PE (August 2020; was on Xarelto OP), HTN, depression who presents for hematuria.    # Recurrent  Hematuria/ acute blood loss anemia   - s/p  cystoscopy with fulguration 11/2 at  [facility] by Dr Kumar  - c/w empiric Ancef post op as pt is immunocompromised on chemotherapy)   - monitor H/H, keep Hb above 7.5 and active type/cross   - hold off with AC for now  - no further  interventions at this time, urology reccs appreciated     # AMANDA  - resolved after IV hydration   - c/w  IV fluids   - monitor urine output, BUN/cr   - avoid nephrotoxic medications     #  Adenocarcinoma of mullerian origin  - medical oncology is following, recommendation noted   - S/P SHAAN in 2016 with adjuvant chemotherapy  - on carbo/taxol weekly 3 on 1 off  -  last chemotherapy was on 10/30  - prognosis guarded   - pt is willing to proceed with cancer directed therapy     # h/o DVT and PE  -  off AC  -  s/p IVC filter on 10/29 by IR     # CAD/ h/o CABG   -Continue aspirin/statin/beta blocker    #  Anxiety  -Klonopin 0.5 q24hrs    MISC:  -DVT PPX: SCD   -GI PPX: none  -Activity: As tolerated  -Diet: DASH

## 2020-11-03 NOTE — DIETITIAN INITIAL EVALUATION ADULT. - ORAL INTAKE PTA/DIET HISTORY
Pt reports adequate appetite and PO intake at baseline. Consumes 3 meals/day with occasional snacks. NKFA. Denies following any specific diet, denies cultural/[employer] food preferences.

## 2020-11-03 NOTE — DIETITIAN INITIAL EVALUATION ADULT. - OTHER INFO
Pt p/w hematuria with primary dx: anemia & UTI. Recurrent hematuria/ acute blood loss anemia--s/p cystoscopy with fulguration at [facility] on 11/2. Recurrent adenocarcinoma Mullerian origin, malignant ascites--Per HemeOn: sarted on weekly Carbo/taxol weekly 3 on 1 off, started on 8/14/20; 10/23 dose on hold, given 10/30 with next dose 11/13. Acute on Chronic PE in setting of known Malignancy.

## 2020-11-03 NOTE — PROGRESS NOTE ADULT - SUBJECTIVE AND OBJECTIVE BOX
Patient is a 80y old  Female who presents with a chief complaint of hematuria (03 Nov 2020 06:24)      Subjective: Patient had her procedure yesterday afternoon and tolerated it well. She still reports having some hematuria.       Vital Signs Last 24 Hrs  T(C): 37.6 (03 Nov 2020 05:00), Max: 37.6 (03 Nov 2020 05:00)  T(F): 99.6 (03 Nov 2020 05:00), Max: 99.6 (03 Nov 2020 05:00)  HR: 87 (03 Nov 2020 05:00) (69 - 87)  BP: 123/62 (03 Nov 2020 05:00) (119/63 - 145/71)  BP(mean): --  RR: 18 (03 Nov 2020 05:00) (16 - 18)  SpO2: 97% (02 Nov 2020 20:49) (96% - 100%)    PHYSICAL EXAM  General: adult in NAD  HEENT: anicteric sclera, pink conjunctiva  Neck: supple  CV: normal S1/S2 with no murmur rubs or gallops  Lungs: CTABL  Abdomen: soft non-tender non-distended   Ext: no edema  Skin: no rashes  Neuro: alert and oriented X 4, no focal deficits    MEDICATIONS  (STANDING):  amLODIPine   Tablet 5 milliGRAM(s) Oral daily  aspirin enteric coated 81 milliGRAM(s) Oral daily  atorvastatin 40 milliGRAM(s) Oral at bedtime  brimonidine 0.2% Ophthalmic Solution 1 Drop(s) Both EYES two times a day  ceFAZolin   IVPB 500 milliGRAM(s) IV Intermittent every 8 hours  ceFAZolin   IVPB      dorzolamide 2% Ophthalmic Solution 1 Drop(s) Both EYES three times a day  lactated ringers. 1000 milliLiter(s) (75 mL/Hr) IV Continuous <Continuous>  lactulose Syrup 20 Gram(s) Oral daily  magnesium sulfate  IVPB 2 Gram(s) IV Intermittent once  metoprolol succinate ER 25 milliGRAM(s) Oral daily  polyethylene glycol 3350 17 Gram(s) Oral at bedtime  senna 2 Tablet(s) Oral at bedtime  sertraline 25 milliGRAM(s) Oral daily    MEDICATIONS  (PRN):  clonazePAM  Tablet 0.5 milliGRAM(s) Oral daily PRN anxiety  HYDROmorphone  Injectable 0.5 milliGRAM(s) IV Push every 10 minutes PRN Moderate Pain (4 - 6)  meperidine     Injectable 12.5 milliGRAM(s) IV Push every 10 minutes PRN Shivering  morphine  - Injectable 2 milliGRAM(s) IV Push every 10 minutes PRN Severe Pain (7 - 10)  morphine  - Injectable 1 milliGRAM(s) IV Push every 10 minutes PRN Moderate Pain (4 - 6)  ondansetron Injectable 4 milliGRAM(s) IV Push once PRN Nausea and/or Vomiting  ondansetron Injectable 4 milliGRAM(s) IV Push once PRN Nausea and/or Vomiting  oxycodone    5 mG/acetaminophen 325 mG 1 Tablet(s) Oral every 6 hours PRN Moderate Pain (4 - 6)      LABS:                          8.0    3.08  )-----------( 190      ( 03 Nov 2020 06:48 )             25.6         Mean Cell Volume : 88.3 fL  Mean Cell Hemoglobin : 27.6 pg  Mean Cell Hemoglobin Concentration : 31.3 g/dL  Auto Neutrophil # : 1.93 K/uL  Auto Lymphocyte # : 0.85 K/uL  Auto Monocyte # : 0.24 K/uL  Auto Eosinophil # : 0.04 K/uL  Auto Basophil # : 0.01 K/uL  Auto Neutrophil % : 62.7 %  Auto Lymphocyte % : 27.6 %  Auto Monocyte % : 7.8 %  Auto Eosinophil % : 1.3 %  Auto Basophil % : 0.3 %      Serial CBC's  11-03 @ 06:48  Hct-25.6 / Hgb-8.0 / Plat-190 / RBC-2.90 / WBC-3.08  Serial CBC's  11-02 @ 06:20  Hct-24.9 / Hgb-7.7 / Plat-189 / RBC-2.80 / WBC-3.12  Serial CBC's  11-01 @ 14:22  Hct-26.3 / Hgb-8.1 / Plat-211 / RBC-2.96 / WBC-4.05  Serial CBC's  10-31 @ 05:20  Hct-27.6 / Hgb-8.9 / Plat-217 / RBC-3.21 / WBC-4.38      11-03    140  |  108  |  15  ----------------------------<  90  4.1   |  23  |  0.9    Ca    7.9<L>      03 Nov 2020 06:48  Phos  3.2     11-03  Mg     1.6     11-03    TPro  5.6<L>  /  Alb  3.4<L>  /  TBili  0.3  /  DBili  x   /  AST  14  /  ALT  6   /  AlkPhos  66  11-03      PT/INR - ( 03 Nov 2020 06:48 )   PT: 12.60 sec;   INR: 1.10 ratio         PTT - ( 03 Nov 2020 06:48 )  PTT:26.9 sec    BLOOD SMEAR INTERPRETATION:       RADIOLOGY & ADDITIONAL STUDIES:

## 2020-11-03 NOTE — DIETITIAN INITIAL EVALUATION ADULT. - ENERGY INTAKE
Pt reports appetite continues to remain adequate and consuming 75% or more of meals with no s/s intolerance. Denied offer of nutrition supplements, no further nutrition intervention. Good (>75%)

## 2020-11-03 NOTE — PROGRESS NOTE ADULT - SUBJECTIVE AND OBJECTIVE BOX
BEKAH NELSON   [id number]  80y   Female PMHx of ANEMIA, URINARY TRACT INFECTION    ^ANEMIA, URINARY TRACT INFECTION    SUBJECTIVE:    Patient is a 80y old Female who presents with a chief complaint of hematuria (02 Nov 2020 20:55)    Today is hospital day 7d. This morning she is resting comfortably in bed and reports no new issues or overnight events.   Currently admitted to medicine with the primary diagnosis of Hematuria    PAST MEDICAL & SURGICAL HISTORY  HLD (hyperlipidemia)    HTN (hypertension)    CAD (coronary artery disease)    Coronary artery disease  s/p CABG X2    Glaucoma    Cataract of right eye    High cholesterol    Hypertension    Uterine cancer  s/p SHAAN and chemo    History of coronary artery bypass surgery    Athscl CABG, unsp, w angina pectoris w documented spasm    H/O total hysterectomy    ALLERGIES:  chloroquine (Hives)  quinine (Urticaria)    MEDICATIONS:  STANDING MEDICATIONS  amLODIPine   Tablet 5 milliGRAM(s) Oral daily  aspirin enteric coated 81 milliGRAM(s) Oral daily  atorvastatin 40 milliGRAM(s) Oral at bedtime  brimonidine 0.2% Ophthalmic Solution 1 Drop(s) Both EYES two times a day  ceFAZolin   IVPB 500 milliGRAM(s) IV Intermittent every 8 hours  ceFAZolin   IVPB      dorzolamide 2% Ophthalmic Solution 1 Drop(s) Both EYES three times a day  lactated ringers. 1000 milliLiter(s) IV Continuous <Continuous>  lactulose Syrup 20 Gram(s) Oral daily  metoprolol succinate ER 25 milliGRAM(s) Oral daily  polyethylene glycol 3350 17 Gram(s) Oral at bedtime  senna 2 Tablet(s) Oral at bedtime  sertraline 25 milliGRAM(s) Oral daily    PRN MEDICATIONS  clonazePAM  Tablet 0.5 milliGRAM(s) Oral daily PRN  HYDROmorphone  Injectable 0.5 milliGRAM(s) IV Push every 10 minutes PRN  meperidine     Injectable 12.5 milliGRAM(s) IV Push every 10 minutes PRN  morphine  - Injectable 2 milliGRAM(s) IV Push every 10 minutes PRN  morphine  - Injectable 1 milliGRAM(s) IV Push every 10 minutes PRN  ondansetron Injectable 4 milliGRAM(s) IV Push once PRN  ondansetron Injectable 4 milliGRAM(s) IV Push once PRN  oxycodone    5 mG/acetaminophen 325 mG 1 Tablet(s) Oral every 6 hours PRN    VITALS:   T(F): 99.6  HR: 87  BP: 123/62  RR: 18  SpO2: 97%    LABS:                        7.7    3.12  )-----------( 189      ( 02 Nov 2020 06:20 )             24.9     Hemoglobin: 7.7 g/dL (11-02 @ 06:20)  Hemoglobin: 8.1 g/dL (11-01 @ 14:22)  Hemoglobin: 8.9 g/dL (10-31 @ 05:20)  Hemoglobin: 8.6 g/dL (10-30 @ 05:30)    11-02    144  |  111<H>  |  16  ----------------------------<  103<H>  4.2   |  23  |  0.9    Ca    7.6<L>      02 Nov 2020 06:20  Mg     1.8     11-02    TPro  5.3<L>  /  Alb  3.2<L>  /  TBili  0.2  /  DBili  x   /  AST  13  /  ALT  6   /  AlkPhos  61  11-02    Potassium Trend: 4.2<--, 3.8<--, 4.6<--, 4.1<--, 4.0<--  SODIUM TREND:  Sodium 144 [11-02 @ 06:20]  Sodium 143 [11-01 @ 14:22]  Sodium 141 [10-31 @ 05:20]  Sodium 137 [10-30 @ 05:30]  Sodium 140 [10-29 @ 05:30]  Sodium 138 [10-28 @ 15:18]  Sodium 139 [10-27 @ 13:31]  Sodium 141 [10-22 @ 05:53]  Sodium 138 [10-21 @ 07:26]  Sodium 141 [10-11 @ 19:41]    Creatinine Trend: 0.9<--, 1.1<--, 1.3<--, 0.9<--, 0.9<--, 0.9<--  PT/INR - ( 02 Nov 2020 06:20 )   PT: 12.00 sec;   INR: 1.04 ratio         PTT - ( 02 Nov 2020 06:20 )  PTT:25.4 sec              COVID  10-27-20 @ 13:31  [facility]   [facility]  10-21-20 @ 12:55  COVID -   [facility]  09-11-20 @ 04:45  COVID -   [facility]  08-06-20 @ 22:10  [facility]      RADIOLOGY:    PHYSICAL EXAM:  GEN: No acute distress  HEENT: normocephalic, atraumatic, aniceteric  LUNGS: Clear to auscultation bilaterally, no rales/wheezing/ rhonchi  HEART: S1/S2 present. RRR, no murmurs  ABD: Soft, non-tender, non-distended. Bowel sounds present  EXT: Warm, well perfused, skin color appropriate for ethnicity, +distal pulses, +motor/sensory fnxn intact x all 4 ext's.   NEURO: AAOX3, normal affect    Lines/Tubes/Catheters:  1. Placed 00/00, Condition  1. Placed 00/00, Condition  1. Placed 00/00, Condition     BEKAH NELSON   [id number]  80y   Female PMHx of ANEMIA, URINARY TRACT INFECTION    ^ANEMIA, URINARY TRACT INFECTION    SUBJECTIVE:    Patient is a 80y old Female who presents with a chief complaint of hematuria (02 Nov 2020 20:55)    Today is hospital day 7d. This morning she is resting comfortably in bed and reports some hematuria this AM (to be expected) otherwise no new complaints and no events overnight.    Currently admitted to medicine with the primary diagnosis of Hematuria    PAST MEDICAL & SURGICAL HISTORY  HLD (hyperlipidemia)    HTN (hypertension)    CAD (coronary artery disease)    Coronary artery disease  s/p CABG X2    Glaucoma    Cataract of right eye    High cholesterol    Hypertension    Uterine cancer  s/p SHAAN and chemo    History of coronary artery bypass surgery    Athscl CABG, unsp, w angina pectoris w documented spasm    H/O total hysterectomy    ALLERGIES:  chloroquine (Hives)  quinine (Urticaria)    MEDICATIONS:  STANDING MEDICATIONS  amLODIPine   Tablet 5 milliGRAM(s) Oral daily  aspirin enteric coated 81 milliGRAM(s) Oral daily  atorvastatin 40 milliGRAM(s) Oral at bedtime  brimonidine 0.2% Ophthalmic Solution 1 Drop(s) Both EYES two times a day  ceFAZolin   IVPB 500 milliGRAM(s) IV Intermittent every 8 hours  ceFAZolin   IVPB      dorzolamide 2% Ophthalmic Solution 1 Drop(s) Both EYES three times a day  lactated ringers. 1000 milliLiter(s) IV Continuous <Continuous>  lactulose Syrup 20 Gram(s) Oral daily  metoprolol succinate ER 25 milliGRAM(s) Oral daily  polyethylene glycol 3350 17 Gram(s) Oral at bedtime  senna 2 Tablet(s) Oral at bedtime  sertraline 25 milliGRAM(s) Oral daily    PRN MEDICATIONS  clonazePAM  Tablet 0.5 milliGRAM(s) Oral daily PRN  HYDROmorphone  Injectable 0.5 milliGRAM(s) IV Push every 10 minutes PRN  meperidine     Injectable 12.5 milliGRAM(s) IV Push every 10 minutes PRN  morphine  - Injectable 2 milliGRAM(s) IV Push every 10 minutes PRN  morphine  - Injectable 1 milliGRAM(s) IV Push every 10 minutes PRN  ondansetron Injectable 4 milliGRAM(s) IV Push once PRN  ondansetron Injectable 4 milliGRAM(s) IV Push once PRN  oxycodone    5 mG/acetaminophen 325 mG 1 Tablet(s) Oral every 6 hours PRN    VITALS:   T(F): 99.6  HR: 87  BP: 123/62  RR: 18  SpO2: 97%    LABS:                        7.7    3.12  )-----------( 189      ( 02 Nov 2020 06:20 )             24.9     Hemoglobin: 7.7 g/dL (11-02 @ 06:20)  Hemoglobin: 8.1 g/dL (11-01 @ 14:22)  Hemoglobin: 8.9 g/dL (10-31 @ 05:20)  Hemoglobin: 8.6 g/dL (10-30 @ 05:30)    11-02    144  |  111<H>  |  16  ----------------------------<  103<H>  4.2   |  23  |  0.9    Ca    7.6<L>      02 Nov 2020 06:20  Mg     1.8     11-02    TPro  5.3<L>  /  Alb  3.2<L>  /  TBili  0.2  /  DBili  x   /  AST  13  /  ALT  6   /  AlkPhos  61  11-02    Potassium Trend: 4.2<--, 3.8<--, 4.6<--, 4.1<--, 4.0<--  SODIUM TREND:  Sodium 144 [11-02 @ 06:20]  Sodium 143 [11-01 @ 14:22]  Sodium 141 [10-31 @ 05:20]  Sodium 137 [10-30 @ 05:30]  Sodium 140 [10-29 @ 05:30]  Sodium 138 [10-28 @ 15:18]  Sodium 139 [10-27 @ 13:31]  Sodium 141 [10-22 @ 05:53]  Sodium 138 [10-21 @ 07:26]  Sodium 141 [10-11 @ 19:41]    Creatinine Trend: 0.9<--, 1.1<--, 1.3<--, 0.9<--, 0.9<--, 0.9<--  PT/INR - ( 02 Nov 2020 06:20 )   PT: 12.00 sec;   INR: 1.04 ratio         PTT - ( 02 Nov 2020 06:20 )  PTT:25.4 sec              COVID  10-27-20 @ 13:31  COVID -   [facility]  10-21-20 @ 12:55  [facility]   [facility]  09-11-20 @ 04:45  [facility]   [facility]  08-06-20 @ 22:10  [facility]      RADIOLOGY:    PHYSICAL EXAM:  GEN: No acute distress  HEENT: normocephalic, atraumatic, aniceteric  LUNGS: Clear to auscultation bilaterally, no rales/wheezing/ rhonchi  HEART: S1/S2 present. RRR, no murmurs  ABD: Soft, non-tender, non-distended. Bowel sounds present  EXT: Warm, well perfused, skin color appropriate for ethnicity, +distal pulses, +motor/sensory fnxn intact x all 4 ext's.   NEURO: AAOX3, normal affect

## 2020-11-03 NOTE — DIETITIAN INITIAL EVALUATION ADULT. - OTHER CALCULATIONS
est kcal needs = 9630-5022 kcal/day (MSJ x1.2-1.3); est protein needs = 72-85 g/day (1.1-1.3 g/kg CBW d/t cancer on active treatment); est fluid needs = per LIP

## 2020-11-03 NOTE — DIETITIAN INITIAL EVALUATION ADULT. - ADD RECOMMEND
Pt to continue to consume 75% or more of meals and snacks throughout LOS. Reassess in 7 days. RD will monitor diet order, energy intake, nutrition related labs, body composition, NFPF

## 2020-11-03 NOTE — PROGRESS NOTE ADULT - NSHPATTENDINGPLANDISCUSS_GEN_ALL_CORE
house staff
house staff and residents
house staff, nurse
house staff, residents
house staff, residents, oncology and  attending
house staff, residents, oncology and  attending
house staff, residents
the patient and PA staff

## 2020-11-03 NOTE — PROGRESS NOTE ADULT - ASSESSMENT
Patient is a 81 year old F patient with past medical history of Uterine cancer s/p SHAAN, malignant ascites on therapeutic paracentesis, CAD s/p CABG, segmental pulmonary embolism on Lovenox recently switched to xarelto, HTN, depression, recent admission for hematuria/UTI is referred from oncology clinic for worsening hematuria.      #) Hematuria: Recently admitted for hematuria/UTI and discharged last week on Augmentin  - Patient was evaluated in clinic and was noted to have worsening hematuria and a drop in hemoglobin so referred to the ED  - Urology eval appreciated; Cystoscopy done on 11/2, no reported mass seen, only "areas of erythema on right/posterior wall." Patient reports she still had episode of hematuria today   - Recent CT AP showed inflammation of the ureter    #) Acute on Chronic PE in setting of known Malignancy   - Patient is high risk to be off AC as she recently had a PE also with history of DVT; s/p IVC filter placement  - Hemoglobin is stable but patient still reports intermittent episodes of hematuria   - Would recommend cardio eval for reevaluation of aspirin given her history of CAD with CABG   - Ideally, would prefer Lovenox or Heparin, DVT prophylactic dosing on discharge     #) Recurrent adenocarcinoma Mullerian origin, malignant ascites dx on 08/08/2020  - s/p TAHBSO in 2016 with adjuvant chemotherapy  - Started on weekly Carbo/taxol weekly 3 on 1 off, started on 08/14/20 after found to have malignant ascites on paracentesis (4L removed per patient)  - PICC placement by IR  on 10/21  - Patient's chemo on 10/23 held due to UTI   - Chemo given on 10/30, next dose on 11/13.

## 2020-11-04 DIAGNOSIS — I26.99 OTHER PULMONARY EMBOLISM WITHOUT ACUTE COR PULMONALE: ICD-10-CM

## 2020-11-04 LAB
ALBUMIN SERPL ELPH-MCNC: 3.5 G/DL — SIGNIFICANT CHANGE UP (ref 3.5–5.2)
ALP SERPL-CCNC: 66 U/L — SIGNIFICANT CHANGE UP (ref 30–115)
ALT FLD-CCNC: <5 U/L — SIGNIFICANT CHANGE UP (ref 0–41)
ANION GAP SERPL CALC-SCNC: 12 MMOL/L — SIGNIFICANT CHANGE UP (ref 7–14)
AST SERPL-CCNC: 13 U/L — SIGNIFICANT CHANGE UP (ref 0–41)
BASOPHILS # BLD AUTO: 0.01 K/UL — SIGNIFICANT CHANGE UP (ref 0–0.2)
BASOPHILS NFR BLD AUTO: 0.3 % — SIGNIFICANT CHANGE UP (ref 0–1)
BILIRUB SERPL-MCNC: 0.3 MG/DL — SIGNIFICANT CHANGE UP (ref 0.2–1.2)
BUN SERPL-MCNC: 10 MG/DL — SIGNIFICANT CHANGE UP (ref 10–20)
CALCIUM SERPL-MCNC: 8 MG/DL — LOW (ref 8.5–10.1)
CHLORIDE SERPL-SCNC: 105 MMOL/L — SIGNIFICANT CHANGE UP (ref 98–110)
CO2 SERPL-SCNC: 23 MMOL/L — SIGNIFICANT CHANGE UP (ref 17–32)
CREAT SERPL-MCNC: 0.9 MG/DL — SIGNIFICANT CHANGE UP (ref 0.7–1.5)
EOSINOPHIL # BLD AUTO: 0.03 K/UL — SIGNIFICANT CHANGE UP (ref 0–0.7)
EOSINOPHIL NFR BLD AUTO: 0.9 % — SIGNIFICANT CHANGE UP (ref 0–8)
GLUCOSE SERPL-MCNC: 98 MG/DL — SIGNIFICANT CHANGE UP (ref 70–99)
HCT VFR BLD CALC: 25.2 % — LOW (ref 37–47)
HGB BLD-MCNC: 8.1 G/DL — LOW (ref 12–16)
IMM GRANULOCYTES NFR BLD AUTO: 0.9 % — HIGH (ref 0.1–0.3)
LYMPHOCYTES # BLD AUTO: 0.94 K/UL — LOW (ref 1.2–3.4)
LYMPHOCYTES # BLD AUTO: 28.9 % — SIGNIFICANT CHANGE UP (ref 20.5–51.1)
MAGNESIUM SERPL-MCNC: 1.7 MG/DL — LOW (ref 1.8–2.4)
MCHC RBC-ENTMCNC: 27.6 PG — SIGNIFICANT CHANGE UP (ref 27–31)
MCHC RBC-ENTMCNC: 32.1 G/DL — SIGNIFICANT CHANGE UP (ref 32–37)
MCV RBC AUTO: 86 FL — SIGNIFICANT CHANGE UP (ref 81–99)
MONOCYTES # BLD AUTO: 0.32 K/UL — SIGNIFICANT CHANGE UP (ref 0.1–0.6)
MONOCYTES NFR BLD AUTO: 9.8 % — HIGH (ref 1.7–9.3)
NEUTROPHILS # BLD AUTO: 1.92 K/UL — SIGNIFICANT CHANGE UP (ref 1.4–6.5)
NEUTROPHILS NFR BLD AUTO: 59.2 % — SIGNIFICANT CHANGE UP (ref 42.2–75.2)
NRBC # BLD: 0 /100 WBCS — SIGNIFICANT CHANGE UP (ref 0–0)
PHOSPHATE SERPL-MCNC: 3.2 MG/DL — SIGNIFICANT CHANGE UP (ref 2.1–4.9)
PLATELET # BLD AUTO: 183 K/UL — SIGNIFICANT CHANGE UP (ref 130–400)
POTASSIUM SERPL-MCNC: 4.2 MMOL/L — SIGNIFICANT CHANGE UP (ref 3.5–5)
POTASSIUM SERPL-SCNC: 4.2 MMOL/L — SIGNIFICANT CHANGE UP (ref 3.5–5)
PROT SERPL-MCNC: 5.4 G/DL — LOW (ref 6–8)
RBC # BLD: 2.93 M/UL — LOW (ref 4.2–5.4)
RBC # FLD: 21.3 % — HIGH (ref 11.5–14.5)
SODIUM SERPL-SCNC: 140 MMOL/L — SIGNIFICANT CHANGE UP (ref 135–146)
WBC # BLD: 3.25 K/UL — LOW (ref 4.8–10.8)
WBC # FLD AUTO: 3.25 K/UL — LOW (ref 4.8–10.8)

## 2020-11-04 PROCEDURE — 99222 1ST HOSP IP/OBS MODERATE 55: CPT

## 2020-11-04 PROCEDURE — 99233 SBSQ HOSP IP/OBS HIGH 50: CPT

## 2020-11-04 PROCEDURE — 99232 SBSQ HOSP IP/OBS MODERATE 35: CPT

## 2020-11-04 RX ORDER — ENOXAPARIN SODIUM 100 MG/ML
40 INJECTION SUBCUTANEOUS EVERY 24 HOURS
Refills: 0 | Status: DISCONTINUED | OUTPATIENT
Start: 2020-11-04 | End: 2020-11-05

## 2020-11-04 RX ADMIN — Medication 100 MILLIGRAM(S): at 21:22

## 2020-11-04 RX ADMIN — DORZOLAMIDE HYDROCHLORIDE 1 DROP(S): 20 SOLUTION/ DROPS OPHTHALMIC at 13:38

## 2020-11-04 RX ADMIN — Medication 81 MILLIGRAM(S): at 12:10

## 2020-11-04 RX ADMIN — BRIMONIDINE TARTRATE 1 DROP(S): 2 SOLUTION/ DROPS OPHTHALMIC at 05:14

## 2020-11-04 RX ADMIN — BRIMONIDINE TARTRATE 1 DROP(S): 2 SOLUTION/ DROPS OPHTHALMIC at 17:15

## 2020-11-04 RX ADMIN — DORZOLAMIDE HYDROCHLORIDE 1 DROP(S): 20 SOLUTION/ DROPS OPHTHALMIC at 05:14

## 2020-11-04 RX ADMIN — DORZOLAMIDE HYDROCHLORIDE 1 DROP(S): 20 SOLUTION/ DROPS OPHTHALMIC at 21:22

## 2020-11-04 RX ADMIN — Medication 25 MILLIGRAM(S): at 05:14

## 2020-11-04 RX ADMIN — AMLODIPINE BESYLATE 5 MILLIGRAM(S): 2.5 TABLET ORAL at 05:14

## 2020-11-04 RX ADMIN — ATORVASTATIN CALCIUM 40 MILLIGRAM(S): 80 TABLET, FILM COATED ORAL at 21:23

## 2020-11-04 RX ADMIN — SERTRALINE 25 MILLIGRAM(S): 25 TABLET, FILM COATED ORAL at 12:10

## 2020-11-04 RX ADMIN — LACTULOSE 20 GRAM(S): 10 SOLUTION ORAL at 12:10

## 2020-11-04 RX ADMIN — Medication 100 MILLIGRAM(S): at 05:14

## 2020-11-04 RX ADMIN — ENOXAPARIN SODIUM 40 MILLIGRAM(S): 100 INJECTION SUBCUTANEOUS at 17:15

## 2020-11-04 RX ADMIN — Medication 100 MILLIGRAM(S): at 13:38

## 2020-11-04 RX ADMIN — Medication 0.5 MILLIGRAM(S): at 07:41

## 2020-11-04 NOTE — PROGRESS NOTE ADULT - SUBJECTIVE AND OBJECTIVE BOX
BEKAH NELSON   [id number]  80y   Female PMHx of ANEMIA, URINARY TRACT INFECTION    ^SENT OVER CLINIC    SUBJECTIVE:    Patient is a 80y old Female who presents with a chief complaint of hematuria (03 Nov 2020 17:13)    Today is hospital day 8d. This morning she is resting comfortably in bed and reports no new issues or overnight events.   Currently admitted to medicine with the primary diagnosis of Hematuria       PAST MEDICAL & SURGICAL HISTORY  HLD (hyperlipidemia)    HTN (hypertension)    CAD (coronary artery disease)    Coronary artery disease  s/p CABG X2    Glaucoma    Cataract of right eye    High cholesterol    Hypertension    Uterine cancer  s/p SHAAN and chemo    History of coronary artery bypass surgery    Athscl CABG, unsp, w angina pectoris w documented spasm    H/O total hysterectomy    ALLERGIES:  chloroquine (Hives)  quinine (Urticaria)    MEDICATIONS:  STANDING MEDICATIONS  amLODIPine   Tablet 5 milliGRAM(s) Oral daily  aspirin enteric coated 81 milliGRAM(s) Oral daily  atorvastatin 40 milliGRAM(s) Oral at bedtime  brimonidine 0.2% Ophthalmic Solution 1 Drop(s) Both EYES two times a day  ceFAZolin   IVPB      ceFAZolin   IVPB 500 milliGRAM(s) IV Intermittent every 8 hours  dorzolamide 2% Ophthalmic Solution 1 Drop(s) Both EYES three times a day  lactated ringers. 1000 milliLiter(s) IV Continuous <Continuous>  lactulose Syrup 20 Gram(s) Oral daily  metoprolol succinate ER 25 milliGRAM(s) Oral daily  polyethylene glycol 3350 17 Gram(s) Oral at bedtime  senna 2 Tablet(s) Oral at bedtime  sertraline 25 milliGRAM(s) Oral daily    PRN MEDICATIONS  clonazePAM  Tablet 0.5 milliGRAM(s) Oral daily PRN  HYDROmorphone  Injectable 0.5 milliGRAM(s) IV Push every 10 minutes PRN  meperidine     Injectable 12.5 milliGRAM(s) IV Push every 10 minutes PRN  morphine  - Injectable 2 milliGRAM(s) IV Push every 10 minutes PRN  morphine  - Injectable 1 milliGRAM(s) IV Push every 10 minutes PRN  ondansetron Injectable 4 milliGRAM(s) IV Push once PRN  ondansetron Injectable 4 milliGRAM(s) IV Push once PRN  oxycodone    5 mG/acetaminophen 325 mG 1 Tablet(s) Oral every 6 hours PRN    VITALS:   T(F): 99.5  HR: 85  BP: 118/68  RR: 20  SpO2: 96%    LABS:                        8.1    3.25  )-----------( 183      ( 04 Nov 2020 06:52 )             25.2     Hemoglobin: 8.1 g/dL (11-04 @ 06:52)  Hemoglobin: 8.0 g/dL (11-03 @ 06:48)  Hemoglobin: 7.7 g/dL (11-02 @ 06:20)  Hemoglobin: 8.1 g/dL (11-01 @ 14:22)  Hemoglobin: 8.9 g/dL (10-31 @ 05:20)    11-03    140  |  108  |  15  ----------------------------<  90  4.1   |  23  |  0.9    Ca    7.9<L>      03 Nov 2020 06:48  Phos  3.2     11-03  Mg     1.6     11-03    TPro  5.6<L>  /  Alb  3.4<L>  /  TBili  0.3  /  DBili  x   /  AST  14  /  ALT  6   /  AlkPhos  66  11-03    Potassium Trend: 4.1<--, 4.2<--, 3.8<--, 4.6<--, 4.1<--  SODIUM TREND:  Sodium 140 [11-03 @ 06:48]  Sodium 144 [11-02 @ 06:20]  Sodium 143 [11-01 @ 14:22]  Sodium 141 [10-31 @ 05:20]  Sodium 137 [10-30 @ 05:30]  Sodium 140 [10-29 @ 05:30]  Sodium 138 [10-28 @ 15:18]  Sodium 139 [10-27 @ 13:31]  Sodium 141 [10-22 @ 05:53]  Sodium 138 [10-21 @ 07:26]    Creatinine Trend: 0.9<--, 0.9<--, 1.1<--, 1.3<--, 0.9<--, 0.9<--  PT/INR - ( 03 Nov 2020 06:48 )   PT: 12.60 sec;   INR: 1.10 ratio         PTT - ( 03 Nov 2020 06:48 )  PTT:26.9 sec              COVID  10-27-20 @ 13:31  COVID -   NotDetec  10-21-20 @ 12:55  COVID -   NotDete  09-11-20 @ 04:45  COVID -   NotDetec  08-06-20 @ 22:10  [facility]   [facility]      RADIOLOGY:    PHYSICAL EXAM:  GEN: No acute distress  HEENT: normocephalic, atraumatic, aniceteric  LUNGS: Clear to auscultation bilaterally, no rales/wheezing/ rhonchi  HEART: S1/S2 present. RRR, no murmurs  ABD: Soft, non-tender, non-distended. Bowel sounds present  EXT: Warm, well perfused, skin color appropriate for ethnicity, +distal pulses, +motor/sensory fnxn intact x all 4 ext's.   NEURO: AAOX3, normal affect

## 2020-11-04 NOTE — PROGRESS NOTE ADULT - ASSESSMENT
ASSESSMENT & PLAN  Patient is a 80y old woman with PMH uterine cancer (s/p SHAAN in 2016 and adjuvant therapy, recurrence with malignant ascites currently undergoing therapeutic paracentesis/chemotherapy on Fridays), CAD (s/p CABG), history of PE (August 2020; was on Xarelto OP), HTN, depression who presents for hematuria.    # Recurrent  Hematuria/ acute blood loss anemia - resolving  - s/p  cystoscopy with fulguration on 11/2  at  [facility] by Dr Kumar  - on  IV Ancef post op empirically ( pt is immunocompromised on chemotherapy)   - monitor H/H, keep Hb above 7.5 and active type/cross   - hold off with AC, heme onc reccs appreciated  - Cardio consult placed for need to resume ASA     # AMANDA  - resolved after IV hydration   - monitor urine output, BUN/cr   - avoid nephrotoxic medications     #  Adenocarcinoma of mullerian origin  - medical oncology is following, recommendation noted   -S/P SHAAN in 2016 with adjuvant chemotherapy  - on carbo/taxol weekly 3 on 1 off  -  last chemotherapy was on 10/30  - prognosis guarded   - pt is willing to proceed with cancer directed therapy     # h/o DVT and PE  -  off AC  -  s/p IVC filter on 10/29 by IR     # CAD/ h/o CABG   -Continue aspirin/statin/beta blocker  - consult cardiology to comment on ASA ( pt has persistent hematuria)     #  Anxiety  -Klonopin 0.5 q24hrs   ASSESSMENT & PLAN  Patient is a 80y old woman with PMH uterine cancer (s/p SHAAN in 2016 and adjuvant therapy, recurrence with malignant ascites currently undergoing therapeutic paracentesis/chemotherapy on Fridays), CAD (s/p CABG), history of PE (August 2020; was on Xarelto OP), HTN, depression who presents for hematuria.    # Recurrent  Hematuria/ acute blood loss anemia - resolving  - s/p  cystoscopy with fulguration on 11/2  at  [facility] by Dr Kumar  - on  IV Ancef post op empirically ( pt is immunocompromised on chemotherapy)   - monitor H/H, keep Hb above 7.5 and active type/cross   - hold off with AC, heme onc reccs appreciated  - Cardio consult placed for need to resume ASA   - lovenox 40subq q24 to start if pt tolerates after 1 dose will proceed to full dose AC as appropriate    # AMANDA  - resolved after IV hydration   - monitor urine output, BUN/cr   - avoid nephrotoxic medications     #  Adenocarcinoma of mullerian origin  - medical oncology is following, recommendation noted   -S/P SHAAN in 2016 with adjuvant chemotherapy  - on carbo/taxol weekly 3 on 1 off  -  last chemotherapy was on 10/30  - prognosis guarded   - pt is willing to proceed with cancer directed therapy     # h/o DVT and PE  -  off AC  -  s/p IVC filter on 10/29 by IR     # CAD/ h/o CABG   -Continue aspirin/statin/beta blocker  - consult cardiology to comment on ASA ( pt has persistent hematuria)     #  Anxiety  -Klonopin 0.5 q24hrs

## 2020-11-04 NOTE — PROGRESS NOTE ADULT - ASSESSMENT
Patient is a 81 year old F patient with past medical history of Uterine cancer s/p SHAAN, malignant ascites on therapeutic paracentesis, CAD s/p CABG, segmental pulmonary embolism on Lovenox recently switched to xarelto, HTN, depression, recent admission for hematuria/UTI is referred from oncology clinic for worsening hematuria.      #) Hematuria: Recently admitted for hematuria/UTI and discharged last week on Augmentin  - Patient was evaluated in clinic and was noted to have worsening hematuria and a drop in hemoglobin so referred to the ED  - Urology eval appreciated; Cystoscopy done on 11/2, no reported mass seen, only "areas of erythema on right/posterior wall." Patient reports she still had episode of hematuria today   - Recent CT AP showed inflammation of the ureter    #) Acute on Chronic PE in setting of known Malignancy   - Patient is high risk to be off AC as she recently had a PE also with history of DVT; s/p IVC filter placement  - Hemoglobin is stable but patient still reports intermittent episodes of hematuria   - Awaiting cardio recommendations for aspirin   - If aspirin can be discontinued, would recommend anticoagulation as tolerated     #) Recurrent adenocarcinoma Mullerian origin, malignant ascites dx on 08/08/2020  - s/p TAHBSO in 2016 with adjuvant chemotherapy  - Started on weekly Carbo/taxol weekly 3 on 1 off, started on 08/14/20 after found to have malignant ascites on paracentesis (4L removed per patient)  - PICC placement by IR  on 10/21  - Patient's chemo on 10/23 held due to UTI   - Chemo given on 10/30, next dose on 11/13.

## 2020-11-04 NOTE — CONSULT NOTE ADULT - SUBJECTIVE AND OBJECTIVE BOX
Date of Admission: 10-27-20    CHIEF COMPLAINT:     HISTORY OF PRESENT ILLNESS:     81 year old lady with past medical history of Uterine cancer s/p SHAAN in 2016 with adjuvant therapy and recent recurrence with malignant ascites on therapeutic paracentesis and chemotherapy every Friday ,CAD s/p CABG, history of PE (unprovoked) diagnosed on August for which Lovenox was started then recently switched to Xarelto, HTN, depression, recent admission for hematuria/UTI presented today from oncology clinic for worsening hematuria.  History goes back to 1 week ago when the patient started to have hematuria, she was admitted and treated for a UTI with Augmentin that she continued for 4 days after discharge. According to patient hematuria didn't improve until today when she noticed that her urine is starting to clear up , there was no clots, no urinary symptoms of dysuria, urgency, frequency or straining. No history of renal stones and no history of trauma.  During last admission 1 week ago CT AP showed inflammation of the ureter that was suggestive of  UTI vs radiation induced, but patient had no history of radiotherapy. Review of system was negative for fatigue, SOB, chest pain, epistaxis, melena, GI or  symptoms.  In the ED patient was found to have Hb 8.7 with her base line around 10, INR 1.7. Patient was admitted for further management.  Cardiology Hx  80 y/o female with history of CAD s/p CABG (2011) with recent cath in 2018 showing bypass graft opacification with LIMA to the LAD occluded, Venous graft to the LCX occluded. Her Cardiologist is Dr. Enrique. Has been on aspirin therapy due to multivessel disease. Also on Xarelto due to unprovoked PE. Xarelto has been on hold due to hematuria. She was admitted for hematuria. Cardiology team was consulted for clearance to hold aspirin during active hematuria.     PAST MEDICAL & SURGICAL HISTORY  HLD (hyperlipidemia)    HTN (hypertension)    CAD (coronary artery disease)    Coronary artery disease  s/p CABG X2    Glaucoma    Cataract of right eye    High cholesterol    Hypertension    Uterine cancer  s/p SHAAN and chemo    History of coronary artery bypass surgery    Athscl CABG, unsp, w angina pectoris w documented spasm    H/O total hysterectomy        FAMILY HISTORY:  FAMILY HISTORY:      SOCIAL HISTORY:  []smoker  []Alcohol  []Drug    ROS:  Negative except as mentioned in HPI    ALLERGIES:  chloroquine (Hives)  quinine (Urticaria)      MEDICATIONS:  MEDICATIONS  (STANDING):  amLODIPine   Tablet 5 milliGRAM(s) Oral daily  aspirin enteric coated 81 milliGRAM(s) Oral daily  atorvastatin 40 milliGRAM(s) Oral at bedtime  brimonidine 0.2% Ophthalmic Solution 1 Drop(s) Both EYES two times a day  ceFAZolin   IVPB 500 milliGRAM(s) IV Intermittent every 8 hours  ceFAZolin   IVPB      dorzolamide 2% Ophthalmic Solution 1 Drop(s) Both EYES three times a day  enoxaparin Injectable 40 milliGRAM(s) SubCutaneous every 24 hours  lactated ringers. 1000 milliLiter(s) (75 mL/Hr) IV Continuous <Continuous>  lactulose Syrup 20 Gram(s) Oral daily  metoprolol succinate ER 25 milliGRAM(s) Oral daily  polyethylene glycol 3350 17 Gram(s) Oral at bedtime  senna 2 Tablet(s) Oral at bedtime  sertraline 25 milliGRAM(s) Oral daily    MEDICATIONS  (PRN):  clonazePAM  Tablet 0.5 milliGRAM(s) Oral daily PRN anxiety  HYDROmorphone  Injectable 0.5 milliGRAM(s) IV Push every 10 minutes PRN Moderate Pain (4 - 6)  meperidine     Injectable 12.5 milliGRAM(s) IV Push every 10 minutes PRN Shivering  morphine  - Injectable 2 milliGRAM(s) IV Push every 10 minutes PRN Severe Pain (7 - 10)  morphine  - Injectable 1 milliGRAM(s) IV Push every 10 minutes PRN Moderate Pain (4 - 6)  ondansetron Injectable 4 milliGRAM(s) IV Push once PRN Nausea and/or Vomiting  ondansetron Injectable 4 milliGRAM(s) IV Push once PRN Nausea and/or Vomiting  oxycodone    5 mG/acetaminophen 325 mG 1 Tablet(s) Oral every 6 hours PRN Moderate Pain (4 - 6)      HOME MEDICATIONS:  Home Medications:  amLODIPine 5 mg oral tablet: 1 tab(s) orally once a day (21 Oct 2020 14:12)  aspirin 81 mg oral delayed release tablet: 1 tab(s) orally once a day (21 Oct 2020 14:12)  brimonidine 0.1% ophthalmic solution: to each affected eye 2 times a day (21 Oct 2020 14:12)  dorzolamide 2% ophthalmic solution: 1 drop(s) to each affected eye 2 times a day (21 Oct 2020 14:12)  latanoprost 0.005% ophthalmic solution: 1 drop(s) to each affected eye 2 times a day (21 Oct 2020 14:12)  metoprolol succinate 25 mg oral tablet, extended release: 1 tab(s) orally once a day (21 Oct 2020 14:12)  rivaroxaban 20 mg oral tablet: 1 tab(s) orally once a day (in the evening) (21 Oct 2020 14:12)  rosuvastatin 10 mg oral tablet: 1 tab(s) orally once a day (21 Oct 2020 14:12)  sertraline 25 mg oral tablet: 1 tab(s) orally once a day (21 Oct 2020 14:12)      VITALS:   T(F): 98.6 (11-04 @ 14:30), Max: 99.6 (11-03 @ 05:00)  HR: 78 (11-04 @ 14:30) (69 - 88)  BP: 128/78 (11-04 @ 14:30) (101/60 - 145/71)  BP(mean): --  RR: 19 (11-04 @ 14:30) (16 - 20)  SpO2: 96% (11-03 @ 20:21) (96% - 100%)    I&O's Summary      PHYSICAL EXAM:  GEN: Not in acute distress  HEENT: NCAT, PERRL, EOMI  LUNGS: Clear to auscultation bilaterally   CARDIOVASCULAR: RRR, S1/S2 present, no murmurs, rubs or gallops, no JVD, + PP bilaterally  ABD: Soft, non-tender, non-distended  EXT: No KASEY  SKIN: Intact  NEURO: AAOx3    LABS:                        8.1    3.25  )-----------( 183      ( 04 Nov 2020 06:52 )             25.2     11-04    140  |  105  |  10  ----------------------------<  98  4.2   |  23  |  0.9    Ca    8.0<L>      04 Nov 2020 06:52  Phos  3.2     11-04  Mg     1.7     11-04    TPro  5.4<L>  /  Alb  3.5  /  TBili  0.3  /  DBili  x   /  AST  13  /  ALT  <5  /  AlkPhos  66  11-04    PT/INR - ( 03 Nov 2020 06:48 )   PT: 12.60 sec;   INR: 1.10 ratio         PTT - ( 03 Nov 2020 06:48 )  PTT:26.9 sec    Troponin <0.01, CKMB --, CK --/ 10-12-20 @ 01:00  Troponin <0.01, CKMB --, CK --/ 10-11-20 @ 19:41        Hemoglobin A1C   Thyroid      RADIOLOGY:  -CXR: 10/11/20   No radiographic evidence of acute cardiopulmonary disease.    -TTE: 9/6/19  PHYSICIAN INTERPRETATION:  Left Ventricle: Normal left ventricular size and wall thicknesses, with   normal systolic function. Normal segmental left ventricular systolic   function.  Right Ventricle: Normal right ventricular size and function.  Left Atrium: Normal left atrial size.  Right Atrium: Normal right atrial size.  Pericardium: There is no evidence of pericardial effusion.  Mitral Valve: Structurally normal mitral valve, with normal leaflet   excursion. The mitral valve is normal in structure. Mild mitral valve   regurgitation is seen.  Tricuspid Valve: Structurally normal tricuspid valve, with normalleaflet   excursion. The tricuspid valve is normal in structure. Mild-moderate   tricuspid regurgitation is visualized.  Aortic Valve: Aortic valve mildly thickened.Normal opening.No evidence of   aortic valve regurgitation is seen.  Pulmonic Valve: Structurally normal pulmonic valve, with normal leaflet   excursion. The pulmonic valve is normal. Trace pulmonic valve   regurgitation.  Aorta: The aortic root and ascending aorta are structurally normal, with   no evidence of dilitation.  Pulmonary Artery: The main pulmonary artery is normal in size. Estmated   PA pressure is 50 systolic. Moderate pulmonary hypertension.  Venous: The inferior vena cava is normal.    -CATHETERIZATION: 9/10/18  FINDINGS:   Left Heart Catheterization:                         LVEDP: normal                         LV Pressure: 140/10-12                         CA Pressure: 140/80    LV Angiogram:  The LV is [normal] in size.                              There is overall normal lv LV systolic function.                             The EF is approx. 70%                             No mitral valve prolapse, insufficiency or calcification seen.     SELECTIVE CORONARY ANGIOGRAM:                           Left main: distal 75% lesion                           LAD: ostial 75% lesion                           [employer] has a 75% lesion                           LCX:  normal                           RCA: normal                           PDA:normal                           PLV:normal    BYPASS GRAFT OPACIFICATION:                           LIMA to the LAD is occluded                           Venous graft to the LCX is occluded    ECG:    Diagnosis Line Normal sinus rhythm  Cannot rule out Anterior infarct , age undetermined  Abnormal ECG       Date of Admission: 10-27-20    CHIEF COMPLAINT:     HISTORY OF PRESENT ILLNESS:     81 year old lady with past medical history of Uterine cancer s/p SHAAN in 2016 with adjuvant therapy and recent recurrence with malignant ascites on therapeutic paracentesis and chemotherapy every Friday ,CAD s/p CABG, history of PE (unprovoked) diagnosed on August for which Lovenox was started then recently switched to Xarelto, HTN, depression, recent admission for hematuria/UTI presented today from oncology clinic for worsening hematuria.  History goes back to 1 week ago when the patient started to have hematuria, she was admitted and treated for a UTI with Augmentin that she continued for 4 days after discharge. According to patient hematuria didn't improve until today when she noticed that her urine is starting to clear up , there was no clots, no urinary symptoms of dysuria, urgency, frequency or straining. No history of renal stones and no history of trauma.  During last admission 1 week ago CT AP showed inflammation of the ureter that was suggestive of  UTI vs radiation induced, but patient had no history of radiotherapy. Review of system was negative for fatigue, SOB, chest pain, epistaxis, melena, GI or  symptoms.  In the ED patient was found to have Hb 8.7 with her base line around 10, INR 1.7. Patient was admitted for further management.  Cardiology Hx  82 y/o female with history of CAD s/p CABG (2011) with recent cath in 2018 showing bypass graft opacification with LIMA to the LAD occluded, Venous graft to the LCX occluded. Her Cardiologist is Dr. Enrique. Has been on aspirin therapy due to multivessel disease. Also on Xarelto due to unprovoked PE. Xarelto has been on hold due to hematuria. She was admitted for hematuria. Cardiology team was consulted for clearance to hold aspirin during active hematuria.     PAST MEDICAL & SURGICAL HISTORY  HLD (hyperlipidemia)    HTN (hypertension)    CAD (coronary artery disease)    Coronary artery disease  s/p CABG X2    Glaucoma    Cataract of right eye    High cholesterol    Hypertension    Uterine cancer  s/p SHAAN and chemo    History of coronary artery bypass surgery    Athscl CABG, unsp, w angina pectoris w documented spasm    H/O total hysterectomy        FAMILY HISTORY:  FAMILY HISTORY:      SOCIAL HISTORY:  []smoker  []Alcohol  []Drug    ROS:  Negative except as mentioned in HPI    ALLERGIES:  chloroquine (Hives)  quinine (Urticaria)      MEDICATIONS:  MEDICATIONS  (STANDING):  amLODIPine   Tablet 5 milliGRAM(s) Oral daily  aspirin enteric coated 81 milliGRAM(s) Oral daily  atorvastatin 40 milliGRAM(s) Oral at bedtime  brimonidine 0.2% Ophthalmic Solution 1 Drop(s) Both EYES two times a day  ceFAZolin   IVPB 500 milliGRAM(s) IV Intermittent every 8 hours  ceFAZolin   IVPB      dorzolamide 2% Ophthalmic Solution 1 Drop(s) Both EYES three times a day  enoxaparin Injectable 40 milliGRAM(s) SubCutaneous every 24 hours  lactated ringers. 1000 milliLiter(s) (75 mL/Hr) IV Continuous <Continuous>  lactulose Syrup 20 Gram(s) Oral daily  metoprolol succinate ER 25 milliGRAM(s) Oral daily  polyethylene glycol 3350 17 Gram(s) Oral at bedtime  senna 2 Tablet(s) Oral at bedtime  sertraline 25 milliGRAM(s) Oral daily    MEDICATIONS  (PRN):  clonazePAM  Tablet 0.5 milliGRAM(s) Oral daily PRN anxiety  HYDROmorphone  Injectable 0.5 milliGRAM(s) IV Push every 10 minutes PRN Moderate Pain (4 - 6)  meperidine     Injectable 12.5 milliGRAM(s) IV Push every 10 minutes PRN Shivering  morphine  - Injectable 2 milliGRAM(s) IV Push every 10 minutes PRN Severe Pain (7 - 10)  morphine  - Injectable 1 milliGRAM(s) IV Push every 10 minutes PRN Moderate Pain (4 - 6)  ondansetron Injectable 4 milliGRAM(s) IV Push once PRN Nausea and/or Vomiting  ondansetron Injectable 4 milliGRAM(s) IV Push once PRN Nausea and/or Vomiting  oxycodone    5 mG/acetaminophen 325 mG 1 Tablet(s) Oral every 6 hours PRN Moderate Pain (4 - 6)      HOME MEDICATIONS:  Home Medications:  amLODIPine 5 mg oral tablet: 1 tab(s) orally once a day (21 Oct 2020 14:12)  aspirin 81 mg oral delayed release tablet: 1 tab(s) orally once a day (21 Oct 2020 14:12)  brimonidine 0.1% ophthalmic solution: to each affected eye 2 times a day (21 Oct 2020 14:12)  dorzolamide 2% ophthalmic solution: 1 drop(s) to each affected eye 2 times a day (21 Oct 2020 14:12)  latanoprost 0.005% ophthalmic solution: 1 drop(s) to each affected eye 2 times a day (21 Oct 2020 14:12)  metoprolol succinate 25 mg oral tablet, extended release: 1 tab(s) orally once a day (21 Oct 2020 14:12)  rivaroxaban 20 mg oral tablet: 1 tab(s) orally once a day (in the evening) (21 Oct 2020 14:12)  rosuvastatin 10 mg oral tablet: 1 tab(s) orally once a day (21 Oct 2020 14:12)  sertraline 25 mg oral tablet: 1 tab(s) orally once a day (21 Oct 2020 14:12)      VITALS:   T(F): 98.6 (11-04 @ 14:30), Max: 99.6 (11-03 @ 05:00)  HR: 78 (11-04 @ 14:30) (69 - 88)  BP: 128/78 (11-04 @ 14:30) (101/60 - 145/71)  BP(mean): --  RR: 19 (11-04 @ 14:30) (16 - 20)  SpO2: 96% (11-03 @ 20:21) (96% - 100%)    I&O's Summary      PHYSICAL EXAM:  GEN: Not in acute distress  HEENT: NCAT, PERRL, EOMI  LUNGS: Clear to auscultation bilaterally   CARDIOVASCULAR: RRR, S1/S2 present  ABD: Soft, non-tender, non-distended  EXT: No KASEY  NEURO: AAOx3    LABS:                        8.1    3.25  )-----------( 183      ( 04 Nov 2020 06:52 )             25.2     11-04    140  |  105  |  10  ----------------------------<  98  4.2   |  23  |  0.9    Ca    8.0<L>      04 Nov 2020 06:52  Phos  3.2     11-04  Mg     1.7     11-04    TPro  5.4<L>  /  Alb  3.5  /  TBili  0.3  /  DBili  x   /  AST  13  /  ALT  <5  /  AlkPhos  66  11-04    PT/INR - ( 03 Nov 2020 06:48 )   PT: 12.60 sec;   INR: 1.10 ratio         PTT - ( 03 Nov 2020 06:48 )  PTT:26.9 sec    Troponin <0.01, CKMB --, CK --/ 10-12-20 @ 01:00  Troponin <0.01, CKMB --, CK --/ 10-11-20 @ 19:41        Hemoglobin A1C   Thyroid      RADIOLOGY:  -CXR: 10/11/20   No radiographic evidence of acute cardiopulmonary disease.    -TTE: 9/6/19  PHYSICIAN INTERPRETATION:  Left Ventricle: Normal left ventricular size and wall thicknesses, with   normal systolic function. Normal segmental left ventricular systolic   function.  Right Ventricle: Normal right ventricular size and function.  Left Atrium: Normal left atrial size.  Right Atrium: Normal right atrial size.  Pericardium: There is no evidence of pericardial effusion.  Mitral Valve: Structurally normal mitral valve, with normal leaflet   excursion. The mitral valve is normal in structure. Mild mitral valve   regurgitation is seen.  Tricuspid Valve: Structurally normal tricuspid valve, with normalleaflet   excursion. The tricuspid valve is normal in structure. Mild-moderate   tricuspid regurgitation is visualized.  Aortic Valve: Aortic valve mildly thickened.Normal opening.No evidence of   aortic valve regurgitation is seen.  Pulmonic Valve: Structurally normal pulmonic valve, with normal leaflet   excursion. The pulmonic valve is normal. Trace pulmonic valve   regurgitation.  Aorta: The aortic root and ascending aorta are structurally normal, with   no evidence of dilitation.  Pulmonary Artery: The main pulmonary artery is normal in size. Estmated   PA pressure is 50 systolic. Moderate pulmonary hypertension.  Venous: The inferior vena cava is normal.    -CATHETERIZATION: 9/10/18  FINDINGS:   Left Heart Catheterization:                         LVEDP: normal                         LV Pressure: 140/10-12                         CA Pressure: 140/80    LV Angiogram:  The LV is [normal] in size.                              There is overall normal lv LV systolic function.                             The EF is approx. 70%                             No mitral valve prolapse, insufficiency or calcification seen.     SELECTIVE CORONARY ANGIOGRAM:                           Left main: distal 75% lesion                           LAD: ostial 75% lesion                           [employer] has a 75% lesion                           LCX:  normal                           RCA: normal                           PDA:normal                           PLV:normal    BYPASS GRAFT OPACIFICATION:                           LIMA to the LAD is occluded                           Venous graft to the LCX is occluded    ECG:    Diagnosis Line Normal sinus rhythm  Cannot rule out Anterior infarct , age undetermined  Abnormal ECG

## 2020-11-04 NOTE — PROGRESS NOTE ADULT - SUBJECTIVE AND OBJECTIVE BOX
BEKAH NELSON  80y Female    CHIEF COMPLAINT:    Patient is a 80y old  Female who presents with a chief complaint of hematuria (04 Nov 2020 14:39)      INTERVAL HPI/OVERNIGHT EVENTS:    Patient seen and examined. No acute events overnight.  No active complaints    ROS: All other systems are negative.    Vital Signs:    T(F): 98.6 (11-04-20 @ 14:30), Max: 99.5 (11-04-20 @ 05:00)  HR: 78 (11-04-20 @ 14:30) (78 - 85)  BP: 128/78 (11-04-20 @ 14:30) (111/59 - 128/78)  RR: 19 (11-04-20 @ 14:30) (19 - 20)  SpO2: 96% (11-03-20 @ 20:21) (96% - 96%)    PHYSICAL EXAM:    GENERAL:  NAD  SKIN: No rashes or lesions  HEENT: Atraumatic. Normocephalic.    NECK: Supple, No JVD.    PULMONARY: CTA B/L. No wheezing. No rales  CVS: Normal S1, S2. Rate and Rhythm are regular.    ABDOMEN/GI: Soft, Nontender, Nondistended; BS present  MSK:  No edema B/L LE. No clubbing or cyanosis  NEUROLOGIC: moves all extremities  PSYCH: Alert & oriented x 3, normal affect    Consultant(s) Notes Reviewed:  [x ] YES  [ ] NO  Care Discussed with Consultants/Other Providers [ x] YES  [ ] NO    LABS:                        8.1    3.25  )-----------( 183      ( 04 Nov 2020 06:52 )             25.2     140  |  105  |  10  ----------------------------<  98  4.2   |  23  |  0.9    Ca    8.0<L>      04 Nov 2020 06:52  Phos  3.2     11-04  Mg     1.7     11-04    TPro  5.4<L>  /  Alb  3.5  /  TBili  0.3  /  DBili  x   /  AST  13  /  ALT  <5  /  AlkPhos  66  11-04    PT/INR - ( 03 Nov 2020 06:48 )   PT: 12.60 sec;   INR: 1.10 ratio      PTT - ( 03 Nov 2020 06:48 )  PTT:26.9 sec    RADIOLOGY & ADDITIONAL TESTS:  Imaging or report Personally Reviewed:  [x] YES  [ ] NO  EKG reviewed: [x] YES  [ ] NO    Medications:  Standing  amLODIPine   Tablet 5 milliGRAM(s) Oral daily  aspirin enteric coated 81 milliGRAM(s) Oral daily  atorvastatin 40 milliGRAM(s) Oral at bedtime  brimonidine 0.2% Ophthalmic Solution 1 Drop(s) Both EYES two times a day  ceFAZolin   IVPB 500 milliGRAM(s) IV Intermittent every 8 hours  ceFAZolin   IVPB      dorzolamide 2% Ophthalmic Solution 1 Drop(s) Both EYES three times a day  enoxaparin Injectable 40 milliGRAM(s) SubCutaneous every 24 hours  lactated ringers. 1000 milliLiter(s) IV Continuous <Continuous>  lactulose Syrup 20 Gram(s) Oral daily  metoprolol succinate ER 25 milliGRAM(s) Oral daily  polyethylene glycol 3350 17 Gram(s) Oral at bedtime  senna 2 Tablet(s) Oral at bedtime  sertraline 25 milliGRAM(s) Oral daily    PRN Meds  clonazePAM  Tablet 0.5 milliGRAM(s) Oral daily PRN  HYDROmorphone  Injectable 0.5 milliGRAM(s) IV Push every 10 minutes PRN  meperidine     Injectable 12.5 milliGRAM(s) IV Push every 10 minutes PRN  morphine  - Injectable 2 milliGRAM(s) IV Push every 10 minutes PRN  morphine  - Injectable 1 milliGRAM(s) IV Push every 10 minutes PRN  ondansetron Injectable 4 milliGRAM(s) IV Push once PRN  ondansetron Injectable 4 milliGRAM(s) IV Push once PRN  oxycodone    5 mG/acetaminophen 325 mG 1 Tablet(s) Oral every 6 hours PRN

## 2020-11-04 NOTE — PROGRESS NOTE ADULT - ASSESSMENT
Patient is a 80y old woman with PMH uterine cancer (s/p SHAAN in 2016 and adjuvant therapy, recurrence with malignant ascites currently undergoing therapeutic paracentesis/chemotherapy on Fridays), CAD (s/p CABG), history of PE (August 2020; was on Xarelto OP), HTN, depression who presents for hematuria.    Recurrent  Hematuria/ acute blood loss anemia   as per patient, hematuria resolved, urine is "like water"  - s/p  cystoscopy with fulguration on 11/2  at  [facility] by Dr Kumar  - on  IV Ancef post op, last dose today  - monitor H/H, keep Hb above 7.5 and active type/cross   - resume prophylactic AC today and monitor for hematuria     AMANDA  - resolved after IV hydration   - monitor urine output, BUN/cr   - avoid nephrotoxic medications     Adenocarcinoma of mullerian origin  - medical oncology is following, recommendation noted   -S/P SHAAN in 2016 with adjuvant chemotherapy  - on carbo/taxol weekly 3 on 1 off  -  last chemotherapy was on 10/30, next due 11/13  - pt is willing to proceed with cancer directed therapy     h/o DVT and PE  -  resuming Lovenox 40mg today and monitor CBC/hematuria  -  s/p IVC filter on 10/29 by IR      CAD/ h/o CABG   -Continue aspirin/statin/beta blocker  - consult cardiology pending    Anxiety  -Klonopin 0.5 q24hrs      #Progress Note Handoff  Pending (specify):  c/w  IV Ancef for one more day,  urine cytology, cardiac consult , monitor BUN/cr and H/H now that lovenox is resumed  Family discussion: plan of care discussed with patient, aware and agreement  Disposition: Home__x _

## 2020-11-04 NOTE — CONSULT NOTE ADULT - ASSESSMENT
81 year old lady with past medical history of Uterine cancer s/p SHAAN in 2016 with adjuvant therapy and recent recurrence with malignant ascites on therapeutic paracentesis and chemotherapy every Friday ,CAD s/p CABG, history of PE (unprovoked) diagnosed on August for which Lovenox was started then recently switched to Xarelto ( which is currently being held), admitted for hematuria.     #Impression  - CAD s/p CABG (2011)  - Cath (2018) revealing occlusion of LIMA to LAD, occlusion of venous graft to LCX  - Hx of PE (unprovoked)    #Recommendations   - continue to hold Xarelto  - Hold Aspirin while admitted for hematuria   - Resume Aspirin outpatient 81 year old lady with past medical history of Uterine cancer s/p SHAAN in 2016 with adjuvant therapy and recent recurrence with malignant ascites on therapeutic paracentesis and chemotherapy every Friday ,CAD s/p CABG, history of PE (unprovoked) diagnosed on August for which Lovenox was started then recently switched to Xarelto ( which is currently being held), admitted for hematuria.     #Impression  - CAD s/p CABG (2011)  - Cath (2018) revealing occlusion of LIMA to LAD, occlusion of venous graft to LCX. S/P redo CABG (2018)  - Hx of PE (unprovoked)  - Uterine Cancer s/p SHAAN with recurrent with malignant ascites. Active Chemo  - Hematuria     #Recommendations   - Stable CAD, may hold aspirin while admitted  - resume aspirin upon discharge  - follow up with Dr. Enrique Outpatient   - continue to hold Xarelto   81 year old lady with past medical history of Uterine cancer s/p SHAAN in 2016 with adjuvant therapy and recent recurrence with malignant ascites on therapeutic paracentesis and chemotherapy every Friday ,CAD s/p CABG, history of PE (unprovoked) diagnosed on August for which Lovenox was started then recently switched to Xarelto ( which is currently being held), admitted for hematuria.     #Impression  - CAD s/p CABG (2011)  - Cath (2018) revealing occlusion of LIMA to LAD, occlusion of venous graft to LCX. S/P redo CABG (2018)  - Hx of PE (unprovoked)  - Uterine Cancer s/p SHAAN with recurrent with malignant ascites. Active Chemo  - Hematuria     #Recommendations   - Stable CAD, may hold aspirin while admitted  - resume aspirin upon once bleeding resolved  - follow up with Dr. Moura Outpatient   - continue to hold Xarelto

## 2020-11-04 NOTE — PROGRESS NOTE ADULT - SUBJECTIVE AND OBJECTIVE BOX
Patient is a 80y old  Female who presents with a chief complaint of hematuria (04 Nov 2020 15:28)      Subjective: Patient feels okay today. She reports pink tinged urine (less bloody than on admission)      Vital Signs Last 24 Hrs  T(C): 37 (04 Nov 2020 14:30), Max: 37.5 (04 Nov 2020 05:00)  T(F): 98.6 (04 Nov 2020 14:30), Max: 99.5 (04 Nov 2020 05:00)  HR: 78 (04 Nov 2020 14:30) (78 - 85)  BP: 128/78 (04 Nov 2020 14:30) (111/59 - 128/78)  BP(mean): --  RR: 19 (04 Nov 2020 14:30) (19 - 20)  SpO2: 96% (03 Nov 2020 20:21) (96% - 96%)    PHYSICAL EXAM  General: adult in NAD  HEENT: anicteric sclera, pink conjunctiva  Neck: supple  CV: normal S1/S2 with no murmur rubs or gallops  Lungs: CTABL  Abdomen: soft non-tender non-distended   Ext: no edema  Skin: no rashes  Neuro: alert and oriented X 4, no focal deficits    MEDICATIONS  (STANDING):  amLODIPine   Tablet 5 milliGRAM(s) Oral daily  aspirin enteric coated 81 milliGRAM(s) Oral daily  atorvastatin 40 milliGRAM(s) Oral at bedtime  brimonidine 0.2% Ophthalmic Solution 1 Drop(s) Both EYES two times a day  ceFAZolin   IVPB 500 milliGRAM(s) IV Intermittent every 8 hours  ceFAZolin   IVPB      dorzolamide 2% Ophthalmic Solution 1 Drop(s) Both EYES three times a day  enoxaparin Injectable 40 milliGRAM(s) SubCutaneous every 24 hours  lactated ringers. 1000 milliLiter(s) (75 mL/Hr) IV Continuous <Continuous>  lactulose Syrup 20 Gram(s) Oral daily  metoprolol succinate ER 25 milliGRAM(s) Oral daily  polyethylene glycol 3350 17 Gram(s) Oral at bedtime  senna 2 Tablet(s) Oral at bedtime  sertraline 25 milliGRAM(s) Oral daily    MEDICATIONS  (PRN):  clonazePAM  Tablet 0.5 milliGRAM(s) Oral daily PRN anxiety  HYDROmorphone  Injectable 0.5 milliGRAM(s) IV Push every 10 minutes PRN Moderate Pain (4 - 6)  meperidine     Injectable 12.5 milliGRAM(s) IV Push every 10 minutes PRN Shivering  morphine  - Injectable 1 milliGRAM(s) IV Push every 10 minutes PRN Moderate Pain (4 - 6)  ondansetron Injectable 4 milliGRAM(s) IV Push once PRN Nausea and/or Vomiting  ondansetron Injectable 4 milliGRAM(s) IV Push once PRN Nausea and/or Vomiting  oxycodone    5 mG/acetaminophen 325 mG 1 Tablet(s) Oral every 6 hours PRN Moderate Pain (4 - 6)      LABS:                          8.1    3.25  )-----------( 183      ( 04 Nov 2020 06:52 )             25.2         Mean Cell Volume : 86.0 fL  Mean Cell Hemoglobin : 27.6 pg  Mean Cell Hemoglobin Concentration : 32.1 g/dL  Auto Neutrophil # : 1.92 K/uL  Auto Lymphocyte # : 0.94 K/uL  Auto Monocyte # : 0.32 K/uL  Auto Eosinophil # : 0.03 K/uL  Auto Basophil # : 0.01 K/uL  Auto Neutrophil % : 59.2 %  Auto Lymphocyte % : 28.9 %  Auto Monocyte % : 9.8 %  Auto Eosinophil % : 0.9 %  Auto Basophil % : 0.3 %      Serial CBC's  11-04 @ 06:52  Hct-25.2 / Hgb-8.1 / Plat-183 / RBC-2.93 / WBC-3.25  Serial CBC's  11-03 @ 06:48  Hct-25.6 / Hgb-8.0 / Plat-190 / RBC-2.90 / WBC-3.08  Serial CBC's  11-02 @ 06:20  Hct-24.9 / Hgb-7.7 / Plat-189 / RBC-2.80 / WBC-3.12  Serial CBC's  11-01 @ 14:22  Hct-26.3 / Hgb-8.1 / Plat-211 / RBC-2.96 / WBC-4.05      11-04    140  |  105  |  10  ----------------------------<  98  4.2   |  23  |  0.9    Ca    8.0<L>      04 Nov 2020 06:52  Phos  3.2     11-04  Mg     1.7     11-04    TPro  5.4<L>  /  Alb  3.5  /  TBili  0.3  /  DBili  x   /  AST  13  /  ALT  <5  /  AlkPhos  66  11-04      PT/INR - ( 03 Nov 2020 06:48 )   PT: 12.60 sec;   INR: 1.10 ratio         PTT - ( 03 Nov 2020 06:48 )  PTT:26.9 sec        BLOOD SMEAR INTERPRETATION:       RADIOLOGY & ADDITIONAL STUDIES:

## 2020-11-04 NOTE — CONSULT NOTE ADULT - ATTENDING COMMENTS
pt seen and examined at bedside  had IVC filter today for history of PE  currently with hematuria   ?due to invasion from uterine cancer  has been intermittently bloody  hemoglobin up and down -- has not required blood transfusion  plans for cysto as outpatient  if hematuria worsens, drop in hemoglobin will reassess for possible inpatient cysto
Suggest urology consult, patient is unsafe to stop anticoagulation, h/o recent PE.   Case was discussed with ED attending.
agree with above

## 2020-11-05 LAB
ALBUMIN SERPL ELPH-MCNC: 3.4 G/DL — LOW (ref 3.5–5.2)
ALP SERPL-CCNC: 68 U/L — SIGNIFICANT CHANGE UP (ref 30–115)
ALT FLD-CCNC: <5 U/L — SIGNIFICANT CHANGE UP (ref 0–41)
ANION GAP SERPL CALC-SCNC: 11 MMOL/L — SIGNIFICANT CHANGE UP (ref 7–14)
AST SERPL-CCNC: 14 U/L — SIGNIFICANT CHANGE UP (ref 0–41)
BASOPHILS # BLD AUTO: 0.01 K/UL — SIGNIFICANT CHANGE UP (ref 0–0.2)
BASOPHILS NFR BLD AUTO: 0.3 % — SIGNIFICANT CHANGE UP (ref 0–1)
BILIRUB SERPL-MCNC: 0.3 MG/DL — SIGNIFICANT CHANGE UP (ref 0.2–1.2)
BUN SERPL-MCNC: 11 MG/DL — SIGNIFICANT CHANGE UP (ref 10–20)
CALCIUM SERPL-MCNC: 8.4 MG/DL — LOW (ref 8.5–10.1)
CHLORIDE SERPL-SCNC: 104 MMOL/L — SIGNIFICANT CHANGE UP (ref 98–110)
CO2 SERPL-SCNC: 22 MMOL/L — SIGNIFICANT CHANGE UP (ref 17–32)
CREAT SERPL-MCNC: 1.1 MG/DL — SIGNIFICANT CHANGE UP (ref 0.7–1.5)
EOSINOPHIL # BLD AUTO: 0.04 K/UL — SIGNIFICANT CHANGE UP (ref 0–0.7)
EOSINOPHIL NFR BLD AUTO: 1.1 % — SIGNIFICANT CHANGE UP (ref 0–8)
GLUCOSE SERPL-MCNC: 102 MG/DL — HIGH (ref 70–99)
HCT VFR BLD CALC: 26.1 % — LOW (ref 37–47)
HGB BLD-MCNC: 8.2 G/DL — LOW (ref 12–16)
IMM GRANULOCYTES NFR BLD AUTO: 0.8 % — HIGH (ref 0.1–0.3)
LYMPHOCYTES # BLD AUTO: 1.19 K/UL — LOW (ref 1.2–3.4)
LYMPHOCYTES # BLD AUTO: 33.1 % — SIGNIFICANT CHANGE UP (ref 20.5–51.1)
MAGNESIUM SERPL-MCNC: 1.6 MG/DL — LOW (ref 1.8–2.4)
MCHC RBC-ENTMCNC: 27.9 PG — SIGNIFICANT CHANGE UP (ref 27–31)
MCHC RBC-ENTMCNC: 31.4 G/DL — LOW (ref 32–37)
MCV RBC AUTO: 88.8 FL — SIGNIFICANT CHANGE UP (ref 81–99)
MONOCYTES # BLD AUTO: 0.47 K/UL — SIGNIFICANT CHANGE UP (ref 0.1–0.6)
MONOCYTES NFR BLD AUTO: 13.1 % — HIGH (ref 1.7–9.3)
NEUTROPHILS # BLD AUTO: 1.85 K/UL — SIGNIFICANT CHANGE UP (ref 1.4–6.5)
NEUTROPHILS NFR BLD AUTO: 51.6 % — SIGNIFICANT CHANGE UP (ref 42.2–75.2)
NRBC # BLD: 0 /100 WBCS — SIGNIFICANT CHANGE UP (ref 0–0)
PHOSPHATE SERPL-MCNC: 3 MG/DL — SIGNIFICANT CHANGE UP (ref 2.1–4.9)
PLATELET # BLD AUTO: 187 K/UL — SIGNIFICANT CHANGE UP (ref 130–400)
POTASSIUM SERPL-MCNC: 4.3 MMOL/L — SIGNIFICANT CHANGE UP (ref 3.5–5)
POTASSIUM SERPL-SCNC: 4.3 MMOL/L — SIGNIFICANT CHANGE UP (ref 3.5–5)
PROT SERPL-MCNC: 5.7 G/DL — LOW (ref 6–8)
RBC # BLD: 2.94 M/UL — LOW (ref 4.2–5.4)
RBC # FLD: 21.3 % — HIGH (ref 11.5–14.5)
SODIUM SERPL-SCNC: 137 MMOL/L — SIGNIFICANT CHANGE UP (ref 135–146)
WBC # BLD: 3.59 K/UL — LOW (ref 4.8–10.8)
WBC # FLD AUTO: 3.59 K/UL — LOW (ref 4.8–10.8)

## 2020-11-05 PROCEDURE — 99233 SBSQ HOSP IP/OBS HIGH 50: CPT

## 2020-11-05 RX ORDER — ENOXAPARIN SODIUM 100 MG/ML
65 INJECTION SUBCUTANEOUS
Refills: 0 | Status: DISCONTINUED | OUTPATIENT
Start: 2020-11-05 | End: 2020-11-05

## 2020-11-05 RX ORDER — ENOXAPARIN SODIUM 100 MG/ML
65 INJECTION SUBCUTANEOUS
Refills: 0 | Status: DISCONTINUED | OUTPATIENT
Start: 2020-11-05 | End: 2020-11-06

## 2020-11-05 RX ADMIN — Medication 100 MILLIGRAM(S): at 21:30

## 2020-11-05 RX ADMIN — DORZOLAMIDE HYDROCHLORIDE 1 DROP(S): 20 SOLUTION/ DROPS OPHTHALMIC at 21:31

## 2020-11-05 RX ADMIN — BRIMONIDINE TARTRATE 1 DROP(S): 2 SOLUTION/ DROPS OPHTHALMIC at 17:25

## 2020-11-05 RX ADMIN — DORZOLAMIDE HYDROCHLORIDE 1 DROP(S): 20 SOLUTION/ DROPS OPHTHALMIC at 05:50

## 2020-11-05 RX ADMIN — Medication 81 MILLIGRAM(S): at 11:15

## 2020-11-05 RX ADMIN — AMLODIPINE BESYLATE 5 MILLIGRAM(S): 2.5 TABLET ORAL at 05:49

## 2020-11-05 RX ADMIN — OXYCODONE AND ACETAMINOPHEN 1 TABLET(S): 5; 325 TABLET ORAL at 10:30

## 2020-11-05 RX ADMIN — Medication 100 MILLIGRAM(S): at 05:49

## 2020-11-05 RX ADMIN — OXYCODONE AND ACETAMINOPHEN 1 TABLET(S): 5; 325 TABLET ORAL at 10:02

## 2020-11-05 RX ADMIN — ENOXAPARIN SODIUM 65 MILLIGRAM(S): 100 INJECTION SUBCUTANEOUS at 23:49

## 2020-11-05 RX ADMIN — DORZOLAMIDE HYDROCHLORIDE 1 DROP(S): 20 SOLUTION/ DROPS OPHTHALMIC at 13:03

## 2020-11-05 RX ADMIN — ATORVASTATIN CALCIUM 40 MILLIGRAM(S): 80 TABLET, FILM COATED ORAL at 21:30

## 2020-11-05 RX ADMIN — BRIMONIDINE TARTRATE 1 DROP(S): 2 SOLUTION/ DROPS OPHTHALMIC at 05:50

## 2020-11-05 RX ADMIN — ENOXAPARIN SODIUM 65 MILLIGRAM(S): 100 INJECTION SUBCUTANEOUS at 13:03

## 2020-11-05 RX ADMIN — Medication 25 MILLIGRAM(S): at 05:49

## 2020-11-05 RX ADMIN — Medication 100 MILLIGRAM(S): at 13:04

## 2020-11-05 RX ADMIN — SERTRALINE 25 MILLIGRAM(S): 25 TABLET, FILM COATED ORAL at 11:15

## 2020-11-05 NOTE — PROGRESS NOTE ADULT - SUBJECTIVE AND OBJECTIVE BOX
BEKAH NELSON   [id number]  80y   Female PMHx of ANEMIA, URINARY TRACT INFECTION    ^SENT OVER CLINIC    SUBJECTIVE:    Patient is a 80y old Female who presents with a chief complaint of hematuria (05 Nov 2020 14:23)    Today is hospital day 9d. This morning she is resting comfortably in bed and reports no new issues or overnight events.   Currently admitted to medicine with the primary diagnosis of Hematuria    PAST MEDICAL & SURGICAL HISTORY  HLD (hyperlipidemia)    HTN (hypertension)    CAD (coronary artery disease)    Coronary artery disease  s/p CABG X2    Glaucoma    Cataract of right eye    High cholesterol    Hypertension    Uterine cancer  s/p SHAAN and chemo    History of coronary artery bypass surgery    Athscl CABG, unsp, w angina pectoris w documented spasm    H/O total hysterectomy    ALLERGIES:  chloroquine (Hives)  quinine (Urticaria)    MEDICATIONS:  STANDING MEDICATIONS  amLODIPine   Tablet 5 milliGRAM(s) Oral daily  atorvastatin 40 milliGRAM(s) Oral at bedtime  brimonidine 0.2% Ophthalmic Solution 1 Drop(s) Both EYES two times a day  ceFAZolin   IVPB 500 milliGRAM(s) IV Intermittent every 8 hours  ceFAZolin   IVPB      dorzolamide 2% Ophthalmic Solution 1 Drop(s) Both EYES three times a day  enoxaparin Injectable 65 milliGRAM(s) SubCutaneous <User Schedule>  lactated ringers. 1000 milliLiter(s) IV Continuous <Continuous>  metoprolol succinate ER 25 milliGRAM(s) Oral daily  senna 2 Tablet(s) Oral at bedtime  sertraline 25 milliGRAM(s) Oral daily    PRN MEDICATIONS  clonazePAM  Tablet 0.5 milliGRAM(s) Oral daily PRN  HYDROmorphone  Injectable 0.5 milliGRAM(s) IV Push every 10 minutes PRN  meperidine     Injectable 12.5 milliGRAM(s) IV Push every 10 minutes PRN  morphine  - Injectable 1 milliGRAM(s) IV Push every 10 minutes PRN  ondansetron Injectable 4 milliGRAM(s) IV Push once PRN  ondansetron Injectable 4 milliGRAM(s) IV Push once PRN  oxycodone    5 mG/acetaminophen 325 mG 1 Tablet(s) Oral every 6 hours PRN    VITALS:   T(F): 98.8  HR: 86  BP: 128/66  RR: 18  SpO2: 98%    LABS:                        8.2    3.59  )-----------( 187      ( 05 Nov 2020 08:04 )             26.1     Hemoglobin: 8.2 g/dL (11-05 @ 08:04)  Hemoglobin: 8.1 g/dL (11-04 @ 06:52)  Hemoglobin: 8.0 g/dL (11-03 @ 06:48)  Hemoglobin: 7.7 g/dL (11-02 @ 06:20)  Hemoglobin: 8.1 g/dL (11-01 @ 14:22)    11-05    137  |  104  |  11  ----------------------------<  102<H>  4.3   |  22  |  1.1    Ca    8.4<L>      05 Nov 2020 08:04  Phos  3.0     11-05  Mg     1.6     11-05    TPro  5.7<L>  /  Alb  3.4<L>  /  TBili  0.3  /  DBili  x   /  AST  14  /  ALT  <5  /  AlkPhos  68  11-05    Potassium Trend: 4.3<--, 4.2<--, 4.1<--, 4.2<--, 3.8<--  SODIUM TREND:  Sodium 137 [11-05 @ 08:04]  Sodium 140 [11-04 @ 06:52]  Sodium 140 [11-03 @ 06:48]  Sodium 144 [11-02 @ 06:20]  Sodium 143 [11-01 @ 14:22]  Sodium 141 [10-31 @ 05:20]  Sodium 137 [10-30 @ 05:30]  Sodium 140 [10-29 @ 05:30]  Sodium 138 [10-28 @ 15:18]  Sodium 139 [10-27 @ 13:31]    Creatinine Trend: 1.1<--, 0.9<--, 0.9<--, 0.9<--, 1.1<--, 1.3<--                COVID  10-27-20 @ 13:31  COVID -   NotDetec  10-21-20 @ 12:55  COVID -   NotDetec  09-11-20 @ 04:45  COVID -   NotDete  08-06-20 @ 22:10  COVID -   NotDete      RADIOLOGY:    PHYSICAL EXAM:  GEN: No acute distress  HEENT: normocephalic, atraumatic, aniceteric  LUNGS: Clear to auscultation bilaterally, no rales/wheezing/ rhonchi  HEART: S1/S2 present. RRR, no murmurs  ABD: Soft, non-tender, non-distended. Bowel sounds present  EXT: Warm, well perfused, skin color appropriate for ethnicity, +distal pulses, +motor/sensory fnxn intact x all 4 ext's.   NEURO: AAOX3, normal affect

## 2020-11-05 NOTE — PROGRESS NOTE ADULT - SUBJECTIVE AND OBJECTIVE BOX
BEKAH NELSON  80y, Female  Allergy: chloroquine (Hives)  quinine (Urticaria)    Hospital Day: 9d    Patient seen and examined earlier today. No acute events overnight.    PMH/PSH:  PAST MEDICAL & SURGICAL HISTORY:  HLD (hyperlipidemia)    HTN (hypertension)    CAD (coronary artery disease)    Coronary artery disease  s/p CABG X2    Glaucoma    Cataract of right eye    High cholesterol    Hypertension    Uterine cancer  s/p SHAAN and chemo    History of coronary artery bypass surgery    Athscl CABG, unsp, w angina pectoris w documented spasm    H/O total hysterectomy    VITALS:  T(F): 98.8 (11-05-20 @ 13:29), Max: 99.2 (11-04-20 @ 19:55)  HR: 83 (11-05-20 @ 13:29)  BP: 90/50 (11-05-20 @ 13:29) (90/50 - 128/78)  RR: 18 (11-05-20 @ 13:29)  SpO2: 98% (11-05-20 @ 07:54)    TESTS & MEASUREMENTS:  Weight (Kg):   BMI (kg/m2): 25.5 (11-03)                        8.2    3.59  )-----------( 187      ( 05 Nov 2020 08:04 )             26.1     11-05    137  |  104  |  11  ----------------------------<  102<H>  4.3   |  22  |  1.1    Ca    8.4<L>      05 Nov 2020 08:04  Phos  3.0     11-05  Mg     1.6     11-05    TPro  5.7<L>  /  Alb  3.4<L>  /  TBili  0.3  /  DBili  x   /  AST  14  /  ALT  <5  /  AlkPhos  68  11-05    LIVER FUNCTIONS - ( 05 Nov 2020 08:04 )  Alb: 3.4 g/dL / Pro: 5.7 g/dL / ALK PHOS: 68 U/L / ALT: <5 U/L / AST: 14 U/L / GGT: x           MEDICATIONS:  MEDICATIONS  (STANDING):  amLODIPine   Tablet 5 milliGRAM(s) Oral daily  aspirin enteric coated 81 milliGRAM(s) Oral daily  atorvastatin 40 milliGRAM(s) Oral at bedtime  brimonidine 0.2% Ophthalmic Solution 1 Drop(s) Both EYES two times a day  ceFAZolin   IVPB 500 milliGRAM(s) IV Intermittent every 8 hours  ceFAZolin   IVPB      dorzolamide 2% Ophthalmic Solution 1 Drop(s) Both EYES three times a day  enoxaparin Injectable 65 milliGRAM(s) SubCutaneous <User Schedule>  lactated ringers. 1000 milliLiter(s) (75 mL/Hr) IV Continuous <Continuous>  metoprolol succinate ER 25 milliGRAM(s) Oral daily  senna 2 Tablet(s) Oral at bedtime  sertraline 25 milliGRAM(s) Oral daily    MEDICATIONS  (PRN):  clonazePAM  Tablet 0.5 milliGRAM(s) Oral daily PRN anxiety  HYDROmorphone  Injectable 0.5 milliGRAM(s) IV Push every 10 minutes PRN Moderate Pain (4 - 6)  meperidine     Injectable 12.5 milliGRAM(s) IV Push every 10 minutes PRN Shivering  morphine  - Injectable 1 milliGRAM(s) IV Push every 10 minutes PRN Moderate Pain (4 - 6)  ondansetron Injectable 4 milliGRAM(s) IV Push once PRN Nausea and/or Vomiting  ondansetron Injectable 4 milliGRAM(s) IV Push once PRN Nausea and/or Vomiting  oxycodone    5 mG/acetaminophen 325 mG 1 Tablet(s) Oral every 6 hours PRN Moderate Pain (4 - 6)    HOME MEDICATIONS:  amLODIPine 5 mg oral tablet (10-21)  aspirin 81 mg oral delayed release tablet (10-21)  brimonidine 0.1% ophthalmic solution (10-21)  dorzolamide 2% ophthalmic solution (10-21)  latanoprost 0.005% ophthalmic solution (10-21)  metoprolol succinate 25 mg oral tablet, extended release (10-21)  rivaroxaban 20 mg oral tablet (10-21)  rosuvastatin 10 mg oral tablet (10-21)  sertraline 25 mg oral tablet (10-21)      REVIEW OF SYSTEMS:  All other review of systems is negative unless indicated above.     PHYSICAL EXAM:  GENERAL: NAD  HEENT: No Swelling  CHEST/LUNG: Good air entry, No wheezing  HEART: RRR, No murmurs  ABDOMEN: Soft, Bowel sounds present  EXTREMITIES:  No clubbing

## 2020-11-05 NOTE — PROGRESS NOTE ADULT - ASSESSMENT
Patient is a 80y old woman with PMH uterine cancer (s/p SHAAN in 2016 and adjuvant therapy, recurrence with malignant ascites currently undergoing therapeutic paracentesis/chemotherapy on Fridays), CAD (s/p CABG), history of PE (August 2020; was on Xarelto OP), HTN, depression who presents for hematuria.    #Recurrent hematuria in setting on AC  - s/p cystoscopy w fulguration 11/02  - Hematuria resolved  - Restarted full dose AC with lovenox + ASA 81mg qD  - If no further bleeding, can plan for DC tomorrow    #AMANDA, likely pre-renal  - resolved  - Cont to monitor cr    #Adenocarcinoma, mullerian origin  - Oncology following, currently receiving carbo/taxol regimen  - Outpatient f/u    #h/o DVT and PE  -  resuming Lovenox therapeutic dose today   -  s/p IVC filter on 10/29 by IR      #CAD/ h/o CABG   - per cardio d/c ASA if pt going of full AC   - Cont atorvastatin 40mg qHs  - Cont metoprolol ER 25mg qD    #Anxiety  -Klonopin 0.5 q24hrs    DVT PPX, Lovenox    #Progress Note Handoff  Pending (specify): Observation on full dose AC  Family discussion: d/w pt regarding AC, pt would like to be discharged on lovenox and not xarelto  Disposition: Home

## 2020-11-05 NOTE — PROGRESS NOTE ADULT - ASSESSMENT
Patient is a 80y old woman with PMH uterine cancer (s/p SHAAN in 2016 and adjuvant therapy, recurrence with malignant ascites currently undergoing therapeutic paracentesis/chemotherapy on Fridays), CAD (s/p CABG), history of PE (August 2020; was on Xarelto OP), HTN, depression who presents for hematuria.    #Recurrent hematuria in setting on AC  - s/p cystoscopy w fulguration 11/02  - Hematuria resolved  - Restarted full dose AC with lovenox + ASA 81mg qD  - If no further bleeding, can plan for DC tomorrow    #AMANDA, likely pre-renal  - resolved  - Cont to monitor cr    #Adenocarcinoma, mullerian origin  - Oncology following, currently receiving carbo/taxol regimen  - Outpatient f/u    #h/o DVT and PE  -  resuming Lovenox therapeutic dose today  -  s/p IVC filter on 10/29 by IR      #CAD/ h/o CABG   - Continue aspirin 81mg qD  - Cont atorvastatin 40mg qHs  - Cont metoprolol ER 25mg qD    #Anxiety  -Klonopin 0.5 q24hrs    DVT PPX, Lovenox    #Progress Note Handoff  Pending (specify): Observation on full dose AC  Family discussion: d/w pt regarding AC, pt would like to be discharged on lovenox and not xarelto  Disposition: Home

## 2020-11-06 ENCOUNTER — APPOINTMENT (OUTPATIENT)
Dept: PULMONOLOGY | Facility: CLINIC | Age: 80
End: 2020-11-06

## 2020-11-06 ENCOUNTER — TRANSCRIPTION ENCOUNTER (OUTPATIENT)
Age: 80
End: 2020-11-06

## 2020-11-06 VITALS
DIASTOLIC BLOOD PRESSURE: 57 MMHG | HEART RATE: 83 BPM | TEMPERATURE: 100 F | RESPIRATION RATE: 18 BRPM | SYSTOLIC BLOOD PRESSURE: 113 MMHG

## 2020-11-06 LAB
ALBUMIN SERPL ELPH-MCNC: 3.3 G/DL — LOW (ref 3.5–5.2)
ALP SERPL-CCNC: 66 U/L — SIGNIFICANT CHANGE UP (ref 30–115)
ALT FLD-CCNC: <5 U/L — SIGNIFICANT CHANGE UP (ref 0–41)
ANION GAP SERPL CALC-SCNC: 8 MMOL/L — SIGNIFICANT CHANGE UP (ref 7–14)
AST SERPL-CCNC: 11 U/L — SIGNIFICANT CHANGE UP (ref 0–41)
BASOPHILS # BLD AUTO: 0.01 K/UL — SIGNIFICANT CHANGE UP (ref 0–0.2)
BASOPHILS NFR BLD AUTO: 0.3 % — SIGNIFICANT CHANGE UP (ref 0–1)
BILIRUB SERPL-MCNC: <0.2 MG/DL — SIGNIFICANT CHANGE UP (ref 0.2–1.2)
BUN SERPL-MCNC: 10 MG/DL — SIGNIFICANT CHANGE UP (ref 10–20)
CALCIUM SERPL-MCNC: 8.5 MG/DL — SIGNIFICANT CHANGE UP (ref 8.5–10.1)
CHLORIDE SERPL-SCNC: 105 MMOL/L — SIGNIFICANT CHANGE UP (ref 98–110)
CO2 SERPL-SCNC: 25 MMOL/L — SIGNIFICANT CHANGE UP (ref 17–32)
CREAT SERPL-MCNC: 1.1 MG/DL — SIGNIFICANT CHANGE UP (ref 0.7–1.5)
EOSINOPHIL # BLD AUTO: 0.05 K/UL — SIGNIFICANT CHANGE UP (ref 0–0.7)
EOSINOPHIL NFR BLD AUTO: 1.6 % — SIGNIFICANT CHANGE UP (ref 0–8)
GLUCOSE SERPL-MCNC: 100 MG/DL — HIGH (ref 70–99)
HCT VFR BLD CALC: 26.2 % — LOW (ref 37–47)
HGB BLD-MCNC: 8.3 G/DL — LOW (ref 12–16)
IMM GRANULOCYTES NFR BLD AUTO: 0.9 % — HIGH (ref 0.1–0.3)
LYMPHOCYTES # BLD AUTO: 1.18 K/UL — LOW (ref 1.2–3.4)
LYMPHOCYTES # BLD AUTO: 36.8 % — SIGNIFICANT CHANGE UP (ref 20.5–51.1)
MAGNESIUM SERPL-MCNC: 1.7 MG/DL — LOW (ref 1.8–2.4)
MCHC RBC-ENTMCNC: 27.9 PG — SIGNIFICANT CHANGE UP (ref 27–31)
MCHC RBC-ENTMCNC: 31.7 G/DL — LOW (ref 32–37)
MCV RBC AUTO: 87.9 FL — SIGNIFICANT CHANGE UP (ref 81–99)
MONOCYTES # BLD AUTO: 0.56 K/UL — SIGNIFICANT CHANGE UP (ref 0.1–0.6)
MONOCYTES NFR BLD AUTO: 17.4 % — HIGH (ref 1.7–9.3)
NEUTROPHILS # BLD AUTO: 1.38 K/UL — LOW (ref 1.4–6.5)
NEUTROPHILS NFR BLD AUTO: 43 % — SIGNIFICANT CHANGE UP (ref 42.2–75.2)
NRBC # BLD: 0 /100 WBCS — SIGNIFICANT CHANGE UP (ref 0–0)
PHOSPHATE SERPL-MCNC: 3.6 MG/DL — SIGNIFICANT CHANGE UP (ref 2.1–4.9)
PLATELET # BLD AUTO: 201 K/UL — SIGNIFICANT CHANGE UP (ref 130–400)
POTASSIUM SERPL-MCNC: 4.3 MMOL/L — SIGNIFICANT CHANGE UP (ref 3.5–5)
POTASSIUM SERPL-SCNC: 4.3 MMOL/L — SIGNIFICANT CHANGE UP (ref 3.5–5)
PROT SERPL-MCNC: 5.7 G/DL — LOW (ref 6–8)
RBC # BLD: 2.98 M/UL — LOW (ref 4.2–5.4)
RBC # FLD: 21.1 % — HIGH (ref 11.5–14.5)
SODIUM SERPL-SCNC: 138 MMOL/L — SIGNIFICANT CHANGE UP (ref 135–146)
WBC # BLD: 3.21 K/UL — LOW (ref 4.8–10.8)
WBC # FLD AUTO: 3.21 K/UL — LOW (ref 4.8–10.8)

## 2020-11-06 PROCEDURE — 99238 HOSP IP/OBS DSCHRG MGMT 30/<: CPT

## 2020-11-06 RX ORDER — ASPIRIN/CALCIUM CARB/MAGNESIUM 324 MG
1 TABLET ORAL
Qty: 0 | Refills: 0 | DISCHARGE

## 2020-11-06 RX ORDER — ENOXAPARIN SODIUM 100 MG/ML
60 INJECTION SUBCUTANEOUS
Qty: 3600 | Refills: 0
Start: 2020-11-06 | End: 2020-12-05

## 2020-11-06 RX ADMIN — DORZOLAMIDE HYDROCHLORIDE 1 DROP(S): 20 SOLUTION/ DROPS OPHTHALMIC at 05:19

## 2020-11-06 RX ADMIN — SERTRALINE 25 MILLIGRAM(S): 25 TABLET, FILM COATED ORAL at 11:26

## 2020-11-06 RX ADMIN — AMLODIPINE BESYLATE 5 MILLIGRAM(S): 2.5 TABLET ORAL at 05:19

## 2020-11-06 RX ADMIN — Medication 25 MILLIGRAM(S): at 05:19

## 2020-11-06 RX ADMIN — Medication 100 MILLIGRAM(S): at 05:43

## 2020-11-06 RX ADMIN — Medication 0.5 MILLIGRAM(S): at 11:32

## 2020-11-06 RX ADMIN — BRIMONIDINE TARTRATE 1 DROP(S): 2 SOLUTION/ DROPS OPHTHALMIC at 05:19

## 2020-11-06 NOTE — DISCHARGE NOTE PROVIDER - NSDCMRMEDTOKEN_GEN_ALL_CORE_FT
amLODIPine 5 mg oral tablet: 1 tab(s) orally once a day  brimonidine 0.1% ophthalmic solution: to each affected eye 2 times a day  dorzolamide 2% ophthalmic solution: 1 drop(s) to each affected eye 2 times a day  enoxaparin 60 mg/0.6 mL injectable solution: 60 unit(s) injectable every 12 hours   hydrOXYzine hydrochloride 25 mg oral tablet: 1 tab(s) orally every 8 hours, As Needed -for anxiety   KlonoPIN 0.5 mg oral tablet: 0.5 tab(s) orally once a day x 30 days MDD:1mg  latanoprost 0.005% ophthalmic solution: 1 drop(s) to each affected eye 2 times a day  metoprolol succinate 25 mg oral tablet, extended release: 1 tab(s) orally once a day  rosuvastatin 10 mg oral tablet: 1 tab(s) orally once a day  sertraline 25 mg oral tablet: 1 tab(s) orally once a day   amLODIPine 5 mg oral tablet: 1 tab(s) orally once a day  brimonidine 0.1% ophthalmic solution: to each affected eye 2 times a day  dorzolamide 2% ophthalmic solution: 1 drop(s) to each affected eye 2 times a day  hydrOXYzine hydrochloride 25 mg oral tablet: 1 tab(s) orally every 8 hours, As Needed -for anxiety   KlonoPIN 0.5 mg oral tablet: 0.5 tab(s) orally once a day x 30 days MDD:1mg  latanoprost 0.005% ophthalmic solution: 1 drop(s) to each affected eye 2 times a day  metoprolol succinate 25 mg oral tablet, extended release: 1 tab(s) orally once a day  rosuvastatin 10 mg oral tablet: 1 tab(s) orally once a day  sertraline 25 mg oral tablet: 1 tab(s) orally once a day

## 2020-11-06 NOTE — DISCHARGE NOTE PROVIDER - NSDCPNSUBOBJ_GEN_ALL_CORE
<<<RESIDENT DISCHARGE NOTE>>>     BEKAH NELSON  MRN-[id number]    VITAL SIGNS:  T(F): 99.6 (11-06-20 @ 05:06), Max: 99.9 (11-05-20 @ 20:48)  HR: 83 (11-06-20 @ 05:06)  BP: 113/57 (11-06-20 @ 05:06)  SpO2: 97% (11-05-20 @ 16:46)      PHYSICAL EXAMINATION:  General: NAD  Head & Neck: NCAT, PERRLA  Pulmonary: L/S CTAB  Cardiovascular: RRR  Gastrointestinal/Abdomen & Pelvis: SNTTP  Neurologic/Motor: AxOx3, FND    TEST RESULTS:                        8.3    3.21  )-----------( 201      ( 06 Nov 2020 07:14 )             26.2       11-06    138  |  105  |  10  ----------------------------<  100<H>  4.3   |  25  |  1.1    Ca    8.5      06 Nov 2020 07:14  Phos  3.6     11-06  Mg     1.7     11-06    TPro  5.7<L>  /  Alb  3.3<L>  /  TBili  <0.2  /  DBili  x   /  AST  11  /  ALT  <5  /  AlkPhos  66  11-06

## 2020-11-06 NOTE — DISCHARGE NOTE PROVIDER - CARE PROVIDER_API CALL
Glenn Kumar  UROLOGY  [address] [place] [address]  Phone: [phone]  Fax: [phone]  Established Patient  Follow Up Time: 1 week

## 2020-11-06 NOTE — PROGRESS NOTE ADULT - REASON FOR ADMISSION
hematuria

## 2020-11-06 NOTE — DISCHARGE NOTE PROVIDER - HOSPITAL COURSE
Patient is a 80y old woman with PMH uterine cancer (s/p SHAAN in 2016 and adjuvant therapy, recurrence with malignant ascites currently undergoing therapeutic paracentesis/chemotherapy on Fridays), CAD (s/p CABG), history of PE (August 2020; was on Xarelto OP), HTN, depression who presented to the ED for hematuria. Pt underwent cystoscopy with fulgaration with subsequent resolution of hematuria, pt was returned to full dose AC with lovenox but ASA was discontinued in light of bleeding risk.

## 2020-11-06 NOTE — DISCHARGE NOTE PROVIDER - NSDCFUSCHEDAPPT_GEN_ALL_CORE_FT
BEKAH NELSON ; 11/10/2020 ; [facility] BEKAH Pham ; 11/12/2020 ; [facility] BEKAH Banuelos ; 11/13/2020 ; [facility] BEKAH Pham ; 11/17/2020 ; [facility] BEKAH Pham ; 11/20/2020 ; [facility] BEKAH Pham ; 11/24/2020 ; [facility] BEKAH Pham ; 11/27/2020 ; [facility] BEKAH Pham ; 12/01/2020 ; [facility] BEKAH Pham ; 12/04/2020 ; [facility] BEKAH Pham ; 01/26/2021 ; [facility] Card[facility] [address]

## 2020-11-06 NOTE — PROGRESS NOTE ADULT - ASSESSMENT
Patient is a 80y old woman with PMH uterine cancer (s/p SHAAN in 2016 and adjuvant therapy, recurrence with malignant ascites currently undergoing therapeutic paracentesis/chemotherapy on Fridays), CAD (s/p CABG), history of PE (August 2020; was on Xarelto OP), HTN, depression who presents for hematuria.    #Recurrent hematuria in setting on AC  - s/p cystoscopy w fulguration 11/02  - Hematuria resolved  - Restarted full dose AC with lovenox + ASA 81mg qD  - DC planning today    #AMANDA, likely pre-renal  - resolved  - Cont to monitor cr    #Adenocarcinoma, mullerian origin  - Oncology following, currently receiving carbo/taxol regimen  - Outpatient f/u    #h/o DVT and PE  -  resuming Lovenox therapeutic dose today  -  s/p IVC filter on 10/29 by IR      #CAD/ h/o CABG   - Continue aspirin 81mg qD  - Cont atorvastatin 40mg qHs  - Cont metoprolol ER 25mg qD    #Anxiety  -Klonopin 0.5 q24hrs    DVT PPX, Lovenox    DC today

## 2020-11-06 NOTE — PROGRESS NOTE ADULT - SUBJECTIVE AND OBJECTIVE BOX
BEKAH NELSON  80y, Female  Allergy: chloroquine (Hives)  quinine (Urticaria)    Hospital Day: 10d    Patient seen and examined earlier today. No acute events overnight.    PMH/PSH:  PAST MEDICAL & SURGICAL HISTORY:  HLD (hyperlipidemia)    HTN (hypertension)    CAD (coronary artery disease)    Coronary artery disease  s/p CABG X2    Glaucoma    Cataract of right eye    High cholesterol    Hypertension    Uterine cancer  s/p SHAAN and chemo    History of coronary artery bypass surgery    Athscl CABG, unsp, w angina pectoris w documented spasm    H/O total hysterectomy    VITALS:  T(F): 99.6 (11-06-20 @ 05:06), Max: 99.9 (11-05-20 @ 20:48)  HR: 83 (11-06-20 @ 05:06)  BP: 113/57 (11-06-20 @ 05:06) (90/50 - 128/66)  RR: 18 (11-06-20 @ 05:06)  SpO2: 97% (11-05-20 @ 16:46)    TESTS & MEASUREMENTS:  Weight (Kg):   BMI (kg/m2): 25.5 (11-03)                        8.3    3.21  )-----------( 201      ( 06 Nov 2020 07:14 )             26.2     11-06    138  |  105  |  10  ----------------------------<  100<H>  4.3   |  25  |  1.1    Ca    8.5      06 Nov 2020 07:14  Phos  3.6     11-06  Mg     1.7     11-06    TPro  5.7<L>  /  Alb  3.3<L>  /  TBili  <0.2  /  DBili  x   /  AST  11  /  ALT  <5  /  AlkPhos  66  11-06    LIVER FUNCTIONS - ( 06 Nov 2020 07:14 )  Alb: 3.3 g/dL / Pro: 5.7 g/dL / ALK PHOS: 66 U/L / ALT: <5 U/L / AST: 11 U/L / GGT: x           MEDICATIONS:  MEDICATIONS  (STANDING):  amLODIPine   Tablet 5 milliGRAM(s) Oral daily  atorvastatin 40 milliGRAM(s) Oral at bedtime  brimonidine 0.2% Ophthalmic Solution 1 Drop(s) Both EYES two times a day  ceFAZolin   IVPB      ceFAZolin   IVPB 500 milliGRAM(s) IV Intermittent every 8 hours  dorzolamide 2% Ophthalmic Solution 1 Drop(s) Both EYES three times a day  enoxaparin Injectable 65 milliGRAM(s) SubCutaneous <User Schedule>  lactated ringers. 1000 milliLiter(s) (75 mL/Hr) IV Continuous <Continuous>  metoprolol succinate ER 25 milliGRAM(s) Oral daily  senna 2 Tablet(s) Oral at bedtime  sertraline 25 milliGRAM(s) Oral daily    MEDICATIONS  (PRN):  clonazePAM  Tablet 0.5 milliGRAM(s) Oral daily PRN anxiety  HYDROmorphone  Injectable 0.5 milliGRAM(s) IV Push every 10 minutes PRN Moderate Pain (4 - 6)  meperidine     Injectable 12.5 milliGRAM(s) IV Push every 10 minutes PRN Shivering  morphine  - Injectable 1 milliGRAM(s) IV Push every 10 minutes PRN Moderate Pain (4 - 6)  ondansetron Injectable 4 milliGRAM(s) IV Push once PRN Nausea and/or Vomiting  ondansetron Injectable 4 milliGRAM(s) IV Push once PRN Nausea and/or Vomiting  oxycodone    5 mG/acetaminophen 325 mG 1 Tablet(s) Oral every 6 hours PRN Moderate Pain (4 - 6)    HOME MEDICATIONS:  amLODIPine 5 mg oral tablet (10-21)  brimonidine 0.1% ophthalmic solution (10-21)  dorzolamide 2% ophthalmic solution (10-21)  latanoprost 0.005% ophthalmic solution (10-21)  metoprolol succinate 25 mg oral tablet, extended release (10-21)  rosuvastatin 10 mg oral tablet (10-21)  sertraline 25 mg oral tablet (10-21)      REVIEW OF SYSTEMS:  All other review of systems is negative unless indicated above.     PHYSICAL EXAM:  GENERAL: NAD  HEENT: No Swelling  CHEST/LUNG: Good air entry, No wheezing  HEART: RRR, No murmurs  ABDOMEN: Soft, Bowel sounds present  EXTREMITIES:  No clubbing

## 2020-11-06 NOTE — PROGRESS NOTE ADULT - SUBJECTIVE AND OBJECTIVE BOX
Patient is a 80y old  Female who presents with a chief complaint of hematuria (06 Nov 2020 13:25)      Subjective: Patient feels well today. She had no recurrent episodes of hematuria       Vital Signs Last 24 Hrs  T(C): 37.6 (06 Nov 2020 05:06), Max: 37.7 (05 Nov 2020 20:48)  T(F): 99.6 (06 Nov 2020 05:06), Max: 99.9 (05 Nov 2020 20:48)  HR: 83 (06 Nov 2020 05:06) (83 - 86)  BP: 113/57 (06 Nov 2020 05:06) (113/57 - 115/62)  BP(mean): --  RR: 18 (06 Nov 2020 05:06) (18 - 18)  SpO2: --    PHYSICAL EXAM  General: adult in NAD  HEENT: anicteric sclera, pink conjunctiva  Neck: supple  CV: normal S1/S2 with no murmur rubs or gallops  Lungs: CTABL  Abdomen: soft non-tender non-distended   Ext: no edema  Skin: no rashes  Neuro: alert and oriented X 4, no focal deficits    MEDICATIONS  (STANDING):  amLODIPine   Tablet 5 milliGRAM(s) Oral daily  atorvastatin 40 milliGRAM(s) Oral at bedtime  brimonidine 0.2% Ophthalmic Solution 1 Drop(s) Both EYES two times a day  ceFAZolin   IVPB 500 milliGRAM(s) IV Intermittent every 8 hours  ceFAZolin   IVPB      dorzolamide 2% Ophthalmic Solution 1 Drop(s) Both EYES three times a day  enoxaparin Injectable 65 milliGRAM(s) SubCutaneous <User Schedule>  lactated ringers. 1000 milliLiter(s) (75 mL/Hr) IV Continuous <Continuous>  metoprolol succinate ER 25 milliGRAM(s) Oral daily  senna 2 Tablet(s) Oral at bedtime  sertraline 25 milliGRAM(s) Oral daily    MEDICATIONS  (PRN):  clonazePAM  Tablet 0.5 milliGRAM(s) Oral daily PRN anxiety  HYDROmorphone  Injectable 0.5 milliGRAM(s) IV Push every 10 minutes PRN Moderate Pain (4 - 6)  meperidine     Injectable 12.5 milliGRAM(s) IV Push every 10 minutes PRN Shivering  morphine  - Injectable 1 milliGRAM(s) IV Push every 10 minutes PRN Moderate Pain (4 - 6)  ondansetron Injectable 4 milliGRAM(s) IV Push once PRN Nausea and/or Vomiting  ondansetron Injectable 4 milliGRAM(s) IV Push once PRN Nausea and/or Vomiting  oxycodone    5 mG/acetaminophen 325 mG 1 Tablet(s) Oral every 6 hours PRN Moderate Pain (4 - 6)      LABS:                          8.3    3.21  )-----------( 201      ( 06 Nov 2020 07:14 )             26.2         Mean Cell Volume : 87.9 fL  Mean Cell Hemoglobin : 27.9 pg  Mean Cell Hemoglobin Concentration : 31.7 g/dL  Auto Neutrophil # : 1.38 K/uL  Auto Lymphocyte # : 1.18 K/uL  Auto Monocyte # : 0.56 K/uL  Auto Eosinophil # : 0.05 K/uL  Auto Basophil # : 0.01 K/uL  Auto Neutrophil % : 43.0 %  Auto Lymphocyte % : 36.8 %  Auto Monocyte % : 17.4 %  Auto Eosinophil % : 1.6 %  Auto Basophil % : 0.3 %      Serial CBC's  11-06 @ 07:14  Hct-26.2 / Hgb-8.3 / Plat-201 / RBC-2.98 / WBC-3.21  Serial CBC's  11-05 @ 08:04  Hct-26.1 / Hgb-8.2 / Plat-187 / RBC-2.94 / WBC-3.59  Serial CBC's  11-04 @ 06:52  Hct-25.2 / Hgb-8.1 / Plat-183 / RBC-2.93 / WBC-3.25  Serial CBC's  11-03 @ 06:48  Hct-25.6 / Hgb-8.0 / Plat-190 / RBC-2.90 / WBC-3.08      11-06    138  |  105  |  10  ----------------------------<  100<H>  4.3   |  25  |  1.1    Ca    8.5      06 Nov 2020 07:14  Phos  3.6     11-06  Mg     1.7     11-06    TPro  5.7<L>  /  Alb  3.3<L>  /  TBili  <0.2  /  DBili  x   /  AST  11  /  ALT  <5  /  AlkPhos  66  11-06    BLOOD SMEAR INTERPRETATION:       RADIOLOGY & ADDITIONAL STUDIES:

## 2020-11-06 NOTE — PROGRESS NOTE ADULT - ASSESSMENT
Patient is a 81 year old F patient with past medical history of Uterine cancer s/p SHAAN, malignant ascites on therapeutic paracentesis, CAD s/p CABG, segmental pulmonary embolism on Lovenox recently switched to xarelto, HTN, depression, recent admission for hematuria/UTI is referred from oncology clinic for worsening hematuria.      #) Hematuria: Recently admitted for hematuria/UTI and discharged last week on Augmentin  - Patient was evaluated in clinic and was noted to have worsening hematuria and a drop in hemoglobin so referred to the ED  - Recent CT AP showed inflammation of the ureter  - Urology eval appreciated; Cystoscopy done on 11/2, no reported mass seen, only "areas of erythema on right/posterior wall."  - Hematuria resolved today     #) Acute on Chronic PE in setting of known Malignancy   - Patient is high risk to be off AC as she recently had a PE also with history of DVT; s/p IVC filter placement  - Hemoglobin is stable and hematuria has resolved   - Discussed with patient's cardiologist Dr. Moura. As long as patient is on therapeutic anticoagulation, aspirin can be stopped. Patient is aware   - Discharge home on Lovenox 1mg/kg BID     #) Recurrent adenocarcinoma Mullerian origin, malignant ascites dx on 08/08/2020  - s/p TAHBSO in 2016 with adjuvant chemotherapy  - Started on weekly Carbo/taxol weekly 3 on 1 off, started on 08/14/20 after found to have malignant ascites on paracentesis (4L removed per patient)  - PICC placement by IR  on 10/21  - Patient's chemo on 10/23 held due to UTI   - Chemo given on 10/30, next dose on 11/13.

## 2020-11-06 NOTE — DISCHARGE NOTE PROVIDER - NSDCCPCAREPLAN_GEN_ALL_CORE_FT
PRINCIPAL DISCHARGE DIAGNOSIS  Diagnosis: Hematuria  Assessment and Plan of Treatment: Hematuria, Adult  Hematuria is blood in the urine. Blood may be visible in the urine, or it may be identified with a test. This condition can be caused by infections of the bladder, urethra, kidney, or prostate. Other possible causes include:  Kidney stones.  Cancer of the urinary tract.  Too much calcium in the urine.  Conditions that are passed from parent to child (inherited conditions).   Exercise that requires a lot of energy.  Infections can usually be treated with medicine, and a kidney stone usually will pass through your urine. If neither of these is the cause of your hematuria, more tests may be needed to identify the cause of your symptoms.  ImageIt is very important to tell your health care provider about any blood in your urine, even if it is painless or the blood stops without treatment. Blood in the urine, when it happens and then stops and then happens again, can be a symptom of a very serious condition, including cancer. There is no pain in the initial stages of many urinary cancers.  Follow these instructions at home:  Medicines   Take over-the-counter and prescription medicines only as told by your health care provider.  If you were prescribed an antibiotic medicine, take it as told by your health care provider. Do not stop taking the antibiotic even if you start to feel better.  Eating and drinking   Drink enough fluid to keep your urine clear or pale yellow. It is recommended that you drink 3–4 quarts (2.8–3.8 L) a day. If you have been diagnosed with an infection, it is recommended that you drink cranberry juice in addition to large amounts of water.  Avoid caffeine, tea, and carbonated beverages. These tend to irritate the bladder.  Avoid alcohol because it may irritate the prostate (men).  General instructions   If you have been diagnosed with a kidney stone, follow your health care provider's instructions about straining your urine to catch the stone.  Empty your bladder often. Avoid holding urine for long periods of time.  If you are female:  After a deborah      SECONDARY DISCHARGE DIAGNOSES  Diagnosis: UTI (urinary tract infection)  Assessment and Plan of Treatment:     Diagnosis: Anemia  Assessment and Plan of Treatment:

## 2020-11-10 ENCOUNTER — APPOINTMENT (OUTPATIENT)
Dept: INFUSION THERAPY | Facility: CLINIC | Age: 80
End: 2020-11-10

## 2020-11-12 ENCOUNTER — LABORATORY RESULT (OUTPATIENT)
Age: 80
End: 2020-11-12

## 2020-11-12 ENCOUNTER — APPOINTMENT (OUTPATIENT)
Dept: HEMATOLOGY ONCOLOGY | Facility: CLINIC | Age: 80
End: 2020-11-12
Payer: MEDICARE

## 2020-11-12 VITALS
DIASTOLIC BLOOD PRESSURE: 79 MMHG | HEART RATE: 85 BPM | BODY MASS INDEX: 23.05 KG/M2 | TEMPERATURE: 98.7 F | HEIGHT: 64 IN | WEIGHT: 135 LBS | SYSTOLIC BLOOD PRESSURE: 116 MMHG | RESPIRATION RATE: 14 BRPM

## 2020-11-12 DIAGNOSIS — Z88.8 ALLERGY STATUS TO OTHER DRUGS, MEDICAMENTS AND BIOLOGICAL SUBSTANCES: ICD-10-CM

## 2020-11-12 DIAGNOSIS — D62 ACUTE POSTHEMORRHAGIC ANEMIA: ICD-10-CM

## 2020-11-12 DIAGNOSIS — C78.6 SECONDARY MALIGNANT NEOPLASM OF RETROPERITONEUM AND PERITONEUM: ICD-10-CM

## 2020-11-12 DIAGNOSIS — C55 MALIGNANT NEOPLASM OF UTERUS, PART UNSPECIFIED: ICD-10-CM

## 2020-11-12 DIAGNOSIS — N18.2 CHRONIC KIDNEY DISEASE, STAGE 2 (MILD): ICD-10-CM

## 2020-11-12 DIAGNOSIS — R18.0 MALIGNANT ASCITES: ICD-10-CM

## 2020-11-12 DIAGNOSIS — I36.1 NONRHEUMATIC TRICUSPID (VALVE) INSUFFICIENCY: ICD-10-CM

## 2020-11-12 DIAGNOSIS — I12.9 HYPERTENSIVE CHRONIC KIDNEY DISEASE WITH STAGE 1 THROUGH STAGE 4 CHRONIC KIDNEY DISEASE, OR UNSPECIFIED CHRONIC KIDNEY DISEASE: ICD-10-CM

## 2020-11-12 DIAGNOSIS — Z86.718 PERSONAL HISTORY OF OTHER VENOUS THROMBOSIS AND EMBOLISM: ICD-10-CM

## 2020-11-12 DIAGNOSIS — Z90.710 ACQUIRED ABSENCE OF BOTH CERVIX AND UTERUS: ICD-10-CM

## 2020-11-12 DIAGNOSIS — I26.99 OTHER PULMONARY EMBOLISM WITHOUT ACUTE COR PULMONALE: ICD-10-CM

## 2020-11-12 DIAGNOSIS — R31.9 HEMATURIA, UNSPECIFIED: ICD-10-CM

## 2020-11-12 DIAGNOSIS — N17.9 ACUTE KIDNEY FAILURE, UNSPECIFIED: ICD-10-CM

## 2020-11-12 DIAGNOSIS — N13.30 UNSPECIFIED HYDRONEPHROSIS: ICD-10-CM

## 2020-11-12 DIAGNOSIS — Z79.01 LONG TERM (CURRENT) USE OF ANTICOAGULANTS: ICD-10-CM

## 2020-11-12 DIAGNOSIS — Z95.1 PRESENCE OF AORTOCORONARY BYPASS GRAFT: ICD-10-CM

## 2020-11-12 DIAGNOSIS — N30.91 CYSTITIS, UNSPECIFIED WITH HEMATURIA: ICD-10-CM

## 2020-11-12 DIAGNOSIS — I25.10 ATHEROSCLEROTIC HEART DISEASE OF NATIVE CORONARY ARTERY WITHOUT ANGINA PECTORIS: ICD-10-CM

## 2020-11-12 DIAGNOSIS — Z92.21 PERSONAL HISTORY OF ANTINEOPLASTIC CHEMOTHERAPY: ICD-10-CM

## 2020-11-12 DIAGNOSIS — H40.9 UNSPECIFIED GLAUCOMA: ICD-10-CM

## 2020-11-12 DIAGNOSIS — Z79.82 LONG TERM (CURRENT) USE OF ASPIRIN: ICD-10-CM

## 2020-11-12 PROCEDURE — 99213 OFFICE O/P EST LOW 20 MIN: CPT

## 2020-11-13 ENCOUNTER — APPOINTMENT (OUTPATIENT)
Dept: INFUSION THERAPY | Facility: CLINIC | Age: 80
End: 2020-11-13

## 2020-11-13 LAB
ALBUMIN SERPL ELPH-MCNC: 4.2 G/DL
ALP BLD-CCNC: 88 U/L
ALT SERPL-CCNC: 6 U/L
ANION GAP SERPL CALC-SCNC: 16 MMOL/L
APPEARANCE: ABNORMAL
AST SERPL-CCNC: 17 U/L
BILIRUB SERPL-MCNC: <0.2 MG/DL
BILIRUBIN URINE: NEGATIVE
BLOOD URINE: ABNORMAL
BUN SERPL-MCNC: 22 MG/DL
CALCIUM SERPL-MCNC: 9 MG/DL
CHLORIDE SERPL-SCNC: 100 MMOL/L
CO2 SERPL-SCNC: 24 MMOL/L
COLOR: ABNORMAL
CREAT SERPL-MCNC: 1.7 MG/DL
GLUCOSE QUALITATIVE U: NORMAL
GLUCOSE SERPL-MCNC: 111 MG/DL
HCT VFR BLD CALC: 29.5 %
HGB BLD-MCNC: 9.6 G/DL
KETONES URINE: NORMAL
LEUKOCYTE ESTERASE URINE: ABNORMAL
MCHC RBC-ENTMCNC: 27.7 PG
MCHC RBC-ENTMCNC: 32.5 G/DL
MCV RBC AUTO: 85.3 FL
NITRITE URINE: NEGATIVE
PH URINE: 7
PLATELET # BLD AUTO: 231 K/UL
PMV BLD: 9.5 FL
POTASSIUM SERPL-SCNC: 4.1 MMOL/L
PROT SERPL-MCNC: 7.1 G/DL
PROTEIN URINE: ABNORMAL
RBC # BLD: 3.46 M/UL
RBC # FLD: 20.5 %
SODIUM SERPL-SCNC: 140 MMOL/L
SPECIFIC GRAVITY URINE: 1.01
UROBILINOGEN URINE: NORMAL
WBC # FLD AUTO: 5.04 K/UL

## 2020-11-13 RX ORDER — DIPHENHYDRAMINE HCL 50 MG
25 CAPSULE ORAL ONCE
Refills: 0 | Status: COMPLETED | OUTPATIENT
Start: 2020-11-13 | End: 2020-11-13

## 2020-11-13 RX ORDER — ACETAMINOPHEN 500 MG
650 TABLET ORAL ONCE
Refills: 0 | Status: COMPLETED | OUTPATIENT
Start: 2020-11-13 | End: 2020-11-13

## 2020-11-13 RX ORDER — FOSAPREPITANT DIMEGLUMINE 150 MG/5ML
150 INJECTION, POWDER, LYOPHILIZED, FOR SOLUTION INTRAVENOUS ONCE
Refills: 0 | Status: COMPLETED | OUTPATIENT
Start: 2020-11-13 | End: 2020-11-13

## 2020-11-13 RX ORDER — PACLITAXEL 6 MG/ML
100 INJECTION, SOLUTION, CONCENTRATE INTRAVENOUS ONCE
Refills: 0 | Status: COMPLETED | OUTPATIENT
Start: 2020-11-13 | End: 2020-11-13

## 2020-11-13 RX ORDER — CARBOPLATIN 50 MG
185 VIAL (EA) INTRAVENOUS ONCE
Refills: 0 | Status: COMPLETED | OUTPATIENT
Start: 2020-11-13 | End: 2020-11-13

## 2020-11-13 RX ORDER — SODIUM CHLORIDE 9 MG/ML
1000 INJECTION INTRAMUSCULAR; INTRAVENOUS; SUBCUTANEOUS
Refills: 0 | Status: DISCONTINUED | OUTPATIENT
Start: 2020-11-13 | End: 2021-02-23

## 2020-11-13 RX ORDER — FAMOTIDINE 10 MG/ML
20 INJECTION INTRAVENOUS ONCE
Refills: 0 | Status: COMPLETED | OUTPATIENT
Start: 2020-11-13 | End: 2020-11-13

## 2020-11-13 RX ORDER — DEXAMETHASONE 0.5 MG/5ML
12 ELIXIR ORAL ONCE
Refills: 0 | Status: COMPLETED | OUTPATIENT
Start: 2020-11-13 | End: 2020-11-13

## 2020-11-13 RX ADMIN — PACLITAXEL 266.67 MILLIGRAM(S): 6 INJECTION, SOLUTION, CONCENTRATE INTRAVENOUS at 13:32

## 2020-11-13 RX ADMIN — FAMOTIDINE 104 MILLIGRAM(S): 10 INJECTION INTRAVENOUS at 12:32

## 2020-11-13 RX ADMIN — Medication 25 MILLIGRAM(S): at 13:02

## 2020-11-13 RX ADMIN — PACLITAXEL 100 MILLIGRAM(S): 6 INJECTION, SOLUTION, CONCENTRATE INTRAVENOUS at 14:32

## 2020-11-13 RX ADMIN — FAMOTIDINE 20 MILLIGRAM(S): 10 INJECTION INTRAVENOUS at 12:52

## 2020-11-13 RX ADMIN — FOSAPREPITANT DIMEGLUMINE 300 MILLIGRAM(S): 150 INJECTION, POWDER, LYOPHILIZED, FOR SOLUTION INTRAVENOUS at 13:12

## 2020-11-13 RX ADMIN — Medication 650 MILLIGRAM(S): at 12:12

## 2020-11-13 RX ADMIN — Medication 12 MILLIGRAM(S): at 12:32

## 2020-11-13 RX ADMIN — Medication 122 MILLIGRAM(S): at 12:12

## 2020-11-13 RX ADMIN — Medication 101 MILLIGRAM(S): at 12:52

## 2020-11-13 RX ADMIN — SODIUM CHLORIDE 500 MILLILITER(S): 9 INJECTION INTRAMUSCULAR; INTRAVENOUS; SUBCUTANEOUS at 15:40

## 2020-11-13 RX ADMIN — Medication 185 MILLIGRAM(S): at 15:35

## 2020-11-13 RX ADMIN — FOSAPREPITANT DIMEGLUMINE 150 MILLIGRAM(S): 150 INJECTION, POWDER, LYOPHILIZED, FOR SOLUTION INTRAVENOUS at 13:32

## 2020-11-13 RX ADMIN — Medication 268.5 MILLIGRAM(S): at 12:45

## 2020-11-16 ENCOUNTER — APPOINTMENT (OUTPATIENT)
Dept: INFUSION THERAPY | Facility: CLINIC | Age: 80
End: 2020-11-16

## 2020-11-16 LAB
ALBUMIN SERPL ELPH-MCNC: 3.5 G/DL
ALP BLD-CCNC: 77 U/L
ALT SERPL-CCNC: 6 U/L
ANION GAP SERPL CALC-SCNC: 11 MMOL/L
AST SERPL-CCNC: 15 U/L
BACTERIA UR CULT: NORMAL
BILIRUB SERPL-MCNC: <0.2 MG/DL
BUN SERPL-MCNC: 20 MG/DL
CALCIUM SERPL-MCNC: 7.3 MG/DL
CHLORIDE SERPL-SCNC: 105 MMOL/L
CO2 SERPL-SCNC: 22 MMOL/L
CREAT SERPL-MCNC: 1.2 MG/DL
GLUCOSE SERPL-MCNC: 149 MG/DL
POTASSIUM SERPL-SCNC: 4.3 MMOL/L
PROT SERPL-MCNC: 5.7 G/DL
SODIUM SERPL-SCNC: 138 MMOL/L

## 2020-11-16 RX ORDER — SODIUM CHLORIDE 9 MG/ML
1000 INJECTION INTRAMUSCULAR; INTRAVENOUS; SUBCUTANEOUS
Refills: 0 | Status: DISCONTINUED | OUTPATIENT
Start: 2020-11-16 | End: 2021-02-23

## 2020-11-16 RX ADMIN — SODIUM CHLORIDE 500 MILLILITER(S): 9 INJECTION INTRAMUSCULAR; INTRAVENOUS; SUBCUTANEOUS at 11:55

## 2020-11-16 RX ADMIN — SODIUM CHLORIDE 1000 MILLILITER(S): 9 INJECTION INTRAMUSCULAR; INTRAVENOUS; SUBCUTANEOUS at 13:30

## 2020-11-17 ENCOUNTER — APPOINTMENT (OUTPATIENT)
Dept: INFUSION THERAPY | Facility: CLINIC | Age: 80
End: 2020-11-17

## 2020-11-17 LAB
ALBUMIN SERPL ELPH-MCNC: 3.6 G/DL
ALP BLD-CCNC: 68 U/L
ALT SERPL-CCNC: 8 U/L
ANION GAP SERPL CALC-SCNC: 9 MMOL/L
AST SERPL-CCNC: 16 U/L
BILIRUB SERPL-MCNC: <0.2 MG/DL
BUN SERPL-MCNC: 17 MG/DL
CALCIUM SERPL-MCNC: 7.6 MG/DL
CHLORIDE SERPL-SCNC: 107 MMOL/L
CO2 SERPL-SCNC: 26 MMOL/L
CREAT SERPL-MCNC: 1.1 MG/DL
GLUCOSE SERPL-MCNC: 95 MG/DL
POTASSIUM SERPL-SCNC: 4.4 MMOL/L
PROT SERPL-MCNC: 5.8 G/DL
SODIUM SERPL-SCNC: 142 MMOL/L

## 2020-11-20 ENCOUNTER — LABORATORY RESULT (OUTPATIENT)
Age: 80
End: 2020-11-20

## 2020-11-20 ENCOUNTER — APPOINTMENT (OUTPATIENT)
Dept: INFUSION THERAPY | Facility: CLINIC | Age: 80
End: 2020-11-20

## 2020-11-20 ENCOUNTER — OUTPATIENT (OUTPATIENT)
Dept: OUTPATIENT SERVICES | Facility: HOSPITAL | Age: 80
LOS: 1 days | Discharge: HOME | End: 2020-11-20

## 2020-11-20 DIAGNOSIS — Z95.1 PRESENCE OF AORTOCORONARY BYPASS GRAFT: Chronic | ICD-10-CM

## 2020-11-20 DIAGNOSIS — Z98.890 OTHER SPECIFIED POSTPROCEDURAL STATES: Chronic | ICD-10-CM

## 2020-11-20 DIAGNOSIS — Z01.818 ENCOUNTER FOR OTHER PREPROCEDURAL EXAMINATION: ICD-10-CM

## 2020-11-20 DIAGNOSIS — I25.701 ATHEROSCLEROSIS OF CORONARY ARTERY BYPASS GRAFT(S), UNSPECIFIED, WITH ANGINA PECTORIS WITH DOCUMENTED SPASM: Chronic | ICD-10-CM

## 2020-11-20 DIAGNOSIS — C55 MALIGNANT NEOPLASM OF UTERUS, PART UNSPECIFIED: ICD-10-CM

## 2020-11-20 DIAGNOSIS — I26.99 OTHER PULMONARY EMBOLISM WITHOUT ACUTE COR PULMONALE: ICD-10-CM

## 2020-11-20 DIAGNOSIS — R52 PAIN, UNSPECIFIED: ICD-10-CM

## 2020-11-20 DIAGNOSIS — K59.00 CONSTIPATION, UNSPECIFIED: ICD-10-CM

## 2020-11-20 DIAGNOSIS — I10 ESSENTIAL (PRIMARY) HYPERTENSION: ICD-10-CM

## 2020-11-20 DIAGNOSIS — Z86.39 PERSONAL HISTORY OF OTHER ENDOCRINE, NUTRITIONAL AND METABOLIC DISEASE: ICD-10-CM

## 2020-11-20 RX ORDER — SODIUM CHLORIDE 9 MG/ML
500 INJECTION INTRAMUSCULAR; INTRAVENOUS; SUBCUTANEOUS
Refills: 0 | Status: DISCONTINUED | OUTPATIENT
Start: 2020-11-20 | End: 2021-02-23

## 2020-11-20 RX ORDER — FOSAPREPITANT DIMEGLUMINE 150 MG/5ML
150 INJECTION, POWDER, LYOPHILIZED, FOR SOLUTION INTRAVENOUS ONCE
Refills: 0 | Status: COMPLETED | OUTPATIENT
Start: 2020-11-20 | End: 2020-11-20

## 2020-11-20 RX ORDER — DIPHENHYDRAMINE HCL 50 MG
25 CAPSULE ORAL ONCE
Refills: 0 | Status: COMPLETED | OUTPATIENT
Start: 2020-11-20 | End: 2020-11-20

## 2020-11-20 RX ORDER — DEXAMETHASONE 0.5 MG/5ML
12 ELIXIR ORAL ONCE
Refills: 0 | Status: COMPLETED | OUTPATIENT
Start: 2020-11-20 | End: 2020-11-20

## 2020-11-20 RX ORDER — PACLITAXEL 6 MG/ML
100 INJECTION, SOLUTION, CONCENTRATE INTRAVENOUS ONCE
Refills: 0 | Status: COMPLETED | OUTPATIENT
Start: 2020-11-20 | End: 2020-11-20

## 2020-11-20 RX ORDER — ACETAMINOPHEN 500 MG
650 TABLET ORAL ONCE
Refills: 0 | Status: COMPLETED | OUTPATIENT
Start: 2020-11-20 | End: 2020-11-20

## 2020-11-20 RX ORDER — FAMOTIDINE 10 MG/ML
20 INJECTION INTRAVENOUS ONCE
Refills: 0 | Status: COMPLETED | OUTPATIENT
Start: 2020-11-20 | End: 2020-11-20

## 2020-11-20 RX ORDER — CARBOPLATIN 50 MG
185 VIAL (EA) INTRAVENOUS ONCE
Refills: 0 | Status: COMPLETED | OUTPATIENT
Start: 2020-11-20 | End: 2020-11-20

## 2020-11-20 RX ADMIN — FAMOTIDINE 104 MILLIGRAM(S): 10 INJECTION INTRAVENOUS at 12:47

## 2020-11-20 RX ADMIN — Medication 650 MILLIGRAM(S): at 12:47

## 2020-11-20 RX ADMIN — Medication 650 MILLIGRAM(S): at 12:48

## 2020-11-20 RX ADMIN — Medication 268.5 MILLIGRAM(S): at 15:30

## 2020-11-20 RX ADMIN — Medication 101 MILLIGRAM(S): at 12:48

## 2020-11-20 RX ADMIN — FOSAPREPITANT DIMEGLUMINE 300 MILLIGRAM(S): 150 INJECTION, POWDER, LYOPHILIZED, FOR SOLUTION INTRAVENOUS at 12:48

## 2020-11-20 RX ADMIN — Medication 122 MILLIGRAM(S): at 12:47

## 2020-11-20 RX ADMIN — PACLITAXEL 266.67 MILLIGRAM(S): 6 INJECTION, SOLUTION, CONCENTRATE INTRAVENOUS at 14:03

## 2020-11-20 RX ADMIN — SODIUM CHLORIDE 500 MILLILITER(S): 9 INJECTION INTRAMUSCULAR; INTRAVENOUS; SUBCUTANEOUS at 12:47

## 2020-11-24 ENCOUNTER — APPOINTMENT (OUTPATIENT)
Dept: INFUSION THERAPY | Facility: CLINIC | Age: 80
End: 2020-11-24

## 2020-11-25 LAB
ALBUMIN SERPL ELPH-MCNC: 3.6 G/DL
ALP BLD-CCNC: 63 U/L
ALT SERPL-CCNC: 7 U/L
ANION GAP SERPL CALC-SCNC: 9 MMOL/L
AST SERPL-CCNC: 15 U/L
BILIRUB SERPL-MCNC: <0.2 MG/DL
BUN SERPL-MCNC: 16 MG/DL
CALCIUM SERPL-MCNC: 8.1 MG/DL
CHLORIDE SERPL-SCNC: 105 MMOL/L
CO2 SERPL-SCNC: 27 MMOL/L
CREAT SERPL-MCNC: 1.2 MG/DL
GLUCOSE SERPL-MCNC: 94 MG/DL
HCT VFR BLD CALC: 27.4 %
HGB BLD-MCNC: 9 G/DL
MCHC RBC-ENTMCNC: 28.5 PG
MCHC RBC-ENTMCNC: 32.8 G/DL
MCV RBC AUTO: 86.7 FL
PLATELET # BLD AUTO: 180 K/UL
PMV BLD: 9.7 FL
POTASSIUM SERPL-SCNC: 4.5 MMOL/L
PROT SERPL-MCNC: 5.7 G/DL
RBC # BLD: 3.16 M/UL
RBC # FLD: 19.7 %
SODIUM SERPL-SCNC: 141 MMOL/L
WBC # FLD AUTO: 3.94 K/UL

## 2020-11-27 ENCOUNTER — LABORATORY RESULT (OUTPATIENT)
Age: 80
End: 2020-11-27

## 2020-11-27 ENCOUNTER — APPOINTMENT (OUTPATIENT)
Dept: INFUSION THERAPY | Facility: CLINIC | Age: 80
End: 2020-11-27

## 2020-11-27 RX ORDER — PACLITAXEL 6 MG/ML
100 INJECTION, SOLUTION, CONCENTRATE INTRAVENOUS ONCE
Refills: 0 | Status: COMPLETED | OUTPATIENT
Start: 2020-11-27 | End: 2020-11-27

## 2020-11-27 RX ORDER — FOSAPREPITANT DIMEGLUMINE 150 MG/5ML
150 INJECTION, POWDER, LYOPHILIZED, FOR SOLUTION INTRAVENOUS ONCE
Refills: 0 | Status: COMPLETED | OUTPATIENT
Start: 2020-11-27 | End: 2020-11-27

## 2020-11-27 RX ORDER — DEXAMETHASONE 0.5 MG/5ML
12 ELIXIR ORAL ONCE
Refills: 0 | Status: COMPLETED | OUTPATIENT
Start: 2020-11-27 | End: 2020-11-27

## 2020-11-27 RX ORDER — SODIUM CHLORIDE 9 MG/ML
500 INJECTION INTRAMUSCULAR; INTRAVENOUS; SUBCUTANEOUS
Refills: 0 | Status: DISCONTINUED | OUTPATIENT
Start: 2020-11-27 | End: 2021-01-01

## 2020-11-27 RX ORDER — DIPHENHYDRAMINE HCL 50 MG
25 CAPSULE ORAL ONCE
Refills: 0 | Status: COMPLETED | OUTPATIENT
Start: 2020-11-27 | End: 2020-11-27

## 2020-11-27 RX ORDER — ACETAMINOPHEN 500 MG
650 TABLET ORAL ONCE
Refills: 0 | Status: COMPLETED | OUTPATIENT
Start: 2020-11-27 | End: 2020-11-27

## 2020-11-27 RX ORDER — CARBOPLATIN 50 MG
185 VIAL (EA) INTRAVENOUS ONCE
Refills: 0 | Status: COMPLETED | OUTPATIENT
Start: 2020-11-27 | End: 2020-11-27

## 2020-11-27 RX ORDER — FAMOTIDINE 10 MG/ML
20 INJECTION INTRAVENOUS ONCE
Refills: 0 | Status: COMPLETED | OUTPATIENT
Start: 2020-11-27 | End: 2020-11-27

## 2020-11-27 RX ADMIN — PACLITAXEL 266.67 MILLIGRAM(S): 6 INJECTION, SOLUTION, CONCENTRATE INTRAVENOUS at 15:20

## 2020-11-27 RX ADMIN — Medication 101 MILLIGRAM(S): at 15:00

## 2020-11-27 RX ADMIN — FOSAPREPITANT DIMEGLUMINE 300 MILLIGRAM(S): 150 INJECTION, POWDER, LYOPHILIZED, FOR SOLUTION INTRAVENOUS at 14:00

## 2020-11-27 RX ADMIN — FAMOTIDINE 20 MILLIGRAM(S): 10 INJECTION INTRAVENOUS at 15:00

## 2020-11-27 RX ADMIN — Medication 25 MILLIGRAM(S): at 15:20

## 2020-11-27 RX ADMIN — Medication 185 MILLIGRAM(S): at 17:20

## 2020-11-27 RX ADMIN — PACLITAXEL 100 MILLIGRAM(S): 6 INJECTION, SOLUTION, CONCENTRATE INTRAVENOUS at 00:00

## 2020-11-27 RX ADMIN — Medication 650 MILLIGRAM(S): at 13:57

## 2020-11-27 RX ADMIN — Medication 12 MILLIGRAM(S): at 14:50

## 2020-11-27 RX ADMIN — Medication 268.5 MILLIGRAM(S): at 16:20

## 2020-11-27 RX ADMIN — Medication 650 MILLIGRAM(S): at 13:58

## 2020-11-27 RX ADMIN — FAMOTIDINE 104 MILLIGRAM(S): 10 INJECTION INTRAVENOUS at 14:45

## 2020-11-27 RX ADMIN — Medication 122 MILLIGRAM(S): at 14:30

## 2020-11-28 NOTE — HISTORY OF PRESENT ILLNESS
[de-identified] : Trav is a shekhar 81 year old lady I initially met in the hospital on 8/11/2020. Her past medical history is significant for uterine cancer s/p SHAAN BSO as per patient s/p adjuvant chemotherapy in 2016, CAD s/p CABG, HTN and depression, she presented to the ED for abdominal pain and chest discomfort. 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 \par \par PAST MEDICAL & SURGICAL HISTORY:\par HLD (hyperlipidemia)\par HTN (hypertension)\par CAD (coronary artery disease)\par History of coronary artery bypass surgery\par \par FAMILY HISTORY:\par No pertinent family history in first degree relatives\par \par Allergies\par chloroquine (Hives)\par \par CTA C/A/P on 8/6/2020 revealed \par 1. Intraluminal filling defect is seen within the segmental branches of the right lower lobe pulmonary artery, indicating acute pulmonary embolism. (6/132-138; 10/51-55). Filling defect in segmental branch of the right upper lobe pulmonary artery. (6/77) There is a small filling defect within a left lower lobe pulmonary artery segmental branch (6/147; 10/59). No evidence of right heart strain. The right ventricle measures 3.5 cm; the left ventricle measures 3.7 cm. (RV:LV<1) \par 2. Compared with the previous CT scan of 5/27/2020, there is now a large amount of ascites throughout the abdomen and pelvis. \par \par LE Doppler on 8/8/2020 revealed \par No evidence of deep venous thrombosis or superficial thrombophlebitis in the bilateral lower extremities. \par Left popliteal fossa fluid collection as measured above likely Baker's cyst\par \par During her admission she has required 3 episodes of paracentesis on 8/7 3L, 8/12 4L, 8/13 2.2L, once pathology became available patient was started on weekly Carbo/Taxol she has tolerated chemotherapy well.  [de-identified] : 8/18/2020: She returns today for follow up, she reports improved SOB, improved abdominal swelling, still come positional chest discomfort, she is compliant with BID Lovenox, she remains on the ASA. She reports improved appetite, she reports that appetite is decreased with a large amount of fluid on the abdomen. She is due for C1D8 of Carbo/Taxol on 8/21/2020. CBC was reviewed. \par Side effects of chemotherapy including, but not limited to, cytopenias, that may require blood product transfusions and lead to increased risk of infection, requiring hospitalization, allergic reactions, that can rarely be life-threatening, skin reactions, nausea/vomiting, mucositis, diarrhea/constipation were discussed at length, patient and family voice undestaging, all questions were answered to patient's satisfaction.\par Images were personally reviewed by MD Matt and discussed with patient.\par \par 9/10/2020: Patient is 80 years old female came  today for follow up for  Recurrent adenocarcinoma Mullerian origin, malignant ascites diagnosed on 8/8/2020, she S/P one cycle of weekly Carbo/taxol she tolerated well. She  reports improved SOB, improved abdominal swelling, she is ran out of Lovenox, which she is supposed to be taking BID, she didn't call for the renewal, she remains on the ASA. She  has been without Lovenox for like 7 days now, we will restart her on AC immediately. Xarelto was prescribed. She was educated again that she will need to remain on long-term anticoagulation. She reports good  appetite  She is due to start the new cycle of chemo on 9/11/20.\par PET Scan reviewed from 9/8/20 which revealed\par Mild diffuse FDG uptake throughout the mesentery along with peritoneal stranding and nodularity compatible with peritoneal carcinomatosis (SUV up to 3.9). \par No additional sites of abnormal FDG uptake to suggest biologic tumor activity.\par Images were personally reviewed by MD Matt and discussed with patient. \par \par 10/15/2020: Trav present for follow up, her next chemotherapy is for tomorrow, we will need to place PICC line, this will be arrange as outpatient. \par CT chest on 10/11/2020 revealed When compared to prior study dated 9/11/2020: \par Although not a dedicated PE study, redemonstration of a right lower lobe segmental/subsegmental pulmonary embolus which appears overall decreased in size from one month prior. \par No evidence for new consolidation, pleural effusion or pneumothorax. \par Trav reports mild neuropathy, not worsening with chemotherapy, CBC was reviewed. Overall clinically she demonstrates great response to therapy. \par \par 11/12/2020: Trav is 80 years old female  present for follow up, her ncycle 4 of carbo/taxol is for tomorrow. She repots feeling better regard urination and hematuria. She stated no more blood with her urine but sometime feels pain while she is urinating.\par We communicated CBC result with Patient with HGB/ HCT is 9.6/29.5. Also patient currently on the therapeutic lovenox once every 12 hrs.\par Trav reports neuropathy is getting  worse with chemotherapy, CBC was reviewed. Overall clinically she demonstrates great response to therapy.\par

## 2020-11-28 NOTE — PHYSICAL EXAM
[Ambulatory and capable of all self care but unable to carry out any work activities] : Status 2- Ambulatory and capable of all self care but unable to carry out any work activities. Up and about more than 50% of waking hours [Normal] : affect appropriate [de-identified] : mildly distended overall improved compared to prior, non-tender

## 2020-11-28 NOTE — REVIEW OF SYSTEMS
[Fatigue] : fatigue [Recent Change In Weight] : ~T recent weight change [SOB on Exertion] : shortness of breath during exertion [Negative] : Allergic/Immunologic [Fever] : no fever [Chills] : no chills [Night Sweats] : no night sweats [Shortness Of Breath] : no shortness of breath [Wheezing] : no wheezing [Cough] : no cough [Abdominal Pain] : no abdominal pain [Vomiting] : no vomiting [Constipation] : no constipation [Diarrhea] : no diarrhea [Confused] : no confusion [Dizziness] : no dizziness [Fainting] : no fainting [Difficulty Walking] : no difficulty walking [FreeTextEntry7] : early satiety [de-identified] : minor preexisting neuropathy

## 2020-11-28 NOTE — ASSESSMENT
[FreeTextEntry1] : Recurrent adenocarcinoma Mullerian origin, malignant ascites diagnosed on 8/8/2020\par --Prior SHAAN BSO in 2016, adjuvant chemotherapy, obtain records from Dr. Abdullahi \par --Started on weekly carbo/taxol weekly 3 on 1 off on 8/14/2020 \par --PET CT reviewed from 9/8/20. \par --Lab work today with Stable blood counts\par --For chemo 10/16/2020\par --PICC line care\par \par Malignant ascites required high volume paracentesis \par --Improved/resolved with carbo/taxol \par \par PE in the setting of malignancy diagnosed on 8/6/2020\par --Doppler was negative for DVT on 8/8/2020\par --On BID Lovenox, but ran out on 9/1/2020\par --Also on ASA, h/o CABG . \par --Was stared on Xarelto, but after 1 dose was admitted with recurrent PE, diagnosed on 9/11/2020\par --Contique on Xarelto \par \par Follow up in 3 weeks or earlier if needed.\par

## 2020-12-01 ENCOUNTER — APPOINTMENT (OUTPATIENT)
Dept: INFUSION THERAPY | Facility: CLINIC | Age: 80
End: 2020-12-01

## 2020-12-01 ENCOUNTER — LABORATORY RESULT (OUTPATIENT)
Age: 80
End: 2020-12-01

## 2020-12-01 LAB
ALBUMIN SERPL ELPH-MCNC: 3.5 G/DL
ALBUMIN SERPL ELPH-MCNC: 3.8 G/DL
ALP BLD-CCNC: 65 U/L
ALP BLD-CCNC: 66 U/L
ALT SERPL-CCNC: 6 U/L
ALT SERPL-CCNC: 7 U/L
ANION GAP SERPL CALC-SCNC: 11 MMOL/L
ANION GAP SERPL CALC-SCNC: 11 MMOL/L
AST SERPL-CCNC: 15 U/L
AST SERPL-CCNC: 17 U/L
BILIRUB SERPL-MCNC: 0.2 MG/DL
BILIRUB SERPL-MCNC: 0.8 MG/DL
BUN SERPL-MCNC: 17 MG/DL
BUN SERPL-MCNC: 22 MG/DL
CALCIUM SERPL-MCNC: 8 MG/DL
CALCIUM SERPL-MCNC: 8.3 MG/DL
CHLORIDE SERPL-SCNC: 101 MMOL/L
CHLORIDE SERPL-SCNC: 102 MMOL/L
CO2 SERPL-SCNC: 25 MMOL/L
CO2 SERPL-SCNC: 27 MMOL/L
CREAT SERPL-MCNC: 1.2 MG/DL
CREAT SERPL-MCNC: 1.3 MG/DL
GLUCOSE SERPL-MCNC: 86 MG/DL
GLUCOSE SERPL-MCNC: 92 MG/DL
HCT VFR BLD CALC: 29.3 %
HGB BLD-MCNC: 9.4 G/DL
MCHC RBC-ENTMCNC: 28.1 PG
MCHC RBC-ENTMCNC: 32.1 G/DL
MCV RBC AUTO: 87.5 FL
PLATELET # BLD AUTO: 178 K/UL
PMV BLD: 10.2 FL
POTASSIUM SERPL-SCNC: 4.2 MMOL/L
POTASSIUM SERPL-SCNC: 4.8 MMOL/L
PROT SERPL-MCNC: 6 G/DL
PROT SERPL-MCNC: 6.1 G/DL
RBC # BLD: 3.35 M/UL
RBC # FLD: 19.4 %
SODIUM SERPL-SCNC: 138 MMOL/L
SODIUM SERPL-SCNC: 139 MMOL/L
WBC # FLD AUTO: 3.26 K/UL

## 2020-12-01 RX ORDER — SODIUM CHLORIDE 9 MG/ML
500 INJECTION INTRAMUSCULAR; INTRAVENOUS; SUBCUTANEOUS
Refills: 0 | Status: DISCONTINUED | OUTPATIENT
Start: 2020-12-01 | End: 2021-01-01

## 2020-12-02 ENCOUNTER — APPOINTMENT (OUTPATIENT)
Dept: OTOLARYNGOLOGY | Facility: CLINIC | Age: 80
End: 2020-12-02
Payer: MEDICARE

## 2020-12-02 DIAGNOSIS — H93.8X2 OTHER SPECIFIED DISORDERS OF LEFT EAR: ICD-10-CM

## 2020-12-02 DIAGNOSIS — H61.22 IMPACTED CERUMEN, LEFT EAR: ICD-10-CM

## 2020-12-02 DIAGNOSIS — R04.0 EPISTAXIS: ICD-10-CM

## 2020-12-02 PROCEDURE — 69210 REMOVE IMPACTED EAR WAX UNI: CPT

## 2020-12-02 PROCEDURE — 99072 ADDL SUPL MATRL&STAF TM PHE: CPT

## 2020-12-02 PROCEDURE — 99213 OFFICE O/P EST LOW 20 MIN: CPT | Mod: 25

## 2020-12-02 PROCEDURE — 31238 NSL/SINS NDSC SRG NSL HEMRRG: CPT | Mod: LT

## 2020-12-02 NOTE — HISTORY OF PRESENT ILLNESS
[FreeTextEntry1] : Patient presents today following up on clogged ears. No otalgia. No tinnitus. \par Patient also c/o epistaxis. Started about 1 week ago. Only happens when blowing her nose from the left nostril every morning.

## 2020-12-02 NOTE — PROCEDURE
[Epistaxis] : evaluation of epistaxis [None] : none [Rigid Endoscope] : examined with a rigid endoscope [Nasal Septum Mucosa Bleeding Left] : bleeding on the left [Cauterized w/Silver Nitrate] : the bleeding was cauterized with silver nitrate [Normal] : the paranasal sinuses had no abnormalities

## 2020-12-02 NOTE — H&P ADULT - NSHPROSALLOTHERNEGRD_GEN_ALL_CORE
All other review of systems negative, except as noted in HPI EnglishSpanish                                                                                                                                        Back Pain in Pregnancy      Back pain during pregnancy is common. Back pain may be caused by several factors that are related to changes during your pregnancy.      Follow these instructions at home:      Managing pain, stiffness, and swelling                 •If directed, for sudden (acute) back pain, put ice on the painful area.  •Put ice in a plastic bag.      •Place a towel between your skin and the bag.      •Leave the ice on for 20 minutes, 2–3 times per day.      •If directed, apply heat to the affected area before you exercise. Use the heat source that your health care provider recommends, such as a moist heat pack or a heating pad.  •Place a towel between your skin and the heat source.      •Leave the heat on for 20–30 minutes.      •Remove the heat if your skin turns bright red. This is especially important if you are unable to feel pain, heat, or cold. You may have a greater risk of getting burned.        •If directed, massage the affected area.      Activity     •Exercise as told by your health care provider. Gentle exercise is the best way to prevent or manage back pain.      •Listen to your body when lifting. If lifting hurts, ask for help or bend your knees. This uses your leg muscles instead of your back muscles.      •Squat down when picking up something from the floor. Do not bend over.      •Only use bed rest for short periods as told by your health care provider. Bed rest should only be used for the most severe episodes of back pain.      Standing, sitting, and lying down     • Do not  one place for long periods of time.      •Use good posture when sitting. Make sure your head rests over your shoulders and is not hanging forward. Use a pillow on your lower back if necessary.    •Try sleeping on your side, preferably the left side, with a pregnancy support pillow or 1–2 regular pillows between your legs.  •If you have back pain after a night's rest, your bed may be too soft.      •A firm mattress may provide more support for your back during pregnancy.        General instructions     • Do not wear high heels.      •Eat a healthy diet. Try to gain weight within your health care provider's recommendations.      •Use a maternity girdle, elastic sling, or back brace as told by your health care provider.      •Take over-the-counter and prescription medicines only as told by your health care provider.      •Work with a physical therapist or massage therapist to find ways to manage back pain. Acupuncture or massage therapy may be helpful.      •Keep all follow-up visits as told by your health care provider. This is important.        Contact a health care provider if:    •Your back pain interferes with your daily activities.      •You have increasing pain in other parts of your body.        Get help right away if:    •You develop numbness, tingling, weakness, or problems with the use of your arms or legs.      •You develop severe back pain that is not controlled with medicine.      •You have a change in bowel or bladder control.      •You develop shortness of breath, dizziness, or you faint.      •You develop nausea, vomiting, or sweating.      •You have back pain that is a rhythmic, cramping pain similar to labor pains. Labor pain is usually 1–2 minutes apart, lasts for about 1 minute, and involves a bearing down feeling or pressure in your pelvis.      •You have back pain and your water breaks or you have vaginal bleeding.      •You have back pain or numbness that travels down your leg.      •Your back pain developed after you fell.      •You develop pain on one side of your back.      •You see blood in your urine.      •You develop skin blisters in the area of your back pain.        Summary    •Back pain may be caused by several factors that are related to changes during your pregnancy.      •Follow instructions as told by your health care provider for managing pain, stiffness, and swelling.      •Exercise as told by your health care provider. Gentle exercise is the best way to prevent or manage back pain.      •Take over-the-counter and prescription medicines only as told by your health care provider.      •Keep all follow-up visits as told by your health care provider. This is important.      This information is not intended to replace advice given to you by your health care provider. Make sure you discuss any questions you have with your health care provider.      Document Revised: 04/07/2020 Document Reviewed: 06/05/2019    Elsevier Patient Education © 2020 [employer]

## 2020-12-03 DIAGNOSIS — R31.9 HEMATURIA, UNSPECIFIED: ICD-10-CM

## 2020-12-04 ENCOUNTER — APPOINTMENT (OUTPATIENT)
Dept: INFUSION THERAPY | Facility: CLINIC | Age: 80
End: 2020-12-04

## 2020-12-08 ENCOUNTER — APPOINTMENT (OUTPATIENT)
Dept: INFUSION THERAPY | Facility: CLINIC | Age: 80
End: 2020-12-08

## 2020-12-10 ENCOUNTER — LABORATORY RESULT (OUTPATIENT)
Age: 80
End: 2020-12-10

## 2020-12-10 ENCOUNTER — APPOINTMENT (OUTPATIENT)
Dept: HEMATOLOGY ONCOLOGY | Facility: CLINIC | Age: 80
End: 2020-12-10
Payer: MEDICARE

## 2020-12-10 VITALS
HEIGHT: 64 IN | TEMPERATURE: 97.5 F | WEIGHT: 138 LBS | RESPIRATION RATE: 14 BRPM | HEART RATE: 80 BPM | BODY MASS INDEX: 23.56 KG/M2 | SYSTOLIC BLOOD PRESSURE: 121 MMHG | DIASTOLIC BLOOD PRESSURE: 68 MMHG

## 2020-12-10 DIAGNOSIS — Z01.818 ENCOUNTER FOR OTHER PREPROCEDURAL EXAMINATION: ICD-10-CM

## 2020-12-10 PROCEDURE — 99213 OFFICE O/P EST LOW 20 MIN: CPT

## 2020-12-10 RX ORDER — RIVAROXABAN 15 MG-20MG
15 & 20 KIT ORAL
Qty: 51 | Refills: 0 | Status: DISCONTINUED | COMMUNITY
Start: 2020-09-10 | End: 2020-12-10

## 2020-12-10 RX ORDER — RIVAROXABAN 20 MG/1
20 TABLET, FILM COATED ORAL
Qty: 30 | Refills: 3 | Status: DISCONTINUED | COMMUNITY
Start: 2020-10-15 | End: 2020-12-10

## 2020-12-11 ENCOUNTER — APPOINTMENT (OUTPATIENT)
Dept: INFUSION THERAPY | Facility: CLINIC | Age: 80
End: 2020-12-11

## 2020-12-11 RX ORDER — CARBOPLATIN 50 MG
110 VIAL (EA) INTRAVENOUS ONCE
Refills: 0 | Status: COMPLETED | OUTPATIENT
Start: 2020-12-11 | End: 2020-12-11

## 2020-12-11 RX ORDER — PACLITAXEL 6 MG/ML
100 INJECTION, SOLUTION, CONCENTRATE INTRAVENOUS ONCE
Refills: 0 | Status: COMPLETED | OUTPATIENT
Start: 2020-12-11 | End: 2020-12-11

## 2020-12-11 RX ORDER — SODIUM CHLORIDE 9 MG/ML
500 INJECTION INTRAMUSCULAR; INTRAVENOUS; SUBCUTANEOUS
Refills: 0 | Status: COMPLETED | OUTPATIENT
Start: 2020-12-11 | End: 2020-12-11

## 2020-12-11 RX ORDER — FAMOTIDINE 10 MG/ML
20 INJECTION INTRAVENOUS ONCE
Refills: 0 | Status: COMPLETED | OUTPATIENT
Start: 2020-12-11 | End: 2020-12-11

## 2020-12-11 RX ORDER — FOSAPREPITANT DIMEGLUMINE 150 MG/5ML
150 INJECTION, POWDER, LYOPHILIZED, FOR SOLUTION INTRAVENOUS ONCE
Refills: 0 | Status: COMPLETED | OUTPATIENT
Start: 2020-12-11 | End: 2020-12-11

## 2020-12-11 RX ORDER — ACETAMINOPHEN 500 MG
650 TABLET ORAL ONCE
Refills: 0 | Status: COMPLETED | OUTPATIENT
Start: 2020-12-11 | End: 2020-12-11

## 2020-12-11 RX ORDER — DIPHENHYDRAMINE HCL 50 MG
25 CAPSULE ORAL ONCE
Refills: 0 | Status: COMPLETED | OUTPATIENT
Start: 2020-12-11 | End: 2020-12-11

## 2020-12-11 RX ORDER — DEXAMETHASONE 0.5 MG/5ML
12 ELIXIR ORAL ONCE
Refills: 0 | Status: COMPLETED | OUTPATIENT
Start: 2020-12-11 | End: 2020-12-11

## 2020-12-11 RX ADMIN — FAMOTIDINE 104 MILLIGRAM(S): 10 INJECTION INTRAVENOUS at 10:43

## 2020-12-11 RX ADMIN — SODIUM CHLORIDE 500 MILLILITER(S): 9 INJECTION INTRAMUSCULAR; INTRAVENOUS; SUBCUTANEOUS at 10:20

## 2020-12-11 RX ADMIN — PACLITAXEL 266.67 MILLIGRAM(S): 6 INJECTION, SOLUTION, CONCENTRATE INTRAVENOUS at 10:29

## 2020-12-11 RX ADMIN — Medication 12 MILLIGRAM(S): at 10:43

## 2020-12-11 RX ADMIN — Medication 650 MILLIGRAM(S): at 10:31

## 2020-12-11 RX ADMIN — FOSAPREPITANT DIMEGLUMINE 300 MILLIGRAM(S): 150 INJECTION, POWDER, LYOPHILIZED, FOR SOLUTION INTRAVENOUS at 11:23

## 2020-12-11 RX ADMIN — FAMOTIDINE 20 MILLIGRAM(S): 10 INJECTION INTRAVENOUS at 11:03

## 2020-12-11 RX ADMIN — Medication 25 MILLIGRAM(S): at 11:23

## 2020-12-11 RX ADMIN — Medication 101 MILLIGRAM(S): at 11:03

## 2020-12-11 RX ADMIN — Medication 650 MILLIGRAM(S): at 10:24

## 2020-12-11 RX ADMIN — Medication 122 MILLIGRAM(S): at 10:23

## 2020-12-11 RX ADMIN — Medication 261 MILLIGRAM(S): at 10:28

## 2020-12-15 ENCOUNTER — APPOINTMENT (OUTPATIENT)
Dept: INFUSION THERAPY | Facility: CLINIC | Age: 80
End: 2020-12-15

## 2020-12-15 ENCOUNTER — APPOINTMENT (OUTPATIENT)
Dept: PSYCHIATRY | Facility: CLINIC | Age: 80
End: 2020-12-15

## 2020-12-18 ENCOUNTER — APPOINTMENT (OUTPATIENT)
Dept: INFUSION THERAPY | Facility: CLINIC | Age: 80
End: 2020-12-18

## 2020-12-18 ENCOUNTER — LABORATORY RESULT (OUTPATIENT)
Age: 80
End: 2020-12-18

## 2020-12-18 RX ORDER — FAMOTIDINE 10 MG/ML
20 INJECTION INTRAVENOUS ONCE
Refills: 0 | Status: COMPLETED | OUTPATIENT
Start: 2020-12-18 | End: 2020-12-18

## 2020-12-18 RX ORDER — ACETAMINOPHEN 500 MG
650 TABLET ORAL ONCE
Refills: 0 | Status: COMPLETED | OUTPATIENT
Start: 2020-12-18 | End: 2020-12-18

## 2020-12-18 RX ORDER — CARBOPLATIN 50 MG
110 VIAL (EA) INTRAVENOUS ONCE
Refills: 0 | Status: COMPLETED | OUTPATIENT
Start: 2020-12-18 | End: 2020-12-18

## 2020-12-18 RX ORDER — PACLITAXEL 6 MG/ML
100 INJECTION, SOLUTION, CONCENTRATE INTRAVENOUS ONCE
Refills: 0 | Status: COMPLETED | OUTPATIENT
Start: 2020-12-18 | End: 2020-12-18

## 2020-12-18 RX ORDER — SODIUM CHLORIDE 9 MG/ML
500 INJECTION INTRAMUSCULAR; INTRAVENOUS; SUBCUTANEOUS
Refills: 0 | Status: DISCONTINUED | OUTPATIENT
Start: 2020-12-18 | End: 2021-01-01

## 2020-12-18 RX ORDER — FOSAPREPITANT DIMEGLUMINE 150 MG/5ML
150 INJECTION, POWDER, LYOPHILIZED, FOR SOLUTION INTRAVENOUS ONCE
Refills: 0 | Status: COMPLETED | OUTPATIENT
Start: 2020-12-18 | End: 2020-12-18

## 2020-12-18 RX ORDER — DIPHENHYDRAMINE HCL 50 MG
25 CAPSULE ORAL ONCE
Refills: 0 | Status: COMPLETED | OUTPATIENT
Start: 2020-12-18 | End: 2020-12-18

## 2020-12-18 RX ORDER — DEXAMETHASONE 0.5 MG/5ML
12 ELIXIR ORAL ONCE
Refills: 0 | Status: COMPLETED | OUTPATIENT
Start: 2020-12-18 | End: 2020-12-18

## 2020-12-18 RX ADMIN — FOSAPREPITANT DIMEGLUMINE 300 MILLIGRAM(S): 150 INJECTION, POWDER, LYOPHILIZED, FOR SOLUTION INTRAVENOUS at 14:00

## 2020-12-18 RX ADMIN — Medication 101 MILLIGRAM(S): at 13:40

## 2020-12-18 RX ADMIN — PACLITAXEL 266.67 MILLIGRAM(S): 6 INJECTION, SOLUTION, CONCENTRATE INTRAVENOUS at 14:08

## 2020-12-18 RX ADMIN — Medication 650 MILLIGRAM(S): at 13:02

## 2020-12-18 RX ADMIN — Medication 122 MILLIGRAM(S): at 12:59

## 2020-12-18 RX ADMIN — Medication 12 MILLIGRAM(S): at 13:20

## 2020-12-18 RX ADMIN — Medication 25 MILLIGRAM(S): at 14:00

## 2020-12-18 RX ADMIN — FOSAPREPITANT DIMEGLUMINE 150 MILLIGRAM(S): 150 INJECTION, POWDER, LYOPHILIZED, FOR SOLUTION INTRAVENOUS at 14:20

## 2020-12-18 RX ADMIN — Medication 261 MILLIGRAM(S): at 14:08

## 2020-12-18 RX ADMIN — FAMOTIDINE 104 MILLIGRAM(S): 10 INJECTION INTRAVENOUS at 13:20

## 2020-12-18 RX ADMIN — FAMOTIDINE 20 MILLIGRAM(S): 10 INJECTION INTRAVENOUS at 13:40

## 2020-12-18 RX ADMIN — SODIUM CHLORIDE 500 MILLILITER(S): 9 INJECTION INTRAMUSCULAR; INTRAVENOUS; SUBCUTANEOUS at 13:02

## 2020-12-18 NOTE — ASSESSMENT
[FreeTextEntry1] : Recurrent adenocarcinoma Mullerian origin, malignant ascites diagnosed on 8/8/2020\par --Prior SHAAN BSO in 2016, adjuvant chemotherapy, obtain records from Dr. Abdullahi \par --Started on weekly carbo/taxol weekly 3 on 1 off on 8/14/2020.\par --PET CT reviewed from 9/8/20. \par --Lab work today with Stable blood counts\par --For chemo 10/16/2020\par --PICC line care\par --On 12/11/20 patient will proceed with Cycle #5 of weekly carboplatin and Taxol weekly 3 weeks on and one week off. \par \par Malignant ascites required high volume paracentesis \par --Improved/resolved with carbo/taxol \par \par PE in the setting of malignancy diagnosed on 8/6/2020\par --Doppler was negative for DVT on 8/8/2020\par --On BID Lovenox, but ran out on 9/1/2020\par --Also on ASA, h/o CABG . \par --Was stared on Xarelto, but after 1 dose was admitted with recurrent PE, diagnosed on 9/11/2020\par --Contique on Xarelto \par \par Follow up in 3 weeks or earlier if needed.\par patient seen and examined by Dr Gutierrez who agreed for the above plan of care. \par

## 2020-12-18 NOTE — PHYSICAL EXAM
[Ambulatory and capable of all self care but unable to carry out any work activities] : Status 2- Ambulatory and capable of all self care but unable to carry out any work activities. Up and about more than 50% of waking hours [Normal] : affect appropriate [de-identified] : mildly distended overall improved compared to prior, non-tender

## 2020-12-18 NOTE — HISTORY OF PRESENT ILLNESS
[de-identified] : Trav is a shekhar 81 year old lady I initially met in the hospital on 8/11/2020. Her past medical history is significant for uterine cancer s/p SHAAN BSO as per patient s/p adjuvant chemotherapy in 2016, CAD s/p CABG, HTN and depression, she presented to the ED for abdominal pain and chest discomfort. 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 \par \par PAST MEDICAL & SURGICAL HISTORY:\par HLD (hyperlipidemia)\par HTN (hypertension)\par CAD (coronary artery disease)\par History of coronary artery bypass surgery\par \par FAMILY HISTORY:\par No pertinent family history in first degree relatives\par \par Allergies\par chloroquine (Hives)\par \par CTA C/A/P on 8/6/2020 revealed \par 1. Intraluminal filling defect is seen within the segmental branches of the right lower lobe pulmonary artery, indicating acute pulmonary embolism. (6/132-138; 10/51-55). Filling defect in segmental branch of the right upper lobe pulmonary artery. (6/77) There is a small filling defect within a left lower lobe pulmonary artery segmental branch (6/147; 10/59). No evidence of right heart strain. The right ventricle measures 3.5 cm; the left ventricle measures 3.7 cm. (RV:LV<1) \par 2. Compared with the previous CT scan of 5/27/2020, there is now a large amount of ascites throughout the abdomen and pelvis. \par \par LE Doppler on 8/8/2020 revealed \par No evidence of deep venous thrombosis or superficial thrombophlebitis in the bilateral lower extremities. \par Left popliteal fossa fluid collection as measured above likely Baker's cyst\par \par During her admission she has required 3 episodes of paracentesis on 8/7 3L, 8/12 4L, 8/13 2.2L, once pathology became available patient was started on weekly Carbo/Taxol she has tolerated chemotherapy well.  [de-identified] : 8/18/2020: She returns today for follow up, she reports improved SOB, improved abdominal swelling, still come positional chest discomfort, she is compliant with BID Lovenox, she remains on the ASA. She reports improved appetite, she reports that appetite is decreased with a large amount of fluid on the abdomen. She is due for C1D8 of Carbo/Taxol on 8/21/2020. CBC was reviewed. \par Side effects of chemotherapy including, but not limited to, cytopenias, that may require blood product transfusions and lead to increased risk of infection, requiring hospitalization, allergic reactions, that can rarely be life-threatening, skin reactions, nausea/vomiting, mucositis, diarrhea/constipation were discussed at length, patient and family voice undestaging, all questions were answered to patient's satisfaction.\par Images were personally reviewed by MD Matt and discussed with patient.\par \par 9/10/2020: Patient is 80 years old female came  today for follow up for  Recurrent adenocarcinoma Mullerian origin, malignant ascites diagnosed on 8/8/2020, she S/P one cycle of weekly Carbo/taxol she tolerated well. She  reports improved SOB, improved abdominal swelling, she is ran out of Lovenox, which she is supposed to be taking BID, she didn't call for the renewal, she remains on the ASA. She  has been without Lovenox for like 7 days now, we will restart her on AC immediately. Xarelto was prescribed. She was educated again that she will need to remain on long-term anticoagulation. She reports good  appetite  She is due to start the new cycle of chemo on 9/11/20.\par PET Scan reviewed from 9/8/20 which revealed\par Mild diffuse FDG uptake throughout the mesentery along with peritoneal stranding and nodularity compatible with peritoneal carcinomatosis (SUV up to 3.9). \par No additional sites of abnormal FDG uptake to suggest biologic tumor activity.\par Images were personally reviewed by MD Matt and discussed with patient. \par \par 10/15/2020: Trav present for follow up, her next chemotherapy is for tomorrow, we will need to place PICC line, this will be arrange as outpatient. \par CT chest on 10/11/2020 revealed When compared to prior study dated 9/11/2020: \par Although not a dedicated PE study, redemonstration of a right lower lobe segmental/subsegmental pulmonary embolus which appears overall decreased in size from one month prior. \par No evidence for new consolidation, pleural effusion or pneumothorax. \par Trav reports mild neuropathy, not worsening with chemotherapy, CBC was reviewed. Overall clinically she demonstrates great response to therapy. \par \par 11/12/2020: Trav is 80 years old female  present for follow up, her ncycle 4 of carbo/taxol is for tomorrow. She repots feeling better regard urination and hematuria. She stated no more blood with her urine but sometime feels pain while she is urinating.\par We communicated CBC result with Patient with HGB/ HCT is 9.6/29.5. Also patient currently on the therapeutic lovenox once every 12 hrs.\par Trav reports neuropathy is getting  worse with chemotherapy, CBC was reviewed. Overall clinically she demonstrates great response to therapy.\par \par 12/10/2020: Trav is a 80 years old female  present for follow up for Recurrent adenocarcinoma Mullerian origin, malignant ascites diagnosed on 8/8/2020, S/P 4 cycles of weekly Carboplatin and Taxol.  She reports feeling well, tolerating treatment. She denies hematuria, abdominal pain. \par We communicated CBC result with Patient with HGB/ HCT is 9.1/29.5. Also patient continues on the therapeutic Lovenox once every 12 hrs.\par Patient will start Cycle # 5 tomorrow.

## 2020-12-18 NOTE — REVIEW OF SYSTEMS
[Fatigue] : fatigue [Recent Change In Weight] : ~T recent weight change [SOB on Exertion] : shortness of breath during exertion [Negative] : Allergic/Immunologic [Fever] : no fever [Chills] : no chills [Night Sweats] : no night sweats [Shortness Of Breath] : no shortness of breath [Wheezing] : no wheezing [Cough] : no cough [Abdominal Pain] : no abdominal pain [Vomiting] : no vomiting [Constipation] : no constipation [Diarrhea] : no diarrhea [Confused] : no confusion [Dizziness] : no dizziness [Fainting] : no fainting [Difficulty Walking] : no difficulty walking [FreeTextEntry7] : early satiety [de-identified] : minor preexisting neuropathy

## 2020-12-22 ENCOUNTER — APPOINTMENT (OUTPATIENT)
Dept: INFUSION THERAPY | Facility: CLINIC | Age: 80
End: 2020-12-22

## 2020-12-22 ENCOUNTER — NON-APPOINTMENT (OUTPATIENT)
Age: 80
End: 2020-12-22

## 2020-12-24 ENCOUNTER — LABORATORY RESULT (OUTPATIENT)
Age: 80
End: 2020-12-24

## 2020-12-24 ENCOUNTER — APPOINTMENT (OUTPATIENT)
Dept: INFUSION THERAPY | Facility: CLINIC | Age: 80
End: 2020-12-24

## 2020-12-24 RX ORDER — FOSAPREPITANT DIMEGLUMINE 150 MG/5ML
150 INJECTION, POWDER, LYOPHILIZED, FOR SOLUTION INTRAVENOUS ONCE
Refills: 0 | Status: COMPLETED | OUTPATIENT
Start: 2020-12-24 | End: 2020-12-24

## 2020-12-24 RX ORDER — DIPHENHYDRAMINE HCL 50 MG
25 CAPSULE ORAL ONCE
Refills: 0 | Status: COMPLETED | OUTPATIENT
Start: 2020-12-24 | End: 2020-12-24

## 2020-12-24 RX ORDER — FAMOTIDINE 10 MG/ML
20 INJECTION INTRAVENOUS ONCE
Refills: 0 | Status: COMPLETED | OUTPATIENT
Start: 2020-12-24 | End: 2020-12-24

## 2020-12-24 RX ORDER — ACETAMINOPHEN 500 MG
650 TABLET ORAL ONCE
Refills: 0 | Status: COMPLETED | OUTPATIENT
Start: 2020-12-24 | End: 2020-12-24

## 2020-12-24 RX ORDER — PACLITAXEL 6 MG/ML
100 INJECTION, SOLUTION, CONCENTRATE INTRAVENOUS ONCE
Refills: 0 | Status: COMPLETED | OUTPATIENT
Start: 2020-12-24 | End: 2020-12-24

## 2020-12-24 RX ORDER — DEXAMETHASONE 0.5 MG/5ML
12 ELIXIR ORAL ONCE
Refills: 0 | Status: COMPLETED | OUTPATIENT
Start: 2020-12-24 | End: 2020-12-24

## 2020-12-24 RX ORDER — SODIUM CHLORIDE 9 MG/ML
500 INJECTION INTRAMUSCULAR; INTRAVENOUS; SUBCUTANEOUS
Refills: 0 | Status: COMPLETED | OUTPATIENT
Start: 2020-12-24 | End: 2020-12-24

## 2020-12-24 RX ORDER — CARBOPLATIN 50 MG
110 VIAL (EA) INTRAVENOUS ONCE
Refills: 0 | Status: COMPLETED | OUTPATIENT
Start: 2020-12-24 | End: 2020-12-24

## 2020-12-24 RX ADMIN — SODIUM CHLORIDE 500 MILLILITER(S): 9 INJECTION INTRAMUSCULAR; INTRAVENOUS; SUBCUTANEOUS at 12:00

## 2020-12-24 RX ADMIN — Medication 122 MILLIGRAM(S): at 09:55

## 2020-12-24 RX ADMIN — FAMOTIDINE 20 MILLIGRAM(S): 10 INJECTION INTRAVENOUS at 10:25

## 2020-12-24 RX ADMIN — Medication 261 MILLIGRAM(S): at 12:10

## 2020-12-24 RX ADMIN — FOSAPREPITANT DIMEGLUMINE 150 MILLIGRAM(S): 150 INJECTION, POWDER, LYOPHILIZED, FOR SOLUTION INTRAVENOUS at 11:00

## 2020-12-24 RX ADMIN — Medication 650 MILLIGRAM(S): at 09:50

## 2020-12-24 RX ADMIN — Medication 101 MILLIGRAM(S): at 10:25

## 2020-12-24 RX ADMIN — PACLITAXEL 266.67 MILLIGRAM(S): 6 INJECTION, SOLUTION, CONCENTRATE INTRAVENOUS at 11:05

## 2020-12-24 RX ADMIN — PACLITAXEL 100 MILLIGRAM(S): 6 INJECTION, SOLUTION, CONCENTRATE INTRAVENOUS at 12:05

## 2020-12-24 RX ADMIN — FOSAPREPITANT DIMEGLUMINE 300 MILLIGRAM(S): 150 INJECTION, POWDER, LYOPHILIZED, FOR SOLUTION INTRAVENOUS at 10:35

## 2020-12-24 RX ADMIN — Medication 110 MILLIGRAM(S): at 13:10

## 2020-12-24 RX ADMIN — FAMOTIDINE 104 MILLIGRAM(S): 10 INJECTION INTRAVENOUS at 10:10

## 2020-12-24 RX ADMIN — Medication 12 MILLIGRAM(S): at 10:10

## 2020-12-24 RX ADMIN — Medication 25 MILLIGRAM(S): at 10:40

## 2020-12-29 ENCOUNTER — APPOINTMENT (OUTPATIENT)
Dept: INFUSION THERAPY | Facility: CLINIC | Age: 80
End: 2020-12-29

## 2021-01-01 ENCOUNTER — LABORATORY RESULT (OUTPATIENT)
Age: 81
End: 2021-01-01

## 2021-01-01 ENCOUNTER — EMERGENCY (EMERGENCY)
Facility: HOSPITAL | Age: 81
LOS: 0 days | Discharge: HOME | End: 2021-09-29
Attending: EMERGENCY MEDICINE | Admitting: EMERGENCY MEDICINE
Payer: MEDICARE

## 2021-01-01 ENCOUNTER — APPOINTMENT (OUTPATIENT)
Dept: UROLOGY | Facility: CLINIC | Age: 81
End: 2021-01-01

## 2021-01-01 ENCOUNTER — OUTPATIENT (OUTPATIENT)
Dept: OUTPATIENT SERVICES | Facility: HOSPITAL | Age: 81
LOS: 1 days | Discharge: HOME | End: 2021-01-01

## 2021-01-01 ENCOUNTER — RESULT REVIEW (OUTPATIENT)
Age: 81
End: 2021-01-01

## 2021-01-01 ENCOUNTER — APPOINTMENT (OUTPATIENT)
Dept: OTOLARYNGOLOGY | Facility: CLINIC | Age: 81
End: 2021-01-01
Payer: MEDICARE

## 2021-01-01 ENCOUNTER — EMERGENCY (EMERGENCY)
Facility: HOSPITAL | Age: 81
LOS: 0 days | Discharge: HOME | End: 2021-10-11
Attending: EMERGENCY MEDICINE | Admitting: EMERGENCY MEDICINE
Payer: MEDICARE

## 2021-01-01 ENCOUNTER — APPOINTMENT (OUTPATIENT)
Dept: HEMATOLOGY ONCOLOGY | Facility: CLINIC | Age: 81
End: 2021-01-01
Payer: MEDICARE

## 2021-01-01 ENCOUNTER — APPOINTMENT (OUTPATIENT)
Dept: PSYCHIATRY | Facility: CLINIC | Age: 81
End: 2021-01-01

## 2021-01-01 ENCOUNTER — APPOINTMENT (OUTPATIENT)
Dept: HEMATOLOGY ONCOLOGY | Facility: CLINIC | Age: 81
End: 2021-01-01

## 2021-01-01 ENCOUNTER — NON-APPOINTMENT (OUTPATIENT)
Age: 81
End: 2021-01-01

## 2021-01-01 ENCOUNTER — OUTPATIENT (OUTPATIENT)
Dept: OUTPATIENT SERVICES | Facility: HOSPITAL | Age: 81
LOS: 1 days | Discharge: HOME | End: 2021-01-01
Payer: MEDICARE

## 2021-01-01 ENCOUNTER — EMERGENCY (EMERGENCY)
Facility: HOSPITAL | Age: 81
LOS: 0 days | Discharge: HOME | End: 2021-11-18
Attending: EMERGENCY MEDICINE | Admitting: EMERGENCY MEDICINE
Payer: MEDICARE

## 2021-01-01 ENCOUNTER — EMERGENCY (EMERGENCY)
Facility: HOSPITAL | Age: 81
LOS: 0 days | Discharge: HOME | End: 2021-09-01
Attending: STUDENT IN AN ORGANIZED HEALTH CARE EDUCATION/TRAINING PROGRAM
Payer: SELF-PAY

## 2021-01-01 ENCOUNTER — EMERGENCY (EMERGENCY)
Facility: HOSPITAL | Age: 81
LOS: 0 days | Discharge: HOME | End: 2021-11-29
Attending: EMERGENCY MEDICINE | Admitting: EMERGENCY MEDICINE
Payer: MEDICARE

## 2021-01-01 ENCOUNTER — EMERGENCY (EMERGENCY)
Facility: HOSPITAL | Age: 81
LOS: 0 days | Discharge: HOME | End: 2021-09-01
Attending: EMERGENCY MEDICINE | Admitting: EMERGENCY MEDICINE
Payer: MEDICARE

## 2021-01-01 ENCOUNTER — EMERGENCY (EMERGENCY)
Facility: HOSPITAL | Age: 81
LOS: 0 days | Discharge: HOME | End: 2021-11-19
Attending: EMERGENCY MEDICINE | Admitting: EMERGENCY MEDICINE
Payer: MEDICARE

## 2021-01-01 ENCOUNTER — EMERGENCY (EMERGENCY)
Facility: HOSPITAL | Age: 81
LOS: 0 days | Discharge: HOME | End: 2021-08-06
Attending: EMERGENCY MEDICINE | Admitting: EMERGENCY MEDICINE
Payer: MEDICARE

## 2021-01-01 ENCOUNTER — APPOINTMENT (OUTPATIENT)
Dept: OTOLARYNGOLOGY | Facility: CLINIC | Age: 81
End: 2021-01-01

## 2021-01-01 ENCOUNTER — APPOINTMENT (OUTPATIENT)
Dept: CARDIOLOGY | Facility: CLINIC | Age: 81
End: 2021-01-01
Payer: MEDICARE

## 2021-01-01 VITALS
TEMPERATURE: 98 F | DIASTOLIC BLOOD PRESSURE: 60 MMHG | SYSTOLIC BLOOD PRESSURE: 125 MMHG | RESPIRATION RATE: 18 BRPM | OXYGEN SATURATION: 100 % | HEART RATE: 58 BPM

## 2021-01-01 VITALS
HEIGHT: 63 IN | SYSTOLIC BLOOD PRESSURE: 144 MMHG | OXYGEN SATURATION: 99 % | TEMPERATURE: 99 F | WEIGHT: 147.05 LBS | RESPIRATION RATE: 19 BRPM | DIASTOLIC BLOOD PRESSURE: 73 MMHG | HEART RATE: 69 BPM

## 2021-01-01 VITALS
HEART RATE: 83 BPM | RESPIRATION RATE: 18 BRPM | WEIGHT: 154.98 LBS | DIASTOLIC BLOOD PRESSURE: 71 MMHG | HEIGHT: 63 IN | OXYGEN SATURATION: 95 % | TEMPERATURE: 100 F | SYSTOLIC BLOOD PRESSURE: 128 MMHG

## 2021-01-01 VITALS
TEMPERATURE: 97.8 F | SYSTOLIC BLOOD PRESSURE: 114 MMHG | BODY MASS INDEX: 26.29 KG/M2 | WEIGHT: 154 LBS | DIASTOLIC BLOOD PRESSURE: 70 MMHG | HEART RATE: 75 BPM | HEIGHT: 64 IN | RESPIRATION RATE: 14 BRPM

## 2021-01-01 VITALS
SYSTOLIC BLOOD PRESSURE: 139 MMHG | OXYGEN SATURATION: 100 % | DIASTOLIC BLOOD PRESSURE: 74 MMHG | WEIGHT: 145.06 LBS | RESPIRATION RATE: 18 BRPM | HEART RATE: 70 BPM | TEMPERATURE: 99 F

## 2021-01-01 VITALS
BODY MASS INDEX: 25.1 KG/M2 | TEMPERATURE: 97.4 F | DIASTOLIC BLOOD PRESSURE: 63 MMHG | WEIGHT: 147 LBS | RESPIRATION RATE: 16 BRPM | HEIGHT: 64 IN | SYSTOLIC BLOOD PRESSURE: 115 MMHG | HEART RATE: 65 BPM

## 2021-01-01 VITALS
TEMPERATURE: 97 F | DIASTOLIC BLOOD PRESSURE: 67 MMHG | HEART RATE: 68 BPM | RESPIRATION RATE: 17 BRPM | SYSTOLIC BLOOD PRESSURE: 137 MMHG | OXYGEN SATURATION: 100 %

## 2021-01-01 VITALS
RESPIRATION RATE: 18 BRPM | DIASTOLIC BLOOD PRESSURE: 77 MMHG | OXYGEN SATURATION: 100 % | TEMPERATURE: 98 F | HEART RATE: 60 BPM | SYSTOLIC BLOOD PRESSURE: 165 MMHG

## 2021-01-01 VITALS
TEMPERATURE: 99 F | HEIGHT: 63 IN | HEART RATE: 98 BPM | RESPIRATION RATE: 18 BRPM | OXYGEN SATURATION: 100 % | SYSTOLIC BLOOD PRESSURE: 130 MMHG | WEIGHT: 154.98 LBS | DIASTOLIC BLOOD PRESSURE: 69 MMHG

## 2021-01-01 VITALS
DIASTOLIC BLOOD PRESSURE: 70 MMHG | WEIGHT: 145.06 LBS | OXYGEN SATURATION: 99 % | HEIGHT: 63 IN | HEART RATE: 68 BPM | RESPIRATION RATE: 17 BRPM | SYSTOLIC BLOOD PRESSURE: 140 MMHG | TEMPERATURE: 98 F

## 2021-01-01 VITALS
TEMPERATURE: 98 F | HEART RATE: 92 BPM | SYSTOLIC BLOOD PRESSURE: 129 MMHG | OXYGEN SATURATION: 99 % | RESPIRATION RATE: 18 BRPM | DIASTOLIC BLOOD PRESSURE: 72 MMHG

## 2021-01-01 VITALS
WEIGHT: 145.06 LBS | SYSTOLIC BLOOD PRESSURE: 139 MMHG | OXYGEN SATURATION: 100 % | TEMPERATURE: 99 F | RESPIRATION RATE: 18 BRPM | HEIGHT: 63 IN | DIASTOLIC BLOOD PRESSURE: 74 MMHG | HEART RATE: 70 BPM

## 2021-01-01 VITALS
WEIGHT: 154.98 LBS | TEMPERATURE: 98 F | HEART RATE: 72 BPM | DIASTOLIC BLOOD PRESSURE: 79 MMHG | SYSTOLIC BLOOD PRESSURE: 122 MMHG | HEIGHT: 63 IN | RESPIRATION RATE: 18 BRPM | OXYGEN SATURATION: 98 %

## 2021-01-01 VITALS
DIASTOLIC BLOOD PRESSURE: 78 MMHG | HEIGHT: 64 IN | SYSTOLIC BLOOD PRESSURE: 126 MMHG | HEART RATE: 59 BPM | BODY MASS INDEX: 24.59 KG/M2 | TEMPERATURE: 97.3 F | WEIGHT: 144 LBS | RESPIRATION RATE: 14 BRPM

## 2021-01-01 VITALS
WEIGHT: 147 LBS | HEIGHT: 64 IN | DIASTOLIC BLOOD PRESSURE: 68 MMHG | HEART RATE: 59 BPM | BODY MASS INDEX: 25.1 KG/M2 | TEMPERATURE: 97 F | SYSTOLIC BLOOD PRESSURE: 122 MMHG

## 2021-01-01 VITALS
WEIGHT: 141 LBS | DIASTOLIC BLOOD PRESSURE: 83 MMHG | SYSTOLIC BLOOD PRESSURE: 111 MMHG | BODY MASS INDEX: 24.07 KG/M2 | HEART RATE: 64 BPM | HEIGHT: 64 IN | TEMPERATURE: 97.4 F | RESPIRATION RATE: 14 BRPM

## 2021-01-01 VITALS — WEIGHT: 147.05 LBS | HEIGHT: 63 IN

## 2021-01-01 DIAGNOSIS — Z95.1 PRESENCE OF AORTOCORONARY BYPASS GRAFT: ICD-10-CM

## 2021-01-01 DIAGNOSIS — R07.1 CHEST PAIN ON BREATHING: ICD-10-CM

## 2021-01-01 DIAGNOSIS — Z85.42 PERSONAL HISTORY OF MALIGNANT NEOPLASM OF OTHER PARTS OF UTERUS: ICD-10-CM

## 2021-01-01 DIAGNOSIS — Z98.890 OTHER SPECIFIED POSTPROCEDURAL STATES: Chronic | ICD-10-CM

## 2021-01-01 DIAGNOSIS — Z98.41 CATARACT EXTRACTION STATUS, RIGHT EYE: ICD-10-CM

## 2021-01-01 DIAGNOSIS — Z95.1 PRESENCE OF AORTOCORONARY BYPASS GRAFT: Chronic | ICD-10-CM

## 2021-01-01 DIAGNOSIS — Z86.69 PERSONAL HISTORY OF OTHER DISEASES OF THE NERVOUS SYSTEM AND SENSE ORGANS: ICD-10-CM

## 2021-01-01 DIAGNOSIS — I25.701 ATHEROSCLEROSIS OF CORONARY ARTERY BYPASS GRAFT(S), UNSPECIFIED, WITH ANGINA PECTORIS WITH DOCUMENTED SPASM: Chronic | ICD-10-CM

## 2021-01-01 DIAGNOSIS — C55 MALIGNANT NEOPLASM OF UTERUS, PART UNSPECIFIED: ICD-10-CM

## 2021-01-01 DIAGNOSIS — I26.99 OTHER PULMONARY EMBOLISM WITHOUT ACUTE COR PULMONALE: ICD-10-CM

## 2021-01-01 DIAGNOSIS — Z90.710 ACQUIRED ABSENCE OF BOTH CERVIX AND UTERUS: ICD-10-CM

## 2021-01-01 DIAGNOSIS — Z88.8 ALLERGY STATUS TO OTHER DRUGS, MEDICAMENTS AND BIOLOGICAL SUBSTANCES STATUS: ICD-10-CM

## 2021-01-01 DIAGNOSIS — I25.10 ATHEROSCLEROTIC HEART DISEASE OF NATIVE CORONARY ARTERY WITHOUT ANGINA PECTORIS: ICD-10-CM

## 2021-01-01 DIAGNOSIS — I10 ESSENTIAL (PRIMARY) HYPERTENSION: ICD-10-CM

## 2021-01-01 DIAGNOSIS — R93.89 ABNORMAL FINDINGS ON DIAGNOSTIC IMAGING OF OTHER SPECIFIED BODY STRUCTURES: ICD-10-CM

## 2021-01-01 DIAGNOSIS — F41.9 ANXIETY DISORDER, UNSPECIFIED: ICD-10-CM

## 2021-01-01 DIAGNOSIS — Z79.01 LONG TERM (CURRENT) USE OF ANTICOAGULANTS: ICD-10-CM

## 2021-01-01 DIAGNOSIS — E78.5 HYPERLIPIDEMIA, UNSPECIFIED: ICD-10-CM

## 2021-01-01 DIAGNOSIS — R05 COUGH: ICD-10-CM

## 2021-01-01 DIAGNOSIS — Z12.31 ENCOUNTER FOR SCREENING MAMMOGRAM FOR MALIGNANT NEOPLASM OF BREAST: ICD-10-CM

## 2021-01-01 DIAGNOSIS — Z79.02 LONG TERM (CURRENT) USE OF ANTITHROMBOTICS/ANTIPLATELETS: ICD-10-CM

## 2021-01-01 DIAGNOSIS — M54.6 PAIN IN THORACIC SPINE: ICD-10-CM

## 2021-01-01 DIAGNOSIS — I25.10 ATHEROSCLEROTIC HEART DISEASE OF NATIVE CORONARY ARTERY W/OUT ANGINA PECTORIS: ICD-10-CM
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
## 2021-01-01 DIAGNOSIS — F32.A DEPRESSION, UNSPECIFIED: ICD-10-CM

## 2021-01-01 DIAGNOSIS — E78.00 PURE HYPERCHOLESTEROLEMIA, UNSPECIFIED: ICD-10-CM

## 2021-01-01 DIAGNOSIS — R10.13 EPIGASTRIC PAIN: ICD-10-CM

## 2021-01-01 DIAGNOSIS — Z86.39 PERSONAL HISTORY OF OTHER ENDOCRINE, NUTRITIONAL AND METABOLIC DISEASE: ICD-10-CM

## 2021-01-01 DIAGNOSIS — Z79.899 OTHER LONG TERM (CURRENT) DRUG THERAPY: ICD-10-CM

## 2021-01-01 DIAGNOSIS — N13.30 UNSPECIFIED HYDRONEPHROSIS: ICD-10-CM

## 2021-01-01 DIAGNOSIS — Z20.822 CONTACT WITH AND (SUSPECTED) EXPOSURE TO COVID-19: ICD-10-CM

## 2021-01-01 DIAGNOSIS — N64.4 MASTODYNIA: ICD-10-CM

## 2021-01-01 DIAGNOSIS — F43.21 ADJUSTMENT DISORDER WITH DEPRESSED MOOD: ICD-10-CM

## 2021-01-01 DIAGNOSIS — R07.89 OTHER CHEST PAIN: ICD-10-CM

## 2021-01-01 DIAGNOSIS — H93.8X3 OTHER SPECIFIED DISORDERS OF EAR, BILATERAL: ICD-10-CM

## 2021-01-01 DIAGNOSIS — Z95.5 PRESENCE OF CORONARY ANGIOPLASTY IMPLANT AND GRAFT: ICD-10-CM

## 2021-01-01 DIAGNOSIS — R63.0 ANOREXIA: ICD-10-CM

## 2021-01-01 DIAGNOSIS — Z85.51 PERSONAL HISTORY OF MALIGNANT NEOPLASM OF BLADDER: ICD-10-CM

## 2021-01-01 DIAGNOSIS — C54.1 MALIGNANT NEOPLASM OF ENDOMETRIUM: ICD-10-CM

## 2021-01-01 DIAGNOSIS — R18.8 OTHER ASCITES: ICD-10-CM

## 2021-01-01 DIAGNOSIS — Z88.8 ALLERGY STATUS TO OTHER DRUGS, MEDICAMENTS AND BIOLOGICAL SUBSTANCES: ICD-10-CM

## 2021-01-01 DIAGNOSIS — I34.0 NONRHEUMATIC MITRAL (VALVE) INSUFFICIENCY: ICD-10-CM

## 2021-01-01 DIAGNOSIS — Z86.711 PERSONAL HISTORY OF PULMONARY EMBOLISM: ICD-10-CM

## 2021-01-01 DIAGNOSIS — F41.1 GENERALIZED ANXIETY DISORDER: ICD-10-CM

## 2021-01-01 DIAGNOSIS — I48.91 UNSPECIFIED ATRIAL FIBRILLATION: ICD-10-CM

## 2021-01-01 DIAGNOSIS — R45.84 ANHEDONIA: ICD-10-CM
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
## 2021-01-01 DIAGNOSIS — Z92.21 PERSONAL HISTORY OF ANTINEOPLASTIC CHEMOTHERAPY: ICD-10-CM
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
## 2021-01-01 DIAGNOSIS — R00.1 BRADYCARDIA, UNSPECIFIED: ICD-10-CM
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
## 2021-01-01 DIAGNOSIS — H61.23 IMPACTED CERUMEN, BILATERAL: ICD-10-CM

## 2021-01-01 DIAGNOSIS — Z02.9 ENCOUNTER FOR ADMINISTRATIVE EXAMINATIONS, UNSPECIFIED: ICD-10-CM

## 2021-01-01 LAB
ALBUMIN SERPL ELPH-MCNC: 3.9 G/DL — SIGNIFICANT CHANGE UP (ref 3.5–5.2)
ALBUMIN SERPL ELPH-MCNC: 4 G/DL — SIGNIFICANT CHANGE UP (ref 3.5–5.2)
ALBUMIN SERPL ELPH-MCNC: 4.1 G/DL
ALBUMIN SERPL ELPH-MCNC: 4.1 G/DL — SIGNIFICANT CHANGE UP (ref 3.5–5.2)
ALBUMIN SERPL ELPH-MCNC: 4.2 G/DL
ALBUMIN SERPL ELPH-MCNC: 4.2 G/DL — SIGNIFICANT CHANGE UP (ref 3.5–5.2)
ALBUMIN SERPL ELPH-MCNC: 4.3 G/DL
ALBUMIN SERPL ELPH-MCNC: 4.4 G/DL — SIGNIFICANT CHANGE UP (ref 3.5–5.2)
ALBUMIN SERPL ELPH-MCNC: 4.5 G/DL — SIGNIFICANT CHANGE UP (ref 3.5–5.2)
ALP BLD-CCNC: 102 U/L
ALP BLD-CCNC: 103 U/L
ALP BLD-CCNC: 84 U/L
ALP SERPL-CCNC: 102 U/L — SIGNIFICANT CHANGE UP (ref 30–115)
ALP SERPL-CCNC: 84 U/L — SIGNIFICANT CHANGE UP (ref 30–115)
ALP SERPL-CCNC: 85 U/L — SIGNIFICANT CHANGE UP (ref 30–115)
ALP SERPL-CCNC: 88 U/L — SIGNIFICANT CHANGE UP (ref 30–115)
ALP SERPL-CCNC: 96 U/L — SIGNIFICANT CHANGE UP (ref 30–115)
ALP SERPL-CCNC: 99 U/L — SIGNIFICANT CHANGE UP (ref 30–115)
ALT FLD-CCNC: 10 U/L — SIGNIFICANT CHANGE UP (ref 0–41)
ALT FLD-CCNC: 10 U/L — SIGNIFICANT CHANGE UP (ref 0–41)
ALT FLD-CCNC: 11 U/L — SIGNIFICANT CHANGE UP (ref 0–41)
ALT FLD-CCNC: 13 U/L — SIGNIFICANT CHANGE UP (ref 0–41)
ALT FLD-CCNC: 9 U/L — SIGNIFICANT CHANGE UP (ref 0–41)
ALT FLD-CCNC: 9 U/L — SIGNIFICANT CHANGE UP (ref 0–41)
ALT SERPL-CCNC: 6 U/L
ALT SERPL-CCNC: 7 U/L
ALT SERPL-CCNC: 9 U/L
ANION GAP SERPL CALC-SCNC: 10 MMOL/L
ANION GAP SERPL CALC-SCNC: 10 MMOL/L — SIGNIFICANT CHANGE UP (ref 7–14)
ANION GAP SERPL CALC-SCNC: 11 MMOL/L — SIGNIFICANT CHANGE UP (ref 7–14)
ANION GAP SERPL CALC-SCNC: 12 MMOL/L — SIGNIFICANT CHANGE UP (ref 7–14)
ANION GAP SERPL CALC-SCNC: 13 MMOL/L
ANION GAP SERPL CALC-SCNC: 13 MMOL/L — SIGNIFICANT CHANGE UP (ref 7–14)
ANION GAP SERPL CALC-SCNC: 16 MMOL/L — HIGH (ref 7–14)
ANION GAP SERPL CALC-SCNC: 17 MMOL/L — HIGH (ref 7–14)
ANION GAP SERPL CALC-SCNC: 18 MMOL/L
APPEARANCE UR: CLEAR — SIGNIFICANT CHANGE UP
AST SERPL-CCNC: 17 U/L
AST SERPL-CCNC: 18 U/L
AST SERPL-CCNC: 18 U/L
AST SERPL-CCNC: 18 U/L — SIGNIFICANT CHANGE UP (ref 0–41)
AST SERPL-CCNC: 23 U/L — SIGNIFICANT CHANGE UP (ref 0–41)
AST SERPL-CCNC: 24 U/L — SIGNIFICANT CHANGE UP (ref 0–41)
AST SERPL-CCNC: 31 U/L — SIGNIFICANT CHANGE UP (ref 0–41)
AST SERPL-CCNC: 32 U/L — SIGNIFICANT CHANGE UP (ref 0–41)
AST SERPL-CCNC: 53 U/L — HIGH (ref 0–41)
BACTERIA # UR AUTO: NEGATIVE — SIGNIFICANT CHANGE UP
BASOPHILS # BLD AUTO: 0.02 K/UL — SIGNIFICANT CHANGE UP (ref 0–0.2)
BASOPHILS # BLD AUTO: 0.03 K/UL — SIGNIFICANT CHANGE UP (ref 0–0.2)
BASOPHILS # BLD AUTO: 0.04 K/UL — SIGNIFICANT CHANGE UP (ref 0–0.2)
BASOPHILS NFR BLD AUTO: 0.4 % — SIGNIFICANT CHANGE UP (ref 0–1)
BASOPHILS NFR BLD AUTO: 0.5 % — SIGNIFICANT CHANGE UP (ref 0–1)
BASOPHILS NFR BLD AUTO: 0.6 % — SIGNIFICANT CHANGE UP (ref 0–1)
BASOPHILS NFR BLD AUTO: 0.8 % — SIGNIFICANT CHANGE UP (ref 0–1)
BILIRUB DIRECT SERPL-MCNC: <0.2 MG/DL
BILIRUB DIRECT SERPL-MCNC: <0.2 MG/DL
BILIRUB DIRECT SERPL-MCNC: <0.2 MG/DL — SIGNIFICANT CHANGE UP (ref 0–0.2)
BILIRUB INDIRECT FLD-MCNC: >0 MG/DL — LOW (ref 0.2–1.2)
BILIRUB INDIRECT SERPL-MCNC: >0.1 MG/DL
BILIRUB INDIRECT SERPL-MCNC: >0.2 MG/DL
BILIRUB SERPL-MCNC: 0.2 MG/DL
BILIRUB SERPL-MCNC: 0.2 MG/DL — SIGNIFICANT CHANGE UP (ref 0.2–1.2)
BILIRUB SERPL-MCNC: 0.2 MG/DL — SIGNIFICANT CHANGE UP (ref 0.2–1.2)
BILIRUB SERPL-MCNC: 0.3 MG/DL
BILIRUB SERPL-MCNC: 0.3 MG/DL — SIGNIFICANT CHANGE UP (ref 0.2–1.2)
BILIRUB SERPL-MCNC: 0.4 MG/DL
BILIRUB SERPL-MCNC: <0.2 MG/DL — SIGNIFICANT CHANGE UP (ref 0.2–1.2)
BILIRUB UR-MCNC: NEGATIVE — SIGNIFICANT CHANGE UP
BUN SERPL-MCNC: 20 MG/DL — SIGNIFICANT CHANGE UP (ref 10–20)
BUN SERPL-MCNC: 23 MG/DL
BUN SERPL-MCNC: 23 MG/DL — HIGH (ref 10–20)
BUN SERPL-MCNC: 24 MG/DL — HIGH (ref 10–20)
BUN SERPL-MCNC: 26 MG/DL — HIGH (ref 10–20)
BUN SERPL-MCNC: 29 MG/DL
BUN SERPL-MCNC: 29 MG/DL
BUN SERPL-MCNC: 29 MG/DL — HIGH (ref 10–20)
BUN SERPL-MCNC: 37 MG/DL — HIGH (ref 10–20)
CALCIUM SERPL-MCNC: 8.1 MG/DL — LOW (ref 8.5–10.1)
CALCIUM SERPL-MCNC: 8.2 MG/DL — LOW (ref 8.5–10.1)
CALCIUM SERPL-MCNC: 8.4 MG/DL
CALCIUM SERPL-MCNC: 8.4 MG/DL
CALCIUM SERPL-MCNC: 8.4 MG/DL — LOW (ref 8.5–10.1)
CALCIUM SERPL-MCNC: 8.6 MG/DL
CALCIUM SERPL-MCNC: 8.7 MG/DL — SIGNIFICANT CHANGE UP (ref 8.5–10.1)
CALCIUM SERPL-MCNC: 8.9 MG/DL — SIGNIFICANT CHANGE UP (ref 8.5–10.1)
CALCIUM SERPL-MCNC: 8.9 MG/DL — SIGNIFICANT CHANGE UP (ref 8.5–10.1)
CANCER AG125 SERPL-ACNC: 103 U/ML
CANCER AG125 SERPL-ACNC: 4 U/ML
CANCER AG125 SERPL-ACNC: 5 U/ML
CHLORIDE SERPL-SCNC: 100 MMOL/L — SIGNIFICANT CHANGE UP (ref 98–110)
CHLORIDE SERPL-SCNC: 102 MMOL/L — SIGNIFICANT CHANGE UP (ref 98–110)
CHLORIDE SERPL-SCNC: 103 MMOL/L
CHLORIDE SERPL-SCNC: 103 MMOL/L — SIGNIFICANT CHANGE UP (ref 98–110)
CHLORIDE SERPL-SCNC: 104 MMOL/L
CHLORIDE SERPL-SCNC: 104 MMOL/L — SIGNIFICANT CHANGE UP (ref 98–110)
CHLORIDE SERPL-SCNC: 105 MMOL/L
CO2 SERPL-SCNC: 19 MMOL/L — SIGNIFICANT CHANGE UP (ref 17–32)
CO2 SERPL-SCNC: 20 MMOL/L
CO2 SERPL-SCNC: 22 MMOL/L — SIGNIFICANT CHANGE UP (ref 17–32)
CO2 SERPL-SCNC: 23 MMOL/L
CO2 SERPL-SCNC: 23 MMOL/L — SIGNIFICANT CHANGE UP (ref 17–32)
CO2 SERPL-SCNC: 23 MMOL/L — SIGNIFICANT CHANGE UP (ref 17–32)
CO2 SERPL-SCNC: 24 MMOL/L — SIGNIFICANT CHANGE UP (ref 17–32)
CO2 SERPL-SCNC: 26 MMOL/L
CO2 SERPL-SCNC: 27 MMOL/L — SIGNIFICANT CHANGE UP (ref 17–32)
COLOR SPEC: YELLOW — SIGNIFICANT CHANGE UP
CREAT SERPL-MCNC: 1.1 MG/DL
CREAT SERPL-MCNC: 1.1 MG/DL — SIGNIFICANT CHANGE UP (ref 0.7–1.5)
CREAT SERPL-MCNC: 1.2 MG/DL
CREAT SERPL-MCNC: 1.2 MG/DL — SIGNIFICANT CHANGE UP (ref 0.7–1.5)
CREAT SERPL-MCNC: 1.2 MG/DL — SIGNIFICANT CHANGE UP (ref 0.7–1.5)
CREAT SERPL-MCNC: 1.3 MG/DL
CREAT SERPL-MCNC: 1.3 MG/DL — SIGNIFICANT CHANGE UP (ref 0.7–1.5)
D DIMER BLD IA.RAPID-MCNC: 86 NG/ML DDU — SIGNIFICANT CHANGE UP (ref 0–230)
DIFF PNL FLD: SIGNIFICANT CHANGE UP
EOSINOPHIL # BLD AUTO: 0.06 K/UL — SIGNIFICANT CHANGE UP (ref 0–0.7)
EOSINOPHIL # BLD AUTO: 0.07 K/UL — SIGNIFICANT CHANGE UP (ref 0–0.7)
EOSINOPHIL # BLD AUTO: 0.08 K/UL — SIGNIFICANT CHANGE UP (ref 0–0.7)
EOSINOPHIL # BLD AUTO: 0.08 K/UL — SIGNIFICANT CHANGE UP (ref 0–0.7)
EOSINOPHIL # BLD AUTO: 0.09 K/UL — SIGNIFICANT CHANGE UP (ref 0–0.7)
EOSINOPHIL # BLD AUTO: 0.13 K/UL — SIGNIFICANT CHANGE UP (ref 0–0.7)
EOSINOPHIL NFR BLD AUTO: 1.1 % — SIGNIFICANT CHANGE UP (ref 0–8)
EOSINOPHIL NFR BLD AUTO: 1.1 % — SIGNIFICANT CHANGE UP (ref 0–8)
EOSINOPHIL NFR BLD AUTO: 1.5 % — SIGNIFICANT CHANGE UP (ref 0–8)
EOSINOPHIL NFR BLD AUTO: 1.6 % — SIGNIFICANT CHANGE UP (ref 0–8)
EOSINOPHIL NFR BLD AUTO: 1.7 % — SIGNIFICANT CHANGE UP (ref 0–8)
EOSINOPHIL NFR BLD AUTO: 2.6 % — SIGNIFICANT CHANGE UP (ref 0–8)
EPI CELLS # UR: 1 /HPF — SIGNIFICANT CHANGE UP (ref 0–5)
FERRITIN SERPL-MCNC: 29 NG/ML
FERRITIN SERPL-MCNC: 42 NG/ML
GLUCOSE BLDC GLUCOMTR-MCNC: 100 MG/DL — HIGH (ref 70–99)
GLUCOSE SERPL-MCNC: 103 MG/DL — HIGH (ref 70–99)
GLUCOSE SERPL-MCNC: 104 MG/DL — HIGH (ref 70–99)
GLUCOSE SERPL-MCNC: 106 MG/DL
GLUCOSE SERPL-MCNC: 113 MG/DL — HIGH (ref 70–99)
GLUCOSE SERPL-MCNC: 84 MG/DL
GLUCOSE SERPL-MCNC: 89 MG/DL — SIGNIFICANT CHANGE UP (ref 70–99)
GLUCOSE SERPL-MCNC: 94 MG/DL
GLUCOSE SERPL-MCNC: 95 MG/DL — SIGNIFICANT CHANGE UP (ref 70–99)
GLUCOSE SERPL-MCNC: 97 MG/DL — SIGNIFICANT CHANGE UP (ref 70–99)
GLUCOSE UR QL: NEGATIVE — SIGNIFICANT CHANGE UP
HCT VFR BLD CALC: 34.4 %
HCT VFR BLD CALC: 36.7 % — LOW (ref 37–47)
HCT VFR BLD CALC: 37.4 %
HCT VFR BLD CALC: 38.1 % — SIGNIFICANT CHANGE UP (ref 37–47)
HCT VFR BLD CALC: 39 %
HCT VFR BLD CALC: 39.2 % — SIGNIFICANT CHANGE UP (ref 37–47)
HCT VFR BLD CALC: 39.2 % — SIGNIFICANT CHANGE UP (ref 37–47)
HCT VFR BLD CALC: 39.3 % — SIGNIFICANT CHANGE UP (ref 37–47)
HCT VFR BLD CALC: 42.2 % — SIGNIFICANT CHANGE UP (ref 37–47)
HGB BLD-MCNC: 11.2 G/DL
HGB BLD-MCNC: 11.7 G/DL
HGB BLD-MCNC: 12 G/DL — SIGNIFICANT CHANGE UP (ref 12–16)
HGB BLD-MCNC: 12 G/DL — SIGNIFICANT CHANGE UP (ref 12–16)
HGB BLD-MCNC: 12.4 G/DL — SIGNIFICANT CHANGE UP (ref 12–16)
HGB BLD-MCNC: 12.5 G/DL
HGB BLD-MCNC: 13.3 G/DL — SIGNIFICANT CHANGE UP (ref 12–16)
HYALINE CASTS # UR AUTO: 0 /LPF — SIGNIFICANT CHANGE UP (ref 0–7)
IMM GRANULOCYTES NFR BLD AUTO: 0.2 % — SIGNIFICANT CHANGE UP (ref 0.1–0.3)
IMM GRANULOCYTES NFR BLD AUTO: 0.3 % — SIGNIFICANT CHANGE UP (ref 0.1–0.3)
IMM GRANULOCYTES NFR BLD AUTO: 0.4 % — HIGH (ref 0.1–0.3)
IMM GRANULOCYTES NFR BLD AUTO: 0.6 % — HIGH (ref 0.1–0.3)
IRON SATN MFR SERPL: 15 %
IRON SATN MFR SERPL: 29 %
IRON SERPL-MCNC: 50 UG/DL
IRON SERPL-MCNC: 92 UG/DL
KETONES UR-MCNC: NEGATIVE — SIGNIFICANT CHANGE UP
LACTATE SERPL-SCNC: 0.7 MMOL/L — SIGNIFICANT CHANGE UP (ref 0.7–2)
LEUKOCYTE ESTERASE UR-ACNC: NEGATIVE — SIGNIFICANT CHANGE UP
LIDOCAIN IGE QN: 35 U/L — SIGNIFICANT CHANGE UP (ref 7–60)
LYMPHOCYTES # BLD AUTO: 1.08 K/UL — LOW (ref 1.2–3.4)
LYMPHOCYTES # BLD AUTO: 1.41 K/UL — SIGNIFICANT CHANGE UP (ref 1.2–3.4)
LYMPHOCYTES # BLD AUTO: 1.41 K/UL — SIGNIFICANT CHANGE UP (ref 1.2–3.4)
LYMPHOCYTES # BLD AUTO: 1.55 K/UL — SIGNIFICANT CHANGE UP (ref 1.2–3.4)
LYMPHOCYTES # BLD AUTO: 1.83 K/UL — SIGNIFICANT CHANGE UP (ref 1.2–3.4)
LYMPHOCYTES # BLD AUTO: 1.92 K/UL — SIGNIFICANT CHANGE UP (ref 1.2–3.4)
LYMPHOCYTES # BLD AUTO: 22.7 % — SIGNIFICANT CHANGE UP (ref 20.5–51.1)
LYMPHOCYTES # BLD AUTO: 25.6 % — SIGNIFICANT CHANGE UP (ref 20.5–51.1)
LYMPHOCYTES # BLD AUTO: 25.7 % — SIGNIFICANT CHANGE UP (ref 20.5–51.1)
LYMPHOCYTES # BLD AUTO: 28.3 % — SIGNIFICANT CHANGE UP (ref 20.5–51.1)
LYMPHOCYTES # BLD AUTO: 28.5 % — SIGNIFICANT CHANGE UP (ref 20.5–51.1)
LYMPHOCYTES # BLD AUTO: 37.9 % — SIGNIFICANT CHANGE UP (ref 20.5–51.1)
MAGNESIUM SERPL-MCNC: 2.2 MG/DL
MAGNESIUM SERPL-MCNC: 2.2 MG/DL — SIGNIFICANT CHANGE UP (ref 1.8–2.4)
MCHC RBC-ENTMCNC: 25.5 PG — LOW (ref 27–31)
MCHC RBC-ENTMCNC: 25.7 PG
MCHC RBC-ENTMCNC: 25.7 PG — LOW (ref 27–31)
MCHC RBC-ENTMCNC: 25.9 PG — LOW (ref 27–31)
MCHC RBC-ENTMCNC: 26 PG — LOW (ref 27–31)
MCHC RBC-ENTMCNC: 26.2 PG — LOW (ref 27–31)
MCHC RBC-ENTMCNC: 26.5 PG
MCHC RBC-ENTMCNC: 26.7 PG
MCHC RBC-ENTMCNC: 26.9 PG — LOW (ref 27–31)
MCHC RBC-ENTMCNC: 31.3 G/DL
MCHC RBC-ENTMCNC: 31.5 G/DL — LOW (ref 32–37)
MCHC RBC-ENTMCNC: 31.5 G/DL — LOW (ref 32–37)
MCHC RBC-ENTMCNC: 31.6 G/DL — LOW (ref 32–37)
MCHC RBC-ENTMCNC: 32.1 G/DL
MCHC RBC-ENTMCNC: 32.6 G/DL
MCHC RBC-ENTMCNC: 32.7 G/DL — SIGNIFICANT CHANGE UP (ref 32–37)
MCV RBC AUTO: 80.9 FL — LOW (ref 81–99)
MCV RBC AUTO: 81.3 FL — SIGNIFICANT CHANGE UP (ref 81–99)
MCV RBC AUTO: 82.1 FL
MCV RBC AUTO: 82.2 FL
MCV RBC AUTO: 82.2 FL — SIGNIFICANT CHANGE UP (ref 81–99)
MCV RBC AUTO: 82.3 FL — SIGNIFICANT CHANGE UP (ref 81–99)
MCV RBC AUTO: 82.4 FL — SIGNIFICANT CHANGE UP (ref 81–99)
MCV RBC AUTO: 82.8 FL
MCV RBC AUTO: 82.9 FL — SIGNIFICANT CHANGE UP (ref 81–99)
MONOCYTES # BLD AUTO: 0.5 K/UL — SIGNIFICANT CHANGE UP (ref 0.1–0.6)
MONOCYTES # BLD AUTO: 0.51 K/UL — SIGNIFICANT CHANGE UP (ref 0.1–0.6)
MONOCYTES # BLD AUTO: 0.64 K/UL — HIGH (ref 0.1–0.6)
MONOCYTES # BLD AUTO: 0.71 K/UL — HIGH (ref 0.1–0.6)
MONOCYTES # BLD AUTO: 0.75 K/UL — HIGH (ref 0.1–0.6)
MONOCYTES # BLD AUTO: 0.76 K/UL — HIGH (ref 0.1–0.6)
MONOCYTES NFR BLD AUTO: 10.7 % — HIGH (ref 1.7–9.3)
MONOCYTES NFR BLD AUTO: 11.7 % — HIGH (ref 1.7–9.3)
MONOCYTES NFR BLD AUTO: 12.9 % — HIGH (ref 1.7–9.3)
MONOCYTES NFR BLD AUTO: 13.7 % — HIGH (ref 1.7–9.3)
MONOCYTES NFR BLD AUTO: 15 % — HIGH (ref 1.7–9.3)
MONOCYTES NFR BLD AUTO: 7.8 % — SIGNIFICANT CHANGE UP (ref 1.7–9.3)
NEUTROPHILS # BLD AUTO: 2.19 K/UL — SIGNIFICANT CHANGE UP (ref 1.4–6.5)
NEUTROPHILS # BLD AUTO: 3.03 K/UL — SIGNIFICANT CHANGE UP (ref 1.4–6.5)
NEUTROPHILS # BLD AUTO: 3.04 K/UL — SIGNIFICANT CHANGE UP (ref 1.4–6.5)
NEUTROPHILS # BLD AUTO: 3.28 K/UL — SIGNIFICANT CHANGE UP (ref 1.4–6.5)
NEUTROPHILS # BLD AUTO: 3.34 K/UL — SIGNIFICANT CHANGE UP (ref 1.4–6.5)
NEUTROPHILS # BLD AUTO: 3.98 K/UL — SIGNIFICANT CHANGE UP (ref 1.4–6.5)
NEUTROPHILS NFR BLD AUTO: 43.3 % — SIGNIFICANT CHANGE UP (ref 42.2–75.2)
NEUTROPHILS NFR BLD AUTO: 55.5 % — SIGNIFICANT CHANGE UP (ref 42.2–75.2)
NEUTROPHILS NFR BLD AUTO: 59.4 % — SIGNIFICANT CHANGE UP (ref 42.2–75.2)
NEUTROPHILS NFR BLD AUTO: 60.8 % — SIGNIFICANT CHANGE UP (ref 42.2–75.2)
NEUTROPHILS NFR BLD AUTO: 61.8 % — SIGNIFICANT CHANGE UP (ref 42.2–75.2)
NEUTROPHILS NFR BLD AUTO: 63.9 % — SIGNIFICANT CHANGE UP (ref 42.2–75.2)
NITRITE UR-MCNC: NEGATIVE — SIGNIFICANT CHANGE UP
NRBC # BLD: 0 /100 WBCS — SIGNIFICANT CHANGE UP (ref 0–0)
PH UR: 6.5 — SIGNIFICANT CHANGE UP (ref 5–8)
PLATELET # BLD AUTO: 171 K/UL — SIGNIFICANT CHANGE UP (ref 130–400)
PLATELET # BLD AUTO: 179 K/UL — SIGNIFICANT CHANGE UP (ref 130–400)
PLATELET # BLD AUTO: 182 K/UL — SIGNIFICANT CHANGE UP (ref 130–400)
PLATELET # BLD AUTO: 184 K/UL
PLATELET # BLD AUTO: 187 K/UL
PLATELET # BLD AUTO: 191 K/UL — SIGNIFICANT CHANGE UP (ref 130–400)
PLATELET # BLD AUTO: 197 K/UL
PLATELET # BLD AUTO: 199 K/UL — SIGNIFICANT CHANGE UP (ref 130–400)
PLATELET # BLD AUTO: 208 K/UL — SIGNIFICANT CHANGE UP (ref 130–400)
PMV BLD: 10.1 FL
PMV BLD: 9.5 FL
PMV BLD: 9.7 FL
POTASSIUM SERPL-MCNC: 4.3 MMOL/L — SIGNIFICANT CHANGE UP (ref 3.5–5)
POTASSIUM SERPL-MCNC: 4.3 MMOL/L — SIGNIFICANT CHANGE UP (ref 3.5–5)
POTASSIUM SERPL-MCNC: 4.7 MMOL/L — SIGNIFICANT CHANGE UP (ref 3.5–5)
POTASSIUM SERPL-MCNC: 5.4 MMOL/L — HIGH (ref 3.5–5)
POTASSIUM SERPL-MCNC: 5.5 MMOL/L — HIGH (ref 3.5–5)
POTASSIUM SERPL-MCNC: SIGNIFICANT CHANGE UP MMOL/L (ref 3.5–5)
POTASSIUM SERPL-SCNC: 4.2 MMOL/L
POTASSIUM SERPL-SCNC: 4.3 MMOL/L
POTASSIUM SERPL-SCNC: 4.3 MMOL/L — SIGNIFICANT CHANGE UP (ref 3.5–5)
POTASSIUM SERPL-SCNC: 4.3 MMOL/L — SIGNIFICANT CHANGE UP (ref 3.5–5)
POTASSIUM SERPL-SCNC: 4.7 MMOL/L
POTASSIUM SERPL-SCNC: 4.7 MMOL/L — SIGNIFICANT CHANGE UP (ref 3.5–5)
POTASSIUM SERPL-SCNC: 5.4 MMOL/L — HIGH (ref 3.5–5)
POTASSIUM SERPL-SCNC: 5.5 MMOL/L — HIGH (ref 3.5–5)
POTASSIUM SERPL-SCNC: SIGNIFICANT CHANGE UP MMOL/L (ref 3.5–5)
PROT SERPL-MCNC: 6.8 G/DL
PROT SERPL-MCNC: 6.9 G/DL
PROT SERPL-MCNC: 7.1 G/DL — SIGNIFICANT CHANGE UP (ref 6–8)
PROT SERPL-MCNC: 7.2 G/DL
PROT SERPL-MCNC: 7.3 G/DL — SIGNIFICANT CHANGE UP (ref 6–8)
PROT SERPL-MCNC: 7.6 G/DL — SIGNIFICANT CHANGE UP (ref 6–8)
PROT SERPL-MCNC: 7.7 G/DL — SIGNIFICANT CHANGE UP (ref 6–8)
PROT UR-MCNC: ABNORMAL
RBC # BLD: 4.19 M/UL
RBC # BLD: 4.46 M/UL — SIGNIFICANT CHANGE UP (ref 4.2–5.4)
RBC # BLD: 4.55 M/UL
RBC # BLD: 4.71 M/UL
RBC # BLD: 4.71 M/UL — SIGNIFICANT CHANGE UP (ref 4.2–5.4)
RBC # BLD: 4.73 M/UL — SIGNIFICANT CHANGE UP (ref 4.2–5.4)
RBC # BLD: 4.78 M/UL — SIGNIFICANT CHANGE UP (ref 4.2–5.4)
RBC # BLD: 4.82 M/UL — SIGNIFICANT CHANGE UP (ref 4.2–5.4)
RBC # BLD: 5.12 M/UL — SIGNIFICANT CHANGE UP (ref 4.2–5.4)
RBC # FLD: 14.6 %
RBC # FLD: 14.6 % — HIGH (ref 11.5–14.5)
RBC # FLD: 14.6 % — HIGH (ref 11.5–14.5)
RBC # FLD: 14.7 %
RBC # FLD: 15.4 % — HIGH (ref 11.5–14.5)
RBC # FLD: 16 % — HIGH (ref 11.5–14.5)
RBC # FLD: 16.2 % — HIGH (ref 11.5–14.5)
RBC # FLD: 16.3 %
RBC # FLD: 16.4 % — HIGH (ref 11.5–14.5)
RBC CASTS # UR COMP ASSIST: 1 /HPF — SIGNIFICANT CHANGE UP (ref 0–4)
SARS-COV-2 RNA SPEC QL NAA+PROBE: SIGNIFICANT CHANGE UP
SODIUM SERPL-SCNC: 135 MMOL/L — SIGNIFICANT CHANGE UP (ref 135–146)
SODIUM SERPL-SCNC: 136 MMOL/L — SIGNIFICANT CHANGE UP (ref 135–146)
SODIUM SERPL-SCNC: 138 MMOL/L — SIGNIFICANT CHANGE UP (ref 135–146)
SODIUM SERPL-SCNC: 139 MMOL/L — SIGNIFICANT CHANGE UP (ref 135–146)
SODIUM SERPL-SCNC: 140 MMOL/L
SODIUM SERPL-SCNC: 140 MMOL/L — SIGNIFICANT CHANGE UP (ref 135–146)
SODIUM SERPL-SCNC: 141 MMOL/L
SODIUM SERPL-SCNC: 141 MMOL/L
SODIUM SERPL-SCNC: 142 MMOL/L — SIGNIFICANT CHANGE UP (ref 135–146)
SP GR SPEC: 1.02 — SIGNIFICANT CHANGE UP (ref 1.01–1.03)
TIBC SERPL-MCNC: 319 UG/DL
TIBC SERPL-MCNC: 341 UG/DL
TROPONIN T SERPL-MCNC: <0.01 NG/ML — SIGNIFICANT CHANGE UP
UIBC SERPL-MCNC: 227 UG/DL
UIBC SERPL-MCNC: 291 UG/DL
UROBILINOGEN FLD QL: SIGNIFICANT CHANGE UP
WBC # BLD: 4.75 K/UL — LOW (ref 4.8–10.8)
WBC # BLD: 5.06 K/UL — SIGNIFICANT CHANGE UP (ref 4.8–10.8)
WBC # BLD: 5.48 K/UL — SIGNIFICANT CHANGE UP (ref 4.8–10.8)
WBC # BLD: 5.49 K/UL — SIGNIFICANT CHANGE UP (ref 4.8–10.8)
WBC # BLD: 5.51 K/UL — SIGNIFICANT CHANGE UP (ref 4.8–10.8)
WBC # BLD: 6.43 K/UL — SIGNIFICANT CHANGE UP (ref 4.8–10.8)
WBC # FLD AUTO: 4.43 K/UL
WBC # FLD AUTO: 4.75 K/UL — LOW (ref 4.8–10.8)
WBC # FLD AUTO: 5.06 K/UL — SIGNIFICANT CHANGE UP (ref 4.8–10.8)
WBC # FLD AUTO: 5.48 K/UL
WBC # FLD AUTO: 5.48 K/UL — SIGNIFICANT CHANGE UP (ref 4.8–10.8)
WBC # FLD AUTO: 5.49 K/UL — SIGNIFICANT CHANGE UP (ref 4.8–10.8)
WBC # FLD AUTO: 5.51 K/UL — SIGNIFICANT CHANGE UP (ref 4.8–10.8)
WBC # FLD AUTO: 5.62 K/UL
WBC # FLD AUTO: 6.43 K/UL — SIGNIFICANT CHANGE UP (ref 4.8–10.8)
WBC UR QL: 1 /HPF — SIGNIFICANT CHANGE UP (ref 0–5)

## 2021-01-01 PROCEDURE — 99215 OFFICE O/P EST HI 40 MIN: CPT

## 2021-01-01 PROCEDURE — 99214 OFFICE O/P EST MOD 30 MIN: CPT

## 2021-01-01 PROCEDURE — 99285 EMERGENCY DEPT VISIT HI MDM: CPT | Mod: 25

## 2021-01-01 PROCEDURE — 74177 CT ABD & PELVIS W/CONTRAST: CPT | Mod: 26,MA

## 2021-01-01 PROCEDURE — 76705 ECHO EXAM OF ABDOMEN: CPT | Mod: 26

## 2021-01-01 PROCEDURE — 78815 PET IMAGE W/CT SKULL-THIGH: CPT | Mod: 26,PS

## 2021-01-01 PROCEDURE — L9991: CPT

## 2021-01-01 PROCEDURE — 99285 EMERGENCY DEPT VISIT HI MDM: CPT

## 2021-01-01 PROCEDURE — 99284 EMERGENCY DEPT VISIT MOD MDM: CPT

## 2021-01-01 PROCEDURE — 71045 X-RAY EXAM CHEST 1 VIEW: CPT | Mod: 26

## 2021-01-01 PROCEDURE — 76937 US GUIDE VASCULAR ACCESS: CPT | Mod: 26

## 2021-01-01 PROCEDURE — 77067 SCR MAMMO BI INCL CAD: CPT | Mod: 26

## 2021-01-01 PROCEDURE — 99284 EMERGENCY DEPT VISIT MOD MDM: CPT | Mod: 25

## 2021-01-01 PROCEDURE — 93010 ELECTROCARDIOGRAM REPORT: CPT

## 2021-01-01 PROCEDURE — 71275 CT ANGIOGRAPHY CHEST: CPT | Mod: 26,MA

## 2021-01-01 PROCEDURE — 93970 EXTREMITY STUDY: CPT | Mod: 26

## 2021-01-01 PROCEDURE — 36000 PLACE NEEDLE IN VEIN: CPT

## 2021-01-01 PROCEDURE — 93000 ELECTROCARDIOGRAM COMPLETE: CPT

## 2021-01-01 PROCEDURE — 99072 ADDL SUPL MATRL&STAF TM PHE: CPT

## 2021-01-01 PROCEDURE — 69210 REMOVE IMPACTED EAR WAX UNI: CPT

## 2021-01-01 PROCEDURE — 71046 X-RAY EXAM CHEST 2 VIEWS: CPT | Mod: 26

## 2021-01-01 PROCEDURE — 90792 PSYCH DIAG EVAL W/MED SRVCS: CPT | Mod: GC

## 2021-01-01 PROCEDURE — 93010 ELECTROCARDIOGRAM REPORT: CPT | Mod: 76

## 2021-01-01 PROCEDURE — 36000 PLACE NEEDLE IN VEIN: CPT | Mod: 59

## 2021-01-01 PROCEDURE — 99212 OFFICE O/P EST SF 10 MIN: CPT | Mod: 25

## 2021-01-01 PROCEDURE — 77063 BREAST TOMOSYNTHESIS BI: CPT | Mod: 26

## 2021-01-01 PROCEDURE — 76942 ECHO GUIDE FOR BIOPSY: CPT | Mod: 26

## 2021-01-01 PROCEDURE — 93308 TTE F-UP OR LMTD: CPT | Mod: 26

## 2021-01-01 RX ORDER — ACETAMINOPHEN 500 MG
975 TABLET ORAL ONCE
Refills: 0 | Status: COMPLETED | OUTPATIENT
Start: 2021-01-01 | End: 2021-01-01

## 2021-01-01 RX ORDER — SODIUM CHLORIDE 9 MG/ML
1000 INJECTION INTRAMUSCULAR; INTRAVENOUS; SUBCUTANEOUS ONCE
Refills: 0 | Status: COMPLETED | OUTPATIENT
Start: 2021-01-01 | End: 2021-01-01

## 2021-01-01 RX ORDER — ALPRAZOLAM 0.25 MG
0.25 TABLET ORAL ONCE
Refills: 0 | Status: DISCONTINUED | OUTPATIENT
Start: 2021-01-01 | End: 2021-01-01

## 2021-01-01 RX ORDER — ONDANSETRON 8 MG/1
4 TABLET, FILM COATED ORAL ONCE
Refills: 0 | Status: COMPLETED | OUTPATIENT
Start: 2021-01-01 | End: 2021-01-01

## 2021-01-01 RX ORDER — KETOROLAC TROMETHAMINE 30 MG/ML
15 SYRINGE (ML) INJECTION ONCE
Refills: 0 | Status: DISCONTINUED | OUTPATIENT
Start: 2021-01-01 | End: 2021-01-01

## 2021-01-01 RX ORDER — CLONAZEPAM 0.5 MG/1
0.5 TABLET ORAL
Qty: 15 | Refills: 0 | Status: ACTIVE | COMMUNITY
Start: 2021-01-01

## 2021-01-01 RX ORDER — MORPHINE SULFATE 50 MG/1
4 CAPSULE, EXTENDED RELEASE ORAL ONCE
Refills: 0 | Status: DISCONTINUED | OUTPATIENT
Start: 2021-01-01 | End: 2021-01-01

## 2021-01-01 RX ORDER — METHOCARBAMOL 500 MG/1
1000 TABLET, FILM COATED ORAL ONCE
Refills: 0 | Status: COMPLETED | OUTPATIENT
Start: 2021-01-01 | End: 2021-01-01

## 2021-01-01 RX ORDER — CLONAZEPAM 1 MG
0.5 TABLET ORAL ONCE
Refills: 0 | Status: DISCONTINUED | OUTPATIENT
Start: 2021-01-01 | End: 2021-01-01

## 2021-01-01 RX ORDER — METHOCARBAMOL 500 MG/1
2 TABLET, FILM COATED ORAL
Qty: 20 | Refills: 0
Start: 2021-01-01 | End: 2021-01-01

## 2021-01-01 RX ADMIN — Medication 30 MILLILITER(S): at 20:00

## 2021-01-01 RX ADMIN — SODIUM CHLORIDE 1000 MILLILITER(S): 9 INJECTION INTRAMUSCULAR; INTRAVENOUS; SUBCUTANEOUS at 10:22

## 2021-01-01 RX ADMIN — MORPHINE SULFATE 4 MILLIGRAM(S): 50 CAPSULE, EXTENDED RELEASE ORAL at 12:24

## 2021-01-01 RX ADMIN — Medication 975 MILLIGRAM(S): at 05:48

## 2021-01-01 RX ADMIN — Medication 0.25 MILLIGRAM(S): at 04:09

## 2021-01-01 RX ADMIN — Medication 975 MILLIGRAM(S): at 00:42

## 2021-01-01 RX ADMIN — METHOCARBAMOL 1000 MILLIGRAM(S): 500 TABLET, FILM COATED ORAL at 02:07

## 2021-01-01 RX ADMIN — Medication 0.5 MILLIGRAM(S): at 03:02

## 2021-01-01 RX ADMIN — Medication 975 MILLIGRAM(S): at 08:56

## 2021-01-01 RX ADMIN — Medication 15 MILLIGRAM(S): at 23:49

## 2021-01-01 RX ADMIN — Medication 15 MILLIGRAM(S): at 23:15

## 2021-01-01 RX ADMIN — ONDANSETRON 4 MILLIGRAM(S): 8 TABLET, FILM COATED ORAL at 17:37

## 2021-01-05 ENCOUNTER — APPOINTMENT (OUTPATIENT)
Dept: INFUSION THERAPY | Facility: CLINIC | Age: 81
End: 2021-01-05

## 2021-01-05 VITALS
DIASTOLIC BLOOD PRESSURE: 57 MMHG | HEART RATE: 94 BPM | SYSTOLIC BLOOD PRESSURE: 115 MMHG | TEMPERATURE: 97.9 F | RESPIRATION RATE: 18 BRPM

## 2021-01-07 ENCOUNTER — APPOINTMENT (OUTPATIENT)
Dept: HEMATOLOGY ONCOLOGY | Facility: CLINIC | Age: 81
End: 2021-01-07
Payer: MEDICARE

## 2021-01-07 ENCOUNTER — LABORATORY RESULT (OUTPATIENT)
Age: 81
End: 2021-01-07

## 2021-01-07 ENCOUNTER — APPOINTMENT (OUTPATIENT)
Dept: PSYCHIATRY | Facility: CLINIC | Age: 81
End: 2021-01-07

## 2021-01-07 VITALS
WEIGHT: 129 LBS | HEIGHT: 64 IN | TEMPERATURE: 97.9 F | DIASTOLIC BLOOD PRESSURE: 59 MMHG | SYSTOLIC BLOOD PRESSURE: 119 MMHG | BODY MASS INDEX: 22.02 KG/M2 | RESPIRATION RATE: 14 BRPM | HEART RATE: 86 BPM

## 2021-01-07 DIAGNOSIS — Z79.899 OTHER LONG TERM (CURRENT) DRUG THERAPY: ICD-10-CM

## 2021-01-07 PROCEDURE — 99214 OFFICE O/P EST MOD 30 MIN: CPT

## 2021-01-08 ENCOUNTER — APPOINTMENT (OUTPATIENT)
Dept: INFUSION THERAPY | Facility: CLINIC | Age: 81
End: 2021-01-08

## 2021-01-08 RX ORDER — PACLITAXEL 6 MG/ML
100 INJECTION, SOLUTION, CONCENTRATE INTRAVENOUS ONCE
Refills: 0 | Status: COMPLETED | OUTPATIENT
Start: 2021-01-08 | End: 2021-01-08

## 2021-01-08 RX ORDER — ACETAMINOPHEN 500 MG
650 TABLET ORAL ONCE
Refills: 0 | Status: COMPLETED | OUTPATIENT
Start: 2021-01-08 | End: 2021-01-08

## 2021-01-08 RX ORDER — SODIUM CHLORIDE 9 MG/ML
500 INJECTION INTRAMUSCULAR; INTRAVENOUS; SUBCUTANEOUS
Refills: 0 | Status: COMPLETED | OUTPATIENT
Start: 2021-01-08 | End: 2021-01-08

## 2021-01-08 RX ORDER — FAMOTIDINE 10 MG/ML
20 INJECTION INTRAVENOUS ONCE
Refills: 0 | Status: COMPLETED | OUTPATIENT
Start: 2021-01-08 | End: 2021-01-08

## 2021-01-08 RX ORDER — DEXAMETHASONE 0.5 MG/5ML
12 ELIXIR ORAL ONCE
Refills: 0 | Status: COMPLETED | OUTPATIENT
Start: 2021-01-08 | End: 2021-01-08

## 2021-01-08 RX ORDER — CARBOPLATIN 50 MG
110 VIAL (EA) INTRAVENOUS ONCE
Refills: 0 | Status: COMPLETED | OUTPATIENT
Start: 2021-01-08 | End: 2021-01-08

## 2021-01-08 RX ORDER — FOSAPREPITANT DIMEGLUMINE 150 MG/5ML
150 INJECTION, POWDER, LYOPHILIZED, FOR SOLUTION INTRAVENOUS ONCE
Refills: 0 | Status: COMPLETED | OUTPATIENT
Start: 2021-01-08 | End: 2021-01-08

## 2021-01-08 RX ORDER — DIPHENHYDRAMINE HCL 50 MG
25 CAPSULE ORAL ONCE
Refills: 0 | Status: COMPLETED | OUTPATIENT
Start: 2021-01-08 | End: 2021-01-08

## 2021-01-08 RX ADMIN — Medication 12 MILLIGRAM(S): at 11:18

## 2021-01-08 RX ADMIN — Medication 261 MILLIGRAM(S): at 12:29

## 2021-01-08 RX ADMIN — SODIUM CHLORIDE 500 MILLILITER(S): 9 INJECTION INTRAMUSCULAR; INTRAVENOUS; SUBCUTANEOUS at 10:50

## 2021-01-08 RX ADMIN — FAMOTIDINE 104 MILLIGRAM(S): 10 INJECTION INTRAVENOUS at 11:18

## 2021-01-08 RX ADMIN — Medication 101 MILLIGRAM(S): at 11:38

## 2021-01-08 RX ADMIN — Medication 25 MILLIGRAM(S): at 11:58

## 2021-01-08 RX ADMIN — Medication 122 MILLIGRAM(S): at 10:58

## 2021-01-08 RX ADMIN — FAMOTIDINE 20 MILLIGRAM(S): 10 INJECTION INTRAVENOUS at 11:38

## 2021-01-08 RX ADMIN — PACLITAXEL 266.67 MILLIGRAM(S): 6 INJECTION, SOLUTION, CONCENTRATE INTRAVENOUS at 12:29

## 2021-01-08 RX ADMIN — FOSAPREPITANT DIMEGLUMINE 150 MILLIGRAM(S): 150 INJECTION, POWDER, LYOPHILIZED, FOR SOLUTION INTRAVENOUS at 12:18

## 2021-01-08 RX ADMIN — Medication 650 MILLIGRAM(S): at 10:58

## 2021-01-08 RX ADMIN — FOSAPREPITANT DIMEGLUMINE 300 MILLIGRAM(S): 150 INJECTION, POWDER, LYOPHILIZED, FOR SOLUTION INTRAVENOUS at 11:58

## 2021-01-09 NOTE — ASSESSMENT
[FreeTextEntry1] : Recurrent adenocarcinoma Mullerian origin, malignant ascites diagnosed on 8/8/2020\par --Prior SHAAN BSO in 2016, adjuvant chemotherapy, obtain records from Dr. Abdullahi \par --Started on weekly carbo/taxol weekly 3 on 1 off on 8/14/2020.\par --PET CT reviewed from 9/8/20. \par --Lab work today with Stable blood counts\par --For chemo 10/16/2020\par --PICC line care\par --On 1/8/21 patient will proceed with Cycle #6 of weekly carboplatin and Taxol weekly but will change frequency to 2 weeks on and 1 week off to improve tolerability and allow rest as she reports lack of appetite, weight loss with current regimen\par --PET/CT ordered to be done prior to next cycle for evaluation s/p completion of 6 cycles\par \par Malignant ascites required high volume paracentesis \par --Improved/resolved with carbo/taxol \par \par PE in the setting of malignancy diagnosed on 8/6/2020\par --Doppler was negative for DVT on 8/8/2020\par --On BID Lovenox, but ran out on 9/1/2020\par --Also on ASA, h/o CABG . \par --Was stared on Xarelto, but after 1 dose was admitted with recurrent PE, diagnosed on 9/11/2020\par --patient bled on Xarelto\par --c/w Lovenox BiD, she has rx\par \par RTC in 3 weeks, post repeat imaging; sooner if needed.\par \par Patient seen and examined by Dr Coleman, who agreed for the above plan of care.

## 2021-01-09 NOTE — REVIEW OF SYSTEMS
[Fatigue] : fatigue [Recent Change In Weight] : ~T recent weight change [SOB on Exertion] : shortness of breath during exertion [Negative] : Allergic/Immunologic [Fever] : no fever [Chills] : no chills [Night Sweats] : no night sweats [Shortness Of Breath] : no shortness of breath [Cough] : no cough [Wheezing] : no wheezing [Abdominal Pain] : no abdominal pain [Vomiting] : no vomiting [Constipation] : no constipation [Diarrhea] : no diarrhea [Confused] : no confusion [Dizziness] : no dizziness [Fainting] : no fainting [Difficulty Walking] : no difficulty walking [FreeTextEntry2] : lost 10lb due to lack of appetite [FreeTextEntry7] : early satiety [de-identified] : minor preexisting neuropathy

## 2021-01-09 NOTE — PHYSICAL EXAM
[Ambulatory and capable of all self care but unable to carry out any work activities] : Status 2- Ambulatory and capable of all self care but unable to carry out any work activities. Up and about more than 50% of waking hours [Normal] : affect appropriate [de-identified] : mildly distended overall improved compared to prior, non-tender

## 2021-01-09 NOTE — HISTORY OF PRESENT ILLNESS
[de-identified] : Trav is a shekhar 81 year old lady I initially met in the hospital on 8/11/2020. Her past medical history is significant for uterine cancer s/p SHAAN BSO as per patient s/p adjuvant chemotherapy in 2016, CAD s/p CABG, HTN and depression, she presented to the ED for abdominal pain and chest discomfort. 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 \par \par PAST MEDICAL & SURGICAL HISTORY:\par HLD (hyperlipidemia)\par HTN (hypertension)\par CAD (coronary artery disease)\par History of coronary artery bypass surgery\par \par FAMILY HISTORY:\par No pertinent family history in first degree relatives\par \par Allergies\par chloroquine (Hives)\par \par CTA C/A/P on 8/6/2020 revealed \par 1. Intraluminal filling defect is seen within the segmental branches of the right lower lobe pulmonary artery, indicating acute pulmonary embolism. (6/132-138; 10/51-55). Filling defect in segmental branch of the right upper lobe pulmonary artery. (6/77) There is a small filling defect within a left lower lobe pulmonary artery segmental branch (6/147; 10/59). No evidence of right heart strain. The right ventricle measures 3.5 cm; the left ventricle measures 3.7 cm. (RV:LV<1) \par 2. Compared with the previous CT scan of 5/27/2020, there is now a large amount of ascites throughout the abdomen and pelvis. \par \par LE Doppler on 8/8/2020 revealed \par No evidence of deep venous thrombosis or superficial thrombophlebitis in the bilateral lower extremities. \par Left popliteal fossa fluid collection as measured above likely Baker's cyst\par \par During her admission she has required 3 episodes of paracentesis on 8/7 3L, 8/12 4L, 8/13 2.2L, once pathology became available patient was started on weekly Carbo/Taxol she has tolerated chemotherapy well.  [de-identified] : 8/18/2020: She returns today for follow up, she reports improved SOB, improved abdominal swelling, still come positional chest discomfort, she is compliant with BID Lovenox, she remains on the ASA. She reports improved appetite, she reports that appetite is decreased with a large amount of fluid on the abdomen. She is due for C1D8 of Carbo/Taxol on 8/21/2020. CBC was reviewed. \par Side effects of chemotherapy including, but not limited to, cytopenias, that may require blood product transfusions and lead to increased risk of infection, requiring hospitalization, allergic reactions, that can rarely be life-threatening, skin reactions, nausea/vomiting, mucositis, diarrhea/constipation were discussed at length, patient and family voice undestaging, all questions were answered to patient's satisfaction.\par Images were personally reviewed by MD Matt and discussed with patient.\par \par 9/10/2020: Patient is 80 years old female came  today for follow up for  Recurrent adenocarcinoma Mullerian origin, malignant ascites diagnosed on 8/8/2020, she S/P one cycle of weekly Carbo/taxol she tolerated well. She  reports improved SOB, improved abdominal swelling, she is ran out of Lovenox, which she is supposed to be taking BID, she didn't call for the renewal, she remains on the ASA. She  has been without Lovenox for like 7 days now, we will restart her on AC immediately. Xarelto was prescribed. She was educated again that she will need to remain on long-term anticoagulation. She reports good  appetite  She is due to start the new cycle of chemo on 9/11/20.\par PET Scan reviewed from 9/8/20 which revealed\par Mild diffuse FDG uptake throughout the mesentery along with peritoneal stranding and nodularity compatible with peritoneal carcinomatosis (SUV up to 3.9). \par No additional sites of abnormal FDG uptake to suggest biologic tumor activity.\par Images were personally reviewed by MD Matt and discussed with patient. \par \par 10/15/2020: Trav present for follow up, her next chemotherapy is for tomorrow, we will need to place PICC line, this will be arrange as outpatient. \par CT chest on 10/11/2020 revealed When compared to prior study dated 9/11/2020: \par Although not a dedicated PE study, redemonstration of a right lower lobe segmental/subsegmental pulmonary embolus which appears overall decreased in size from one month prior. \par No evidence for new consolidation, pleural effusion or pneumothorax. \par Trav reports mild neuropathy, not worsening with chemotherapy, CBC was reviewed. Overall clinically she demonstrates great response to therapy. \par \par 11/12/2020: Trav is 80 years old female  present for follow up, her ncycle 4 of carbo/taxol is for tomorrow. She repots feeling better regard urination and hematuria. She stated no more blood with her urine but sometime feels pain while she is urinating.\par We communicated CBC result with Patient with HGB/ HCT is 9.6/29.5. Also patient currently on the therapeutic lovenox once every 12 hrs.\par Trav reports neuropathy is getting  worse with chemotherapy, CBC was reviewed. Overall clinically she demonstrates great response to therapy.\par \par 12/10/2020: Trav is a 80 years old female  present for follow up for Recurrent adenocarcinoma Mullerian origin, malignant ascites diagnosed on 8/8/2020, S/P 4 cycles of weekly Carboplatin and Taxol.  She reports feeling well, tolerating treatment. She denies hematuria, abdominal pain. \par We communicated CBC result with Patient with HGB/ HCT is 9.1/29.5. Also patient continues on the therapeutic Lovenox once every 12 hrs.\par Patient will start Cycle # 5 tomorrow.\par \par 1/7/21\par Patient is here for a follow-up visit for recurrent adenocarcinoma Mullerian origin, malignant ascites diagnosed on 8/8/2020, S/P 5 cycles of weekly Carboplatin and Taxol.  She reports feeling well, tolerating treatment.  Reviewed most recent CBC which is stable.  Patient will start Cycle #6 tomorrow.  Patient denies fever, chills, vomiting or bleeding.  She lost about 10lbs since last visit and reports lack of appetite.  She states she is not drinking much and feels dehydrated, requesting hydration.   She continues on therapeutic Lovenox once every 12 hrs.  However she also reports pain on side of injection that radiates down to leg.  As per Dr. Gutierrez, patient bled on Xarelto, so she remains on Lovenox.

## 2021-01-12 ENCOUNTER — APPOINTMENT (OUTPATIENT)
Dept: INFUSION THERAPY | Facility: CLINIC | Age: 81
End: 2021-01-12

## 2021-01-15 ENCOUNTER — APPOINTMENT (OUTPATIENT)
Dept: INFUSION THERAPY | Facility: CLINIC | Age: 81
End: 2021-01-15

## 2021-01-15 ENCOUNTER — LABORATORY RESULT (OUTPATIENT)
Age: 81
End: 2021-01-15

## 2021-01-15 LAB
APPEARANCE UR: ABNORMAL
BACTERIA # UR AUTO: NEGATIVE — SIGNIFICANT CHANGE UP
BILIRUB UR-MCNC: NEGATIVE — SIGNIFICANT CHANGE UP
COLOR SPEC: SIGNIFICANT CHANGE UP
DIFF PNL FLD: ABNORMAL
EPI CELLS # UR: 0 /HPF — SIGNIFICANT CHANGE UP (ref 0–5)
GLUCOSE UR QL: NEGATIVE — SIGNIFICANT CHANGE UP
HYALINE CASTS # UR AUTO: 1 /LPF — SIGNIFICANT CHANGE UP (ref 0–7)
KETONES UR-MCNC: NEGATIVE — SIGNIFICANT CHANGE UP
LEUKOCYTE ESTERASE UR-ACNC: ABNORMAL
NITRITE UR-MCNC: NEGATIVE — SIGNIFICANT CHANGE UP
PH UR: 7 — SIGNIFICANT CHANGE UP (ref 5–8)
PROT UR-MCNC: ABNORMAL
RBC CASTS # UR COMP ASSIST: 2 /HPF — SIGNIFICANT CHANGE UP (ref 0–4)
SP GR SPEC: 1.01 — SIGNIFICANT CHANGE UP (ref 1.01–1.03)
UROBILINOGEN FLD QL: SIGNIFICANT CHANGE UP
WBC UR QL: 95 /HPF — HIGH (ref 0–5)

## 2021-01-15 RX ORDER — ACETAMINOPHEN 500 MG
650 TABLET ORAL ONCE
Refills: 0 | Status: COMPLETED | OUTPATIENT
Start: 2021-01-15 | End: 2021-01-15

## 2021-01-15 RX ORDER — PACLITAXEL 6 MG/ML
100 INJECTION, SOLUTION, CONCENTRATE INTRAVENOUS ONCE
Refills: 0 | Status: COMPLETED | OUTPATIENT
Start: 2021-01-15 | End: 2021-01-15

## 2021-01-15 RX ORDER — DIPHENHYDRAMINE HCL 50 MG
25 CAPSULE ORAL ONCE
Refills: 0 | Status: COMPLETED | OUTPATIENT
Start: 2021-01-15 | End: 2021-01-15

## 2021-01-15 RX ORDER — DEXAMETHASONE 0.5 MG/5ML
12 ELIXIR ORAL ONCE
Refills: 0 | Status: COMPLETED | OUTPATIENT
Start: 2021-01-15 | End: 2021-01-15

## 2021-01-15 RX ORDER — SODIUM CHLORIDE 9 MG/ML
500 INJECTION INTRAMUSCULAR; INTRAVENOUS; SUBCUTANEOUS
Refills: 0 | Status: DISCONTINUED | OUTPATIENT
Start: 2021-01-15 | End: 2021-01-01

## 2021-01-15 RX ORDER — FAMOTIDINE 10 MG/ML
20 INJECTION INTRAVENOUS ONCE
Refills: 0 | Status: COMPLETED | OUTPATIENT
Start: 2021-01-15 | End: 2021-01-15

## 2021-01-15 RX ORDER — FOSAPREPITANT DIMEGLUMINE 150 MG/5ML
150 INJECTION, POWDER, LYOPHILIZED, FOR SOLUTION INTRAVENOUS ONCE
Refills: 0 | Status: COMPLETED | OUTPATIENT
Start: 2021-01-15 | End: 2021-01-15

## 2021-01-15 RX ORDER — CARBOPLATIN 50 MG
110 VIAL (EA) INTRAVENOUS ONCE
Refills: 0 | Status: COMPLETED | OUTPATIENT
Start: 2021-01-15 | End: 2021-01-15

## 2021-01-15 RX ADMIN — Medication 650 MILLIGRAM(S): at 09:58

## 2021-01-15 RX ADMIN — Medication 101 MILLIGRAM(S): at 10:30

## 2021-01-15 RX ADMIN — FAMOTIDINE 104 MILLIGRAM(S): 10 INJECTION INTRAVENOUS at 10:15

## 2021-01-15 RX ADMIN — FOSAPREPITANT DIMEGLUMINE 300 MILLIGRAM(S): 150 INJECTION, POWDER, LYOPHILIZED, FOR SOLUTION INTRAVENOUS at 10:50

## 2021-01-15 RX ADMIN — Medication 122 MILLIGRAM(S): at 10:00

## 2021-01-15 RX ADMIN — Medication 12 MILLIGRAM(S): at 10:15

## 2021-01-15 RX ADMIN — FAMOTIDINE 20 MILLIGRAM(S): 10 INJECTION INTRAVENOUS at 10:30

## 2021-01-15 RX ADMIN — SODIUM CHLORIDE 500 MILLILITER(S): 9 INJECTION INTRAMUSCULAR; INTRAVENOUS; SUBCUTANEOUS at 09:58

## 2021-01-15 RX ADMIN — Medication 261 MILLIGRAM(S): at 10:43

## 2021-01-15 RX ADMIN — FOSAPREPITANT DIMEGLUMINE 150 MILLIGRAM(S): 150 INJECTION, POWDER, LYOPHILIZED, FOR SOLUTION INTRAVENOUS at 11:20

## 2021-01-15 RX ADMIN — PACLITAXEL 266.67 MILLIGRAM(S): 6 INJECTION, SOLUTION, CONCENTRATE INTRAVENOUS at 10:43

## 2021-01-15 RX ADMIN — Medication 25 MILLIGRAM(S): at 10:45

## 2021-01-18 LAB
ALBUMIN SERPL ELPH-MCNC: 3.1 G/DL
ALBUMIN SERPL ELPH-MCNC: 3.2 G/DL
ALBUMIN SERPL ELPH-MCNC: 3.4 G/DL
ALBUMIN SERPL ELPH-MCNC: 3.4 G/DL
ALBUMIN SERPL ELPH-MCNC: 3.8 G/DL
ALP BLD-CCNC: 62 U/L
ALP BLD-CCNC: 65 U/L
ALP BLD-CCNC: 69 U/L
ALP BLD-CCNC: 70 U/L
ALP BLD-CCNC: 76 U/L
ALT SERPL-CCNC: 7 U/L
ALT SERPL-CCNC: <5 U/L
ANION GAP SERPL CALC-SCNC: 10 MMOL/L
ANION GAP SERPL CALC-SCNC: 13 MMOL/L
ANION GAP SERPL CALC-SCNC: 15 MMOL/L
ANION GAP SERPL CALC-SCNC: 9 MMOL/L
ANION GAP SERPL CALC-SCNC: 9 MMOL/L
AST SERPL-CCNC: 12 U/L
AST SERPL-CCNC: 13 U/L
AST SERPL-CCNC: 14 U/L
AST SERPL-CCNC: 15 U/L
AST SERPL-CCNC: 16 U/L
BILIRUB SERPL-MCNC: 0.2 MG/DL
BILIRUB SERPL-MCNC: 0.2 MG/DL
BILIRUB SERPL-MCNC: <0.2 MG/DL
BUN SERPL-MCNC: 17 MG/DL
BUN SERPL-MCNC: 20 MG/DL
BUN SERPL-MCNC: 20 MG/DL
BUN SERPL-MCNC: 25 MG/DL
BUN SERPL-MCNC: 25 MG/DL
CALCIUM SERPL-MCNC: 7.9 MG/DL
CALCIUM SERPL-MCNC: 8.2 MG/DL
CALCIUM SERPL-MCNC: 8.2 MG/DL
CALCIUM SERPL-MCNC: 8.9 MG/DL
CALCIUM SERPL-MCNC: 9 MG/DL
CANCER AG125 SERPL-ACNC: 7 U/ML
CHLORIDE SERPL-SCNC: 105 MMOL/L
CHLORIDE SERPL-SCNC: 105 MMOL/L
CHLORIDE SERPL-SCNC: 106 MMOL/L
CHLORIDE SERPL-SCNC: 97 MMOL/L
CHLORIDE SERPL-SCNC: 97 MMOL/L
CO2 SERPL-SCNC: 22 MMOL/L
CO2 SERPL-SCNC: 24 MMOL/L
CO2 SERPL-SCNC: 24 MMOL/L
CO2 SERPL-SCNC: 25 MMOL/L
CO2 SERPL-SCNC: 25 MMOL/L
CREAT SERPL-MCNC: 1.1 MG/DL
CREAT SERPL-MCNC: 1.1 MG/DL
CREAT SERPL-MCNC: 1.4 MG/DL
CREAT SERPL-MCNC: 1.5 MG/DL
CREAT SERPL-MCNC: 1.5 MG/DL
GLUCOSE SERPL-MCNC: 102 MG/DL
GLUCOSE SERPL-MCNC: 106 MG/DL
GLUCOSE SERPL-MCNC: 143 MG/DL
GLUCOSE SERPL-MCNC: 92 MG/DL
GLUCOSE SERPL-MCNC: 99 MG/DL
HCT VFR BLD CALC: 29.2 %
HCT VFR BLD CALC: 29.5 %
HCT VFR BLD CALC: 29.8 %
HCT VFR BLD CALC: 30.4 %
HCT VFR BLD CALC: 30.7 %
HGB BLD-MCNC: 9.1 G/DL
HGB BLD-MCNC: 9.5 G/DL
HGB BLD-MCNC: 9.7 G/DL
HGB BLD-MCNC: 9.7 G/DL
HGB BLD-MCNC: 9.9 G/DL
MCHC RBC-ENTMCNC: 27.3 PG
MCHC RBC-ENTMCNC: 27.9 PG
MCHC RBC-ENTMCNC: 28 PG
MCHC RBC-ENTMCNC: 28.4 PG
MCHC RBC-ENTMCNC: 28.4 PG
MCHC RBC-ENTMCNC: 30.8 G/DL
MCHC RBC-ENTMCNC: 31.6 G/DL
MCHC RBC-ENTMCNC: 31.9 G/DL
MCHC RBC-ENTMCNC: 32.5 G/DL
MCHC RBC-ENTMCNC: 33.2 G/DL
MCV RBC AUTO: 85.4 FL
MCV RBC AUTO: 86.5 FL
MCV RBC AUTO: 87.2 FL
MCV RBC AUTO: 87.6 FL
MCV RBC AUTO: 90.5 FL
PLATELET # BLD AUTO: 152 K/UL
PLATELET # BLD AUTO: 181 K/UL
PLATELET # BLD AUTO: 209 K/UL
PLATELET # BLD AUTO: 231 K/UL
PLATELET # BLD AUTO: 316 K/UL
PMV BLD: 10 FL
PMV BLD: 10.6 FL
PMV BLD: 9.2 FL
PMV BLD: 9.7 FL
PMV BLD: 9.8 FL
POTASSIUM SERPL-SCNC: 4 MMOL/L
POTASSIUM SERPL-SCNC: 4.1 MMOL/L
POTASSIUM SERPL-SCNC: 4.2 MMOL/L
POTASSIUM SERPL-SCNC: 4.6 MMOL/L
POTASSIUM SERPL-SCNC: 4.7 MMOL/L
PROT SERPL-MCNC: 5.7 G/DL
PROT SERPL-MCNC: 6.1 G/DL
PROT SERPL-MCNC: 6.6 G/DL
PROT SERPL-MCNC: 6.8 G/DL
PROT SERPL-MCNC: 6.9 G/DL
RBC # BLD: 3.26 M/UL
RBC # BLD: 3.35 M/UL
RBC # BLD: 3.47 M/UL
RBC # BLD: 3.49 M/UL
RBC # BLD: 3.55 M/UL
RBC # FLD: 17.6 %
RBC # FLD: 17.7 %
RBC # FLD: 17.7 %
RBC # FLD: 17.9 %
RBC # FLD: 18.9 %
SODIUM SERPL-SCNC: 134 MMOL/L
SODIUM SERPL-SCNC: 134 MMOL/L
SODIUM SERPL-SCNC: 138 MMOL/L
SODIUM SERPL-SCNC: 139 MMOL/L
SODIUM SERPL-SCNC: 141 MMOL/L
WBC # FLD AUTO: 4.18 K/UL
WBC # FLD AUTO: 4.6 K/UL
WBC # FLD AUTO: 5.11 K/UL
WBC # FLD AUTO: 5.57 K/UL
WBC # FLD AUTO: 6.32 K/UL

## 2021-01-19 ENCOUNTER — APPOINTMENT (OUTPATIENT)
Dept: INFUSION THERAPY | Facility: CLINIC | Age: 81
End: 2021-01-19

## 2021-01-19 RX ORDER — BENZOCAINE 200 MG/G
20 LIQUID DENTAL; ORAL; PERIODONTAL
Qty: 300 | Refills: 1 | Status: DISCONTINUED | COMMUNITY
Start: 2021-01-19 | End: 2021-01-19

## 2021-01-21 NOTE — ED PROVIDER NOTE - OBJECTIVE STATEMENT
"""Recommend YAG Capsulotomy OS then OD."" ""Informed patient that their capsular opacification is visually significant and meets the minimum criteria for capsulotomy by YAG laser to increase their vision and decrease their glare symptoms. RBAs of procedure discussed.  "" ""Start Prednisolone Acetate left eye twice a day 79yo F with PMHx CAD/CABG (resternotomy 9/2018), HTN, DLD, GERD, uterine CA s/p SHAAN, p/w chest pain since this evening. Occurred while watching TV, right-mid chest, worse with palpation of specific spot, intermittent, nonradiating. Denies fever, headache, lightheadedness, SOB, cough, palpitations, nausea, vomiting, diarrhea, abd pain, dysuria, leg swelling. No trauma.

## 2021-01-22 ENCOUNTER — APPOINTMENT (OUTPATIENT)
Dept: INFUSION THERAPY | Facility: CLINIC | Age: 81
End: 2021-01-22

## 2021-01-22 ENCOUNTER — LABORATORY RESULT (OUTPATIENT)
Age: 81
End: 2021-01-22

## 2021-01-22 LAB
HCT VFR BLD CALC: 27 %
HGB BLD-MCNC: 8.5 G/DL
MCHC RBC-ENTMCNC: 27.3 PG
MCHC RBC-ENTMCNC: 31.5 G/DL
MCV RBC AUTO: 86.8 FL
PLATELET # BLD AUTO: 276 K/UL
PMV BLD: 10.6 FL
RBC # BLD: 3.11 M/UL
RBC # FLD: 18 %
WBC # FLD AUTO: 3.95 K/UL

## 2021-01-26 ENCOUNTER — APPOINTMENT (OUTPATIENT)
Dept: CARDIOLOGY | Facility: CLINIC | Age: 81
End: 2021-01-26

## 2021-01-26 ENCOUNTER — APPOINTMENT (OUTPATIENT)
Dept: INFUSION THERAPY | Facility: CLINIC | Age: 81
End: 2021-01-26

## 2021-01-28 ENCOUNTER — LABORATORY RESULT (OUTPATIENT)
Age: 81
End: 2021-01-28

## 2021-01-28 ENCOUNTER — APPOINTMENT (OUTPATIENT)
Dept: HEMATOLOGY ONCOLOGY | Facility: CLINIC | Age: 81
End: 2021-01-28
Payer: MEDICARE

## 2021-01-28 VITALS
HEIGHT: 64 IN | BODY MASS INDEX: 22.2 KG/M2 | SYSTOLIC BLOOD PRESSURE: 113 MMHG | TEMPERATURE: 98.1 F | HEART RATE: 81 BPM | DIASTOLIC BLOOD PRESSURE: 78 MMHG | WEIGHT: 130 LBS

## 2021-01-28 DIAGNOSIS — R31.9 HEMATURIA, UNSPECIFIED: ICD-10-CM

## 2021-01-28 LAB
ALBUMIN SERPL ELPH-MCNC: 3.4 G/DL
ALP BLD-CCNC: 61 U/L
ALT SERPL-CCNC: <5 U/L
ANION GAP SERPL CALC-SCNC: 11 MMOL/L
AST SERPL-CCNC: 11 U/L
BILIRUB SERPL-MCNC: <0.2 MG/DL
BUN SERPL-MCNC: 14 MG/DL
CALCIUM SERPL-MCNC: 8.5 MG/DL
CHLORIDE SERPL-SCNC: 98 MMOL/L
CO2 SERPL-SCNC: 27 MMOL/L
CREAT SERPL-MCNC: 1.2 MG/DL
GLUCOSE SERPL-MCNC: 100 MG/DL
HCT VFR BLD CALC: 30.6 %
HGB BLD-MCNC: 9.7 G/DL
MAGNESIUM SERPL-MCNC: 2 MG/DL
MCHC RBC-ENTMCNC: 27.5 PG
MCHC RBC-ENTMCNC: 31.7 G/DL
MCV RBC AUTO: 86.7 FL
PLATELET # BLD AUTO: 245 K/UL
PMV BLD: 9.9 FL
POTASSIUM SERPL-SCNC: 4.4 MMOL/L
PROT SERPL-MCNC: 6.4 G/DL
RBC # BLD: 3.53 M/UL
RBC # FLD: 17.9 %
SODIUM SERPL-SCNC: 136 MMOL/L
WBC # FLD AUTO: 4.86 K/UL

## 2021-01-28 PROCEDURE — 99213 OFFICE O/P EST LOW 20 MIN: CPT

## 2021-01-29 ENCOUNTER — APPOINTMENT (OUTPATIENT)
Dept: INFUSION THERAPY | Facility: CLINIC | Age: 81
End: 2021-01-29

## 2021-02-02 ENCOUNTER — APPOINTMENT (OUTPATIENT)
Dept: INFUSION THERAPY | Facility: CLINIC | Age: 81
End: 2021-02-02

## 2021-02-05 ENCOUNTER — APPOINTMENT (OUTPATIENT)
Dept: INFUSION THERAPY | Facility: CLINIC | Age: 81
End: 2021-02-05

## 2021-02-08 ENCOUNTER — APPOINTMENT (OUTPATIENT)
Dept: INFUSION THERAPY | Facility: CLINIC | Age: 81
End: 2021-02-08

## 2021-02-12 ENCOUNTER — APPOINTMENT (OUTPATIENT)
Dept: INFUSION THERAPY | Facility: CLINIC | Age: 81
End: 2021-02-12

## 2021-02-15 ENCOUNTER — OUTPATIENT (OUTPATIENT)
Dept: OUTPATIENT SERVICES | Facility: HOSPITAL | Age: 81
LOS: 1 days | Discharge: HOME | End: 2021-02-15
Payer: MEDICARE

## 2021-02-15 ENCOUNTER — RESULT REVIEW (OUTPATIENT)
Age: 81
End: 2021-02-15

## 2021-02-15 DIAGNOSIS — Z98.890 OTHER SPECIFIED POSTPROCEDURAL STATES: Chronic | ICD-10-CM

## 2021-02-15 DIAGNOSIS — Z95.1 PRESENCE OF AORTOCORONARY BYPASS GRAFT: Chronic | ICD-10-CM

## 2021-02-15 DIAGNOSIS — I25.701 ATHEROSCLEROSIS OF CORONARY ARTERY BYPASS GRAFT(S), UNSPECIFIED, WITH ANGINA PECTORIS WITH DOCUMENTED SPASM: Chronic | ICD-10-CM

## 2021-02-15 DIAGNOSIS — C79.89 SECONDARY MALIGNANT NEOPLASM OF OTHER SPECIFIED SITES: ICD-10-CM

## 2021-02-15 DIAGNOSIS — C55 MALIGNANT NEOPLASM OF UTERUS, PART UNSPECIFIED: ICD-10-CM

## 2021-02-15 LAB — GLUCOSE BLDC GLUCOMTR-MCNC: 84 MG/DL — SIGNIFICANT CHANGE UP (ref 70–99)

## 2021-02-15 PROCEDURE — 78815 PET IMAGE W/CT SKULL-THIGH: CPT | Mod: 26,PS

## 2021-02-15 NOTE — PHYSICAL EXAM
[Ambulatory and capable of all self care but unable to carry out any work activities] : Status 2- Ambulatory and capable of all self care but unable to carry out any work activities. Up and about more than 50% of waking hours [Normal] : affect appropriate [de-identified] : mildly distended overall improved compared to prior, non-tender

## 2021-02-15 NOTE — ASSESSMENT
[FreeTextEntry1] : Recurrent adenocarcinoma Mullerian origin, malignant ascites diagnosed on 8/8/2020\par --Prior SHAAN BSO in 2016, adjuvant chemotherapy, obtain records from Dr. Abdullahi \par --Started on weekly carbo/taxol weekly 3 on 1 off on 8/14/2020.\par --PET CT reviewed from 9/8/20. \par --Lab work today with Stable blood counts\par --For chemo 10/16/2020\par --PICC line care ( Patient insisted to flush PICC line once / week.)\par --On 12/11/20 patient will proceed with Cycle #5 of weekly carboplatin and Taxol weekly 3 weeks on and one week off. .\par --On 1/28/21 patient completed 6 cycles of Chemotherapy. \par --She will proceed with PET Scan, ordered on 1/28/2021\par \par Malignant ascites required high volume paracentesis \par --Improved/resolved with carbo/taxol \par \par PE in the setting of malignancy diagnosed on 8/6/2020\par --Doppler was negative for DVT on 8/8/2020\par --On BID Lovenox, but ran out on 9/1/2020\par --Also on ASA, h/o CABG . \par --Was stared on Xarelto, but after 1 dose was admitted with recurrent PE, diagnosed on 9/11/2020\par --Patient will switch from Lovenox to Xarelto 20 mg po daily on 1/28/2021\par \par Follow up in 4 weeks or earlier if needed.\par patient seen and examined by Dr Gutierrez who agreed for the above plan of care. \par

## 2021-02-15 NOTE — HISTORY OF PRESENT ILLNESS
[de-identified] : Trav is a shekhar 81 year old lady I initially met in the hospital on 8/11/2020. Her past medical history is significant for uterine cancer s/p SHAAN BSO as per patient s/p adjuvant chemotherapy in 2016, CAD s/p CABG, HTN and depression, she presented to the ED for abdominal pain and chest discomfort. 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 \par \par PAST MEDICAL & SURGICAL HISTORY:\par HLD (hyperlipidemia)\par HTN (hypertension)\par CAD (coronary artery disease)\par History of coronary artery bypass surgery\par \par FAMILY HISTORY:\par No pertinent family history in first degree relatives\par \par Allergies\par chloroquine (Hives)\par \par CTA C/A/P on 8/6/2020 revealed \par 1. Intraluminal filling defect is seen within the segmental branches of the right lower lobe pulmonary artery, indicating acute pulmonary embolism. (6/132-138; 10/51-55). Filling defect in segmental branch of the right upper lobe pulmonary artery. (6/77) There is a small filling defect within a left lower lobe pulmonary artery segmental branch (6/147; 10/59). No evidence of right heart strain. The right ventricle measures 3.5 cm; the left ventricle measures 3.7 cm. (RV:LV<1) \par 2. Compared with the previous CT scan of 5/27/2020, there is now a large amount of ascites throughout the abdomen and pelvis. \par \par LE Doppler on 8/8/2020 revealed \par No evidence of deep venous thrombosis or superficial thrombophlebitis in the bilateral lower extremities. \par Left popliteal fossa fluid collection as measured above likely Baker's cyst\par \par During her admission she has required 3 episodes of paracentesis on 8/7 3L, 8/12 4L, 8/13 2.2L, once pathology became available patient was started on weekly Carbo/Taxol she has tolerated chemotherapy well.  [de-identified] : 8/18/2020: She returns today for follow up, she reports improved SOB, improved abdominal swelling, still come positional chest discomfort, she is compliant with BID Lovenox, she remains on the ASA. She reports improved appetite, she reports that appetite is decreased with a large amount of fluid on the abdomen. She is due for C1D8 of Carbo/Taxol on 8/21/2020. CBC was reviewed. \par Side effects of chemotherapy including, but not limited to, cytopenias, that may require blood product transfusions and lead to increased risk of infection, requiring hospitalization, allergic reactions, that can rarely be life-threatening, skin reactions, nausea/vomiting, mucositis, diarrhea/constipation were discussed at length, patient and family voice undestaging, all questions were answered to patient's satisfaction.\par Images were personally reviewed by MD Matt and discussed with patient.\par \par 9/10/2020: Patient is 80 years old female came  today for follow up for  Recurrent adenocarcinoma Mullerian origin, malignant ascites diagnosed on 8/8/2020, she S/P one cycle of weekly Carbo/taxol she tolerated well. She  reports improved SOB, improved abdominal swelling, she is ran out of Lovenox, which she is supposed to be taking BID, she didn't call for the renewal, she remains on the ASA. She  has been without Lovenox for like 7 days now, we will restart her on AC immediately. Xarelto was prescribed. She was educated again that she will need to remain on long-term anticoagulation. She reports good  appetite  She is due to start the new cycle of chemo on 9/11/20.\par PET Scan reviewed from 9/8/20 which revealed\par Mild diffuse FDG uptake throughout the mesentery along with peritoneal stranding and nodularity compatible with peritoneal carcinomatosis (SUV up to 3.9). \par No additional sites of abnormal FDG uptake to suggest biologic tumor activity.\par Images were personally reviewed by MD Matt and discussed with patient. \par \par 10/15/2020: Trav present for follow up, her next chemotherapy is for tomorrow, we will need to place PICC line, this will be arrange as outpatient. \par CT chest on 10/11/2020 revealed When compared to prior study dated 9/11/2020: \par Although not a dedicated PE study, redemonstration of a right lower lobe segmental/subsegmental pulmonary embolus which appears overall decreased in size from one month prior. \par No evidence for new consolidation, pleural effusion or pneumothorax. \par Trav reports mild neuropathy, not worsening with chemotherapy, CBC was reviewed. Overall clinically she demonstrates great response to therapy. \par \par 11/12/2020: Trav is 80 years old female  present for follow up, her ncycle 4 of carbo/taxol is for tomorrow. She repots feeling better regard urination and hematuria. She stated no more blood with her urine but sometime feels pain while she is urinating.\par We communicated CBC result with Patient with HGB/ HCT is 9.6/29.5. Also patient currently on the therapeutic lovenox once every 12 hrs.\par Trav reports neuropathy is getting  worse with chemotherapy, CBC was reviewed. Overall clinically she demonstrates great response to therapy.\par \par 12/10/2020: Trav is a 80 years old female  present for follow up for Recurrent adenocarcinoma Mullerian origin, malignant ascites diagnosed on 8/8/2020, S/P 4 cycles of weekly Carboplatin and Taxol.  She reports feeling well, tolerating treatment. She denies hematuria, abdominal pain. \par We communicated CBC result with Patient with HGB/ HCT is 9.1/29.5. Also patient continues on the therapeutic Lovenox once every 12 hrs.\par Patient will start Cycle # 5 tomorrow.\par \par 1/28/21: Trav is a 80 years old female  present for follow up for Recurrent adenocarcinoma Mullerian origin, malignant ascites diagnosed on 8/8/2020, S/P 6 cycles of weekly Carboplatin and Taxol.  She reports feeling well , Mildly tired , tolerating her diet better. She denies hematuria, abdominal pain. \par We communicated CBC result with Patient with HGB/ HCT is 9.7 /30.6.  Also patient continues on the therapeutic Lovenox once every 12 hrs.\par Patient still complaining  of mild dysuria no bleeding. we will send for Urine analysis. \par Patient was educated to switch from Lovenox to Xarelto 20 mg po daily.\par Patient is due for PET Scan . Prescription provided.

## 2021-02-15 NOTE — REVIEW OF SYSTEMS
[Fatigue] : fatigue [Recent Change In Weight] : ~T recent weight change [SOB on Exertion] : shortness of breath during exertion [Negative] : Allergic/Immunologic [Fever] : no fever [Chills] : no chills [Night Sweats] : no night sweats [Shortness Of Breath] : no shortness of breath [Wheezing] : no wheezing [Cough] : no cough [Abdominal Pain] : no abdominal pain [Vomiting] : no vomiting [Constipation] : no constipation [Diarrhea] : no diarrhea [Confused] : no confusion [Dizziness] : no dizziness [Fainting] : no fainting [Difficulty Walking] : no difficulty walking [FreeTextEntry7] : early satiety [de-identified] : minor preexisting neuropathy

## 2021-02-16 ENCOUNTER — APPOINTMENT (OUTPATIENT)
Dept: INFUSION THERAPY | Facility: CLINIC | Age: 81
End: 2021-02-16

## 2021-02-16 ENCOUNTER — APPOINTMENT (OUTPATIENT)
Dept: INTERVENTIONAL RADIOLOGY/VASCULAR | Facility: CLINIC | Age: 81
End: 2021-02-16

## 2021-02-16 PROCEDURE — XXXXX: CPT | Mod: 1L

## 2021-02-17 NOTE — HISTORY OF PRESENT ILLNESS
[FreeTextEntry1] :  81 year old lady PMH significant for uterine cancer s/p SHAAN BSO as per patient s/p adjuvant chemotherapy in 2016, CAD s/p CABG, HTN and depression, and metastatic adenocarcinoma s/p PICC line placement for chemotherapy on 10/22/20. \par \par Called from [facility], patient had imaging that showed PICC line retracted and coiled in the arm. PICC line no longer needed as per heme/onc attending. Patient sent to IR for PICC line removal.\par \par PICC line site looks C/D/I. No erythema, swelling or tenderness to touch. Tegaderm removed, no sutures noted, PICC line removed and pressure held. Sureseal band aid placed with gauze and Tegaderm and patient was sent home.\par \par No IR follow up necessary.

## 2021-02-19 ENCOUNTER — APPOINTMENT (OUTPATIENT)
Dept: INFUSION THERAPY | Facility: CLINIC | Age: 81
End: 2021-02-19

## 2021-02-23 ENCOUNTER — APPOINTMENT (OUTPATIENT)
Dept: INFUSION THERAPY | Facility: CLINIC | Age: 81
End: 2021-02-23

## 2021-02-25 ENCOUNTER — APPOINTMENT (OUTPATIENT)
Dept: HEMATOLOGY ONCOLOGY | Facility: CLINIC | Age: 81
End: 2021-02-25
Payer: MEDICARE

## 2021-02-25 ENCOUNTER — OUTPATIENT (OUTPATIENT)
Dept: OUTPATIENT SERVICES | Facility: HOSPITAL | Age: 81
LOS: 1 days | Discharge: HOME | End: 2021-02-25

## 2021-02-25 ENCOUNTER — LABORATORY RESULT (OUTPATIENT)
Age: 81
End: 2021-02-25

## 2021-02-25 VITALS
WEIGHT: 133 LBS | HEIGHT: 64 IN | TEMPERATURE: 98.6 F | BODY MASS INDEX: 22.71 KG/M2 | RESPIRATION RATE: 14 BRPM | DIASTOLIC BLOOD PRESSURE: 63 MMHG | HEART RATE: 66 BPM | SYSTOLIC BLOOD PRESSURE: 128 MMHG

## 2021-02-25 DIAGNOSIS — I25.701 ATHEROSCLEROSIS OF CORONARY ARTERY BYPASS GRAFT(S), UNSPECIFIED, WITH ANGINA PECTORIS WITH DOCUMENTED SPASM: Chronic | ICD-10-CM

## 2021-02-25 DIAGNOSIS — Z95.1 PRESENCE OF AORTOCORONARY BYPASS GRAFT: Chronic | ICD-10-CM

## 2021-02-25 DIAGNOSIS — I10 ESSENTIAL (PRIMARY) HYPERTENSION: ICD-10-CM

## 2021-02-25 DIAGNOSIS — R52 PAIN, UNSPECIFIED: ICD-10-CM

## 2021-02-25 DIAGNOSIS — C55 MALIGNANT NEOPLASM OF UTERUS, PART UNSPECIFIED: ICD-10-CM

## 2021-02-25 DIAGNOSIS — Z01.818 ENCOUNTER FOR OTHER PREPROCEDURAL EXAMINATION: ICD-10-CM

## 2021-02-25 DIAGNOSIS — Z79.899 OTHER LONG TERM (CURRENT) DRUG THERAPY: ICD-10-CM

## 2021-02-25 DIAGNOSIS — Z98.890 OTHER SPECIFIED POSTPROCEDURAL STATES: Chronic | ICD-10-CM

## 2021-02-25 DIAGNOSIS — Z86.39 PERSONAL HISTORY OF OTHER ENDOCRINE, NUTRITIONAL AND METABOLIC DISEASE: ICD-10-CM

## 2021-02-25 DIAGNOSIS — R31.9 HEMATURIA, UNSPECIFIED: ICD-10-CM

## 2021-02-25 PROCEDURE — 99214 OFFICE O/P EST MOD 30 MIN: CPT

## 2021-02-26 ENCOUNTER — APPOINTMENT (OUTPATIENT)
Dept: INFUSION THERAPY | Facility: CLINIC | Age: 81
End: 2021-02-26

## 2021-03-05 LAB
ALBUMIN SERPL ELPH-MCNC: 3.8 G/DL
ALP BLD-CCNC: 53 U/L
ALT SERPL-CCNC: 6 U/L
ANION GAP SERPL CALC-SCNC: 10 MMOL/L
ANION GAP SERPL CALC-SCNC: 12 MMOL/L
AST SERPL-CCNC: 15 U/L
BILIRUB SERPL-MCNC: <0.2 MG/DL
BUN SERPL-MCNC: 15 MG/DL
BUN SERPL-MCNC: 16 MG/DL
CALCIUM SERPL-MCNC: 8.4 MG/DL
CALCIUM SERPL-MCNC: 8.9 MG/DL
CANCER AG125 SERPL-ACNC: 6 U/ML
CHLORIDE SERPL-SCNC: 102 MMOL/L
CHLORIDE SERPL-SCNC: 104 MMOL/L
CO2 SERPL-SCNC: 26 MMOL/L
CO2 SERPL-SCNC: 26 MMOL/L
CREAT SERPL-MCNC: 1.2 MG/DL
CREAT SERPL-MCNC: 1.2 MG/DL
GLUCOSE SERPL-MCNC: 79 MG/DL
GLUCOSE SERPL-MCNC: 92 MG/DL
HCT VFR BLD CALC: 32.3 %
HGB BLD-MCNC: 10 G/DL
MCHC RBC-ENTMCNC: 27.5 PG
MCHC RBC-ENTMCNC: 31 G/DL
MCV RBC AUTO: 88.7 FL
PLATELET # BLD AUTO: 207 K/UL
PMV BLD: 9.2 FL
POTASSIUM SERPL-SCNC: 4 MMOL/L
POTASSIUM SERPL-SCNC: 4.6 MMOL/L
PROT SERPL-MCNC: 7 G/DL
RBC # BLD: 3.64 M/UL
RBC # FLD: 17.2 %
SODIUM SERPL-SCNC: 138 MMOL/L
SODIUM SERPL-SCNC: 142 MMOL/L
WBC # FLD AUTO: 5.31 K/UL

## 2021-03-08 NOTE — REVIEW OF SYSTEMS
[Fatigue] : fatigue [Recent Change In Weight] : ~T recent weight change [SOB on Exertion] : shortness of breath during exertion [Negative] : Allergic/Immunologic [Fever] : no fever [Chills] : no chills [Night Sweats] : no night sweats [Shortness Of Breath] : no shortness of breath [Wheezing] : no wheezing [Cough] : no cough [Abdominal Pain] : no abdominal pain [Vomiting] : no vomiting [Constipation] : no constipation [Diarrhea] : no diarrhea [Confused] : no confusion [Dizziness] : no dizziness [Fainting] : no fainting [Difficulty Walking] : no difficulty walking [FreeTextEntry7] : early satiety [de-identified] : minor preexisting neuropathy

## 2021-03-08 NOTE — HISTORY OF PRESENT ILLNESS
[de-identified] : Trav is a shekhar 81 year old lady I initially met in the hospital on 8/11/2020. Her past medical history is significant for uterine cancer s/p SHAAN BSO as per patient s/p adjuvant chemotherapy in 2016, CAD s/p CABG, HTN and depression, she presented to the ED for abdominal pain and chest discomfort. 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 \par \par PAST MEDICAL & SURGICAL HISTORY:\par HLD (hyperlipidemia)\par HTN (hypertension)\par CAD (coronary artery disease)\par History of coronary artery bypass surgery\par \par FAMILY HISTORY:\par No pertinent family history in first degree relatives\par \par Allergies\par chloroquine (Hives)\par \par CTA C/A/P on 8/6/2020 revealed \par 1. Intraluminal filling defect is seen within the segmental branches of the right lower lobe pulmonary artery, indicating acute pulmonary embolism. (6/132-138; 10/51-55). Filling defect in segmental branch of the right upper lobe pulmonary artery. (6/77) There is a small filling defect within a left lower lobe pulmonary artery segmental branch (6/147; 10/59). No evidence of right heart strain. The right ventricle measures 3.5 cm; the left ventricle measures 3.7 cm. (RV:LV<1) \par 2. Compared with the previous CT scan of 5/27/2020, there is now a large amount of ascites throughout the abdomen and pelvis. \par \par LE Doppler on 8/8/2020 revealed \par No evidence of deep venous thrombosis or superficial thrombophlebitis in the bilateral lower extremities. \par Left popliteal fossa fluid collection as measured above likely Baker's cyst\par \par During her admission she has required 3 episodes of paracentesis on 8/7 3L, 8/12 4L, 8/13 2.2L, once pathology became available patient was started on weekly Carbo/Taxol she has tolerated chemotherapy well.  [de-identified] : 8/18/2020: She returns today for follow up, she reports improved SOB, improved abdominal swelling, still come positional chest discomfort, she is compliant with BID Lovenox, she remains on the ASA. She reports improved appetite, she reports that appetite is decreased with a large amount of fluid on the abdomen. She is due for C1D8 of Carbo/Taxol on 8/21/2020. CBC was reviewed. \par Side effects of chemotherapy including, but not limited to, cytopenias, that may require blood product transfusions and lead to increased risk of infection, requiring hospitalization, allergic reactions, that can rarely be life-threatening, skin reactions, nausea/vomiting, mucositis, diarrhea/constipation were discussed at length, patient and family voice undestaging, all questions were answered to patient's satisfaction.\par Images were personally reviewed by MD Matt and discussed with patient.\par \par 9/10/2020: Patient is 80 years old female came  today for follow up for  Recurrent adenocarcinoma Mullerian origin, malignant ascites diagnosed on 8/8/2020, she S/P one cycle of weekly Carbo/taxol she tolerated well. She  reports improved SOB, improved abdominal swelling, she is ran out of Lovenox, which she is supposed to be taking BID, she didn't call for the renewal, she remains on the ASA. She  has been without Lovenox for like 7 days now, we will restart her on AC immediately. Xarelto was prescribed. She was educated again that she will need to remain on long-term anticoagulation. She reports good  appetite  She is due to start the new cycle of chemo on 9/11/20.\par PET Scan reviewed from 9/8/20 which revealed\par Mild diffuse FDG uptake throughout the mesentery along with peritoneal stranding and nodularity compatible with peritoneal carcinomatosis (SUV up to 3.9). \par No additional sites of abnormal FDG uptake to suggest biologic tumor activity.\par Images were personally reviewed by MD Matt and discussed with patient. \par \par 10/15/2020: Trav present for follow up, her next chemotherapy is for tomorrow, we will need to place PICC line, this will be arrange as outpatient. \par CT chest on 10/11/2020 revealed When compared to prior study dated 9/11/2020: \par Although not a dedicated PE study, redemonstration of a right lower lobe segmental/subsegmental pulmonary embolus which appears overall decreased in size from one month prior. \par No evidence for new consolidation, pleural effusion or pneumothorax. \par Trav reports mild neuropathy, not worsening with chemotherapy, CBC was reviewed. Overall clinically she demonstrates great response to therapy. \par \par 11/12/2020: Trav is 80 years old female  present for follow up, her ncycle 4 of carbo/taxol is for tomorrow. She repots feeling better regard urination and hematuria. She stated no more blood with her urine but sometime feels pain while she is urinating.\par We communicated CBC result with Patient with HGB/ HCT is 9.6/29.5. Also patient currently on the therapeutic lovenox once every 12 hrs.\par Trav reports neuropathy is getting  worse with chemotherapy, CBC was reviewed. Overall clinically she demonstrates great response to therapy.\par \par 12/10/2020: Trav is a 80 years old female  present for follow up for Recurrent adenocarcinoma Mullerian origin, malignant ascites diagnosed on 8/8/2020, S/P 4 cycles of weekly Carboplatin and Taxol.  She reports feeling well, tolerating treatment. She denies hematuria, abdominal pain. \par We communicated CBC result with Patient with HGB/ HCT is 9.1/29.5. Also patient continues on the therapeutic Lovenox once every 12 hrs.\par Patient will start Cycle # 5 tomorrow.\par \par 1/28/21: Trav is a 80 years old female  present for follow up for Recurrent adenocarcinoma Mullerian origin, malignant ascites diagnosed on 8/8/2020, S/P 6 cycles of weekly Carboplatin and Taxol.  She reports feeling well , Mildly tired , tolerating her diet better. She denies hematuria, abdominal pain. \par We communicated CBC result with Patient with HGB/ HCT is 9.7 /30.6.  Also patient continues on the therapeutic Lovenox once every 12 hrs.\par Patient still complaining  of mild dysuria no bleeding. we will send for Urine analysis. \par Patient was educated to switch from Lovenox to Xarelto 20 mg po daily.\par Patient is due for PET Scan . Prescription provided. \par \par 2/25/21: Trav is a 80 years old female  who presents for follow up for Recurrent adenocarcinoma Mullerian origin, malignant ascites diagnosed on 8/8/2020, S/P 6 cycles of weekly Carboplatin and Taxol.  She reports feeling OK, reports fatigue, tolerating her diet better. She denies hematuria, abdominal pain. She reports she does not wish at this point to receive any additional cancer-directed therapy, ad wishes to stop Lovenox. \par PET CT on 2/15/21 revealed \par Compared to PET/CT of 9/8/2020, no definite sites of abnormal FDG uptake to suggest biologic tumor activity.\par Interval retraction of a right PICC with tip noted within the right axilla (when correlated to chest radiograph dated 10/22/2020).\par Increased left hydroureteronephrosis to the level of the midureter without definite obstructing radiopaque calculus.\par Images were personally reviewed by MD Matt and discussed with patient. \par We recommended for the patient to follow up with urology, we will assist with appointment.\par We communicated CBC result with Patient with HGB/ HCT is 10.0  /32.6.  Also patient will switch from Lovenox to Xarelto, we have already prescribed that during her prior visit, she did not however switch over.\par Patient was educated to switch from Lovenox to Xarelto 20 mg po daily.

## 2021-03-08 NOTE — PHYSICAL EXAM
[Ambulatory and capable of all self care but unable to carry out any work activities] : Status 2- Ambulatory and capable of all self care but unable to carry out any work activities. Up and about more than 50% of waking hours [Normal] : affect appropriate [de-identified] : mildly distended overall improved compared to prior, non-tender

## 2021-03-08 NOTE — ASSESSMENT
[FreeTextEntry1] : Recurrent adenocarcinoma Mullerian origin, malignant ascites diagnosed on 8/8/2020\par --Prior SHAAN BSO in 2016, adjuvant chemotherapy, obtain records from Dr. Abdullahi \par --Started on weekly carbo/taxol weekly 3 on 1 off on 8/14/2020.\par --PET CT reviewed from 9/8/20. \par --Lab work today with Stable blood counts\par --For chemo 10/16/2020\par --PICC line care ( Patient insisted to flush PICC line once / week.)\par --On 12/11/20 patient will proceed with Cycle #5 of weekly carboplatin and Taxol weekly 3 weeks on and one week off. .\par --On 1/28/21 patient completed 6 cycles of Carbo/Taxol (weekly), declines any additional cancer directed therapy on 2/25/2021\par --On 2/25/21 we reviewed PET Scan from 2/25/21 with good response to chemo.\par --Referred Patient to Urology for  left hydroureteronephrosis, we will assist patient with appointment. \par \par Malignant ascites required high volume paracentesis \par --Improved/resolved with carbo/taxol \par \par PE in the setting of malignancy diagnosed on 8/6/2020\par --Doppler was negative for DVT on 8/8/2020\par --On BID Lovenox, but ran out on 9/1/2020\par --Also on ASA, h/o CABG . \par --Was stared on Xarelto, but after 1 dose was admitted with recurrent PE, diagnosed on 9/11/2020\par --Patient will switch from Lovenox to Xarelto 20 mg po daily on 1/28/2021.\par --Reeducated patient how to switch from Lovenox to Xarelto. \par \par Follow up in 4 weeks or earlier if needed.\par patient seen and examined by Dr Gutierrez who agreed for the above plan of care. \par

## 2021-03-09 DIAGNOSIS — R52 PAIN, UNSPECIFIED: ICD-10-CM

## 2021-03-09 DIAGNOSIS — I26.99 OTHER PULMONARY EMBOLISM WITHOUT ACUTE COR PULMONALE: ICD-10-CM

## 2021-03-09 DIAGNOSIS — G62.9 POLYNEUROPATHY, UNSPECIFIED: ICD-10-CM

## 2021-03-11 ENCOUNTER — APPOINTMENT (OUTPATIENT)
Dept: UROLOGY | Facility: CLINIC | Age: 81
End: 2021-03-11

## 2021-03-12 ENCOUNTER — EMERGENCY (EMERGENCY)
Facility: HOSPITAL | Age: 81
LOS: 0 days | Discharge: HOME | End: 2021-03-12
Attending: EMERGENCY MEDICINE | Admitting: EMERGENCY MEDICINE
Payer: MEDICARE

## 2021-03-12 VITALS
TEMPERATURE: 98 F | HEART RATE: 75 BPM | RESPIRATION RATE: 18 BRPM | HEIGHT: 63 IN | DIASTOLIC BLOOD PRESSURE: 57 MMHG | WEIGHT: 169.98 LBS | SYSTOLIC BLOOD PRESSURE: 112 MMHG | OXYGEN SATURATION: 98 %

## 2021-03-12 DIAGNOSIS — Z98.890 OTHER SPECIFIED POSTPROCEDURAL STATES: Chronic | ICD-10-CM

## 2021-03-12 DIAGNOSIS — E78.00 PURE HYPERCHOLESTEROLEMIA, UNSPECIFIED: ICD-10-CM

## 2021-03-12 DIAGNOSIS — I25.701 ATHEROSCLEROSIS OF CORONARY ARTERY BYPASS GRAFT(S), UNSPECIFIED, WITH ANGINA PECTORIS WITH DOCUMENTED SPASM: Chronic | ICD-10-CM

## 2021-03-12 DIAGNOSIS — Z88.8 ALLERGY STATUS TO OTHER DRUGS, MEDICAMENTS AND BIOLOGICAL SUBSTANCES: ICD-10-CM

## 2021-03-12 DIAGNOSIS — I10 ESSENTIAL (PRIMARY) HYPERTENSION: ICD-10-CM

## 2021-03-12 DIAGNOSIS — Z95.1 PRESENCE OF AORTOCORONARY BYPASS GRAFT: Chronic | ICD-10-CM

## 2021-03-12 DIAGNOSIS — N89.8 OTHER SPECIFIED NONINFLAMMATORY DISORDERS OF VAGINA: ICD-10-CM

## 2021-03-12 DIAGNOSIS — Z95.1 PRESENCE OF AORTOCORONARY BYPASS GRAFT: ICD-10-CM

## 2021-03-12 DIAGNOSIS — K59.00 CONSTIPATION, UNSPECIFIED: ICD-10-CM

## 2021-03-12 DIAGNOSIS — K62.89 OTHER SPECIFIED DISEASES OF ANUS AND RECTUM: ICD-10-CM

## 2021-03-12 DIAGNOSIS — N39.0 URINARY TRACT INFECTION, SITE NOT SPECIFIED: ICD-10-CM

## 2021-03-12 DIAGNOSIS — Z79.899 OTHER LONG TERM (CURRENT) DRUG THERAPY: ICD-10-CM

## 2021-03-12 LAB
APPEARANCE UR: ABNORMAL
BACTERIA # UR AUTO: NEGATIVE — SIGNIFICANT CHANGE UP
BILIRUB UR-MCNC: NEGATIVE — SIGNIFICANT CHANGE UP
COLOR SPEC: SIGNIFICANT CHANGE UP
DIFF PNL FLD: ABNORMAL
EPI CELLS # UR: 1 /HPF — SIGNIFICANT CHANGE UP (ref 0–5)
GLUCOSE UR QL: NEGATIVE — SIGNIFICANT CHANGE UP
HYALINE CASTS # UR AUTO: 1 /LPF — SIGNIFICANT CHANGE UP (ref 0–7)
KETONES UR-MCNC: NEGATIVE — SIGNIFICANT CHANGE UP
LEUKOCYTE ESTERASE UR-ACNC: ABNORMAL
NITRITE UR-MCNC: NEGATIVE — SIGNIFICANT CHANGE UP
PH UR: 7 — SIGNIFICANT CHANGE UP (ref 5–8)
PROT UR-MCNC: ABNORMAL
RBC CASTS # UR COMP ASSIST: 88 /HPF — HIGH (ref 0–4)
SP GR SPEC: 1.01 — SIGNIFICANT CHANGE UP (ref 1.01–1.03)
UROBILINOGEN FLD QL: SIGNIFICANT CHANGE UP
WBC UR QL: 202 /HPF — HIGH (ref 0–5)

## 2021-03-12 PROCEDURE — 99284 EMERGENCY DEPT VISIT MOD MDM: CPT

## 2021-03-12 PROCEDURE — 74018 RADEX ABDOMEN 1 VIEW: CPT | Mod: 26

## 2021-03-12 RX ORDER — CEFPODOXIME PROXETIL 100 MG
1 TABLET ORAL
Qty: 14 | Refills: 0
Start: 2021-03-12 | End: 2021-03-18

## 2021-03-12 RX ORDER — LACTULOSE 10 G/15ML
30 SOLUTION ORAL
Qty: 500 | Refills: 0
Start: 2021-03-12

## 2021-03-12 RX ORDER — SERTRALINE 25 MG/1
1 TABLET, FILM COATED ORAL
Qty: 0 | Refills: 0 | DISCHARGE

## 2021-03-12 NOTE — ED PROVIDER NOTE - OBJECTIVE STATEMENT
81y/o F with PMH of uterine CA s/p SHAAN on chemo (finished 01/2021, [place]), CAD CABG, hx of PE on xarelto, HTN, depression presents to ED for rectal fullness and vaginal discomfort. Hx of constipation, last went 1 wk ago, doesn't use stool softeners bc they upset her stomach. Feels rectal is 'full' with something that needs to come out but nothing coming out. Vagina feels 'sharp' like its burning - was tx for UTI in past when she had dysuria but doesn't have urinary sxs now. No vaginal lesions or burning. No fever/chills, hx of STD, hx of smoking, weight loss, CP, SOB, abd pain, N/V/D, blood per rectum.    Rectal exam performed, offered manual disimpaction. Pt declined. 81y/o F with PMH of uterine CA s/p SHAAN on chemo (finished 01/2021, [facility]), CAD CABG, hx of PE on xarelto, HTN, depression presents to ED for rectal fullness and vaginal discomfort. Hx of constipation, last went 1 wk ago, doesn't use stool softeners bc they upset her stomach. Feels rectal is 'full' with something that needs to come out but nothing coming out. Vagina feels 'sharp' like its burning - was tx for UTI in past when she had dysuria but doesn't have urinary sxs now. No vaginal lesions or burning. No fever/chills, hx of STD, hx of smoking, weight loss, CP, SOB, abd pain, N/V/D, blood per rectum. Patient is passing gas.    Rectal exam performed, offered manual disimpaction. Pt declined.

## 2021-03-12 NOTE — ED ADULT NURSE NOTE - NSIMPLEMENTINTERV_GEN_ALL_ED
Implemented All Fall with Harm Risk Interventions:  [place] to call system. Call bell, personal items and telephone within reach. Instruct patient to call for assistance. Room bathroom lighting operational. Non-slip footwear when patient is off stretcher. Physically safe environment: no spills, clutter or unnecessary equipment. Stretcher in lowest position, wheels locked, appropriate side rails in place. Provide visual cue, wrist band, yellow gown, etc. Monitor gait and stability. Monitor for mental status changes and reorient to person, place, and time. Review medications for side effects contributing to fall risk. Reinforce activity limits and safety measures with patient and family. Provide visual clues: red socks.

## 2021-03-12 NOTE — ED PROVIDER NOTE - NSFOLLOWUPINSTRUCTIONS_ED_ALL_ED_FT
Constipation    Constipation is when a person has fewer than three bowel movements a week, has difficulty having a bowel movement, or has stools that are dry, hard, or larger than normal. Other symptoms can include abdominal pain or bloating. As people grow older, constipation is more common. A low-fiber diet, not taking in enough fluids, and taking certain medicines, including opioid painkillers, may make constipation worse. Treatment varies but may include dietary modifications (more fiber-rich foods), lifestyle modifications, and possible medications.     Continue taking lactulose until you have a good bowel movement, avoid prolonged bouts of constipation that can cause more stress on the stress near your rectum.     SEEK IMMEDIATE MEDICAL CARE IF YOU HAVE ANY OF THE FOLLOWING SYMPTOMS: bright red blood in your stool, constipation for longer than 4 days, abdominal or rectal pain, unexplained weight loss, or inability to pass gas.     Vaginal Dryness    Your burning near your vagina may be caused by vaginal dryness and changes related to menopause. We recommend you follow up with Dr. Elizabeth your gynecologist to suggest any creams or aids for this symptom. You were also tested for a urinary tract infection. Constipation    Constipation is when a person has fewer than three bowel movements a week, has difficulty having a bowel movement, or has stools that are dry, hard, or larger than normal. Other symptoms can include abdominal pain or bloating. As people grow older, constipation is more common. A low-fiber diet, not taking in enough fluids, and taking certain medicines, including opioid painkillers, may make constipation worse. Treatment varies but may include dietary modifications (more fiber-rich foods), lifestyle modifications, and possible medications.     Continue taking lactulose until you have a good bowel movement, avoid prolonged bouts of constipation that can cause more stress on the stress near your rectum. We also recommend hydrating well.     SEEK IMMEDIATE MEDICAL CARE IF YOU HAVE ANY OF THE FOLLOWING SYMPTOMS: bright red blood in your stool, constipation for longer than 4 days, abdominal or rectal pain, unexplained weight loss, or inability to pass gas.     Vaginal Dryness & Burning    Your burning near your vagina may be caused by vaginal dryness and changes related to menopause. We recommend you follow up with Dr. Elizabeth your gynecologist to suggest any creams or aids for this symptom.     You were also found to have a urinary tract infection. Please finish 7 day course of antibiotics. We will follow-up your culture. Follow up with your primary care provider on discharge

## 2021-03-12 NOTE — ED PROVIDER NOTE - ATTENDING CONTRIBUTION TO CARE
80 year old female, pmhx as documented, presenting with rectal fullness and suprapubic abdominal and vaginal pain x 1 week described as non-radiating, no palliative or provocative factors, mild severity. States her last BM was 1 week ago which is abnormal for her, but is (+) passing flatus. Patient otherwise denies fevers, N/V, blood in stool, or leg swelling.    Vital Signs: I have reviewed the initial vital signs.  Constitutional: NAD, well-nourished, appears stated age, no acute distress.  HEENT: Airway patent, moist MM, no erythema/swelling/deformity of oral structures. EOMI, PERRLA.  CV: regular rate, regular rhythm, well-perfused extremities, 2+ b/l DP and radial pulses equal.  Lungs: BCTA, no increased WOB.  ABD: NTND, no guarding or rebound, no pulsatile mass, no hernias.   MSK: Neck supple, nontender, nl ROM, no stepoff. Chest nontender. Back nontender in TLS spine or to b/l bony structures or flanks. Ext nontender, nl rom, no deformity.   INTEG: Skin warm, dry, no rash.  NEURO: A&Ox3, normal strength, nl sensation throughout, normal speech.   PSYCH: Calm, cooperative, normal affect and interaction.    Abd non-tender. Rectal exam as above - no fecal impaction. Will obtain UA, urine cx, KUB to screen for obstruction, re-eval.

## 2021-03-12 NOTE — ED PROVIDER NOTE - PATIENT PORTAL LINK FT
You can access the FollowMyHealth Patient Portal offered by [facility] by registering at the following website: [email or web address]. By joining [employer]’s FollowMyHealth portal, you will also be able to view your health information using other applications (apps) compatible with our system.

## 2021-03-12 NOTE — ED PROVIDER NOTE - CARE PLAN
Principal Discharge DX:	Constipation  Secondary Diagnosis:	Vaginal burning   Principal Discharge DX:	Constipation  Secondary Diagnosis:	Vaginal burning  Secondary Diagnosis:	UTI (urinary tract infection)

## 2021-03-12 NOTE — ED PROVIDER NOTE - CLINICAL SUMMARY MEDICAL DECISION MAKING FREE TEXT BOX
Patient presented with suprapubic abdominal and vaginal pain as well as constipation. Otherwise afebrile, HD stable, abd completely non-tender. Rectal exam without evidence of fecal impaction. UA obtained and showed (+) possibly early UTI, urine cx sent. KUB with non-obstructive pattern. Given the above, will prescribe PO abx for UTI, laxative for constipation, outpatient follow up. Patient agreeable with plan. Agrees to return to ED for any new or worsening symptoms.

## 2021-03-13 LAB
CULTURE RESULTS: SIGNIFICANT CHANGE UP
SPECIMEN SOURCE: SIGNIFICANT CHANGE UP

## 2021-03-16 ENCOUNTER — NON-APPOINTMENT (OUTPATIENT)
Age: 81
End: 2021-03-16

## 2021-03-25 ENCOUNTER — APPOINTMENT (OUTPATIENT)
Dept: UROLOGY | Facility: CLINIC | Age: 81
End: 2021-03-25
Payer: MEDICARE

## 2021-03-25 ENCOUNTER — OUTPATIENT (OUTPATIENT)
Dept: OUTPATIENT SERVICES | Facility: HOSPITAL | Age: 81
LOS: 1 days | Discharge: HOME | End: 2021-03-25

## 2021-03-25 VITALS
HEART RATE: 63 BPM | DIASTOLIC BLOOD PRESSURE: 71 MMHG | SYSTOLIC BLOOD PRESSURE: 125 MMHG | TEMPERATURE: 96.6 F | WEIGHT: 135 LBS | BODY MASS INDEX: 23.05 KG/M2 | OXYGEN SATURATION: 98 % | HEIGHT: 64 IN

## 2021-03-25 DIAGNOSIS — I25.701 ATHEROSCLEROSIS OF CORONARY ARTERY BYPASS GRAFT(S), UNSPECIFIED, WITH ANGINA PECTORIS WITH DOCUMENTED SPASM: Chronic | ICD-10-CM

## 2021-03-25 DIAGNOSIS — Z98.890 OTHER SPECIFIED POSTPROCEDURAL STATES: Chronic | ICD-10-CM

## 2021-03-25 DIAGNOSIS — N13.30 UNSPECIFIED HYDRONEPHROSIS: ICD-10-CM

## 2021-03-25 DIAGNOSIS — Z95.1 PRESENCE OF AORTOCORONARY BYPASS GRAFT: Chronic | ICD-10-CM

## 2021-03-25 PROCEDURE — 99213 OFFICE O/P EST LOW 20 MIN: CPT

## 2021-03-25 NOTE — HISTORY OF PRESENT ILLNESS
[FreeTextEntry1] : Trav is a 80 years old female is followed by Dr Gutierrez for Recurrent adenocarcinoma Mullerian origin, malignant ascites diagnosed on 8/8/2020, S/P 6 cycles of weekly Carboplatin and Taxol. She reports feeling well , Mildly tired , tolerating her diet better. She denies hematuria, abdominal pain. \par \par Patient still complaining of mild dysuria no bleeding. we will send for Urine analysis. \par \par Referred to me to assess for left hydronephrosis\par \par CT images visualized by me -- mild to moderatle left hydro to the left mid ureter -- no signs of stone\par patient without flank pain\par \par serium creatinine level -- 1.2\par UA = negative nitrite\par \par patient is done with chemotherapy

## 2021-03-28 ENCOUNTER — EMERGENCY (EMERGENCY)
Facility: HOSPITAL | Age: 81
LOS: 0 days | Discharge: HOME | End: 2021-03-28
Attending: EMERGENCY MEDICINE | Admitting: EMERGENCY MEDICINE
Payer: MEDICARE

## 2021-03-28 VITALS
SYSTOLIC BLOOD PRESSURE: 124 MMHG | WEIGHT: 134.92 LBS | RESPIRATION RATE: 16 BRPM | DIASTOLIC BLOOD PRESSURE: 76 MMHG | HEIGHT: 63 IN | OXYGEN SATURATION: 100 % | HEART RATE: 72 BPM | TEMPERATURE: 98 F

## 2021-03-28 DIAGNOSIS — Z98.41 CATARACT EXTRACTION STATUS, RIGHT EYE: ICD-10-CM

## 2021-03-28 DIAGNOSIS — Z90.710 ACQUIRED ABSENCE OF BOTH CERVIX AND UTERUS: ICD-10-CM

## 2021-03-28 DIAGNOSIS — R10.9 UNSPECIFIED ABDOMINAL PAIN: ICD-10-CM

## 2021-03-28 DIAGNOSIS — I10 ESSENTIAL (PRIMARY) HYPERTENSION: ICD-10-CM

## 2021-03-28 DIAGNOSIS — Z98.890 OTHER SPECIFIED POSTPROCEDURAL STATES: ICD-10-CM

## 2021-03-28 DIAGNOSIS — Z95.1 PRESENCE OF AORTOCORONARY BYPASS GRAFT: Chronic | ICD-10-CM

## 2021-03-28 DIAGNOSIS — N39.0 URINARY TRACT INFECTION, SITE NOT SPECIFIED: ICD-10-CM

## 2021-03-28 DIAGNOSIS — Z98.1 ARTHRODESIS STATUS: ICD-10-CM

## 2021-03-28 DIAGNOSIS — E78.00 PURE HYPERCHOLESTEROLEMIA, UNSPECIFIED: ICD-10-CM

## 2021-03-28 DIAGNOSIS — Z88.8 ALLERGY STATUS TO OTHER DRUGS, MEDICAMENTS AND BIOLOGICAL SUBSTANCES STATUS: ICD-10-CM

## 2021-03-28 DIAGNOSIS — Z98.890 OTHER SPECIFIED POSTPROCEDURAL STATES: Chronic | ICD-10-CM

## 2021-03-28 DIAGNOSIS — E78.5 HYPERLIPIDEMIA, UNSPECIFIED: ICD-10-CM

## 2021-03-28 DIAGNOSIS — I25.701 ATHEROSCLEROSIS OF CORONARY ARTERY BYPASS GRAFT(S), UNSPECIFIED, WITH ANGINA PECTORIS WITH DOCUMENTED SPASM: Chronic | ICD-10-CM

## 2021-03-28 DIAGNOSIS — F32.9 MAJOR DEPRESSIVE DISORDER, SINGLE EPISODE, UNSPECIFIED: ICD-10-CM

## 2021-03-28 LAB
APPEARANCE UR: ABNORMAL
BACTERIA # UR AUTO: NEGATIVE — SIGNIFICANT CHANGE UP
BILIRUB UR-MCNC: NEGATIVE — SIGNIFICANT CHANGE UP
COLOR SPEC: SIGNIFICANT CHANGE UP
DIFF PNL FLD: ABNORMAL
EPI CELLS # UR: 1 /HPF — SIGNIFICANT CHANGE UP (ref 0–5)
GLUCOSE UR QL: NEGATIVE — SIGNIFICANT CHANGE UP
HYALINE CASTS # UR AUTO: 1 /LPF — SIGNIFICANT CHANGE UP (ref 0–7)
KETONES UR-MCNC: NEGATIVE — SIGNIFICANT CHANGE UP
LEUKOCYTE ESTERASE UR-ACNC: ABNORMAL
NITRITE UR-MCNC: NEGATIVE — SIGNIFICANT CHANGE UP
PH UR: 6.5 — SIGNIFICANT CHANGE UP (ref 5–8)
PROT UR-MCNC: SIGNIFICANT CHANGE UP
RBC CASTS # UR COMP ASSIST: 137 /HPF — HIGH (ref 0–4)
SP GR SPEC: 1.01 — SIGNIFICANT CHANGE UP (ref 1.01–1.03)
UROBILINOGEN FLD QL: SIGNIFICANT CHANGE UP
WBC UR QL: 147 /HPF — HIGH (ref 0–5)

## 2021-03-28 PROCEDURE — 99284 EMERGENCY DEPT VISIT MOD MDM: CPT

## 2021-03-28 RX ORDER — NITROFURANTOIN MACROCRYSTAL 50 MG
1 CAPSULE ORAL
Qty: 14 | Refills: 0
Start: 2021-03-28 | End: 2021-04-03

## 2021-03-28 NOTE — ED PROVIDER NOTE - ATTENDING CONTRIBUTION TO CARE
80 year old female, pmhx as documented, presenting with diffuse abdominal pain x several months described as achy, crampy, non-radiating, no palliative or provocative factors, mild severity. States she is chronically constipated. Seen in ED 2 weeks ago for this and prescribed lactulose which has been improving her symptoms. However over the past few days has also developed dysuria. Otherwise has been (+) passing flatus, denies fevers, N/V, blood in stool, urinary symptoms or leg swelling.    Vital Signs: I have reviewed the initial vital signs.  Constitutional: NAD, well-nourished, appears stated age, no acute distress.  HEENT: Airway patent, moist MM, no erythema/swelling/deformity of oral structures. EOMI, PERRLA.  CV: regular rate, regular rhythm, well-perfused extremities, 2+ b/l DP and radial pulses equal.  Lungs: BCTA, no increased WOB.  ABD: NTND, no guarding or rebound, no pulsatile mass, no hernias.   MSK: Neck supple, nontender, nl ROM, no stepoff. Chest nontender. Back nontender in TLS spine or to b/l bony structures or flanks. Ext nontender, nl rom, no deformity.   INTEG: Skin warm, dry, no rash.  NEURO: A&Ox3, normal strength, nl sensation throughout, normal speech.   PSYCH: Calm, cooperative, normal affect and interaction.    Abd completely non-tender. States lactulose has helped her have more regular BMs at home. Will obtain UA, urine cx for dysuria, re-eval.

## 2021-03-28 NOTE — ED ADULT NURSE NOTE - NSIMPLEMENTINTERV_GEN_ALL_ED
Implemented All Universal Safety Interventions:  [place] to call system. Call bell, personal items and telephone within reach. Instruct patient to call for assistance. Room bathroom lighting operational. Non-slip footwear when patient is off stretcher. Physically safe environment: no spills, clutter or unnecessary equipment. Stretcher in lowest position, wheels locked, appropriate side rails in place.

## 2021-03-28 NOTE — ED PROVIDER NOTE - PATIENT PORTAL LINK FT
You can access the FollowMyHealth Patient Portal offered by [facility] by registering at the following website: [email or web address]. By joining [employer]’s FollowMyHealth portal, you will also be able to view your health information using other applications (apps) compatible with our system.

## 2021-03-28 NOTE — ED PROVIDER NOTE - CLINICAL SUMMARY MEDICAL DECISION MAKING FREE TEXT BOX
Patient presented with chronic constipation, now with dysuria. Otherwise afebrile, HD stable, well appearing, abd completely non-tender. (+) passing flatus, no evidence of obstruction, and lactulose has been helping her to have regular BMs at home. Obtained UA which showed (+) possible UTI, so urine cx sent. Will prescribe PO macrobid for UA results, outpatient follow up. Patient agreeable with plan. Agrees to return to ED for any new or worsening symptoms.

## 2021-03-28 NOTE — ED PROVIDER NOTE - PHYSICAL EXAMINATION
Physical Exam    Vital Signs: I have reviewed the initial vital signs.  Constitutional: well-nourished, appears stated age, no acute distress  Eyes: Conjunctiva pink, Sclera clear, PERRLA, EOMI.  Cardiovascular: S1 and S2, regular rate, regular rhythm, well-perfused extremities, radial pulses equal and 2+  Respiratory: unlabored respiratory effort, clear to auscultation bilaterally no wheezing, rales and rhonchi  Gastrointestinal: soft, non-tender abdomen, no pulsatile mass, normal bowl sounds. no CVAT   Musculoskeletal: supple neck, no lower extremity edema, no midline tenderness  Integumentary: warm, dry, no rash  Neurologic: awake, alert, cranial nerves II-XII grossly intact, extremities’ motor and sensory functions grossly intact  Psychiatric: appropriate mood, appropriate affect

## 2021-03-28 NOTE — ED PROVIDER NOTE - NSFOLLOWUPINSTRUCTIONS_ED_ALL_ED_FT
Follow up with your primary medical doctor in 1-2 days     Urinary Tract Infection    A urinary tract infection (UTI) is an infection of any part of the urinary tract, which includes the kidneys, ureters, bladder, and urethra. Risk factors include ignoring your need to urinate, wiping back to front if female, being an uncircumcised male, and having diabetes or a weak immune system. Symptoms include frequent urination, pain or burning with urination, foul smelling urine, cloudy urine, pain in the lower abdomen, blood in the urine, and fever. If you were prescribed an antibiotic medicine, take it as told by your health care provider. Do not stop taking the antibiotic even if you start to feel better.    SEEK IMMEDIATE MEDICAL CARE IF YOU HAVE THE FOLLOWING SYMPTOMS: severe back or abdominal pain, inability to keep fluids or medicine down, dizziness/lightheadedness, or a change in mental status.

## 2021-03-28 NOTE — ED PROVIDER NOTE - NS ED ROS FT
Constitutional: (-) fever  Eyes/ENT: (-) blurry vision, (-) epistaxis  Cardiovascular: (-) chest pain, (-) syncope  Respiratory: (-) cough, (-) shortness of breath  Gastrointestinal: (-) vomiting, (-) diarrhea (+) abd pain  Musculoskeletal: (-) neck pain, (-) back pain, (-) joint pain  Integumentary: (-) rash, (-) edema  Neurological: (-) headache, (-) altered mental status  Psychiatric: (-) hallucinations  Allergic/Immunologic: (-) pruritus

## 2021-03-29 LAB
CULTURE RESULTS: SIGNIFICANT CHANGE UP
SPECIMEN SOURCE: SIGNIFICANT CHANGE UP

## 2021-04-04 LAB
ALBUMIN SERPL ELPH-MCNC: 4.3 G/DL
ALP BLD-CCNC: 87 U/L
ALT SERPL-CCNC: 5 U/L
ANION GAP SERPL CALC-SCNC: 10 MMOL/L
AST SERPL-CCNC: 17 U/L
BASOPHILS # BLD AUTO: 0.02 K/UL
BASOPHILS NFR BLD AUTO: 0.4 %
BILIRUB SERPL-MCNC: 0.3 MG/DL
BUN SERPL-MCNC: 25 MG/DL
CALCIUM SERPL-MCNC: 8.8 MG/DL
CHLORIDE SERPL-SCNC: 103 MMOL/L
CHOLEST SERPL-MCNC: 169 MG/DL
CO2 SERPL-SCNC: 27 MMOL/L
CREAT SERPL-MCNC: 1.3 MG/DL
EOSINOPHIL # BLD AUTO: 0.05 K/UL
EOSINOPHIL NFR BLD AUTO: 1 %
GLUCOSE SERPL-MCNC: 91 MG/DL
HCT VFR BLD CALC: 33.5 %
HDLC SERPL-MCNC: 69 MG/DL
HGB BLD-MCNC: 10.3 G/DL
IMM GRANULOCYTES NFR BLD AUTO: 0.2 %
LDLC SERPL CALC-MCNC: 84 MG/DL
LYMPHOCYTES # BLD AUTO: 1.95 K/UL
LYMPHOCYTES NFR BLD AUTO: 40 %
MAN DIFF?: NORMAL
MCHC RBC-ENTMCNC: 27.1 PG
MCHC RBC-ENTMCNC: 30.7 G/DL
MCV RBC AUTO: 88.2 FL
MONOCYTES # BLD AUTO: 0.58 K/UL
MONOCYTES NFR BLD AUTO: 11.9 %
NEUTROPHILS # BLD AUTO: 2.27 K/UL
NEUTROPHILS NFR BLD AUTO: 46.5 %
NONHDLC SERPL-MCNC: 100 MG/DL
PLATELET # BLD AUTO: 201 K/UL
POTASSIUM SERPL-SCNC: 4.5 MMOL/L
PROT SERPL-MCNC: 7.4 G/DL
RBC # BLD: 3.8 M/UL
RBC # FLD: 15.9 %
SODIUM SERPL-SCNC: 140 MMOL/L
TRIGL SERPL-MCNC: 80 MG/DL
WBC # FLD AUTO: 4.88 K/UL

## 2021-04-08 ENCOUNTER — LABORATORY RESULT (OUTPATIENT)
Age: 81
End: 2021-04-08

## 2021-04-08 ENCOUNTER — APPOINTMENT (OUTPATIENT)
Dept: HEMATOLOGY ONCOLOGY | Facility: CLINIC | Age: 81
End: 2021-04-08
Payer: MEDICARE

## 2021-04-08 VITALS
BODY MASS INDEX: 23.39 KG/M2 | WEIGHT: 137 LBS | SYSTOLIC BLOOD PRESSURE: 131 MMHG | TEMPERATURE: 97.6 F | HEART RATE: 61 BPM | HEIGHT: 64 IN | DIASTOLIC BLOOD PRESSURE: 69 MMHG | RESPIRATION RATE: 14 BRPM

## 2021-04-08 LAB
ALBUMIN SERPL ELPH-MCNC: 4 G/DL
ALP BLD-CCNC: 95 U/L
ALT SERPL-CCNC: 7 U/L
ANION GAP SERPL CALC-SCNC: 11 MMOL/L
AST SERPL-CCNC: 18 U/L
BILIRUB SERPL-MCNC: 0.2 MG/DL
BUN SERPL-MCNC: 29 MG/DL
CALCIUM SERPL-MCNC: 8.6 MG/DL
CHLORIDE SERPL-SCNC: 104 MMOL/L
CO2 SERPL-SCNC: 25 MMOL/L
CREAT SERPL-MCNC: 1.3 MG/DL
GLUCOSE SERPL-MCNC: 86 MG/DL
HCT VFR BLD CALC: 33.3 %
HGB BLD-MCNC: 10.5 G/DL
IRON SATN MFR SERPL: 21 %
IRON SERPL-MCNC: 65 UG/DL
MCHC RBC-ENTMCNC: 27.2 PG
MCHC RBC-ENTMCNC: 31.5 G/DL
MCV RBC AUTO: 86.3 FL
PLATELET # BLD AUTO: 178 K/UL
PMV BLD: 9.9 FL
POTASSIUM SERPL-SCNC: 4.4 MMOL/L
PROT SERPL-MCNC: 7 G/DL
RBC # BLD: 3.86 M/UL
RBC # FLD: 15.5 %
SODIUM SERPL-SCNC: 140 MMOL/L
TIBC SERPL-MCNC: 305 UG/DL
UIBC SERPL-MCNC: 240 UG/DL
WBC # FLD AUTO: 5.17 K/UL

## 2021-04-08 PROCEDURE — 99213 OFFICE O/P EST LOW 20 MIN: CPT

## 2021-04-08 NOTE — CONSULT LETTER
[Dear  ___] : Dear  [unfilled], [Consult Letter:] : I had the pleasure of evaluating your patient, [unfilled]. [( Thank you for referring [unfilled] for consultation for _____ )] : Thank you for referring [unfilled] for consultation for [unfilled] [Please see my note below.] : Please see my note below. [Consult Closing:] : Thank you very much for allowing me to participate in the care of this patient.  If you have any questions, please do not hesitate to contact me. [Sincerely,] : Sincerely, [FreeTextEntry3] : Jimena Gutierrez MD

## 2021-04-08 NOTE — ASSESSMENT
[FreeTextEntry1] : Recurrent adenocarcinoma Mullerian origin, malignant ascites diagnosed on 2020\par --Prior SHAAN BSO in 2016, adjuvant chemotherapy, obtain records from Dr. Abdullahi \par --Started on weekly carbo/taxol weekly 3 on 1 off on 2020.\par --PET CT reviewed from 20. \par --Lab work today with Stable blood counts\par --For chemo 10/16/2020\par --PICC line care ( Patient insisted to flush PICC line once / week.)\par --On 20 patient will proceed with Cycle #5 of weekly carboplatin and Taxol weekly 3 weeks on and one week off. .\par --On 21 patient completed 6 cycles of Carbo/Taxol (weekly), declines any additional cancer directed therapy on 2021\par --On 21 we reviewed PET Scan from 21 with good response to chemo.\par --Referred Patient to Urology for  left hydroureteronephrosis, no intervention for now, sono in 3 months per Dr. Singletary \par --She wishes to go to [facility] in 2021 for a  for 3 weeks, prefers to delay restaging scan until after she comes back\par \par Malignant ascites required high volume paracentesis \par --Improved/resolved with carbo/taxol \par \par PE in the setting of malignancy diagnosed on 2020\par --Doppler was negative for DVT on 2020\par --On BID Lovenox, but ran out on 2020\par --Also on ASA, h/o CABG . \par --Was stared on Xarelto, but after 1 dose was admitted with recurrent PE, diagnosed on 2020\par --Patient will switch from Lovenox to Xarelto 20 mg po daily on 2021.\par --On 2021 tolerating Xarelto with no complications \par \par Follow up in 6 weeks or earlier if needed.\par

## 2021-04-08 NOTE — REVIEW OF SYSTEMS
[SOB on Exertion] : shortness of breath during exertion [Negative] : Allergic/Immunologic [Fever] : no fever [Chills] : no chills [Night Sweats] : no night sweats [Fatigue] : no fatigue [Recent Change In Weight] : ~T no recent weight change [Shortness Of Breath] : no shortness of breath [Wheezing] : no wheezing [Cough] : no cough [Abdominal Pain] : no abdominal pain [Vomiting] : no vomiting [Constipation] : no constipation [Diarrhea] : no diarrhea [Confused] : no confusion [Dizziness] : no dizziness [Fainting] : no fainting [Difficulty Walking] : no difficulty walking [FreeTextEntry7] : early satiety [de-identified] : minor preexisting neuropathy

## 2021-04-08 NOTE — HISTORY OF PRESENT ILLNESS
[de-identified] : Trav is a shekhar 81 year old lady I initially met in the hospital on 8/11/2020. Her past medical history is significant for uterine cancer s/p SHAAN BSO as per patient s/p adjuvant chemotherapy in 2016, CAD s/p CABG, HTN and depression, she presented to the ED for abdominal pain and chest discomfort. 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 \par \par PAST MEDICAL & SURGICAL HISTORY:\par HLD (hyperlipidemia)\par HTN (hypertension)\par CAD (coronary artery disease)\par History of coronary artery bypass surgery\par \par FAMILY HISTORY:\par No pertinent family history in first degree relatives\par \par Allergies\par chloroquine (Hives)\par \par CTA C/A/P on 8/6/2020 revealed \par 1. Intraluminal filling defect is seen within the segmental branches of the right lower lobe pulmonary artery, indicating acute pulmonary embolism. (6/132-138; 10/51-55). Filling defect in segmental branch of the right upper lobe pulmonary artery. (6/77) There is a small filling defect within a left lower lobe pulmonary artery segmental branch (6/147; 10/59). No evidence of right heart strain. The right ventricle measures 3.5 cm; the left ventricle measures 3.7 cm. (RV:LV<1) \par 2. Compared with the previous CT scan of 5/27/2020, there is now a large amount of ascites throughout the abdomen and pelvis. \par \par LE Doppler on 8/8/2020 revealed \par No evidence of deep venous thrombosis or superficial thrombophlebitis in the bilateral lower extremities. \par Left popliteal fossa fluid collection as measured above likely Baker's cyst\par \par During her admission she has required 3 episodes of paracentesis on 8/7 3L, 8/12 4L, 8/13 2.2L, once pathology became available patient was started on weekly Carbo/Taxol she has tolerated chemotherapy well.  [de-identified] : 8/18/2020: She returns today for follow up, she reports improved SOB, improved abdominal swelling, still come positional chest discomfort, she is compliant with BID Lovenox, she remains on the ASA. She reports improved appetite, she reports that appetite is decreased with a large amount of fluid on the abdomen. She is due for C1D8 of Carbo/Taxol on 8/21/2020. CBC was reviewed. \par Side effects of chemotherapy including, but not limited to, cytopenias, that may require blood product transfusions and lead to increased risk of infection, requiring hospitalization, allergic reactions, that can rarely be life-threatening, skin reactions, nausea/vomiting, mucositis, diarrhea/constipation were discussed at length, patient and family voice undestaging, all questions were answered to patient's satisfaction.\par Images were personally reviewed by MD Matt and discussed with patient.\par \par 9/10/2020: Patient is 80 years old female came  today for follow up for  Recurrent adenocarcinoma Mullerian origin, malignant ascites diagnosed on 8/8/2020, she S/P one cycle of weekly Carbo/taxol she tolerated well. She  reports improved SOB, improved abdominal swelling, she is ran out of Lovenox, which she is supposed to be taking BID, she didn't call for the renewal, she remains on the ASA. She  has been without Lovenox for like 7 days now, we will restart her on AC immediately. Xarelto was prescribed. She was educated again that she will need to remain on long-term anticoagulation. She reports good  appetite  She is due to start the new cycle of chemo on 9/11/20.\par PET Scan reviewed from 9/8/20 which revealed\par Mild diffuse FDG uptake throughout the mesentery along with peritoneal stranding and nodularity compatible with peritoneal carcinomatosis (SUV up to 3.9). \par No additional sites of abnormal FDG uptake to suggest biologic tumor activity.\par Images were personally reviewed by MD Matt and discussed with patient. \par \par 10/15/2020: Trav present for follow up, her next chemotherapy is for tomorrow, we will need to place PICC line, this will be arrange as outpatient. \par CT chest on 10/11/2020 revealed When compared to prior study dated 9/11/2020: \par Although not a dedicated PE study, redemonstration of a right lower lobe segmental/subsegmental pulmonary embolus which appears overall decreased in size from one month prior. \par No evidence for new consolidation, pleural effusion or pneumothorax. \par Trav reports mild neuropathy, not worsening with chemotherapy, CBC was reviewed. Overall clinically she demonstrates great response to therapy. \par \par 11/12/2020: Trav is 80 years old female  present for follow up, her ncycle 4 of carbo/taxol is for tomorrow. She repots feeling better regard urination and hematuria. She stated no more blood with her urine but sometime feels pain while she is urinating.\par We communicated CBC result with Patient with HGB/ HCT is 9.6/29.5. Also patient currently on the therapeutic lovenox once every 12 hrs.\par Trav reports neuropathy is getting  worse with chemotherapy, CBC was reviewed. Overall clinically she demonstrates great response to therapy.\par \par 12/10/2020: Trav is a 80 years old female  present for follow up for Recurrent adenocarcinoma Mullerian origin, malignant ascites diagnosed on 8/8/2020, S/P 4 cycles of weekly Carboplatin and Taxol.  She reports feeling well, tolerating treatment. She denies hematuria, abdominal pain. \par We communicated CBC result with Patient with HGB/ HCT is 9.1/29.5. Also patient continues on the therapeutic Lovenox once every 12 hrs.\par Patient will start Cycle # 5 tomorrow.\par \par 1/28/21: Trav is a 80 years old female  present for follow up for Recurrent adenocarcinoma Mullerian origin, malignant ascites diagnosed on 8/8/2020, S/P 6 cycles of weekly Carboplatin and Taxol.  She reports feeling well , Mildly tired , tolerating her diet better. She denies hematuria, abdominal pain. \par We communicated CBC result with Patient with HGB/ HCT is 9.7 /30.6.  Also patient continues on the therapeutic Lovenox once every 12 hrs.\par Patient still complaining  of mild dysuria no bleeding. we will send for Urine analysis. \par Patient was educated to switch from Lovenox to Xarelto 20 mg po daily.\par Patient is due for PET Scan . Prescription provided. \par \par 2/25/21: Trav is a 80 years old female  who presents for follow up for Recurrent adenocarcinoma Mullerian origin, malignant ascites diagnosed on 8/8/2020, S/P 6 cycles of weekly Carboplatin and Taxol.  She reports feeling OK, reports fatigue, tolerating her diet better. She denies hematuria, abdominal pain. She reports she does not wish at this point to receive any additional cancer-directed therapy, ad wishes to stop Lovenox. \par PET CT on 2/15/21 revealed \par Compared to PET/CT of 9/8/2020, no definite sites of abnormal FDG uptake to suggest biologic tumor activity.\par Interval retraction of a right PICC with tip noted within the right axilla (when correlated to chest radiograph dated 10/22/2020).\par Increased left hydroureteronephrosis to the level of the midureter without definite obstructing radiopaque calculus.\par Images were personally reviewed by MD Matt and discussed with patient. \par We recommended for the patient to follow up with urology, we will assist with appointment.\par We communicated CBC result with Patient with HGB/ HCT is 10.0  /32.6.  Also patient will switch from Lovenox to Xarelto, we have already prescribed that during her prior visit, she did not however switch over.\par Patient was educated to switch from Lovenox to Xarelto 20 mg po daily.\par \par 4/8/2021: Trav is feeling well, reports no weight loss, no new pulm or neurological symptoms, no new pain, no new GI/ symptoms. \par She is tolerating Xarelto and reports no bleeding. She has followed up with Dr. Singletary, no intervention for hydronephrosis. \par Her older brother passed away, he will be buried in [facility] in June she wishes to go. I recommended she takes COVID vaccine before she goes and she will need to continue with Xarelto. She  prefers to get restaging scans after she comes back. \par

## 2021-04-12 ENCOUNTER — APPOINTMENT (OUTPATIENT)
Dept: OTOLARYNGOLOGY | Facility: CLINIC | Age: 81
End: 2021-04-12

## 2021-04-14 LAB
CANCER AG125 SERPL-ACNC: 5 U/ML
FERRITIN SERPL-MCNC: 39 NG/ML

## 2021-04-16 ENCOUNTER — EMERGENCY (EMERGENCY)
Facility: HOSPITAL | Age: 81
LOS: 0 days | Discharge: HOME | End: 2021-04-17
Attending: EMERGENCY MEDICINE | Admitting: EMERGENCY MEDICINE
Payer: MEDICARE

## 2021-04-16 ENCOUNTER — EMERGENCY (EMERGENCY)
Facility: HOSPITAL | Age: 81
LOS: 0 days | Discharge: HOME | End: 2021-04-16

## 2021-04-16 VITALS
WEIGHT: 136.91 LBS | SYSTOLIC BLOOD PRESSURE: 126 MMHG | HEIGHT: 63 IN | HEART RATE: 88 BPM | DIASTOLIC BLOOD PRESSURE: 59 MMHG | TEMPERATURE: 98 F | OXYGEN SATURATION: 100 % | RESPIRATION RATE: 18 BRPM

## 2021-04-16 DIAGNOSIS — I25.701 ATHEROSCLEROSIS OF CORONARY ARTERY BYPASS GRAFT(S), UNSPECIFIED, WITH ANGINA PECTORIS WITH DOCUMENTED SPASM: Chronic | ICD-10-CM

## 2021-04-16 DIAGNOSIS — R07.9 CHEST PAIN, UNSPECIFIED: ICD-10-CM

## 2021-04-16 DIAGNOSIS — Z87.891 PERSONAL HISTORY OF NICOTINE DEPENDENCE: ICD-10-CM

## 2021-04-16 DIAGNOSIS — Z95.1 PRESENCE OF AORTOCORONARY BYPASS GRAFT: Chronic | ICD-10-CM

## 2021-04-16 DIAGNOSIS — Z95.1 PRESENCE OF AORTOCORONARY BYPASS GRAFT: ICD-10-CM

## 2021-04-16 DIAGNOSIS — Z79.01 LONG TERM (CURRENT) USE OF ANTICOAGULANTS: ICD-10-CM

## 2021-04-16 DIAGNOSIS — Z85.42 PERSONAL HISTORY OF MALIGNANT NEOPLASM OF OTHER PARTS OF UTERUS: ICD-10-CM

## 2021-04-16 DIAGNOSIS — Z86.711 PERSONAL HISTORY OF PULMONARY EMBOLISM: ICD-10-CM

## 2021-04-16 DIAGNOSIS — E78.5 HYPERLIPIDEMIA, UNSPECIFIED: ICD-10-CM

## 2021-04-16 DIAGNOSIS — Z98.890 OTHER SPECIFIED POSTPROCEDURAL STATES: Chronic | ICD-10-CM

## 2021-04-16 DIAGNOSIS — I10 ESSENTIAL (PRIMARY) HYPERTENSION: ICD-10-CM

## 2021-04-16 DIAGNOSIS — M79.661 PAIN IN RIGHT LOWER LEG: ICD-10-CM

## 2021-04-16 DIAGNOSIS — Z88.8 ALLERGY STATUS TO OTHER DRUGS, MEDICAMENTS AND BIOLOGICAL SUBSTANCES STATUS: ICD-10-CM

## 2021-04-16 DIAGNOSIS — Z02.9 ENCOUNTER FOR ADMINISTRATIVE EXAMINATIONS, UNSPECIFIED: ICD-10-CM

## 2021-04-16 DIAGNOSIS — I25.10 ATHEROSCLEROTIC HEART DISEASE OF NATIVE CORONARY ARTERY WITHOUT ANGINA PECTORIS: ICD-10-CM

## 2021-04-16 LAB
ALBUMIN SERPL ELPH-MCNC: 4 G/DL — SIGNIFICANT CHANGE UP (ref 3.5–5.2)
ALP SERPL-CCNC: 98 U/L — SIGNIFICANT CHANGE UP (ref 30–115)
ALT FLD-CCNC: 8 U/L — SIGNIFICANT CHANGE UP (ref 0–41)
ANION GAP SERPL CALC-SCNC: 10 MMOL/L — SIGNIFICANT CHANGE UP (ref 7–14)
APTT BLD: 33.7 SEC — SIGNIFICANT CHANGE UP (ref 27–39.2)
AST SERPL-CCNC: 22 U/L — SIGNIFICANT CHANGE UP (ref 0–41)
BASOPHILS # BLD AUTO: 0.02 K/UL — SIGNIFICANT CHANGE UP (ref 0–0.2)
BASOPHILS NFR BLD AUTO: 0.5 % — SIGNIFICANT CHANGE UP (ref 0–1)
BILIRUB SERPL-MCNC: <0.2 MG/DL — SIGNIFICANT CHANGE UP (ref 0.2–1.2)
BUN SERPL-MCNC: 27 MG/DL — HIGH (ref 10–20)
CALCIUM SERPL-MCNC: 8.5 MG/DL — SIGNIFICANT CHANGE UP (ref 8.5–10.1)
CHLORIDE SERPL-SCNC: 105 MMOL/L — SIGNIFICANT CHANGE UP (ref 98–110)
CO2 SERPL-SCNC: 25 MMOL/L — SIGNIFICANT CHANGE UP (ref 17–32)
CREAT SERPL-MCNC: 1.3 MG/DL — SIGNIFICANT CHANGE UP (ref 0.7–1.5)
D DIMER BLD IA.RAPID-MCNC: 62 NG/ML DDU — SIGNIFICANT CHANGE UP (ref 0–230)
EOSINOPHIL # BLD AUTO: 0.05 K/UL — SIGNIFICANT CHANGE UP (ref 0–0.7)
EOSINOPHIL NFR BLD AUTO: 1.1 % — SIGNIFICANT CHANGE UP (ref 0–8)
GLUCOSE SERPL-MCNC: 153 MG/DL — HIGH (ref 70–99)
HCT VFR BLD CALC: 33.4 % — LOW (ref 37–47)
HGB BLD-MCNC: 10.6 G/DL — LOW (ref 12–16)
IMM GRANULOCYTES NFR BLD AUTO: 0.2 % — SIGNIFICANT CHANGE UP (ref 0.1–0.3)
INR BLD: 1.6 RATIO — HIGH (ref 0.65–1.3)
LYMPHOCYTES # BLD AUTO: 1.52 K/UL — SIGNIFICANT CHANGE UP (ref 1.2–3.4)
LYMPHOCYTES # BLD AUTO: 34.5 % — SIGNIFICANT CHANGE UP (ref 20.5–51.1)
MCHC RBC-ENTMCNC: 26.6 PG — LOW (ref 27–31)
MCHC RBC-ENTMCNC: 31.7 G/DL — LOW (ref 32–37)
MCV RBC AUTO: 83.9 FL — SIGNIFICANT CHANGE UP (ref 81–99)
MONOCYTES # BLD AUTO: 0.39 K/UL — SIGNIFICANT CHANGE UP (ref 0.1–0.6)
MONOCYTES NFR BLD AUTO: 8.9 % — SIGNIFICANT CHANGE UP (ref 1.7–9.3)
NEUTROPHILS # BLD AUTO: 2.41 K/UL — SIGNIFICANT CHANGE UP (ref 1.4–6.5)
NEUTROPHILS NFR BLD AUTO: 54.8 % — SIGNIFICANT CHANGE UP (ref 42.2–75.2)
NRBC # BLD: 0 /100 WBCS — SIGNIFICANT CHANGE UP (ref 0–0)
NT-PROBNP SERPL-SCNC: 214 PG/ML — SIGNIFICANT CHANGE UP (ref 0–300)
PLATELET # BLD AUTO: 182 K/UL — SIGNIFICANT CHANGE UP (ref 130–400)
POTASSIUM SERPL-MCNC: 4.9 MMOL/L — SIGNIFICANT CHANGE UP (ref 3.5–5)
POTASSIUM SERPL-SCNC: 4.9 MMOL/L — SIGNIFICANT CHANGE UP (ref 3.5–5)
PROT SERPL-MCNC: 7.1 G/DL — SIGNIFICANT CHANGE UP (ref 6–8)
PROTHROM AB SERPL-ACNC: 18.4 SEC — HIGH (ref 9.95–12.87)
RBC # BLD: 3.98 M/UL — LOW (ref 4.2–5.4)
RBC # FLD: 15 % — HIGH (ref 11.5–14.5)
SARS-COV-2 RNA SPEC QL NAA+PROBE: SIGNIFICANT CHANGE UP
SODIUM SERPL-SCNC: 140 MMOL/L — SIGNIFICANT CHANGE UP (ref 135–146)
TROPONIN T SERPL-MCNC: <0.01 NG/ML — SIGNIFICANT CHANGE UP
TROPONIN T SERPL-MCNC: <0.01 NG/ML — SIGNIFICANT CHANGE UP
WBC # BLD: 4.4 K/UL — LOW (ref 4.8–10.8)
WBC # FLD AUTO: 4.4 K/UL — LOW (ref 4.8–10.8)

## 2021-04-16 PROCEDURE — 93010 ELECTROCARDIOGRAM REPORT: CPT | Mod: 77

## 2021-04-16 PROCEDURE — 99220: CPT

## 2021-04-16 PROCEDURE — 71045 X-RAY EXAM CHEST 1 VIEW: CPT | Mod: 26

## 2021-04-16 PROCEDURE — 93970 EXTREMITY STUDY: CPT | Mod: 26

## 2021-04-16 PROCEDURE — 93010 ELECTROCARDIOGRAM REPORT: CPT

## 2021-04-16 RX ORDER — METOPROLOL TARTRATE 50 MG
25 TABLET ORAL DAILY
Refills: 0 | Status: DISCONTINUED | OUTPATIENT
Start: 2021-04-16 | End: 2021-04-17

## 2021-04-16 RX ORDER — RIVAROXABAN 15 MG-20MG
20 KIT ORAL
Refills: 0 | Status: DISCONTINUED | OUTPATIENT
Start: 2021-04-16 | End: 2021-04-17

## 2021-04-16 RX ORDER — AMLODIPINE BESYLATE 2.5 MG/1
5 TABLET ORAL DAILY
Refills: 0 | Status: DISCONTINUED | OUTPATIENT
Start: 2021-04-16 | End: 2021-04-17

## 2021-04-16 RX ORDER — ACETAMINOPHEN 500 MG
975 TABLET ORAL ONCE
Refills: 0 | Status: COMPLETED | OUTPATIENT
Start: 2021-04-16 | End: 2021-04-16

## 2021-04-16 RX ORDER — ATORVASTATIN CALCIUM 80 MG/1
40 TABLET, FILM COATED ORAL DAILY
Refills: 0 | Status: DISCONTINUED | OUTPATIENT
Start: 2021-04-16 | End: 2021-04-17

## 2021-04-16 RX ADMIN — Medication 975 MILLIGRAM(S): at 19:11

## 2021-04-16 NOTE — ED PROVIDER NOTE - ATTENDING CONTRIBUTION TO CARE
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  No associated SOB, dizziness or lightheadedness, no SOB, no N/V/abdominal pain, no black or bloody stools. no leg pain or swelling.   Well-appearing well-nourished elderly female in NAD, head AT/NC, PERRL, pink conjunctivae, nml phonation, supple neck without midline spine ttp, nml work of breathing, lungs CTA b/l, equal air entry, RRR, well-perfused extremities, distal pulses intact, abdomen soft, NT/ND, BS present in all quadrants, no midline spine or CVA ttp, no leg edema or unilateral calf swelling, A&Ox3, no focal neuro deficits, nml mood and affect.  ECG is without acute ischemic changes.  CXR, labs including d-dimer, reassess.

## 2021-04-16 NOTE — ED PROVIDER NOTE - OBJECTIVE STATEMENT
pt with pmhx CAD/CABGx2, HLD, HTN, PE on xarelto, and Uterine cancer s/p SHAAN and chemo presents to ED c/o CP and RLE pain since last night, not relieved by tylenol. of note, pt rec'd first dose of covid19 vaccine 1 week ago and concerned for poss blood clot despite xarelto use. pain is sharp, nonradiating, moderate. denies exacerbating or relieving factors. Denies fever/chill/HA/dizziness/palpitation/sob/abd pain/n/v/d/ black stool/bloody stool/urinary sxs

## 2021-04-16 NOTE — CONSULT NOTE ADULT - ASSESSMENT
IMPRESSION  ·	Atypical cp episode x1  ·	Known CAD sp CABG 2011 (lima-lad, svg-om), sp redo CABG 2018 (jeimy-lad,  svg-om),   ·	Other hx:  Uterine cancer (s/p SHAAN in 2016 with adjuvant therapy, recurrence with malignant ascites on therapeutic paracentesis, now off chemo),  PE (on xarelto), HTN, depression, former smoker  ---  Ekg:	nsr. No acute isch st/t changes.   Echo:	9/’19:  ef nL. mild mod tr. mod pulm htn pasp 50.   Stress:	11/‘17:  nml adenosine  Cath:	9/’18:  lm d75; lad o75; ri 75; occluded lima-lad + svg-lcx.   ----  Labs:	trop neg x 1; ddimer 62  Rad:	v duplex neg.       PLAN  - lower suspicion of cardiac etiology,  possibly mmsk based on description,  however pt does have multiple risk factors and known hx of CAD requiring CABG/re-do CABG  - tte  - nuclear stress test  - if negative or low risk,  can follow up with Dr. Martell greco  (pt states she has appt with him in ~10d)   IMPRESSION  ·	Atypical cp episode x1  ·	Known CAD sp CABG 2011 (lima-lad, svg-om), sp redo CABG 2018 (jeimy-lad,  svg-om),   ·	Other hx:  Uterine cancer (s/p SHAAN in 2016 with adjuvant therapy, recurrence with malignant ascites on therapeutic paracentesis, now off chemo),  PE (on xarelto), HTN, depression, former smoker  ---  Ekg:	nsr. No acute isch st/t changes.   Echo:	9/’19:  ef nL. mild mod tr. mod pulm htn pasp 50.   Stress:	11/‘17:  nml adenosine  Cath:	9/’18:  lm d75; lad o75; ri 75; occluded lima-lad + svg-lcx.   ----  Labs:	trop neg x 1; ddimer 62  Rad:	v duplex neg.       PLAN  - lower suspicion of cardiac etiology,  possibly mmsk based on description,  however pt does have multiple risk factors and known hx of CAD requiring CABG/re-do CABG  - tte  - nuclear stress test  - if negative or low risk,  can follow up with Dr. Moura outpt  (pt states she has appt with him in ~10d)    ---  addendum:  pt had initially wanted to leave and follow up outpt next week.  D/w Dr. Moura,  who agreed that outpt follow up is ok.     However pt then changed her mind and wanted to do the stress test instead,  so ED will order it for tomorrow and dc if stress test is ok.

## 2021-04-16 NOTE — ED CDU PROVIDER INITIAL DAY NOTE - ATTENDING CONTRIBUTION TO CARE
Pt has a known history or CAD. s\p CABG x 2 in the past. Presents with left side resting chest pain. Hx of DVT in the past related to uterine cancer. States she has completed her chemo in January. currently on Norvasc, Xarelto, metoprolol and statin. on exam s1S2 rrr, no murmur, lungs clear, abdomen is soft nontender, ext neg for edema or tenderness. No chest wall tenderness .

## 2021-04-16 NOTE — ED PROVIDER NOTE - CLINICAL SUMMARY MEDICAL DECISION MAKING FREE TEXT BOX
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  No associated SOB, dizziness or lightheadedness, no SOB, no N/V/abdominal pain, no black or bloody stools. no leg pain or swelling.   Well-appearing well-nourished elderly female in NAD, head AT/NC, PERRL, pink conjunctivae, nml phonation, supple neck without midline spine ttp, nml work of breathing, lungs CTA b/l, equal air entry, RRR, well-perfused extremities, distal pulses intact, abdomen soft, NT/ND, BS present in all quadrants, no midline spine or CVA ttp, no leg edema or unilateral calf swelling, A&Ox3, no focal neuro deficits, nml mood and affect.  ECG is without acute ischemic changes. 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  No associated SOB, dizziness or lightheadedness, no SOB, no N/V/abdominal pain, no black or bloody stools. no leg pain or swelling.   Well-appearing well-nourished elderly female in NAD, head AT/NC, PERRL, pink conjunctivae, nml phonation, supple neck without midline spine ttp, nml work of breathing, lungs CTA b/l, equal air entry, RRR, well-perfused extremities, distal pulses intact, abdomen soft, NT/ND, BS present in all quadrants, no midline spine or CVA ttp, no leg edema or unilateral calf swelling, A&Ox3, no focal neuro deficits, nml mood and affect.  ECG is without acute ischemic changes.  D-dimer is negative, case d/w cardiac fellow who communicated with Dr Moura.  Will admit to [facility] for stress test tomorrow.

## 2021-04-16 NOTE — ED PROVIDER NOTE - PATIENT PORTAL LINK FT
You can access the FollowMyHealth Patient Portal offered by [facility] by registering at the following website: [email or web address]. By joining [facility]’s FollowMyHealth portal, you will also be able to view your health information using other applications (apps) compatible with our system.

## 2021-04-16 NOTE — ED PROVIDER NOTE - PHYSICAL EXAMINATION
CONSTITUTIONAL: Well-appearing; well-nourished; in no apparent distress.   CARDIOVASCULAR: Normal S1, S2; no murmurs, rubs, or gallops.   RESPIRATORY: Normal chest excursion with respiration; breath sounds clear and equal bilaterally; no wheezes, rhonchi, or rales.  GI/: non-distended; non-tender; no palpable organomegaly.   MS: No evidence of trauma or deformity. Non-tender to palpation. Normal ROM in all four extremities; distal pulses are normal.   SKIN: Normal for age and race; warm; dry; good turgor; no apparent lesions or exudate.   NEURO/PSYCH: A & O x 4; grossly unremarkable. mood and manner are appropriate.

## 2021-04-16 NOTE — ED PROVIDER NOTE - PROGRESS NOTE DETAILS
vasc study neg for DVT; last nuc stress 2/2021. all options discussed with pt and she agrees to obs stay for stress in AM vasc study neg for DVT; last nuc stress 2/2021. pt not an obs candidate; will c/s card for further rec vasc study neg for DVT; last nuc stress 2/2021. pt not an obs candidate; will c/s card for further rec (has appt with  in 10 days) per cardiac fellow who spoke with , advised dc with outpt f/u vasc study neg for DVT; last nuc stress 2/2021. will c/s card for further rec (has appt with  in 10 days) per cardiac fellow who spoke with , pt will stay in obs for stress

## 2021-04-16 NOTE — CONSULT NOTE ADULT - SUBJECTIVE AND OBJECTIVE BOX
Outpt cardiologist:  Dr. Moura    HPI:  81 yo F  pmh CAD sp CABG  (lima-lad, svg-om), sp redo CABG 2018 (ejimy-lad,  svg-om) / Uterine cancer s/p SHAAN in 2016 with adjuvant therapy, recurrence with malignant ascites on therapeutic paracentesis, now off chemo,  PE (on xarelto), HTN, depression, former smoker    presents for cp:   occurred while in kitchen during late activity, left upper chest area, mild in nature, worse with arm movements, lasting 1 to 2 minutes. Resolved by itself.  Of note, she was lifting heavy objects yesterday, unsure if it may have been related.   Alleviated after rubbing her L pectoral mm.     Not positional, not reproducible, not pleuritic.        PAST MEDICAL & SURGICAL HISTORY  Uterine cancer  s/p SHAAN and chemo    Hypertension    High cholesterol    Cataract of right eye    Glaucoma    Coronary artery disease  s/p CABG X2    CAD (coronary artery disease)    HTN (hypertension)    HLD (hyperlipidemia)    H/O total hysterectomy    Athscl CABG, unsp, w angina pectoris w documented spasm    History of coronary artery bypass surgery        FAMILY HISTORY:  FAMILY HISTORY:      SOCIAL HISTORY:  Social History:      ALLERGIES:  chloroquine (Hives)  quinine (Urticaria)      MEDICATIONS:    PRN:      HOME MEDICATIONS:  Home Medications:  amLODIPine 5 mg oral tablet: 1 tab(s) orally once a day (12 Mar 2021 10:24)  brimonidine 0.1% ophthalmic solution: to each affected eye 2 times a day (12 Mar 2021 10:24)  dorzolamide 2% ophthalmic solution: 1 drop(s) to each affected eye 2 times a day (12 Mar 2021 10:24)  latanoprost 0.005% ophthalmic solution: 1 drop(s) to each affected eye 2 times a day (12 Mar 2021 10:24)  metoprolol succinate 25 mg oral tablet, extended release: 1 tab(s) orally once a day (12 Mar 2021 10:24)  rosuvastatin 10 mg oral tablet: 1 tab(s) orally once a day (12 Mar 2021 10:24)  Xarelto 20 mg oral tablet: 1 tab(s) orally once a day (in the evening) (12 Mar 2021 10:24)      VITALS:   T(F): 98.2 (04-16 @ 15:19), Max: 98.2 (04-16 @ 15:19)  HR: 88 ( @ 15:19) (88 - 88)  BP: 126/59 ( @ 15:19) (126/59 - 126/59)  BP(mean): --  RR: 18 ( @ 15:19) (18 - 18)  SpO2: 100% ( @ 15:19) (100% - 100%)    I&O's Summary      REVIEW OF SYSTEMS:  CONSTITUTIONAL: No weakness, fevers or chills  HEENT: No visual changes, neck/ear pain  RESPIRATORY: No cough, sob  CARDIOVASCULAR: See HPI  GASTROINTESTINAL: No abdominal pain. No nausea, vomiting, diarrhea   GENITOURINARY: No dysuria, frequency or hematuria  NEUROLOGICAL: No new focal deficits  SKIN: No new rashes    PHYSICAL EXAM:  General: Not in distress.  Non-toxic appearing.   HEENT: EOMI  Cardio: regular, S1, S2, no murmur  Pulm: B/L BS.  No wheezing / crackles / rales  Abdomen: Soft, non-tender, non-distended. Normoactive bowel sounds  Extremities: No edema b/l le  Neuro: A&O x3. No focal deficits.  numbness all extremities after her chemo treatments.     LABS:                        10.6   4.40  )-----------( 182      ( 2021 15:40 )             33.4         140  |  105  |  27<H>  ----------------------------<  153<H>  4.9   |  25  |  1.3    Ca    8.5      2021 15:40    TPro  7.1  /  Alb  4.0  /  TBili  <0.2  /  DBili  x   /  AST  22  /  ALT  8   /  AlkPhos  98  -    PT/INR - ( 2021 15:40 )   PT: 18.40 sec;   INR: 1.60 ratio         PTT - ( 2021 15:40 )  PTT:33.7 sec  Troponin T, Serum: <0.01 ng/mL (21 @ 15:40)    CARDIAC MARKERS ( 2021 15:40 )  x     / <0.01 ng/mL / x     / x     / x            Troponin trend:    Serum Pro-Brain Natriuretic Peptide: 214 pg/mL (21 @ 15:40)      COVID-19 PCR: NotDetec (27 Oct 2020 13:31)  COVID-19 PCR: NotDetec (21 Oct 2020 12:55)      RADIOLOGY:  -CXR:  -TTE:< from: Transthoracic Echocardiogram (19 @ 11:55) >  Summary:   1. Mild-moderate tricuspid regurgitation.    PHYSICIAN INTERPRETATION:  Left Ventricle: Normal left ventricular size and wall thicknesses, with   normal systolic function. Normal segmental left ventricular systolic   function.  Right Ventricle: Normal right ventricular size and function.  Left Atrium: Normal left atrial size.  Right Atrium: Normal right atrial size.  Pericardium: There is no evidence of pericardial effusion.  Mitral Valve: Structurally normal mitral valve, with normal leaflet   excursion. The mitral valve is normal in structure. Mild mitral valve   regurgitation is seen.  Tricuspid Valve: Structurally normal tricuspid valve, with normalleaflet   excursion. The tricuspid valve is normal in structure. Mild-moderate   tricuspid regurgitation is visualized.  Aortic Valve: Aortic valve mildly thickened.Normal opening.No evidence of   aortic valve regurgitation is seen.  Pulmonic Valve: Structurally normal pulmonic valve, with normal leaflet   excursion. The pulmonic valve is normal. Trace pulmonic valve   regurgitation.  Aorta: The aortic root and ascending aorta are structurally normal, with   no evidence of dilitation.  Pulmonary Artery: The main pulmonary artery is normal in size. Estmated   PA pressure is 50 systolic. Moderate pulmonary hypertension.  Venous: The inferior vena cava is normal.    < end of copied text >    -CCTA:  -STRESS TEST:  -CATHETERIZATION:< from: Cardiac Cath Lab - Adult (09.10.18 @ 07:40) >  CORONARY CIRCULATION: There was left main disease ( 60 % stenosis). There    was 2-vessel coronary artery disease (LAD and circumflex). Distal left    main: There was a discrete 60 % stenosis. Ostial LAD: There was a discrete    75 % stenosis. Circumflex: The vessel was medium sized. Ostial circumflex:    There was a discrete 70 % stenosis. Ramus intermedius: The vessel was    medium to large sized. Ostial ramus intermedius: There was a discrete 80 %    stenosis. RCA: The vessel was medium sized. Angiography showed minor    luminal irregularities with no flow limiting lesions. Graft to the mid    LAD: The graft was a LIMA. Graft angiography showed severe degeneration.    < end of copied text >    -OTHER:  < from: [facility] Duplex Lower Ext Vein Scan, Bilat (21 @ 17:21) >  No evidence of deep venous thrombosis in the bilateral lower extremities.    < end of copied text >  EC Lead ECG:   Ventricular Rate 68 BPM    Atrial Rate 68 BPM    P-R Interval 136 ms    QRS Duration 74 ms    Q-T Interval 376 ms    QTC Calculation(Bazett) 399 ms    P Axis 68 degrees    R Axis 9 degrees    T Axis 49 degrees    Diagnosis Line Normal sinus rhythm  Cannot rule out Anterior infarct , age undetermined  Abnormal ECG    Confirmed by GONZÁLEZ YATES MD ([id number]) on 2021 5:04:52 PM ( @ 15:17)      TELEMETRY EVENTS:

## 2021-04-16 NOTE — ED CDU PROVIDER INITIAL DAY NOTE - OBJECTIVE STATEMENT
80 yold female to Ed Pmhx Cad, s/p cabg x2, Htn, Hld, hx PE on xarelto, hx uterine ca s/p tahbso and ctx; pt c/o moderate mid sternal chest pain started this am described as sharp lasting few min; pt denies fever, chills, cough, sob, n/v/ or back pain; pt last stress test 2/2020 negative(Dr. Enrique); pt recievied covid vaccine 1 week ago and was also having RLE pain and was concerned for dvt;

## 2021-04-16 NOTE — ED PROVIDER NOTE - NS ED ROS FT
Constitutional: no fever, chills, no recent weight loss, change in appetite or malaise  Eyes: no redness/discharge/pain/vision changes  ENT: no rhinorrhea/ear pain/sore throat  Cardiac: No SOB or edema.  Respiratory: No cough or respiratory distress  GI: No nausea, vomiting, diarrhea or abdominal pain.  : No dysuria, frequency, urgency or hematuria  MS: no pain to back or other extremities, no loss of ROM, no weakness  Neuro: No headache or weakness. No LOC.  Skin: No skin rash.  Except as documented in the HPI, all other systems are negative.

## 2021-04-16 NOTE — ED CDU PROVIDER INITIAL DAY NOTE - PROGRESS NOTE DETAILS
received signout from Tyler Sosa - verenice placed in obs for evaluation of chest pain; initial ce/ekg negative; will repeat and plan for stress test in am; covid sent; received signout from Tyler Sosa - verenice placed in obs for evaluation of chest pain; initial ce/ekg negative; will repeat and plan for stress test in am; covid sent; duplex prelim negative;

## 2021-04-17 VITALS
SYSTOLIC BLOOD PRESSURE: 135 MMHG | HEART RATE: 61 BPM | DIASTOLIC BLOOD PRESSURE: 73 MMHG | TEMPERATURE: 96 F | OXYGEN SATURATION: 100 % | RESPIRATION RATE: 17 BRPM

## 2021-04-17 PROCEDURE — 93016 CV STRESS TEST SUPVJ ONLY: CPT

## 2021-04-17 PROCEDURE — 99284 EMERGENCY DEPT VISIT MOD MDM: CPT

## 2021-04-17 PROCEDURE — 93018 CV STRESS TEST I&R ONLY: CPT

## 2021-04-17 PROCEDURE — 99217: CPT

## 2021-04-17 PROCEDURE — 78452 HT MUSCLE IMAGE SPECT MULT: CPT | Mod: 26,MA

## 2021-04-17 RX ORDER — REGADENOSON 0.08 MG/ML
0.4 INJECTION, SOLUTION INTRAVENOUS ONCE
Refills: 0 | Status: DISCONTINUED | OUTPATIENT
Start: 2021-04-17 | End: 2021-04-17

## 2021-04-17 RX ADMIN — AMLODIPINE BESYLATE 5 MILLIGRAM(S): 2.5 TABLET ORAL at 05:49

## 2021-04-17 RX ADMIN — Medication 25 MILLIGRAM(S): at 05:50

## 2021-04-17 NOTE — ED CDU PROVIDER DISPOSITION NOTE - PROVIDER TOKENS
FREE:[LAST:[YOUR PRIMARY CARE PHYSICIAN],PHONE:[(   )    -],FAX:[(   )    -],FOLLOWUP:[1-3 Days]],PROVIDER:[TOKEN:[[id number]]]

## 2021-04-17 NOTE — ED CDU PROVIDER SUBSEQUENT DAY NOTE - HISTORY
pt resting in obs without complaints; repeat ce/ekg unchanged;  covid test negative; home meds ordered; pharm nuclear stress test ordered;  pt seen by cardiology fellow - sx atypical for acs and pt given option to f/u outpt with Dr. Moura; pt chose to complete workup here;

## 2021-04-17 NOTE — ED CDU PROVIDER DISPOSITION NOTE - CLINICAL COURSE
Pt has a known history of CAD. s\p CABG. Presents with chest pain. CE neg. Stress test neg for ischemia. Advised continue meds and out pt follow up with her cardiologist.

## 2021-04-17 NOTE — ED ADULT NURSE REASSESSMENT NOTE - NS ED NURSE REASSESS COMMENT FT1
Pt in OBS resting comfortably in safe and stable condition. No complaints of CP nor SOB. Toileting scheduled with hourly rounding in process. Will continue to monitor.

## 2021-04-17 NOTE — ED CDU PROVIDER DISPOSITION NOTE - PATIENT PORTAL LINK FT
You can access the FollowMyHealth Patient Portal offered by [facility] by registering at the following website: [email or web address]. By joining [employer]’s FollowMyHealth portal, you will also be able to view your health information using other applications (apps) compatible with our system.

## 2021-04-17 NOTE — ED CDU PROVIDER DISPOSITION NOTE - CARE PROVIDER_API CALL
YOUR PRIMARY CARE PHYSICIAN,   Phone: (   )    -  Fax: (   )    -  Follow Up Time: 1-3 Days    Manuel Moura)  Cardiovascular Disease; Internal Medicine  [address] [place] [address]  Phone: [phone]  Fax: [phone]  Follow Up Time:

## 2021-04-17 NOTE — ED CDU PROVIDER DISPOSITION NOTE - NSFOLLOWUPINSTRUCTIONS_ED_ALL_ED_FT
Nonspecific Chest Pain  Chest pain can be caused by many different conditions. There is always a chance that your pain could be related to something serious, such as a heart attack or a blood clot in your lungs. Chest pain can also be caused by conditions that are not life-threatening. If you have chest pain, it is very important to follow up with your health care provider.    What are the causes?  Causes of this condition include:    Heartburn.  Pneumonia or bronchitis.  Anxiety or stress.  Inflammation around your heart (pericarditis) or lung (pleuritis or pleurisy).  A blood clot in your lung.  A collapsed lung (pneumothorax). This can develop suddenly on its own (spontaneous pneumothorax) or from trauma to the chest.  Shingles infection (varicella-zoster virus).  Heart attack.  Damage to the bones, muscles, and cartilage that make up your chest wall. This can include:    Bruised bones due to injury.  Strained muscles or cartilage due to frequent or repeated coughing or overwork.  Fracture to one or more ribs.  Sore cartilage due to inflammation (costochondritis).      What increases the risk?  Risk factors for this condition may include:    Activities that increase your risk for trauma or injury to your chest.  Respiratory infections or conditions that cause frequent coughing.  Medical conditions or overeating that can cause heartburn.  Heart disease or family history of heart disease.  Conditions or health behaviors that increase your risk of developing a blood clot.  Having had chicken pox (varicella zoster).    What are the signs or symptoms?  Chest pain can feel like:    Burning or tingling on the surface of your chest or deep in your chest.  Crushing, pressure, aching, or squeezing pain.  Dull or sharp pain that is worse when you move, cough, or take a deep breath.  Pain that is also felt in your back, neck, shoulder, or arm, or pain that spreads to any of these areas.    Your chest pain may come and go, or it may stay constant.    How is this diagnosed?  Lab tests or other studies may be needed to find the cause of your pain. Your health care provider may have you take a test called an ECG (electrocardiogram). An ECG records your heartbeat patterns at the time the test is performed. You may also have other tests, such as:    Transthoracic echocardiogram (TTE). In this test, sound waves are used to create a picture of the heart structures and to look at how blood flows through your heart.  Transesophageal echocardiogram (ARLIN). This is a more advanced imaging test that takes images from inside your body. It allows your health care provider to see your heart in finer detail.  Cardiac monitoring. This allows your health care provider to monitor your heart rate and rhythm in real time.  Holter monitor. This is a portable device that records your heartbeat and can help to diagnose abnormal heartbeats. It allows your health care provider to track your heart activity for several days, if needed.  Stress tests. These can be done through exercise or by taking medicine that makes your heart beat more quickly.  Blood tests.  Other imaging tests.    How is this treated?  Treatment depends on what is causing your chest pain. Treatment may include:    Medicines. These may include:    Acid blockers for heartburn.  Anti-inflammatory medicine.  Pain medicine for inflammatory conditions.  Antibiotic medicine, if an infection is present.  Medicines to dissolve blood clots.  Medicines to treat coronary artery disease (CAD).    Supportive care for conditions that do not require medicines. This may include:    Resting.  Applying heat or cold packs to injured areas.  Limiting activities until pain decreases.      Follow these instructions at home:  Medicines     If you were prescribed an antibiotic, take it as told by your health care provider. Do not stop taking the antibiotic even if you start to feel better.  Take over-the-counter and prescription medicines only as told by your health care provider.  Lifestyle     Do not use any products that contain nicotine or tobacco, such as cigarettes and e-cigarettes. If you need help quitting, ask your health care provider.  Do not drink alcohol.  ImageMake lifestyle changes as directed by your health care provider. These may include:    Getting regular exercise. Ask your health care provider to suggest some activities that are safe for you.  Eating a heart-healthy diet. A registered dietitian can help you to learn healthy eating options.  Maintaining a healthy weight.  Managing diabetes, if necessary.  Reducing stress, such as with yoga or relaxation techniques.    General instructions     Avoid any activities that bring on chest pain.  If heartburn is the cause for your chest pain, raise (elevate) the head of your bed about 6 inches (15 cm) by putting blocks under the legs. Sleeping with more pillows does not effectively relieve heartburn because it only changes the position of your head.  Keep all follow-up visits as told by your health care provider. This is important. This includes any further testing if your chest pain does not go away.  Contact a health care provider if:  Your chest pain does not go away.  You have a rash with blisters on your chest.  You have a fever.  You have chills.  Get help right away if:  Your chest pain is worse.  You have a cough that gets worse, or you cough up blood.  You have severe pain in your abdomen.  You have severe weakness.  You faint.  You have sudden, unexplained chest discomfort.  You have sudden, unexplained discomfort in your arms, back, neck, or jaw.  You have shortness of breath at any time.  You suddenly start to sweat, or your skin gets clammy.  You feel nauseous or you vomit.  You suddenly feel light-headed or dizzy.  Your heart begins to beat quickly, or it feels like it is skipping beats.  These symptoms may represent a serious problem that is an emergency. Do not wait to see if the symptoms will go away. Get medical help right away. Call your local emergency services (911 in the [place] Do not drive yourself to the hospital.     This information is not intended to replace advice given to you by your health care provider. Make sure you discuss any questions you have with your health care provider.

## 2021-04-19 ENCOUNTER — APPOINTMENT (OUTPATIENT)
Dept: UROLOGY | Facility: CLINIC | Age: 81
End: 2021-04-19

## 2021-04-20 ENCOUNTER — APPOINTMENT (OUTPATIENT)
Dept: PSYCHIATRY | Facility: CLINIC | Age: 81
End: 2021-04-20

## 2021-04-21 ENCOUNTER — EMERGENCY (EMERGENCY)
Facility: HOSPITAL | Age: 81
LOS: 0 days | Discharge: HOME | End: 2021-04-21
Attending: EMERGENCY MEDICINE | Admitting: EMERGENCY MEDICINE
Payer: MEDICARE

## 2021-04-21 VITALS
SYSTOLIC BLOOD PRESSURE: 144 MMHG | RESPIRATION RATE: 18 BRPM | HEART RATE: 79 BPM | WEIGHT: 136.91 LBS | DIASTOLIC BLOOD PRESSURE: 70 MMHG | TEMPERATURE: 98 F | HEIGHT: 63 IN | OXYGEN SATURATION: 100 %

## 2021-04-21 DIAGNOSIS — Z95.5 PRESENCE OF CORONARY ANGIOPLASTY IMPLANT AND GRAFT: ICD-10-CM

## 2021-04-21 DIAGNOSIS — E78.00 PURE HYPERCHOLESTEROLEMIA, UNSPECIFIED: ICD-10-CM

## 2021-04-21 DIAGNOSIS — Z79.01 LONG TERM (CURRENT) USE OF ANTICOAGULANTS: ICD-10-CM

## 2021-04-21 DIAGNOSIS — I25.810 ATHEROSCLEROSIS OF CORONARY ARTERY BYPASS GRAFT(S) WITHOUT ANGINA PECTORIS: ICD-10-CM

## 2021-04-21 DIAGNOSIS — I25.701 ATHEROSCLEROSIS OF CORONARY ARTERY BYPASS GRAFT(S), UNSPECIFIED, WITH ANGINA PECTORIS WITH DOCUMENTED SPASM: Chronic | ICD-10-CM

## 2021-04-21 DIAGNOSIS — Z98.890 OTHER SPECIFIED POSTPROCEDURAL STATES: Chronic | ICD-10-CM

## 2021-04-21 DIAGNOSIS — Z88.8 ALLERGY STATUS TO OTHER DRUGS, MEDICAMENTS AND BIOLOGICAL SUBSTANCES STATUS: ICD-10-CM

## 2021-04-21 DIAGNOSIS — H40.9 UNSPECIFIED GLAUCOMA: ICD-10-CM

## 2021-04-21 DIAGNOSIS — E78.5 HYPERLIPIDEMIA, UNSPECIFIED: ICD-10-CM

## 2021-04-21 DIAGNOSIS — Z86.711 PERSONAL HISTORY OF PULMONARY EMBOLISM: ICD-10-CM

## 2021-04-21 DIAGNOSIS — Z85.42 PERSONAL HISTORY OF MALIGNANT NEOPLASM OF OTHER PARTS OF UTERUS: ICD-10-CM

## 2021-04-21 DIAGNOSIS — Z90.710 ACQUIRED ABSENCE OF BOTH CERVIX AND UTERUS: ICD-10-CM

## 2021-04-21 DIAGNOSIS — Z79.899 OTHER LONG TERM (CURRENT) DRUG THERAPY: ICD-10-CM

## 2021-04-21 DIAGNOSIS — I10 ESSENTIAL (PRIMARY) HYPERTENSION: ICD-10-CM

## 2021-04-21 DIAGNOSIS — R42 DIZZINESS AND GIDDINESS: ICD-10-CM

## 2021-04-21 DIAGNOSIS — Z95.1 PRESENCE OF AORTOCORONARY BYPASS GRAFT: Chronic | ICD-10-CM

## 2021-04-21 LAB
ALBUMIN SERPL ELPH-MCNC: 4.1 G/DL — SIGNIFICANT CHANGE UP (ref 3.5–5.2)
ALP SERPL-CCNC: 86 U/L — SIGNIFICANT CHANGE UP (ref 30–115)
ALT FLD-CCNC: <5 U/L — SIGNIFICANT CHANGE UP (ref 0–41)
ANION GAP SERPL CALC-SCNC: 11 MMOL/L — SIGNIFICANT CHANGE UP (ref 7–14)
APTT BLD: 23 SEC — CRITICAL LOW (ref 27–39.2)
AST SERPL-CCNC: 56 U/L — HIGH (ref 0–41)
BASE EXCESS BLDV CALC-SCNC: 3.5 MMOL/L — HIGH (ref -2–2)
BASOPHILS # BLD AUTO: 0.03 K/UL — SIGNIFICANT CHANGE UP (ref 0–0.2)
BASOPHILS NFR BLD AUTO: 0.5 % — SIGNIFICANT CHANGE UP (ref 0–1)
BILIRUB SERPL-MCNC: 0.3 MG/DL — SIGNIFICANT CHANGE UP (ref 0.2–1.2)
BUN SERPL-MCNC: 24 MG/DL — HIGH (ref 10–20)
CA-I SERPL-SCNC: 1.16 MMOL/L — SIGNIFICANT CHANGE UP (ref 1.12–1.3)
CALCIUM SERPL-MCNC: 8.6 MG/DL — SIGNIFICANT CHANGE UP (ref 8.5–10.1)
CHLORIDE SERPL-SCNC: 103 MMOL/L — SIGNIFICANT CHANGE UP (ref 98–110)
CO2 SERPL-SCNC: 23 MMOL/L — SIGNIFICANT CHANGE UP (ref 17–32)
CREAT SERPL-MCNC: 1.2 MG/DL — SIGNIFICANT CHANGE UP (ref 0.7–1.5)
EOSINOPHIL # BLD AUTO: 0.05 K/UL — SIGNIFICANT CHANGE UP (ref 0–0.7)
EOSINOPHIL NFR BLD AUTO: 0.8 % — SIGNIFICANT CHANGE UP (ref 0–8)
GAS PNL BLDV: 142 MMOL/L — SIGNIFICANT CHANGE UP (ref 136–145)
GAS PNL BLDV: SIGNIFICANT CHANGE UP
GLUCOSE SERPL-MCNC: 99 MG/DL — SIGNIFICANT CHANGE UP (ref 70–99)
HCO3 BLDV-SCNC: 30 MMOL/L — HIGH (ref 22–29)
HCT VFR BLD CALC: 35 % — LOW (ref 37–47)
HCT VFR BLDA CALC: 35.6 % — SIGNIFICANT CHANGE UP (ref 34–44)
HGB BLD CALC-MCNC: 11.6 G/DL — LOW (ref 14–18)
HGB BLD-MCNC: 11.1 G/DL — LOW (ref 12–16)
IMM GRANULOCYTES NFR BLD AUTO: 0.3 % — SIGNIFICANT CHANGE UP (ref 0.1–0.3)
INR BLD: 1.13 RATIO — SIGNIFICANT CHANGE UP (ref 0.65–1.3)
LACTATE BLDV-MCNC: 1.1 MMOL/L — SIGNIFICANT CHANGE UP (ref 0.5–1.6)
LYMPHOCYTES # BLD AUTO: 1.89 K/UL — SIGNIFICANT CHANGE UP (ref 1.2–3.4)
LYMPHOCYTES # BLD AUTO: 29.9 % — SIGNIFICANT CHANGE UP (ref 20.5–51.1)
MCHC RBC-ENTMCNC: 26.6 PG — LOW (ref 27–31)
MCHC RBC-ENTMCNC: 31.7 G/DL — LOW (ref 32–37)
MCV RBC AUTO: 83.9 FL — SIGNIFICANT CHANGE UP (ref 81–99)
MONOCYTES # BLD AUTO: 0.56 K/UL — SIGNIFICANT CHANGE UP (ref 0.1–0.6)
MONOCYTES NFR BLD AUTO: 8.9 % — SIGNIFICANT CHANGE UP (ref 1.7–9.3)
NEUTROPHILS # BLD AUTO: 3.77 K/UL — SIGNIFICANT CHANGE UP (ref 1.4–6.5)
NEUTROPHILS NFR BLD AUTO: 59.6 % — SIGNIFICANT CHANGE UP (ref 42.2–75.2)
NRBC # BLD: 0 /100 WBCS — SIGNIFICANT CHANGE UP (ref 0–0)
PCO2 BLDV: 52 MMHG — HIGH (ref 39–42)
PH BLDV: 7.36 — SIGNIFICANT CHANGE UP (ref 7.26–7.43)
PLATELET # BLD AUTO: 182 K/UL — SIGNIFICANT CHANGE UP (ref 130–400)
PO2 BLDV: 20 MMHG — SIGNIFICANT CHANGE UP (ref 20–40)
POTASSIUM BLDV-SCNC: 4.6 MMOL/L — SIGNIFICANT CHANGE UP (ref 3.3–5.6)
POTASSIUM SERPL-MCNC: 7 MMOL/L — CRITICAL HIGH (ref 3.5–5)
POTASSIUM SERPL-SCNC: 7 MMOL/L — CRITICAL HIGH (ref 3.5–5)
PROT SERPL-MCNC: 7.7 G/DL — SIGNIFICANT CHANGE UP (ref 6–8)
PROTHROM AB SERPL-ACNC: 13 SEC — HIGH (ref 9.95–12.87)
RBC # BLD: 4.17 M/UL — LOW (ref 4.2–5.4)
RBC # FLD: 14.9 % — HIGH (ref 11.5–14.5)
SAO2 % BLDV: 27 % — SIGNIFICANT CHANGE UP
SODIUM SERPL-SCNC: 137 MMOL/L — SIGNIFICANT CHANGE UP (ref 135–146)
TROPONIN T SERPL-MCNC: <0.01 NG/ML — SIGNIFICANT CHANGE UP
WBC # BLD: 6.32 K/UL — SIGNIFICANT CHANGE UP (ref 4.8–10.8)
WBC # FLD AUTO: 6.32 K/UL — SIGNIFICANT CHANGE UP (ref 4.8–10.8)

## 2021-04-21 PROCEDURE — 70498 CT ANGIOGRAPHY NECK: CPT | Mod: 26,MA

## 2021-04-21 PROCEDURE — 99285 EMERGENCY DEPT VISIT HI MDM: CPT

## 2021-04-21 PROCEDURE — 70450 CT HEAD/BRAIN W/O DYE: CPT | Mod: 26,MA

## 2021-04-21 PROCEDURE — 70496 CT ANGIOGRAPHY HEAD: CPT | Mod: 26,MA

## 2021-04-21 PROCEDURE — 93010 ELECTROCARDIOGRAM REPORT: CPT

## 2021-04-21 RX ORDER — MECLIZINE HCL 12.5 MG
25 TABLET ORAL ONCE
Refills: 0 | Status: COMPLETED | OUTPATIENT
Start: 2021-04-21 | End: 2021-04-21

## 2021-04-21 RX ORDER — CLONAZEPAM 1 MG
0.5 TABLET ORAL ONCE
Refills: 0 | Status: DISCONTINUED | OUTPATIENT
Start: 2021-04-21 | End: 2021-04-21

## 2021-04-21 RX ORDER — MECLIZINE HCL 12.5 MG
1 TABLET ORAL
Qty: 6 | Refills: 0
Start: 2021-04-21 | End: 2021-04-23

## 2021-04-21 RX ADMIN — Medication 0.5 MILLIGRAM(S): at 09:23

## 2021-04-21 RX ADMIN — Medication 25 MILLIGRAM(S): at 09:00

## 2021-04-21 NOTE — ED PROVIDER NOTE - NS ED ROS FT
Constitutional: no fever, chills, no recent weight loss, change in appetite or malaise  Eyes: no redness/discharge/pain/vision changes  ENT: no rhinorrhea/ear pain/sore throat  Cardiac: No chest pain, SOB or edema.  Respiratory: No cough or respiratory distress  GI: + nausea. No vomiting, diarrhea or abdominal pain.  : No dysuria, frequency, urgency or hematuria  MS: no pain to back or extremities, no loss of ROM, no weakness  Neuro: + dizziness. No headache or weakness. No LOC.  Skin: No skin rash.  Endocrine: No history of thyroid disease or diabetes.  Except as documented in the HPI, all other systems are negative.

## 2021-04-21 NOTE — ED PROVIDER NOTE - PATIENT PORTAL LINK FT
You can access the FollowMyHealth Patient Portal offered by [facility] by registering at the following website: [email or web address]. By joining [employer]’s FollowMyHealth portal, you will also be able to view your health information using other applications (apps) compatible with our system.

## 2021-04-21 NOTE — ED PROVIDER NOTE - PROGRESS NOTE DETAILS
deidra - pt reports symptomatic resolution with meclizine, ambulated to bathroom without assistance. Pt seen at bedside, sts feels much better and symptoms have resolved. Ambulating well in ED with steady gait and requesting food. Will dc home with ent/neuro follow up. Strict return precautions given.

## 2021-04-21 NOTE — ED PROVIDER NOTE - PHYSICAL EXAMINATION
CONSTITUTIONAL: Well-appearing; well-nourished; in no apparent distress.   EYES: PERRL; EOM intact.   ENT: normal nose; no rhinorrhea; normal pharynx with no tonsillar hypertrophy.   NECK: Supple; non-tender; no cervical lymphadenopathy.   CARDIOVASCULAR: Normal S1, S2; no murmurs, rubs, or gallops.   RESPIRATORY: Normal chest excursion with respiration; breath sounds clear and equal bilaterally; no wheezes, rhonchi, or rales.  GI/: Normal bowel sounds; non-distended; non-tender; no palpable organomegaly.   MS: No evidence of trauma or deformity. Normal ROM in all four extremities; non-tender to palpation; distal pulses are normal.   SKIN: Normal for age and race; warm; dry; good turgor; no apparent lesions or exudate.   NEURO/PSYCH: A & O x 4; grossly unremarkable. mood and manner are appropriate. No focal deficits. No facial droop. No tongue deviation. Cerebellar intact. normal gait. Sensation intact

## 2021-04-21 NOTE — ED PROVIDER NOTE - ATTENDING CONTRIBUTION TO CARE
81yo F with PMHx CAD/CABG, HTN, HLD, PE on xarelto, uterine CA s/p SHAAN (last chemo 1/22/21), presents for dizziness/vertigo since 5am when she woke up today, worse with head movement. Patient states was asymptomatic yesterday. Denies any other symptoms including fever, chills, headache, lightheadedness, visual or hearing changes, CP, SOB, cough, nausea, vomiting, diarrhea, abd pain, leg swelling. Denies numbness, tingling, weakness. Denies slurred speech, facial droop.     Review of charts shows patient was seen in ED 5 days ago for chest pain and RLE pain, had workup in ED Obs including neg venous duplex and neg nuclear stress test.    Vital signs reviewed  GENERAL: Patient nontoxic appearing, NAD  HEAD: NCAT  EYES: Anicteric. PERRL, EOMI. No nystagmus  ENT: MMM  RESPIRATORY: Normal respiratory effort. CTA B/L. No wheezing, rales, rhonchi  CARDIOVASCULAR: Regular rate and rhythm  ABDOMEN: Soft. Nondistended. Nontender.   MUSCULOSKELETAL/EXTREMITIES: Brisk cap refill.   SKIN:  Warm and dry  NEURO: AAOx3. GCS 15. Speech clear and coherent. Answering questions appropriately. Face symmetric, no facial droop. Strength 5/5 x4. Normal finger-to-nose. No dysmetria.

## 2021-04-21 NOTE — ED PROVIDER NOTE - CARE PROVIDER_API CALL
Rubin Mckeon)  Otolaryngology  [address] [facility], [address] [place] [address]  Phone: [phone]  Fax: [phone]  Follow Up Time:     Jewel Perez)  EEGEpilepsy; Neurology  [address] [facility], [address]  [place] [address]  Phone: [phone]  Fax: [phone]  Follow Up Time:

## 2021-04-21 NOTE — ED PROVIDER NOTE - CLINICAL SUMMARY MEDICAL DECISION MAKING FREE TEXT BOX
81yo F presents for dizziness/vertigo today. Symptoms resolved after meclizine. Negative CTH and CTA. No FND. Stable at time of discharge. Return precautiosn given.

## 2021-04-21 NOTE — ED ADULT NURSE NOTE - NSIMPLEMENTINTERV_GEN_ALL_ED
Implemented All Fall with Harm Risk Interventions:  [place] to call system. Call bell, personal items and telephone within reach. Instruct patient to call for assistance. Room bathroom lighting operational. Non-slip footwear when patient is off stretcher. Physically safe environment: no spills, clutter or unnecessary equipment. Stretcher in lowest position, wheels locked, appropriate side rails in place. Provide visual cue, wrist band, yellow gown, etc. Monitor gait and stability. Monitor for mental status changes and reorient to person, place, and time. Review medications for side effects contributing to fall risk. Reinforce activity limits and safety measures with patient and family. Provide visual clues: red socks.

## 2021-04-21 NOTE — ED PROVIDER NOTE - OBJECTIVE STATEMENT
80 year old F with hx of CAD s/p CABG x 2, HLD, HTN, PE on xarelto, uterine ca s/p SHAAN, recently seen in ED 04/16 for CP had neg nuclear stress c/o dizziness x 1 day. Symptoms started after she got out of bed at 5 am. + acute onset dizziness (room spinning sensation) worse with standing/walking/head position. Symptoms are better with laying down. + nausea. Denies any headache, visual changes, chest pain, sob, numbness, weakness, speech changes, inability to ambulate, recent illness/fever/chills.

## 2021-04-21 NOTE — ED PROVIDER NOTE - CARE PROVIDERS DIRECT ADDRESSES
[email or web address]

## 2021-04-30 ENCOUNTER — NON-APPOINTMENT (OUTPATIENT)
Age: 81
End: 2021-04-30

## 2021-05-06 ENCOUNTER — APPOINTMENT (OUTPATIENT)
Dept: CARDIOLOGY | Facility: CLINIC | Age: 81
End: 2021-05-06
Payer: MEDICARE

## 2021-05-06 VITALS
TEMPERATURE: 96.9 F | DIASTOLIC BLOOD PRESSURE: 72 MMHG | BODY MASS INDEX: 23.9 KG/M2 | WEIGHT: 140 LBS | HEIGHT: 64 IN | SYSTOLIC BLOOD PRESSURE: 128 MMHG | HEART RATE: 59 BPM

## 2021-05-06 DIAGNOSIS — I25.10 ATHEROSCLEROTIC HEART DISEASE OF NATIVE CORONARY ARTERY W/OUT ANGINA PECTORIS: ICD-10-CM

## 2021-05-06 DIAGNOSIS — I70.90 UNSPECIFIED ATHEROSCLEROSIS: ICD-10-CM

## 2021-05-06 PROCEDURE — 99072 ADDL SUPL MATRL&STAF TM PHE: CPT

## 2021-05-06 PROCEDURE — 93306 TTE W/DOPPLER COMPLETE: CPT

## 2021-05-06 PROCEDURE — 93000 ELECTROCARDIOGRAM COMPLETE: CPT

## 2021-05-06 PROCEDURE — 99214 OFFICE O/P EST MOD 30 MIN: CPT

## 2021-05-06 RX ORDER — ROSUVASTATIN CALCIUM 10 MG/1
10 TABLET, FILM COATED ORAL DAILY
Qty: 90 | Refills: 3 | Status: ACTIVE | COMMUNITY
Start: 2019-01-04 | End: 1900-01-01

## 2021-05-06 RX ORDER — METOPROLOL SUCCINATE 25 MG/1
25 TABLET, EXTENDED RELEASE ORAL DAILY
Qty: 90 | Refills: 3 | Status: ACTIVE | COMMUNITY
Start: 1900-01-01 | End: 1900-01-01

## 2021-05-06 RX ORDER — OXYCODONE AND ACETAMINOPHEN 5; 325 MG/1; MG/1
5-325 TABLET ORAL
Qty: 45 | Refills: 0 | Status: DISCONTINUED | COMMUNITY
Start: 2020-11-12 | End: 2021-05-06

## 2021-05-06 RX ORDER — PROCHLORPERAZINE MALEATE 10 MG/1
10 TABLET ORAL
Qty: 120 | Refills: 0 | Status: DISCONTINUED | COMMUNITY
Start: 2020-08-19 | End: 2021-05-06

## 2021-05-06 RX ORDER — DEXAMETHASONE 4 MG/1
4 TABLET ORAL
Qty: 10 | Refills: 0 | Status: DISCONTINUED | COMMUNITY
Start: 2020-08-20 | End: 2021-05-06

## 2021-05-06 RX ORDER — ENOXAPARIN SODIUM 100 MG/ML
60 INJECTION SUBCUTANEOUS
Qty: 60 | Refills: 1 | Status: DISCONTINUED | COMMUNITY
Start: 2020-12-24 | End: 2021-05-06

## 2021-05-06 RX ORDER — ONDANSETRON 8 MG/1
8 TABLET, ORALLY DISINTEGRATING ORAL EVERY 6 HOURS
Qty: 30 | Refills: 1 | Status: DISCONTINUED | COMMUNITY
Start: 2020-08-19 | End: 2021-05-06

## 2021-05-06 RX ORDER — ENOXAPARIN SODIUM 100 MG/ML
60 INJECTION SUBCUTANEOUS
Qty: 60 | Refills: 1 | Status: DISCONTINUED | COMMUNITY
Start: 2021-02-08 | End: 2021-05-06

## 2021-05-06 RX ORDER — CIPROFLOXACIN HYDROCHLORIDE 500 MG/1
500 TABLET, FILM COATED ORAL TWICE DAILY
Qty: 10 | Refills: 0 | Status: DISCONTINUED | COMMUNITY
Start: 2021-01-19 | End: 2021-05-06

## 2021-05-06 NOTE — HISTORY OF PRESENT ILLNESS
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 Found to [facility] . To get chemo; She was found to have a PE during admission in August . She was on Lovenox then placed on Xarelto . Seeing hematology . The patient has had chemotherapy . The patient had been seen in the ER . CP was atypical and she has had a negative nuclear stress test . She was slo seen for vertigo . she did have aneurological work up which was unremarkable .

## 2021-05-06 NOTE — ASSESSMENT
[FreeTextEntry1] : The patient has stage IV uterine CA / S/P Chemo. She has had DVT and PE and is now on Xarelto . The patient had atypical CP which did not appear to be cardiac in origin . She had a nuclear stress test which was negative for ischemia . She has had CABG twice . She had an episode of vertigo and had a negative neurological work up .

## 2021-05-06 NOTE — REVIEW OF SYSTEMS
[Joint Pain] : joint pain [Negative] : Heme/Lymph [SOB] : no shortness of breath [Dyspnea on exertion] : not dyspnea during exertion [Chest Discomfort] : no chest discomfort

## 2021-05-13 NOTE — PROGRESS NOTE ADULT - ATTENDING COMMENTS
Bleeding now with Lovenox, suggest inpatient cystoscopy to identify source, if invasion into the bladder is noted, patient will benefit from palliative XRT. Agree with IVC filter given active bleeding and h/o recent PE.
Hematuria is much improved with Lovenox, recommend discharging with Lovenox.   Awaiting urology recs. Will follow.
No overt source of bleeding on cycloscopy, off full AC, still complaining of hematuria, required PRBC transfusion during admission.   Cardiology eval for utility of baby ASA, if baby ASA can be stopped, would recommend AC as tolerated.
Patient examined , above read and edited where appropriate , still with red tinged urine m Hb is stable . awaiting cystoscopy .
For carbo/taxol today. S/p IVC filter yesterday.   Suggest inpatient cystoscopy.   Case was discussed with primary attending.
Pt was seen and examined at bedside independently, pt c/o abdominal discomfort, she denies hematuria today ( has a clear urine), pt has advanced endometrial CA, on chemotherapy with h/o PE, DVT, she was on AC at home and developed gross hematuria, po AC was held, will start SQ Lovenox and monitor closely.   Pt was consulted by medical oncology and , will follow up urine cytology, OP cystoscopy recommended, will monitor for 24 hours, f/u CBC in AM.   For now will c/w current medical management, control pain.     #Progress Note Handoff  Pending (specify):  start Lovenox SQ, monitor H/H, send urine cytology, control pain   Family discussion: n/a, I spoke with pt, she agreed with a plan of care   Disposition: Home_x __/SNF___/Other________/Unknown at this time________
I personally saw and examined the patient 11/03/2020 PM, reviewed the chart and available data. I discussed the situation with the patient and PA staff. I also reviewed and/or amended the note as necessary.    patient seen on the day in question. Note not reviewed and cosigned until now due to scheduling issues
Minoxidil Counseling: Minoxidil is a topical medication which can increase blood flow where it is applied. It is uncertain how this medication increases hair growth. Side effects are uncommon and include stinging and allergic reactions.

## 2021-05-26 PROBLEM — H93.8X3 CLOGGED EAR, BILATERAL: Status: ACTIVE | Noted: 2019-06-06

## 2021-05-26 PROBLEM — H61.23 BILATERAL IMPACTED CERUMEN: Status: ACTIVE | Noted: 2021-01-01

## 2021-05-26 NOTE — PHYSICAL EXAM
[Normal] : mucosa is normal [Midline] : trachea located in midline position [de-identified] : narrow EAC's, bilateral cerumen impaction

## 2021-05-26 NOTE — HISTORY OF PRESENT ILLNESS
[FreeTextEntry1] : Patient presents today following up on clogged ears. Patient admits dizziness occurred last month. She woke up with room spinning and seen in the ER. She states dizziness resolved after leaving ER. No other complaints.

## 2021-06-03 PROBLEM — N13.30 HYDRONEPHROSIS OF LEFT KIDNEY: Status: ACTIVE | Noted: 2021-03-25

## 2021-06-03 NOTE — ASSESSMENT
[FreeTextEntry1] : Recurrent adenocarcinoma Mullerian origin, malignant ascites diagnosed on 2020\par --Prior SHAAN BSO in 2016, adjuvant chemotherapy, obtain records from Dr. Abdullahi \par --Started on weekly carbo/taxol weekly 3 on 1 off on 2020.\par --PET CT reviewed from 20. \par --Lab work today with Stable blood counts\par --For chemo 10/16/2020\par --PICC line care ( Patient insisted to flush PICC line once / week.)\par --On 20 patient will proceed with Cycle #5 of weekly carboplatin and Taxol weekly 3 weeks on and one week off. .\par --On 21 patient completed 6 cycles of Carbo/Taxol (weekly), declines any additional cancer directed therapy on 2021\par --On 21 we reviewed PET Scan from 21 with good response to chemo.\par --Referred Patient to Urology for  left hydroureteronephrosis, no intervention for now, sono in 3 months per Dr. Singletary \par --She is going  [facility] in 2021 for a  for 3 weeks, prefers to delay restaging scan until after she comes back\par \par Malignant ascites required high volume paracentesis \par --Improved/resolved with carbo/taxol \par \par PE in the setting of malignancy diagnosed on 2020\par --Doppler was negative for DVT on 2020\par --On BID Lovenox, but ran out on 2020\par --Also on ASA, h/o CABG . \par --Was stared on Xarelto, but after 1 dose was admitted with recurrent PE, diagnosed on 2020\par --Patient will switch from Lovenox to Xarelto 20 mg po daily on 2021.\par --On 2021 tolerating Xarelto with no complications \par \par Follow up in 6 weeks or earlier if needed.\par Patient seen and examined by Dr Gutierrez who agreed for the above plan of care.\par

## 2021-06-03 NOTE — REVIEW OF SYSTEMS
[SOB on Exertion] : shortness of breath during exertion [Negative] : Allergic/Immunologic [Fever] : no fever [Chills] : no chills [Night Sweats] : no night sweats [Fatigue] : no fatigue [Recent Change In Weight] : ~T no recent weight change [Shortness Of Breath] : no shortness of breath [Wheezing] : no wheezing [Cough] : no cough [Abdominal Pain] : no abdominal pain [Vomiting] : no vomiting [Constipation] : no constipation [Diarrhea] : no diarrhea [Confused] : no confusion [Fainting] : no fainting [Dizziness] : no dizziness [Difficulty Walking] : no difficulty walking [FreeTextEntry7] : early satiety [de-identified] : minor preexisting neuropathy

## 2021-06-03 NOTE — HISTORY OF PRESENT ILLNESS
[de-identified] : Trav is a shekhar 81 year old lady I initially met in the hospital on 8/11/2020. Her past medical history is significant for uterine cancer s/p SHAAN BSO as per patient s/p adjuvant chemotherapy in 2016, CAD s/p CABG, HTN and depression, she presented to the ED for abdominal pain and chest discomfort. 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 \par \par PAST MEDICAL & SURGICAL HISTORY:\par HLD (hyperlipidemia)\par HTN (hypertension)\par CAD (coronary artery disease)\par History of coronary artery bypass surgery\par \par FAMILY HISTORY:\par No pertinent family history in first degree relatives\par \par Allergies\par chloroquine (Hives)\par \par CTA C/A/P on 8/6/2020 revealed \par 1. Intraluminal filling defect is seen within the segmental branches of the right lower lobe pulmonary artery, indicating acute pulmonary embolism. (6/132-138; 10/51-55). Filling defect in segmental branch of the right upper lobe pulmonary artery. (6/77) There is a small filling defect within a left lower lobe pulmonary artery segmental branch (6/147; 10/59). No evidence of right heart strain. The right ventricle measures 3.5 cm; the left ventricle measures 3.7 cm. (RV:LV<1) \par 2. Compared with the previous CT scan of 5/27/2020, there is now a large amount of ascites throughout the abdomen and pelvis. \par \par LE Doppler on 8/8/2020 revealed \par No evidence of deep venous thrombosis or superficial thrombophlebitis in the bilateral lower extremities. \par Left popliteal fossa fluid collection as measured above likely Baker's cyst\par \par During her admission she has required 3 episodes of paracentesis on 8/7 3L, 8/12 4L, 8/13 2.2L, once pathology became available patient was started on weekly Carbo/Taxol she has tolerated chemotherapy well.  [de-identified] : 2020: She returns today for follow up, she reports improved SOB, improved abdominal swelling, still come positional chest discomfort, she is compliant with BID Lovenox, she remains on the ASA. She reports improved appetite, she reports that appetite is decreased with a large amount of fluid on the abdomen. She is due for C1D8 of Carbo/Taxol on 2020. CBC was reviewed. \par Side effects of chemotherapy including, but not limited to, cytopenias, that may require blood product transfusions and lead to increased risk of infection, requiring hospitalization, allergic reactions, that can rarely be life-threatening, skin reactions, nausea/vomiting, mucositis, diarrhea/constipation were discussed at length, patient and family voice undestaging, all questions were answered to patient's satisfaction.\par Images were personally reviewed by MD Matt and discussed with patient.\par \par 9/10/2020: Patient is 80 years old female came  today for follow up for  Recurrent adenocarcinoma Mullerian origin, malignant ascites diagnosed on 2020, she S/P one cycle of weekly Carbo/taxol she tolerated well. She  reports improved SOB, improved abdominal swelling, she is ran out of Lovenox, which she is supposed to be taking BID, she didn't call for the renewal, she remains on the ASA. She  has been without Lovenox for like 7 days now, we will restart her on AC immediately. Xarelto was prescribed. She was educated again that she will need to remain on long-term anticoagulation. She reports good  appetite  She is due to start the new cycle of chemo on 20.\par PET Scan reviewed from 20 which revealed\par Mild diffuse FDG uptake throughout the mesentery along with peritoneal stranding and nodularity compatible with peritoneal carcinomatosis (SUV up to 3.9). \par No additional sites of abnormal FDG uptake to suggest biologic tumor activity.\par Images were personally reviewed by MD Matt and discussed with patient. \par \par 10/15/2020: Trav present for follow up, her next chemotherapy is for tomorrow, we will need to place PICC line, this will be arrange as outpatient. \par CT chest on 10/11/2020 revealed When compared to prior study dated 2020: \par Although not a dedicated PE study, redemonstration of a right lower lobe segmental/subsegmental pulmonary embolus which appears overall decreased in size from one month prior. \par No evidence for new consolidation, pleural effusion or pneumothorax. \par Trav reports mild neuropathy, not worsening with chemotherapy, CBC was reviewed. Overall clinically she demonstrates great response to therapy. \par \par 2020: Trav is 80 years old female  present for follow up, her ncycle 4 of carbo/taxol is for tomorrow. She repots feeling better regard urination and hematuria. She stated no more blood with her urine but sometime feels pain while she is urinating.\par We communicated CBC result with Patient with HGB/ HCT is 9.6/29.5. Also patient currently on the therapeutic lovenox once every 12 hrs.\par Trav reports neuropathy is getting  worse with chemotherapy, CBC was reviewed. Overall clinically she demonstrates great response to therapy.\par \par 12/10/2020: Trav is a 80 years old female  present for follow up for Recurrent adenocarcinoma Mullerian origin, malignant ascites diagnosed on 2020, S/P 4 cycles of weekly Carboplatin and Taxol.  She reports feeling well, tolerating treatment. She denies hematuria, abdominal pain. \par We communicated CBC result with Patient with HGB/ HCT is 9.1/29.5. Also patient continues on the therapeutic Lovenox once every 12 hrs.\par Patient will start Cycle # 5 tomorrow.\par \par 21: Trav is a 80 years old female  present for follow up for Recurrent adenocarcinoma Mullerian origin, malignant ascites diagnosed on 2020, S/P 6 cycles of weekly Carboplatin and Taxol.  She reports feeling well , Mildly tired , tolerating her diet better. She denies hematuria, abdominal pain. \par We communicated CBC result with Patient with HGB/ HCT is 9.7 /30.6.  Also patient continues on the therapeutic Lovenox once every 12 hrs.\par Patient still complaining  of mild dysuria no bleeding. we will send for Urine analysis. \par Patient was educated to switch from Lovenox to Xarelto 20 mg po daily.\par Patient is due for PET Scan . Prescription provided. \par \par 21: Trav is a 80 years old female  who presents for follow up for Recurrent adenocarcinoma Mullerian origin, malignant ascites diagnosed on 2020, S/P 6 cycles of weekly Carboplatin and Taxol.  She reports feeling OK, reports fatigue, tolerating her diet better. She denies hematuria, abdominal pain. She reports she does not wish at this point to receive any additional cancer-directed therapy, ad wishes to stop Lovenox. \par PET CT on 2/15/21 revealed \par Compared to PET/CT of 2020, no definite sites of abnormal FDG uptake to suggest biologic tumor activity.\par Interval retraction of a right PICC with tip noted within the right axilla (when correlated to chest radiograph dated 10/22/2020).\par Increased left hydroureteronephrosis to the level of the midureter without definite obstructing radiopaque calculus.\par Images were personally reviewed by MD Matt and discussed with patient. \par We recommended for the patient to follow up with urology, we will assist with appointment.\par We communicated CBC result with Patient with HGB/ HCT is 10.0  /32.6.  Also patient will switch from Lovenox to Xarelto, we have already prescribed that during her prior visit, she did not however switch over.\par Patient was educated to switch from Lovenox to Xarelto 20 mg po daily.\par \par 2021: Trav is feeling well, reports no weight loss, no new pulm or neurological symptoms, no new pain, no new GI/ symptoms. \par She is tolerating Xarelto and reports no bleeding. She has followed up with Dr. Singletary, no intervention for hydronephrosis. \par Her older brother passed away, he will be buried in [facility] in  she wishes to go. I recommended she takes COVID vaccine before she goes and she will need to continue with Xarelto. She  prefers to get restaging scans after she comes back. \par \par 2201: Trav is  here for a follow up for Uterine cancer, she reports feeling well, reports no weight loss, no new pulm or neurological symptoms, no new pain, no new GI/ symptoms. \par She is tolerating Xarelto and reports no bleeding. She has followed up with Dr. Singletary, no intervention for hydronephrosis. \par She will travel to [facility] on 21 for 3 weeks for her older brother . She will need to continue with Xarelto. \par She will follow up in 4 weeks, repeat follow up CT C/A/P when she is back from [facility] per her request. \par

## 2021-06-15 NOTE — ED PROVIDER NOTE - MEDICAL DECISION MAKING DETAILS
66.2 Pain improved.  two neg trop.  Pt well appearing at time of d/c.  Continue ibuprofen, tylenol as needed for pain control at home.  F/U with PCP. Pain improved.  two neg trop.  No ischemic EKG changes. Pt well appearing at time of d/c.  Continue ibuprofen, tylenol as needed for pain control at home.  F/U with PCP.

## 2021-08-03 NOTE — REVIEW OF SYSTEMS
[SOB on Exertion] : shortness of breath during exertion [Negative] : Allergic/Immunologic [Fever] : no fever [Chills] : no chills [Night Sweats] : no night sweats [Fatigue] : no fatigue [Recent Change In Weight] : ~T no recent weight change [Shortness Of Breath] : no shortness of breath [Wheezing] : no wheezing [Cough] : no cough [Abdominal Pain] : no abdominal pain [Vomiting] : no vomiting [Constipation] : no constipation [Diarrhea] : no diarrhea [Confused] : no confusion [Dizziness] : no dizziness [Fainting] : no fainting [Difficulty Walking] : no difficulty walking [FreeTextEntry7] : early satiety [de-identified] : minor preexisting neuropathy

## 2021-08-03 NOTE — HISTORY OF PRESENT ILLNESS
[de-identified] : Trav is a shekhar 81 year old lady I initially met in the hospital on 8/11/2020. Her past medical history is significant for uterine cancer s/p SHAAN BSO as per patient s/p adjuvant chemotherapy in 2016, CAD s/p CABG, HTN and depression, she presented to the ED for abdominal pain and chest discomfort. 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 \par \par PAST MEDICAL & SURGICAL HISTORY:\par HLD (hyperlipidemia)\par HTN (hypertension)\par CAD (coronary artery disease)\par History of coronary artery bypass surgery\par \par FAMILY HISTORY:\par No pertinent family history in first degree relatives\par \par Allergies\par chloroquine (Hives)\par \par CTA C/A/P on 8/6/2020 revealed \par 1. Intraluminal filling defect is seen within the segmental branches of the right lower lobe pulmonary artery, indicating acute pulmonary embolism. (6/132-138; 10/51-55). Filling defect in segmental branch of the right upper lobe pulmonary artery. (6/77) There is a small filling defect within a left lower lobe pulmonary artery segmental branch (6/147; 10/59). No evidence of right heart strain. The right ventricle measures 3.5 cm; the left ventricle measures 3.7 cm. (RV:LV<1) \par 2. Compared with the previous CT scan of 5/27/2020, there is now a large amount of ascites throughout the abdomen and pelvis. \par \par LE Doppler on 8/8/2020 revealed \par No evidence of deep venous thrombosis or superficial thrombophlebitis in the bilateral lower extremities. \par Left popliteal fossa fluid collection as measured above likely Baker's cyst\par \par During her admission she has required 3 episodes of paracentesis on 8/7 3L, 8/12 4L, 8/13 2.2L, once pathology became available patient was started on weekly Carbo/Taxol she has tolerated chemotherapy well.  [de-identified] : 2020: She returns today for follow up, she reports improved SOB, improved abdominal swelling, still come positional chest discomfort, she is compliant with BID Lovenox, she remains on the ASA. She reports improved appetite, she reports that appetite is decreased with a large amount of fluid on the abdomen. She is due for C1D8 of Carbo/Taxol on 2020. CBC was reviewed. \par Side effects of chemotherapy including, but not limited to, cytopenias, that may require blood product transfusions and lead to increased risk of infection, requiring hospitalization, allergic reactions, that can rarely be life-threatening, skin reactions, nausea/vomiting, mucositis, diarrhea/constipation were discussed at length, patient and family voice undestaging, all questions were answered to patient's satisfaction.\par Images were personally reviewed by MD Matt and discussed with patient.\par \par 9/10/2020: Patient is 80 years old female came  today for follow up for  Recurrent adenocarcinoma Mullerian origin, malignant ascites diagnosed on 2020, she S/P one cycle of weekly Carbo/taxol she tolerated well. She  reports improved SOB, improved abdominal swelling, she is ran out of Lovenox, which she is supposed to be taking BID, she didn't call for the renewal, she remains on the ASA. She  has been without Lovenox for like 7 days now, we will restart her on AC immediately. Xarelto was prescribed. She was educated again that she will need to remain on long-term anticoagulation. She reports good  appetite  She is due to start the new cycle of chemo on 20.\par PET Scan reviewed from 20 which revealed\par Mild diffuse FDG uptake throughout the mesentery along with peritoneal stranding and nodularity compatible with peritoneal carcinomatosis (SUV up to 3.9). \par No additional sites of abnormal FDG uptake to suggest biologic tumor activity.\par Images were personally reviewed by MD Matt and discussed with patient. \par \par 10/15/2020: Trav present for follow up, her next chemotherapy is for tomorrow, we will need to place PICC line, this will be arrange as outpatient. \par CT chest on 10/11/2020 revealed When compared to prior study dated 2020: \par Although not a dedicated PE study, redemonstration of a right lower lobe segmental/subsegmental pulmonary embolus which appears overall decreased in size from one month prior. \par No evidence for new consolidation, pleural effusion or pneumothorax. \par Trav reports mild neuropathy, not worsening with chemotherapy, CBC was reviewed. Overall clinically she demonstrates great response to therapy. \par \par 2020: Trav is 80 years old female  present for follow up, her ncycle 4 of carbo/taxol is for tomorrow. She repots feeling better regard urination and hematuria. She stated no more blood with her urine but sometime feels pain while she is urinating.\par We communicated CBC result with Patient with HGB/ HCT is 9.6/29.5. Also patient currently on the therapeutic lovenox once every 12 hrs.\par Trav reports neuropathy is getting  worse with chemotherapy, CBC was reviewed. Overall clinically she demonstrates great response to therapy.\par \par 12/10/2020: Trav is a 80 years old female  present for follow up for Recurrent adenocarcinoma Mullerian origin, malignant ascites diagnosed on 2020, S/P 4 cycles of weekly Carboplatin and Taxol.  She reports feeling well, tolerating treatment. She denies hematuria, abdominal pain. \par We communicated CBC result with Patient with HGB/ HCT is 9.1/29.5. Also patient continues on the therapeutic Lovenox once every 12 hrs.\par Patient will start Cycle # 5 tomorrow.\par \par 21: Trav is a 80 years old female  present for follow up for Recurrent adenocarcinoma Mullerian origin, malignant ascites diagnosed on 2020, S/P 6 cycles of weekly Carboplatin and Taxol.  She reports feeling well , Mildly tired , tolerating her diet better. She denies hematuria, abdominal pain. \par We communicated CBC result with Patient with HGB/ HCT is 9.7 /30.6.  Also patient continues on the therapeutic Lovenox once every 12 hrs.\par Patient still complaining  of mild dysuria no bleeding. we will send for Urine analysis. \par Patient was educated to switch from Lovenox to Xarelto 20 mg po daily.\par Patient is due for PET Scan . Prescription provided. \par \par 21: Trav is a 80 years old female  who presents for follow up for Recurrent adenocarcinoma Mullerian origin, malignant ascites diagnosed on 2020, S/P 6 cycles of weekly Carboplatin and Taxol.  She reports feeling OK, reports fatigue, tolerating her diet better. She denies hematuria, abdominal pain. She reports she does not wish at this point to receive any additional cancer-directed therapy, ad wishes to stop Lovenox. \par PET CT on 2/15/21 revealed \par Compared to PET/CT of 2020, no definite sites of abnormal FDG uptake to suggest biologic tumor activity.\par Interval retraction of a right PICC with tip noted within the right axilla (when correlated to chest radiograph dated 10/22/2020).\par Increased left hydroureteronephrosis to the level of the midureter without definite obstructing radiopaque calculus.\par Images were personally reviewed by MD Matt and discussed with patient. \par We recommended for the patient to follow up with urology, we will assist with appointment.\par We communicated CBC result with Patient with HGB/ HCT is 10.0  /32.6.  Also patient will switch from Lovenox to Xarelto, we have already prescribed that during her prior visit, she did not however switch over.\par Patient was educated to switch from Lovenox to Xarelto 20 mg po daily.\par \par 2021: Trav is feeling well, reports no weight loss, no new pulm or neurological symptoms, no new pain, no new GI/ symptoms. \par She is tolerating Xarelto and reports no bleeding. She has followed up with Dr. Singletary, no intervention for hydronephrosis. \par Her older brother passed away, he will be buried in [facility] in  she wishes to go. I recommended she takes COVID vaccine before she goes and she will need to continue with Xarelto. She  prefers to get restaging scans after she comes back. \par \par 2201: Trav is  here for a follow up for Uterine cancer, she reports feeling well, reports no weight loss, no new pulm or neurological symptoms, no new pain, no new GI/ symptoms. \par She is tolerating Xarelto and reports no bleeding. She has followed up with Dr. Singletary, no intervention for hydronephrosis. \par She will travel to [facility] on 21 for 3 weeks for her older brother . She will need to continue with Xarelto. \par She will follow up in 4 weeks, repeat follow up CT C/A/P when she is back from [facility] per her request. \par

## 2021-08-03 NOTE — ASSESSMENT
[FreeTextEntry1] : Recurrent adenocarcinoma Mullerian origin, malignant ascites diagnosed on 2020\par --Prior SHAAN BSO in 2016, adjuvant chemotherapy, obtain records from Dr. Abdullahi \par --Started on weekly carbo/taxol weekly 3 on 1 off on 2020.\par --PET CT reviewed from 20. \par --Lab work today with Stable blood counts\par --For chemo 10/16/2020\par --PICC line care ( Patient insisted to flush PICC line once / week.)\par --On 20 patient will proceed with Cycle #5 of weekly carboplatin and Taxol weekly 3 weeks on and one week off. .\par --On 21 patient completed 6 cycles of Carbo/Taxol (weekly), declines any additional cancer directed therapy on 2021\par --On 21 we reviewed PET Scan from 21 with good response to chemo.\par --Referred Patient to Urology for  left hydroureteronephrosis, no intervention for now, sono in 3 months per Dr. Singletary \par --She went to [facility] in 2021 for a  for 3 weeks, \par \par Malignant ascites required high volume paracentesis \par --Improved/resolved with carbo/taxol \par \par PE in the setting of malignancy diagnosed on 2020\par --Doppler was negative for DVT on 2020\par --On BID Lovenox, but ran out on 2020\par --Also on ASA, h/o CABG . \par --Was stared on Xarelto, but after 1 dose was admitted with recurrent PE, diagnosed on 2020\par --Patient will switch from Lovenox to Xarelto 20 mg po daily on 2021.\par --On 2021 tolerating Xarelto with no complications \par \par PLAN:\par PET/CT for restaging \par labs today\par cont xarelto\par

## 2021-08-03 NOTE — CONSULT LETTER
[Dear  ___] : Dear  [unfilled], [Consult Letter:] : I had the pleasure of evaluating your patient, [unfilled]. [( Thank you for referring [unfilled] for consultation for _____ )] : Thank you for referring [unfilled] for consultation for [unfilled] [Please see my note below.] : Please see my note below. [Consult Closing:] : Thank you very much for allowing me to participate in the care of this patient.  If you have any questions, please do not hesitate to contact me. [Sincerely,] : Sincerely, [FreeTextEntry3] : Paul Greco DO\par Attending Physician,\par Hematology/ Medical Oncology\par [phone] office\par \par

## 2021-08-06 NOTE — ED PROVIDER NOTE - CLINICAL SUMMARY MEDICAL DECISION MAKING FREE TEXT BOX
Pt with 1 wk of breast pain. Required ekg, labs, imaging. No acute findings. Will d.c with outpt f/up.

## 2021-08-06 NOTE — ED PROVIDER NOTE - PATIENT PORTAL LINK FT
You can access the FollowMyHealth Patient Portal offered by [facility] by registering at the following website: [email or web address]. By joining [employer]’s FollowMyHealth portal, you will also be able to view your health information using other applications (apps) compatible with our system.

## 2021-08-06 NOTE — ED PROVIDER NOTE - IV ALTEPLASE DOOR HIDDEN
Notes: I reviewed the notes dated:  10/26/2018 Office Visit with Jennifer Moya MD for conductive hearing loss bilateral  10/31/2018 CT Temporal bones results    History loss of hearing in right ear, though recently went to the ER with left ear pain and was irrigated out by ER MD. Since then the patient has had bleeding in the ear with no resolve. Has been given doxycycline and ultram. Being seen today for consult and treatment of left ear.     Prior Laboratory Testing:  Lab Results   Component Value Date    HGBA1C 7.4 (H) 08/13/2018       BUN (mg/dL)   Date Value   02/14/2018 18     Lab Results   Component Value Date    WBC 4.3 07/05/2013    HCT 35.0 (L) 07/05/2013    HGB 11.4 (L) 07/05/2013     07/05/2013     Lab Results   Component Value Date    FSTS1 7 02/14/2018    SODIUM 142 02/14/2018    POTASSIUM 4.3 02/14/2018    CHLORIDE 105 02/14/2018    CO2 29 02/14/2018    ANIONGAP 12 02/14/2018    GLUCOSE 144 (H) 02/14/2018    BUN 18 02/14/2018    CREATININE 0.96 02/14/2018    GFRA 86 02/14/2018    GFRNA 74 02/14/2018    BCRAT 19 02/14/2018    CALCIUM 9.5 02/14/2018    BILIRUBIN 0.9 02/14/2018    AST 17 02/14/2018    GPT 25 02/14/2018    ALKPT 80 02/14/2018    TOTPROTEIN 6.6 02/14/2018    ALBUMIN 4.0 02/14/2018    GLOB 2.6 02/14/2018    AGR 1.5 02/14/2018     Creatinine (mg/dL)   Date Value   02/14/2018 0.96       Potassium (mmol/L)   Date Value   02/14/2018 4.3       
show

## 2021-08-06 NOTE — ED ADULT NURSE NOTE - NSIMPLEMENTINTERV_GEN_ALL_ED
Implemented All Fall Risk Interventions:  Albers to call system. Call bell, personal items and telephone within reach. Instruct patient to call for assistance. Room bathroom lighting operational. Non-slip footwear when patient is off stretcher. Physically safe environment: no spills, clutter or unnecessary equipment. Stretcher in lowest position, wheels locked, appropriate side rails in place. Provide visual cue, wrist band, yellow gown, etc. Monitor gait and stability. Monitor for mental status changes and reorient to person, place, and time. Review medications for side effects contributing to fall risk. Reinforce activity limits and safety measures with patient and family.

## 2021-08-06 NOTE — ED PROVIDER NOTE - CARE PROVIDER_API CALL
Leslie Johnston)  Obstetrics and Gynecology  [address], [address]  [place], [place] [id number]  Phone: [phone]  Fax: [phone]  Follow Up Time: 7-10 Days    Manuel Moura  Cardiology  [address] [place] [address]  Phone: [phone]  Fax: [phone]  Follow Up Time: 4-6 Days

## 2021-08-06 NOTE — ED PROVIDER NOTE - PROVIDER TOKENS
PROVIDER:[TOKEN:[[id number]],FOLLOWUP:[7-10 Days]],PROVIDER:[TOKEN:[[id number]],FOLLOWUP:[4-6 Days]]

## 2021-08-06 NOTE — ED PROVIDER NOTE - PHYSICAL EXAMINATION
CONSTITUTIONAL: Well-developed; well-nourished; in no acute distress, nontoxic appearing  SKIN: skin exam is warm and dry,  HEAD: Normocephalic; atraumatic.  NECK: Supple; non tender.+ full passive ROM in all directions. No JVD  CARD: S1, S2 normal, no murmur  RESP: No wheezes, rales or rhonchi. Good air movement bilaterally  CHESTWALL: + ttp of the breast tissue medially b/l at the 3 oclock position. No palpable lumps, no skin erythema or edema  ABD: soft; non-distended; non-tender. No Rebound, No guarding  EXT: Normal ROM. No cyanosis or edema. Dp Pulses intact.   NEURO: awake, alert, following commands, oriented, grossly unremarkable. No Focal deficits. GCS 15.   PSYCH: Cooperative, appropriate.

## 2021-08-06 NOTE — ED PROVIDER NOTE - OBJECTIVE STATEMENT
81 F with hx of CAD s.p CABG, HLD, HTN, uterine ca s/p SHAAN, bladder ca s/p chemo 6 months ago, PE on Xarelto with complaints about 1 week of breast pain. Was seen by GYN Dr. Agudelo and was referred for outpt mammogram. Pt presents stating that she got nervous because she was unsure of her mammogram results. Denies CP, SOB. fever, coughing, n/v.

## 2021-08-06 NOTE — ED PROVIDER NOTE - NSFOLLOWUPINSTRUCTIONS_ED_ALL_ED_FT
Follow up with your GYN and your cardiologist.  Breast Tenderness      Breast tenderness is a common problem for women of all ages, but may also occur in men. Breast tenderness may range from mild discomfort to severe pain. In women, the pain usually comes and goes with the menstrual cycle, but it can also be constant.    Breast tenderness has many possible causes, including hormone changes, infections, and taking certain medicines. You may have tests, such as a mammogram or an ultrasound, to check for any unusual findings. Having breast tenderness usually does not mean that you have breast cancer.      Follow these instructions at home:      Managing pain and discomfort    •If directed, put ice to the painful area. To do this:  •Put ice in a plastic bag.      •Place a towel between your skin and the bag.      •Leave the ice on for 20 minutes, 2–3 times a day.        •Wear a supportive bra, especially during exercise. You may also want to wear a supportive bra while sleeping if your breasts are very tender.      Medicines     •Take over-the-counter and prescription medicines only as told by your health care provider. If the cause of your pain is infection, you may be prescribed an antibiotic medicine.      •If you were prescribed an antibiotic, take it as told by your health care provider. Do not stop taking the antibiotic even if you start to feel better.      Eating and drinking   •Your health care provider may recommend that you lessen the amount of fat in your diet. You can do this by:  •Limiting fried foods.      •Cooking foods using methods such as baking, boiling, grilling, and broiling.        •Decrease the amount of caffeine in your diet. Instead, drink more water and choose caffeine-free drinks.        General instructions      •Keep a log of the days and times when your breasts are most tender.      •Ask your health care provider how to do breast exams at home. This will help you notice if you have an unusual growth or lump.      •Keep all follow-up visits as told by your health care provider. This is important.        Contact a health care provider if:    •Any part of your breast is hard, red, and hot to the touch. This may be a sign of infection.    •You are a woman and:  •Not breastfeeding and you have fluid, especially blood or pus, coming out of your nipples.      •Have a new or painful lump in your breast that remains after your menstrual period ends.        •You have a fever.      •Your pain does not improve or it gets worse.      •Your pain is interfering with your daily activities.        Summary    •Breast tenderness may range from mild discomfort to severe pain.      •Breast tenderness has many possible causes, including hormone changes, infections, and taking certain medicines.      •It can be treated with ice, wearing a supportive bra, and medicines.      •Make changes to your diet if told to by your health care provider.      This information is not intended to replace advice given to you by your health care provider. Make sure you discuss any questions you have with your health care provider.

## 2021-08-17 NOTE — HISTORY OF PRESENT ILLNESS
[de-identified] : Trav is a shekhar 81 year old lady I initially met in the hospital on 8/11/2020. Her past medical history is significant for uterine cancer s/p SHAAN BSO as per patient s/p adjuvant chemotherapy in 2016, CAD s/p CABG, HTN and depression, she presented to the ED for abdominal pain and chest discomfort. 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 \par \par PAST MEDICAL & SURGICAL HISTORY:\par HLD (hyperlipidemia)\par HTN (hypertension)\par CAD (coronary artery disease)\par History of coronary artery bypass surgery\par \par FAMILY HISTORY:\par No pertinent family history in first degree relatives\par \par Allergies\par chloroquine (Hives)\par \par CTA C/A/P on 8/6/2020 revealed \par 1. Intraluminal filling defect is seen within the segmental branches of the right lower lobe pulmonary artery, indicating acute pulmonary embolism. (6/132-138; 10/51-55). Filling defect in segmental branch of the right upper lobe pulmonary artery. (6/77) There is a small filling defect within a left lower lobe pulmonary artery segmental branch (6/147; 10/59). No evidence of right heart strain. The right ventricle measures 3.5 cm; the left ventricle measures 3.7 cm. (RV:LV<1) \par 2. Compared with the previous CT scan of 5/27/2020, there is now a large amount of ascites throughout the abdomen and pelvis. \par \par LE Doppler on 8/8/2020 revealed \par No evidence of deep venous thrombosis or superficial thrombophlebitis in the bilateral lower extremities. \par Left popliteal fossa fluid collection as measured above likely Baker's cyst\par \par During her admission she has required 3 episodes of paracentesis on 8/7 3L, 8/12 4L, 8/13 2.2L, once pathology became available patient was started on weekly Carbo/Taxol she has tolerated chemotherapy well.  [de-identified] : 2020: She returns today for follow up, she reports improved SOB, improved abdominal swelling, still come positional chest discomfort, she is compliant with BID Lovenox, she remains on the ASA. She reports improved appetite, she reports that appetite is decreased with a large amount of fluid on the abdomen. She is due for C1D8 of Carbo/Taxol on 2020. CBC was reviewed. \par Side effects of chemotherapy including, but not limited to, cytopenias, that may require blood product transfusions and lead to increased risk of infection, requiring hospitalization, allergic reactions, that can rarely be life-threatening, skin reactions, nausea/vomiting, mucositis, diarrhea/constipation were discussed at length, patient and family voice undestaging, all questions were answered to patient's satisfaction.\par Images were personally reviewed by MD Matt and discussed with patient.\par \par 9/10/2020: Patient is 80 years old female came  today for follow up for  Recurrent adenocarcinoma Mullerian origin, malignant ascites diagnosed on 2020, she S/P one cycle of weekly Carbo/taxol she tolerated well. She  reports improved SOB, improved abdominal swelling, she is ran out of Lovenox, which she is supposed to be taking BID, she didn't call for the renewal, she remains on the ASA. She  has been without Lovenox for like 7 days now, we will restart her on AC immediately. Xarelto was prescribed. She was educated again that she will need to remain on long-term anticoagulation. She reports good  appetite  She is due to start the new cycle of chemo on 20.\par PET Scan reviewed from 20 which revealed\par Mild diffuse FDG uptake throughout the mesentery along with peritoneal stranding and nodularity compatible with peritoneal carcinomatosis (SUV up to 3.9). \par No additional sites of abnormal FDG uptake to suggest biologic tumor activity.\par Images were personally reviewed by MD Matt and discussed with patient. \par \par 10/15/2020: Trav present for follow up, her next chemotherapy is for tomorrow, we will need to place PICC line, this will be arrange as outpatient. \par CT chest on 10/11/2020 revealed When compared to prior study dated 2020: \par Although not a dedicated PE study, redemonstration of a right lower lobe segmental/subsegmental pulmonary embolus which appears overall decreased in size from one month prior. \par No evidence for new consolidation, pleural effusion or pneumothorax. \par Trav reports mild neuropathy, not worsening with chemotherapy, CBC was reviewed. Overall clinically she demonstrates great response to therapy. \par \par 2020: Trav is 80 years old female  present for follow up, her ncycle 4 of carbo/taxol is for tomorrow. She repots feeling better regard urination and hematuria. She stated no more blood with her urine but sometime feels pain while she is urinating.\par We communicated CBC result with Patient with HGB/ HCT is 9.6/29.5. Also patient currently on the therapeutic lovenox once every 12 hrs.\par Trav reports neuropathy is getting  worse with chemotherapy, CBC was reviewed. Overall clinically she demonstrates great response to therapy.\par \par 12/10/2020: Trav is a 80 years old female  present for follow up for Recurrent adenocarcinoma Mullerian origin, malignant ascites diagnosed on 2020, S/P 4 cycles of weekly Carboplatin and Taxol.  She reports feeling well, tolerating treatment. She denies hematuria, abdominal pain. \par We communicated CBC result with Patient with HGB/ HCT is 9.1/29.5. Also patient continues on the therapeutic Lovenox once every 12 hrs.\par Patient will start Cycle # 5 tomorrow.\par \par 21: Trav is a 80 years old female  present for follow up for Recurrent adenocarcinoma Mullerian origin, malignant ascites diagnosed on 2020, S/P 6 cycles of weekly Carboplatin and Taxol.  She reports feeling well , Mildly tired , tolerating her diet better. She denies hematuria, abdominal pain. \par We communicated CBC result with Patient with HGB/ HCT is 9.7 /30.6.  Also patient continues on the therapeutic Lovenox once every 12 hrs.\par Patient still complaining  of mild dysuria no bleeding. we will send for Urine analysis. \par Patient was educated to switch from Lovenox to Xarelto 20 mg po daily.\par Patient is due for PET Scan . Prescription provided. \par \par 21: Trav is a 80 years old female  who presents for follow up for Recurrent adenocarcinoma Mullerian origin, malignant ascites diagnosed on 2020, S/P 6 cycles of weekly Carboplatin and Taxol.  She reports feeling OK, reports fatigue, tolerating her diet better. She denies hematuria, abdominal pain. She reports she does not wish at this point to receive any additional cancer-directed therapy, ad wishes to stop Lovenox. \par PET CT on 2/15/21 revealed \par Compared to PET/CT of 2020, no definite sites of abnormal FDG uptake to suggest biologic tumor activity.\par Interval retraction of a right PICC with tip noted within the right axilla (when correlated to chest radiograph dated 10/22/2020).\par Increased left hydroureteronephrosis to the level of the midureter without definite obstructing radiopaque calculus.\par Images were personally reviewed by MD Matt and discussed with patient. \par We recommended for the patient to follow up with urology, we will assist with appointment.\par We communicated CBC result with Patient with HGB/ HCT is 10.0  /32.6.  Also patient will switch from Lovenox to Xarelto, we have already prescribed that during her prior visit, she did not however switch over.\par Patient was educated to switch from Lovenox to Xarelto 20 mg po daily.\par \par 2021: Trav is feeling well, reports no weight loss, no new pulm or neurological symptoms, no new pain, no new GI/ symptoms. \par She is tolerating Xarelto and reports no bleeding. She has followed up with Dr. Singletary, no intervention for hydronephrosis. \par Her older brother passed away, he will be buried in [facility] in  she wishes to go. I recommended she takes COVID vaccine before she goes and she will need to continue with Xarelto. She  prefers to get restaging scans after she comes back. \par \par 2201: Trav is  here for a follow up for Uterine cancer, she reports feeling well, reports no weight loss, no new pulm or neurological symptoms, no new pain, no new GI/ symptoms. \par She is tolerating Xarelto and reports no bleeding. She has followed up with Dr. Singletary, no intervention for hydronephrosis. \par She will travel to [facility] on 21 for 3 weeks for her older brother . She will need to continue with Xarelto. \par She will follow up in 4 weeks, repeat follow up CT C/A/P when she is back from [facility] per her request. \par

## 2021-08-17 NOTE — REVIEW OF SYSTEMS
[Fever] : no fever [Chills] : no chills [Night Sweats] : no night sweats [Fatigue] : no fatigue [Recent Change In Weight] : ~T no recent weight change [Shortness Of Breath] : no shortness of breath [Wheezing] : no wheezing [Cough] : no cough [SOB on Exertion] : shortness of breath during exertion [Abdominal Pain] : no abdominal pain [Vomiting] : no vomiting [Constipation] : no constipation [Diarrhea] : no diarrhea [Confused] : no confusion [Dizziness] : no dizziness [Fainting] : no fainting [Difficulty Walking] : no difficulty walking [Negative] : Allergic/Immunologic [FreeTextEntry7] : early satiety [de-identified] : minor preexisting neuropathy

## 2021-08-17 NOTE — REVIEW OF SYSTEMS
[Fever] : no fever [Chills] : no chills [Night Sweats] : no night sweats [Fatigue] : no fatigue [Recent Change In Weight] : ~T no recent weight change [Shortness Of Breath] : no shortness of breath [Wheezing] : no wheezing [Cough] : no cough [SOB on Exertion] : shortness of breath during exertion [Abdominal Pain] : no abdominal pain [Vomiting] : no vomiting [Constipation] : no constipation [Diarrhea] : no diarrhea [Confused] : no confusion [Dizziness] : no dizziness [Fainting] : no fainting [Difficulty Walking] : no difficulty walking [Negative] : Allergic/Immunologic [FreeTextEntry7] : early satiety [de-identified] : minor preexisting neuropathy

## 2021-08-17 NOTE — REVIEW OF SYSTEMS
[Fever] : no fever [Chills] : no chills [Night Sweats] : no night sweats [Fatigue] : no fatigue [Recent Change In Weight] : ~T no recent weight change [Shortness Of Breath] : no shortness of breath [Wheezing] : no wheezing [Cough] : no cough [SOB on Exertion] : shortness of breath during exertion [Abdominal Pain] : no abdominal pain [Vomiting] : no vomiting [Constipation] : no constipation [Diarrhea] : no diarrhea [Confused] : no confusion [Dizziness] : no dizziness [Fainting] : no fainting [Difficulty Walking] : no difficulty walking [Negative] : Allergic/Immunologic [FreeTextEntry7] : early satiety [de-identified] : minor preexisting neuropathy

## 2021-08-17 NOTE — CONSULT LETTER
[Dear  ___] : Dear  [unfilled], [Consult Letter:] : I had the pleasure of evaluating your patient, [unfilled]. [( Thank you for referring [unfilled] for consultation for _____ )] : Thank you for referring [unfilled] for consultation for [unfilled] [Please see my note below.] : Please see my note below. [Consult Closing:] : Thank you very much for allowing me to participate in the care of this patient.  If you have any questions, please do not hesitate to contact me. [Sincerely,] : Sincerely, [FreeTextEntry3] : Paul Greco DO\par Attending Physician,\par Hematology/ Medical Oncology\par [phone] office\par \par

## 2021-08-17 NOTE — ASSESSMENT
[FreeTextEntry1] : Recurrent adenocarcinoma Mullerian origin, malignant ascites diagnosed on 2020\par --Prior SHAAN BSO in 2016, adjuvant chemotherapy, obtain records from Dr. Abdullahi \par --Started on weekly carbo/taxol weekly 3 on 1 off on 2020.\par --PET CT reviewed from 20. \par --Lab work today with Stable blood counts\par --For chemo 10/16/2020\par --PICC line care ( Patient insisted to flush PICC line once / week.)\par --On 20 patient will proceed with Cycle #5 of weekly carboplatin and Taxol weekly 3 weeks on and one week off. .\par --On 21 patient completed 6 cycles of Carbo/Taxol (weekly), declines any additional cancer directed therapy on 2021\par --On 21 we reviewed PET Scan from 21 with good response to chemo.\par --Referred Patient to Urology for  left hydroureteronephrosis, no intervention for now, sono in 3 months per Dr. Singletary \par --She went to [facility] in 2021 for a  for 3 weeks, \par \par Malignant ascites required high volume paracentesis \par --Improved/resolved with carbo/taxol \par \par PE in the setting of malignancy diagnosed on 2020\par --Doppler was negative for DVT on 2020\par --On BID Lovenox, but ran out on 2020\par --Also on ASA, h/o CABG . \par --Was stared on Xarelto, but after 1 dose was admitted with recurrent PE, diagnosed on 2020\par --Patient will switch from Lovenox to Xarelto 20 mg po daily on 2021.\par --On 2021 tolerating Xarelto with no complications \par \par PLAN:\par PET/CT 21" stable iodseae\par labs WNL\par cont xarelto\par RTC IN 3 MOS\par

## 2021-08-17 NOTE — HISTORY OF PRESENT ILLNESS
[de-identified] : Trav is a shekhar 81 year old lady I initially met in the hospital on 8/11/2020. Her past medical history is significant for uterine cancer s/p SHAAN BSO as per patient s/p adjuvant chemotherapy in 2016, CAD s/p CABG, HTN and depression, she presented to the ED for abdominal pain and chest discomfort. 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 \par \par PAST MEDICAL & SURGICAL HISTORY:\par HLD (hyperlipidemia)\par HTN (hypertension)\par CAD (coronary artery disease)\par History of coronary artery bypass surgery\par \par FAMILY HISTORY:\par No pertinent family history in first degree relatives\par \par Allergies\par chloroquine (Hives)\par \par CTA C/A/P on 8/6/2020 revealed \par 1. Intraluminal filling defect is seen within the segmental branches of the right lower lobe pulmonary artery, indicating acute pulmonary embolism. (6/132-138; 10/51-55). Filling defect in segmental branch of the right upper lobe pulmonary artery. (6/77) There is a small filling defect within a left lower lobe pulmonary artery segmental branch (6/147; 10/59). No evidence of right heart strain. The right ventricle measures 3.5 cm; the left ventricle measures 3.7 cm. (RV:LV<1) \par 2. Compared with the previous CT scan of 5/27/2020, there is now a large amount of ascites throughout the abdomen and pelvis. \par \par LE Doppler on 8/8/2020 revealed \par No evidence of deep venous thrombosis or superficial thrombophlebitis in the bilateral lower extremities. \par Left popliteal fossa fluid collection as measured above likely Baker's cyst\par \par During her admission she has required 3 episodes of paracentesis on 8/7 3L, 8/12 4L, 8/13 2.2L, once pathology became available patient was started on weekly Carbo/Taxol she has tolerated chemotherapy well.  [de-identified] : 2020: She returns today for follow up, she reports improved SOB, improved abdominal swelling, still come positional chest discomfort, she is compliant with BID Lovenox, she remains on the ASA. She reports improved appetite, she reports that appetite is decreased with a large amount of fluid on the abdomen. She is due for C1D8 of Carbo/Taxol on 2020. CBC was reviewed. \par Side effects of chemotherapy including, but not limited to, cytopenias, that may require blood product transfusions and lead to increased risk of infection, requiring hospitalization, allergic reactions, that can rarely be life-threatening, skin reactions, nausea/vomiting, mucositis, diarrhea/constipation were discussed at length, patient and family voice undestaging, all questions were answered to patient's satisfaction.\par Images were personally reviewed by MD Matt and discussed with patient.\par \par 9/10/2020: Patient is 80 years old female came  today for follow up for  Recurrent adenocarcinoma Mullerian origin, malignant ascites diagnosed on 2020, she S/P one cycle of weekly Carbo/taxol she tolerated well. She  reports improved SOB, improved abdominal swelling, she is ran out of Lovenox, which she is supposed to be taking BID, she didn't call for the renewal, she remains on the ASA. She  has been without Lovenox for like 7 days now, we will restart her on AC immediately. Xarelto was prescribed. She was educated again that she will need to remain on long-term anticoagulation. She reports good  appetite  She is due to start the new cycle of chemo on 20.\par PET Scan reviewed from 20 which revealed\par Mild diffuse FDG uptake throughout the mesentery along with peritoneal stranding and nodularity compatible with peritoneal carcinomatosis (SUV up to 3.9). \par No additional sites of abnormal FDG uptake to suggest biologic tumor activity.\par Images were personally reviewed by MD Matt and discussed with patient. \par \par 10/15/2020: rTav present for follow up, her next chemotherapy is for tomorrow, we will need to place PICC line, this will be arrange as outpatient. \par CT chest on 10/11/2020 revealed When compared to prior study dated 2020: \par Although not a dedicated PE study, redemonstration of a right lower lobe segmental/subsegmental pulmonary embolus which appears overall decreased in size from one month prior. \par No evidence for new consolidation, pleural effusion or pneumothorax. \par Trav reports mild neuropathy, not worsening with chemotherapy, CBC was reviewed. Overall clinically she demonstrates great response to therapy. \par \par 2020: Trav is 80 years old female  present for follow up, her ncycle 4 of carbo/taxol is for tomorrow. She repots feeling better regard urination and hematuria. She stated no more blood with her urine but sometime feels pain while she is urinating.\par We communicated CBC result with Patient with HGB/ HCT is 9.6/29.5. Also patient currently on the therapeutic lovenox once every 12 hrs.\par Trav reports neuropathy is getting  worse with chemotherapy, CBC was reviewed. Overall clinically she demonstrates great response to therapy.\par \par 12/10/2020: Trav is a 80 years old female  present for follow up for Recurrent adenocarcinoma Mullerian origin, malignant ascites diagnosed on 2020, S/P 4 cycles of weekly Carboplatin and Taxol.  She reports feeling well, tolerating treatment. She denies hematuria, abdominal pain. \par We communicated CBC result with Patient with HGB/ HCT is 9.1/29.5. Also patient continues on the therapeutic Lovenox once every 12 hrs.\par Patient will start Cycle # 5 tomorrow.\par \par 21: Trav is a 80 years old female  present for follow up for Recurrent adenocarcinoma Mullerian origin, malignant ascites diagnosed on 2020, S/P 6 cycles of weekly Carboplatin and Taxol.  She reports feeling well , Mildly tired , tolerating her diet better. She denies hematuria, abdominal pain. \par We communicated CBC result with Patient with HGB/ HCT is 9.7 /30.6.  Also patient continues on the therapeutic Lovenox once every 12 hrs.\par Patient still complaining  of mild dysuria no bleeding. we will send for Urine analysis. \par Patient was educated to switch from Lovenox to Xarelto 20 mg po daily.\par Patient is due for PET Scan . Prescription provided. \par \par 21: Trav is a 80 years old female  who presents for follow up for Recurrent adenocarcinoma Mullerian origin, malignant ascites diagnosed on 2020, S/P 6 cycles of weekly Carboplatin and Taxol.  She reports feeling OK, reports fatigue, tolerating her diet better. She denies hematuria, abdominal pain. She reports she does not wish at this point to receive any additional cancer-directed therapy, ad wishes to stop Lovenox. \par PET CT on 2/15/21 revealed \par Compared to PET/CT of 2020, no definite sites of abnormal FDG uptake to suggest biologic tumor activity.\par Interval retraction of a right PICC with tip noted within the right axilla (when correlated to chest radiograph dated 10/22/2020).\par Increased left hydroureteronephrosis to the level of the midureter without definite obstructing radiopaque calculus.\par Images were personally reviewed by MD Matt and discussed with patient. \par We recommended for the patient to follow up with urology, we will assist with appointment.\par We communicated CBC result with Patient with HGB/ HCT is 10.0  /32.6.  Also patient will switch from Lovenox to Xarelto, we have already prescribed that during her prior visit, she did not however switch over.\par Patient was educated to switch from Lovenox to Xarelto 20 mg po daily.\par \par 2021: Trav is feeling well, reports no weight loss, no new pulm or neurological symptoms, no new pain, no new GI/ symptoms. \par She is tolerating Xarelto and reports no bleeding. She has followed up with Dr. Singletary, no intervention for hydronephrosis. \par Her older brother passed away, he will be buried in [facility] in  she wishes to go. I recommended she takes COVID vaccine before she goes and she will need to continue with Xarelto. She  prefers to get restaging scans after she comes back. \par \par 2201: Trav is  here for a follow up for Uterine cancer, she reports feeling well, reports no weight loss, no new pulm or neurological symptoms, no new pain, no new GI/ symptoms. \par She is tolerating Xarelto and reports no bleeding. She has followed up with Dr. Singletary, no intervention for hydronephrosis. \par She will travel to [facility] on 21 for 3 weeks for her older brother . She will need to continue with Xarelto. \par She will follow up in 4 weeks, repeat follow up CT C/A/P when she is back from [facility] per her request. \par

## 2021-08-17 NOTE — ASSESSMENT
[FreeTextEntry1] : Recurrent adenocarcinoma Mullerian origin, malignant ascites diagnosed on 2020\par --Prior SHAAN BSO in 2016, adjuvant chemotherapy, obtain records from Dr. Abdullahi \par --Started on weekly carbo/taxol weekly 3 on 1 off on 2020.\par --PET CT reviewed from 20. \par --Lab work today with Stable blood counts\par --For chemo 10/16/2020\par --PICC line care ( Patient insisted to flush PICC line once / week.)\par --On 20 patient will proceed with Cycle #5 of weekly carboplatin and Taxol weekly 3 weeks on and one week off. .\par --On 21 patient completed 6 cycles of Carbo/Taxol (weekly), declines any additional cancer directed therapy on 2021\par --On 21 we reviewed PET Scan from 21 with good response to chemo.\par --Referred Patient to Urology for  left hydroureteronephrosis, no intervention for now, sono in 3 months per Dr. Singletary \par --She went to [facility] in 2021 for a  for 3 weeks, \par \par Malignant ascites required high volume paracentesis \par --Improved/resolved with carbo/taxol \par \par PE in the setting of malignancy diagnosed on 2020\par --Doppler was negative for DVT on 2020\par --On BID Lovenox, but ran out on 2020\par --Also on ASA, h/o CABG . \par --Was stared on Xarelto, but after 1 dose was admitted with recurrent PE, diagnosed on 2020\par --Patient will switch from Lovenox to Xarelto 20 mg po daily on 2021.\par --On 2021 tolerating Xarelto with no complications \par \par PLAN:\par PET/CT 21" stable iodseae\par labs WNL\par cont xarelto\par RTC IN 3 MOS\par

## 2021-08-17 NOTE — REVIEW OF SYSTEMS
[Fever] : no fever [Chills] : no chills [Night Sweats] : no night sweats [Fatigue] : no fatigue [Recent Change In Weight] : ~T no recent weight change [Shortness Of Breath] : no shortness of breath [Wheezing] : no wheezing [Cough] : no cough [SOB on Exertion] : shortness of breath during exertion [Abdominal Pain] : no abdominal pain [Vomiting] : no vomiting [Constipation] : no constipation [Diarrhea] : no diarrhea [Confused] : no confusion [Dizziness] : no dizziness [Fainting] : no fainting [Difficulty Walking] : no difficulty walking [Negative] : Allergic/Immunologic [FreeTextEntry7] : early satiety [de-identified] : minor preexisting neuropathy

## 2021-08-17 NOTE — ASSESSMENT
[FreeTextEntry1] : Recurrent adenocarcinoma Mullerian origin, malignant ascites diagnosed on 2020\par --Prior SHAAN BSO in 2016, adjuvant chemotherapy, obtain records from Dr. Abdullahi \par --Started on weekly carbo/taxol weekly 3 on 1 off on 2020.\par --PET CT reviewed from 20. \par --Lab work today with Stable blood counts\par --For chemo 10/16/2020\par --PICC line care ( Patient insisted to flush PICC line once / week.)\par --On 20 patient will proceed with Cycle #5 of weekly carboplatin and Taxol weekly 3 weeks on and one week off. .\par --On 21 patient completed 6 cycles of Carbo/Taxol (weekly), declines any additional cancer directed therapy on 2021\par --On 21 we reviewed PET Scan from 21 with good response to chemo.\par --Referred Patient to Urology for  left hydroureteronephrosis, no intervention for now, sono in 3 months per Dr. Singletary \par --She went to [facility] in 2021 for a  for 3 weeks, \par \par Malignant ascites required high volume paracentesis \par --Improved/resolved with carbo/taxol \par \par PE in the setting of malignancy diagnosed on 2020\par --Doppler was negative for DVT on 2020\par --On BID Lovenox, but ran out on 2020\par --Also on ASA, h/o CABG . \par --Was stared on Xarelto, but after 1 dose was admitted with recurrent PE, diagnosed on 2020\par --Patient will switch from Lovenox to Xarelto 20 mg po daily on 2021.\par --On 2021 tolerating Xarelto with no complications \par \par PLAN:\par PET/CT 21" stable iodseae\par labs WNL\par cont xarelto\par RTC IN 3 MOS\par

## 2021-08-17 NOTE — HISTORY OF PRESENT ILLNESS
[de-identified] : Trav is a shekhar 81 year old lady I initially met in the hospital on 8/11/2020. Her past medical history is significant for uterine cancer s/p SHAAN BSO as per patient s/p adjuvant chemotherapy in 2016, CAD s/p CABG, HTN and depression, she presented to the ED for abdominal pain and chest discomfort. 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 \par \par PAST MEDICAL & SURGICAL HISTORY:\par HLD (hyperlipidemia)\par HTN (hypertension)\par CAD (coronary artery disease)\par History of coronary artery bypass surgery\par \par FAMILY HISTORY:\par No pertinent family history in first degree relatives\par \par Allergies\par chloroquine (Hives)\par \par CTA C/A/P on 8/6/2020 revealed \par 1. Intraluminal filling defect is seen within the segmental branches of the right lower lobe pulmonary artery, indicating acute pulmonary embolism. (6/132-138; 10/51-55). Filling defect in segmental branch of the right upper lobe pulmonary artery. (6/77) There is a small filling defect within a left lower lobe pulmonary artery segmental branch (6/147; 10/59). No evidence of right heart strain. The right ventricle measures 3.5 cm; the left ventricle measures 3.7 cm. (RV:LV<1) \par 2. Compared with the previous CT scan of 5/27/2020, there is now a large amount of ascites throughout the abdomen and pelvis. \par \par LE Doppler on 8/8/2020 revealed \par No evidence of deep venous thrombosis or superficial thrombophlebitis in the bilateral lower extremities. \par Left popliteal fossa fluid collection as measured above likely Baker's cyst\par \par During her admission she has required 3 episodes of paracentesis on 8/7 3L, 8/12 4L, 8/13 2.2L, once pathology became available patient was started on weekly Carbo/Taxol she has tolerated chemotherapy well.  [de-identified] : 2020: She returns today for follow up, she reports improved SOB, improved abdominal swelling, still come positional chest discomfort, she is compliant with BID Lovenox, she remains on the ASA. She reports improved appetite, she reports that appetite is decreased with a large amount of fluid on the abdomen. She is due for C1D8 of Carbo/Taxol on 2020. CBC was reviewed. \par Side effects of chemotherapy including, but not limited to, cytopenias, that may require blood product transfusions and lead to increased risk of infection, requiring hospitalization, allergic reactions, that can rarely be life-threatening, skin reactions, nausea/vomiting, mucositis, diarrhea/constipation were discussed at length, patient and family voice undestaging, all questions were answered to patient's satisfaction.\par Images were personally reviewed by MD Matt and discussed with patient.\par \par 9/10/2020: Patient is 80 years old female came  today for follow up for  Recurrent adenocarcinoma Mullerian origin, malignant ascites diagnosed on 2020, she S/P one cycle of weekly Carbo/taxol she tolerated well. She  reports improved SOB, improved abdominal swelling, she is ran out of Lovenox, which she is supposed to be taking BID, she didn't call for the renewal, she remains on the ASA. She  has been without Lovenox for like 7 days now, we will restart her on AC immediately. Xarelto was prescribed. She was educated again that she will need to remain on long-term anticoagulation. She reports good  appetite  She is due to start the new cycle of chemo on 20.\par PET Scan reviewed from 20 which revealed\par Mild diffuse FDG uptake throughout the mesentery along with peritoneal stranding and nodularity compatible with peritoneal carcinomatosis (SUV up to 3.9). \par No additional sites of abnormal FDG uptake to suggest biologic tumor activity.\par Images were personally reviewed by MD Matt and discussed with patient. \par \par 10/15/2020: Trav present for follow up, her next chemotherapy is for tomorrow, we will need to place PICC line, this will be arrange as outpatient. \par CT chest on 10/11/2020 revealed When compared to prior study dated 2020: \par Although not a dedicated PE study, redemonstration of a right lower lobe segmental/subsegmental pulmonary embolus which appears overall decreased in size from one month prior. \par No evidence for new consolidation, pleural effusion or pneumothorax. \par Trav reports mild neuropathy, not worsening with chemotherapy, CBC was reviewed. Overall clinically she demonstrates great response to therapy. \par \par 2020: Trav is 80 years old female  present for follow up, her ncycle 4 of carbo/taxol is for tomorrow. She repots feeling better regard urination and hematuria. She stated no more blood with her urine but sometime feels pain while she is urinating.\par We communicated CBC result with Patient with HGB/ HCT is 9.6/29.5. Also patient currently on the therapeutic lovenox once every 12 hrs.\par Trav reports neuropathy is getting  worse with chemotherapy, CBC was reviewed. Overall clinically she demonstrates great response to therapy.\par \par 12/10/2020: Trav is a 80 years old female  present for follow up for Recurrent adenocarcinoma Mullerian origin, malignant ascites diagnosed on 2020, S/P 4 cycles of weekly Carboplatin and Taxol.  She reports feeling well, tolerating treatment. She denies hematuria, abdominal pain. \par We communicated CBC result with Patient with HGB/ HCT is 9.1/29.5. Also patient continues on the therapeutic Lovenox once every 12 hrs.\par Patient will start Cycle # 5 tomorrow.\par \par 21: Trav is a 80 years old female  present for follow up for Recurrent adenocarcinoma Mullerian origin, malignant ascites diagnosed on 2020, S/P 6 cycles of weekly Carboplatin and Taxol.  She reports feeling well , Mildly tired , tolerating her diet better. She denies hematuria, abdominal pain. \par We communicated CBC result with Patient with HGB/ HCT is 9.7 /30.6.  Also patient continues on the therapeutic Lovenox once every 12 hrs.\par Patient still complaining  of mild dysuria no bleeding. we will send for Urine analysis. \par Patient was educated to switch from Lovenox to Xarelto 20 mg po daily.\par Patient is due for PET Scan . Prescription provided. \par \par 21: Trav is a 80 years old female  who presents for follow up for Recurrent adenocarcinoma Mullerian origin, malignant ascites diagnosed on 2020, S/P 6 cycles of weekly Carboplatin and Taxol.  She reports feeling OK, reports fatigue, tolerating her diet better. She denies hematuria, abdominal pain. She reports she does not wish at this point to receive any additional cancer-directed therapy, ad wishes to stop Lovenox. \par PET CT on 2/15/21 revealed \par Compared to PET/CT of 2020, no definite sites of abnormal FDG uptake to suggest biologic tumor activity.\par Interval retraction of a right PICC with tip noted within the right axilla (when correlated to chest radiograph dated 10/22/2020).\par Increased left hydroureteronephrosis to the level of the midureter without definite obstructing radiopaque calculus.\par Images were personally reviewed by MD Matt and discussed with patient. \par We recommended for the patient to follow up with urology, we will assist with appointment.\par We communicated CBC result with Patient with HGB/ HCT is 10.0  /32.6.  Also patient will switch from Lovenox to Xarelto, we have already prescribed that during her prior visit, she did not however switch over.\par Patient was educated to switch from Lovenox to Xarelto 20 mg po daily.\par \par 2021: Trav is feeling well, reports no weight loss, no new pulm or neurological symptoms, no new pain, no new GI/ symptoms. \par She is tolerating Xarelto and reports no bleeding. She has followed up with Dr. Singletary, no intervention for hydronephrosis. \par Her older brother passed away, he will be buried in [facility] in  she wishes to go. I recommended she takes COVID vaccine before she goes and she will need to continue with Xarelto. She  prefers to get restaging scans after she comes back. \par \par 2201: Trav is  here for a follow up for Uterine cancer, she reports feeling well, reports no weight loss, no new pulm or neurological symptoms, no new pain, no new GI/ symptoms. \par She is tolerating Xarelto and reports no bleeding. She has followed up with Dr. Singletary, no intervention for hydronephrosis. \par She will travel to [facility] on 21 for 3 weeks for her older brother . She will need to continue with Xarelto. \par She will follow up in 4 weeks, repeat follow up CT C/A/P when she is back from [facility] per her request. \par

## 2021-08-17 NOTE — CONSULT LETTER
[Dear  ___] : Dear  [unfilled], [Consult Letter:] : I had the pleasure of evaluating your patient, [unfilled]. [( Thank you for referring [unfilled] for consultation for _____ )] : Thank you for referring [unfilled] for consultation for [unfilled] [Please see my note below.] : Please see my note below. [Consult Closing:] : Thank you very much for allowing me to participate in the care of this patient.  If you have any questions, please do not hesitate to contact me. [Sincerely,] : Sincerely, [FreeTextEntry3] : Paul Greco DO\par Attending Physician,\par Hematology/ Medical Oncology\par [phone] office\par \par

## 2021-08-17 NOTE — REVIEW OF SYSTEMS
[Fever] : no fever [Chills] : no chills [Night Sweats] : no night sweats [Fatigue] : no fatigue [Recent Change In Weight] : ~T no recent weight change [Shortness Of Breath] : no shortness of breath [Wheezing] : no wheezing [Cough] : no cough [SOB on Exertion] : shortness of breath during exertion [Abdominal Pain] : no abdominal pain [Vomiting] : no vomiting [Constipation] : no constipation [Diarrhea] : no diarrhea [Confused] : no confusion [Dizziness] : no dizziness [Fainting] : no fainting [Difficulty Walking] : no difficulty walking [Negative] : Allergic/Immunologic [FreeTextEntry7] : early satiety [de-identified] : minor preexisting neuropathy

## 2021-08-17 NOTE — ASSESSMENT
[FreeTextEntry1] : Recurrent adenocarcinoma Mullerian origin, malignant ascites diagnosed on 2020\par --Prior SHAAN BSO in 2016, adjuvant chemotherapy, obtain records from Dr. Abdullahi \par --Started on weekly carbo/taxol weekly 3 on 1 off on 2020.\par --PET CT reviewed from 20. \par --Lab work today with Stable blood counts\par --For chemo 10/16/2020\par --PICC line care ( Patient insisted to flush PICC line once / week.)\par --On 20 patient will proceed with Cycle #5 of weekly carboplatin and Taxol weekly 3 weeks on and one week off. .\par --On 21 patient completed 6 cycles of Carbo/Taxol (weekly), declines any additional cancer directed therapy on 2021\par --On 21 we reviewed PET Scan from 21 with good response to chemo.\par --Referred Patient to Urology for  left hydroureteronephrosis, no intervention for now, sono in 3 months per Dr. Singletary \par --She went to [facility] in 2021 for a  for 3 weeks, \par \par Malignant ascites required high volume paracentesis \par --Improved/resolved with carbo/taxol \par \par PE in the setting of malignancy diagnosed on 2020\par --Doppler was negative for DVT on 2020\par --On BID Lovenox, but ran out on 2020\par --Also on ASA, h/o CABG . \par --Was stared on Xarelto, but after 1 dose was admitted with recurrent PE, diagnosed on 2020\par --Patient will switch from Lovenox to Xarelto 20 mg po daily on 2021.\par --On 2021 tolerating Xarelto with no complications \par \par PLAN:\par PET/CT 21" stable iodseae\par labs WNL\par cont xarelto\par RTC IN 3 MOS\par

## 2021-08-17 NOTE — REVIEW OF SYSTEMS
[Fever] : no fever [Chills] : no chills [Night Sweats] : no night sweats [Fatigue] : no fatigue [Recent Change In Weight] : ~T no recent weight change [Shortness Of Breath] : no shortness of breath [Wheezing] : no wheezing [Cough] : no cough [SOB on Exertion] : shortness of breath during exertion [Abdominal Pain] : no abdominal pain [Vomiting] : no vomiting [Constipation] : no constipation [Diarrhea] : no diarrhea [Confused] : no confusion [Dizziness] : no dizziness [Fainting] : no fainting [Difficulty Walking] : no difficulty walking [Negative] : Allergic/Immunologic [FreeTextEntry7] : early satiety [de-identified] : minor preexisting neuropathy

## 2021-08-17 NOTE — CONSULT LETTER
[Dear  ___] : Dear  [unfilled], [Consult Letter:] : I had the pleasure of evaluating your patient, [unfilled]. [( Thank you for referring [unfilled] for consultation for _____ )] : Thank you for referring [unfilled] for consultation for [unfilled] [Please see my note below.] : Please see my note below. [Consult Closing:] : Thank you very much for allowing me to participate in the care of this patient.  If you have any questions, please do not hesitate to contact me. [Sincerely,] : Sincerely, [FreeTextEntry3] : Paul Greco DO\par Attending Physician,\par Hematology/ Medical Oncology\par [phone] office\par \par

## 2021-08-17 NOTE — CONSULT LETTER
[Dear  ___] : Dear  [unfilled], [Consult Letter:] : I had the pleasure of evaluating your patient, [unfilled]. [( Thank you for referring [unfilled] for consultation for _____ )] : Thank you for referring [unfilled] for consultation for [unfilled] [Please see my note below.] : Please see my note below. [Consult Closing:] : Thank you very much for allowing me to participate in the care of this patient.  If you have any questions, please do not hesitate to contact me. [Sincerely,] : Sincerely, [FreeTextEntry3] : Paul Greco DO\par Attending Physician,\par Hematology/ Medical Oncology\par [phone] office\par \par

## 2021-08-17 NOTE — ASSESSMENT
[FreeTextEntry1] : Recurrent adenocarcinoma Mullerian origin, malignant ascites diagnosed on 2020\par --Prior SHAAN BSO in 2016, adjuvant chemotherapy, obtain records from Dr. Abdullahi \par --Started on weekly carbo/taxol weekly 3 on 1 off on 2020.\par --PET CT reviewed from 20. \par --Lab work today with Stable blood counts\par --For chemo 10/16/2020\par --PICC line care ( Patient insisted to flush PICC line once / week.)\par --On 20 patient will proceed with Cycle #5 of weekly carboplatin and Taxol weekly 3 weeks on and one week off. .\par --On 21 patient completed 6 cycles of Carbo/Taxol (weekly), declines any additional cancer directed therapy on 2021\par --On 21 we reviewed PET Scan from 21 with good response to chemo.\par --Referred Patient to Urology for  left hydroureteronephrosis, no intervention for now, sono in 3 months per Dr. Singletary \par --She went to [facility] in 2021 for a  for 3 weeks, \par \par Malignant ascites required high volume paracentesis \par --Improved/resolved with carbo/taxol \par \par PE in the setting of malignancy diagnosed on 2020\par --Doppler was negative for DVT on 2020\par --On BID Lovenox, but ran out on 2020\par --Also on ASA, h/o CABG . \par --Was stared on Xarelto, but after 1 dose was admitted with recurrent PE, diagnosed on 2020\par --Patient will switch from Lovenox to Xarelto 20 mg po daily on 2021.\par --On 2021 tolerating Xarelto with no complications \par \par PLAN:\par PET/CT 21" stable iodseae\par labs WNL\par cont xarelto\par RTC IN 3 MOS\par

## 2021-08-17 NOTE — HISTORY OF PRESENT ILLNESS
[de-identified] : Trav is a shekhar 81 year old lady I initially met in the hospital on 8/11/2020. Her past medical history is significant for uterine cancer s/p SHAAN BSO as per patient s/p adjuvant chemotherapy in 2016, CAD s/p CABG, HTN and depression, she presented to the ED for abdominal pain and chest discomfort. 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 \par \par PAST MEDICAL & SURGICAL HISTORY:\par HLD (hyperlipidemia)\par HTN (hypertension)\par CAD (coronary artery disease)\par History of coronary artery bypass surgery\par \par FAMILY HISTORY:\par No pertinent family history in first degree relatives\par \par Allergies\par chloroquine (Hives)\par \par CTA C/A/P on 8/6/2020 revealed \par 1. Intraluminal filling defect is seen within the segmental branches of the right lower lobe pulmonary artery, indicating acute pulmonary embolism. (6/132-138; 10/51-55). Filling defect in segmental branch of the right upper lobe pulmonary artery. (6/77) There is a small filling defect within a left lower lobe pulmonary artery segmental branch (6/147; 10/59). No evidence of right heart strain. The right ventricle measures 3.5 cm; the left ventricle measures 3.7 cm. (RV:LV<1) \par 2. Compared with the previous CT scan of 5/27/2020, there is now a large amount of ascites throughout the abdomen and pelvis. \par \par LE Doppler on 8/8/2020 revealed \par No evidence of deep venous thrombosis or superficial thrombophlebitis in the bilateral lower extremities. \par Left popliteal fossa fluid collection as measured above likely Baker's cyst\par \par During her admission she has required 3 episodes of paracentesis on 8/7 3L, 8/12 4L, 8/13 2.2L, once pathology became available patient was started on weekly Carbo/Taxol she has tolerated chemotherapy well.  [de-identified] : 2020: She returns today for follow up, she reports improved SOB, improved abdominal swelling, still come positional chest discomfort, she is compliant with BID Lovenox, she remains on the ASA. She reports improved appetite, she reports that appetite is decreased with a large amount of fluid on the abdomen. She is due for C1D8 of Carbo/Taxol on 2020. CBC was reviewed. \par Side effects of chemotherapy including, but not limited to, cytopenias, that may require blood product transfusions and lead to increased risk of infection, requiring hospitalization, allergic reactions, that can rarely be life-threatening, skin reactions, nausea/vomiting, mucositis, diarrhea/constipation were discussed at length, patient and family voice undestaging, all questions were answered to patient's satisfaction.\par Images were personally reviewed by MD Matt and discussed with patient.\par \par 9/10/2020: Patient is 80 years old female came  today for follow up for  Recurrent adenocarcinoma Mullerian origin, malignant ascites diagnosed on 2020, she S/P one cycle of weekly Carbo/taxol she tolerated well. She  reports improved SOB, improved abdominal swelling, she is ran out of Lovenox, which she is supposed to be taking BID, she didn't call for the renewal, she remains on the ASA. She  has been without Lovenox for like 7 days now, we will restart her on AC immediately. Xarelto was prescribed. She was educated again that she will need to remain on long-term anticoagulation. She reports good  appetite  She is due to start the new cycle of chemo on 20.\par PET Scan reviewed from 20 which revealed\par Mild diffuse FDG uptake throughout the mesentery along with peritoneal stranding and nodularity compatible with peritoneal carcinomatosis (SUV up to 3.9). \par No additional sites of abnormal FDG uptake to suggest biologic tumor activity.\par Images were personally reviewed by MD Matt and discussed with patient. \par \par 10/15/2020: Trav present for follow up, her next chemotherapy is for tomorrow, we will need to place PICC line, this will be arrange as outpatient. \par CT chest on 10/11/2020 revealed When compared to prior study dated 2020: \par Although not a dedicated PE study, redemonstration of a right lower lobe segmental/subsegmental pulmonary embolus which appears overall decreased in size from one month prior. \par No evidence for new consolidation, pleural effusion or pneumothorax. \par Trav reports mild neuropathy, not worsening with chemotherapy, CBC was reviewed. Overall clinically she demonstrates great response to therapy. \par \par 2020: Trav is 80 years old female  present for follow up, her ncycle 4 of carbo/taxol is for tomorrow. She repots feeling better regard urination and hematuria. She stated no more blood with her urine but sometime feels pain while she is urinating.\par We communicated CBC result with Patient with HGB/ HCT is 9.6/29.5. Also patient currently on the therapeutic lovenox once every 12 hrs.\par Trav reports neuropathy is getting  worse with chemotherapy, CBC was reviewed. Overall clinically she demonstrates great response to therapy.\par \par 12/10/2020: Trav is a 80 years old female  present for follow up for Recurrent adenocarcinoma Mullerian origin, malignant ascites diagnosed on 2020, S/P 4 cycles of weekly Carboplatin and Taxol.  She reports feeling well, tolerating treatment. She denies hematuria, abdominal pain. \par We communicated CBC result with Patient with HGB/ HCT is 9.1/29.5. Also patient continues on the therapeutic Lovenox once every 12 hrs.\par Patient will start Cycle # 5 tomorrow.\par \par 21: Trav is a 80 years old female  present for follow up for Recurrent adenocarcinoma Mullerian origin, malignant ascites diagnosed on 2020, S/P 6 cycles of weekly Carboplatin and Taxol.  She reports feeling well , Mildly tired , tolerating her diet better. She denies hematuria, abdominal pain. \par We communicated CBC result with Patient with HGB/ HCT is 9.7 /30.6.  Also patient continues on the therapeutic Lovenox once every 12 hrs.\par Patient still complaining  of mild dysuria no bleeding. we will send for Urine analysis. \par Patient was educated to switch from Lovenox to Xarelto 20 mg po daily.\par Patient is due for PET Scan . Prescription provided. \par \par 21: Trav is a 80 years old female  who presents for follow up for Recurrent adenocarcinoma Mullerian origin, malignant ascites diagnosed on 2020, S/P 6 cycles of weekly Carboplatin and Taxol.  She reports feeling OK, reports fatigue, tolerating her diet better. She denies hematuria, abdominal pain. She reports she does not wish at this point to receive any additional cancer-directed therapy, ad wishes to stop Lovenox. \par PET CT on 2/15/21 revealed \par Compared to PET/CT of 2020, no definite sites of abnormal FDG uptake to suggest biologic tumor activity.\par Interval retraction of a right PICC with tip noted within the right axilla (when correlated to chest radiograph dated 10/22/2020).\par Increased left hydroureteronephrosis to the level of the midureter without definite obstructing radiopaque calculus.\par Images were personally reviewed by MD Matt and discussed with patient. \par We recommended for the patient to follow up with urology, we will assist with appointment.\par We communicated CBC result with Patient with HGB/ HCT is 10.0  /32.6.  Also patient will switch from Lovenox to Xarelto, we have already prescribed that during her prior visit, she did not however switch over.\par Patient was educated to switch from Lovenox to Xarelto 20 mg po daily.\par \par 2021: Trav is feeling well, reports no weight loss, no new pulm or neurological symptoms, no new pain, no new GI/ symptoms. \par She is tolerating Xarelto and reports no bleeding. She has followed up with Dr. Singletary, no intervention for hydronephrosis. \par Her older brother passed away, he will be buried in [facility] in  she wishes to go. I recommended she takes COVID vaccine before she goes and she will need to continue with Xarelto. She  prefers to get restaging scans after she comes back. \par \par 2201: Trav is  here for a follow up for Uterine cancer, she reports feeling well, reports no weight loss, no new pulm or neurological symptoms, no new pain, no new GI/ symptoms. \par She is tolerating Xarelto and reports no bleeding. She has followed up with Dr. Singletary, no intervention for hydronephrosis. \par She will travel to [facility] on 21 for 3 weeks for her older brother . She will need to continue with Xarelto. \par She will follow up in 4 weeks, repeat follow up CT C/A/P when she is back from [facility] per her request. \par

## 2021-08-17 NOTE — ASSESSMENT
[FreeTextEntry1] : Recurrent adenocarcinoma Mullerian origin, malignant ascites diagnosed on 2020\par --Prior SHAAN BSO in 2016, adjuvant chemotherapy, obtain records from Dr. Abdullahi \par --Started on weekly carbo/taxol weekly 3 on 1 off on 2020.\par --PET CT reviewed from 20. \par --Lab work today with Stable blood counts\par --For chemo 10/16/2020\par --PICC line care ( Patient insisted to flush PICC line once / week.)\par --On 20 patient will proceed with Cycle #5 of weekly carboplatin and Taxol weekly 3 weeks on and one week off. .\par --On 21 patient completed 6 cycles of Carbo/Taxol (weekly), declines any additional cancer directed therapy on 2021\par --On 21 we reviewed PET Scan from 21 with good response to chemo.\par --Referred Patient to Urology for  left hydroureteronephrosis, no intervention for now, sono in 3 months per Dr. Singletary \par --She went to [facility] in 2021 for a  for 3 weeks, \par \par Malignant ascites required high volume paracentesis \par --Improved/resolved with carbo/taxol \par \par PE in the setting of malignancy diagnosed on 2020\par --Doppler was negative for DVT on 2020\par --On BID Lovenox, but ran out on 2020\par --Also on ASA, h/o CABG . \par --Was stared on Xarelto, but after 1 dose was admitted with recurrent PE, diagnosed on 2020\par --Patient will switch from Lovenox to Xarelto 20 mg po daily on 2021.\par --On 2021 tolerating Xarelto with no complications \par \par PLAN:\par PET/CT 21" stable iodseae\par labs WNL\par cont xarelto\par RTC IN 3 MOS\par

## 2021-08-17 NOTE — CONSULT LETTER
[Dear  ___] : Dear  [unfilled], [Consult Letter:] : I had the pleasure of evaluating your patient, [unfilled]. [( Thank you for referring [unfilled] for consultation for _____ )] : Thank you for referring [unfilled] for consultation for [unfilled] [Please see my note below.] : Please see my note below. [Consult Closing:] : Thank you very much for allowing me to participate in the care of this patient.  If you have any questions, please do not hesitate to contact me. [Sincerely,] : Sincerely, [FreeTextEntry3] : Paul Greco DO\par Attending Physician,\par Hematology/ Medical Oncology\par [phone] office\par \par

## 2021-08-17 NOTE — ASSESSMENT
[FreeTextEntry1] : Recurrent adenocarcinoma Mullerian origin, malignant ascites diagnosed on 2020\par --Prior SHAAN BSO in 2016, adjuvant chemotherapy, obtain records from Dr. Abdullahi \par --Started on weekly carbo/taxol weekly 3 on 1 off on 2020.\par --PET CT reviewed from 20. \par --Lab work today with Stable blood counts\par --For chemo 10/16/2020\par --PICC line care ( Patient insisted to flush PICC line once / week.)\par --On 20 patient will proceed with Cycle #5 of weekly carboplatin and Taxol weekly 3 weeks on and one week off. .\par --On 21 patient completed 6 cycles of Carbo/Taxol (weekly), declines any additional cancer directed therapy on 2021\par --On 21 we reviewed PET Scan from 21 with good response to chemo.\par --Referred Patient to Urology for  left hydroureteronephrosis, no intervention for now, sono in 3 months per Dr. Singletary \par --She went to [facility] in 2021 for a  for 3 weeks, \par \par Malignant ascites required high volume paracentesis \par --Improved/resolved with carbo/taxol \par \par PE in the setting of malignancy diagnosed on 2020\par --Doppler was negative for DVT on 2020\par --On BID Lovenox, but ran out on 2020\par --Also on ASA, h/o CABG . \par --Was stared on Xarelto, but after 1 dose was admitted with recurrent PE, diagnosed on 2020\par --Patient will switch from Lovenox to Xarelto 20 mg po daily on 2021.\par --On 2021 tolerating Xarelto with no complications \par \par PLAN:\par PET/CT 21" stable iodseae\par labs WNL\par cont xarelto\par RTC IN 3 MOS\par

## 2021-08-17 NOTE — HISTORY OF PRESENT ILLNESS
[de-identified] : Trav is a shekhar 81 year old lady I initially met in the hospital on 8/11/2020. Her past medical history is significant for uterine cancer s/p SHAAN BSO as per patient s/p adjuvant chemotherapy in 2016, CAD s/p CABG, HTN and depression, she presented to the ED for abdominal pain and chest discomfort. 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 \par \par PAST MEDICAL & SURGICAL HISTORY:\par HLD (hyperlipidemia)\par HTN (hypertension)\par CAD (coronary artery disease)\par History of coronary artery bypass surgery\par \par FAMILY HISTORY:\par No pertinent family history in first degree relatives\par \par Allergies\par chloroquine (Hives)\par \par CTA C/A/P on 8/6/2020 revealed \par 1. Intraluminal filling defect is seen within the segmental branches of the right lower lobe pulmonary artery, indicating acute pulmonary embolism. (6/132-138; 10/51-55). Filling defect in segmental branch of the right upper lobe pulmonary artery. (6/77) There is a small filling defect within a left lower lobe pulmonary artery segmental branch (6/147; 10/59). No evidence of right heart strain. The right ventricle measures 3.5 cm; the left ventricle measures 3.7 cm. (RV:LV<1) \par 2. Compared with the previous CT scan of 5/27/2020, there is now a large amount of ascites throughout the abdomen and pelvis. \par \par LE Doppler on 8/8/2020 revealed \par No evidence of deep venous thrombosis or superficial thrombophlebitis in the bilateral lower extremities. \par Left popliteal fossa fluid collection as measured above likely Baker's cyst\par \par During her admission she has required 3 episodes of paracentesis on 8/7 3L, 8/12 4L, 8/13 2.2L, once pathology became available patient was started on weekly Carbo/Taxol she has tolerated chemotherapy well.  [de-identified] : 2020: She returns today for follow up, she reports improved SOB, improved abdominal swelling, still come positional chest discomfort, she is compliant with BID Lovenox, she remains on the ASA. She reports improved appetite, she reports that appetite is decreased with a large amount of fluid on the abdomen. She is due for C1D8 of Carbo/Taxol on 2020. CBC was reviewed. \par Side effects of chemotherapy including, but not limited to, cytopenias, that may require blood product transfusions and lead to increased risk of infection, requiring hospitalization, allergic reactions, that can rarely be life-threatening, skin reactions, nausea/vomiting, mucositis, diarrhea/constipation were discussed at length, patient and family voice undestaging, all questions were answered to patient's satisfaction.\par Images were personally reviewed by MD Matt and discussed with patient.\par \par 9/10/2020: Patient is 80 years old female came  today for follow up for  Recurrent adenocarcinoma Mullerian origin, malignant ascites diagnosed on 2020, she S/P one cycle of weekly Carbo/taxol she tolerated well. She  reports improved SOB, improved abdominal swelling, she is ran out of Lovenox, which she is supposed to be taking BID, she didn't call for the renewal, she remains on the ASA. She  has been without Lovenox for like 7 days now, we will restart her on AC immediately. Xarelto was prescribed. She was educated again that she will need to remain on long-term anticoagulation. She reports good  appetite  She is due to start the new cycle of chemo on 20.\par PET Scan reviewed from 20 which revealed\par Mild diffuse FDG uptake throughout the mesentery along with peritoneal stranding and nodularity compatible with peritoneal carcinomatosis (SUV up to 3.9). \par No additional sites of abnormal FDG uptake to suggest biologic tumor activity.\par Images were personally reviewed by MD Matt and discussed with patient. \par \par 10/15/2020: Trav present for follow up, her next chemotherapy is for tomorrow, we will need to place PICC line, this will be arrange as outpatient. \par CT chest on 10/11/2020 revealed When compared to prior study dated 2020: \par Although not a dedicated PE study, redemonstration of a right lower lobe segmental/subsegmental pulmonary embolus which appears overall decreased in size from one month prior. \par No evidence for new consolidation, pleural effusion or pneumothorax. \par Trav reports mild neuropathy, not worsening with chemotherapy, CBC was reviewed. Overall clinically she demonstrates great response to therapy. \par \par 2020: Trav is 80 years old female  present for follow up, her ncycle 4 of carbo/taxol is for tomorrow. She repots feeling better regard urination and hematuria. She stated no more blood with her urine but sometime feels pain while she is urinating.\par We communicated CBC result with Patient with HGB/ HCT is 9.6/29.5. Also patient currently on the therapeutic lovenox once every 12 hrs.\par Trav reports neuropathy is getting  worse with chemotherapy, CBC was reviewed. Overall clinically she demonstrates great response to therapy.\par \par 12/10/2020: Trav is a 80 years old female  present for follow up for Recurrent adenocarcinoma Mullerian origin, malignant ascites diagnosed on 2020, S/P 4 cycles of weekly Carboplatin and Taxol.  She reports feeling well, tolerating treatment. She denies hematuria, abdominal pain. \par We communicated CBC result with Patient with HGB/ HCT is 9.1/29.5. Also patient continues on the therapeutic Lovenox once every 12 hrs.\par Patient will start Cycle # 5 tomorrow.\par \par 21: Trav is a 80 years old female  present for follow up for Recurrent adenocarcinoma Mullerian origin, malignant ascites diagnosed on 2020, S/P 6 cycles of weekly Carboplatin and Taxol.  She reports feeling well , Mildly tired , tolerating her diet better. She denies hematuria, abdominal pain. \par We communicated CBC result with Patient with HGB/ HCT is 9.7 /30.6.  Also patient continues on the therapeutic Lovenox once every 12 hrs.\par Patient still complaining  of mild dysuria no bleeding. we will send for Urine analysis. \par Patient was educated to switch from Lovenox to Xarelto 20 mg po daily.\par Patient is due for PET Scan . Prescription provided. \par \par 21: Trav is a 80 years old female  who presents for follow up for Recurrent adenocarcinoma Mullerian origin, malignant ascites diagnosed on 2020, S/P 6 cycles of weekly Carboplatin and Taxol.  She reports feeling OK, reports fatigue, tolerating her diet better. She denies hematuria, abdominal pain. She reports she does not wish at this point to receive any additional cancer-directed therapy, ad wishes to stop Lovenox. \par PET CT on 2/15/21 revealed \par Compared to PET/CT of 2020, no definite sites of abnormal FDG uptake to suggest biologic tumor activity.\par Interval retraction of a right PICC with tip noted within the right axilla (when correlated to chest radiograph dated 10/22/2020).\par Increased left hydroureteronephrosis to the level of the midureter without definite obstructing radiopaque calculus.\par Images were personally reviewed by MD Matt and discussed with patient. \par We recommended for the patient to follow up with urology, we will assist with appointment.\par We communicated CBC result with Patient with HGB/ HCT is 10.0  /32.6.  Also patient will switch from Lovenox to Xarelto, we have already prescribed that during her prior visit, she did not however switch over.\par Patient was educated to switch from Lovenox to Xarelto 20 mg po daily.\par \par 2021: Trav is feeling well, reports no weight loss, no new pulm or neurological symptoms, no new pain, no new GI/ symptoms. \par She is tolerating Xarelto and reports no bleeding. She has followed up with Dr. Singletary, no intervention for hydronephrosis. \par Her older brother passed away, he will be buried in [facility] in  she wishes to go. I recommended she takes COVID vaccine before she goes and she will need to continue with Xarelto. She  prefers to get restaging scans after she comes back. \par \par 2201: Trav is  here for a follow up for Uterine cancer, she reports feeling well, reports no weight loss, no new pulm or neurological symptoms, no new pain, no new GI/ symptoms. \par She is tolerating Xarelto and reports no bleeding. She has followed up with Dr. Singletary, no intervention for hydronephrosis. \par She will travel to [facility] on 21 for 3 weeks for her older brother . She will need to continue with Xarelto. \par She will follow up in 4 weeks, repeat follow up CT C/A/P when she is back from [facility] per her request. \par

## 2021-08-17 NOTE — REVIEW OF SYSTEMS
[Fever] : no fever [Chills] : no chills [Night Sweats] : no night sweats [Fatigue] : no fatigue [Recent Change In Weight] : ~T no recent weight change [Shortness Of Breath] : no shortness of breath [Wheezing] : no wheezing [Cough] : no cough [SOB on Exertion] : shortness of breath during exertion [Abdominal Pain] : no abdominal pain [Vomiting] : no vomiting [Constipation] : no constipation [Diarrhea] : no diarrhea [Confused] : no confusion [Dizziness] : no dizziness [Fainting] : no fainting [Difficulty Walking] : no difficulty walking [Negative] : Allergic/Immunologic [FreeTextEntry7] : early satiety [de-identified] : minor preexisting neuropathy

## 2021-08-17 NOTE — HISTORY OF PRESENT ILLNESS
[de-identified] : Trav is a shekhar 81 year old lady I initially met in the hospital on 8/11/2020. Her past medical history is significant for uterine cancer s/p SHAAN BSO as per patient s/p adjuvant chemotherapy in 2016, CAD s/p CABG, HTN and depression, she presented to the ED for abdominal pain and chest discomfort. 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 \par \par PAST MEDICAL & SURGICAL HISTORY:\par HLD (hyperlipidemia)\par HTN (hypertension)\par CAD (coronary artery disease)\par History of coronary artery bypass surgery\par \par FAMILY HISTORY:\par No pertinent family history in first degree relatives\par \par Allergies\par chloroquine (Hives)\par \par CTA C/A/P on 8/6/2020 revealed \par 1. Intraluminal filling defect is seen within the segmental branches of the right lower lobe pulmonary artery, indicating acute pulmonary embolism. (6/132-138; 10/51-55). Filling defect in segmental branch of the right upper lobe pulmonary artery. (6/77) There is a small filling defect within a left lower lobe pulmonary artery segmental branch (6/147; 10/59). No evidence of right heart strain. The right ventricle measures 3.5 cm; the left ventricle measures 3.7 cm. (RV:LV<1) \par 2. Compared with the previous CT scan of 5/27/2020, there is now a large amount of ascites throughout the abdomen and pelvis. \par \par LE Doppler on 8/8/2020 revealed \par No evidence of deep venous thrombosis or superficial thrombophlebitis in the bilateral lower extremities. \par Left popliteal fossa fluid collection as measured above likely Baker's cyst\par \par During her admission she has required 3 episodes of paracentesis on 8/7 3L, 8/12 4L, 8/13 2.2L, once pathology became available patient was started on weekly Carbo/Taxol she has tolerated chemotherapy well.  [de-identified] : 2020: She returns today for follow up, she reports improved SOB, improved abdominal swelling, still come positional chest discomfort, she is compliant with BID Lovenox, she remains on the ASA. She reports improved appetite, she reports that appetite is decreased with a large amount of fluid on the abdomen. She is due for C1D8 of Carbo/Taxol on 2020. CBC was reviewed. \par Side effects of chemotherapy including, but not limited to, cytopenias, that may require blood product transfusions and lead to increased risk of infection, requiring hospitalization, allergic reactions, that can rarely be life-threatening, skin reactions, nausea/vomiting, mucositis, diarrhea/constipation were discussed at length, patient and family voice undestaging, all questions were answered to patient's satisfaction.\par Images were personally reviewed by MD Matt and discussed with patient.\par \par 9/10/2020: Patient is 80 years old female came  today for follow up for  Recurrent adenocarcinoma Mullerian origin, malignant ascites diagnosed on 2020, she S/P one cycle of weekly Carbo/taxol she tolerated well. She  reports improved SOB, improved abdominal swelling, she is ran out of Lovenox, which she is supposed to be taking BID, she didn't call for the renewal, she remains on the ASA. She  has been without Lovenox for like 7 days now, we will restart her on AC immediately. Xarelto was prescribed. She was educated again that she will need to remain on long-term anticoagulation. She reports good  appetite  She is due to start the new cycle of chemo on 20.\par PET Scan reviewed from 20 which revealed\par Mild diffuse FDG uptake throughout the mesentery along with peritoneal stranding and nodularity compatible with peritoneal carcinomatosis (SUV up to 3.9). \par No additional sites of abnormal FDG uptake to suggest biologic tumor activity.\par Images were personally reviewed by MD Matt and discussed with patient. \par \par 10/15/2020: Trav present for follow up, her next chemotherapy is for tomorrow, we will need to place PICC line, this will be arrange as outpatient. \par CT chest on 10/11/2020 revealed When compared to prior study dated 2020: \par Although not a dedicated PE study, redemonstration of a right lower lobe segmental/subsegmental pulmonary embolus which appears overall decreased in size from one month prior. \par No evidence for new consolidation, pleural effusion or pneumothorax. \par Trav reports mild neuropathy, not worsening with chemotherapy, CBC was reviewed. Overall clinically she demonstrates great response to therapy. \par \par 2020: Trav is 80 years old female  present for follow up, her ncycle 4 of carbo/taxol is for tomorrow. She repots feeling better regard urination and hematuria. She stated no more blood with her urine but sometime feels pain while she is urinating.\par We communicated CBC result with Patient with HGB/ HCT is 9.6/29.5. Also patient currently on the therapeutic lovenox once every 12 hrs.\par Trav reports neuropathy is getting  worse with chemotherapy, CBC was reviewed. Overall clinically she demonstrates great response to therapy.\par \par 12/10/2020: Trav is a 80 years old female  present for follow up for Recurrent adenocarcinoma Mullerian origin, malignant ascites diagnosed on 2020, S/P 4 cycles of weekly Carboplatin and Taxol.  She reports feeling well, tolerating treatment. She denies hematuria, abdominal pain. \par We communicated CBC result with Patient with HGB/ HCT is 9.1/29.5. Also patient continues on the therapeutic Lovenox once every 12 hrs.\par Patient will start Cycle # 5 tomorrow.\par \par 21: Trav is a 80 years old female  present for follow up for Recurrent adenocarcinoma Mullerian origin, malignant ascites diagnosed on 2020, S/P 6 cycles of weekly Carboplatin and Taxol.  She reports feeling well , Mildly tired , tolerating her diet better. She denies hematuria, abdominal pain. \par We communicated CBC result with Patient with HGB/ HCT is 9.7 /30.6.  Also patient continues on the therapeutic Lovenox once every 12 hrs.\par Patient still complaining  of mild dysuria no bleeding. we will send for Urine analysis. \par Patient was educated to switch from Lovenox to Xarelto 20 mg po daily.\par Patient is due for PET Scan . Prescription provided. \par \par 21: Trav is a 80 years old female  who presents for follow up for Recurrent adenocarcinoma Mullerian origin, malignant ascites diagnosed on 2020, S/P 6 cycles of weekly Carboplatin and Taxol.  She reports feeling OK, reports fatigue, tolerating her diet better. She denies hematuria, abdominal pain. She reports she does not wish at this point to receive any additional cancer-directed therapy, ad wishes to stop Lovenox. \par PET CT on 2/15/21 revealed \par Compared to PET/CT of 2020, no definite sites of abnormal FDG uptake to suggest biologic tumor activity.\par Interval retraction of a right PICC with tip noted within the right axilla (when correlated to chest radiograph dated 10/22/2020).\par Increased left hydroureteronephrosis to the level of the midureter without definite obstructing radiopaque calculus.\par Images were personally reviewed by MD Matt and discussed with patient. \par We recommended for the patient to follow up with urology, we will assist with appointment.\par We communicated CBC result with Patient with HGB/ HCT is 10.0  /32.6.  Also patient will switch from Lovenox to Xarelto, we have already prescribed that during her prior visit, she did not however switch over.\par Patient was educated to switch from Lovenox to Xarelto 20 mg po daily.\par \par 2021: Trav is feeling well, reports no weight loss, no new pulm or neurological symptoms, no new pain, no new GI/ symptoms. \par She is tolerating Xarelto and reports no bleeding. She has followed up with Dr. Singletary, no intervention for hydronephrosis. \par Her older brother passed away, he will be buried in [facility] in  she wishes to go. I recommended she takes COVID vaccine before she goes and she will need to continue with Xarelto. She  prefers to get restaging scans after she comes back. \par \par 2201: Trav is  here for a follow up for Uterine cancer, she reports feeling well, reports no weight loss, no new pulm or neurological symptoms, no new pain, no new GI/ symptoms. \par She is tolerating Xarelto and reports no bleeding. She has followed up with Dr. Singletary, no intervention for hydronephrosis. \par She will travel to [facility] on 21 for 3 weeks for her older brother . She will need to continue with Xarelto. \par She will follow up in 4 weeks, repeat follow up CT C/A/P when she is back from [facility] per her request. \par

## 2021-08-17 NOTE — HISTORY OF PRESENT ILLNESS
[de-identified] : Trav is a shekhar 81 year old lady I initially met in the hospital on 8/11/2020. Her past medical history is significant for uterine cancer s/p SHAAN BSO as per patient s/p adjuvant chemotherapy in 2016, CAD s/p CABG, HTN and depression, she presented to the ED for abdominal pain and chest discomfort. 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 \par \par PAST MEDICAL & SURGICAL HISTORY:\par HLD (hyperlipidemia)\par HTN (hypertension)\par CAD (coronary artery disease)\par History of coronary artery bypass surgery\par \par FAMILY HISTORY:\par No pertinent family history in first degree relatives\par \par Allergies\par chloroquine (Hives)\par \par CTA C/A/P on 8/6/2020 revealed \par 1. Intraluminal filling defect is seen within the segmental branches of the right lower lobe pulmonary artery, indicating acute pulmonary embolism. (6/132-138; 10/51-55). Filling defect in segmental branch of the right upper lobe pulmonary artery. (6/77) There is a small filling defect within a left lower lobe pulmonary artery segmental branch (6/147; 10/59). No evidence of right heart strain. The right ventricle measures 3.5 cm; the left ventricle measures 3.7 cm. (RV:LV<1) \par 2. Compared with the previous CT scan of 5/27/2020, there is now a large amount of ascites throughout the abdomen and pelvis. \par \par LE Doppler on 8/8/2020 revealed \par No evidence of deep venous thrombosis or superficial thrombophlebitis in the bilateral lower extremities. \par Left popliteal fossa fluid collection as measured above likely Baker's cyst\par \par During her admission she has required 3 episodes of paracentesis on 8/7 3L, 8/12 4L, 8/13 2.2L, once pathology became available patient was started on weekly Carbo/Taxol she has tolerated chemotherapy well.  [de-identified] : 2020: She returns today for follow up, she reports improved SOB, improved abdominal swelling, still come positional chest discomfort, she is compliant with BID Lovenox, she remains on the ASA. She reports improved appetite, she reports that appetite is decreased with a large amount of fluid on the abdomen. She is due for C1D8 of Carbo/Taxol on 2020. CBC was reviewed. \par Side effects of chemotherapy including, but not limited to, cytopenias, that may require blood product transfusions and lead to increased risk of infection, requiring hospitalization, allergic reactions, that can rarely be life-threatening, skin reactions, nausea/vomiting, mucositis, diarrhea/constipation were discussed at length, patient and family voice undestaging, all questions were answered to patient's satisfaction.\par Images were personally reviewed by MD Matt and discussed with patient.\par \par 9/10/2020: Patient is 80 years old female came  today for follow up for  Recurrent adenocarcinoma Mullerian origin, malignant ascites diagnosed on 2020, she S/P one cycle of weekly Carbo/taxol she tolerated well. She  reports improved SOB, improved abdominal swelling, she is ran out of Lovenox, which she is supposed to be taking BID, she didn't call for the renewal, she remains on the ASA. She  has been without Lovenox for like 7 days now, we will restart her on AC immediately. Xarelto was prescribed. She was educated again that she will need to remain on long-term anticoagulation. She reports good  appetite  She is due to start the new cycle of chemo on 20.\par PET Scan reviewed from 20 which revealed\par Mild diffuse FDG uptake throughout the mesentery along with peritoneal stranding and nodularity compatible with peritoneal carcinomatosis (SUV up to 3.9). \par No additional sites of abnormal FDG uptake to suggest biologic tumor activity.\par Images were personally reviewed by MD Matt and discussed with patient. \par \par 10/15/2020: Trav present for follow up, her next chemotherapy is for tomorrow, we will need to place PICC line, this will be arrange as outpatient. \par CT chest on 10/11/2020 revealed When compared to prior study dated 2020: \par Although not a dedicated PE study, redemonstration of a right lower lobe segmental/subsegmental pulmonary embolus which appears overall decreased in size from one month prior. \par No evidence for new consolidation, pleural effusion or pneumothorax. \par Trav reports mild neuropathy, not worsening with chemotherapy, CBC was reviewed. Overall clinically she demonstrates great response to therapy. \par \par 2020: Trav is 80 years old female  present for follow up, her ncycle 4 of carbo/taxol is for tomorrow. She repots feeling better regard urination and hematuria. She stated no more blood with her urine but sometime feels pain while she is urinating.\par We communicated CBC result with Patient with HGB/ HCT is 9.6/29.5. Also patient currently on the therapeutic lovenox once every 12 hrs.\par Trav reports neuropathy is getting  worse with chemotherapy, CBC was reviewed. Overall clinically she demonstrates great response to therapy.\par \par 12/10/2020: Trav is a 80 years old female  present for follow up for Recurrent adenocarcinoma Mullerian origin, malignant ascites diagnosed on 2020, S/P 4 cycles of weekly Carboplatin and Taxol.  She reports feeling well, tolerating treatment. She denies hematuria, abdominal pain. \par We communicated CBC result with Patient with HGB/ HCT is 9.1/29.5. Also patient continues on the therapeutic Lovenox once every 12 hrs.\par Patient will start Cycle # 5 tomorrow.\par \par 21: Trav is a 80 years old female  present for follow up for Recurrent adenocarcinoma Mullerian origin, malignant ascites diagnosed on 2020, S/P 6 cycles of weekly Carboplatin and Taxol.  She reports feeling well , Mildly tired , tolerating her diet better. She denies hematuria, abdominal pain. \par We communicated CBC result with Patient with HGB/ HCT is 9.7 /30.6.  Also patient continues on the therapeutic Lovenox once every 12 hrs.\par Patient still complaining  of mild dysuria no bleeding. we will send for Urine analysis. \par Patient was educated to switch from Lovenox to Xarelto 20 mg po daily.\par Patient is due for PET Scan . Prescription provided. \par \par 21: Trav is a 80 years old female  who presents for follow up for Recurrent adenocarcinoma Mullerian origin, malignant ascites diagnosed on 2020, S/P 6 cycles of weekly Carboplatin and Taxol.  She reports feeling OK, reports fatigue, tolerating her diet better. She denies hematuria, abdominal pain. She reports she does not wish at this point to receive any additional cancer-directed therapy, ad wishes to stop Lovenox. \par PET CT on 2/15/21 revealed \par Compared to PET/CT of 2020, no definite sites of abnormal FDG uptake to suggest biologic tumor activity.\par Interval retraction of a right PICC with tip noted within the right axilla (when correlated to chest radiograph dated 10/22/2020).\par Increased left hydroureteronephrosis to the level of the midureter without definite obstructing radiopaque calculus.\par Images were personally reviewed by MD Matt and discussed with patient. \par We recommended for the patient to follow up with urology, we will assist with appointment.\par We communicated CBC result with Patient with HGB/ HCT is 10.0  /32.6.  Also patient will switch from Lovenox to Xarelto, we have already prescribed that during her prior visit, she did not however switch over.\par Patient was educated to switch from Lovenox to Xarelto 20 mg po daily.\par \par 2021: Trav is feeling well, reports no weight loss, no new pulm or neurological symptoms, no new pain, no new GI/ symptoms. \par She is tolerating Xarelto and reports no bleeding. She has followed up with Dr. Singletary, no intervention for hydronephrosis. \par Her older brother passed away, he will be buried in [facility] in  she wishes to go. I recommended she takes COVID vaccine before she goes and she will need to continue with Xarelto. She  prefers to get restaging scans after she comes back. \par \par 2201: Trav is  here for a follow up for Uterine cancer, she reports feeling well, reports no weight loss, no new pulm or neurological symptoms, no new pain, no new GI/ symptoms. \par She is tolerating Xarelto and reports no bleeding. She has followed up with Dr. Singletary, no intervention for hydronephrosis. \par She will travel to [facility] on 21 for 3 weeks for her older brother . She will need to continue with Xarelto. \par She will follow up in 4 weeks, repeat follow up CT C/A/P when she is back from [facility] per her request. \par

## 2021-08-17 NOTE — CONSULT LETTER
[Dear  ___] : Dear  [unfilled], [Consult Letter:] : I had the pleasure of evaluating your patient, [unfilled]. [( Thank you for referring [unfilled] for consultation for _____ )] : Thank you for referring [unfilled] for consultation for [unfilled] [Please see my note below.] : Please see my note below. [Consult Closing:] : Thank you very much for allowing me to participate in the care of this patient.  If you have any questions, please do not hesitate to contact me. [Sincerely,] : Sincerely, [FreeTextEntry3] : Paul Greco DO\par Attending Physician,\par Hematology/ Medical Oncology\par [phone] office\par \par

## 2021-08-17 NOTE — CONSULT LETTER
[Dear  ___] : Dear  [unfilled], [Consult Letter:] : I had the pleasure of evaluating your patient, [unfilled]. [( Thank you for referring [unfilled] for consultation for _____ )] : Thank you for referring [unfilled] for consultation for [unfilled] [Please see my note below.] : Please see my note below. [Consult Closing:] : Thank you very much for allowing me to participate in the care of this patient.  If you have any questions, please do not hesitate to contact me. [Sincerely,] : Sincerely, [FreeTextEntry3] : Paul Greco DO\par Attending Physician,\par Hematology/ Medical Oncology\par [phone] office\par \par

## 2021-09-01 NOTE — ED ADULT TRIAGE NOTE - CHIEF COMPLAINT QUOTE
Patient has c/o right side back pain. Tylenol taken at home with no relief. Denies any strenuous activity.

## 2021-09-01 NOTE — ED PROVIDER NOTE - OBJECTIVE STATEMENT
80 y/o female with hx CAD s/p CABG, Uterine CA s/p Hysterectomy presents to the ED c/o "I have right upper back pain with cough and pain with deep breath since yesterday." no fever/ chills/ leg pain/ leg swelling

## 2021-09-01 NOTE — ED ADULT NURSE NOTE - CHIEF COMPLAINT QUOTE
Pt has c/o right sided back pain, Tylenol taken at home with no relief. Denies any strenuous activity.

## 2021-09-01 NOTE — ED PROVIDER NOTE - PATIENT PORTAL LINK FT
You can access the FollowMyHealth Patient Portal offered by [facility] by registering at the following website: [email or web address]. By joining [employer]’s FollowMyHealth portal, you will also be able to view your health information using other applications (apps) compatible with our system.

## 2021-09-01 NOTE — ED PROVIDER NOTE - CLINICAL SUMMARY MEDICAL DECISION MAKING FREE TEXT BOX
Patient appears well, states she no longer has pain on her back, CTA chest is negative for PE or any other acute pulmonary process, WBC WNL, she is afebrile.  Lungs CTA, no chest wall ttp, patient is stable for d/c home.  .

## 2021-09-01 NOTE — ED PROVIDER NOTE - ATTENDING CONTRIBUTION TO CARE
I personally evaluated the patient. I reviewed the Resident’s or Physician Assistant’s note (as assigned above), and agree with the findings and plan except as documented in my note.  81 year old female with a pmh of CAD s/p CABG history of uterine ca presents here for right upper back pain that began yesterday. Associated with cough no cp n/v or fevers. States pain is worse when she has to pull things and is pleurtic in nature.  On exam  CONSTITUTIONAL: WA / WN / NAD  HEAD: NCAT  EYES: PERRL; EOMI;   ENT: Normal pharynx; mucous membranes pink/moist, no erythema.  NECK: Supple; no meningeal signs  CARD: RRR; nl S1/S2; no M/R/G.   RESP: Respiratory rate and effort are normal; breath sounds clear and equal bilaterally.  ABD: Soft, NT ND nl bowel sounds; no masses; no rebound  MSK/EXT: No gross deformities; full range of motion. No midline CTLS + right upper paraspinal ttp.  SKIN: Warm and dry;   NEURO: AAOx3  PSYCH: Memory Intact, Normal Affect

## 2021-09-01 NOTE — ED PROVIDER NOTE - PROGRESS NOTE DETAILS
SR: patient waiting for ct. , spoke with ct, should be picked up. SR: patient with difficult access I intend to get a bedside US guided IV access SR: s/o provided to dr. ronquillo to follow up imaging and reassess EP: patient was endorsed to me to follow up CTA and dispo.  Patient appears well, states she no longer has pain on her back, CTA chest is negative for PE or any other acute pulmonary process, WBC WNL, she is afebrile.  Lungs CTA, no chest wall ttp, patient is stable for d/c home.  Patient to be discharged from ED. Any available test results were discussed with patient and/or family. Verbal instructions given, including instructions to return to ED immediately for any new, worsening, or concerning symptoms. Patient endorsed understanding. Written discharge instructions additionally given, including follow-up plan.  Patient was given opportunity to ask questions.

## 2021-09-01 NOTE — ED POST DISCHARGE NOTE - RESULT SUMMARY
CXR- R BASE OPACITY/ATELECTASIS. CXR- R BASE OPACITY/ATELECTASIS. CT CHEST- DID SHOW OPACITIES. ON CHART REVIEW MD REVIEWED ALL RESULTS. NO CALLBACK NEEDED.

## 2021-09-23 PROBLEM — Z95.1 S/P CABG (CORONARY ARTERY BYPASS GRAFT): Status: ACTIVE | Noted: 2018-11-05

## 2021-09-23 NOTE — ASSESSMENT
[FreeTextEntry1] : The patient has stage IV uterine CA / S/P Chemo. She has had DVT and PE and is now on Xarelto . The patient had atypical CP which did not appear to be cardiac in origin . She had a nuclear stress test which was negative for ischemia . She has had CABG twice . 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 She had been to [facility] for a , no medical issues while away

## 2021-09-23 NOTE — CARDIOLOGY SUMMARY
[___] : [unfilled] [de-identified] : 9- Sinus bradycardia  [de-identified] : 5-6-2021 Normal LV systolic function EF 66% mild MR mild TR

## 2021-09-23 NOTE — PHYSICAL EXAM
[General Appearance - Well Developed] : well developed [Normal Appearance] : normal appearance [Well Groomed] : well groomed [General Appearance - Well Nourished] : well nourished [No Deformities] : no deformities [General Appearance - In No Acute Distress] : no acute distress [Normal Conjunctiva] : the conjunctiva exhibited no abnormalities [Eyelids - No Xanthelasma] : the eyelids demonstrated no xanthelasmas [Normal Oral Mucosa] : normal oral mucosa [No Oral Pallor] : no oral pallor [No Oral Cyanosis] : no oral cyanosis [Respiration, Rhythm And Depth] : normal respiratory rhythm and effort [Exaggerated Use Of Accessory Muscles For Inspiration] : no accessory muscle use [Auscultation Breath Sounds / Voice Sounds] : lungs were clear to auscultation bilaterally [Abdomen Soft] : soft [Abdomen Tenderness] : non-tender [Abdomen Mass (___ Cm)] : no abdominal mass palpated [Abnormal Walk] : normal gait [Gait - Sufficient For Exercise Testing] : the gait was sufficient for exercise testing [Nail Clubbing] : no clubbing of the fingernails [Cyanosis, Localized] : no localized cyanosis [Petechial Hemorrhages (___cm)] : no petechial hemorrhages [Oriented To Time, Place, And Person] : oriented to person, place, and time [] : no ischemic changes [Affect] : the affect was normal [Mood] : the mood was normal [No Anxiety] : not feeling anxious [Normal Rate] : normal [Rhythm Regular] : regular [No Murmur] : no murmurs heard [1+] : left 1+ [No Pitting Edema] : no pitting edema present [FreeTextEntry1] : No JVD  [Rt] : no varicose veins of the right leg

## 2021-09-23 NOTE — HISTORY OF PRESENT ILLNESS
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 [facility] . She was found to have a PE during admission in August . She was on Lovenox then placed on Xarelto . Seeing hematology . The patient has had chemotherapy . The patient went to [facility] for a family . She had no issues .The pateint has had no chest pain

## 2021-09-29 NOTE — ASU PATIENT PROFILE, ADULT - FALL HARM RISK CONCLUSION
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 · Keep your medication in the bottles provided by the pharmacist and keep a list of the medication names, dosages, and times to be taken in your wallet. · Do not take other medications without consulting your doctor     Pain Management: per above medications    What to do at Home    Recommended diet:  Low fat, Low cholesterol    Recommended activity: Activity as tolerated    1) Return to the hospital if you feel worse    2) If you experience any of the following symptoms then please call your primary care physician or return to the emergency room if you cannot get hold of your doctor:  Fever, chills, nausea, vomiting, diarrhea, change in mentation, falling, bleeding, shortness of breath, chest pain, severe headache, severe abdominal pain,     3) Follow up with the Oncologist as directed. You will also need to see a surgeon     Follow Up: Follow-up Information     Follow up With Specialties Details Why Rosalina Whipple MD Hematology and Oncology, Internal Medicine, Hematology, Oncology Go on 10/8/2021 appt at 1 pm to review pathology and discuss possible treatment options [address] [facility]  [place] [address]  [phone]      Kimberly Balderas MD Internal Medicine Schedule an appointment as soon as possible for a visit in 5 days Hospital Follow Up. Please contact your PCP to schedule your appointment.  [address] [facility]  [phone]      Randy Corona MD General Surgery, Surgical Oncology, Colon and Rectal Surgery Schedule an appointment as soon as possible for a visit in 2 weeks  [address] [place] [address]  [phone]              Information obtained by :  I understand that if any problems occur once I am at home I am to contact my physician. I understand and acknowledge receipt of the instructions indicated above.                                                                                                                                            Physician's or R.N.'s Signature                                                                  Date/Time                                                                                                                                              Patient or Representative Signature                                                          Date/Time Universal Safety Interventions

## 2021-09-29 NOTE — ED ADULT TRIAGE NOTE - CHIEF COMPLAINT QUOTE
Pt c/o severe chest pain since this morning; pt had a recent death in her family and states she feels stressed & anxious.

## 2021-09-29 NOTE — ED PROVIDER NOTE - OBJECTIVE STATEMENT
80 yo F hx of CAD, CABG x2, uterine cancer, HTN c/o intermittent, sharp, left sided chest pains at 7:30am today. Pain resolved after taking Tylenol. No SOB, n/v, or abdominal pain.  Patient under stress, recent death in family 2 days ago. On Xarelto. States she cant take ASA.

## 2021-09-29 NOTE — ED PROVIDER NOTE - PATIENT PORTAL LINK FT
You can access the FollowMyHealth Patient Portal offered by [facility] by registering at the following website: [email or web address]. By joining [employer]’s FollowMyHealth portal, you will also be able to view your health information using other applications (apps) compatible with our system.

## 2021-09-29 NOTE — ED ADULT NURSE NOTE - OBJECTIVE STATEMENT
Pt c/o chest pain that occurred this AM PTA, took Tylenol at home and now has no c/o chest pain in ED. Pt states she is a cardiac patient s/p 2 surgeries.

## 2021-09-29 NOTE — ED ADULT NURSE NOTE - NSIMPLEMENTINTERV_GEN_ALL_ED
Implemented All Universal Safety Interventions:  [place] to call system. Call bell, personal items and telephone within reach. Instruct patient to call for assistance. Room bathroom lighting operational. Non-slip footwear when patient is off stretcher. Physically safe environment: no spills, clutter or unnecessary equipment. Stretcher in lowest position, wheels locked, appropriate side rails in place.

## 2021-09-29 NOTE — ED PROVIDER NOTE - CLINICAL SUMMARY MEDICAL DECISION MAKING FREE TEXT BOX
pt presenting with CAD CABG HTN, stress test earlier this year normal, presenting with chest pain this AM now resolved. per pt, recent death in the family that she believes caused this. Well appearing, NAD, non toxic. NCAT PERRLA EOMI neck supple non tender normal wob cta bl rrr abdomen s nt nd no rebound no guarding WWPx4 neuro non focal. pt continues to be asynptomatic. trop and ekg x2 nml. Aware of all results, given a copy of all available results, comfortable with discharge and follow-up outpatient, strict return precautions given. Endorses understanding of all of this and aware that they can return at any time for new or concerning symptoms. No further questions or concerns at this time

## 2021-10-10 NOTE — ED PROVIDER NOTE - PROVIDER TOKENS
PROVIDER:[TOKEN:[[id number]],FOLLOWUP:[1-3 Days]],PROVIDER:[TOKEN:[[id number]],FOLLOWUP:[1-3 Days]]

## 2021-10-10 NOTE — ED PROVIDER NOTE - OBJECTIVE STATEMENT
81 year old female, past medical history htn, hdl, cad s/p cardiac bypass, uterine/bladder ca in remission, who presents with right sided chest pain. patient endorses recent heavy lifting, with gradual onset of right sided breast pain, worse w/ palpation and movement, that began earlier this afternoon prompting presentation to ed. patient follows with dr larry, cardiologist, last evaluated xseveral months ago, last bypass x5 years ago. denies fever, chills, headache, shortness of breath, nausea, vomiting, back pain, skin changes. no recent travel/surgery/malignancy, hx blood clots.

## 2021-10-10 NOTE — ED PROVIDER NOTE - CARE PROVIDER_API CALL
Carmelo Cervantes  CARDIOVASCULAR DISEASE  [address] [place] [address]  Phone: [phone]  Fax: [phone]  Follow Up Time: 1-3 Days    Hamlet Chua  Cardiology  [address] [place] [address]  Phone: [phone]  Fax: [phone]  Follow Up Time: 1-3 Days

## 2021-10-10 NOTE — ED PROVIDER NOTE - CLINICAL SUMMARY MEDICAL DECISION MAKING FREE TEXT BOX
81 female here for right sided chest pain. Had screening labs imaging medications and reevaluation without acute findings, plan is for disposition to EDOU in monitored setting for low risk chest pain management. 81 female here for right sided chest pain. Had screening labs imaging supportive care medications and reevaluation, no acute emergent findings, symptoms improved, plan discussed with patient, will discharge with outpatient management and return and follow up instructions. Prior workup reveals normal provocative cardiac test this year, has good followup. Will discharge.

## 2021-10-10 NOTE — ED PROVIDER NOTE - DISCHARGE DATE
11-Oct-2021
FREE:[LAST:[[facility]],PHONE:[(   )    -],FAX:[(   )    -]]

## 2021-10-10 NOTE — ED PROVIDER NOTE - ATTENDING CONTRIBUTION TO CARE
I personally evaluated the patient. I reviewed the Resident’s or Physician Assistant’s note (as assigned above), and agree with the findings and plan except as documented in my note.     81 female here for eval of right sided breast / chest pain for one day with acute onset, gradual, worse with movement and palpation of the area, provoked by overuse.     PMH: CAD, PMD Dr. Moura with stress test last month per patient, bladder and uterine cancer in remission  PSH: CABG    ROS otherwise unremarkable    PE: female in no distress. CV: pulses intact. CHEST: normal work of breathing. ABD: nondistended. SKIN: normal. EXT: FROM. NEURO: AAO 3 no focal deficits. HEENT: mucosa normal    Impression: chest pain     Plan: IV labs imaging supportive care and reevaluation

## 2021-10-10 NOTE — ED PROVIDER NOTE - PATIENT PORTAL LINK FT
You can access the FollowMyHealth Patient Portal offered by [facility] by registering at the following website: [email or web address]. By joining [employer]’s FollowMyHealth portal, you will also be able to view your health information using other applications (apps) compatible with our system.

## 2021-10-10 NOTE — ED ADULT NURSE NOTE - CAS TRG GENERAL NORM CIRC DET
Subjective


Date of Service: 07/22/18


Interval History: 





Admission Date:    07/19/18


Date of consult: 7/22/2018


Provider: Hospitalist service


PRIMARY CARE PROVIDER:  Dr. Charlton.


CHIEF COMPLAINT:  Fall and fractures


Reason for consult: Pafib, SVT


 


HISTORY OF PRESENT ILLNESS:  


Melissa Luis is a 76-year-old woman with a history of GERD, hyperlipidemia, 

seasonal allergies, history of asthma.  She was working outside and had a 

mechanical fall and left hip and left clavicle fracture.  There was no syncope.

  She is now post orthopaedic surgery. She appears dyspneic.  She states she 

always breaths like this.  She has had issues with recurrent post-operative 

difficult to control rapid atrial fibrillation.  Adenosine showed atrial 

fibrillation.  She is not clearly symptomatic with this.  she states she only 

feels palpitation after people rush into her room after developing this.  She 

is currently in sinus tachycardia.  She has no known history of CAD.  An 

echocardogram this admission showed normal LV size and function LVEF 60%, mild 

LA dilation, normal RV size/function, mild-moderate pulmonary HTN. She was 

noted to have mild sinus tachycardia on admission.





PAST MEDICAL HISTORY:  Significant for:


1.  GERD.


2.  Hyperlipidemia.


3.  Asthma.


4.  Seasonal allergies.


5.  Osteoporosis.


 


PAST SURGICAL HISTORY:  She has had:


1.  Cataract extractions.


2.  Hysterectomy.


3.  Partial bowel resection.





ALLERGIES TO MEDICATIONS:  Include SULFA and PENICILLIN.


 


FAMILY HISTORY:  Mother had a history of hypertension and diabetes.  Father had 

a history of cancer.


 


SOCIAL HISTORY:  She does not smoke.  She does not drink.  She does live alone. 

Surrogate decision maker is 


her daughter.











Medications


Active Medications: 








Acetaminophen (Tylenol Tab*)  650 mg PO Q4H PRN


   PRN Reason: FEVER/PAIN


Albuterol (Ventolin 2.5 Mg/3 Ml Neb.Sol*)  2.5 mg INH Q2H PRN


   PRN Reason: SOB/WHEEZING


Atorvastatin Calcium (Lipitor*)  20 mg PO BEDTIME SUMA; Protocol


   Last Admin: 07/22/18 20:26 Dose:  20 mg


Calcium Carbonate (Tums*)  1,000 mg PO Q4H PRN


   PRN Reason: HEARTBURN


   Last Admin: 07/21/18 20:28 Dose:  500 mg


Cholecalciferol (Vitamin D Tab*)  4,000 units PO DAILY SUMA


   Last Admin: 07/22/18 08:03 Dose:  4,000 units


Diltiazem HCl (Cardizem Tab*)  30 mg PO Q6HR [facility]


Diphenhydramine HCl (Benadryl Po*)  25 mg PO Q6H PRN


   PRN Reason: ITCHING


Docusate Sodium (Colace Cap*)  100 mg PO BID [facility]


   Last Admin: 07/22/18 20:26 Dose:  100 mg


Enoxaparin Sodium (Lovenox(*))  40 mg SUBCUT DAILY [facility]


   Last Admin: 07/22/18 08:06 Dose:  40 mg


Potassium Chloride (Potassium Chloride 20 Meq/100 Ml Ivpremix*)  20 meq in 100 

mls @ 50 mls/hr IV Q2H [facility]


   Stop: 07/22/18 21:59


   Last Admin: 07/22/18 20:09 Dose:  50 mls/hr


Magnesium Hydroxide (Milk Of Magnesia Liq*)  30 ml PO BID [facility]


   Last Admin: 07/22/18 08:08 Dose:  Not Given


Magnesium Hydroxide (Milk Of Magnesia Liq*)  30 ml PO BID PRN


   PRN Reason: CONSTIPATION


Morphine Sulfate (Morphine Vial*)  2 mg IV Q4H PRN


   PRN Reason: PAIN - BREAKTHROUGH (SEVERE)


   Last Admin: 07/20/18 12:34 Dose:  2 mg


Omeprazole (Prilosec Cap*)  20 mg PO DAILY [facility]


   Last Admin: 07/22/18 08:06 Dose:  20 mg


Ondansetron HCl (Zofran Inj*)  4 mg IV Q6H PRN


   PRN Reason: NAUSEA


Oxycodone/Acetaminophen (Percocet 5/325 Tab*)  1 tab PO Q4H PRN


   PRN Reason: PAIN - MODERATE


   Last Admin: 07/21/18 08:44 Dose:  1 tab


Oxycodone/Acetaminophen (Percocet 5/325 Tab*)  2 tab PO Q4H PRN


   PRN Reason: PAIN - SEVERE


   Last Admin: 07/20/18 05:29 Dose:  2 tab


Senna (Senokot Tab*)  1 tab PO BEDTIME PRN


   PRN Reason: CONSTIPATION


Sotalol HCl (Betapace Tab*)  80 mg PO BID [facility]


Tramadol HCl (Ultram*)  50 mg PO Q8H PRN


   PRN Reason: PAIN


   Last Admin: 07/22/18 08:04 Dose:  50 mg





Home Medications: 


 





Omeprazole CAP* [Prilosec CAP*] 20 mg PO DAILY 09/19/13 [History Confirmed 07/19 /18]


Cholecalciferol TAB* [Vitamin D TAB*] 4,000 units PO DAILY 03/13/18 [History 

Confirmed 07/19/18]


Rosuvastatin Calcium 10 mg PO BEDTIME 03/13/18 [History Confirmed 07/19/18]


Acetaminophen [Tylenol Extra Strength] 500 mg PO Q8H PRN 07/19/18 [History 

Confirmed 07/19/18]








Review of Systems





- Measurements


Intake and Output: 


Intake and Output Last 24 Hours











 07/20/18 07/21/18 07/22/18 07/23/18





 06:59 06:59 06:59 06:59


 


Intake Total 1890 4156 3317 840


 


Output Total 500 1450 850 850


 


Balance 1390 2706 2467 -10


 


Weight 208 lb 220 lb 14.451 oz  


 


Intake:    


 


  IV Fluids 990 3886 2102 


 


    LR  886 2102 


 


    NS (0.9%) 990 1000  


 


    lr  2000  


 


  IVPB  170 155 


 


    ABX - CLINDAMYCIN  55 155 


 


    Magnesium  115  


 


  Oral  840


 


Output:    


 


  Urine 500 400  850


 


  Quintero  1050 850 


 


Other:    


 


  Date of Last Bowel   7/21 





  Movement    


 


  # Bowel Movements 0  1 


 


  Estimated Stool Amount   Medium 


 


  # Voids  2  














- Review of Systems


Constitutional Symptoms: 


   Negative: Weight Gain, Weight Loss


Dermatology: 


   Negative: Rash, Skin Lesions


HEENT: 


   Negative: Change in Hearing, Vertigo


Eyes: 


   Negative: Change in Vision, Double Vision


Thyroid: 


   Negative: Cold Intolerance, Heat Intolerance, Weight Loss, Weight Gain


Pulmonary: Positive: Shortness of Breath


   Negative: Hemoptysis


Cardiology: Positive: Shortness of Breath


   Negative: Swelling of Ankles, Peripheral Vascular Dis, Edema, Syncope, 

Claudication, Paroxysmal Nocturnal Dyspnea


Gastroenterology: 


   Negative: Abdominal Pain, Nausea, Vomiting, Anorexia


Genital - Urinary: 


   Negative: Dysuria, Hematuria


Musculoskeletal: Positive: Joint Pain


   Negative: Low Back Pain


Endocrinology: Positive: Obesity


   Negative: Calluses, Polydipsia, Polyuria


Neurology: 


   Negative: Headaches, Migraines, Change in Vision, Diplopia, Dizziness, 

Change in Balancing, Change in Coordination, Change in Memory, Hx of Stroke\TIA

, Hx Seizures


Psychiatry: 


   Negative: Unusual Anxiety, Suicidal Ideation


Allergic/Immunologic: 


   Negative: Hx HIV, Immunocompromise


Review of Systems Statement: 


All other review of systems negative, unless stated above.








Objective


Vital Signs:











Temp Pulse Resp BP Pulse Ox


 


 99.3 F   123   28   118/67   95 


 


 07/22/18 19:28  07/22/18 20:11  07/22/18 17:20  07/22/18 20:11  07/22/18 20:11











Oxygen Devices in Use Now: Nasal Cannula


Appearance: nad, pleasant


Ears/Nose/Mouth/Throat: Clear Oropharnyx, Mucous Membranes Moist


Neck: NL Appearance and Movements; NL JVP, Trachea Midline


Respiratory: Clear to Auscultation, - - mild increased work of breathing


Cardiovascular: - - tachycardic, regular, no significant murmur


Abdominal: NL Sounds; No Tenderness; No Distention


Extremities: No Clubbing, Cyanosis


Skin: No Rash or Ulcers


Neurological: Alert and Oriented x 3


Laboratory Results: 


 





 07/22/18 06:01 





 07/22/18 06:01 





 











INR (Anticoag Therapy)  1.02  (0.77-1.02)   07/20/18  05:11    


 


APTT  45.1 seconds (26.0-36.3)  H  07/19/18  09:54    


 


Total Bilirubin  1.10 mg/dL (0.2-1.0)  H  07/19/18  09:54    


 


AST  15 U/L (13-39)   07/19/18  09:54    


 


ALT  11 U/L (7-52)   07/19/18  09:54    


 


Alkaline Phosphatase  49 U/L ()   07/19/18  09:54    


 


Total Protein  6.5 g/dL (6.4-8.9)   07/19/18  09:54    


 


Albumin  3.9 g/dL (3.2-5.2)   07/19/18  09:54    


 


Globulin  2.6 g/dL (2-4)   07/19/18  09:54    


 


Albumin/Globulin Ratio  1.5  (1-3)   07/19/18  09:54    


 


TSH  3.68 mcIU/mL (0.34-5.60)   07/19/18  09:54    








 











  07/19/18 07/19/18 07/19/18





  09:54 11:35 14:34


 


Troponin I  0.00  0.00  0.00














  07/20/18 07/21/18 07/21/18





  22:18 01:21 05:00


 


Troponin I  0.00  0.00  0.01











mg 7/22 was 2.8


Diagnostic Imaging: 





cxr on admission7/19/2018: no active disease








EKG Data: 





ekg 7/19/2018:


 bpm, rsr' v1, pvc's, qtc 446





ekg 7/20/2018


afib 190 bpm, diffuse rate related st changes





ekg 7/22/2018


afib 190 bpm, diffuse rate related st changes, pvc





tele currently:  bpm








Assessment/Plan





At this point given her very rapid difficult to control paroxysmal episodes of 

atrial fibrillation we will pursue a rhythm control strategy with sotalol load 

of 80 mg po bid.  Will also start diltiazem 30 mg po q6 hours of diltiazem and 

eventually transition this to longacting to help rate control paroxysmal 

episodes.  She may continue to have paroxysmal episodes while sotalol is 

loading.  We discussed risks/benefits of anti-arrhythmic drug therapy and 

patient is agreeable to continue.  Given these episodes appear to be provoked 

by a post-operative state and she is currently in sinus rhythm, will hold off 

on anticoagulation at this point.  Keep K 4 or greater and Mg 2 or greater.  

Daily EKG (ordered) and continue telemetry monitoring.  Continue DVT 

prophylaxis.
Strong peripheral pulses

## 2021-11-18 NOTE — ED PROVIDER NOTE - PROVIDER TOKENS
PROVIDER:[TOKEN:[[id number]],FOLLOWUP:[1-3 Days]],PROVIDER:[TOKEN:[[id number]],FOLLOWUP:[7-10 Days]]

## 2021-11-18 NOTE — ED PROVIDER NOTE - ATTENDING CONTRIBUTION TO CARE
82 y/o female h/o HTN, HLD, CAD s/p CABG, uterine CA s/p SHAAN p/w upper abdominal pain x yesterday, states only present when pushes on abdomen, denies radiation / modifying factors, last bowel movement was yesterday ago, passing flatus, denies fever, n/v/d, anorexia, cough, respiratory sx, change in bowel habits or urinary sx, vaginal d/c or other associated complaints at present.     Old chart reviewed.  I have reviewed and agree with the initial nursing note, except as documented in my note.    VSS, awake, alert, no scleral icterus, oropharynx clear, mmm, no jaundice, skin rash or lesions, chest CTAB, non-labored breathing, no w/r/r, +S1/S2, RRR, no m/r/g, abdomen soft, NT, ND, +BS, no scars, hernias or distention, no pulsatile masses or bruits appreciated, no CVA tenderness, no peripheral edema or deformities, alert, clear speech and steady gait. 80 y/o female h/o HTN, HLD, CAD s/p CABG, uterine CA s/p SHAAN p/w upper abdominal pain x yesterday, states only present when pushes on abdomen, denies radiation / modifying factors, last bowel movement was yesterday ago, passing flatus, denies fever, n/v/d, anorexia, cough, respiratory sx, change in bowel habits or urinary sx, vaginal d/c or other associated complaints at present.     Old chart reviewed.  I have reviewed and agree with the initial nursing note, except as documented in my note.    VSS, awake, alert, no scleral icterus, oropharynx clear, mmm, no jaundice, skin rash or lesions, chest CTAB, non-labored breathing, no w/r/r, +S1/S2, RRR, no m/r/g, abdomen soft, NT, ND, +BS, no hernias or distention, no pulsatile masses or bruits appreciated, no CVA tenderness, no peripheral edema or deformities, alert, clear speech and steady gait.

## 2021-11-18 NOTE — ED ADULT NURSE NOTE - NSFALLRSKASSESSDT_ED_ALL_ED
A/P:  31 year old PPD#1 s/p , sinus tachycardia and atypical chest pain, resolved
A/P:  PPD#1 s/p  with Urinary retention   and resolved maternal tachycardia  pt to go home with manzano leg bag and keflex and follow up on wensday 10/11/17  -Continue pain management  -Encourage OOB and ambulation  -Check CBC  -Continue current care  -Plan for discharge tomorrow  -d/w dr. carlos
Pt with no sig PMHX here for delivery with sinus tach and atypical CP.  1.No further cardiac work-up or follow up needed.  2.Continue current medication.  3.GI and DVT prophylaxis.
18-Nov-2021 09:08

## 2021-11-18 NOTE — ED PROVIDER NOTE - CARE PROVIDER_API CALL
Jaz Figueroah  INTERNAL MEDICINE  [address], [address]  [place] [address]  Phone: [phone]  Fax: [phone]  Follow Up Time: 1-3 Days    Kye Montesinos  Gastroenterology  [address] [place] [address]  Phone: [phone]  Fax: [phone]  Follow Up Time: 7-10 Days

## 2021-11-18 NOTE — ED ADULT TRIAGE NOTE - CHIEF COMPLAINT QUOTE
"im having pain in my stomach and my rectum don't feel right. no bleeding. it started yesterday" denies n/v

## 2021-11-18 NOTE — ED ADULT NURSE NOTE - OBJECTIVE STATEMENT
Pt presents to ED c/o lower abdominal pain and rectal discomfort - states "my rectum just doesn't feel right." Denies n/v.

## 2021-11-18 NOTE — ED PROVIDER NOTE - PROGRESS NOTE DETAILS
BH: Patient's labs, UA, bedside US WNL, no acute intra-abdominal process identified at this time, abdomen soft, non-tender. No indication for admission or further emergent evaluation at this time. Was explained that the source of their symptoms is unclear and that the patient should still follow up with their primary physician or gastroenterology for further evaluation and management. These elements were discussed with the patient and they are amenable to outpatient follow-up at this time.    The patient was given detailed return precautions and advised to return to the emergency department in 2-3 days if not improving or sooner if any new symptoms develop, symptoms worsened or for any concerns. The patient was offered the opportunity to ask questions and verbalized that they understand the diagnosis and discharge instructions.

## 2021-11-18 NOTE — ED PROVIDER NOTE - OBJECTIVE STATEMENT
80 y/o F, PMHx HTN, HLD, CAD - s/p CABG, Uterine Cancer - s/p TAHBSO, presents to the ED with complaints of abdominal pain x two days. She admits to intermittent epigastric discomfort without accompanying nausea, vomiting, decreased PO intake, chest pain and dyspnea. She additionally admits to rectal discomfort however denies any hx of hemorrhoids and denies any bloody stool. She has had a colonoscopy/endoscopy within the past five years

## 2021-11-18 NOTE — ED PROVIDER NOTE - PATIENT PORTAL LINK FT
You can access the FollowMyHealth Patient Portal offered by [facility] by registering at the following website: [email or web address]. By joining [employer]’s FollowMyHealth portal, you will also be able to view your health information using other applications (apps) compatible with our system.

## 2021-11-19 NOTE — ED PROVIDER NOTE - PATIENT PORTAL LINK FT
You can access the FollowMyHealth Patient Portal offered by [facility] by registering at the following website: [email or web address]. By joining [employer]’s FollowMyHealth portal, you will also be able to view your health information using other applications (apps) compatible with our system.

## 2021-11-19 NOTE — ED ADULT TRIAGE NOTE - CHIEF COMPLAINT QUOTE
Pt came in today for call back. Pt states she was in for abd pain and was called back for abnormal ct scan.

## 2021-11-19 NOTE — ED PROVIDER NOTE - NSFOLLOWUPINSTRUCTIONS_ED_ALL_ED_FT
Follow up with PMD and GI in 1-2 days.    Ascites    Ascites is a collection of excess fluid in the abdomen. Ascites can range from mild to severe. It can get worse without treatment.     CAUSES  Possible causes include:    Cirrhosis. This is the most common cause of ascites.  Infection or inflammation in the abdomen.  Cancer in the abdomen.  Heart failure.  Kidney disease.  Inflammation of the pancreas.  Clots in the veins of the liver.    SIGNS AND SYMPTOMS  Signs and symptoms may include:    A feeling of fullness in your abdomen. This is common.  An increase in the size of your abdomen or your waist.  Swelling in your legs.  Swelling of the scrotum in men.  Difficulty breathing.  Abdominal pain.  Sudden weight gain.    If the condition is mild, you may not have symptoms.    DIAGNOSIS  To make a diagnosis, your health care provider will:    Ask about your medical history.  Perform a physical exam.   Order imaging tests, such as an ultrasound or CT scan of your abdomen.    TREATMENT  Treatment depends on the cause of the ascites. It may include:    Taking a pill to make you urinate. This is called a water pill (diuretic pill).  Strictly reducing your salt (sodium) intake. Salt can cause extra fluid to be kept in the body, and this makes ascites worse.  Having a procedure to remove fluid from your abdomen (paracentesis).  Having a procedure to transfer fluid from your abdomen into a vein.  Having a procedure that connects two of the major veins within your liver and relieves pressure on your liver (TIPS procedure).     Ascites may go away or improve with treatment of the condition that caused it.     HOME CARE INSTRUCTIONS  Keep track of your weight. To do this, weigh yourself at the same time every day and record your weight.  Keep track of how much you drink and any changes in the amount you urinate.  Follow any instructions that your health care provider gives you about how much to drink.  Try not to eat salty (high-sodium) foods.  Take medicines only as directed by your health care provider.  Keep all follow-up visits as directed by your health care provider. This is important.  Report any changes in your health to your health care provider, especially if you develop new symptoms or your symptoms get worse.    SEEK MEDICAL CARE IF:  Your gain more than 3 pounds in 3 days.  Your abdominal size or your waist size increases.  You have new swelling in your legs.  The swelling in your legs gets worse.    SEEK IMMEDIATE MEDICAL CARE IF:  You develop a fever.  You develop confusion.  You develop new or worsening difficulty breathing.  You develop new or worsening abdominal pain.  You develop new or worsening swelling in the scrotum (in men).    ADDITIONAL NOTES AND INSTRUCTIONS    Please follow up with your Primary MD in 24-48 hr.  Seek immediate medical care for any new/worsening signs or symptoms.

## 2021-11-19 NOTE — ED PROVIDER NOTE - OBJECTIVE STATEMENT
81y F pmh HTN, HLD, CAD CABG, Uterine CA s/p TAHBSO presents for abnormal imaging. Pt was seen in ED yesterday for abdominal pain with (+) fast exam. Now presents with no complaints.

## 2021-11-19 NOTE — ED PROVIDER NOTE - CARE PROVIDER_API CALL
Sadia Silva (MD)  Gastroenterology  [address] [place] [address]  Phone: [phone]  Fax: [phone]  Follow Up Time:

## 2021-11-19 NOTE — ED PROVIDER NOTE - ATTENDING CONTRIBUTION TO CARE
81 y F to ED with abd swelling and positive fast yesterday ,pt requested call-back so to ED for eval.   Seen in ED yesterday for abd pain and d/c home after bedside US.  AVSS, exam as noted, CTAB, RRR, abdomen soft NTND,

## 2021-11-22 NOTE — DIETITIAN INITIAL EVALUATION ADULT. - FEEDING SKILL
independent associated with seasonal allergic rhinitis - compliant on hydroxyzine as needed/sinus symptoms/nasal congestion

## 2021-11-29 NOTE — ED PROVIDER NOTE - OBJECTIVE STATEMENT
82 Y/O F ANXIETY/DEPRESSION, HTN, CAD, S/P CABG, C/O DEPRESSION, WORSENING SXS X 1-2 WEEKS. PT REPORTS ANHEDONIA, ANOREXIA AND CRYING FREQUENTLY. PT WAS CHANGED FROM KLONOPIN TO BUSPIRONE ONE WEEK AGO DUE TO UNSTEADY GAIT ON THE KLONOPIN. PT DENIES SUICIDAL IDEATION HOWEVER REPORTS "I DON'T CARE IF I DIE." NO HOMICIDAL IDEATION, PARANOID IDEATION OR HALLUCINATIONS. PT IS FOLLOWED AT THE [facility]. PT LIVES AT HOME WITH HER FAMILY AND REPORTS SHE HAS A GOOD SUPPORT SYSTEM.

## 2021-11-29 NOTE — ED PROVIDER NOTE - NSFOLLOWUPCLINICS_GEN_ALL_ED_FT
[facility]  OP Mental Health  [address] [place] [id number]  Phone: [phone]  Fax:   Established Patient  Scheduled Appointment: 12/6/2021 11:30 AM
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
## 2021-11-29 NOTE — ED PROVIDER NOTE - PHYSICAL EXAMINATION
CONSTITUTIONAL: Well-appearing; well-nourished; in no apparent distress.   HEAD: Normocephalic; atraumatic.   EYES: PERRL; EOM intact. Conjunctiva normal B/L.   ENT: Normal pharynx with no tonsillar hypertrophy. MMM.  NECK: Supple; non-tender; no cervical lymphadenopathy.   CHEST: Normal chest excursion with respiration. s/p sternotomy.  CARDIOVASCULAR: Normal S1, S2; no murmurs, rubs, or gallops.   RESPIRATORY: Normal chest excursion with respiration; breath sounds clear and equal bilaterally; no wheezes, rhonchi, or rales.  GI/: Normal bowel sounds; non-distended; non-tender.  BACK: No evidence of trauma or deformity. Non-tender to palpation. No CVA tenderness.   EXT: Normal ROM in all four extremities; non-tender to palpation; distal pulses are normal. No leg edema B/L.   SKIN: Normal for age and race; warm; dry; good turgor.  NEURO: A & O x 4; CN 2-12 intact. Grossly unremarkable.

## 2021-11-29 NOTE — ED BEHAVIORAL HEALTH ASSESSMENT NOTE - CURRENT MEDICATION
Buspirone 7.5mg bid Called the pharmacy [facility] at [address] [place] [address], and confirmed she is on Buspirone 7.5mg bid, and Klonopin 0.5 mg daily.

## 2021-11-29 NOTE — ED BEHAVIORAL HEALTH ASSESSMENT NOTE - HPI (INCLUDE ILLNESS QUALITY, SEVERITY, DURATION, TIMING, CONTEXT, MODIFYING FACTORS, ASSOCIATED SIGNS AND SYMPTOMS)
The patient was seen in the ED lying comfortably in bed  This 81y old female, with PMH of HTN, CAD, s/p CABG, uterine cancer treated surgically and chemo, anxiety and depression. She came to the hospital today because she feel depressed, crying all the time, no appetite, does not want to take bath, and hearing sounds in her ears as noise (no voices). These symptoms started about two days ago, and feels worsening. Before that she was doing good. She mentioned that she had appointment with the psychiatry clinic and her medications were changed one week ago. The change was stopping her Klonopin and starting her on Buspirone. She think that this change what caused her to have this episode of depression. She tried to fight it but did not work, and she started to see nightmares of dead people. She is spending time lying down, but usually she is active. She is not having fun, and feels sad for herself. She wants to be happy again. "my body feels funny, is it because I not taking my medication?" during the interview she was crying.     She never had IPP before, and she was on Xanax in the past, which was changed to Clonazepam 3-4 y ago (she does not know why was changed). She said she was doing well on Klonopin, and the new medication is not working well. According to the patient she is having depression every 6-7 months; however, she mentioned she never had depression attack on Klonopin. The grandson confirmed that she was doing good until two days ago, he also, mentioned that she feel depressed when she remember a dead person either relative or a friend.     No visual hallucinations, no suicidal attempts, thoughts or plans, and never tried to suicide (confirmed by the grandson)    According to the patient, her depression started when she lost her son in 1994  She mentioned she sleeps well, but sometimes will stay up on TV.   Her concentration and cognition are good, and she makes her own decisions.   She denied any anxiety symptoms currently.     She lives in house with her grandson, sister, niece and she said they are there for her.   She moved to  in [place] from [place], and moved from [place] to [place] in 1986.   She has two brothers and two sisters (one lives with her)   No psychiatric history in the family, The patient was seen in the ED lying comfortably in bed  This 81y old female, single , had 2 children , one of whom is  , lives in house with her grandson, sister, niece , retired home health aide ,  with PMH of HTN, CAD, s/p CABG, uterine cancer treated surgically and chemo, anxiety and depression who came to the ED today because she was  feeling depressed,   Patient reports that she has been crying all the time,  has no appetite, does not want to take bath, and hearing sounds in her ears as noise (no voices). These symptoms started about two days ago, and are worsening. Before that she says that she was doing good. She mentioned that  a week ago she had appointment with her psychiatrist and her medications were changed. Patient says that the  change  made was stopping her Klonopin and starting her on Buspirone. She think that this change is what caused her to have this episode of depression. She  reports that she tried to fight it but did not work, and she started to have nightmares of dead people. She  says that she is spending time lying down, but usually she is active. She says that she is not having fun, and feels sad for herself. She reports  that she  wants to be happy again. "my body feels funny, is it because I not taking my medication?" During the interview she was crying.     She says that she  never had IPP before, and she was on Xanax in the past, which was changed to Clonazepam 3-4 y ago (she does not know why was changed). She said she was doing well on Klonopin, and the new medication is not working well. According to the patient she is having depression every 6-7 months; however, she mentioned she never had depression attack on Klonopin.   Patient denies visual hallucinations, no suicidal attempts, thoughts or plans, and never tried to suicide  Collateral information was obtained from the patient's grandson who  confirmed that she was doing good until two days ago, He also, mentioned that she feel depressed when she remember a dead person either relative or a friend. Patient grandson reports that he has no concerns for suicide.
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
## 2021-11-29 NOTE — ED PROVIDER NOTE - NSFOLLOWUPINSTRUCTIONS_ED_ALL_ED_FT
FOLLOW UP APPOINTMENT:    Dr. Glenn Trotter, at [facility], [address], [place], [address]. Phone: [phone] on Monday, December 6, 2021 at 11:30am    CONTINUE KLONOPIN AND BUSPAR AS PRESCRIBED.

## 2021-11-29 NOTE — ED BEHAVIORAL HEALTH ASSESSMENT NOTE - OTHER PAST PSYCHIATRIC HISTORY (INCLUDE DETAILS REGARDING ONSET, COURSE OF ILLNESS, INPATIENT/OUTPATIENT TREATMENT)
as per HPI According to the patient, her depression started when she lost her son in 1994  She mentioned she sleeps well, but sometimes will stay up on TV.   Her concentration and cognition are good, and she makes her own decisions.   She denied any anxiety symptoms currently.      and she said they are there for her.

## 2021-11-29 NOTE — ED BEHAVIORAL HEALTH ASSESSMENT NOTE - REFERRAL / APPOINTMENT DETAILS
The patient will follow up with Dr. Glenn Trotter, at [facility] psychiat[facility], [address] [place] [address], [id number]. Phone: [phone] on Monday, December 6, 2021 at 11:30am

## 2021-11-29 NOTE — ED PROVIDER NOTE - NS ED ROS FT
Constitutional:  See HPI.  Eyes:  No visual changes, eye pain or discharge.  ENMT:  No hearing changes, pain, discharge or infections. No neck pain or stiffness.  Cardiac:  No chest pain, SOB or edema. No chest pain with exertion.  Respiratory:  No cough or respiratory distress. No hemoptysis. No history of asthma or RAD.  GI:  No nausea, vomiting, diarrhea or abdominal pain.  :  No dysuria, frequency or burning.  MS:  No myalgia, muscle weakness, joint pain or back pain.  Neuro:  No weakness.  No LOC.  Skin:  No skin rash.   Endocrine: No history of thyroid disease or diabetes.  Except as documented in the HPI,  all other systems are negative.

## 2021-11-29 NOTE — ED BEHAVIORAL HEALTH ASSESSMENT NOTE - SUMMARY
This 81 y old female, has a diagnosis DONALDO on Klonopin with recent adding Buspirone, presenting with feeling depressed. She is under the impression she need to stop the Klonopin.   After contacting her psychiatrist, the plan was to continue Klonopin and add Buspirone.    Plan:  - Continue Klonopin 0.5 mg daily  - Continue Buspirone 7.5 mg bid  - F/U with outpatient psychiatry clinic on next Monday December 6, 2021  - Can be discharged This 81 y old female, has a diagnosis DONALDO on Klonopin with recent adding Buspirone, presenting with worsening  depressed.    Her feelings of depression seems to be have been in the context of her fear of not being on Klonopin. Patient however  seems to have misunderstood the recommendations of her outpatient psychiatrist with regards to her Klonopin as patient's psychiatrist did not discontinue the medication. Patient denies having current suicidal ideations, intent or plan. She continues to be future oriented and is amenable for psychotherapy.   At this time, patient does not need inpatient psychiatric hospitalization.   Patient will follow up with her outpatient psychiatrist Dr Trotter who was able to get her an earlier appointment with him next Monday. Dr Trotter was informed of patient's desire to start psychotherapy.     Plan:  - Continue Klonopin 0.5 mg daily  - Continue Buspirone 7.5 mg bid  - F/U with outpatient psychiatry clinic on next Monday December 6, 2021 at 11.30am

## 2021-11-29 NOTE — ED PROVIDER NOTE - PATIENT PORTAL LINK FT
You can access the FollowMyHealth Patient Portal offered by [facility] by registering at the following website: [email or web address]. By joining [employer]’s FollowMyHealth portal, you will also be able to view your health information using other applications (apps) compatible with our system.

## 2021-11-29 NOTE — ED ADULT NURSE NOTE - CADM POA CENTRAL LINE
Vancomycin IV Pharmacy-to-Dose Consultation    Main Green is a 64 y o  male who is currently receiving Vancomycin IV with management by the Pharmacy Consult service  Assessment/Plan:  The patient was reviewed  Renal function is stable and no signs or symptoms of nephrotoxicity and/or infusion reactions were documented in the chart  Based on todays assessment, continue current vancomycin dosing of 1500mg IV every 8 hours, with a plan for trough to be drawn at 1330 on 8/11/19    We will continue to follow the patients culture results and clinical progress daily      Margaret Escamilla, Pharmacist No

## 2021-11-29 NOTE — BH SAFETY PLAN - STEP 1 WARNING SIGNS
Detail Level: Simple . Detail Level: Generalized Include Location In Plan?: Yes Hide Include Location In Plan Question?: No Detail Level: Detailed

## 2021-11-29 NOTE — ED BEHAVIORAL HEALTH ASSESSMENT NOTE - CASE SUMMARY
Ms Block is a 81 year old woman with a history of generalized Anxiety Disorder who presented to the Ed for the evalaution of depressed mood.   Her feelings of depression seems to be have been in the context of her fear of not being on Klonopin. Patient however  seems to have misunderstood the recommendations of her outpatient psychiatrist with regards to her Klonopin as patient's psychiatrist did not discontinue the medication.  At this time, patient is not considered a danger to herself or others and does not need inpatient psychiatric hospitalization. We recommend continuation of her psychotropic medication for now with the plan to follow up with her outpatient psychiatrist Dr Trotter for continued medication management  and symptom stabilization.  Patient will  also benefit from supportive psychotherapy to help her develop better coping skills and better frustration tolerance to deal with her stressors.

## 2021-11-29 NOTE — ED ADULT TRIAGE NOTE - CHIEF COMPLAINT QUOTE
Patient complaining of depression and weakness for the past week, mental health clinic recently changed her medication from Clonazepam to Buspirone and "it is not working"- No SI/HI at this time.

## 2021-11-29 NOTE — ED BEHAVIORAL HEALTH ASSESSMENT NOTE - DESCRIPTION
The patient came to the ED by herself, and she was lying down in the bed, calm but cry sometimes.   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 , went as far as the 11 th grade ,  She moved to [place] in [place] from [place], and moved from [place] to [place] in 1986.

## 2021-12-08 NOTE — ED ADULT TRIAGE NOTE - SPO2 (%)
ED Attending Physician Note      Patient : Pablito Stephenson Age: 56 year old Sex: male   MRN: [id number] Encounter Date: 12/8/2021      History     Chief Complaint   Patient presents with   • Flank Pain     HPI  [HPI]  57 yo male hx of 9mm kidney stone. At 9 oclock has some pain but it went away. Passed a stone and then pain intesified over the last hour. Stone and pain were on the right side. After receiving morphine the pain improved. No nausea, or vomiting. No fevers. No abx currently.    Patient was in mask and I myself was in mask, eye protection, gloves when interacting with patient.  Was not present for any aersolizing procedures      Dr Hall  No Known Allergies    Past Medical History:   Diagnosis Date   • AV block, Mobitz 1     SAOUR   • Basal cell carcinoma (BCC) of back 2020    BURGESS   • Hypothyroidism     EVELIN ([facility] in [place])   • Kidney stones     MONICA   • Pneumonia due to COVID-19 virus with hospitalization 2021   • Rheumatoid arthritis     ROSIE ([facility])       Past Surgical History:   Procedure Laterality Date   • ADENOIDECTOMY  1977   • BASAL CELL CARCINOMA EXCISION  2020    upper back   • EXTRACORPOREAL SHOCK WAVE LITHOTRIPSY Right 2018   • INGUINAL HERNIA REPAIR Right 2015       Family History   Problem Relation Age of Onset   • Thyroid Mother    • Cancer, Skin Melanoma Father    • Thyroid Sister    • Diabetes Sister    • Hypertension Brother    • Myocardial Infarction Maternal Grandmother         in 60's   • Diabetes Maternal Uncle    • Stroke Maternal Uncle    • Diabetes Maternal Uncle    • Myocardial Infarction Maternal Uncle    • Cancer, Skin Paternal Uncle    • Cancer, Lung Paternal Uncle    • Cancer, CNS Paternal Uncle        Social History     Tobacco Use   • Smoking status: Never Smoker   • Smokeless tobacco: Never Used   Vaping Use   • Vaping Use: never used   Substance Use Topics   • Alcohol use: Yes     Comment: 1-2 drinks once a month   • Drug use: Never       Review  of Systems    General: No weakness  Skin: No rash  Eye: No recent vision problems  ENMT: No sore throat  Respiratory: No shortness of breath  CV: No chest pain  GI: Abdominal pain  : [No dysuria]  MSK: No joint pain  Neurologic: No headache    Physical Exam     Patient Vitals for the past 24 hrs:   BP Temp Pulse Resp SpO2 Height Weight   12/08/21 1445 127/72 -- (!) 47 16 99 % -- --   12/08/21 1415 119/67 -- (!) 45 18 98 % -- --   12/08/21 1345 121/67 -- (!) 45 16 98 % -- --   12/08/21 1330 113/84 -- (!) 48 13 97 % -- --   12/08/21 1315 122/72 -- (!) 47 12 96 % -- --   12/08/21 1220 (!) 146/84 98.7 °F (37.1 °C) (!) 53 (!) 22 99 % 6' (1.829 m) 104.3 kg (230 lb)       Pulse Ox is 99 on Room Air which is [normal] for this patient    Physical Exam    General: Alert, no acute distress  Skin: Warm, dry  Head: Normocephalic atraumatic  Eye: PERRL, EOMI  ENMT: Oral mucosa [moist]  Neck: Supple, trachea midline  Cardiovascular: [Regular rate], rhythm, S1, S2  Respiratory: Lungs CTA, non-labored respirations, symmetrical expansion  GI: Soft, [nontender], nondistended [   ]  MSK: Normal ROM, no swelling, no deformity  Neuro: AAO x4, no focal neuro deficits  Psychiatric: Appropriate mood and affect    Lab Results     Results for orders placed or performed during the hospital encounter of 12/08/21   Comprehensive Metabolic Panel   Result Value Ref Range    Fasting Status      Sodium 139 135 - 145 mmol/L    Potassium 4.0 3.4 - 5.1 mmol/L    Chloride 102 98 - 107 mmol/L    Carbon Dioxide 23 21 - 32 mmol/L    Anion Gap 18 10 - 20 mmol/L    Glucose 128 (H) 70 - 99 mg/dL    BUN 15 6 - 20 mg/dL    Creatinine 1.20 (H) 0.67 - 1.17 mg/dL    Glomerular Filtration Rate 68 >=60    BUN/ Creatinine Ratio 13 7 - 25    Calcium 8.9 8.4 - 10.2 mg/dL    Bilirubin, Total 0.9 0.2 - 1.0 mg/dL    GOT/AST 20 <=37 Units/L    GPT/ALT 31 <64 Units/L    Alkaline Phosphatase 95 45 - 117 Units/L    Albumin 4.3 3.6 - 5.1 g/dL    Protein, Total 7.5 6.4 - 8.2  g/dL    Globulin 3.2 2.0 - 4.0 g/dL    A/G Ratio 1.3 1.0 - 2.4   Lipase   Result Value Ref Range    Lipase 66 (L) 73 - 393 Units/L   CBC with Automated Differential (performable only)   Result Value Ref Range    WBC 9.2 4.2 - 11.0 K/mcL    RBC 5.10 4.50 - 5.90 mil/mcL    HGB 16.2 13.0 - 17.0 g/dL    HCT 46.8 39.0 - 51.0 %    MCV 91.8 78.0 - 100.0 fl    MCH 31.8 26.0 - 34.0 pg    MCHC 34.6 32.0 - 36.5 g/dL    RDW-CV 12.5 11.0 - 15.0 %    RDW-SD 42.0 39.0 - 50.0 fL     140 - 450 K/mcL    NRBC 0 <=0 /100 WBC    Neutrophil, Percent 70 %    Lymphocytes, Percent 18 %    Mono, Percent 9 %    Eosinophils, Percent 2 %    Basophils, Percent 1 %    Immature Granulocytes 0 %    Absolute Neutrophils 6.5 1.8 - 7.7 K/mcL    Absolute Lymphocytes 1.7 1.0 - 4.0 K/mcL    Absolute Monocytes 0.8 0.3 - 0.9 K/mcL    Absolute Eosinophils  0.2 0.0 - 0.5 K/mcL    Absolute Basophils 0.1 0.0 - 0.3 K/mcL    Absolute Immmature Granulocytes 0.0 0.0 - 0.2 K/mcL   Urinalysis & Reflex Microscopy With Culture If Indicated   Result Value Ref Range    COLOR, URINALYSIS Yellow     APPEARANCE, URINALYSIS Clear     GLUCOSE, URINALYSIS Negative Negative mg/dL    BILIRUBIN, URINALYSIS Negative Negative    KETONES, URINALYSIS 15   (A) Negative mg/dL    SPECIFIC GRAVITY, URINALYSIS 1.020 1.005 - 1.030    OCCULT BLOOD, URINALYSIS Large (A) Negative    PH, URINALYSIS 6.0 5.0 - 7.0    PROTEIN, URINALYSIS Trace (A) Negative mg/dL    UROBILINOGEN, URINALYSIS 0.2 0.2, 1.0 mg/dL    NITRITE, URINALYSIS Negative Negative    LEUKOCYTE ESTERASE, URINALYSIS Negative Negative    SQUAMOUS EPITHELIAL, URINALYSIS None Seen None Seen, 1 to 5 /hpf    ERYTHROCYTES, URINALYSIS 6 to 10 (A) None Seen, 1 to 2 /hpf    LEUKOCYTES, URINALYSIS 1 to 5 None Seen, 1 to 5 /hpf    BACTERIA, URINALYSIS None Seen None Seen /hpf    HYALINE CASTS, URINALYSIS None Seen None Seen, 1 to 5 /lpf       EKG Results   [EKG]    Rhythm Strip Interpretation     Radiology Results     Imaging Results     None         ED Medication Orders (From admission, onward)    Ordered Start     Status Ordering Provider    12/08/21 1228 12/08/21 1230  ondansetron (ZOFRAN) injection 4 mg  ONCE         Last MAR action: Given JAY VILLA    12/08/21 1227 12/08/21 1230  morphine injection 4 mg  ONCE         Last MAR action: Given JAY VILLA            ED Course   Multiple diagnoses considered including but not limited to causes that are GI (Cholecystitis, Pancreatitis, Appendicitis, Diverticulitis, Hepatitis, Bowel Obstruction, intraabdominal abscess, perforation, hemorrhage, IBD, PUD, GERD) Vascular (Mesenteric Ischemia, Aortic Dissection, AAA)  (pyelonephritis, renal colic, torsion, infection) Cardiopulmonary (ACS, PNA)     Patient presents for evaluation of right flank pain that intensified over the past hour.  He was writhing in pain initially but after given morphine his pain subsided and he feels much better now.  He did pass a kidney stone in his urine today.  Given his recent lithotripsy and known history of stones I do not think that further imaging is warranted as his labs are stable.  His urine shows no evidence of acute UTI.  Symptoms are consistent with renal colic and the patient has no abdominal tenderness.  Usual return precaution instructions were given.  Patient is comfortable with discharge at this point.    MDM    [MDM]    Procedures    Clinical Impression     ED Diagnosis   1. Renal colic         Disposition        Discharge 12/8/2021  2:56 PM  Pablito Stephenson discharge to home/self care.          Jay Villa MD   12/8/2021 12:48 PM                      Jay Villa MD  12/08/21 1514     99

## 2021-12-14 NOTE — ASSESSMENT
[FreeTextEntry1] : # Recurrent adenocarcinoma Mullerian origin, malignant ascites diagnosed on 2020\par --Prior SHAAN BSO in 2016, adjuvant chemotherapy, obtain records from Dr. Abdullahi \par --Started on weekly carbo/taxol weekly 3 on 1 off on 2020.\par --PET CT reviewed from 20. \par --Lab work today with Stable blood counts\par --For chemo 10/16/2020\par --PICC line care ( Patient insisted to flush PICC line once / week.)\par --On 21 patient completed 6 cycles of Carbo/Taxol (weekly), declines any additional cancer directed therapy on 2021\par --On 21 we reviewed PET Scan from 21 with good response to chemo.\par --Referred Patient to Urology for  left hydroureteronephrosis, no intervention for now, sono in 3 months per Dr. Singletary \par --She went to [facility] in 2021 for a  for 3 weeks\par --CT A/P from 2021 (abd pain) shows no new disease ; CTa Chest from 2021 ; will repeat both in about 3 months ( ~2021) (tasked Navigators for assistance with scheduling)\par \par # Malignant ascites required high volume paracentesis \par --Improved/resolved with carbo/taxol \par \par # PE in the setting of malignancy diagnosed on 2020\par --Doppler was negative for DVT on 2020\par --On BID Lovenox, but ran out on 2020\par --Also on ASA, h/o CABG . \par --Was stared on Xarelto, but after 1 dose was admitted with recurrent PE, diagnosed on 2020\par --Patient will switch from Lovenox to Xarelto 20 mg po daily on 2021.\par --c/w Xarelto 20mg daily, Rx renewed \par \par Will send Nexium due to gassiness + belching.  She has GI followup pending.  \par \par RTC in 3 months with CBC, BMP, LFTs, Ca125 week prior

## 2021-12-14 NOTE — REVIEW OF SYSTEMS
[SOB on Exertion] : shortness of breath during exertion [Negative] : Allergic/Immunologic [Fever] : no fever [Chills] : no chills [Night Sweats] : no night sweats [Fatigue] : no fatigue [Recent Change In Weight] : ~T no recent weight change [Shortness Of Breath] : no shortness of breath [Wheezing] : no wheezing [Cough] : no cough [Abdominal Pain] : no abdominal pain [Vomiting] : no vomiting [Constipation] : no constipation [Diarrhea] : no diarrhea [Confused] : no confusion [Dizziness] : no dizziness [Fainting] : no fainting [Difficulty Walking] : no difficulty walking [FreeTextEntry7] : early satiety [de-identified] : minor preexisting neuropathy

## 2021-12-14 NOTE — HISTORY OF PRESENT ILLNESS
[de-identified] : Trav is a shekhar 81 year old lady I initially met in the hospital on 8/11/2020. Her past medical history is significant for uterine cancer s/p SHAAN BSO as per patient s/p adjuvant chemotherapy in 2016, CAD s/p CABG, HTN and depression, she presented to the ED for abdominal pain and chest discomfort. 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 \par \par PAST MEDICAL & SURGICAL HISTORY:\par HLD (hyperlipidemia)\par HTN (hypertension)\par CAD (coronary artery disease)\par History of coronary artery bypass surgery\par \par FAMILY HISTORY:\par No pertinent family history in first degree relatives\par \par Allergies\par chloroquine (Hives)\par \par CTA C/A/P on 8/6/2020 revealed \par 1. Intraluminal filling defect is seen within the segmental branches of the right lower lobe pulmonary artery, indicating acute pulmonary embolism. (6/132-138; 10/51-55). Filling defect in segmental branch of the right upper lobe pulmonary artery. (6/77) There is a small filling defect within a left lower lobe pulmonary artery segmental branch (6/147; 10/59). No evidence of right heart strain. The right ventricle measures 3.5 cm; the left ventricle measures 3.7 cm. (RV:LV<1) \par 2. Compared with the previous CT scan of 5/27/2020, there is now a large amount of ascites throughout the abdomen and pelvis. \par \par LE Doppler on 8/8/2020 revealed \par No evidence of deep venous thrombosis or superficial thrombophlebitis in the bilateral lower extremities. \par Left popliteal fossa fluid collection as measured above likely Baker's cyst\par \par During her admission she has required 3 episodes of paracentesis on 8/7 3L, 8/12 4L, 8/13 2.2L, once pathology became available patient was started on weekly Carbo/Taxol she has tolerated chemotherapy well.  [de-identified] : 2020: She returns today for follow up, she reports improved SOB, improved abdominal swelling, still come positional chest discomfort, she is compliant with BID Lovenox, she remains on the ASA. She reports improved appetite, she reports that appetite is decreased with a large amount of fluid on the abdomen. She is due for C1D8 of Carbo/Taxol on 2020. CBC was reviewed. \par Side effects of chemotherapy including, but not limited to, cytopenias, that may require blood product transfusions and lead to increased risk of infection, requiring hospitalization, allergic reactions, that can rarely be life-threatening, skin reactions, nausea/vomiting, mucositis, diarrhea/constipation were discussed at length, patient and family voice undestaging, all questions were answered to patient's satisfaction.\par Images were personally reviewed by MD Matt and discussed with patient.\par \par 9/10/2020: Patient is 80 years old female came  today for follow up for  Recurrent adenocarcinoma Mullerian origin, malignant ascites diagnosed on 2020, she S/P one cycle of weekly Carbo/taxol she tolerated well. She  reports improved SOB, improved abdominal swelling, she is ran out of Lovenox, which she is supposed to be taking BID, she didn't call for the renewal, she remains on the ASA. She  has been without Lovenox for like 7 days now, we will restart her on AC immediately. Xarelto was prescribed. She was educated again that she will need to remain on long-term anticoagulation. She reports good  appetite  She is due to start the new cycle of chemo on 20.\par PET Scan reviewed from 20 which revealed\par Mild diffuse FDG uptake throughout the mesentery along with peritoneal stranding and nodularity compatible with peritoneal carcinomatosis (SUV up to 3.9). \par No additional sites of abnormal FDG uptake to suggest biologic tumor activity.\par Images were personally reviewed by MD Matt and discussed with patient. \par \par 10/15/2020: Trav present for follow up, her next chemotherapy is for tomorrow, we will need to place PICC line, this will be arrange as outpatient. \par CT chest on 10/11/2020 revealed When compared to prior study dated 2020: \par Although not a dedicated PE study, redemonstration of a right lower lobe segmental/subsegmental pulmonary embolus which appears overall decreased in size from one month prior. \par No evidence for new consolidation, pleural effusion or pneumothorax. \par Trav reports mild neuropathy, not worsening with chemotherapy, CBC was reviewed. Overall clinically she demonstrates great response to therapy. \par \par 2020: Trav is 80 years old female  present for follow up, her ncycle 4 of carbo/taxol is for tomorrow. She repots feeling better regard urination and hematuria. She stated no more blood with her urine but sometime feels pain while she is urinating.\par We communicated CBC result with Patient with HGB/ HCT is 9.6/29.5. Also patient currently on the therapeutic lovenox once every 12 hrs.\par Trav reports neuropathy is getting  worse with chemotherapy, CBC was reviewed. Overall clinically she demonstrates great response to therapy.\par \par 12/10/2020: Trav is a 80 years old female  present for follow up for Recurrent adenocarcinoma Mullerian origin, malignant ascites diagnosed on 2020, S/P 4 cycles of weekly Carboplatin and Taxol.  She reports feeling well, tolerating treatment. She denies hematuria, abdominal pain. \par We communicated CBC result with Patient with HGB/ HCT is 9.1/29.5. Also patient continues on the therapeutic Lovenox once every 12 hrs.\par Patient will start Cycle # 5 tomorrow.\par \par 21: Trav is a 80 years old female  present for follow up for Recurrent adenocarcinoma Mullerian origin, malignant ascites diagnosed on 2020, S/P 6 cycles of weekly Carboplatin and Taxol.  She reports feeling well , Mildly tired , tolerating her diet better. She denies hematuria, abdominal pain. \par We communicated CBC result with Patient with HGB/ HCT is 9.7 /30.6.  Also patient continues on the therapeutic Lovenox once every 12 hrs.\par Patient still complaining  of mild dysuria no bleeding. we will send for Urine analysis. \par Patient was educated to switch from Lovenox to Xarelto 20 mg po daily.\par Patient is due for PET Scan . Prescription provided. \par \par 21: Trav is a 80 years old female  who presents for follow up for Recurrent adenocarcinoma Mullerian origin, malignant ascites diagnosed on 2020, S/P 6 cycles of weekly Carboplatin and Taxol.  She reports feeling OK, reports fatigue, tolerating her diet better. She denies hematuria, abdominal pain. She reports she does not wish at this point to receive any additional cancer-directed therapy, ad wishes to stop Lovenox. \par PET CT on 2/15/21 revealed \par Compared to PET/CT of 2020, no definite sites of abnormal FDG uptake to suggest biologic tumor activity.\par Interval retraction of a right PICC with tip noted within the right axilla (when correlated to chest radiograph dated 10/22/2020).\par Increased left hydroureteronephrosis to the level of the midureter without definite obstructing radiopaque calculus.\par Images were personally reviewed by MD Matt and discussed with patient. \par We recommended for the patient to follow up with urology, we will assist with appointment.\par We communicated CBC result with Patient with HGB/ HCT is 10.0  /32.6.  Also patient will switch from Lovenox to Xarelto, we have already prescribed that during her prior visit, she did not however switch over.\par Patient was educated to switch from Lovenox to Xarelto 20 mg po daily.\par \par 2021: Trav is feeling well, reports no weight loss, no new pulm or neurological symptoms, no new pain, no new GI/ symptoms. \par She is tolerating Xarelto and reports no bleeding. She has followed up with Dr. Singletary, no intervention for hydronephrosis. \par Her older brother passed away, he will be buried in [facility] in  she wishes to go. I recommended she takes COVID vaccine before she goes and she will need to continue with Xarelto. She  prefers to get restaging scans after she comes back. \par \par 2201: Trav is here for a follow up for Uterine cancer, she reports feeling well, reports no weight loss, no new pulm or neurological symptoms, no new pain, no new GI/ symptoms. \par She is tolerating Xarelto and reports no bleeding. She has followed up with Dr. Singletary, no intervention for hydronephrosis. \par She will travel to [facility] on 21 for 3 weeks for her older brother . She will need to continue with Xarelto. \par She will follow up in 4 weeks, repeat follow up CT C/A/P when she is back from [facility] per her request. \par \par 21\par Patient is here for a follow-up visit for hx of Pulmonary Embolism and Uterine Cancer.  She is feeling well with no new complaints.  Reviewed most recent CBC, which is stable.  Patient denies fever, chills, nausea, vomiting, dyspnea or bleeding.  She continues on Xarelto daily as tolerated.  Reviewed the last C125 from 2021 which was WNL.  She was seen recently in the ER due to abdominal pain related to gassiness.  She has GI followup pending.  \par CT A/P (2021) IMPRESSION:No evidence of intra-abdominal or pelvic mass or inflammatory process.No evidence of intra-thoracic, abdominal or pelvic visceral laceration or hematoma.A small amount of ascites is noted around the liver, right paracolic gutter, and within the pelvis.\par CTa Chest (2021)IMPRESSION:No evidence of pulmonary embolism.

## 2021-12-14 NOTE — CONSULT LETTER
[Dear  ___] : Dear  [unfilled], [Consult Letter:] : I had the pleasure of evaluating your patient, [unfilled]. [( Thank you for referring [unfilled] for consultation for _____ )] : Thank you for referring [unfilled] for consultation for [unfilled] [Please see my note below.] : Please see my note below. [Consult Closing:] : Thank you very much for allowing me to participate in the care of this patient.  If you have any questions, please do not hesitate to contact me. [Sincerely,] : Sincerely, [FreeTextEntry3] : Paul Greco DO\par Attending Physician,\par Hematology/ Medical Oncology\par [phone] office\par \par

## 2022-01-01 ENCOUNTER — TRANSCRIPTION ENCOUNTER (OUTPATIENT)
Age: 82
End: 2022-01-01

## 2022-01-01 ENCOUNTER — APPOINTMENT (OUTPATIENT)
Dept: HEMATOLOGY ONCOLOGY | Facility: CLINIC | Age: 82
End: 2022-01-01

## 2022-01-01 ENCOUNTER — LABORATORY RESULT (OUTPATIENT)
Age: 82
End: 2022-01-01

## 2022-01-01 ENCOUNTER — APPOINTMENT (OUTPATIENT)
Dept: GASTROENTEROLOGY | Facility: CLINIC | Age: 82
End: 2022-01-01

## 2022-01-01 ENCOUNTER — OUTPATIENT (OUTPATIENT)
Dept: OUTPATIENT SERVICES | Facility: HOSPITAL | Age: 82
LOS: 1 days | Discharge: HOME | End: 2022-01-01

## 2022-01-01 ENCOUNTER — RESULT REVIEW (OUTPATIENT)
Age: 82
End: 2022-01-01

## 2022-01-01 ENCOUNTER — NON-APPOINTMENT (OUTPATIENT)
Age: 82
End: 2022-01-01

## 2022-01-01 ENCOUNTER — INPATIENT (INPATIENT)
Facility: HOSPITAL | Age: 82
LOS: 2 days | Discharge: HOME | End: 2022-02-19
Attending: HOSPITALIST | Admitting: HOSPITALIST
Payer: MEDICARE

## 2022-01-01 ENCOUNTER — APPOINTMENT (OUTPATIENT)
Dept: PSYCHIATRY | Facility: CLINIC | Age: 82
End: 2022-01-01

## 2022-01-01 ENCOUNTER — INPATIENT (INPATIENT)
Facility: HOSPITAL | Age: 82
LOS: 2 days | Discharge: HOME HEALTH | End: 2022-03-23
Attending: INTERNAL MEDICINE | Admitting: INTERNAL MEDICINE
Payer: MEDICARE

## 2022-01-01 ENCOUNTER — INPATIENT (INPATIENT)
Facility: HOSPITAL | Age: 82
LOS: 0 days | Discharge: HOME | End: 2022-04-22
Attending: INTERNAL MEDICINE | Admitting: INTERNAL MEDICINE
Payer: MEDICARE

## 2022-01-01 ENCOUNTER — APPOINTMENT (OUTPATIENT)
Dept: HEMATOLOGY ONCOLOGY | Facility: CLINIC | Age: 82
End: 2022-01-01
Payer: MEDICARE

## 2022-01-01 ENCOUNTER — APPOINTMENT (OUTPATIENT)
Dept: INFUSION THERAPY | Facility: CLINIC | Age: 82
End: 2022-01-01

## 2022-01-01 ENCOUNTER — RX RENEWAL (OUTPATIENT)
Age: 82
End: 2022-01-01

## 2022-01-01 ENCOUNTER — EMERGENCY (EMERGENCY)
Facility: HOSPITAL | Age: 82
LOS: 0 days | Discharge: HOME | End: 2022-01-11
Attending: STUDENT IN AN ORGANIZED HEALTH CARE EDUCATION/TRAINING PROGRAM | Admitting: STUDENT IN AN ORGANIZED HEALTH CARE EDUCATION/TRAINING PROGRAM
Payer: MEDICARE

## 2022-01-01 ENCOUNTER — OUTPATIENT (OUTPATIENT)
Dept: OUTPATIENT SERVICES | Facility: HOSPITAL | Age: 82
LOS: 1 days | Discharge: HOME | End: 2022-01-01
Payer: MEDICARE

## 2022-01-01 ENCOUNTER — EMERGENCY (EMERGENCY)
Facility: HOSPITAL | Age: 82
LOS: 0 days | Discharge: HOME | End: 2022-03-03
Attending: EMERGENCY MEDICINE | Admitting: EMERGENCY MEDICINE
Payer: MEDICARE

## 2022-01-01 ENCOUNTER — INPATIENT (INPATIENT)
Facility: HOSPITAL | Age: 82
LOS: 13 days | End: 2022-05-14
Attending: HOSPITALIST | Admitting: HOSPITALIST
Payer: MEDICARE

## 2022-01-01 ENCOUNTER — INPATIENT (INPATIENT)
Facility: HOSPITAL | Age: 82
LOS: 4 days | Discharge: HOME HEALTH | End: 2022-04-30
Attending: HOSPITALIST | Admitting: HOSPITALIST
Payer: MEDICARE

## 2022-01-01 ENCOUNTER — APPOINTMENT (OUTPATIENT)
Dept: INFUSION THERAPY | Facility: CLINIC | Age: 82
End: 2022-01-01
Payer: MEDICARE

## 2022-01-01 VITALS
HEART RATE: 70 BPM | DIASTOLIC BLOOD PRESSURE: 64 MMHG | RESPIRATION RATE: 18 BRPM | TEMPERATURE: 98 F | SYSTOLIC BLOOD PRESSURE: 108 MMHG

## 2022-01-01 VITALS
RESPIRATION RATE: 18 BRPM | HEIGHT: 64 IN | WEIGHT: 127 LBS | HEART RATE: 86 BPM | SYSTOLIC BLOOD PRESSURE: 109 MMHG | DIASTOLIC BLOOD PRESSURE: 69 MMHG | BODY MASS INDEX: 21.68 KG/M2 | TEMPERATURE: 97.8 F

## 2022-01-01 VITALS
RESPIRATION RATE: 20 BRPM | SYSTOLIC BLOOD PRESSURE: 130 MMHG | TEMPERATURE: 97.3 F | DIASTOLIC BLOOD PRESSURE: 81 MMHG | HEART RATE: 106 BPM

## 2022-01-01 VITALS
HEIGHT: 63 IN | RESPIRATION RATE: 17 BRPM | HEART RATE: 101 BPM | WEIGHT: 160.06 LBS | DIASTOLIC BLOOD PRESSURE: 77 MMHG | OXYGEN SATURATION: 99 % | SYSTOLIC BLOOD PRESSURE: 134 MMHG | TEMPERATURE: 99 F

## 2022-01-01 VITALS
WEIGHT: 145.06 LBS | HEART RATE: 111 BPM | SYSTOLIC BLOOD PRESSURE: 128 MMHG | HEIGHT: 63 IN | TEMPERATURE: 97 F | DIASTOLIC BLOOD PRESSURE: 81 MMHG | RESPIRATION RATE: 20 BRPM

## 2022-01-01 VITALS
DIASTOLIC BLOOD PRESSURE: 70 MMHG | TEMPERATURE: 97 F | RESPIRATION RATE: 18 BRPM | SYSTOLIC BLOOD PRESSURE: 117 MMHG | HEART RATE: 85 BPM

## 2022-01-01 VITALS
DIASTOLIC BLOOD PRESSURE: 70 MMHG | SYSTOLIC BLOOD PRESSURE: 123 MMHG | TEMPERATURE: 96 F | OXYGEN SATURATION: 96 % | WEIGHT: 123.02 LBS | HEART RATE: 83 BPM | HEIGHT: 63 IN | RESPIRATION RATE: 20 BRPM

## 2022-01-01 VITALS
BODY MASS INDEX: 21.68 KG/M2 | SYSTOLIC BLOOD PRESSURE: 112 MMHG | HEIGHT: 64 IN | HEART RATE: 91 BPM | DIASTOLIC BLOOD PRESSURE: 72 MMHG | RESPIRATION RATE: 20 BRPM | WEIGHT: 127 LBS | TEMPERATURE: 97.4 F

## 2022-01-01 VITALS
SYSTOLIC BLOOD PRESSURE: 106 MMHG | DIASTOLIC BLOOD PRESSURE: 73 MMHG | HEIGHT: 63 IN | TEMPERATURE: 98 F | HEART RATE: 91 BPM | RESPIRATION RATE: 18 BRPM | OXYGEN SATURATION: 95 %

## 2022-01-01 VITALS
HEART RATE: 81 BPM | SYSTOLIC BLOOD PRESSURE: 110 MMHG | RESPIRATION RATE: 18 BRPM | TEMPERATURE: 99 F | DIASTOLIC BLOOD PRESSURE: 59 MMHG

## 2022-01-01 VITALS
TEMPERATURE: 97 F | HEART RATE: 88 BPM | SYSTOLIC BLOOD PRESSURE: 114 MMHG | DIASTOLIC BLOOD PRESSURE: 60 MMHG | RESPIRATION RATE: 18 BRPM

## 2022-01-01 VITALS
DIASTOLIC BLOOD PRESSURE: 68 MMHG | HEART RATE: 72 BPM | SYSTOLIC BLOOD PRESSURE: 121 MMHG | RESPIRATION RATE: 18 BRPM | OXYGEN SATURATION: 99 %

## 2022-01-01 VITALS
SYSTOLIC BLOOD PRESSURE: 127 MMHG | RESPIRATION RATE: 18 BRPM | DIASTOLIC BLOOD PRESSURE: 81 MMHG | OXYGEN SATURATION: 98 % | HEART RATE: 87 BPM | TEMPERATURE: 97 F

## 2022-01-01 VITALS
DIASTOLIC BLOOD PRESSURE: 76 MMHG | TEMPERATURE: 97.8 F | BODY MASS INDEX: 22.71 KG/M2 | HEART RATE: 113 BPM | SYSTOLIC BLOOD PRESSURE: 123 MMHG | HEIGHT: 64 IN | WEIGHT: 133 LBS | RESPIRATION RATE: 20 BRPM

## 2022-01-01 VITALS
SYSTOLIC BLOOD PRESSURE: 122 MMHG | OXYGEN SATURATION: 99 % | HEIGHT: 63 IN | DIASTOLIC BLOOD PRESSURE: 68 MMHG | TEMPERATURE: 98 F | RESPIRATION RATE: 20 BRPM | HEART RATE: 81 BPM

## 2022-01-01 VITALS
SYSTOLIC BLOOD PRESSURE: 129 MMHG | DIASTOLIC BLOOD PRESSURE: 87 MMHG | WEIGHT: 145.06 LBS | TEMPERATURE: 98 F | RESPIRATION RATE: 20 BRPM | HEART RATE: 79 BPM | HEIGHT: 63 IN | OXYGEN SATURATION: 97 %

## 2022-01-01 VITALS — HEIGHT: 63 IN

## 2022-01-01 VITALS
DIASTOLIC BLOOD PRESSURE: 72 MMHG | WEIGHT: 134.92 LBS | OXYGEN SATURATION: 99 % | TEMPERATURE: 99 F | RESPIRATION RATE: 20 BRPM | HEIGHT: 63 IN | SYSTOLIC BLOOD PRESSURE: 113 MMHG | HEART RATE: 98 BPM

## 2022-01-01 VITALS — OXYGEN SATURATION: 70 % | HEART RATE: 67 BPM

## 2022-01-01 DIAGNOSIS — Z95.1 PRESENCE OF AORTOCORONARY BYPASS GRAFT: Chronic | ICD-10-CM

## 2022-01-01 DIAGNOSIS — F32.A DEPRESSION, UNSPECIFIED: ICD-10-CM

## 2022-01-01 DIAGNOSIS — C55 MALIGNANT NEOPLASM OF UTERUS, PART UNSPECIFIED: ICD-10-CM

## 2022-01-01 DIAGNOSIS — Z95.1 PRESENCE OF AORTOCORONARY BYPASS GRAFT: ICD-10-CM

## 2022-01-01 DIAGNOSIS — E78.00 PURE HYPERCHOLESTEROLEMIA, UNSPECIFIED: ICD-10-CM

## 2022-01-01 DIAGNOSIS — Z98.890 OTHER SPECIFIED POSTPROCEDURAL STATES: Chronic | ICD-10-CM

## 2022-01-01 DIAGNOSIS — H40.9 UNSPECIFIED GLAUCOMA: ICD-10-CM

## 2022-01-01 DIAGNOSIS — I26.99 OTHER PULMONARY EMBOLISM WITHOUT ACUTE COR PULMONALE: ICD-10-CM

## 2022-01-01 DIAGNOSIS — Z90.710 ACQUIRED ABSENCE OF BOTH CERVIX AND UTERUS: ICD-10-CM

## 2022-01-01 DIAGNOSIS — E78.5 HYPERLIPIDEMIA, UNSPECIFIED: ICD-10-CM

## 2022-01-01 DIAGNOSIS — I10 ESSENTIAL (PRIMARY) HYPERTENSION: ICD-10-CM
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
## 2022-01-01 DIAGNOSIS — R11.2 NAUSEA WITH VOMITING, UNSPECIFIED: ICD-10-CM

## 2022-01-01 DIAGNOSIS — I25.701 ATHEROSCLEROSIS OF CORONARY ARTERY BYPASS GRAFT(S), UNSPECIFIED, WITH ANGINA PECTORIS WITH DOCUMENTED SPASM: Chronic | ICD-10-CM

## 2022-01-01 DIAGNOSIS — Z88.8 ALLERGY STATUS TO OTHER DRUGS, MEDICAMENTS AND BIOLOGICAL SUBSTANCES STATUS: ICD-10-CM

## 2022-01-01 DIAGNOSIS — H26.9 UNSPECIFIED CATARACT: ICD-10-CM

## 2022-01-01 DIAGNOSIS — N18.32 CHRONIC KIDNEY DISEASE, STAGE 3B: ICD-10-CM

## 2022-01-01 DIAGNOSIS — G62.9 POLYNEUROPATHY, UNSPECIFIED: ICD-10-CM

## 2022-01-01 DIAGNOSIS — I12.9 HYPERTENSIVE CHRONIC KIDNEY DISEASE WITH STAGE 1 THROUGH STAGE 4 CHRONIC KIDNEY DISEASE, OR UNSPECIFIED CHRONIC KIDNEY DISEASE: ICD-10-CM

## 2022-01-01 DIAGNOSIS — K59.00 CONSTIPATION, UNSPECIFIED: ICD-10-CM

## 2022-01-01 DIAGNOSIS — R10.9 UNSPECIFIED ABDOMINAL PAIN: ICD-10-CM

## 2022-01-01 DIAGNOSIS — Z85.42 PERSONAL HISTORY OF MALIGNANT NEOPLASM OF OTHER PARTS OF UTERUS: ICD-10-CM

## 2022-01-01 DIAGNOSIS — I25.111 ATHEROSCLEROTIC HEART DISEASE OF NATIVE CORONARY ARTERY WITH ANGINA PECTORIS WITH DOCUMENTED SPASM: ICD-10-CM

## 2022-01-01 DIAGNOSIS — R20.2 PARESTHESIA OF SKIN: ICD-10-CM

## 2022-01-01 DIAGNOSIS — C56.9 MALIGNANT NEOPLASM OF UNSPECIFIED OVARY: ICD-10-CM

## 2022-01-01 DIAGNOSIS — Z95.828 PRESENCE OF OTHER VASCULAR IMPLANTS AND GRAFTS: ICD-10-CM

## 2022-01-01 DIAGNOSIS — F41.9 ANXIETY DISORDER, UNSPECIFIED: ICD-10-CM

## 2022-01-01 DIAGNOSIS — H25.89 OTHER AGE-RELATED CATARACT: ICD-10-CM

## 2022-01-01 DIAGNOSIS — R18.8 OTHER ASCITES: ICD-10-CM

## 2022-01-01 DIAGNOSIS — Z79.01 LONG TERM (CURRENT) USE OF ANTICOAGULANTS: ICD-10-CM

## 2022-01-01 DIAGNOSIS — R52 PAIN, UNSPECIFIED: ICD-10-CM

## 2022-01-01 DIAGNOSIS — F32.9 MAJOR DEPRESSIVE DISORDER, SINGLE EPISODE, UNSPECIFIED: ICD-10-CM

## 2022-01-01 DIAGNOSIS — I36.1 NONRHEUMATIC TRICUSPID (VALVE) INSUFFICIENCY: ICD-10-CM

## 2022-01-01 DIAGNOSIS — N18.30 CHRONIC KIDNEY DISEASE, STAGE 3 UNSPECIFIED: ICD-10-CM

## 2022-01-01 DIAGNOSIS — I25.10 ATHEROSCLEROTIC HEART DISEASE OF NATIVE CORONARY ARTERY WITHOUT ANGINA PECTORIS: ICD-10-CM

## 2022-01-01 DIAGNOSIS — R18.0 MALIGNANT ASCITES: ICD-10-CM

## 2022-01-01 DIAGNOSIS — Z86.711 PERSONAL HISTORY OF PULMONARY EMBOLISM: ICD-10-CM

## 2022-01-01 DIAGNOSIS — E88.09 OTHER DISORDERS OF PLASMA-PROTEIN METABOLISM, NOT ELSEWHERE CLASSIFIED: ICD-10-CM

## 2022-01-01 DIAGNOSIS — C78.6 SECONDARY MALIGNANT NEOPLASM OF RETROPERITONEUM AND PERITONEUM: ICD-10-CM

## 2022-01-01 DIAGNOSIS — C79.89 SECONDARY MALIGNANT NEOPLASM OF OTHER SPECIFIED SITES: ICD-10-CM

## 2022-01-01 DIAGNOSIS — C54.1 MALIGNANT NEOPLASM OF ENDOMETRIUM: ICD-10-CM

## 2022-01-01 DIAGNOSIS — Z88.8 ALLERGY STATUS TO OTHER DRUGS, MEDICAMENTS AND BIOLOGICAL SUBSTANCES: ICD-10-CM

## 2022-01-01 DIAGNOSIS — Z79.899 OTHER LONG TERM (CURRENT) DRUG THERAPY: ICD-10-CM

## 2022-01-01 DIAGNOSIS — R11.0 NAUSEA: ICD-10-CM

## 2022-01-01 DIAGNOSIS — I26.99 OTHER PULMONARY EMBOLISM W/OUT ACUTE COR PULMONALE: ICD-10-CM

## 2022-01-01 DIAGNOSIS — D64.9 ANEMIA, UNSPECIFIED: ICD-10-CM

## 2022-01-01 DIAGNOSIS — M43.16 SPONDYLOLISTHESIS, LUMBAR REGION: ICD-10-CM

## 2022-01-01 DIAGNOSIS — Z51.5 ENCOUNTER FOR PALLIATIVE CARE: ICD-10-CM

## 2022-01-01 DIAGNOSIS — T45.1X5A ADVERSE EFFECT OF ANTINEOPLASTIC AND IMMUNOSUPPRESSIVE DRUGS, INITIAL ENCOUNTER: ICD-10-CM

## 2022-01-01 DIAGNOSIS — Z45.2 ENCOUNTER FOR ADJUSTMENT AND MANAGEMENT OF VASCULAR ACCESS DEVICE: ICD-10-CM

## 2022-01-01 DIAGNOSIS — K56.7 ILEUS, UNSPECIFIED: ICD-10-CM

## 2022-01-01 DIAGNOSIS — R53.83 OTHER FATIGUE: ICD-10-CM

## 2022-01-01 DIAGNOSIS — E63.9 NUTRITIONAL DEFICIENCY, UNSPECIFIED: ICD-10-CM

## 2022-01-01 DIAGNOSIS — R53.1 WEAKNESS: ICD-10-CM

## 2022-01-01 DIAGNOSIS — I25.118 ATHEROSCLEROTIC HEART DISEASE OF NATIVE CORONARY ARTERY WITH OTHER FORMS OF ANGINA PECTORIS: ICD-10-CM

## 2022-01-01 LAB
A1C WITH ESTIMATED AVERAGE GLUCOSE RESULT: 5.5 % — SIGNIFICANT CHANGE UP (ref 4–5.6)
ALBUMIN SERPL ELPH-MCNC: 2.1 G/DL — LOW (ref 3.5–5.2)
ALBUMIN SERPL ELPH-MCNC: 2.1 G/DL — LOW (ref 3.5–5.2)
ALBUMIN SERPL ELPH-MCNC: 2.4 G/DL — LOW (ref 3.5–5.2)
ALBUMIN SERPL ELPH-MCNC: 2.5 G/DL — LOW (ref 3.5–5.2)
ALBUMIN SERPL ELPH-MCNC: 2.6 G/DL — LOW (ref 3.5–5.2)
ALBUMIN SERPL ELPH-MCNC: 2.8 G/DL — LOW (ref 3.5–5.2)
ALBUMIN SERPL ELPH-MCNC: 2.9 G/DL — LOW (ref 3.5–5.2)
ALBUMIN SERPL ELPH-MCNC: 3 G/DL — LOW (ref 3.5–5.2)
ALBUMIN SERPL ELPH-MCNC: 3.2 G/DL — LOW (ref 3.5–5.2)
ALBUMIN SERPL ELPH-MCNC: 3.2 G/DL — LOW (ref 3.5–5.2)
ALBUMIN SERPL ELPH-MCNC: 3.3 G/DL — LOW (ref 3.5–5.2)
ALBUMIN SERPL ELPH-MCNC: 3.3 G/DL — LOW (ref 3.5–5.2)
ALBUMIN SERPL ELPH-MCNC: 3.5 G/DL
ALBUMIN SERPL ELPH-MCNC: 3.6 G/DL
ALBUMIN SERPL ELPH-MCNC: 3.6 G/DL
ALBUMIN SERPL ELPH-MCNC: 3.6 G/DL — SIGNIFICANT CHANGE UP (ref 3.5–5.2)
ALBUMIN SERPL ELPH-MCNC: 3.6 G/DL — SIGNIFICANT CHANGE UP (ref 3.5–5.2)
ALBUMIN SERPL ELPH-MCNC: 3.8 G/DL
ALBUMIN SERPL ELPH-MCNC: 3.9 G/DL — SIGNIFICANT CHANGE UP (ref 3.5–5.2)
ALP BLD-CCNC: 70 U/L
ALP BLD-CCNC: 75 U/L
ALP BLD-CCNC: 80 U/L
ALP BLD-CCNC: 85 U/L
ALP SERPL-CCNC: 104 U/L — SIGNIFICANT CHANGE UP (ref 30–115)
ALP SERPL-CCNC: 105 U/L — SIGNIFICANT CHANGE UP (ref 30–115)
ALP SERPL-CCNC: 106 U/L — SIGNIFICANT CHANGE UP (ref 30–115)
ALP SERPL-CCNC: 113 U/L — SIGNIFICANT CHANGE UP (ref 30–115)
ALP SERPL-CCNC: 113 U/L — SIGNIFICANT CHANGE UP (ref 30–115)
ALP SERPL-CCNC: 123 U/L — HIGH (ref 30–115)
ALP SERPL-CCNC: 129 U/L — HIGH (ref 30–115)
ALP SERPL-CCNC: 153 U/L — HIGH (ref 30–115)
ALP SERPL-CCNC: 247 U/L — HIGH (ref 30–115)
ALP SERPL-CCNC: 62 U/L — SIGNIFICANT CHANGE UP (ref 30–115)
ALP SERPL-CCNC: 64 U/L — SIGNIFICANT CHANGE UP (ref 30–115)
ALP SERPL-CCNC: 66 U/L — SIGNIFICANT CHANGE UP (ref 30–115)
ALP SERPL-CCNC: 75 U/L — SIGNIFICANT CHANGE UP (ref 30–115)
ALP SERPL-CCNC: 76 U/L — SIGNIFICANT CHANGE UP (ref 30–115)
ALP SERPL-CCNC: 76 U/L — SIGNIFICANT CHANGE UP (ref 30–115)
ALP SERPL-CCNC: 78 U/L — SIGNIFICANT CHANGE UP (ref 30–115)
ALP SERPL-CCNC: 79 U/L — SIGNIFICANT CHANGE UP (ref 30–115)
ALP SERPL-CCNC: 80 U/L — SIGNIFICANT CHANGE UP (ref 30–115)
ALP SERPL-CCNC: 85 U/L — SIGNIFICANT CHANGE UP (ref 30–115)
ALP SERPL-CCNC: 85 U/L — SIGNIFICANT CHANGE UP (ref 30–115)
ALP SERPL-CCNC: 90 U/L — SIGNIFICANT CHANGE UP (ref 30–115)
ALP SERPL-CCNC: 99 U/L — SIGNIFICANT CHANGE UP (ref 30–115)
ALP SERPL-CCNC: 99 U/L — SIGNIFICANT CHANGE UP (ref 30–115)
ALT FLD-CCNC: 10 U/L — SIGNIFICANT CHANGE UP (ref 0–41)
ALT FLD-CCNC: 10 U/L — SIGNIFICANT CHANGE UP (ref 0–41)
ALT FLD-CCNC: 11 U/L — SIGNIFICANT CHANGE UP (ref 0–41)
ALT FLD-CCNC: 12 U/L — SIGNIFICANT CHANGE UP (ref 0–41)
ALT FLD-CCNC: 13 U/L — SIGNIFICANT CHANGE UP (ref 0–41)
ALT FLD-CCNC: 14 U/L — SIGNIFICANT CHANGE UP (ref 0–41)
ALT FLD-CCNC: 14 U/L — SIGNIFICANT CHANGE UP (ref 0–41)
ALT FLD-CCNC: 17 U/L — SIGNIFICANT CHANGE UP (ref 0–41)
ALT FLD-CCNC: 19 U/L — SIGNIFICANT CHANGE UP (ref 0–41)
ALT FLD-CCNC: 20 U/L — SIGNIFICANT CHANGE UP (ref 0–41)
ALT FLD-CCNC: 23 U/L — SIGNIFICANT CHANGE UP (ref 0–41)
ALT FLD-CCNC: 26 U/L — SIGNIFICANT CHANGE UP (ref 0–41)
ALT FLD-CCNC: 28 U/L — SIGNIFICANT CHANGE UP (ref 0–41)
ALT FLD-CCNC: 35 U/L — SIGNIFICANT CHANGE UP (ref 0–41)
ALT FLD-CCNC: 41 U/L — SIGNIFICANT CHANGE UP (ref 0–41)
ALT FLD-CCNC: 5 U/L — SIGNIFICANT CHANGE UP (ref 0–41)
ALT FLD-CCNC: 6 U/L — SIGNIFICANT CHANGE UP (ref 0–41)
ALT FLD-CCNC: 7 U/L — SIGNIFICANT CHANGE UP (ref 0–41)
ALT FLD-CCNC: 7 U/L — SIGNIFICANT CHANGE UP (ref 0–41)
ALT FLD-CCNC: 8 U/L — SIGNIFICANT CHANGE UP (ref 0–41)
ALT FLD-CCNC: 8 U/L — SIGNIFICANT CHANGE UP (ref 0–41)
ALT FLD-CCNC: 9 U/L — SIGNIFICANT CHANGE UP (ref 0–41)
ALT FLD-CCNC: 9 U/L — SIGNIFICANT CHANGE UP (ref 0–41)
ALT SERPL-CCNC: 10 U/L
ALT SERPL-CCNC: 7 U/L
ALT SERPL-CCNC: 9 U/L
ALT SERPL-CCNC: 9 U/L
ANION GAP SERPL CALC-SCNC: 10 MMOL/L — SIGNIFICANT CHANGE UP (ref 7–14)
ANION GAP SERPL CALC-SCNC: 11 MMOL/L — SIGNIFICANT CHANGE UP (ref 7–14)
ANION GAP SERPL CALC-SCNC: 11 MMOL/L — SIGNIFICANT CHANGE UP (ref 7–14)
ANION GAP SERPL CALC-SCNC: 12 MMOL/L — SIGNIFICANT CHANGE UP (ref 7–14)
ANION GAP SERPL CALC-SCNC: 13 MMOL/L — SIGNIFICANT CHANGE UP (ref 7–14)
ANION GAP SERPL CALC-SCNC: 14 MMOL/L — SIGNIFICANT CHANGE UP (ref 7–14)
ANION GAP SERPL CALC-SCNC: 15 MMOL/L
ANION GAP SERPL CALC-SCNC: 15 MMOL/L
ANION GAP SERPL CALC-SCNC: 15 MMOL/L — HIGH (ref 7–14)
ANION GAP SERPL CALC-SCNC: 16 MMOL/L — HIGH (ref 7–14)
ANION GAP SERPL CALC-SCNC: 17 MMOL/L
ANION GAP SERPL CALC-SCNC: 17 MMOL/L — HIGH (ref 7–14)
ANION GAP SERPL CALC-SCNC: 18 MMOL/L — HIGH (ref 7–14)
ANION GAP SERPL CALC-SCNC: 19 MMOL/L — HIGH (ref 7–14)
ANION GAP SERPL CALC-SCNC: 20 MMOL/L
ANION GAP SERPL CALC-SCNC: 21 MMOL/L — HIGH (ref 7–14)
ANION GAP SERPL CALC-SCNC: 21 MMOL/L — HIGH (ref 7–14)
ANION GAP SERPL CALC-SCNC: 22 MMOL/L — HIGH (ref 7–14)
ANION GAP SERPL CALC-SCNC: 23 MMOL/L
ANION GAP SERPL CALC-SCNC: 8 MMOL/L — SIGNIFICANT CHANGE UP (ref 7–14)
ANISOCYTOSIS BLD QL: SLIGHT — SIGNIFICANT CHANGE UP
APPEARANCE UR: CLEAR — SIGNIFICANT CHANGE UP
APTT BLD: 29.8 SEC — SIGNIFICANT CHANGE UP (ref 27–39.2)
APTT BLD: 30.5 SEC — SIGNIFICANT CHANGE UP (ref 27–39.2)
APTT BLD: 31.6 SEC — SIGNIFICANT CHANGE UP (ref 27–39.2)
APTT BLD: 32.8 SEC — SIGNIFICANT CHANGE UP (ref 27–39.2)
APTT BLD: 36 SEC — SIGNIFICANT CHANGE UP (ref 27–39.2)
APTT BLD: 37 SEC — SIGNIFICANT CHANGE UP (ref 27–39.2)
APTT BLD: 37.4 SEC — SIGNIFICANT CHANGE UP (ref 27–39.2)
APTT BLD: 85.1 SEC — CRITICAL HIGH (ref 27–39.2)
AST SERPL-CCNC: 18 U/L — SIGNIFICANT CHANGE UP (ref 0–41)
AST SERPL-CCNC: 19 U/L — SIGNIFICANT CHANGE UP (ref 0–41)
AST SERPL-CCNC: 19 U/L — SIGNIFICANT CHANGE UP (ref 0–41)
AST SERPL-CCNC: 20 U/L — SIGNIFICANT CHANGE UP (ref 0–41)
AST SERPL-CCNC: 21 U/L — SIGNIFICANT CHANGE UP (ref 0–41)
AST SERPL-CCNC: 22 U/L
AST SERPL-CCNC: 22 U/L
AST SERPL-CCNC: 22 U/L — SIGNIFICANT CHANGE UP (ref 0–41)
AST SERPL-CCNC: 23 U/L — SIGNIFICANT CHANGE UP (ref 0–41)
AST SERPL-CCNC: 26 U/L
AST SERPL-CCNC: 26 U/L
AST SERPL-CCNC: 27 U/L — SIGNIFICANT CHANGE UP (ref 0–41)
AST SERPL-CCNC: 29 U/L — SIGNIFICANT CHANGE UP (ref 0–41)
AST SERPL-CCNC: 31 U/L — SIGNIFICANT CHANGE UP (ref 0–41)
AST SERPL-CCNC: 31 U/L — SIGNIFICANT CHANGE UP (ref 0–41)
AST SERPL-CCNC: 36 U/L — SIGNIFICANT CHANGE UP (ref 0–41)
AST SERPL-CCNC: 38 U/L — SIGNIFICANT CHANGE UP (ref 0–41)
AST SERPL-CCNC: 39 U/L — SIGNIFICANT CHANGE UP (ref 0–41)
AST SERPL-CCNC: 41 U/L — SIGNIFICANT CHANGE UP (ref 0–41)
AST SERPL-CCNC: 43 U/L — HIGH (ref 0–41)
AST SERPL-CCNC: 44 U/L — HIGH (ref 0–41)
AST SERPL-CCNC: 54 U/L — HIGH (ref 0–41)
AST SERPL-CCNC: 59 U/L — HIGH (ref 0–41)
B PERT IGG+IGM PNL SER: ABNORMAL
BACTERIA # UR AUTO: NEGATIVE — SIGNIFICANT CHANGE UP
BASE EXCESS BLDV CALC-SCNC: 2.5 MMOL/L — SIGNIFICANT CHANGE UP (ref -2–3)
BASOPHILS # BLD AUTO: 0 K/UL — SIGNIFICANT CHANGE UP (ref 0–0.2)
BASOPHILS # BLD AUTO: 0 K/UL — SIGNIFICANT CHANGE UP (ref 0–0.2)
BASOPHILS # BLD AUTO: 0.01 K/UL — SIGNIFICANT CHANGE UP (ref 0–0.2)
BASOPHILS # BLD AUTO: 0.02 K/UL — SIGNIFICANT CHANGE UP (ref 0–0.2)
BASOPHILS # BLD AUTO: 0.03 K/UL — SIGNIFICANT CHANGE UP (ref 0–0.2)
BASOPHILS # BLD AUTO: 0.04 K/UL — SIGNIFICANT CHANGE UP (ref 0–0.2)
BASOPHILS # BLD AUTO: 0.04 K/UL — SIGNIFICANT CHANGE UP (ref 0–0.2)
BASOPHILS NFR BLD AUTO: 0 % — SIGNIFICANT CHANGE UP (ref 0–1)
BASOPHILS NFR BLD AUTO: 0 % — SIGNIFICANT CHANGE UP (ref 0–1)
BASOPHILS NFR BLD AUTO: 0.2 % — SIGNIFICANT CHANGE UP (ref 0–1)
BASOPHILS NFR BLD AUTO: 0.3 % — SIGNIFICANT CHANGE UP (ref 0–1)
BASOPHILS NFR BLD AUTO: 0.3 % — SIGNIFICANT CHANGE UP (ref 0–1)
BASOPHILS NFR BLD AUTO: 0.4 % — SIGNIFICANT CHANGE UP (ref 0–1)
BASOPHILS NFR BLD AUTO: 0.5 % — SIGNIFICANT CHANGE UP (ref 0–1)
BASOPHILS NFR BLD AUTO: 0.7 % — SIGNIFICANT CHANGE UP (ref 0–1)
BILIRUB DIRECT SERPL-MCNC: 0.2 MG/DL
BILIRUB DIRECT SERPL-MCNC: 0.2 MG/DL
BILIRUB DIRECT SERPL-MCNC: 0.2 MG/DL — SIGNIFICANT CHANGE UP (ref 0–0.3)
BILIRUB DIRECT SERPL-MCNC: <0.2 MG/DL
BILIRUB DIRECT SERPL-MCNC: <0.2 MG/DL
BILIRUB DIRECT SERPL-MCNC: <0.2 MG/DL — SIGNIFICANT CHANGE UP (ref 0–0.3)
BILIRUB DIRECT SERPL-MCNC: <0.2 MG/DL — SIGNIFICANT CHANGE UP (ref 0–0.3)
BILIRUB INDIRECT FLD-MCNC: 0.2 MG/DL — SIGNIFICANT CHANGE UP (ref 0.2–1.2)
BILIRUB INDIRECT FLD-MCNC: >0 MG/DL — LOW (ref 0.2–1.2)
BILIRUB INDIRECT FLD-MCNC: >0.1 MG/DL — LOW (ref 0.2–1.2)
BILIRUB INDIRECT SERPL-MCNC: 0.1 MG/DL
BILIRUB INDIRECT SERPL-MCNC: 0.3 MG/DL
BILIRUB INDIRECT SERPL-MCNC: >0 MG/DL
BILIRUB INDIRECT SERPL-MCNC: >0.2 MG/DL
BILIRUB SERPL-MCNC: 0.2 MG/DL
BILIRUB SERPL-MCNC: 0.2 MG/DL — SIGNIFICANT CHANGE UP (ref 0.2–1.2)
BILIRUB SERPL-MCNC: 0.3 MG/DL
BILIRUB SERPL-MCNC: 0.3 MG/DL — SIGNIFICANT CHANGE UP (ref 0.2–1.2)
BILIRUB SERPL-MCNC: 0.4 MG/DL
BILIRUB SERPL-MCNC: 0.4 MG/DL — SIGNIFICANT CHANGE UP (ref 0.2–1.2)
BILIRUB SERPL-MCNC: 0.5 MG/DL
BILIRUB SERPL-MCNC: 0.5 MG/DL — SIGNIFICANT CHANGE UP (ref 0.2–1.2)
BILIRUB SERPL-MCNC: 0.6 MG/DL — SIGNIFICANT CHANGE UP (ref 0.2–1.2)
BILIRUB SERPL-MCNC: <0.2 MG/DL — SIGNIFICANT CHANGE UP (ref 0.2–1.2)
BILIRUB UR-MCNC: NEGATIVE — SIGNIFICANT CHANGE UP
BLD GP AB SCN SERPL QL: SIGNIFICANT CHANGE UP
BLD GP AB SCN SERPL QL: SIGNIFICANT CHANGE UP
BUN SERPL-MCNC: 11 MG/DL — SIGNIFICANT CHANGE UP (ref 10–20)
BUN SERPL-MCNC: 11 MG/DL — SIGNIFICANT CHANGE UP (ref 10–20)
BUN SERPL-MCNC: 12 MG/DL
BUN SERPL-MCNC: 12 MG/DL — SIGNIFICANT CHANGE UP (ref 10–20)
BUN SERPL-MCNC: 14 MG/DL — SIGNIFICANT CHANGE UP (ref 10–20)
BUN SERPL-MCNC: 15 MG/DL
BUN SERPL-MCNC: 15 MG/DL — SIGNIFICANT CHANGE UP (ref 10–20)
BUN SERPL-MCNC: 15 MG/DL — SIGNIFICANT CHANGE UP (ref 10–20)
BUN SERPL-MCNC: 16 MG/DL — SIGNIFICANT CHANGE UP (ref 10–20)
BUN SERPL-MCNC: 17 MG/DL — SIGNIFICANT CHANGE UP (ref 10–20)
BUN SERPL-MCNC: 18 MG/DL
BUN SERPL-MCNC: 18 MG/DL — SIGNIFICANT CHANGE UP (ref 10–20)
BUN SERPL-MCNC: 19 MG/DL — SIGNIFICANT CHANGE UP (ref 10–20)
BUN SERPL-MCNC: 19 MG/DL — SIGNIFICANT CHANGE UP (ref 10–20)
BUN SERPL-MCNC: 21 MG/DL — HIGH (ref 10–20)
BUN SERPL-MCNC: 23 MG/DL — HIGH (ref 10–20)
BUN SERPL-MCNC: 25 MG/DL — HIGH (ref 10–20)
BUN SERPL-MCNC: 26 MG/DL — HIGH (ref 10–20)
BUN SERPL-MCNC: 33 MG/DL — HIGH (ref 10–20)
BUN SERPL-MCNC: 36 MG/DL — HIGH (ref 10–20)
BUN SERPL-MCNC: 36 MG/DL — HIGH (ref 10–20)
BUN SERPL-MCNC: 37 MG/DL — HIGH (ref 10–20)
BUN SERPL-MCNC: 40 MG/DL — HIGH (ref 10–20)
BUN SERPL-MCNC: 41 MG/DL — HIGH (ref 10–20)
BUN SERPL-MCNC: 47 MG/DL — HIGH (ref 10–20)
CA-I SERPL-SCNC: 1.06 MMOL/L — LOW (ref 1.15–1.33)
CALCIUM SERPL-MCNC: 6.5 MG/DL — LOW (ref 8.5–10.1)
CALCIUM SERPL-MCNC: 7.6 MG/DL — LOW (ref 8.5–10.1)
CALCIUM SERPL-MCNC: 7.7 MG/DL — LOW (ref 8.5–10.1)
CALCIUM SERPL-MCNC: 7.7 MG/DL — LOW (ref 8.5–10.1)
CALCIUM SERPL-MCNC: 7.8 MG/DL — LOW (ref 8.5–10.1)
CALCIUM SERPL-MCNC: 8 MG/DL
CALCIUM SERPL-MCNC: 8 MG/DL — LOW (ref 8.5–10.1)
CALCIUM SERPL-MCNC: 8 MG/DL — LOW (ref 8.5–10.1)
CALCIUM SERPL-MCNC: 8.1 MG/DL — LOW (ref 8.5–10.1)
CALCIUM SERPL-MCNC: 8.2 MG/DL — LOW (ref 8.5–10.1)
CALCIUM SERPL-MCNC: 8.3 MG/DL — LOW (ref 8.5–10.1)
CALCIUM SERPL-MCNC: 8.4 MG/DL — LOW (ref 8.5–10.1)
CALCIUM SERPL-MCNC: 8.5 MG/DL
CALCIUM SERPL-MCNC: 8.5 MG/DL
CALCIUM SERPL-MCNC: 8.5 MG/DL — SIGNIFICANT CHANGE UP (ref 8.5–10.1)
CALCIUM SERPL-MCNC: 8.6 MG/DL — SIGNIFICANT CHANGE UP (ref 8.5–10.1)
CALCIUM SERPL-MCNC: 8.7 MG/DL
CALCIUM SERPL-MCNC: 8.7 MG/DL — SIGNIFICANT CHANGE UP (ref 8.5–10.1)
CALCIUM SERPL-MCNC: 8.8 MG/DL
CALCIUM SERPL-MCNC: 8.8 MG/DL — SIGNIFICANT CHANGE UP (ref 8.5–10.1)
CANCER AG125 SERPL-ACNC: 103 U/ML
CANCER AG125 SERPL-ACNC: 104 U/ML — HIGH
CANCER AG125 SERPL-ACNC: 125 U/ML
CANCER AG125 SERPL-ACNC: 156 U/ML
CANCER AG125 SERPL-ACNC: 60 U/ML
CANCER AG125 SERPL-ACNC: 63 U/ML
CHLORIDE SERPL-SCNC: 100 MMOL/L — SIGNIFICANT CHANGE UP (ref 98–110)
CHLORIDE SERPL-SCNC: 101 MMOL/L — SIGNIFICANT CHANGE UP (ref 98–110)
CHLORIDE SERPL-SCNC: 102 MMOL/L
CHLORIDE SERPL-SCNC: 102 MMOL/L — SIGNIFICANT CHANGE UP (ref 98–110)
CHLORIDE SERPL-SCNC: 103 MMOL/L — SIGNIFICANT CHANGE UP (ref 98–110)
CHLORIDE SERPL-SCNC: 103 MMOL/L — SIGNIFICANT CHANGE UP (ref 98–110)
CHLORIDE SERPL-SCNC: 104 MMOL/L — SIGNIFICANT CHANGE UP (ref 98–110)
CHLORIDE SERPL-SCNC: 104 MMOL/L — SIGNIFICANT CHANGE UP (ref 98–110)
CHLORIDE SERPL-SCNC: 105 MMOL/L — SIGNIFICANT CHANGE UP (ref 98–110)
CHLORIDE SERPL-SCNC: 106 MMOL/L — SIGNIFICANT CHANGE UP (ref 98–110)
CHLORIDE SERPL-SCNC: 106 MMOL/L — SIGNIFICANT CHANGE UP (ref 98–110)
CHLORIDE SERPL-SCNC: 93 MMOL/L — LOW (ref 98–110)
CHLORIDE SERPL-SCNC: 93 MMOL/L — LOW (ref 98–110)
CHLORIDE SERPL-SCNC: 94 MMOL/L — LOW (ref 98–110)
CHLORIDE SERPL-SCNC: 94 MMOL/L — LOW (ref 98–110)
CHLORIDE SERPL-SCNC: 95 MMOL/L
CHLORIDE SERPL-SCNC: 95 MMOL/L — LOW (ref 98–110)
CHLORIDE SERPL-SCNC: 96 MMOL/L
CHLORIDE SERPL-SCNC: 96 MMOL/L — LOW (ref 98–110)
CHLORIDE SERPL-SCNC: 97 MMOL/L — LOW (ref 98–110)
CHLORIDE SERPL-SCNC: 98 MMOL/L
CHLORIDE SERPL-SCNC: 98 MMOL/L
CHLORIDE SERPL-SCNC: 98 MMOL/L — SIGNIFICANT CHANGE UP (ref 98–110)
CHLORIDE SERPL-SCNC: 99 MMOL/L — SIGNIFICANT CHANGE UP (ref 98–110)
CK SERPL-CCNC: 25 U/L — SIGNIFICANT CHANGE UP (ref 0–225)
CO2 SERPL-SCNC: 13 MMOL/L — LOW (ref 17–32)
CO2 SERPL-SCNC: 16 MMOL/L
CO2 SERPL-SCNC: 16 MMOL/L — LOW (ref 17–32)
CO2 SERPL-SCNC: 16 MMOL/L — LOW (ref 17–32)
CO2 SERPL-SCNC: 18 MMOL/L — SIGNIFICANT CHANGE UP (ref 17–32)
CO2 SERPL-SCNC: 18 MMOL/L — SIGNIFICANT CHANGE UP (ref 17–32)
CO2 SERPL-SCNC: 20 MMOL/L — SIGNIFICANT CHANGE UP (ref 17–32)
CO2 SERPL-SCNC: 21 MMOL/L
CO2 SERPL-SCNC: 21 MMOL/L — SIGNIFICANT CHANGE UP (ref 17–32)
CO2 SERPL-SCNC: 22 MMOL/L
CO2 SERPL-SCNC: 22 MMOL/L — SIGNIFICANT CHANGE UP (ref 17–32)
CO2 SERPL-SCNC: 23 MMOL/L — SIGNIFICANT CHANGE UP (ref 17–32)
CO2 SERPL-SCNC: 24 MMOL/L — SIGNIFICANT CHANGE UP (ref 17–32)
CO2 SERPL-SCNC: 25 MMOL/L
CO2 SERPL-SCNC: 25 MMOL/L — SIGNIFICANT CHANGE UP (ref 17–32)
CO2 SERPL-SCNC: 25 MMOL/L — SIGNIFICANT CHANGE UP (ref 17–32)
CO2 SERPL-SCNC: 26 MMOL/L
CO2 SERPL-SCNC: 26 MMOL/L — SIGNIFICANT CHANGE UP (ref 17–32)
CO2 SERPL-SCNC: 27 MMOL/L — SIGNIFICANT CHANGE UP (ref 17–32)
CO2 SERPL-SCNC: 27 MMOL/L — SIGNIFICANT CHANGE UP (ref 17–32)
COLOR FLD: YELLOW — SIGNIFICANT CHANGE UP
COLOR SPEC: SIGNIFICANT CHANGE UP
COLOR SPEC: YELLOW — SIGNIFICANT CHANGE UP
CREAT SERPL-MCNC: 0.7 MG/DL — SIGNIFICANT CHANGE UP (ref 0.7–1.5)
CREAT SERPL-MCNC: 0.8 MG/DL — SIGNIFICANT CHANGE UP (ref 0.7–1.5)
CREAT SERPL-MCNC: 0.9 MG/DL — SIGNIFICANT CHANGE UP (ref 0.7–1.5)
CREAT SERPL-MCNC: 1 MG/DL — SIGNIFICANT CHANGE UP (ref 0.7–1.5)
CREAT SERPL-MCNC: 1.1 MG/DL
CREAT SERPL-MCNC: 1.1 MG/DL — SIGNIFICANT CHANGE UP (ref 0.7–1.5)
CREAT SERPL-MCNC: 1.2 MG/DL
CREAT SERPL-MCNC: 1.2 MG/DL — SIGNIFICANT CHANGE UP (ref 0.7–1.5)
CREAT SERPL-MCNC: 1.3 MG/DL
CREAT SERPL-MCNC: 1.3 MG/DL
CREAT SERPL-MCNC: 1.3 MG/DL — SIGNIFICANT CHANGE UP (ref 0.7–1.5)
CREAT SERPL-MCNC: 1.4 MG/DL
CREAT SERPL-MCNC: 1.4 MG/DL — SIGNIFICANT CHANGE UP (ref 0.7–1.5)
CREAT SERPL-MCNC: 1.5 MG/DL — SIGNIFICANT CHANGE UP (ref 0.7–1.5)
CULTURE RESULTS: SIGNIFICANT CHANGE UP
DIFF PNL FLD: ABNORMAL
DIFF PNL FLD: NEGATIVE — SIGNIFICANT CHANGE UP
EGFR: 35 ML/MIN/1.73M2 — LOW
EGFR: 38 ML/MIN/1.73M2
EGFR: 38 ML/MIN/1.73M2 — LOW
EGFR: 42 ML/MIN/1.73M2
EGFR: 42 ML/MIN/1.73M2
EGFR: 42 ML/MIN/1.73M2 — LOW
EGFR: 42 ML/MIN/1.73M2 — LOW
EGFR: 46 ML/MIN/1.73M2 — LOW
EGFR: 57 ML/MIN/1.73M2 — LOW
EGFR: 65 ML/MIN/1.73M2 — SIGNIFICANT CHANGE UP
EGFR: 75 ML/MIN/1.73M2 — SIGNIFICANT CHANGE UP
EGFR: 88 ML/MIN/1.73M2 — SIGNIFICANT CHANGE UP
EOSINOPHIL # BLD AUTO: 0 K/UL — SIGNIFICANT CHANGE UP (ref 0–0.7)
EOSINOPHIL # BLD AUTO: 0.01 K/UL — SIGNIFICANT CHANGE UP (ref 0–0.7)
EOSINOPHIL # BLD AUTO: 0.02 K/UL — SIGNIFICANT CHANGE UP (ref 0–0.7)
EOSINOPHIL # BLD AUTO: 0.03 K/UL — SIGNIFICANT CHANGE UP (ref 0–0.7)
EOSINOPHIL # BLD AUTO: 0.04 K/UL — SIGNIFICANT CHANGE UP (ref 0–0.7)
EOSINOPHIL # BLD AUTO: 0.05 K/UL — SIGNIFICANT CHANGE UP (ref 0–0.7)
EOSINOPHIL # BLD AUTO: 0.06 K/UL — SIGNIFICANT CHANGE UP (ref 0–0.7)
EOSINOPHIL # BLD AUTO: 0.06 K/UL — SIGNIFICANT CHANGE UP (ref 0–0.7)
EOSINOPHIL # BLD AUTO: 0.07 K/UL — SIGNIFICANT CHANGE UP (ref 0–0.7)
EOSINOPHIL # BLD AUTO: 0.07 K/UL — SIGNIFICANT CHANGE UP (ref 0–0.7)
EOSINOPHIL # BLD AUTO: 0.08 K/UL — SIGNIFICANT CHANGE UP (ref 0–0.7)
EOSINOPHIL # BLD AUTO: 0.09 K/UL — SIGNIFICANT CHANGE UP (ref 0–0.7)
EOSINOPHIL # BLD AUTO: 0.14 K/UL — SIGNIFICANT CHANGE UP (ref 0–0.7)
EOSINOPHIL # BLD AUTO: 0.19 K/UL — SIGNIFICANT CHANGE UP (ref 0–0.7)
EOSINOPHIL # BLD AUTO: 0.19 K/UL — SIGNIFICANT CHANGE UP (ref 0–0.7)
EOSINOPHIL NFR BLD AUTO: 0 % — SIGNIFICANT CHANGE UP (ref 0–8)
EOSINOPHIL NFR BLD AUTO: 0.1 % — SIGNIFICANT CHANGE UP (ref 0–8)
EOSINOPHIL NFR BLD AUTO: 0.2 % — SIGNIFICANT CHANGE UP (ref 0–8)
EOSINOPHIL NFR BLD AUTO: 0.3 % — SIGNIFICANT CHANGE UP (ref 0–8)
EOSINOPHIL NFR BLD AUTO: 0.4 % — SIGNIFICANT CHANGE UP (ref 0–8)
EOSINOPHIL NFR BLD AUTO: 0.7 % — SIGNIFICANT CHANGE UP (ref 0–8)
EOSINOPHIL NFR BLD AUTO: 0.8 % — SIGNIFICANT CHANGE UP (ref 0–8)
EOSINOPHIL NFR BLD AUTO: 0.9 % — SIGNIFICANT CHANGE UP (ref 0–8)
EOSINOPHIL NFR BLD AUTO: 0.9 % — SIGNIFICANT CHANGE UP (ref 0–8)
EOSINOPHIL NFR BLD AUTO: 1 % — SIGNIFICANT CHANGE UP (ref 0–8)
EOSINOPHIL NFR BLD AUTO: 1.1 % — SIGNIFICANT CHANGE UP (ref 0–8)
EOSINOPHIL NFR BLD AUTO: 1.4 % — SIGNIFICANT CHANGE UP (ref 0–8)
EOSINOPHIL NFR BLD AUTO: 1.4 % — SIGNIFICANT CHANGE UP (ref 0–8)
EOSINOPHIL NFR BLD AUTO: 1.6 % — SIGNIFICANT CHANGE UP (ref 0–8)
EOSINOPHIL NFR BLD AUTO: 1.9 % — SIGNIFICANT CHANGE UP (ref 0–8)
EOSINOPHIL NFR BLD AUTO: 2 % — SIGNIFICANT CHANGE UP (ref 0–8)
EOSINOPHIL NFR BLD AUTO: 3.2 % — SIGNIFICANT CHANGE UP (ref 0–8)
EOSINOPHIL NFR BLD AUTO: 3.7 % — SIGNIFICANT CHANGE UP (ref 0–8)
EPI CELLS # UR: 3 /HPF — SIGNIFICANT CHANGE UP (ref 0–5)
EPI CELLS # UR: 7 /HPF — HIGH (ref 0–5)
EPI CELLS # UR: 9 /HPF — HIGH (ref 0–5)
ESTIMATED AVERAGE GLUCOSE: 111 MG/DL — SIGNIFICANT CHANGE UP (ref 68–114)
FLUID INTAKE SUBSTANCE CLASS: SIGNIFICANT CHANGE UP
GAS PNL BLDV: 123 MMOL/L — LOW (ref 136–145)
GAS PNL BLDV: SIGNIFICANT CHANGE UP
GGT SERPL-CCNC: 11 U/L
GIANT PLATELETS BLD QL SMEAR: PRESENT — SIGNIFICANT CHANGE UP
GLUCOSE BLDC GLUCOMTR-MCNC: 105 MG/DL — HIGH (ref 70–99)
GLUCOSE BLDC GLUCOMTR-MCNC: 113 MG/DL — HIGH (ref 70–99)
GLUCOSE BLDC GLUCOMTR-MCNC: 121 MG/DL — HIGH (ref 70–99)
GLUCOSE BLDC GLUCOMTR-MCNC: 126 MG/DL — HIGH (ref 70–99)
GLUCOSE BLDC GLUCOMTR-MCNC: 135 MG/DL — HIGH (ref 70–99)
GLUCOSE FLD-MCNC: 58 MG/DL — SIGNIFICANT CHANGE UP
GLUCOSE SERPL-MCNC: 105 MG/DL
GLUCOSE SERPL-MCNC: 106 MG/DL — HIGH (ref 70–99)
GLUCOSE SERPL-MCNC: 113 MG/DL — HIGH (ref 70–99)
GLUCOSE SERPL-MCNC: 119 MG/DL — HIGH (ref 70–99)
GLUCOSE SERPL-MCNC: 123 MG/DL — HIGH (ref 70–99)
GLUCOSE SERPL-MCNC: 127 MG/DL — HIGH (ref 70–99)
GLUCOSE SERPL-MCNC: 131 MG/DL — HIGH (ref 70–99)
GLUCOSE SERPL-MCNC: 133 MG/DL — HIGH (ref 70–99)
GLUCOSE SERPL-MCNC: 133 MG/DL — HIGH (ref 70–99)
GLUCOSE SERPL-MCNC: 136 MG/DL
GLUCOSE SERPL-MCNC: 139 MG/DL — HIGH (ref 70–99)
GLUCOSE SERPL-MCNC: 143 MG/DL — HIGH (ref 70–99)
GLUCOSE SERPL-MCNC: 143 MG/DL — HIGH (ref 70–99)
GLUCOSE SERPL-MCNC: 187 MG/DL — HIGH (ref 70–99)
GLUCOSE SERPL-MCNC: 61 MG/DL — LOW (ref 70–99)
GLUCOSE SERPL-MCNC: 67 MG/DL — LOW (ref 70–99)
GLUCOSE SERPL-MCNC: 71 MG/DL — SIGNIFICANT CHANGE UP (ref 70–99)
GLUCOSE SERPL-MCNC: 73 MG/DL — SIGNIFICANT CHANGE UP (ref 70–99)
GLUCOSE SERPL-MCNC: 81 MG/DL — SIGNIFICANT CHANGE UP (ref 70–99)
GLUCOSE SERPL-MCNC: 84 MG/DL
GLUCOSE SERPL-MCNC: 86 MG/DL
GLUCOSE SERPL-MCNC: 86 MG/DL — SIGNIFICANT CHANGE UP (ref 70–99)
GLUCOSE SERPL-MCNC: 87 MG/DL — SIGNIFICANT CHANGE UP (ref 70–99)
GLUCOSE SERPL-MCNC: 88 MG/DL — SIGNIFICANT CHANGE UP (ref 70–99)
GLUCOSE SERPL-MCNC: 89 MG/DL — SIGNIFICANT CHANGE UP (ref 70–99)
GLUCOSE SERPL-MCNC: 92 MG/DL — SIGNIFICANT CHANGE UP (ref 70–99)
GLUCOSE SERPL-MCNC: 94 MG/DL — SIGNIFICANT CHANGE UP (ref 70–99)
GLUCOSE SERPL-MCNC: 95 MG/DL — SIGNIFICANT CHANGE UP (ref 70–99)
GLUCOSE SERPL-MCNC: 96 MG/DL
GLUCOSE SERPL-MCNC: 96 MG/DL — SIGNIFICANT CHANGE UP (ref 70–99)
GLUCOSE SERPL-MCNC: 96 MG/DL — SIGNIFICANT CHANGE UP (ref 70–99)
GLUCOSE SERPL-MCNC: 97 MG/DL — SIGNIFICANT CHANGE UP (ref 70–99)
GLUCOSE UR QL: NEGATIVE — SIGNIFICANT CHANGE UP
GRAM STN FLD: SIGNIFICANT CHANGE UP
HCO3 BLDV-SCNC: 28 MMOL/L — SIGNIFICANT CHANGE UP (ref 22–29)
HCT VFR BLD CALC: 28.6 % — LOW (ref 37–47)
HCT VFR BLD CALC: 29 % — LOW (ref 37–47)
HCT VFR BLD CALC: 30.1 % — LOW (ref 37–47)
HCT VFR BLD CALC: 30.2 % — LOW (ref 37–47)
HCT VFR BLD CALC: 30.5 % — LOW (ref 37–47)
HCT VFR BLD CALC: 30.8 % — LOW (ref 37–47)
HCT VFR BLD CALC: 30.9 % — LOW (ref 37–47)
HCT VFR BLD CALC: 31.5 % — LOW (ref 37–47)
HCT VFR BLD CALC: 31.8 % — LOW (ref 37–47)
HCT VFR BLD CALC: 32.4 % — LOW (ref 37–47)
HCT VFR BLD CALC: 33.4 % — LOW (ref 37–47)
HCT VFR BLD CALC: 33.7 % — LOW (ref 37–47)
HCT VFR BLD CALC: 35 % — LOW (ref 37–47)
HCT VFR BLD CALC: 35 % — LOW (ref 37–47)
HCT VFR BLD CALC: 35.2 %
HCT VFR BLD CALC: 35.2 % — LOW (ref 37–47)
HCT VFR BLD CALC: 36.6 % — LOW (ref 37–47)
HCT VFR BLD CALC: 36.6 % — LOW (ref 37–47)
HCT VFR BLD CALC: 36.8 % — LOW (ref 37–47)
HCT VFR BLD CALC: 36.9 % — LOW (ref 37–47)
HCT VFR BLD CALC: 37.5 % — SIGNIFICANT CHANGE UP (ref 37–47)
HCT VFR BLD CALC: 38.2 % — SIGNIFICANT CHANGE UP (ref 37–47)
HCT VFR BLD CALC: 38.8 %
HCT VFR BLD CALC: 39.1 %
HCT VFR BLD CALC: 40.8 % — SIGNIFICANT CHANGE UP (ref 37–47)
HCT VFR BLD CALC: 41 % — SIGNIFICANT CHANGE UP (ref 37–47)
HCT VFR BLD CALC: 41.1 %
HCT VFR BLD CALC: 42.2 % — SIGNIFICANT CHANGE UP (ref 37–47)
HCT VFR BLDA CALC: 40 % — SIGNIFICANT CHANGE UP (ref 39–51)
HGB BLD CALC-MCNC: 13.3 G/DL — SIGNIFICANT CHANGE UP (ref 12.6–17.4)
HGB BLD-MCNC: 10 G/DL — LOW (ref 12–16)
HGB BLD-MCNC: 10 G/DL — LOW (ref 12–16)
HGB BLD-MCNC: 10.1 G/DL — LOW (ref 12–16)
HGB BLD-MCNC: 10.2 G/DL — LOW (ref 12–16)
HGB BLD-MCNC: 10.4 G/DL — LOW (ref 12–16)
HGB BLD-MCNC: 10.4 G/DL — LOW (ref 12–16)
HGB BLD-MCNC: 10.9 G/DL — LOW (ref 12–16)
HGB BLD-MCNC: 11.2 G/DL — LOW (ref 12–16)
HGB BLD-MCNC: 11.2 G/DL — LOW (ref 12–16)
HGB BLD-MCNC: 11.3 G/DL
HGB BLD-MCNC: 11.3 G/DL — LOW (ref 12–16)
HGB BLD-MCNC: 11.5 G/DL — LOW (ref 12–16)
HGB BLD-MCNC: 11.6 G/DL — LOW (ref 12–16)
HGB BLD-MCNC: 11.8 G/DL — LOW (ref 12–16)
HGB BLD-MCNC: 11.8 G/DL — LOW (ref 12–16)
HGB BLD-MCNC: 12 G/DL — SIGNIFICANT CHANGE UP (ref 12–16)
HGB BLD-MCNC: 12.1 G/DL — SIGNIFICANT CHANGE UP (ref 12–16)
HGB BLD-MCNC: 12.2 G/DL
HGB BLD-MCNC: 12.2 G/DL — SIGNIFICANT CHANGE UP (ref 12–16)
HGB BLD-MCNC: 12.5 G/DL — SIGNIFICANT CHANGE UP (ref 12–16)
HGB BLD-MCNC: 12.8 G/DL
HGB BLD-MCNC: 12.8 G/DL — SIGNIFICANT CHANGE UP (ref 12–16)
HGB BLD-MCNC: 13.3 G/DL
HGB BLD-MCNC: 13.4 G/DL — SIGNIFICANT CHANGE UP (ref 12–16)
HGB BLD-MCNC: 13.7 G/DL — SIGNIFICANT CHANGE UP (ref 12–16)
HGB BLD-MCNC: 9.8 G/DL — LOW (ref 12–16)
HGB BLD-MCNC: 9.8 G/DL — LOW (ref 12–16)
HGB BLD-MCNC: 9.9 G/DL — LOW (ref 12–16)
HYALINE CASTS # UR AUTO: 2 /LPF — SIGNIFICANT CHANGE UP (ref 0–7)
HYALINE CASTS # UR AUTO: 3 /LPF — SIGNIFICANT CHANGE UP (ref 0–7)
HYALINE CASTS # UR AUTO: 7 /LPF — SIGNIFICANT CHANGE UP (ref 0–7)
IMM GRANULOCYTES NFR BLD AUTO: 0 % — LOW (ref 0.1–0.3)
IMM GRANULOCYTES NFR BLD AUTO: 0.2 % — SIGNIFICANT CHANGE UP (ref 0.1–0.3)
IMM GRANULOCYTES NFR BLD AUTO: 0.3 % — SIGNIFICANT CHANGE UP (ref 0.1–0.3)
IMM GRANULOCYTES NFR BLD AUTO: 0.3 % — SIGNIFICANT CHANGE UP (ref 0.1–0.3)
IMM GRANULOCYTES NFR BLD AUTO: 0.4 % — HIGH (ref 0.1–0.3)
IMM GRANULOCYTES NFR BLD AUTO: 0.4 % — HIGH (ref 0.1–0.3)
IMM GRANULOCYTES NFR BLD AUTO: 0.7 % — HIGH (ref 0.1–0.3)
IMM GRANULOCYTES NFR BLD AUTO: 0.7 % — HIGH (ref 0.1–0.3)
IMM GRANULOCYTES NFR BLD AUTO: 1.1 % — HIGH (ref 0.1–0.3)
IMM GRANULOCYTES NFR BLD AUTO: 1.2 % — HIGH (ref 0.1–0.3)
IMM GRANULOCYTES NFR BLD AUTO: 1.2 % — HIGH (ref 0.1–0.3)
IMM GRANULOCYTES NFR BLD AUTO: 1.4 % — HIGH (ref 0.1–0.3)
IMM GRANULOCYTES NFR BLD AUTO: 1.4 % — HIGH (ref 0.1–0.3)
IMM GRANULOCYTES NFR BLD AUTO: 1.6 % — HIGH (ref 0.1–0.3)
IMM GRANULOCYTES NFR BLD AUTO: 1.6 % — HIGH (ref 0.1–0.3)
IMM GRANULOCYTES NFR BLD AUTO: 1.9 % — HIGH (ref 0.1–0.3)
INR BLD: 0.97 RATIO — SIGNIFICANT CHANGE UP (ref 0.65–1.3)
INR BLD: 1.16 RATIO — SIGNIFICANT CHANGE UP (ref 0.65–1.3)
INR BLD: 1.19 RATIO — SIGNIFICANT CHANGE UP (ref 0.65–1.3)
INR BLD: 1.5 RATIO — HIGH (ref 0.65–1.3)
INR BLD: 1.6 RATIO — HIGH (ref 0.65–1.3)
INR BLD: 1.79 RATIO — HIGH (ref 0.65–1.3)
INR BLD: 2.79 RATIO — HIGH (ref 0.65–1.3)
KETONES UR-MCNC: NEGATIVE — SIGNIFICANT CHANGE UP
KETONES UR-MCNC: NEGATIVE — SIGNIFICANT CHANGE UP
KETONES UR-MCNC: SIGNIFICANT CHANGE UP
KETONES UR-MCNC: SIGNIFICANT CHANGE UP
LACTATE BLDV-MCNC: 1.3 MMOL/L — SIGNIFICANT CHANGE UP (ref 0.5–2)
LACTATE SERPL-SCNC: 0.9 MMOL/L — SIGNIFICANT CHANGE UP (ref 0.7–2)
LACTATE SERPL-SCNC: 1.1 MMOL/L — SIGNIFICANT CHANGE UP (ref 0.7–2)
LACTATE SERPL-SCNC: 1.3 MMOL/L — SIGNIFICANT CHANGE UP (ref 0.7–2)
LACTATE SERPL-SCNC: 1.6 MMOL/L — SIGNIFICANT CHANGE UP (ref 0.7–2)
LACTATE SERPL-SCNC: 2.3 MMOL/L — HIGH (ref 0.7–2)
LACTATE SERPL-SCNC: <0.2 MMOL/L — LOW (ref 0.7–2)
LDH SERPL L TO P-CCNC: 346 U/L — SIGNIFICANT CHANGE UP
LEUKOCYTE ESTERASE UR-ACNC: ABNORMAL
LEUKOCYTE ESTERASE UR-ACNC: ABNORMAL
LEUKOCYTE ESTERASE UR-ACNC: NEGATIVE — SIGNIFICANT CHANGE UP
LEUKOCYTE ESTERASE UR-ACNC: NEGATIVE — SIGNIFICANT CHANGE UP
LIDOCAIN IGE QN: 25 U/L — SIGNIFICANT CHANGE UP (ref 7–60)
LIDOCAIN IGE QN: 25 U/L — SIGNIFICANT CHANGE UP (ref 7–60)
LIDOCAIN IGE QN: 26 U/L — SIGNIFICANT CHANGE UP (ref 7–60)
LIDOCAIN IGE QN: 28 U/L — SIGNIFICANT CHANGE UP (ref 7–60)
LIDOCAIN IGE QN: 30 U/L — SIGNIFICANT CHANGE UP (ref 7–60)
LIDOCAIN IGE QN: 49 U/L — SIGNIFICANT CHANGE UP (ref 7–60)
LIDOCAIN IGE QN: 70 U/L — HIGH (ref 7–60)
LYMPHOCYTES # BLD AUTO: 0.47 K/UL — LOW (ref 1.2–3.4)
LYMPHOCYTES # BLD AUTO: 0.65 K/UL — LOW (ref 1.2–3.4)
LYMPHOCYTES # BLD AUTO: 0.67 K/UL — LOW (ref 1.2–3.4)
LYMPHOCYTES # BLD AUTO: 0.72 K/UL — LOW (ref 1.2–3.4)
LYMPHOCYTES # BLD AUTO: 0.73 K/UL — LOW (ref 1.2–3.4)
LYMPHOCYTES # BLD AUTO: 0.78 K/UL — LOW (ref 1.2–3.4)
LYMPHOCYTES # BLD AUTO: 0.83 K/UL — LOW (ref 1.2–3.4)
LYMPHOCYTES # BLD AUTO: 0.85 K/UL — LOW (ref 1.2–3.4)
LYMPHOCYTES # BLD AUTO: 0.86 K/UL — LOW (ref 1.2–3.4)
LYMPHOCYTES # BLD AUTO: 0.89 K/UL — LOW (ref 1.2–3.4)
LYMPHOCYTES # BLD AUTO: 0.97 K/UL — LOW (ref 1.2–3.4)
LYMPHOCYTES # BLD AUTO: 0.97 K/UL — LOW (ref 1.2–3.4)
LYMPHOCYTES # BLD AUTO: 1.03 K/UL — LOW (ref 1.2–3.4)
LYMPHOCYTES # BLD AUTO: 1.11 K/UL — LOW (ref 1.2–3.4)
LYMPHOCYTES # BLD AUTO: 1.17 K/UL — LOW (ref 1.2–3.4)
LYMPHOCYTES # BLD AUTO: 1.17 K/UL — LOW (ref 1.2–3.4)
LYMPHOCYTES # BLD AUTO: 1.34 K/UL — SIGNIFICANT CHANGE UP (ref 1.2–3.4)
LYMPHOCYTES # BLD AUTO: 1.34 K/UL — SIGNIFICANT CHANGE UP (ref 1.2–3.4)
LYMPHOCYTES # BLD AUTO: 1.48 K/UL — SIGNIFICANT CHANGE UP (ref 1.2–3.4)
LYMPHOCYTES # BLD AUTO: 1.53 K/UL — SIGNIFICANT CHANGE UP (ref 1.2–3.4)
LYMPHOCYTES # BLD AUTO: 10.6 % — LOW (ref 20.5–51.1)
LYMPHOCYTES # BLD AUTO: 11.7 % — LOW (ref 20.5–51.1)
LYMPHOCYTES # BLD AUTO: 12.1 % — LOW (ref 20.5–51.1)
LYMPHOCYTES # BLD AUTO: 12.5 % — LOW (ref 20.5–51.1)
LYMPHOCYTES # BLD AUTO: 13.7 % — LOW (ref 20.5–51.1)
LYMPHOCYTES # BLD AUTO: 14.7 % — LOW (ref 20.5–51.1)
LYMPHOCYTES # BLD AUTO: 15.2 % — LOW (ref 20.5–51.1)
LYMPHOCYTES # BLD AUTO: 15.4 % — LOW (ref 20.5–51.1)
LYMPHOCYTES # BLD AUTO: 16.2 % — LOW (ref 20.5–51.1)
LYMPHOCYTES # BLD AUTO: 17.2 % — LOW (ref 20.5–51.1)
LYMPHOCYTES # BLD AUTO: 17.9 % — LOW (ref 20.5–51.1)
LYMPHOCYTES # BLD AUTO: 18.7 % — LOW (ref 20.5–51.1)
LYMPHOCYTES # BLD AUTO: 19.2 % — LOW (ref 20.5–51.1)
LYMPHOCYTES # BLD AUTO: 19.4 % — LOW (ref 20.5–51.1)
LYMPHOCYTES # BLD AUTO: 20 % — LOW (ref 20.5–51.1)
LYMPHOCYTES # BLD AUTO: 20.1 % — LOW (ref 20.5–51.1)
LYMPHOCYTES # BLD AUTO: 21 % — SIGNIFICANT CHANGE UP (ref 20.5–51.1)
LYMPHOCYTES # BLD AUTO: 22.6 % — SIGNIFICANT CHANGE UP (ref 20.5–51.1)
LYMPHOCYTES # BLD AUTO: 24.5 % — SIGNIFICANT CHANGE UP (ref 20.5–51.1)
LYMPHOCYTES # BLD AUTO: 28.7 % — SIGNIFICANT CHANGE UP (ref 20.5–51.1)
LYMPHOCYTES # FLD: 27 — SIGNIFICANT CHANGE UP
MACROCYTES BLD QL: SLIGHT — SIGNIFICANT CHANGE UP
MAGNESIUM SERPL-MCNC: 1.7 MG/DL — LOW (ref 1.8–2.4)
MAGNESIUM SERPL-MCNC: 1.7 MG/DL — LOW (ref 1.8–2.4)
MAGNESIUM SERPL-MCNC: 1.8 MG/DL — SIGNIFICANT CHANGE UP (ref 1.8–2.4)
MAGNESIUM SERPL-MCNC: 1.9 MG/DL — SIGNIFICANT CHANGE UP (ref 1.8–2.4)
MAGNESIUM SERPL-MCNC: 1.9 MG/DL — SIGNIFICANT CHANGE UP (ref 1.8–2.4)
MAGNESIUM SERPL-MCNC: 2 MG/DL — SIGNIFICANT CHANGE UP (ref 1.8–2.4)
MAGNESIUM SERPL-MCNC: 2.1 MG/DL — SIGNIFICANT CHANGE UP (ref 1.8–2.4)
MAGNESIUM SERPL-MCNC: 2.2 MG/DL — SIGNIFICANT CHANGE UP (ref 1.8–2.4)
MAGNESIUM SERPL-MCNC: 2.3 MG/DL — SIGNIFICANT CHANGE UP (ref 1.8–2.4)
MAGNESIUM SERPL-MCNC: 2.3 MG/DL — SIGNIFICANT CHANGE UP (ref 1.8–2.4)
MANUAL SMEAR VERIFICATION: SIGNIFICANT CHANGE UP
MCHC RBC-ENTMCNC: 24.7 PG — LOW (ref 27–31)
MCHC RBC-ENTMCNC: 25.1 PG
MCHC RBC-ENTMCNC: 25.2 PG — LOW (ref 27–31)
MCHC RBC-ENTMCNC: 25.3 PG — LOW (ref 27–31)
MCHC RBC-ENTMCNC: 25.3 PG — LOW (ref 27–31)
MCHC RBC-ENTMCNC: 25.4 PG — LOW (ref 27–31)
MCHC RBC-ENTMCNC: 25.5 PG — LOW (ref 27–31)
MCHC RBC-ENTMCNC: 25.5 PG — LOW (ref 27–31)
MCHC RBC-ENTMCNC: 25.6 PG — LOW (ref 27–31)
MCHC RBC-ENTMCNC: 25.7 PG — LOW (ref 27–31)
MCHC RBC-ENTMCNC: 25.7 PG — LOW (ref 27–31)
MCHC RBC-ENTMCNC: 25.8 PG
MCHC RBC-ENTMCNC: 25.8 PG — LOW (ref 27–31)
MCHC RBC-ENTMCNC: 25.9 PG — LOW (ref 27–31)
MCHC RBC-ENTMCNC: 26 PG — LOW (ref 27–31)
MCHC RBC-ENTMCNC: 26.1 PG
MCHC RBC-ENTMCNC: 26.1 PG — LOW (ref 27–31)
MCHC RBC-ENTMCNC: 26.2 PG — LOW (ref 27–31)
MCHC RBC-ENTMCNC: 26.2 PG — LOW (ref 27–31)
MCHC RBC-ENTMCNC: 26.3 PG
MCHC RBC-ENTMCNC: 26.3 PG — LOW (ref 27–31)
MCHC RBC-ENTMCNC: 31.2 G/DL — LOW (ref 32–37)
MCHC RBC-ENTMCNC: 31.4 G/DL
MCHC RBC-ENTMCNC: 31.4 G/DL — LOW (ref 32–37)
MCHC RBC-ENTMCNC: 31.7 G/DL — LOW (ref 32–37)
MCHC RBC-ENTMCNC: 31.8 G/DL — LOW (ref 32–37)
MCHC RBC-ENTMCNC: 31.8 G/DL — LOW (ref 32–37)
MCHC RBC-ENTMCNC: 32 G/DL — SIGNIFICANT CHANGE UP (ref 32–37)
MCHC RBC-ENTMCNC: 32.1 G/DL
MCHC RBC-ENTMCNC: 32.3 G/DL — SIGNIFICANT CHANGE UP (ref 32–37)
MCHC RBC-ENTMCNC: 32.4 G/DL
MCHC RBC-ENTMCNC: 32.4 G/DL — SIGNIFICANT CHANGE UP (ref 32–37)
MCHC RBC-ENTMCNC: 32.5 G/DL — SIGNIFICANT CHANGE UP (ref 32–37)
MCHC RBC-ENTMCNC: 32.7 G/DL
MCHC RBC-ENTMCNC: 32.7 G/DL — SIGNIFICANT CHANGE UP (ref 32–37)
MCHC RBC-ENTMCNC: 32.8 G/DL — SIGNIFICANT CHANGE UP (ref 32–37)
MCHC RBC-ENTMCNC: 32.8 G/DL — SIGNIFICANT CHANGE UP (ref 32–37)
MCHC RBC-ENTMCNC: 33.1 G/DL — SIGNIFICANT CHANGE UP (ref 32–37)
MCHC RBC-ENTMCNC: 33.2 G/DL — SIGNIFICANT CHANGE UP (ref 32–37)
MCHC RBC-ENTMCNC: 33.3 G/DL — SIGNIFICANT CHANGE UP (ref 32–37)
MCHC RBC-ENTMCNC: 33.6 G/DL — SIGNIFICANT CHANGE UP (ref 32–37)
MCHC RBC-ENTMCNC: 33.6 G/DL — SIGNIFICANT CHANGE UP (ref 32–37)
MCHC RBC-ENTMCNC: 33.7 G/DL — SIGNIFICANT CHANGE UP (ref 32–37)
MCHC RBC-ENTMCNC: 33.7 G/DL — SIGNIFICANT CHANGE UP (ref 32–37)
MCHC RBC-ENTMCNC: 34.1 G/DL — SIGNIFICANT CHANGE UP (ref 32–37)
MCHC RBC-ENTMCNC: 34.3 G/DL — SIGNIFICANT CHANGE UP (ref 32–37)
MCHC RBC-ENTMCNC: 34.4 G/DL — SIGNIFICANT CHANGE UP (ref 32–37)
MCV RBC AUTO: 74.6 FL — LOW (ref 81–99)
MCV RBC AUTO: 76.2 FL — LOW (ref 81–99)
MCV RBC AUTO: 76.5 FL — LOW (ref 81–99)
MCV RBC AUTO: 76.5 FL — LOW (ref 81–99)
MCV RBC AUTO: 76.7 FL — LOW (ref 81–99)
MCV RBC AUTO: 77.1 FL — LOW (ref 81–99)
MCV RBC AUTO: 77.1 FL — LOW (ref 81–99)
MCV RBC AUTO: 77.8 FL — LOW (ref 81–99)
MCV RBC AUTO: 77.8 FL — LOW (ref 81–99)
MCV RBC AUTO: 78 FL
MCV RBC AUTO: 78 FL — LOW (ref 81–99)
MCV RBC AUTO: 78.2 FL — LOW (ref 81–99)
MCV RBC AUTO: 78.3 FL — LOW (ref 81–99)
MCV RBC AUTO: 78.3 FL — LOW (ref 81–99)
MCV RBC AUTO: 78.4 FL — LOW (ref 81–99)
MCV RBC AUTO: 79 FL — LOW (ref 81–99)
MCV RBC AUTO: 79.2 FL — LOW (ref 81–99)
MCV RBC AUTO: 79.2 FL — LOW (ref 81–99)
MCV RBC AUTO: 79.6 FL
MCV RBC AUTO: 79.7 FL
MCV RBC AUTO: 79.8 FL — LOW (ref 81–99)
MCV RBC AUTO: 80.1 FL — LOW (ref 81–99)
MCV RBC AUTO: 80.4 FL — LOW (ref 81–99)
MCV RBC AUTO: 80.4 FL — LOW (ref 81–99)
MCV RBC AUTO: 82.2 FL — SIGNIFICANT CHANGE UP (ref 81–99)
MCV RBC AUTO: 83.6 FL
MESOTHL CELL # FLD: 4 % — SIGNIFICANT CHANGE UP
METAMYELOCYTES # FLD: 9.9 % — HIGH (ref 0–0)
MONOCYTES # BLD AUTO: 0.37 K/UL — SIGNIFICANT CHANGE UP (ref 0.1–0.6)
MONOCYTES # BLD AUTO: 0.39 K/UL — SIGNIFICANT CHANGE UP (ref 0.1–0.6)
MONOCYTES # BLD AUTO: 0.4 K/UL — SIGNIFICANT CHANGE UP (ref 0.1–0.6)
MONOCYTES # BLD AUTO: 0.55 K/UL — SIGNIFICANT CHANGE UP (ref 0.1–0.6)
MONOCYTES # BLD AUTO: 0.63 K/UL — HIGH (ref 0.1–0.6)
MONOCYTES # BLD AUTO: 0.65 K/UL — HIGH (ref 0.1–0.6)
MONOCYTES # BLD AUTO: 0.66 K/UL — HIGH (ref 0.1–0.6)
MONOCYTES # BLD AUTO: 0.71 K/UL — HIGH (ref 0.1–0.6)
MONOCYTES # BLD AUTO: 0.72 K/UL — HIGH (ref 0.1–0.6)
MONOCYTES # BLD AUTO: 0.77 K/UL — HIGH (ref 0.1–0.6)
MONOCYTES # BLD AUTO: 0.8 K/UL — HIGH (ref 0.1–0.6)
MONOCYTES # BLD AUTO: 0.81 K/UL — HIGH (ref 0.1–0.6)
MONOCYTES # BLD AUTO: 0.82 K/UL — HIGH (ref 0.1–0.6)
MONOCYTES # BLD AUTO: 0.84 K/UL — HIGH (ref 0.1–0.6)
MONOCYTES # BLD AUTO: 0.87 K/UL — HIGH (ref 0.1–0.6)
MONOCYTES # BLD AUTO: 0.87 K/UL — HIGH (ref 0.1–0.6)
MONOCYTES # BLD AUTO: 0.89 K/UL — HIGH (ref 0.1–0.6)
MONOCYTES # BLD AUTO: 0.92 K/UL — HIGH (ref 0.1–0.6)
MONOCYTES # BLD AUTO: 1.06 K/UL — HIGH (ref 0.1–0.6)
MONOCYTES # BLD AUTO: 1.1 K/UL — HIGH (ref 0.1–0.6)
MONOCYTES NFR BLD AUTO: 10.1 % — HIGH (ref 1.7–9.3)
MONOCYTES NFR BLD AUTO: 10.8 % — HIGH (ref 1.7–9.3)
MONOCYTES NFR BLD AUTO: 10.8 % — HIGH (ref 1.7–9.3)
MONOCYTES NFR BLD AUTO: 10.9 % — HIGH (ref 1.7–9.3)
MONOCYTES NFR BLD AUTO: 10.9 % — HIGH (ref 1.7–9.3)
MONOCYTES NFR BLD AUTO: 12.7 % — HIGH (ref 1.7–9.3)
MONOCYTES NFR BLD AUTO: 13.1 % — HIGH (ref 1.7–9.3)
MONOCYTES NFR BLD AUTO: 13.3 % — HIGH (ref 1.7–9.3)
MONOCYTES NFR BLD AUTO: 13.5 % — HIGH (ref 1.7–9.3)
MONOCYTES NFR BLD AUTO: 13.8 % — HIGH (ref 1.7–9.3)
MONOCYTES NFR BLD AUTO: 15.7 % — HIGH (ref 1.7–9.3)
MONOCYTES NFR BLD AUTO: 16.5 % — HIGH (ref 1.7–9.3)
MONOCYTES NFR BLD AUTO: 16.7 % — HIGH (ref 1.7–9.3)
MONOCYTES NFR BLD AUTO: 16.8 % — HIGH (ref 1.7–9.3)
MONOCYTES NFR BLD AUTO: 18.3 % — HIGH (ref 1.7–9.3)
MONOCYTES NFR BLD AUTO: 18.9 % — HIGH (ref 1.7–9.3)
MONOCYTES NFR BLD AUTO: 19.9 % — HIGH (ref 1.7–9.3)
MONOCYTES NFR BLD AUTO: 7.1 % — SIGNIFICANT CHANGE UP (ref 1.7–9.3)
MONOCYTES NFR BLD AUTO: 8.1 % — SIGNIFICANT CHANGE UP (ref 1.7–9.3)
MONOCYTES NFR BLD AUTO: 9.9 % — HIGH (ref 1.7–9.3)
MONOS+MACROS # FLD: 69 % — SIGNIFICANT CHANGE UP
NEUTROPHILS # BLD AUTO: 1.69 K/UL — SIGNIFICANT CHANGE UP (ref 1.4–6.5)
NEUTROPHILS # BLD AUTO: 2.47 K/UL — SIGNIFICANT CHANGE UP (ref 1.4–6.5)
NEUTROPHILS # BLD AUTO: 2.68 K/UL — SIGNIFICANT CHANGE UP (ref 1.4–6.5)
NEUTROPHILS # BLD AUTO: 2.75 K/UL — SIGNIFICANT CHANGE UP (ref 1.4–6.5)
NEUTROPHILS # BLD AUTO: 2.86 K/UL — SIGNIFICANT CHANGE UP (ref 1.4–6.5)
NEUTROPHILS # BLD AUTO: 3.34 K/UL — SIGNIFICANT CHANGE UP (ref 1.4–6.5)
NEUTROPHILS # BLD AUTO: 3.43 K/UL — SIGNIFICANT CHANGE UP (ref 1.4–6.5)
NEUTROPHILS # BLD AUTO: 3.43 K/UL — SIGNIFICANT CHANGE UP (ref 1.4–6.5)
NEUTROPHILS # BLD AUTO: 3.56 K/UL — SIGNIFICANT CHANGE UP (ref 1.4–6.5)
NEUTROPHILS # BLD AUTO: 3.65 K/UL — SIGNIFICANT CHANGE UP (ref 1.4–6.5)
NEUTROPHILS # BLD AUTO: 3.65 K/UL — SIGNIFICANT CHANGE UP (ref 1.4–6.5)
NEUTROPHILS # BLD AUTO: 3.76 K/UL — SIGNIFICANT CHANGE UP (ref 1.4–6.5)
NEUTROPHILS # BLD AUTO: 3.92 K/UL — SIGNIFICANT CHANGE UP (ref 1.4–6.5)
NEUTROPHILS # BLD AUTO: 4.02 K/UL — SIGNIFICANT CHANGE UP (ref 1.4–6.5)
NEUTROPHILS # BLD AUTO: 4.35 K/UL — SIGNIFICANT CHANGE UP (ref 1.4–6.5)
NEUTROPHILS # BLD AUTO: 4.67 K/UL — SIGNIFICANT CHANGE UP (ref 1.4–6.5)
NEUTROPHILS # BLD AUTO: 4.73 K/UL — SIGNIFICANT CHANGE UP (ref 1.4–6.5)
NEUTROPHILS # BLD AUTO: 5.1 K/UL — SIGNIFICANT CHANGE UP (ref 1.4–6.5)
NEUTROPHILS # BLD AUTO: 5.44 K/UL — SIGNIFICANT CHANGE UP (ref 1.4–6.5)
NEUTROPHILS # BLD AUTO: 6.07 K/UL — SIGNIFICANT CHANGE UP (ref 1.4–6.5)
NEUTROPHILS NFR BLD AUTO: 50.1 % — SIGNIFICANT CHANGE UP (ref 42.2–75.2)
NEUTROPHILS NFR BLD AUTO: 53.2 % — SIGNIFICANT CHANGE UP (ref 42.2–75.2)
NEUTROPHILS NFR BLD AUTO: 57.6 % — SIGNIFICANT CHANGE UP (ref 42.2–75.2)
NEUTROPHILS NFR BLD AUTO: 59.9 % — SIGNIFICANT CHANGE UP (ref 42.2–75.2)
NEUTROPHILS NFR BLD AUTO: 60.1 % — SIGNIFICANT CHANGE UP (ref 42.2–75.2)
NEUTROPHILS NFR BLD AUTO: 60.6 % — SIGNIFICANT CHANGE UP (ref 42.2–75.2)
NEUTROPHILS NFR BLD AUTO: 62 % — SIGNIFICANT CHANGE UP (ref 42.2–75.2)
NEUTROPHILS NFR BLD AUTO: 62.2 % — SIGNIFICANT CHANGE UP (ref 42.2–75.2)
NEUTROPHILS NFR BLD AUTO: 66.7 % — SIGNIFICANT CHANGE UP (ref 42.2–75.2)
NEUTROPHILS NFR BLD AUTO: 67.2 % — SIGNIFICANT CHANGE UP (ref 42.2–75.2)
NEUTROPHILS NFR BLD AUTO: 67.9 % — SIGNIFICANT CHANGE UP (ref 42.2–75.2)
NEUTROPHILS NFR BLD AUTO: 68.1 % — SIGNIFICANT CHANGE UP (ref 42.2–75.2)
NEUTROPHILS NFR BLD AUTO: 69 % — SIGNIFICANT CHANGE UP (ref 42.2–75.2)
NEUTROPHILS NFR BLD AUTO: 70.2 % — SIGNIFICANT CHANGE UP (ref 42.2–75.2)
NEUTROPHILS NFR BLD AUTO: 70.5 % — SIGNIFICANT CHANGE UP (ref 42.2–75.2)
NEUTROPHILS NFR BLD AUTO: 72.1 % — SIGNIFICANT CHANGE UP (ref 42.2–75.2)
NEUTROPHILS NFR BLD AUTO: 74 % — SIGNIFICANT CHANGE UP (ref 42.2–75.2)
NEUTROPHILS NFR BLD AUTO: 75.8 % — HIGH (ref 42.2–75.2)
NEUTROPHILS NFR BLD AUTO: 76.6 % — HIGH (ref 42.2–75.2)
NEUTROPHILS NFR BLD AUTO: 76.7 % — HIGH (ref 42.2–75.2)
NEUTROPHILS-BODY FLUID: 0 % — SIGNIFICANT CHANGE UP
NEUTS BAND # BLD: 5.4 % — SIGNIFICANT CHANGE UP (ref 0–6)
NITRITE UR-MCNC: NEGATIVE — SIGNIFICANT CHANGE UP
NON-GYNECOLOGICAL CYTOLOGY STUDY: SIGNIFICANT CHANGE UP
NRBC # BLD: 0 /100 WBCS — SIGNIFICANT CHANGE UP (ref 0–0)
NRBC # BLD: 1 /100 WBCS — HIGH (ref 0–0)
NRBC # BLD: 4 /100 — HIGH (ref 0–0)
NRBC # BLD: SIGNIFICANT CHANGE UP /100 WBCS (ref 0–0)
PCO2 BLDV: 45 MMHG — HIGH (ref 39–42)
PH BLDV: 7.4 — SIGNIFICANT CHANGE UP (ref 7.32–7.43)
PH UR: 5.5 — SIGNIFICANT CHANGE UP (ref 5–8)
PH UR: 6 — SIGNIFICANT CHANGE UP (ref 5–8)
PH UR: 6.5 — SIGNIFICANT CHANGE UP (ref 5–8)
PH UR: 7.5 — SIGNIFICANT CHANGE UP (ref 5–8)
PHOSPHATE SERPL-MCNC: 1.3 MG/DL — LOW (ref 2.1–4.9)
PHOSPHATE SERPL-MCNC: 2.2 MG/DL — SIGNIFICANT CHANGE UP (ref 2.1–4.9)
PHOSPHATE SERPL-MCNC: 2.7 MG/DL — SIGNIFICANT CHANGE UP (ref 2.1–4.9)
PHOSPHATE SERPL-MCNC: 2.9 MG/DL — SIGNIFICANT CHANGE UP (ref 2.1–4.9)
PHOSPHATE SERPL-MCNC: 3 MG/DL — SIGNIFICANT CHANGE UP (ref 2.1–4.9)
PHOSPHATE SERPL-MCNC: 3.2 MG/DL — SIGNIFICANT CHANGE UP (ref 2.1–4.9)
PHOSPHATE SERPL-MCNC: 3.5 MG/DL — SIGNIFICANT CHANGE UP (ref 2.1–4.9)
PHOSPHATE SERPL-MCNC: 4 MG/DL — SIGNIFICANT CHANGE UP (ref 2.1–4.9)
PHOSPHATE SERPL-MCNC: 4.1 MG/DL — SIGNIFICANT CHANGE UP (ref 2.1–4.9)
PHOSPHATE SERPL-MCNC: 4.3 MG/DL — SIGNIFICANT CHANGE UP (ref 2.1–4.9)
PHOSPHATE SERPL-MCNC: 5.3 MG/DL — HIGH (ref 2.1–4.9)
PLAT MORPH BLD: NORMAL — SIGNIFICANT CHANGE UP
PLATELET # BLD AUTO: 211 K/UL
PLATELET # BLD AUTO: 215 K/UL — SIGNIFICANT CHANGE UP (ref 130–400)
PLATELET # BLD AUTO: 215 K/UL — SIGNIFICANT CHANGE UP (ref 130–400)
PLATELET # BLD AUTO: 220 K/UL — SIGNIFICANT CHANGE UP (ref 130–400)
PLATELET # BLD AUTO: 228 K/UL — SIGNIFICANT CHANGE UP (ref 130–400)
PLATELET # BLD AUTO: 228 K/UL — SIGNIFICANT CHANGE UP (ref 130–400)
PLATELET # BLD AUTO: 240 K/UL — SIGNIFICANT CHANGE UP (ref 130–400)
PLATELET # BLD AUTO: 244 K/UL
PLATELET # BLD AUTO: 244 K/UL — SIGNIFICANT CHANGE UP (ref 130–400)
PLATELET # BLD AUTO: 244 K/UL — SIGNIFICANT CHANGE UP (ref 130–400)
PLATELET # BLD AUTO: 248 K/UL — SIGNIFICANT CHANGE UP (ref 130–400)
PLATELET # BLD AUTO: 251 K/UL — SIGNIFICANT CHANGE UP (ref 130–400)
PLATELET # BLD AUTO: 252 K/UL — SIGNIFICANT CHANGE UP (ref 130–400)
PLATELET # BLD AUTO: 253 K/UL — SIGNIFICANT CHANGE UP (ref 130–400)
PLATELET # BLD AUTO: 262 K/UL — SIGNIFICANT CHANGE UP (ref 130–400)
PLATELET # BLD AUTO: 262 K/UL — SIGNIFICANT CHANGE UP (ref 130–400)
PLATELET # BLD AUTO: 265 K/UL — SIGNIFICANT CHANGE UP (ref 130–400)
PLATELET # BLD AUTO: 267 K/UL — SIGNIFICANT CHANGE UP (ref 130–400)
PLATELET # BLD AUTO: 267 K/UL — SIGNIFICANT CHANGE UP (ref 130–400)
PLATELET # BLD AUTO: 268 K/UL — SIGNIFICANT CHANGE UP (ref 130–400)
PLATELET # BLD AUTO: 274 K/UL — SIGNIFICANT CHANGE UP (ref 130–400)
PLATELET # BLD AUTO: 292 K/UL — SIGNIFICANT CHANGE UP (ref 130–400)
PLATELET # BLD AUTO: 313 K/UL — SIGNIFICANT CHANGE UP (ref 130–400)
PLATELET # BLD AUTO: 314 K/UL — SIGNIFICANT CHANGE UP (ref 130–400)
PLATELET # BLD AUTO: 325 K/UL — SIGNIFICANT CHANGE UP (ref 130–400)
PLATELET # BLD AUTO: 328 K/UL
PLATELET # BLD AUTO: 338 K/UL
PLATELET # BLD AUTO: 349 K/UL — SIGNIFICANT CHANGE UP (ref 130–400)
PMV BLD: 9.3 FL
PMV BLD: 9.4 FL
PMV BLD: 9.7 FL
PMV BLD: 9.8 FL
PO2 BLDV: 47 MMHG — SIGNIFICANT CHANGE UP
POIKILOCYTOSIS BLD QL AUTO: SIGNIFICANT CHANGE UP
POLYCHROMASIA BLD QL SMEAR: SLIGHT — SIGNIFICANT CHANGE UP
POTASSIUM BLDV-SCNC: 3.5 MMOL/L — SIGNIFICANT CHANGE UP (ref 3.5–5.1)
POTASSIUM SERPL-MCNC: 3 MMOL/L — LOW (ref 3.5–5)
POTASSIUM SERPL-MCNC: 3.1 MMOL/L — LOW (ref 3.5–5)
POTASSIUM SERPL-MCNC: 3.4 MMOL/L — LOW (ref 3.5–5)
POTASSIUM SERPL-MCNC: 3.4 MMOL/L — LOW (ref 3.5–5)
POTASSIUM SERPL-MCNC: 3.6 MMOL/L — SIGNIFICANT CHANGE UP (ref 3.5–5)
POTASSIUM SERPL-MCNC: 3.7 MMOL/L — SIGNIFICANT CHANGE UP (ref 3.5–5)
POTASSIUM SERPL-MCNC: 3.7 MMOL/L — SIGNIFICANT CHANGE UP (ref 3.5–5)
POTASSIUM SERPL-MCNC: 3.9 MMOL/L — SIGNIFICANT CHANGE UP (ref 3.5–5)
POTASSIUM SERPL-MCNC: 4 MMOL/L — SIGNIFICANT CHANGE UP (ref 3.5–5)
POTASSIUM SERPL-MCNC: 4.1 MMOL/L — SIGNIFICANT CHANGE UP (ref 3.5–5)
POTASSIUM SERPL-MCNC: 4.2 MMOL/L — SIGNIFICANT CHANGE UP (ref 3.5–5)
POTASSIUM SERPL-MCNC: 4.3 MMOL/L — SIGNIFICANT CHANGE UP (ref 3.5–5)
POTASSIUM SERPL-MCNC: 4.4 MMOL/L — SIGNIFICANT CHANGE UP (ref 3.5–5)
POTASSIUM SERPL-MCNC: 4.4 MMOL/L — SIGNIFICANT CHANGE UP (ref 3.5–5)
POTASSIUM SERPL-MCNC: 4.5 MMOL/L — SIGNIFICANT CHANGE UP (ref 3.5–5)
POTASSIUM SERPL-MCNC: 4.6 MMOL/L — SIGNIFICANT CHANGE UP (ref 3.5–5)
POTASSIUM SERPL-MCNC: 4.7 MMOL/L — SIGNIFICANT CHANGE UP (ref 3.5–5)
POTASSIUM SERPL-MCNC: 4.9 MMOL/L — SIGNIFICANT CHANGE UP (ref 3.5–5)
POTASSIUM SERPL-MCNC: 5 MMOL/L — SIGNIFICANT CHANGE UP (ref 3.5–5)
POTASSIUM SERPL-MCNC: 5.5 MMOL/L — HIGH (ref 3.5–5)
POTASSIUM SERPL-MCNC: 5.5 MMOL/L — HIGH (ref 3.5–5)
POTASSIUM SERPL-MCNC: 5.7 MMOL/L — HIGH (ref 3.5–5)
POTASSIUM SERPL-MCNC: 6.4 MMOL/L — CRITICAL HIGH (ref 3.5–5)
POTASSIUM SERPL-SCNC: 3 MMOL/L — LOW (ref 3.5–5)
POTASSIUM SERPL-SCNC: 3.1 MMOL/L — LOW (ref 3.5–5)
POTASSIUM SERPL-SCNC: 3.4 MMOL/L
POTASSIUM SERPL-SCNC: 3.4 MMOL/L — LOW (ref 3.5–5)
POTASSIUM SERPL-SCNC: 3.4 MMOL/L — LOW (ref 3.5–5)
POTASSIUM SERPL-SCNC: 3.6 MMOL/L — SIGNIFICANT CHANGE UP (ref 3.5–5)
POTASSIUM SERPL-SCNC: 3.7 MMOL/L — SIGNIFICANT CHANGE UP (ref 3.5–5)
POTASSIUM SERPL-SCNC: 3.7 MMOL/L — SIGNIFICANT CHANGE UP (ref 3.5–5)
POTASSIUM SERPL-SCNC: 3.9 MMOL/L — SIGNIFICANT CHANGE UP (ref 3.5–5)
POTASSIUM SERPL-SCNC: 4 MMOL/L — SIGNIFICANT CHANGE UP (ref 3.5–5)
POTASSIUM SERPL-SCNC: 4.1 MMOL/L
POTASSIUM SERPL-SCNC: 4.1 MMOL/L — SIGNIFICANT CHANGE UP (ref 3.5–5)
POTASSIUM SERPL-SCNC: 4.2 MMOL/L — SIGNIFICANT CHANGE UP (ref 3.5–5)
POTASSIUM SERPL-SCNC: 4.3 MMOL/L
POTASSIUM SERPL-SCNC: 4.3 MMOL/L — SIGNIFICANT CHANGE UP (ref 3.5–5)
POTASSIUM SERPL-SCNC: 4.4 MMOL/L
POTASSIUM SERPL-SCNC: 4.4 MMOL/L — SIGNIFICANT CHANGE UP (ref 3.5–5)
POTASSIUM SERPL-SCNC: 4.4 MMOL/L — SIGNIFICANT CHANGE UP (ref 3.5–5)
POTASSIUM SERPL-SCNC: 4.5 MMOL/L — SIGNIFICANT CHANGE UP (ref 3.5–5)
POTASSIUM SERPL-SCNC: 4.6 MMOL/L — SIGNIFICANT CHANGE UP (ref 3.5–5)
POTASSIUM SERPL-SCNC: 4.7 MMOL/L — SIGNIFICANT CHANGE UP (ref 3.5–5)
POTASSIUM SERPL-SCNC: 4.9 MMOL/L
POTASSIUM SERPL-SCNC: 4.9 MMOL/L — SIGNIFICANT CHANGE UP (ref 3.5–5)
POTASSIUM SERPL-SCNC: 5 MMOL/L — SIGNIFICANT CHANGE UP (ref 3.5–5)
POTASSIUM SERPL-SCNC: 5.5 MMOL/L — HIGH (ref 3.5–5)
POTASSIUM SERPL-SCNC: 5.5 MMOL/L — HIGH (ref 3.5–5)
POTASSIUM SERPL-SCNC: 5.7 MMOL/L — HIGH (ref 3.5–5)
POTASSIUM SERPL-SCNC: 6.4 MMOL/L — CRITICAL HIGH (ref 3.5–5)
PROCALCITONIN SERPL-MCNC: 9.82 NG/ML — HIGH (ref 0.02–0.1)
PROT FLD-MCNC: 4.4 G/DL — SIGNIFICANT CHANGE UP
PROT SERPL-MCNC: 4.5 G/DL — LOW (ref 6–8)
PROT SERPL-MCNC: 4.6 G/DL — LOW (ref 6–8)
PROT SERPL-MCNC: 4.8 G/DL — LOW (ref 6–8)
PROT SERPL-MCNC: 4.9 G/DL — LOW (ref 6–8)
PROT SERPL-MCNC: 5 G/DL — LOW (ref 6–8)
PROT SERPL-MCNC: 5 G/DL — LOW (ref 6–8)
PROT SERPL-MCNC: 5.1 G/DL — LOW (ref 6–8)
PROT SERPL-MCNC: 5.2 G/DL — LOW (ref 6–8)
PROT SERPL-MCNC: 5.6 G/DL — LOW (ref 6–8)
PROT SERPL-MCNC: 5.7 G/DL — LOW (ref 6–8)
PROT SERPL-MCNC: 5.7 G/DL — LOW (ref 6–8)
PROT SERPL-MCNC: 6 G/DL — SIGNIFICANT CHANGE UP (ref 6–8)
PROT SERPL-MCNC: 6.2 G/DL — SIGNIFICANT CHANGE UP (ref 6–8)
PROT SERPL-MCNC: 6.5 G/DL
PROT SERPL-MCNC: 6.5 G/DL — SIGNIFICANT CHANGE UP (ref 6–8)
PROT SERPL-MCNC: 6.6 G/DL
PROT SERPL-MCNC: 6.6 G/DL — SIGNIFICANT CHANGE UP (ref 6–8)
PROT SERPL-MCNC: 6.6 G/DL — SIGNIFICANT CHANGE UP (ref 6–8)
PROT SERPL-MCNC: 6.7 G/DL
PROT SERPL-MCNC: 6.8 G/DL — SIGNIFICANT CHANGE UP (ref 6–8)
PROT SERPL-MCNC: 6.9 G/DL — SIGNIFICANT CHANGE UP (ref 6–8)
PROT SERPL-MCNC: 7.2 G/DL
PROT SERPL-MCNC: 7.4 G/DL — SIGNIFICANT CHANGE UP (ref 6–8)
PROT UR-MCNC: ABNORMAL
PROT UR-MCNC: SIGNIFICANT CHANGE UP
PROTHROM AB SERPL-ACNC: 11.2 SEC — SIGNIFICANT CHANGE UP (ref 9.95–12.87)
PROTHROM AB SERPL-ACNC: 13.3 SEC — HIGH (ref 9.95–12.87)
PROTHROM AB SERPL-ACNC: 13.7 SEC — HIGH (ref 9.95–12.87)
PROTHROM AB SERPL-ACNC: 17.2 SEC — HIGH (ref 9.95–12.87)
PROTHROM AB SERPL-ACNC: 18.3 SEC — HIGH (ref 9.95–12.87)
PROTHROM AB SERPL-ACNC: 20.5 SEC — HIGH (ref 9.95–12.87)
PROTHROM AB SERPL-ACNC: 31.8 SEC — HIGH (ref 9.95–12.87)
RBC # BLD: 3.74 M/UL — LOW (ref 4.2–5.4)
RBC # BLD: 3.78 M/UL — LOW (ref 4.2–5.4)
RBC # BLD: 3.86 M/UL — LOW (ref 4.2–5.4)
RBC # BLD: 3.86 M/UL — LOW (ref 4.2–5.4)
RBC # BLD: 3.88 M/UL — LOW (ref 4.2–5.4)
RBC # BLD: 3.95 M/UL — LOW (ref 4.2–5.4)
RBC # BLD: 4.01 M/UL — LOW (ref 4.2–5.4)
RBC # BLD: 4.05 M/UL — LOW (ref 4.2–5.4)
RBC # BLD: 4.06 M/UL — LOW (ref 4.2–5.4)
RBC # BLD: 4.25 M/UL — SIGNIFICANT CHANGE UP (ref 4.2–5.4)
RBC # BLD: 4.31 M/UL — SIGNIFICANT CHANGE UP (ref 4.2–5.4)
RBC # BLD: 4.37 M/UL — SIGNIFICANT CHANGE UP (ref 4.2–5.4)
RBC # BLD: 4.41 M/UL — SIGNIFICANT CHANGE UP (ref 4.2–5.4)
RBC # BLD: 4.48 M/UL — SIGNIFICANT CHANGE UP (ref 4.2–5.4)
RBC # BLD: 4.49 M/UL — SIGNIFICANT CHANGE UP (ref 4.2–5.4)
RBC # BLD: 4.51 M/UL
RBC # BLD: 4.55 M/UL — SIGNIFICANT CHANGE UP (ref 4.2–5.4)
RBC # BLD: 4.64 M/UL
RBC # BLD: 4.65 M/UL — SIGNIFICANT CHANGE UP (ref 4.2–5.4)
RBC # BLD: 4.66 M/UL — SIGNIFICANT CHANGE UP (ref 4.2–5.4)
RBC # BLD: 4.67 M/UL — SIGNIFICANT CHANGE UP (ref 4.2–5.4)
RBC # BLD: 4.77 M/UL — SIGNIFICANT CHANGE UP (ref 4.2–5.4)
RBC # BLD: 4.79 M/UL — SIGNIFICANT CHANGE UP (ref 4.2–5.4)
RBC # BLD: 4.91 M/UL
RBC # BLD: 5.16 M/UL
RBC # BLD: 5.18 M/UL — SIGNIFICANT CHANGE UP (ref 4.2–5.4)
RBC # BLD: 5.25 M/UL — SIGNIFICANT CHANGE UP (ref 4.2–5.4)
RBC # BLD: 5.33 M/UL — SIGNIFICANT CHANGE UP (ref 4.2–5.4)
RBC # FLD: 14.4 % — SIGNIFICANT CHANGE UP (ref 11.5–14.5)
RBC # FLD: 14.6 %
RBC # FLD: 14.6 % — HIGH (ref 11.5–14.5)
RBC # FLD: 14.6 % — HIGH (ref 11.5–14.5)
RBC # FLD: 14.7 % — HIGH (ref 11.5–14.5)
RBC # FLD: 14.9 % — HIGH (ref 11.5–14.5)
RBC # FLD: 15 %
RBC # FLD: 15.1 % — HIGH (ref 11.5–14.5)
RBC # FLD: 16.6 % — HIGH (ref 11.5–14.5)
RBC # FLD: 16.7 % — HIGH (ref 11.5–14.5)
RBC # FLD: 17 % — HIGH (ref 11.5–14.5)
RBC # FLD: 17.2 %
RBC # FLD: 18.1 % — HIGH (ref 11.5–14.5)
RBC # FLD: 18.6 % — HIGH (ref 11.5–14.5)
RBC # FLD: 19 % — HIGH (ref 11.5–14.5)
RBC # FLD: 19.1 % — HIGH (ref 11.5–14.5)
RBC # FLD: 19.1 % — HIGH (ref 11.5–14.5)
RBC # FLD: 19.3 %
RBC # FLD: 19.3 % — HIGH (ref 11.5–14.5)
RBC # FLD: 19.6 % — HIGH (ref 11.5–14.5)
RBC # FLD: 19.7 % — HIGH (ref 11.5–14.5)
RBC # FLD: 19.8 % — HIGH (ref 11.5–14.5)
RBC # FLD: 20 % — HIGH (ref 11.5–14.5)
RBC # FLD: 20 % — HIGH (ref 11.5–14.5)
RBC # FLD: 20.3 % — HIGH (ref 11.5–14.5)
RBC # FLD: 20.4 % — HIGH (ref 11.5–14.5)
RBC # FLD: 20.8 % — HIGH (ref 11.5–14.5)
RBC # FLD: 21.3 % — HIGH (ref 11.5–14.5)
RBC BLD AUTO: ABNORMAL
RBC CASTS # UR COMP ASSIST: 1 /HPF — SIGNIFICANT CHANGE UP (ref 0–4)
RBC CASTS # UR COMP ASSIST: 10 /HPF — HIGH (ref 0–4)
RBC CASTS # UR COMP ASSIST: 2 /HPF — SIGNIFICANT CHANGE UP (ref 0–4)
RCV VOL RI: 2000 /UL — HIGH (ref 0–0)
SAO2 % BLDV: 77.6 % — SIGNIFICANT CHANGE UP
SARS-COV-2 RNA SPEC QL NAA+PROBE: SIGNIFICANT CHANGE UP
SMUDGE CELLS # BLD: PRESENT — SIGNIFICANT CHANGE UP
SODIUM SERPL-SCNC: 132 MMOL/L — LOW (ref 135–146)
SODIUM SERPL-SCNC: 132 MMOL/L — LOW (ref 135–146)
SODIUM SERPL-SCNC: 133 MMOL/L — LOW (ref 135–146)
SODIUM SERPL-SCNC: 133 MMOL/L — LOW (ref 135–146)
SODIUM SERPL-SCNC: 134 MMOL/L — LOW (ref 135–146)
SODIUM SERPL-SCNC: 135 MMOL/L
SODIUM SERPL-SCNC: 135 MMOL/L — SIGNIFICANT CHANGE UP (ref 135–146)
SODIUM SERPL-SCNC: 136 MMOL/L
SODIUM SERPL-SCNC: 137 MMOL/L
SODIUM SERPL-SCNC: 137 MMOL/L — SIGNIFICANT CHANGE UP (ref 135–146)
SODIUM SERPL-SCNC: 138 MMOL/L
SODIUM SERPL-SCNC: 138 MMOL/L — SIGNIFICANT CHANGE UP (ref 135–146)
SODIUM SERPL-SCNC: 139 MMOL/L — SIGNIFICANT CHANGE UP (ref 135–146)
SODIUM SERPL-SCNC: 140 MMOL/L — SIGNIFICANT CHANGE UP (ref 135–146)
SODIUM SERPL-SCNC: 142 MMOL/L — SIGNIFICANT CHANGE UP (ref 135–146)
SODIUM SERPL-SCNC: 143 MMOL/L
SP GR SPEC: 1.02 — SIGNIFICANT CHANGE UP (ref 1.01–1.03)
SP GR SPEC: 1.03 — HIGH (ref 1.01–1.03)
SP GR SPEC: >1.05 (ref 1.01–1.03)
SP GR SPEC: >1.05 (ref 1.01–1.03)
SPECIMEN SOURCE: SIGNIFICANT CHANGE UP
TOTAL NUCLEATED CELL COUNT, BODY FLUID: 108 /UL — SIGNIFICANT CHANGE UP
TRIGL SERPL-MCNC: 78 MG/DL — SIGNIFICANT CHANGE UP
TROPONIN T SERPL-MCNC: <0.01 NG/ML — SIGNIFICANT CHANGE UP
TSH SERPL-ACNC: 2.46 UIU/ML
TSH SERPL-ACNC: 4.67 UIU/ML
UROBILINOGEN FLD QL: ABNORMAL
UROBILINOGEN FLD QL: ABNORMAL
UROBILINOGEN FLD QL: SIGNIFICANT CHANGE UP
UROBILINOGEN FLD QL: SIGNIFICANT CHANGE UP
VARIANT LYMPHS # BLD: 1.8 % — SIGNIFICANT CHANGE UP (ref 0–5)
WBC # BLD: 2.88 K/UL — LOW (ref 4.8–10.8)
WBC # BLD: 4.29 K/UL — LOW (ref 4.8–10.8)
WBC # BLD: 4.59 K/UL — LOW (ref 4.8–10.8)
WBC # BLD: 4.61 K/UL — LOW (ref 4.8–10.8)
WBC # BLD: 4.94 K/UL — SIGNIFICANT CHANGE UP (ref 4.8–10.8)
WBC # BLD: 4.94 K/UL — SIGNIFICANT CHANGE UP (ref 4.8–10.8)
WBC # BLD: 4.97 K/UL — SIGNIFICANT CHANGE UP (ref 4.8–10.8)
WBC # BLD: 5.14 K/UL — SIGNIFICANT CHANGE UP (ref 4.8–10.8)
WBC # BLD: 5.2 K/UL — SIGNIFICANT CHANGE UP (ref 4.8–10.8)
WBC # BLD: 5.24 K/UL — SIGNIFICANT CHANGE UP (ref 4.8–10.8)
WBC # BLD: 5.34 K/UL — SIGNIFICANT CHANGE UP (ref 4.8–10.8)
WBC # BLD: 5.54 K/UL — SIGNIFICANT CHANGE UP (ref 4.8–10.8)
WBC # BLD: 5.56 K/UL — SIGNIFICANT CHANGE UP (ref 4.8–10.8)
WBC # BLD: 5.83 K/UL — SIGNIFICANT CHANGE UP (ref 4.8–10.8)
WBC # BLD: 6.03 K/UL — SIGNIFICANT CHANGE UP (ref 4.8–10.8)
WBC # BLD: 6.31 K/UL — SIGNIFICANT CHANGE UP (ref 4.8–10.8)
WBC # BLD: 6.51 K/UL — SIGNIFICANT CHANGE UP (ref 4.8–10.8)
WBC # BLD: 6.71 K/UL — SIGNIFICANT CHANGE UP (ref 4.8–10.8)
WBC # BLD: 6.99 K/UL — SIGNIFICANT CHANGE UP (ref 4.8–10.8)
WBC # BLD: 7.1 K/UL — SIGNIFICANT CHANGE UP (ref 4.8–10.8)
WBC # BLD: 7.12 K/UL — SIGNIFICANT CHANGE UP (ref 4.8–10.8)
WBC # BLD: 7.48 K/UL — SIGNIFICANT CHANGE UP (ref 4.8–10.8)
WBC # BLD: 7.49 K/UL — SIGNIFICANT CHANGE UP (ref 4.8–10.8)
WBC # BLD: 8.01 K/UL — SIGNIFICANT CHANGE UP (ref 4.8–10.8)
WBC # FLD AUTO: 2.88 K/UL — LOW (ref 4.8–10.8)
WBC # FLD AUTO: 4.29 K/UL — LOW (ref 4.8–10.8)
WBC # FLD AUTO: 4.59 K/UL — LOW (ref 4.8–10.8)
WBC # FLD AUTO: 4.61 K/UL — LOW (ref 4.8–10.8)
WBC # FLD AUTO: 4.94 K/UL — SIGNIFICANT CHANGE UP (ref 4.8–10.8)
WBC # FLD AUTO: 4.94 K/UL — SIGNIFICANT CHANGE UP (ref 4.8–10.8)
WBC # FLD AUTO: 4.97 K/UL — SIGNIFICANT CHANGE UP (ref 4.8–10.8)
WBC # FLD AUTO: 5.14 K/UL — SIGNIFICANT CHANGE UP (ref 4.8–10.8)
WBC # FLD AUTO: 5.2 K/UL — SIGNIFICANT CHANGE UP (ref 4.8–10.8)
WBC # FLD AUTO: 5.24 K/UL — SIGNIFICANT CHANGE UP (ref 4.8–10.8)
WBC # FLD AUTO: 5.34 K/UL — SIGNIFICANT CHANGE UP (ref 4.8–10.8)
WBC # FLD AUTO: 5.38 K/UL
WBC # FLD AUTO: 5.53 K/UL
WBC # FLD AUTO: 5.54 K/UL — SIGNIFICANT CHANGE UP (ref 4.8–10.8)
WBC # FLD AUTO: 5.56 K/UL — SIGNIFICANT CHANGE UP (ref 4.8–10.8)
WBC # FLD AUTO: 5.83 K/UL — SIGNIFICANT CHANGE UP (ref 4.8–10.8)
WBC # FLD AUTO: 6.02 K/UL
WBC # FLD AUTO: 6.03 K/UL — SIGNIFICANT CHANGE UP (ref 4.8–10.8)
WBC # FLD AUTO: 6.31 K/UL — SIGNIFICANT CHANGE UP (ref 4.8–10.8)
WBC # FLD AUTO: 6.38 K/UL
WBC # FLD AUTO: 6.51 K/UL — SIGNIFICANT CHANGE UP (ref 4.8–10.8)
WBC # FLD AUTO: 6.71 K/UL — SIGNIFICANT CHANGE UP (ref 4.8–10.8)
WBC # FLD AUTO: 6.99 K/UL — SIGNIFICANT CHANGE UP (ref 4.8–10.8)
WBC # FLD AUTO: 7.1 K/UL — SIGNIFICANT CHANGE UP (ref 4.8–10.8)
WBC # FLD AUTO: 7.12 K/UL — SIGNIFICANT CHANGE UP (ref 4.8–10.8)
WBC # FLD AUTO: 7.48 K/UL — SIGNIFICANT CHANGE UP (ref 4.8–10.8)
WBC # FLD AUTO: 7.49 K/UL — SIGNIFICANT CHANGE UP (ref 4.8–10.8)
WBC # FLD AUTO: 8.01 K/UL — SIGNIFICANT CHANGE UP (ref 4.8–10.8)
WBC UR QL: 23 /HPF — HIGH (ref 0–5)
WBC UR QL: 37 /HPF — HIGH (ref 0–5)
WBC UR QL: 4 /HPF — SIGNIFICANT CHANGE UP (ref 0–5)

## 2022-01-01 PROCEDURE — 74018 RADEX ABDOMEN 1 VIEW: CPT | Mod: 26

## 2022-01-01 PROCEDURE — 99214 OFFICE O/P EST MOD 30 MIN: CPT

## 2022-01-01 PROCEDURE — 76937 US GUIDE VASCULAR ACCESS: CPT | Mod: 26

## 2022-01-01 PROCEDURE — 99239 HOSP IP/OBS DSCHRG MGMT >30: CPT

## 2022-01-01 PROCEDURE — 99231 SBSQ HOSP IP/OBS SF/LOW 25: CPT

## 2022-01-01 PROCEDURE — 99221 1ST HOSP IP/OBS SF/LOW 40: CPT

## 2022-01-01 PROCEDURE — 99233 SBSQ HOSP IP/OBS HIGH 50: CPT

## 2022-01-01 PROCEDURE — 99215 OFFICE O/P EST HI 40 MIN: CPT

## 2022-01-01 PROCEDURE — 71045 X-RAY EXAM CHEST 1 VIEW: CPT | Mod: 26

## 2022-01-01 PROCEDURE — 49083 ABD PARACENTESIS W/IMAGING: CPT

## 2022-01-01 PROCEDURE — 99285 EMERGENCY DEPT VISIT HI MDM: CPT | Mod: 25

## 2022-01-01 PROCEDURE — 99223 1ST HOSP IP/OBS HIGH 75: CPT

## 2022-01-01 PROCEDURE — 93010 ELECTROCARDIOGRAM REPORT: CPT

## 2022-01-01 PROCEDURE — 74176 CT ABD & PELVIS W/O CONTRAST: CPT | Mod: 26,MA

## 2022-01-01 PROCEDURE — 74177 CT ABD & PELVIS W/CONTRAST: CPT | Mod: 26,MA

## 2022-01-01 PROCEDURE — 99222 1ST HOSP IP/OBS MODERATE 55: CPT

## 2022-01-01 PROCEDURE — 88342 IMHCHEM/IMCYTCHM 1ST ANTB: CPT | Mod: 26

## 2022-01-01 PROCEDURE — 99285 EMERGENCY DEPT VISIT HI MDM: CPT

## 2022-01-01 PROCEDURE — 99231 SBSQ HOSP IP/OBS SF/LOW 25: CPT | Mod: GC

## 2022-01-01 PROCEDURE — 99221 1ST HOSP IP/OBS SF/LOW 40: CPT | Mod: GC

## 2022-01-01 PROCEDURE — 88112 CYTOPATH CELL ENHANCE TECH: CPT | Mod: 26

## 2022-01-01 PROCEDURE — 74177 CT ABD & PELVIS W/CONTRAST: CPT | Mod: 26

## 2022-01-01 PROCEDURE — 99232 SBSQ HOSP IP/OBS MODERATE 35: CPT

## 2022-01-01 PROCEDURE — 77001 FLUOROGUIDE FOR VEIN DEVICE: CPT | Mod: 26

## 2022-01-01 PROCEDURE — 88341 IMHCHEM/IMCYTCHM EA ADD ANTB: CPT | Mod: 26

## 2022-01-01 PROCEDURE — 88305 TISSUE EXAM BY PATHOLOGIST: CPT | Mod: 26

## 2022-01-01 PROCEDURE — 36561 INSERT TUNNELED CV CATH: CPT

## 2022-01-01 PROCEDURE — 36000 PLACE NEEDLE IN VEIN: CPT

## 2022-01-01 PROCEDURE — 49083 ABD PARACENTESIS W/IMAGING: CPT | Mod: 79

## 2022-01-01 PROCEDURE — 76770 US EXAM ABDO BACK WALL COMP: CPT | Mod: 26

## 2022-01-01 RX ORDER — FUROSEMIDE 40 MG
20 TABLET ORAL ONCE
Refills: 0 | Status: DISCONTINUED | OUTPATIENT
Start: 2022-01-01 | End: 2022-01-01

## 2022-01-01 RX ORDER — OLANZAPINE 15 MG/1
5 TABLET, FILM COATED ORAL DAILY
Refills: 0 | Status: DISCONTINUED | OUTPATIENT
Start: 2022-01-01 | End: 2022-01-01

## 2022-01-01 RX ORDER — ONDANSETRON 8 MG/1
4 TABLET, FILM COATED ORAL ONCE
Refills: 0 | Status: COMPLETED | OUTPATIENT
Start: 2022-01-01 | End: 2022-01-01

## 2022-01-01 RX ORDER — KETOROLAC TROMETHAMINE 30 MG/ML
30 SYRINGE (ML) INJECTION ONCE
Refills: 0 | Status: DISCONTINUED | OUTPATIENT
Start: 2022-01-01 | End: 2022-01-01

## 2022-01-01 RX ORDER — SPIRONOLACTONE 25 MG/1
50 TABLET, FILM COATED ORAL DAILY
Refills: 0 | Status: DISCONTINUED | OUTPATIENT
Start: 2022-01-01 | End: 2022-01-01

## 2022-01-01 RX ORDER — RIVAROXABAN 15 MG-20MG
1 KIT ORAL
Qty: 0 | Refills: 0 | DISCHARGE

## 2022-01-01 RX ORDER — CEFEPIME 1 G/1
INJECTION, POWDER, FOR SOLUTION INTRAMUSCULAR; INTRAVENOUS
Refills: 0 | Status: DISCONTINUED | OUTPATIENT
Start: 2022-01-01 | End: 2022-01-01

## 2022-01-01 RX ORDER — ONDANSETRON 8 MG/1
8 TABLET, FILM COATED ORAL ONCE
Refills: 0 | Status: DISCONTINUED | OUTPATIENT
Start: 2022-01-01 | End: 2022-01-01

## 2022-01-01 RX ORDER — LATANOPROST 0.05 MG/ML
1 SOLUTION/ DROPS OPHTHALMIC; TOPICAL
Qty: 0 | Refills: 0 | DISCHARGE

## 2022-01-01 RX ORDER — ATORVASTATIN CALCIUM 80 MG/1
40 TABLET, FILM COATED ORAL AT BEDTIME
Refills: 0 | Status: DISCONTINUED | OUTPATIENT
Start: 2022-01-01 | End: 2022-01-01

## 2022-01-01 RX ORDER — POLYETHYLENE GLYCOL 3350 17 G/17G
17 POWDER, FOR SOLUTION ORAL ONCE
Refills: 0 | Status: COMPLETED | OUTPATIENT
Start: 2022-01-01 | End: 2022-01-01

## 2022-01-01 RX ORDER — PANTOPRAZOLE SODIUM 20 MG/1
40 TABLET, DELAYED RELEASE ORAL
Refills: 0 | Status: DISCONTINUED | OUTPATIENT
Start: 2022-01-01 | End: 2022-01-01

## 2022-01-01 RX ORDER — LACTULOSE 10 G/15ML
15 SOLUTION ORAL THREE TIMES A DAY
Refills: 0 | Status: DISCONTINUED | OUTPATIENT
Start: 2022-01-01 | End: 2022-01-01

## 2022-01-01 RX ORDER — DORZOLAMIDE HYDROCHLORIDE 20 MG/ML
1 SOLUTION/ DROPS OPHTHALMIC EVERY 8 HOURS
Refills: 0 | Status: DISCONTINUED | OUTPATIENT
Start: 2022-01-01 | End: 2022-01-01

## 2022-01-01 RX ORDER — ACETAMINOPHEN 500 MG
650 TABLET ORAL EVERY 6 HOURS
Refills: 0 | Status: DISCONTINUED | OUTPATIENT
Start: 2022-01-01 | End: 2022-01-01

## 2022-01-01 RX ORDER — CLONAZEPAM 1 MG
0.5 TABLET ORAL EVERY 12 HOURS
Refills: 0 | Status: DISCONTINUED | OUTPATIENT
Start: 2022-01-01 | End: 2022-01-01

## 2022-01-01 RX ORDER — SODIUM CHLORIDE 9 MG/ML
1000 INJECTION INTRAMUSCULAR; INTRAVENOUS; SUBCUTANEOUS ONCE
Refills: 0 | Status: COMPLETED | OUTPATIENT
Start: 2022-01-01 | End: 2022-01-01

## 2022-01-01 RX ORDER — OXYCODONE HYDROCHLORIDE 5 MG/1
5 TABLET ORAL ONCE
Refills: 0 | Status: DISCONTINUED | OUTPATIENT
Start: 2022-01-01 | End: 2022-01-01

## 2022-01-01 RX ORDER — ACETAMINOPHEN 500 MG
650 TABLET ORAL ONCE
Refills: 0 | Status: COMPLETED | OUTPATIENT
Start: 2022-01-01 | End: 2022-01-01

## 2022-01-01 RX ORDER — CEFEPIME 1 G/1
1000 INJECTION, POWDER, FOR SOLUTION INTRAMUSCULAR; INTRAVENOUS ONCE
Refills: 0 | Status: DISCONTINUED | OUTPATIENT
Start: 2022-01-01 | End: 2022-01-01

## 2022-01-01 RX ORDER — POTASSIUM CHLORIDE 20 MEQ
20 PACKET (EA) ORAL
Refills: 0 | Status: DISCONTINUED | OUTPATIENT
Start: 2022-01-01 | End: 2022-01-01

## 2022-01-01 RX ORDER — LATANOPROST 0.05 MG/ML
1 SOLUTION/ DROPS OPHTHALMIC; TOPICAL AT BEDTIME
Refills: 0 | Status: DISCONTINUED | OUTPATIENT
Start: 2022-01-01 | End: 2022-01-01

## 2022-01-01 RX ORDER — OXYCODONE HYDROCHLORIDE 5 MG/1
5 TABLET ORAL EVERY 6 HOURS
Refills: 0 | Status: DISCONTINUED | OUTPATIENT
Start: 2022-01-01 | End: 2022-01-01

## 2022-01-01 RX ORDER — MINERAL OIL
133 OIL (ML) MISCELLANEOUS ONCE
Refills: 0 | Status: COMPLETED | OUTPATIENT
Start: 2022-01-01 | End: 2022-01-01

## 2022-01-01 RX ORDER — POLYETHYLENE GLYCOL 3350 17 G/17G
17 POWDER, FOR SOLUTION ORAL DAILY
Refills: 0 | Status: DISCONTINUED | OUTPATIENT
Start: 2022-01-01 | End: 2022-01-01

## 2022-01-01 RX ORDER — MORPHINE SULFATE 50 MG/1
2 CAPSULE, EXTENDED RELEASE ORAL ONCE
Refills: 0 | Status: DISCONTINUED | OUTPATIENT
Start: 2022-01-01 | End: 2022-01-01

## 2022-01-01 RX ORDER — MAGNESIUM SULFATE 500 MG/ML
2 VIAL (ML) INJECTION ONCE
Refills: 0 | Status: DISCONTINUED | OUTPATIENT
Start: 2022-01-01 | End: 2022-01-01

## 2022-01-01 RX ORDER — SPIRONOLACTONE 25 MG/1
1 TABLET, FILM COATED ORAL
Qty: 30 | Refills: 0
Start: 2022-01-01 | End: 2022-01-01

## 2022-01-01 RX ORDER — GABAPENTIN 400 MG/1
100 CAPSULE ORAL THREE TIMES A DAY
Refills: 0 | Status: DISCONTINUED | OUTPATIENT
Start: 2022-01-01 | End: 2022-01-01

## 2022-01-01 RX ORDER — SENNA PLUS 8.6 MG/1
2 TABLET ORAL AT BEDTIME
Refills: 0 | Status: DISCONTINUED | OUTPATIENT
Start: 2022-01-01 | End: 2022-01-01

## 2022-01-01 RX ORDER — FAMOTIDINE 10 MG/ML
20 INJECTION INTRAVENOUS ONCE
Refills: 0 | Status: COMPLETED | OUTPATIENT
Start: 2022-01-01 | End: 2022-01-01

## 2022-01-01 RX ORDER — BRIMONIDINE TARTRATE 2 MG/MG
1 SOLUTION/ DROPS OPHTHALMIC
Qty: 0 | Refills: 0 | DISCHARGE

## 2022-01-01 RX ORDER — CASPOFUNGIN ACETATE 7 MG/ML
INJECTION, POWDER, LYOPHILIZED, FOR SOLUTION INTRAVENOUS
Refills: 0 | Status: DISCONTINUED | OUTPATIENT
Start: 2022-01-01 | End: 2022-01-01

## 2022-01-01 RX ORDER — ATORVASTATIN CALCIUM 80 MG/1
40 TABLET, FILM COATED ORAL DAILY
Refills: 0 | Status: DISCONTINUED | OUTPATIENT
Start: 2022-01-01 | End: 2022-01-01

## 2022-01-01 RX ORDER — BRIMONIDINE TARTRATE 2 MG/MG
1 SOLUTION/ DROPS OPHTHALMIC
Refills: 0 | Status: DISCONTINUED | OUTPATIENT
Start: 2022-01-01 | End: 2022-01-01

## 2022-01-01 RX ORDER — AMLODIPINE BESYLATE 2.5 MG/1
0.5 TABLET ORAL
Qty: 0 | Refills: 0 | DISCHARGE

## 2022-01-01 RX ORDER — DORZOLAMIDE HYDROCHLORIDE 20 MG/ML
1 SOLUTION/ DROPS OPHTHALMIC
Qty: 0 | Refills: 0 | DISCHARGE

## 2022-01-01 RX ORDER — ATORVASTATIN CALCIUM 80 MG/1
80 TABLET, FILM COATED ORAL AT BEDTIME
Refills: 0 | Status: DISCONTINUED | OUTPATIENT
Start: 2022-01-01 | End: 2022-01-01

## 2022-01-01 RX ORDER — FONDAPARINUX SODIUM 2.5 MG/.5ML
1 INJECTION, SOLUTION SUBCUTANEOUS
Qty: 0 | Refills: 0 | DISCHARGE

## 2022-01-01 RX ORDER — DORZOLAMIDE HYDROCHLORIDE 20 MG/ML
1 SOLUTION/ DROPS OPHTHALMIC
Refills: 0 | Status: DISCONTINUED | OUTPATIENT
Start: 2022-01-01 | End: 2022-01-01

## 2022-01-01 RX ORDER — I.V. FAT EMULSION 20 G/100ML
1.27 EMULSION INTRAVENOUS
Qty: 75.01 | Refills: 0 | Status: DISCONTINUED | OUTPATIENT
Start: 2022-01-01 | End: 2022-01-01

## 2022-01-01 RX ORDER — ONDANSETRON 8 MG/1
8 TABLET, FILM COATED ORAL ONCE
Refills: 0 | Status: COMPLETED | OUTPATIENT
Start: 2022-01-01 | End: 2022-01-01

## 2022-01-01 RX ORDER — AMLODIPINE BESYLATE 2.5 MG/1
1 TABLET ORAL
Qty: 0 | Refills: 0 | DISCHARGE

## 2022-01-01 RX ORDER — AMLODIPINE BESYLATE 2.5 MG/1
2.5 TABLET ORAL DAILY
Refills: 0 | Status: DISCONTINUED | OUTPATIENT
Start: 2022-01-01 | End: 2022-01-01

## 2022-01-01 RX ORDER — SODIUM CHLORIDE 9 MG/ML
1000 INJECTION INTRAMUSCULAR; INTRAVENOUS; SUBCUTANEOUS
Refills: 0 | Status: DISCONTINUED | OUTPATIENT
Start: 2022-01-01 | End: 2022-01-01

## 2022-01-01 RX ORDER — AMLODIPINE BESYLATE 2.5 MG/1
5 TABLET ORAL DAILY
Refills: 0 | Status: DISCONTINUED | OUTPATIENT
Start: 2022-01-01 | End: 2022-01-01

## 2022-01-01 RX ORDER — ENOXAPARIN SODIUM 100 MG/ML
80 INJECTION SUBCUTANEOUS EVERY 12 HOURS
Refills: 0 | Status: DISCONTINUED | OUTPATIENT
Start: 2022-01-01 | End: 2022-01-01

## 2022-01-01 RX ORDER — CHLORHEXIDINE GLUCONATE 213 G/1000ML
1 SOLUTION TOPICAL DAILY
Refills: 0 | Status: DISCONTINUED | OUTPATIENT
Start: 2022-01-01 | End: 2022-01-01

## 2022-01-01 RX ORDER — ENOXAPARIN SODIUM 100 MG/ML
70 INJECTION SUBCUTANEOUS EVERY 12 HOURS
Refills: 0 | Status: DISCONTINUED | OUTPATIENT
Start: 2022-01-01 | End: 2022-01-01

## 2022-01-01 RX ORDER — IOHEXOL 300 MG/ML
30 INJECTION, SOLUTION INTRAVENOUS ONCE
Refills: 0 | Status: COMPLETED | OUTPATIENT
Start: 2022-01-01 | End: 2022-01-01

## 2022-01-01 RX ORDER — DORZOLAMIDE HYDROCHLORIDE 20 MG/ML
1 SOLUTION/ DROPS OPHTHALMIC THREE TIMES A DAY
Refills: 0 | Status: DISCONTINUED | OUTPATIENT
Start: 2022-01-01 | End: 2022-01-01

## 2022-01-01 RX ORDER — BUSPIRONE HYDROCHLORIDE 15 MG/1
15 TABLET ORAL
Qty: 60 | Refills: 2 | Status: COMPLETED | COMMUNITY
Start: 2021-01-01 | End: 2022-01-01

## 2022-01-01 RX ORDER — ELECTROLYTE SOLUTION,INJ
1 VIAL (ML) INTRAVENOUS
Refills: 0 | Status: DISCONTINUED | OUTPATIENT
Start: 2022-01-01 | End: 2022-01-01

## 2022-01-01 RX ORDER — DEXTROSE MONOHYDRATE, SODIUM CHLORIDE, AND POTASSIUM CHLORIDE 50; .745; 4.5 G/1000ML; G/1000ML; G/1000ML
1000 INJECTION, SOLUTION INTRAVENOUS
Refills: 0 | Status: DISCONTINUED | OUTPATIENT
Start: 2022-01-01 | End: 2022-01-01

## 2022-01-01 RX ORDER — KETOROLAC TROMETHAMINE 30 MG/ML
15 SYRINGE (ML) INJECTION EVERY 8 HOURS
Refills: 0 | Status: DISCONTINUED | OUTPATIENT
Start: 2022-01-01 | End: 2022-01-01

## 2022-01-01 RX ORDER — ROSUVASTATIN CALCIUM 5 MG/1
1 TABLET ORAL
Qty: 0 | Refills: 0 | DISCHARGE

## 2022-01-01 RX ORDER — KETOROLAC TROMETHAMINE 30 MG/ML
15 SYRINGE (ML) INJECTION ONCE
Refills: 0 | Status: DISCONTINUED | OUTPATIENT
Start: 2022-01-01 | End: 2022-01-01

## 2022-01-01 RX ORDER — KETOROLAC TROMETHAMINE 30 MG/ML
10 SYRINGE (ML) INJECTION EVERY 6 HOURS
Refills: 0 | Status: DISCONTINUED | OUTPATIENT
Start: 2022-01-01 | End: 2022-01-01

## 2022-01-01 RX ORDER — TRAMADOL HYDROCHLORIDE 50 MG/1
1 TABLET ORAL
Qty: 28 | Refills: 0
Start: 2022-01-01 | End: 2022-01-01

## 2022-01-01 RX ORDER — HEPARIN SODIUM 5000 [USP'U]/ML
1000 INJECTION INTRAVENOUS; SUBCUTANEOUS
Qty: 25000 | Refills: 0 | Status: DISCONTINUED | OUTPATIENT
Start: 2022-01-01 | End: 2022-01-01

## 2022-01-01 RX ORDER — LANOLIN ALCOHOL/MO/W.PET/CERES
3 CREAM (GRAM) TOPICAL AT BEDTIME
Refills: 0 | Status: DISCONTINUED | OUTPATIENT
Start: 2022-01-01 | End: 2022-01-01

## 2022-01-01 RX ORDER — DEXAMETHASONE 0.5 MG/5ML
8 ELIXIR ORAL DAILY
Refills: 0 | Status: DISCONTINUED | OUTPATIENT
Start: 2022-01-01 | End: 2022-01-01

## 2022-01-01 RX ORDER — PANTOPRAZOLE SODIUM 20 MG/1
1 TABLET, DELAYED RELEASE ORAL
Qty: 60 | Refills: 0
Start: 2022-01-01 | End: 2022-05-21

## 2022-01-01 RX ORDER — CASPOFUNGIN ACETATE 7 MG/ML
50 INJECTION, POWDER, LYOPHILIZED, FOR SOLUTION INTRAVENOUS EVERY 24 HOURS
Refills: 0 | Status: DISCONTINUED | OUTPATIENT
Start: 2022-05-15 | End: 2022-01-01

## 2022-01-01 RX ORDER — PANTOPRAZOLE SODIUM 20 MG/1
20 TABLET, DELAYED RELEASE ORAL ONCE
Refills: 0 | Status: COMPLETED | OUTPATIENT
Start: 2022-01-01 | End: 2022-01-01

## 2022-01-01 RX ORDER — CLONAZEPAM 1 MG
0.5 TABLET ORAL DAILY
Refills: 0 | Status: DISCONTINUED | OUTPATIENT
Start: 2022-01-01 | End: 2022-01-01

## 2022-01-01 RX ORDER — SENNA PLUS 8.6 MG/1
2 TABLET ORAL
Qty: 60 | Refills: 0
Start: 2022-01-01 | End: 2022-05-21

## 2022-01-01 RX ORDER — PANTOPRAZOLE SODIUM 20 MG/1
40 TABLET, DELAYED RELEASE ORAL EVERY 12 HOURS
Refills: 0 | Status: DISCONTINUED | OUTPATIENT
Start: 2022-01-01 | End: 2022-01-01

## 2022-01-01 RX ORDER — PANTOPRAZOLE SODIUM 20 MG/1
40 TABLET, DELAYED RELEASE ORAL DAILY
Refills: 0 | Status: DISCONTINUED | OUTPATIENT
Start: 2022-01-01 | End: 2022-01-01

## 2022-01-01 RX ORDER — NOREPINEPHRINE BITARTRATE/D5W 8 MG/250ML
0.05 PLASTIC BAG, INJECTION (ML) INTRAVENOUS
Qty: 8 | Refills: 0 | Status: DISCONTINUED | OUTPATIENT
Start: 2022-01-01 | End: 2022-01-01

## 2022-01-01 RX ORDER — ATORVASTATIN CALCIUM 80 MG/1
1 TABLET, FILM COATED ORAL
Qty: 0 | Refills: 0 | DISCHARGE

## 2022-01-01 RX ORDER — METOPROLOL TARTRATE 50 MG
25 TABLET ORAL DAILY
Refills: 0 | Status: DISCONTINUED | OUTPATIENT
Start: 2022-01-01 | End: 2022-01-01

## 2022-01-01 RX ORDER — CLONAZEPAM 1 MG
1 TABLET ORAL
Qty: 0 | Refills: 0 | DISCHARGE
Start: 2022-01-01

## 2022-01-01 RX ORDER — CEFTRIAXONE 500 MG/1
1000 INJECTION, POWDER, FOR SOLUTION INTRAMUSCULAR; INTRAVENOUS EVERY 24 HOURS
Refills: 0 | Status: DISCONTINUED | OUTPATIENT
Start: 2022-01-01 | End: 2022-01-01

## 2022-01-01 RX ORDER — I.V. FAT EMULSION 20 G/100ML
0.84 EMULSION INTRAVENOUS
Qty: 50.02 | Refills: 0 | Status: DISCONTINUED | OUTPATIENT
Start: 2022-01-01 | End: 2022-01-01

## 2022-01-01 RX ORDER — MORPHINE SULFATE 50 MG/1
6 CAPSULE, EXTENDED RELEASE ORAL ONCE
Refills: 0 | Status: DISCONTINUED | OUTPATIENT
Start: 2022-01-01 | End: 2022-01-01

## 2022-01-01 RX ORDER — SODIUM CHLORIDE 9 MG/ML
500 INJECTION, SOLUTION INTRAVENOUS ONCE
Refills: 0 | Status: COMPLETED | OUTPATIENT
Start: 2022-01-01 | End: 2022-01-01

## 2022-01-01 RX ORDER — OLANZAPINE 15 MG/1
1 TABLET, FILM COATED ORAL
Qty: 1 | Refills: 0
Start: 2022-01-01 | End: 2022-01-01

## 2022-01-01 RX ORDER — MORPHINE SULFATE 50 MG/1
4 CAPSULE, EXTENDED RELEASE ORAL ONCE
Refills: 0 | Status: DISCONTINUED | OUTPATIENT
Start: 2022-01-01 | End: 2022-01-01

## 2022-01-01 RX ORDER — RIVAROXABAN 20 MG/1
20 TABLET, FILM COATED ORAL
Qty: 30 | Refills: 3 | Status: ACTIVE | COMMUNITY
Start: 2021-01-28 | End: 1900-01-01

## 2022-01-01 RX ORDER — CASPOFUNGIN ACETATE 7 MG/ML
70 INJECTION, POWDER, LYOPHILIZED, FOR SOLUTION INTRAVENOUS ONCE
Refills: 0 | Status: COMPLETED | OUTPATIENT
Start: 2022-01-01 | End: 2022-01-01

## 2022-01-01 RX ORDER — RIVAROXABAN 15 MG-20MG
1 KIT ORAL
Qty: 30 | Refills: 0
Start: 2022-01-01 | End: 2022-01-01

## 2022-01-01 RX ORDER — SODIUM CHLORIDE 9 MG/ML
1000 INJECTION, SOLUTION INTRAVENOUS
Refills: 0 | Status: DISCONTINUED | OUTPATIENT
Start: 2022-01-01 | End: 2022-01-01

## 2022-01-01 RX ORDER — CLONAZEPAM 1 MG
0.25 TABLET ORAL EVERY 8 HOURS
Refills: 0 | Status: DISCONTINUED | OUTPATIENT
Start: 2022-01-01 | End: 2022-01-01

## 2022-01-01 RX ORDER — PEMBROLIZUMAB 25 MG/ML
200 INJECTION, SOLUTION INTRAVENOUS ONCE
Refills: 0 | Status: COMPLETED | OUTPATIENT
Start: 2022-01-01 | End: 2022-01-01

## 2022-01-01 RX ORDER — ONDANSETRON 8 MG/1
8 TABLET ORAL EVERY 8 HOURS
Qty: 45 | Refills: 3 | Status: ACTIVE | COMMUNITY
Start: 2022-01-01 | End: 1900-01-01

## 2022-01-01 RX ORDER — RIVAROXABAN 15 MG-20MG
20 KIT ORAL
Refills: 0 | Status: DISCONTINUED | OUTPATIENT
Start: 2022-01-01 | End: 2022-01-01

## 2022-01-01 RX ORDER — OXYCODONE HYDROCHLORIDE 5 MG/1
1 TABLET ORAL
Qty: 0 | Refills: 0 | DISCHARGE

## 2022-01-01 RX ORDER — CLONAZEPAM 1 MG
0.25 TABLET ORAL EVERY 12 HOURS
Refills: 0 | Status: DISCONTINUED | OUTPATIENT
Start: 2022-01-01 | End: 2022-01-01

## 2022-01-01 RX ORDER — CEFEPIME 1 G/1
1000 INJECTION, POWDER, FOR SOLUTION INTRAMUSCULAR; INTRAVENOUS ONCE
Refills: 0 | Status: COMPLETED | OUTPATIENT
Start: 2022-01-01 | End: 2022-01-01

## 2022-01-01 RX ORDER — ACETAMINOPHEN 500 MG
2 TABLET ORAL
Qty: 0 | Refills: 0 | DISCHARGE
Start: 2022-01-01

## 2022-01-01 RX ORDER — CHLORHEXIDINE GLUCONATE 213 G/1000ML
1 SOLUTION TOPICAL
Refills: 0 | Status: DISCONTINUED | OUTPATIENT
Start: 2022-01-01 | End: 2022-01-01

## 2022-01-01 RX ORDER — TRAMADOL HYDROCHLORIDE 50 MG/1
25 TABLET ORAL EVERY 4 HOURS
Refills: 0 | Status: DISCONTINUED | OUTPATIENT
Start: 2022-01-01 | End: 2022-01-01

## 2022-01-01 RX ORDER — HEPARIN SODIUM 5000 [USP'U]/ML
5000 INJECTION INTRAVENOUS; SUBCUTANEOUS EVERY 12 HOURS
Refills: 0 | Status: DISCONTINUED | OUTPATIENT
Start: 2022-01-01 | End: 2022-01-01

## 2022-01-01 RX ORDER — MIDODRINE HYDROCHLORIDE 2.5 MG/1
5 TABLET ORAL EVERY 8 HOURS
Refills: 0 | Status: DISCONTINUED | OUTPATIENT
Start: 2022-01-01 | End: 2022-01-01

## 2022-01-01 RX ORDER — GABAPENTIN 100 MG/1
100 CAPSULE ORAL 3 TIMES DAILY
Qty: 270 | Refills: 0 | Status: ACTIVE | COMMUNITY
Start: 2022-01-01 | End: 1900-01-01

## 2022-01-01 RX ORDER — ONDANSETRON 8 MG/1
4 TABLET, FILM COATED ORAL EVERY 8 HOURS
Refills: 0 | Status: DISCONTINUED | OUTPATIENT
Start: 2022-01-01 | End: 2022-01-01

## 2022-01-01 RX ORDER — LENVATINIB 4 MG/1
4 CAPSULE ORAL
Qty: 30 | Refills: 1 | Status: ACTIVE | COMMUNITY
Start: 2022-01-01 | End: 1900-01-01

## 2022-01-01 RX ORDER — SODIUM CHLORIDE 9 MG/ML
500 INJECTION, SOLUTION INTRAVENOUS ONCE
Refills: 0 | Status: DISCONTINUED | OUTPATIENT
Start: 2022-01-01 | End: 2022-01-01

## 2022-01-01 RX ORDER — POTASSIUM CHLORIDE 20 MEQ
20 PACKET (EA) ORAL ONCE
Refills: 0 | Status: COMPLETED | OUTPATIENT
Start: 2022-01-01 | End: 2022-01-01

## 2022-01-01 RX ORDER — BRIMONIDINE TARTRATE 2 MG/MG
0 SOLUTION/ DROPS OPHTHALMIC
Qty: 0 | Refills: 0 | DISCHARGE

## 2022-01-01 RX ORDER — SERTRALINE 25 MG/1
25 TABLET, FILM COATED ORAL DAILY
Qty: 30 | Refills: 1 | Status: ACTIVE | COMMUNITY
Start: 2022-01-01 | End: 1900-01-01

## 2022-01-01 RX ORDER — TRAMADOL HYDROCHLORIDE 50 MG/1
50 TABLET ORAL EVERY 6 HOURS
Refills: 0 | Status: DISCONTINUED | OUTPATIENT
Start: 2022-01-01 | End: 2022-01-01

## 2022-01-01 RX ORDER — DORZOLAMIDE HYDROCHLORIDE TIMOLOL MALEATE 20; 5 MG/ML; MG/ML
1 SOLUTION/ DROPS OPHTHALMIC
Refills: 0 | Status: DISCONTINUED | OUTPATIENT
Start: 2022-01-01 | End: 2022-01-01

## 2022-01-01 RX ORDER — SODIUM CHLORIDE 9 MG/ML
1000 INJECTION, SOLUTION INTRAVENOUS ONCE
Refills: 0 | Status: COMPLETED | OUTPATIENT
Start: 2022-01-01 | End: 2022-01-01

## 2022-01-01 RX ORDER — BRIMONIDINE TARTRATE 2 MG/MG
1 SOLUTION/ DROPS OPHTHALMIC THREE TIMES A DAY
Refills: 0 | Status: DISCONTINUED | OUTPATIENT
Start: 2022-01-01 | End: 2022-01-01

## 2022-01-01 RX ORDER — TIMOLOL 0.5 %
1 DROPS OPHTHALMIC (EYE)
Refills: 0 | Status: DISCONTINUED | OUTPATIENT
Start: 2022-01-01 | End: 2022-01-01

## 2022-01-01 RX ORDER — RIVAROXABAN 15 MG-20MG
20 KIT ORAL DAILY
Refills: 0 | Status: DISCONTINUED | OUTPATIENT
Start: 2022-01-01 | End: 2022-01-01

## 2022-01-01 RX ORDER — DORZOLAMIDE HYDROCHLORIDE TIMOLOL MALEATE 20; 5 MG/ML; MG/ML
1 SOLUTION/ DROPS OPHTHALMIC
Qty: 0 | Refills: 0 | DISCHARGE

## 2022-01-01 RX ORDER — SODIUM CHLORIDE 9 MG/ML
500 INJECTION INTRAMUSCULAR; INTRAVENOUS; SUBCUTANEOUS ONCE
Refills: 0 | Status: COMPLETED | OUTPATIENT
Start: 2022-01-01 | End: 2022-01-01

## 2022-01-01 RX ORDER — POLYETHYLENE GLYCOL 3350 17 G/17G
17 POWDER, FOR SOLUTION ORAL
Qty: 510 | Refills: 0
Start: 2022-01-01 | End: 2022-05-21

## 2022-01-01 RX ORDER — ESOMEPRAZOLE MAGNESIUM 40 MG/1
1 CAPSULE, DELAYED RELEASE ORAL
Qty: 0 | Refills: 0 | DISCHARGE

## 2022-01-01 RX ORDER — METOCLOPRAMIDE HCL 10 MG
10 TABLET ORAL ONCE
Refills: 0 | Status: COMPLETED | OUTPATIENT
Start: 2022-01-01 | End: 2022-01-01

## 2022-01-01 RX ORDER — VANCOMYCIN HCL 1 G
750 VIAL (EA) INTRAVENOUS EVERY 12 HOURS
Refills: 0 | Status: DISCONTINUED | OUTPATIENT
Start: 2022-01-01 | End: 2022-01-01

## 2022-01-01 RX ORDER — ACETAMINOPHEN 500 MG
1000 TABLET ORAL ONCE
Refills: 0 | Status: COMPLETED | OUTPATIENT
Start: 2022-01-01 | End: 2022-01-01

## 2022-01-01 RX ORDER — HYDROMORPHONE HYDROCHLORIDE 2 MG/ML
0.5 INJECTION INTRAMUSCULAR; INTRAVENOUS; SUBCUTANEOUS ONCE
Refills: 0 | Status: DISCONTINUED | OUTPATIENT
Start: 2022-01-01 | End: 2022-01-01

## 2022-01-01 RX ORDER — GABAPENTIN 400 MG/1
1 CAPSULE ORAL
Qty: 0 | Refills: 0 | DISCHARGE

## 2022-01-01 RX ORDER — VANCOMYCIN HCL 1 G
750 VIAL (EA) INTRAVENOUS EVERY 24 HOURS
Refills: 0 | Status: DISCONTINUED | OUTPATIENT
Start: 2022-05-15 | End: 2022-01-01

## 2022-01-01 RX ORDER — ESOMEPRAZOLE MAGNESIUM 20 MG/1
20 CAPSULE, DELAYED RELEASE ORAL DAILY
Qty: 30 | Refills: 3 | Status: ACTIVE | COMMUNITY
Start: 2021-01-01 | End: 1900-01-01

## 2022-01-01 RX ORDER — METOPROLOL TARTRATE 50 MG
1 TABLET ORAL
Qty: 0 | Refills: 0 | DISCHARGE

## 2022-01-01 RX ORDER — METHOCARBAMOL 500 MG/1
500 TABLET, FILM COATED ORAL
Qty: 20 | Refills: 0 | Status: DISCONTINUED | COMMUNITY
Start: 2021-01-01 | End: 2022-01-01

## 2022-01-01 RX ORDER — ONDANSETRON 8 MG/1
8 TABLET, FILM COATED ORAL EVERY 8 HOURS
Refills: 0 | Status: DISCONTINUED | OUTPATIENT
Start: 2022-01-01 | End: 2022-01-01

## 2022-01-01 RX ORDER — CEFEPIME 1 G/1
1000 INJECTION, POWDER, FOR SOLUTION INTRAMUSCULAR; INTRAVENOUS EVERY 12 HOURS
Refills: 0 | Status: DISCONTINUED | OUTPATIENT
Start: 2022-05-15 | End: 2022-01-01

## 2022-01-01 RX ORDER — PROCHLORPERAZINE MALEATE 10 MG/1
10 TABLET ORAL EVERY 6 HOURS
Qty: 120 | Refills: 1 | Status: ACTIVE | COMMUNITY
Start: 2022-01-01 | End: 1900-01-01

## 2022-01-01 RX ORDER — SOD SULF/SODIUM/NAHCO3/KCL/PEG
4000 SOLUTION, RECONSTITUTED, ORAL ORAL ONCE
Refills: 0 | Status: DISCONTINUED | OUTPATIENT
Start: 2022-01-01 | End: 2022-01-01

## 2022-01-01 RX ADMIN — GABAPENTIN 100 MILLIGRAM(S): 400 CAPSULE ORAL at 21:38

## 2022-01-01 RX ADMIN — LATANOPROST 1 DROP(S): 0.05 SOLUTION/ DROPS OPHTHALMIC; TOPICAL at 21:55

## 2022-01-01 RX ADMIN — CHLORHEXIDINE GLUCONATE 1 APPLICATION(S): 213 SOLUTION TOPICAL at 06:27

## 2022-01-01 RX ADMIN — Medication 15 MILLIGRAM(S): at 06:02

## 2022-01-01 RX ADMIN — SODIUM CHLORIDE 1000 MILLILITER(S): 9 INJECTION, SOLUTION INTRAVENOUS at 00:50

## 2022-01-01 RX ADMIN — BRIMONIDINE TARTRATE 1 DROP(S): 2 SOLUTION/ DROPS OPHTHALMIC at 17:37

## 2022-01-01 RX ADMIN — DORZOLAMIDE HYDROCHLORIDE 1 DROP(S): 20 SOLUTION/ DROPS OPHTHALMIC at 05:44

## 2022-01-01 RX ADMIN — ENOXAPARIN SODIUM 70 MILLIGRAM(S): 100 INJECTION SUBCUTANEOUS at 05:12

## 2022-01-01 RX ADMIN — IOHEXOL 30 MILLILITER(S): 300 INJECTION, SOLUTION INTRAVENOUS at 01:00

## 2022-01-01 RX ADMIN — Medication 15 MILLIGRAM(S): at 09:10

## 2022-01-01 RX ADMIN — DORZOLAMIDE HYDROCHLORIDE 1 DROP(S): 20 SOLUTION/ DROPS OPHTHALMIC at 22:11

## 2022-01-01 RX ADMIN — BRIMONIDINE TARTRATE 1 DROP(S): 2 SOLUTION/ DROPS OPHTHALMIC at 14:43

## 2022-01-01 RX ADMIN — DORZOLAMIDE HYDROCHLORIDE 1 DROP(S): 20 SOLUTION/ DROPS OPHTHALMIC at 21:02

## 2022-01-01 RX ADMIN — BRIMONIDINE TARTRATE 1 DROP(S): 2 SOLUTION/ DROPS OPHTHALMIC at 21:02

## 2022-01-01 RX ADMIN — Medication 25 MILLIGRAM(S): at 05:21

## 2022-01-01 RX ADMIN — CHLORHEXIDINE GLUCONATE 1 APPLICATION(S): 213 SOLUTION TOPICAL at 05:15

## 2022-01-01 RX ADMIN — PANTOPRAZOLE SODIUM 40 MILLIGRAM(S): 20 TABLET, DELAYED RELEASE ORAL at 11:04

## 2022-01-01 RX ADMIN — LATANOPROST 1 DROP(S): 0.05 SOLUTION/ DROPS OPHTHALMIC; TOPICAL at 23:23

## 2022-01-01 RX ADMIN — CHLORHEXIDINE GLUCONATE 1 APPLICATION(S): 213 SOLUTION TOPICAL at 05:44

## 2022-01-01 RX ADMIN — PANTOPRAZOLE SODIUM 40 MILLIGRAM(S): 20 TABLET, DELAYED RELEASE ORAL at 12:59

## 2022-01-01 RX ADMIN — ATORVASTATIN CALCIUM 80 MILLIGRAM(S): 80 TABLET, FILM COATED ORAL at 22:06

## 2022-01-01 RX ADMIN — BRIMONIDINE TARTRATE 1 DROP(S): 2 SOLUTION/ DROPS OPHTHALMIC at 05:38

## 2022-01-01 RX ADMIN — Medication 1 DROP(S): at 06:05

## 2022-01-01 RX ADMIN — I.V. FAT EMULSION 23.4 GM/KG/DAY: 20 EMULSION INTRAVENOUS at 21:03

## 2022-01-01 RX ADMIN — DORZOLAMIDE HYDROCHLORIDE 1 DROP(S): 20 SOLUTION/ DROPS OPHTHALMIC at 13:16

## 2022-01-01 RX ADMIN — BRIMONIDINE TARTRATE 1 DROP(S): 2 SOLUTION/ DROPS OPHTHALMIC at 05:11

## 2022-01-01 RX ADMIN — ONDANSETRON 4 MILLIGRAM(S): 8 TABLET, FILM COATED ORAL at 05:30

## 2022-01-01 RX ADMIN — Medication 25 MILLIGRAM(S): at 06:03

## 2022-01-01 RX ADMIN — HEPARIN SODIUM 12 UNIT(S)/HR: 5000 INJECTION INTRAVENOUS; SUBCUTANEOUS at 00:29

## 2022-01-01 RX ADMIN — ENOXAPARIN SODIUM 70 MILLIGRAM(S): 100 INJECTION SUBCUTANEOUS at 17:34

## 2022-01-01 RX ADMIN — Medication 650 MILLIGRAM(S): at 09:05

## 2022-01-01 RX ADMIN — Medication 15 MILLIGRAM(S): at 06:05

## 2022-01-01 RX ADMIN — Medication 650 MILLIGRAM(S): at 11:30

## 2022-01-01 RX ADMIN — ENOXAPARIN SODIUM 80 MILLIGRAM(S): 100 INJECTION SUBCUTANEOUS at 22:07

## 2022-01-01 RX ADMIN — AMLODIPINE BESYLATE 5 MILLIGRAM(S): 2.5 TABLET ORAL at 06:03

## 2022-01-01 RX ADMIN — CHLORHEXIDINE GLUCONATE 1 APPLICATION(S): 213 SOLUTION TOPICAL at 05:56

## 2022-01-01 RX ADMIN — Medication 650 MILLIGRAM(S): at 22:10

## 2022-01-01 RX ADMIN — ENOXAPARIN SODIUM 70 MILLIGRAM(S): 100 INJECTION SUBCUTANEOUS at 18:30

## 2022-01-01 RX ADMIN — DORZOLAMIDE HYDROCHLORIDE 1 DROP(S): 20 SOLUTION/ DROPS OPHTHALMIC at 13:47

## 2022-01-01 RX ADMIN — ONDANSETRON 8 MILLIGRAM(S): 8 TABLET, FILM COATED ORAL at 08:18

## 2022-01-01 RX ADMIN — AMLODIPINE BESYLATE 5 MILLIGRAM(S): 2.5 TABLET ORAL at 05:55

## 2022-01-01 RX ADMIN — MORPHINE SULFATE 2 MILLIGRAM(S): 50 CAPSULE, EXTENDED RELEASE ORAL at 01:25

## 2022-01-01 RX ADMIN — DORZOLAMIDE HYDROCHLORIDE 1 DROP(S): 20 SOLUTION/ DROPS OPHTHALMIC at 22:54

## 2022-01-01 RX ADMIN — DORZOLAMIDE HYDROCHLORIDE 1 DROP(S): 20 SOLUTION/ DROPS OPHTHALMIC at 14:23

## 2022-01-01 RX ADMIN — Medication 0.25 MILLIGRAM(S): at 13:53

## 2022-01-01 RX ADMIN — DORZOLAMIDE HYDROCHLORIDE 1 DROP(S): 20 SOLUTION/ DROPS OPHTHALMIC at 14:36

## 2022-01-01 RX ADMIN — AMLODIPINE BESYLATE 5 MILLIGRAM(S): 2.5 TABLET ORAL at 05:07

## 2022-01-01 RX ADMIN — CHLORHEXIDINE GLUCONATE 1 APPLICATION(S): 213 SOLUTION TOPICAL at 05:21

## 2022-01-01 RX ADMIN — PANTOPRAZOLE SODIUM 40 MILLIGRAM(S): 20 TABLET, DELAYED RELEASE ORAL at 12:53

## 2022-01-01 RX ADMIN — SODIUM CHLORIDE 1000 MILLILITER(S): 9 INJECTION INTRAMUSCULAR; INTRAVENOUS; SUBCUTANEOUS at 02:20

## 2022-01-01 RX ADMIN — Medication 10 MILLIGRAM(S): at 17:10

## 2022-01-01 RX ADMIN — Medication 1 DROP(S): at 18:33

## 2022-01-01 RX ADMIN — DORZOLAMIDE HYDROCHLORIDE 1 DROP(S): 20 SOLUTION/ DROPS OPHTHALMIC at 21:08

## 2022-01-01 RX ADMIN — ONDANSETRON 8 MILLIGRAM(S): 8 TABLET, FILM COATED ORAL at 09:00

## 2022-01-01 RX ADMIN — MORPHINE SULFATE 2 MILLIGRAM(S): 50 CAPSULE, EXTENDED RELEASE ORAL at 11:42

## 2022-01-01 RX ADMIN — ENOXAPARIN SODIUM 70 MILLIGRAM(S): 100 INJECTION SUBCUTANEOUS at 06:23

## 2022-01-01 RX ADMIN — Medication 1 EACH: at 21:25

## 2022-01-01 RX ADMIN — POLYETHYLENE GLYCOL 3350 17 GRAM(S): 17 POWDER, FOR SOLUTION ORAL at 11:45

## 2022-01-01 RX ADMIN — BRIMONIDINE TARTRATE 1 DROP(S): 2 SOLUTION/ DROPS OPHTHALMIC at 06:27

## 2022-01-01 RX ADMIN — DORZOLAMIDE HYDROCHLORIDE 1 DROP(S): 20 SOLUTION/ DROPS OPHTHALMIC at 21:20

## 2022-01-01 RX ADMIN — SODIUM CHLORIDE 100 MILLILITER(S): 9 INJECTION, SOLUTION INTRAVENOUS at 23:01

## 2022-01-01 RX ADMIN — BRIMONIDINE TARTRATE 1 DROP(S): 2 SOLUTION/ DROPS OPHTHALMIC at 05:33

## 2022-01-01 RX ADMIN — ENOXAPARIN SODIUM 70 MILLIGRAM(S): 100 INJECTION SUBCUTANEOUS at 17:33

## 2022-01-01 RX ADMIN — Medication 1 DROP(S): at 05:15

## 2022-01-01 RX ADMIN — GABAPENTIN 100 MILLIGRAM(S): 400 CAPSULE ORAL at 13:58

## 2022-01-01 RX ADMIN — Medication 1 DROP(S): at 18:53

## 2022-01-01 RX ADMIN — Medication 25 MILLIGRAM(S): at 06:05

## 2022-01-01 RX ADMIN — SODIUM CHLORIDE 60 MILLILITER(S): 9 INJECTION, SOLUTION INTRAVENOUS at 12:16

## 2022-01-01 RX ADMIN — POLYETHYLENE GLYCOL 3350 17 GRAM(S): 17 POWDER, FOR SOLUTION ORAL at 12:03

## 2022-01-01 RX ADMIN — BRIMONIDINE TARTRATE 1 DROP(S): 2 SOLUTION/ DROPS OPHTHALMIC at 06:03

## 2022-01-01 RX ADMIN — DORZOLAMIDE HYDROCHLORIDE 1 DROP(S): 20 SOLUTION/ DROPS OPHTHALMIC at 05:11

## 2022-01-01 RX ADMIN — Medication 25 MILLIGRAM(S): at 06:06

## 2022-01-01 RX ADMIN — HEPARIN SODIUM 10 UNIT(S)/HR: 5000 INJECTION INTRAVENOUS; SUBCUTANEOUS at 15:14

## 2022-01-01 RX ADMIN — PANTOPRAZOLE SODIUM 40 MILLIGRAM(S): 20 TABLET, DELAYED RELEASE ORAL at 06:12

## 2022-01-01 RX ADMIN — MORPHINE SULFATE 6 MILLIGRAM(S): 50 CAPSULE, EXTENDED RELEASE ORAL at 18:38

## 2022-01-01 RX ADMIN — SENNA PLUS 2 TABLET(S): 8.6 TABLET ORAL at 22:04

## 2022-01-01 RX ADMIN — PANTOPRAZOLE SODIUM 40 MILLIGRAM(S): 20 TABLET, DELAYED RELEASE ORAL at 06:05

## 2022-01-01 RX ADMIN — Medication 10 MILLIGRAM(S): at 13:49

## 2022-01-01 RX ADMIN — PANTOPRAZOLE SODIUM 40 MILLIGRAM(S): 20 TABLET, DELAYED RELEASE ORAL at 12:12

## 2022-01-01 RX ADMIN — AMLODIPINE BESYLATE 5 MILLIGRAM(S): 2.5 TABLET ORAL at 06:07

## 2022-01-01 RX ADMIN — RIVAROXABAN 20 MILLIGRAM(S): KIT at 17:04

## 2022-01-01 RX ADMIN — DORZOLAMIDE HYDROCHLORIDE 1 DROP(S): 20 SOLUTION/ DROPS OPHTHALMIC at 13:42

## 2022-01-01 RX ADMIN — BRIMONIDINE TARTRATE 1 DROP(S): 2 SOLUTION/ DROPS OPHTHALMIC at 13:36

## 2022-01-01 RX ADMIN — PEMBROLIZUMAB 216 MILLIGRAM(S): 25 INJECTION, SOLUTION INTRAVENOUS at 16:05

## 2022-01-01 RX ADMIN — LATANOPROST 1 DROP(S): 0.05 SOLUTION/ DROPS OPHTHALMIC; TOPICAL at 22:02

## 2022-01-01 RX ADMIN — ATORVASTATIN CALCIUM 40 MILLIGRAM(S): 80 TABLET, FILM COATED ORAL at 22:35

## 2022-01-01 RX ADMIN — PANTOPRAZOLE SODIUM 40 MILLIGRAM(S): 20 TABLET, DELAYED RELEASE ORAL at 06:29

## 2022-01-01 RX ADMIN — DORZOLAMIDE HYDROCHLORIDE 1 DROP(S): 20 SOLUTION/ DROPS OPHTHALMIC at 13:13

## 2022-01-01 RX ADMIN — ATORVASTATIN CALCIUM 40 MILLIGRAM(S): 80 TABLET, FILM COATED ORAL at 22:06

## 2022-01-01 RX ADMIN — BRIMONIDINE TARTRATE 1 DROP(S): 2 SOLUTION/ DROPS OPHTHALMIC at 15:50

## 2022-01-01 RX ADMIN — ENOXAPARIN SODIUM 80 MILLIGRAM(S): 100 INJECTION SUBCUTANEOUS at 23:38

## 2022-01-01 RX ADMIN — BRIMONIDINE TARTRATE 1 DROP(S): 2 SOLUTION/ DROPS OPHTHALMIC at 12:00

## 2022-01-01 RX ADMIN — Medication 25 MILLIGRAM(S): at 07:45

## 2022-01-01 RX ADMIN — SENNA PLUS 2 TABLET(S): 8.6 TABLET ORAL at 22:06

## 2022-01-01 RX ADMIN — BRIMONIDINE TARTRATE 1 DROP(S): 2 SOLUTION/ DROPS OPHTHALMIC at 05:21

## 2022-01-01 RX ADMIN — Medication 50 MILLIEQUIVALENT(S): at 20:27

## 2022-01-01 RX ADMIN — Medication 1 EACH: at 01:00

## 2022-01-01 RX ADMIN — Medication 0.5 MILLIGRAM(S): at 12:02

## 2022-01-01 RX ADMIN — Medication 1 DROP(S): at 05:22

## 2022-01-01 RX ADMIN — Medication 15 MILLIGRAM(S): at 17:12

## 2022-01-01 RX ADMIN — Medication 1 DROP(S): at 05:44

## 2022-01-01 RX ADMIN — SENNA PLUS 2 TABLET(S): 8.6 TABLET ORAL at 21:38

## 2022-01-01 RX ADMIN — SPIRONOLACTONE 50 MILLIGRAM(S): 25 TABLET, FILM COATED ORAL at 06:04

## 2022-01-01 RX ADMIN — AMLODIPINE BESYLATE 5 MILLIGRAM(S): 2.5 TABLET ORAL at 06:05

## 2022-01-01 RX ADMIN — ENOXAPARIN SODIUM 70 MILLIGRAM(S): 100 INJECTION SUBCUTANEOUS at 05:15

## 2022-01-01 RX ADMIN — GABAPENTIN 100 MILLIGRAM(S): 400 CAPSULE ORAL at 22:06

## 2022-01-01 RX ADMIN — GABAPENTIN 100 MILLIGRAM(S): 400 CAPSULE ORAL at 06:03

## 2022-01-01 RX ADMIN — ONDANSETRON 8 MILLIGRAM(S): 8 TABLET, FILM COATED ORAL at 10:18

## 2022-01-01 RX ADMIN — ENOXAPARIN SODIUM 70 MILLIGRAM(S): 100 INJECTION SUBCUTANEOUS at 06:28

## 2022-01-01 RX ADMIN — ENOXAPARIN SODIUM 70 MILLIGRAM(S): 100 INJECTION SUBCUTANEOUS at 05:55

## 2022-01-01 RX ADMIN — PANTOPRAZOLE SODIUM 40 MILLIGRAM(S): 20 TABLET, DELAYED RELEASE ORAL at 06:21

## 2022-01-01 RX ADMIN — ENOXAPARIN SODIUM 70 MILLIGRAM(S): 100 INJECTION SUBCUTANEOUS at 06:36

## 2022-01-01 RX ADMIN — BRIMONIDINE TARTRATE 1 DROP(S): 2 SOLUTION/ DROPS OPHTHALMIC at 22:06

## 2022-01-01 RX ADMIN — Medication 1 DROP(S): at 06:21

## 2022-01-01 RX ADMIN — BRIMONIDINE TARTRATE 1 DROP(S): 2 SOLUTION/ DROPS OPHTHALMIC at 13:16

## 2022-01-01 RX ADMIN — Medication 20 MILLIGRAM(S): at 09:59

## 2022-01-01 RX ADMIN — Medication 25 MILLIGRAM(S): at 06:02

## 2022-01-01 RX ADMIN — Medication 15 MILLIGRAM(S): at 21:14

## 2022-01-01 RX ADMIN — DORZOLAMIDE HYDROCHLORIDE 1 DROP(S): 20 SOLUTION/ DROPS OPHTHALMIC at 13:40

## 2022-01-01 RX ADMIN — DORZOLAMIDE HYDROCHLORIDE 1 DROP(S): 20 SOLUTION/ DROPS OPHTHALMIC at 20:49

## 2022-01-01 RX ADMIN — Medication 15 MILLIGRAM(S): at 22:03

## 2022-01-01 RX ADMIN — Medication 1 EACH: at 21:22

## 2022-01-01 RX ADMIN — DORZOLAMIDE HYDROCHLORIDE 1 DROP(S): 20 SOLUTION/ DROPS OPHTHALMIC at 05:41

## 2022-01-01 RX ADMIN — Medication 15 MILLIGRAM(S): at 06:55

## 2022-01-01 RX ADMIN — FAMOTIDINE 20 MILLIGRAM(S): 10 INJECTION INTRAVENOUS at 09:59

## 2022-01-01 RX ADMIN — ENOXAPARIN SODIUM 70 MILLIGRAM(S): 100 INJECTION SUBCUTANEOUS at 05:23

## 2022-01-01 RX ADMIN — PANTOPRAZOLE SODIUM 40 MILLIGRAM(S): 20 TABLET, DELAYED RELEASE ORAL at 05:54

## 2022-01-01 RX ADMIN — Medication 133 MILLILITER(S): at 16:15

## 2022-01-01 RX ADMIN — DORZOLAMIDE HYDROCHLORIDE 1 DROP(S): 20 SOLUTION/ DROPS OPHTHALMIC at 06:02

## 2022-01-01 RX ADMIN — LACTULOSE 15 GRAM(S): 10 SOLUTION ORAL at 22:11

## 2022-01-01 RX ADMIN — ATORVASTATIN CALCIUM 80 MILLIGRAM(S): 80 TABLET, FILM COATED ORAL at 22:09

## 2022-01-01 RX ADMIN — BRIMONIDINE TARTRATE 1 DROP(S): 2 SOLUTION/ DROPS OPHTHALMIC at 05:15

## 2022-01-01 RX ADMIN — CASPOFUNGIN ACETATE 260 MILLIGRAM(S): 7 INJECTION, POWDER, LYOPHILIZED, FOR SOLUTION INTRAVENOUS at 11:37

## 2022-01-01 RX ADMIN — Medication 650 MILLIGRAM(S): at 22:09

## 2022-01-01 RX ADMIN — Medication 0.5 MILLIGRAM(S): at 13:35

## 2022-01-01 RX ADMIN — SODIUM CHLORIDE 60 MILLILITER(S): 9 INJECTION, SOLUTION INTRAVENOUS at 23:11

## 2022-01-01 RX ADMIN — Medication 650 MILLIGRAM(S): at 07:44

## 2022-01-01 RX ADMIN — SODIUM CHLORIDE 60 MILLILITER(S): 9 INJECTION, SOLUTION INTRAVENOUS at 18:29

## 2022-01-01 RX ADMIN — ENOXAPARIN SODIUM 70 MILLIGRAM(S): 100 INJECTION SUBCUTANEOUS at 17:53

## 2022-01-01 RX ADMIN — Medication 25 MILLIGRAM(S): at 05:16

## 2022-01-01 RX ADMIN — SENNA PLUS 2 TABLET(S): 8.6 TABLET ORAL at 00:12

## 2022-01-01 RX ADMIN — Medication 0.5 MILLIGRAM(S): at 13:15

## 2022-01-01 RX ADMIN — DORZOLAMIDE HYDROCHLORIDE 1 DROP(S): 20 SOLUTION/ DROPS OPHTHALMIC at 17:05

## 2022-01-01 RX ADMIN — SODIUM CHLORIDE 1000 MILLILITER(S): 9 INJECTION INTRAMUSCULAR; INTRAVENOUS; SUBCUTANEOUS at 17:16

## 2022-01-01 RX ADMIN — SENNA PLUS 2 TABLET(S): 8.6 TABLET ORAL at 23:37

## 2022-01-01 RX ADMIN — ONDANSETRON 8 MILLIGRAM(S): 8 TABLET, FILM COATED ORAL at 01:11

## 2022-01-01 RX ADMIN — Medication 0.5 MILLIGRAM(S): at 13:09

## 2022-01-01 RX ADMIN — MORPHINE SULFATE 4 MILLIGRAM(S): 50 CAPSULE, EXTENDED RELEASE ORAL at 07:27

## 2022-01-01 RX ADMIN — BRIMONIDINE TARTRATE 1 DROP(S): 2 SOLUTION/ DROPS OPHTHALMIC at 13:58

## 2022-01-01 RX ADMIN — LATANOPROST 1 DROP(S): 0.05 SOLUTION/ DROPS OPHTHALMIC; TOPICAL at 23:37

## 2022-01-01 RX ADMIN — DORZOLAMIDE HYDROCHLORIDE 1 DROP(S): 20 SOLUTION/ DROPS OPHTHALMIC at 14:30

## 2022-01-01 RX ADMIN — ONDANSETRON 108 MILLIGRAM(S): 8 TABLET, FILM COATED ORAL at 12:57

## 2022-01-01 RX ADMIN — ENOXAPARIN SODIUM 70 MILLIGRAM(S): 100 INJECTION SUBCUTANEOUS at 17:54

## 2022-01-01 RX ADMIN — DORZOLAMIDE HYDROCHLORIDE 1 DROP(S): 20 SOLUTION/ DROPS OPHTHALMIC at 14:25

## 2022-01-01 RX ADMIN — BRIMONIDINE TARTRATE 1 DROP(S): 2 SOLUTION/ DROPS OPHTHALMIC at 21:08

## 2022-01-01 RX ADMIN — Medication 25 MILLIGRAM(S): at 05:07

## 2022-01-01 RX ADMIN — Medication 1 DROP(S): at 17:18

## 2022-01-01 RX ADMIN — CHLORHEXIDINE GLUCONATE 1 APPLICATION(S): 213 SOLUTION TOPICAL at 06:03

## 2022-01-01 RX ADMIN — POLYETHYLENE GLYCOL 3350 17 GRAM(S): 17 POWDER, FOR SOLUTION ORAL at 11:00

## 2022-01-01 RX ADMIN — AMLODIPINE BESYLATE 5 MILLIGRAM(S): 2.5 TABLET ORAL at 07:43

## 2022-01-01 RX ADMIN — TRAMADOL HYDROCHLORIDE 25 MILLIGRAM(S): 50 TABLET ORAL at 16:44

## 2022-01-01 RX ADMIN — LATANOPROST 1 DROP(S): 0.05 SOLUTION/ DROPS OPHTHALMIC; TOPICAL at 22:11

## 2022-01-01 RX ADMIN — ATORVASTATIN CALCIUM 40 MILLIGRAM(S): 80 TABLET, FILM COATED ORAL at 21:51

## 2022-01-01 RX ADMIN — ENOXAPARIN SODIUM 70 MILLIGRAM(S): 100 INJECTION SUBCUTANEOUS at 18:54

## 2022-01-01 RX ADMIN — CHLORHEXIDINE GLUCONATE 1 APPLICATION(S): 213 SOLUTION TOPICAL at 06:04

## 2022-01-01 RX ADMIN — BRIMONIDINE TARTRATE 1 DROP(S): 2 SOLUTION/ DROPS OPHTHALMIC at 22:02

## 2022-01-01 RX ADMIN — Medication 1 DROP(S): at 17:09

## 2022-01-01 RX ADMIN — GABAPENTIN 100 MILLIGRAM(S): 400 CAPSULE ORAL at 13:00

## 2022-01-01 RX ADMIN — LATANOPROST 1 DROP(S): 0.05 SOLUTION/ DROPS OPHTHALMIC; TOPICAL at 21:20

## 2022-01-01 RX ADMIN — Medication 30 MILLIGRAM(S): at 16:59

## 2022-01-01 RX ADMIN — CHLORHEXIDINE GLUCONATE 1 APPLICATION(S): 213 SOLUTION TOPICAL at 05:33

## 2022-01-01 RX ADMIN — Medication 1 EACH: at 21:03

## 2022-01-01 RX ADMIN — Medication 15 MILLIGRAM(S): at 06:21

## 2022-01-01 RX ADMIN — Medication 650 MILLIGRAM(S): at 12:56

## 2022-01-01 RX ADMIN — Medication 1 EACH: at 21:55

## 2022-01-01 RX ADMIN — OLANZAPINE 5 MILLIGRAM(S): 15 TABLET, FILM COATED ORAL at 11:45

## 2022-01-01 RX ADMIN — DORZOLAMIDE HYDROCHLORIDE 1 DROP(S): 20 SOLUTION/ DROPS OPHTHALMIC at 05:22

## 2022-01-01 RX ADMIN — ENOXAPARIN SODIUM 70 MILLIGRAM(S): 100 INJECTION SUBCUTANEOUS at 17:45

## 2022-01-01 RX ADMIN — MORPHINE SULFATE 2 MILLIGRAM(S): 50 CAPSULE, EXTENDED RELEASE ORAL at 12:14

## 2022-01-01 RX ADMIN — Medication 0.5 MILLIGRAM(S): at 11:45

## 2022-01-01 RX ADMIN — SPIRONOLACTONE 50 MILLIGRAM(S): 25 TABLET, FILM COATED ORAL at 06:06

## 2022-01-01 RX ADMIN — PANTOPRAZOLE SODIUM 40 MILLIGRAM(S): 20 TABLET, DELAYED RELEASE ORAL at 11:07

## 2022-01-01 RX ADMIN — RIVAROXABAN 20 MILLIGRAM(S): KIT at 10:29

## 2022-01-01 RX ADMIN — AMLODIPINE BESYLATE 5 MILLIGRAM(S): 2.5 TABLET ORAL at 06:02

## 2022-01-01 RX ADMIN — DORZOLAMIDE HYDROCHLORIDE 1 DROP(S): 20 SOLUTION/ DROPS OPHTHALMIC at 12:00

## 2022-01-01 RX ADMIN — PANTOPRAZOLE SODIUM 40 MILLIGRAM(S): 20 TABLET, DELAYED RELEASE ORAL at 11:32

## 2022-01-01 RX ADMIN — Medication 5 MILLIGRAM(S): at 00:12

## 2022-01-01 RX ADMIN — ONDANSETRON 8 MILLIGRAM(S): 8 TABLET, FILM COATED ORAL at 09:10

## 2022-01-01 RX ADMIN — LACTULOSE 15 GRAM(S): 10 SOLUTION ORAL at 15:36

## 2022-01-01 RX ADMIN — BRIMONIDINE TARTRATE 1 DROP(S): 2 SOLUTION/ DROPS OPHTHALMIC at 20:49

## 2022-01-01 RX ADMIN — SODIUM CHLORIDE 166.67 MILLILITER(S): 9 INJECTION, SOLUTION INTRAVENOUS at 17:47

## 2022-01-01 RX ADMIN — PANTOPRAZOLE SODIUM 40 MILLIGRAM(S): 20 TABLET, DELAYED RELEASE ORAL at 11:37

## 2022-01-01 RX ADMIN — DORZOLAMIDE HYDROCHLORIDE 1 DROP(S): 20 SOLUTION/ DROPS OPHTHALMIC at 18:33

## 2022-01-01 RX ADMIN — DORZOLAMIDE HYDROCHLORIDE 1 DROP(S): 20 SOLUTION/ DROPS OPHTHALMIC at 22:07

## 2022-01-01 RX ADMIN — Medication 1 DROP(S): at 17:16

## 2022-01-01 RX ADMIN — Medication 1 EACH: at 22:12

## 2022-01-01 RX ADMIN — CHLORHEXIDINE GLUCONATE 1 APPLICATION(S): 213 SOLUTION TOPICAL at 06:19

## 2022-01-01 RX ADMIN — Medication 1 DROP(S): at 05:34

## 2022-01-01 RX ADMIN — DORZOLAMIDE HYDROCHLORIDE 1 DROP(S): 20 SOLUTION/ DROPS OPHTHALMIC at 15:50

## 2022-01-01 RX ADMIN — LATANOPROST 1 DROP(S): 0.05 SOLUTION/ DROPS OPHTHALMIC; TOPICAL at 21:08

## 2022-01-01 RX ADMIN — CHLORHEXIDINE GLUCONATE 1 APPLICATION(S): 213 SOLUTION TOPICAL at 05:36

## 2022-01-01 RX ADMIN — Medication 10 MILLIGRAM(S): at 11:37

## 2022-01-01 RX ADMIN — BRIMONIDINE TARTRATE 1 DROP(S): 2 SOLUTION/ DROPS OPHTHALMIC at 18:30

## 2022-01-01 RX ADMIN — Medication 0.5 MILLIGRAM(S): at 13:25

## 2022-01-01 RX ADMIN — DORZOLAMIDE HYDROCHLORIDE 1 DROP(S): 20 SOLUTION/ DROPS OPHTHALMIC at 21:55

## 2022-01-01 RX ADMIN — Medication 101.6 MILLIGRAM(S): at 23:00

## 2022-01-01 RX ADMIN — Medication 1 DROP(S): at 18:30

## 2022-01-01 RX ADMIN — BRIMONIDINE TARTRATE 1 DROP(S): 2 SOLUTION/ DROPS OPHTHALMIC at 23:43

## 2022-01-01 RX ADMIN — PANTOPRAZOLE SODIUM 40 MILLIGRAM(S): 20 TABLET, DELAYED RELEASE ORAL at 11:57

## 2022-01-01 RX ADMIN — BRIMONIDINE TARTRATE 1 DROP(S): 2 SOLUTION/ DROPS OPHTHALMIC at 17:04

## 2022-01-01 RX ADMIN — Medication 0.25 MILLIGRAM(S): at 10:17

## 2022-01-01 RX ADMIN — Medication 15 MILLIGRAM(S): at 17:04

## 2022-01-01 RX ADMIN — ENOXAPARIN SODIUM 80 MILLIGRAM(S): 100 INJECTION SUBCUTANEOUS at 10:59

## 2022-01-01 RX ADMIN — Medication 10 MILLIGRAM(S): at 10:38

## 2022-01-01 RX ADMIN — I.V. FAT EMULSION 23.4 GM/KG/DAY: 20 EMULSION INTRAVENOUS at 22:11

## 2022-01-01 RX ADMIN — GABAPENTIN 100 MILLIGRAM(S): 400 CAPSULE ORAL at 23:37

## 2022-01-01 RX ADMIN — PANTOPRAZOLE SODIUM 40 MILLIGRAM(S): 20 TABLET, DELAYED RELEASE ORAL at 06:36

## 2022-01-01 RX ADMIN — Medication 1 DROP(S): at 17:12

## 2022-01-01 RX ADMIN — I.V. FAT EMULSION 23.4 GM/KG/DAY: 20 EMULSION INTRAVENOUS at 21:24

## 2022-01-01 RX ADMIN — Medication 1 DROP(S): at 17:05

## 2022-01-01 RX ADMIN — LATANOPROST 1 DROP(S): 0.05 SOLUTION/ DROPS OPHTHALMIC; TOPICAL at 22:36

## 2022-01-01 RX ADMIN — ENOXAPARIN SODIUM 70 MILLIGRAM(S): 100 INJECTION SUBCUTANEOUS at 05:21

## 2022-01-01 RX ADMIN — ENOXAPARIN SODIUM 70 MILLIGRAM(S): 100 INJECTION SUBCUTANEOUS at 05:39

## 2022-01-01 RX ADMIN — DORZOLAMIDE HYDROCHLORIDE 1 DROP(S): 20 SOLUTION/ DROPS OPHTHALMIC at 23:10

## 2022-01-01 RX ADMIN — SPIRONOLACTONE 50 MILLIGRAM(S): 25 TABLET, FILM COATED ORAL at 05:21

## 2022-01-01 RX ADMIN — DORZOLAMIDE HYDROCHLORIDE 1 DROP(S): 20 SOLUTION/ DROPS OPHTHALMIC at 06:21

## 2022-01-01 RX ADMIN — BRIMONIDINE TARTRATE 1 DROP(S): 2 SOLUTION/ DROPS OPHTHALMIC at 21:55

## 2022-01-01 RX ADMIN — SODIUM CHLORIDE 33.33 MILLILITER(S): 9 INJECTION, SOLUTION INTRAVENOUS at 14:39

## 2022-01-01 RX ADMIN — Medication 650 MILLIGRAM(S): at 02:40

## 2022-01-01 RX ADMIN — Medication 1 DROP(S): at 05:10

## 2022-01-01 RX ADMIN — Medication 15 MILLIGRAM(S): at 13:57

## 2022-01-01 RX ADMIN — BRIMONIDINE TARTRATE 1 DROP(S): 2 SOLUTION/ DROPS OPHTHALMIC at 22:00

## 2022-01-01 RX ADMIN — LATANOPROST 1 DROP(S): 0.05 SOLUTION/ DROPS OPHTHALMIC; TOPICAL at 20:49

## 2022-01-01 RX ADMIN — DORZOLAMIDE HYDROCHLORIDE 1 DROP(S): 20 SOLUTION/ DROPS OPHTHALMIC at 05:09

## 2022-01-01 RX ADMIN — GABAPENTIN 100 MILLIGRAM(S): 400 CAPSULE ORAL at 06:29

## 2022-01-01 RX ADMIN — Medication 1 DROP(S): at 05:37

## 2022-01-01 RX ADMIN — Medication 1 DROP(S): at 17:34

## 2022-01-01 RX ADMIN — CHLORHEXIDINE GLUCONATE 1 APPLICATION(S): 213 SOLUTION TOPICAL at 05:22

## 2022-01-01 RX ADMIN — LATANOPROST 1 DROP(S): 0.05 SOLUTION/ DROPS OPHTHALMIC; TOPICAL at 21:02

## 2022-01-01 RX ADMIN — BRIMONIDINE TARTRATE 1 DROP(S): 2 SOLUTION/ DROPS OPHTHALMIC at 05:44

## 2022-01-01 RX ADMIN — DORZOLAMIDE HYDROCHLORIDE 1 DROP(S): 20 SOLUTION/ DROPS OPHTHALMIC at 13:18

## 2022-01-01 RX ADMIN — Medication 1 EACH: at 21:08

## 2022-01-01 RX ADMIN — CHLORHEXIDINE GLUCONATE 1 APPLICATION(S): 213 SOLUTION TOPICAL at 05:12

## 2022-01-01 RX ADMIN — BRIMONIDINE TARTRATE 1 DROP(S): 2 SOLUTION/ DROPS OPHTHALMIC at 18:01

## 2022-01-01 RX ADMIN — LATANOPROST 1 DROP(S): 0.05 SOLUTION/ DROPS OPHTHALMIC; TOPICAL at 22:07

## 2022-01-01 RX ADMIN — LATANOPROST 1 DROP(S): 0.05 SOLUTION/ DROPS OPHTHALMIC; TOPICAL at 23:03

## 2022-01-01 RX ADMIN — ONDANSETRON 8 MILLIGRAM(S): 8 TABLET, FILM COATED ORAL at 07:54

## 2022-01-01 RX ADMIN — LATANOPROST 1 DROP(S): 0.05 SOLUTION/ DROPS OPHTHALMIC; TOPICAL at 23:00

## 2022-01-01 RX ADMIN — BRIMONIDINE TARTRATE 1 DROP(S): 2 SOLUTION/ DROPS OPHTHALMIC at 13:11

## 2022-01-01 RX ADMIN — BRIMONIDINE TARTRATE 1 DROP(S): 2 SOLUTION/ DROPS OPHTHALMIC at 21:25

## 2022-01-01 RX ADMIN — Medication 15 MILLIGRAM(S): at 06:03

## 2022-01-01 RX ADMIN — BRIMONIDINE TARTRATE 1 DROP(S): 2 SOLUTION/ DROPS OPHTHALMIC at 13:00

## 2022-01-01 RX ADMIN — ONDANSETRON 8 MILLIGRAM(S): 8 TABLET, FILM COATED ORAL at 00:42

## 2022-01-01 RX ADMIN — Medication 1 DROP(S): at 05:40

## 2022-01-01 RX ADMIN — Medication 5.55 MICROGRAM(S)/KG/MIN: at 16:49

## 2022-01-01 RX ADMIN — Medication 25 MILLIGRAM(S): at 05:43

## 2022-01-01 RX ADMIN — BRIMONIDINE TARTRATE 1 DROP(S): 2 SOLUTION/ DROPS OPHTHALMIC at 17:07

## 2022-01-01 RX ADMIN — BRIMONIDINE TARTRATE 1 DROP(S): 2 SOLUTION/ DROPS OPHTHALMIC at 06:01

## 2022-01-01 RX ADMIN — PANTOPRAZOLE SODIUM 40 MILLIGRAM(S): 20 TABLET, DELAYED RELEASE ORAL at 08:00

## 2022-01-01 RX ADMIN — BRIMONIDINE TARTRATE 1 DROP(S): 2 SOLUTION/ DROPS OPHTHALMIC at 14:30

## 2022-01-01 RX ADMIN — LATANOPROST 1 DROP(S): 0.05 SOLUTION/ DROPS OPHTHALMIC; TOPICAL at 21:26

## 2022-01-01 RX ADMIN — Medication 25 MILLIGRAM(S): at 06:29

## 2022-01-01 RX ADMIN — Medication 15 MILLIGRAM(S): at 17:07

## 2022-01-01 RX ADMIN — ENOXAPARIN SODIUM 70 MILLIGRAM(S): 100 INJECTION SUBCUTANEOUS at 05:41

## 2022-01-01 RX ADMIN — PEMBROLIZUMAB 216 MILLIGRAM(S): 25 INJECTION, SOLUTION INTRAVENOUS at 13:40

## 2022-01-01 RX ADMIN — BRIMONIDINE TARTRATE 1 DROP(S): 2 SOLUTION/ DROPS OPHTHALMIC at 13:40

## 2022-01-01 RX ADMIN — POLYETHYLENE GLYCOL 3350 17 GRAM(S): 17 POWDER, FOR SOLUTION ORAL at 13:08

## 2022-01-01 RX ADMIN — RIVAROXABAN 20 MILLIGRAM(S): KIT at 17:24

## 2022-01-01 RX ADMIN — Medication 0.25 MILLIGRAM(S): at 09:47

## 2022-01-01 RX ADMIN — AMLODIPINE BESYLATE 5 MILLIGRAM(S): 2.5 TABLET ORAL at 06:28

## 2022-01-01 RX ADMIN — SODIUM CHLORIDE 2000 MILLILITER(S): 9 INJECTION INTRAMUSCULAR; INTRAVENOUS; SUBCUTANEOUS at 13:30

## 2022-01-01 RX ADMIN — Medication 10 MILLIGRAM(S): at 12:55

## 2022-01-01 RX ADMIN — BRIMONIDINE TARTRATE 1 DROP(S): 2 SOLUTION/ DROPS OPHTHALMIC at 05:41

## 2022-01-01 RX ADMIN — Medication 1 EACH: at 22:56

## 2022-01-01 RX ADMIN — Medication 10 MILLIGRAM(S): at 00:50

## 2022-01-01 RX ADMIN — Medication 25 MILLIGRAM(S): at 05:55

## 2022-01-01 RX ADMIN — BRIMONIDINE TARTRATE 1 DROP(S): 2 SOLUTION/ DROPS OPHTHALMIC at 05:56

## 2022-01-01 RX ADMIN — Medication 0.25 MILLIGRAM(S): at 20:33

## 2022-01-01 RX ADMIN — MORPHINE SULFATE 4 MILLIGRAM(S): 50 CAPSULE, EXTENDED RELEASE ORAL at 01:15

## 2022-01-01 RX ADMIN — Medication 101.6 MILLIGRAM(S): at 05:55

## 2022-01-01 RX ADMIN — TRAMADOL HYDROCHLORIDE 25 MILLIGRAM(S): 50 TABLET ORAL at 16:05

## 2022-01-01 RX ADMIN — Medication 15 MILLIGRAM(S): at 05:07

## 2022-01-01 RX ADMIN — GABAPENTIN 100 MILLIGRAM(S): 400 CAPSULE ORAL at 05:43

## 2022-01-01 RX ADMIN — PANTOPRAZOLE SODIUM 40 MILLIGRAM(S): 20 TABLET, DELAYED RELEASE ORAL at 21:39

## 2022-01-01 RX ADMIN — DORZOLAMIDE HYDROCHLORIDE 1 DROP(S): 20 SOLUTION/ DROPS OPHTHALMIC at 13:10

## 2022-01-01 RX ADMIN — Medication 30 MILLILITER(S): at 19:04

## 2022-01-01 RX ADMIN — AMLODIPINE BESYLATE 5 MILLIGRAM(S): 2.5 TABLET ORAL at 05:21

## 2022-01-01 RX ADMIN — GABAPENTIN 100 MILLIGRAM(S): 400 CAPSULE ORAL at 05:08

## 2022-01-01 RX ADMIN — IOHEXOL 30 MILLILITER(S): 300 INJECTION, SOLUTION INTRAVENOUS at 01:57

## 2022-01-01 RX ADMIN — BRIMONIDINE TARTRATE 1 DROP(S): 2 SOLUTION/ DROPS OPHTHALMIC at 06:35

## 2022-01-01 RX ADMIN — ATORVASTATIN CALCIUM 40 MILLIGRAM(S): 80 TABLET, FILM COATED ORAL at 11:45

## 2022-01-01 RX ADMIN — DORZOLAMIDE HYDROCHLORIDE 1 DROP(S): 20 SOLUTION/ DROPS OPHTHALMIC at 05:15

## 2022-01-01 RX ADMIN — LATANOPROST 1 DROP(S): 0.05 SOLUTION/ DROPS OPHTHALMIC; TOPICAL at 22:54

## 2022-01-01 RX ADMIN — ENOXAPARIN SODIUM 70 MILLIGRAM(S): 100 INJECTION SUBCUTANEOUS at 17:09

## 2022-01-01 RX ADMIN — DORZOLAMIDE HYDROCHLORIDE 1 DROP(S): 20 SOLUTION/ DROPS OPHTHALMIC at 22:02

## 2022-01-01 RX ADMIN — BRIMONIDINE TARTRATE 1 DROP(S): 2 SOLUTION/ DROPS OPHTHALMIC at 13:44

## 2022-01-01 RX ADMIN — GABAPENTIN 100 MILLIGRAM(S): 400 CAPSULE ORAL at 23:22

## 2022-01-01 RX ADMIN — Medication 15 MILLIGRAM(S): at 05:43

## 2022-01-01 RX ADMIN — Medication 15 MILLIGRAM(S): at 17:31

## 2022-01-01 RX ADMIN — ENOXAPARIN SODIUM 70 MILLIGRAM(S): 100 INJECTION SUBCUTANEOUS at 17:35

## 2022-01-01 RX ADMIN — SODIUM CHLORIDE 100 MILLILITER(S): 9 INJECTION, SOLUTION INTRAVENOUS at 14:11

## 2022-01-01 RX ADMIN — ONDANSETRON 108 MILLIGRAM(S): 8 TABLET, FILM COATED ORAL at 09:39

## 2022-01-01 RX ADMIN — ENOXAPARIN SODIUM 70 MILLIGRAM(S): 100 INJECTION SUBCUTANEOUS at 17:27

## 2022-01-01 RX ADMIN — Medication 15 MILLIGRAM(S): at 18:00

## 2022-01-01 RX ADMIN — BRIMONIDINE TARTRATE 1 DROP(S): 2 SOLUTION/ DROPS OPHTHALMIC at 14:26

## 2022-01-01 RX ADMIN — POLYETHYLENE GLYCOL 3350 17 GRAM(S): 17 POWDER, FOR SOLUTION ORAL at 11:22

## 2022-01-01 RX ADMIN — BRIMONIDINE TARTRATE 1 DROP(S): 2 SOLUTION/ DROPS OPHTHALMIC at 06:21

## 2022-01-01 RX ADMIN — ONDANSETRON 8 MILLIGRAM(S): 8 TABLET, FILM COATED ORAL at 13:00

## 2022-01-01 RX ADMIN — Medication 1 DROP(S): at 17:55

## 2022-01-01 RX ADMIN — PANTOPRAZOLE SODIUM 40 MILLIGRAM(S): 20 TABLET, DELAYED RELEASE ORAL at 06:02

## 2022-01-01 RX ADMIN — DORZOLAMIDE HYDROCHLORIDE 1 DROP(S): 20 SOLUTION/ DROPS OPHTHALMIC at 06:35

## 2022-01-01 RX ADMIN — BRIMONIDINE TARTRATE 1 DROP(S): 2 SOLUTION/ DROPS OPHTHALMIC at 22:11

## 2022-01-01 RX ADMIN — BRIMONIDINE TARTRATE 1 DROP(S): 2 SOLUTION/ DROPS OPHTHALMIC at 16:00

## 2022-01-01 RX ADMIN — PANTOPRAZOLE SODIUM 40 MILLIGRAM(S): 20 TABLET, DELAYED RELEASE ORAL at 17:24

## 2022-01-01 RX ADMIN — GABAPENTIN 100 MILLIGRAM(S): 400 CAPSULE ORAL at 22:09

## 2022-01-01 RX ADMIN — DORZOLAMIDE HYDROCHLORIDE 1 DROP(S): 20 SOLUTION/ DROPS OPHTHALMIC at 06:06

## 2022-01-01 RX ADMIN — DORZOLAMIDE HYDROCHLORIDE 1 DROP(S): 20 SOLUTION/ DROPS OPHTHALMIC at 05:21

## 2022-01-01 RX ADMIN — Medication 250 MILLIGRAM(S): at 06:48

## 2022-01-01 RX ADMIN — LATANOPROST 1 DROP(S): 0.05 SOLUTION/ DROPS OPHTHALMIC; TOPICAL at 21:54

## 2022-01-01 RX ADMIN — PANTOPRAZOLE SODIUM 20 MILLIGRAM(S): 20 TABLET, DELAYED RELEASE ORAL at 18:50

## 2022-01-01 RX ADMIN — ONDANSETRON 4 MILLIGRAM(S): 8 TABLET, FILM COATED ORAL at 23:15

## 2022-01-01 RX ADMIN — Medication 15 MILLIGRAM(S): at 11:06

## 2022-01-01 RX ADMIN — BRIMONIDINE TARTRATE 1 DROP(S): 2 SOLUTION/ DROPS OPHTHALMIC at 06:07

## 2022-01-01 RX ADMIN — ENOXAPARIN SODIUM 70 MILLIGRAM(S): 100 INJECTION SUBCUTANEOUS at 05:37

## 2022-01-01 RX ADMIN — Medication 1 EACH: at 20:54

## 2022-01-01 RX ADMIN — Medication 1 DROP(S): at 05:12

## 2022-01-01 RX ADMIN — SODIUM CHLORIDE 100 MILLILITER(S): 9 INJECTION, SOLUTION INTRAVENOUS at 11:48

## 2022-01-01 RX ADMIN — IOHEXOL 30 MILLILITER(S): 300 INJECTION, SOLUTION INTRAVENOUS at 17:04

## 2022-01-01 RX ADMIN — Medication 1 DROP(S): at 06:29

## 2022-01-01 RX ADMIN — DORZOLAMIDE HYDROCHLORIDE 1 DROP(S): 20 SOLUTION/ DROPS OPHTHALMIC at 06:36

## 2022-01-01 RX ADMIN — Medication 85 MILLIMOLE(S): at 02:24

## 2022-01-01 RX ADMIN — MORPHINE SULFATE 4 MILLIGRAM(S): 50 CAPSULE, EXTENDED RELEASE ORAL at 00:50

## 2022-01-01 RX ADMIN — Medication 1 DROP(S): at 06:36

## 2022-01-01 RX ADMIN — DORZOLAMIDE HYDROCHLORIDE 1 DROP(S): 20 SOLUTION/ DROPS OPHTHALMIC at 06:19

## 2022-01-01 RX ADMIN — Medication 1 EACH: at 23:07

## 2022-01-01 RX ADMIN — ENOXAPARIN SODIUM 70 MILLIGRAM(S): 100 INJECTION SUBCUTANEOUS at 17:32

## 2022-01-01 RX ADMIN — LATANOPROST 1 DROP(S): 0.05 SOLUTION/ DROPS OPHTHALMIC; TOPICAL at 22:43

## 2022-01-01 RX ADMIN — MORPHINE SULFATE 2 MILLIGRAM(S): 50 CAPSULE, EXTENDED RELEASE ORAL at 08:35

## 2022-01-01 RX ADMIN — Medication 250 MILLIGRAM(S): at 17:53

## 2022-01-01 RX ADMIN — DORZOLAMIDE HYDROCHLORIDE 1 DROP(S): 20 SOLUTION/ DROPS OPHTHALMIC at 06:27

## 2022-01-01 RX ADMIN — ONDANSETRON 8 MILLIGRAM(S): 8 TABLET, FILM COATED ORAL at 12:16

## 2022-01-01 RX ADMIN — ONDANSETRON 4 MILLIGRAM(S): 8 TABLET, FILM COATED ORAL at 21:39

## 2022-01-01 RX ADMIN — Medication 650 MILLIGRAM(S): at 16:32

## 2022-01-01 RX ADMIN — CEFTRIAXONE 100 MILLIGRAM(S): 500 INJECTION, POWDER, FOR SOLUTION INTRAMUSCULAR; INTRAVENOUS at 17:53

## 2022-01-01 RX ADMIN — SODIUM CHLORIDE 1000 MILLILITER(S): 9 INJECTION INTRAMUSCULAR; INTRAVENOUS; SUBCUTANEOUS at 08:35

## 2022-01-01 RX ADMIN — PANTOPRAZOLE SODIUM 40 MILLIGRAM(S): 20 TABLET, DELAYED RELEASE ORAL at 11:12

## 2022-01-01 RX ADMIN — BRIMONIDINE TARTRATE 1 DROP(S): 2 SOLUTION/ DROPS OPHTHALMIC at 06:28

## 2022-01-01 RX ADMIN — Medication 0.25 MILLIGRAM(S): at 01:11

## 2022-01-01 RX ADMIN — Medication 1 DROP(S): at 17:32

## 2022-01-01 RX ADMIN — DORZOLAMIDE HYDROCHLORIDE 1 DROP(S): 20 SOLUTION/ DROPS OPHTHALMIC at 05:56

## 2022-01-01 RX ADMIN — OXYCODONE HYDROCHLORIDE 5 MILLIGRAM(S): 5 TABLET ORAL at 15:56

## 2022-01-01 RX ADMIN — DORZOLAMIDE HYDROCHLORIDE 1 DROP(S): 20 SOLUTION/ DROPS OPHTHALMIC at 13:36

## 2022-01-01 RX ADMIN — BRIMONIDINE TARTRATE 1 DROP(S): 2 SOLUTION/ DROPS OPHTHALMIC at 21:21

## 2022-01-01 RX ADMIN — Medication 1 DROP(S): at 06:27

## 2022-01-01 RX ADMIN — MORPHINE SULFATE 4 MILLIGRAM(S): 50 CAPSULE, EXTENDED RELEASE ORAL at 05:43

## 2022-01-01 RX ADMIN — LACTULOSE 15 GRAM(S): 10 SOLUTION ORAL at 00:12

## 2022-01-01 RX ADMIN — PANTOPRAZOLE SODIUM 40 MILLIGRAM(S): 20 TABLET, DELAYED RELEASE ORAL at 09:27

## 2022-01-01 RX ADMIN — Medication 0.25 MILLIGRAM(S): at 18:57

## 2022-01-01 RX ADMIN — Medication 10 MILLIGRAM(S): at 17:55

## 2022-01-01 RX ADMIN — BRIMONIDINE TARTRATE 1 DROP(S): 2 SOLUTION/ DROPS OPHTHALMIC at 05:40

## 2022-01-01 RX ADMIN — LATANOPROST 1 DROP(S): 0.05 SOLUTION/ DROPS OPHTHALMIC; TOPICAL at 21:07

## 2022-01-01 RX ADMIN — DORZOLAMIDE HYDROCHLORIDE 1 DROP(S): 20 SOLUTION/ DROPS OPHTHALMIC at 05:33

## 2022-01-01 RX ADMIN — LACTULOSE 15 GRAM(S): 10 SOLUTION ORAL at 21:38

## 2022-01-01 RX ADMIN — ATORVASTATIN CALCIUM 80 MILLIGRAM(S): 80 TABLET, FILM COATED ORAL at 21:38

## 2022-01-01 RX ADMIN — BRIMONIDINE TARTRATE 1 DROP(S): 2 SOLUTION/ DROPS OPHTHALMIC at 14:24

## 2022-01-01 RX ADMIN — ENOXAPARIN SODIUM 70 MILLIGRAM(S): 100 INJECTION SUBCUTANEOUS at 06:09

## 2022-01-01 RX ADMIN — BRIMONIDINE TARTRATE 1 DROP(S): 2 SOLUTION/ DROPS OPHTHALMIC at 23:22

## 2022-01-01 RX ADMIN — Medication 1 DROP(S): at 17:31

## 2022-01-01 RX ADMIN — BRIMONIDINE TARTRATE 1 DROP(S): 2 SOLUTION/ DROPS OPHTHALMIC at 13:42

## 2022-01-01 RX ADMIN — BRIMONIDINE TARTRATE 1 DROP(S): 2 SOLUTION/ DROPS OPHTHALMIC at 05:22

## 2022-01-01 RX ADMIN — BRIMONIDINE TARTRATE 1 DROP(S): 2 SOLUTION/ DROPS OPHTHALMIC at 14:31

## 2022-01-01 RX ADMIN — BRIMONIDINE TARTRATE 1 DROP(S): 2 SOLUTION/ DROPS OPHTHALMIC at 23:10

## 2022-01-01 RX ADMIN — Medication 1 DROP(S): at 17:28

## 2022-01-01 RX ADMIN — I.V. FAT EMULSION 23.4 GM/KG/DAY: 20 EMULSION INTRAVENOUS at 21:21

## 2022-01-01 RX ADMIN — HEPARIN SODIUM 10 UNIT(S)/HR: 5000 INJECTION INTRAVENOUS; SUBCUTANEOUS at 02:47

## 2022-01-01 RX ADMIN — MORPHINE SULFATE 6 MILLIGRAM(S): 50 CAPSULE, EXTENDED RELEASE ORAL at 17:16

## 2022-01-01 RX ADMIN — PANTOPRAZOLE SODIUM 40 MILLIGRAM(S): 20 TABLET, DELAYED RELEASE ORAL at 05:43

## 2022-01-01 RX ADMIN — CHLORHEXIDINE GLUCONATE 1 APPLICATION(S): 213 SOLUTION TOPICAL at 11:44

## 2022-01-01 RX ADMIN — ENOXAPARIN SODIUM 70 MILLIGRAM(S): 100 INJECTION SUBCUTANEOUS at 17:18

## 2022-01-01 RX ADMIN — DORZOLAMIDE HYDROCHLORIDE 1 DROP(S): 20 SOLUTION/ DROPS OPHTHALMIC at 05:40

## 2022-01-01 RX ADMIN — BRIMONIDINE TARTRATE 1 DROP(S): 2 SOLUTION/ DROPS OPHTHALMIC at 05:09

## 2022-01-01 RX ADMIN — IOHEXOL 30 MILLILITER(S): 300 INJECTION, SOLUTION INTRAVENOUS at 05:33

## 2022-01-01 RX ADMIN — DORZOLAMIDE HYDROCHLORIDE 1 DROP(S): 20 SOLUTION/ DROPS OPHTHALMIC at 21:54

## 2022-01-01 RX ADMIN — ATORVASTATIN CALCIUM 80 MILLIGRAM(S): 80 TABLET, FILM COATED ORAL at 23:21

## 2022-01-01 RX ADMIN — DORZOLAMIDE HYDROCHLORIDE 1 DROP(S): 20 SOLUTION/ DROPS OPHTHALMIC at 13:44

## 2022-01-01 RX ADMIN — I.V. FAT EMULSION 23.4 GM/KG/DAY: 20 EMULSION INTRAVENOUS at 22:54

## 2022-01-01 RX ADMIN — DORZOLAMIDE HYDROCHLORIDE 1 DROP(S): 20 SOLUTION/ DROPS OPHTHALMIC at 17:12

## 2022-01-01 RX ADMIN — DORZOLAMIDE HYDROCHLORIDE 1 DROP(S): 20 SOLUTION/ DROPS OPHTHALMIC at 22:36

## 2022-01-01 RX ADMIN — RIVAROXABAN 20 MILLIGRAM(S): KIT at 17:03

## 2022-01-01 RX ADMIN — BRIMONIDINE TARTRATE 1 DROP(S): 2 SOLUTION/ DROPS OPHTHALMIC at 23:03

## 2022-01-01 RX ADMIN — DORZOLAMIDE HYDROCHLORIDE 1 DROP(S): 20 SOLUTION/ DROPS OPHTHALMIC at 06:29

## 2022-01-01 RX ADMIN — Medication 10 MILLIGRAM(S): at 08:48

## 2022-01-01 RX ADMIN — LATANOPROST 1 DROP(S): 0.05 SOLUTION/ DROPS OPHTHALMIC; TOPICAL at 21:37

## 2022-01-01 RX ADMIN — LATANOPROST 1 DROP(S): 0.05 SOLUTION/ DROPS OPHTHALMIC; TOPICAL at 22:00

## 2022-01-01 RX ADMIN — POLYETHYLENE GLYCOL 3350 17 GRAM(S): 17 POWDER, FOR SOLUTION ORAL at 14:44

## 2022-01-01 RX ADMIN — BRIMONIDINE TARTRATE 1 DROP(S): 2 SOLUTION/ DROPS OPHTHALMIC at 22:54

## 2022-01-01 RX ADMIN — ONDANSETRON 4 MILLIGRAM(S): 8 TABLET, FILM COATED ORAL at 17:16

## 2022-01-01 RX ADMIN — DORZOLAMIDE HYDROCHLORIDE 1 DROP(S): 20 SOLUTION/ DROPS OPHTHALMIC at 13:27

## 2022-01-01 RX ADMIN — ONDANSETRON 8 MILLIGRAM(S): 8 TABLET, FILM COATED ORAL at 17:18

## 2022-01-01 RX ADMIN — LATANOPROST 1 DROP(S): 0.05 SOLUTION/ DROPS OPHTHALMIC; TOPICAL at 23:10

## 2022-01-01 RX ADMIN — CHLORHEXIDINE GLUCONATE 1 APPLICATION(S): 213 SOLUTION TOPICAL at 06:21

## 2022-01-01 RX ADMIN — Medication 400 MILLIGRAM(S): at 13:37

## 2022-01-01 RX ADMIN — ONDANSETRON 8 MILLIGRAM(S): 8 TABLET, FILM COATED ORAL at 14:03

## 2022-01-01 RX ADMIN — Medication 1 DROP(S): at 06:03

## 2022-01-01 RX ADMIN — Medication 1 EACH: at 21:07

## 2022-01-01 RX ADMIN — MORPHINE SULFATE 4 MILLIGRAM(S): 50 CAPSULE, EXTENDED RELEASE ORAL at 02:20

## 2022-01-01 RX ADMIN — Medication 1 DROP(S): at 18:04

## 2022-01-01 RX ADMIN — ATORVASTATIN CALCIUM 80 MILLIGRAM(S): 80 TABLET, FILM COATED ORAL at 23:44

## 2022-01-01 RX ADMIN — PEMBROLIZUMAB 216 MILLIGRAM(S): 25 INJECTION, SOLUTION INTRAVENOUS at 09:51

## 2022-01-01 RX ADMIN — BRIMONIDINE TARTRATE 1 DROP(S): 2 SOLUTION/ DROPS OPHTHALMIC at 13:18

## 2022-01-01 RX ADMIN — MORPHINE SULFATE 4 MILLIGRAM(S): 50 CAPSULE, EXTENDED RELEASE ORAL at 02:35

## 2022-01-01 RX ADMIN — DORZOLAMIDE HYDROCHLORIDE 1 DROP(S): 20 SOLUTION/ DROPS OPHTHALMIC at 21:25

## 2022-01-01 RX ADMIN — SODIUM CHLORIDE 2000 MILLILITER(S): 9 INJECTION INTRAMUSCULAR; INTRAVENOUS; SUBCUTANEOUS at 12:47

## 2022-01-01 RX ADMIN — ATORVASTATIN CALCIUM 40 MILLIGRAM(S): 80 TABLET, FILM COATED ORAL at 21:54

## 2022-01-01 RX ADMIN — Medication 650 MILLIGRAM(S): at 10:32

## 2022-01-01 RX ADMIN — DORZOLAMIDE HYDROCHLORIDE 1 DROP(S): 20 SOLUTION/ DROPS OPHTHALMIC at 21:53

## 2022-01-01 RX ADMIN — BRIMONIDINE TARTRATE 1 DROP(S): 2 SOLUTION/ DROPS OPHTHALMIC at 06:36

## 2022-01-01 RX ADMIN — DORZOLAMIDE HYDROCHLORIDE 1 DROP(S): 20 SOLUTION/ DROPS OPHTHALMIC at 17:17

## 2022-01-01 RX ADMIN — CEFEPIME 1000 MILLIGRAM(S): 1 INJECTION, POWDER, FOR SOLUTION INTRAMUSCULAR; INTRAVENOUS at 14:16

## 2022-01-01 RX ADMIN — DORZOLAMIDE HYDROCHLORIDE 1 DROP(S): 20 SOLUTION/ DROPS OPHTHALMIC at 05:37

## 2022-01-01 RX ADMIN — BRIMONIDINE TARTRATE 1 DROP(S): 2 SOLUTION/ DROPS OPHTHALMIC at 06:06

## 2022-01-11 NOTE — ED PROVIDER NOTE - CLINICAL SUMMARY MEDICAL DECISION MAKING FREE TEXT BOX
81F with pmh HTN, CAD s/p CABG, MDD/DONALDO, uterine CA s/p total hysterectomy who presents for gradual worsening of diffuse abdominal pain for months. She reports feeling gassing, with multiple episodes of flatus. Labs and imaging reviewed.  Pepcid, Bentyl given and on reassessment patient reports significant improvement of pain, and abdomen is soft, nontender.  Discussed possibility of obtaining CT scan for further evaluation, however patient feels well and agrees with conservative management with Bentyl and close follow-up outpatient.  Patient was given modification education, and return to the ER if she experiences vomiting, fevers, chills and she voices understanding.  Recommend follow-up with GI. I have fully discussed the medical management and delivery of care with the patient. I have discussed any available labs, imaging and treatment options with the patient. All Questions answered at the bedside and printed copies of all results provided and recommended to review with PCP. Patient confirms understanding and has been given detailed return precautions. Patient instructed to return to the ED should symptoms persist or worsen. Patient has demonstrated capacity and has verbalized understanding. Patient is well appearing upon discharge, ambulatory with a steady gait.

## 2022-01-11 NOTE — ED ADULT TRIAGE NOTE - CHIEF COMPLAINT QUOTE
Pt with EMS C/O abdomen pain for "while " worse this ,morning filling bloated .Pt denies fever chills no N/V/D

## 2022-01-11 NOTE — ED ADULT NURSE NOTE - OBJECTIVE STATEMENT
Patient is a 81 year old female complaining of abdominal pain, bloating, and nausea. No vomiting or diarrhea

## 2022-01-11 NOTE — ED PROVIDER NOTE - NS ED ROS FT
Constitutional: See HPI.  Eyes: No visual changes, eye pain or discharge. No Photophobia  ENMT: No hearing changes, pain, discharge or infections. No neck pain or stiffness. No limited ROM  Cardiac: No SOB or edema. No chest pain with exertion.  Respiratory: No cough or respiratory distress. No hemoptysis. No history of asthma or RAD.  GI: + abd pain, No nausea, vomiting, diarrhea  : No dysuria, frequency or burning. No Discharge  MS: No myalgia, muscle weakness, joint pain or back pain.  Neuro: No headache or weakness. No LOC.  Skin: No skin rash.  Except as documented in the HPI, all other systems are negative.

## 2022-01-11 NOTE — ED PROVIDER NOTE - PATIENT PORTAL LINK FT
You can access the FollowMyHealth Patient Portal offered by [facility] by registering at the following website: [email or web address]. By joining [facility]’s FollowMyHealth portal, you will also be able to view your health information using other applications (apps) compatible with our system.

## 2022-01-11 NOTE — ED PROVIDER NOTE - OBJECTIVE STATEMENT
81F with pmh HTN, CAD s/p CABG, MDD/DONALDO, uterine CA s/p total hysterectomy who presents for gradual worsening of diffuse abdominal pain for months. She reports feeling gassing, with multiple episodes of flatus. Denies n/v/d/f/c, loose stool, brbpr or melena, flank pain, urinary sx. She has an apt with Dr. Silva on May 20th. Pain exacerbated with food; denies particularly spicy or acidic foods. Prior CT a/p Nov 2021 wnl.

## 2022-01-11 NOTE — ED PROVIDER NOTE - NSFOLLOWUPINSTRUCTIONS_ED_ALL_ED_FT
Gastroesophageal Reflux Disease, Adult    Normally, food travels down the esophagus and stays in the stomach to be digested. However, when a person has gastroesophageal reflux disease (GERD), food and stomach acid move back up into the esophagus. When this happens, the esophagus becomes sore and inflamed. Over time, GERD can create small holes (ulcers) in the lining of the esophagus.     CAUSES  This condition is caused by a problem with the muscle between the esophagus and the stomach (lower esophageal sphincter, or LES). Normally, the LES muscle closes after food passes through the esophagus to the stomach. When the LES is weakened or abnormal, it does not close properly, and that allows food and stomach acid to go back up into the esophagus. The LES can be weakened by certain dietary substances, medicines, and medical conditions, including:    Tobacco use.  Pregnancy.  Having a hiatal hernia.  Heavy alcohol use.  Certain foods and beverages, such as coffee, chocolate, onions, and peppermint.    RISK FACTORS  This condition is more likely to develop in:    People who have an increased body weight.  People who have connective tissue disorders.  People who use NSAID medicines.    SYMPTOMS  Symptoms of this condition include:    Heartburn.  Difficult or painful swallowing.  The feeling of having a lump in the throat.  A bitter taste in the mouth.  Bad breath.  Having a large amount of saliva.  Having an upset or bloated stomach.  Belching.  Chest pain.  Shortness of breath or wheezing.  Ongoing (chronic) cough or a night-time cough.  Wearing away of tooth enamel.  Weight loss.    Different conditions can cause chest pain. Make sure to see your health care provider if you experience chest pain.    DIAGNOSIS  Your health care provider will take a medical history and perform a physical exam. To determine if you have mild or severe GERD, your health care provider may also monitor how you respond to treatment. You may also have other tests, including:    An endoscopy to examine your stomach and esophagus with a small camera.  A test that measures the acidity level in your esophagus.  A test that measures how much pressure is on your esophagus.  A barium swallow or modified barium swallow to show the shape, size, and functioning of your esophagus.    TREATMENT  The goal of treatment is to help relieve your symptoms and to prevent complications. Treatment for this condition may vary depending on how severe your symptoms are. Your health care provider may recommend:    Changes to your diet.  Medicine.  Surgery.    HOME CARE INSTRUCTIONS  Diet    Follow a diet as recommended by your health care provider. This may involve avoiding foods and drinks such as:  Coffee and tea (with or without caffeine).  Drinks that contain alcohol.  Energy drinks and sports drinks.  Carbonated drinks or sodas.  Chocolate and cocoa.  Peppermint and mint flavorings.  Garlic and onions.  Horseradish.  Spicy and acidic foods, including peppers, chili powder, walters powder, vinegar, hot sauces, and barbecue sauce.  Citrus fruit juices and citrus fruits, such as oranges, chandler, and limes.  Tomato-based foods, such as red sauce, chili, salsa, and pizza with red sauce.  Fried and fatty foods, such as donuts, french fries, potato chips, and high-fat dressings.  High-fat meats, such as hot dogs and fatty cuts of red and white meats, such as rib eye steak, sausage, ham, and smith.  High-fat dairy items, such as whole milk, butter, and cream cheese.  Eat small, frequent meals instead of large meals.  Avoid drinking large amounts of liquid with your meals.  Avoid eating meals during the 2–3 hours before bedtime.  Avoid lying down right after you eat.  Do not exercise right after you eat.     General Instructions     Pay attention to any changes in your symptoms.  Take over-the-counter and prescription medicines only as told by your health care provider. Do not take aspirin, ibuprofen, or other NSAIDs unless your health care provider told you to do so.  Do not use any tobacco products, including cigarettes, chewing tobacco, and e-cigarettes. If you need help quitting, ask your health care provider.  Wear loose-fitting clothing. Do not wear anything tight around your waist that causes pressure on your abdomen.  Raise (elevate) the head of your bed 6 inches (15cm).  Try to reduce your stress, such as with yoga or meditation. If you need help reducing stress, ask your health care provider.  If you are overweight, reduce your weight to an amount that is healthy for you. Ask your health care provider for guidance about a safe weight loss goal.  Keep all follow-up visits as told by your health care provider. This is important.    SEEK MEDICAL CARE IF:  You have new symptoms.  You have unexplained weight loss.  You have difficulty swallowing, or it hurts to swallow.  You have wheezing or a persistent cough.  Your symptoms do not improve with treatment.  You have a hoarse voice.    SEEK IMMEDIATE MEDICAL CARE IF:  You have pain in your arms, neck, jaw, teeth, or back.  You feel sweaty, dizzy, or light-headed.  You have chest pain or shortness of breath.  You vomit and your vomit looks like blood or coffee grounds.  You faint.  Your stool is bloody or black.  You cannot swallow, drink, or eat.    ADDITIONAL NOTES AND INSTRUCTIONS    Please follow up with your Primary MD in 24-48 hr.  Seek immediate medical care for any new/worsening signs or symptoms.

## 2022-01-11 NOTE — ED PROVIDER NOTE - CARE PROVIDER_API CALL
Sadia Silva (MD)  Gastroenterology  [address] [place] [address]  Phone: [phone]  Fax: [phone]  Established Patient  Follow Up Time: 4-6 Days

## 2022-02-01 NOTE — CONSULT NOTE ADULT - REASON FOR ADMISSION
02/01/22 1512   Vital Signs   Pulse 91   Resp (!) 24   SpO2 (!) 94 %   /60   MAP (mmHg) 90   Pre-Hemodialysis Assessment   Treatment Status Started   Pre-Treatment Weight 47.6 kg (104 lb 15 oz)   Gross Bleach Negative Yes   Total Chlorine is less than 0.1 ppm Yes   Machine Number [id number]   Alarms Verified Yes   Reverse Osmosis Number [id number]   Dialyzer Lot # [id number]   Tubing Lot # [id number]   Reverse Osmosis Machine Log Completed Yes   Extracorporeal Circuit Tested for Integrity Yes   pH 7   Machine Temperature 96.8 °F (36 °C)   Dialyzer F-160   Machine Conductivity 13.8   Meter Conductivity 14   Sodium Modeling 0   UF Profile 0   Dialysate Na (mEq/L) 140   Dialysate K (mEq/L) 2   Dialysate CA (mEq/L) 2.5   Dialysate HCO3 (mEq/L) 35   Prime Ordered (mL) 250 mL   Treatment Initiation with Dialysis Precautions All connections secured;Saline line double clamped;Venous parameters set;Arterial parameters set;Prime given;Air foam detector engaged   Prime Amount Given (mL) 250 mL   Net UF Goal 3000   Total UF Goal 3500   Pre-Hemodialysis Comments 3.5 hour I-HDTx initiated   UF Rate 1028   RO # / DI #  [id number]   RO Rejection Within Limits? Yes   Timeout Performed 1459   During Hemodialysis Assessment   Blood Flow Rate (mL/min) 400 mL/min   Dialysate Flow Rate (mL/min) 800 ml/min   Ultrafiltration Rate (mL/Hr) 1028 mL/Hr   Arteriovenous Lines Secure Yes   Arterial Pressure (mmHg) -85 mmHg   Venous Pressure (mmHg) 151   Blood Volume Processed (Liters) 0 L   UF Removed (mL) 0 mL   TMP 29   Venous Line in Air Detector Yes   Intake (mL) 250 mL   Transducer Dry Yes   Access Visible Yes    notified of access issue? N/A   Heparin given? N/A   Intra-Hemodialysis Comments hd tx started.      Chest pain.

## 2022-02-04 NOTE — ASU DISCHARGE PLAN (ADULT/PEDIATRIC) - CARE PROVIDER_API CALL
Pattie Calero  GASTROENTEROLOGY  [address] [place] [address]  Phone: [phone]  Fax: [phone]  Follow Up Time:

## 2022-02-04 NOTE — ASU PATIENT PROFILE, ADULT - FALL HARM RISK - UNIVERSAL INTERVENTIONS
Bed in lowest position, wheels locked, appropriate side rails in place/Call bell, personal items and telephone in reach/Instruct patient to call for assistance before getting out of bed or chair/Non-slip footwear when patient is out of bed/[place] to call system/Physically safe environment - no spills, clutter or unnecessary equipment/Purposeful Proactive Rounding/Room/bathroom lighting operational, light cord in reach

## 2022-02-04 NOTE — ASU DISCHARGE PLAN (ADULT/PEDIATRIC) - NS MD DC FALL RISK RISK
For information on Fall & Injury Prevention, visit: [email or web address] OR  [email or web address] OR  https://www.cdc.gov/steadi/patient.html

## 2022-02-16 NOTE — ED ADULT TRIAGE NOTE - ESI TRIAGE ACUITY LEVEL, MLM
Airway  Urgency: elective    Airway not difficult    General Information and Staff    Patient location during procedure: OR  CRNA: LUIS CARLOS JIMENEZ    Indications and Patient Condition  Indications for airway management: airway protection    Preoxygenated: yes  MILS not maintained throughout  Mask difficulty assessment: 2 - vent by mask + OA or adjuvant +/- NMBA    Final Airway Details  Final airway type: endotracheal airway      Successful airway: ETT  Cuffed: yes   Successful intubation technique: direct laryngoscopy  Endotracheal tube insertion site: oral  Blade: Neno  Blade size: #3  ETT size: 7.5 mm  Cormack-Lehane Classification: grade IIb - view of arytenoids or posterior of glottis only  Placement verified by: chest auscultation and capnometry   Cuff volume (mL): 7  Measured from: lips  ETT to lips (cm): 23  Number of attempts at approach: 1    Additional Comments  Negative epigastric sounds, Breath sound equal bilaterally with symmetric chest rise and fall atraumatic intubation after first attempt no change in lips or dentition            
3

## 2022-02-16 NOTE — ED PROVIDER NOTE - OBJECTIVE STATEMENT
82 yo F with pmhx of uterine cancer s/p SHAAN, malignant ascites. CAD s/p CABG, pulmonary embolism on xarelto, HTN, depression presenting with abdominal pain and abdominal distention x 1 day. Symptoms are moderate. No alleviating/aggravating factors. No cp, sob, fever, chills, nausea, vomiting, diarrhea, back pain, urinary symptoms, headache, dizziness, paresthesias, or weakness. 82 yo F with pmhx of uterine cancer s/p SHAAN, malignant ascites. CAD s/p CABG, pulmonary embolism on xarelto, HTN, depression presenting with abdominal discomfort and abdominal distention over the last 3-4 weeks. States she went to GI and was prescribed simethicone about 3 weeks ago for "gas pain" but noticed it was not helping. Symptoms are moderate. No alleviating/aggravating factors. No cp, sob, fever, chills, nausea, vomiting, diarrhea, back pain, urinary symptoms, headache, dizziness, paresthesias, or weakness.

## 2022-02-16 NOTE — H&P ADULT - NSHPLABSRESULTS_GEN_ALL_CORE
13.4   5.54  )-----------( 265      ( 16 Feb 2022 09:30 )             42.2       02-16    138  |  100  |  17  ----------------------------<  95  4.2   |  23  |  1.3    Ca    8.7      16 Feb 2022 09:30  Mg     2.1     02-16    TPro  6.9  /  Alb  3.6  /  TBili  0.3  /  DBili  x   /  AST  29  /  ALT  9   /  AlkPhos  76  02-16                  PT/INR - ( 16 Feb 2022 09:30 )   PT: 18.30 sec;   INR: 1.60 ratio         PTT - ( 16 Feb 2022 09:30 )  PTT:37.0 sec    Lactate Trend  02-16 @ 09:30 Lactate:1.6       CARDIAC MARKERS ( 16 Feb 2022 09:30 )  x     / <0.01 ng/mL / x     / x     / x

## 2022-02-16 NOTE — ED PROVIDER NOTE - CLINICAL SUMMARY MEDICAL DECISION MAKING FREE TEXT BOX
81-year-old female PMH as noted in the chart presenting to the ED for evaluation of increasing abdominal girth and SOB in the supine position.  Patient states that her belly has been growing larger, she has seen her gastroenterologist who prescribed simethicone without any improvement. She reports decreased appetite and decreased PO intake, says she is afraid to drink because it can worsen her belly swelling.  Denies any vomiting, black or bloody stools, urinary complaints, fever or chills, cough or chest pain.  To me she denies any abdominal pain and when asked if she wants something for pain she says "but I have no pain".  Elderly female resting on the stretcher in no acute distress, PERRL, pink conjunctiva,, no acute respiratory distress, equal air entry bilaterally, speaking full sentences, RRR, well-perfused extremities, abdomen is rotund soft with positive fluid wave, no tenderness to palpation on my exam no rebound or guarding, no midline spine or CVA TTP, mild bilateral lower leg edema without unilateral calf tenderness to palpation or swelling, awake and alert, no gross motor deficits.  Plan labs, CT abdomen pelvis, plan to admit for further work-up.

## 2022-02-16 NOTE — ED ADULT NURSE REASSESSMENT NOTE - NS ED NURSE REASSESS COMMENT FT1
pt alert and oriented x3, pt sleeping at this time but easily aroused. pt denies any complaints at this time. pt updated on plan of care. no acute distress noted. will continue to monitor.

## 2022-02-16 NOTE — H&P ADULT - HISTORY OF PRESENT ILLNESS
This is a 81 year old F patient with PMHx of uterine cancer s/p SHAAN, Recurrent malignant ascites, CAD s/p CABG, HTN, depression, PE on Xarelto who presented to the ED for abdominal distension and discomfort of 4 weeks duration. Patient was doing her baseline until 4 weeks prior to admission when she started noticing that her belly is getting bigger. She did not seek any medical attention and that time. The distention was progressively getting worse and it was associated with abdominal discomfort and shortness of breath in the supine position. Patient mentioned following up with her Gastroenterologist who according to her prescribed simethicone without improvement so she decided to come to the ED. Patient denied any vomiting, nausea, change in bowel habits, fevers, chills or any other symptoms     In the ED, Patient is hemodynamically stable with the only complaint of abdominal discomfort.

## 2022-02-16 NOTE — ED PROVIDER NOTE - PHYSICAL EXAMINATION
VITAL SIGNS: I have reviewed nursing notes and confirm.  CONSTITUTIONAL: Well-developed; well-nourished; in no acute distress.  SKIN: Skin exam is warm and dry, no acute rash.  HEAD: Normocephalic; atraumatic.  EYES: PERRL, EOM intact; conjunctiva and sclera clear.  ENT: No nasal discharge; airway clear.   NECK: Supple; non tender.  CARD: S1, S2 normal; no murmurs, gallops, or rubs. Regular rate and rhythm.  RESP: Clear to auscultation bilaterally. No wheezes, rales or rhonchi.  ABD: Normal bowel sounds; soft; +distended; +diffuse tenderness. No rebound tenderness or guarding.   EXT: Normal ROM. No edema.  LYMPH: No acute cervical adenopathy.  NEURO: Alert, oriented. Grossly unremarkable. No focal deficits.  PSYCH: Cooperative, appropriate.

## 2022-02-16 NOTE — ED PROVIDER NOTE - NS ED ROS FT
Review of Systems:  	•	CONSTITUTIONAL - no fever, no diaphoresis, no chills  	•	SKIN - no rash  	•	HEMATOLOGIC - no bleeding, no bruising  	•	EYES - no eye pain, no blurry vision  	•	ENT - no congestion  	•	RESPIRATORY - no shortness of breath, no cough  	•	CARDIAC - no chest pain, no palpitations  	•	GI - + abd pain, no nausea, no vomiting, no diarrhea, no constipation  	•	GENITO-URINARY - no dysuria; no hematuria, no increased urinary frequency  	•	MUSCULOSKELETAL - no joint paint, no swelling, no redness  	•	NEUROLOGIC - no weakness, no headache, no paresthesias, no LOC  	•	PSYCH - no anxiety, +depression  	All other ROS are negative except as documented in HPI.

## 2022-02-16 NOTE — H&P ADULT - ASSESSMENT
This is a 81 year old F patient with PMHx of uterine cancer s/p SHAAN,  malignant ascites, CAD s/p CABG, HTN, depression, PE on Xarelto who presented to the ED for abdominal distension and discomfort of 4 weeks duration.    #Abdominal discomfort  #Ascites   -Patient has a previous Hx of Ascites with paracentesis  -Bedside paracentesis was attempted, patient did not tolerate the procedure well because of pain ( adequate local anesthetic were used )   -Follow up with her GI ( Dr. Mccain )     # Hx of PE   -Continue home dose of Xarelto     #Hx of CAD s/p CABG   -Continue AC, Toprol and statin     #HTN   -Continue home dose of Amlodipine and Toprol     #Depression   -Continue Home Psych medications     #DVT ppx: Xarelto   #GI ppx: Protonix   #Diet: DASH

## 2022-02-16 NOTE — ED ADULT NURSE NOTE - BRAND OF COVID-19 VACCINATION
Moderna dose 1, 2, and 3 O-T Advancement Flap Text: The defect edges were debeveled with a #15 scalpel blade.  Given the location of the defect, shape of the defect and the proximity to free margins an O-T advancement flap was deemed most appropriate.  Using a sterile surgical marker, an appropriate advancement flap was drawn incorporating the defect and placing the expected incisions within the relaxed skin tension lines where possible.    The area thus outlined was incised deep to adipose tissue with a #15 scalpel blade.  The skin margins were undermined to an appropriate distance in all directions utilizing iris scissors.

## 2022-02-16 NOTE — ED PROVIDER NOTE - ATTENDING CONTRIBUTION TO CARE
81-year-old female PMH as noted in the chart presenting to the ED for evaluation of increasing abdominal girth, generalized weakness and SOB in the supine position.  Patient states that her belly has been growing larger, she has seen her gastroenterologist who prescribed simethicone without any improvement. She reports decreased appetite and decreased PO intake, says she is afraid to drink because it can worsen her belly swelling.  Denies any vomiting, black or bloody stools, urinary complaints, fever or chills, cough or chest pain.  To me she denies any abdominal pain and when asked if she wants something for pain she says "but I have no pain".  Elderly female resting on the stretcher in no acute distress, PERRL, pink conjunctiva,, no acute respiratory distress, equal air entry bilaterally, speaking full sentences, RRR, well-perfused extremities, abdomen is rotund soft with positive fluid wave, no tenderness to palpation on my exam no rebound or guarding, no midline spine or CVA TTP, mild bilateral lower leg edema without unilateral calf tenderness to palpation or swelling, awake and alert, no gross motor deficits.  Plan labs, CT abdomen pelvis, plan to admit for further work-up.

## 2022-02-16 NOTE — ED ADULT NURSE NOTE - NSIMPLEMENTINTERV_GEN_ALL_ED
Implemented All Fall with Harm Risk Interventions:  [place] to call system. Call bell, personal items and telephone within reach. Instruct patient to call for assistance. Room bathroom lighting operational. Non-slip footwear when patient is off stretcher. Physically safe environment: no spills, clutter or unnecessary equipment. Stretcher in lowest position, wheels locked, appropriate side rails in place. Provide visual cue, wrist band, yellow gown, etc. Monitor gait and stability. Monitor for mental status changes and reorient to person, place, and time. Review medications for side effects contributing to fall risk. Reinforce activity limits and safety measures with patient and family. Provide visual clues: red socks.

## 2022-02-16 NOTE — ED PROVIDER NOTE - PROGRESS NOTE DETAILS
EP: CT scan shows large volume ascites, abdomen remains nontender to palpation, very low clinical suspicion for SBP.  Admit to medicine for further management and additional work-up.  Patient is amenable with the plan.

## 2022-02-16 NOTE — H&P ADULT - NSHPPHYSICALEXAM_GEN_ALL_CORE
General: NAD, in bed   Head and neck: NC, AT  Heart: S1,S2 appreciated, RRR  Lungs: Bilateral breath sounds, no wheeze  Abdomen: Distended, discomfort on palpation, BS +ve  LE: no edema

## 2022-02-16 NOTE — PATIENT PROFILE ADULT - FALL HARM RISK - HARM RISK INTERVENTIONS
Assistance with ambulation/Assistance OOB with selected safe patient handling equipment/Communicate Risk of Fall with Harm to all staff/Discuss with provider need for PT consult/Monitor gait and stability/Reinforce activity limits and safety measures with patient and family/Tailored Fall Risk Interventions/Visual Cue: Yellow wristband and red socks/Bed in lowest position, wheels locked, appropriate side rails in place/Call bell, personal items and telephone in reach/Instruct patient to call for assistance before getting out of bed or chair/Non-slip footwear when patient is out of bed/[place] to call system/Physically safe environment - no spills, clutter or unnecessary equipment/Purposeful Proactive Rounding/Room/bathroom lighting operational, light cord in reach

## 2022-02-16 NOTE — H&P ADULT - ATTENDING COMMENTS
81 year old F patient with PMHx of uterine cancer s/p SHAAN,  malignant ascites, CAD s/p CABG, HTN, depression, PE on Xarelto who presented to the ED for abdominal distension and discomfort. Admitted for symptomatic malignant ascites.       #Symptomatic Malignant Ascites   - IR consult for therapeutic paracentesis   - Hold Xarelto as IR might require us to hold it    # Recurrent adenocarcinoma Mullerian origin, malignant ascites dx on 08/08/2020  - s/p TAHBSO in 2016 with adjuvant chemotherapy  - S/p Carbo/taxol    - follows with Dr. Greco - note available on HIE     # Hx of PE   - on Xarelto, hold for now     #Hx of CAD s/p CABG   -Continue Toprol and statin     #HTN   -Continue home dose of Amlodipine and Toprol     #Depression   -Continue Home Psych medications     #DVT ppx: Xarelto

## 2022-02-17 NOTE — PROGRESS NOTE ADULT - ASSESSMENT
This is a 81 year old F patient with PMHx of uterine cancer s/p SHAAN,  malignant ascites, CAD s/p CABG, HTN, depression, PE on Xarelto who presented to the ED for abdominal distension and discomfort of 4 weeks duration.    # Abdominal discomfort 2/2 Ascites likely 2/2 recurrent ovarian cancer  s/p SHAAN/BSO in past  had chemo in past   (8/2020): 217  rpt   s/p para w/ IR 2/17: 4.35 L removed; 60 cc sent for cytopath  H/O eval - pt said she would do cancer tx (instead of just hospice)  CT A/P: tr pl eff; mod-lg ascites; atrophied lt kidney; s/p SHAAN/BSO; no LN; no hydro; + omental nodularity; no bowel obst; gluteal granulomas  consider diuretics, like aldactone    # Hx of PE   resume Xarelto 20mg po q24 nisa  has IVC filter    # Hx of CAD s/p CABG; DLD; HTN  on a/c, no asa  c/w Toprol, norvasc and statin     # Depression   c/w buspar; klonopin    # glaucoma  c/w eye gtts: brimonidine, dorzolamide, latanoprost    # DVT ppx: Xarelto - resume nisa    # GI ppx: Protonix     # Activity: need to walk pt to see if PT eval is needed    Dispo: h/o eval - need to see what they want to do inpt vs outpt; c/w home meds; xarelto resume nisa; f/u ascites analysis; f/u rpt   eventually, pt will go home (vs SNF if not walking well) - anticipate d/c nisa

## 2022-02-17 NOTE — CONSULT NOTE ADULT - SUBJECTIVE AND OBJECTIVE BOX
INTERVENTIONAL RADIOLOGY CONSULT:     Procedure Requested: image guided paracentesis     HPI:  This is a 81 year old F patient with PMHx of uterine cancer s/p SHAAN, Recurrent malignant ascites, CAD s/p CABG, HTN, depression, PE on Xarelto who presented to the ED for abdominal distension and discomfort of 4 weeks duration. Patient was doing her baseline until 4 weeks prior to admission when she started noticing that her belly is getting bigger. She did not seek any medical attention and that time. The distention was progressively getting worse and it was associated with abdominal discomfort and shortness of breath in the supine position. Patient mentioned following up with her Gastroenterologist who according to her prescribed simethicone without improvement so she decided to come to the ED. Patient denied any vomiting, nausea, change in bowel habits, fevers, chills or any other symptoms     In the ED, Patient is hemodynamically stable with the only complaint of abdominal discomfort.  (16 Feb 2022 17:55)      PAST MEDICAL & SURGICAL HISTORY:  Uterine cancer  s/p SHAAN and chemo    Hypertension    High cholesterol    Cataract of right eye    Glaucoma    Coronary artery disease  s/p CABG X2    CAD (coronary artery disease)    HTN (hypertension)    HLD (hyperlipidemia)    H/O total hysterectomy    Athscl CABG, unsp, w angina pectoris w documented spasm    History of coronary artery bypass surgery        MEDICATIONS  (STANDING):  amLODIPine   Tablet 5 milliGRAM(s) Oral daily  atorvastatin 40 milliGRAM(s) Oral at bedtime  brimonidine 0.2% Ophthalmic Solution 1 Drop(s) Both EYES two times a day  busPIRone 15 milliGRAM(s) Oral every 12 hours  chlorhexidine 4% Liquid 1 Application(s) Topical <User Schedule>  clonazePAM  Tablet 0.5 milliGRAM(s) Oral daily  dorzolamide 2% Ophthalmic Solution 1 Drop(s) Both EYES three times a day  latanoprost 0.005% Ophthalmic Solution 1 Drop(s) Both EYES at bedtime  metoprolol succinate ER 25 milliGRAM(s) Oral daily  pantoprazole    Tablet 40 milliGRAM(s) Oral before breakfast  polyethylene glycol 3350 17 Gram(s) Oral daily  senna 2 Tablet(s) Oral at bedtime    MEDICATIONS  (PRN):      Allergies    chloroquine (Hives)  quinine (Urticaria)    Intolerances      Physical Exam:   Vital Signs Last 24 Hrs  T(C): 37.1 (17 Feb 2022 06:00), Max: 37.3 (16 Feb 2022 23:05)  T(F): 98.7 (17 Feb 2022 06:00), Max: 99.1 (16 Feb 2022 23:05)  HR: 80 (17 Feb 2022 06:00) (74 - 113)  BP: 133/63 (17 Feb 2022 06:00) (95/64 - 133/63)  BP(mean): --  RR: 18 (17 Feb 2022 06:00) (18 - 19)  SpO2: 98% (17 Feb 2022 00:15) (96% - 99%)    Labs:                         11.2   6.51  )-----------( 268      ( 17 Feb 2022 04:30 )             35.2     02-17    142  |  99  |  18  ----------------------------<  71  4.1   |  22  |  1.3    Ca    8.3<L>      17 Feb 2022 04:30  Mg     2.1     02-16    TPro  6.0  /  Alb  3.2<L>  /  TBili  0.3  /  DBili  x   /  AST  21  /  ALT  7   /  AlkPhos  66  02-17    PT/INR - ( 16 Feb 2022 09:30 )   PT: 18.30 sec;   INR: 1.60 ratio         PTT - ( 16 Feb 2022 09:30 )  PTT:37.0 sec    Pertinent labs:                      11.2   6.51  )-----------( 268      ( 17 Feb 2022 04:30 )             35.2       02-17    142  |  99  |  18  ----------------------------<  71  4.1   |  22  |  1.3    Ca    8.3<L>      17 Feb 2022 04:30  Mg     2.1     02-16    TPro  6.0  /  Alb  3.2<L>  /  TBili  0.3  /  DBili  x   /  AST  21  /  ALT  7   /  AlkPhos  66  02-17      PT/INR - ( 16 Feb 2022 09:30 )   PT: 18.30 sec;   INR: 1.60 ratio         PTT - ( 16 Feb 2022 09:30 )  PTT:37.0 sec    Radiology & Additional Studies:     IMPRESSION:    Since November 19, 2021, new omental nodularity and increased now large   volume abdominopelvic malignant ascites.      --- End of Report ---      Radiology imaging reviewed.       ASSESSMENT/ PLAN:   This is a 81 year old F patient with PMHx of uterine cancer s/p SHAAN, Recurrent malignant ascites, CAD s/p CABG, HTN, depression, PE on Xarelto who presented to the ED for abdominal distension and discomfort of 4 weeks duration  - consulted for therapeutic paracentesis due to CT findings  - increased large volume ascites noted on imaging as well as clinical appearance of abdominal distension  - vast ascites throughout right and left lower quadrants, IR intervention not necessary   - H+P from 2/16 states bedside paracentesis was attempted, patient did not tolerate the procedure well because of pain (adequate local anesthetic were used)   - case discussed with floor resident, states procedure was unsuccessful due to inability to find safe window not because of pain  - patient to come to IR to re-evaluate under ultrasound, if insufficient window will not proceed with procedure    Thank you for the courtesy of this consult, please call [phone] with any further questions.

## 2022-02-17 NOTE — PROGRESS NOTE ADULT - ASSESSMENT
81 year old F patient with PMHx of uterine cancer s/p SHAAN,  malignant ascites, CAD s/p CABG, HTN, depression, PE on Xarelto who presented to the ED for abdominal distension and discomfort. Admitted for symptomatic malignant ascites.     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 Greco     #Hx of PE   - on Xarelto, hold for now     #Hx of CAD s/p CABG   -Continue Toprol and statin     #HTN   -Continue home dose of Amlodipine and Toprol     #Depression   -Continue Home Psych medications     #Misc   - DVT prophylaxis: SCD for now   - GI prophylaxis: PPI   - Diet: DASH/TLC   - Activity status: IAT   - Code status: Full code   Disposition: acute, pending para 81 year old F patient with PMHx of uterine cancer s/p SHAAN,  malignant ascites, CAD s/p CABG, HTN, depression, PE on Xarelto who presented to the ED for abdominal distension and discomfort. Admitted for symptomatic malignant ascites.     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 Greco   -  (8/2020): 217  - F/u repeat   - GOC was revisited with patient, she is not interest at this moment, Patient would like to do cancer tx     #Hx of PE   - on Xarelto, will restart tomorrow     #Hx of CAD s/p CABG   - Not on ASA   -Continue Toprol and statin     #HTN   -Continue home dose of Amlodipine and Toprol     #Depression   -Continue Home Psych medications     #Glaucoma  - c/w eye gtts: brimonidine, dorzolamide, latanoprost    #Misc   - DVT prophylaxis: SCD for now, re-start Xarelto in the am   - GI prophylaxis: PPI   - Diet: DASH/TLC   - Activity status: IAT   - Code status: Full code   Disposition: Heme/onc recs, d/c possible tomorrow

## 2022-02-18 NOTE — CONSULT NOTE ADULT - ATTENDING COMMENTS
The patient was also seen and examined by myself. I agree with the medical student's note above. Modifications made. Situation discussed with him and the patient.

## 2022-02-18 NOTE — DISCHARGE NOTE NURSING/CASE MANAGEMENT/SOCIAL WORK - NSDCPEFALRISK_GEN_ALL_CORE
For information on Fall & Injury Prevention, visit: [email or web address] OR  [email or web address] OR  https://www.cdc.gov/steadi/patient.html

## 2022-02-18 NOTE — DISCHARGE NOTE PROVIDER - CARE PROVIDER_API CALL
Paul Greco (DO)  Hematology; Internal Medicine; Medical Oncology  [address] [place] [address]  Phone: [phone]  Fax: [phone]  Established Patient  Follow Up Time: 1 week

## 2022-02-18 NOTE — CONSULT NOTE ADULT - ASSESSMENT
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      1. Ascites likely due to recurrent adenocarcinoma of mullerian origin: Due to patient's pMHx of uterine cancer and recurrence back in 2020, current ascites is most likely due to recurrence of adenocarcinoma of mullerian origin. CT A/P shows new omental nodularity which further supports diagnosis.    - CT A/P: New omental nodularity with large malignant ascites  -  (8/2020): 217  - s/p paracentesis with IR (2/17/22): 4.35 L removed; 60 cc sent for cytopathology      Recommendations    - Obtain  levels  - Follow up cytopathology of paracentesis fluid  - Per Dr. Greco, patient has an appointment scheduled with him on Tuesday, 2/22/22. 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      1. Ascites likely due to recurrent adenocarcinoma of mullerian origin: Due to patient's pMHx of uterine cancer and recurrence back in 2020, current ascites is most likely due to recurrence of adenocarcinoma of mullerian origin. CT A/P shows new omental nodularity which further supports diagnosis.    - CT A/P: New omental nodularity with large malignant ascites  -  (1/28/22): 63  - s/p paracentesis with IR (2/17/22): 4.35 L removed; 60 cc sent for cytopathology      Recommendations    - Obtain  levels  - Follow up cytopathology of paracentesis fluid  - Per Dr. Greco, patient has an appointment scheduled with him on Tuesday, 2/22/22. 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      1. Ascites likely due to recurrent adenocarcinoma of mullerian origin: Due to patient's pMHx of uterine cancer and recurrence back in 2020, current ascites is most likely due to recurrence of adenocarcinoma of mullerian origin. CT A/P shows new omental nodularity which further supports diagnosis.    - CT A/P: New omental nodularity with large malignant ascites  -  (1/28/22): 63  - s/p paracentesis with IR (2/17/22): 4.35 L removed; 60 cc sent for cytopathology      Recommendation  - Obtain  levels  - Follow up cytopathology of paracentesis fluid  - Patient has an appointment scheduled with Dr. Greco on Tuesday, 2/22/22. If patient feels well, she can be discharged home and follow up with him in office to discuss treatment options. Patient is a 81 year old female with PMHx of uterine cancer s/p SHAAN-BSO with adjuvant chemotherapy in 2016, recurrent adenocarcinoma of mullerian origin w/ malignant ascites, s/p Carbo/Taxol (Chemo completed 1/2021), CAD s/p CABG, HTN, depression, PE on Xarelto, who presents with recurrence of adenocarcinoma of mullerian origin.      1. Ascites likely due to recurrent adenocarcinoma of mullerian origin: Due to patient's pMHx of uterine cancer and recurrence back in 2020, current ascites is most likely due to recurrence of adenocarcinoma of mullerian origin. CT A/P shows new omental nodularity which further supports diagnosis.    - CT A/P: New omental nodularity with large malignant ascites  -  (1/28/22): 63  - s/p paracentesis with IR (2/17/22): 4.35 L removed; 60 cc sent for cytopathology      Recommendation  - Obtain  levels  - Follow up cytopathology of paracentesis fluid  - Patient has an appointment scheduled with Dr. Greco on Tuesday, 2/22/22. If patient feels well, she can be discharged home after paracentesis and follow up with him in office to discuss treatment options.

## 2022-02-18 NOTE — PROGRESS NOTE ADULT - ASSESSMENT
81 year old F patient with PMHx of uterine cancer s/p SHAAN,  malignant ascites, CAD s/p CABG, HTN, depression, PE on Xarelto who presented to the ED for abdominal distension and discomfort. Admitted for symptomatic malignant ascites.     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 Greco   -  (8/2020): 217  - F/u repeat   - GOC was revisited with patient, she is not interest at this moment, Patient would like to do cancer tx     #Hx of PE   - c/w Xarelto    #Hx of CAD s/p CABG   - Not on ASA   -Continue Toprol and statin     #HTN   -Continue home dose of Amlodipine and Toprol     #Depression   -Continue Home Psych medications     #Glaucoma  - c/w eye gtts: brimonidine, dorzolamide, latanoprost    #Misc   - DVT prophylaxis: Xarelto  - GI prophylaxis: PPI   - Diet: DASH/TLC   - Activity status: IAT   - Code status: Full code   Disposition: Heme/onc recs, d/c possible tomorrow

## 2022-02-18 NOTE — DISCHARGE NOTE PROVIDER - HOSPITAL COURSE
This is a 81 year old F patient with PMHx of uterine cancer s/p SHAAN, Recurrent malignant ascites, CAD s/p CABG, HTN, depression, PE on Xarelto who presented to the ED for abdominal distension and discomfort of 4 weeks duration. Patient was doing her baseline until 4 weeks prior to admission when she started noticing that her belly is getting bigger. She did not seek any medical attention and that time. The distention was progressively getting worse and it was associated with abdominal discomfort and shortness of breath in the supine position. Patient mentioned following up with her Gastroenterologist who according to her prescribed simethicone without improvement so she decided to come to the ED. Patient denied any vomiting, nausea, change in bowel habits, fevers, chills or any other symptoms   In the ED, Patient is hemodynamically stable with the only complaint of abdominal discomfort.      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 Greco   -  (8/2020): 217  - F/u repeat   - [facility] was revisited with patient, she is not interest at this moment, Patient would like to do cancer tx   - Patient will follow up with Dr. Greco  as outpatient     #Hx of PE   - c/w Xarelto    #Hx of CAD s/p CABG   - Not on ASA   -Continue Toprol and statin     #HTN   -Continue home dose of Amlodipine and Toprol     #Depression   -Continue Home Psych medications     #Glaucoma  - c/w eye gtts: brimonidine, dorzolamide, latanoprost

## 2022-02-18 NOTE — PHYSICAL THERAPY INITIAL EVALUATION ADULT - GENERAL OBSERVATIONS, REHAB EVAL
pt chart reviewed. PT IE [phone]. pt received semi-das in bed with +IV lock, call bell within reach, bed rails raised, and in NAD. pt reports that she is in no pain, just slight abdominal discomfort 2/2 having fluid drained and just eating lunch. pt is otherwise feeling fine, AOx4, and well tempered. pt required minor encouragement to participate in tx, but tolerated amb. w/RW very well. pt was returned to bed and left as found.

## 2022-02-18 NOTE — PROGRESS NOTE ADULT - ASSESSMENT
This is a 81 year old F patient with PMHx of uterine cancer s/p SHAAN,  malignant ascites, CAD s/p CABG, HTN, depression, PE on Xarelto who presented to the ED for abdominal distension and discomfort of 4 weeks duration.    # Abdominal discomfort 2/2 Ascites likely 2/2 recurrent ovarian/Mullerian cancer  s/p SHAAN/BSO in past  had chemo in past   (8/2020): 217  rpt  - f/u  s/p para w/ IR (2/17): 4.35 L removed; 60 cc sent for cytopath  H/O eval - they said f/u as outpt  CT A/P: tr pl eff; mod-lg ascites; atrophied lt kidney; s/p SHAAN/BSO; no LN; no hydro; + omental nodularity; no bowel obst; gluteal granulomas  consider diuretics, like aldactone  pain: ultram 50mg po q6 prn    # Hx of PE   resume Xarelto 20mg po q24   has IVC filter    # Hx of CAD s/p CABG; DLD; HTN  on a/c, no asa  c/w Toprol, norvasc and statin     # Depression   c/w buspar; klonopin    # glaucoma  c/w eye gtts: brimonidine, dorzolamide, latanoprost    # DVT ppx: Xarelto - resume     # GI ppx: Protonix     # Activity: walked 50' w/ RW w/ PT - Rx for rolling walker given    Dispo: h/o f/u outpt; c/w home meds; xarelto resume; tx pain; f/u ascites cytopath; f/u rpt   eventually, pt will go home w/ home PT - anticipate d/c nisa

## 2022-02-18 NOTE — DISCHARGE NOTE PROVIDER - NSDCFUSCHEDAPPT_GEN_ALL_CORE_FT
BEKAH NELSON ; 02/22/2022 ; NPP HemOnc [facility] BEKAH Banuelos ; 02/28/2022 ; NPP PSYCHIATRY  BEKAH Quinones ; 03/15/2022 ; NPP HemOn[facility] BEKAH Banuelos ; 03/22/2022 ; NPP HemOnc [facility] BEKAH Banuelos ; 05/10/2022 ; NPP Gastro Doc Off [id number] [facility]

## 2022-02-18 NOTE — DISCHARGE NOTE PROVIDER - NSDCCPCAREPLAN_GEN_ALL_CORE_FT
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 Greco   -  (8/2020): 217  - F/u repeat   - GOC was revisited with patient, she is not interest at this moment, Patient would like to do cancer tx       PRINCIPAL DISCHARGE DIAGNOSIS  Diagnosis: Ascites  Assessment and Plan of Treatment: Ascities is excess fluid in the lower abdomen. The fluid causes swelling in the abdomen. You had a therapeutic paracentesis to remove the fluids. We were able to remove 4.35 L and was sent for cytopathalogy. Please discuss the findings with Dr. Greco during your next visit.

## 2022-02-18 NOTE — DISCHARGE NOTE NURSING/CASE MANAGEMENT/SOCIAL WORK - PATIENT PORTAL LINK FT
You can access the FollowMyHealth Patient Portal offered by [facility] by registering at the following website: [email or web address]. By joining [employer]’s FollowMyHealth portal, you will also be able to view your health information using other applications (apps) compatible with our system.

## 2022-02-18 NOTE — DISCHARGE NOTE PROVIDER - NSDCMRMEDTOKEN_GEN_ALL_CORE_FT
amLODIPine 5 mg oral tablet: 1 tab(s) orally once a day  brimonidine 0.1% ophthalmic solution: to each affected eye 2 times a day  busPIRone 15 mg oral tablet: 1 tab(s) orally 2 times a day  dorzolamide 2% ophthalmic solution: 1 drop(s) to each affected eye 2 times a day  KlonoPIN 0.5 mg oral tablet: 0.5 tab(s) orally once a day x 30 days MDD:1mg  latanoprost 0.005% ophthalmic solution: 1 drop(s) to each affected eye 2 times a day  metoprolol succinate 25 mg oral tablet, extended release: 1 tab(s) orally once a day  rosuvastatin 10 mg oral tablet: 1 tab(s) orally once a day  Xarelto 20 mg oral tablet: 1 tab(s) orally once a day (in the evening)   amLODIPine 5 mg oral tablet: 1 tab(s) orally once a day  brimonidine 0.1% ophthalmic solution: to each affected eye 2 times a day  busPIRone 15 mg oral tablet: 1 tab(s) orally 2 times a day  clonazePAM 0.5 mg oral tablet: 1 tab(s) orally once a day  dorzolamide 2% ophthalmic solution: 1 drop(s) to each affected eye 2 times a day  latanoprost 0.005% ophthalmic solution: 1 drop(s) to each affected eye 2 times a day  metoprolol succinate 25 mg oral tablet, extended release: 1 tab(s) orally once a day  rosuvastatin 10 mg oral tablet: 1 tab(s) orally once a day  Xarelto 20 mg oral tablet: 1 tab(s) orally once a day (in the evening)

## 2022-02-18 NOTE — CONSULT NOTE ADULT - SUBJECTIVE AND OBJECTIVE BOX
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 She presented to the ED for abdominal distension and discomfort of 4 weeks duration with associated SOB in supine position. Of note, patient was treated for recurrent adenocarcinoma of mullerian origin by Dr. Greco back in 8/2020. She received 6 cycles of Carbo/Taxol and completed her treatment in 1/2021 with good response. Patient continues to follow Dr. Greco for cancer management.  Last CT A/P in 12/14/21 showed no evidence of intro-abdominal or pelvic masses.     In the ED, Patient was hemodynamically stable with the only complaint of abdominal discomfort. Patient denies any vomiting, nausea, change in bowel habits, fevers, chills or any other symptoms (16 Feb 2022 17:55)       Interval Events:    No acute overnight events        PAST MEDICAL & SURGICAL HISTORY:  Uterine cancer  s/p SHAAN and chemo    Hypertension    High cholesterol    Cataract of right eye    Glaucoma    Coronary artery disease  s/p CABG X2    CAD (coronary artery disease)    HTN (hypertension)    HLD (hyperlipidemia)    H/O total hysterectomy    Athscl CABG, unsp, w angina pectoris w documented spasm    History of coronary artery bypass surgery        SOCIAL HISTORY:    FAMILY HISTORY:    Allergies    chloroquine (Hives)  quinine (Urticaria)    Intolerances      ROS:  Negative except for:        HOME MEDICATIONS:  amLODIPine 5 mg oral tablet: 1 tab(s) orally once a day (04 Feb 2022 08:08)  brimonidine 0.1% ophthalmic solution: to each affected eye 2 times a day (04 Feb 2022 08:08)  busPIRone 15 mg oral tablet: 1 tab(s) orally 2 times a day (04 Feb 2022 08:08)  dorzolamide 2% ophthalmic solution: 1 drop(s) to each affected eye 2 times a day (04 Feb 2022 08:08)  latanoprost 0.005% ophthalmic solution: 1 drop(s) to each affected eye 2 times a day (04 Feb 2022 08:08)  metoprolol succinate 25 mg oral tablet, extended release: 1 tab(s) orally once a day (04 Feb 2022 08:08)  rosuvastatin 10 mg oral tablet: 1 tab(s) orally once a day (04 Feb 2022 08:08)  Xarelto 20 mg oral tablet: 1 tab(s) orally once a day (in the evening) (04 Feb 2022 08:08)      Vital Signs Last 24 Hrs  T(C): 36.9 (18 Feb 2022 05:20), Max: 37.6 (17 Feb 2022 21:44)  T(F): 98.4 (18 Feb 2022 05:20), Max: 99.6 (17 Feb 2022 21:44)  HR: 81 (18 Feb 2022 08:03) (80 - 94)  BP: 125/58 (18 Feb 2022 05:20) (121/68 - 125/58)  BP(mean): --  RR: 18 (18 Feb 2022 05:20) (17 - 18)  SpO2: 94% (18 Feb 2022 08:03) (94% - 94%)    PHYSICAL EXAM  General: adult in NAD  HEENT: clear oropharynx, anicteric sclera, pink conjunctiva  Neck: supple  CV: normal S1/S2 with no murmur rubs or gallops  Lungs: positive air movement b/l ant lungs,clear to auscultation, no wheezes, no rales  Abdomen: soft non-tender non-distended, no hepatosplenomegaly  Ext: no clubbing cyanosis or edema  Skin: no rashes and no petechiae  Neuro: alert and oriented X 4, no focal deficits    MEDICATIONS  (STANDING):  amLODIPine   Tablet 5 milliGRAM(s) Oral daily  atorvastatin 40 milliGRAM(s) Oral at bedtime  brimonidine 0.2% Ophthalmic Solution 1 Drop(s) Both EYES two times a day  busPIRone 15 milliGRAM(s) Oral every 12 hours  chlorhexidine 4% Liquid 1 Application(s) Topical <User Schedule>  clonazePAM  Tablet 0.5 milliGRAM(s) Oral daily  dorzolamide 2% Ophthalmic Solution 1 Drop(s) Both EYES three times a day  latanoprost 0.005% Ophthalmic Solution 1 Drop(s) Both EYES at bedtime  metoprolol succinate ER 25 milliGRAM(s) Oral daily  pantoprazole    Tablet 40 milliGRAM(s) Oral before breakfast  polyethylene glycol 3350 17 Gram(s) Oral daily  rivaroxaban 20 milliGRAM(s) Oral with dinner  senna 2 Tablet(s) Oral at bedtime    MEDICATIONS  (PRN):      LABS:                          11.2   6.51  )-----------( 268      ( 17 Feb 2022 04:30 )             35.2         Mean Cell Volume : 79.8 fL  Mean Cell Hemoglobin : 25.4 pg  Mean Cell Hemoglobin Concentration : 31.8 g/dL  Auto Neutrophil # : x  Auto Lymphocyte # : x  Auto Monocyte # : x  Auto Eosinophil # : x  Auto Basophil # : x  Auto Neutrophil % : x  Auto Lymphocyte % : x  Auto Monocyte % : x  Auto Eosinophil % : x  Auto Basophil % : x      Serial CBC's  02-17 @ 04:30  Hct-35.2 / Hgb-11.2 / Plat-268 / RBC-4.41 / WBC-6.51  Serial CBC's  02-16 @ 09:30  Hct-42.2 / Hgb-13.4 / Plat-265 / RBC-5.25 / WBC-5.54      02-17    142  |  99  |  18  ----------------------------<  71  4.1   |  22  |  1.3    Ca    8.3<L>      17 Feb 2022 04:30    TPro  6.0  /  Alb  3.2<L>  /  TBili  0.3  /  DBili  x   /  AST  21  /  ALT  7   /  AlkPhos  66  02-17        RADIOLOGY & ADDITIONAL STUDIES:    < from: CT Abdomen and Pelvis w/ IV Cont (02.16.22 @ 11:50) >  ACC: [id number] EXAM:  CT ABDOMEN AND PELVIS IC                          PROCEDURE DATE:  02/16/2022      IMPRESSION:    Since November 19, 2021, new omental nodularity and increased now large   volume abdominopelvic malignant ascites.        < from: Xray Chest 1 View-PORTABLE IMMEDIATE (Xray Chest 1 View-PORTABLE IMMEDIATE .) (02.16.22 @ 09:26) >    ACC: [id number] EXAM:  XR CHEST PORTABLE IMMED 1V                          PROCEDURE DATE:  02/16/2022      Impression:  Low lung volume without definite evidence of focal consolidation pleural   effusion or pneumothorax.       Patient is a 81y old Female who presents with a chief complaint of Ascites (17 Feb 2022 17:09)      HPI:  Patient is a 81 year old female with PMHx of uterine cancer s/p SHAAN-BSO and adjuvant chemotherapy in 2016, recurrent adenocarcinoma of mullerian origin w/ malignant ascites s/p Carbo/Taxol (Chemo completed 1/2021), CAD s/p CABG, HTN, depression, PE on Xarelto, who is consulted for recurrence of malignant ascites. She presented to the ED for abdominal distension and discomfort of 4 weeks duration with associated SOB in supine position. Of note, patient was treated for recurrent adenocarcinoma of mullerian origin by Dr. Greco back in 8/2020. She received 6 cycles of Carbo/Taxol and completed her treatment in 1/2021 with good response. Patient continues to follow Dr. Greco for cancer management.  Last CT A/P in 12/14/21 showed no evidence of intro-abdominal or pelvic masses.     In the ED, Patient was hemodynamically stable with the only complaint of abdominal discomfort. Patient denies any vomiting, nausea, change in bowel habits, fevers, chills or any other symptoms (16 Feb 2022 17:55)       Interval Events:    No acute overnight events        PAST MEDICAL & SURGICAL HISTORY:  Uterine cancer  s/p SHAAN and chemo    Hypertension    High cholesterol    Cataract of right eye    Glaucoma    Coronary artery disease  s/p CABG X2    CAD (coronary artery disease)    HTN (hypertension)    HLD (hyperlipidemia)    H/O total hysterectomy    Athscl CABG, unsp, w angina pectoris w documented spasm    History of coronary artery bypass surgery        SOCIAL HISTORY:    FAMILY HISTORY:    Allergies    chloroquine (Hives)  quinine (Urticaria)    Intolerances      ROS:  Negative except for:        HOME MEDICATIONS:  amLODIPine 5 mg oral tablet: 1 tab(s) orally once a day (04 Feb 2022 08:08)  brimonidine 0.1% ophthalmic solution: to each affected eye 2 times a day (04 Feb 2022 08:08)  busPIRone 15 mg oral tablet: 1 tab(s) orally 2 times a day (04 Feb 2022 08:08)  dorzolamide 2% ophthalmic solution: 1 drop(s) to each affected eye 2 times a day (04 Feb 2022 08:08)  latanoprost 0.005% ophthalmic solution: 1 drop(s) to each affected eye 2 times a day (04 Feb 2022 08:08)  metoprolol succinate 25 mg oral tablet, extended release: 1 tab(s) orally once a day (04 Feb 2022 08:08)  rosuvastatin 10 mg oral tablet: 1 tab(s) orally once a day (04 Feb 2022 08:08)  Xarelto 20 mg oral tablet: 1 tab(s) orally once a day (in the evening) (04 Feb 2022 08:08)      Vital Signs Last 24 Hrs  T(C): 36.9 (18 Feb 2022 05:20), Max: 37.6 (17 Feb 2022 21:44)  T(F): 98.4 (18 Feb 2022 05:20), Max: 99.6 (17 Feb 2022 21:44)  HR: 81 (18 Feb 2022 08:03) (80 - 94)  BP: 125/58 (18 Feb 2022 05:20) (121/68 - 125/58)  BP(mean): --  RR: 18 (18 Feb 2022 05:20) (17 - 18)  SpO2: 94% (18 Feb 2022 08:03) (94% - 94%)    PHYSICAL EXAM  General: adult in NAD  HEENT: clear oropharynx, anicteric sclera, pink conjunctiva  Neck: supple  CV: normal S1/S2 with no murmur rubs or gallops  Lungs: positive air movement b/l ant lungs,clear to auscultation, no wheezes, no rales  Abdomen: soft non-tender non-distended, no hepatosplenomegaly  Ext: no clubbing cyanosis or edema  Skin: no rashes and no petechiae  Neuro: alert and oriented X 4, no focal deficits    MEDICATIONS  (STANDING):  amLODIPine   Tablet 5 milliGRAM(s) Oral daily  atorvastatin 40 milliGRAM(s) Oral at bedtime  brimonidine 0.2% Ophthalmic Solution 1 Drop(s) Both EYES two times a day  busPIRone 15 milliGRAM(s) Oral every 12 hours  chlorhexidine 4% Liquid 1 Application(s) Topical <User Schedule>  clonazePAM  Tablet 0.5 milliGRAM(s) Oral daily  dorzolamide 2% Ophthalmic Solution 1 Drop(s) Both EYES three times a day  latanoprost 0.005% Ophthalmic Solution 1 Drop(s) Both EYES at bedtime  metoprolol succinate ER 25 milliGRAM(s) Oral daily  pantoprazole    Tablet 40 milliGRAM(s) Oral before breakfast  polyethylene glycol 3350 17 Gram(s) Oral daily  rivaroxaban 20 milliGRAM(s) Oral with dinner  senna 2 Tablet(s) Oral at bedtime    MEDICATIONS  (PRN):      LABS:                          11.2   6.51  )-----------( 268      ( 17 Feb 2022 04:30 )             35.2         Mean Cell Volume : 79.8 fL  Mean Cell Hemoglobin : 25.4 pg  Mean Cell Hemoglobin Concentration : 31.8 g/dL  Auto Neutrophil # : x  Auto Lymphocyte # : x  Auto Monocyte # : x  Auto Eosinophil # : x  Auto Basophil # : x  Auto Neutrophil % : x  Auto Lymphocyte % : x  Auto Monocyte % : x  Auto Eosinophil % : x  Auto Basophil % : x      Serial CBC's  02-17 @ 04:30  Hct-35.2 / Hgb-11.2 / Plat-268 / RBC-4.41 / WBC-6.51  Serial CBC's  02-16 @ 09:30  Hct-42.2 / Hgb-13.4 / Plat-265 / RBC-5.25 / WBC-5.54      02-17    142  |  99  |  18  ----------------------------<  71  4.1   |  22  |  1.3    Ca    8.3<L>      17 Feb 2022 04:30    TPro  6.0  /  Alb  3.2<L>  /  TBili  0.3  /  DBili  x   /  AST  21  /  ALT  7   /  AlkPhos  66  02-17        RADIOLOGY & ADDITIONAL STUDIES:    < from: CT Abdomen and Pelvis w/ IV Cont (02.16.22 @ 11:50) >  ACC: [id number] EXAM:  CT ABDOMEN AND PELVIS IC                          PROCEDURE DATE:  02/16/2022      IMPRESSION:    Since November 19, 2021, new omental nodularity and increased now large   volume abdominopelvic malignant ascites.        < from: Xray Chest 1 View-PORTABLE IMMEDIATE (Xray Chest 1 View-PORTABLE IMMEDIATE .) (02.16.22 @ 09:26) >    ACC: [id number] EXAM:  XR CHEST PORTABLE IMMED 1V                          PROCEDURE DATE:  02/16/2022      Impression:  Low lung volume without definite evidence of focal consolidation pleural   effusion or pneumothorax.

## 2022-02-19 NOTE — PROGRESS NOTE ADULT - SUBJECTIVE AND OBJECTIVE BOX
----------Daily Progress Note----------    HISTORY OF PRESENT ILLNESS:  Patient is a 81y old Female who presents with a chief complaint of Ascites (16 Feb 2022 17:55)    Currently admitted to medicine with the primary diagnosis of Ascites    This is a 81 year old F patient with PMHx of uterine cancer s/p SHAAN, Recurrent malignant ascites, CAD s/p CABG, HTN, depression, PE on Xarelto who presented to the ED for abdominal distension and discomfort of 4 weeks duration. Patient was doing her baseline until 4 weeks prior to admission when she started noticing that her belly is getting bigger. She did not seek any medical attention and that time. The distention was progressively getting worse and it was associated with abdominal discomfort and shortness of breath in the supine position. Patient mentioned following up with her Gastroenterologist who according to her prescribed simethicone without improvement so she decided to come to the ED. Patient denied any vomiting, nausea, change in bowel habits, fevers, chills or any other symptoms     In the ED, Patient is hemodynamically stable with the only complaint of abdominal discomfort.        Today is hospital day 1d.     INTERVAL HOSPITAL COURSE / OVERNIGHT EVENTS:    Patient was examined and seen at bedside. This morning she is resting comfortably in bed and reports no new issues or overnight events.     Review of Systems: Patient is endorsing abdominal discomfort. Patient denies any headache, dizziness, chest pain, palpitation SOB     <<<<<PAST MEDICAL & SURGICAL HISTORY>>>>>  Uterine cancer  s/p SHAAN and chemo    Hypertension    High cholesterol    Cataract of right eye    Glaucoma    Coronary artery disease  s/p CABG X2    CAD (coronary artery disease)    HTN (hypertension)    HLD (hyperlipidemia)    H/O total hysterectomy    Athscl CABG, unsp, w angina pectoris w documented spasm    History of coronary artery bypass surgery      ALLERGIES  chloroquine (Hives)  quinine (Urticaria)      Home Medications:  amLODIPine 5 mg oral tablet: 1 tab(s) orally once a day (04 Feb 2022 08:08)  brimonidine 0.1% ophthalmic solution: to each affected eye 2 times a day (04 Feb 2022 08:08)  busPIRone 15 mg oral tablet: 1 tab(s) orally 2 times a day (04 Feb 2022 08:08)  dorzolamide 2% ophthalmic solution: 1 drop(s) to each affected eye 2 times a day (04 Feb 2022 08:08)  latanoprost 0.005% ophthalmic solution: 1 drop(s) to each affected eye 2 times a day (04 Feb 2022 08:08)  metoprolol succinate 25 mg oral tablet, extended release: 1 tab(s) orally once a day (04 Feb 2022 08:08)  rosuvastatin 10 mg oral tablet: 1 tab(s) orally once a day (04 Feb 2022 08:08)  Xarelto 20 mg oral tablet: 1 tab(s) orally once a day (in the evening) (04 Feb 2022 08:08)        MEDICATIONS  STANDING MEDICATIONS  amLODIPine   Tablet 5 milliGRAM(s) Oral daily  atorvastatin 40 milliGRAM(s) Oral at bedtime  brimonidine 0.2% Ophthalmic Solution 1 Drop(s) Both EYES two times a day  busPIRone 15 milliGRAM(s) Oral every 12 hours  chlorhexidine 4% Liquid 1 Application(s) Topical <User Schedule>  clonazePAM  Tablet 0.5 milliGRAM(s) Oral daily  dorzolamide 2% Ophthalmic Solution 1 Drop(s) Both EYES three times a day  latanoprost 0.005% Ophthalmic Solution 1 Drop(s) Both EYES at bedtime  metoprolol succinate ER 25 milliGRAM(s) Oral daily  pantoprazole    Tablet 40 milliGRAM(s) Oral before breakfast  polyethylene glycol 3350 17 Gram(s) Oral daily  senna 2 Tablet(s) Oral at bedtime    PRN MEDICATIONS    VITALS:  T(F): 98.7  HR: 80  BP: 133/63  RR: 18  SpO2: 98%    <<<<<LABS>>>>>                        11.2   6.51  )-----------( 268      ( 17 Feb 2022 04:30 )             35.2     02-17    142  |  99  |  18  ----------------------------<  71  4.1   |  22  |  1.3    Ca    8.3<L>      17 Feb 2022 04:30  Mg     2.1     02-16    TPro  6.0  /  Alb  3.2<L>  /  TBili  0.3  /  DBili  x   /  AST  21  /  ALT  7   /  AlkPhos  66  02-17    PT/INR - ( 16 Feb 2022 09:30 )   PT: 18.30 sec;   INR: 1.60 ratio         PTT - ( 16 Feb 2022 09:30 )  PTT:37.0 sec        [id number]  CARDIAC MARKERS ( 16 Feb 2022 09:30 )  x     / <0.01 ng/mL / x     / x     / x            <<<<<RADIOLOGY>>>>>  < from: CT Abdomen and Pelvis w/ IV Cont (02.16.22 @ 11:50) >  IMPRESSION:    Since November 19, 2021, new omental nodularity and increased now large   volume abdominopelvic malignant ascites.      --- End of Report ---    < end of copied text >    <<<<<PHYSICAL EXAM>>>>>  GENERAL: Well developed, well nourished and in no acute distress. Resting comfortably in bed.  PULMONARY: Clear to auscultation bilaterally. No rales, rhonchi, or wheezing.  CARDIOVASCULAR: Regular rate and rhythm, S1-S2, no murmurs  GASTROINTESTINAL: Soft, mildly tender, +distended, no guarding.  SKIN/EXTREMITIES: No clubbing or edema  NEUROLOGIC/MUSCULOSKELETAL: AOx4, grossly moving all extremities, no focal deficits.      -----------------------------------------------------------------------------------------------------------------------------------------------------------------------------------------------
  BEKAH NELSON  81y  Female  ***My note supersedes ALL resident notes that I sign.  My corrections for their notes are in my note.***    I can be reached directly on [employer] [phone]. My office number is [phone]. My personal cell number is [phone].    INTERVAL EVENTS: Here for f/u of ascites. Pt says pain meds are helping her abd pain. Pt can walk. Awaiting walker.    T(F): 98.8 (02-19-22 @ 05:32), Max: 98.9 (02-18-22 @ 19:53)  HR: 81 (02-19-22 @ 05:32) (81 - 84)  BP: 110/59 (02-19-22 @ 05:32) (108/51 - 119/66)  RR: 18 (02-19-22 @ 05:32) (18 - 18)  SpO2: 95% (02-18-22 @ 20:57) (95% - 95%)    Gen: min abd discomfort; no resp distress  HEENT: PERRL, EOMI, mouth clr, nose clr; scl white  Neck: no nodes, no JVD, thyroid nl  lungs: clr  hrt: s1 s2 rrr no murmur  abd: soft, less distended; + ascites; + minimal discomfort; no HS megaly  ext: no edema, no c/c  neuro: aa ox3, cn intact, can move all 4 ext    LABS:                      11.6    (    80.4   7.48  )-----------( ---------      228      ( 19 Feb 2022 04:30 )             36.6    (    14.9     140   (   100   (   92      02-19-22 @ 04:30  ----------------------               3.7   (   27   (   15                             -----                        1.1  Ca  8.3   Mg  2.0    P   --     LFT  5.7  (  0.3  (  18       02-19-22 @ 04:30  -------------------------  2.9  (  62  (  6    RADIOLOGY & ADDITIONAL TESTS:      MEDICATIONS:    amLODIPine   Tablet 5 milliGRAM(s) Oral daily  atorvastatin 40 milliGRAM(s) Oral at bedtime  brimonidine 0.2% Ophthalmic Solution 1 Drop(s) Both EYES two times a day  busPIRone 15 milliGRAM(s) Oral every 12 hours  chlorhexidine 4% Liquid 1 Application(s) Topical <User Schedule>  clonazePAM  Tablet 0.5 milliGRAM(s) Oral daily  dorzolamide 2% Ophthalmic Solution 1 Drop(s) Both EYES three times a day  latanoprost 0.005% Ophthalmic Solution 1 Drop(s) Both EYES at bedtime  metoprolol succinate ER 25 milliGRAM(s) Oral daily  pantoprazole    Tablet 40 milliGRAM(s) Oral before breakfast  polyethylene glycol 3350 17 Gram(s) Oral daily  rivaroxaban 20 milliGRAM(s) Oral with dinner  senna 2 Tablet(s) Oral at bedtime  traMADol 50 milliGRAM(s) Oral every 6 hours PRN  
  BEKAH NELSON  81y  Female  ***My note supersedes ALL resident notes that I sign.  My corrections for their notes are in my note.***    I can be reached directly on [employer] [phone]. My office number is [phone]. My personal cell number is [phone].    INTERVAL EVENTS: Here for f/u of ovarian ca. Pt feels a little abd pain where they did paracent. Pt otherwise OK and would like to go home tomorrow.    T(F): 97.6 (02-18-22 @ 14:13), Max: 99.6 (02-17-22 @ 21:44)  HR: 84 (02-18-22 @ 14:13) (80 - 94)  BP: 108/51 (02-18-22 @ 14:13) (108/51 - 125/58)  RR: 18 (02-18-22 @ 14:13) (17 - 18)  SpO2: 94% (02-18-22 @ 08:03) (94% - 94%)    Gen: min abd discomfort; no resp distress  HEENT: PERRL, EOMI, mouth clr, nose clr; scl white  Neck: no nodes, no JVD, thyroid nl  lungs: clr  hrt: s1 s2 rrr no murmur  abd: soft, less distended; + ascites; + minimal discomfort; no HS megaly  ext: no edema, no c/c  neuro: aa ox3, cn intact, can move all 4 ext    LABS:                      11.8    (    79.2   7.12  )-----------( ---------      240      ( 18 Feb 2022 12:43 )             36.9    (    14.6     140   (   99   (   88      02-18-22 @ 12:43  ----------------------               3.6   (   25   (   16                             -----                        1.2  Ca  8.2   Mg  2.0    P   --     Creatinine:   1.2 (02-18 @ 12:43)  GFR (AA):     50  GFR (non AA): 43    Creatinine:   1.3 (02-17 @ 04:30)  GFR (AA):     45  GFR (non AA): 39    Creatinine:   1.3 (02-16 @ 09:30)  GFR (AA):     45  GFR (non AA): 39      LFT  5.7  (  0.4  (  19 02-18-22 @ 12:43  -------------------------  2.9  (  64  (  5    RADIOLOGY & ADDITIONAL TESTS:      MEDICATIONS:    amLODIPine   Tablet 5 milliGRAM(s) Oral daily  atorvastatin 40 milliGRAM(s) Oral at bedtime  brimonidine 0.2% Ophthalmic Solution 1 Drop(s) Both EYES two times a day  busPIRone 15 milliGRAM(s) Oral every 12 hours  chlorhexidine 4% Liquid 1 Application(s) Topical <User Schedule>  clonazePAM  Tablet 0.5 milliGRAM(s) Oral daily  dorzolamide 2% Ophthalmic Solution 1 Drop(s) Both EYES three times a day  latanoprost 0.005% Ophthalmic Solution 1 Drop(s) Both EYES at bedtime  metoprolol succinate ER 25 milliGRAM(s) Oral daily  pantoprazole    Tablet 40 milliGRAM(s) Oral before breakfast  polyethylene glycol 3350 17 Gram(s) Oral daily  rivaroxaban 20 milliGRAM(s) Oral with dinner  senna 2 Tablet(s) Oral at bedtime  traMADol 25 milliGRAM(s) Oral every 4 hours PRN  
Interventional Radiology Brief- Operative Note: Paracentesis    Procedure: right sided image guided paracentesis     Pre-Op Diagnosis: hx uterine cancer with recurrent ascites     Post-Op Diagnosis: same     Provider: Benita Reyez PA-C    Anesthesia (type):  [ ] General Anesthesia  [ ] Sedation  [ ] Spinal Anesthesia  [ x ] Local/Regional    Contrast: none    Estimated Blood Loss: <5mL    Condition:   [ ] Critical  [ ] Serious  [ ] Fair   [ x ] Good    Findings/Follow up Plan of Care: 4.35L of rian color fluid removed     Specimens Removed: 60cc specimen removed and sent to lab for cytopathology    Implants: none     Complications: none     Condition/Disposition: d/c to floor     Discharge instructions: resume diet and activity as tolerated, resume medications as needed, keep dressing clean and dry - remove after 2 days, follow up paracentesis as needed    Please call Interventional Radiology [phone] with any questions, concerns, or issues.        
  BEKAH NELSON  81y  Female  ***My note supersedes ALL resident notes that I sign.  My corrections for their notes are in my note.***    I can be reached directly on [employer] [phone]. My office number is [phone]. My personal cell number is [phone].    INTERVAL EVENTS: Here for f/u of abd pain. Pt having incr girth, swelling and pain in abd. She knows she has "water" in her abd and she is hoping it is not cancer. No swelling in legs. Pt is constipated, but also says she is not eating well. Pt was crying thinking that her cancer was back.    T(F): 98.1 (02-17-22 @ 14:15), Max: 99.1 (02-16-22 @ 23:05)  HR: 86 (02-17-22 @ 14:15) (74 - 90)  BP: 121/68 (02-17-22 @ 14:15) (118/91 - 133/63)  RR: 18 (02-17-22 @ 14:15) (18 - 19)  SpO2: 98% (02-17-22 @ 00:15) (96% - 99%)    Gen: mild abd discomfort; no resp distress  HEENT: PERRL, EOMI, mouth clr, nose clr; scl white  Neck: no nodes, no JVD, thyroid nl  lungs: clr  hrt: s1 s2 rrr no murmur  abd: soft, + distended; + ascites; + mild diffuse discomfort; no HS megaly  ext: no edema, no c/c  neuro: aa ox3, cn intact, can move all 4 ext    LABS:                      11.2    (    79.8   6.51  )-----------( ---------      268      ( 17 Feb 2022 04:30 )             35.2    (    14.7     Hemoglobin: 11.2 g/dL (02-17 @ 04:30)  Hemoglobin: 13.4 g/dL (02-16 @ 09:30)    142   (   99   (   71      02-17-22 @ 04:30  ----------------------               4.1   (   22   (   18                             -----                        1.3  Ca  8.3   Mg  --    P   --     Creatinine:   1.3 (02-17 @ 04:30)  GFR (AA):     45  GFR (non AA): 39    Creatinine:   1.3 (02-16 @ 09:30)  GFR (AA):     45  GFR (non AA): 39      LFT  6.0  (  0.3  (  21       02-17-22 @ 04:30  -------------------------  3.2  (  66  (  7    PT/INR - ( 16 Feb 2022 09:30 )   PT: 18.30 sec;   INR: 1.60 ratio    PTT - ( 16 Feb 2022 09:30 )  PTT: 37.0 sec    CARDIAC MARKERS ( 16 Feb 2022 09:30 )  x     / <0.01 ng/mL / x     / x     / x        RADIOLOGY & ADDITIONAL TESTS:  < from: US Kidney and Bladder (02.17.22 @ 12:53) >  Other: Moderate abdominal pelvic ascites.    IMPRESSION:    No stones or hydronephrosis.    < end of copied text >    < from: CT Abdomen and Pelvis w/ IV Cont (02.16.22 @ 11:50) >  FINDINGS:    LOWER CHEST: Dependent atelectasis. Trace left pleural effusion.    HEPATOBILIARY: Scalloping of the liver secondary to the ascites.    SPLEEN: Unremarkable.    PANCREAS: Unremarkable.    ADRENAL GLANDS: Unremarkable.    KIDNEYS: No hydronephrosis. Symmetric renal enhancement. Atrophied left kidney.    ABDOMINOPELVIC NODES: No lymphadenopathy.    PELVIC ORGANS: Post hysterectomy and bilateral salpingo-oophorectomy.    PERITONEUM/MESENTERY/BOWEL: Increased now large volume abdominopelvic   ascites. New omental nodularity noted (4/133). Large volume abdominopelvic ascites.  No definite bowel obstruction or pneumoperitoneum. Appendix not visualized.    BONES/SOFT TISSUES: Mild degenerative changes of the spine. Grade 1   anterolisthesis of L4 onto L5. No suspicious osseous lesions.  Gluteal subcutaneous calcified granulomas.    VASCULAR: IVC filter in place. Atherosclerotic calcifications of the   normal caliber aorta and its distal branches.    IMPRESSION:    Since November 19, 2021, new omental nodularity and increased now large   volume abdominopelvic malignant ascites.    < end of copied text >    < from: Xray Chest 1 View-PORTABLE IMMEDIATE (Xray Chest 1 View-PORTABLE IMMEDIATE .) (02.16.22 @ 09:26) >  Impression:  Low lung volume without definite evidence of focal consolidation pleural effusion or pneumothorax.    < end of copied text >    MEDICATIONS:    amLODIPine   Tablet 5 milliGRAM(s) Oral daily  atorvastatin 40 milliGRAM(s) Oral at bedtime  brimonidine 0.2% Ophthalmic Solution 1 Drop(s) Both EYES two times a day  busPIRone 15 milliGRAM(s) Oral every 12 hours  chlorhexidine 4% Liquid 1 Application(s) Topical <User Schedule>  clonazePAM  Tablet 0.5 milliGRAM(s) Oral daily  dorzolamide 2% Ophthalmic Solution 1 Drop(s) Both EYES three times a day  latanoprost 0.005% Ophthalmic Solution 1 Drop(s) Both EYES at bedtime  metoprolol succinate ER 25 milliGRAM(s) Oral daily  pantoprazole    Tablet 40 milliGRAM(s) Oral before breakfast  polyethylene glycol 3350 17 Gram(s) Oral daily  senna 2 Tablet(s) Oral at bedtime  
----------Daily Progress Note----------    HISTORY OF PRESENT ILLNESS:  Patient is a 81y old Female who presents with a chief complaint of Ascites (18 Feb 2022 12:19)    Currently admitted to medicine with the primary diagnosis of Ascites    This is a 81 year old F patient with PMHx of uterine cancer s/p SHAAN, Recurrent malignant ascites, CAD s/p CABG, HTN, depression, PE on Xarelto who presented to the ED for abdominal distension and discomfort of 4 weeks duration. Patient was doing her baseline until 4 weeks prior to admission when she started noticing that her belly is getting bigger. She did not seek any medical attention and that time. The distention was progressively getting worse and it was associated with abdominal discomfort and shortness of breath in the supine position. Patient mentioned following up with her Gastroenterologist who according to her prescribed simethicone without improvement so she decided to come to the ED. Patient denied any vomiting, nausea, change in bowel habits, fevers, chills or any other symptoms     In the ED, Patient is hemodynamically stable with the only complaint of abdominal discomfort.      Today is hospital day 2d.     INTERVAL HOSPITAL COURSE / OVERNIGHT EVENTS:  Patient was examined and seen at bedside. This morning she is resting comfortably in bed and reports no new issues or overnight events.     Review of Systems: Patient is endorsing abdominal discomfort. Patient denies any headache, dizziness, chest pain, palpitation SOB       <<<<<PAST MEDICAL & SURGICAL HISTORY>>>>>  Uterine cancer  s/p SHAAN and chemo    Hypertension    High cholesterol    Cataract of right eye    Glaucoma    Coronary artery disease  s/p CABG X2    CAD (coronary artery disease)    HTN (hypertension)    HLD (hyperlipidemia)    H/O total hysterectomy    Athscl CABG, unsp, w angina pectoris w documented spasm    History of coronary artery bypass surgery      ALLERGIES  chloroquine (Hives)  quinine (Urticaria)      Home Medications:  amLODIPine 5 mg oral tablet: 1 tab(s) orally once a day (04 Feb 2022 08:08)  brimonidine 0.1% ophthalmic solution: to each affected eye 2 times a day (04 Feb 2022 08:08)  busPIRone 15 mg oral tablet: 1 tab(s) orally 2 times a day (04 Feb 2022 08:08)  dorzolamide 2% ophthalmic solution: 1 drop(s) to each affected eye 2 times a day (04 Feb 2022 08:08)  latanoprost 0.005% ophthalmic solution: 1 drop(s) to each affected eye 2 times a day (04 Feb 2022 08:08)  metoprolol succinate 25 mg oral tablet, extended release: 1 tab(s) orally once a day (04 Feb 2022 08:08)  rosuvastatin 10 mg oral tablet: 1 tab(s) orally once a day (04 Feb 2022 08:08)  Xarelto 20 mg oral tablet: 1 tab(s) orally once a day (in the evening) (04 Feb 2022 08:08)        MEDICATIONS  STANDING MEDICATIONS  amLODIPine   Tablet 5 milliGRAM(s) Oral daily  atorvastatin 40 milliGRAM(s) Oral at bedtime  brimonidine 0.2% Ophthalmic Solution 1 Drop(s) Both EYES two times a day  busPIRone 15 milliGRAM(s) Oral every 12 hours  chlorhexidine 4% Liquid 1 Application(s) Topical <User Schedule>  clonazePAM  Tablet 0.5 milliGRAM(s) Oral daily  dorzolamide 2% Ophthalmic Solution 1 Drop(s) Both EYES three times a day  latanoprost 0.005% Ophthalmic Solution 1 Drop(s) Both EYES at bedtime  metoprolol succinate ER 25 milliGRAM(s) Oral daily  pantoprazole    Tablet 40 milliGRAM(s) Oral before breakfast  polyethylene glycol 3350 17 Gram(s) Oral daily  rivaroxaban 20 milliGRAM(s) Oral with dinner  senna 2 Tablet(s) Oral at bedtime    PRN MEDICATIONS    VITALS:  T(F): 98.4  HR: 81  BP: 125/58  RR: 18  SpO2: 94%    <<<<<LABS>>>>>                        11.2   6.51  )-----------( 268      ( 17 Feb 2022 04:30 )             35.2     02-17    142  |  99  |  18  ----------------------------<  71  4.1   |  22  |  1.3    Ca    8.3<L>      17 Feb 2022 04:30    TPro  6.0  /  Alb  3.2<L>  /  TBili  0.3  /  DBili  x   /  AST  21  /  ALT  7   /  AlkPhos  66  02-17            [id number]        <<<<<RADIOLOGY>>>>>  < from: CT Abdomen and Pelvis w/ IV Cont (02.16.22 @ 11:50) >  IMPRESSION:    Since November 19, 2021, new omental nodularity and increased now large   volume abdominopelvic malignant ascites.      --- End of Report ---    < end of copied text >    <<<<<PHYSICAL EXAM>>>>>  GENERAL: Well developed, well nourished and in no acute distress. Resting comfortably in bed.  PULMONARY: Clear to auscultation bilaterally. No rales, rhonchi, or wheezing.  CARDIOVASCULAR: Regular rate and rhythm, S1-S2, no murmurs  GASTROINTESTINAL: Soft, mildly tender, +distended, no guarding.  SKIN/EXTREMITIES: No clubbing or edema  NEUROLOGIC/MUSCULOSKELETAL: AOx4, grossly moving all extremities, no focal deficits.          -----------------------------------------------------------------------------------------------------------------------------------------------------------------------------------------------

## 2022-02-19 NOTE — PROGRESS NOTE ADULT - ASSESSMENT
This is a 81 year old woman with PMHx of uterine cancer s/p SHAAN,  malignant ascites, CAD s/p CABG, HTN, depression, PE on Xarelto who presented to the ED for abdominal distension and discomfort of 4 weeks duration.    # Abdominal discomfort 2/2 Ascites likely 2/2 recurrent ovarian/Mullerian cancer (also causing pain)  s/p SHAAN/BSO in past  had chemo in past   (8/2020): 217  rpt : 104  s/p para w/ IR (2/17): 4.35 L removed; 60 cc sent for cytopath  H/O eval - they said f/u as outpt  CT A/P: tr pl eff; mod-lg ascites; atrophied lt kidney; s/p SHAAN/BSO; no LN; no hydro; + omental nodularity; no bowel obst; gluteal granulomas  consider diuretics, like aldactone  pain: ultram 50mg po q6 prn - sent 1-wk supply to pharm    # Hx of PE   resume Xarelto 20mg po q24   has IVC filter    # Hx of CAD s/p CABG; DLD; HTN  on a/c, no asa  c/w Toprol, norvasc and statin     # Depression   c/w buspar; klonopin    # glaucoma  c/w eye gtts: brimonidine, dorzolamide, latanoprost    # DVT ppx: Xarelto - resume     # GI ppx: Protonix     # Activity: walked 50' w/ RW w/ PT - Rx for rolling walker given (seems pt had RW in 2020, so dtr will  walker for pt on her own)    Dispo: h/o f/u outpt; tx pain; f/u ascites cytopath - can be outpt;   pt will go home today w/ home PT - f/u CM (stable for d/c)

## 2022-02-19 NOTE — PROGRESS NOTE ADULT - PROVIDER SPECIALTY LIST ADULT
Internal Medicine
Internal Medicine
Intervent Radiology
Internal Medicine

## 2022-03-01 NOTE — HISTORY OF PRESENT ILLNESS
[de-identified] : Trav is a shekhar 81 year old lady I initially met in the hospital on 8/11/2020. Her past medical history is significant for uterine cancer s/p SHAAN BSO as per patient s/p adjuvant chemotherapy in 2016, CAD s/p CABG, HTN and depression, she presented to the ED for abdominal pain and chest discomfort. 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 \par \par PAST MEDICAL & SURGICAL HISTORY:\par HLD (hyperlipidemia)\par HTN (hypertension)\par CAD (coronary artery disease)\par History of coronary artery bypass surgery\par \par FAMILY HISTORY:\par No pertinent family history in first degree relatives\par \par Allergies\par chloroquine (Hives)\par \par CTA C/A/P on 8/6/2020 revealed \par 1. Intraluminal filling defect is seen within the segmental branches of the right lower lobe pulmonary artery, indicating acute pulmonary embolism. (6/132-138; 10/51-55). Filling defect in segmental branch of the right upper lobe pulmonary artery. (6/77) There is a small filling defect within a left lower lobe pulmonary artery segmental branch (6/147; 10/59). No evidence of right heart strain. The right ventricle measures 3.5 cm; the left ventricle measures 3.7 cm. (RV:LV<1) \par 2. Compared with the previous CT scan of 5/27/2020, there is now a large amount of ascites throughout the abdomen and pelvis. \par \par LE Doppler on 8/8/2020 revealed \par No evidence of deep venous thrombosis or superficial thrombophlebitis in the bilateral lower extremities. \par Left popliteal fossa fluid collection as measured above likely Baker's cyst\par \par During her admission she has required 3 episodes of paracentesis on 8/7 3L, 8/12 4L, 8/13 2.2L, once pathology became available patient was started on weekly Carbo/Taxol she has tolerated chemotherapy well.  [de-identified] : 2020: She returns today for follow up, she reports improved SOB, improved abdominal swelling, still come positional chest discomfort, she is compliant with BID Lovenox, she remains on the ASA. She reports improved appetite, she reports that appetite is decreased with a large amount of fluid on the abdomen. She is due for C1D8 of Carbo/Taxol on 2020. CBC was reviewed. \par Side effects of chemotherapy including, but not limited to, cytopenias, that may require blood product transfusions and lead to increased risk of infection, requiring hospitalization, allergic reactions, that can rarely be life-threatening, skin reactions, nausea/vomiting, mucositis, diarrhea/constipation were discussed at length, patient and family voice undestaging, all questions were answered to patient's satisfaction.\par Images were personally reviewed by MD Matt and discussed with patient.\par \par 9/10/2020: Patient is 80 years old female came  today for follow up for  Recurrent adenocarcinoma Mullerian origin, malignant ascites diagnosed on 2020, she S/P one cycle of weekly Carbo/taxol she tolerated well. She  reports improved SOB, improved abdominal swelling, she is ran out of Lovenox, which she is supposed to be taking BID, she didn't call for the renewal, she remains on the ASA. She  has been without Lovenox for like 7 days now, we will restart her on AC immediately. Xarelto was prescribed. She was educated again that she will need to remain on long-term anticoagulation. She reports good  appetite  She is due to start the new cycle of chemo on 20.\par PET Scan reviewed from 20 which revealed\par Mild diffuse FDG uptake throughout the mesentery along with peritoneal stranding and nodularity compatible with peritoneal carcinomatosis (SUV up to 3.9). \par No additional sites of abnormal FDG uptake to suggest biologic tumor activity.\par Images were personally reviewed by MD Matt and discussed with patient. \par \par 10/15/2020: Trav present for follow up, her next chemotherapy is for tomorrow, we will need to place PICC line, this will be arrange as outpatient. \par CT chest on 10/11/2020 revealed When compared to prior study dated 2020: \par Although not a dedicated PE study, redemonstration of a right lower lobe segmental/subsegmental pulmonary embolus which appears overall decreased in size from one month prior. \par No evidence for new consolidation, pleural effusion or pneumothorax. \par Trav reports mild neuropathy, not worsening with chemotherapy, CBC was reviewed. Overall clinically she demonstrates great response to therapy. \par \par 2020: Trav is 80 years old female  present for follow up, her ncycle 4 of carbo/taxol is for tomorrow. She repots feeling better regard urination and hematuria. She stated no more blood with her urine but sometime feels pain while she is urinating.\par We communicated CBC result with Patient with HGB/ HCT is 9.6/29.5. Also patient currently on the therapeutic lovenox once every 12 hrs.\par Trav reports neuropathy is getting  worse with chemotherapy, CBC was reviewed. Overall clinically she demonstrates great response to therapy.\par \par 12/10/2020: Trav is a 80 years old female  present for follow up for Recurrent adenocarcinoma Mullerian origin, malignant ascites diagnosed on 2020, S/P 4 cycles of weekly Carboplatin and Taxol.  She reports feeling well, tolerating treatment. She denies hematuria, abdominal pain. \par We communicated CBC result with Patient with HGB/ HCT is 9.1/29.5. Also patient continues on the therapeutic Lovenox once every 12 hrs.\par Patient will start Cycle # 5 tomorrow.\par \par 21: Trav is a 80 years old female  present for follow up for Recurrent adenocarcinoma Mullerian origin, malignant ascites diagnosed on 2020, S/P 6 cycles of weekly Carboplatin and Taxol.  She reports feeling well , Mildly tired , tolerating her diet better. She denies hematuria, abdominal pain. \par We communicated CBC result with Patient with HGB/ HCT is 9.7 /30.6.  Also patient continues on the therapeutic Lovenox once every 12 hrs.\par Patient still complaining  of mild dysuria no bleeding. we will send for Urine analysis. \par Patient was educated to switch from Lovenox to Xarelto 20 mg po daily.\par Patient is due for PET Scan . Prescription provided. \par \par 21: Trav is a 80 years old female  who presents for follow up for Recurrent adenocarcinoma Mullerian origin, malignant ascites diagnosed on 2020, S/P 6 cycles of weekly Carboplatin and Taxol.  She reports feeling OK, reports fatigue, tolerating her diet better. She denies hematuria, abdominal pain. She reports she does not wish at this point to receive any additional cancer-directed therapy, ad wishes to stop Lovenox. \par PET CT on 2/15/21 revealed \par Compared to PET/CT of 2020, no definite sites of abnormal FDG uptake to suggest biologic tumor activity.\par Interval retraction of a right PICC with tip noted within the right axilla (when correlated to chest radiograph dated 10/22/2020).\par Increased left hydroureteronephrosis to the level of the midureter without definite obstructing radiopaque calculus.\par Images were personally reviewed by MD Matt and discussed with patient. \par We recommended for the patient to follow up with urology, we will assist with appointment.\par We communicated CBC result with Patient with HGB/ HCT is 10.0  /32.6.  Also patient will switch from Lovenox to Xarelto, we have already prescribed that during her prior visit, she did not however switch over.\par Patient was educated to switch from Lovenox to Xarelto 20 mg po daily.\par \par 2021: Trav is feeling well, reports no weight loss, no new pulm or neurological symptoms, no new pain, no new GI/ symptoms. \par She is tolerating Xarelto and reports no bleeding. She has followed up with Dr. Singletary, no intervention for hydronephrosis. \par Her older brother passed away, he will be buried in [facility] in  she wishes to go. I recommended she takes COVID vaccine before she goes and she will need to continue with Xarelto. She  prefers to get restaging scans after she comes back. \par \par 2201: Trav is here for a follow up for Uterine cancer, she reports feeling well, reports no weight loss, no new pulm or neurological symptoms, no new pain, no new GI/ symptoms. \par She is tolerating Xarelto and reports no bleeding. She has followed up with Dr. Singletary, no intervention for hydronephrosis. \par She will travel to [facility] on 21 for 3 weeks for her older brother . She will need to continue with Xarelto. \par She will follow up in 4 weeks, repeat follow up CT C/A/P when she is back from [facility] per her request. \par \par 21\par Patient is here for a follow-up visit for hx of Pulmonary Embolism and Uterine Cancer.  She is feeling well with no new complaints.  Reviewed most recent CBC, which is stable.  Patient denies fever, chills, nausea, vomiting, dyspnea or bleeding.  She continues on Xarelto daily as tolerated.  Reviewed the last C125 from 2021 which was WNL.  She was seen recently in the ER due to abdominal pain related to gassiness.  She has GI followup pending.  \par CT A/P (2021) IMPRESSION:No evidence of intra-abdominal or pelvic mass or inflammatory process.No evidence of intra-thoracic, abdominal or pelvic visceral laceration or hematoma.A small amount of ascites is noted around the liver, right paracolic gutter, and within the pelvis.\par CTa Chest (2021)IMPRESSION:No evidence of pulmonary embolism.\par \par \par CT A/P; 2022\par PROCEDURE DATE:  2022  \par \par \par \par INTERPRETATION:  CLINICAL STATEMENT: History of uterine cancer status \par post total abdominal hysterectomy complicated by malignant ascites \par presents with abdominal distention and discomfort over 3-4 weeks.\par \par TECHNIQUE: Contiguous axial CT images were obtained from the lower chest \par to the pubic symphysis following administration of 100cc Omnipaque 350 \par intravenous contrast.  Oral contrast was administered.  Reformatted \par images in the coronal and sagittal planes were acquired.\par \par COMPARISON CT: Abdominopelvic CT 2021.\par \par FINDINGS:\par \par LOWER CHEST: Dependent atelectasis. Trace left pleural effusion.\par \par HEPATOBILIARY: Scalloping of the liver secondary to the scalloping of the \par liver secondary to the ascites.\par \par SPLEEN: Unremarkable.\par \par PANCREAS: Unremarkable.\par \par ADRENAL GLANDS: Unremarkable.\par \par KIDNEYS: No hydronephrosis. Symmetric renal enhancement. Atrophied left \par kidney.\par \par ABDOMINOPELVIC NODES: No lymphadenopathy.\par \par PELVIC ORGANS: Post hysterectomy and bilateral salpingo-oophorectomy.\par \par PERITONEUM/MESENTERY/BOWEL: Increased now large volume abdominopelvic \par ascites. New omental nodularity noted (4/). Large volume \par abdominopelvic ascites. No definite bowel obstruction or \par pneumoperitoneum. Appendix not visualized.\par \par BONES/SOFT TISSUES: Mild degenerative changes of the spine. Grade 1 \par anterolisthesis of L4 onto L5. No suspicious osseous lesions. Gluteal \par subcutaneous calcified granulomas.\par \par VASCULAR: IVC filter in place. Atherosclerotic calcifications of the \par normal caliber aorta and its distal branches.\par \par IMPRESSION:\par \par Since 2021, new omental nodularity and increased now large \par volume abdominopelvic malignant ascites.\par \par \par --- End of Report ---\par \par \par \par \par PRINCESS LONG MD; Resident Radiologist\par This document has been electronically signed.\par MARLON REYNAGA MD; Attending Radiologist\par \par 2022: ASCITES FLUID\par \par Addendum\par Immunohistochemical studies were performed on block 1A at [place]\[facility], [address] [place] [address],\par [place], [facility] [place].  The results are as follows:\par - The malignant cells are positive for CK7, PAX-8 (focal) and p53 (strong\par nuclear staining) and negative for CK20 and calretinin.  These results\par support the diagnosis of metastatic adenocarcinoma, mullerian origin,\par in  this patient with a known history of uterine cancer.\par

## 2022-03-01 NOTE — REVIEW OF SYSTEMS
[SOB on Exertion] : shortness of breath during exertion [Negative] : Allergic/Immunologic [Fever] : no fever [Chills] : no chills [Night Sweats] : no night sweats [Fatigue] : no fatigue [Recent Change In Weight] : ~T no recent weight change [Shortness Of Breath] : no shortness of breath [Wheezing] : no wheezing [Cough] : no cough [Abdominal Pain] : no abdominal pain [Vomiting] : no vomiting [Constipation] : no constipation [Diarrhea] : no diarrhea [Confused] : no confusion [Dizziness] : no dizziness [Fainting] : no fainting [Difficulty Walking] : no difficulty walking [FreeTextEntry7] : early satiety [de-identified] : minor preexisting neuropathy

## 2022-03-01 NOTE — CONSULT LETTER
[Dear  ___] : Dear  [unfilled], [Consult Letter:] : I had the pleasure of evaluating your patient, [unfilled]. [( Thank you for referring [unfilled] for consultation for _____ )] : Thank you for referring [unfilled] for consultation for [unfilled] [Please see my note below.] : Please see my note below. [Consult Closing:] : Thank you very much for allowing me to participate in the care of this patient.  If you have any questions, please do not hesitate to contact me. [Sincerely,] : Sincerely, [FreeTextEntry3] : Paul Greco DO\par Attending Physician,\par Hematology/ Medical Oncology\par [phone] office\par \par

## 2022-03-01 NOTE — ASSESSMENT
[FreeTextEntry1] : # Recurrent adenocarcinoma Mullerian origin, malignant ascites diagnosed on 2020\par --Prior SHAAN BSO in 2016, adjuvant chemotherapy, obtain records from Dr. Abdullahi \par --Started on weekly carbo/taxol weekly 3 on 1 off on 2020.\par --PET CT reviewed from 20. \par --Lab work today with Stable blood counts\par --For chemo 10/16/2020\par --PICC line care ( Patient insisted to flush PICC line once / week.)\par --On 21 patient completed 6 cycles of Carbo/Taxol (weekly), declines any additional cancer directed therapy on 2021\par --On 21 we reviewed PET Scan from 21 with good response to chemo.\par --Referred Patient to Urology for  left hydroureteronephrosis, no intervention for now, sono in 3 months per Dr. Singletary \par --She went to [facility] in 2021 for a  for 3 weeks\par --CT A/P from 2021 (abd pain) shows no new disease ; CTa Chest from 2021 ; \par # Malignant ascites required high volume paracentesis \par --Improved/resolved with carbo/taxol \par \par ct a/p 2022 showed ascites which showed recurrent adenocarcinoma of mullerian origin:  is high; will get liquid bx today; f/u in 3 days WITH HER FAMILY\par \par # PE in the setting of malignancy diagnosed on 2020\par --Doppler was negative for DVT on 2020\par --On BID Lovenox, but ran out on 2020\par --Also on ASA, h/o CABG . \par --Was stared on Xarelto, but after 1 dose was admitted with recurrent PE, diagnosed on 2020\par --Patient will switch from Lovenox to Xarelto 20 mg po daily on 2021.\par --c/w Xarelto 20mg daily, Rx renewed \par \par Will send Nexium due to gassiness + belching.  She has GI followup pending.  \par \par RTC in 3 months with CBC, BMP, LFTs, Ca125 week prior

## 2022-03-03 NOTE — ED ADULT NURSE NOTE - ALCOHOL PRE SCREEN (AUDIT - C)
Nurse refuses to give any more info, states she used to work with [employer], please call at above number Statement Selected

## 2022-03-03 NOTE — ED PROVIDER NOTE - ATTENDING CONTRIBUTION TO CARE
pt with uterine ca with omental mets c/o dec po intake, nasuea, anorexia, gen weakness. has not started any treatments for it.  no fever. + ab pain, diffuse. + bm (diarrhea).  she had a paracentesis done on last admission in february.     pt is elderly, frail, in nad, ancit, cta, rrr, ab soft, nt, nd.      labs, imaging, supportive care

## 2022-03-03 NOTE — ED PROVIDER NOTE - CARE PROVIDERS DIRECT ADDRESSES
[email or web address]

## 2022-03-03 NOTE — ED PROVIDER NOTE - CARE PROVIDER_API CALL
Paul Greco (DO)  Hematology; Internal Medicine; Medical Oncology  [address] [place] [address]  Phone: [phone]  Fax: [phone]  Follow Up Time: Urgent

## 2022-03-03 NOTE — ED PROVIDER NOTE - OBJECTIVE STATEMENT
82 yo F with PMH uterine cancer s/p SHAAN, Recurrent malignant ascites, CAD s/p CABG, HTN, depression, PE on Xarelto who presents with abdominal pain since 330 AM.  Pt is mid abdomen, gradual, non radiating.  Pt states pain is same as previous admission for malignant ascites.  Pt also endorsing generalized weakness and constipation.  Pt nauseous but no episodes of vomiting.  Pt denies fevers, chills, hematochezia, hematuria, hematemesis, dysuria, vomiting, headache, chest pain, SOB.

## 2022-03-03 NOTE — ED ADULT NURSE REASSESSMENT NOTE - NS ED NURSE REASSESS COMMENT FT1
Patient met in room at start of shift. Patient assessed, VSS. Bed alarm intact, pt close to nurse station. Pain management rendered. Pt updated on plan of care, verbalized understanding. Will continue to monitor. Close observation maintained for safety.

## 2022-03-03 NOTE — ED PROVIDER NOTE - PATIENT PORTAL LINK FT
Impression: Age-related nuclear cataract, bilateral: H25.13 OU. Condition: established, stable. Plan: Discussed diagnosis in detail with patient. No treatment is required at this time. Patient will update glasses RX first. Will continue to observe condition and or symptoms. You can access the FollowMyHealth Patient Portal offered by [facility] by registering at the following website: [email or web address]. By joining [employer]’s FollowMyHealth portal, you will also be able to view your health information using other applications (apps) compatible with our system.

## 2022-03-03 NOTE — ED PROVIDER NOTE - PROGRESS NOTE DETAILS
AH - pending labs, CT, dispo Received pt from Dr Connelly awaiting labs, imaging, dispo. On my exam pt looks cachectic and chronically ill with poor skin turgor, but not acutely toxic. She is easily tearful, and reports that she has had difficulty even drinking water over the past few days, not eating because no appetite and feeling nauseated, and has persistent abd pain. She followed up with Dr Mariee earlier this week, but bx was not yet resulted; plan from HIE review was to f/u in the office on Friday with family member to discuss plan. Pt says grandjess is going to come with her to the appt. She is frustrated because she has not started chemo and admits "I'm feeling sorry for myself." Will f/u labs and imaging and reassess, speak with heme/onc re dispo. Spoke with patient's oncologist Dr Mariee and heme/onc fellow. Patient to be discharged home and has appt with him tomorrow AM at 0900. Patient amenable and would like to go home.

## 2022-03-03 NOTE — ED ADULT NURSE NOTE - OBJECTIVE STATEMENT
Pt presented to ED c/o abd pain. Pt BIBA c/o abd pain x1 day. Denies n/v/d/fevers/chills. Pt A&Ox4, ambulatory baseline w/ assist.

## 2022-03-03 NOTE — ED PROVIDER NOTE - CLINICAL SUMMARY MEDICAL DECISION MAKING FREE TEXT BOX
Labs unchanged from prior, CT abdomen/pelvis also grossly unchanged. Spoke with Dr Mariee, he will follow up with pt in the office tomorrow as scheduled. Pt is comfortable with discharge.

## 2022-03-03 NOTE — ED PROVIDER NOTE - PHYSICAL EXAMINATION
CONSTITUTIONAL: in mild acute distress, afebrile  SKIN: Warm, dry  EYES: No conjunctival injection. EOMI  ENT: No nasal discharge; oropharynx nonerythematous; airway clear  CARD:  Regular rate and rhythm  RESP: CTAB  ABD: Soft non distended, palpable indurated mass in L abdomen, no flank or cva tenderness, mild mid abd TTP; No guarding or rebound tenderness  EXT: Normal ROM.  No clubbing or cyanosis.  No edema  NEURO: A&O x3, grossly unremarkable, no focal deficits  *Chaperone DO Godwin was used during the encounter. A professional environment was maintained and discussed with patient

## 2022-03-03 NOTE — ED PROVIDER NOTE - NS ED ROS FT
Review of Systems:  CONSTITUTIONAL: No fever, No diaphoresis  SKIN: No rash  EYES: No eye pain, No blurred vision  ENT: No change in hearing, No sore throat, No neck pain, No rhinorrhea  RESPIRATORY: No shortness of breath, No cough  CARDIAC: No chest pain, No palpitations  GI: + abdominal pain, + nausea, No vomiting, No diarrhea, + constipation, No bright red blood per rectum or melena. No flank pain  : No dysuria, frequency, hematuria  MUSCULOSKELETAL: No joint paint, No swelling, No back pain  NEUROLOGIC: No numbness, No focal weakness, No headache, No dizziness  All other systems negative, unless specified in HPI

## 2022-03-03 NOTE — ED PROVIDER NOTE - NSFOLLOWUPINSTRUCTIONS_ED_ALL_ED_FT
FOLLOW UP WITH YOUR ONCOLOGIST AS SCHEDULED TOMORROW AM AT 0900.  PLEASE BRING A FAMILY MEMBER TO VISIT PER ONCOLOGIST.     RETURN TO THE ED AT ANY TIME FOR PERSISTENT/WORSENING SYMPTOMS.

## 2022-03-04 NOTE — REASON FOR VISIT
[Follow-Up Visit] : a follow-up [Family Member] : family member [Other: _____] : [unfilled] [FreeTextEntry2] : Recurrent endometrial cancer, HFU

## 2022-03-04 NOTE — HISTORY OF PRESENT ILLNESS
[de-identified] : Trav is a shekhar 81 year old lady I initially met in the hospital on 8/11/2020. Her past medical history is significant for uterine cancer s/p SHAAN BSO as per patient s/p adjuvant chemotherapy in 2016, CAD s/p CABG, HTN and depression, she presented to the ED for abdominal pain and chest discomfort. 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 \par \par PAST MEDICAL & SURGICAL HISTORY:\par HLD (hyperlipidemia)\par HTN (hypertension)\par CAD (coronary artery disease)\par History of coronary artery bypass surgery\par \par FAMILY HISTORY:\par No pertinent family history in first degree relatives\par \par Allergies\par chloroquine (Hives)\par \par CTA C/A/P on 8/6/2020 revealed \par 1. Intraluminal filling defect is seen within the segmental branches of the right lower lobe pulmonary artery, indicating acute pulmonary embolism. (6/132-138; 10/51-55). Filling defect in segmental branch of the right upper lobe pulmonary artery. (6/77) There is a small filling defect within a left lower lobe pulmonary artery segmental branch (6/147; 10/59). No evidence of right heart strain. The right ventricle measures 3.5 cm; the left ventricle measures 3.7 cm. (RV:LV<1) \par 2. Compared with the previous CT scan of 5/27/2020, there is now a large amount of ascites throughout the abdomen and pelvis. \par \par LE Doppler on 8/8/2020 revealed \par No evidence of deep venous thrombosis or superficial thrombophlebitis in the bilateral lower extremities. \par Left popliteal fossa fluid collection as measured above likely Baker's cyst\par \par During her admission she has required 3 episodes of paracentesis on 8/7 3L, 8/12 4L, 8/13 2.2L, once pathology became available patient was started on weekly Carbo/Taxol she has tolerated chemotherapy well.  [FreeTextEntry1] : Starting weekly Carboplatin (AUC 2) + Taxol (40mg/m2 due to neuropathy) x 3 weeks then 1 week break on... [de-identified] : 2020: She returns today for follow up, she reports improved SOB, improved abdominal swelling, still come positional chest discomfort, she is compliant with BID Lovenox, she remains on the ASA. She reports improved appetite, she reports that appetite is decreased with a large amount of fluid on the abdomen. She is due for C1D8 of Carbo/Taxol on 2020. CBC was reviewed. \par Side effects of chemotherapy including, but not limited to, cytopenias, that may require blood product transfusions and lead to increased risk of infection, requiring hospitalization, allergic reactions, that can rarely be life-threatening, skin reactions, nausea/vomiting, mucositis, diarrhea/constipation were discussed at length, patient and family voice undestaging, all questions were answered to patient's satisfaction.\par Images were personally reviewed by MD Matt and discussed with patient.\par \par 9/10/2020: Patient is 80 years old female came  today for follow up for  Recurrent adenocarcinoma Mullerian origin, malignant ascites diagnosed on 2020, she S/P one cycle of weekly Carbo/taxol she tolerated well. She  reports improved SOB, improved abdominal swelling, she is ran out of Lovenox, which she is supposed to be taking BID, she didn't call for the renewal, she remains on the ASA. She  has been without Lovenox for like 7 days now, we will restart her on AC immediately. Xarelto was prescribed. She was educated again that she will need to remain on long-term anticoagulation. She reports good  appetite  She is due to start the new cycle of chemo on 20.\par PET Scan reviewed from 20 which revealed\par Mild diffuse FDG uptake throughout the mesentery along with peritoneal stranding and nodularity compatible with peritoneal carcinomatosis (SUV up to 3.9). \par No additional sites of abnormal FDG uptake to suggest biologic tumor activity.\par Images were personally reviewed by MD Matt and discussed with patient. \par \par 10/15/2020: Trav present for follow up, her next chemotherapy is for tomorrow, we will need to place PICC line, this will be arrange as outpatient. \par CT chest on 10/11/2020 revealed When compared to prior study dated 2020: \par Although not a dedicated PE study, redemonstration of a right lower lobe segmental/subsegmental pulmonary embolus which appears overall decreased in size from one month prior. \par No evidence for new consolidation, pleural effusion or pneumothorax. \par Trav reports mild neuropathy, not worsening with chemotherapy, CBC was reviewed. Overall clinically she demonstrates great response to therapy. \par \par 2020: Trav is 80 years old female  present for follow up, her ncycle 4 of carbo/taxol is for tomorrow. She repots feeling better regard urination and hematuria. She stated no more blood with her urine but sometime feels pain while she is urinating.\par We communicated CBC result with Patient with HGB/ HCT is 9.6/29.5. Also patient currently on the therapeutic lovenox once every 12 hrs.\par Trav reports neuropathy is getting  worse with chemotherapy, CBC was reviewed. Overall clinically she demonstrates great response to therapy.\par \par 12/10/2020: Trav is a 80 years old female  present for follow up for Recurrent adenocarcinoma Mullerian origin, malignant ascites diagnosed on 2020, S/P 4 cycles of weekly Carboplatin and Taxol.  She reports feeling well, tolerating treatment. She denies hematuria, abdominal pain. \par We communicated CBC result with Patient with HGB/ HCT is 9.1/29.5. Also patient continues on the therapeutic Lovenox once every 12 hrs.\par Patient will start Cycle # 5 tomorrow.\par \par 21: Trav is a 80 years old female  present for follow up for Recurrent adenocarcinoma Mullerian origin, malignant ascites diagnosed on 2020, S/P 6 cycles of weekly Carboplatin and Taxol.  She reports feeling well , Mildly tired , tolerating her diet better. She denies hematuria, abdominal pain. \par We communicated CBC result with Patient with HGB/ HCT is 9.7 /30.6.  Also patient continues on the therapeutic Lovenox once every 12 hrs.\par Patient still complaining  of mild dysuria no bleeding. we will send for Urine analysis. \par Patient was educated to switch from Lovenox to Xarelto 20 mg po daily.\par Patient is due for PET Scan . Prescription provided. \par \par 21: Trav is a 80 years old female  who presents for follow up for Recurrent adenocarcinoma Mullerian origin, malignant ascites diagnosed on 2020, S/P 6 cycles of weekly Carboplatin and Taxol.  She reports feeling OK, reports fatigue, tolerating her diet better. She denies hematuria, abdominal pain. She reports she does not wish at this point to receive any additional cancer-directed therapy, ad wishes to stop Lovenox. \par PET CT on 2/15/21 revealed \par Compared to PET/CT of 2020, no definite sites of abnormal FDG uptake to suggest biologic tumor activity.\par Interval retraction of a right PICC with tip noted within the right axilla (when correlated to chest radiograph dated 10/22/2020).\par Increased left hydroureteronephrosis to the level of the midureter without definite obstructing radiopaque calculus.\par Images were personally reviewed by MD Matt and discussed with patient. \par We recommended for the patient to follow up with urology, we will assist with appointment.\par We communicated CBC result with Patient with HGB/ HCT is 10.0  /32.6.  Also patient will switch from Lovenox to Xarelto, we have already prescribed that during her prior visit, she did not however switch over.\par Patient was educated to switch from Lovenox to Xarelto 20 mg po daily.\par \par 2021: Trav is feeling well, reports no weight loss, no new pulm or neurological symptoms, no new pain, no new GI/ symptoms. \par She is tolerating Xarelto and reports no bleeding. She has followed up with Dr. Singletary, no intervention for hydronephrosis. \par Her older brother passed away, he will be buried in [facility] in  she wishes to go. I recommended she takes COVID vaccine before she goes and she will need to continue with Xarelto. She  prefers to get restaging scans after she comes back. \par \par 2201: Trav is here for a follow up for Uterine cancer, she reports feeling well, reports no weight loss, no new pulm or neurological symptoms, no new pain, no new GI/ symptoms. \par She is tolerating Xarelto and reports no bleeding. She has followed up with Dr. Singletary, no intervention for hydronephrosis. \par She will travel to [facility] on 21 for 3 weeks for her older brother . She will need to continue with Xarelto. \par She will follow up in 4 weeks, repeat follow up CT C/A/P when she is back from [facility] per her request. \par \par 21\par Patient is here for a follow-up visit for hx of Pulmonary Embolism and Uterine Cancer.  She is feeling well with no new complaints.  Reviewed most recent CBC, which is stable.  Patient denies fever, chills, nausea, vomiting, dyspnea or bleeding.  She continues on Xarelto daily as tolerated.  Reviewed the last C125 from 2021 which was WNL.  She was seen recently in the ER due to abdominal pain related to gassiness.  She has GI followup pending.  \par CT A/P (2021) IMPRESSION:No evidence of intra-abdominal or pelvic mass or inflammatory process.No evidence of intra-thoracic, abdominal or pelvic visceral laceration or hematoma.A small amount of ascites is noted around the liver, right paracolic gutter, and within the pelvis.\par CTa Chest (2021)IMPRESSION:No evidence of pulmonary embolism.\par \par \par CT A/P; 2022\par PROCEDURE DATE:  2022  \par \par \par \par INTERPRETATION:  CLINICAL STATEMENT: History of uterine cancer status \par post total abdominal hysterectomy complicated by malignant ascites \par presents with abdominal distention and discomfort over 3-4 weeks.\par \par TECHNIQUE: Contiguous axial CT images were obtained from the lower chest \par to the pubic symphysis following administration of 100cc Omnipaque 350 \par intravenous contrast.  Oral contrast was administered.  Reformatted \par images in the coronal and sagittal planes were acquired.\par \par COMPARISON CT: Abdominopelvic CT 2021.\par \par FINDINGS:\par \par LOWER CHEST: Dependent atelectasis. Trace left pleural effusion.\par \par HEPATOBILIARY: Scalloping of the liver secondary to the scalloping of the \par liver secondary to the ascites.\par \par SPLEEN: Unremarkable.\par \par PANCREAS: Unremarkable.\par \par ADRENAL GLANDS: Unremarkable.\par \par KIDNEYS: No hydronephrosis. Symmetric renal enhancement. Atrophied left \par kidney.\par \par ABDOMINOPELVIC NODES: No lymphadenopathy.\par \par PELVIC ORGANS: Post hysterectomy and bilateral salpingo-oophorectomy.\par \par PERITONEUM/MESENTERY/BOWEL: Increased now large volume abdominopelvic \par ascites. New omental nodularity noted (). Large volume \par abdominopelvic ascites. No definite bowel obstruction or \par pneumoperitoneum. Appendix not visualized.\par \par BONES/SOFT TISSUES: Mild degenerative changes of the spine. Grade 1 \par anterolisthesis of L4 onto L5. No suspicious osseous lesions. Gluteal \par subcutaneous calcified granulomas.\par \par VASCULAR: IVC filter in place. Atherosclerotic calcifications of the \par normal caliber aorta and its distal branches.\par \par IMPRESSION:\par \par Since 2021, new omental nodularity and increased now large \par volume abdominopelvic malignant ascites.\par \par \par --- End of Report ---\par \par \par \par \par PRINCESS LONG MD; Resident Radiologist\par This document has been electronically signed.\par MARLON REYNAGA MD; Attending Radiologist\par \par 2022: ASCITES FLUID\par \par Addendum\par Immunohistochemical studies were performed on block 1A at [facility], [address] [place] [address],\[facility], [facility].  The results are as follows:\par - The malignant cells are positive for CK7, PAX-8 (focal) and p53 (strong\par nuclear staining) and negative for CK20 and calretinin.  These results\par support the diagnosis of metastatic adenocarcinoma, mullerian origin,\par in  this patient with a known history of uterine cancer.\par \par \par 3/4/22\par Patient is here for a follow-up visit for recurrent adenocarcinoma of Mullerian origin, accompanied by grandson and family member via telephone.  Patient denies fever, chills, abdominal pain, nausea, vomiting, dyspnea or bleeding.  She reports lack of appetite.  She lost approximately 20lbs over the last 3 months.  She still reports gassiness + belching, but it is better controlled with Nexium.  Reviewed Ca125 which is up to 156U/mL from 2022.  She continues on Xarelto 20mg daily.

## 2022-03-04 NOTE — ASSESSMENT
[FreeTextEntry1] : 80 yo woman with # Recurrent adenocarcinoma Mullerian origin, malignant ascites diagnosed on 8/8/2020\par - Prior SHAAN BSO in 2016, adjuvant chemotherapy, obtain records from Dr. Abdullahi \par -frist recurrence in 2020:  Started on weekly carbo/taxol weekly 3 on 1 off on 8/14/2020. On 1/28/21 patient completed 6 cycles of Carbo/Taxol (weekly), declines any additional cancer directed therapy on 2/25/2021\par - On 2/25/21 we reviewed PET Scan from 2/25/21 with good response to chemo.\par \par \par - CT A/P from 11/2021 (abd pain) shows no new disease ; CTa Chest from 09/2021 \par - CT A/P from 02/2022 showed ascites which showed recurrent adenocarcinoma of mullerian origin:  is high\par - explained that since it has been greater than 1 year since last treatment with Carbo/Taxol with improvement in response , one option is to reinitiate same chemotherapy regimen and see if she responds.  However, she notes lower extremity neuropathy from chemotherapy last time.  \par - START weekly Carboplatin (AUC 2) + Taxol (40mg/m2 due to neuropathy) x 3 weeks then 1 week off ; Discussed risks of chemotherapy including but not limited to fatigue, nausea, vomiting, diarrhea, elevated liver enzymes, loss of appetite, hair loss, neuropathy, allergic reactions, cytopenias, and infection to name a few.  Written information provided.  Anti-emetics prescribed to pharmacy on file.  Consent obtained.  \par - Completed [employer] Liquid Biopsy Testing form with patient in clinic on 03/01/2022. \par - she will require port insertion due to poor peripheral access; Faxed script from [employer] and most recent office note to Interventional Radiology at [phone] and [employer] form submitted on 03/04/2022 \par \par # Malignant ascites required high volume paracentesis \par - previously improved/resolved with carbo/taxol \par \par # PE in the setting of malignancy diagnosed on 8/6/2020\par - Doppler was negative for DVT on 8/8/2020\par - Also on ASA, h/o CABG . \par - Was stared on Xarelto, but after 1 dose was admitted with recurrent PE, diagnosed on 9/11/2020\par - Patient will switch from Lovenox to Xarelto 20 mg po daily on 1/28/2021.\par - c/w Xarelto 20mg daily\par - she will HOLD Xarelto 3 days prior to port insertion + resume following day as long as no bleeding complications\par \par # Gassiness + belching\par - c/w Nexium \par - She has GI followup pending.  \par \par # Upper + lower extremity neuropathy, bilateral, not painful \par - START gabapentin 100mg , can titrate up to TiD dosing as tolerated , Rx sent + side effects discussed.\par \par # Anxiety / Depression with associated lack of appetite + weight loss\par - she will discuss with PCP +/- mental health provider regarding antidepressants\par - Patient inquired about certification for Medical Marijuana.  Risks and benefits discussed with patient at length.  Patient woud like to proceed with certification today.  Patient successfully certified for utilization of Medical Marijuana; online submission made and certificate generated on 03/04/2022.  Patient is a permanent [facility] resident with diagnosis of cancer with associated symptoms including anxiety/depression, early satiety, poor appetite and weight loss who expressed interest and verbal consent for medical marijuana certification and use.  Patient and family members educated on various formulations including but not limited to vaporization, capsule, tablet, oil, powder or tincture with THC:CBD ratio to be determined and titrated per pharmacist consultation and recommendations.  Patient provided with signed copy of certification and patient specific instructions to apply for Registry ID Card Online.  Instructed to visit health.[place].gov/mmp or contact the [facility] Medical Marijuana Program at [phone] or by email at [email or web address] for more information.  Patient verbalized understanding and has no further questions at this time.\par \par RTC in 3 weeks with CBC (week 3 of chemo) \par seen/examined w/ NP [facility]; note revieiwed; case discussed\par spent 45-50mins to discuss with pt/her grandson and her daughter on the phone; expaloned all the questions\par - pending port placement\par - start weekly low dose carbo/taxol\par - f/u liquid bx result\par - needs anti depressants\par - suggest to try medical marijuana, agreeable\par f/u / the 3rd dose\par \par Octayvious Umair mendoza) [phone]

## 2022-03-04 NOTE — REVIEW OF SYSTEMS
[SOB on Exertion] : shortness of breath during exertion [Anxiety] : anxiety [Depression] : depression [Fever] : no fever [Chills] : no chills [Night Sweats] : no night sweats [Fatigue] : no fatigue [Eye Pain] : no eye pain [Recent Change In Weight] : ~T no recent weight change [Dysphagia] : no dysphagia [Odynophagia] : no odynophagia [Chest Pain] : no chest pain [Wheezing] : no wheezing [Cough] : no cough [Abdominal Pain] : abdominal pain [Vomiting] : vomiting [Diarrhea] : no diarrhea [Dysuria] : no dysuria [Skin Rash] : skin rash [Confused] : no confusion [Dizziness] : no dizziness [Fainting] : no fainting [Difficulty Walking] : no difficulty walking [Negative] : Psychiatric [FreeTextEntry7] : reports early satiety [de-identified] : reports upper + lower extremity neuropathy , not painful

## 2022-03-04 NOTE — CONSULT LETTER
[Dear  ___] : Dear  [unfilled], [Consult Letter:] : I had the pleasure of evaluating your patient, [unfilled]. [( Thank you for referring [unfilled] for consultation for _____ )] : Thank you for referring [unfilled] for consultation for [unfilled] [Please see my note below.] : Please see my note below. [Consult Closing:] : Thank you very much for allowing me to participate in the care of this patient.  If you have any questions, please do not hesitate to contact me. [Sincerely,] : Sincerely, [FreeTextEntry3] : Paul Greco DO\par Attending Physician,\par Hematology/ Medical Oncology\par [phone] office\par \par

## 2022-03-15 NOTE — PRE-ANESTHESIA EVALUATION ADULT - NSANTHSUBSTSD_GEN_ALL_CORE
[facility] Call Center    Phone Message    May a detailed message be left on voicemail: yes    Reason for Call: Other: Cancel Appointment     Action Taken: Message routed to:  Other: Angela Palmer (Pharmacist)       This patient had to cancel appointment for today due to not feeling well. I told him to just call back when he's ready to reschedule.      Thank You:)    Alycia Staley   Patient Representative   [facility]  [phone]  
No

## 2022-03-15 NOTE — CHART NOTE - NSCHARTNOTEFT_GEN_A_CORE
ANESTHESIA to PACU NOTE      ____ Intubated  TV:______       Rate: ______      FiO2: ______    _x___ Patent Airway    _x___ Full return of protective reflexes    ____ Full recovery from anesthesia / sedation to baseline status    Vitals:  HR 68  /65  RR 15  O2sat. 100%  Temp: 36.4C      Mental Status:  __x__ Awake   _x____ Alert   _____ Drowsy   _____ Sedated    Nausea/Vomiting: ____ Yes, See Post - Op Orders      _x___ No    Pain Scale (0-10): _____    Treatment: __x__ None    ____ See Post - Op/PCA Orders    Post - Operative Fluids:   ____ Oral   _x___ See Post - Op Orders    Plan:  Discharge to:   __x__Home       _____Floor      _____Critical Care    _____ Other:_________________    Comments: s/p IV sedation. No anesthesia complications. Pt's condition is stable in PACU. Full report is given to PACU RN.

## 2022-03-15 NOTE — PROGRESS NOTE ADULT - SUBJECTIVE AND OBJECTIVE BOX
Interventional Radiology Outpatient Documentation    PREOPERATIVE DAY OF PROCEDURE EVALUATION:     I have personally seen and examined this patient. I agree with the history and physical which I have reviewed and noted any changes below:     81 year-old female with history of uterine cancer.  Plan is for image-guided venous access port insertion with sedation administered by Anesthesia.    Procedure/ risks/ benefits/ goals/ alternatives were explained. All questions answered. Informed content obtained from patient. Consent placed in chart.     POSTOPERATIVE PROCEDURAL EVALUATION:     Procedure:  Left IJ port    Pre-Op Diagnosis: Uterine cancer    Post-Op Diagnosis: Same    Attending: Kiki  Resident: None    Anesthesia (type):  [ ] General Anesthesia  [ x] Sedation administered by Anesthesia  [ ] Spinal Anesthesia  [ x] Local/Regional    Contrast: None    Estimated Blood Loss: Minimal, < 5 cc    Condition:   [ ] Critical  [ ] Serious  [ ] Fair   [x] Good    Findings/Follow up Plan of Care:  Successful left IJ port insertion with tip in the RA.  Ready for use immediately.      See full report in pacs    Complications: None    Disposition: Recovery

## 2022-03-20 NOTE — ED PROVIDER NOTE - PROGRESS NOTE DETAILS
ER: received pt on signout. pt resting comfortably in bed not in any distress. pt denies any pain. pt pending CT. will continue to monitor.

## 2022-03-20 NOTE — ED PROVIDER NOTE - NS ED ATTENDING STATEMENT MOD
This was a shared visit with the JORGITO. I reviewed and verified the documentation and independently performed the documented:

## 2022-03-20 NOTE — ED ADULT TRIAGE NOTE - AS HEIGHT TYPE
Presents to ED right sided flank pain that started last night, rate 3/10 described as a sore pain. Pt states that she thought it was gas and it hurts with movement. Pt reports blood spot in underwear this morning. Denies N/V/D.   stated

## 2022-03-20 NOTE — ED PROVIDER NOTE - OBJECTIVE STATEMENT
82 yo female, pmh of uterine cancer s/p SHAAN,  malignant ascites, CAD s/p CABG, HTN, depression, PE on Xarelto, presents to ed for abd distention, no specific pain or radiation, notes ascites is worsening, last time needed IR for paracentesis. Denies fever, chills, cp, sob, le swelling, nvd, dysuria, hematuria.

## 2022-03-20 NOTE — H&P ADULT - ATTENDING COMMENTS
82 YO F w/ a PMH of uterine CA s/p SHAAN s/p recurrence, recurrent malignant ascites, CAD s/p CABG, HTN, depression, and PE (Xarelto) who was BIBEMS for eval of worsening ABD distention over the past x 2 weeks. Associated with ABD discomfort and nausea. Denies any fevers/chills, CP, SOB, rashes, or dysuria. ROS is negative except as above.     In the ED, CT-AP w/ IV contrast confirmed recurrence of abdominal ascites. Pt switched from xarelto to heparin drip in preparation for an in-pt paracentesis.     FMHx: Reviewed, not relevant    Physical exam shows pt in NAD. VSS, afebrile, not hypoxic on RA. A&Ox3. Neuro exam without deficits, motor/sensory intact, no dysarthria, no facial asymmetry. Muscle strength/sensation intact. CTA B/L with no W/C/R. RRR, no M/G/R. ABD is distended, not warm, and non-tender, normoactive BSs. LEs without swelling. No rashes. Labs and radiology as above.     ABD distention, from recurrence of malignant ascites, low suspicion for SBP; no sepsis present on admission. Pt switched to heparin drip in the ED, in preparation for in-pt paracentesis. Serial coags. IR consult. FU paracentesis results. Start IV ABXs if pt spikes a fever. PRN pain meds and anti-emetics.     Microcytic anemia, slightly below baseline. Pt denies any bleeding symptoms. Replace PRN.     Hypoalbuminemia, from poor oral intake. Nutrition eval.     Hx of uterine CA s/p SHAAN s/p recurrence, recurrent malignant ascites, CAD s/p CABG, HTN, depression, and PE. Restart home meds, except as stated above. DVT PPX. Inform PCP of pt's admission to hospital. My note supersedes the residents note.     Date seen by Attending: 3/20/22

## 2022-03-20 NOTE — H&P ADULT - NSHPPHYSICALEXAM_GEN_ALL_CORE
General: NAD, lying in bed comfortably  Mental status: AAOx3  Head:  Atraumatic, Normocephalic  Eyes: EOMI, conjunctiva and sclera clear  ENT: Moist mucous membranes  Neck: No JVD  Chest/lung: Clear to auscultation bilaterally; No wheezing or crackles. Chest wall port present, clean  Heart: Regular rate and rhythm; No murmurs, rubs, or gallops  Abdomen: + abdominal distention. Bowel sounds present; Soft, Nontender  Extremities:  2+ Peripheral Pulses. No edema or cyanosis

## 2022-03-20 NOTE — PATIENT PROFILE ADULT - FALL HARM RISK - HARM RISK INTERVENTIONS
Assistance with ambulation/Assistance OOB with selected safe patient handling equipment/Communicate Risk of Fall with Harm to all staff/Discuss with provider need for PT consult/Monitor gait and stability/Reinforce activity limits and safety measures with patient and family/Tailored Fall Risk Interventions/Visual Cue: Yellow wristband and red socks/Bed in lowest position, wheels locked, appropriate side rails in place/Call bell, personal items and telephone in reach/Instruct patient to call for assistance before getting out of bed or chair/Non-slip footwear when patient is out of bed/[place] to call system/Physically safe environment - no spills, clutter or unnecessary equipment/Purposeful Proactive Rounding/Room/bathroom lighting operational, light cord in reach

## 2022-03-20 NOTE — H&P ADULT - ASSESSMENT
81 F patient with Hx of uterine cancer s/p SHAAN s/p recurrence, Recurrent malignant ascites, CAD s/p CABG, HTN, depression, PE on Xarelto who presented for increasing abdominal distention x4 weeks.     #Recurrent ascites  #Hx of uterine malignancy s/p SHAAN and chemotherapy, recurrence  - Last admission had paracentesis by IR with drainage of >4 L  - CT from Feb 2022 demonstrated new omental nodularity indicating recurrence of malignancy  - Was treated previously with carbo/taxol. Outpatient heme onc notes with plan for resumption of the same  - IR consult for paracentesis  - Heme-Onc consult    #Hx of PE       #Hx of CAD s/p CABG   -Continue AC, Toprol and statin     #HTN   -Continue home dose of Amlodipine and Toprol     #Depression   -Continue Home Psych medications    81 F patient with Hx of uterine cancer s/p SHAAN s/p recurrence, Recurrent malignant ascites, CAD s/p CABG, HTN, depression, PE on Xarelto who presented for increasing abdominal distention x4 weeks.     #Recurrent ascites  #Hx of uterine malignancy s/p SHAAN and chemotherapy, recurrence  - Last admission had paracentesis by IR with drainage of >4 L  - CT from Feb 2022 demonstrated new omental nodularity indicating recurrence of malignancy  - Was treated previously with carbo/taxol. Outpatient heme onc notes with plan for resumption of the same  - IR consult for paracentesis  - Heme-Onc consult  - Will give 1 dose IV Lasix 20 mg     #Hx of PE   - On Xarelto at home. Last dose yesterday morning  - Heparin drip for now. Hold prior to paracentesis when scheduled  - CT shows IVC filter in place (placed in 2020). Consider clarifying with vascular how long it should remain    #Hx of CAD s/p CABG   - Continue Toprol and statin     #HTN   - Continue home Amlodipine and Toprol     #Depression   - Continue Home Psych medications     DVT Prophylaxis: heparin drip  GI Prophylaxis: not indicated  Diet: DASH  Activity: IAT

## 2022-03-20 NOTE — H&P ADULT - NSHPREVIEWOFSYSTEMS_GEN_ALL_CORE
CONSTITUTIONAL: No weakness, fevers or chills, no weight loss   EYES/ENT: No visual changes;  No vertigo or throat pain   NECK: No pain or stiffness  RESPIRATORY: No cough, wheezing, hemoptysis; No shortness of breath  CARDIOVASCULAR: No chest pain or palpitations  GASTROINTESTINAL: + abdominal distention, No abdominal or epigastric pain. No nausea, vomiting, or hematemesis; No diarrhea or constipation. No melena or hematochezia.  GENITOURINARY: No dysuria, frequency or hematuria  NEUROLOGICAL: No numbness or weakness  All other review of systems is negative unless indicated above.

## 2022-03-20 NOTE — ED ADULT NURSE NOTE - NSIMPLEMENTINTERV_GEN_ALL_ED
Implemented All Fall with Harm Risk Interventions:  [place] to call system. Call bell, personal items and telephone within reach. Instruct patient to call for assistance. Room bathroom lighting operational. Non-slip footwear when patient is off stretcher. Physically safe environment: no spills, clutter or unnecessary equipment. Stretcher in lowest position, wheels locked, appropriate side rails in place. Provide visual cue, wrist band, yellow gown, etc. Monitor gait and stability. Monitor for mental status changes and reorient to person, place, and time. Review medications for side effects contributing to fall risk. Reinforce activity limits and safety measures with patient and family. Provide visual clues: red socks.

## 2022-03-20 NOTE — ED PROVIDER NOTE - ATTENDING CONTRIBUTION TO CARE
Patient is c/o worsening abdominal distension, associated with intermittent episodes of crampy abd pain, denies f/c; +nausea. denies cp/sob/syncope. Patient is a poor historian, and could not provide much details.   Vitals reviewed.   Patient is awake, alert, answering questions appropriately, appears comfortable and not in any distress.  Lungs: CTA, no wheezing, no crackles.  Abd: +BS, NT, ND, soft, no rebound, no guarding,   CNS: Awake, alert, speaking in full sentences, moving all 4 ext.   A/P: Worsening abdominal distension with crampy pain,   labs, CT, reevaluation.

## 2022-03-20 NOTE — ED PROCEDURE NOTE - ATTENDING CONTRIBUTION TO CARE
Patient with difficult IV access, needed US guidance. Procedure performed by BRENDA Haley, patient tolerated procedure well and no complications noted.

## 2022-03-20 NOTE — H&P ADULT - HISTORY OF PRESENT ILLNESS
81 F patient with Hx of uterine cancer s/p SHAAN s/p recurrence, Recurrent malignant ascites, CAD s/p CABG, HTN, depression, PE on Xarelto who presented for increasing abdominal distention x4 weeks. She was admitted 1 month ago with the same complaint and had paracentesis by IR with drainage of 4+ L. She follows with Dr. Mariee and had a port inserted for resumption of chemotherapy after recurrence of abdominal malignancy. She denies abdominal pain, constipation, chest pain, SOB, fever, chills, and did not check for weight gain.    ED VS: T(F): , Max: 99 (03-20-22 @ 12:06)  HR:  (73 - 83)  BP:  (111/58 - 124/57)  RR: 18  SpO2:  (96% - 98%)      Labs: Hgb 10.2, lipase 70  Imaging: CT AP shows moderate to large volume abdominopelvic ascites  Pt is admitted for management of recurrent ascites likely 2/2 malignancy

## 2022-03-20 NOTE — H&P ADULT - NSHPLABSRESULTS_GEN_ALL_CORE
LABS:  cret                        10.2   5.14  )-----------( 251      ( 20 Mar 2022 02:31 )             31.5     03-20    138  |  101  |  15  ----------------------------<  106<H>  5.5<H>   |  26  |  1.4    Ca    8.1<L>      20 Mar 2022 02:31    TPro  6.5  /  Alb  3.0<L>  /  TBili  0.3  /  DBili  <0.2  /  AST  44<H>  /  ALT  12  /  AlkPhos  78  03-20    PT/INR - ( 20 Mar 2022 02:31 )   PT: 20.50 sec;   INR: 1.79 ratio         PTT - ( 20 Mar 2022 02:31 )  PTT:36.0 sec

## 2022-03-21 NOTE — PROVIDER CONTACT NOTE (OTHER) - ACTION/TREATMENT ORDERED:
MD Rome made aware. As per MD goode to keep pt on until we know time of procedure. Will continue to monitor.

## 2022-03-21 NOTE — CONSULT NOTE ADULT - SUBJECTIVE AND OBJECTIVE BOX
INTERVENTIONAL RADIOLOGY CONSULT:     HPI:  81 F patient with Hx of uterine cancer s/p SHAAN s/p recurrence, Recurrent malignant ascites, CAD s/p CABG, HTN, depression, PE on Xarelto who presented for increasing abdominal distention x4 weeks. She was admitted 1 month ago with the same complaint and had paracentesis by IR with drainage of 4+ L. She follows with Dr. Mariee and had a port inserted for resumption of chemotherapy after recurrence of abdominal malignancy. She denies abdominal pain, constipation, chest pain, SOB, fever, chills, and did not check for weight gain.    ED VS: T(F): , Max: 99 (03-20-22 @ 12:06)  HR:  (73 - 83)  BP:  (111/58 - 124/57)  RR: 18  SpO2:  (96% - 98%)      Labs: Hgb 10.2, lipase 70  Imaging: CT AP shows moderate to large volume abdominopelvic ascites  Pt is admitted for management of recurrent ascites likely 2/2 malignancy   (20 Mar 2022 13:56)      PAST MEDICAL & SURGICAL HISTORY:  Uterine cancer  s/p SHAAN and chemo    Hypertension    High cholesterol    Cataract of right eye    Glaucoma    Coronary artery disease  s/p CABG X2    CAD (coronary artery disease)    HTN (hypertension)    HLD (hyperlipidemia)    H/O total hysterectomy    Athscl CABG, unsp, w angina pectoris w documented spasm    History of coronary artery bypass surgery        MEDICATIONS  (STANDING):  amLODIPine   Tablet 5 milliGRAM(s) Oral daily  atorvastatin 40 milliGRAM(s) Oral at bedtime  brimonidine 0.2% Ophthalmic Solution 1 Drop(s) Both EYES two times a day  busPIRone 15 milliGRAM(s) Oral two times a day  clonazePAM  Tablet 0.5 milliGRAM(s) Oral daily  dorzolamide 2% Ophthalmic Solution 1 Drop(s) Both EYES three times a day  furosemide   Injectable 20 milliGRAM(s) IV Push once  latanoprost 0.005% Ophthalmic Solution 1 Drop(s) Both EYES at bedtime  metoprolol succinate ER 25 milliGRAM(s) Oral daily  spironolactone 50 milliGRAM(s) Oral daily    MEDICATIONS  (PRN):  acetaminophen     Tablet .. 650 milliGRAM(s) Oral every 6 hours PRN Temp greater or equal to 38C (100.4F), Mild Pain (1 - 3)      Allergies    chloroquine (Hives)  quinine (Urticaria)    Intolerances        Physical Exam:   Vital Signs Last 24 Hrs  T(C): 36.6 (21 Mar 2022 09:38), Max: 37.2 (20 Mar 2022 12:06)  T(F): 97.8 (21 Mar 2022 09:38), Max: 99 (20 Mar 2022 12:06)  HR: 88 (21 Mar 2022 09:38) (73 - 98)  BP: 120/63 (21 Mar 2022 09:38) (105/53 - 127/69)  BP(mean): --  RR: 18 (21 Mar 2022 09:38) (18 - 18)  SpO2: 99% (21 Mar 2022 09:38) (98% - 99%)    Labs:                         10.1   6.03  )-----------( 252      ( 21 Mar 2022 06:30 )             30.8     03-21    138  |  102  |  12  ----------------------------<  97  4.1   |  26  |  1.2    Ca    8.3<L>      21 Mar 2022 06:30    TPro  6.0  /  Alb  3.0<L>  /  TBili  0.4  /  DBili  x   /  AST  23  /  ALT  10  /  AlkPhos  80  03-21    PT/INR - ( 20 Mar 2022 02:31 )   PT: 20.50 sec;   INR: 1.79 ratio         PTT - ( 21 Mar 2022 06:30 )  PTT:85.1 sec    Pertinent labs:                      10.1   6.03  )-----------( 252      ( 21 Mar 2022 06:30 )             30.8       03-21    138  |  102  |  12  ----------------------------<  97  4.1   |  26  |  1.2    Ca    8.3<L>      21 Mar 2022 06:30    TPro  6.0  /  Alb  3.0<L>  /  TBili  0.4  /  DBili  x   /  AST  23  /  ALT  10  /  AlkPhos  80  03-21      PT/INR - ( 20 Mar 2022 02:31 )   PT: 20.50 sec;   INR: 1.79 ratio         PTT - ( 21 Mar 2022 06:30 )  PTT:85.1 sec    Radiology imaging reviewed.     ASSESSMENT/PLAN:     81 F patient with Hx of uterine cancer s/p SHAAN s/p recurrence, Recurrent malignant ascites, CAD s/p CABG, HTN, depression, PE on Xarelto who presented for increasing abdominal distention x4 weeks. IR is consulted for paracentesis.   - Plan for Paracentesis today    Thank you for the courtesy of this consult, please call [phone] between 8am and 5pm on weekdays, and [phone] at any other time with any further questions.

## 2022-03-21 NOTE — PROCEDURE NOTE - NSPOSTCAREGUIDE_GEN_A_CORE
Keep the cast/splint/dressing clean and dry
Verbal/written post procedure instructions were given to patient/caregiver/Instructed patient/caregiver to follow-up with primary care physician/Instructed patient/caregiver regarding signs and symptoms of infection/Keep the cast/splint/dressing clean and dry

## 2022-03-21 NOTE — CONSULT NOTE ADULT - ATTENDING COMMENTS
Patient examined at the bedside, feels better after paracentesis . will need salvage therapy to start as outpatient .  to discuss options with medical oncologist , Dr Herzog .

## 2022-03-21 NOTE — CONSULT NOTE ADULT - ASSESSMENT
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 CT AP shows moderate to large volume ascites with no other significant changes. Oncology consulted for management.    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 in 2020 s/p weekly Carbo/Taxol (8/14/20-1/28/2021)   - PET Scan from 2/25/21 shows good response to chemo (declines any additional cancer directed therapy)  - CT A/P from 11/2021 (abd pain) shows no new disease; CTa Chest from 09/2021   - CT A/P from 02/2022 showed ascites which showed recurrent adenocarcinoma of mullerian origin:  is high  - Completed [facility] Liquid Biopsy Testing (03/01/2022) shows no targetable mutation  - chemoport placed by IR   - plan to weekly Carboplatin (AUC 2) + Taxol (40mg/m2 due to neuropathy) x 3 weeks then 1 week off as outpatient    # Malignant ascites required high volume paracentesis   - previously improved/resolved with carbo/taxol  - perform therapeutic paracentesis as chemo treatment unlikely to rapidly improve sxs     # PE in the setting of malignancy diagnosed on 8/6/2020  - Doppler was negative for DVT on 8/8/2020  - Also on ASA, h/o CABG.   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 CT AP shows moderate to large volume ascites with no other significant changes. Oncology consulted for management.    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 in 2020 s/p weekly Carbo/Taxol (8/14/20-1/28/2021)   - PET Scan from 2/25/21 shows good response to chemo (declines any additional cancer directed therapy)  - CT A/P from 11/2021 (abd pain) shows no new disease; CTa Chest from 09/2021   - CT A/P from 02/2022 showed ascites which showed recurrent adenocarcinoma of mullerian origin:  is high  - Completed [facility] Liquid Biopsy Testing (03/01/2022) shows no targetable mutation  - chemoport placed by IR       # Malignant ascites required high volume paracentesis   - previously improved/resolved with carbo/taxol  - perform therapeutic paracentesis as chemo treatment unlikely to rapidly improve sxs     # PE in the setting of malignancy diagnosed on 8/6/2020  - Doppler was negative for DVT on 8/8/2020  - Also on ASA, h/o CABG.   - she can resume Xarelto 20mg daily s/p paracentesis as long as no bleeding complications

## 2022-03-21 NOTE — PROGRESS NOTE ADULT - ASSESSMENT
81 F patient with Hx of uterine cancer s/p SHAAN s/p recurrence, Recurrent malignant ascites, CAD s/p CABG, HTN, depression, PE on Xarelto who presented for increasing abdominal distention x4 weeks.     #Recurrent ascites  #Hx of uterine malignancy s/p SHAAN and chemotherapy, recurrence  - low suspicion for SBP; no sepsis present on admission.   - CT shows Redemonstrated moderate to large volume abdominopelvic ascites. No significant change since 3/3/2022   - Last admission had paracentesis by IR with drainage of >4 L  - S/p 1 dose IV Lasix 20 mg   - Currently on heparin drip  in preparation for in-pt paracentesis.  - F/u IR consult for paracentesis    #Hx of PE   - On Xarelto at home. Last dose yesterday morning  - Heparin drip for now. Hold prior to paracentesis when scheduled  - CT shows IVC filter in place (placed in 2020). Consider clarifying with vascular how long it should remain    #Hx of CAD s/p CABG   - Continue Toprol and statin     #Hx of uterine CA s/p SHAAN s/p recurrence  - CT from Feb 2022 demonstrated new omental nodularity indicating recurrence of malignancy  - Was treated previously with carbo/taxol. Outpatient heme onc notes with plan for resumption of the same    #HTN   - Continue home Amlodipine and Toprol     #Depression   - Continue Home Psych medications     #Misx   - DVT Prophylaxis: heparin drip  - GI Prophylaxis: not indicated  - Diet: DASH  - Activity: IAT   81 F patient with Hx of uterine cancer s/p SHAAN s/p recurrence, Recurrent malignant ascites, CAD s/p CABG, HTN, depression, PE on Xarelto who presented for increasing abdominal distention x4 weeks.     #Recurrent ascites  #Hx of uterine malignancy s/p SHAAN and chemotherapy, recurrence  - low suspicion for SBP; no sepsis present on admission.   - CT shows Re-demonstrated moderate to large volume abdominopelvic ascites. No significant change since 3/3/2022   - Last admission had paracentesis by IR with drainage of >4 L  - S/p 1 dose IV Lasix 20 mg   - Currently on heparin drip  in preparation for in-pt paracentesis.  - F/u IR consult for paracentesis    #Hx of PE   - On Xarelto at home. Last dose yesterday morning  - Heparin drip for now. Hold prior to paracentesis when scheduled  - CT shows IVC filter in place (placed in 2020). Consider clarifying with vascular how long it should remain    #Hx of CAD s/p CABG   - Continue Toprol and statin     #Hx of uterine CA s/p SHAAN s/p recurrence  - CT from Feb 2022 demonstrated new omental nodularity indicating recurrence of malignancy  - Was treated previously with carbo/taxol. Outpatient heme onc notes with plan for resumption of the same    #HTN   - Continue home Amlodipine and Toprol     #Depression   - Continue Home Psych medications     #Misc   - DVT Prophylaxis: heparin drip  - GI Prophylaxis: not indicated  - Diet: DASH  - Activity: IAT  - Disposition: acute   Pending: IR

## 2022-03-21 NOTE — PROGRESS NOTE ADULT - ASSESSMENT
81 F patient with Hx of uterine cancer s/p SHAAN s/p recurrence, Recurrent malignant ascites, CAD s/p CABG, HTN, depression, PE on Xarelto who presented for increasing abdominal distention x4 weeks.     #Recurrent ascites  #Hx of uterine malignancy s/p SHAAN and chemotherapy, recurrence  - low suspicion for SBP; no sepsis present on admission.   - CT shows Re-demonstrated moderate to large volume abdominopelvic ascites. No significant change since 3/3/2022   - Last admission had paracentesis by IR with drainage of >4 L  - S/p 1 dose IV Lasix 20 mg   - Currently on heparin drip  in preparation for in-pt paracentesis.  - F/u IR consult for paracentesis  -Oncology to follow up     #Hx of PE   - On Xarelto at home. Last dose yesterday morning  - Heparin drip for now. Hold prior to paracentesis when scheduled  - CT shows IVC filter in place (placed in 2020). Consider clarifying with vascular how long it should remain    #Hx of CAD s/p CABG   - Continue Toprol and statin     #Hx of uterine CA s/p SHAAN s/p recurrence  - CT from Feb 2022 demonstrated new omental nodularity indicating recurrence of malignancy  - Was treated previously with carbo/taxol. Outpatient heme onc notes with plan for resumption of the same    #HTN   - Continue home Amlodipine and Toprol     #Depression   - Continue Home Psych medications     #Misc   - DVT Prophylaxis: heparin drip  - GI Prophylaxis: not indicated  - Diet: DASH  - Activity: IAT  - Disposition: acute   Pending: IR

## 2022-03-21 NOTE — PROCEDURE NOTE - NSPROCDETAILS_GEN_ALL_CORE
location identified, sterile technique used, catheter introduced, fluid drained/Seldinger technique/sterile dressing applied
location identified, draped/prepped, sterile technique used/ultrasound guidance

## 2022-03-21 NOTE — CONSULT NOTE ADULT - SUBJECTIVE AND OBJECTIVE BOX
Patient is a 81y old  Female who presents with a chief complaint of     HPI:  81 F patient with Hx of uterine cancer s/p SHAAN s/p recurrence, Recurrent malignant ascites, CAD s/p CABG, HTN, depression, PE on Xarelto who presented for increasing abdominal distention x4 weeks. She was admitted 1 month ago with the same complaint and had paracentesis by IR with drainage of 4+ L. She follows with Dr. Mariee and had a port inserted for resumption of chemotherapy after recurrence of abdominal malignancy. She denies abdominal pain, constipation, chest pain, SOB, fever, chills, and did not check for weight gain.    ED VS: T(F): , Max: 99 (03-20-22 @ 12:06)  HR:  (73 - 83)  BP:  (111/58 - 124/57)  RR: 18  SpO2:  (96% - 98%)      Labs: Hgb 10.2, lipase 70  Imaging: CT AP shows moderate to large volume abdominopelvic ascites  Pt is admitted for management of recurrent ascites likely 2/2 malignancy   (20 Mar 2022 13:56)       ROS:  Negative except for:    PAST MEDICAL & SURGICAL HISTORY:  Uterine cancer  s/p SHAAN and chemo    Hypertension    High cholesterol    Cataract of right eye    Glaucoma    Coronary artery disease  s/p CABG X2    CAD (coronary artery disease)    HTN (hypertension)    HLD (hyperlipidemia)    H/O total hysterectomy    Athscl CABG, unsp, w angina pectoris w documented spasm    History of coronary artery bypass surgery        SOCIAL HISTORY:    FAMILY HISTORY:      MEDICATIONS  (STANDING):  amLODIPine   Tablet 5 milliGRAM(s) Oral daily  atorvastatin 40 milliGRAM(s) Oral at bedtime  brimonidine 0.2% Ophthalmic Solution 1 Drop(s) Both EYES two times a day  busPIRone 15 milliGRAM(s) Oral two times a day  clonazePAM  Tablet 0.5 milliGRAM(s) Oral daily  dorzolamide 2% Ophthalmic Solution 1 Drop(s) Both EYES three times a day  furosemide   Injectable 20 milliGRAM(s) IV Push once  latanoprost 0.005% Ophthalmic Solution 1 Drop(s) Both EYES at bedtime  metoprolol succinate ER 25 milliGRAM(s) Oral daily  spironolactone 50 milliGRAM(s) Oral daily    MEDICATIONS  (PRN):  acetaminophen     Tablet .. 650 milliGRAM(s) Oral every 6 hours PRN Temp greater or equal to 38C (100.4F), Mild Pain (1 - 3)    Height (cm): 160 (03-21-22 @ 03:01)  Weight (kg): 55.8 (03-21-22 @ 03:01)  BMI (kg/m2): 21.8 (03-21-22 @ 03:01)  BSA (m2): 1.57 (03-21-22 @ 03:01)  Allergies    chloroquine (Hives)  quinine (Urticaria)    Intolerances        Vital Signs Last 24 Hrs  T(C): 36.9 (21 Mar 2022 00:00), Max: 37.2 (20 Mar 2022 12:06)  T(F): 98.4 (21 Mar 2022 00:00), Max: 99 (20 Mar 2022 12:06)  HR: 92 (21 Mar 2022 05:00) (73 - 98)  BP: 114/62 (21 Mar 2022 05:00) (105/53 - 127/69)  BP(mean): --  RR: 18 (21 Mar 2022 00:00) (18 - 18)  SpO2: 99% (20 Mar 2022 16:07) (98% - 99%)    PHYSICAL EXAM  General: adult in NAD  HEENT: clear oropharynx, anicteric sclera, pink conjunctiva  Neck: supple  CV: normal S1/S2 with no murmur rubs or gallops  Lungs: positive air movement b/l ant lungs,clear to auscultation, no wheezes, no rales  Abdomen: soft non-tender non-distended, no hepatosplenomegaly  Ext: no clubbing cyanosis or edema  Skin: no rashes and no petechiae  Neuro: alert and oriented X 4, no focal deficits      LABS:                          10.1   6.03  )-----------( 252      ( 21 Mar 2022 06:30 )             30.8         Mean Cell Volume : 78.0 fL  Mean Cell Hemoglobin : 25.6 pg  Mean Cell Hemoglobin Concentration : 32.8 g/dL  Auto Neutrophil # : 3.65 K/uL  Auto Lymphocyte # : 1.48 K/uL  Auto Monocyte # : 0.66 K/uL  Auto Eosinophil # : 0.19 K/uL  Auto Basophil # : 0.03 K/uL  Auto Neutrophil % : 60.6 %  Auto Lymphocyte % : 24.5 %  Auto Monocyte % : 10.9 %  Auto Eosinophil % : 3.2 %  Auto Basophil % : 0.5 %      Serial CBC's  03-21 @ 06:30  Hct-30.8 / Hgb-10.1 / Plat-252 / RBC-3.95 / WBC-6.03  Serial CBC's  03-20 @ 02:31  Hct-31.5 / Hgb-10.2 / Plat-251 / RBC-4.05 / WBC-5.14      03-21    138  |  102  |  12  ----------------------------<  97  4.1   |  26  |  1.2    Ca    8.3<L>      21 Mar 2022 06:30    TPro  6.0  /  Alb  3.0<L>  /  TBili  0.4  /  DBili  x   /  AST  23  /  ALT  10  /  AlkPhos  80  03-21      PT/INR - ( 20 Mar 2022 02:31 )   PT: 20.50 sec;   INR: 1.79 ratio         PTT - ( 21 Mar 2022 06:30 )  PTT:85.1 sec      BLOOD SMEAR INTERPRETATION:       RADIOLOGY & ADDITIONAL STUDIES:    < from: CT Abdomen and Pelvis No Cont (03.20.22 @ 06:22) >  IMPRESSION:  Redemonstrated moderate to large volume abdominopelvic ascites. No   significant change since 3/3/2022 given the limitation of no IV contrast.  --- End of Report ---  < end of copied text >         Patient is a 81y old  Female who presents with a chief complaint of     HPI:  81 F patient with Hx of uterine cancer s/p SHAAN s/p recurrence, Recurrent malignant ascites, CAD s/p CABG, HTN, depression, PE on Xarelto who presented for increasing abdominal distention x4 weeks. She was admitted 1 month ago with the same complaint and had paracentesis by IR with drainage of 4+ L. She follows with Dr. Mariee and had a port inserted for resumption of chemotherapy after recurrence of abdominal malignancy. She denies abdominal pain, constipation, chest pain, SOB, fever, chills, and did not check for weight gain.    ED VS: T(F): , Max: 99 (03-20-22 @ 12:06)  HR:  (73 - 83)  BP:  (111/58 - 124/57)  RR: 18  SpO2:  (96% - 98%)      Labs: Hgb 10.2, lipase 70  Imaging: CT AP shows moderate to large volume abdominopelvic ascites  Pt is admitted for management of recurrent ascites likely 2/2 malignancy   (20 Mar 2022 13:56)       ROS:  Negative except for: mild abdominal discomfort    PAST MEDICAL & SURGICAL HISTORY:  Uterine cancer  s/p SHAAN and chemo    Hypertension    High cholesterol    Cataract of right eye    Glaucoma    Coronary artery disease  s/p CABG X2    CAD (coronary artery disease)    HTN (hypertension)    HLD (hyperlipidemia)    H/O total hysterectomy    Athscl CABG, unsp, w angina pectoris w documented spasm    History of coronary artery bypass surgery        SOCIAL HISTORY:    FAMILY HISTORY:      MEDICATIONS  (STANDING):  amLODIPine   Tablet 5 milliGRAM(s) Oral daily  atorvastatin 40 milliGRAM(s) Oral at bedtime  brimonidine 0.2% Ophthalmic Solution 1 Drop(s) Both EYES two times a day  busPIRone 15 milliGRAM(s) Oral two times a day  clonazePAM  Tablet 0.5 milliGRAM(s) Oral daily  dorzolamide 2% Ophthalmic Solution 1 Drop(s) Both EYES three times a day  furosemide   Injectable 20 milliGRAM(s) IV Push once  latanoprost 0.005% Ophthalmic Solution 1 Drop(s) Both EYES at bedtime  metoprolol succinate ER 25 milliGRAM(s) Oral daily  spironolactone 50 milliGRAM(s) Oral daily    MEDICATIONS  (PRN):  acetaminophen     Tablet .. 650 milliGRAM(s) Oral every 6 hours PRN Temp greater or equal to 38C (100.4F), Mild Pain (1 - 3)    Height (cm): 160 (03-21-22 @ 03:01)  Weight (kg): 55.8 (03-21-22 @ 03:01)  BMI (kg/m2): 21.8 (03-21-22 @ 03:01)  BSA (m2): 1.57 (03-21-22 @ 03:01)  Allergies    chloroquine (Hives)  quinine (Urticaria)    Intolerances        Vital Signs Last 24 Hrs  T(C): 36.9 (21 Mar 2022 00:00), Max: 37.2 (20 Mar 2022 12:06)  T(F): 98.4 (21 Mar 2022 00:00), Max: 99 (20 Mar 2022 12:06)  HR: 92 (21 Mar 2022 05:00) (73 - 98)  BP: 114/62 (21 Mar 2022 05:00) (105/53 - 127/69)  BP(mean): --  RR: 18 (21 Mar 2022 00:00) (18 - 18)  SpO2: 99% (20 Mar 2022 16:07) (98% - 99%)    PHYSICAL EXAM  General: adult in NAD  HEENT: clear oropharynx, anicteric sclera, pink conjunctiva  Neck: supple  CV: normal S1/S2 with no murmur rubs or gallops  Lungs: positive air movement b/l ant lungs,clear to auscultation, no wheezes, no rales  Abdomen: distended, but not tender. +surgical scar, well healed. no hepatosplenomegaly  Ext: no clubbing cyanosis or edema  Skin: no rashes and no petechiae  Neuro: alert and oriented X 4, no focal deficits      LABS:                          10.1   6.03  )-----------( 252      ( 21 Mar 2022 06:30 )             30.8         Mean Cell Volume : 78.0 fL  Mean Cell Hemoglobin : 25.6 pg  Mean Cell Hemoglobin Concentration : 32.8 g/dL  Auto Neutrophil # : 3.65 K/uL  Auto Lymphocyte # : 1.48 K/uL  Auto Monocyte # : 0.66 K/uL  Auto Eosinophil # : 0.19 K/uL  Auto Basophil # : 0.03 K/uL  Auto Neutrophil % : 60.6 %  Auto Lymphocyte % : 24.5 %  Auto Monocyte % : 10.9 %  Auto Eosinophil % : 3.2 %  Auto Basophil % : 0.5 %      Serial CBC's  03-21 @ 06:30  Hct-30.8 / Hgb-10.1 / Plat-252 / RBC-3.95 / WBC-6.03  Serial CBC's  03-20 @ 02:31  Hct-31.5 / Hgb-10.2 / Plat-251 / RBC-4.05 / WBC-5.14      03-21    138  |  102  |  12  ----------------------------<  97  4.1   |  26  |  1.2    Ca    8.3<L>      21 Mar 2022 06:30    TPro  6.0  /  Alb  3.0<L>  /  TBili  0.4  /  DBili  x   /  AST  23  /  ALT  10  /  AlkPhos  80  03-21      PT/INR - ( 20 Mar 2022 02:31 )   PT: 20.50 sec;   INR: 1.79 ratio         PTT - ( 21 Mar 2022 06:30 )  PTT:85.1 sec      BLOOD SMEAR INTERPRETATION:       RADIOLOGY & ADDITIONAL STUDIES:    < from: CT Abdomen and Pelvis No Cont (03.20.22 @ 06:22) >  IMPRESSION:  Redemonstrated moderate to large volume abdominopelvic ascites. No   significant change since 3/3/2022 given the limitation of no IV contrast.  --- End of Report ---  < end of copied text >

## 2022-03-22 NOTE — PROGRESS NOTE ADULT - ASSESSMENT
81 F patient with Hx of uterine cancer s/p SHAAN s/p recurrence, Recurrent malignant ascites, CAD s/p CABG, HTN, depression, PE on Xarelto who presented for increasing abdominal distention x4 weeks.     #Recurrent ascites  #Hx of uterine malignancy s/p SHAAN and chemotherapy, recurrence  - low suspicion for SBP; no sepsis present on admission.   - CT shows Re-demonstrated moderate to large volume abdominopelvic ascites. No significant change since 3/3/2022   - Last admission had paracentesis by IR with drainage of >4 L  - S/p 1 dose IV Lasix 20 mg   - s/p heparin drip  in preparation for in-pt paracentesis.  - S/p paracentesis 3/21: removed 2700ml of fluid     #Hx of PE   - On Xarelto at home.   - CT shows IVC filter in place (placed in 2020).  - Xarelto restarted 3/22    #Hx of CAD s/p CABG   - Continue Toprol and statin     #Hx of uterine CA s/p SHAAN s/p recurrence  - CT from Feb 2022 demonstrated new omental nodularity indicating recurrence of malignancy  - Was treated previously with carbo/taxol. Outpatient heme onc notes with plan for resumption of the same  - Follows Dr. Mariee    #HTN   - Continue home Amlodipine and Toprol     #Depression   - Continue Home Psych medications     #Misc   - DVT Prophylaxis: Xarelto   - GI Prophylaxis: not indicated  - Diet: DASH  - Activity: IAT  - Disposition: Discharge home        81 F patient with Hx of uterine cancer s/p SHAAN s/p recurrence, Recurrent malignant ascites, CAD s/p CABG, HTN, depression, PE on Xarelto who presented for increasing abdominal distention x4 weeks.     #Recurrent ascites  #Hx of uterine malignancy s/p SHAAN and chemotherapy, recurrence  - low suspicion for SBP; no sepsis present on admission.   - CT shows Re-demonstrated moderate to large volume abdominopelvic ascites. No significant change since 3/3/2022   - Last admission had paracentesis by IR with drainage of >4 L  - c/w spironolactone 50mg QD   - S/p 1 dose IV Lasix 20 mg   - s/p heparin drip  in preparation for in-pt paracentesis.  - S/p paracentesis 3/21: removed 2700ml of fluid     #Hx of PE   - On Xarelto at home.   - CT shows IVC filter in place (placed in 2020).  - Xarelto restarted 3/22    #Hx of CAD s/p CABG   - Continue Toprol and statin     #Hx of uterine CA s/p SHAAN s/p recurrence  - CT from Feb 2022 demonstrated new omental nodularity indicating recurrence of malignancy  - Was treated previously with carbo/taxol. Outpatient heme onc notes with plan for resumption of the same  - Follows Dr. Mariee    #HTN   - Continue home Amlodipine and Toprol     #Depression   - Continue Home Psych medications     #Misc   - DVT Prophylaxis: Xarelto   - GI Prophylaxis: not indicated  - Diet: DASH  - Activity: IAT  - Disposition: Discharge home

## 2022-03-22 NOTE — DISCHARGE NOTE PROVIDER - PROVIDER TOKENS
PROVIDER:[TOKEN:[[id number]],SCHEDULEDAPPT:[03/22/2022],ESTABLISHEDPATIENT:[T]] PROVIDER:[TOKEN:[[id number]],SCHEDULEDAPPT:[03/30/2022],SCHEDULEDAPPTTIME:[05:00 PM],ESTABLISHEDPATIENT:[T]]

## 2022-03-22 NOTE — DISCHARGE NOTE PROVIDER - NSDCFUSCHEDAPPT_GEN_ALL_CORE_FT
BEKAH NELSON ; 03/22/2022 ; [facility] Hem[facility]c [facility] BEKAH Banuelos ; 04/04/2022 ; NPP PSYCHIATRY  BEKAH Quinones ; 05/10/2022 ; [facility] Gastro Doc Off [id number] BEKAH NELSON ; 03/30/2022 ; [facility] HemOnc [facility] BEKAH Banuelos ; 04/04/2022 ; NPP PSYCHIATRY  BEKAH Quinones ; 05/10/2022 ; [facility] Gastro Doc Off [id number]

## 2022-03-22 NOTE — DISCHARGE NOTE NURSING/CASE MANAGEMENT/SOCIAL WORK - NSDCPEFALRISK_GEN_ALL_CORE
For information on Fall & Injury Prevention, visit: [email or web address] OR  [email or web address] OR  https://www.cdc.gov/steadi/patient.html

## 2022-03-22 NOTE — DISCHARGE NOTE PROVIDER - NSDCMRMEDTOKEN_GEN_ALL_CORE_FT
Aldactone 50 mg oral tablet: 1 tab(s) orally once a day   amLODIPine 5 mg oral tablet: 1 tab(s) orally once a day  brimonidine 0.1% ophthalmic solution: to each affected eye 2 times a day  busPIRone 15 mg oral tablet: 1 tab(s) orally 2 times a day  clonazePAM 0.5 mg oral tablet: 1 tab(s) orally once a day  dorzolamide 2% ophthalmic solution: 1 drop(s) to each affected eye 2 times a day  latanoprost 0.005% ophthalmic solution: 1 drop(s) to each affected eye 2 times a day  metoprolol succinate 25 mg oral tablet, extended release: 1 tab(s) orally once a day  rosuvastatin 10 mg oral tablet: 1 tab(s) orally once a day  traMADol 50 mg oral tablet: 1 tab(s) orally every 6 hours, As Needed -for severe pain MDD:4 tab   Xarelto 20 mg oral tablet: 1 tab(s) orally once a day (in the evening)

## 2022-03-22 NOTE — DISCHARGE NOTE NURSING/CASE MANAGEMENT/SOCIAL WORK - PATIENT PORTAL LINK FT
You can access the FollowMyHealth Patient Portal offered by [facility] by registering at the following website: [email or web address]. By joining [facility]’s FollowMyHealth portal, you will also be able to view your health information using other applications (apps) compatible with our system.

## 2022-03-22 NOTE — DISCHARGE NOTE PROVIDER - NSDCCPCAREPLAN_GEN_ALL_CORE_FT
PRINCIPAL DISCHARGE DIAGNOSIS  Diagnosis: Ascites  Assessment and Plan of Treatment: Ascities is excess fluid in the lower abdomen. The fluid causes swelling in the abdomen. You had a therapeutic paracentesis to remove the fluids. We were able to removed 2.7L. Please follow up with Dr. Greco for further management.

## 2022-03-22 NOTE — DISCHARGE NOTE PROVIDER - CARE PROVIDER_API CALL
Paul Greco (DO)  Hematology; Internal Medicine; Medical Oncology  [address] [place] [address]  Phone: [phone]  Fax: [phone]  Established Patient  Scheduled Appointment: 03/22/2022   Paul Greco (DO)  Hematology; Internal Medicine; Medical Oncology  [address] [place] [address]  Phone: [phone]  Fax: [phone]  Established Patient  Scheduled Appointment: 03/30/2022 05:00 PM

## 2022-03-22 NOTE — DISCHARGE NOTE PROVIDER - HOSPITAL COURSE
81 F patient with Hx of uterine cancer s/p SHAAN s/p recurrence, Recurrent malignant ascites, CAD s/p CABG, HTN, depression, PE on Xarelto who presented for increasing abdominal distention x4 weeks.     #Recurrent ascites  #Hx of uterine malignancy s/p SHAAN and chemotherapy, recurrence  - low suspicion for SBP; no sepsis present on admission.   - CT shows Re-demonstrated moderate to large volume abdominopelvic ascites. No significant change since 3/3/2022   - Last admission had paracentesis by IR with drainage of >4 L  - c/w spironolactone 50mg QD   - S/p 1 dose IV Lasix 20 mg   - s/p heparin drip  in preparation for in-pt paracentesis.  - S/p paracentesis 3/21: removed 2700ml of fluid     #Hx of PE   - On Xarelto at home.   - CT shows IVC filter in place (placed in 2020).  - Xarelto restarted 3/22    #Hx of CAD s/p CABG   - Continue Toprol and statin     #Hx of uterine CA s/p SHAAN s/p recurrence  - CT from Feb 2022 demonstrated new omental nodularity indicating recurrence of malignancy  - Was treated previously with carbo/taxol. Outpatient heme onc notes with plan for resumption of the same  - Follows Dr. Mariee    #HTN   - Continue home Amlodipine and Toprol     #Depression   - Continue Home Psych medications    81 F patient with Hx of uterine cancer s/p SHAAN s/p recurrence, Recurrent malignant ascites, CAD s/p CABG, HTN, depression, PE on Xarelto who presented for increasing abdominal distention x4 weeks.     #Recurrent ascites  #Hx of uterine malignancy s/p SHAAN and chemotherapy, recurrence  - low suspicion for SBP; no sepsis present on admission.   - CT shows Re-demonstrated moderate to large volume abdominopelvic ascites. No significant change since 3/3/2022   - Last admission had paracentesis by IR with drainage of >4 L  - c/w spironolactone 50mg QD   - S/p 1 dose IV Lasix 20 mg   - S/p paracentesis 3/21: removed 2700ml of fluid     #Hx of PE   - On Xarelto at home.   - CT shows IVC filter in place (placed in 2020).  - Xarelto restarted 3/22    #Hx of CAD s/p CABG   - Continue Toprol and statin     #Hx of uterine CA s/p SHAAN s/p recurrence  - CT from Feb 2022 demonstrated new omental nodularity indicating recurrence of malignancy  - Was treated previously with carbo/taxol. Outpatient heme onc notes with plan for resumption of the same  - Follows Dr. Mariee : plan to weekly Carboplatin (AUC 2) + Taxol (40mg/m2 due to neuropathy) x 3 weeks then 1 week off as outpatient    #HTN   - Continue home Amlodipine and Toprol     #Depression   - Continue Home Psych medications    81 F patient with Hx of uterine cancer s/p SHAAN s/p recurrence, Recurrent malignant ascites, CAD s/p CABG, HTN, depression, PE on Xarelto who presented for increasing abdominal distention x4 weeks.     #Recurrent ascites  #Hx of uterine malignancy s/p SHAAN and chemotherapy, recurrence  - low suspicion for SBP; no sepsis present on admission.   - CT shows Re-demonstrated moderate to large volume abdominopelvic ascites. No significant change since 3/3/2022   - Last admission had paracentesis by IR with drainage of >4 L  - c/w spironolactone 50mg QD   - S/p 1 dose IV Lasix 20 mg   - S/p paracentesis 3/21: removed 2700ml of fluid     #Hx of PE   - On Xarelto at home.   - CT shows IVC filter in place (placed in 2020).  - Xarelto restarted 3/22    #Hx of CAD s/p CABG   - Continue Toprol and statin     #Hx of uterine CA s/p SHAAN s/p recurrence  - CT from Feb 2022 demonstrated new omental nodularity indicating recurrence of malignancy  - Was treated previously with carbo/taxol. Outpatient heme onc notes with plan for resumption of the same  - Follows Dr. Mariee : plan to weekly Carboplatin (AUC 2) + Taxol (40mg/m2 due to neuropathy) x 3 weeks then 1 week off as outpatient  - next appointment  3/30/2022 at 5PM     #HTN   - Continue home Amlodipine and Toprol     #Depression   - Continue Home Psych medications

## 2022-03-22 NOTE — CHART NOTE - NSCHARTNOTEFT_GEN_A_CORE
4B Transfer Note    Transfer from: 4B   Transfer to: 3B  Accepting physican: Dr. Mariee      4B COURSE:    81 F patient with Hx of uterine cancer s/p SHAAN s/p recurrence, Recurrent malignant ascites, CAD s/p CABG, HTN, depression, PE on Xarelto who presented for increasing abdominal distention x4 weeks. She was admitted 1 month ago with the same complaint and had paracentesis by IR with drainage of 4+ L. She follows with Dr. Mariee and had a port inserted for resumption of chemotherapy after recurrence of abdominal malignancy. She denies abdominal pain, constipation, chest pain, SOB, fever, chills, and did not check for weight gain. CT AP shows moderate to large volume abdominopelvic ascites. Pt is admitted for management of recurrent ascites likely 2/2 malignancy.  Patient had paracentesis by IR 3/21 and removed 2700ml of fluid. Patient was ready for discharge and to follow up with her oncologist. Patient had an appointment 3/22 but the heme/onc team decided to do inpatient chemo then discharge the patient. Patient is being transferred to  for chemo.     ASSESMENT AND PLAN:   81 F patient with Hx of uterine cancer s/p SHAAN s/p recurrence, Recurrent malignant ascites, CAD s/p CABG, HTN, depression, PE on Xarelto who presented for increasing abdominal distention x4 weeks.     #Recurrent ascites  #Hx of uterine malignancy s/p SHAAN and chemotherapy, recurrence  - low suspicion for SBP; no sepsis present on admission.   - CT shows Re-demonstrated moderate to large volume abdominopelvic ascites. No significant change since 3/3/2022   - Last admission had paracentesis by IR with drainage of >4 L  - c/w spironolactone 50mg QD   - S/p 1 dose IV Lasix 20 mg   - s/p heparin drip  in preparation for in-pt paracentesis.  - S/p paracentesis 3/21: removed 2700ml of fluid     #Hx of PE   - On Xarelto at home.   - CT shows IVC filter in place (placed in 2020).  - Xarelto restarted 3/22    #Hx of CAD s/p CABG   - Continue Toprol and statin     #Hx of uterine CA s/p SHAAN s/p recurrence  - CT from Feb 2022 demonstrated new omental nodularity indicating recurrence of malignancy  - Was treated previously with carbo/taxol. Outpatient heme onc notes with plan for resumption of the same  - Follows Dr. Mariee    #HTN   - Continue home Amlodipine and Toprol     #Depression   - Continue Home Psych medications     #Misc   - DVT Prophylaxis: Xarelto   - GI Prophylaxis: not indicated  - Diet: DASH  - Activity: IAT  - Disposition: chemo       For Follow-Up:  - f/u with heme/onc for chemo           Vital Signs Last 24 Hrs  T(C): 37.6 (22 Mar 2022 09:00), Max: 37.6 (21 Mar 2022 17:15)  T(F): 99.7 (22 Mar 2022 09:00), Max: 99.7 (22 Mar 2022 09:00)  HR: 89 (22 Mar 2022 09:00) (75 - 90)  BP: 111/58 (22 Mar 2022 09:00) (100/57 - 111/58)  BP(mean): --  RR: 19 (22 Mar 2022 09:00) (18 - 19)  SpO2: --  I&O's Summary        MEDICATIONS  (STANDING):  amLODIPine   Tablet 5 milliGRAM(s) Oral daily  atorvastatin 40 milliGRAM(s) Oral at bedtime  brimonidine 0.2% Ophthalmic Solution 1 Drop(s) Both EYES two times a day  busPIRone 15 milliGRAM(s) Oral two times a day  clonazePAM  Tablet 0.5 milliGRAM(s) Oral daily  dorzolamide 2% Ophthalmic Solution 1 Drop(s) Both EYES three times a day  furosemide   Injectable 20 milliGRAM(s) IV Push once  latanoprost 0.005% Ophthalmic Solution 1 Drop(s) Both EYES at bedtime  metoprolol succinate ER 25 milliGRAM(s) Oral daily  rivaroxaban 20 milliGRAM(s) Oral with dinner  spironolactone 50 milliGRAM(s) Oral daily    MEDICATIONS  (PRN):  acetaminophen     Tablet .. 650 milliGRAM(s) Oral every 6 hours PRN Temp greater or equal to 38C (100.4F), Mild Pain (1 - 3)        LABS                                            9.8                   Neurophils% (auto):   x      (03-22 @ 06:30):    4.94 )-----------(244          Lymphocytes% (auto):  x                                             30.2                   Eosinphils% (auto):   x        Manual%: Neutrophils x    ; Lymphocytes x    ; Eosinophils x    ; Bands%: x    ; Blasts x                                    139    |  102    |  11                  Calcium: 8.4   / iCa: x      (03-22 @ 06:30)    ----------------------------<  73        Magnesium: 2.2                              4.6     |  25     |  1.0              Phosphorous: x        TPro  5.6    /  Alb  2.6    /  TBili  0.4    /  DBili  x      /  AST  20     /  ALT  8      /  AlkPhos  76     22 Mar 2022 06:30 4B Transfer Note    Transfer from: 4B   Transfer to: 3B  Accepting physican:       4B COURSE:    81 F patient with Hx of uterine cancer s/p SHAAN s/p recurrence, Recurrent malignant ascites, CAD s/p CABG, HTN, depression, PE on Xarelto who presented for increasing abdominal distention x4 weeks. She was admitted 1 month ago with the same complaint and had paracentesis by IR with drainage of 4+ L. She follows with Dr. Mariee and had a port inserted for resumption of chemotherapy after recurrence of abdominal malignancy. She denies abdominal pain, constipation, chest pain, SOB, fever, chills, and did not check for weight gain. CT AP shows moderate to large volume abdominopelvic ascites. Pt is admitted for management of recurrent ascites likely 2/2 malignancy.  Patient had paracentesis by IR 3/21 and removed 2700ml of fluid. Patient was ready for discharge and to follow up with her oncologist. Patient had an appointment 3/22 but the heme/onc team decided to do inpatient chemo then discharge the patient. Patient is being transferred to  for chemo.     ASSESMENT AND PLAN:   81 F patient with Hx of uterine cancer s/p SHAAN s/p recurrence, Recurrent malignant ascites, CAD s/p CABG, HTN, depression, PE on Xarelto who presented for increasing abdominal distention x4 weeks.     #Recurrent ascites  #Hx of uterine malignancy s/p SHAAN and chemotherapy, recurrence  - low suspicion for SBP; no sepsis present on admission.   - CT shows Re-demonstrated moderate to large volume abdominopelvic ascites. No significant change since 3/3/2022   - Last admission had paracentesis by IR with drainage of >4 L  - c/w spironolactone 50mg QD   - S/p 1 dose IV Lasix 20 mg   - s/p heparin drip  in preparation for in-pt paracentesis.  - S/p paracentesis 3/21: removed 2700ml of fluid     #Hx of PE   - On Xarelto at home.   - CT shows IVC filter in place (placed in 2020).  - Xarelto restarted 3/22    #Hx of CAD s/p CABG   - Continue Toprol and statin     #Hx of uterine CA s/p SHAAN s/p recurrence  - CT from Feb 2022 demonstrated new omental nodularity indicating recurrence of malignancy  - Was treated previously with carbo/taxol. Outpatient heme onc notes with plan for resumption of the same  - Follows Dr. Mariee    #HTN   - Continue home Amlodipine and Toprol     #Depression   - Continue Home Psych medications     #Misc   - DVT Prophylaxis: Xarelto   - GI Prophylaxis: not indicated  - Diet: DASH  - Activity: IAT  - Disposition: chemo       For Follow-Up:  - f/u with heme/onc for chemo           Vital Signs Last 24 Hrs  T(C): 37.6 (22 Mar 2022 09:00), Max: 37.6 (21 Mar 2022 17:15)  T(F): 99.7 (22 Mar 2022 09:00), Max: 99.7 (22 Mar 2022 09:00)  HR: 89 (22 Mar 2022 09:00) (75 - 90)  BP: 111/58 (22 Mar 2022 09:00) (100/57 - 111/58)  BP(mean): --  RR: 19 (22 Mar 2022 09:00) (18 - 19)  SpO2: --  I&O's Summary        MEDICATIONS  (STANDING):  amLODIPine   Tablet 5 milliGRAM(s) Oral daily  atorvastatin 40 milliGRAM(s) Oral at bedtime  brimonidine 0.2% Ophthalmic Solution 1 Drop(s) Both EYES two times a day  busPIRone 15 milliGRAM(s) Oral two times a day  clonazePAM  Tablet 0.5 milliGRAM(s) Oral daily  dorzolamide 2% Ophthalmic Solution 1 Drop(s) Both EYES three times a day  furosemide   Injectable 20 milliGRAM(s) IV Push once  latanoprost 0.005% Ophthalmic Solution 1 Drop(s) Both EYES at bedtime  metoprolol succinate ER 25 milliGRAM(s) Oral daily  rivaroxaban 20 milliGRAM(s) Oral with dinner  spironolactone 50 milliGRAM(s) Oral daily    MEDICATIONS  (PRN):  acetaminophen     Tablet .. 650 milliGRAM(s) Oral every 6 hours PRN Temp greater or equal to 38C (100.4F), Mild Pain (1 - 3)        LABS                                            9.8                   Neurophils% (auto):   x      (03-22 @ 06:30):    4.94 )-----------(244          Lymphocytes% (auto):  x                                             30.2                   Eosinphils% (auto):   x        Manual%: Neutrophils x    ; Lymphocytes x    ; Eosinophils x    ; Bands%: x    ; Blasts x                                    139    |  102    |  11                  Calcium: 8.4   / iCa: x      (03-22 @ 06:30)    ----------------------------<  73        Magnesium: 2.2                              4.6     |  25     |  1.0              Phosphorous: x        TPro  5.6    /  Alb  2.6    /  TBili  0.4    /  DBili  x      /  AST  20     /  ALT  8      /  AlkPhos  76     22 Mar 2022 06:30

## 2022-03-22 NOTE — DISCHARGE NOTE PROVIDER - ATTENDING DISCHARGE PHYSICAL EXAMINATION:
T(C): 37.6 (03-22-22 @ 09:00), Max: 37.6 (03-21-22 @ 17:15)  HR: 89 (03-22-22 @ 09:00) (75 - 90)  BP: 111/58 (03-22-22 @ 09:00) (100/57 - 111/58)  RR: 19 (03-22-22 @ 09:00) (18 - 19)  SpO2: --    O/E: Awake, alert  HEENT: atraumatic. EOMI intact  Chest: decreased breath sounds at bases  CVS: S1S2+,no murmur  P/A: soft, BS+  Ext : no edema feet  CNS: awake, alert

## 2022-03-22 NOTE — PROGRESS NOTE ADULT - ASSESSMENT
81 F patient with Hx of uterine cancer s/p SHAAN s/p recurrence, Recurrent malignant ascites, CAD s/p CABG, HTN, depression, PE on Xarelto who presented for increasing abdominal distention x4 weeks. She was admitted 1 month ago with the same complaint and had paracentesis by IR with drainage of 4+ L. She follows with Dr. Mariee and had a port inserted for resumption of chemotherapy after recurrence of abdominal malignancy.    # Recurrent ascites  # H/o uterine cancer  s/p SHAAN- BSO and chemotherapy with  recurrence  -  CT Abdomen and Pelvis No Cont (03.20.22 @ 06:22) >Redemonstrated moderate to large volume abdominopelvic ascites. No   significant change since 3/3/2022 given the limitation of no IV contrast.  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

## 2022-03-23 NOTE — PROGRESS NOTE ADULT - ASSESSMENT
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 CT AP shows moderate to large volume ascites with no other significant changes. Oncology consulted for management.    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 in 2020 s/p weekly Carbo/Taxol (8/14/20-1/28/2021)   - PET Scan from 2/25/21 shows good response to chemo (declines any additional cancer directed therapy)  - CT A/P from 11/2021 (abd pain) shows no new disease; CTa Chest from 09/2021   - CT A/P from 02/2022 showed ascites which showed recurrent adenocarcinoma of mullerian origin:  is high  - Completed [facility] Liquid Biopsy Testing (03/01/2022) shows no targetable mutation  - chemoport placed by IR   - was planned to restart carbotaxol; but given peripheral neuropathy, will switch Carbotaxol to immunotherapy  - pt can be discharged from heme/onc perspective and return to [facility] on Friday for first treatment (we will schedule)    # Malignant ascites required high volume paracentesis   - previously improved/resolved with carbo/taxol  - s/p therapeutic paracentesis by IR    # PE in the setting of malignancy diagnosed on 8/6/2020  - Doppler was negative for DVT on 8/8/2020  - restarted Xarelto 20mg daily s/p paracentesis        82 yo F with PMH of uterine cancer s/p SHAAN-BSO with adjuvant chemotherapy in 2016, 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 CT AP shows moderate to large volume ascites with no other significant changes. Oncology consulted for management.    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 in 2020 s/p weekly Carbo/Taxol (8/14/20-1/28/2021)   - PET Scan from 2/25/21 shows good response to chemo (declines any additional cancer directed therapy)  - CT A/P from 11/2021 (abd pain) shows no new disease; CTa Chest from 09/2021   - CT A/P from 02/2022 showed ascites which showed recurrent adenocarcinoma of mullerian origin:  is high  - Completed [facility] Liquid Biopsy Testing (03/01/2022) shows no targetable mutation  - chemoport placed by IR   - was planned to restart carbotaxol; but given peripheral neuropathy, will switch Carbotaxol to immunotherapy + PARPI  - pt can be discharged from heme/onc perspective and return to [facility] on Friday for first treatment (we will schedule)    # Malignant ascites required high volume paracentesis   - previously improved/resolved with carbo/taxol  - s/p therapeutic paracentesis by IR    # PE in the setting of malignancy diagnosed on 8/6/2020  - Doppler was negative for DVT on 8/8/2020  - restarted Xarelto 20mg daily s/p paracentesis        82 yo F with PMH of uterine cancer s/p SHAAN-BSO with adjuvant chemotherapy in 2016, 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 CT AP shows moderate to large volume ascites with no other significant changes. Oncology consulted for management.    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 3.8 months and OS in pMMR population: 17.4 vs. 12.0 months)  - pt can be discharged from heme/onc perspective and return to [facility] on Friday for first treatment (we will schedule)    # Malignant ascites required high volume paracentesis   - previously improved/resolved with carbo/taxol  - s/p therapeutic paracentesis by IR    # PE in the setting of malignancy diagnosed on 8/6/2020  - Doppler was negative for DVT on 8/8/2020  - restarted Xarelto 20mg daily s/p paracentesis

## 2022-03-23 NOTE — PROGRESS NOTE ADULT - ASSESSMENT
81 F patient with Hx of uterine cancer s/p SHAAN s/p recurrence, Recurrent malignant ascites, CAD s/p CABG, HTN, depression, PE on Xarelto who presented for increasing abdominal distention x4 weeks.     #Recurrent ascites  #Hx of uterine malignancy s/p SHAAN and chemotherapy, recurrence  - low suspicion for SBP; no sepsis present on admission.   - CT shows Re-demonstrated moderate to large volume abdominopelvic ascites. No significant change since 3/3/2022   - Last admission had paracentesis by IR with drainage of >4 L  - c/w spironolactone 50mg QD   - S/p 1 dose IV Lasix 20 mg   - s/p heparin drip  in preparation for in-pt paracentesis.  - S/p paracentesis 3/21: removed 2700ml of fluid     #Hx of PE   - On Xarelto at home.   - CT shows IVC filter in place (placed in 2020).  - Xarelto restarted 3/22    #Hx of CAD s/p CABG   - Continue Toprol and statin     #Hx of uterine CA s/p SHAAN s/p recurrence  - CT from Feb 2022 demonstrated new omental nodularity indicating recurrence of malignancy  - Was treated previously with carbo/taxol. Outpatient heme onc notes with plan for resumption of the same  - Follows Dr. Mariee    #HTN   - Continue home Amlodipine and Toprol     #Depression   - Continue Home Psych medications     #Misc   - DVT Prophylaxis: Xarelto   - GI Prophylaxis: not indicated  - Diet: DASH  - Activity: IAT  - Disposition: Discharge home    81 F patient with Hx of uterine cancer s/p SHAAN s/p recurrence, Recurrent malignant ascites, CAD s/p CABG, HTN, depression, PE on Xarelto who presented for increasing abdominal distention x4 weeks.     #Recurrent ascites  #Hx of uterine malignancy s/p SHAAN and chemotherapy, recurrence  - low suspicion for SBP; no sepsis present on admission.   - CT shows Re-demonstrated moderate to large volume abdominopelvic ascites. No significant change since 3/3/2022   - Last admission had paracentesis by IR with drainage of >4 L  - c/w spironolactone 50mg QD   - S/p 1 dose IV Lasix 20 mg   - s/p heparin drip  in preparation for in-pt paracentesis.  - S/p paracentesis 3/21: removed 2700ml of fluid     #Hx of PE   - On Xarelto at home.   - CT shows IVC filter in place (placed in 2020).  - Xarelto restarted 3/22    #Hx of CAD s/p CABG   - Continue Toprol and statin     #Hx of uterine CA s/p SHAAN s/p recurrence  - CT from Feb 2022 demonstrated new omental nodularity indicating recurrence of malignancy  - Was treated previously with carbo/taxol. Outpatient heme onc notes with plan for immuno therapy   - Follows Dr. Mariee    #HTN   - Continue home Amlodipine and Toprol     #Depression   - Continue Home Psych medications     #Misc   - DVT Prophylaxis: Xarelto   - GI Prophylaxis: not indicated  - Diet: DASH  - Activity: IAT  - Disposition: Discharge home

## 2022-03-23 NOTE — PROGRESS NOTE ADULT - ATTENDING COMMENTS
patient seen and examined , agree with pgy 3 assesment and plan except as indicated above,     GEN Lying in no acute distress  HEENT Pupils equal and reactive to light and accommodationSupple Neck  PULM Clear to auscultation bilaterally  CV s1s2 regular rate and rhythm  GI + bowel sounds nontnender  EXT no cyanosis or edema  PSYCH awake alert and oriented x 3  INTEG No Lesions  NEURO Moves all extremities    #Recurrence of uterine caner  spoke with oncology plan for outpatient immunotherapy     # grade 1 anterolisthesis of L4 on L5    #Mild - moderate tricuspid regurgitation     PROGRESS NOTE HANDOFF    Pending:  dc home , time spent coordinating care 43 minutes    Family discussion: patient verbalized understanding and agreeable to plan of care     Disposition: Home

## 2022-03-23 NOTE — PROGRESS NOTE ADULT - SUBJECTIVE AND OBJECTIVE BOX
----------Daily Progress Note----------    HISTORY OF PRESENT ILLNESS:  Patient is a 81y old Female who presents with a chief complaint of   Currently admitted to medicine with the primary diagnosis of Abdominal pain    81 F patient with Hx of uterine cancer s/p SHAAN s/p recurrence, Recurrent malignant ascites, CAD s/p CABG, HTN, depression, PE on Xarelto who presented for increasing abdominal distention x4 weeks. She was admitted 1 month ago with the same complaint and had paracentesis by IR with drainage of 4+ L. She follows with Dr. Mariee and had a port inserted for resumption of chemotherapy after recurrence of abdominal malignancy. She denies abdominal pain, constipation, chest pain, SOB, fever, chills, and did not check for weight gain.    ED VS: T(F): , Max: 99 (03-20-22 @ 12:06)  HR:  (73 - 83)  BP:  (111/58 - 124/57)  RR: 18  SpO2:  (96% - 98%)      Labs: Hgb 10.2, lipase 70  Imaging: CT AP shows moderate to large volume abdominopelvic ascites  Pt is admitted for management of recurrent ascites likely 2/2 malignancy       Today is hospital day 1d.     INTERVAL HOSPITAL COURSE / OVERNIGHT EVENTS:    Patient was examined and seen at bedside. This morning she is resting comfortably in bed and reports no new issues or overnight events.     Review of Systems: Patient denies any fever, chills, headache, dizziness. Patient denies chest pain, palpitation, SOB.     <<<<<PAST MEDICAL & SURGICAL HISTORY>>>>>  Uterine cancer  s/p SHAAN and chemo    Hypertension    High cholesterol    Cataract of right eye    Glaucoma    Coronary artery disease  s/p CABG X2    CAD (coronary artery disease)    HTN (hypertension)    HLD (hyperlipidemia)    H/O total hysterectomy    Athscl CABG, unsp, w angina pectoris w documented spasm    History of coronary artery bypass surgery      ALLERGIES  chloroquine (Hives)  quinine (Urticaria)      Home Medications:  amLODIPine 5 mg oral tablet: 1 tab(s) orally once a day (20 Mar 2022 14:06)  brimonidine 0.1% ophthalmic solution: to each affected eye 2 times a day (20 Mar 2022 14:06)  busPIRone 15 mg oral tablet: 1 tab(s) orally 2 times a day (20 Mar 2022 14:06)  clonazePAM 0.5 mg oral tablet: 1 tab(s) orally once a day (20 Mar 2022 14:06)  dorzolamide 2% ophthalmic solution: 1 drop(s) to each affected eye 2 times a day (20 Mar 2022 14:06)  latanoprost 0.005% ophthalmic solution: 1 drop(s) to each affected eye 2 times a day (20 Mar 2022 14:06)  metoprolol succinate 25 mg oral tablet, extended release: 1 tab(s) orally once a day (20 Mar 2022 14:06)  rosuvastatin 10 mg oral tablet: 1 tab(s) orally once a day (20 Mar 2022 14:06)        MEDICATIONS  STANDING MEDICATIONS  amLODIPine   Tablet 5 milliGRAM(s) Oral daily  atorvastatin 40 milliGRAM(s) Oral at bedtime  brimonidine 0.2% Ophthalmic Solution 1 Drop(s) Both EYES two times a day  busPIRone 15 milliGRAM(s) Oral two times a day  clonazePAM  Tablet 0.5 milliGRAM(s) Oral daily  dorzolamide 2% Ophthalmic Solution 1 Drop(s) Both EYES three times a day  furosemide   Injectable 20 milliGRAM(s) IV Push once  latanoprost 0.005% Ophthalmic Solution 1 Drop(s) Both EYES at bedtime  metoprolol succinate ER 25 milliGRAM(s) Oral daily  spironolactone 50 milliGRAM(s) Oral daily    PRN MEDICATIONS  acetaminophen     Tablet .. 650 milliGRAM(s) Oral every 6 hours PRN    VITALS:  T(F): 98.4  HR: 92  BP: 114/62  RR: 18  SpO2: 99%    <<<<<LABS>>>>>                        10.1   6.03  )-----------( 252      ( 21 Mar 2022 06:30 )             30.8     03-21    138  |  102  |  12  ----------------------------<  97  4.1   |  26  |  1.2    Ca    8.3<L>      21 Mar 2022 06:30    TPro  6.0  /  Alb  3.0<L>  /  TBili  0.4  /  DBili  x   /  AST  23  /  ALT  10  /  AlkPhos  80  03-21    PT/INR - ( 20 Mar 2022 02:31 )   PT: 20.50 sec;   INR: 1.79 ratio         PTT - ( 21 Mar 2022 06:30 )  PTT:85.1 sec        [id number]  CARDIAC MARKERS ( 20 Mar 2022 04:10 )  x     / <0.01 ng/mL / x     / x     / x            <<<<<RADIOLOGY>>>>>  < from: CT Abdomen and Pelvis No Cont (03.20.22 @ 06:22) >  IMPRESSION:    Redemonstrated moderate to large volume abdominopelvic ascites. No   significant change since 3/3/2022 given the limitation of no IV contrast.    --- End of Report ---    < end of copied text >      <<<<<PHYSICAL EXAM>>>>>  GENERAL: Well developed, well nourished and in no acute distress. Resting comfortably in bed.  PULMONARY: Clear to auscultation bilaterally. No rales, rhonchi, or wheezing.  CARDIOVASCULAR: Regular rate and rhythm, S1-S2, no murmurs  GASTROINTESTINAL: Soft, non-tender, +distended, no guarding.  SKIN/EXTREMITIES: No clubbing or edema  NEUROLOGIC/MUSCULOSKELETAL: AOx4, grossly moving all extremities, no focal deficits.      -----------------------------------------------------------------------------------------------------------------------------------------------------------------------------------------------
----------Daily Progress Note----------    HISTORY OF PRESENT ILLNESS:  Patient is a 81y old Female who presents with a chief complaint of   Currently admitted to medicine with the primary diagnosis of Abdominal pain    81 F patient with Hx of uterine cancer s/p SHAAN s/p recurrence, Recurrent malignant ascites, CAD s/p CABG, HTN, depression, PE on Xarelto who presented for increasing abdominal distention x4 weeks. She was admitted 1 month ago with the same complaint and had paracentesis by IR with drainage of 4+ L. She follows with Dr. Mariee and had a port inserted for resumption of chemotherapy after recurrence of abdominal malignancy. She denies abdominal pain, constipation, chest pain, SOB, fever, chills, and did not check for weight gain.    ED VS: T(F): , Max: 99 (03-20-22 @ 12:06)  HR:  (73 - 83)  BP:  (111/58 - 124/57)  RR: 18  SpO2:  (96% - 98%)      Labs: Hgb 10.2, lipase 70  Imaging: CT AP shows moderate to large volume abdominopelvic ascites  Pt is admitted for management of recurrent ascites likely 2/2 malignancy    Today is hospital day 2d.     INTERVAL HOSPITAL COURSE / OVERNIGHT EVENTS:    Patient was examined and seen at bedside. This morning she is resting comfortably in bed and reports no new issues or overnight events.     Review of Systems: Patient denies any fever, chills, headache, dizziness. Patient denies chest pain, palpitation, SOB.     <<<<<PAST MEDICAL & SURGICAL HISTORY>>>>>  Uterine cancer  s/p SHAAN and chemo    Hypertension    High cholesterol    Cataract of right eye    Glaucoma    Coronary artery disease  s/p CABG X2    CAD (coronary artery disease)    HTN (hypertension)    HLD (hyperlipidemia)    H/O total hysterectomy    Athscl CABG, unsp, w angina pectoris w documented spasm    History of coronary artery bypass surgery      ALLERGIES  chloroquine (Hives)  quinine (Urticaria)      Home Medications:  amLODIPine 5 mg oral tablet: 1 tab(s) orally once a day (20 Mar 2022 14:06)  brimonidine 0.1% ophthalmic solution: to each affected eye 2 times a day (20 Mar 2022 14:06)  busPIRone 15 mg oral tablet: 1 tab(s) orally 2 times a day (20 Mar 2022 14:06)  clonazePAM 0.5 mg oral tablet: 1 tab(s) orally once a day (20 Mar 2022 14:06)  dorzolamide 2% ophthalmic solution: 1 drop(s) to each affected eye 2 times a day (20 Mar 2022 14:06)  latanoprost 0.005% ophthalmic solution: 1 drop(s) to each affected eye 2 times a day (20 Mar 2022 14:06)  metoprolol succinate 25 mg oral tablet, extended release: 1 tab(s) orally once a day (20 Mar 2022 14:06)  rosuvastatin 10 mg oral tablet: 1 tab(s) orally once a day (20 Mar 2022 14:06)        MEDICATIONS  STANDING MEDICATIONS  amLODIPine   Tablet 5 milliGRAM(s) Oral daily  atorvastatin 40 milliGRAM(s) Oral at bedtime  brimonidine 0.2% Ophthalmic Solution 1 Drop(s) Both EYES two times a day  busPIRone 15 milliGRAM(s) Oral two times a day  clonazePAM  Tablet 0.5 milliGRAM(s) Oral daily  dorzolamide 2% Ophthalmic Solution 1 Drop(s) Both EYES three times a day  furosemide   Injectable 20 milliGRAM(s) IV Push once  latanoprost 0.005% Ophthalmic Solution 1 Drop(s) Both EYES at bedtime  metoprolol succinate ER 25 milliGRAM(s) Oral daily  rivaroxaban 20 milliGRAM(s) Oral with dinner  spironolactone 50 milliGRAM(s) Oral daily    PRN MEDICATIONS  acetaminophen     Tablet .. 650 milliGRAM(s) Oral every 6 hours PRN    VITALS:  T(F): 99.7  HR: 89  BP: 111/58  RR: 19  SpO2: --    <<<<<LABS>>>>>                        9.8    4.94  )-----------( 244      ( 22 Mar 2022 06:30 )             30.2     03-22    139  |  102  |  11  ----------------------------<  73  4.6   |  25  |  1.0    Ca    8.4<L>      22 Mar 2022 06:30  Mg     2.2     03-22    TPro  5.6<L>  /  Alb  2.6<L>  /  TBili  0.4  /  DBili  x   /  AST  20  /  ALT  8   /  AlkPhos  76  03-22    PTT - ( 21 Mar 2022 06:30 )  PTT:85.1 sec        [id number]        <<<<<RADIOLOGY>>>>>    < from: CT Abdomen and Pelvis No Cont (03.20.22 @ 06:22) >  IMPRESSION:    Redemonstrated moderate to large volume abdominopelvic ascites. No   significant change since 3/3/2022 given the limitation of no IV contrast.    --- End of Report ---    < end of copied text >      <<<<<PHYSICAL EXAM>>>>>  GENERAL: Well developed, well nourished and in no acute distress. Resting comfortably in bed.  PULMONARY: Clear to auscultation bilaterally. No rales, rhonchi, or wheezing.  CARDIOVASCULAR: Regular rate and rhythm, S1-S2, no murmurs  GASTROINTESTINAL: Soft, non-tender, +distended, no guarding.  SKIN/EXTREMITIES: No clubbing or edema  NEUROLOGIC/MUSCULOSKELETAL: AOx4, grossly moving all extremities, no focal deficits.    -----------------------------------------------------------------------------------------------------------------------------------------------------------------------------------------------
----------Daily Progress Note----------    HISTORY OF PRESENT ILLNESS:  Patient is a 81y old Female who presents with a chief complaint of   Currently admitted to medicine with the primary diagnosis of Ascites    81 F patient with Hx of uterine cancer s/p SHAAN s/p recurrence, Recurrent malignant ascites, CAD s/p CABG, HTN, depression, PE on Xarelto who presented for increasing abdominal distention x4 weeks. She was admitted 1 month ago with the same complaint and had paracentesis by IR with drainage of 4+ L. She follows with Dr. Mariee and had a port inserted for resumption of chemotherapy after recurrence of abdominal malignancy. She denies abdominal pain, constipation, chest pain, SOB, fever, chills, and did not check for weight gain.    ED VS: T(F): , Max: 99 (03-20-22 @ 12:06)  HR:  (73 - 83)  BP:  (111/58 - 124/57)  RR: 18  SpO2:  (96% - 98%)      Labs: Hgb 10.2, lipase 70  Imaging: CT AP shows moderate to large volume abdominopelvic ascites  Pt is admitted for management of recurrent ascites likely 2/2 malignancy       Today is hospital day 3d.     INTERVAL HOSPITAL COURSE / OVERNIGHT EVENTS:    Patient was examined and seen at bedside. This morning she is resting comfortably in bed and reports no new issues or overnight events.     Review of Systems: Patient denies any fever, chills, headache, dizziness. Patient denies chest pain, palpitation, SOB.         <<<<<PAST MEDICAL & SURGICAL HISTORY>>>>>  Uterine cancer  s/p SHAAN and chemo    Hypertension    High cholesterol    Cataract of right eye    Glaucoma    Coronary artery disease  s/p CABG X2    CAD (coronary artery disease)    HTN (hypertension)    HLD (hyperlipidemia)    H/O total hysterectomy    Athscl CABG, unsp, w angina pectoris w documented spasm    History of coronary artery bypass surgery      ALLERGIES  chloroquine (Hives)  quinine (Urticaria)      Home Medications:  amLODIPine 5 mg oral tablet: 1 tab(s) orally once a day (20 Mar 2022 14:06)  brimonidine 0.1% ophthalmic solution: to each affected eye 2 times a day (20 Mar 2022 14:06)  busPIRone 15 mg oral tablet: 1 tab(s) orally 2 times a day (20 Mar 2022 14:06)  clonazePAM 0.5 mg oral tablet: 1 tab(s) orally once a day (20 Mar 2022 14:06)  dorzolamide 2% ophthalmic solution: 1 drop(s) to each affected eye 2 times a day (20 Mar 2022 14:06)  latanoprost 0.005% ophthalmic solution: 1 drop(s) to each affected eye 2 times a day (20 Mar 2022 14:06)  metoprolol succinate 25 mg oral tablet, extended release: 1 tab(s) orally once a day (20 Mar 2022 14:06)  rosuvastatin 10 mg oral tablet: 1 tab(s) orally once a day (20 Mar 2022 14:06)        MEDICATIONS  STANDING MEDICATIONS  amLODIPine   Tablet 5 milliGRAM(s) Oral daily  atorvastatin 40 milliGRAM(s) Oral at bedtime  brimonidine 0.2% Ophthalmic Solution 1 Drop(s) Both EYES two times a day  busPIRone 15 milliGRAM(s) Oral two times a day  clonazePAM  Tablet 0.5 milliGRAM(s) Oral daily  dorzolamide 2% Ophthalmic Solution 1 Drop(s) Both EYES three times a day  furosemide   Injectable 20 milliGRAM(s) IV Push once  latanoprost 0.005% Ophthalmic Solution 1 Drop(s) Both EYES at bedtime  metoprolol succinate ER 25 milliGRAM(s) Oral daily  rivaroxaban 20 milliGRAM(s) Oral with dinner  spironolactone 50 milliGRAM(s) Oral daily    PRN MEDICATIONS  acetaminophen     Tablet .. 650 milliGRAM(s) Oral every 6 hours PRN    VITALS:  T(F): 98.3  HR: 77  BP: 119/58  RR: 18  SpO2: --    <<<<<LABS>>>>>                        9.8    4.94  )-----------( 244      ( 22 Mar 2022 06:30 )             30.2     03-22    139  |  102  |  11  ----------------------------<  73  4.6   |  25  |  1.0    Ca    8.4<L>      22 Mar 2022 06:30  Mg     2.2     03-22    TPro  5.6<L>  /  Alb  2.6<L>  /  TBili  0.4  /  DBili  x   /  AST  20  /  ALT  8   /  AlkPhos  76  03-22            [id number]        <<<<<RADIOLOGY>>>>>  < from: CT Abdomen and Pelvis No Cont (03.20.22 @ 06:22) >  IMPRESSION:    Redemonstrated moderate to large volume abdominopelvic ascites. No   significant change since 3/3/2022 given the limitation of no IV contrast.    --- End of Report ---    < end of copied text >      <<<<<PHYSICAL EXAM>>>>>  GENERAL: Well developed, well nourished and in no acute distress. Resting comfortably in bed.  PULMONARY: Clear to auscultation bilaterally. No rales, rhonchi, or wheezing.  CARDIOVASCULAR: Regular rate and rhythm, S1-S2, no murmurs  GASTROINTESTINAL: Soft, non-tender, +distended, no guarding.  SKIN/EXTREMITIES: No clubbing or edema  NEUROLOGIC/MUSCULOSKELETAL: AOx4, grossly moving all extremities, no focal deficits.          -----------------------------------------------------------------------------------------------------------------------------------------------------------------------------------------------
BEKAH NELSON 81y Female  MRN#: [id number]     SUBJECTIVE  Patient is a 81y old Female who presents with a chief complaint of     Today is hospital day 3d, and this morning she is lying in bed without distress.   No acute overnight events.     OBJECTIVE  PAST MEDICAL & SURGICAL HISTORY  Uterine cancer  s/p SHAAN and chemo    Hypertension    High cholesterol    Cataract of right eye    Glaucoma    Coronary artery disease  s/p CABG X2    CAD (coronary artery disease)    HTN (hypertension)    HLD (hyperlipidemia)    H/O total hysterectomy    Athscl CABG, unsp, w angina pectoris w documented spasm    History of coronary artery bypass surgery      ALLERGIES:  chloroquine (Hives)  quinine (Urticaria)    MEDICATIONS:  STANDING MEDICATIONS  amLODIPine   Tablet 5 milliGRAM(s) Oral daily  atorvastatin 40 milliGRAM(s) Oral at bedtime  brimonidine 0.2% Ophthalmic Solution 1 Drop(s) Both EYES two times a day  busPIRone 15 milliGRAM(s) Oral two times a day  clonazePAM  Tablet 0.5 milliGRAM(s) Oral daily  dorzolamide 2% Ophthalmic Solution 1 Drop(s) Both EYES three times a day  furosemide   Injectable 20 milliGRAM(s) IV Push once  latanoprost 0.005% Ophthalmic Solution 1 Drop(s) Both EYES at bedtime  metoprolol succinate ER 25 milliGRAM(s) Oral daily  rivaroxaban 20 milliGRAM(s) Oral with dinner  spironolactone 50 milliGRAM(s) Oral daily    PRN MEDICATIONS  acetaminophen     Tablet .. 650 milliGRAM(s) Oral every 6 hours PRN    HOME MEDICATIONS  Home Medications:  amLODIPine 5 mg oral tablet: 1 tab(s) orally once a day (20 Mar 2022 14:06)  brimonidine 0.1% ophthalmic solution: to each affected eye 2 times a day (20 Mar 2022 14:06)  busPIRone 15 mg oral tablet: 1 tab(s) orally 2 times a day (20 Mar 2022 14:06)  clonazePAM 0.5 mg oral tablet: 1 tab(s) orally once a day (20 Mar 2022 14:06)  dorzolamide 2% ophthalmic solution: 1 drop(s) to each affected eye 2 times a day (20 Mar 2022 14:06)  latanoprost 0.005% ophthalmic solution: 1 drop(s) to each affected eye 2 times a day (20 Mar 2022 14:06)  metoprolol succinate 25 mg oral tablet, extended release: 1 tab(s) orally once a day (20 Mar 2022 14:06)  rosuvastatin 10 mg oral tablet: 1 tab(s) orally once a day (20 Mar 2022 14:06)      VITAL SIGNS: Last 24 Hours  T(C): 36.8 (23 Mar 2022 08:42), Max: 36.9 (23 Mar 2022 00:00)  T(F): 98.3 (23 Mar 2022 08:42), Max: 98.4 (23 Mar 2022 00:00)  HR: 77 (23 Mar 2022 08:42) (74 - 89)  BP: 119/58 (23 Mar 2022 08:42) (110/63 - 138/63)  BP(mean): --  RR: 18 (23 Mar 2022 08:42) (18 - 19)  SpO2: --      LABS:                        9.8    4.94  )-----------( 244      ( 22 Mar 2022 06:30 )             30.2     03-22    139  |  102  |  11  ----------------------------<  73  4.6   |  25  |  1.0    Ca    8.4<L>      22 Mar 2022 06:30  Mg     2.2     03-22    TPro  5.6<L>  /  Alb  2.6<L>  /  TBili  0.4  /  DBili  x   /  AST  20  /  ALT  8   /  AlkPhos  76  03-22    LIVER FUNCTIONS - ( 22 Mar 2022 06:30 )  Alb: 2.6 g/dL / Pro: 5.6 g/dL / ALK PHOS: 76 U/L / ALT: 8 U/L / AST: 20 U/L / GGT: x             CAPILLARY BLOOD GLUCOSE      RADIOLOGY:    PHYSICAL EXAM:  GENERAL: NAD, AAO x 4, 81y F  HEAD:  Atraumatic, Normocephalic  EYES: EOMI, conjunctiva clear and sclera white  NECK: Supple, No JVD  CHEST/LUNG: Clear to auscultation bilaterally; No wheeze; No crackles; No accessory muscles used  HEART: Regular rate and rhythm; No murmurs;   ABDOMEN: Soft, Nontender, Nondistended; Bowel sounds present; No guarding  EXTREMITIES:  2+ Peripheral Pulses, No cyanosis or edema  NEUROLOGY: non-focal    ADMISSION SUMMARY  Patient is a 81y old Female who presents with a chief complaint of  
CC. Abdominal pain  HPI.  Patient reports abdm pain controlled  Offers no other complaints      Constitutional: No fever, fatigue or weight loss.  Skin: No rash.  Eyes: No recent vision problems or eye pain.  ENT: No congestion, ear pain, or sore throat.  Endocrine: No thyroid problems.  Cardiovascular: No chest pain or palpation.  Respiratory: No cough, shortness of breath, congestion, or wheezing.  Gastrointestinal: No  nausea, vomiting, or diarrhea.  Genitourinary: No dysuria.  Musculoskeletal: No joint swelling.  Neurologic: No headache.              Vital Signs Last 24 Hrs  T(C): 36.6 (03-21-22 @ 09:38), Max: 37.2 (03-20-22 @ 12:06)  T(F): 97.8 (03-21-22 @ 09:38), Max: 99 (03-20-22 @ 12:06)  HR: 88 (03-21-22 @ 09:38) (73 - 98)  BP: 120/63 (03-21-22 @ 09:38) (105/53 - 127/69)  BP(mean): --  RR: 18 (03-21-22 @ 09:38) (18 - 18)  SpO2: 99% (03-21-22 @ 09:38) (98% - 99%)        PHYSICAL EXAM-  GENERAL: NAD   HEAD:  Atraumatic, Normocephalic  EYES: EOMI, PERRLA, conjunctiva and sclera clear  NECK: Supple, No JVD, Normal thyroid  NERVOUS SYSTEM:  Alert & Oriented X3, Moving all extremities  CHEST/LUNG: Clear to percussion bilaterally; No rales, rhonchi, wheezing, or rubs  HEART: Regular rate and rhythm; No murmurs, rubs, or gallops  ABDOMEN: Soft, Nontender,+ distended; Bowel sounds present  EXTREMITIES:  , No clubbing, cyanosis, or edema  SKIN: No rashes or lesions                                  10.1   6.03  )-----------( 252      ( 21 Mar 2022 06:30 )             30.8     03-21    138  |  102  |  12  ----------------------------<  97  4.1   |  26  |  1.2    Ca    8.3<L>      21 Mar 2022 06:30    TPro  6.0  /  Alb  3.0<L>  /  TBili  0.4  /  DBili  x   /  AST  23  /  ALT  10  /  AlkPhos  80  03-21    CARDIAC MARKERS ( 20 Mar 2022 04:10 )  x     / <0.01 ng/mL / x     / x     / x              PT/INR - ( 20 Mar 2022 02:31 )   PT: 20.50 sec;   INR: 1.79 ratio         PTT - ( 21 Mar 2022 06:30 )  PTT:85.1 sec        MEDICATIONS  (STANDING):  amLODIPine   Tablet 5 milliGRAM(s) Oral daily  atorvastatin 40 milliGRAM(s) Oral at bedtime  brimonidine 0.2% Ophthalmic Solution 1 Drop(s) Both EYES two times a day  busPIRone 15 milliGRAM(s) Oral two times a day  clonazePAM  Tablet 0.5 milliGRAM(s) Oral daily  dorzolamide 2% Ophthalmic Solution 1 Drop(s) Both EYES three times a day  furosemide   Injectable 20 milliGRAM(s) IV Push once  latanoprost 0.005% Ophthalmic Solution 1 Drop(s) Both EYES at bedtime  metoprolol succinate ER 25 milliGRAM(s) Oral daily  spironolactone 50 milliGRAM(s) Oral daily    MEDICATIONS  (PRN):  acetaminophen     Tablet .. 650 milliGRAM(s) Oral every 6 hours PRN Temp greater or equal to 38C (100.4F), Mild Pain (1 - 3)          Imaging Personally Reviewed:     [x ] YES  [ ] NO    Consultant(s) Notes Reviewed:  [x ] YES  [ ] NO    Care Discussed with Consultants/Other Providers [x ] YES  [ ] NO     
Patient is a 81y old  Female who presents with a chief complaint of increasing abdominal distention    Patient was seen and examined.  S/p paracentesis on 3/21  Denies anycomplaints.  All systems reviewed.    PAST MEDICAL & SURGICAL HISTORY:  Uterine cancer s/p SHAAN and chemo  Hypertension  High cholesterol  Cataract of right eye  Glaucoma  Coronary artery disease s/p CABG X2    Allergies  chloroquine (Hives)  quinine (Urticaria)    MEDICATIONS  (STANDING):  amLODIPine   Tablet 5 milliGRAM(s) Oral daily  atorvastatin 40 milliGRAM(s) Oral at bedtime  brimonidine 0.2% Ophthalmic Solution 1 Drop(s) Both EYES two times a day  busPIRone 15 milliGRAM(s) Oral two times a day  clonazePAM  Tablet 0.5 milliGRAM(s) Oral daily  dorzolamide 2% Ophthalmic Solution 1 Drop(s) Both EYES three times a day  furosemide   Injectable 20 milliGRAM(s) IV Push once  latanoprost 0.005% Ophthalmic Solution 1 Drop(s) Both EYES at bedtime  metoprolol succinate ER 25 milliGRAM(s) Oral daily  rivaroxaban 20 milliGRAM(s) Oral with dinner  spironolactone 50 milliGRAM(s) Oral daily    MEDICATIONS  (PRN):  acetaminophen     Tablet .. 650 milliGRAM(s) Oral every 6 hours PRN Temp greater or equal to 38C (100.4F), Mild Pain (1 - 3)    Vital Signs Last 24 Hrs  T(C): 37.6  T(F): 99.7  HR: 89  BP: 111/58  BP(mean): --  RR: 19  SpO2: --    O/E:  Awake, alert, not in distress.  HEENT: atraumatic, EOMI.  Chest: decreased breath sounds at bases  CVS: SIS2 +, no murmur.  P/A: Soft, BS+  CNS: awake, alert  Ext: no edema feet.  Skin: no rash, no ulcers.  All systems reviewed positive findings as above.                                9.8<L>  4.94  )-----------( 244      ( 22 Mar 2022 06:30 )             30.2<L>                        10.1<L>  6.03  )-----------( 252      ( 21 Mar 2022 06:30 )             30.8<L>    03-22    139  |  102  |  11  ----------------------------<  73  4.6   |  25  |  1.0  03-21    138  |  102  |  12  ----------------------------<  97  4.1   |  26  |  1.2    Ca    8.4<L>      22 Mar 2022 06:30  Ca    8.3<L>      21 Mar 2022 06:30  Mg     2.2     03-22    TPro  5.6<L>  /  Alb  2.6<L>  /  TBili  0.4  /  DBili  x   /  AST  20  /  ALT  8   /  AlkPhos  76  03-22  TPro  6.0  /  Alb  3.0<L>  /  TBili  0.4  /  DBili  x   /  AST  23  /  ALT  10  /  AlkPhos  80  03-21          PTT - ( 21 Mar 2022 06:30 )  PTT:85.1 sec

## 2022-03-23 NOTE — PROGRESS NOTE ADULT - ATTENDING COMMENTS
Patient examined , above note read , agree with recommendation to follow up with Dr Herzog for salvage therapy .

## 2022-03-25 NOTE — CONSULT LETTER
[Dear  ___] : Dear  [unfilled], [( Thank you for referring [unfilled] for consultation for _____ )] : Thank you for referring [unfilled] for consultation for [unfilled] [Consult Letter:] : I had the pleasure of evaluating your patient, [unfilled]. [Please see my note below.] : Please see my note below. [Consult Closing:] : Thank you very much for allowing me to participate in the care of this patient.  If you have any questions, please do not hesitate to contact me. [Sincerely,] : Sincerely, [FreeTextEntry3] : Paul Greco DO\par Attending Physician,\par Hematology/ Medical Oncology\par [phone] office\par \par

## 2022-03-25 NOTE — ASSESSMENT
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  MMR proficient.  \par - Prior SHAAN BSO in 2016, adjuvant chemotherapy, obtain records from Dr. Abdullahi \par - first recurrence in 2020:  Started on weekly carbo/taxol weekly 3 on 1 off on 8/14/2020. On 1/28/21 patient completed 6 cycles of Carbo/Taxol (weekly), declines any additional cancer directed therapy on 2/25/2021\par - CT A/P from 11/2021 (abd pain) shows no new disease ; CTa Chest from 09/2021 \par - CT A/P from 02/2022 showed ascites which showed recurrent adenocarcinoma of mullerian origin:  is high\par - explained that since it has been greater than 1 year since last treatment with Carbo/Taxol with improvement in response , one option is to reinitiate same chemotherapy regimen and see if she responds.  However, she notes lower extremity neuropathy from chemotherapy last time so we agreed upon Keytruda + Lenvima.  \par - Completed [employer] Liquid Biopsy Testing form with patient in clinic on 03/01/2022. \par - Discussed risks of chemoimmunotherapy (Keytuda + Lenvima 4mg daily) including but not limited to fatigue, diarrhea, rashes, lung effects, kidney damage, elevated liver enzymes, loss of appetite, hypothyroidism, adrenal insufficiency, allergic reactions and death to name a few.  Anti-emetic sent to pharmacy.  Consent obtained ; tasked clinical pharmacist to follow. \par - s/p L chest port placement with IR  \par \par # Malignant ascites required high volume paracentesis \par - previously improved/resolved with carbo/taxol \par \par # PE in the setting of malignancy diagnosed on 8/6/2020\par - Doppler was negative for DVT on 8/8/2020\par - Also on ASA, h/o CABG . \par - Was stared on Xarelto, but after 1 dose was admitted with recurrent PE, diagnosed on 9/11/2020\par - Patient will switch from Lovenox to Xarelto 20 mg po daily on 1/28/2021.\par - c/w Xarelto 20mg daily , no refills needed\par \par # Gassiness + belching\par - c/w Nexium \par - She has GI followup pending.  \par \par # Upper + lower extremity neuropathy, bilateral, not painful \par - c/w gabapentin 100mg , can titrate up to TiD dosing as tolerated \par \par # Anxiety / Depression with associated lack of appetite + weight loss \par - she will discuss with PCP +/- mental health provider regarding antidepressants \par - Patient certified for utilization of Medical Marijuana on 03/04/2022.  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  MMR proficient.  Has neuropathy; carbo/taxol would be a poor selection in view of neuropathy; since MMR proficient can not give a singe agent immunotherapy; would start combination of immuno and low dose lenvima; she is agreeable; side effects explained; had 45 mins discussion both in clinic and in the hospital ;asked to call me if any quesitions; she is tearful

## 2022-03-25 NOTE — REASON FOR VISIT
[Follow-Up Visit] : a follow-up [Family Member] : family member [FreeTextEntry2] : Recurrent endometrial cancer, HFU

## 2022-03-25 NOTE — HISTORY OF PRESENT ILLNESS
[Therapy: ___] : Therapy: [unfilled] [de-identified] : Trav is a shekhar 81 year old lady I initially met in the hospital on 8/11/2020. Her past medical history is significant for uterine cancer s/p SHAAN BSO as per patient s/p adjuvant chemotherapy in 2016, CAD s/p CABG, HTN and depression, she presented to the ED for abdominal pain and chest discomfort. 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 \par \par PAST MEDICAL & SURGICAL HISTORY:\par HLD (hyperlipidemia)\par HTN (hypertension)\par CAD (coronary artery disease)\par History of coronary artery bypass surgery\par \par FAMILY HISTORY:\par No pertinent family history in first degree relatives\par \par Allergies\par chloroquine (Hives)\par \par CTA C/A/P on 8/6/2020 revealed \par 1. Intraluminal filling defect is seen within the segmental branches of the right lower lobe pulmonary artery, indicating acute pulmonary embolism. (6/132-138; 10/51-55). Filling defect in segmental branch of the right upper lobe pulmonary artery. (6/77) There is a small filling defect within a left lower lobe pulmonary artery segmental branch (6/147; 10/59). No evidence of right heart strain. The right ventricle measures 3.5 cm; the left ventricle measures 3.7 cm. (RV:LV<1) \par 2. Compared with the previous CT scan of 5/27/2020, there is now a large amount of ascites throughout the abdomen and pelvis. \par \par LE Doppler on 8/8/2020 revealed \par No evidence of deep venous thrombosis or superficial thrombophlebitis in the bilateral lower extremities. \par Left popliteal fossa fluid collection as measured above likely Baker's cyst\par \par During her admission she has required 3 episodes of paracentesis on 8/7 3L, 8/12 4L, 8/13 2.2L, once pathology became available patient was started on weekly Carbo/Taxol she has tolerated chemotherapy well.  [FreeTextEntry1] : Starting Keytruda every 3 weeks + Lenvima 4mg PO daily... [de-identified] : 2020: She returns today for follow up, she reports improved SOB, improved abdominal swelling, still come positional chest discomfort, she is compliant with BID Lovenox, she remains on the ASA. She reports improved appetite, she reports that appetite is decreased with a large amount of fluid on the abdomen. She is due for C1D8 of Carbo/Taxol on 2020. CBC was reviewed. \par Side effects of chemotherapy including, but not limited to, cytopenias, that may require blood product transfusions and lead to increased risk of infection, requiring hospitalization, allergic reactions, that can rarely be life-threatening, skin reactions, nausea/vomiting, mucositis, diarrhea/constipation were discussed at length, patient and family voice undestaging, all questions were answered to patient's satisfaction.\par Images were personally reviewed by MD Matt and discussed with patient.\par \par 9/10/2020: Patient is 80 years old female came  today for follow up for  Recurrent adenocarcinoma Mullerian origin, malignant ascites diagnosed on 2020, she S/P one cycle of weekly Carbo/taxol she tolerated well. She  reports improved SOB, improved abdominal swelling, she is ran out of Lovenox, which she is supposed to be taking BID, she didn't call for the renewal, she remains on the ASA. She  has been without Lovenox for like 7 days now, we will restart her on AC immediately. Xarelto was prescribed. She was educated again that she will need to remain on long-term anticoagulation. She reports good  appetite  She is due to start the new cycle of chemo on 20.\par PET Scan reviewed from 20 which revealed\par Mild diffuse FDG uptake throughout the mesentery along with peritoneal stranding and nodularity compatible with peritoneal carcinomatosis (SUV up to 3.9). \par No additional sites of abnormal FDG uptake to suggest biologic tumor activity.\par Images were personally reviewed by MD Matt and discussed with patient. \par \par 10/15/2020: Trav present for follow up, her next chemotherapy is for tomorrow, we will need to place PICC line, this will be arrange as outpatient. \par CT chest on 10/11/2020 revealed When compared to prior study dated 2020: \par Although not a dedicated PE study, redemonstration of a right lower lobe segmental/subsegmental pulmonary embolus which appears overall decreased in size from one month prior. \par No evidence for new consolidation, pleural effusion or pneumothorax. \par Trav reports mild neuropathy, not worsening with chemotherapy, CBC was reviewed. Overall clinically she demonstrates great response to therapy. \par \par 2020: Trav is 80 years old female  present for follow up, her ncycle 4 of carbo/taxol is for tomorrow. She repots feeling better regard urination and hematuria. She stated no more blood with her urine but sometime feels pain while she is urinating.\par We communicated CBC result with Patient with HGB/ HCT is 9.6/29.5. Also patient currently on the therapeutic lovenox once every 12 hrs.\par Trav reports neuropathy is getting  worse with chemotherapy, CBC was reviewed. Overall clinically she demonstrates great response to therapy.\par \par 12/10/2020: Trav is a 80 years old female  present for follow up for Recurrent adenocarcinoma Mullerian origin, malignant ascites diagnosed on 2020, S/P 4 cycles of weekly Carboplatin and Taxol.  She reports feeling well, tolerating treatment. She denies hematuria, abdominal pain. \par We communicated CBC result with Patient with HGB/ HCT is 9.1/29.5. Also patient continues on the therapeutic Lovenox once every 12 hrs.\par Patient will start Cycle # 5 tomorrow.\par \par 21: Trav is a 80 years old female  present for follow up for Recurrent adenocarcinoma Mullerian origin, malignant ascites diagnosed on 2020, S/P 6 cycles of weekly Carboplatin and Taxol.  She reports feeling well , Mildly tired , tolerating her diet better. She denies hematuria, abdominal pain. \par We communicated CBC result with Patient with HGB/ HCT is 9.7 /30.6.  Also patient continues on the therapeutic Lovenox once every 12 hrs.\par Patient still complaining  of mild dysuria no bleeding. we will send for Urine analysis. \par Patient was educated to switch from Lovenox to Xarelto 20 mg po daily.\par Patient is due for PET Scan . Prescription provided. \par \par 21: Trav is a 80 years old female  who presents for follow up for Recurrent adenocarcinoma Mullerian origin, malignant ascites diagnosed on 2020, S/P 6 cycles of weekly Carboplatin and Taxol.  She reports feeling OK, reports fatigue, tolerating her diet better. She denies hematuria, abdominal pain. She reports she does not wish at this point to receive any additional cancer-directed therapy, ad wishes to stop Lovenox. \par PET CT on 2/15/21 revealed \par Compared to PET/CT of 2020, no definite sites of abnormal FDG uptake to suggest biologic tumor activity.\par Interval retraction of a right PICC with tip noted within the right axilla (when correlated to chest radiograph dated 10/22/2020).\par Increased left hydroureteronephrosis to the level of the midureter without definite obstructing radiopaque calculus.\par Images were personally reviewed by MD Matt and discussed with patient. \par We recommended for the patient to follow up with urology, we will assist with appointment.\par We communicated CBC result with Patient with HGB/ HCT is 10.0  /32.6.  Also patient will switch from Lovenox to Xarelto, we have already prescribed that during her prior visit, she did not however switch over.\par Patient was educated to switch from Lovenox to Xarelto 20 mg po daily.\par \par 2021: Trav is feeling well, reports no weight loss, no new pulm or neurological symptoms, no new pain, no new GI/ symptoms. \par She is tolerating Xarelto and reports no bleeding. She has followed up with Dr. Singletary, no intervention for hydronephrosis. \par Her older brother passed away, he will be buried in [facility] in  she wishes to go. I recommended she takes COVID vaccine before she goes and she will need to continue with Xarelto. She  prefers to get restaging scans after she comes back. \par \par 2201: Trav is here for a follow up for Uterine cancer, she reports feeling well, reports no weight loss, no new pulm or neurological symptoms, no new pain, no new GI/ symptoms. \par She is tolerating Xarelto and reports no bleeding. She has followed up with Dr. Singletary, no intervention for hydronephrosis. \par She will travel to [facility] on 21 for 3 weeks for her older brother . She will need to continue with Xarelto. \par She will follow up in 4 weeks, repeat follow up CT C/A/P when she is back from [facility] per her request. \par \par 21\par Patient is here for a follow-up visit for hx of Pulmonary Embolism and Uterine Cancer.  She is feeling well with no new complaints.  Reviewed most recent CBC, which is stable.  Patient denies fever, chills, nausea, vomiting, dyspnea or bleeding.  She continues on Xarelto daily as tolerated.  Reviewed the last C125 from 2021 which was WNL.  She was seen recently in the ER due to abdominal pain related to gassiness.  She has GI followup pending.  \par CT A/P (2021) IMPRESSION:No evidence of intra-abdominal or pelvic mass or inflammatory process.No evidence of intra-thoracic, abdominal or pelvic visceral laceration or hematoma.A small amount of ascites is noted around the liver, right paracolic gutter, and within the pelvis.\par CTa Chest (2021)IMPRESSION:No evidence of pulmonary embolism.\par \par \par CT A/P; 2022\par PROCEDURE DATE:  2022  \par \par \par \par INTERPRETATION:  CLINICAL STATEMENT: History of uterine cancer status \par post total abdominal hysterectomy complicated by malignant ascites \par presents with abdominal distention and discomfort over 3-4 weeks.\par \par TECHNIQUE: Contiguous axial CT images were obtained from the lower chest \par to the pubic symphysis following administration of 100cc Omnipaque 350 \par intravenous contrast.  Oral contrast was administered.  Reformatted \par images in the coronal and sagittal planes were acquired.\par \par COMPARISON CT: Abdominopelvic CT 2021.\par \par FINDINGS:\par \par LOWER CHEST: Dependent atelectasis. Trace left pleural effusion.\par \par HEPATOBILIARY: Scalloping of the liver secondary to the scalloping of the \par liver secondary to the ascites.\par \par SPLEEN: Unremarkable.\par \par PANCREAS: Unremarkable.\par \par ADRENAL GLANDS: Unremarkable.\par \par KIDNEYS: No hydronephrosis. Symmetric renal enhancement. Atrophied left \par kidney.\par \par ABDOMINOPELVIC NODES: No lymphadenopathy.\par \par PELVIC ORGANS: Post hysterectomy and bilateral salpingo-oophorectomy.\par \par PERITONEUM/MESENTERY/BOWEL: Increased now large volume abdominopelvic \par ascites. New omental nodularity noted (). Large volume \par abdominopelvic ascites. No definite bowel obstruction or \par pneumoperitoneum. Appendix not visualized.\par \par BONES/SOFT TISSUES: Mild degenerative changes of the spine. Grade 1 \par anterolisthesis of L4 onto L5. No suspicious osseous lesions. Gluteal \par subcutaneous calcified granulomas.\par \par VASCULAR: IVC filter in place. Atherosclerotic calcifications of the \par normal caliber aorta and its distal branches.\par \par IMPRESSION:\par \par Since 2021, new omental nodularity and increased now large \par volume abdominopelvic malignant ascites.\par \par \par --- End of Report ---\par \par \par \par \par PRINCESS LONG MD; Resident Radiologist\par This document has been electronically signed.\par MARLON REYNAGA MD; Attending Radiologist\par \par 2022: ASCITES FLUID\par \par Addendum\par Immunohistochemical studies were performed on block 1A at [facility], [address] [place] [address],\[facility], [facility].  The results are as follows:\par - The malignant cells are positive for CK7, PAX-8 (focal) and p53 (strong\par nuclear staining) and negative for CK20 and calretinin.  These results\par support the diagnosis of metastatic adenocarcinoma, mullerian origin,\par in  this patient with a known history of uterine cancer.\par \par \par 3/4/22\par Patient is here for a follow-up visit for recurrent adenocarcinoma of Mullerian origin, accompanied by grandson and family member via telephone.  Patient denies fever, chills, abdominal pain, nausea, vomiting, dyspnea or bleeding.  She reports lack of appetite.  She lost approximately 20lbs over the last 3 months.  She still reports gassiness + belching, but it is better controlled with Nexium.  Reviewed Ca125 which is up to 156U/mL from 2022.  She continues on Xarelto 20mg daily.  \par \par 3/25/22\par Patient is here for a follow-up visit for recurrent adenocarcinoma of Mullerian origin, accompanied by family member.  Patient denies fever, chills, abdominal pain, nausea, vomiting, dyspnea or bleeding.  She reports lack of appetite.  She is tearful today.  Reviewed Ca125 which is up to 156U/mL from 2022.  She continues on Xarelto 20mg daily.

## 2022-03-25 NOTE — REVIEW OF SYSTEMS
[SOB on Exertion] : shortness of breath during exertion [Vomiting] : vomiting [Anxiety] : anxiety [Depression] : depression [Negative] : Allergic/Immunologic [Fever] : no fever [Chills] : no chills [Night Sweats] : no night sweats [Fatigue] : no fatigue [Recent Change In Weight] : ~T no recent weight change [Eye Pain] : no eye pain [Dysphagia] : no dysphagia [Odynophagia] : no odynophagia [Chest Pain] : no chest pain [Wheezing] : no wheezing [Cough] : no cough [Diarrhea] : no diarrhea [Dysuria] : no dysuria [Confused] : no confusion [Dizziness] : no dizziness [Fainting] : no fainting [Difficulty Walking] : no difficulty walking [FreeTextEntry7] : reports early satiety [de-identified] : s/p L Chest port insertion with IR  [de-identified] : reports upper + lower extremity neuropathy , not painful

## 2022-03-25 NOTE — PHYSICAL EXAM
[Restricted in physically strenuous activity but ambulatory and able to carry out work of a light or sedentary nature] : Status 1- Restricted in physically strenuous activity but ambulatory and able to carry out work of a light or sedentary nature, e.g., light house work, office work [Normal] : affect appropriate [de-identified] : L chest port c/d/i

## 2022-04-04 PROBLEM — F41.9 ANXIETY: Status: ACTIVE | Noted: 2020-12-15

## 2022-04-19 PROBLEM — G62.9 NEUROPATHY, PERIPHERAL: Status: ACTIVE | Noted: 2021-03-08

## 2022-04-19 PROBLEM — I26.99 PULMONARY EMBOLISM: Status: ACTIVE | Noted: 2020-08-26

## 2022-04-19 NOTE — CONSULT LETTER
[Dear  ___] : Dear  [unfilled], [Consult Letter:] : I had the pleasure of evaluating your patient, [unfilled]. [( Thank you for referring [unfilled] for consultation for _____ )] : Thank you for referring [unfilled] for consultation for [unfilled] [Please see my note below.] : Please see my note below. [Consult Closing:] : Thank you very much for allowing me to participate in the care of this patient.  If you have any questions, please do not hesitate to contact me. [Sincerely,] : Sincerely, [FreeTextEntry3] : Paul Greco DO\par Attending Physician,\par Hematology/ Medical Oncology\par [phone] office\par \par

## 2022-04-19 NOTE — PHYSICAL EXAM
[Restricted in physically strenuous activity but ambulatory and able to carry out work of a light or sedentary nature] : Status 1- Restricted in physically strenuous activity but ambulatory and able to carry out work of a light or sedentary nature, e.g., light house work, office work [Normal] : affect appropriate [de-identified] : L chest port c/d/i

## 2022-04-19 NOTE — ASSESSMENT
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  MMR proficient.  \par - Prior SHAAN BSO in 2016, adjuvant chemotherapy, obtain records from Dr. Abdullahi \par - first recurrence in 2020:  Started on weekly carbo/taxol weekly 3 on 1 off on 8/14/2020. On 1/28/21 patient completed 6 cycles of Carbo/Taxol (weekly), declines any additional cancer directed therapy on 2/25/2021\par - CT A/P from 11/2021 (abd pain) shows no new disease ; CTa Chest from 09/2021 \par - CT A/P from 02/2022 showed ascites which showed recurrent adenocarcinoma of mullerian origin:  is high\par - explained that since it has been greater than 1 year since last treatment with Carbo/Taxol with improvement in response , one option is to reinitiate same chemotherapy regimen and see if she responds.  However, she notes lower extremity neuropathy from chemotherapy last time so we agreed upon Keytruda + Lenvima.  \par - Completed [employer] Liquid Biopsy Testing form with patient in clinic on 03/01/2022. \par - Started Keytuda + Lenvima 4mg daily on 3.29.2022 but poorly tolerating (weakness, fatigue, abd discomfort)\par - c/w cycle 2 of single-agent Keytruda every 3 weeks ; discontinue Lenvima 4mg daily due to poor tolerability\par - had a discussion regarding possibility of resuming Lenvima in the future 4mg once every 3 days \par - hydrate 500cc NS over 1 hour today ; will obtain CBC, BMP, LFTs, TSH, GGT, Ca125 today \par - s/p L chest port placement with IR  \par \par # Malignant ascites required high volume paracentesis \par - previously improved/resolved with carbo/taxol \par \par # PE in the setting of malignancy diagnosed on 8/6/2020\par - Doppler was negative for DVT on 8/8/2020\par - Also on ASA, h/o CABG . \par - Was stared on Xarelto, but after 1 dose was admitted with recurrent PE, diagnosed on 9/11/2020\par - Patient will switch from Lovenox to Xarelto 20 mg po daily on 1/28/2021.\par - c/w Xarelto 20mg daily , no refills needed\par \par # Gassiness + belching\par - c/w Nexium \par - She has GI followup pending.  \par \par # Upper + lower extremity neuropathy, bilateral, not painful \par - c/w gabapentin 100mg , can titrate up to TiD dosing as tolerated \par \par # Anxiety / Depression with associated lack of appetite + weight loss \par - she will discuss with PCP +/- mental health provider regarding antidepressants \par - Patient certified for utilization of Medical Marijuana on 03/04/2022.  \par \par RTC in 3 weeks with ACP with CBC, BMP, LFTs, TSH, Ca125\par \par Octayvious Umair (jabier) [phone]\par \par seen/examined w/NP Catherine; note reviewed, case dicussed\par in view of pain, HOLD lenvima\par immunotherapy to be continued\par reevaluate in 3 weeks: if pain resolved, will try to reintroduce lenvima slowly; 1 tab 4mg every 3 days if she is agreeable

## 2022-04-19 NOTE — HISTORY OF PRESENT ILLNESS
[Therapy: ___] : Therapy: [unfilled] [Cycle: ___] : Cycle: [unfilled] [de-identified] : Trav is a shekhar 81 year old lady I initially met in the hospital on 8/11/2020. Her past medical history is significant for uterine cancer s/p SHAAN BSO as per patient s/p adjuvant chemotherapy in 2016, CAD s/p CABG, HTN and depression, she presented to the ED for abdominal pain and chest discomfort. 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 \par \par PAST MEDICAL & SURGICAL HISTORY:\par HLD (hyperlipidemia)\par HTN (hypertension)\par CAD (coronary artery disease)\par History of coronary artery bypass surgery\par \par FAMILY HISTORY:\par No pertinent family history in first degree relatives\par \par Allergies\par chloroquine (Hives)\par \par CTA C/A/P on 8/6/2020 revealed \par 1. Intraluminal filling defect is seen within the segmental branches of the right lower lobe pulmonary artery, indicating acute pulmonary embolism. (6/132-138; 10/51-55). Filling defect in segmental branch of the right upper lobe pulmonary artery. (6/77) There is a small filling defect within a left lower lobe pulmonary artery segmental branch (6/147; 10/59). No evidence of right heart strain. The right ventricle measures 3.5 cm; the left ventricle measures 3.7 cm. (RV:LV<1) \par 2. Compared with the previous CT scan of 5/27/2020, there is now a large amount of ascites throughout the abdomen and pelvis. \par \par LE Doppler on 8/8/2020 revealed \par No evidence of deep venous thrombosis or superficial thrombophlebitis in the bilateral lower extremities. \par Left popliteal fossa fluid collection as measured above likely Baker's cyst\par \par During her admission she has required 3 episodes of paracentesis on 8/7 3L, 8/12 4L, 8/13 2.2L, once pathology became available patient was started on weekly Carbo/Taxol she has tolerated chemotherapy well.  [FreeTextEntry1] : Started Keytruda every 3 weeks + Lenvima 4mg PO daily on 3.29.2022 but discontinued Lenvima in 04/2022 due to poor tolerability [de-identified] : 2020: She returns today for follow up, she reports improved SOB, improved abdominal swelling, still come positional chest discomfort, she is compliant with BID Lovenox, she remains on the ASA. She reports improved appetite, she reports that appetite is decreased with a large amount of fluid on the abdomen. She is due for C1D8 of Carbo/Taxol on 2020. CBC was reviewed. \par Side effects of chemotherapy including, but not limited to, cytopenias, that may require blood product transfusions and lead to increased risk of infection, requiring hospitalization, allergic reactions, that can rarely be life-threatening, skin reactions, nausea/vomiting, mucositis, diarrhea/constipation were discussed at length, patient and family voice undestaging, all questions were answered to patient's satisfaction.\par Images were personally reviewed by MD Matt and discussed with patient.\par \par 9/10/2020: Patient is 80 years old female came  today for follow up for  Recurrent adenocarcinoma Mullerian origin, malignant ascites diagnosed on 2020, she S/P one cycle of weekly Carbo/taxol she tolerated well. She  reports improved SOB, improved abdominal swelling, she is ran out of Lovenox, which she is supposed to be taking BID, she didn't call for the renewal, she remains on the ASA. She  has been without Lovenox for like 7 days now, we will restart her on AC immediately. Xarelto was prescribed. She was educated again that she will need to remain on long-term anticoagulation. She reports good  appetite  She is due to start the new cycle of chemo on 20.\par PET Scan reviewed from 20 which revealed\par Mild diffuse FDG uptake throughout the mesentery along with peritoneal stranding and nodularity compatible with peritoneal carcinomatosis (SUV up to 3.9). \par No additional sites of abnormal FDG uptake to suggest biologic tumor activity.\par Images were personally reviewed by MD Matt and discussed with patient. \par \par 10/15/2020: Trav present for follow up, her next chemotherapy is for tomorrow, we will need to place PICC line, this will be arrange as outpatient. \par CT chest on 10/11/2020 revealed When compared to prior study dated 2020: \par Although not a dedicated PE study, redemonstration of a right lower lobe segmental/subsegmental pulmonary embolus which appears overall decreased in size from one month prior. \par No evidence for new consolidation, pleural effusion or pneumothorax. \par Trav reports mild neuropathy, not worsening with chemotherapy, CBC was reviewed. Overall clinically she demonstrates great response to therapy. \par \par 2020: Trav is 80 years old female  present for follow up, her ncycle 4 of carbo/taxol is for tomorrow. She repots feeling better regard urination and hematuria. She stated no more blood with her urine but sometime feels pain while she is urinating.\par We communicated CBC result with Patient with HGB/ HCT is 9.6/29.5. Also patient currently on the therapeutic lovenox once every 12 hrs.\par Trav reports neuropathy is getting  worse with chemotherapy, CBC was reviewed. Overall clinically she demonstrates great response to therapy.\par \par 12/10/2020: Trav is a 80 years old female  present for follow up for Recurrent adenocarcinoma Mullerian origin, malignant ascites diagnosed on 2020, S/P 4 cycles of weekly Carboplatin and Taxol.  She reports feeling well, tolerating treatment. She denies hematuria, abdominal pain. \par We communicated CBC result with Patient with HGB/ HCT is 9.1/29.5. Also patient continues on the therapeutic Lovenox once every 12 hrs.\par Patient will start Cycle # 5 tomorrow.\par \par 21: Trav is a 80 years old female  present for follow up for Recurrent adenocarcinoma Mullerian origin, malignant ascites diagnosed on 2020, S/P 6 cycles of weekly Carboplatin and Taxol.  She reports feeling well , Mildly tired , tolerating her diet better. She denies hematuria, abdominal pain. \par We communicated CBC result with Patient with HGB/ HCT is 9.7 /30.6.  Also patient continues on the therapeutic Lovenox once every 12 hrs.\par Patient still complaining  of mild dysuria no bleeding. we will send for Urine analysis. \par Patient was educated to switch from Lovenox to Xarelto 20 mg po daily.\par Patient is due for PET Scan . Prescription provided. \par \par 21: Trav is a 80 years old female  who presents for follow up for Recurrent adenocarcinoma Mullerian origin, malignant ascites diagnosed on 2020, S/P 6 cycles of weekly Carboplatin and Taxol.  She reports feeling OK, reports fatigue, tolerating her diet better. She denies hematuria, abdominal pain. She reports she does not wish at this point to receive any additional cancer-directed therapy, ad wishes to stop Lovenox. \par PET CT on 2/15/21 revealed \par Compared to PET/CT of 2020, no definite sites of abnormal FDG uptake to suggest biologic tumor activity.\par Interval retraction of a right PICC with tip noted within the right axilla (when correlated to chest radiograph dated 10/22/2020).\par Increased left hydroureteronephrosis to the level of the midureter without definite obstructing radiopaque calculus.\par Images were personally reviewed by MD Matt and discussed with patient. \par We recommended for the patient to follow up with urology, we will assist with appointment.\par We communicated CBC result with Patient with HGB/ HCT is 10.0  /32.6.  Also patient will switch from Lovenox to Xarelto, we have already prescribed that during her prior visit, she did not however switch over.\par Patient was educated to switch from Lovenox to Xarelto 20 mg po daily.\par \par 2021: Trav is feeling well, reports no weight loss, no new pulm or neurological symptoms, no new pain, no new GI/ symptoms. \par She is tolerating Xarelto and reports no bleeding. She has followed up with Dr. Singletary, no intervention for hydronephrosis. \par Her older brother passed away, he will be buried in [facility] in  she wishes to go. I recommended she takes COVID vaccine before she goes and she will need to continue with Xarelto. She  prefers to get restaging scans after she comes back. \par \par 2201: Trav is here for a follow up for Uterine cancer, she reports feeling well, reports no weight loss, no new pulm or neurological symptoms, no new pain, no new GI/ symptoms. \par She is tolerating Xarelto and reports no bleeding. She has followed up with Dr. Singletary, no intervention for hydronephrosis. \par She will travel to [facility] on 21 for 3 weeks for her older brother . She will need to continue with Xarelto. \par She will follow up in 4 weeks, repeat follow up CT C/A/P when she is back from [facility] per her request. \par \par 21\par Patient is here for a follow-up visit for hx of Pulmonary Embolism and Uterine Cancer.  She is feeling well with no new complaints.  Reviewed most recent CBC, which is stable.  Patient denies fever, chills, nausea, vomiting, dyspnea or bleeding.  She continues on Xarelto daily as tolerated.  Reviewed the last C125 from 2021 which was WNL.  She was seen recently in the ER due to abdominal pain related to gassiness.  She has GI followup pending.  \par CT A/P (2021) IMPRESSION:No evidence of intra-abdominal or pelvic mass or inflammatory process.No evidence of intra-thoracic, abdominal or pelvic visceral laceration or hematoma.A small amount of ascites is noted around the liver, right paracolic gutter, and within the pelvis.\par CTa Chest (2021)IMPRESSION:No evidence of pulmonary embolism.\par \par \par CT A/P; 2022\par PROCEDURE DATE:  2022  \par \par \par \par INTERPRETATION:  CLINICAL STATEMENT: History of uterine cancer status \par post total abdominal hysterectomy complicated by malignant ascites \par presents with abdominal distention and discomfort over 3-4 weeks.\par \par TECHNIQUE: Contiguous axial CT images were obtained from the lower chest \par to the pubic symphysis following administration of 100cc Omnipaque 350 \par intravenous contrast.  Oral contrast was administered.  Reformatted \par images in the coronal and sagittal planes were acquired.\par \par COMPARISON CT: Abdominopelvic CT 2021.\par \par FINDINGS:\par \par LOWER CHEST: Dependent atelectasis. Trace left pleural effusion.\par \par HEPATOBILIARY: Scalloping of the liver secondary to the scalloping of the \par liver secondary to the ascites.\par \par SPLEEN: Unremarkable.\par \par PANCREAS: Unremarkable.\par \par ADRENAL GLANDS: Unremarkable.\par \par KIDNEYS: No hydronephrosis. Symmetric renal enhancement. Atrophied left \par kidney.\par \par ABDOMINOPELVIC NODES: No lymphadenopathy.\par \par PELVIC ORGANS: Post hysterectomy and bilateral salpingo-oophorectomy.\par \par PERITONEUM/MESENTERY/BOWEL: Increased now large volume abdominopelvic \par ascites. New omental nodularity noted (). Large volume \par abdominopelvic ascites. No definite bowel obstruction or \par pneumoperitoneum. Appendix not visualized.\par \par BONES/SOFT TISSUES: Mild degenerative changes of the spine. Grade 1 \par anterolisthesis of L4 onto L5. No suspicious osseous lesions. Gluteal \par subcutaneous calcified granulomas.\par \par VASCULAR: IVC filter in place. Atherosclerotic calcifications of the \par normal caliber aorta and its distal branches.\par \par IMPRESSION:\par \par Since 2021, new omental nodularity and increased now large \par volume abdominopelvic malignant ascites.\par \par \par --- End of Report ---\par \par \par \par \par PRINCESS LONG MD; Resident Radiologist\par This document has been electronically signed.\par MARLON REYNAGA MD; Attending Radiologist\par \par 2022: ASCITES FLUID\par \par Addendum\par Immunohistochemical studies were performed on block 1A at [facility], [address] [place] [address],\[facility], [facility].  The results are as follows:\par - The malignant cells are positive for CK7, PAX-8 (focal) and p53 (strong\par nuclear staining) and negative for CK20 and calretinin.  These results\par support the diagnosis of metastatic adenocarcinoma, mullerian origin,\par in  this patient with a known history of uterine cancer.\par \par \par 3/4/22\par Patient is here for a follow-up visit for recurrent adenocarcinoma of Mullerian origin, accompanied by grandson and family member via telephone.  Patient denies fever, chills, abdominal pain, nausea, vomiting, dyspnea or bleeding.  She reports lack of appetite.  She lost approximately 20lbs over the last 3 months.  She still reports gassiness + belching, but it is better controlled with Nexium.  Reviewed Ca125 which is up to 156U/mL from 2022.  She continues on Xarelto 20mg daily.  \par \par 3/25/22\par Patient is here for a follow-up visit for recurrent adenocarcinoma of Mullerian origin, accompanied by family member.  Patient denies fever, chills, abdominal pain, nausea, vomiting, dyspnea or bleeding.  She reports lack of appetite.  She is tearful today.  Reviewed Ca125 which is up to 156U/mL from 2022.  She continues on Xarelto 20mg daily.   \par \par 22\par Patient is here for a follow-up visit for recurrent adenocarcinoma of Mullerian origin, accompanied by family member.  She started Keytruda and Lenvima on 3.29.2022.  Patient denies fever, chills or bleeding.  She reports lack of appetite, nausea and weakness.  She is poorly tolerating regimen and stopped oral therapy.  She is tearful today and uncomfortable.  Reviewed Ca125 which went down to 103U/mL from 2022.  She continues on Xarelto 20mg daily.

## 2022-04-19 NOTE — REVIEW OF SYSTEMS
[SOB on Exertion] : shortness of breath during exertion [Vomiting] : vomiting [Anxiety] : anxiety [Depression] : depression [Negative] : Allergic/Immunologic [Fatigue] : fatigue [Muscle Weakness] : muscle weakness [Fever] : no fever [Chills] : no chills [Night Sweats] : no night sweats [Recent Change In Weight] : ~T no recent weight change [Eye Pain] : no eye pain [Dysphagia] : no dysphagia [Odynophagia] : no odynophagia [Chest Pain] : no chest pain [Wheezing] : no wheezing [Cough] : no cough [Diarrhea] : no diarrhea [Dysuria] : no dysuria [Confused] : no confusion [Dizziness] : no dizziness [Fainting] : no fainting [Difficulty Walking] : no difficulty walking [FreeTextEntry7] : reports early satiety + nausea [de-identified] : s/p L Chest port insertion with IR  [de-identified] : reports upper + lower extremity neuropathy , not painful

## 2022-04-21 NOTE — ED ADULT TRIAGE NOTE - SOURCE OF INFORMATION
Physical Exam  Mallampati: II  TM Distance: >3 FB  Neck ROM: Full  cardiovascular exam normal  pulmonary exam normal  Patient Demonstrates:  Obese      Legend: C=Chipped  M=Missing  L=LoosePOOR DENTITION      Anesthesia Plan  ASA Status: 2  Anesthesia Type: General  Induction: Intravenous  Preferred Airway Type: LMA  Reviewed: Past Med History, Medications, NPO Status, Pre-Induction Reassessment, Problem List, Allergies and Patient Summary  The proposed anesthetic plan, including its risks and benefits, have been discussed with the Patient - along with the risks and benefits of alternatives.  Questions were encouraged and answered and the patient and/or representative agrees to proceed.  Plan Comments: PATIENT AGREES TO GENERAL ANESTHESIA WITH ADDUCTOR CANAL BLOCK.  RISKS/BENEFITS EXPLAINED/ACCEPTED      Anesthesia ROS/Med Hx        Pulmonary Review:    Pt. positive for asthma    Neuro/Psych Review:    Pt. positive for headaches  Pt. positive for psychiatric history (DEPRESSION)    Cardiovascular Review:    Pt. positive for hypertension  Pt. positive for hyperlipidemia    GI/HEPATIC/RENAL Review:    Pt. negative for GERD    End/Other Review:    Pt. positive for diabetes - type 2  Pt. positive for arthritis  Pt. positive for obesity class II - 35.00 - 39.99     Patient

## 2022-04-21 NOTE — H&P ADULT - ASSESSMENT
81 F patient with Hx of uterine cancer s/p SHAAN s/p recurrence, Recurrent malignant ascites, CAD s/p CABG, HTN, depression, PE on Xarelto, s.p IVC 2020 presented for abdominal pain and vomiting     81 F patient with Hx of uterine cancer s/p SHAAN s/p recurrence, Recurrent malignant ascites, CAD s/p CABG, HTN, depression, PE on Xarelto, s.p IVC 2020 presented for abdominal pain and vomiting      # Vomiting Post chemotherapy April 19    - will keep patient NPO  - LFTs , lipase WNL  - CT AP Redemonstrated large volume abdominopelvic ascites. Dilated loops of   small bowel without definitive transition point.  - bowel regimen for constipation  - Abdominal TAP ruled out SBP ANC < 250  - will give LR at 100 cc/ hr  - PPI IV q 12  - dexamethasone 8 mg daily  - Olanzapine 5-10 mg daily for 3 days  - Zofran 4 mg PRN  - monitor QTc    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, malignant ascites diagnosed on 8/8/2020  # Uterine cancer s/p SHAAN-BSO with adjuvant chemotherapy in 2016    - s/p weekly Carbo/Taxol (8/14/20-1/28/2021)   - PET Scan from 2/25/21 shows good response to chemo   - CT A/P from 11/2021 (abd pain) shows no new disease  - CT A/P from 02/2022 showed ascites which showed recurrent adenocarcinoma of mullerian origin:  is high  - Completed [facility] Liquid Biopsy Testing (03/01/2022) shows no targetable mutation  - chemoport placed by IR   - was recently on Keytruda + lenvatinib   - follows with Dr Mariee   - NO SBP according to paracentesis  - off aldactone according to patient    # CAD s.p CABG  # Hypertension    - not on antiplatelets  - c/w Toprol Xl 25 daily  - c/w Atorvastatin 40 mg daily ( patient states that she is taking atorvastatin 40; record shows rosuvastatin 10 )  - check lipid panel A1c  - c/w amlodipine 5 mg daily  - record shows Aldactone 50; however patient states not taking it ( malignant ascitis)    # H.o PE 2020; No DVT  # s.p IVC 2020    - takes Xarelto 15 mg at home according to the patient  - give Xarelto 20 mg daily  - The FDA recommends IVC filter removal between   29 and 54 days after implantation. Consider IVC filter retrieval as   clinically indicated.  - resume Xarelto tomorrow after paracentesis      # CKD III    - at baseline  - avoid nephrotoxins  - renally dosed medications  - monitor Creat, BUN, Phos  - hypochloremia from vomiting    # depression    - used to take buspirone  - off antidepressant    # DVT ppx Xarelto  # GI ppx protonix (switch to PO when tolerating)  # NPO for now; constantly vomiting  # Full code   PLEASE VERIFY MEDREC IN AM ; ACCORDING TO THE PATIENT SHE IS TAKING ONLY 4 PILLS; PREVIOUS RECORD SHOWS MORE MEDICATION AND DIFFERENT DOSAGE

## 2022-04-21 NOTE — H&P ADULT - HISTORY OF PRESENT ILLNESS
81 F patient with Hx of uterine cancer s/p SHAAN s/p recurrence, Recurrent malignant ascites, CAD s/p CABG, HTN, depression, PE on Xarelto, s.p IVC 2020 presented for abdominal pain and vomiting  Patient underwent chemotherapy April 19. She complains of chronic abdominal pain  however today morning she woke up complaining of severe 9/10 periumbilical pain, non radiating, colicky, exacerbated by meals, associated with intractable vomiting and PO intolerance. She denies fever and chills, reports constipation.   No respiratory symptoms   ED vitals unremarkable

## 2022-04-21 NOTE — ED PROVIDER NOTE - CLINICAL SUMMARY MEDICAL DECISION MAKING FREE TEXT BOX
82 yo female with PMH of uterine cancer s/p SHAAN, recurrent ascites from abdominal malignancy (finished course of chemo and now on immunotherapy), HTN, CAD s/p CABG, depression, PE (on xarelto), glaucoma, cataracts who presents to the ER for 1 day of diffuse abdomen discomfort, with N/V. Denies diarrhea/fever/cp/sob/dysuria/chills. On exam +diffuse abdomen tenderness, no significant distension, +BS, no mass, remainder of exam per PA note. Pt results reviewed, and no acute pathology. pt still actively vomiting in ER despite meds. Pt to be admitted for pain and vomiting (?secondary to recent chemo regimen). UA still pending as of admission.

## 2022-04-21 NOTE — ED PROVIDER NOTE - ATTENDING APP SHARED VISIT CONTRIBUTION OF CARE
80 yo female with PMH of uterine cancer s/p SHAAN, recurrent ascites from abdominal malignancy (finished course of chemo and now on immunotherapy), HTN, CAD s/p CABG, depression, PE (on xarelto), glaucoma, cataracts who presents to the ER for 1 day of diffuse abdomen discomfort, with N/V. Denies diarrhea/fever/cp/sob/dysuria/chills. On exam +diffuse abdomen tenderness, no significant distension, +BS, no mass, remainder of exam per PA note. Bedside sono done for IV cath placement and pt with mild-mod ascites noted. Paracentesis done for diagnostic purpose given complaint and labs ordered, CT ordered, UA ordered. Ascites fluid analysis ordered. To give IVF antiemetics, IVFs, pain meds, and reassess.

## 2022-04-21 NOTE — H&P ADULT - ATTENDING COMMENTS
HPI:  81 F patient with Hx of uterine cancer s/p SHAAN s/p recurrence, Recurrent malignant ascites, CAD s/p CABG, HTN, depression, PE on Xarelto, s.p IVC 2020 presented for abdominal pain and vomiting  Patient underwent chemotherapy April 19. She complains of chronic abdominal pain  however today morning she woke up complaining of severe 9/10 periumbilical pain, non radiating, colicky, exacerbated by meals, associated with intractable vomiting and PO intolerance. She denies fever and chills, reports constipation.   No respiratory symptoms   ED vitals unremarkable  (21 Apr 2022 21:49)    REVIEW OF SYSTEMS: see cc/HPI   CONSTITUTIONAL: No weakness, fevers or chills  EYES/ENT: No visual changes;  No vertigo or throat pain   NECK: No pain or stiffness  RESPIRATORY: No cough, wheezing, hemoptysis; No shortness of breath  CARDIOVASCULAR: No chest pain or palpitations  GASTROINTESTINAL: (+) abdominal pain - diffuse . (+) nausea/vomiting, No hematemesis; No diarrhea or constipation. No melena or hematochezia.  GENITOURINARY: No dysuria, frequency or hematuria  NEUROLOGICAL: No numbness or weakness  SKIN: No itching, rashes    Physical Exam:  General: WN/WD NAD - abdominal pain improved   Neurology: A&Ox3, nonfocal, follows commands  Eyes: PERRLA/ EOMI  ENT/Neck: Neck supple, trachea midline, No JVD  Respiratory: CTA B/L, No wheezing, rales, rhonchi  CV: Normal rate regular rhythm, S1S2, no murmurs, rubs or gallops  Abdominal: Soft, NT, ND +BS,   Extremities: No edema, + peripheral pulses  Skin: No Rashes, Hematoma, Ecchymosis  Incisions:   Tubes:    A/p  Intractable vomiting ? etiology r/o chemo side effect r/o ileus   -IV fluid and PPI   -PRN Zofran   - PRN pain Rx     Mullerian origin adenocarcinoma   -c/w current outpatient Rx     CAD s/p CABG  -restart ASA   -c/w statin and Bblocker   -c/w Spironolactone and amlodipine     CKD III - stable   -iv hydration   -serial CMP     Depression   Decreased appetite  -Megace trial vs. Marinol     DVT prophylaxis    SEEN by ATTENDING 4/21/22 ( note revised 4/22)

## 2022-04-21 NOTE — H&P ADULT - NSHPLABSRESULTS_GEN_ALL_CORE
12.5   7.10  )-----------( 253      ( 21 Apr 2022 17:32 )             37.5         04-21    134<L>  |  94<L>  |  25<H>  ----------------------------<  86  4.3   |  24  |  1.3    Ca    8.8      21 Apr 2022 17:32    TPro  6.6  /  Alb  3.6  /  TBili  0.4  /  DBili  0.2  /  AST  27  /  ALT  9   /  AlkPhos  85  04-21      < from: CT Abdomen and Pelvis w/ Oral Cont and w/ IV Cont (04.21.22 @ 20:08) >    Redemonstrated large volume abdominopelvic ascites. Dilated loops of   small bowel without definitive transition point.    Inferior vena cava filter. The FDA recommends IVC filter removal between   29 and 54 days after implantation. Consider IVC filter retrieval as   clinically indicated.

## 2022-04-21 NOTE — ED PROVIDER NOTE - OBJECTIVE STATEMENT
82 yo f pmhx sig for htn, hld, cad, h/o uterine cancer with SHAAN recently discovered to be recurrent (now on biologics) pw 1 d of gradually worsening diffuse abd pain mod in severity with 1 episode of nbnb vomiting with no change in stools, no  symptoms. The pain is constant with no provoking or modifying factors.    I have reviewed available current nursing and previous documentation of past medical, surgical, family, and/or social history.

## 2022-04-21 NOTE — ED ADULT NURSE REASSESSMENT NOTE - NS ED NURSE REASSESS COMMENT FT1
Paracentesis done- RN unaware that procedure was being done at this time. Patient assessed afterward, Aox4 in no acute distress. Will continue to monitor.

## 2022-04-21 NOTE — H&P ADULT - NSHPPHYSICALEXAM_GEN_ALL_CORE
PHYSICAL EXAM:      Constitutional: NAD, vomiting     Respiratory: clear to auscultation b/l     Cardiovascular: RRR no audible murmur     Gastrointestinal: soft, distended, periumbilical tenderness    Extremities: no LE edema     Neurological: Alert oriented x 3 non focal

## 2022-04-21 NOTE — ED ADULT NURSE REASSESSMENT NOTE - NS ED NURSE REASSESS COMMENT FT1
Assumed care from previous day shift nurse c/o abdominal pain /distention , nausea, vomiting , noted abdominal distention , soft tender distended abdomen , MD aware , noted nausea and vomiting , MD aware , denies chest pain , denies headache , denies dizziness ,reminded pt of urine collection , will continue to monitor , IVL intact , fall alarm on Future appt:    Last Appointment with provider:   1/25/2021 f/u care; recheck 1 year  Last appointment at [facility]:  1/25/2021  Cholesterol, Total (mg/dL)   Date Value   01/25/2021 298 (H)     HDL Cholesterol (mg/dL)   Date Value   01/25/2021 55     LD

## 2022-04-21 NOTE — ED PROVIDER NOTE - PHYSICAL EXAMINATION
Physical Exam    Vital Signs: I have reviewed the initial vital signs.  Constitutional: well-nourished, appears stated age, no acute distress  Eyes: PERRLA, and symmetrical lids.  ENT: Neck supple with no adenopathy, moist MM.  Cardiovascular: regular rate, regular rhythm, well-perfused extremities  Respiratory: unlabored respiratory effort, clear to auscultation bilaterally  Gastrointestinal: soft, diffuse abdomina ttp, no guarding, no rebound   Musculoskeletal: supple neck, no lower extremity edema  Integumentary: warm, dry, no rash  Neurologic: extremities’ motor and sensory functions grossly intact  Psychiatric: A&Ox3

## 2022-04-21 NOTE — ED PROVIDER NOTE - NS ED ROS FT
Review of Systems    Constitutional: (-) fever  Eyes/ENT: (-) change in vision, (-) sore throat, (-) ear pain  Cardiovascular: (-) chest pain, (-) palpitation   Respiratory: (-) cough, (-) shortness of breath  Gastrointestinal: (-) diarrhea  Musculoskeletal: (-) neck pain, (-) back pain, (-) joint pain  Integumentary: (-) rash, (-) edema  Neurological: (-) headache, (-) altered mental status  Heme/Lymph: (-) easy bruising (-) easy bleeding   Allergic/Immunologic: (-) pruritus

## 2022-04-22 NOTE — DISCHARGE NOTE PROVIDER - HOSPITAL COURSE
Patient is a 81 year old female with Hx of uterine cancer s/p SHAAN s/p recurrence, Recurrent malignant ascites, CAD s/p CABG, HTN, depression, PE on Xarelto, s.p IVC 2020 presented for abdominal pain and vomiting  Patient underwent chemotherapy April 19. She complains of chronic abdominal pain however today morning she woke up complaining of severe 9/10 periumbilical pain, non radiating, colicky, exacerbated by meals, associated with intractable vomiting and PO intolerance. She denies fever and chills, reports constipation.     Admitted for workup for abdominal pain. CT-A/P showed Redemonstrated large volume abdominopelvic ascites. Dilated loops of small bowel without definitive transition point. S/p paracentesis in ED, SBP ruled out. Given Zofran/dexamethasone, Olanzapine, Protonix, with resolution of symptoms. Diet advanced, patient tolerated. Now stable for d/c.

## 2022-04-22 NOTE — DISCHARGE NOTE NURSING/CASE MANAGEMENT/SOCIAL WORK - NSDCPEFALRISK_GEN_ALL_CORE
For information on Fall & Injury Prevention, visit: [email or web address] OR  [email or web address] OR  https://www.cdc.gov/steadi/patient.html

## 2022-04-22 NOTE — PATIENT PROFILE ADULT - FALL HARM RISK - HARM RISK INTERVENTIONS
Assistance with ambulation/Assistance OOB with selected safe patient handling equipment/Communicate Risk of Fall with Harm to all staff/Discuss with provider need for PT consult/Monitor gait and stability/Reinforce activity limits and safety measures with patient and family/Tailored Fall Risk Interventions/Visual Cue: Yellow wristband and red socks/Bed in lowest position, wheels locked, appropriate side rails in place/Call bell, personal items and telephone in reach/Instruct patient to call for assistance before getting out of bed or chair/Non-slip footwear when patient is out of bed/[place] to call system/Physically safe environment - no spills, clutter or unnecessary equipment/Purposeful Proactive Rounding/Room/bathroom lighting operational, light cord in reach

## 2022-04-22 NOTE — CHART NOTE - NSCHARTNOTEFT_GEN_A_CORE
Meds reconciled from [facility]. Patient reports she is only taking 4 meds at home: Rosuvastatin, Xarelto, Metoprolol, Amlodipine.

## 2022-04-22 NOTE — ED ADULT NURSE REASSESSMENT NOTE - NS ED NURSE REASSESS COMMENT FT1
transported to med surg unit , pt AO x 4 , no SOB , denies any pain this time , no vomiting noted ,IVL intact , all belongings sent to unit with pt

## 2022-04-22 NOTE — PROGRESS NOTE ADULT - ATTENDING COMMENTS
patient seen and examined , agree with pgy 1 assesment and plan except as indicated above,     GEN Lying in no acute distress  HEENT Pupils equal and reactive to light and accommodationSupple Neck  PULM Clear to auscultation bilaterally  CV s1s2 regular rate and rhythm  GI + bowel sounds nontnender  EXT no cyanosis or edema  PSYCH awake alert and oriented x 3  INTEG No Lesions  NEURO Moves all extremities    PROGRESS NOTE HANDOFF    Pending:  as patient has pain improved , tolerating diet can dc home     Family discussion: patient verbalized understanding and agreeable to plan of care     Disposition: Home    time spent coordinating care , discussing with patient 44 minutes

## 2022-04-22 NOTE — PATIENT PROFILE ADULT - FUNCTIONAL ASSESSMENT - DAILY ACTIVITY 6.
[facility]    Call received from pt regarding response to ritalin. Pt states the medication is working to improve energy focus, and QOL. However, ordered dose was not working so he has doubled the dose to achieve current benefit. Pt is requesting increased dose, with early refill at 10 mg bid. Discussed importance of complying with plan of care as directed. Additionally, educated pt regarding potential for cardiac SE, particularly with pt history of MI. Will discuss with Dr. Dey ability to provide refill.       2 = A lot of assistance

## 2022-04-22 NOTE — DISCHARGE NOTE PROVIDER - NSDCFUSCHEDAPPT_GEN_ALL_CORE_FT
BEKAH NELSON ; 05/02/2022 ; NPP PSYCHIATRY  [place]  BEKAH NELSON ; 05/10/2022 ; NPP Gastro Doc Off [id number]  BEKAH NELSON ; 05/10/2022 ; NPP HemOnc [address] BEKAH Banuelos ; 05/10/2022 ; NPP Chemo & Infus [address] BEKAH Pham ; 05/31/2022 ; NPP HemOnc [address] BEKAH Banuelos ; 05/31/2022 ; NPP Chemo & Infus [address]

## 2022-04-22 NOTE — ED ADULT NURSE REASSESSMENT NOTE - NS ED NURSE REASSESS COMMENT FT1
report called to 4 B nurse , pt med surg admission c/o intractable vomiting , pt AO x 4 , no vomiting noted as of this time , no grimaced face as of this time , no SOB , IVL intact

## 2022-04-22 NOTE — DISCHARGE NOTE NURSING/CASE MANAGEMENT/SOCIAL WORK - PATIENT PORTAL LINK FT
You can access the FollowMyHealth Patient Portal offered by [facility] by registering at the following website: [email or web address]. By joining [facility]’s FollowMyHealth portal, you will also be able to view your health information using other applications (apps) compatible with our system.

## 2022-04-22 NOTE — DISCHARGE NOTE PROVIDER - CARE PROVIDERS DIRECT ADDRESSES
,DirectAddress_Unknown,[email or web address]

## 2022-04-22 NOTE — DISCHARGE NOTE PROVIDER - NSDCMRMEDTOKEN_GEN_ALL_CORE_FT
amLODIPine 5 mg oral tablet: 1 tab(s) orally once a day  ClearLax oral powder for reconstitution: 17 gram(s) orally once a day   latanoprost 0.005% ophthalmic solution: 1 drop(s) to each affected eye 2 times a day  metoprolol succinate 25 mg oral tablet, extended release: 1 tab(s) orally once a day  OLANZapine 5 mg oral tablet: 1 tab(s) orally once a day, last day 04/23  Protonix 40 mg oral delayed release tablet: 1 tab(s) orally 2 times a day (before meals)   rosuvastatin 40 mg oral tablet: 1 tab(s) orally once a day  senna oral tablet: 2 tab(s) orally once a day (at bedtime)  Xarelto 20 mg oral tablet: 1 tab(s) orally once a day (in the evening)

## 2022-04-22 NOTE — PROGRESS NOTE ADULT - SUBJECTIVE AND OBJECTIVE BOX
BEKAH NELSON 81y Female  MRN#: [id number]   CODE STATUS: Full  Hospital Day: 1d  Pt is currently admitted with the primary diagnosis of: Post-Chemo N/V    SUBJECTIVE  Overnight events: None reported.  Subjective complaints: Denies abdominal pain, N/V.                                          ----------------------------------------------------------  OBJECTIVE  PAST MEDICAL & SURGICAL HISTORY  Uterine cancer  s/p SHAAN and chemo    Hypertension    High cholesterol    Cataract of right eye    Glaucoma    Coronary artery disease  s/p CABG X2    CAD (coronary artery disease)    HTN (hypertension)    HLD (hyperlipidemia)    H/O total hysterectomy    Athscl CABG, unsp, w angina pectoris w documented spasm    History of coronary artery bypass surgery                                          -----------------------------------------------------------  ALLERGIES:  chloroquine (Hives)  quinine (Urticaria)                                          ------------------------------------------------------------  HOME MEDICATIONS  Home Medications:  amLODIPine 5 mg oral tablet: 1 tab(s) orally once a day (20 Mar 2022 14:06)  atorvastatin 40 mg oral tablet: 1 tab(s) orally once a day (21 Apr 2022 21:47)  latanoprost 0.005% ophthalmic solution: 1 drop(s) to each affected eye 2 times a day (20 Mar 2022 14:06)  metoprolol succinate 25 mg oral tablet, extended release: 1 tab(s) orally once a day (20 Mar 2022 14:06)  Xarelto 15 mg oral tablet: 1 tab(s) orally once a day (in the evening) (21 Apr 2022 21:47)                         MEDICATIONS:  STANDING MEDICATIONS  amLODIPine   Tablet 5 milliGRAM(s) Oral daily  atorvastatin Oral Tab/Cap - Peds 40 milliGRAM(s) Oral daily  chlorhexidine 4% Liquid 1 Application(s) Topical daily  dexAMETHasone  IVPB 8 milliGRAM(s) IV Intermittent daily  lactated ringers. 1000 milliLiter(s) IV Continuous <Continuous>  latanoprost 0.005% Ophthalmic Solution 1 Drop(s) Both EYES at bedtime  metoprolol succinate ER 25 milliGRAM(s) Oral daily  OLANZapine 5 milliGRAM(s) Oral daily  pantoprazole  Injectable 40 milliGRAM(s) IV Push every 12 hours  rivaroxaban 20 milliGRAM(s) Oral with dinner  senna 2 Tablet(s) Oral at bedtime    PRN MEDICATIONS  bisacodyl Suppository 10 milliGRAM(s) Rectal daily PRN  ondansetron Injectable 4 milliGRAM(s) IV Push every 8 hours PRN                                          ------------------------------------------------------------  VITAL SIGNS: Last 24 Hours  T(C): 36.1 (22 Apr 2022 07:58), Max: 36.8 (21 Apr 2022 16:32)  T(F): 97 (22 Apr 2022 07:58), Max: 98.2 (21 Apr 2022 16:32)  HR: 64 (22 Apr 2022 07:58) (64 - 84)  BP: 93/54 (22 Apr 2022 07:58) (93/54 - 130/66)  BP(mean): --  RR: 18 (22 Apr 2022 07:58) (18 - 20)  SpO2: 98% (22 Apr 2022 01:57) (98% - 100%)    04-21-22 @ 07:01  -  04-22-22 @ 07:00  --------------------------------------------------------  IN: 100 mL / OUT: 0 mL / NET: 100 mL    04-22-22 @ 07:01  -  04-22-22 @ 09:59  --------------------------------------------------------  IN: 300 mL / OUT: 0 mL / NET: 300 mL                                         --------------------------------------------------------------  LABS:             12.5   7.10  )-----------( 253      ( 21 Apr 2022 17:32 )             37.5     04-21    134<L>  |  94<L>  |  25<H>  ----------------------------<  86  4.3   |  24  |  1.3    Ca    8.8      21 Apr 2022 17:32    TPro  6.6  /  Alb  3.6  /  TBili  0.4  /  DBili  0.2  /  AST  27  /  ALT  9   /  AlkPhos  85  04-21    PT/INR - ( 21 Apr 2022 17:32 )   PT: 31.80 sec;   INR: 2.79 ratio       PTT - ( 21 Apr 2022 17:32 )  PTT:37.4 sec  Urinalysis Basic - ( 21 Apr 2022 23:30 )    Color: Yellow / Appearance: Clear / SG: >1.050 / pH: x  Gluc: x / Ketone: Trace  / Bili: Negative / Urobili: <2 mg/dL   Blood: x / Protein: 100 mg/dL / Nitrite: Negative   Leuk Esterase: Negative / RBC: 1 /HPF / WBC 23 /HPF   Sq Epi: x / Non Sq Epi: 9 /HPF / Bacteria: Negative    Culture - Body Fluid with Gram Stain (collected 21 Apr 2022 18:52)  Source: .Body Fluid Ascites Fluid  Gram Stain (22 Apr 2022 04:50):    polymorphonuclear leukocytes seen    No organisms seen    by cytocentrifuge                                          -------------------------------------------------------------  RADIOLOGY:  < from: CT Abdomen and Pelvis w/ Oral Cont and w/ IV Cont (04.21.22 @ 20:08) >  IMPRESSION:  Redemonstrated large volume abdominopelvic ascites. Dilated loops of small bowel without definitive transition point.  Inferior vena cava filter. The FDA recommends IVC filter removal between 29 and 54 days after implantation. Consider IVC filter retrieval as clinically indicated.  < end of copied text >                                          --------------------------------------------------------------  PHYSICAL EXAM:  General: no acute distress  HEENT: atraumatic  LUNGS: clear to auscultation bilaterally, no wheezes noted  HEART: regular rate/rhythm, no murmurs/gallops  ABDOMEN: soft, nontender, nondistended, normoactive bowel sounds  EXT: 2+ radial pulses, no edema noted  NEURO: aaox4, proper affect  SKIN: intact, no new lesions noted                                         --------------------------------------------------------------  ASSESSMENT & PLAN  Past medical history and hospital course:  81 F patient with Hx of uterine cancer s/p SHAAN s/p recurrence, Recurrent malignant ascites, CAD s/p CABG, HTN, depression, PE on Xarelto, s.p IVC 2020 presented for abdominal pain and vomiting    #Post-Chemo Nausea/Vomiting - had Chemo on 4/19 for #Recurrent Uterine Cancer  #Malignant Ascites  - advance diet to clear liquids in AM, running LR @100cc/h  - LFTs/Lipase WNL 4/21  - CT-A/P 4/21: Redemonstrated large volume abdominopelvic ascites. Dilated loops of small bowel without definitive transition point.  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 Greco recurrence diagnosed on 8/8/2020, s/p SHAAN-BSO with adjuvant chemotherapy in 2016  - patient has been getting weekly Carbo/Taxol (8/14/20-1/28/2021)  - PET Scan from 2/25/21 showed good response to chemo, CT A/P from 11/2021 (for abd pain) showed no new disease, CT A/P from 02/2022 showed ascites and recurrent adenocarcinoma of Mullerian origin (high CA-125)  - Completed [facility] Liquid Biopsy Testing (03/01/2022), no targetable mutation  - s/p chemoport placed by IR, was also recently on Keytruda + lenvatinib   - off Aldactone according to patient  - outpatient f/u    #CAD - s/p CABG, no anti-platelets  #DLD/Hypertension - on Toprol XL 25mg PO daily, Atorvastatin 40mg PO @bedtime, f/u Lipid Panel/A1C  - BP stable on Amlodipine 5mg PO daily, per patient not taking Aldactone anymore    #PMHx of PE in 2020 - no DVTs, s/p IVC Filter in 2020 - on Xarelto 20mg PO qPM  #CKD-3 - creatinine at baseline ~1.3  #Depression - off antidepressants as per patient, used to take buspirone                                                                            ----------------------------------------------------  # DVT prophylaxis: Xarelto 20mg PO qPM  # GI prophylaxis: Protonix 40mg IV BID  # Diet: Clear Liquids  # Activity: Ambulate as Tolerated  # Code status: Full  # Disposition: Remain on Floor                                                                           --------------------------------------------------------  # Handoff: d/c planning if tolerates diet

## 2022-04-22 NOTE — DISCHARGE NOTE PROVIDER - PROVIDER TOKENS
PROVIDER:[TOKEN:[[id number]],FOLLOWUP:[1 month]],PROVIDER:[TOKEN:[[id number]],FOLLOWUP:[2 weeks]]

## 2022-04-22 NOTE — DISCHARGE NOTE PROVIDER - NSDCCPCAREPLAN_GEN_ALL_CORE_FT
PRINCIPAL DISCHARGE DIAGNOSIS  Diagnosis: Intractable vomiting  Assessment and Plan of Treatment: Your symptoms were likely a side effect from your chemotherapy. A paracentesis was performed to remove excess fluid from your abdomen. Please follow-up closely with your oncologist and primary care provider.

## 2022-04-22 NOTE — PROVIDER CONTACT NOTE (OTHER) - BACKGROUND
pt c/o of constipation, has not had a BM in a few days. Given a suppository earlier but no BM, pt passing gas

## 2022-04-22 NOTE — DISCHARGE NOTE PROVIDER - CARE PROVIDER_API CALL
Ashley Figueroa  INTERNAL MEDICINE  [address], [address]  [place] [address]  Phone: [phone]  Fax: [phone]  Follow Up Time: 1 month    Paul Greco (DO)  Hematology; Internal Medicine; Medical Oncology  [address] [place] [id number]  Phone: [phone]  Fax: [phone]  Follow Up Time: 2 weeks

## 2022-04-25 NOTE — CONSULT NOTE ADULT - PROBLEM SELECTOR RECOMMENDATION 3
recurrence (initially tx 2016, recurrence in 2020)  follows outpatient in [place]  recs per heme onc In the setting of malignancy and overall poor conditioning.  -continue treatment per primary team/oncology  -PT as appropriate per primary team  -no need for pharmacological treatment of fatigue at this time

## 2022-04-25 NOTE — PATIENT PROFILE ADULT - FUNCTIONAL ASSESSMENT - BASIC MOBILITY 3.
Retinal tear and detachment warning symptoms reviewed and patient instructed to call immediately if increasing floaters, flashes, or decreasing peripheral vision. 2 = A lot of assistance

## 2022-04-25 NOTE — PATIENT PROFILE ADULT - FALL HARM RISK - RISK INTERVENTIONS

## 2022-04-25 NOTE — ED PROVIDER NOTE - ATTENDING CONTRIBUTION TO CARE
Patient is c/o abdominal pain/nausea, unable to eat/drink due to the abd pain/nausea. Denies f/c/cp/sob. +no BM in 24 hours and no flatus. no trauma.   Vitals reviewed.   Patient is awake, alert, answering questions appropriately, appears comfortable and not in any distress.  Lungs: CTA, no wheezing, no crackles.  Abd: +BS, +distended, +generalized tenderness, ND, soft, no rebound, no guarding.   CNS: awake, alert, speaking in full sentences, appears comfortable and not in distress.   A/P: Abdominal pain/nausea,   labs, symptomatic treatment as needed,   CT, reevaluation.

## 2022-04-25 NOTE — ED PROVIDER NOTE - CLINICAL SUMMARY MEDICAL DECISION MAKING FREE TEXT BOX
Patient remained stable in ED. CT findings reviewed and consulted surgery on call. Discussed with MAR and patient is admitted to Medicine and surgery will follow patient on medicine team. Patient remained hemodynamically stable during the course of ED stay. Discussed with patient about the results of the diagnostic studies. Discussed with admitting physician and MAR, patient is admitted to Medicine for further evaluation and care.

## 2022-04-25 NOTE — H&P ADULT - NSHPLABSRESULTS_GEN_ALL_CORE
12.1   5.20  )-----------( 228      ( 25 Apr 2022 02:19 )             36.6       04-25    132<L>  |  95<L>  |  25<H>  ----------------------------<  87  5.5<H>   |  23  |  1.4    Ca    8.4<L>      25 Apr 2022 02:19  Mg     2.3     04-25    TPro  6.2  /  Alb  3.3<L>  /  TBili  0.4  /  DBili  x   /  AST  41  /  ALT  14  /  AlkPhos  90  04-25        Lipase 25    Lactate Trend  04-25 @ 02:19 Lactate:1.1       CARDIAC MARKERS ( 25 Apr 2022 02:19 )  x     / <0.01 ng/mL / x     / x     / x          Culture Results:   No growth (04-21 @ 18:52)

## 2022-04-25 NOTE — CONSULT NOTE ADULT - SUBJECTIVE AND OBJECTIVE BOX
BEKAH NELSON          MRN-[id number]              HPI:  82 yo F PMH uterine adenocarcinoma s/p SHAAN w recurrence in 2022, recurrent malignant ascites, CAD s/p CABG, 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, HTN, depression/anxiety who presents c/o nausea and abdominal pain. At time of interview, pt reports that the nausea has now resolved. She still c/o abdominal pain which has been on-going for the past week. She describes the pain as severe, periumbilical, and nonradiating.     Of note, pt recently admitted 4/21-4/22 for similar complaint. Underwent para in ED at that time with improvement in her symptoms (unclear how much fluid removed).    ED COURSE  Vitals: /73, HR 91, T 97.9F, spO2 95% on RA  Labs: K 5.5, lipase 25, trop neg x1, Cr 1.4 (baseline appears 1.1-1.4)  Imaging: CT A/P w PO, IV con - dilated small bowel loops with suspected transition point within L hemiabdomen suggestive of partial obstruction. Large volume pelvic ascites. IVC filter.  Interventions: 1L NS bolus; morphine 4mg x1; s/p surgery c/s. (25 Apr 2022 09:13)      PAST MEDICAL & SURGICAL HISTORY:  Uterine cancer  s/p SHAAN and chemo    Hypertension    High cholesterol    Cataract of right eye    Glaucoma    Coronary artery disease  s/p CABG X2    CAD (coronary artery disease)    HTN (hypertension)    HLD (hyperlipidemia)    H/O total hysterectomy    Athscl CABG, unsp, w angina pectoris w documented spasm    History of coronary artery bypass surgery        FAMILY HISTORY:   Reviewed and found non contributory in mother or father    SOCIAL HISTORY: no tobacco/etoh use reported. Pt resides at ______       ROS:	    Unable to attain due to:                      Dyspnea (Dianne 0-10): 0                       N/V (Y/N): No                             Secretions (Y/N) : No                                          Agitation(Y/N): No                              Pain (Y/N): No                                 -Provocation/Palliation: N/A  -Quality/Quantity: N/A  -Radiating: N/A  -Severity: No pain  -Timing/Frequency: N/A  -Impact on ADLs: N/A    General:  Denied  HEENT:    Denied  Neck:  Denied  CVS:  Denied  Resp:  Denied  GI:  Denied    :  Denied  Musc:  Denied  Neuro:  Denied  Psych:  Denied  Skin:  Denied  Lymph:  Denied    Last BM:      Allergies    chloroquine (Hives)  quinine (Urticaria)    Intolerances      Opiate Naive (Y/N):   -iStop reviewed (Y/N):    Reference #: [id number]    You have not added a LEDA number. Keeping your LEDA number(s) up to date on the My LEDA # page will enable the separation of your prescriptions from others in the search results.    Others' Prescriptions  Patient Name: Bekah NelsonBirth Date: 1940  Address: [address] [place] [id number]: Female  Rx Written	Rx Dispensed	Drug	Quantity	Days Supply	Prescriber Name	Prescriber Leda #	Payment Method	Dispenser  04/23/2022	04/23/2022	oxycodone hcl (ir) 5 mg tablet	12	3	[facility]	[id number]	Medicare	[id number]  02/19/2022	02/19/2022	tramadol hcl 50 mg tablet	28	7	Cisco Lanza MD	[id number]	Medicare	[id number]  01/03/2022	01/12/2022	clonazepam 0.5 mg tablet	30	30	[facility]	[id number]	Medicare	[id number]  10/25/2021	11/21/2021	clonazepam 0.5 mg tablet	15	30	[facility]	[id number]	Medicare	[facility] #[id number]  09/27/2021	10/12/2021	clonazepam 0.5 mg tablet	15	30	[facility]	[id number]	Medicare	[facility] #[id number]  08/30/2021	09/10/2021	clonazepam 0.5 mg tablet	15	7	Cindy Everett (DO)	[id number]	Medicare	[facility][id number]  06/29/2021	07/11/2021	clonazepam 0.5 mg tablet	15	30	[facility]	[id number]	Medicare	[facility] #[id number]  05/27/2021	06/03/2021	clonazepam 0.5 mg tablet	15	15	[facility]	[id number]	Medicare	[id number]  04/30/2021	05/03/2021	clonazepam 0.5 mg tablet	15	30	[facility]	[id number]	Medicare	[id number]    Medications:	      MEDICATIONS  (STANDING):  amLODIPine   Tablet 5 milliGRAM(s) Oral daily  atorvastatin 80 milliGRAM(s) Oral at bedtime  brimonidine 0.2% Ophthalmic Solution 1 Drop(s) Left EYE three times a day  busPIRone 15 milliGRAM(s) Oral two times a day  dorzolamide 2%/timolol 0.5% Ophthalmic Solution 1 Drop(s) Left EYE two times a day  gabapentin 100 milliGRAM(s) Oral three times a day  latanoprost 0.005% Ophthalmic Solution 1 Drop(s) Both EYES at bedtime  metoprolol succinate ER 25 milliGRAM(s) Oral daily  pantoprazole    Tablet 40 milliGRAM(s) Oral before breakfast  polyethylene glycol 3350 17 Gram(s) Oral daily  senna 2 Tablet(s) Oral at bedtime    MEDICATIONS  (PRN):  acetaminophen     Tablet .. 650 milliGRAM(s) Oral every 6 hours PRN Temp greater or equal to 38C (100.4F), Mild Pain (1 - 3)  aluminum hydroxide/magnesium hydroxide/simethicone Suspension 30 milliLiter(s) Oral every 4 hours PRN Dyspepsia  melatonin 3 milliGRAM(s) Oral at bedtime PRN Insomnia  ondansetron Injectable 4 milliGRAM(s) IV Push every 8 hours PRN Nausea and/or Vomiting      Labs:	    CBC:                        12.1   5.20  )-----------( 228      ( 25 Apr 2022 02:19 )             36.6     CMP:    04-25    132<L>  |  95<L>  |  25<H>  ----------------------------<  87  5.5<H>   |  23  |  1.4    Ca    8.4<L>      25 Apr 2022 02:19  Mg     2.3     04-25    TPro  6.2  /  Alb  3.3<L>  /  TBili  0.4  /  DBili  x   /  AST  41  /  ALT  14  /  AlkPhos  90  04-25                  Radiology:	   CT Chest w/ IV Cont:   EXAM:  CT CHEST IC            PROCEDURE DATE:  10/11/2020            INTERPRETATION:  CLINICAL HISTORY/REASON FOR EXAM: Chest pain    TECHNIQUE: Contiguous CT images were obtained from the thoracic inlet to the upper abdomen.  Intravenous Contrast:80 cc Omnipaque 350 intravenous contrast was administered.    COMPARISON: CT chest dated 9/11/2020    FINDINGS:    LUNGS, PLEURA AND AIRWAYS: Left hemidiaphragm elevation. Nonspecific punctate sub-4 mm nodules localized to the right lower lobe. Bilateral areas of subsegmental atelectasis. No focal consolidation, pleural effusion or pneumothorax. No evidence for central obstructing bronchial lesion.    HEART AND VESSELS: Cardiomegaly. No pericardial effusion. Normal caliber thoracic aorta and mainpulmonary artery. Sternotomy and CABG. Although not performed as a dedicated PE study, there is redemonstration of a right lower lobe segmental/subsegmental PE which appears decreased in size from one month prior.    THORACIC INLET/MEDIASTINUM/LYMPH NODES: The visualized thyroid gland is unremarkable. No enlarged thoracic lymph nodes.    UPPER ABDOMEN: Limited evaluation of the upper abdomen demonstrates no acute abnormality.    BONES AND SOFT TISSUES: Sternotomy. Degenerative changes of the spine.    IMPRESSION:    When compared to prior study dated 9/11/2020:    Although not a dedicated PE study, redemonstration of a right lower lobe segmental/subsegmental pulmonary embolus which appears overall decreased in size from one month prior.    Noevidence for new consolidation, pleural effusion or pneumothorax.                ADORE BROWNING M.D., ATTENDING RADIOLOGIST  This document has been electronically signed. Oct 11 2020 10:32PM (10-11-20)      EKG:	      Imaging Personally Reviewed:  [ ] YES  [ ] NO    Consultant(s) Notes Reviewed:  [ ] YES  [ ] NO  Care Discussed with Consultants/Other Providers [ ] YES  [ ] NO    PEx:	  T(C): 36.5 (04-25-22 @ 06:42), Max: 36.6 (04-25-22 @ 00:57)  HR: 78 (04-25-22 @ 06:42) (78 - 91)  BP: 115/67 (04-25-22 @ 06:42) (106/73 - 115/67)  RR: 18 (04-25-22 @ 06:42) (18 - 18)  SpO2: 97% (04-25-22 @ 06:42) (95% - 97%)  Wt(kg): --  Daily Height in cm: 160.02 (25 Apr 2022 00:57)    Daily     General:  found in bed in NAD  HEENT:  NCAT PERRL EOMI Non icteric, no noted thrush  CVS: RR S1S2 No M/G/R  Resp: Unlabored Non tachypneic No increased WOB  GI:  Soft NT ND BS+  :  Voiding / [place] / [employer]  Musc: No C/C/E    Neuro: Follows commands No focal deficits  Psych: Calm Pleasant, AAOx3    Skin: Non jaundiced , no rash   Lymph: no adenopathy     Preadmit Karnofsky:  %           Current Karnofsky:     %  http://www.npcrc.org/files/news/karnofsky_performance_scale.pdf   http://www.npcrc.org/files/news/palliative_performance_scale_PPSv2.pdf  Cachexia (Y/N):   BMI:          Advanced Directives:	     Full Code     DNR/DNI     MOLST     HCP     DPOA     Living Will     Decision maker: The patient is able to participate in complex medical decision making conversations.   Legal surrogate:    GOALS OF CARE DISCUSSION	       Palliative care info/counseling provided	           Family meeting       Advanced Directives addressed please see Advance Care Planning Note	           See previous Palliative Medicine Note       Documentation of GOC: 	    REFERRALS	        Palliative Med        Unit SW/Case Mgmt              Hospice       Speech/Swallow       Nutrition       PT/OT BEKAH NELSON          [id number]              HPI:  82 yo F PMH uterine adenocarcinoma s/p SHAAN w recurrence in 2022, recurrent malignant ascites, CAD s/p CABG, 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, HTN, depression/anxiety who presents c/o nausea and abdominal pain. At time of interview, pt reports that the nausea has now resolved. She still c/o abdominal pain which has been on-going for the past week. She describes the pain as severe, periumbilical, and nonradiating.     Of note, pt recently admitted 4/21-4/22 for similar complaint. Underwent para in ED at that time with improvement in her symptoms (unclear how much fluid removed).    ED COURSE  Vitals: /73, HR 91, T 97.9F, spO2 95% on RA  Labs: K 5.5, lipase 25, trop neg x1, Cr 1.4 (baseline appears 1.1-1.4)  Imaging: CT A/P w PO, IV con - dilated small bowel loops with suspected transition point within L hemiabdomen suggestive of partial obstruction. Large volume pelvic ascites. IVC filter.  Interventions: 1L NS bolus; morphine 4mg x1; s/p surgery c/s. (25 Apr 2022 09:13)      PAST MEDICAL & SURGICAL HISTORY:  Uterine cancer  s/p SHAAN and chemo    Hypertension    High cholesterol    Cataract of right eye    Glaucoma    Coronary artery disease  s/p CABG X2    CAD (coronary artery disease)    HTN (hypertension)    HLD (hyperlipidemia)    H/O total hysterectomy    Athscl CABG, unsp, w angina pectoris w documented spasm    History of coronary artery bypass surgery        FAMILY HISTORY:   Reviewed and found non contributory in mother or father    SOCIAL HISTORY: no tobacco/etoh use reported. Pt resides at home and lives with her children and grandchildren. Reportedly she handles all of her own medical care.       ROS:	    Pt reports she has pain and nausea when she came in but the medications that the hospital gave her helped to relieve these symptoms.   Dyspnea (Dianne 0-10): 0                       N/V (Y/N): No                             Secretions (Y/N) : No                                          Agitation(Y/N): No                              Pain (Y/N): No                                 -Provocation/Palliation: N/A  -Quality/Quantity: N/A  -Radiating: N/A  -Severity: No pain  -Timing/Frequency: N/A  -Impact on ADLs: N/A    General:  Denied  HEENT:    Denied  Neck:  Denied  CVS:  Denied  Resp:  Denied  GI:  Denied    :  Denied  Musc:  Denied  Neuro:  Denied  Psych:  Denied  Skin:  Denied  Lymph:  Denied        Allergies    chloroquine (Hives)  quinine (Urticaria)    Intolerances      Opiate Naive (Y/N):   -iStop reviewed (Y/N):    Reference #: [id number]    You have not added a LEDA number. Keeping your LEDA number(s) up to date on the My LEDA # page will enable the separation of your prescriptions from others in the search results.    Others' Prescriptions  Patient Name: Bekah NelsonBirth Date: 1940  Address: [address] [place], [place] [address]Sex: Female  Rx Written	Rx Dispensed	Drug	Quantity	Days Supply	Prescriber Name	Prescriber Leda #	Payment Method	Dispenser  04/23/2022	04/23/2022	oxycodone hcl (ir) 5 mg tablet	12	3	[facility]	[id number]	Medicare	[id number]  02/19/2022	02/19/2022	tramadol hcl 50 mg tablet	28	7	Cisco Lanza MD	[id number]	Medicare	[facility] #[id number]  01/03/2022	01/12/2022	clonazepam 0.5 mg tablet	30	30	[facility]	[id number]	Medicare	[facility] #[id number]  10/25/2021	11/21/2021	clonazepam 0.5 mg tablet	15	30	[facility]	[id number]	Medicare	[facility] #[id number]  09/27/2021	10/12/2021	clonazepam 0.5 mg tablet	15	30	[facility]	[id number]	Medicare	[facility] #[id number]  08/30/2021	09/10/2021	clonazepam 0.5 mg tablet	15	7	Cindy Everett (DO)	[id number]	Medicare	[facility] #[id number]  06/29/2021	07/11/2021	clonazepam 0.5 mg tablet	15	30	[facility]	[id number]	Medicare	[facility] #[id number]  05/27/2021	06/03/2021	clonazepam 0.5 mg tablet	15	15	[facility]	[id number]	Medicare	[facility] #[id number]  04/30/2021	05/03/2021	clonazepam 0.5 mg tablet	15	30	[facility]	[id number]	Medicare	[facility] #[id number]    Medications:	      MEDICATIONS  (STANDING):  amLODIPine   Tablet 5 milliGRAM(s) Oral daily  atorvastatin 80 milliGRAM(s) Oral at bedtime  brimonidine 0.2% Ophthalmic Solution 1 Drop(s) Left EYE three times a day  busPIRone 15 milliGRAM(s) Oral two times a day  dorzolamide 2%/timolol 0.5% Ophthalmic Solution 1 Drop(s) Left EYE two times a day  gabapentin 100 milliGRAM(s) Oral three times a day  latanoprost 0.005% Ophthalmic Solution 1 Drop(s) Both EYES at bedtime  metoprolol succinate ER 25 milliGRAM(s) Oral daily  pantoprazole    Tablet 40 milliGRAM(s) Oral before breakfast  polyethylene glycol 3350 17 Gram(s) Oral daily  senna 2 Tablet(s) Oral at bedtime    MEDICATIONS  (PRN):  acetaminophen     Tablet .. 650 milliGRAM(s) Oral every 6 hours PRN Temp greater or equal to 38C (100.4F), Mild Pain (1 - 3)  aluminum hydroxide/magnesium hydroxide/simethicone Suspension 30 milliLiter(s) Oral every 4 hours PRN Dyspepsia  melatonin 3 milliGRAM(s) Oral at bedtime PRN Insomnia  ondansetron Injectable 4 milliGRAM(s) IV Push every 8 hours PRN Nausea and/or Vomiting      Labs:	    CBC:                        12.1   5.20  )-----------( 228      ( 25 Apr 2022 02:19 )             36.6     CMP:    04-25    132<L>  |  95<L>  |  25<H>  ----------------------------<  87  5.5<H>   |  23  |  1.4    Ca    8.4<L>      25 Apr 2022 02:19  Mg     2.3     04-25    TPro  6.2  /  Alb  3.3<L>  /  TBili  0.4  /  DBili  x   /  AST  41  /  ALT  14  /  AlkPhos  90  04-25       Radiology:	   CT Chest w/ IV Cont:   EXAM:  CT CHEST IC            PROCEDURE DATE:  10/11/2020            INTERPRETATION:  CLINICAL HISTORY/REASON FOR EXAM: Chest pain    TECHNIQUE: Contiguous CT images were obtained from the thoracic inlet to the upper abdomen.  Intravenous Contrast:80 cc Omnipaque 350 intravenous contrast was administered.    COMPARISON: CT chest dated 9/11/2020    FINDINGS:    LUNGS, PLEURA AND AIRWAYS: Left hemidiaphragm elevation. Nonspecific punctate sub-4 mm nodules localized to the right lower lobe. Bilateral areas of subsegmental atelectasis. No focal consolidation, pleural effusion or pneumothorax. No evidence for central obstructing bronchial lesion.    HEART AND VESSELS: Cardiomegaly. No pericardial effusion. Normal caliber thoracic aorta and mainpulmonary artery. Sternotomy and CABG. Although not performed as a dedicated PE study, there is redemonstration of a right lower lobe segmental/subsegmental PE which appears decreased in size from one month prior.    THORACIC INLET/MEDIASTINUM/LYMPH NODES: The visualized thyroid gland is unremarkable. No enlarged thoracic lymph nodes.    UPPER ABDOMEN: Limited evaluation of the upper abdomen demonstrates no acute abnormality.    BONES AND SOFT TISSUES: Sternotomy. Degenerative changes of the spine.    IMPRESSION:    When compared to prior study dated 9/11/2020:    Although not a dedicated PE study, redemonstration of a right lower lobe segmental/subsegmental pulmonary embolus which appears overall decreased in size from one month prior.    Noevidence for new consolidation, pleural effusion or pneumothorax.        ADORE BROWNING M.D., ATTENDING RADIOLOGIST  This document has been electronically signed. Oct 11 2020 10:32PM (10-11-20)      EKG:	    < from: 12 Lead ECG (03.20.22 @ 04:38) >    Ventricular Rate 79 BPM    Atrial Rate 79 BPM    P-R Interval 132 ms    QRS Duration 74 ms    Q-T Interval 362 ms    QTC Calculation(Bazett) 415 ms    P Axis 60 degrees    R Axis 20 degrees    T Axis 51 degrees    Diagnosis Line Normal sinus rhythm  Low voltage QRS  Borderline ECG    Confirmed by Meseret Senior MD ([id number]) on 3/20/2022 4:51:40 PM    < end of copied text >      Imaging Personally Reviewed:  [X ] YES  [ ] NO    Consultant(s) Notes Reviewed:  [X ] YES  [ ] NO  Care Discussed with Consultants/Other Providers [X ] YES  [ ] NO    PEx:	  T(C): 36.5 (04-25-22 @ 06:42), Max: 36.6 (04-25-22 @ 00:57)  HR: 78 (04-25-22 @ 06:42) (78 - 91)  BP: 115/67 (04-25-22 @ 06:42) (106/73 - 115/67)  RR: 18 (04-25-22 @ 06:42) (18 - 18)  SpO2: 97% (04-25-22 @ 06:42) (95% - 97%)  Daily Height in cm: 160.02 (25 Apr 2022 00:57)      General:  found in bed in NAD  HEENT:  NCAT PERRL EOMI Non icteric, no noted thrush  CVS: RR, no edema   Resp: Unlabored Non tachypneic No increased WOB  GI:  Soft NT ND BS+  Musc: No C/C/E    Neuro: Follows commands No focal deficits  Psych: Calm Pleasant, AAOx3    Skin: Non jaundiced , no rash   Lymph: no adenopathy     Preadmit Karnofsky:  %           Current Karnofsky:  70   %        Advanced Directives:	     Full Code    Decision maker: The patient is able to participate in complex medical decision making conversations.   Legal surrogate:     GOALS OF CARE DISCUSSION	  - did not address  - introduced palliative care      BEKAH NELSON          MRN-[id number]              CC: abdominal pain    HPI:  82 yo F PMH uterine adenocarcinoma s/p SHAAN w recurrence in 2022, recurrent malignant ascites, CAD s/p CABG, 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, HTN, depression/anxiety who presents c/o nausea and abdominal pain. At time of interview, pt reports that the nausea has now resolved. She still c/o abdominal pain which has been on-going for the past week. She describes the pain as severe, periumbilical, and nonradiating.     Of note, pt recently admitted 4/21-4/22 for similar complaint. Underwent para in ED at that time with improvement in her symptoms (unclear how much fluid removed).    ED COURSE  Vitals: /73, HR 91, T 97.9F, spO2 95% on RA  Labs: K 5.5, lipase 25, trop neg x1, Cr 1.4 (baseline appears 1.1-1.4)  Imaging: CT A/P w PO, IV con - dilated small bowel loops with suspected transition point within L hemiabdomen suggestive of partial obstruction. Large volume pelvic ascites. IVC filter.  Interventions: 1L NS bolus; morphine 4mg x1; s/p surgery c/s. (25 Apr 2022 09:13)      PAST MEDICAL & SURGICAL HISTORY:  Uterine cancer  s/p SHAAN and chemo    Hypertension    High cholesterol    Cataract of right eye    Glaucoma    Coronary artery disease  s/p CABG X2    CAD (coronary artery disease)    HTN (hypertension)    HLD (hyperlipidemia)    H/O total hysterectomy    Athscl CABG, unsp, w angina pectoris w documented spasm    History of coronary artery bypass surgery        FAMILY HISTORY:   Reviewed and found non contributory in mother or father    SOCIAL HISTORY: no tobacco/etoh use reported. Pt resides at home and lives with her children and grandchildren. Reportedly she handles all of her own medical care.       ROS:	    Pt reports she has pain and nausea when she came in but the medications that the hospital gave her helped to relieve these symptoms.   Dyspnea (Dianne 0-10): 0                       N/V (Y/N): No                             Secretions (Y/N) : No                                          Agitation(Y/N): No                              Pain (Y/N): No                                 -Provocation/Palliation: N/A  -Quality/Quantity: N/A  -Radiating: N/A  -Severity: No pain  -Timing/Frequency: N/A  -Impact on ADLs: N/A    General:  Denied  HEENT:    Denied  Neck:  Denied  CVS:  Denied  Resp:  Denied  GI:  Denied    :  Denied  Musc:  Denied  Neuro:  Denied  Psych:  Denied  Skin:  Denied  Lymph:  Denied        Allergies  chloroquine (Hives)  quinine (Urticaria)        Opiate Naive (Y/N):   -iStop reviewed (Y/N):    Reference #: [id number]    You have not added a LEDA number. Keeping your LEDA number(s) up to date on the My LEDA # page will enable the separation of your prescriptions from others in the search results.    Others' Prescriptions  Patient Name: Bekah NelsonBirth Date: 1940  Address: [address] [place], [place] [address]Sex: Female  Rx Written	Rx Dispensed	Drug	Quantity	Days Supply	Prescriber Name	Prescriber Leda #	Payment Method	Dispenser  04/23/2022	04/23/2022	oxycodone hcl (ir) 5 mg tablet	12	3	[facility]	[id number]	Medicare	[id number]  02/19/2022	02/19/2022	tramadol hcl 50 mg tablet	28	7	Cisco Lanza MD	[id number]	Medicare	[id number]  01/03/2022	01/12/2022	clonazepam 0.5 mg tablet	30	30	[facility]	[id number]	Medicare	[facility][id number]  10/25/2021	11/21/2021	clonazepam 0.5 mg tablet	15	30	[facility]	[id number]	Medicare	[facility][id number]  09/27/2021	10/12/2021	clonazepam 0.5 mg tablet	15	30	[facility]	[id number]	Medicare	[facility][id number]  08/30/2021	09/10/2021	clonazepam 0.5 mg tablet	15	7	Cindy Everett (DO)	[id number]	Medicare	[facility][id number]  06/29/2021	07/11/2021	clonazepam 0.5 mg tablet	15	30	[facility]	[id number]	Medicare	[facility][id number]  05/27/2021	06/03/2021	clonazepam 0.5 mg tablet	15	15	[facility]	[id number]	Medicare	[facility][id number]  04/30/2021	05/03/2021	clonazepam 0.5 mg tablet	15	30	[facility]	[id number]	Medicare	[facility][id number]    Medications:	      MEDICATIONS  (STANDING):  amLODIPine   Tablet 5 milliGRAM(s) Oral daily  atorvastatin 80 milliGRAM(s) Oral at bedtime  brimonidine 0.2% Ophthalmic Solution 1 Drop(s) Left EYE three times a day  busPIRone 15 milliGRAM(s) Oral two times a day  dorzolamide 2%/timolol 0.5% Ophthalmic Solution 1 Drop(s) Left EYE two times a day  gabapentin 100 milliGRAM(s) Oral three times a day  latanoprost 0.005% Ophthalmic Solution 1 Drop(s) Both EYES at bedtime  metoprolol succinate ER 25 milliGRAM(s) Oral daily  pantoprazole    Tablet 40 milliGRAM(s) Oral before breakfast  polyethylene glycol 3350 17 Gram(s) Oral daily  senna 2 Tablet(s) Oral at bedtime    MEDICATIONS  (PRN):  acetaminophen     Tablet .. 650 milliGRAM(s) Oral every 6 hours PRN Temp greater or equal to 38C (100.4F), Mild Pain (1 - 3)  aluminum hydroxide/magnesium hydroxide/simethicone Suspension 30 milliLiter(s) Oral every 4 hours PRN Dyspepsia  melatonin 3 milliGRAM(s) Oral at bedtime PRN Insomnia  ondansetron Injectable 4 milliGRAM(s) IV Push every 8 hours PRN Nausea and/or Vomiting      Labs:	    CBC:                        12.1   5.20  )-----------( 228      ( 25 Apr 2022 02:19 )             36.6     CMP:    04-25    132<L>  |  95<L>  |  25<H>  ----------------------------<  87  5.5<H>   |  23  |  1.4    Ca    8.4<L>      25 Apr 2022 02:19  Mg     2.3     04-25    TPro  6.2  /  Alb  3.3<L>  /  TBili  0.4  /  DBili  x   /  AST  41  /  ALT  14  /  AlkPhos  90  04-25       Radiology:	   CT Chest w/ IV Cont:   EXAM:  CT CHEST IC            PROCEDURE DATE:  10/11/2020            INTERPRETATION:  CLINICAL HISTORY/REASON FOR EXAM: Chest pain    TECHNIQUE: Contiguous CT images were obtained from the thoracic inlet to the upper abdomen.  Intravenous Contrast:80 cc Omnipaque 350 intravenous contrast was administered.    COMPARISON: CT chest dated 9/11/2020    FINDINGS:    LUNGS, PLEURA AND AIRWAYS: Left hemidiaphragm elevation. Nonspecific punctate sub-4 mm nodules localized to the right lower lobe. Bilateral areas of subsegmental atelectasis. No focal consolidation, pleural effusion or pneumothorax. No evidence for central obstructing bronchial lesion.    HEART AND VESSELS: Cardiomegaly. No pericardial effusion. Normal caliber thoracic aorta and mainpulmonary artery. Sternotomy and CABG. Although not performed as a dedicated PE study, there is redemonstration of a right lower lobe segmental/subsegmental PE which appears decreased in size from one month prior.    THORACIC INLET/MEDIASTINUM/LYMPH NODES: The visualized thyroid gland is unremarkable. No enlarged thoracic lymph nodes.    UPPER ABDOMEN: Limited evaluation of the upper abdomen demonstrates no acute abnormality.    BONES AND SOFT TISSUES: Sternotomy. Degenerative changes of the spine.    IMPRESSION:    When compared to prior study dated 9/11/2020:    Although not a dedicated PE study, redemonstration of a right lower lobe segmental/subsegmental pulmonary embolus which appears overall decreased in size from one month prior.    Noevidence for new consolidation, pleural effusion or pneumothorax.        ADORE BROWNING M.D., ATTENDING RADIOLOGIST  This document has been electronically signed. Oct 11 2020 10:32PM (10-11-20)      EKG:	    < from: 12 Lead ECG (03.20.22 @ 04:38) >    Ventricular Rate 79 BPM    Atrial Rate 79 BPM    P-R Interval 132 ms    QRS Duration 74 ms    Q-T Interval 362 ms    QTC Calculation(Bazett) 415 ms    P Axis 60 degrees    R Axis 20 degrees    T Axis 51 degrees    Diagnosis Line Normal sinus rhythm  Low voltage QRS  Borderline ECG    Confirmed by Meseret Senior MD ([id number]) on 3/20/2022 4:51:40 PM    < end of copied text >      Imaging Personally Reviewed:  [X ] YES  [ ] NO    Consultant(s) Notes Reviewed:  [X ] YES  [ ] NO  Care Discussed with Consultants/Other Providers [X ] YES  [ ] NO    PEx:	  T(C): 36.5 (04-25-22 @ 06:42), Max: 36.6 (04-25-22 @ 00:57)  HR: 78 (04-25-22 @ 06:42) (78 - 91)  BP: 115/67 (04-25-22 @ 06:42) (106/73 - 115/67)  RR: 18 (04-25-22 @ 06:42) (18 - 18)  SpO2: 97% (04-25-22 @ 06:42) (95% - 97%)  Daily Height in cm: 160.02 (25 Apr 2022 00:57)      General:  found in bed in NAD  HEENT:  NCAT PERRL EOMI Non icteric, no noted thrush  CVS: RR, no edema   Resp: Unlabored Non tachypneic No increased WOB  GI:  Soft NT ND BS+  Musc: No C/C/E    Neuro: Follows commands No focal deficits  Psych: Calm Pleasant, AAOx3    Skin: Non jaundiced , no rash   Lymph: no adenopathy     Preadmit Karnofsky:  Unknown%           Current Karnofsky:  70   %        Advanced Directives:	     Full Code    Decision maker: The patient is able to participate in complex medical decision making conversations.   Legal surrogate:     GOALS OF CARE DISCUSSION	  - did not address  - introduced palliative care

## 2022-04-25 NOTE — ED ADULT NURSE NOTE - NSIMPLEMENTINTERV_GEN_ALL_ED
Implemented All Fall with Harm Risk Interventions:  [place] to call system. Call bell, personal items and telephone within reach. Instruct patient to call for assistance. Room bathroom lighting operational. Non-slip footwear when patient is off stretcher. Physically safe environment: no spills, clutter or unnecessary equipment. Stretcher in lowest position, wheels locked, appropriate side rails in place. Provide visual cue, wrist band, yellow gown, etc. Monitor gait and stability. Monitor for mental status changes and reorient to person, place, and time. Review medications for side effects contributing to fall risk. Reinforce activity limits and safety measures with patient and family. Provide visual clues: red socks.

## 2022-04-25 NOTE — CONSULT NOTE ADULT - ASSESSMENT
81yFemale with PMH including uterine adenocarcinoma s/p SHAAN w recurrence in 2022, recurrent malignant ascites, CAD s/p CABG, 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, HTN, depression/anxiety , admitted with abdominal pain and nausea being evaluated for symptom management and GOC. She has recetn admission for these symptoms a few days ago during which time she had a paracentesis which helped her symptoms.     Pt was not very engaged in conversation- will re-visit GOC discussions if patient is agreeable.   Patient comfortable at time of exam.       MEDD (morphine equivalent daily dose): 12      See Recs below.    Please call [phone] with questions or concerns 24/7.   We will continue to follow.     Discussed with primary MD.

## 2022-04-25 NOTE — H&P ADULT - HISTORY OF PRESENT ILLNESS
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 At time of interview, pt reports that the nausea has now resolved. She still c/o abdominal pain which has been on-going for the past week. She describes the pain as severe, periumbilical, and nonradiating.     Of note, pt recently admitted 4/21-4/22 for similar complaint. Underwent para in ED at that time with improvement in her symptoms (unclear how much fluid removed).    ED COURSE  Vitals: /73, HR 91, T 97.9F, spO2 95% on RA  Labs: K 5.5, lipase 25, trop neg x1, Cr 1.4 (baseline appears 1.1-1.4)  Imaging: CT A/P w PO, IV con - dilated small bowel loops with suspected transition point within L hemiabdomen suggestive of partial obstruction. Large volume pelvic ascites. IVC filter.  Interventions: 1L NS bolus; morphine 4mg x1; s/p surgery c/s.

## 2022-04-25 NOTE — CONSULT NOTE ADULT - SUBJECTIVE AND OBJECTIVE BOX
BEKAH NELSNO [id number]  81y Female    HPI: [id number] PMH recurrent uterine adenocarcinoma s/p SHAAN/BSO (2016), recurrent malignant ascites, CAD s/p CABG, HTN, depression, PE (Xarelto) and IVC filter 2020 presenting with 3 day history of LUQ abdominal pain. Patient was recently discharged on 4/22 for similar abdominal pain, during admission had paracentesis performed with mild symptomatic improvement and was discharged. Since discharge however patient has been endorsing LUQ abdominal pain with associated N/V 4/24 (day prior to arrival to ED). Last bowel movement x2 day prior to arrival, normal in appearance nonbloody. Last meal day prior to arrival, patient endorses loss of appetite for the last 3 months. No F/chills. Patient states that she feels her abdominal pain is secondary to her abdominal ascites.     PAST MEDICAL & SURGICAL HISTORY:  Uterine cancer  s/p SHAAN and chemo  Hypertension  High cholesterol  Cataract of right eye  Glaucoma  Coronary artery disease  s/p CABG X2  CAD (coronary artery disease)  HTN (hypertension)  HLD (hyperlipidemia)  H/O total hysterectomy  Athscl CABG, unsp, w angina pectoris w documented spasm  History of coronary artery bypass surgery    MEDICATIONS  (STANDING):  ondansetron Injectable 8 milliGRAM(s) IV Push once    MEDICATIONS  (PRN):    Allergies  chloroquine (Hives)  quinine (Urticaria)    Intolerances  REVIEW OF SYSTEMS  [ x] A ten-point review of systems was otherwise negative except as noted.  [ ] Due to altered mental status/intubation, subjective information were not able to be obtained from the patient. History was obtained, to the extent possible, from review of the chart and collateral sources of information.    Vital Signs Last 24 Hrs  T(C): 36.5 (25 Apr 2022 06:42), Max: 36.6 (25 Apr 2022 00:57)  T(F): 97.7 (25 Apr 2022 06:42), Max: 97.9 (25 Apr 2022 00:57)  HR: 78 (25 Apr 2022 06:42) (78 - 91)  BP: 115/67 (25 Apr 2022 06:42) (106/73 - 115/67)  RR: 18 (25 Apr 2022 06:42) (18 - 18)  SpO2: 97% (25 Apr 2022 06:42) (95% - 97%)    PHYSICAL EXAM:  GENERAL: NAD, well-appearing  CHEST/LUNG: Clear to auscultation bilaterally  HEART: Regular rate and rhythm  ABDOMEN: Soft, Nontender, Nondistended; (+) Fluid wave/ascites, no rebound or guarding  EXTREMITIES:  No clubbing, cyanosis, or edema    Labs:  CAPILLARY BLOOD GLUCOSE                      12.1   5.20  )-----------( 228      ( 25 Apr 2022 02:19 )             36.6       Auto Neutrophil %: 70.2 % (04-25-22 @ 02:19)  Auto Immature Granulocyte %: 0.2 % (04-25-22 @ 02:19)    04-25    132<L>  |  95<L>  |  25<H>  ----------------------------<  87  5.5<H>   |  23  |  1.4    Calcium, Total Serum: 8.4 mg/dL (04-25-22 @ 02:19)    LFTs:      6.2  | 0.4  | 41       ------------------[90      ( 25 Apr 2022 02:19 )  3.3  | x    | 14          Lipase:25     Amylase:x         Lactate, Blood: 1.1 mmol/L (04-25-22 @ 02:19)    Coags:    CARDIAC MARKERS ( 25 Apr 2022 02:19 )  x     / <0.01 ng/mL / x     / x     / x        RADIOLOGY & ADDITIONAL STUDIES:  < from: CT Abdomen and Pelvis w/ Oral Cont and w/ IV Cont (04.25.22 @ 05:08) >  IMPRESSION:    Compared to 4/21/2022:    1.  Dilated smallbowel loops with suspected mild/patent transition point   within the left hemiabdomen and findings overall suggestive of partial   bowel obstruction.    2.  Otherwise stable exam, including large volume abdominopelvic ascites   and peritoneal/serosalimplants.    < end of copied text >     BEKAH NELSON [id number]  81y Female    HPI: [id number] PMH recurrent uterine adenocarcinoma s/p SHAAN/BSO (2016), recurrent malignant ascites, CAD s/p CABG, HTN, depression, PE (Xarelto) and IVC filter 2020 presenting with 3 day history of LUQ abdominal pain. Patient was recently discharged on 4/22 for similar abdominal pain, during admission had paracentesis performed with mild symptomatic improvement and was discharged. Since discharge however patient has been endorsing LUQ abdominal pain with associated N/V 4/24 (day prior to arrival to ED). Last bowel movement day prior to arrival (x2 normal in appearance nonbloody), passing gas in ED. Last meal day prior to arrival, patient endorses loss of appetite for the last 3 months. No F/chills. Patient states that she feels her abdominal pain is secondary to her abdominal ascites.     PAST MEDICAL & SURGICAL HISTORY:  Uterine cancer  s/p SHAAN and chemo  Hypertension  High cholesterol  Cataract of right eye  Glaucoma  Coronary artery disease  s/p CABG X2  CAD (coronary artery disease)  HTN (hypertension)  HLD (hyperlipidemia)  H/O total hysterectomy  Athscl CABG, unsp, w angina pectoris w documented spasm  History of coronary artery bypass surgery    MEDICATIONS  (STANDING):  ondansetron Injectable 8 milliGRAM(s) IV Push once    MEDICATIONS  (PRN):    Allergies  chloroquine (Hives)  quinine (Urticaria)    Intolerances  REVIEW OF SYSTEMS  [ x] A ten-point review of systems was otherwise negative except as noted.  [ ] Due to altered mental status/intubation, subjective information were not able to be obtained from the patient. History was obtained, to the extent possible, from review of the chart and collateral sources of information.    Vital Signs Last 24 Hrs  T(C): 36.5 (25 Apr 2022 06:42), Max: 36.6 (25 Apr 2022 00:57)  T(F): 97.7 (25 Apr 2022 06:42), Max: 97.9 (25 Apr 2022 00:57)  HR: 78 (25 Apr 2022 06:42) (78 - 91)  BP: 115/67 (25 Apr 2022 06:42) (106/73 - 115/67)  RR: 18 (25 Apr 2022 06:42) (18 - 18)  SpO2: 97% (25 Apr 2022 06:42) (95% - 97%)    PHYSICAL EXAM:  GENERAL: NAD, well-appearing  CHEST/LUNG: Clear to auscultation bilaterally  HEART: Regular rate and rhythm  ABDOMEN: Soft, Nontender, Nondistended; (+) Fluid wave/ascites, no rebound or guarding  EXTREMITIES:  No clubbing, cyanosis, or edema    Labs:  CAPILLARY BLOOD GLUCOSE                      12.1   5.20  )-----------( 228      ( 25 Apr 2022 02:19 )             36.6       Auto Neutrophil %: 70.2 % (04-25-22 @ 02:19)  Auto Immature Granulocyte %: 0.2 % (04-25-22 @ 02:19)    04-25    132<L>  |  95<L>  |  25<H>  ----------------------------<  87  5.5<H>   |  23  |  1.4    Calcium, Total Serum: 8.4 mg/dL (04-25-22 @ 02:19)    LFTs:      6.2  | 0.4  | 41       ------------------[90      ( 25 Apr 2022 02:19 )  3.3  | x    | 14          Lipase:25     Amylase:x         Lactate, Blood: 1.1 mmol/L (04-25-22 @ 02:19)    Coags:    CARDIAC MARKERS ( 25 Apr 2022 02:19 )  x     / <0.01 ng/mL / x     / x     / x        RADIOLOGY & ADDITIONAL STUDIES:  < from: CT Abdomen and Pelvis w/ Oral Cont and w/ IV Cont (04.25.22 @ 05:08) >  IMPRESSION:    Compared to 4/21/2022:    1.  Dilated smallbowel loops with suspected mild/patent transition point   within the left hemiabdomen and findings overall suggestive of partial   bowel obstruction.    2.  Otherwise stable exam, including large volume abdominopelvic ascites   and peritoneal/serosalimplants.    < end of copied text >

## 2022-04-25 NOTE — ED ADULT NURSE NOTE - NSINTERVENTIONOPT_GEN_ALL_ED
Stretcher Alarms Unique Flap 2 Text: The flap is incised and reflected downward. The medial portion of the flap was dissected just deep to the ordicularis oculi, and laterally the flap is dissected to the lateral canthal tendon area. The flap is rotated and advanced into place and sutured in a multilaminal fashion. The mucosa defect does not require closure. At completion, the repair is under little tension and the lid is well supported. All vertical tension is directed onto the temple.

## 2022-04-25 NOTE — CONSULT NOTE ADULT - PROBLEM SELECTOR RECOMMENDATION 4
Full Code  Pt declined to discuss GOC with ED team  Will attempt to address GOC with patient this week  Symptoms under control on exam  Will follow up recurrence (initially tx 2016, recurrence in 2020)  follows outpatient in [place]  recs per heme onc

## 2022-04-25 NOTE — H&P ADULT - NSHPPHYSICALEXAM_GEN_ALL_CORE
GENERAL: NAD, Resting in bed  HEENT: NCAT  CHEST/LUNG: CTA b/l no wheezing, rhonchi, or rales. L upper chest chemo port.   HEART: Normal S1, S2. No murmurs, rubs, or gallops. RRR.  ABDOMEN: Soft, NTND. BSx4q. No rebound tenderness or guarding. No pulsatile masses. Tympanic to percussion.   EXTREMITIES:  No clubbing, cyanosis, or edema  NERVOUS SYSTEM:  Alert & Oriented X3  MSK: Moving all 4 extremities.   SKIN: No rashes or lesions.

## 2022-04-25 NOTE — CONSULT NOTE ADULT - ASSESSMENT
Assessment  81F PMH recurrent uterine adenocarcinoma s/p SHAAN/BSO (2016), recurrent malignant ascites, CAD s/p CABG, HTN, depression, PE (Xarelto) and IVC filter 2020 presenting with 3 day history of LUQ abdominal pain since prior to discharge, x1 episode of NBNB emesis day prior to arrival, none in the ED, passing gas on day of arrival with BM day prior to arrival. On workup patient found to have persistent large volume ascites (malignant), with small bowel dilation questionable TP in left mid abdomen with PO contrast in rectum likely secondary to ileus from large volume ascites    Plan  - no indication for NGT as stomach is collapsed and patient has not been nauseos or vomiting for >24h  - CLD+Ensure, IVF, advance diet as tolerated (IVL once patient is on regular diet)  - correct electrolytes, avoid opiods  - GI consult for paracentesis  - gyn/onc consult for palliative options  - heme/onc consult for management of palliative chemotherapy  - palliative care consult  - no acute surgical intervention at this time  - surgical team to follow  - d/w Dr. Ward Assessment  81F PMH recurrent uterine adenocarcinoma s/p SHAAN/BSO (2016), recurrent malignant ascites, CAD s/p CABG, HTN, depression, PE (Xarelto) and IVC filter 2020 presenting with 3 day history of LUQ abdominal pain since prior to discharge, x1 episode of NBNB emesis day prior to arrival, none in the ED, passing gas on day of arrival with BM day prior to arrival. On workup patient found to have persistent large volume ascites (malignant), with small bowel dilation questionable TP in left mid abdomen with PO contrast in rectum likely secondary to ileus from large volume ascites    Plan  - no indication for NGT as stomach is collapsed and patient has not been nauseos or vomiting for >24h  - CLD+Ensure, IVF, advance diet as tolerated  - correct electrolytes, avoid opiods  - GI consult for paracentesis  - gyn/onc consult for palliative options  - heme/onc consult for management of palliative chemotherapy  - palliative care consult  - no acute surgical intervention at this time  - surgical team to follow  - d/w Dr. Ward

## 2022-04-25 NOTE — CONSULT NOTE ADULT - PROBLEM SELECTOR RECOMMENDATION 9
abdominal pain now resolved   Tylenol  mg PO Q 6 H   would re-start Oxycodone 5 mg Q 4 H PRN for pain if Tylenol is not helpful  continue bowel regimen as ordered- senna 2 tabs QHS, Miralax 17 g QD   medical management of bowel obstruction

## 2022-04-25 NOTE — H&P ADULT - ASSESSMENT
ASSESSMENT & PLAN  80 yo F PMH uterine adenocarcinoma s/p SHAAN w recurrence in 2022, recurrent malignant ascites, CAD s/p CABG, 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, HTN, CKD, depression/anxiety who presents c/o nausea and abdominal pain.    # Uterine adenocarcinoma s/p SHAAN/SBO w recurrence (02/2022)  # Recurrent malignant ascites  # Pelvic ascites  - CT A/P w large pelvic ascites  - pt was started on keytruda + lenvima on 3/29, however lenvima d/c'd due to poor tolerance  - pt continues on keytruda q3w - last 4/19, next 5/10  - pt with multiple admission over past few months for ascites drainage, many requiring IR intervention  - s/p para in ED prior admission 4/21 -- Cx negative. ANC<250. Unclear how much fluid removed at that time  - plan for therapeutic para today  - pain control, avoid opioids if possible    # Imaging findings suggestive of SBO  - CT A/P in ED - dilated small bowel loops with suspected transition point within L anuradha-abdomen suggestive fo partial bowel obstruction  - s/p surgery c/s - suspect transition point likely 2/2 ileus from large volume ascites; CLD, advance as tolerated; no surgical intervention    # CAD s/p CABG  # HLD  - cont metoprolol succinate 25mg qd  - on rosuvastatin 40mg qd at home, cont as atorvastatin here    # 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  - pt developed hematuria while on AC for DVT in 2020  - IVC filter placed 10/2020, was to be re-evaluated 6 weeks after for possible removal  - IVC filter appears to remain in place on CT A/P  - holding xarelto for now for possible paracentesis  - outpatient f/u for eval of IVC filter removal    # Depression/Anxiety  - cont home buspirone 15mg BID    # HTN  - BP: 115/67 (106/73 - 115/67)  - cont home amlodipine 5mg qd    # CKD3b  - Cr 1.4, appears at baseline 1.1-1.4    PT/REHAB: PT  ACTIVITY: IAT  DVT PPX: xarelto (held for para)  GI PPX: PPI  DIET: clears, advance as tolerated  CODE: full  Disposition: floors  Pending: para   ASSESSMENT & PLAN  80 yo F PMH uterine adenocarcinoma s/p SHAAN w recurrence in 2022, recurrent malignant ascites, CAD s/p CABG, 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, HTN, CKD, depression/anxiety who presents c/o nausea and abdominal pain.    # Uterine adenocarcinoma s/p SHAAN/SBO w recurrence (02/2022)  # Recurrent malignant ascites  # Pelvic ascites  - CT A/P w large pelvic ascites  - pt was started on keytruda + lenvima on 3/29, however lenvima d/c'd due to poor tolerance  - pt continues on keytruda q3w - last 4/19, next 5/10  - pt with multiple admission over past few months for ascites drainage, many requiring IR intervention  - s/p para in ED prior admission 4/21 -- Cx negative. ANC<250. Unclear how much fluid removed at that time  - plan for therapeutic para today  - pain control, avoid opioids if possible    # Imaging findings suggestive of SBO  - CT A/P in ED - dilated small bowel loops with suspected transition point within L anuradha-abdomen suggestive fo partial bowel obstruction  - s/p surgery c/s - suspect transition point likely 2/2 ileus from large volume ascites; CLD, advance as tolerated; no surgical intervention    # CAD s/p CABG  # HLD  - cont metoprolol succinate 25mg qd  - on rosuvastatin 40mg qd at home, cont as atorvastatin here    # 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  - pt developed hematuria while on AC for DVT in 2020  - IVC filter placed 10/2020, was to be re-evaluated 6 weeks after for possible removal  - IVC filter appears to remain in place on CT A/P  - holding xarelto for now for possible paracentesis  - outpatient f/u for eval of IVC filter removal    # Post-chemotherapy paresthesia  - b/l feet  - cont gabapentin 100mg TID    # Depression/Anxiety  - cont home buspirone 15mg BID    # HTN  - BP: 115/67 (106/73 - 115/67)  - cont home amlodipine 5mg qd    # CKD3b  - Cr 1.4, appears at baseline 1.1-1.4    PT/REHAB: PT  ACTIVITY: IAT  DVT PPX: xarelto (held for para)  GI PPX: PPI  DIET: clears, advance as tolerated  CODE: full  Disposition: floors  Pending: para   ASSESSMENT & PLAN  82 yo F PMH uterine adenocarcinoma s/p SHAAN w recurrence in 2022, recurrent malignant ascites, CAD s/p CABG, 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, HTN, CKD, depression/anxiety who presents c/o nausea and abdominal pain.    # Uterine adenocarcinoma s/p SHAAN/SBO w recurrence (02/2022)  # Recurrent malignant ascites  # Pelvic ascites  - CT A/P w large pelvic ascites  - pt was started on keytruda + lenvima on 3/29, however lenvima d/c'd due to poor tolerance  - pt continues on keytruda q3w - last 4/19, next 5/10  - pt with multiple admission over past few months for ascites drainage, many requiring IR intervention  - s/p para in ED prior admission 4/21 -- Cx negative. ANC<250. Unclear how much fluid removed at that time  - plan for therapeutic para today  - pain control, avoid opioids if possible    # Imaging findings suggestive of SBO  - CT A/P in ED - dilated small bowel loops with suspected transition point within L anuradha-abdomen suggestive fo partial bowel obstruction  - s/p surgery c/s - suspect transition point likely 2/2 ileus from large volume ascites; CLD, advance as tolerated; no surgical intervention    # CAD s/p CABG  # HLD  - cont metoprolol succinate 25mg qd  - on rosuvastatin 40mg qd at home, cont as atorvastatin here    # 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  - pt developed hematuria while on AC for DVT in 2020  - IVC filter placed 10/2020, was to be re-evaluated 6 weeks after for possible removal  - IVC filter appears to remain in place on CT A/P  - holding xarelto for now for possible paracentesis  - outpatient f/u for eval of IVC filter removal    # Post-chemotherapy paresthesia  - b/l feet  - cont gabapentin 100mg TID    # Depression/Anxiety  - cont home buspirone 15mg BID    # HTN  - BP: 115/67 (106/73 - 115/67)  - cont home amlodipine 5mg qd    # CKD3b  - Cr 1.4, appears at baseline 1.1-1.4    # Glaucoma  - cont lantanoprost drops each eye BID, brimonidine 0.2% drop L eye TID, dorzolamide/timolol drop L eye BID.    PT/REHAB: PT  ACTIVITY: IAT  DVT PPX: xarelto (held for para)  GI PPX: PPI  DIET: clears, advance as tolerated  CODE: full  Disposition: floors  Pending: para   ASSESSMENT & PLAN  80 yo F PMH uterine adenocarcinoma s/p SHAAN w recurrence in 2022, recurrent malignant ascites, CAD s/p CABG, 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, HTN, CKD, depression/anxiety who presents c/o nausea and abdominal pain.    # Uterine adenocarcinoma s/p SHAAN/SBO w recurrence (02/2022)  # Recurrent malignant ascites  # Pelvic ascites on imaging  - CT A/P w large pelvic ascites  - pt was started on keytruda + lenvima on 3/29, however lenvima d/c'd due to poor tolerance  - pt continues on keytruda q3w - last 4/19, next 5/10  - pt with multiple admission over past few months for ascites drainage, many requiring IR intervention  - s/p para in ED prior admission 4/21 -- Cx negative. ANC<250. Unclear how much fluid removed at that time  - pt w/o physical exam findings suggestive of significant ascites  - will hold off on para for now  - pain control, avoid opioids if possible  - palliative care c/s for pain control, GOC    # Imaging findings suggestive of SBO  - CT A/P in ED - dilated small bowel loops with suspected transition point within L anuradha-abdomen suggestive fo partial bowel obstruction  - pt had BM this morning, passing gas in ED  - s/p surgery c/s - suspect transition point likely 2/2 ileus from large volume ascites; CLD, advance as tolerated; no surgical intervention    # CAD s/p CABG  # HLD  - cont metoprolol succinate 25mg qd  - on rosuvastatin 40mg qd at home, cont as atorvastatin here    # 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  - pt developed hematuria while on AC for DVT in 2020  - IVC filter placed 10/2020, was to be re-evaluated 6 weeks after for possible removal  - IVC filter appears to remain in place on CT A/P  - outpatient f/u for eval of IVC filter removal  - hold xarelto for now - start therapeutic lovenox this PM in case any intervention needed    # Post-chemotherapy paresthesia  - b/l feet tingling  - cont gabapentin 100mg TID    # Depression/Anxiety  - cont home buspirone 15mg BID    # HTN  - BP: 115/67 (106/73 - 115/67)  - cont home amlodipine 5mg qd    # CKD3b  - Cr 1.4, appears at baseline 1.1-1.4    # Glaucoma  - cont lantanoprost drops each eye BID, brimonidine 0.2% drop L eye TID, dorzolamide/timolol drop L eye BID.    PT/REHAB: PT  ACTIVITY: IAT  DVT PPX: therapeutic lovenox  GI PPX: PPI  DIET: clears, advance as tolerated  CODE: full - pt unwilling to participate in code status discussion  Disposition: floors  Pending: pain control; palliative c/s; advanced diet as tolerated   ASSESSMENT & PLAN  80 yo F PMH uterine adenocarcinoma s/p SHAAN w recurrence in 2022, recurrent malignant ascites, CAD s/p CABG, 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, HTN, CKD, depression/anxiety who presents c/o nausea and abdominal pain.    # Uterine adenocarcinoma s/p SHAAN/SBO w recurrence (02/2022)  # Recurrent malignant ascites  # Pelvic ascites on imaging  - CT A/P w large pelvic ascites  - pt was started on keytruda + lenvima on 3/29, however lenvima d/c'd due to poor tolerance  - pt continues on keytruda q3w - last 4/19, next 5/10  - pt with multiple admission over past few months for ascites drainage, many requiring IR intervention  - s/p para in ED prior admission 4/21 -- Cx negative. ANC<250. Unclear how much fluid removed at that time  - pt w/o physical exam findings suggestive of significant ascites  - will hold off on para for now  - pain control, avoid opioids if possible given ?ileus  - palliative care c/s for pain control, GOC    # Imaging findings suggestive of SBO  - CT A/P in ED - dilated small bowel loops with suspected transition point within L anuradha-abdomen suggestive fo partial bowel obstruction  - pt had BM this morning, passing gas in ED  - s/p surgery c/s - suspect transition point likely 2/2 ileus from large volume ascites; CLD, advance as tolerated; no surgical intervention    # CAD s/p CABG  # HLD  - cont metoprolol succinate 25mg qd  - on rosuvastatin 40mg qd at home, cont as atorvastatin here    # 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  - pt developed hematuria while on AC for DVT in 2020  - IVC filter placed 10/2020, was to be re-evaluated 6 weeks after for possible removal  - IVC filter appears to remain in place on CT A/P  - outpatient f/u for eval of IVC filter removal  - hold xarelto for now - start therapeutic lovenox this PM in case any intervention needed    # Post-chemotherapy paresthesia  - b/l feet tingling  - cont gabapentin 100mg TID    # Depression/Anxiety  - cont home buspirone 15mg BID    # HTN  - BP: 115/67 (106/73 - 115/67)  - cont home amlodipine 5mg qd    # CKD3b  - Cr 1.4, appears at baseline 1.1-1.4    # Glaucoma  - cont lantanoprost drops each eye BID, brimonidine 0.2% drop L eye TID, dorzolamide/timolol drop L eye BID.    PT/REHAB: PT  ACTIVITY: IAT  DVT PPX: therapeutic lovenox  GI PPX: PPI  DIET: clears, advance as tolerated  CODE: full - pt unwilling to participate in code status discussion  Disposition: floors  Pending: pain control; palliative c/s; advanced diet as tolerated

## 2022-04-25 NOTE — H&P ADULT - NSHPREVIEWOFSYSTEMS_GEN_ALL_CORE
General: denies fevers, chills  Eyes: denies changes in vision  ENMT: denies changes in hearing, dysphagia, throat pain  Cards: denies chest pain, palpitations  Resp: denies cough, SOB  GI: admits to nausea (now resolved), abdominal pain. denies vomiting, constipation, diarrhea.  : denies dysuria, urinary frequency  MSK: denies muscle pain, muscle weakness   Neuro: Admits to numbness/tingling to b/l distal LE 2/2 chemotherapy. denies HA

## 2022-04-25 NOTE — CONSULT NOTE ADULT - NS ATTEND AMEND GEN_ALL_CORE FT
Patient seen in ED  Denied N/V and pain at time of visit  Recommendations for N/V, constipation, and pain as above  Will be available for GOC discussions as appropriate

## 2022-04-25 NOTE — ED PROVIDER NOTE - OBJECTIVE STATEMENT
Patient is c/o abdominal pain/nausea, unable to eat/drink due to the abd pain/nausea. Denies f/c/cp/sob. +no BM in 24 hours and no flatus. no trauma. Denies  symptoms, no rash.

## 2022-04-25 NOTE — CONSULT NOTE ADULT - PROBLEM SELECTOR RECOMMENDATION 5
Full Code  Pt declined to discuss GOC with ED team  Will attempt to address GOC with patient this week  Symptoms under control on exam  Will follow up

## 2022-04-25 NOTE — H&P ADULT - ATTENDING COMMENTS
patient seen and examined , agree with pgy 1 assesment and plan except as indicated above,     GEN Lying in no acute distress  HEENT Pupils equal and reactive to light and accommodationSupple Neck  PULM Clear to auscultation bilaterally  CV s1s2 regular rate and rhythm  GI + bowel sounds nontnender  EXT no cyanosis or edema  PSYCH awake alert and oriented x 3  INTEG No Lesions  NEURO Moves all extremities      PROGRESS NOTE HANDOFF    Pending:  stool softners , surgery following     Family discussion: patient verbalized understanding and agreeable to plan of care     Disposition: Home patient seen and examined , agree with pgy 1 assesment and plan except as indicated above,     GEN Lying in no acute distress  HEENT Pupils equal and reactive to light and accommodationSupple Neck  PULM Clear to auscultation bilaterally  CV s1s2 regular rate and rhythm  GI + bowel sounds nontnender  EXT no cyanosis or edema  PSYCH awake alert and oriented x 3  INTEG No Lesions  NEURO Moves all extremities        PROGRESS NOTE HANDOFF    Pending:  stool softners , surgery following , attempt for patient to have bowel movement no indication currently for paracentesis , monitor     Family discussion: patient verbalized understanding and agreeable to plan of care , son at bedside     Disposition: Home

## 2022-04-25 NOTE — CONSULT NOTE ADULT - PROBLEM SELECTOR RECOMMENDATION 2
agree with Zofran 4 mg IVP- may make Q 6 PRN   2/2 possible bowel obstruction/ileus   medical management of  bowel obstruction agree with Zofran 4 mg IVP- may make Q 6 PRN   2/2 possible bowel obstruction/ileus   medical management of partial bowel obstruction

## 2022-04-26 NOTE — PHYSICAL THERAPY INITIAL EVALUATION ADULT - GAIT DEVIATIONS NOTED, PT EVAL
Narrow base of support. Slow, guarded gait/increased time in double stance/decreased step length/decreased stride length/increased stride width

## 2022-04-26 NOTE — PROGRESS NOTE ADULT - ATTENDING COMMENTS
Patient Examined  advance stage cancer with ascites  no bowel obstruction  abdomen soft non tender  call surgery as needed.

## 2022-04-26 NOTE — PHYSICAL THERAPY INITIAL EVALUATION ADULT - ADDITIONAL COMMENTS
Patient lives daughter in house with no steps to enter. Was independent in ADL's and ambulation using RW.

## 2022-04-26 NOTE — PHYSICAL THERAPY INITIAL EVALUATION ADULT - PERTINENT HX OF CURRENT PROBLEM, REHAB EVAL
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 At time of interview, pt reports that the nausea has now resolved. She still c/o abdominal pain which has been on-going for the past week. She describes the pain as severe, periumbilical, and nonradiating.

## 2022-04-26 NOTE — PROGRESS NOTE ADULT - SUBJECTIVE AND OBJECTIVE BOX
GENERAL SURGERY PROGRESS NOTE    Patient: BEKAH NELSON , 81y ([birth year])Female   MRN: [id number]  Location: [address] A  Visit: 04-25-22 Inpatient  Date: 04-26-22 @ 15:55    Hospital Day #: 2    Procedure/Dx/Injuries: Partial SBO, recurrent malignancy and recurrent ascites     Events of past 24 hours: Patient is tolerating CLD with no gas or bowel movements. Patient has no nausea or vomiting. No other recurrent events.     PAST MEDICAL & SURGICAL HISTORY:  Uterine cancer  s/p SHAAN and chemo    Hypertension    High cholesterol    Cataract of right eye    Glaucoma    Coronary artery disease  s/p CABG X2    CAD (coronary artery disease)    HTN (hypertension)    HLD (hyperlipidemia)    H/O total hysterectomy    Athscl CABG, unsp, w angina pectoris w documented spasm    History of coronary artery bypass surgery        Vitals:   T(F): 97.9 (04-26-22 @ 16:00), Max: 98.8 (04-25-22 @ 16:52)  HR: 71 (04-26-22 @ 16:00)  BP: 101/55 (04-26-22 @ 16:00)  RR: 19 (04-26-22 @ 16:00)  SpO2: 99% (04-26-22 @ 00:00)      Diet, Clear Liquid      Fluids:     I & O's:    04-25-22 @ 07:01  -  04-26-22 @ 07:00  --------------------------------------------------------  IN:  Total IN: 0 mL    OUT:    Voided (mL): 250 mL  Total OUT: 250 mL    Total NET: -250 mL    PHYSICAL EXAM:  GENERAL: NAD, well-appearing  CHEST/LUNG: Clear to auscultation bilaterally  HEART: Regular rate and rhythm  ABDOMEN: Soft, Nontender, Nondistended; (+) Fluid wave/ascites, no rebound or guarding  EXTREMITIES:  No clubbing, cyanosis, or edema    MEDICATIONS  (STANDING):  amLODIPine   Tablet 5 milliGRAM(s) Oral daily  atorvastatin 80 milliGRAM(s) Oral at bedtime  brimonidine 0.2% Ophthalmic Solution 1 Drop(s) Left EYE three times a day  busPIRone 15 milliGRAM(s) Oral two times a day  dorzolamide 2% Ophthalmic Solution 1 Drop(s) Left EYE <User Schedule>  enoxaparin Injectable 80 milliGRAM(s) SubCutaneous every 12 hours  gabapentin 100 milliGRAM(s) Oral three times a day  lactulose Syrup 15 Gram(s) Oral three times a day  latanoprost 0.005% Ophthalmic Solution 1 Drop(s) Both EYES at bedtime  metoprolol succinate ER 25 milliGRAM(s) Oral daily  pantoprazole    Tablet 40 milliGRAM(s) Oral before breakfast  polyethylene glycol 3350 17 Gram(s) Oral daily  senna 2 Tablet(s) Oral at bedtime  timolol 0.5% Solution 1 Drop(s) Left EYE <User Schedule>    MEDICATIONS  (PRN):  acetaminophen     Tablet .. 650 milliGRAM(s) Oral every 6 hours PRN Temp greater or equal to 38C (100.4F), Mild Pain (1 - 3)  aluminum hydroxide/magnesium hydroxide/simethicone Suspension 30 milliLiter(s) Oral every 4 hours PRN Dyspepsia  bisacodyl Suppository 10 milliGRAM(s) Rectal daily PRN Constipation  melatonin 3 milliGRAM(s) Oral at bedtime PRN Insomnia  ondansetron Injectable 4 milliGRAM(s) IV Push every 8 hours PRN Nausea and/or Vomiting      DVT PROPHYLAXIS: enoxaparin Injectable 80 milliGRAM(s) SubCutaneous every 12 hours    GI PROPHYLAXIS: pantoprazole    Tablet 40 milliGRAM(s) Oral before breakfast    ANTICOAGULATION:   ANTIBIOTICS:            LAB/STUDIES:  Labs:  CAPILLARY BLOOD GLUCOSE                              10.9   4.59  )-----------( 244      ( 26 Apr 2022 06:39 )             32.4       Auto Neutrophil %: 59.9 % (04-26-22 @ 06:39)  Auto Immature Granulocyte %: 0.2 % (04-26-22 @ 06:39)    04-26    135  |  97<L>  |  21<H>  ----------------------------<  67<L>  4.0   |  21  |  1.0      Calcium, Total Serum: 8.0 mg/dL (04-26-22 @ 06:39)      LFTs:             5.2  | 0.3  | 21       ------------------[85      ( 26 Apr 2022 06:39 )  3.0  | x    | 10          Lipase:x      Amylase:x         Lactate, Blood: 1.1 mmol/L (04-25-22 @ 02:19)      Coags:     13.70  ----< 1.19    ( 26 Apr 2022 06:39 )     31.6        CARDIAC MARKERS ( 25 Apr 2022 02:19 )  x     / <0.01 ng/mL / x     / x     / x

## 2022-04-26 NOTE — PROGRESS NOTE ADULT - ASSESSMENT
ASSESSMENT:  81F PMH recurrent uterine adenocarcinoma s/p SHAAN/BSO (2016), recurrent malignant ascites, CAD s/p CABG, HTN, depression, PE (Xarelto) and IVC filter 2020 presenting with 3 day history of LUQ abdominal pain since prior to discharge, x1 episode of NBNB emesis day prior to arrival, none in the ED, passing gas on day of arrival with BM day prior to arrival. On workup patient found to have persistent large volume ascites (malignant), with small bowel dilation questionable TP in left mid abdomen with PO contrast in rectum likely secondary to ileus from large volume ascites    PLAN:  - Bowel regimen started   - Seen by palliative  - Patient unwilling to have code status discussion per GOC talk  - F/U heme/ onc consult for palliative chemo  - GI consult for paracentesis   - No acute surgical intervention  - Surgical team to follow       BLUE TEAM SPECTRA: [id number]

## 2022-04-27 NOTE — DISCHARGE NOTE NURSING/CASE MANAGEMENT/SOCIAL WORK - NSDCPEFALRISK_GEN_ALL_CORE
For information on Fall & Injury Prevention, visit: [email or web address] OR  [email or web address] OR  https://www.cdc.gov/steadi/patient.html

## 2022-04-27 NOTE — CONSULT NOTE ADULT - SUBJECTIVE AND OBJECTIVE BOX
INTERVENTIONAL RADIOLOGY CONSULT:     HPI:  82 yo F PMH uterine adenocarcinoma s/p SHAAN w recurrence in 2022, recurrent malignant ascites, CAD s/p CABG, 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, HTN, depression/anxiety who presents c/o nausea and abdominal pain. At time of interview, pt reports that the nausea has now resolved. She still c/o abdominal pain which has been on-going for the past week. She describes the pain as severe, periumbilical, and nonradiating.     Of note, pt recently admitted 4/21-4/22 for similar complaint. Underwent para in ED at that time with improvement in her symptoms (unclear how much fluid removed).    ED COURSE  Vitals: /73, HR 91, T 97.9F, spO2 95% on RA  Labs: K 5.5, lipase 25, trop neg x1, Cr 1.4 (baseline appears 1.1-1.4)  Imaging: CT A/P w PO, IV con - dilated small bowel loops with suspected transition point within L hemiabdomen suggestive of partial obstruction. Large volume pelvic ascites. IVC filter.  Interventions: 1L NS bolus; morphine 4mg x1; s/p surgery c/s. (25 Apr 2022 09:13)    PAST MEDICAL & SURGICAL HISTORY:  Uterine cancer  s/p SHAAN and chemo  Hypertension  High cholesterol  Cataract of right eye  Glaucoma  Coronary artery disease  s/p CABG X2  CAD (coronary artery disease)  HTN (hypertension)  HLD (hyperlipidemia)  H/O total hysterectomy  Athscl CABG, unsp, w angina pectoris w documented spasm  History of coronary artery bypass surgery    MEDICATIONS  (STANDING):  amLODIPine   Tablet 5 milliGRAM(s) Oral daily  atorvastatin 80 milliGRAM(s) Oral at bedtime  brimonidine 0.2% Ophthalmic Solution 1 Drop(s) Left EYE three times a day  busPIRone 15 milliGRAM(s) Oral two times a day  dorzolamide 2% Ophthalmic Solution 1 Drop(s) Left EYE <User Schedule>  gabapentin 100 milliGRAM(s) Oral three times a day  lactulose Syrup 15 Gram(s) Oral three times a day  latanoprost 0.005% Ophthalmic Solution 1 Drop(s) Both EYES at bedtime  metoprolol succinate ER 25 milliGRAM(s) Oral daily  morphine  - Injectable 2 milliGRAM(s) IV Push once  pantoprazole    Tablet 40 milliGRAM(s) Oral before breakfast  polyethylene glycol 3350 17 Gram(s) Oral daily  senna 2 Tablet(s) Oral at bedtime  timolol 0.5% Solution 1 Drop(s) Left EYE <User Schedule>    MEDICATIONS  (PRN):  acetaminophen     Tablet .. 650 milliGRAM(s) Oral every 6 hours PRN Temp greater or equal to 38C (100.4F), Mild Pain (1 - 3)  aluminum hydroxide/magnesium hydroxide/simethicone Suspension 30 milliLiter(s) Oral every 4 hours PRN Dyspepsia  bisacodyl Suppository 10 milliGRAM(s) Rectal daily PRN Constipation  melatonin 3 milliGRAM(s) Oral at bedtime PRN Insomnia  ondansetron Injectable 4 milliGRAM(s) IV Push every 8 hours PRN Nausea and/or Vomiting    Allergies  chloroquine (Hives)  quinine (Urticaria)    Intolerances    FAMILY HISTORY:    Vital Signs Last 24 Hrs  T(C): 36.2 (27 Apr 2022 07:20), Max: 36.6 (26 Apr 2022 16:00)  T(F): 97.2 (27 Apr 2022 07:20), Max: 97.9 (26 Apr 2022 16:00)  HR: 80 (27 Apr 2022 07:20) (71 - 97)  BP: 111/62 (27 Apr 2022 07:20) (101/55 - 111/62)  BP(mean): --  RR: 18 (27 Apr 2022 07:20) (18 - 19)  SpO2: --    Labs:                         10.9   4.59  )-----------( 244      ( 26 Apr 2022 06:39 )             32.4     04-26    135  |  97<L>  |  21<H>  ----------------------------<  67<L>  4.0   |  21  |  1.0    Ca    8.0<L>      26 Apr 2022 06:39    TPro  5.2<L>  /  Alb  3.0<L>  /  TBili  0.3  /  DBili  x   /  AST  21  /  ALT  10  /  AlkPhos  85  04-26    PT/INR - ( 26 Apr 2022 06:39 )   PT: 13.70 sec;   INR: 1.19 ratio       PTT - ( 26 Apr 2022 06:39 )  PTT:31.6 sec    Pertinent labs:                      10.9   4.59  )-----------( 244      ( 26 Apr 2022 06:39 )             32.4     04-26    135  |  97<L>  |  21<H>  ----------------------------<  67<L>  4.0   |  21  |  1.0    Ca    8.0<L>      26 Apr 2022 06:39    TPro  5.2<L>  /  Alb  3.0<L>  /  TBili  0.3  /  DBili  x   /  AST  21  /  ALT  10  /  AlkPhos  85  04-26    PT/INR - ( 26 Apr 2022 06:39 )   PT: 13.70 sec;   INR: 1.19 ratio       PTT - ( 26 Apr 2022 06:39 )  PTT:31.6 sec    Radiology & Additional Studies:     Radiology imaging reviewed.

## 2022-04-27 NOTE — DISCHARGE NOTE NURSING/CASE MANAGEMENT/SOCIAL WORK - PATIENT PORTAL LINK FT
You can access the FollowMyHealth Patient Portal offered by [facility] by registering at the following website: [email or web address]. By joining [employer]’s FollowMyHealth portal, you will also be able to view your health information using other applications (apps) compatible with our system.

## 2022-04-27 NOTE — PROGRESS NOTE ADULT - ASSESSMENT
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    # Uterine adenocarcinoma s/p SHAAN/BSO with  recurrence 02/2022  # Recurrent malignant ascites  - CT Abdomen and Pelvis w/ Oral Cont and w/ IV Cont (04.25.22 @ 05:08) >Dilated smallbowel loops with suspected mild/patent transition point within the left hemiabdomen and findings overall suggestive of partial bowel obstruction.Otherwise stable exam, including large volume abdominopelvic ascites and peritoneal/serosalimplants.  - pt on keytruda q 3w - last received on  4/19, next due on 5/10  - Planned for paracentesis with IR in AM  - As per discussion with onc - No need for pigtail for recurrence . Patient has been  recently started on new therapy and will need to assess for response.    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

## 2022-04-27 NOTE — CONSULT NOTE ADULT - ASSESSMENT
ASSESSMENT AND PLAN:    Ms. Block is a 81 year old lady with comorbid PMH including uterine adenocarcinoma s/p SHAAN w recurrence in 2022 c/b recurrent malignant ascites.    #Uterine adenocarcinoma s/p SHAAN w recurrence in 2022 c/b recurrent malignant ascites  -IR consulted to perform therapeutic paracentesis  -Hold enoxaparin   -Plan to perform procedure tomorrow 4/28/22  -NPO at midnight  -CBC/CMP/COAGs prior to proceudre  -Hold anticoagulation/antiplatelet until post procedure    Please call Interventional Radiology with questions or concerns:   - M-F [phone]: [phone]   - All other hours: [phone]

## 2022-04-27 NOTE — DISCHARGE NOTE PROVIDER - NSDCACTIVITY_GEN_ALL_CORE
No heavy lifting/straining I will SWITCH the dose or number of times a day I take the medications listed below when I get home from the hospital:  None

## 2022-04-27 NOTE — DISCHARGE NOTE PROVIDER - CARE PROVIDERS DIRECT ADDRESSES
,[email or web address],DirectAddress_Unknown

## 2022-04-27 NOTE — DISCHARGE NOTE PROVIDER - CARE PROVIDER_API CALL
Paul Greco (DO)  Hematology; Internal Medicine; Medical Oncology  [address] [place] [address]  Phone: [phone]  Fax: [phone]  Follow Up Time: 2 weeks   Paul Greco (DO)  Hematology; Internal Medicine; Medical Oncology  [address] [place] [address]  Phone: [phone]  Fax: [phone]  Follow Up Time: 2 weeks    Ashley Figueroa  INTERNAL MEDICINE  [address] [facility] [address]  [place] [address]  Phone: [phone]  Fax: [phone]  Follow Up Time: 2 weeks   Paul Greco (DO)  Hematology; Internal Medicine; Medical Oncology  [address] [place] [address]  Phone: [phone]  Fax: [phone]  Follow Up Time: 2 weeks    Ashley Figueroa  INTERNAL MEDICINE  [address] [facility] [facility] [address]  [place] [address]  Phone: [phone]  Fax: [phone]  Follow Up Time: 1 week

## 2022-04-27 NOTE — DISCHARGE NOTE PROVIDER - PROVIDER TOKENS
PROVIDER:[TOKEN:[[id number]],FOLLOWUP:[2 weeks]] PROVIDER:[TOKEN:[[id number]],FOLLOWUP:[2 weeks]],PROVIDER:[TOKEN:[[id number]],FOLLOWUP:[2 weeks]] PROVIDER:[TOKEN:[[id number]],FOLLOWUP:[2 weeks]],PROVIDER:[TOKEN:[[id number]],FOLLOWUP:[1 week]]

## 2022-04-27 NOTE — DISCHARGE NOTE PROVIDER - NSDCFUADDINST_GEN_ALL_CORE_FT
***Patients home medication :  norvasc dose decreased from 5mg to 2.5mg daily  ** Paracentesis site: keep dressing clean and dry - remove after 2 days ***Patients home medication :  Norvasc discontinued  ** Paracentesis site: keep dressing clean and dry - remove after 2 days

## 2022-04-27 NOTE — DISCHARGE NOTE PROVIDER - NSDCFUSCHEDAPPT_GEN_ALL_CORE_FT
Glenn Vaelnzuela  [facility]  PSYCHIATRY  [place]  Scheduled Appointment: 05/02/2022    Sadia Silva  [facility]  Gastro Doc Off [address]  Scheduled Appointment: 05/10/2022    [facility]  HemOnc [address]  Scheduled Appointment: 05/10/2022    [facility]  Chemo & Infus [address]   Scheduled Appointment: 05/10/2022    Paul Greco  [facility]  HemOnc [address]  Scheduled Appointment: 05/31/2022    [facility]  Chemo & Infus [address]   Scheduled Appointment: 05/31/2022

## 2022-04-27 NOTE — DISCHARGE NOTE PROVIDER - HOSPITAL COURSE
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    # Uterine adenocarcinoma s/p SHAAN/BSO w recurrence (02/2022)  # Recurrent malignant ascites  # Pelvic ascites on imaging  - CT A/P w large pelvic ascites  - pt was started on keytruda + lenvima on 3/29, however lenvima d/c'd due to poor tolerance  - pt continues on keytruda q3w - last 4/19, next 5/10  - pt with multiple admission over past few months for ascites drainage, many requiring IR intervention  - s/p para in ED prior admission 4/21 -- Cx negative. ANC<250. Unclear how much fluid removed at that time  - on bowel regimen  - now having regular bowel movements, abdominal pain improved  - Surgery team following - no surgical intervention  - palliative care c/s appreciated  - Discussed with Hem-onc - Can perform therapeutic paracentesis today; No need for pigtail yet as patient recently started on new therapy and will assess for response. Can follow up outpatient.     # Imaging findings suggestive of SBO - Improved  - CT A/P in ED - dilated small bowel loops with suspected transition point within L anuradha-abdomen suggestive fo partial bowel obstruction  - s/p surgery c/s - suspect transition point likely 2/2 ileus from large volume ascites; CLD, advance as tolerated; no surgical intervention  - Pain improved, having regular BMs now  - continue with bowel regimen    # CAD s/p CABG  # HLD  - cont metoprolol succinate 25mg qd  - on rosuvastatin 40mg qd at home, cont as atorvastatin here    # 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  - pt developed hematuria while on AC for DVT in 2020  - IVC filter placed 10/2020, was to be re-evaluated 6 weeks after for possible removal  - IVC filter appears to remain in place on CT A/P  - outpatient f/u for eval of IVC filter removal  - switched to therapeutic lovenox for possible intervention    # Post-chemotherapy paresthesia  - b/l feet tingling  - cont gabapentin 100mg TID    # Depression/Anxiety  - cont home buspirone 15mg BID    # HTN  - BP: 115/67 (106/73 - 115/67)  - cont home amlodipine 5mg qd    # CKD3b  - Cr 1.4, appears at baseline 1.1-1.4    # Glaucoma  - cont lantanoprost drops each eye BID, brimonidine 0.2% drop L eye TID, dorzolamide/timolol drop L eye BID.    # PT/REHAB: PT  # ACTIVITY: IAT  # DVT PPX: therapeutic lovenox  # GI PPX: PPI  # DIET: Soft bite sized  # CODE: full   82 yo F PMH uterine adenocarcinoma s/p SHAAN w recurrence in 2022, recurrent malignant ascites, CAD s/p CABG, 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, HTN, CKD, depression/anxiety who presents c/o nausea and abdominal pain.  CT abdomen and pelvis showed -Small bowel distention and large abdominopelvic ascites. Patient was initially admitted with concern for SBO, she has surgery evaluation, no surgical intervention done, Patient was started on bowel regimen after which she had regular bowel movements and abdominal pain improved. She had therapeutic paracentesis on 04/28 and -   Patient has been advised to follow up outpatient with her hemato-oncologist, cardiologist and PCP.   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  CT abdomen and pelvis showed -Small bowel distention and large abdominopelvic ascites. Patient was initially admitted with concern for SBO, she has surgery evaluation, no surgical intervention done, Patient was started on bowel regimen after which she had regular bowel movements and abdominal pain improved. She had therapeutic paracentesis on 04/28 and 3.4L of fluid was removed.   Patient has been advised to follow up outpatient with her hemato-oncologist, cardiologist and PCP.   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  CT abdomen and pelvis showed -Small bowel distention and large abdominopelvic ascites. Patient was initially admitted with concern for SBO, she has surgery evaluation, no surgical intervention done, Patient was started on bowel regimen after which she had regular bowel movements and abdominal pain improved. She had therapeutic paracentesis on 04/28 and 3.4L of fluid was removed.   Patient has been advised to follow up outpatient with her hemato-oncologist, cardiologist and PCP.    # Uterine adenocarcinoma s/p SHAAN/BSO with  recurrence 02/2022  # Recurrent malignant ascites  - CT Abdomen and Pelvis w/ Oral Cont and w/ IV Cont (04.25.22 @ 05:08) >Dilated smallbowel loops with suspected mild/patent transition point within the left hemiabdomen and findings overall suggestive of partial bowel obstruction.Otherwise stable exam, including large volume abdominopelvic ascites and peritoneal/serosalimplants.  - pt on keytruda q 3w - last received on  4/19, next due on 5/10  - As per discussion with onc - No need for pigtail for recurrence . Patient has been  recently started on new therapy and will need to assess for response.   - S/p paracentesis : 3.4L of rian color fluid removed  on 4/28    # Ileus  - resolved    # H/o CAD s/p CABG  # Hypertension  - c/w metoprolol, statin  - c/w norvasc  - c/w xarelto    # H/o PE  - c/w xarelto  -  f/u  outpt for IVC filter removal    # Neuropathy sec to chemo  - c/w neurontin    # CKD stage 3-stable    #  H/o depression  -  c/w home meds    # Glaucoma  -  c/w home meds

## 2022-04-27 NOTE — DISCHARGE NOTE PROVIDER - NSDCMRMEDTOKEN_GEN_ALL_CORE_FT
amLODIPine 5 mg oral tablet: 1 tab(s) orally once a day  brimonidine 0.2% ophthalmic solution: 1 drop(s) in the left eye 3 times a day  busPIRone 15 mg oral tablet: 1 tab(s) orally 2 times a day  ClearLax oral powder for reconstitution: 17 gram(s) orally once a day   dorzolamide-timolol 2.23%-0.68% ophthalmic solution: 1 drop(s) in the left eye 2 times a day  esomeprazole 20 mg oral delayed release capsule: 1 cap(s) orally once a day  gabapentin 100 mg oral capsule: 1 cap(s) orally 3 times a day  latanoprost 0.005% ophthalmic solution: 1 drop(s) to each affected eye 2 times a day  metoprolol succinate 25 mg oral tablet, extended release: 1 tab(s) orally once a day  rosuvastatin 40 mg oral tablet: 1 tab(s) orally once a day  senna oral tablet: 2 tab(s) orally once a day (at bedtime)  Xarelto 20 mg oral tablet: 1 tab(s) orally once a day (in the evening)   acetaminophen 325 mg oral tablet: 2 tab(s) orally every 6 hours, As needed, Temp greater or equal to 38C (100.4F), Mild Pain (1 - 3)  amLODIPine 5 mg oral tablet: 1 tab(s) orally once a day  brimonidine 0.2% ophthalmic solution: 1 drop(s) in the left eye 3 times a day  busPIRone 15 mg oral tablet: 1 tab(s) orally 2 times a day  ClearLax oral powder for reconstitution: 17 gram(s) orally once a day   dorzolamide-timolol 2.23%-0.68% ophthalmic solution: 1 drop(s) in the left eye 2 times a day  esomeprazole 20 mg oral delayed release capsule: 1 cap(s) orally once a day  gabapentin 100 mg oral capsule: 1 cap(s) orally 3 times a day  latanoprost 0.005% ophthalmic solution: 1 drop(s) to each affected eye 2 times a day  metoprolol succinate 25 mg oral tablet, extended release: 1 tab(s) orally once a day  rosuvastatin 40 mg oral tablet: 1 tab(s) orally once a day  senna oral tablet: 2 tab(s) orally once a day (at bedtime)  Xarelto 20 mg oral tablet: 1 tab(s) orally once a day (in the evening)   acetaminophen 325 mg oral tablet: 2 tab(s) orally every 6 hours, As needed, Temp greater or equal to 38C (100.4F), Mild Pain (1 - 3)  amLODIPine 5 mg oral tablet: 0.5 tab(s) orally once a day. Hold for SBP&lt; 110  brimonidine 0.2% ophthalmic solution: 1 drop(s) in the left eye 3 times a day  busPIRone 15 mg oral tablet: 1 tab(s) orally 2 times a day  ClearLax oral powder for reconstitution: 17 gram(s) orally once a day   dorzolamide-timolol 2.23%-0.68% ophthalmic solution: 1 drop(s) in the left eye 2 times a day  esomeprazole 20 mg oral delayed release capsule: 1 cap(s) orally once a day  gabapentin 100 mg oral capsule: 1 cap(s) orally 3 times a day  latanoprost 0.005% ophthalmic solution: 1 drop(s) to each affected eye 2 times a day  metoprolol succinate 25 mg oral tablet, extended release: 1 tab(s) orally once a day  rosuvastatin 40 mg oral tablet: 1 tab(s) orally once a day  senna oral tablet: 2 tab(s) orally once a day (at bedtime)  Xarelto 20 mg oral tablet: 1 tab(s) orally once a day (in the evening)   acetaminophen 325 mg oral tablet: 2 tab(s) orally every 6 hours, As needed, Temp greater or equal to 38C (100.4F), Mild Pain (1 - 3)  amLODIPine 5 mg oral tablet: 0.5 tab(s) orally once a day. Hold for SBP&lt; 110  brimonidine 0.2% ophthalmic solution: 1 drop(s) in the left eye 3 times a day  busPIRone 15 mg oral tablet: 1 tab(s) orally 2 times a day  ClearLax oral powder for reconstitution: 17 gram(s) orally once a day   dorzolamide-timolol 2.23%-0.68% ophthalmic solution: 1 drop(s) in the left eye 2 times a day  esomeprazole 20 mg oral delayed release capsule: 1 cap(s) orally once a day  gabapentin 100 mg oral capsule: 1 cap(s) orally 3 times a day  latanoprost 0.005% ophthalmic solution: 1 drop(s) to each affected eye 2 times a day  metoprolol succinate 25 mg oral tablet, extended release: 1 tab(s) orally once a day  oxyCODONE 5 mg oral tablet: 1 tab(s) orally every 6 hours, As Needed for pain  rosuvastatin 40 mg oral tablet: 1 tab(s) orally once a day  senna oral tablet: 2 tab(s) orally once a day (at bedtime)  Xarelto 20 mg oral tablet: 1 tab(s) orally once a day (in the evening)   acetaminophen 325 mg oral tablet: 2 tab(s) orally every 6 hours, As needed, Temp greater or equal to 38C (100.4F), Mild Pain (1 - 3)  brimonidine 0.2% ophthalmic solution: 1 drop(s) in the left eye 3 times a day  busPIRone 15 mg oral tablet: 1 tab(s) orally 2 times a day  ClearLax oral powder for reconstitution: 17 gram(s) orally once a day   dorzolamide-timolol 2.23%-0.68% ophthalmic solution: 1 drop(s) in the left eye 2 times a day  esomeprazole 20 mg oral delayed release capsule: 1 cap(s) orally once a day  gabapentin 100 mg oral capsule: 1 cap(s) orally 3 times a day  latanoprost 0.005% ophthalmic solution: 1 drop(s) to each affected eye 2 times a day  metoprolol succinate 25 mg oral tablet, extended release: 1 tab(s) orally once a day  oxyCODONE 5 mg oral tablet: 1 tab(s) orally every 6 hours, As Needed for pain  rosuvastatin 40 mg oral tablet: 1 tab(s) orally once a day  senna oral tablet: 2 tab(s) orally once a day (at bedtime)  Xarelto 20 mg oral tablet: 1 tab(s) orally once a day (in the evening)

## 2022-04-27 NOTE — DISCHARGE NOTE PROVIDER - ATTENDING DISCHARGE PHYSICAL EXAMINATION:
T(C): 35.6 (04-28-22 @ 08:30), Max: 36.8 (04-28-22 @ 00:25)  HR: 79 (04-28-22 @ 08:30) (79 - 97)  BP: 100/68 (04-28-22 @ 08:30) (100/68 - 140/79)  RR: 18 (04-28-22 @ 08:30) (18 - 18)  SpO2: --    O/E:  Awake, alert, not in distress.  HEENT: atraumatic, EOMI.  Chest: decreased breath sounds at bases  CVS: SIS2 +, no murmur.  P/A: soft, BS+, free fluid+  CNS: awake, alert  Ext: no edema feet.  Skin: no rash, no ulcers.  All systems reviewed positive findings as above.   T(C): 36.3 (04-30-22 @ 07:54), Max: 36.8 (04-30-22 @ 00:00)  HR: 85 (04-30-22 @ 07:54) (85 - 94)  BP: 117/70 (04-30-22 @ 07:54) (95/50 - 117/70)  RR: 18 (04-30-22 @ 07:54) (18 - 19)  SpO2: --  O/E:  Awake, alert, not in distress.  HEENT: atraumatic, EOMI.  Chest: decreased breath sounds at bases  CVS: SIS2 +, no murmur.  P/A: soft, BS+, free fluid+  CNS: awake, alert  Ext: no edema feet.  Skin: no rash, no ulcers.  All systems reviewed positive findings as above.

## 2022-04-27 NOTE — DISCHARGE NOTE PROVIDER - NSDCCPCAREPLAN_GEN_ALL_CORE_FT
PRINCIPAL DISCHARGE DIAGNOSIS  Diagnosis: Partial small bowel obstruction  Assessment and Plan of Treatment:        PRINCIPAL DISCHARGE DIAGNOSIS  Diagnosis: Partial small bowel obstruction  Assessment and Plan of Treatment: You were treated in the hospital for partial small bowel obstruction, you were evaluated by surgery team, no surgical intervention was needed. The obstruction resolved after starting stool softener medicaitons.   Continue taking medications as prescribed. Follow up with your primary care physician.   Seek immediate medical attention if you have severe abdominal pain, nausea, voimiting, severe constipation, fever, blood in stool.      SECONDARY DISCHARGE DIAGNOSES  Diagnosis: Malignant ascites  Assessment and Plan of Treatment: You had a large collection of fluid within your abdominal cavity because of the uterine cancer recurrence. In the hospial the fluid was drained by IR. Follow up regularly with your oncologist to continue chemotherapy.   Seek immediate medical attention if you have abdominal pain, fever, nausea, vomiting, increasing abdominal swelling.    Diagnosis: Uterine cancer  Assessment and Plan of Treatment: Follow up with your oncologist as scheduled to continue chemotherapy.     PRINCIPAL DISCHARGE DIAGNOSIS  Diagnosis: Partial small bowel obstruction  Assessment and Plan of Treatment: You were treated in the hospital for partial small bowel obstruction, you were evaluated by surgery team, no surgical intervention was needed. The obstruction resolved after starting stool softener medicaitons.   Continue taking medications as prescribed. Follow up with your primary care physician.   Seek immediate medical attention if you have severe abdominal pain, nausea, voimiting, severe constipation, fever, blood in stool.      SECONDARY DISCHARGE DIAGNOSES  Diagnosis: Malignant ascites  Assessment and Plan of Treatment: You had a large collection of fluid within your abdominal cavity because of the uterine cancer recurrence. In the hospial the fluid was drained by IR. Follow up regularly with your oncologist to continue chemotherapy.   Seek immediate medical attention if you have abdominal pain, fever, nausea, vomiting, increasing abdominal swelling.    Diagnosis: Uterine cancer  Assessment and Plan of Treatment: Follow up with your oncologist as scheduled to continue chemotherapy.    Diagnosis: Hypertension  Assessment and Plan of Treatment: Your blood pressure has been on the lower side in the hospital. Removal of the fluid from your belly can increase risk of low blood pressure. Your BP medication amlodipine has been decreased to half. Take only half pill once a day for now.   Follow up with your primary care physician within 1 week to recheck your blood pressure and adjust medication accordingly.   SEEK IMMEDIATE MEDICAL CARE IF YOU HAVE ANY OF THE FOLLOWING SYMPTOMS: severe headache, confusion, chest pain, abdominal pain, vomiting, or shortness of breath.       PRINCIPAL DISCHARGE DIAGNOSIS  Diagnosis: Partial small bowel obstruction  Assessment and Plan of Treatment: You were treated in the hospital for partial small bowel obstruction, you were evaluated by surgery team, no surgical intervention was needed. The obstruction resolved after starting stool softener medicaitons.   Continue taking medications as prescribed. Follow up with your primary care physician.   Seek immediate medical attention if you have severe abdominal pain, nausea, voimiting, severe constipation, fever, blood in stool.      SECONDARY DISCHARGE DIAGNOSES  Diagnosis: Malignant ascites  Assessment and Plan of Treatment: You had a large collection of fluid within your abdominal cavity because of the uterine cancer recurrence. In the hospial the fluid was drained by IR. Follow up regularly with your oncologist to continue chemotherapy.   Seek immediate medical attention if you have abdominal pain, fever, nausea, vomiting, increasing abdominal swelling.    Diagnosis: Uterine cancer  Assessment and Plan of Treatment: Follow up with your oncologist as scheduled to continue chemotherapy.    Diagnosis: Hypertension  Assessment and Plan of Treatment: Your blood pressure has been on the lower side in the hospital. Amlodipine is discontinued.   Follow up with your primary care physician within 1 week to recheck your blood pressure and adjust medication accordingly.   SEEK IMMEDIATE MEDICAL CARE IF YOU HAVE ANY OF THE FOLLOWING SYMPTOMS: severe headache, confusion, chest pain, abdominal pain, vomiting, or shortness of breath.

## 2022-04-27 NOTE — PROGRESS NOTE ADULT - SUBJECTIVE AND OBJECTIVE BOX
Patient is a 81y old  Female who presents with a chief complaint of abdominal pain (27 Apr 2022 14:02)    Patient was seen and examined.  Planned for paracentesis in AM  no new complaints    PAST MEDICAL & SURGICAL HISTORY:  Uterine cancer, s/p SHAAN and chemo  Hypertension  Cataract of right eye  Glaucoma  Coronary artery disease, s/p CABG X2  HLD (hyperlipidemia)    H/O total hysterectomy  History of coronary artery bypass surgery    Allergies  chloroquine (Hives)  quinine (Urticaria)    MEDICATIONS  (STANDING):  amLODIPine   Tablet 5 milliGRAM(s) Oral daily  atorvastatin 80 milliGRAM(s) Oral at bedtime  brimonidine 0.2% Ophthalmic Solution 1 Drop(s) Left EYE three times a day  busPIRone 15 milliGRAM(s) Oral two times a day  dorzolamide 2% Ophthalmic Solution 1 Drop(s) Left EYE <User Schedule>  gabapentin 100 milliGRAM(s) Oral three times a day  lactulose Syrup 15 Gram(s) Oral three times a day  latanoprost 0.005% Ophthalmic Solution 1 Drop(s) Both EYES at bedtime  metoprolol succinate ER 25 milliGRAM(s) Oral daily  pantoprazole    Tablet 40 milliGRAM(s) Oral before breakfast  polyethylene glycol 3350 17 Gram(s) Oral daily  senna 2 Tablet(s) Oral at bedtime  timolol 0.5% Solution 1 Drop(s) Left EYE <User Schedule>    MEDICATIONS  (PRN):  acetaminophen     Tablet .. 650 milliGRAM(s) Oral every 6 hours PRN Temp greater or equal to 38C (100.4F), Mild Pain (1 - 3)  aluminum hydroxide/magnesium hydroxide/simethicone Suspension 30 milliLiter(s) Oral every 4 hours PRN Dyspepsia  bisacodyl Suppository 10 milliGRAM(s) Rectal daily PRN Constipation  melatonin 3 milliGRAM(s) Oral at bedtime PRN Insomnia  ondansetron Injectable 4 milliGRAM(s) IV Push every 8 hours PRN Nausea and/or Vomiting    Vital Signs Last 24 Hrs  T(C): 36.9  T(F): 98.4  HR: 88  BP: 112/57  BP(mean): --  RR: 18  SpO2: 98%    O/E:  Awake, alert, not in distress.  HEENT: atraumatic, EOMI.  Chest: decreased breath sounds at bases  CVS: SIS2 +, no murmur.  P/A: Distended, BS+, free fluid+  CNS: awake, alert  Ext: no edema feet.  Skin: no rash, no ulcers.  All systems reviewed positive findings as above.                            10.9<L>  4.59<L> )-----------( 244      ( 26 Apr 2022 06:39 )             32.4<L>    04-26    135  |  97<L>  |  21<H>  ----------------------------<  67<L>  4.0   |  21  |  1.0    Ca    8.0<L>      26 Apr 2022 06:39    TPro  5.2<L>  /  Alb  3.0<L>  /  TBili  0.3  /  DBili  x   /  AST  21  /  ALT  10  /  AlkPhos  85  04-26          PT/INR - ( 26 Apr 2022 06:39 )   PT: 13.70 sec;   INR: 1.19 ratio         PTT - ( 26 Apr 2022 06:39 )  PTT:31.6 sec

## 2022-04-27 NOTE — PROGRESS NOTE ADULT - SUBJECTIVE AND OBJECTIVE BOX
GENERAL SURGERY PROGRESS NOTE    Patient: BEKAH NELSON , 81y ([birth year])Female   MRN: [id number]  Location: [address]  Visit: 04-25-22 Inpatient  Date: 04-27-22 @ 10:28    Hospital Day #:3    Procedure/Dx/Injuries: Partial SBO, recurrent malignancy and recurrent ascites     Events of past 24 hours: Pt seen and examined at bedside. Pt reports passing gas and having BM. No acute overnight events. Afebrile     PAST MEDICAL & SURGICAL HISTORY:  Uterine cancer  s/p SHAAN and chemo  Hypertension  High cholesterol  Cataract of right eye  Glaucoma  Coronary artery disease  s/p CABG X2  CAD (coronary artery disease)  HTN (hypertension)  HLD (hyperlipidemia)  H/O total hysterectomy  Athscl CABG, unsp, w angina pectoris w documented spasm  History of coronary artery bypass surgery    Vitals:   T(F): 97.2 (04-27-22 @ 07:20), Max: 97.9 (04-26-22 @ 16:00)  HR: 80 (04-27-22 @ 07:20)  BP: 111/62 (04-27-22 @ 07:20)  RR: 18 (04-27-22 @ 07:20)  SpO2: --    PHYSICAL EXAM:  General: NAD  HEENT: NCAT, EOMI  Cardiac: S1, S2  Respiratory: normal respiratory effort, breath sounds equal BL  Abdomen: Soft, non-distended, non-tender, no rebound, no guarding  Skin: Warm/dry, normal color, no jaundice    MEDICATIONS  (STANDING):  amLODIPine   Tablet 5 milliGRAM(s) Oral daily  atorvastatin 80 milliGRAM(s) Oral at bedtime  brimonidine 0.2% Ophthalmic Solution 1 Drop(s) Left EYE three times a day  busPIRone 15 milliGRAM(s) Oral two times a day  dorzolamide 2% Ophthalmic Solution 1 Drop(s) Left EYE <User Schedule>  gabapentin 100 milliGRAM(s) Oral three times a day  lactulose Syrup 15 Gram(s) Oral three times a day  latanoprost 0.005% Ophthalmic Solution 1 Drop(s) Both EYES at bedtime  metoprolol succinate ER 25 milliGRAM(s) Oral daily  pantoprazole    Tablet 40 milliGRAM(s) Oral before breakfast  polyethylene glycol 3350 17 Gram(s) Oral daily  senna 2 Tablet(s) Oral at bedtime  timolol 0.5% Solution 1 Drop(s) Left EYE <User Schedule>    MEDICATIONS  (PRN):  acetaminophen     Tablet .. 650 milliGRAM(s) Oral every 6 hours PRN Temp greater or equal to 38C (100.4F), Mild Pain (1 - 3)  aluminum hydroxide/magnesium hydroxide/simethicone Suspension 30 milliLiter(s) Oral every 4 hours PRN Dyspepsia  bisacodyl Suppository 10 milliGRAM(s) Rectal daily PRN Constipation  melatonin 3 milliGRAM(s) Oral at bedtime PRN Insomnia  ondansetron Injectable 4 milliGRAM(s) IV Push every 8 hours PRN Nausea and/or Vomiting    GI PROPHYLAXIS: pantoprazole    Tablet 40 milliGRAM(s) Oral before breakfast    LAB/STUDIES:  Labs:                        10.9   4.59  )-----------( 244      ( 26 Apr 2022 06:39 )             32.4       04-26    135  |  97<L>  |  21<H>  ----------------------------<  67<L>  4.0   |  21  |  1.0    LFTs:             5.2  | 0.3  | 21       ------------------[85      ( 26 Apr 2022 06:39 )  3.0  | x    | 10          Lipase:x      Amylase:x         Lactate, Blood: 1.1 mmol/L (04-25-22 @ 02:19)    Coags:     13.70  ----< 1.19    ( 26 Apr 2022 06:39 )     31.6      IMAGING:  No new imaging past 24hrs

## 2022-04-27 NOTE — PROGRESS NOTE ADULT - SUBJECTIVE AND OBJECTIVE BOX
SUBJECTIVE:   LENGTH OF HOSPITAL STAY: 2d    CHIEF COMPLAINT:  Patient is a 81y old  Female who presents with a chief complaint of abdominal pain (26 Apr 2022 15:55)      Events over the past 24 hours:  Patient was seen at the bedside this morning. She is lying comfortably on the bed. There were no acute events overnight.  The abdominal pain has improved and she is having regular bowel movements.     REVIEW OF SYSTEMS  Negative Except as mentioned above.    ALLERGIES:  chloroquine (Hives)  quinine (Urticaria)      MEDICATIONS:  STANDING MEDICATIONS  amLODIPine   Tablet 5 milliGRAM(s) Oral daily  atorvastatin 80 milliGRAM(s) Oral at bedtime  brimonidine 0.2% Ophthalmic Solution 1 Drop(s) Left EYE three times a day  busPIRone 15 milliGRAM(s) Oral two times a day  dorzolamide 2% Ophthalmic Solution 1 Drop(s) Left EYE <User Schedule>  enoxaparin Injectable 80 milliGRAM(s) SubCutaneous every 12 hours  gabapentin 100 milliGRAM(s) Oral three times a day  lactulose Syrup 15 Gram(s) Oral three times a day  latanoprost 0.005% Ophthalmic Solution 1 Drop(s) Both EYES at bedtime  metoprolol succinate ER 25 milliGRAM(s) Oral daily  pantoprazole    Tablet 40 milliGRAM(s) Oral before breakfast  polyethylene glycol 3350 17 Gram(s) Oral daily  senna 2 Tablet(s) Oral at bedtime  timolol 0.5% Solution 1 Drop(s) Left EYE <User Schedule>    PRN MEDICATIONS  acetaminophen     Tablet .. 650 milliGRAM(s) Oral every 6 hours PRN  aluminum hydroxide/magnesium hydroxide/simethicone Suspension 30 milliLiter(s) Oral every 4 hours PRN  bisacodyl Suppository 10 milliGRAM(s) Rectal daily PRN  melatonin 3 milliGRAM(s) Oral at bedtime PRN  ondansetron Injectable 4 milliGRAM(s) IV Push every 8 hours PRN        OBJECTIVE:  VITALS:   T(F): 96.9 (04-26 @ 23:37), Max: 97.9 (04-26 @ 16:00)  HR: 97 (04-27 @ 05:37) (71 - 97)  BP: 107/63 (04-27 @ 05:37) (101/55 - 110/57)  RR: 19 (04-26 @ 23:37) (19 - 19)  SpO2: --    I&O's:       PHYSICAL EXAM:  General: No acute distress; Pallor (-), Icterus (-), Cyanosis (-), Clubbing (-)  HEENT: Normocephalic, atraumatic, PERRLA, EOMI  PULM: Bilaterally equal and clear breath sounds, wheeze (-), rubs (-), crackles (-)  CVS: Normal S1 and S2, murmurs (-), rubs (-), gallops (-)   GI: Soft, Distended, nontender, BS +; Fluid thrill +; Shifting dullness +  MSK: Edema (-), no muscle, bone or joint tenderness noted  SKIN: Warm and well perfused  NEURO:  Alert and Oriented x 3; No gross focal neurological deficit noted      LABS:                        10.9   4.59  )-----------( 244      ( 26 Apr 2022 06:39 )             32.4             04-26    135  |  97<L>  |  21<H>  ----------------------------<  67<L>  4.0   |  21  |  1.0    Ca    8.0<L>      26 Apr 2022 06:39    TPro  5.2<L>  /  Alb  3.0<L>  /  TBili  0.3  /  DBili  x   /  AST  21  /  ALT  10  /  AlkPhos  85  04-26    LIVER FUNCTIONS - ( 26 Apr 2022 06:39 )  Alb: 3.0 g/dL / Pro: 5.2 g/dL / ALK PHOS: 85 U/L / ALT: 10 U/L / AST: 21 U/L / GGT: x                 PT/INR - ( 26 Apr 2022 06:39 )   PT: 13.70 sec;   INR: 1.19 ratio         PTT - ( 26 Apr 2022 06:39 )  PTT:31.6 sec  Blood Glucose:        RADIOLOGY & ADDITIONAL TESTS:  IMPRESSION:    PERITONEUM/MESENTERY/BOWEL: Several dilated loops of small bowel   measuring up to 3.9 cm diameter with mild but relatively focal transition   point within the left hemiabdomen (series 6, image 258). Region of   transition from dilated to decompressed bowel is patent with note made of   enteric contrast traversing an present both immediately and remotely   distal to this, although some amount may represent retained contrast from   a preceding exam. Redemonstration of large abdominopelvic ascites. No   pneumoperitoneum. Scattered peritoneal deposits, for example serosal   deposits on the rectosigmoid colon and distal transverse colon (series 6,   image 175) as well as linear soft tissue thickening situated inferior to   the stomach and posterior to the transverse colonmeasuring up to 2.2 cm   in thickness (series 6, image 176) on the right.    Compared to 4/21/2022:    1.  Dilated smallbowel loops with suspected mild/patent transition point   within the left hemia bdomen and findings overall suggestive of partial   bowel obstruction.    2.  Otherwise stable exam, including large volume abdominopelvic ascites   and peritoneal/ serosal implants.

## 2022-04-27 NOTE — PROGRESS NOTE ADULT - ASSESSMENT
ASSESSMENT:  81F PMH recurrent uterine adenocarcinoma s/p SHAAN/BSO (2016), recurrent malignant ascites, CAD s/p CABG, HTN, depression, PE (Xarelto) and IVC filter 2020 presenting with 3 day history of LUQ abdominal pain since prior to discharge, x1 episode of NBNB emesis day prior to arrival, none in the ED, passing gas on day of arrival with BM day prior to arrival. On workup patient found to have persistent large volume ascites (malignant), with small bowel dilation questionable TP in left mid abdomen with PO contrast in rectum likely secondary to ileus from large volume ascites    PLAN:  -Management per primary team   -Monitor bowel function - currently passing gas and having BM  -Continue bowel regimen   -Palliative following  -No acute surgical intervention at this time   -Please recall surgery as needed     BLUE TEAM SPECTRA: [id number]

## 2022-04-27 NOTE — PROGRESS NOTE ADULT - ASSESSMENT
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    # Uterine adenocarcinoma s/p SHAAN/BSO w recurrence (02/2022)  # Recurrent malignant ascites  # Pelvic ascites on imaging  - CT A/P w large pelvic ascites  - pt was started on keytruda + lenvima on 3/29, however lenvima d/c'd due to poor tolerance  - pt continues on keytruda q3w - last 4/19, next 5/10  - pt with multiple admission over past few months for ascites drainage, many requiring IR intervention  - s/p para in ED prior admission 4/21 -- Cx negative. ANC<250. Unclear how much fluid removed at that time  - on bowel regimen  - now having regular bowel movements, abdominal pain improved  - Surgery team following - no surgical intervention  - palliative care c/s appreciated    # Imaging findings suggestive of SBO - Improved  - CT A/P in ED - dilated small bowel loops with suspected transition point within L anuradha-abdomen suggestive fo partial bowel obstruction  - s/p surgery c/s - suspect transition point likely 2/2 ileus from large volume ascites; CLD, advance as tolerated; no surgical intervention  - Pain improved, having regular BMs now  - continue with bowel regimen    # CAD s/p CABG  # HLD  - cont metoprolol succinate 25mg qd  - on rosuvastatin 40mg qd at home, cont as atorvastatin here    # 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  - pt developed hematuria while on AC for DVT in 2020  - IVC filter placed 10/2020, was to be re-evaluated 6 weeks after for possible removal  - IVC filter appears to remain in place on CT A/P  - outpatient f/u for eval of IVC filter removal  - switched to therapeutic lovenox for possible intervention    # Post-chemotherapy paresthesia  - b/l feet tingling  - cont gabapentin 100mg TID    # Depression/Anxiety  - cont home buspirone 15mg BID    # HTN  - BP: 115/67 (106/73 - 115/67)  - cont home amlodipine 5mg qd    # CKD3b  - Cr 1.4, appears at baseline 1.1-1.4    # Glaucoma  - cont lantanoprost drops each eye BID, brimonidine 0.2% drop L eye TID, dorzolamide/timolol drop L eye BID.    # PT/REHAB: PT  # ACTIVITY: IAT  # DVT PPX: therapeutic lovenox  # GI PPX: PPI  # DIET: Soft bite sized  # CODE: full   82 yo F PMH uterine adenocarcinoma s/p SHAAN w recurrence in 2022, recurrent malignant ascites, CAD s/p CABG, 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, HTN, CKD, depression/anxiety who presents c/o nausea and abdominal pain.    # Uterine adenocarcinoma s/p SHAAN/BSO w recurrence (02/2022)  # Recurrent malignant ascites  # Pelvic ascites on imaging  - CT A/P w large pelvic ascites  - pt was started on keytruda + lenvima on 3/29, however lenvima d/c'd due to poor tolerance  - pt continues on keytruda q3w - last 4/19, next 5/10  - pt with multiple admission over past few months for ascites drainage, many requiring IR intervention  - s/p para in ED prior admission 4/21 -- Cx negative. ANC<250. Unclear how much fluid removed at that time  - on bowel regimen  - now having regular bowel movements, abdominal pain improved  - Surgery team following - no surgical intervention  - palliative care c/s appreciated  - Discussed with Hem-onc - Can perform therapeutic paracentesis today; No need for pigtail yet as patient recently started on new therapy and will assess for response. Can follow up outpatient.     # Imaging findings suggestive of SBO - Improved  - CT A/P in ED - dilated small bowel loops with suspected transition point within L anuradha-abdomen suggestive fo partial bowel obstruction  - s/p surgery c/s - suspect transition point likely 2/2 ileus from large volume ascites; CLD, advance as tolerated; no surgical intervention  - Pain improved, having regular BMs now  - continue with bowel regimen    # CAD s/p CABG  # HLD  - cont metoprolol succinate 25mg qd  - on rosuvastatin 40mg qd at home, cont as atorvastatin here    # 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  - pt developed hematuria while on AC for DVT in 2020  - IVC filter placed 10/2020, was to be re-evaluated 6 weeks after for possible removal  - IVC filter appears to remain in place on CT A/P  - outpatient f/u for eval of IVC filter removal  - switched to therapeutic lovenox for possible intervention    # Post-chemotherapy paresthesia  - b/l feet tingling  - cont gabapentin 100mg TID    # Depression/Anxiety  - cont home buspirone 15mg BID    # HTN  - BP: 115/67 (106/73 - 115/67)  - cont home amlodipine 5mg qd    # CKD3b  - Cr 1.4, appears at baseline 1.1-1.4    # Glaucoma  - cont lantanoprost drops each eye BID, brimonidine 0.2% drop L eye TID, dorzolamide/timolol drop L eye BID.    # PT/REHAB: PT  # ACTIVITY: IAT  # DVT PPX: therapeutic lovenox  # GI PPX: PPI  # DIET: Soft bite sized  # CODE: full   82 yo F PMH uterine adenocarcinoma s/p SHAAN w recurrence in 2022, recurrent malignant ascites, CAD s/p CABG, 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, HTN, CKD, depression/anxiety who presents c/o nausea and abdominal pain.    # Uterine adenocarcinoma s/p SHAAN/BSO w recurrence (02/2022)  # Recurrent malignant ascites  # Pelvic ascites on imaging  - CT A/P w large pelvic ascites  - pt was started on keytruda + lenvima on 3/29, however lenvima d/c'd due to poor tolerance  - pt continues on keytruda q3w - last 4/19, next 5/10  - pt with multiple admission over past few months for ascites drainage, many requiring IR intervention  - s/p para in ED prior admission 4/21 -- Cx negative. ANC<250. Unclear how much fluid removed at that time  - on bowel regimen  - now having regular bowel movements, abdominal pain improved  - Surgery team following - no surgical intervention  - palliative care c/s appreciated  - Discussed with Hem-onc - Can perform therapeutic paracentesis today; No need for pigtail yet as patient recently started on new therapy and will assess for response. Can follow up outpatient.  - Attempted Para - unable to drain  - IR consult for Paracentesis    # Imaging findings suggestive of SBO - Improved  - CT A/P in ED - dilated small bowel loops with suspected transition point within L anuradha-abdomen suggestive fo partial bowel obstruction  - s/p surgery c/s - suspect transition point likely 2/2 ileus from large volume ascites; CLD, advance as tolerated; no surgical intervention  - Pain improved, having regular BMs now  - continue with bowel regimen    # CAD s/p CABG  # HLD  - cont metoprolol succinate 25mg qd  - on rosuvastatin 40mg qd at home, cont as atorvastatin here    # 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  - pt developed hematuria while on AC for DVT in 2020  - IVC filter placed 10/2020, was to be re-evaluated 6 weeks after for possible removal  - IVC filter appears to remain in place on CT A/P  - outpatient f/u for eval of IVC filter removal  - switched to therapeutic lovenox for possible intervention    # Post-chemotherapy paresthesia  - b/l feet tingling  - cont gabapentin 100mg TID    # Depression/Anxiety  - cont home buspirone 15mg BID    # HTN  - BP: 115/67 (106/73 - 115/67)  - cont home amlodipine 5mg qd    # CKD3b  - Cr 1.4, appears at baseline 1.1-1.4    # Glaucoma  - cont lantanoprost drops each eye BID, brimonidine 0.2% drop L eye TID, dorzolamide/timolol drop L eye BID.    # PT/REHAB: PT  # ACTIVITY: IAT  # DVT PPX: therapeutic lovenox  # GI PPX: PPI  # DIET: Soft bite sized  # CODE: full

## 2022-04-28 NOTE — PROGRESS NOTE ADULT - SUBJECTIVE AND OBJECTIVE BOX
Subjective:      Mark Martinez is a 4 y.o. male here with mother. Patient brought in for pre op surgery (brought in by mom Belkis: schedule for oral surgery tomorrow at [facility] )      History of Present Illness:  Mark is a 5 yo male established patient presenting for pre-operative clearance for dental surgery on 08/03/18.  Mother denies that the patient has had cough or fever.  Nasal congestion this morning, but has seasonal allergies.  Appetite is normal.           Review of Systems   Constitutional: Negative for activity change, appetite change, fatigue and fever.   HENT: Positive for congestion. Negative for ear pain, rhinorrhea and sore throat.    Eyes: Negative for redness.   Respiratory: Negative for cough.    Gastrointestinal: Negative for abdominal pain, constipation, diarrhea, nausea and vomiting.   Genitourinary: Negative for decreased urine volume, dysuria and frequency.   Skin: Negative for rash.       Objective:     Physical Exam   Constitutional: He appears well-developed and well-nourished. He is active. No distress.   HENT:   Head: Atraumatic.   Right Ear: Tympanic membrane normal.   Left Ear: Tympanic membrane normal.   Nose: Nose normal. No nasal discharge.   Mouth/Throat: Mucous membranes are moist. Dentition is normal. No tonsillar exudate. Oropharynx is clear. Pharynx is normal.   Eyes: Conjunctivae and EOM are normal. Pupils are equal, round, and reactive to light. Right eye exhibits no discharge. Left eye exhibits no discharge.   Neck: Normal range of motion. Neck supple.   Cardiovascular: Normal rate, regular rhythm, S1 normal and S2 normal. Pulses are strong.   No murmur heard.  Pulmonary/Chest: Effort normal and breath sounds normal.   Abdominal: Soft. Bowel sounds are normal. He exhibits no distension and no mass. There is no hepatosplenomegaly. There is no tenderness. There is no rebound and no guarding. No hernia.   Genitourinary: Penis normal.   Musculoskeletal: Normal range  Interventional Radiology Brief- Operative Note: Paracentesis    Procedure: left sided image guided paracentesis     Pre-Op Diagnosis: uterine carcinoma with recurrent ascites     Post-Op Diagnosis: same     Provider: Benita Reyez PA-C    Anesthesia (type):  [ ] General Anesthesia  [ ] Sedation  [ ] Spinal Anesthesia  [ x ] Local/Regional    Contrast: none    Estimated Blood Loss: <5mL    Condition:   [ ] Critical  [ ] Serious  [ ] Fair   [ x ] Good    Findings/Follow up Plan of Care: 3.4L of rian color fluid removed     Specimens Removed: none requested     Implants: none     Complications: none     Condition/Disposition: d/c to floor - follow up as needed     Discharge instructions: resume diet and activity as tolerated, resume medications as needed, keep dressing clean and dry - remove after 2 days, follow up paracentesis as needed    Please call Interventional Radiology [phone] with any questions, concerns, or issues.         of motion. He exhibits no tenderness.   Lymphadenopathy: No occipital adenopathy is present.     He has no cervical adenopathy.   Neurological: He is alert. No cranial nerve deficit. He exhibits normal muscle tone. Coordination normal.   Skin: Skin is warm and dry. No rash noted.   Nursing note and vitals reviewed.      Assessment:        1. Pre-operative clearance         Plan:   Mark was seen today for pre op surgery.    Diagnoses and all orders for this visit:    Pre-operative clearance  -     Nursing communication      Patient is cleared for his dental procedure.  Form has been completed and faxed to [facility]'s.     Aisha Eaton MD

## 2022-04-28 NOTE — CHART NOTE - NSCHARTNOTEFT_GEN_A_CORE
Patient was not on unit due to procedure.
PALLIATIVE MEDICINE INTERDISCIPLINARY TEAM NOTE    Provider:  [ x  ]Social Work   [   ]          [ x  ] Initial visit [   ] Follow up    Family or contact name / phone #   Met with: [  x ] Patient  [   ] Family  [   ] Other:    Primary Language: [ x  ] English [   ] Other*:                      *Interpretation provided by:    SUPPORT DIAGNOSES            (Check all that apply)  [   ] Psychosocial spiritual assessment (PSSA)  [   ] EOL issues  [   ] Cultural / spiritual concerns  [ x  ] Pain / suffering  [   ] Dementia / AMS  [   ] Other:  [x   ] AD issues  [   ] Grief / loss / sadness  [   ] Discharge issues  [ x  ] Distress / coping    PSYCHOSOCIAL ASSESSMENT OF PATIENT         (Check all that apply)  [ x  ] Initial Assessment            [   ] Reassessment          [   ] Not Applicable this visit    Pain/suffering acuity:  [x   ] None to mild (0-3)           [   ] Moderate (4-6)        [   ] High (7-10)    Mental Status:  [x   ] Alert/oriented (x3)          [   ] Confused/Altered(x2/x1)         [   ] Non-resp    Functional status:  [   ] Independent w ADLs      [   ] Needs Assistance             [  x ] Bedbound/Full Care    Coping:  [   ] Coping well                     [  x ] Coping w/difficulty            [   ] Poor coping    Support system:  [x   ] Strong                              [   ] Adequate                        [   ] Inadequate      Past history and medications for:     [ x] Anxiety       [x ] Depression    [ ] Sleep disorders     SPIRITUAL ASSESSMENT  Restoration/Spiritual practice: ___________________________    Role of organized Pentecostal:  [   ] Important                     [   ] Some (fam tradition, cultural)               [   ] None    Effects on medical care:  [   ] Yes, _____________________________________                         [   ] None    Cultural/Anglican need:  [   ] Yes, _____________________________________                         [   ] None    Refer to Pastoral Care:  [   ] Yes           [   ] No, not at this time    SERVICE PROVIDED  [   ]PSSA                                                                             [   ]Discharge support / facilitation  [   ]AD / goals of care counseling                                  [   ]EOL / death / bereavement counseling  [ x  ]Counseling / support                                                [   ] Family meeting  [   ]Prayer / sacrament / ritual                                      [   ] Referral   [   ]Other                                                                       NOTE and Plan of Care (PoC):    Patient is a 81 year old female.  Patient was admitted on 4/25/22, dx:  Partial Small Bowel Obstruction.  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  Patient presented to ED c/o nausea and abdominal pain.    Patient was tearful when approached.  Patient discussed concerns regarding illness and prognosis.  Support rendered.
Patient was observed to be resting comfortably.

## 2022-04-28 NOTE — PROGRESS NOTE ADULT - ASSESSMENT
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    # Uterine adenocarcinoma s/p SHAAN/BSO with  recurrence 02/2022  # Recurrent malignant ascites  - CT Abdomen and Pelvis w/ Oral Cont and w/ IV Cont (04.25.22 @ 05:08) >Dilated smallbowel loops with suspected mild/patent transition point within the left hemiabdomen and findings overall suggestive of partial bowel obstruction.Otherwise stable exam, including large volume abdominopelvic ascites and peritoneal/serosalimplants.  - pt on keytruda q 3w - last received on  4/19, next due on 5/10  - S/p paracentesis 3.4L of rian color fluid removed  - As per discussion with onc - No need for pigtail for recurrence . Patient has been  recently started on new therapy and will need to assess for response.    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

## 2022-04-28 NOTE — PROGRESS NOTE ADULT - ASSESSMENT
80 yo F PMH uterine adenocarcinoma s/p SHAAN w recurrence in 2022, recurrent malignant ascites, CAD s/p CABG, 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, HTN, CKD, depression/anxiety who presents c/o nausea and abdominal pain.    # Uterine adenocarcinoma s/p SHAAN/BSO w recurrence (02/2022)  # Recurrent malignant ascites  # Pelvic ascites on imaging  - CT A/P w large pelvic ascites  - pt was started on keytruda + lenvima on 3/29, however lenvima d/c'd due to poor tolerance  - pt continues on keytruda q3w - last 4/19, next 5/10  - pt with multiple admission over past few months for ascites drainage, many requiring IR intervention  - s/p para in ED prior admission 4/21 -- Cx negative. ANC<250. Unclear how much fluid removed at that time  - on bowel regimen  - now having regular bowel movements, abdominal pain improved  - Surgery team following - no surgical intervention  - palliative care c/s appreciated  - Discussed with Hem-onc - Can perform therapeutic paracentesis today; No need for pigtail yet as patient recently started on new therapy and will assess for response. Can follow up outpatient.  - IR for Paracentesis today    # Imaging findings suggestive of SBO - Resolved  - CT A/P in ED - dilated small bowel loops with suspected transition point within L anuradha-abdomen suggestive fo partial bowel obstruction  - s/p surgery c/s - suspect transition point likely 2/2 ileus from large volume ascites; CLD, advance as tolerated; no surgical intervention  - Pain improved, having regular BMs now  - continue with bowel regimen    # CAD s/p CABG  # HLD  - cont metoprolol succinate 25mg qd  - on rosuvastatin 40mg qd at home, cont as atorvastatin here    # 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  - pt developed hematuria while on AC for DVT in 2020  - IVC filter placed 10/2020, was to be re-evaluated 6 weeks after for possible removal  - IVC filter appears to remain in place on CT A/P  - outpatient f/u for eval of IVC filter removal  - Resume DOAC after Paracentesis today    # Post-chemotherapy paresthesia  - was on carboplatin  - b/l feet tingling  - cont gabapentin 100mg TID    # Depression/Anxiety  - cont home buspirone 15mg BID    # HTN  - BP: 115/67 (106/73 - 115/67)  - cont home amlodipine 5mg qd    # CKD3b  - Cr 1.4, appears at baseline 1.1-1.4    # Glaucoma  - cont lantanoprost drops each eye BID, brimonidine 0.2% drop L eye TID, dorzolamide/timolol drop L eye BID.    # PT/REHAB: PT  # ACTIVITY: IAT  # DVT PPX: SCD, Lovenox on hold for para  # GI PPX: PPI  # DIET: Soft bite sized, NPO for Para  # CODE: full   80 yo F PMH uterine adenocarcinoma s/p SHAAN w recurrence in 2022, recurrent malignant ascites, CAD s/p CABG, 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, HTN, CKD, depression/anxiety who presents c/o nausea and abdominal pain.    # Uterine adenocarcinoma s/p SHAAN/BSO w recurrence (02/2022)  # Recurrent malignant ascites  # Pelvic ascites on imaging  - CT A/P w large pelvic ascites  - pt was started on keytruda + lenvima on 3/29, however lenvima d/c'd due to poor tolerance  - pt continues on keytruda q3w - last 4/19, next 5/10  - pt with multiple admission over past few months for ascites drainage, many requiring IR intervention  - s/p para in ED prior admission 4/21 -- Cx negative. ANC<250. Unclear how much fluid removed at that time  - on bowel regimen  - now having regular bowel movements, abdominal pain improved  - Surgery team following - no surgical intervention  - palliative care c/s appreciated  - Discussed with Hem-onc - Can perform therapeutic paracentesis today; No need for pigtail yet as patient recently started on new therapy and will assess for response. Can follow up outpatient.  - IR for Paracentesis today    # Imaging findings suggestive of SBO - Resolved  - CT A/P in ED - dilated small bowel loops with suspected transition point within L anuradha-abdomen suggestive fo partial bowel obstruction  - s/p surgery c/s - suspect transition point likely 2/2 ileus from large volume ascites; CLD, advance as tolerated; no surgical intervention  - Pain improved, having regular BMs now  - continue with bowel regimen    # CAD s/p CABG  # HLD  - cont metoprolol succinate 25mg qd  - on rosuvastatin 40mg qd at home, cont as atorvastatin here    # 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  - pt developed hematuria while on AC for DVT in 2020  - IVC filter placed 10/2020, was to be re-evaluated 6 weeks after for possible removal  - IVC filter appears to remain in place on CT A/P  - outpatient f/u for eval of IVC filter removal  - Resume DOAC after Paracentesis today    # Post-chemotherapy paresthesia  - was on carboplatin  - b/l feet tingling  - cont gabapentin 100mg TID    # Depression/Anxiety  - cont home buspirone 15mg BID    # HTN  - BP: 115/67 (106/73 - 115/67)  - cont home amlodipine 5mg qd    # CKD3b  - Cr 1.4, appears at baseline 1.1-1.4    # Glaucoma  - cont lantanoprost drops each eye BID, brimonidine 0.2% drop L eye TID, dorzolamide/timolol drop L eye BID.    # PT/REHAB: PT  # ACTIVITY: IAT  # DVT PPX: SCD, Lovenox on hold for para  # GI PPX: PPI  # DIET: Soft bite sized, NPO for Para  # CODE: full  # Dispo: Home with [facility] after Paracentesis

## 2022-04-28 NOTE — PROGRESS NOTE ADULT - SUBJECTIVE AND OBJECTIVE BOX
*** Incomplete Note ****  SUBJECTIVE:   LENGTH OF HOSPITAL STAY: 3d    CHIEF COMPLAINT:  Patient is a 81y old  Female who presents with a chief complaint of abdominal pain (27 Apr 2022 16:56)      Events over the past 24 hours:      REVIEW OF SYSTEMS  Negative Except as mentioned above.    ALLERGIES:  chloroquine (Hives)  quinine (Urticaria)      MEDICATIONS:  STANDING MEDICATIONS  amLODIPine   Tablet 5 milliGRAM(s) Oral daily  atorvastatin 80 milliGRAM(s) Oral at bedtime  brimonidine 0.2% Ophthalmic Solution 1 Drop(s) Left EYE three times a day  busPIRone 15 milliGRAM(s) Oral two times a day  dorzolamide 2% Ophthalmic Solution 1 Drop(s) Left EYE <User Schedule>  gabapentin 100 milliGRAM(s) Oral three times a day  lactulose Syrup 15 Gram(s) Oral three times a day  latanoprost 0.005% Ophthalmic Solution 1 Drop(s) Both EYES at bedtime  metoprolol succinate ER 25 milliGRAM(s) Oral daily  pantoprazole    Tablet 40 milliGRAM(s) Oral before breakfast  polyethylene glycol 3350 17 Gram(s) Oral daily  senna 2 Tablet(s) Oral at bedtime  timolol 0.5% Solution 1 Drop(s) Left EYE <User Schedule>    PRN MEDICATIONS  acetaminophen     Tablet .. 650 milliGRAM(s) Oral every 6 hours PRN  aluminum hydroxide/magnesium hydroxide/simethicone Suspension 30 milliLiter(s) Oral every 4 hours PRN  bisacodyl Suppository 10 milliGRAM(s) Rectal daily PRN  melatonin 3 milliGRAM(s) Oral at bedtime PRN  ondansetron Injectable 4 milliGRAM(s) IV Push every 8 hours PRN        OBJECTIVE:  VITALS:   T(F): 98.2 (04-28 @ 00:25), Max: 98.4 (04-27 @ 15:07)  HR: 89 (04-28 @ 05:07) (88 - 97)  BP: 135/71 (04-28 @ 05:07) (112/57 - 140/79)  RR: 18 (04-28 @ 00:25) (18 - 18)  SpO2: 98% (04-27 @ 15:07) (98% - 98%)    I&O's:       PHYSICAL EXAM:      LABS:                                      Blood Glucose:        RADIOLOGY & ADDITIONAL TESTS:  IMPRESSION:    PERITONEUM/MESENTERY/BOWEL: Several dilated loops of small bowel   measuring up to 3.9 cm diameter with mild but relatively focal transition   point within the left hemiabdomen (series 6, image 258). Region of   transition from dilated to decompressed bowel is patent with note made of   enteric contrast traversing an present both immediately and remotely   distal to this, although some amount may represent retained contrast from   a preceding exam. Redemonstration of large abdominopelvic ascites. No   pneumoperitoneum. Scattered peritoneal deposits, for example serosal   deposits on the rectosigmoid colon and distal transverse colon (series 6,   image 175) as well as linear soft tissue thickening situated inferior to   the stomach and posterior to the transverse colonmeasuring up to 2.2 cm   in thickness (series 6, image 176) on the right.    Compared to 4/21/2022:    1.  Dilated smallbowel loops with suspected mild/patent transition point   within the left hemia bdomen and findings overall suggestive of partial   bowel obstruction.    2.  Otherwise stable exam, including large volume abdominopelvic ascites   and peritoneal/ serosal implants.                 SUBJECTIVE:   LENGTH OF HOSPITAL STAY: 3d    CHIEF COMPLAINT:  Patient is a 81y old  Female who presents with a chief complaint of abdominal pain (27 Apr 2022 16:56)      Events over the past 24 hours:  Patient was seen at the bedside this morning. She is lying comfortably on the bed. There were no acute events overnight.  Patient denies any chest pain, shortness of breath, cough, nausea, vomiting, dizziness.      REVIEW OF SYSTEMS  Negative Except as mentioned above.    ALLERGIES:  chloroquine (Hives)  quinine (Urticaria)      MEDICATIONS:  STANDING MEDICATIONS  amLODIPine   Tablet 5 milliGRAM(s) Oral daily  atorvastatin 80 milliGRAM(s) Oral at bedtime  brimonidine 0.2% Ophthalmic Solution 1 Drop(s) Left EYE three times a day  busPIRone 15 milliGRAM(s) Oral two times a day  dorzolamide 2% Ophthalmic Solution 1 Drop(s) Left EYE <User Schedule>  gabapentin 100 milliGRAM(s) Oral three times a day  lactulose Syrup 15 Gram(s) Oral three times a day  latanoprost 0.005% Ophthalmic Solution 1 Drop(s) Both EYES at bedtime  metoprolol succinate ER 25 milliGRAM(s) Oral daily  pantoprazole    Tablet 40 milliGRAM(s) Oral before breakfast  polyethylene glycol 3350 17 Gram(s) Oral daily  senna 2 Tablet(s) Oral at bedtime  timolol 0.5% Solution 1 Drop(s) Left EYE <User Schedule>    PRN MEDICATIONS  acetaminophen     Tablet .. 650 milliGRAM(s) Oral every 6 hours PRN  aluminum hydroxide/magnesium hydroxide/simethicone Suspension 30 milliLiter(s) Oral every 4 hours PRN  bisacodyl Suppository 10 milliGRAM(s) Rectal daily PRN  melatonin 3 milliGRAM(s) Oral at bedtime PRN  ondansetron Injectable 4 milliGRAM(s) IV Push every 8 hours PRN        OBJECTIVE:  VITALS:   T(F): 98.2 (04-28 @ 00:25), Max: 98.4 (04-27 @ 15:07)  HR: 89 (04-28 @ 05:07) (88 - 97)  BP: 135/71 (04-28 @ 05:07) (112/57 - 140/79)  RR: 18 (04-28 @ 00:25) (18 - 18)  SpO2: 98% (04-27 @ 15:07) (98% - 98%)    I&O's:       PHYSICAL EXAM:  General: No acute distress; Pallor (-), Icterus (-), Cyanosis (-), Clubbing (-)  HEENT: Normocephalic, atraumatic, PERRLA, EOMI  PULM: Bilaterally equal and clear breath sounds, wheeze (-), rubs (-), crackles (-)  CVS: Normal S1 and S2, murmurs (-), rubs (-), gallops (-)   GI: Soft, Distended, nontender, BS +; Fluid thrill +; Shifting dullness +  MSK: Edema (-), no muscle, bone or joint tenderness noted  SKIN: Warm and well perfused  NEURO:  Alert and Oriented x 3; No gross focal neurological deficit noted    (04-28 @ 06:04)                      12.2  5.34 )-----------( 313                 36.8    Neutrophils = 2.68 (50.1%)  Lymphocytes = 1.53 (28.7%)  Eosinophils = 0.04 (0.7%)  Basophils = 0.01 (0.2%)  Monocytes = 1.06 (19.9%)  Bands = --%    04-28    133<L>  |  93<L>  |  19  ----------------------------<  81  4.6   |  18  |  1.3    Ca    8.6      28 Apr 2022 06:04  Mg     2.2     04-28    TPro  6.0  /  Alb  3.0<L>  /  TBili  0.4  /  DBili  x   /  AST  31  /  ALT  13  /  AlkPhos  99  04-28    Magnesium, Serum: 2.2 mg/dL (04-28-22 @ 06:04)    ( 28 Apr 2022 06:04 )   PT: 11.20 sec;   INR: 0.97 ratio;    Blood Glucose:        RADIOLOGY & ADDITIONAL TESTS:  IMPRESSION:    PERITONEUM/MESENTERY/BOWEL: Several dilated loops of small bowel   measuring up to 3.9 cm diameter with mild but relatively focal transition   point within the left hemiabdomen (series 6, image 258). Region of   transition from dilated to decompressed bowel is patent with note made of   enteric contrast traversing an present both immediately and remotely   distal to this, although some amount may represent retained contrast from   a preceding exam. Redemonstration of large abdominopelvic ascites. No   pneumoperitoneum. Scattered peritoneal deposits, for example serosal   deposits on the rectosigmoid colon and distal transverse colon (series 6,   image 175) as well as linear soft tissue thickening situated inferior to   the stomach and posterior to the transverse colonmeasuring up to 2.2 cm   in thickness (series 6, image 176) on the right.    Compared to 4/21/2022:    1.  Dilated smallbowel loops with suspected mild/patent transition point   within the left hemia bdomen and findings overall suggestive of partial   bowel obstruction.    2.  Otherwise stable exam, including large volume abdominopelvic ascites   and peritoneal/ serosal implants.

## 2022-04-28 NOTE — PROGRESS NOTE ADULT - SUBJECTIVE AND OBJECTIVE BOX
Patient is a 81y old  Female who presents with a chief complaint of abdominal pain (27 Apr 2022 14:02)    Patient was seen and examined.  no new events    PAST MEDICAL & SURGICAL HISTORY:  Uterine cancer, s/p SHAAN and chemo  Hypertension  Cataract of right eye  Glaucoma  Coronary artery disease, s/p CABG X2  HLD (hyperlipidemia)    H/O total hysterectomy  History of coronary artery bypass surgery    Allergies  chloroquine (Hives)  quinine (Urticaria)    MEDICATIONS  (STANDING):  atorvastatin 80 milliGRAM(s) Oral at bedtime  bisacodyl 5 milliGRAM(s) Oral at bedtime  brimonidine 0.2% Ophthalmic Solution 1 Drop(s) Left EYE three times a day  busPIRone 15 milliGRAM(s) Oral two times a day  dorzolamide 2% Ophthalmic Solution 1 Drop(s) Left EYE <User Schedule>  gabapentin 100 milliGRAM(s) Oral three times a day  lactulose Syrup 15 Gram(s) Oral three times a day  latanoprost 0.005% Ophthalmic Solution 1 Drop(s) Both EYES at bedtime  metoprolol succinate ER 25 milliGRAM(s) Oral daily  pantoprazole    Tablet 40 milliGRAM(s) Oral before breakfast  polyethylene glycol 3350 17 Gram(s) Oral daily  rivaroxaban 20 milliGRAM(s) Oral with dinner  senna 2 Tablet(s) Oral at bedtime  timolol 0.5% Solution 1 Drop(s) Left EYE <User Schedule>    MEDICATIONS  (PRN):  acetaminophen     Tablet .. 650 milliGRAM(s) Oral every 6 hours PRN Temp greater or equal to 38C (100.4F), Mild Pain (1 - 3)  aluminum hydroxide/magnesium hydroxide/simethicone Suspension 30 milliLiter(s) Oral every 4 hours PRN Dyspepsia  bisacodyl Suppository 10 milliGRAM(s) Rectal daily PRN Constipation  melatonin 3 milliGRAM(s) Oral at bedtime PRN Insomnia  ondansetron Injectable 4 milliGRAM(s) IV Push every 8 hours PRN Nausea and/or Vomiting  oxyCODONE    IR 5 milliGRAM(s) Oral every 6 hours PRN Severe Pain (7 - 10)    T(F): 98.2 (04-28 @ 00:25), Max: 98.4 (04-27 @ 15:07)  HR: 89 (04-28 @ 05:07) (88 - 97)  BP: 135/71 (04-28 @ 05:07) (112/57 - 140/79)  RR: 18 (04-28 @ 00:25) (18 - 18)  SpO2: 98% (04-27 @ 15:07) (98% - 98%)    O/E:  Awake, alert, not in distress.  HEENT: atraumatic, EOMI.  Chest: decreased breath sounds at bases  CVS: SIS2 +, no murmur.  P/A: soft,  BS+, free fluid+  CNS: awake, alert  Ext: no edema feet.  Skin: no rash, no ulcers.  All systems reviewed positive findings as above.                                    12.2   5.34  )-----------( 313      ( 28 Apr 2022 06:04 )             36.8<L>  04-28    133<L>  |  93<L>  |  19  ----------------------------<  81  4.6   |  18  |  1.3    Ca    8.6      28 Apr 2022 06:04  Mg     2.2     04-28    TPro  6.0  /  Alb  3.0<L>  /  TBili  0.4  /  DBili  x   /  AST  31  /  ALT  13  /  AlkPhos  99  04-28

## 2022-04-28 NOTE — CHART NOTE - NSCHARTNOTESELECT_GEN_ALL_CORE
Palliative Care SW Initial Assessment/Event Note
Palliative Care SW Note/Event Note
Palliative Care SW Note/Event Note

## 2022-04-29 NOTE — PROGRESS NOTE ADULT - TIME BILLING
Direct patient care. Discussed on rounds with Housestaff
Direct patient care. Discussed on rounds with Housestaff
Direct patient care . Discussed on rounds with Housestaff

## 2022-04-29 NOTE — PROGRESS NOTE ADULT - SUBJECTIVE AND OBJECTIVE BOX
SUBJECTIVE:   LENGTH OF HOSPITAL STAY: 4d    CHIEF COMPLAINT:  Patient is a 81y old  Female who presents with a chief complaint of abdominal pain (28 Apr 2022 12:44)      Events over the past 24 hours:  Patient was seen at the bedside this morning. She is lying comfortably on the bed. There were no acute events overnight.  Patient denies any chest pain, shortness of breath, cough, nausea, vomiting, dizziness.   Later in the morning patient c/o increased abdominal pain and no bowel movement today.       REVIEW OF SYSTEMS  Negative Except as mentioned above.    ALLERGIES:  chloroquine (Hives)  quinine (Urticaria)      MEDICATIONS:  STANDING MEDICATIONS  atorvastatin 80 milliGRAM(s) Oral at bedtime  brimonidine 0.2% Ophthalmic Solution 1 Drop(s) Left EYE three times a day  busPIRone 15 milliGRAM(s) Oral two times a day  dorzolamide 2% Ophthalmic Solution 1 Drop(s) Left EYE <User Schedule>  gabapentin 100 milliGRAM(s) Oral three times a day  ketorolac   Injectable 15 milliGRAM(s) IV Push once  lactulose Syrup 15 Gram(s) Oral three times a day  latanoprost 0.005% Ophthalmic Solution 1 Drop(s) Both EYES at bedtime  metoprolol succinate ER 25 milliGRAM(s) Oral daily  mineral oil enema 133 milliLiter(s) Rectal once  pantoprazole    Tablet 40 milliGRAM(s) Oral before breakfast  polyethylene glycol 3350 17 Gram(s) Oral daily  senna 2 Tablet(s) Oral at bedtime  timolol 0.5% Solution 1 Drop(s) Left EYE <User Schedule>    PRN MEDICATIONS  acetaminophen     Tablet .. 650 milliGRAM(s) Oral every 6 hours PRN  aluminum hydroxide/magnesium hydroxide/simethicone Suspension 30 milliLiter(s) Oral every 4 hours PRN  bisacodyl Suppository 10 milliGRAM(s) Rectal daily PRN  melatonin 3 milliGRAM(s) Oral at bedtime PRN  ondansetron Injectable 4 milliGRAM(s) IV Push every 8 hours PRN        OBJECTIVE:  VITALS:   T(F): 95.6 (04-29 @ 08:25), Max: 96.8 (04-29 @ 00:30)  HR: 81 (04-29 @ 10:07) (74 - 81)  BP: 112/57 (04-29 @ 10:07) (92/54 - 112/57)  RR: 19 (04-29 @ 10:07) (18 - 19)  SpO2: --    I&O's:       PHYSICAL EXAM:  General: No acute distress; Pallor (-), Icterus (-), Cyanosis (-), Clubbing (-)  HEENT: Normocephalic, atraumatic, PERRLA, EOMI  PULM: Bilaterally equal and clear breath sounds, wheeze (-), rubs (-), crackles (-)  CVS: Normal S1 and S2, murmurs (-), rubs (-), gallops (-)   GI: Soft,  Tenderness + on the Rt lower quadrant, BS +  MSK: Edema (-), no muscle, bone or joint tenderness noted  SKIN: Warm and well perfused,  NEURO:  Alert and Oriented x 3; No gross focal neurological deficit noted      LABS:                        12.2   5.34  )-----------( 313      ( 28 Apr 2022 06:04 )             36.8             04-28    133<L>  |  93<L>  |  19  ----------------------------<  81  4.6   |  18  |  1.3    Ca    8.6      28 Apr 2022 06:04  Mg     2.2     04-28    TPro  6.0  /  Alb  3.0<L>  /  TBili  0.4  /  DBili  x   /  AST  31  /  ALT  13  /  AlkPhos  99  04-28    LIVER FUNCTIONS - ( 28 Apr 2022 06:04 )  Alb: 3.0 g/dL / Pro: 6.0 g/dL / ALK PHOS: 99 U/L / ALT: 13 U/L / AST: 31 U/L / GGT: x                 PT/INR - ( 28 Apr 2022 06:04 )   PT: 11.20 sec;   INR: 0.97 ratio         PTT - ( 28 Apr 2022 06:04 )  PTT:30.5 sec            Blood Glucose:        RADIOLOGY & ADDITIONAL TESTS:

## 2022-04-29 NOTE — PROGRESS NOTE ADULT - SUBJECTIVE AND OBJECTIVE BOX
Patient is a 81y old  Female who presents with a chief complaint of abdominal pain (27 Apr 2022 14:02)    Patient was seen and examined.  C/o abd pain  C/o constipation    PAST MEDICAL & SURGICAL HISTORY:  Uterine cancer, s/p SHAAN and chemo  Hypertension  Cataract of right eye  Glaucoma  Coronary artery disease, s/p CABG X2  HLD (hyperlipidemia)    H/O total hysterectomy  History of coronary artery bypass surgery    Allergies  chloroquine (Hives)  quinine (Urticaria)    MEDICATIONS  (STANDING):  atorvastatin 80 milliGRAM(s) Oral at bedtime  bisacodyl 5 milliGRAM(s) Oral at bedtime  brimonidine 0.2% Ophthalmic Solution 1 Drop(s) Left EYE three times a day  busPIRone 15 milliGRAM(s) Oral two times a day  dorzolamide 2% Ophthalmic Solution 1 Drop(s) Left EYE <User Schedule>  gabapentin 100 milliGRAM(s) Oral three times a day  lactulose Syrup 15 Gram(s) Oral three times a day  latanoprost 0.005% Ophthalmic Solution 1 Drop(s) Both EYES at bedtime  metoprolol succinate ER 25 milliGRAM(s) Oral daily  pantoprazole    Tablet 40 milliGRAM(s) Oral before breakfast  polyethylene glycol 3350 17 Gram(s) Oral daily  rivaroxaban 20 milliGRAM(s) Oral with dinner  senna 2 Tablet(s) Oral at bedtime  timolol 0.5% Solution 1 Drop(s) Left EYE <User Schedule>    MEDICATIONS  (PRN):  acetaminophen     Tablet .. 650 milliGRAM(s) Oral every 6 hours PRN Temp greater or equal to 38C (100.4F), Mild Pain (1 - 3)  aluminum hydroxide/magnesium hydroxide/simethicone Suspension 30 milliLiter(s) Oral every 4 hours PRN Dyspepsia  bisacodyl Suppository 10 milliGRAM(s) Rectal daily PRN Constipation  melatonin 3 milliGRAM(s) Oral at bedtime PRN Insomnia  ondansetron Injectable 4 milliGRAM(s) IV Push every 8 hours PRN Nausea and/or Vomiting  oxyCODONE    IR 5 milliGRAM(s) Oral every 6 hours PRN Severe Pain (7 - 10)    T(C): 35.9 (04-29-22 @ 16:03), Max: 36 (04-29-22 @ 00:30)  HR: 94 (04-29-22 @ 16:03) (76 - 94)  BP: 113/64 (04-29-22 @ 16:03) (92/54 - 113/64)  RR: 19 (04-29-22 @ 16:03) (18 - 19)  SpO2: --    O/E:  Awake, alert, not in distress.  HEENT: atraumatic, EOMI.  Chest: decreased breath sounds at bases  CVS: SIS2 +, no murmur.  P/A: soft,  BS+, free fluid+  CNS: awake, alert  Ext: no edema feet.  Skin: no rash, no ulcers.  All systems reviewed positive findings as above.                                    12.2   5.34  )-----------( 313      ( 28 Apr 2022 06:04 )             36.8<L>  04-28    133<L>  |  93<L>  |  19  ----------------------------<  81  4.6   |  18  |  1.3    Ca    8.6      28 Apr 2022 06:04  Mg     2.2     04-28    TPro  6.0  /  Alb  3.0<L>  /  TBili  0.4  /  DBili  x   /  AST  31  /  ALT  13  /  AlkPhos  99  04-28

## 2022-04-29 NOTE — PROGRESS NOTE ADULT - ASSESSMENT
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    # Uterine adenocarcinoma s/p SHAAN/BSO w recurrence (02/2022)  # Recurrent malignant ascites  # Pelvic ascites on imaging  - CT A/P w large pelvic ascites  - pt was started on keytruda + lenvima on 3/29, however lenvima d/c'd due to poor tolerance  - pt continues on keytruda q3w - last 4/19, next 5/10  - pt with multiple admission over past few months for ascites drainage, many requiring IR intervention  - s/p para in ED prior admission 4/21 -- Cx negative. ANC<250. Unclear how much fluid removed at that time  - on bowel regimen  - now having regular bowel movements, abdominal pain improved  - Surgery team following - no surgical intervention  - palliative care c/s appreciated  - s/p Therapeutic paracentesis with removal of 3.4L fluid on 04/28    # Imaging findings suggestive of SBO - Worsening abdominal pain today  - CT A/P in ED - dilated small bowel loops with suspected transition point within L anuradha-abdomen suggestive fo partial bowel obstruction  - s/p surgery c/s - suspect transition point likely 2/2 ileus from large volume ascites; CLD, advance as tolerated; no surgical intervention  - Repeat KUB (04/29) - persistent Small bowel distention +  - continue with bowel regimen  - Fleet enema x 1   - Pain control    # CAD s/p CABG  # HLD  - cont metoprolol succinate 25mg qd  - on rosuvastatin 40mg qd at home, cont as atorvastatin here    # 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  - pt developed hematuria while on AC for DVT in 2020  - IVC filter placed 10/2020, was to be re-evaluated 6 weeks after for possible removal  - IVC filter appears to remain in place on CT A/P  - outpatient f/u for eval of IVC filter removal  - Resume DOAC after Paracentesis today    # Post-chemotherapy paresthesia  - was on carboplatin  - b/l feet tingling  - cont gabapentin 100mg TID    # Depression/Anxiety  - cont home buspirone 15mg BID    # HTN  - BP: 115/67 (106/73 - 115/67)  - cont home amlodipine 5mg qd    # CKD3b  - Cr 1.4, appears at baseline 1.1-1.4    # Glaucoma  - cont lantanoprost drops each eye BID, brimonidine 0.2% drop L eye TID, dorzolamide/timolol drop L eye BID.    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    # Uterine adenocarcinoma s/p SHAAN/BSO w recurrence (02/2022)  # Recurrent malignant ascites  # Pelvic ascites on imaging  - CT A/P w large pelvic ascites  - pt was started on keytruda + lenvima on 3/29, however lenvima d/c'd due to poor tolerance  - pt continues on keytruda q3w - last 4/19, next 5/10  - pt with multiple admission over past few months for ascites drainage, many requiring IR intervention  - s/p para in ED prior admission 4/21 -- Cx negative. ANC<250. Unclear how much fluid removed at that time  - on bowel regimen  - now having regular bowel movements, abdominal pain improved  - Surgery team following - no surgical intervention  - palliative care c/s appreciated  - s/p Therapeutic paracentesis with removal of 3.4L fluid on 04/28    # Imaging findings suggestive of SBO - Worsening abdominal pain today  - CT A/P in ED - dilated small bowel loops with suspected transition point within L anuradha-abdomen suggestive fo partial bowel obstruction  - s/p surgery c/s - suspect transition point likely 2/2 ileus from large volume ascites; CLD, advance as tolerated; no surgical intervention  - Repeat KUB (04/29) - persistent Small bowel distention +  - continue with bowel regimen  - Fleet enema x 1   - Pain control    # CAD s/p CABG  # HLD  - cont metoprolol succinate 25mg qd  - on rosuvastatin 40mg qd at home, cont as atorvastatin here    # 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  - pt developed hematuria while on AC for DVT in 2020  - IVC filter placed 10/2020, was to be re-evaluated 6 weeks after for possible removal  - IVC filter appears to remain in place on CT A/P  - outpatient f/u for eval of IVC filter removal  - Resume DOAC     # Post-chemotherapy paresthesia  - was on carboplatin  - b/l feet tingling  - cont gabapentin 100mg TID    # Depression/Anxiety  - cont home buspirone 15mg BID    # HTN  - BP: 115/67 (106/73 - 115/67)  - cont home amlodipine 5mg qd    # CKD3b  - Cr 1.4, appears at baseline 1.1-1.4    # Glaucoma  - cont lantanoprost drops each eye BID, brimonidine 0.2% drop L eye TID, dorzolamide/timolol drop L eye BID.    # PT/REHAB: PT  # ACTIVITY: IAT  # DVT PPX: Lovenox  # GI PPX: PPI  # DIET: Soft bite sized,   # CODE: full  # Dispo: Home with 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    # Uterine adenocarcinoma s/p SHAAN/BSO w recurrence (02/2022)  # Recurrent malignant ascites  # Pelvic ascites on imaging  - CT A/P w large pelvic ascites  - pt was started on keytruda + lenvima on 3/29, however lenvima d/c'd due to poor tolerance  - pt continues on keytruda q3w - last 4/19, next 5/10  - pt with multiple admission over past few months for ascites drainage, many requiring IR intervention  - s/p para in ED prior admission 4/21 -- Cx negative. ANC<250. Unclear how much fluid removed at that time  - on bowel regimen  - now having regular bowel movements, abdominal pain improved  - Surgery team following - no surgical intervention  - palliative care c/s appreciated  - s/p Therapeutic paracentesis with removal of 3.4L fluid on 04/28    # Imaging findings suggestive of SBO - Worsening abdominal pain today  - CT A/P in ED - dilated small bowel loops with suspected transition point within L anuradha-abdomen suggestive fo partial bowel obstruction  - s/p surgery c/s - suspect transition point likely 2/2 ileus from large volume ascites; CLD, advance as tolerated; no surgical intervention  - Repeat KUB (04/29) - persistent Small bowel distention +  - continue with bowel regimen  - Fleet enema x 1   - Pain control    # CAD s/p CABG  # HLD  - cont metoprolol succinate 25mg qd  - on rosuvastatin 40mg qd at home, cont as atorvastatin here    # 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  - pt developed hematuria while on AC for DVT in 2020  - IVC filter placed 10/2020, was to be re-evaluated 6 weeks after for possible removal  - IVC filter appears to remain in place on CT A/P  - outpatient f/u for eval of IVC filter removal  - Resume DOAC     # Post-chemotherapy paresthesia  - was on carboplatin  - b/l feet tingling  - cont gabapentin 100mg TID    # Depression/Anxiety  - cont home buspirone 15mg BID    # HTN  - BP: 115/67 (106/73 - 115/67)  - cont home amlodipine 5mg qd    # CKD3b  - Cr 1.4, appears at baseline 1.1-1.4    # Glaucoma  - cont lantanoprost drops each eye BID, brimonidine 0.2% drop L eye TID, dorzolamide/timolol drop L eye BID.    # PT/REHAB: PT  # ACTIVITY: IAT  # DVT PPX: on xarelto  # GI PPX: PPI  # DIET: Soft bite sized,   # CODE: full  # Dispo: Home with [facility]

## 2022-04-29 NOTE — PROGRESS NOTE ADULT - ASSESSMENT
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    # Uterine adenocarcinoma s/p SHAAN/BSO with  recurrence 02/2022  # Recurrent malignant ascites  - CT Abdomen and Pelvis w/ Oral Cont and w/ IV Cont (04.25.22 @ 05:08) >Dilated smallbowel loops with suspected mild/patent transition point within the left hemiabdomen and findings overall suggestive of partial bowel obstruction.Otherwise stable exam, including large volume abdominopelvic ascites and peritoneal/serosalimplants.  - pt on keytruda q 3w - last received on  4/19, next due on 5/10  - S/p paracentesis3.4L of rian color fluid removed  - As per discussion with onc - No need for pigtail for recurrence . Patient has been  recently started on new therapy and will need to assess for response.    # Constipation  - Xray Kidney Ureter Bladder (04.29.22 @ 11:48) >Nonspecific colonic distention.. Previously seen small bowel dilatation   is not well evaluated on this radiograph.  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

## 2022-04-30 NOTE — PROGRESS NOTE ADULT - SUBJECTIVE AND OBJECTIVE BOX
SUBJECTIVE:   LENGTH OF HOSPITAL STAY: 5d    CHIEF COMPLAINT:  Patient is a 81y old  Female who presents with a chief complaint of abdominal pain (29 Apr 2022 16:28)      Events over the past 24 hours:        REVIEW OF SYSTEMS  Negative Except as mentioned above.    ALLERGIES:  chloroquine (Hives)  quinine (Urticaria)      MEDICATIONS:  STANDING MEDICATIONS  atorvastatin 80 milliGRAM(s) Oral at bedtime  bisacodyl 5 milliGRAM(s) Oral at bedtime  brimonidine 0.2% Ophthalmic Solution 1 Drop(s) Left EYE three times a day  busPIRone 15 milliGRAM(s) Oral two times a day  dorzolamide 2% Ophthalmic Solution 1 Drop(s) Left EYE <User Schedule>  gabapentin 100 milliGRAM(s) Oral three times a day  lactulose Syrup 15 Gram(s) Oral three times a day  latanoprost 0.005% Ophthalmic Solution 1 Drop(s) Both EYES at bedtime  metoprolol succinate ER 25 milliGRAM(s) Oral daily  pantoprazole    Tablet 40 milliGRAM(s) Oral before breakfast  polyethylene glycol 3350 17 Gram(s) Oral daily  rivaroxaban 20 milliGRAM(s) Oral with dinner  senna 2 Tablet(s) Oral at bedtime  timolol 0.5% Solution 1 Drop(s) Left EYE <User Schedule>    PRN MEDICATIONS  acetaminophen     Tablet .. 650 milliGRAM(s) Oral every 6 hours PRN  aluminum hydroxide/magnesium hydroxide/simethicone Suspension 30 milliLiter(s) Oral every 4 hours PRN  bisacodyl Suppository 10 milliGRAM(s) Rectal daily PRN  melatonin 3 milliGRAM(s) Oral at bedtime PRN  ondansetron Injectable 4 milliGRAM(s) IV Push every 8 hours PRN  oxyCODONE    IR 5 milliGRAM(s) Oral every 6 hours PRN        OBJECTIVE:  VITALS:   T(F): 98.3 (04-30 @ 00:00), Max: 98.3 (04-30 @ 00:00)  HR: 87 (04-30 @ 00:00) (79 - 94)  BP: 95/50 (04-30 @ 00:00) (92/54 - 113/64)  RR: 18 (04-30 @ 00:00) (18 - 19)  SpO2: --    I&O's:       PHYSICAL EXAM:      LABS:                                        RADIOLOGY & ADDITIONAL TESTS:  < from: Xray Kidney Ureter Bladder (04.29.22 @ 11:48) >  Findings/  impression:    Nonspecific colonic distention.. Previously seen small bowel dilatation   is not well evaluated on this radiograph.    IVC filter. Degenerative changes. Surgical clips overlying the pelvis.   Buttock calcifications.    < end of copied text >  < from: CT Abdomen and Pelvis w/ Oral Cont and w/ IV Cont (04.25.22 @ 05:08) >  PERITONEUM/MESENTERY/BOWEL: Several dilated loops of small bowel   measuring up to 3.9 cm diameter with mild but relatively focal transition   point within the left hemiabdomen (series 6, image 258). Region of   transition from dilated to decompressed bowel is patent with note made of   enteric contrast traversing an present both immediately and remotely   distal to this, although some amount may represent retained contrast from   a preceding exam. Redemonstration of large abdominopelvic ascites. No   pneumoperitoneum. Scattered peritoneal deposits, for example serosal   deposits on the rectosigmoid colon and distal transverse colon (series 6,   image 175) as well as linear soft tissue thickening situated inferior to   the stomach and posterior to the transverse colonmeasuring up to 2.2 cm   in thickness (series 6, image 176) on the right.    BONES/SOFT TISSUES: Sternotomy. Anterolisthesis of L4 on L5..    OTHER: Calcific atherosclerosis. IVC filter.      IMPRESSION:    Compared to 4/21/2022:    1.  Dilated smallbowel loops with suspected mild/patent transition point   within the left hemiabdomen and findings overall suggestive of partial   bowel obstruction.    2.  Otherwise stable exam, including large volume abdominopelvic ascites   and peritoneal/serosalimplants.    < end of copied text >     SUBJECTIVE:   LENGTH OF HOSPITAL STAY: 5d    CHIEF COMPLAINT:  Patient is a 81y old  Female who presents with a chief complaint of abdominal pain (29 Apr 2022 16:28)      Events over the past 24 hours:  Patient was seen at the bedside this morning. She is lying comfortably on the bed. There were no acute events overnight. Patient denies any chest pain, shortness of breath, cough, nausea, vomiting, dizziness.  She had a large bowel movement yesterday and now feels much better, the abdominal pain has resolved.         REVIEW OF SYSTEMS  Negative Except as mentioned above.    ALLERGIES:  chloroquine (Hives)  quinine (Urticaria)      MEDICATIONS:  STANDING MEDICATIONS  atorvastatin 80 milliGRAM(s) Oral at bedtime  bisacodyl 5 milliGRAM(s) Oral at bedtime  brimonidine 0.2% Ophthalmic Solution 1 Drop(s) Left EYE three times a day  busPIRone 15 milliGRAM(s) Oral two times a day  dorzolamide 2% Ophthalmic Solution 1 Drop(s) Left EYE <User Schedule>  gabapentin 100 milliGRAM(s) Oral three times a day  lactulose Syrup 15 Gram(s) Oral three times a day  latanoprost 0.005% Ophthalmic Solution 1 Drop(s) Both EYES at bedtime  metoprolol succinate ER 25 milliGRAM(s) Oral daily  pantoprazole    Tablet 40 milliGRAM(s) Oral before breakfast  polyethylene glycol 3350 17 Gram(s) Oral daily  rivaroxaban 20 milliGRAM(s) Oral with dinner  senna 2 Tablet(s) Oral at bedtime  timolol 0.5% Solution 1 Drop(s) Left EYE <User Schedule>    PRN MEDICATIONS  acetaminophen     Tablet .. 650 milliGRAM(s) Oral every 6 hours PRN  aluminum hydroxide/magnesium hydroxide/simethicone Suspension 30 milliLiter(s) Oral every 4 hours PRN  bisacodyl Suppository 10 milliGRAM(s) Rectal daily PRN  melatonin 3 milliGRAM(s) Oral at bedtime PRN  ondansetron Injectable 4 milliGRAM(s) IV Push every 8 hours PRN  oxyCODONE    IR 5 milliGRAM(s) Oral every 6 hours PRN        OBJECTIVE:  VITALS:   T(F): 98.3 (04-30 @ 00:00), Max: 98.3 (04-30 @ 00:00)  HR: 87 (04-30 @ 00:00) (79 - 94)  BP: 95/50 (04-30 @ 00:00) (92/54 - 113/64)  RR: 18 (04-30 @ 00:00) (18 - 19)  SpO2: --    I&O's:       PHYSICAL EXAM:  General: No acute distress; Pallor (-), Icterus (-), Cyanosis (-), Clubbing (-)  HEENT: Normocephalic, atraumatic, PERRLA, EOMI  PULM: Bilaterally equal and clear breath sounds, wheeze (-), rubs (-), crackles (-)  CVS: Normal S1 and S2, murmurs (-), rubs (-), gallops (-)   GI: Soft, Nontender, BS +  MSK: Edema (-), no muscle, bone or joint tenderness noted  SKIN: Warm and well perfused,  NEURO:  Alert and Oriented x 3; No gross focal neurological deficit noted    LABS:                  RADIOLOGY & ADDITIONAL TESTS:  < from: Xray Kidney Ureter Bladder (04.29.22 @ 11:48) >  Findings/  impression:    Nonspecific colonic distention.. Previously seen small bowel dilatation   is not well evaluated on this radiograph.    IVC filter. Degenerative changes. Surgical clips overlying the pelvis.   Buttock calcifications.    < end of copied text >  < from: CT Abdomen and Pelvis w/ Oral Cont and w/ IV Cont (04.25.22 @ 05:08) >  PERITONEUM/MESENTERY/BOWEL: Several dilated loops of small bowel   measuring up to 3.9 cm diameter with mild but relatively focal transition   point within the left hemiabdomen (series 6, image 258). Region of   transition from dilated to decompressed bowel is patent with note made of   enteric contrast traversing an present both immediately and remotely   distal to this, although some amount may represent retained contrast from   a preceding exam. Redemonstration of large abdominopelvic ascites. No   pneumoperitoneum. Scattered peritoneal deposits, for example serosal   deposits on the rectosigmoid colon and distal transverse colon (series 6,   image 175) as well as linear soft tissue thickening situated inferior to   the stomach and posterior to the transverse colonmeasuring up to 2.2 cm   in thickness (series 6, image 176) on the right.    BONES/SOFT TISSUES: Sternotomy. Anterolisthesis of L4 on L5..    OTHER: Calcific atherosclerosis. IVC filter.      IMPRESSION:    Compared to 4/21/2022:    1.  Dilated smallbowel loops with suspected mild/patent transition point   within the left hemiabdomen and findings overall suggestive of partial   bowel obstruction.    2.  Otherwise stable exam, including large volume abdominopelvic ascites   and peritoneal/serosalimplants.    < end of copied text >

## 2022-04-30 NOTE — PROGRESS NOTE ADULT - ASSESSMENT
80 yo F PMH uterine adenocarcinoma s/p SHAAN w recurrence in 2022, recurrent malignant ascites, CAD s/p CABG, 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, HTN, CKD, depression/anxiety who presents c/o nausea and abdominal pain.    # Uterine adenocarcinoma s/p SHAAN/BSO w recurrence (02/2022)  # Recurrent malignant ascites  # Pelvic ascites on imaging  - CT A/P (04/25/22) -  large abdomino-pelvic ascites  - pt was started on keytruda + lenvima on 3/29, however lenvima d/c'd due to poor tolerance  - pt continues on keytruda q3w - last 4/19, next 5/10  - pt with multiple admission over past few months for ascites drainage, many requiring IR intervention  - s/p para in ED prior admission 4/21 -- Cx negative. ANC<250. Unclear how much fluid removed at that time  - on bowel regimen  - now having regular bowel movements, abdominal pain improved  - Surgery team following - no surgical intervention  - palliative care c/s appreciated  - s/p Therapeutic paracentesis with removal of 3.4L fluid on 04/28    # Imaging findings suggestive of SBO -   - CT A/P in ED - dilated small bowel loops with suspected transition point within L anuradha-abdomen suggestive fo partial bowel obstruction  - s/p surgery c/s - suspect transition point likely 2/2 ileus from large volume ascites; CLD, advance as tolerated; no surgical intervention  - Repeat KUB (04/29) - persistent Small bowel distention +  - continue with bowel regimen, Enema  - Pain control    # CAD s/p CABG  # HLD  - cont metoprolol succinate 25mg qd  - on rosuvastatin 40mg qd at home, cont as atorvastatin here    # 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  - pt developed hematuria while on AC for DVT in 2020  - IVC filter placed 10/2020, was to be re-evaluated 6 weeks after for possible removal  - IVC filter appears to remain in place on CT A/P  - outpatient f/u for eval of IVC filter removal  - Resumed Xarelto    # Post-chemotherapy paresthesia  - was on carboplatin  - b/l feet tingling  - cont gabapentin 100mg TID    # Depression/Anxiety  - cont home buspirone 15mg BID    # HTN  - BP: 115/67 (106/73 - 115/67)  - cont home amlodipine 5mg qd    # CKD3b  - Cr 1.4, appears at baseline 1.1-1.4    # Glaucoma  - cont lantanoprost drops each eye BID, brimonidine 0.2% drop L eye TID, dorzolamide/timolol drop L eye BID.    # PT/REHAB: PT  # ACTIVITY: IAT  # DVT PPX: on xarelto  # GI PPX: PPI  # DIET: Soft bite sized,   # CODE: full  # Dispo: Home with [facility]    80 yo F PMH uterine adenocarcinoma s/p SHAAN w recurrence in 2022, recurrent malignant ascites, CAD s/p CABG, 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, HTN, CKD, depression/anxiety who presents c/o nausea and abdominal pain.    # Uterine adenocarcinoma s/p SHAAN/BSO w recurrence (02/2022)  # Recurrent malignant ascites  # Pelvic ascites on imaging  - CT A/P (04/25/22) -  large abdomino-pelvic ascites  - pt was started on keytruda + lenvima on 3/29, however lenvima d/c'd due to poor tolerance  - pt continues on keytruda q3w - last 4/19, next 5/10  - pt with multiple admission over past few months for ascites drainage, many requiring IR intervention  - s/p para in ED prior admission 4/21 -- Cx negative. ANC<250. Unclear how much fluid removed at that time  - on bowel regimen  - now having regular bowel movements, abdominal pain improved  - Surgery team following - no surgical intervention  - palliative care c/s appreciated  - s/p Therapeutic paracentesis with removal of 3.4L fluid on 04/28    # Imaging findings suggestive of SBO  # Abdominal pain/constipation - Resolved  - CT A/P in ED - dilated small bowel loops with suspected transition point within L anuradha-abdomen suggestive fo partial bowel obstruction  - s/p surgery c/s - suspect transition point likely 2/2 ileus from large volume ascites; CLD, advance as tolerated; no surgical intervention  - continue with bowel regimen,  - Pain control    # CAD s/p CABG  # HLD  - cont metoprolol succinate 25mg qd  - on rosuvastatin 40mg qd at home, cont as atorvastatin here    # 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  - pt developed hematuria while on AC for DVT in 2020  - IVC filter placed 10/2020, was to be re-evaluated 6 weeks after for possible removal  - IVC filter appears to remain in place on CT A/P  - outpatient f/u for eval of IVC filter removal  - Resumed Xarelto    # Post-chemotherapy paresthesia  - was on carboplatin  - b/l feet tingling  - cont gabapentin 100mg TID    # Depression/Anxiety  - cont home buspirone 15mg BID    # HTN  - BP: 115/67 (106/73 - 115/67)  - cont home amlodipine 5mg qd    # CKD3b  - Cr 1.4, appears at baseline 1.1-1.4    # Glaucoma  - cont lantanoprost drops each eye BID, brimonidine 0.2% drop L eye TID, dorzolamide/timolol drop L eye BID.    # PT/REHAB: PT  # ACTIVITY: IAT  # DVT PPX: on xarelto  # GI PPX: PPI  # DIET: Soft bite sized,   # CODE: full  # Dispo: Home with [facility]

## 2022-04-30 NOTE — PROGRESS NOTE ADULT - PROVIDER SPECIALTY LIST ADULT
Surgery
Internal Medicine
Surgery
Hospitalist
Internal Medicine
Intervent Radiology
Hospitalist
Hospitalist

## 2022-05-01 NOTE — ED PROVIDER NOTE - OBJECTIVE STATEMENT
81-year-old female history of hypertension and CAD PE with IVC filter on Xarelto uterine adenocarcinoma status post SAHAN with recurrent ascites on Keytruda recent admission for partial small bowel obstruction presenting for evaluation of diffuse abdominal pain with associated nausea and vomiting.  No fevers or chills.  States that since discharge pain has gotten progressively worse.  Also complaining of constipation, not currently passing gas.  Symptoms gradual onset, moderate, no exacerbating or alleviating factors

## 2022-05-01 NOTE — CONSULT NOTE ADULT - SUBJECTIVE AND OBJECTIVE BOX
GENERAL SURGERY CONSULT NOTE    Patient: BEKAH NELSON , 81y ([birth year])Female   MRN: [id number]  Location: [facility] ED  Visit: 04-30-22 Emergency  Date: 05-01-22 @ 04:29    HPI: [id number] PMH recurrent uterine adenocarcinoma s/p SHAAN/BSO (2016), recurrent malignant ascites, CAD s/p CABG, HTN, depression, PE (Xarelto) and IVC filter 2020 presenting with progressively worsening abdominal pain since discharge yesterday, during admission patient was evaluated by surgery for partial SBO, during hospital course the patient was tolerating diet and having bowel movements prior to surgery signing off. During admission had paracentesis performed with mild symptomatic improvement and was discharged. The patient states after discharge, her abdominal pain worsened, she had multiple episodes of vomiting and primarily concerned with constipation. CT scan performed shows dilated loops of bowel to 4.2cm, with small bowel collapse in the pelvis and large volume ascites. PO contrast was administered at 1am, ct scan performed at 320am, CT scan was likely performed too early. Patient unable to state when her last bowel movement was and states she is not passing gas.       PAST MEDICAL & SURGICAL HISTORY:  Uterine cancer  s/p SHAAN and chemo    Hypertension    High cholesterol    Cataract of right eye    Glaucoma    Coronary artery disease  s/p CABG X2    CAD (coronary artery disease)    HTN (hypertension)    HLD (hyperlipidemia)    H/O total hysterectomy    Athscl CABG, unsp, w angina pectoris w documented spasm    History of coronary artery bypass surgery        Home Medications:  acetaminophen 325 mg oral tablet: 2 tab(s) orally every 6 hours, As needed, Temp greater or equal to 38C (100.4F), Mild Pain (1 - 3) (28 Apr 2022 15:30)  brimonidine 0.2% ophthalmic solution: 1 drop(s) in the left eye 3 times a day (28 Apr 2022 15:30)  busPIRone 15 mg oral tablet: 1 tab(s) orally 2 times a day (28 Apr 2022 15:30)  dorzolamide-timolol 2.23%-0.68% ophthalmic solution: 1 drop(s) in the left eye 2 times a day (28 Apr 2022 15:30)  esomeprazole 20 mg oral delayed release capsule: 1 cap(s) orally once a day (28 Apr 2022 15:30)  gabapentin 100 mg oral capsule: 1 cap(s) orally 3 times a day (28 Apr 2022 15:30)  latanoprost 0.005% ophthalmic solution: 1 drop(s) to each affected eye 2 times a day (28 Apr 2022 15:30)  metoprolol succinate 25 mg oral tablet, extended release: 1 tab(s) orally once a day (28 Apr 2022 15:30)  oxyCODONE 5 mg oral tablet: 1 tab(s) orally every 6 hours, As Needed for pain (28 Apr 2022 15:30)  rosuvastatin 40 mg oral tablet: 1 tab(s) orally once a day (28 Apr 2022 15:30)  Xarelto 20 mg oral tablet: 1 tab(s) orally once a day (in the evening) (28 Apr 2022 15:30)        VITALS:  T(F): 97.5 (05-01-22 @ 01:24), Max: 99.1 (04-30-22 @ 22:57)  HR: 101 (04-30-22 @ 22:57) (101 - 101)  BP: 134/77 (04-30-22 @ 22:57) (134/77 - 134/77)  RR: 17 (04-30-22 @ 22:57) (17 - 17)  SpO2: 99% (04-30-22 @ 22:57) (99% - 99%)    PHYSICAL EXAM:  General: NAD, AAOx3, calm and cooperative  HEENT: NCAT, RUY, EOMI, Trachea ML, Neck supple  Cardiac: RRR S1, S2, no Murmurs, rubs or gallops  Respiratory: CTAB, normal respiratory effort, breath sounds equal BL, no wheeze, rhonchi or crackles  Abdomen: Soft, distended, non-tender, no rebound, no guarding.  Musculoskeletal: Strength 5/5 BL UE/LE, ROM intact, compartments soft  Neuro: Sensation grossly intact and equal throughout, no focal deficits  Vascular: Pulses 2+ throughout, extremities well perfused  Skin: Warm/dry, normal color, no jaundice  Incision/wound: healing well, dressings in place, clean, dry and intact    MEDICATIONS  (STANDING):    MEDICATIONS  (PRN):      LAB/STUDIES:                        13.7   8.01  )-----------( 325      ( 01 May 2022 01:00 )             40.8     05-01    133<L>  |  93<L>  |  17  ----------------------------<  119<H>  6.4<HH>   |  21  |  1.2    Ca    8.5      01 May 2022 01:00    TPro  6.6  /  Alb  3.3<L>  /  TBili  0.6  /  DBili  x   /  AST  59<H>  /  ALT  26  /  AlkPhos  247<H>  05-01      LIVER FUNCTIONS - ( 01 May 2022 01:00 )  Alb: 3.3 g/dL / Pro: 6.6 g/dL / ALK PHOS: 247 U/L / ALT: 26 U/L / AST: 59 U/L / GGT: x             CARDIAC MARKERS ( 01 May 2022 01:00 )  x     / <0.01 ng/mL / x     / x     / x        IMAGING:  < from: CT Abdomen and Pelvis w/ Oral Cont and w/ IV Cont (05.01.22 @ 03:21) >  IMPRESSION:  Since April 25, 2022:    Segment of abnormally dilated small bowel, up to 4.2 cm with abrupt   transition to collapsed bowel within left mid abdomen, similar location   of prior study. Findings compatible with small bowel obstruction.    Decreased large ascites.    --- End of Report ---    < end of copied text >      ACCESS DEVICES:  [x ] Peripheral IV

## 2022-05-01 NOTE — H&P ADULT - NSHPPHYSICALEXAM_GEN_ALL_CORE
Vital Signs Last 24 Hrs  T(C): 36.4 (01 May 2022 07:22), Max: 37.3 (30 Apr 2022 22:57)  T(F): 97.6 (01 May 2022 07:22), Max: 99.1 (30 Apr 2022 22:57)  HR: 83 (01 May 2022 07:22) (83 - 101)  BP: 128/76 (01 May 2022 07:22) (128/76 - 134/77)  BP(mean): --  RR: 18 (01 May 2022 07:22) (17 - 18)  SpO2: 98% (01 May 2022 07:22) (98% - 99%)          GENERAL: NAD, lying in bed comfortably  HEAD:  Atraumatic, Normocephalic  EYES: EOMI, PERRLA, conjunctiva and sclera clear  ENT: Moist mucous membranes  NECK: Supple, No JVD  CHEST/LUNG: Clear to auscultation bilaterally; No rales, rhonchi, wheezing, or rubs. Unlabored respirations  HEART: Regular rate and rhythm; No murmurs, rubs, or gallops  ABDOMEN: Soft, Nontender, distended  EXTREMITIES:  2+ Peripheral Pulses, brisk capillary refill. No clubbing, cyanosis, or edema  NERVOUS SYSTEM:  Alert & Oriented X3, speech clear. No deficits

## 2022-05-01 NOTE — H&P ADULT - NSHPLABSRESULTS_GEN_ALL_CORE
LABS:  cret                        13.7   8.01  )-----------( 325      ( 01 May 2022 01:00 )             40.8     05-01    133<L>  |  93<L>  |  17  ----------------------------<  119<H>  6.4<HH>   |  21  |  1.2    Ca    8.5      01 May 2022 01:00    TPro  6.6  /  Alb  3.3<L>  /  TBili  0.6  /  DBili  x   /  AST  59<H>  /  ALT  26  /  AlkPhos  247<H>  05-01

## 2022-05-01 NOTE — PATIENT PROFILE ADULT - FALL HARM RISK - RISK INTERVENTIONS

## 2022-05-01 NOTE — ED ADULT NURSE NOTE - INTERVENTIONS DEFINITIONS
Call bell, personal items and telephone within reach/Instruct patient to call for assistance/Physically safe environment: no spills, clutter or unnecessary equipment/Monitor gait and stability/Monitor for mental status changes and reorient to person, place, and time/Review medications for side effects contributing to fall risk/Reinforce activity limits and safety measures with patient and family

## 2022-05-01 NOTE — ED PROVIDER NOTE - PHYSICAL EXAMINATION
Constitutional: Uncomfortable.  Eyes: PERRLA. Extraocular movements intact, no entrapment. Conjunctiva normal.   ENT: No nasal discharge. Dry mucus membranes.  Neck: Supple, non tender, full range of motion.  CV: RRR no murmurs, rubs, or gallops. +S1S2.   Pulm: Clear to auscultation bilaterally. Normal work of breathing.  Abd: soft Mildly distended, diffusely tender to palpation, no rebound or guarding +BS.   Ext: Warm and well perfused x4, moving all extremities, no edema.   Psy: Cooperative, appropriate.   Skin: Warm, dry, no rash  Neuro: CN2-12 grossly intact no sensory or motor deficits throughout, no drift, no ataxia

## 2022-05-01 NOTE — CHART NOTE - NSCHARTNOTEFT_GEN_A_CORE
Pt seen and examined at bedside. Pt report feeling nauseous and was spitting up throughout the day. Attempted to place NGT at bedside but patient immediately removed NGT after placement. Pt currently refusing another attempt to place NGT. Pt informed risk of not placing an NGT including decrease chance of resolution of SBO, vomiting, aspiration, and further complications. Pt states that she still does not want an NGT today and may possibly try again tomorrow.    [employer] #[id number]

## 2022-05-01 NOTE — ED PROVIDER NOTE - CLINICAL SUMMARY MEDICAL DECISION MAKING FREE TEXT BOX
81-year-old female history of hypertension and CAD PE with IVC filter on Xarelto uterine adenocarcinoma status post SHAAN with recurrent ascites on Keytruda recent admission for partial small bowel obstruction presenting for evaluation of diffuse abdominal pain with associated nausea and vomiting.  No fevers or chills.  States that since discharge pain has gotten progressively worse.  Also complaining of constipation, not currently passing gas.  Symptoms gradual onset, moderate, no exacerbating or alleviating factors. labs imaging reviewed. persistent pain, nausea/vomiting. sp surgery eval, per surgery, non surgical intervention. recs admit to medicine for further eval, will follow while inpatient. will admit

## 2022-05-01 NOTE — ED ADULT NURSE REASSESSMENT NOTE - NS ED NURSE REASSESS COMMENT FT1
Received report from prior RN. Pt on Tele/O2 monitor. Fall precautions maintained, BA in place. No s/s of distress noted, will f/u.

## 2022-05-01 NOTE — H&P ADULT - HISTORY OF PRESENT ILLNESS
81-year-old female history of hypertension and CAD PE with IVC filter on Xarelto uterine adenocarcinoma status post SHAAN with recurrent ascites on Keytruda recent admission for partial small bowel obstruction presenting for evaluation of diffuse abdominal pain with associated nausea and vomiting.  No fevers or chills.  States that since discharge yesterday pain has gotten progressively worse.  Also complaining of constipation, not currently passing gas.  Symptoms gradual onset, moderate, no exacerbating or alleviating factors.    Surgery following and recommends NG tube.

## 2022-05-01 NOTE — H&P ADULT - ATTENDING COMMENTS
Patient seen and examined independently. Agree with resident note with exceptions. Case discussed with housestaff, nursing and patient    # SBO  - CT Abdomen and Pelvis w/ Oral Cont and w/ IV Cont (05.01.22 @ 03:21) >Since April 25, 2022:Segment of abnormally dilated small bowel, up to 4.2 cm with abrupt transition to collapsed bowel within left mid abdomen, similar location of prior study. Findings compatible with small bowel obstruction. Decreased large ascites.  - Xray Abdomen 1 View PORTABLE -Urgent (Xray Abdomen 1 View PORTABLE -Urgent .) (05.01.22 @ 09:41) >Dilated loops of bowel, unchanged. Oral contrast within small bowel loops in left hemiabdomen. Urinary bladder opacified with contrast. IVC filter in place. Osseous structures are unchanged.  **Pt's last BM was on 4/30/22  -Surgery eval: NGT for decompression, leave on low continuous suction, strict NPO for now  -Strict NPO  -Strict I&O's Q4 hrs.  -IVF  -Repeat obstructive series in AM to monitor for contrast progression.    # Uterine adenocarcinoma s/p SHAAN/BSO with  recurrence 02/2022  # Recurrent malignant ascites  - pt on keytruda q 3w - last received on  4/19, next due on 5/10  - S/p paracentesis3.4L of rian color fluid removed on 4/28  - As per discussion with onc - No need for pigtail for recurrence . Patient has been  recently started on new therapy and will need to assess for response.    # H/o CAD s/p CABG  # Hypertension  - c/w metoprolol, statin  - c/w xarelto    # H/o PE  - c/w xarelto  -  f/u  outpt for IVC filter removal    # Neuropathy sec to chemo  - c/w neurontin    # CKD stage 3-stable    #  H/o depression  -  c/w home meds    # Glaucoma  -  c/w home meds    # Full code    # Pending: clinical improvement, xray kub in AM  # Discussed plan of care with patient  # Disposition: Home with [facility] when stable    # Time spent: 50 minutes Patient seen and examined independently. Agree with resident note with exceptions. Case discussed with housestaff, nursing and patient    # SBO  - CT Abdomen and Pelvis w/ Oral Cont and w/ IV Cont (05.01.22 @ 03:21) >Since April 25, 2022:Segment of abnormally dilated small bowel, up to 4.2 cm with abrupt transition to collapsed bowel within left mid abdomen, similar location of prior study. Findings compatible with small bowel obstruction. Decreased large ascites.  - Xray Abdomen 1 View PORTABLE -Urgent (Xray Abdomen 1 View PORTABLE -Urgent .) (05.01.22 @ 09:41) >Dilated loops of bowel, unchanged. Oral contrast within small bowel loops in left hemiabdomen. Urinary bladder opacified with contrast. IVC filter in place. Osseous structures are unchanged.  **Pt's last BM was on 4/30/22  -Surgery eval: NGT for decompression, leave on low continuous suction, strict NPO for now  -Strict NPO  -Strict I&O's Q4 hrs.  -IVF  -Repeat obstructive series in AM to monitor for contrast progression.    # Uterine adenocarcinoma s/p SHAAN/BSO with  recurrence 02/2022  # Recurrent malignant ascites  - pt on keytruda q 3w - last received on  4/19, next due on 5/10  - S/p paracentesis3.4L of rian color fluid removed on 4/28  - As per discussion with onc - No need for pigtail for recurrence . Patient has been  recently started on new therapy and will need to assess for response.    # H/o CAD s/p CABG  # Hypertension  - c/w metoprolol, statin  - c/w xarelto    # H/o PE  - c/w xarelto  -  f/u  outpt for IVC filter removal    # Neuropathy sec to chemo  - c/w neurontin    # CKD stage 3-stable    #  H/o depression  -  c/w home meds    # Glaucoma  -  c/w home meds    # Full code    # Pending: clinical improvement, xray kub in AM  # Discussed plan of care with patient and surgery   # Disposition: Home with [facility] when stable    # Time spent: 50 minutes Patient seen and examined independently. Agree with resident note with exceptions. Case discussed with housestaff, nursing and patient    # SBO  - CT Abdomen and Pelvis w/ Oral Cont and w/ IV Cont (05.01.22 @ 03:21) >Since April 25, 2022:Segment of abnormally dilated small bowel, up to 4.2 cm with abrupt transition to collapsed bowel within left mid abdomen, similar location of prior study. Findings compatible with small bowel obstruction. Decreased large ascites.  - Xray Abdomen 1 View PORTABLE -Urgent (Xray Abdomen 1 View PORTABLE -Urgent .) (05.01.22 @ 09:41) >Dilated loops of bowel, unchanged. Oral contrast within small bowel loops in left hemiabdomen. Urinary bladder opacified with contrast. IVC filter in place. Osseous structures are unchanged.  **Pt's last BM was on 4/30/22  -Surgery eval: NGT for decompression, leave on low continuous suction, strict NPO for now  -Strict NPO  -Strict I&O's Q4 hrs.  -IVF  -Repeat obstructive series in AM to monitor for contrast progression.    # Uterine adenocarcinoma s/p SHAAN/BSO with  recurrence 02/2022  # Recurrent malignant ascites  - pt on keytruda q 3w - last received on  4/19, next due on 5/10  - S/p paracentesis3.4L of rian color fluid removed on 4/28  - As per discussion with onc - No need for pigtail for recurrence . Patient has been  recently started on new therapy and will need to assess for response.    # H/o CAD s/p CABG  # Hypertension  - c/w metoprolol, statin  - DC xarelto. start lovenox    # H/o PE  - c/w lovenox  -  f/u  outpt for IVC filter removal    # Neuropathy sec to chemo  - c/w neurontin    # CKD stage 3-stable    #  H/o depression  -  c/w home meds    # Glaucoma  -  c/w home meds    # Full code    # Pending: clinical improvement, xray kub in AM  # Discussed plan of care with patient and surgery   # Disposition: Home with [facility] when stable    # Time spent: 50 minutes

## 2022-05-01 NOTE — ED ADULT NURSE NOTE - OBJECTIVE STATEMENT
Patient biba c/o severe abdominal pain and n/v. Patient is afebrile and weak in the ED. BA in placed.

## 2022-05-01 NOTE — CONSULT NOTE ADULT - ASSESSMENT
81F PMH recurrent uterine adenocarcinoma s/p SHAAN/BSO (2016), recurrent malignant ascites, CAD s/p CABG, HTN, depression, PE (Xarelto) and IVC filter 2020 presenting with 3 day history of LUQ abdominal pain since prior to discharge, x1 episode of NBNB emesis day prior to arrival, none in the ED, passing gas on day of arrival with BM day prior to arrival. On workup patient found to have persistent large volume ascites (malignant), with small bowel dilation up to 4.2cm with abrupt transition point to collapsed small bowel in left mid abdomen. CT scan from prior admission looks similar, the small bowel is more dilated, but it is likely that there was not enough transit time for the contrast to progress to the transition point as the oral contrast was given at 1am and the ct scan was performed at 320am. Will recommend NGT for decompression as the small bowel is more dilated than prior, will follow along to monitor bowel function.    Plan :  -NGT for decompression, leave on low continuous suction, strict NPO for now  -Strict NPO  -Strict I&O's Q4 hrs.  -IVF  -Medical management of multiple medical comorbidities  -Will monitor for bowel function  -Repeat obstructive series in AM to monitor for contrast progression.    Plan discussed with Dr. Miranda     81F PMH recurrent uterine adenocarcinoma s/p SHAAN/BSO (2016), recurrent malignant ascites, CAD s/p CABG, HTN, depression, PE (Xarelto) and IVC filter 2020 presenting with 3 day history of LUQ abdominal pain since prior to discharge, x1 episode of NBNB emesis day prior to arrival, none in the ED, passing gas on day of arrival with BM day prior to arrival. On workup patient found to have persistent large volume ascites (malignant), with small bowel dilation up to 4.2cm with abrupt transition point to collapsed small bowel in left mid abdomen. CT scan from prior admission looks similar, the small bowel is more dilated, but it is likely that there was not enough transit time for the contrast to progress to the transition point as the oral contrast was given at 1am and the ct scan was performed at 320am. Will recommend NGT for decompression as the small bowel is more dilated than prior, will follow along to monitor bowel function.    Plan :  -NGT for decompression, leave on low continuous suction, strict NPO for now - NGT attempted, patient refused  -Strict NPO  -Strict I&O's Q4 hrs.  -IVF  -Medical management of multiple medical comorbidities  -Will monitor for bowel function  -Repeat obstructive series in AM to monitor for contrast progression.    Plan discussed with Dr. Miranda     81F PMH recurrent uterine adenocarcinoma s/p SHAAN/BSO (2016), recurrent malignant ascites, CAD s/p CABG, HTN, depression, PE (Xarelto) and IVC filter 2020 presenting with 3 day history of LUQ abdominal pain since prior to discharge, x1 episode of NBNB emesis day prior to arrival, none in the ED, passing gas on day of arrival with BM day prior to arrival. On workup patient found to have persistent large volume ascites (malignant), with small bowel dilation up to 4.2cm with abrupt transition point to collapsed small bowel in left mid abdomen. CT scan from prior admission looks similar, the small bowel is more dilated, but it is likely that there was not enough transit time for the contrast to progress to the transition point as the oral contrast was given at 1am and the ct scan was performed at 320am. Will recommend NGT for decompression as the small bowel is more dilated than prior, will follow along to monitor bowel function.    Plan :  -NGT for decompression, leave on low continuous suction, strict NPO for now - NGT attempted, patient refused  -Strict NPO  -Strict I&O's Q4 hrs.  -IVF  -Medical management of multiple medical comorbidities  -Will monitor for bowel function  -Repeat obstructive series in AM to monitor for contrast progression.    Plan discussed with Dr. Miranda    Senior Note  I have personally examined and evaluated the patient  I agree with the above plan and note, and I have edited where appropriate  Surgical Attending aware and agrees with plan  Above plan discussed with Trauma attending, Dr. Miranda , patient, patient family, and ED team

## 2022-05-01 NOTE — ED PROVIDER NOTE - NS ED ROS FT
Constitutional:  See HPI.   Eyes:  No visual changes, eye pain or discharge.  ENMT:  No hearing changes, pain, discharge or infections. No neck pain or stiffness.  Cardiac:  No chest pain, SOB or edema. No chest pain with exertion.  Respiratory:  No cough or respiratory distress. No hemoptysis.     :  No dysuria, frequency, hematuria  MS:  No joint pain or back pain.  Neuro:  No LOC. No headache or weakness.    Skin:  No skin rash.  Except as in HPI, all other review of systems is negative

## 2022-05-02 NOTE — PROGRESS NOTE ADULT - ASSESSMENT
Patient is a 81 female PMH recurrent uterine adenocarcinoma s/p SHAAN/BSO (2016), recurrent malignant ascites, CAD s/p CABG, HTN, depression, PE (Xarelto) and IVC filter 2020 presenting with multiple day history of LUQ abdominal pain since prior to discharge, x1 episode of NBNB emesis day prior to arrival, none in the ED, passing gas on day of arrival with BM day prior to arrival. On workup patient found to have persistent large volume ascites (malignant), with small bowel dilation up to 4.2cm with abrupt transition point to collapsed small bowel in left mid abdomen. The first NG tube placement attempt failed - will consider a second attempt today, if needed.    Small Bowel Obstruction  -NGT for decompression, leave on low continuous suction, strict NPO for now - will consider another attempt  -Strict NPO  -Strict I&O's Q4 hrs.   Patient is a 81 female PMH recurrent uterine adenocarcinoma s/p SHANA/BSO (2016), recurrent malignant ascites, CAD s/p CABG, HTN, depression, PE (Xarelto) and IVC filter 2020 presenting with multiple day history of LUQ abdominal pain since prior to discharge, x1 episode of NBNB emesis day prior to arrival, none in the ED, passing gas on day of arrival with BM day prior to arrival. On workup patient found to have persistent large volume ascites (malignant), with small bowel dilation up to 4.2cm with abrupt transition point to collapsed small bowel in left mid abdomen. The first NG tube placement attempt failed - will consider a second attempt today, if needed.    #Small Bowel Obstruction  -NGT for decompression, leave on low continuous suction, strict NPO for now - will consider another attempt  -Strict NPO  -Strict I&O's Q4 hrs.  - KUB ordered  - Follow surgery recommendations    #Hypertension  #Hyperlipidemia  #CAD  #Uterine Cancer  #Glaucoma  #Cataracts Patient is a 81 female PMH recurrent uterine adenocarcinoma s/p SHAAN/BSO (2016), recurrent malignant ascites, CAD s/p CABG, HTN, depression, PE (Xarelto) and IVC filter 2020 presenting with multiple day history of LUQ abdominal pain since prior to discharge, x1 episode of NBNB emesis day prior to arrival, none in the ED, passing gas on day of arrival with BM day prior to arrival. On workup patient found to have persistent large volume ascites (malignant), with small bowel dilation up to 4.2cm with abrupt transition point to collapsed small bowel in left mid abdomen. The first NG tube placement attempt failed - will consider a second attempt today, if needed.    #Small Bowel Obstruction  -NGT for decompression, leave on low continuous suction, strict NPO for now - will consider another attempt  -Strict NPO  -Strict I&O's Q4 hrs.  - KUB ordered  - Follow surgery recommendations    #Hypertension  #Hyperlipidemia  #CAD  #Uterine Cancer  #Glaucoma  #Cataracts   Patient is a 81 female PMH recurrent uterine adenocarcinoma s/p SHAAN/BSO (2016), recurrent malignant ascites, CAD s/p CABG, HTN, depression, PE (Xarelto) and IVC filter 2020 presenting with multiple day history of LUQ abdominal pain since prior to discharge, x1 episode of NBNB emesis day prior to arrival, none in the ED, passing gas on day of arrival with BM day prior to arrival. On workup patient found to have persistent large volume ascites (malignant), with small bowel dilation up to 4.2cm with abrupt transition point to collapsed small bowel in left mid abdomen. The first NG tube placement attempt failed - will consider a second attempt today, if needed.    #Small Bowel Obstruction  -NGT for decompression, leave on low continuous suction, strict NPO for now - will consider another attempt  -Strict NPO  -Strict I&O's Q4 hrs.  - KUB ordered  - Follow surgery recommendations  - trend am labs    #Hypertension  - Patient is on Metoprolol 25mg daily  - continue monitoring BP    #Hyperlipidemia    #CAD  - Patient is on Xarelto and has an IVC filter    #Uterine Cancer      #Glaucoma  - continue taking medications    #Misc   - DVT ppx: therapeutic lvnx   - Diet: NPO  - Code status: full   - Pending (specify):  KUB, Surgery recs, am labs  - Disposition: acute     Patient is a 81 female PMH recurrent uterine adenocarcinoma s/p SHAAN/BSO (2016), recurrent malignant ascites, CAD s/p CABG, HTN, depression, PE (Xarelto) and IVC filter 2020 presenting with multiple day history of LUQ abdominal pain since prior to discharge, x1 episode of NBNB emesis day prior to arrival, none in the ED, passing gas on day of arrival with BM day prior to arrival. On workup patient found to have persistent large volume ascites (malignant), with small bowel dilation up to 4.2cm with abrupt transition point to collapsed small bowel in left mid abdomen. The first NG tube placement attempt failed, patient removed it herself - will consider a second attempt today, if needed.    #Small Bowel Obstruction  - No need for NGT for decompression now   -Strict NPO  -Strict I&O's Q4 hrs.  - KUB ordered for AM  - Follow surgery recommendations. Surgery a very high risk candidate for surgery and do not want to offer surgical intervention   - obtain heme/onc consult to ascertain patient's status at surgery's request  - trend am labs    #Hypertension  - Patient is on Metoprolol 25mg daily  - continue monitoring BP    #Hyperlipidemia    #CAD  - Patient is on Xarelto and has an IVC filter    #Uterine Cancer      #Glaucoma  - continue taking medications    # Family contact: Updated grandson Umair and his mother 5/2 .     #Misc   - DVT ppx: therapeutic lvnx   - Diet: NPO  - Code status: full   - Pending (specify):  AM KUB, onc consult ,am labs  - Disposition: acute

## 2022-05-02 NOTE — PROGRESS NOTE ADULT - SUBJECTIVE AND OBJECTIVE BOX
GENERAL SURGERY PROGRESS NOTE      Events of past 24 hours:  NAEON. Patient refused NGT yesterday, no further episode sof emesis overnight.        ROS otherwise negative except per subjective and HPI      Vital Signs Last 24 Hrs  T(C): 35.6 (02 May 2022 05:09), Max: 37.2 (01 May 2022 14:45)  T(F): 96 (02 May 2022 05:09), Max: 98.9 (01 May 2022 14:45)  HR: 104 (02 May 2022 05:09) (96 - 104)  BP: 117/60 (02 May 2022 05:09) (117/60 - 149/77)  BP(mean): --  RR: 17 (02 May 2022 05:09) (17 - 18)  SpO2: --    Diet, NPO (05-01-22 @ 11:19) [Active]          I&O's Detail      General Appearance: NAD  Heart: RRR  Lungs: Unlabored breathing at rest  Abdomen:  S/ND/NT. No guarding or rebound.          MEDICATIONS:   MEDICATIONS  (STANDING):  brimonidine 0.2% Ophthalmic Solution 1 Drop(s) Left EYE three times a day  chlorhexidine 4% Liquid 1 Application(s) Topical <User Schedule>  dextrose 5% + sodium chloride 0.45%. 1000 milliLiter(s) (60 mL/Hr) IV Continuous <Continuous>  dorzolamide 2% Ophthalmic Solution 1 Drop(s) Left EYE every 8 hours  enoxaparin Injectable 70 milliGRAM(s) SubCutaneous every 12 hours  latanoprost 0.005% Ophthalmic Solution 1 Drop(s) Both EYES at bedtime  metoprolol succinate ER 25 milliGRAM(s) Oral daily  pantoprazole    Tablet 40 milliGRAM(s) Oral before breakfast  senna 2 Tablet(s) Oral at bedtime  timolol 0.5% Solution 1 Drop(s) Left EYE two times a day    MEDICATIONS  (PRN):  ondansetron Injectable 8 milliGRAM(s) IV Push every 8 hours PRN Nausea and/or Vomiting        LAB/STUDIES:                        13.7   8.01  )-----------( 325      ( 01 May 2022 01:00 )             40.8     05-01    133<L>  |  93<L>  |  17  ----------------------------<  119<H>  6.4<HH>   |  21  |  1.2    Ca    8.5      01 May 2022 01:00    TPro  6.6  /  Alb  3.3<L>  /  TBili  0.6  /  DBili  x   /  AST  59<H>  /  ALT  26  /  AlkPhos  247<H>  05-01      LIVER FUNCTIONS - ( 01 May 2022 01:00 )  Alb: 3.3 g/dL / Pro: 6.6 g/dL / ALK PHOS: 247 U/L / ALT: 26 U/L / AST: 59 U/L / GGT: x           Urinalysis Basic - ( 01 May 2022 06:59 )    Color: Yellow / Appearance: Clear / SG: >1.050 / pH: x  Gluc: x / Ketone: Trace  / Bili: Negative / Urobili: 3 mg/dL   Blood: x / Protein: 30 mg/dL / Nitrite: Negative   Leuk Esterase: Small / RBC: 10 /HPF / WBC 37 /HPF   Sq Epi: x / Non Sq Epi: 3 /HPF / Bacteria: Negative      CARDIAC MARKERS ( 01 May 2022 01:00 )  x     / <0.01 ng/mL / x     / x     / x              IMAGING:

## 2022-05-02 NOTE — PROGRESS NOTE ADULT - ASSESSMENT
81F PMH recurrent uterine adenocarcinoma s/p SHAAN/BSO (2016), recurrent malignant ascites, CAD s/p CABG, HTN, depression, PE (Xarelto) and IVC filter 2020 presenting with 3 day history of LUQ abdominal pain since prior to discharge, x1 episode of NBNB emesis day prior to arrival, none in the ED, passing gas on day of arrival with BM day prior to arrival. On workup patient found to have persistent large volume ascites (malignant), with small bowel dilation up to 4.2cm with abrupt transition point to collapsed small bowel in left mid abdomen. CT scan from prior admission looks similar, the small bowel is more dilated, but it is likely that there was not enough transit time for the contrast to progress to the transition point as the oral contrast was given at 1am and the ct scan was performed at 320am. Will recommend NGT for decompression as the small bowel is more dilated than prior, will follow along to monitor bowel function.    Plan :  -NGT for decompression, leave on low continuous suction, strict NPO for now - NGT attempted, patient refused  -Strict NPO  -Strict I&O's Q4 hrs.  -IVF  -Medical management of multiple medical comorbidities  -Will monitor for bowel function   81F PMH recurrent uterine adenocarcinoma s/p SHAAN/BSO (2016), recurrent malignant ascites, CAD s/p CABG, HTN, depression, PE (Xarelto) and IVC filter 2020 presenting with 3 day history of LUQ abdominal pain since prior to discharge, x1 episode of NBNB emesis day prior to arrival, none in the ED, passing gas on day of arrival with BM day prior to arrival. On workup patient found to have persistent large volume ascites (malignant), with small bowel dilation up to 4.2cm with abrupt transition point to collapsed small bowel in left mid abdomen. CT scan from prior admission looks similar, the small bowel is more dilated, but it is likely that there was not enough transit time for the contrast to progress to the transition point as the oral contrast was given at 1am and the ct scan was performed at 320am. Will recommend NGT for decompression as the small bowel is more dilated than prior, will follow along to monitor bowel function.    Plan :  -NGT for decompression, leave on low continuous suction, strict NPO for now - NGT attempted, patient refused  -Strict NPO  -Strict I&O's Q4 hrs.  -IVF  - Recommend palliative and hospice   - Recommend GOC talk   -Medical management of multiple medical comorbidities  -Will monitor for bowel function   81F PMH recurrent uterine adenocarcinoma s/p SHAAN/BSO (2016), recurrent malignant ascites, CAD s/p CABG, HTN, depression, PE (Xarelto) and IVC filter 2020 presenting with 3 day history of LUQ abdominal pain since prior to discharge, x1 episode of NBNB emesis day prior to arrival, none in the ED, passing gas on day of arrival with BM day prior to arrival. On workup patient found to have persistent large volume ascites (malignant), with small bowel dilation up to 4.2cm with abrupt transition point to collapsed small bowel in left mid abdomen. CT scan from prior admission looks similar, the small bowel is more dilated, but it is likely that there was not enough transit time for the contrast to progress to the transition point as the oral contrast was given at 1am and the ct scan was performed at 320am. Will recommend NGT for decompression as the small bowel is more dilated than prior, will follow along to monitor bowel function.    Plan :  -NGT for decompression, leave on low continuous suction, strict NPO for now - NGT attempted, patient refused  -Strict NPO  -Strict I&O's Q4 hrs.  -IVF  - Recommend palliative and hospice   - Recommend GOC talk   -Medical management of multiple medical comorbidities  -Patient is having bowel movements; Please recall Surgery as needed

## 2022-05-02 NOTE — PROGRESS NOTE ADULT - SUBJECTIVE AND OBJECTIVE BOX
Patient is a 81y old  Female who presents with a chief complaint of SBO (02 May 2022 07:56)      INTERVAL HPI/OVERNIGHT EVENTS:    Overnight, the patient refused NG tube placement. The patient is amenable to trying today, however, she endorses passing gas and having a bowel movement last night.     REVIEW OF SYSTEMS:  CONSTITUTIONAL: No fever, weight loss, or fatigue  EYES: No eye pain, visual disturbances, or discharge  ENMT:  No difficulty hearing, tinnitus, vertigo; No sinus or throat pain  NECK: No pain or stiffness  BREASTS: No pain, masses, or nipple discharge  RESPIRATORY: No cough, wheezing, chills or hemoptysis; No shortness of breath  CARDIOVASCULAR: No chest pain, palpitations, dizziness, or leg swelling  GASTROINTESTINAL: no abdominal or epigastric pain. No nausea, vomiting, or hematemesis; No diarrhea or constipation. No melena or hematochezia.  GENITOURINARY: No dysuria, frequency, hematuria, or incontinence  NEUROLOGICAL: No headaches, memory loss, loss of strength, numbness, or tremors  SKIN: No itching, burning, rashes, or lesions   LYMPH NODES: No enlarged glands  ENDOCRINE: No heat or cold intolerance; No hair loss  MUSCULOSKELETAL: No joint pain or swelling; No muscle, back, or extremity pain  PSYCHIATRIC: No depression, anxiety, mood swings, or difficulty sleeping  HEME/LYMPH: No easy bruising, or bleeding gums  ALLERY AND IMMUNOLOGIC: No hives or eczema  FAMILY HISTORY:    T(C): 35.6 (05-02-22 @ 05:09), Max: 37.2 (05-01-22 @ 14:45)  HR: 104 (05-02-22 @ 05:09) (96 - 104)  BP: 117/60 (05-02-22 @ 05:09) (117/60 - 149/77)  RR: 17 (05-02-22 @ 05:09) (17 - 18)  SpO2: --  Wt(kg): --Vital Signs Last 24 Hrs  T(C): 35.6 (02 May 2022 05:09), Max: 37.2 (01 May 2022 14:45)  T(F): 96 (02 May 2022 05:09), Max: 98.9 (01 May 2022 14:45)  HR: 104 (02 May 2022 05:09) (96 - 104)  BP: 117/60 (02 May 2022 05:09) (117/60 - 149/77)  BP(mean): --  RR: 17 (02 May 2022 05:09) (17 - 18)  SpO2: --    PHYSICAL EXAM:  GENERAL: NAD, well-groomed, well-developed  HEAD:  Atraumatic, Normocephalic  EYES: EOMI, PERRLA, conjunctiva and sclera clear  ENMT: No tonsillar erythema, exudates, or enlargement; Moist mucous membranes, Good dentition, No lesions  NECK: Supple, No JVD, Normal thyroid  NERVOUS SYSTEM:  Alert & Oriented X3, Good concentration; Motor Strength 5/5 B/L upper and lower extremities; DTRs 2+ intact and symmetric  CHEST/LUNG: Clear to percussion bilaterally; No rales, rhonchi, wheezing, or rubs  HEART: Regular rate and rhythm; No murmurs, rubs, or gallops  ABDOMEN: Soft, Nontender, Nondistended; Bowel sounds present  EXTREMITIES:  2+ Peripheral Pulses, No clubbing, cyanosis, or edema  LYMPH: No lymphadenopathy noted  SKIN: No rashes or lesions    Consultant(s) Notes Reviewed:  [x ] YES  [ ] NO  Care Discussed with Consultants/Other Providers [ x] YES  [ ] NO    LABS:    CBC:            13.7   8.01  )-----------( 325      ( 05-01-22 @ 01:00 )             40.8         Chem:         ( 05-01-22 @ 01:00 )    133<L>  |  93<L>  |  17  ----------------------------<  119<H>  6.4<HH>   |  21  |  1.2        Liver Functions: ( 05-01-22 @ 01:00 )  Alb: 3.3 g/dL / Pro: 6.6 g/dL / ALK PHOS: 247 U/L / ALT: 26 U/L / AST: 59 U/L / GGT: x              Type & Screen:             RADIOLOGY & ADDITIONAL TESTS:    Imaging Personally Reviewed:  [ ] YES  [ ] NO  brimonidine 0.2% Ophthalmic Solution 1 Drop(s) Left EYE three times a day  chlorhexidine 4% Liquid 1 Application(s) Topical <User Schedule>  dextrose 5% + sodium chloride 0.45%. 1000 milliLiter(s) IV Continuous <Continuous>  dorzolamide 2% Ophthalmic Solution 1 Drop(s) Left EYE every 8 hours  enoxaparin Injectable 70 milliGRAM(s) SubCutaneous every 12 hours  latanoprost 0.005% Ophthalmic Solution 1 Drop(s) Both EYES at bedtime  metoprolol succinate ER 25 milliGRAM(s) Oral daily  ondansetron Injectable 8 milliGRAM(s) IV Push every 8 hours PRN  pantoprazole    Tablet 40 milliGRAM(s) Oral before breakfast  senna 2 Tablet(s) Oral at bedtime  timolol 0.5% Solution 1 Drop(s) Left EYE two times a day      HEALTH ISSUES - PROBLEM Dx:         Patient is a 81y old  Female who presents with a chief complaint of SBO (02 May 2022 07:56)      INTERVAL HPI/OVERNIGHT EVENTS:    Overnight, the patient refused NG tube placement. The patient is amenable to trying today, however, she endorses passing gas and having a bowel movement last night.     REVIEW OF SYSTEMS:  CONSTITUTIONAL: No fever, weight loss, or fatigue  EYES: No eye pain, visual disturbances, or discharge  ENMT:  No difficulty hearing, tinnitus, vertigo; No sinus or throat pain  NECK: No pain or stiffness  BREASTS: No pain, masses, or nipple discharge  RESPIRATORY: No cough, wheezing, chills or hemoptysis; No shortness of breath  CARDIOVASCULAR: No chest pain, palpitations, dizziness, or leg swelling  GASTROINTESTINAL: no abdominal or epigastric pain. No nausea, vomiting, or hematemesis; No diarrhea or constipation. No melena or hematochezia.  GENITOURINARY: No dysuria, frequency, hematuria, or incontinence  NEUROLOGICAL: No headaches, memory loss, loss of strength, numbness, or tremors  SKIN: No itching, burning, rashes, or lesions   LYMPH NODES: No enlarged glands  ENDOCRINE: No heat or cold intolerance; No hair loss  MUSCULOSKELETAL: No joint pain or swelling; No muscle, back, or extremity pain  PSYCHIATRIC: No depression, anxiety, mood swings, or difficulty sleeping  HEME/LYMPH: No easy bruising, or bleeding gums  ALLERY AND IMMUNOLOGIC: No hives or eczema  FAMILY HISTORY:    T(C): 35.6 (05-02-22 @ 05:09), Max: 37.2 (05-01-22 @ 14:45)  HR: 104 (05-02-22 @ 05:09) (96 - 104)  BP: 117/60 (05-02-22 @ 05:09) (117/60 - 149/77)  RR: 17 (05-02-22 @ 05:09) (17 - 18)  SpO2: --  Wt(kg): --Vital Signs Last 24 Hrs  T(C): 35.6 (02 May 2022 05:09), Max: 37.2 (01 May 2022 14:45)  T(F): 96 (02 May 2022 05:09), Max: 98.9 (01 May 2022 14:45)  HR: 104 (02 May 2022 05:09) (96 - 104)  BP: 117/60 (02 May 2022 05:09) (117/60 - 149/77)  BP(mean): --  RR: 17 (02 May 2022 05:09) (17 - 18)  SpO2: --    PHYSICAL EXAM:  GENERAL: NAD, well-groomed, well-developed  HEAD:  Atraumatic, Normocephalic  EYES: EOMI, PERRLA, conjunctiva and sclera clear  ENMT: No tonsillar erythema, exudates, or enlargement; Moist mucous membranes, Good dentition, No lesions  NECK: Supple, No JVD, Normal thyroid  NERVOUS SYSTEM:  Alert & Oriented X3, Good concentration; Motor Strength 5/5 B/L upper and lower extremities; DTRs 2+ intact and symmetric  CHEST/LUNG: Clear to percussion bilaterally; No rales, rhonchi, wheezing, or rubs  HEART: Regular rate and rhythm; No murmurs, rubs, or gallops  ABDOMEN: Soft, Nontender, Nondistended; Bowel sounds present  EXTREMITIES:  2+ Peripheral Pulses, No clubbing, cyanosis, or edema  LYMPH: No lymphadenopathy noted  SKIN: No rashes or lesions    Consultant(s) Notes Reviewed:  [x ] YES  [ ] NO  Care Discussed with Consultants/Other Providers [ x] YES  [ ] NO    LABS:    CBC:            13.7   8.01  )-----------( 325      ( 05-01-22 @ 01:00 )             40.8         Chem:         ( 05-01-22 @ 01:00 )    133<L>  |  93<L>  |  17  ----------------------------<  119<H>  6.4<HH>   |  21  |  1.2        Liver Functions: ( 05-01-22 @ 01:00 )  Alb: 3.3 g/dL / Pro: 6.6 g/dL / ALK PHOS: 247 U/L / ALT: 26 U/L / AST: 59 U/L / GGT: x            RADIOLOGY & ADDITIONAL TESTS:    Imaging Personally Reviewed:  [ ] YES  [ ] NO  brimonidine 0.2% Ophthalmic Solution 1 Drop(s) Left EYE three times a day  chlorhexidine 4% Liquid 1 Application(s) Topical <User Schedule>  dextrose 5% + sodium chloride 0.45%. 1000 milliLiter(s) IV Continuous <Continuous>  dorzolamide 2% Ophthalmic Solution 1 Drop(s) Left EYE every 8 hours  enoxaparin Injectable 70 milliGRAM(s) SubCutaneous every 12 hours  latanoprost 0.005% Ophthalmic Solution 1 Drop(s) Both EYES at bedtime  metoprolol succinate ER 25 milliGRAM(s) Oral daily  ondansetron Injectable 8 milliGRAM(s) IV Push every 8 hours PRN  pantoprazole    Tablet 40 milliGRAM(s) Oral before breakfast  senna 2 Tablet(s) Oral at bedtime  timolol 0.5% Solution 1 Drop(s) Left EYE two times a day      HEALTH ISSUES - PROBLEM Dx:    Uterine Cancer  SBO  HTN  HLD  CAD     Patient is a 81y old  Female who presents with a chief complaint of SBO (02 May 2022 07:56)      INTERVAL HPI/OVERNIGHT EVENTS:    Overnight, the patient refused NG tube placement. The patient is amenable to trying today, however, she endorses passing gas and had a large BM today.     REVIEW OF SYSTEMS:  CONSTITUTIONAL: No fever, weight loss, or fatigue  EYES: No eye pain, visual disturbances, or discharge  ENMT:  No difficulty hearing, tinnitus, vertigo; No sinus or throat pain  NECK: No pain or stiffness  BREASTS: No pain, masses, or nipple discharge  RESPIRATORY: No cough, wheezing, chills or hemoptysis; No shortness of breath  CARDIOVASCULAR: No chest pain, palpitations, dizziness, or leg swelling  GASTROINTESTINAL: no abdominal or epigastric pain. No nausea, vomiting, or hematemesis; No diarrhea or constipation. No melena or hematochezia.  GENITOURINARY: No dysuria, frequency, hematuria, or incontinence  NEUROLOGICAL: No headaches, memory loss, loss of strength, numbness, or tremors  SKIN: No itching, burning, rashes, or lesions   LYMPH NODES: No enlarged glands  ENDOCRINE: No heat or cold intolerance; No hair loss  MUSCULOSKELETAL: No joint pain or swelling; No muscle, back, or extremity pain  PSYCHIATRIC: No depression, anxiety, mood swings, or difficulty sleeping  HEME/LYMPH: No easy bruising, or bleeding gums  ALLERY AND IMMUNOLOGIC: No hives or eczema  FAMILY HISTORY:    T(C): 35.6 (05-02-22 @ 05:09), Max: 37.2 (05-01-22 @ 14:45)  HR: 104 (05-02-22 @ 05:09) (96 - 104)  BP: 117/60 (05-02-22 @ 05:09) (117/60 - 149/77)  RR: 17 (05-02-22 @ 05:09) (17 - 18)  SpO2: --  Wt(kg): --Vital Signs Last 24 Hrs  T(C): 35.6 (02 May 2022 05:09), Max: 37.2 (01 May 2022 14:45)  T(F): 96 (02 May 2022 05:09), Max: 98.9 (01 May 2022 14:45)  HR: 104 (02 May 2022 05:09) (96 - 104)  BP: 117/60 (02 May 2022 05:09) (117/60 - 149/77)  BP(mean): --  RR: 17 (02 May 2022 05:09) (17 - 18)  SpO2: --    PHYSICAL EXAM:  GENERAL: NAD, well-groomed, well-developed  HEAD:  Atraumatic, Normocephalic  EYES: EOMI, PERRLA, conjunctiva and sclera clear  ENMT: No tonsillar erythema, exudates, or enlargement; Moist mucous membranes, Good dentition, No lesions  NECK: Supple, No JVD, Normal thyroid  NERVOUS SYSTEM:  Alert & Oriented X3, Good concentration; Motor Strength 5/5 B/L upper and lower extremities; DTRs 2+ intact and symmetric  CHEST/LUNG: Clear to percussion bilaterally; No rales, rhonchi, wheezing, or rubs  HEART: Regular rate and rhythm; No murmurs, rubs, or gallops  ABDOMEN: Soft, Nontender, Nondistended; Bowel sounds present  EXTREMITIES:  2+ Peripheral Pulses, No clubbing, cyanosis, or edema  LYMPH: No lymphadenopathy noted  SKIN: No rashes or lesions    Consultant(s) Notes Reviewed:  [x ] YES  [ ] NO  Care Discussed with Consultants/Other Providers [ x] YES  [ ] NO    LABS:    CBC:            13.7   8.01  )-----------( 325      ( 05-01-22 @ 01:00 )             40.8         Chem:         ( 05-01-22 @ 01:00 )    133<L>  |  93<L>  |  17  ----------------------------<  119<H>  6.4<HH>   |  21  |  1.2        Liver Functions: ( 05-01-22 @ 01:00 )  Alb: 3.3 g/dL / Pro: 6.6 g/dL / ALK PHOS: 247 U/L / ALT: 26 U/L / AST: 59 U/L / GGT: x            RADIOLOGY & ADDITIONAL TESTS:    Imaging Personally Reviewed:  [ ] YES  [ ] NO  brimonidine 0.2% Ophthalmic Solution 1 Drop(s) Left EYE three times a day  chlorhexidine 4% Liquid 1 Application(s) Topical <User Schedule>  dextrose 5% + sodium chloride 0.45%. 1000 milliLiter(s) IV Continuous <Continuous>  dorzolamide 2% Ophthalmic Solution 1 Drop(s) Left EYE every 8 hours  enoxaparin Injectable 70 milliGRAM(s) SubCutaneous every 12 hours  latanoprost 0.005% Ophthalmic Solution 1 Drop(s) Both EYES at bedtime  metoprolol succinate ER 25 milliGRAM(s) Oral daily  ondansetron Injectable 8 milliGRAM(s) IV Push every 8 hours PRN  pantoprazole    Tablet 40 milliGRAM(s) Oral before breakfast  senna 2 Tablet(s) Oral at bedtime  timolol 0.5% Solution 1 Drop(s) Left EYE two times a day      HEALTH ISSUES - PROBLEM Dx:    Uterine Cancer  SBO  HTN  HLD  CAD

## 2022-05-03 NOTE — CONSULT NOTE ADULT - ATTENDING COMMENTS
Patient seen and examined with surgery resident in ED awaiting hospital bed and discussed management plans. rehospitalization in 24 hours after discharge. Known malignancy with ascites and SBO. CT images reviewed. Patient vomiting now will place NG tube and observe will follow.
Patient examined , above read . agree with assessment , denies vomiting , abdomen soft but xray still consistent with SBO .   continue conservative treatment ( refused NG suction)

## 2022-05-03 NOTE — DISCHARGE NOTE NURSING/CASE MANAGEMENT/SOCIAL WORK - NSDCPEFALRISK_GEN_ALL_CORE
For information on Fall & Injury Prevention, visit: [email or web address] OR  [email or web address] OR  https://www.cdc.gov/steadi/patient.html

## 2022-05-03 NOTE — PROGRESS NOTE ADULT - ASSESSMENT
Patient is a 81 female PMH recurrent uterine adenocarcinoma s/p SHAAN/BSO (2016), recurrent malignant ascites, CAD s/p CABG, HTN, depression, PE (Xarelto) and IVC filter 2020 presenting with multiple day history of LUQ abdominal pain since prior to discharge, x1 episode of NBNB emesis day prior to arrival, none in the ED, passing gas on day of arrival with BM day prior to arrival. On workup patient found to have persistent large volume ascites (malignant), with small bowel dilation up to 4.2cm with abrupt transition point to collapsed small bowel in left mid abdomen. The first NG tube placement attempt failed, patient removed it herself - will consider a second attempt today, if needed.    #Small Bowel Obstruction  - No need for NGT for decompression now   -Strict NPO  -Strict I&O's Q4 hrs.  - KUB ordered   - Follow surgery recommendations. 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 s/p weekly Carbo/Taxol (8/14/20-1/28/2021)   - PET Scan from 2/25/21 shows good response to chemo (declines any additional cancer directed therapy)  - CT A/P from 11/2021 (abd pain) shows no new disease; CTa Chest from 09/2021   - CT A/P from 02/2022 showed ascites which showed recurrent adenocarcinoma of mullerian origin:  is high  - Completed [facility] Liquid Biopsy Testing (03/01/2022) shows no targetable mutation  - chemoport placed by IR   - patient was started on Keytruda + Lenvima q3 weeks but lenvima was discontinued due to poor tolerance  - Will consider possibly resuming lenvima once more medically stable      #Hypertension  - Patient is on Metoprolol 25mg daily  - continue monitoring BP    #Hyperlipidemia    #CAD  - Patient is on Xarelto and has an IVC filter    #Uterine Cancer      #Glaucoma  - continue taking medications      #Misc   - DVT ppx: therapeutic lvnx   - Diet: NPO  - Code status: full   - Pending (specify):  KUB, nutrition consult ,am labs  - Disposition: acute   Patient is a 81 female PMH recurrent uterine adenocarcinoma s/p SHAAN/BSO (2016), recurrent malignant ascites, CAD s/p CABG, HTN, depression, PE (Xarelto) and IVC filter 2020 presenting with multiple day history of LUQ abdominal pain since prior to discharge, x1 episode of NBNB emesis day prior to arrival, none in the ED, passing gas on day of arrival with BM day prior to arrival. On workup patient found to have persistent large volume ascites (malignant), with small bowel dilation up to 4.2cm with abrupt transition point to collapsed small bowel in left mid abdomen. The first NG tube placement attempt failed, patient removed it herself - will consider a second attempt today, if needed.    #Small Bowel Obstruction  - No need for NGT for decompression now   - Strict NPO  -Strict I&O's Q4 hrs.  - KUB ordered - continuous SBO noted. Daily KUB   - Follow surgery recommendations. 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 s/p weekly Carbo/Taxol (8/14/20-1/28/2021)   - PET Scan from 2/25/21 shows good response to chemo (declines any additional cancer directed therapy)  - CT A/P from 11/2021 (abd pain) shows no new disease; CTa Chest from 09/2021   - CT A/P from 02/2022 showed ascites which showed recurrent adenocarcinoma of mullerian origin:  is high  - Completed [facility] Liquid Biopsy Testing (03/01/2022) shows no targetable mutation  - chemoport placed by IR   - patient was started on Keytruda + Lenvima q3 weeks but lenvima was discontinued due to poor tolerance  - heme onc will consider possibly resuming lenvima once more medically stable      #Hypertension  - Patient is on Metoprolol 25mg daily  - continue monitoring BP    #Hyperlipidemia    #CAD  - Patient is on Xarelto and has an IVC filter    #Glaucoma  - continue taking medications    #Misc   - DVT ppx: therapeutic lvnx   - Diet: NPO  - Code status: full   - Pending (specify):  claudia SCHROEDER labs  - Disposition: acute

## 2022-05-03 NOTE — CHART NOTE - NSCHARTNOTEFT_GEN_A_CORE
NUTRITION SUPPORT CONSULTATION    HPI:  81-year-old female history of hypertension and CAD PE with IVC filter on Xarelto uterine adenocarcinoma status post SHAAN with recurrent ascites on Keytruda recent admission for partial small bowel obstruction presenting for evaluation of diffuse abdominal pain with associated nausea and vomiting.  No fevers or chills.  States that since discharge yesterday pain has gotten progressively worse.  Also complaining of constipation, not currently passing gas.  Symptoms gradual onset, moderate, no exacerbating or alleviating factors.    Surgery following and recommends NG tube. (01 May 2022 11:08)      PAST MEDICAL & SURGICAL HISTORY:  Uterine cancer  s/p SHAAN and chemo  Hypertension  High cholesterol  Cataract of right eye  Glaucoma  Coronary artery disease  s/p CABG X2  CAD (coronary artery disease)  HTN (hypertension)  HLD (hyperlipidemia)  H/O total hysterectomy  Athscl CABG, unsp, w angina pectoris w documented spasm  History of coronary artery bypass surgery    Allergies  chloroquine (Hives)  quinine (Urticaria)    MEDICATIONS  (STANDING):  brimonidine 0.2% Ophthalmic Solution 1 Drop(s) Left EYE three times a day  chlorhexidine 4% Liquid 1 Application(s) Topical <User Schedule>  dextrose 5% + sodium chloride 0.45%. 1000 milliLiter(s) (60 mL/Hr) IV Continuous <Continuous>  dorzolamide 2% Ophthalmic Solution 1 Drop(s) Left EYE every 8 hours  enoxaparin Injectable 70 milliGRAM(s) SubCutaneous every 12 hours  latanoprost 0.005% Ophthalmic Solution 1 Drop(s) Both EYES at bedtime  metoprolol succinate ER 25 milliGRAM(s) Oral daily  pantoprazole    Tablet 40 milliGRAM(s) Oral before breakfast  senna 2 Tablet(s) Oral at bedtime  timolol 0.5% Solution 1 Drop(s) Left EYE two times a day    MEDICATIONS  (PRN):  aluminum hydroxide/magnesium hydroxide/simethicone Suspension 30 milliLiter(s) Oral every 6 hours PRN Dyspepsia  ondansetron Injectable 8 milliGRAM(s) IV Push every 8 hours PRN Nausea and/or Vomiting    ICU Vital Signs Last 24 Hrs  T(C): 36.8 (03 May 2022 04:45), Max: 37.6 (02 May 2022 21:04)  T(F): 98.3 (03 May 2022 04:45), Max: 99.6 (02 May 2022 21:04)  HR: 101 (03 May 2022 04:45) (101 - 123)  BP: 128/82 (03 May 2022 04:45) (113/69 - 128/82)  RR: 18 (03 May 2022 04:45) (18 - 18)  SpO2: 97% (03 May 2022 00:58) (97% - 97%)    Drug Dosing Weight  Height (cm): 160 (01 May 2022 16:10)  Weight (kg): 59.2 (01 May 2022 16:10)  BMI (kg/m2): 23.1 (01 May 2022 16:10)  BSA (m2): 1.61 (01 May 2022 16:10)    EXAM  A&O X 4  Abd:      Enteral access: none  IV access: +Left CW port    LABS  05-03    134<L>  |  94<L>  |  14  ----------------------------<  113<H>  3.4<L>   |  26  |  1.0    Ca    8.0<L>      03 May 2022 07:30  Mg     1.7     05-03    TPro  5.2<L>  /  Alb  2.8<L>  /  TBili  0.5  /  DBili  x   /  AST  22  /  ALT  11  /  AlkPhos  153<H>  05-03                        11.5   6.71  )-----------( 314      ( 03 May 2022 07:30 )             33.4           Radiology:  < from: CT Abdomen and Pelvis w/ Oral Cont and w/ IV Cont (05.01.22 @ 03:21) >    ACC: [id number] EXAM:  CT ABDOMEN AND PELVIS OC IC                          PROCEDURE DATE:  05/01/2022      INTERPRETATION:  CLINICAL STATEMENT: Abdominal pain. Nausea. Vomiting.    TECHNIQUE: Contiguous axial CT images were obtained from the lower chest   to the pubic symphysis following administration of 100cc Optiray 320   intravenous contrast.  Oral contrast was administered.  Reformatted   images in the coronal and sagittal planes were acquired.    Comparison made with CT abdomen and pelvis April 25, 2022.    FINDINGS:    LOWER CHEST: Dependent atelectasis. Stable trace left pleural effusion..    HEPATOBILIARY: Unremarkable.    SPLEEN: Unremarkable.    PANCREAS: Unremarkable.    ADRENAL GLANDS: Unremarkable.    KIDNEYS: Unremarkable.    ABDOMINOPELVIC NODES: Unremarkable.    PELVIC ORGANS: Post hysterectomy. Urinary bladder nondistended.    PERITONEUM/MESENTERY/BOWEL: Segment of abnormally dilated small bowel, up   to 4.2 cm with abrupt transition to collapsed bowel within left mid   abdomen (series 4, image 83). Decreased large ascites. Known omental   nodularity obscured by extensive ascites. No pneumoperitoneum. Oral   contrast does not traverse obstructed bowel.    BONES/SOFT TISSUES: Stable grade 1 anterolisthesis of L4on L5.   Degenerative changes of the spine. Scattered subcutaneous calcified   granulomas.    OTHER: IVC filter noted. Vascular calcifications.    IMPRESSION:  Since April 25, 2022:    Segment of abnormally dilated small bowel, up to 4.2 cm with abrupt   transition to collapsed bowel within left mid abdomen, similar location   of prior study. Findings compatible with small bowel obstruction.    Decreased large ascites.    --- End of Report ---  < end of copied text >    Diet, NPO (05-01-22 @ 11:19)    ASSESSMENT  Patient is a 81 female PMH recurrent uterine adenocarcinoma s/p SHAAN/BSO (2016), recurrent malignant ascites, CAD s/p CABG, HTN, depression, PE (Xarelto) and IVC filter 2020 presenting with multiple day history of LUQ abdominal pain since prior to discharge, x1 episode of NBNB emesis day prior to arrival, none in the ED, passing gas on day of arrival with BM day prior to arrival. On workup patient found to have persistent large volume ascites (malignant), with small bowel dilation up to 4.2cm with abrupt transition point to collapsed small bowel in left mid abdomen. The first NG tube placement attempt failed, patient removed it herself    - Uterine Adenocarcinoma Mullerian origin, malignant ascites diagnosed on 8/8/2020  - SBO  - HTN  - CAD, hyperlipidemia  - glaucoma        PLAN  - start TPN tonight via port  - d/c IVF's when TPN begins  - am 5/4 bmp, in phos, mg, TG level  - will follow NUTRITION SUPPORT CONSULTATION    HPI:  81-year-old female history of hypertension and CAD PE with IVC filter on Xarelto uterine adenocarcinoma status post SHAAN with recurrent ascites on Keytruda recent admission for partial small bowel obstruction presenting for evaluation of diffuse abdominal pain with associated nausea and vomiting.  No fevers or chills.  States that since discharge yesterday pain has gotten progressively worse. Also complaining of constipation, not currently passing gas.  Symptoms gradual onset, moderate, no exacerbating or alleviating factors.    Surgery following and recommends NG tube. (01 May 2022 11:08)    Pt reports vomiting X 1 this am. NGT out overnight and refused replacement. Having BM's    PAST MEDICAL & SURGICAL HISTORY:  Uterine cancer  s/p SHAAN and chemo  Hypertension  High cholesterol  Cataract of right eye  Glaucoma  Coronary artery disease  s/p CABG X2  CAD (coronary artery disease)  HTN (hypertension)  HLD (hyperlipidemia)  H/O total hysterectomy  Athscl CABG, unsp, w angina pectoris w documented spasm  History of coronary artery bypass surgery    Allergies  chloroquine (Hives)  quinine (Urticaria)    MEDICATIONS  (STANDING):  brimonidine 0.2% Ophthalmic Solution 1 Drop(s) Left EYE three times a day  chlorhexidine 4% Liquid 1 Application(s) Topical <User Schedule>  dextrose 5% + sodium chloride 0.45%. 1000 milliLiter(s) (60 mL/Hr) IV Continuous <Continuous>  dorzolamide 2% Ophthalmic Solution 1 Drop(s) Left EYE every 8 hours  enoxaparin Injectable 70 milliGRAM(s) SubCutaneous every 12 hours  latanoprost 0.005% Ophthalmic Solution 1 Drop(s) Both EYES at bedtime  metoprolol succinate ER 25 milliGRAM(s) Oral daily  pantoprazole    Tablet 40 milliGRAM(s) Oral before breakfast  senna 2 Tablet(s) Oral at bedtime  timolol 0.5% Solution 1 Drop(s) Left EYE two times a day    MEDICATIONS  (PRN):  aluminum hydroxide/magnesium hydroxide/simethicone Suspension 30 milliLiter(s) Oral every 6 hours PRN Dyspepsia  ondansetron Injectable 8 milliGRAM(s) IV Push every 8 hours PRN Nausea and/or Vomiting    ICU Vital Signs Last 24 Hrs  T(C): 36.8 (03 May 2022 04:45), Max: 37.6 (02 May 2022 21:04)  T(F): 98.3 (03 May 2022 04:45), Max: 99.6 (02 May 2022 21:04)  HR: 101 (03 May 2022 04:45) (101 - 123)  BP: 128/82 (03 May 2022 04:45) (113/69 - 128/82)  RR: 18 (03 May 2022 04:45) (18 - 18)  SpO2: 97% (03 May 2022 00:58) (97% - 97%)    Drug Dosing Weight  Height (cm): 160 (01 May 2022 16:10)  Weight (kg): 59.2 (01 May 2022 16:10)  BMI (kg/m2): 23.1 (01 May 2022 16:10)  BSA (m2): 1.61 (01 May 2022 16:10)    Wt: 65.3kg (11/02/2020)    EXAM  A&O X 4  Abd: soft, distended  Skin: no breakdown  Enteral access: none  IV access: +Left CW port    LABS  05-03    134<L>  |  94<L>  |  14  ----------------------------<  113<H>  3.4<L>   |  26  |  1.0    Ca    8.0<L>      03 May 2022 07:30  Mg     1.7     05-03    TPro  5.2<L>  /  Alb  2.8<L>  /  TBili  0.5  /  DBili  x   /  AST  22  /  ALT  11  /  AlkPhos  153<H>  05-03                        11.5   6.71  )-----------( 314      ( 03 May 2022 07:30 )             33.4           Radiology:  < from: CT Abdomen and Pelvis w/ Oral Cont and w/ IV Cont (05.01.22 @ 03:21) >    ACC: [id number] EXAM:  CT ABDOMEN AND PELVIS OC IC                          PROCEDURE DATE:  05/01/2022      INTERPRETATION:  CLINICAL STATEMENT: Abdominal pain. Nausea. Vomiting.    TECHNIQUE: Contiguous axial CT images were obtained from the lower chest   to the pubic symphysis following administration of 100cc Optiray 320   intravenous contrast.  Oral contrast was administered.  Reformatted   images in the coronal and sagittal planes were acquired.    Comparison made with CT abdomen and pelvis April 25, 2022.    FINDINGS:    LOWER CHEST: Dependent atelectasis. Stable trace left pleural effusion..    HEPATOBILIARY: Unremarkable.    SPLEEN: Unremarkable.    PANCREAS: Unremarkable.    ADRENAL GLANDS: Unremarkable.    KIDNEYS: Unremarkable.    ABDOMINOPELVIC NODES: Unremarkable.    PELVIC ORGANS: Post hysterectomy. Urinary bladder nondistended.    PERITONEUM/MESENTERY/BOWEL: Segment of abnormally dilated small bowel, up   to 4.2 cm with abrupt transition to collapsed bowel within left mid   abdomen (series 4, image 83). Decreased large ascites. Known omental   nodularity obscured by extensive ascites. No pneumoperitoneum. Oral   contrast does not traverse obstructed bowel.    BONES/SOFT TISSUES: Stable grade 1 anterolisthesis of L4on L5.   Degenerative changes of the spine. Scattered subcutaneous calcified   granulomas.    OTHER: IVC filter noted. Vascular calcifications.    IMPRESSION:  Since April 25, 2022:    Segment of abnormally dilated small bowel, up to 4.2 cm with abrupt   transition to collapsed bowel within left mid abdomen, similar location   of prior study. Findings compatible with small bowel obstruction.    Decreased large ascites.    --- End of Report ---  < end of copied text >    Diet, NPO (05-01-22 @ 11:19)    ASSESSMENT  Patient is a 81 female PMH recurrent uterine adenocarcinoma s/p SHAAN/BSO (2016), recurrent malignant ascites, CAD s/p CABG, HTN, depression, PE (Xarelto) and IVC filter 2020 presenting with multiple day history of LUQ abdominal pain since prior to discharge, x1 episode of NBNB emesis day prior to arrival, none in the ED, passing gas on day of arrival with BM day prior to arrival. On workup patient found to have persistent large volume ascites (malignant), with small bowel dilation up to 4.2cm with abrupt transition point to collapsed small bowel in left mid abdomen. The first NG tube placement attempt failed, patient removed it herself    - Uterine Adenocarcinoma Mullerian origin, malignant ascites diagnosed on 8/8/2020  - SBO  - HTN  - CAD, hyperlipidemia  - glaucoma  - hypokalemia, hypomagnesemia  - wt loss, ~10% since 11/2020  - risk for refeeding syndrome    PLAN  - start TPN tonight via port  - d/c IVF's when TPN begins  - am 5/4 bmp, in phos, mg, TG level  - will follow NUTRITION SUPPORT CONSULTATION    81-year-old female history of hypertension and CAD PE with IVC filter on Xarelto uterine adenocarcinoma status post SHAAN with recurrent ascites on Keytruda recent admission for partial small bowel obstruction presenting for evaluation of diffuse abdominal pain with associated nausea and vomiting.  No fevers or chills.  States that since discharge yesterday pain has gotten progressively worse. Also complaining of constipation, not currently passing gas.  Symptoms gradual onset, moderate, no exacerbating or alleviating factors.    Surgery following and recommends NG tube. (01 May 2022 11:08)    Pt reports vomiting X 1 this am. NGT out overnight and refused replacement. Having BM's    PAST MEDICAL & SURGICAL HISTORY:  Uterine cancer  s/p SHAAN and chemo  Hypertension  High cholesterol  Cataract of right eye  Glaucoma  Coronary artery disease  s/p CABG X2  CAD (coronary artery disease)  HTN (hypertension)  HLD (hyperlipidemia)  H/O total hysterectomy  Athscl CABG, unsp, w angina pectoris w documented spasm  History of coronary artery bypass surgery    Allergies  chloroquine (Hives)  quinine (Urticaria)    MEDICATIONS  (STANDING):  brimonidine 0.2% Ophthalmic Solution 1 Drop(s) Left EYE three times a day  chlorhexidine 4% Liquid 1 Application(s) Topical <User Schedule>  dextrose 5% + sodium chloride 0.45%. 1000 milliLiter(s) (60 mL/Hr) IV Continuous <Continuous>  dorzolamide 2% Ophthalmic Solution 1 Drop(s) Left EYE every 8 hours  enoxaparin Injectable 70 milliGRAM(s) SubCutaneous every 12 hours  latanoprost 0.005% Ophthalmic Solution 1 Drop(s) Both EYES at bedtime  metoprolol succinate ER 25 milliGRAM(s) Oral daily  pantoprazole    Tablet 40 milliGRAM(s) Oral before breakfast  senna 2 Tablet(s) Oral at bedtime  timolol 0.5% Solution 1 Drop(s) Left EYE two times a day    MEDICATIONS  (PRN):  aluminum hydroxide/magnesium hydroxide/simethicone Suspension 30 milliLiter(s) Oral every 6 hours PRN Dyspepsia  ondansetron Injectable 8 milliGRAM(s) IV Push every 8 hours PRN Nausea and/or Vomiting    ICU Vital Signs Last 24 Hrs  T(C): 36.8 (03 May 2022 04:45), Max: 37.6 (02 May 2022 21:04)  T(F): 98.3 (03 May 2022 04:45), Max: 99.6 (02 May 2022 21:04)  HR: 101 (03 May 2022 04:45) (101 - 123)  BP: 128/82 (03 May 2022 04:45) (113/69 - 128/82)  RR: 18 (03 May 2022 04:45) (18 - 18)  SpO2: 97% (03 May 2022 00:58) (97% - 97%)    Drug Dosing Weight  Height (cm): 160 (01 May 2022 16:10)  Weight (kg): 59.2 (01 May 2022 16:10)  BMI (kg/m2): 23.1 (01 May 2022 16:10)  BSA (m2): 1.61 (01 May 2022 16:10)    Wt: 65.3kg (11/02/2020)    EXAM  A&O X 4  Abd: soft, distended  Skin: no breakdown  Enteral access: none  IV access: +Left CW port    LABS  05-03    134<L>  |  94<L>  |  14  ----------------------------<  113<H>  3.4<L>   |  26  |  1.0    Ca    8.0<L>      03 May 2022 07:30  Mg     1.7     05-03    TPro  5.2<L>  /  Alb  2.8<L>  /  TBili  0.5  /  DBili  x   /  AST  22  /  ALT  11  /  AlkPhos  153<H>  05-03                        11.5   6.71  )-----------( 314      ( 03 May 2022 07:30 )             33.4           Radiology:  < from: CT Abdomen and Pelvis w/ Oral Cont and w/ IV Cont (05.01.22 @ 03:21) >    ACC: [id number] EXAM:  CT ABDOMEN AND PELVIS OC IC                          PROCEDURE DATE:  05/01/2022      INTERPRETATION:  CLINICAL STATEMENT: Abdominal pain. Nausea. Vomiting.    TECHNIQUE: Contiguous axial CT images were obtained from the lower chest   to the pubic symphysis following administration of 100cc Optiray 320   intravenous contrast.  Oral contrast was administered.  Reformatted   images in the coronal and sagittal planes were acquired.    Comparison made with CT abdomen and pelvis April 25, 2022.    FINDINGS:    LOWER CHEST: Dependent atelectasis. Stable trace left pleural effusion..    HEPATOBILIARY: Unremarkable.    SPLEEN: Unremarkable.    PANCREAS: Unremarkable.    ADRENAL GLANDS: Unremarkable.    KIDNEYS: Unremarkable.    ABDOMINOPELVIC NODES: Unremarkable.    PELVIC ORGANS: Post hysterectomy. Urinary bladder nondistended.    PERITONEUM/MESENTERY/BOWEL: Segment of abnormally dilated small bowel, up   to 4.2 cm with abrupt transition to collapsed bowel within left mid   abdomen (series 4, image 83). Decreased large ascites. Known omental   nodularity obscured by extensive ascites. No pneumoperitoneum. Oral   contrast does not traverse obstructed bowel.    BONES/SOFT TISSUES: Stable grade 1 anterolisthesis of L4on L5.   Degenerative changes of the spine. Scattered subcutaneous calcified   granulomas.    OTHER: IVC filter noted. Vascular calcifications.    IMPRESSION:  Since April 25, 2022:    Segment of abnormally dilated small bowel, up to 4.2 cm with abrupt   transition to collapsed bowel within left mid abdomen, similar location   of prior study. Findings compatible with small bowel obstruction.    Decreased large ascites.    --- End of Report ---  < end of copied text >    Diet, NPO (05-01-22 @ 11:19)    ASSESSMENT  Patient is a 81 female PMH recurrent uterine adenocarcinoma s/p SHAAN/BSO (2016), recurrent malignant ascites, CAD s/p CABG, HTN, depression, PE (Xarelto) and IVC filter 2020 presenting with multiple day history of LUQ abdominal pain since prior to discharge, x1 episode of NBNB emesis day prior to arrival, none in the ED, passing gas on day of arrival with BM day prior to arrival. On workup patient found to have persistent large volume ascites (malignant), with small bowel dilation up to 4.2cm with abrupt transition point to collapsed small bowel in left mid abdomen. The first NG tube placement attempt failed, patient removed it herself    - Uterine Adenocarcinoma Mullerian origin, malignant ascites diagnosed on 8/8/2020  - SBO  - HTN  - CAD, hyperlipidemia  - glaucoma  - hypokalemia, hypomagnesemia  - wt loss, ~10% since 11/2020  - risk for refeeding syndrome    PLAN  - start TPN tonight via port  - d/c IVF's when TPN begins  - am 5/4 bmp, in phos, mg, TG level  - will follow

## 2022-05-03 NOTE — PROGRESS NOTE ADULT - ASSESSMENT
81 female PMH recurrent uterine adenocarcinoma s/p SHAAN/BSO (2016), recurrent malignant ascites, CAD s/p CABG, HTN, depression, PE (Xarelto) and IVC filter 2020 presenting with multiple day history of LUQ abdominal pain since prior to discharge, x1 episode of NBNB emesis day prior to arrival, none in the ED, passing gas on day of arrival with BM day prior to arrival. On workup patient found to have persistent large volume ascites (malignant), with small bowel dilation up to 4.2cm with abrupt transition point to collapsed small bowel in left mid abdomen. The first NG tube placement attempt failed - will try second attempt today.     # SBO  - CT Abdomen and Pelvis w/ Oral Cont and w/ IV Cont (05.01.22 @ 03:21) >Since April 25, 2022:Segment of abnormally dilated small bowel, up to 4.2 cm with abrupt transition to collapsed bowel within left mid abdomen, similar location of prior study. Findings compatible with small bowel obstruction. Decreased large ascites.  -Surgery eval: NGT for decompression, leave on low continuous suction,  -Pt removed NGT overnight on 5/2, refused replacement  -Strict NPO  -IVF  -continue serial abdominal exam, KUB  - nutrition consultes- start on TPN    # Uterine adenocarcinoma s/p SHAAN/BSO with  recurrence 02/2022  # Recurrent malignant ascites  - pt on keytruda q 3w - last received on  4/19, next due on 5/10  - S/p paracentesis3.4L of rian color fluid removed on 4/28  - As per discussion with onc by prior hospitalist - No need for pigtail for recurrence . Patient has been  recently started on new therapy and will need to assess for response.    # H/o CAD s/p CABG  # Hypertension  - c/w metoprolol, statin  - DC xarelto. 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

## 2022-05-03 NOTE — CONSULT NOTE ADULT - ASSESSMENT
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 CT AP shows moderate to large volume ascites with no other significant changes. Oncology consulted for management.    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 in 2020 s/p weekly Carbo/Taxol (8/14/20-1/28/2021)   - PET Scan from 2/25/21 shows good response to chemo (declines any additional cancer directed therapy)  - CT A/P from 11/2021 (abd pain) shows no new disease; CTa Chest from 09/2021   - CT A/P from 02/2022 showed ascites which showed recurrent adenocarcinoma of mullerian origin:  is high  - Completed [facility] Liquid Biopsy Testing (03/01/2022) shows no targetable mutation  - chemoport placed by IR   - patient was started on Keytruda + Lenvima q3 weeks but lenvima was discontinued due to poor tolerance  - Will consider possibly resuming lenvima once more medically stable      # Malignant ascites required high volume paracentesis   - previously improved/resolved with carbo/taxol  - s/p therapeutic paracentesis by IR, patient will need scheduled theraputic paracentesis     # PE in the setting of malignancy diagnosed on 8/6/2020  - Doppler was negative for DVT on 8/8/2020  - restarted Xarelto 20mg daily s/p paracentesis    82 yo F with PMH of uterine cancer s/p SHAAN-BSO with adjuvant chemotherapy in 2016, 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 CT AP shows moderate to large volume ascites with no other significant changes. Oncology consulted for management.    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 in 2020 s/p weekly Carbo/Taxol (8/14/20-1/28/2021)   - PET Scan from 2/25/21 shows good response to chemo (declines any additional cancer directed therapy)  - CT A/P from 11/2021 (abd pain) shows no new disease; CTa Chest from 09/2021   - CT A/P from 02/2022 showed ascites which showed recurrent adenocarcinoma of mullerian origin  - Completed [facility] Liquid Biopsy Testing (03/01/2022) shows no targetable mutation  - chemoport placed by IR   - patient was started on Keytruda + Lenvima q3 weeks but lenvima was discontinued due to poor tolerance (Ca 125: 156-->60), patient remains chemically responsive to treatment.  - Will consider possibly resuming lenvima once more medically stable and SBO resolves and more medically optimized.     SBO  - c/w conservative management  - patient agreeable to NGT placement, + 2 episodes of NBNB emesis today  - unable to determine if SBO is due to tumor burden due to underlying ascites on imaging  - c/w care per surgery/primary team,, consider repeating imaging after next therapeutic paracentesis      # Malignant ascites required high volume paracentesis   - s/p therapeutic paracentesis by IR, patient will need scheduled therapeutic paracentesis routinely for reaccumulation.     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 CT AP shows moderate to large volume ascites with no other significant changes. Oncology consulted for management.    # SBO  - c/w conservative management (i.e. 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 in 2020 s/p weekly Carbo/Taxol (8/14/20-1/28/2021)   - PET Scan from 2/25/21 shows good response to chemo (declines any additional cancer directed therapy)  - CT A/P from 11/2021 (abd pain) shows no new disease; CTa Chest from 09/2021   - CT A/P from 02/2022 showed ascites which showed recurrent adenocarcinoma of mullerian origin  - Completed [facility] Liquid Biopsy Testing (03/01/2022) shows no targetable mutation  - chemoport placed by IR   - patient was started on Keytruda + Lenvima q3 weeks but lenvima was discontinued due to poor tolerance   - Ca 125: 156-->60 after starting keytruda, good biochemically response to treatment  - Next keytruda treatment on 5/10/22  - Will consider possibly resuming Lenvima once more medically stable and SBO resolves and more medically optimized.     # Malignant ascites required high volume paracentesis   - s/p therapeutic paracentesis by IR, patient will need scheduled therapeutic paracentesis routinely for reaccumulation.     # PE in the setting of malignancy diagnosed on 8/6/2020  - c/w Lovenox for now, switch to Xarelto upon discharge

## 2022-05-03 NOTE — PROGRESS NOTE ADULT - SUBJECTIVE AND OBJECTIVE BOX
HPI  Patient is a 81y old Female who presents with a chief complaint of SBO (03 May 2022 11:36)    Currently admitted to medicine with the primary diagnosis of Abdominal pain       Today is hospital day 2d.     INTERVAL HPI / OVERNIGHT EVENTS:  Patient was seen and examined at bedside. Grandson present at bedside  states she has no more nausea or vomiting; no abdominal pain; passing fllatus  but continuously spitting to the bedside pan  Denies any complains of chest pain or shortness of breath          PAST MEDICAL & SURGICAL HISTORY  Uterine cancer  s/p SHAAN and chemo    Hypertension    High cholesterol    Cataract of right eye    Glaucoma    Coronary artery disease  s/p CABG X2    CAD (coronary artery disease)    HTN (hypertension)    HLD (hyperlipidemia)    H/O total hysterectomy    Athscl CABG, unsp, w angina pectoris w documented spasm    History of coronary artery bypass surgery      ALLERGIES  chloroquine (Hives)  quinine (Urticaria)    MEDICATIONS  STANDING MEDICATIONS  brimonidine 0.2% Ophthalmic Solution 1 Drop(s) Left EYE three times a day  chlorhexidine 4% Liquid 1 Application(s) Topical <User Schedule>  dextrose 5% + sodium chloride 0.45%. 1000 milliLiter(s) IV Continuous <Continuous>  dorzolamide 2% Ophthalmic Solution 1 Drop(s) Left EYE every 8 hours  enoxaparin Injectable 70 milliGRAM(s) SubCutaneous every 12 hours  latanoprost 0.005% Ophthalmic Solution 1 Drop(s) Both EYES at bedtime  metoprolol succinate ER 25 milliGRAM(s) Oral daily  pantoprazole    Tablet 40 milliGRAM(s) Oral before breakfast  Parenteral Nutrition - Adult 1 Each TPN Continuous <Continuous>  senna 2 Tablet(s) Oral at bedtime  timolol 0.5% Solution 1 Drop(s) Left EYE two times a day    PRN MEDICATIONS  aluminum hydroxide/magnesium hydroxide/simethicone Suspension 30 milliLiter(s) Oral every 6 hours PRN  ondansetron Injectable 8 milliGRAM(s) IV Push every 8 hours PRN    VITALS:  T(F): 97.6  HR: 91  BP: 117/60  RR: 18  SpO2: 97%    PHYSICAL EXAM  GEN: no distress, comfortable  PULM: BS heard b/l equal, No wheezing  CVS: S1S2 present, no rubs or gallops  ABD: Soft, non-distended, no guarding; non-tender  EXT: No lower extremity edema  NEURO: A&Ox3    LABS                        11.5   6.71  )-----------( 314      ( 03 May 2022 07:30 )             33.4     05-03    134<L>  |  94<L>  |  14  ----------------------------<  113<H>  3.4<L>   |  26  |  1.0    Ca    8.0<L>      03 May 2022 07:30  Mg     1.7     05-03    TPro  5.2<L>  /  Alb  2.8<L>  /  TBili  0.5  /  DBili  x   /  AST  22  /  ALT  11  /  AlkPhos  153<H>  05-03              Culture - Urine (collected 01 May 2022 06:59)  Source: Clean Catch Clean Catch (Midstream)  Final Report (02 May 2022 16:27):    >=3 organisms. Probable collection contamination.          RADIOLOGY

## 2022-05-03 NOTE — CONSULT NOTE ADULT - SUBJECTIVE AND OBJECTIVE BOX
Hematology Consult Note    HPI:  81-year-old female history of hypertension and CAD PE with IVC filter on Xarelto uterine adenocarcinoma status post SHAAN with recurrent ascites, currently on Keytruda recent admission for partial small bowel obstruction presenting for evaluation of diffuse abdominal pain with associated nausea and vomiting.  No fevers or chills.  States that since discharge yesterday pain has gotten progressively worse.  Also complaining of constipation, not currently passing gas.  Symptoms gradual onset, moderate, no exacerbating or alleviating factors. Patient found to have SBO on admission, followed by surgery,         Allergies    chloroquine (Hives)  quinine (Urticaria)    Intolerances        MEDICATIONS  (STANDING):  brimonidine 0.2% Ophthalmic Solution 1 Drop(s) Left EYE three times a day  chlorhexidine 4% Liquid 1 Application(s) Topical <User Schedule>  dextrose 5% + sodium chloride 0.45%. 1000 milliLiter(s) (60 mL/Hr) IV Continuous <Continuous>  dorzolamide 2% Ophthalmic Solution 1 Drop(s) Left EYE every 8 hours  enoxaparin Injectable 70 milliGRAM(s) SubCutaneous every 12 hours  latanoprost 0.005% Ophthalmic Solution 1 Drop(s) Both EYES at bedtime  metoprolol succinate ER 25 milliGRAM(s) Oral daily  pantoprazole    Tablet 40 milliGRAM(s) Oral before breakfast  senna 2 Tablet(s) Oral at bedtime  timolol 0.5% Solution 1 Drop(s) Left EYE two times a day    MEDICATIONS  (PRN):  aluminum hydroxide/magnesium hydroxide/simethicone Suspension 30 milliLiter(s) Oral every 6 hours PRN Dyspepsia  ondansetron Injectable 8 milliGRAM(s) IV Push every 8 hours PRN Nausea and/or Vomiting      PAST MEDICAL & SURGICAL HISTORY:  Uterine cancer  s/p SHAAN and chemo    Hypertension    High cholesterol    Cataract of right eye    Glaucoma    Coronary artery disease  s/p CABG X2    CAD (coronary artery disease)    HTN (hypertension)    HLD (hyperlipidemia)    H/O total hysterectomy    Athscl CABG, unsp, w angina pectoris w documented spasm    History of coronary artery bypass surgery        FAMILY HISTORY:      SOCIAL HISTORY: No EtOH, no tobacco    REVIEW OF SYSTEMS:    CONSTITUTIONAL: No weakness, fevers or chills  EYES/ENT: No visual changes;  No vertigo or throat pain   NECK: No pain or stiffness  RESPIRATORY: No cough, wheezing, hemoptysis; No shortness of breath  CARDIOVASCULAR: No chest pain or palpitations  GASTROINTESTINAL: No abdominal or epigastric pain. No nausea, vomiting, or hematemesis; No diarrhea or constipation. No melena or hematochezia.  GENITOURINARY: No dysuria, frequency or hematuria  NEUROLOGICAL: No numbness or weakness  SKIN: No itching, burning, rashes, or lesions   All other review of systems is negative unless indicated above.        T(F): 98.3 (05-03-22 @ 04:45), Max: 99.6 (05-02-22 @ 21:04)  HR: 101 (05-03-22 @ 04:45)  BP: 128/82 (05-03-22 @ 04:45)  RR: 18 (05-03-22 @ 04:45)  SpO2: 97% (05-03-22 @ 00:58)  Wt(kg): --    GENERAL: NAD, well-developed  HEAD:  Atraumatic, Normocephalic  EYES: EOMI, PERRLA, conjunctiva and sclera clear  NECK: Supple, No JVD  CHEST/LUNG: Clear to auscultation bilaterally; No wheeze  HEART: Regular rate and rhythm; No murmurs, rubs, or gallops  ABDOMEN: Soft, Nontender, Nondistended; Bowel sounds present  EXTREMITIES:  2+ Peripheral Pulses, No clubbing, cyanosis, or edema  NEUROLOGY: non-focal  SKIN: No rashes or lesions                          11.5   6.71  )-----------( 314      ( 03 May 2022 07:30 )             33.4       05-03    134<L>  |  94<L>  |  14  ----------------------------<  113<H>  3.4<L>   |  26  |  1.0    Ca    8.0<L>      03 May 2022 07:30  Mg     1.7     05-03    TPro  5.2<L>  /  Alb  2.8<L>  /  TBili  0.5  /  DBili  x   /  AST  22  /  ALT  11  /  AlkPhos  153<H>  05-03      Magnesium, Serum: 1.7 mg/dL (05-03 @ 07:30)

## 2022-05-03 NOTE — PROGRESS NOTE ADULT - NUTRITIONAL ASSESSMENT
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 CT AP shows moderate to large volume ascites with no other significant changes. Oncology consulted for management.    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 in 2020 s/p weekly Carbo/Taxol (8/14/20-1/28/2021)   - PET Scan from 2/25/21 shows good response to chemo (declines any additional cancer directed therapy)  - CT A/P from 11/2021 (abd pain) shows no new disease; CTa Chest from 09/2021   - CT A/P from 02/2022 showed ascites which showed recurrent adenocarcinoma of mullerian origin:  is high  - Completed [facility] Liquid Biopsy Testing (03/01/2022) shows no targetable mutation  - chemoport placed by IR   - patient was started on Keytruda + Lenvima q3 weeks but lenvima was discontinued due to poor tolerance  - Will consider possibly resuming lenvima once more medically stable      # Malignant ascites required high volume paracentesis   - previously improved/resolved with carbo/taxol  - s/p therapeutic paracentesis by IR, patient will need scheduled theraputic paracentesis     # PE in the setting of malignancy diagnosed on 8/6/2020  - Doppler was negative for DVT on 8/8/2020  - restarted Xarelto 20mg daily s/p paracentesis

## 2022-05-03 NOTE — PROGRESS NOTE ADULT - SUBJECTIVE AND OBJECTIVE BOX
Patient is a 81y old  Female who presents with a chief complaint of SBO (03 May 2022 10:11)      INTERVAL HPI/OVERNIGHT EVENTS:    Patient endorses nausea and vomiting "too many times to count" overnight. The patient describes her vomit as non-bloody, dark and liquid. The patient states she had 1 bowel movement yesterday.    REVIEW OF SYSTEMS:  CONSTITUTIONAL: No fever, weight loss, or fatigue  EYES: No eye pain, visual disturbances, or discharge  ENMT:  No difficulty hearing, tinnitus, vertigo; No sinus or throat pain  NECK: No pain or stiffness  BREASTS: No pain, masses, or nipple discharge  RESPIRATORY: No cough, wheezing, chills or hemoptysis; No shortness of breath  CARDIOVASCULAR: No chest pain, palpitations, dizziness, or leg swelling  GASTROINTESTINAL: No abdominal or epigastric pain. No nausea, vomiting, or hematemesis; No diarrhea or constipation. No melena or hematochezia.  GENITOURINARY: No dysuria, frequency, hematuria, or incontinence  NEUROLOGICAL: No headaches, memory loss, loss of strength, numbness, or tremors  SKIN: No itching, burning, rashes, or lesions   LYMPH NODES: No enlarged glands  ENDOCRINE: No heat or cold intolerance; No hair loss  MUSCULOSKELETAL: No joint pain or swelling; No muscle, back, or extremity pain  PSYCHIATRIC: No depression, anxiety, mood swings, or difficulty sleeping  HEME/LYMPH: No easy bruising, or bleeding gums  ALLERY AND IMMUNOLOGIC: No hives or eczema  FAMILY HISTORY:    T(C): 36.8 (05-03-22 @ 04:45), Max: 37.6 (05-02-22 @ 21:04)  HR: 101 (05-03-22 @ 04:45) (100 - 123)  BP: 128/82 (05-03-22 @ 04:45) (102/65 - 128/82)  RR: 18 (05-03-22 @ 04:45) (16 - 18)  SpO2: 97% (05-03-22 @ 00:58) (97% - 97%)  Wt(kg): --Vital Signs Last 24 Hrs  T(C): 36.8 (03 May 2022 04:45), Max: 37.6 (02 May 2022 21:04)  T(F): 98.3 (03 May 2022 04:45), Max: 99.6 (02 May 2022 21:04)  HR: 101 (03 May 2022 04:45) (100 - 123)  BP: 128/82 (03 May 2022 04:45) (102/65 - 128/82)  BP(mean): --  RR: 18 (03 May 2022 04:45) (16 - 18)  SpO2: 97% (03 May 2022 00:58) (97% - 97%)    PHYSICAL EXAM:  GENERAL: NAD, well-groomed, well-developed  HEAD:  Atraumatic, Normocephalic  EYES: EOMI, PERRLA, conjunctiva and sclera clear  ENMT: No tonsillar erythema, exudates, or enlargement; Moist mucous membranes, Good dentition, No lesions  NECK: Supple, No JVD, Normal thyroid  NERVOUS SYSTEM:  Alert & Oriented X3, Good concentration; Motor Strength 5/5 B/L upper and lower extremities; DTRs 2+ intact and symmetric  CHEST/LUNG: Clear to percussion bilaterally; No rales, rhonchi, wheezing, or rubs  HEART: Regular rate and rhythm; No murmurs, rubs, or gallops  ABDOMEN: Soft, Nontender, Nondistended; Bowel sounds present  EXTREMITIES:  2+ Peripheral Pulses, No clubbing, cyanosis, or edema  LYMPH: No lymphadenopathy noted  SKIN: No rashes or lesions    Consultant(s) Notes Reviewed:  [x ] YES  [ ] NO  Care Discussed with Consultants/Other Providers [ x] YES  [ ] NO    LABS:    CBC:            11.5   6.71  )-----------( 314      ( 05-03-22 @ 07:30 )             33.4         Chem:         ( 05-03-22 @ 07:30 )    134<L>  |  94<L>  |  14  ----------------------------<  113<H>  3.4<L>   |  26  |  1.0        Liver Functions: ( 05-03-22 @ 07:30 )  Alb: 2.8 g/dL / Pro: 5.2 g/dL / ALK PHOS: 153 U/L / ALT: 11 U/L / AST: 22 U/L / GGT: x              RADIOLOGY & ADDITIONAL TESTS:    Imaging Personally Reviewed:  [ ] YES  [ ] NO  aluminum hydroxide/magnesium hydroxide/simethicone Suspension 30 milliLiter(s) Oral every 6 hours PRN  brimonidine 0.2% Ophthalmic Solution 1 Drop(s) Left EYE three times a day  chlorhexidine 4% Liquid 1 Application(s) Topical <User Schedule>  dextrose 5% + sodium chloride 0.45%. 1000 milliLiter(s) IV Continuous <Continuous>  dorzolamide 2% Ophthalmic Solution 1 Drop(s) Left EYE every 8 hours  enoxaparin Injectable 70 milliGRAM(s) SubCutaneous every 12 hours  latanoprost 0.005% Ophthalmic Solution 1 Drop(s) Both EYES at bedtime  metoprolol succinate ER 25 milliGRAM(s) Oral daily  ondansetron Injectable 8 milliGRAM(s) IV Push every 8 hours PRN  pantoprazole    Tablet 40 milliGRAM(s) Oral before breakfast  senna 2 Tablet(s) Oral at bedtime  timolol 0.5% Solution 1 Drop(s) Left EYE two times a day      HEALTH ISSUES - PROBLEM Dx:         Patient is a 81y old  Female who presents with a chief complaint of SBO (03 May 2022 10:11)      INTERVAL HPI/OVERNIGHT EVENTS:    Patient endorses nausea and vomiting "too many times to count" overnight. The patient describes her vomit as non-bloody, dark and liquid. The patient states she had 1 bowel movement yesterday.    REVIEW OF SYSTEMS:  CONSTITUTIONAL: No fever, weight loss, or fatigue  EYES: No eye pain, visual disturbances, or discharge  ENMT:  No difficulty hearing, tinnitus, vertigo; No sinus or throat pain  NECK: No pain or stiffness  BREASTS: No pain, masses, or nipple discharge  RESPIRATORY: No cough, wheezing, chills or hemoptysis; No shortness of breath  CARDIOVASCULAR: No chest pain, palpitations, dizziness, or leg swelling  GASTROINTESTINAL: No abdominal or epigastric pain. Nausea & vomiting present. No hematemesis; No diarrhea or constipation. No melena or hematochezia.  GENITOURINARY: No dysuria, frequency, hematuria, or incontinence  NEUROLOGICAL: No headaches, memory loss, loss of strength, numbness, or tremors  SKIN: No itching, burning, rashes, or lesions   LYMPH NODES: No enlarged glands  ENDOCRINE: No heat or cold intolerance; No hair loss  MUSCULOSKELETAL: No joint pain or swelling; No muscle, back, or extremity pain  PSYCHIATRIC: No depression, anxiety, mood swings, or difficulty sleeping  HEME/LYMPH: No easy bruising, or bleeding gums  ALLERY AND IMMUNOLOGIC: No hives or eczema  FAMILY HISTORY:    T(C): 36.8 (05-03-22 @ 04:45), Max: 37.6 (05-02-22 @ 21:04)  HR: 101 (05-03-22 @ 04:45) (100 - 123)  BP: 128/82 (05-03-22 @ 04:45) (102/65 - 128/82)  RR: 18 (05-03-22 @ 04:45) (16 - 18)  SpO2: 97% (05-03-22 @ 00:58) (97% - 97%)  Wt(kg): --Vital Signs Last 24 Hrs  T(C): 36.8 (03 May 2022 04:45), Max: 37.6 (02 May 2022 21:04)  T(F): 98.3 (03 May 2022 04:45), Max: 99.6 (02 May 2022 21:04)  HR: 101 (03 May 2022 04:45) (100 - 123)  BP: 128/82 (03 May 2022 04:45) (102/65 - 128/82)  BP(mean): --  RR: 18 (03 May 2022 04:45) (16 - 18)  SpO2: 97% (03 May 2022 00:58) (97% - 97%)    PHYSICAL EXAM:  GENERAL: NAD, well-groomed, well-developed  HEAD:  Atraumatic, Normocephalic  EYES: EOMI, PERRLA, conjunctiva and sclera clear  ENMT: No tonsillar erythema, exudates, or enlargement; Moist mucous membranes, Good dentition, No lesions  NECK: Supple, No JVD, Normal thyroid  NERVOUS SYSTEM:  Alert & Oriented X3, Good concentration; Motor Strength 5/5 B/L upper and lower extremities; DTRs 2+ intact and symmetric  CHEST/LUNG: Clear to percussion bilaterally; No rales, rhonchi, wheezing, or rubs  HEART: Regular rate and rhythm; No murmurs, rubs, or gallops  ABDOMEN: Soft, Nontender, Nondistended; Bowel sounds present  EXTREMITIES:  2+ Peripheral Pulses, No clubbing, cyanosis, or edema  LYMPH: No lymphadenopathy noted  SKIN: No rashes or lesions    Consultant(s) Notes Reviewed:  [x ] YES  [ ] NO  Care Discussed with Consultants/Other Providers [ x] YES  [ ] NO    LABS:    CBC:            11.5   6.71  )-----------( 314      ( 05-03-22 @ 07:30 )             33.4         Chem:         ( 05-03-22 @ 07:30 )    134<L>  |  94<L>  |  14  ----------------------------<  113<H>  3.4<L>   |  26  |  1.0        Liver Functions: ( 05-03-22 @ 07:30 )  Alb: 2.8 g/dL / Pro: 5.2 g/dL / ALK PHOS: 153 U/L / ALT: 11 U/L / AST: 22 U/L / GGT: x              RADIOLOGY & ADDITIONAL TESTS:    Imaging Personally Reviewed:  [ ] YES  [ ] NO  aluminum hydroxide/magnesium hydroxide/simethicone Suspension 30 milliLiter(s) Oral every 6 hours PRN  brimonidine 0.2% Ophthalmic Solution 1 Drop(s) Left EYE three times a day  chlorhexidine 4% Liquid 1 Application(s) Topical <User Schedule>  dextrose 5% + sodium chloride 0.45%. 1000 milliLiter(s) IV Continuous <Continuous>  dorzolamide 2% Ophthalmic Solution 1 Drop(s) Left EYE every 8 hours  enoxaparin Injectable 70 milliGRAM(s) SubCutaneous every 12 hours  latanoprost 0.005% Ophthalmic Solution 1 Drop(s) Both EYES at bedtime  metoprolol succinate ER 25 milliGRAM(s) Oral daily  ondansetron Injectable 8 milliGRAM(s) IV Push every 8 hours PRN  pantoprazole    Tablet 40 milliGRAM(s) Oral before breakfast  senna 2 Tablet(s) Oral at bedtime  timolol 0.5% Solution 1 Drop(s) Left EYE two times a day      HEALTH ISSUES - PROBLEM Dx:         Patient is a 81y old  Female who presents with a chief complaint of SBO (03 May 2022 10:11)      INTERVAL HPI/OVERNIGHT EVENTS:    Patient endorses nausea and vomiting "too many times to count" overnight. The patient describes her vomit as non-bloody, dark and liquid. The patient states she had 1 bowel movement yesterday.    FAMILY HISTORY:    T(C): 36.8 (05-03-22 @ 04:45), Max: 37.6 (05-02-22 @ 21:04)  HR: 101 (05-03-22 @ 04:45) (100 - 123)  BP: 128/82 (05-03-22 @ 04:45) (102/65 - 128/82)  RR: 18 (05-03-22 @ 04:45) (16 - 18)  SpO2: 97% (05-03-22 @ 00:58) (97% - 97%)  Wt(kg): --Vital Signs Last 24 Hrs  T(C): 36.8 (03 May 2022 04:45), Max: 37.6 (02 May 2022 21:04)  T(F): 98.3 (03 May 2022 04:45), Max: 99.6 (02 May 2022 21:04)  HR: 101 (03 May 2022 04:45) (100 - 123)  BP: 128/82 (03 May 2022 04:45) (102/65 - 128/82)  BP(mean): --  RR: 18 (03 May 2022 04:45) (16 - 18)  SpO2: 97% (03 May 2022 00:58) (97% - 97%)    PHYSICAL EXAM:  GENERAL: NAD, well-groomed, well-developed  HEAD:  Atraumatic, Normocephalic  EYES: EOMI, conjunctiva and sclera clear  ENMT: Moist mucous membranes  NECK: Supple, No JVD.   NERVOUS SYSTEM:  Alert & Oriented X3  CHEST/LUNG: audible bilateral bs + L chest port   HEART: Regular rate and rhythm; No murmurs, rubs, or gallops  ABDOMEN: Soft, Nontender, distended abdomen, +BS   EXTREMITIES: UE edema?  SKIN: No rashes or lesions    Consultant(s) Notes Reviewed:  [x ] YES  [ ] NO  Care Discussed with Consultants/Other Providers [ x] YES  [ ] NO    LABS:    CBC:            11.5   6.71  )-----------( 314      ( 05-03-22 @ 07:30 )             33.4         Chem:         ( 05-03-22 @ 07:30 )    134<L>  |  94<L>  |  14  ----------------------------<  113<H>  3.4<L>   |  26  |  1.0        Liver Functions: ( 05-03-22 @ 07:30 )  Alb: 2.8 g/dL / Pro: 5.2 g/dL / ALK PHOS: 153 U/L / ALT: 11 U/L / AST: 22 U/L / GGT: x              RADIOLOGY & ADDITIONAL TESTS:    Imaging Personally Reviewed:  [ ] YES  [ ] NO  aluminum hydroxide/magnesium hydroxide/simethicone Suspension 30 milliLiter(s) Oral every 6 hours PRN  brimonidine 0.2% Ophthalmic Solution 1 Drop(s) Left EYE three times a day  chlorhexidine 4% Liquid 1 Application(s) Topical <User Schedule>  dextrose 5% + sodium chloride 0.45%. 1000 milliLiter(s) IV Continuous <Continuous>  dorzolamide 2% Ophthalmic Solution 1 Drop(s) Left EYE every 8 hours  enoxaparin Injectable 70 milliGRAM(s) SubCutaneous every 12 hours  latanoprost 0.005% Ophthalmic Solution 1 Drop(s) Both EYES at bedtime  metoprolol succinate ER 25 milliGRAM(s) Oral daily  ondansetron Injectable 8 milliGRAM(s) IV Push every 8 hours PRN  pantoprazole    Tablet 40 milliGRAM(s) Oral before breakfast  senna 2 Tablet(s) Oral at bedtime  timolol 0.5% Solution 1 Drop(s) Left EYE two times a day      HEALTH ISSUES - PROBLEM Dx:

## 2022-05-03 NOTE — DISCHARGE NOTE NURSING/CASE MANAGEMENT/SOCIAL WORK - PATIENT PORTAL LINK FT
You can access the FollowMyHealth Patient Portal offered by [facility] by registering at the following website: [email or web address]. By joining [facility]’s FollowMyHealth portal, you will also be able to view your health information using other applications (apps) compatible with our system.

## 2022-05-04 NOTE — PROGRESS NOTE ADULT - SUBJECTIVE AND OBJECTIVE BOX
pt sleepy but oriented and verbal when wakened  TPN infusing via Port  Vital Signs Last 24 Hrs  T(C): 36.7 (04 May 2022 05:07), Max: 37 (03 May 2022 21:02)  T(F): 98.1 (04 May 2022 05:07), Max: 98.6 (03 May 2022 21:02)  HR: 91 (04 May 2022 05:07) (88 - 97)  BP: 104/59 (04 May 2022 05:07) (97/76 - 125/66)  BP(mean): --  RR: 18 (04 May 2022 05:07) (18 - 18)    MEDICATIONS  (STANDING):  brimonidine 0.2% Ophthalmic Solution 1 Drop(s) Left EYE three times a day  chlorhexidine 4% Liquid 1 Application(s) Topical <User Schedule>  dorzolamide 2% Ophthalmic Solution 1 Drop(s) Left EYE every 8 hours  enoxaparin Injectable 70 milliGRAM(s) SubCutaneous every 12 hours  fat emulsion (Fish Oil and Plant Based) 20% Infusion 1.267 Gm/kG/Day (23.4 mL/Hr) IV Continuous <Continuous>  latanoprost 0.005% Ophthalmic Solution 1 Drop(s) Both EYES at bedtime  metoprolol succinate ER 25 milliGRAM(s) Oral daily  pantoprazole  Injectable 40 milliGRAM(s) IV Push daily  Parenteral Nutrition - Adult 1 Each (60 mL/Hr) TPN Continuous <Continuous>  Parenteral Nutrition - Adult 1 Each (60 mL/Hr) TPN Continuous <Continuous>  senna 2 Tablet(s) Oral at bedtime  timolol 0.5% Solution 1 Drop(s) Left EYE two times a day                        11.5   6.71  )-----------( 314      ( 03 May 2022 07:30 )             33.4   05-04    134<L>  |  98  |  17  ----------------------------<  143<H>  4.1   |  21  |  1.0    Ca    7.8<L>      04 May 2022 08:01  Phos  3.0     05-04  Mg     2.1     05-04    TPro  5.2<L>  /  Alb  2.8<L>  /  TBili  0.5  /  DBili  x   /  AST  22  /  ALT  11  /  AlkPhos  153<H>  05-03

## 2022-05-04 NOTE — PROGRESS NOTE ADULT - ASSESSMENT
Patient is a 81 female PMH recurrent uterine adenocarcinoma s/p SHAAN/BSO (2016), recurrent malignant ascites, CAD s/p CABG, HTN, depression, PE (Xarelto) and IVC filter 2020 presenting with multiple day history of LUQ abdominal pain since prior to discharge, x1 episode of NBNB emesis day prior to arrival, none in the ED, passing gas on day of arrival with BM day prior to arrival. On workup patient found to have persistent large volume ascites (malignant), with small bowel dilation up to 4.2cm with abrupt transition point to collapsed small bowel in left mid abdomen. The first NG tube placement attempt failed, patient removed it herself - will consider a second attempt today, if needed.    #Small Bowel Obstruction  - No need for NGT for decompression now   - pt started on clear fluids   -Strict I&O's Q4 hrs.  - Daily KUB   - Follow surgery recommendations. Surgery a very high risk candidate for surgery and do not want to offer surgical intervention   - obtain heme/onc consult to ascertain patient's status at surgery's request  - trend am labs  - TPN via port started   - patient having BMs, abdomen is soft. Will try advancing to clear liquid diet. If vomiting occurs or unable to tolerate, make NPO.     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 s/p weekly Carbo/Taxol (8/14/20-1/28/2021)   - PET Scan from 2/25/21 shows good response to chemo (declines any additional cancer directed therapy)  - CT A/P from 11/2021 (abd pain) shows no new disease; CTa Chest from 09/2021   - CT A/P from 02/2022 showed ascites which showed recurrent adenocarcinoma of mullerian origin:  is high  - Completed [facility] Liquid Biopsy Testing (03/01/2022) shows no targetable mutation  - chemoport placed by IR   - patient was started on Keytruda + Lenvima q3 weeks but lenvima was discontinued due to poor tolerance  - heme onc will consider possibly resuming lenvima once more medically stable    - Chemotherapy scheduled for 5/10    #Hypertension  - Patient is on Metoprolol 25mg daily  - continue monitoring BP    #CAD  - Patient is on Xarelto and has an IVC filter    #Glaucoma  - continue taking medications    #Misc   - DVT ppx: therapeutic lvnx   - Diet: NPO; TPN  - Code status: full   - Pending (specify):  KUB ,am labs, clear liquids toleration   - Disposition: acute

## 2022-05-04 NOTE — PROGRESS NOTE ADULT - SUBJECTIVE AND OBJECTIVE BOX
Patient is a 81y old  Female who presents with a chief complaint of SBO (04 May 2022 10:11)      INTERVAL HPI/OVERNIGHT EVENTS:    Patient endorses producing several bowel movements overnight and this morning. The patient denies any abdominal discomfort/pain.     T(C): 36.7 (04 May 2022 05:07), Max: 37 (03 May 2022 21:02)  T(F): 98.1 (04 May 2022 05:07), Max: 98.6 (03 May 2022 21:02)  HR: 91 (04 May 2022 05:07) (88 - 97)  BP: 104/59 (04 May 2022 05:07) (97/76 - 125/66)  RR: 18 (04 May 2022 05:07) (18 - 18)  SpO2: --    PHYSICAL EXAM:  GENERAL: NAD, well-groomed, well-developed  HEAD:  Atraumatic, Normocephalic  EYES: EOMI, conjunctiva and sclera clear  ENMT: Moist mucous membranes  NECK: Supple, No JVD.   NERVOUS SYSTEM:  Alert & Oriented X3  CHEST/LUNG: audible bilateral bs + L chest port   HEART: Regular rate and rhythm; No murmurs, rubs, or gallops  ABDOMEN: Soft, Nontender, distended abdomen, +BS   EXTREMITIES: UE edema?  SKIN: No rashes or lesions   Patient is a 81y old  Female who presents with a chief complaint of SBO (04 May 2022 10:11)      INTERVAL HPI/OVERNIGHT EVENTS:    Patient endorses producing several bowel movements overnight and this morning. The patient denies any abdominal discomfort/pain.     T(C): 36.7 (04 May 2022 05:07), Max: 37 (03 May 2022 21:02)  T(F): 98.1 (04 May 2022 05:07), Max: 98.6 (03 May 2022 21:02)  HR: 91 (04 May 2022 05:07) (88 - 97)  BP: 104/59 (04 May 2022 05:07) (97/76 - 125/66)  RR: 18 (04 May 2022 05:07) (18 - 18)  SpO2: --    PHYSICAL EXAM:  GENERAL: NAD, well-groomed, well-developed  HEAD:  Atraumatic, Normocephalic  EYES: EOMI, conjunctiva and sclera clear  ENMT: Moist mucous membranes  NECK: Supple, No JVD.   NERVOUS SYSTEM:  Alert & Oriented X3  CHEST/LUNG: audible bilateral bs + L chest port   HEART: Regular rate and rhythm; No murmurs, rubs, or gallops  ABDOMEN: Soft, Nontender, distended abdomen, +BS   EXTREMITIES: UE edema?  SKIN: No rashes or lesions    Consultant(s) Notes Reviewed:  [x ] YES  [ ] NO  Care Discussed with Consultants/Other Providers [ x] YES  [ ] NO    LABS:    CBC:            11.5   6.71  )-----------( 314      ( 05-03-22 @ 07:30 )             33.4         Chem:         ( 05-04-22 @ 08:01 )    134<L>  |  98  |  17  ----------------------------<  143<H>  4.1   |  21  |  1.0        Liver Functions: ( 05-03-22 @ 07:30 )  Alb: 2.8 g/dL / Pro: 5.2 g/dL / ALK PHOS: 153 U/L / ALT: 11 U/L / AST: 22 U/L / GGT: x            RADIOLOGY & ADDITIONAL TESTS:    Imaging Personally Reviewed:  [ ] YES  [ ] NO  aluminum hydroxide/magnesium hydroxide/simethicone Suspension 30 milliLiter(s) Oral every 6 hours PRN  brimonidine 0.2% Ophthalmic Solution 1 Drop(s) Left EYE three times a day  chlorhexidine 4% Liquid 1 Application(s) Topical <User Schedule>  dorzolamide 2% Ophthalmic Solution 1 Drop(s) Left EYE every 8 hours  enoxaparin Injectable 70 milliGRAM(s) SubCutaneous every 12 hours  latanoprost 0.005% Ophthalmic Solution 1 Drop(s) Both EYES at bedtime  metoprolol succinate ER 25 milliGRAM(s) Oral daily  ondansetron Injectable 8 milliGRAM(s) IV Push every 8 hours PRN  pantoprazole  Injectable 40 milliGRAM(s) IV Push daily  Parenteral Nutrition - Adult 1 Each TPN Continuous <Continuous>  senna 2 Tablet(s) Oral at bedtime  timolol 0.5% Solution 1 Drop(s) Left EYE two times a day      HEALTH ISSUES - PROBLEM Dx:  SBO  Uterine Cancer

## 2022-05-04 NOTE — PROGRESS NOTE ADULT - ASSESSMENT
Patient is a 81 female PMH recurrent uterine adenocarcinoma s/p SHAAN/BSO (2016), recurrent malignant ascites, CAD s/p CABG, HTN, depression, PE (Xarelto) and IVC filter 2020 presenting with multiple day history of LUQ abdominal pain since prior to discharge, x1 episode of NBNB emesis day prior to arrival, none in the ED, passing gas on day of arrival with BM day prior to arrival. On workup patient found to have persistent large volume ascites (malignant), with small bowel dilation up to 4.2cm with abrupt transition point to collapsed small bowel in left mid abdomen. The first NG tube placement attempt failed, patient removed it herself - will consider a second attempt today, if needed.    #Small Bowel Obstruction  - No need for NGT for decompression now   - Strict NPO  -Strict I&O's Q4 hrs.  - KUB ordered - continuous SBO noted. Daily KUB   - Follow surgery recommendations. Surgery a very high risk candidate for surgery and do not want to offer surgical intervention   - obtain heme/onc consult to ascertain patient's status at surgery's request  - trend am labs  - TPN via port started     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 s/p weekly Carbo/Taxol (8/14/20-1/28/2021)   - PET Scan from 2/25/21 shows good response to chemo (declines any additional cancer directed therapy)  - CT A/P from 11/2021 (abd pain) shows no new disease; CTa Chest from 09/2021   - CT A/P from 02/2022 showed ascites which showed recurrent adenocarcinoma of mullerian origin:  is high  - Completed [facility] Liquid Biopsy Testing (03/01/2022) shows no targetable mutation  - chemoport placed by IR   - patient was started on Keytruda + Lenvima q3 weeks but lenvima was discontinued due to poor tolerance  - heme onc will consider possibly resuming lenvima once more medically stable    - Chemotherapy scheduled for 5/10    #Hypertension  - Patient is on Metoprolol 25mg daily  - continue monitoring BP    #Hyperlipidemia    #CAD  - Patient is on Xarelto and has an IVC filter    #Glaucoma  - continue taking medications    #Misc   - DVT ppx: therapeutic lvnx   - Diet: NPO; TPN  - Code status: full   - Pending (specify):  claudia SCHROEDER labs  - Disposition: acute Patient is a 81 female PMH recurrent uterine adenocarcinoma s/p SHAAN/BSO (2016), recurrent malignant ascites, CAD s/p CABG, HTN, depression, PE (Xarelto) and IVC filter 2020 presenting with multiple day history of LUQ abdominal pain since prior to discharge, x1 episode of NBNB emesis day prior to arrival, none in the ED, passing gas on day of arrival with BM day prior to arrival. On workup patient found to have persistent large volume ascites (malignant), with small bowel dilation up to 4.2cm with abrupt transition point to collapsed small bowel in left mid abdomen. The first NG tube placement attempt failed, patient removed it herself - will consider a second attempt today, if needed.    #Small Bowel Obstruction  - No need for NGT for decompression now   - Strict NPO  -Strict I&O's Q4 hrs.  - KUB ordered - continuous SBO noted. Daily KUB   - Follow surgery recommendations. Surgery a very high risk candidate for surgery and do not want to offer surgical intervention   - obtain heme/onc consult to ascertain patient's status at surgery's request  - trend am labs  - TPN via port started   - patient having BMs, abdomen is soft. Will try advancing to clear liquid diet. If vomiting occurs or unable to tolerate, make NPO.     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  - Uterine cancer s/p SAHAN-BSO with adjuvant chemotherapy (2016)  - recurrent adenocarcinoma of mullerian origin w/ malignant ascites in 2020 s/p weekly Carbo/Taxol (8/14/20-1/28/2021)   - PET Scan from 2/25/21 shows good response to chemo (declines any additional cancer directed therapy)  - CT A/P from 11/2021 (abd pain) shows no new disease; CTa Chest from 09/2021   - CT A/P from 02/2022 showed ascites which showed recurrent adenocarcinoma of mullerian origin:  is high  - Completed [facility] Liquid Biopsy Testing (03/01/2022) shows no targetable mutation  - chemoport placed by IR   - patient was started on Keytruda + Lenvima q3 weeks but lenvima was discontinued due to poor tolerance  - heme onc will consider possibly resuming lenvima once more medically stable    - Chemotherapy scheduled for 5/10    #Hypertension  - Patient is on Metoprolol 25mg daily  - continue monitoring BP    #CAD  - Patient is on Xarelto and has an IVC filter    #Glaucoma  - continue taking medications    #Misc   - DVT ppx: therapeutic lvnx   - Diet: NPO; TPN  - Code status: full   - Pending (specify):  claudia SCHROEDER labs  - Disposition: acute

## 2022-05-04 NOTE — PROGRESS NOTE ADULT - SUBJECTIVE AND OBJECTIVE BOX
Patient is a 81y old  Female who presents with a chief complaint of SBO (04 May 2022 13:19)      Patient seen and examined at bedside.  Patient denies any chest pain or shortness of breath, reports no emesis for over 48 hours, denies any abd pain  ALLERGIES:  chloroquine (Hives)  quinine (Urticaria)    MEDICATIONS:  aluminum hydroxide/magnesium hydroxide/simethicone Suspension 30 milliLiter(s) Oral every 6 hours PRN  brimonidine 0.2% Ophthalmic Solution 1 Drop(s) Left EYE three times a day  chlorhexidine 4% Liquid 1 Application(s) Topical <User Schedule>  dorzolamide 2% Ophthalmic Solution 1 Drop(s) Left EYE every 8 hours  enoxaparin Injectable 70 milliGRAM(s) SubCutaneous every 12 hours  fat emulsion (Fish Oil and Plant Based) 20% Infusion 1.267 Gm/kG/Day IV Continuous <Continuous>  latanoprost 0.005% Ophthalmic Solution 1 Drop(s) Both EYES at bedtime  metoprolol succinate ER 25 milliGRAM(s) Oral daily  ondansetron Injectable 8 milliGRAM(s) IV Push every 8 hours PRN  pantoprazole  Injectable 40 milliGRAM(s) IV Push daily  Parenteral Nutrition - Adult 1 Each TPN Continuous <Continuous>  Parenteral Nutrition - Adult 1 Each TPN Continuous <Continuous>  senna 2 Tablet(s) Oral at bedtime  timolol 0.5% Solution 1 Drop(s) Left EYE two times a day    Vital Signs Last 24 Hrs  T(F): 98.6 (04 May 2022 14:30), Max: 98.6 (03 May 2022 21:02)  HR: 82 (04 May 2022 14:30) (82 - 91)  BP: 101/59 (04 May 2022 14:30) (101/59 - 125/66)  RR: 17 (04 May 2022 14:30) (17 - 18)  SpO2: --  I&O's Summary      PHYSICAL EXAM:  General: NAD, A/O x 3  ENT: MMM  Neck: Supple, No JVD  Lungs: Clear to auscultation bilaterally, no crackles   Cardio: RRR, S1/S2, No murmurs  Abdomen: Soft, Nontender, mild distended; Bowel sounds present  Extremities: No cyanosis, No edema    LABS:                        11.5   6.71  )-----------( 314      ( 03 May 2022 07:30 )             33.4     05-04    134  |  98  |  17  ----------------------------<  143  4.1   |  21  |  1.0    Ca    7.8      04 May 2022 08:01  Phos  3.0     05-04  Mg     2.1     05-04    TPro  5.2  /  Alb  2.8  /  TBili  0.5  /  DBili  x   /  AST  22  /  ALT  11  /  AlkPhos  153  05-03              05-04 Chol -- LDL -- HDL -- Trig 78 mg/dL                      Culture - Urine (collected 01 May 2022 06:59)  Source: Clean Catch Clean Catch (Midstream)  Final Report (02 May 2022 16:27):    >=3 organisms. Probable collection contamination.      COVID-19 PCR: NotDetec (05-01-22 @ 05:24)  COVID-19 PCR: NotDetec (04-25-22 @ 07:30)  COVID-19 PCR: NotDetec (04-21-22 @ 18:38)      RADIOLOGY & ADDITIONAL TESTS:  < from: Xray Abdomen 1 View PORTABLE -Routine (Xray Abdomen 1 View PORTABLE -Routine in AM.) (05.04.22 @ 05:23) >  FINDINGS/  IMPRESSION:    Again seen is slightly prominent gas-filled distended loop of bowel   within central abdomen.  Stable bones.    IVC filter noted. Surgical clips in the pelvis.    < end of copied text >    Care Discussed with Consultants/Other Providers:

## 2022-05-04 NOTE — PROGRESS NOTE ADULT - ASSESSMENT
IMP:  - Uterine Adenocarcinoma Mullerian origin, malignant ascites diagnosed on 8/8/2020  - SBO  - HTN  - CAD, hyperlipidemia  - glaucoma  - hypokalemia, hypomagnesemia  - wt loss, ~10% since 11/2020  - risk for refeeding syndrome    cont TPN - lytes/ content adjusted

## 2022-05-05 NOTE — PROGRESS NOTE ADULT - ASSESSMENT
Patient is a 81 female PMH recurrent uterine adenocarcinoma s/p SHAAN/BSO (2016), recurrent malignant ascites, CAD s/p CABG, HTN, depression, PE (Xarelto) and IVC filter 2020 presenting with multiple day history of LUQ abdominal pain since prior to discharge, x1 episode of NBNB emesis day prior to arrival, none in the ED, passing gas on day of arrival with BM day prior to arrival. On workup patient found to have persistent large volume ascites (malignant), with small bowel dilation up to 4.2cm with abrupt transition point to collapsed small bowel in left mid abdomen. The first NG tube placement attempt failed, patient removed it herself - will consider a second attempt today, if needed.    #Small Bowel Obstruction  - No need for NGT for decompression now   - Strict NPO  -Strict I&O's Q4 hrs.  - KUB - continuous SBO noted. Daily KUB   - Follow surgery recommendations. Surgery a very high risk candidate for surgery and do not want to offer surgical intervention   - obtain heme/onc consult to ascertain patient's status at surgery's request  - trend am labs  - TPN via port  - patient having BMs, abdomen is soft. Will try advancing to clear liquid diet. If vomiting occurs or unable to tolerate, make NPO.     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 s/p weekly Carbo/Taxol (8/14/20-1/28/2021)   - PET Scan from 2/25/21 shows good response to chemo (declines any additional cancer directed therapy)  - CT A/P from 11/2021 (abd pain) shows no new disease; CTa Chest from 09/2021   - CT A/P from 02/2022 showed ascites which showed recurrent adenocarcinoma of mullerian origin:  is high  - Completed [facility] Liquid Biopsy Testing (03/01/2022) shows no targetable mutation  - chemoport placed by IR   - patient was started on Keytruda + Lenvima q3 weeks but lenvima was discontinued due to poor tolerance  - heme onc will consider possibly resuming lenvima once more medically stable    - Chemotherapy scheduled for 5/10    #Hypertension  - Patient is on Metoprolol 25mg daily  - continue monitoring BP    #CAD  - Patient is on Xarelto and has an IVC filter    #Glaucoma  - continue taking medications    #Misc   - DVT ppx: therapeutic lvnx   - Diet: NPO; TPN  - Code status: full   - Pending (specify):  claudia SCHROEDER labs  - Disposition: acute

## 2022-05-05 NOTE — PROGRESS NOTE ADULT - ASSESSMENT
Patient is a 81 female PMH recurrent uterine adenocarcinoma s/p SHAAN/BSO (2016), recurrent malignant ascites, CAD s/p CABG, HTN, depression, PE (Xarelto) and IVC filter 2020 presenting with multiple day history of LUQ abdominal pain since prior to discharge, x1 episode of NBNB emesis day prior to arrival, none in the ED, passing gas on day of arrival with BM day prior to arrival. On workup patient found to have persistent large volume ascites (malignant), with small bowel dilation up to 4.2cm with abrupt transition point to collapsed small bowel in left mid abdomen. The first NG tube placement attempt failed, patient removed it herself - will consider a second attempt today, if needed.    #Small Bowel Obstruction  - No need for NGT for decompression now   - pt started on clear fluids 5/4 -> increased pain and distention_> return to NPO 5/5  -Strict I&O's Q4 hrs.  - Daily KUB   - Follow surgery recommendations. Surgery a very high risk candidate for surgery and do not want to offer surgical intervention   - obtain heme/onc consult to ascertain patient's status at surgery's request  - trend am labs  - TPN via port started   - patient having BMs, abdomen is soft. Will try advancing to clear liquid diet. If vomiting occurs or unable to tolerate, make NPO.     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 s/p weekly Carbo/Taxol (8/14/20-1/28/2021)   - PET Scan from 2/25/21 shows good response to chemo (declines any additional cancer directed therapy)  - CT A/P from 11/2021 (abd pain) shows no new disease; CTa Chest from 09/2021   - CT A/P from 02/2022 showed ascites which showed recurrent adenocarcinoma of mullerian origin:  is high  - Completed [facility] Liquid Biopsy Testing (03/01/2022) shows no targetable mutation  - chemoport placed by IR   - patient was started on Keytruda + Lenvima q3 weeks but lenvima was discontinued due to poor tolerance  - heme onc will consider possibly resuming lenvima once more medically stable    - Chemotherapy scheduled for 5/10    #Hypertension  - Patient is on Metoprolol 25mg daily  - continue monitoring BP    #CAD  - Patient is on Xarelto and has an IVC filter    #Glaucoma  - continue taking medications    #Misc   - DVT ppx: therapeutic lvnx   - Diet: NPO; TPN  - Code status: full   - Pending (specify):  KUB ,am labs, abd pain   - Disposition: acute  - Family: daughter Avila updated at bedside

## 2022-05-05 NOTE — DISCHARGE NOTE PROVIDER - NPI NUMBER (FOR SYSADMIN USE ONLY) :
Patient is non responsive verbally and generally does open eyes when touched or moved but not as much today as reported from the AM RN, O2 eugenio are dropping into the mid to high 80's and was placed on 5-6LNC humidified, I was told at shift change that  patient has been placed on comfort measures but I do not see the actual orders, son is at bedside, IKER midline flushed and saline locked, NG tube to left nare has been removed, prevalon boots bilateral feet , delgado, meds were not able to be given due to the removal of the NG tube, call bell and tray table within reach of the patient, will continue to monitor.   [[id number]],[[phone]]

## 2022-05-05 NOTE — PROGRESS NOTE ADULT - SUBJECTIVE AND OBJECTIVE BOX
Patient is a 81y old  Female who presents with a chief complaint of SBO (05 May 2022 10:11)      INTERVAL HPI/OVERNIGHT EVENTS:    The patient denies any abdominal discomfort/pain today and states she had 1 bowel movement today.       PHYSICAL EXAM:  GENERAL: NAD, well-groomed, well-developed  HEAD:  Atraumatic, Normocephalic  EYES: EOMI, conjunctiva and sclera clear  ENMT: Moist mucous membranes  NECK: Supple, No JVD.   NERVOUS SYSTEM:  Alert & Oriented X3  CHEST/LUNG: audible bilateral bs + L chest port   HEART: Regular rate and rhythm; No murmurs, rubs, or gallops  ABDOMEN: Soft, Nontender, distended abdomen, faint BS   EXTREMITIES: UE edema?  SKIN: No rashes or lesions    T(C): 36.9 (05-05-22 @ 05:32), Max: 37.1 (05-04-22 @ 20:18)  HR: 91 (05-05-22 @ 05:32) (82 - 94)  BP: 102/59 (05-05-22 @ 05:32) (101/59 - 110/67)  RR: 18 (05-05-22 @ 05:32) (17 - 18)  SpO2: --  Wt(kg): --Vital Signs Last 24 Hrs  T(C): 36.9 (05 May 2022 05:32), Max: 37.1 (04 May 2022 20:18)  T(F): 98.4 (05 May 2022 05:32), Max: 98.7 (04 May 2022 20:18)  HR: 91 (05 May 2022 05:32) (82 - 94)  BP: 102/59 (05 May 2022 05:32) (101/59 - 110/67)  BP(mean): --  RR: 18 (05 May 2022 05:32) (17 - 18)  SpO2: --    Consultant(s) Notes Reviewed:  [x ] YES  [ ] NO  Care Discussed with Consultants/Other Providers [ x] YES  [ ] NO    LABS:    CBC:            9.9    4.97  )-----------( 220      ( 05-05-22 @ 07:30 )             29.0         Chem:         ( 05-05-22 @ 07:30 )    135  |  100  |  19  ----------------------------<  127<H>  3.9   |  23  |  0.8        Liver Functions: ( 05-05-22 @ 07:30 )  Alb: 2.6 g/dL / Pro: 4.6 g/dL / ALK PHOS: 99 U/L / ALT: 8 U/L / AST: 20 U/L / GGT: x            RADIOLOGY & ADDITIONAL TESTS:    Imaging Personally Reviewed:  [ ] YES  [ ] NO  aluminum hydroxide/magnesium hydroxide/simethicone Suspension 30 milliLiter(s) Oral every 6 hours PRN  brimonidine 0.2% Ophthalmic Solution 1 Drop(s) Left EYE three times a day  chlorhexidine 4% Liquid 1 Application(s) Topical <User Schedule>  dorzolamide 2% Ophthalmic Solution 1 Drop(s) Left EYE every 8 hours  enoxaparin Injectable 70 milliGRAM(s) SubCutaneous every 12 hours  fat emulsion (Fish Oil and Plant Based) 20% Infusion 1.267 Gm/kG/Day IV Continuous <Continuous>  latanoprost 0.005% Ophthalmic Solution 1 Drop(s) Both EYES at bedtime  metoprolol succinate ER 25 milliGRAM(s) Oral daily  ondansetron Injectable 8 milliGRAM(s) IV Push every 8 hours PRN  pantoprazole  Injectable 40 milliGRAM(s) IV Push daily  Parenteral Nutrition - Adult 1 Each TPN Continuous <Continuous>  Parenteral Nutrition - Adult 1 Each TPN Continuous <Continuous>  senna 2 Tablet(s) Oral at bedtime  timolol 0.5% Solution 1 Drop(s) Left EYE two times a day      HEALTH ISSUES - PROBLEM Dx:  Small Bowel Obstruction  Uterine Cancer

## 2022-05-05 NOTE — PROGRESS NOTE ADULT - SUBJECTIVE AND OBJECTIVE BOX
Patient is a 81y old  Female who presents with a chief complaint of SBO (05 May 2022 11:24)      Patient seen and examined at bedside.  patient reports some abd pain  ALLERGIES:  chloroquine (Hives)  quinine (Urticaria)    MEDICATIONS:  aluminum hydroxide/magnesium hydroxide/simethicone Suspension 30 milliLiter(s) Oral every 6 hours PRN  brimonidine 0.2% Ophthalmic Solution 1 Drop(s) Left EYE three times a day  chlorhexidine 4% Liquid 1 Application(s) Topical <User Schedule>  clonazePAM  Tablet 0.25 milliGRAM(s) Oral every 12 hours PRN  dorzolamide 2% Ophthalmic Solution 1 Drop(s) Left EYE every 8 hours  enoxaparin Injectable 70 milliGRAM(s) SubCutaneous every 12 hours  latanoprost 0.005% Ophthalmic Solution 1 Drop(s) Both EYES at bedtime  metoprolol succinate ER 25 milliGRAM(s) Oral daily  ondansetron Injectable 8 milliGRAM(s) IV Push every 8 hours PRN  pantoprazole  Injectable 40 milliGRAM(s) IV Push daily  Parenteral Nutrition - Adult 1 Each TPN Continuous <Continuous>  Parenteral Nutrition - Adult 1 Each TPN Continuous <Continuous>  timolol 0.5% Solution 1 Drop(s) Left EYE two times a day    Vital Signs Last 24 Hrs  T(F): 98.9 (05 May 2022 13:30), Max: 98.9 (05 May 2022 13:30)  HR: 79 (05 May 2022 13:30) (79 - 94)  BP: 106/58 (05 May 2022 13:30) (102/59 - 110/67)  RR: 18 (05 May 2022 13:30) (18 - 18)  SpO2: --  I&O's Summary      PHYSICAL EXAM:  General: NAD, A/O x 3  ENT: MMM  Neck: Supple, No JVD  Lungs: Clear to auscultation bilaterally  Cardio: RRR, S1/S2, No murmurs  Abdomen: Soft, +tender, +distended; Bowel sounds present  Extremities: No cyanosis, No edema    LABS:                        9.9    4.97  )-----------( 220      ( 05 May 2022 07:30 )             29.0     05-05    135  |  100  |  19  ----------------------------<  127  3.9   |  23  |  0.8    Ca    7.7      05 May 2022 07:30  Phos  2.2     05-05  Mg     2.3     05-05    TPro  4.6  /  Alb  2.6  /  TBili  0.2  /  DBili  x   /  AST  20  /  ALT  8   /  AlkPhos  99  05-05              05-04 Chol -- LDL -- HDL -- Trig 78 mg/dL                      Culture - Urine (collected 01 May 2022 06:59)  Source: Clean Catch Clean Catch (Midstream)  Final Report (02 May 2022 16:27):    >=3 organisms. Probable collection contamination.      COVID-19 PCR: NotDetec (05-01-22 @ 05:24)  COVID-19 PCR: NotDetec (04-25-22 @ 07:30)  COVID-19 PCR: NotDetec (04-21-22 @ 18:38)      RADIOLOGY & ADDITIONAL TESTS:    Care Discussed with Consultants/Other Providers:

## 2022-05-06 NOTE — PROGRESS NOTE ADULT - SUBJECTIVE AND OBJECTIVE BOX
Patient is a 81y old  Female who presents with a chief complaint of SBO (06 May 2022 10:56)      Patient seen and examined at bedside.  Patient reports increased abdominal pain, denies any chest pain or shortness of breath   ALLERGIES:  chloroquine (Hives)  quinine (Urticaria)    MEDICATIONS:  acetaminophen     Tablet .. 650 milliGRAM(s) Oral every 6 hours PRN  aluminum hydroxide/magnesium hydroxide/simethicone Suspension 30 milliLiter(s) Oral every 6 hours PRN  brimonidine 0.2% Ophthalmic Solution 1 Drop(s) Left EYE three times a day  chlorhexidine 4% Liquid 1 Application(s) Topical <User Schedule>  clonazePAM  Tablet 0.25 milliGRAM(s) Oral every 8 hours PRN  dorzolamide 2% Ophthalmic Solution 1 Drop(s) Left EYE every 8 hours  enoxaparin Injectable 70 milliGRAM(s) SubCutaneous every 12 hours  fat emulsion (Fish Oil and Plant Based) 20% Infusion 1.267 Gm/kG/Day IV Continuous <Continuous>  latanoprost 0.005% Ophthalmic Solution 1 Drop(s) Both EYES at bedtime  metoprolol succinate ER 25 milliGRAM(s) Oral daily  ondansetron Injectable 8 milliGRAM(s) IV Push every 8 hours PRN  pantoprazole  Injectable 40 milliGRAM(s) IV Push daily  Parenteral Nutrition - Adult 1 Each TPN Continuous <Continuous>  Parenteral Nutrition - Adult 1 Each TPN Continuous <Continuous>  timolol 0.5% Solution 1 Drop(s) Left EYE two times a day    Vital Signs Last 24 Hrs  T(F): 97.8 (06 May 2022 05:01), Max: 98.4 (05 May 2022 20:23)  HR: 97 (06 May 2022 05:01) (73 - 97)  BP: 140/61 (06 May 2022 05:01) (108/57 - 140/61)  RR: 18 (06 May 2022 05:01) (18 - 18)  SpO2: --  I&O's Summary      PHYSICAL EXAM:  General: NAD, A/O x 3  ENT: MMM  Neck: Supple, No JVD  Lungs: Clear to auscultation bilaterally  Cardio: RRR, S1/S2, No murmurs  Abdomen: Soft, +tender, Nondistended; Bowel sounds present  Extremities: No cyanosis, No edema    LABS:                        9.9    4.97  )-----------( 220      ( 05 May 2022 07:30 )             29.0     05-06    140  |  106  |  25  ----------------------------<  139  4.6   |  26  |  0.7    Ca    6.5      06 May 2022 08:25  Phos  3.2     05-06  Mg     2.2     05-06    TPro  4.6  /  Alb  2.6  /  TBili  0.2  /  DBili  x   /  AST  20  /  ALT  8   /  AlkPhos  99  05-05              05-04 Chol -- LDL -- HDL -- Trig 78 mg/dL                      Culture - Urine (collected 01 May 2022 06:59)  Source: Clean Catch Clean Catch (Midstream)  Final Report (02 May 2022 16:27):    >=3 organisms. Probable collection contamination.      COVID-19 PCR: NotDetec (05-01-22 @ 05:24)  COVID-19 PCR: NotDetec (04-25-22 @ 07:30)  COVID-19 PCR: NotDetec (04-21-22 @ 18:38)      RADIOLOGY & ADDITIONAL TESTS:    Care Discussed with Consultants/Other Providers:

## 2022-05-06 NOTE — CHART NOTE - NSCHARTNOTEFT_GEN_A_CORE
Palliative Care chart note    Palliative care consult received electronically. Chart reviewed.  Discussed with Dr. Paz.    Patient with pain related to underlying malignancy that responds well to acetaminophen. She also has significant anxiety on home clonazepam.  Patient received clonazepam 0.5mg PO (her home dose) previously during her hospitalization and it sedated her, but has responded well to half dose (clonazepam 0.25mg PO).    Glenn CUNHA William | Reference #: [id number]    You have not added a FLORENCIA number. Keeping your FLORENCIA number(s) up to date on the My FLORENCIA # page will enable the separation of your prescriptions from others in the search results.    Others' Prescriptions  Patient Name: Trav BlockBirth Date: 1940  Address: [address] [place] [id number]: Female  Rx Written	Rx Dispensed	Drug	Quantity	Days Supply	Prescriber Name	Prescriber Florencia #	Payment Method	Dispenser  04/23/2022	04/23/2022	oxycodone hcl (ir) 5 mg tablet	12	3	[facility]	[id number]	Medicare	[id number]  02/19/2022	02/19/2022	tramadol hcl 50 mg tablet	28	7	Cisco Lanza MD	[id number]	Medicare	[id number]  01/03/2022	01/12/2022	clonazepam 0.5 mg tablet	30	30	[facility]	[id number]	Medicare	[id number]  10/25/2021	11/21/2021	clonazepam 0.5 mg tablet	15	30	[facility]	[id number]	Medicare	[id number]  09/27/2021	10/12/2021	clonazepam 0.5 mg tablet	15	30	[facility]	[id number]	Medicare	[id number]  08/30/2021	09/10/2021	clonazepam 0.5 mg tablet	15	7	Cindy Everett (DO)	[id number]	Medicare	[id number]  06/29/2021	07/11/2021	clonazepam 0.5 mg tablet	15	30	[facility]	[id number]	Medicare	[id number]  05/27/2021	06/03/2021	clonazepam 0.5 mg tablet	15	15	[facility]	[id number]	Medicare	[id number]    Recommendations  -continue PRN tylenol for pain  -increase clonazepam to 0.25mg q8h PRN for anxiety  -if patient has significant anxiety despite change to clonazepam, I would recommend Behavorial Health consult given that she was somewhat sedated by higher doses of clonazepam previously  -Will see for full consult on 5/9    [phone]

## 2022-05-06 NOTE — CHART NOTE - NSCHARTNOTEFT_GEN_A_CORE
Pt comfortable this am, no complaints  Denies vomiting  No BM's  TPN infusing via port    T(F): 97.8 (05-06-22 @ 05:01), Max: 98.4 (05-05-22 @ 20:23)  HR: 97 (05-06-22 @ 05:01) (73 - 97)  BP: 140/61 (05-06-22 @ 05:01) (108/57 - 140/61)  RR: 18 (05-06-22 @ 05:01) (18 - 18)    I&O's Detail    MEDICATIONS  (STANDING):  brimonidine 0.2% Ophthalmic Solution 1 Drop(s) Left EYE three times a day  chlorhexidine 4% Liquid 1 Application(s) Topical <User Schedule>  dorzolamide 2% Ophthalmic Solution 1 Drop(s) Left EYE every 8 hours  enoxaparin Injectable 70 milliGRAM(s) SubCutaneous every 12 hours  fat emulsion (Fish Oil and Plant Based) 20% Infusion 1.267 Gm/kG/Day (23.4 mL/Hr) IV Continuous <Continuous>  latanoprost 0.005% Ophthalmic Solution 1 Drop(s) Both EYES at bedtime  metoprolol succinate ER 25 milliGRAM(s) Oral daily  pantoprazole  Injectable 40 milliGRAM(s) IV Push daily  Parenteral Nutrition - Adult 1 Each (60 mL/Hr) TPN Continuous <Continuous>  Parenteral Nutrition - Adult 1 Each (60 mL/Hr) TPN Continuous <Continuous>  timolol 0.5% Solution 1 Drop(s) Left EYE two times a day    MEDICATIONS  (PRN):  acetaminophen     Tablet .. 650 milliGRAM(s) Oral every 6 hours PRN Temp greater or equal to 38C (100.4F), Mild Pain (1 - 3)  aluminum hydroxide/magnesium hydroxide/simethicone Suspension 30 milliLiter(s) Oral every 6 hours PRN Dyspepsia  clonazePAM  Tablet 0.25 milliGRAM(s) Oral every 8 hours PRN anxiety  ondansetron Injectable 8 milliGRAM(s) IV Push every 8 hours PRN Nausea and/or Vomiting    05-06    140  |  106  |  25<H>  ----------------------------<  139<H>  4.6   |  26  |  0.7    Ca    6.5<L>      06 May 2022 08:25  Phos  3.2     05-06  Mg     2.2     05-06    TPro  4.6<L>  /  Alb  2.6<L>  /  TBili  0.2  /  DBili  x   /  AST  20  /  ALT  8   /  AlkPhos  99  05-05                          9.9    4.97  )-----------( 220      ( 05 May 2022 07:30 )             29.0      Diet, NPO:   With Ice Chips/Sips of Water (05-05-22 @ 08:26)    ASSESSMENT  Patient is a 81 female PMH recurrent uterine adenocarcinoma s/p SHAAN/BSO (2016), recurrent malignant ascites, CAD s/p CABG, HTN, depression, PE (Xarelto) and IVC filter 2020 presenting with multiple day history of LUQ abdominal pain since prior to discharge, x1 episode of NBNB emesis day prior to arrival, none in the ED, passing gas on day of arrival with BM day prior to arrival. On workup patient found to have persistent large volume ascites (malignant), with small bowel dilation up to 4.2cm with abrupt transition point to collapsed small bowel in left mid abdomen. The first NG tube placement attempt failed, patient removed it herself    - Uterine Adenocarcinoma Mullerian origin, malignant ascites diagnosed on 8/8/2020  - SBO  - HTN  - CAD, hyperlipidemia  - glaucoma  - hypokalemia, hypomagnesemia  - wt loss, ~10% since 11/2020  - risk for refeeding syndrome    PLAN  - cont TPN tonight via port  - document TPN on I/O's   - daily bmp, in phos, mg   - IF pt no longer being tx for primary disease then will taper and d/c TPN  - will follow Pt comfortable this am, no complaints, alert ane verbal  Denies vomiting  No BM's  TPN infusing via port    T(F): 97.8 (05-06-22 @ 05:01), Max: 98.4 (05-05-22 @ 20:23)  HR: 97 (05-06-22 @ 05:01) (73 - 97)  BP: 140/61 (05-06-22 @ 05:01) (108/57 - 140/61)  RR: 18 (05-06-22 @ 05:01) (18 - 18)    I&O's Detail    MEDICATIONS  (STANDING):  brimonidine 0.2% Ophthalmic Solution 1 Drop(s) Left EYE three times a day  chlorhexidine 4% Liquid 1 Application(s) Topical <User Schedule>  dorzolamide 2% Ophthalmic Solution 1 Drop(s) Left EYE every 8 hours  enoxaparin Injectable 70 milliGRAM(s) SubCutaneous every 12 hours  fat emulsion (Fish Oil and Plant Based) 20% Infusion 1.267 Gm/kG/Day (23.4 mL/Hr) IV Continuous <Continuous>  latanoprost 0.005% Ophthalmic Solution 1 Drop(s) Both EYES at bedtime  metoprolol succinate ER 25 milliGRAM(s) Oral daily  pantoprazole  Injectable 40 milliGRAM(s) IV Push daily  Parenteral Nutrition - Adult 1 Each (60 mL/Hr) TPN Continuous <Continuous>  Parenteral Nutrition - Adult 1 Each (60 mL/Hr) TPN Continuous <Continuous>  timolol 0.5% Solution 1 Drop(s) Left EYE two times a day    MEDICATIONS  (PRN):  acetaminophen     Tablet .. 650 milliGRAM(s) Oral every 6 hours PRN Temp greater or equal to 38C (100.4F), Mild Pain (1 - 3)  aluminum hydroxide/magnesium hydroxide/simethicone Suspension 30 milliLiter(s) Oral every 6 hours PRN Dyspepsia  clonazePAM  Tablet 0.25 milliGRAM(s) Oral every 8 hours PRN anxiety  ondansetron Injectable 8 milliGRAM(s) IV Push every 8 hours PRN Nausea and/or Vomiting    05-06    140  |  106  |  25<H>  ----------------------------<  139<H>  4.6   |  26  |  0.7    Ca    6.5<L>      06 May 2022 08:25  Phos  3.2     05-06  Mg     2.2     05-06    TPro  4.6<L>  /  Alb  2.6<L>  /  TBili  0.2  /  DBili  x   /  AST  20  /  ALT  8   /  AlkPhos  99  05-05                          9.9    4.97  )-----------( 220      ( 05 May 2022 07:30 )             29.0      Diet, NPO:   With Ice Chips/Sips of Water (05-05-22 @ 08:26)    ASSESSMENT  Patient is a 81 female PMH recurrent uterine adenocarcinoma s/p SHAAN/BSO (2016), recurrent malignant ascites, CAD s/p CABG, HTN, depression, PE (Xarelto) and IVC filter 2020 presenting with multiple day history of LUQ abdominal pain since prior to discharge, x1 episode of NBNB emesis day prior to arrival, none in the ED, passing gas on day of arrival with BM day prior to arrival. On workup patient found to have persistent large volume ascites (malignant), with small bowel dilation up to 4.2cm with abrupt transition point to collapsed small bowel in left mid abdomen. The first NG tube placement attempt failed, patient removed it herself    - Uterine Adenocarcinoma Mullerian origin, malignant ascites diagnosed on 8/8/2020  - SBO  - HTN  - CAD, hyperlipidemia  - glaucoma  - hypokalemia, hypomagnesemia  - wt loss, ~10% since 11/2020  - risk for refeeding syndrome    PLAN  - cont TPN tonight via port - orders entered  - document TPN on I/O's   - daily bmp, in phos, mg   - IF pt no longer being tx for primary disease then will taper and d/c TPN  - will follow

## 2022-05-06 NOTE — PROGRESS NOTE ADULT - ASSESSMENT
Patient is a 81 female PMH recurrent uterine adenocarcinoma s/p SHAAN/BSO (2016), recurrent malignant ascites, CAD s/p CABG, HTN, depression, PE (Xarelto) and IVC filter 2020 presenting with multiple day history of LUQ abdominal pain since prior to discharge, x1 episode of NBNB emesis day prior to arrival, none in the ED, passing gas on day of arrival with BM day prior to arrival. On workup patient found to have persistent large volume ascites (malignant), with small bowel dilation up to 4.2cm with abrupt transition point to collapsed small bowel in left mid abdomen. The first NG tube placement attempt failed, patient removed it herself - will consider a second attempt today, if needed.    #Small Bowel Obstruction  - No need for NGT for decompression now   - pt started on clear fluids 5/4 -> increased pain and distention_> return to NPO 5/5  -Strict I&O's Q4 hrs.  - Daily KUB   - 5/6 worsening pain, CT abd ordered -> pain responded well to Tylenol and clonazepam   - Surgery consult appreciated. Surgery a very high risk candidate for surgery and do not want to offer surgical intervention   - obtain heme/onc consult to ascertain patient's status at surgery's request  - trend am labs  - TPN via port started   - patient having BMs, abdomen is soft. Will try advancing to clear liquid diet. If vomiting occurs or unable to tolerate, make NPO.     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 s/p weekly Carbo/Taxol (8/14/20-1/28/2021)   - PET Scan from 2/25/21 shows good response to chemo (declines any additional cancer directed therapy)  - CT A/P from 11/2021 (abd pain) shows no new disease; CTa Chest from 09/2021   - CT A/P from 02/2022 showed ascites which showed recurrent adenocarcinoma of mullerian origin:  is high  - Completed [facility] Liquid Biopsy Testing (03/01/2022) shows no targetable mutation  - chemoport placed by IR   - patient was started on Keytruda + Lenvima q3 weeks but lenvima was discontinued due to poor tolerance  - heme onc will consider possibly resuming lenvima once more medically stable    - Chemotherapy scheduled for 5/10    #Hypertension  - Patient is on Metoprolol 25mg daily  - continue monitoring BP    #CAD  - Patient is on Xarelto and has an IVC filter    #Glaucoma  - continue taking medications    #GOC  - Pt and family report that daughter should be given all medical information and making decisions on behalf of pt  - 5/6 meeting with Avila (daughter) and grandson Octavious discuss prognosis - agree to DNR/DNI want to pursue all other medical treatments for SBO  - agree to palliative care consult    #Misc   - DVT ppx: therapeutic lvnx   - Diet: NPO; TPN  - Code status: full   - Pending (specify):  KUB ,am labs, abd pain , CT abd   - Disposition: acute  - Family: daughter Avila updated at bedside

## 2022-05-06 NOTE — GOALS OF CARE CONVERSATION - ADVANCED CARE PLANNING - CONVERSATION DETAILS
Patient and family agree daughter Evod should make medical decisions. Discussed patient's lack of progress with the SBO and surgery reporting that surgery intervention is not possible due to high risk and limited benefit.  Daughter and grandson agreed that pt should be DNR/DNI.  They agree to palliative care consult

## 2022-05-06 NOTE — PROGRESS NOTE ADULT - ASSESSMENT
Patient is a 81 female PMH recurrent uterine adenocarcinoma s/p SHAAN/BSO (2016), recurrent malignant ascites, CAD s/p CABG, HTN, depression, PE (Xarelto) and IVC filter 2020 presenting with multiple day history of LUQ abdominal pain since prior to discharge, x1 episode of NBNB emesis day prior to arrival, none in the ED, passing gas on day of arrival with BM day prior to arrival. On workup patient found to have persistent large volume ascites (malignant), with small bowel dilation up to 4.2cm with abrupt transition point to collapsed small bowel in left mid abdomen. The first NG tube placement attempt failed, patient removed it herself - patient refused replacement multiple times.     #Small Bowel Obstruction  - No need for NGT for decompression now   - Strict NPO  - Strict I&O's Q4 hrs.  - KUB - continuous SBO noted. Daily KUB   - Follow surgery recommendations. Surgery a very high risk candidate for surgery and do not want to offer surgical intervention   - obtain heme/onc consult to ascertain patient's status at surgery's request  - trend am labs  - TPN via port  - patient is no longer having BMs, hypoactive bowel sounds. Repeat CT abdomen.     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 s/p weekly Carbo/Taxol (8/14/20-1/28/2021)   - PET Scan from 2/25/21 shows good response to chemo (declines any additional cancer directed therapy)  - CT A/P from 11/2021 (abd pain) shows no new disease; CTa Chest from 09/2021   - CT A/P from 02/2022 showed ascites which showed recurrent adenocarcinoma of mullerian origin:  is high  - Completed [facility] Liquid Biopsy Testing (03/01/2022) shows no targetable mutation  - chemoport placed by IR   - patient was started on Keytruda + Lenvima q3 weeks but lenvima was discontinued due to poor tolerance  - heme onc will consider possibly resuming lenvima once more medically stable    - Chemotherapy potentially scheduled for 5/10    # CAD s/p CABG  # DVT s/p IVC filter   #Hypertension  - Patient is on Xarelto and has an IVC filter  - hold xarelto while inpatient, continue with therapeutic lovenox   - continue with metoprolol for now     # H/O anxiety  - resume PO meds, continue with Clonazepam 0.25mg PO BID PRN (can give 0.25mg PO stat additional dose.)     #Glaucoma  - continue taking medications    #Misc   - DVT ppx: therapeutic lvnx   - Diet: NPO; TPN  - Code status: full   - Pending (specify):  CT abdomen   - Disposition: acute

## 2022-05-06 NOTE — PROGRESS NOTE ADULT - SUBJECTIVE AND OBJECTIVE BOX
Hospital Day:  5d    Chief Complaint: Patient is a 81y old  Female who presents with a chief complaint of SBO (05 May 2022 19:19)    24 hour events: no acute overnight events. As per patient she DID NOT have a BM in the past 24 hours.     Past Medical Hx:   Uterine cancer    Hypertension    High cholesterol    Cataract of right eye    Glaucoma    Left lower quadrant pain    Coronary artery disease    No pertinent past medical history    CAD (coronary artery disease)    HTN (hypertension)    HLD (hyperlipidemia)      Past Sx:  H/O total hysterectomy    Athscl CABG, unsp, w angina pectoris w documented spasm    History of coronary artery bypass surgery      Allergies:  chloroquine (Hives)  quinine (Urticaria)    Current Meds:   Standing Meds:  brimonidine 0.2% Ophthalmic Solution 1 Drop(s) Left EYE three times a day  chlorhexidine 4% Liquid 1 Application(s) Topical <User Schedule>  dorzolamide 2% Ophthalmic Solution 1 Drop(s) Left EYE every 8 hours  enoxaparin Injectable 70 milliGRAM(s) SubCutaneous every 12 hours  latanoprost 0.005% Ophthalmic Solution 1 Drop(s) Both EYES at bedtime  metoprolol succinate ER 25 milliGRAM(s) Oral daily  pantoprazole  Injectable 40 milliGRAM(s) IV Push daily  Parenteral Nutrition - Adult 1 Each (60 mL/Hr) TPN Continuous <Continuous>  timolol 0.5% Solution 1 Drop(s) Left EYE two times a day    PRN Meds:  acetaminophen     Tablet .. 650 milliGRAM(s) Oral every 6 hours PRN Temp greater or equal to 38C (100.4F), Mild Pain (1 - 3)  aluminum hydroxide/magnesium hydroxide/simethicone Suspension 30 milliLiter(s) Oral every 6 hours PRN Dyspepsia  clonazePAM  Tablet 0.25 milliGRAM(s) Oral every 12 hours PRN anxiety  ondansetron Injectable 8 milliGRAM(s) IV Push every 8 hours PRN Nausea and/or Vomiting    HOME MEDICATIONS:  acetaminophen 325 mg oral tablet: 2 tab(s) orally every 6 hours, As needed, Temp greater or equal to 38C (100.4F), Mild Pain (1 - 3)  brimonidine 0.2% ophthalmic solution: 1 drop(s) in the left eye 3 times a day  busPIRone 15 mg oral tablet: 1 tab(s) orally 2 times a day  dorzolamide-timolol 2.23%-0.68% ophthalmic solution: 1 drop(s) in the left eye 2 times a day  esomeprazole 20 mg oral delayed release capsule: 1 cap(s) orally once a day  gabapentin 100 mg oral capsule: 1 cap(s) orally 3 times a day  latanoprost 0.005% ophthalmic solution: 1 drop(s) to each affected eye 2 times a day  metoprolol succinate 25 mg oral tablet, extended release: 1 tab(s) orally once a day  oxyCODONE 5 mg oral tablet: 1 tab(s) orally every 6 hours, As Needed for pain  rosuvastatin 40 mg oral tablet: 1 tab(s) orally once a day  Xarelto 20 mg oral tablet: 1 tab(s) orally once a day (in the evening)      Vital Signs:   T(F): 97.8 (05-06-22 @ 05:01), Max: 98.9 (05-05-22 @ 13:30)  HR: 97 (05-06-22 @ 05:01) (73 - 97)  BP: 140/61 (05-06-22 @ 05:01) (106/58 - 140/61)  RR: 18 (05-06-22 @ 05:01) (18 - 18)  SpO2: --        Physical Exam:   GENERAL: NAD  HEENT: NCAT  CHEST/LUNG: audible bilateral breath sounds. +L chest port.   HEART: Regular rate and rhythm; s1 s2 appreciated   ABDOMEN: Soft, Nontender abdomen, hypoactive bowel sounds   EXTREMITIES: No LE edema b/l  SKIN: no rashes, no new lesions  NERVOUS SYSTEM:  Alert & Oriented X3  LINES/CATHETERS: +chemo port         Labs:                         9.9    4.97  )-----------( 220      ( 05 May 2022 07:30 )             29.0       06 May 2022 08:25    140    |  106    |  25     ----------------------------<  139    4.6     |  26     |  0.7      Ca    6.5        06 May 2022 08:25  Phos  3.2       06 May 2022 08:25  Mg     2.2       06 May 2022 08:25    TPro  4.6    /  Alb  2.6    /  TBili  0.2    /  DBili  x      /  AST  20     /  ALT  8      /  AlkPhos  99     05 May 2022 07:30      Radiology:

## 2022-05-07 NOTE — CHART NOTE - NSCHARTNOTEFT_GEN_A_CORE
Called by Hospitalist to attempt [place] sump placement - attempted x3 at the bedside, unable to pass. S/W Dr Paz.

## 2022-05-07 NOTE — PROGRESS NOTE ADULT - ASSESSMENT
Patient is a 81 female PMH recurrent uterine adenocarcinoma s/p SHAAN/BSO (2016), recurrent malignant ascites, CAD s/p CABG, HTN, depression, PE (Xarelto) and IVC filter 2020 presenting with multiple day history of LUQ abdominal pain since prior to discharge, x1 episode of NBNB emesis day prior to arrival, none in the ED, passing gas on day of arrival with BM day prior to arrival. On workup patient found to have persistent large volume ascites (malignant), with small bowel dilation up to 4.2cm with abrupt transition point to collapsed small bowel in left mid abdomen. The first NG tube placement attempt failed, patient removed it herself - will consider a second attempt today, if needed.    #Small Bowel Obstruction  - No need for NGT for decompression now   - pt started on clear fluids 5/4 -> increased pain and distention_> return to NPO 5/5  -Strict I&O's Q4 hrs.  - Daily KUB   - 5/6 worsening pain, CT abd ordered -> pain responded well to Tylenol and clonazepam   - Surgery consult appreciated. Surgery a very high risk candidate for surgery and do not want to offer surgical intervention   - obtain heme/onc consult to ascertain patient's status at surgery's request  - trend am labs  - TPN via port started   - patient having BMs, abdomen is soft. Will try advancing to clear liquid diet. If vomiting occurs or unable to tolerate, make NPO.     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 s/p weekly Carbo/Taxol (8/14/20-1/28/2021)   - PET Scan from 2/25/21 shows good response to chemo (declines any additional cancer directed therapy)  - CT A/P from 11/2021 (abd pain) shows no new disease; CTa Chest from 09/2021   - CT A/P from 02/2022 showed ascites which showed recurrent adenocarcinoma of mullerian origin:  is high  - Completed [facility] Liquid Biopsy Testing (03/01/2022) shows no targetable mutation  - chemoport placed by IR   - patient was started on Keytruda + Lenvima q3 weeks but lenvima was discontinued due to poor tolerance  - heme onc will consider possibly resuming lenvima once more medically stable    - Chemotherapy scheduled for 5/10    #Hypertension  - Patient is on Metoprolol 25mg daily  - continue monitoring BP    #CAD  - Patient is on Xarelto and has an IVC filter    #Glaucoma  - continue taking medications    #GOC  - Pt and family report that daughter should be given all medical information and making decisions on behalf of pt  - 5/6 meeting with Avila (daughter) and grandson Octavious discuss prognosis - agree to DNR/DNI want to pursue all other medical treatments for SBO  - agree to palliative care consult    #Misc   - DVT ppx: therapeutic lvnx   - Diet: NPO; TPN  - Code status: full   - Pending (specify):  KUB ,am labs, abd pain , CT abd   - Disposition: acute  - Family: daughter Avila updated at bedside  Patient is a 81 female PMH recurrent uterine adenocarcinoma s/p SHAAN/BSO (2016), recurrent malignant ascites, CAD s/p CABG, HTN, depression, PE (Xarelto) and IVC filter 2020 presenting with multiple day history of LUQ abdominal pain since prior to discharge, x1 episode of NBNB emesis day prior to arrival, none in the ED, passing gas on day of arrival with BM day prior to arrival. On workup patient found to have persistent large volume ascites (malignant), with small bowel dilation up to 4.2cm with abrupt transition point to collapsed small bowel in left mid abdomen. The first NG tube placement attempt failed, patient removed it herself - will consider a second attempt today, if needed.    #Small Bowel Obstruction  - No need for NGT for decompression now   - pt started on clear fluids 5/4 -> increased pain and distention_> return to NPO 5/5  -Strict I&O's Q4 hrs.  - Daily KUB   - Surgery consult appreciated. Surgery a very high risk candidate for surgery and do not want to offer surgical intervention   - obtain heme/onc consult to ascertain patient's status at surgery's request  - trend am labs  - TPN via port started   - patient having BMs, abdomen is soft. Will try advancing to clear liquid diet. If vomiting occurs or unable to tolerate, make NPO.   - 5/7 pt with emesis abd pain -attempt to pass NGT failed by medicine and ent  - pt unable to swallow - toradol and IV ativan ordered for palliation   - 5/6 CT abd ordered - SBO redemonstrated     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 s/p weekly Carbo/Taxol (8/14/20-1/28/2021)   - PET Scan from 2/25/21 shows good response to chemo (declines any additional cancer directed therapy)  - CT A/P from 11/2021 (abd pain) shows no new disease; CTa Chest from 09/2021   - CT A/P from 02/2022 showed ascites which showed recurrent adenocarcinoma of mullerian origin:  is high  - Completed [facility] Liquid Biopsy Testing (03/01/2022) shows no targetable mutation  - chemoport placed by IR   - patient was started on Keytruda + Lenvima q3 weeks but lenvima was discontinued due to poor tolerance  - heme onc will consider possibly resuming lenvima once more medically stable    - Chemotherapy scheduled for 5/10    #Hypertension  - Patient is on Metoprolol 25mg daily  - continue monitoring BP    #CAD  - Patient is on Xarelto and has an IVC filter    #Glaucoma  - continue taking medications    #GOC  - Pt and family report that daughter should be given all medical information and making decisions on behalf of pt  - 5/6 meeting with Avila (daughter) and grandson Octavious discuss prognosis - agree to DNR/DNI want to pursue all other medical treatments for SBO  - agree to palliative care consult  - 5/7 family meeting with extended family - options given - remove TPN and hospice, home with TPN, nursing home with TPN-family to discuss     #Misc   - DVT ppx: therapeutic lvnx   - Diet: NPO; TPN  - Code status: full   - Pending (specify):  sbo resolution, investigate possibility of tpn at home   - Disposition: acute  - Family: daughter Avila updated at bedside

## 2022-05-07 NOTE — PROGRESS NOTE ADULT - SUBJECTIVE AND OBJECTIVE BOX
Patient is a 81y old  Female who presents with a chief complaint of SBO (06 May 2022 17:20)      Patient seen and examined at bedside.    ALLERGIES:  chloroquine (Hives)  quinine (Urticaria)    MEDICATIONS:  acetaminophen     Tablet .. 650 milliGRAM(s) Oral every 6 hours PRN  aluminum hydroxide/magnesium hydroxide/simethicone Suspension 30 milliLiter(s) Oral every 6 hours PRN  brimonidine 0.2% Ophthalmic Solution 1 Drop(s) Left EYE three times a day  chlorhexidine 4% Liquid 1 Application(s) Topical <User Schedule>  clonazePAM  Tablet 0.25 milliGRAM(s) Oral every 8 hours PRN  dorzolamide 2% Ophthalmic Solution 1 Drop(s) Left EYE every 8 hours  enoxaparin Injectable 70 milliGRAM(s) SubCutaneous every 12 hours  fat emulsion (Fish Oil and Plant Based) 20% Infusion 1.267 Gm/kG/Day IV Continuous <Continuous>  ketorolac   Injectable 10 milliGRAM(s) IV Push every 6 hours PRN  latanoprost 0.005% Ophthalmic Solution 1 Drop(s) Both EYES at bedtime  metoprolol succinate ER 25 milliGRAM(s) Oral daily  ondansetron Injectable 8 milliGRAM(s) IV Push every 8 hours PRN  pantoprazole  Injectable 40 milliGRAM(s) IV Push daily  Parenteral Nutrition - Adult 1 Each TPN Continuous <Continuous>  Parenteral Nutrition - Adult 1 Each TPN Continuous <Continuous>  timolol 0.5% Solution 1 Drop(s) Left EYE two times a day    Vital Signs Last 24 Hrs  T(F): 97.6 (07 May 2022 13:58), Max: 98.1 (06 May 2022 20:45)  HR: 97 (07 May 2022 13:58) (75 - 100)  BP: 112/70 (07 May 2022 13:58) (108/60 - 136/90)  RR: 17 (07 May 2022 13:58) (17 - 18)  SpO2: --  I&O's Summary      PHYSICAL EXAM:  General: NAD, A/O x 3  ENT: MMM  Neck: Supple, No JVD  Lungs: Clear to auscultation bilaterally  Cardio: RRR, S1/S2, No murmurs  Abdomen: Soft, Nontender, Nondistended; Bowel sounds present  Extremities: No cyanosis, No edema    LABS:                        11.2   6.31  )-----------( 292      ( 07 May 2022 04:00 )             33.7     05-07    140  |  103  |  26  ----------------------------<  123  4.5   |  26  |  0.7    Ca    8.5      07 May 2022 04:00  Phos  3.5     05-07  Mg     2.1     05-07    TPro  5.2  /  Alb  2.9  /  TBili  0.2  /  DBili  x   /  AST  31  /  ALT  19  /  AlkPhos  113  05-07              05-04 Chol -- LDL -- HDL -- Trig 78 mg/dL                      Culture - Urine (collected 01 May 2022 06:59)  Source: Clean Catch Clean Catch (Midstream)  Final Report (02 May 2022 16:27):    >=3 organisms. Probable collection contamination.      COVID-19 PCR: NotDetec (05-01-22 @ 05:24)  COVID-19 PCR: NotDetec (04-25-22 @ 07:30)  COVID-19 PCR: NotDetec (04-21-22 @ 18:38)      RADIOLOGY & ADDITIONAL TESTS:    Care Discussed with Consultants/Other Providers:  Patient is a 81y old  Female who presents with a chief complaint of SBO (06 May 2022 17:20)      Patient seen and examined at bedside.  patient with abdominal pain, vomiting this AM   ALLERGIES:  chloroquine (Hives)  quinine (Urticaria)    MEDICATIONS:  acetaminophen     Tablet .. 650 milliGRAM(s) Oral every 6 hours PRN  aluminum hydroxide/magnesium hydroxide/simethicone Suspension 30 milliLiter(s) Oral every 6 hours PRN  brimonidine 0.2% Ophthalmic Solution 1 Drop(s) Left EYE three times a day  chlorhexidine 4% Liquid 1 Application(s) Topical <User Schedule>  clonazePAM  Tablet 0.25 milliGRAM(s) Oral every 8 hours PRN  dorzolamide 2% Ophthalmic Solution 1 Drop(s) Left EYE every 8 hours  enoxaparin Injectable 70 milliGRAM(s) SubCutaneous every 12 hours  fat emulsion (Fish Oil and Plant Based) 20% Infusion 1.267 Gm/kG/Day IV Continuous <Continuous>  ketorolac   Injectable 10 milliGRAM(s) IV Push every 6 hours PRN  latanoprost 0.005% Ophthalmic Solution 1 Drop(s) Both EYES at bedtime  metoprolol succinate ER 25 milliGRAM(s) Oral daily  ondansetron Injectable 8 milliGRAM(s) IV Push every 8 hours PRN  pantoprazole  Injectable 40 milliGRAM(s) IV Push daily  Parenteral Nutrition - Adult 1 Each TPN Continuous <Continuous>  Parenteral Nutrition - Adult 1 Each TPN Continuous <Continuous>  timolol 0.5% Solution 1 Drop(s) Left EYE two times a day    Vital Signs Last 24 Hrs  T(F): 97.6 (07 May 2022 13:58), Max: 98.1 (06 May 2022 20:45)  HR: 97 (07 May 2022 13:58) (75 - 100)  BP: 112/70 (07 May 2022 13:58) (108/60 - 136/90)  RR: 17 (07 May 2022 13:58) (17 - 18)  SpO2: --  I&O's Summary      PHYSICAL EXAM:  General: NAD, A/O x 3  ENT: MMM  Neck: Supple, No JVD  Lungs: Clear to auscultation bilaterally  Cardio: RRR, S1/S2, No murmurs  Abdomen: Soft, +tender, Nondistended; Bowel sounds present  Extremities: No cyanosis, No edema    LABS:                        11.2   6.31  )-----------( 292      ( 07 May 2022 04:00 )             33.7     05-07    140  |  103  |  26  ----------------------------<  123  4.5   |  26  |  0.7    Ca    8.5      07 May 2022 04:00  Phos  3.5     05-07  Mg     2.1     05-07    TPro  5.2  /  Alb  2.9  /  TBili  0.2  /  DBili  x   /  AST  31  /  ALT  19  /  AlkPhos  113  05-07              05-04 Chol -- LDL -- HDL -- Trig 78 mg/dL                      Culture - Urine (collected 01 May 2022 06:59)  Source: Clean Catch Clean Catch (Midstream)  Final Report (02 May 2022 16:27):    >=3 organisms. Probable collection contamination.      COVID-19 PCR: NotDetec (05-01-22 @ 05:24)  COVID-19 PCR: NotDetec (04-25-22 @ 07:30)  COVID-19 PCR: NotDetec (04-21-22 @ 18:38)      RADIOLOGY & ADDITIONAL TESTS:  < from: CT Abdomen and Pelvis w/ IV Cont (05.06.22 @ 20:06) >    IMPRESSION:      Findings again consistent with small bowel obstruction with transition   point in the left hemiabdomen; findings not significant changed when   compared to prior scan of 5/1/2022.    Slightly increased left pleural effusion and abdominal ascites.    < end of copied text >    Care Discussed with Consultants/Other Providers:

## 2022-05-08 NOTE — PROGRESS NOTE ADULT - ASSESSMENT
Patient is a 81 female PMH recurrent uterine adenocarcinoma s/p SHAAN/BSO (2016), recurrent malignant ascites, CAD s/p CABG, HTN, depression, PE (Xarelto) and IVC filter 2020 presenting with multiple day history of LUQ abdominal pain since prior to discharge, x1 episode of NBNB emesis day prior to arrival, none in the ED, passing gas on day of arrival with BM day prior to arrival. On workup patient found to have persistent large volume ascites (malignant), with small bowel dilation up to 4.2cm with abrupt transition point to collapsed small bowel in left mid abdomen. The first NG tube placement attempt failed, patient removed it herself - will consider a second attempt today, if needed.    #Small Bowel Obstruction  - No need for NGT for decompression now   - pt started on clear fluids 5/4 -> increased pain and distention_> return to NPO 5/5  -Strict I&O's Q4 hrs.  - Daily KUB   - Surgery consult appreciated. Surgery a very high risk candidate for surgery and do not want to offer surgical intervention   - obtain heme/onc consult to ascertain patient's status at surgery's request  - trend am labs  - TPN via port started   - patient having BMs, abdomen is soft. Will try advancing to clear liquid diet. If vomiting occurs or unable to tolerate, make NPO.   - 5/7 pt with emesis abd pain -attempt to pass NGT failed by medicine and ent  - pt unable to swallow - toradol and IV ativan ordered for palliation   - 5/6 CT abd ordered - SBO redemonstrated     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 s/p weekly Carbo/Taxol (8/14/20-1/28/2021)   - PET Scan from 2/25/21 shows good response to chemo (declines any additional cancer directed therapy)  - CT A/P from 11/2021 (abd pain) shows no new disease; CTa Chest from 09/2021   - CT A/P from 02/2022 showed ascites which showed recurrent adenocarcinoma of mullerian origin:  is high  - Completed [facility] Liquid Biopsy Testing (03/01/2022) shows no targetable mutation  - chemoport placed by IR   - patient was started on Keytruda + Lenvima q3 weeks but lenvima was discontinued due to poor tolerance  - heme onc will consider possibly resuming lenvima once more medically stable    - Chemotherapy scheduled for 5/10    #Hypertension  - Patient is on Metoprolol 25mg daily  - continue monitoring BP    #CAD  - Patient is on Xarelto and has an IVC filter    #Glaucoma  - continue taking medications    #GOC  - Pt and family report that daughter should be given all medical information and making decisions on behalf of pt  - 5/6 meeting with Avila (daughter) and grandson Octavious discuss prognosis - agree to DNR/DNI want to pursue all other medical treatments for SBO  - agree to palliative care consult  - 5/7 family meeting with extended family - options given - remove TPN and hospice, home with TPN, nursing home with TPN-family to discuss     #Misc   - DVT ppx: therapeutic lvnx   - Diet: NPO; TPN  - Code status: full   - Pending (specify):  sbo resolution, investigate possibility of tpn at home   - Disposition: acute  - Family: daughter Avila updated at bedside 5/8

## 2022-05-08 NOTE — PROGRESS NOTE ADULT - SUBJECTIVE AND OBJECTIVE BOX
Patient is a 81y old  Female who presents with a chief complaint of SBO (07 May 2022 16:34)      Patient seen and examined at bedside.    ALLERGIES:  chloroquine (Hives)  quinine (Urticaria)    MEDICATIONS:  acetaminophen     Tablet .. 650 milliGRAM(s) Oral every 6 hours PRN  aluminum hydroxide/magnesium hydroxide/simethicone Suspension 30 milliLiter(s) Oral every 6 hours PRN  brimonidine 0.2% Ophthalmic Solution 1 Drop(s) Left EYE three times a day  chlorhexidine 4% Liquid 1 Application(s) Topical <User Schedule>  dorzolamide 2% Ophthalmic Solution 1 Drop(s) Left EYE every 8 hours  enoxaparin Injectable 70 milliGRAM(s) SubCutaneous every 12 hours  fat emulsion (Fish Oil and Plant Based) 20% Infusion 1.267 Gm/kG/Day IV Continuous <Continuous>  ketorolac   Injectable 10 milliGRAM(s) IV Push every 6 hours PRN  latanoprost 0.005% Ophthalmic Solution 1 Drop(s) Both EYES at bedtime  LORazepam   Injectable 0.25 milliGRAM(s) IV Push every 8 hours PRN  metoprolol succinate ER 25 milliGRAM(s) Oral daily  ondansetron Injectable 8 milliGRAM(s) IV Push every 8 hours PRN  pantoprazole  Injectable 40 milliGRAM(s) IV Push daily  Parenteral Nutrition - Adult 1 Each TPN Continuous <Continuous>  Parenteral Nutrition - Adult 1 Each TPN Continuous <Continuous>  timolol 0.5% Solution 1 Drop(s) Left EYE two times a day    Vital Signs Last 24 Hrs  T(F): 97.2 (08 May 2022 12:59), Max: 97.2 (08 May 2022 12:59)  HR: 84 (08 May 2022 12:59) (84 - 84)  BP: 116/66 (08 May 2022 12:59) (116/66 - 116/66)  RR: 18 (08 May 2022 12:59) (18 - 18)  SpO2: --  I&O's Summary    07 May 2022 07:01  -  08 May 2022 07:00  --------------------------------------------------------  IN: 360 mL / OUT: 0 mL / NET: 360 mL        PHYSICAL EXAM:  General: NAD, A/O x 3  ENT: MMM  Neck: Supple, No JVD  Lungs: Clear to auscultation bilaterally, no crackles   Cardio: RRR, S1/S2, No murmurs  Abdomen: Soft, +tender, Nondistended; Bowel sounds present  Extremities: No cyanosis, No edema    LABS:                        10.4   6.99  )-----------( 274      ( 08 May 2022 05:00 )             30.9     05-08    139  |  106  |  33  ----------------------------<  143  4.7   |  20  |  0.8    Ca    8.5      08 May 2022 05:00  Phos  4.3     05-08  Mg     1.9     05-08    TPro  5.0  /  Alb  2.6  /  TBili  <0.2  /  DBili  x   /  AST  38  /  ALT  28  /  AlkPhos  105  05-08              05-04 Chol -- LDL -- HDL -- Trig 78 mg/dL                      COVID-19 PCR: NotDetec (05-07-22 @ 19:55)  COVID-19 PCR: NotDetec (05-01-22 @ 05:24)  COVID-19 PCR: NotDetec (04-25-22 @ 07:30)  COVID-19 PCR: NotDetec (04-21-22 @ 18:38)      RADIOLOGY & ADDITIONAL TESTS:    Care Discussed with Consultants/Other Providers:

## 2022-05-09 NOTE — PROGRESS NOTE ADULT - SUBJECTIVE AND OBJECTIVE BOX
Patient is a 81y old  Female who presents with a chief complaint of SBO (09 May 2022 09:41)      Patient seen and examined at bedside.    ALLERGIES:  chloroquine (Hives)  quinine (Urticaria)    MEDICATIONS:  acetaminophen     Tablet .. 650 milliGRAM(s) Oral every 6 hours PRN  aluminum hydroxide/magnesium hydroxide/simethicone Suspension 30 milliLiter(s) Oral every 6 hours PRN  brimonidine 0.2% Ophthalmic Solution 1 Drop(s) Left EYE three times a day  chlorhexidine 4% Liquid 1 Application(s) Topical <User Schedule>  dorzolamide 2% Ophthalmic Solution 1 Drop(s) Left EYE every 8 hours  enoxaparin Injectable 70 milliGRAM(s) SubCutaneous every 12 hours  fat emulsion (Fish Oil and Plant Based) 20% Infusion 1.267 Gm/kG/Day IV Continuous <Continuous>  ketorolac   Injectable 10 milliGRAM(s) IV Push every 6 hours PRN  latanoprost 0.005% Ophthalmic Solution 1 Drop(s) Both EYES at bedtime  LORazepam   Injectable 0.25 milliGRAM(s) IV Push every 8 hours PRN  metoprolol succinate ER 25 milliGRAM(s) Oral daily  ondansetron Injectable 8 milliGRAM(s) IV Push every 8 hours PRN  pantoprazole  Injectable 40 milliGRAM(s) IV Push daily  Parenteral Nutrition - Adult 1 Each TPN Continuous <Continuous>  Parenteral Nutrition - Adult 1 Each TPN Continuous <Continuous>  timolol 0.5% Solution 1 Drop(s) Left EYE two times a day    Vital Signs Last 24 Hrs  T(F): 99.3 (09 May 2022 13:36), Max: 99.3 (09 May 2022 13:36)  HR: 87 (09 May 2022 13:36) (69 - 89)  BP: 114/54 (09 May 2022 13:36) (107/57 - 119/63)  RR: 18 (09 May 2022 13:36) (18 - 18)  SpO2: 95% (08 May 2022 23:55) (95% - 95%)  I&O's Summary    08 May 2022 07:01  -  09 May 2022 07:00  --------------------------------------------------------  IN: 1697.4 mL / OUT: 0 mL / NET: 1697.4 mL        PHYSICAL EXAM:  General: NAD, A/O x 3  ENT: MMM  Neck: Supple, No JVD  Lungs: Clear to auscultation bilaterally  Cardio: RRR, S1/S2, No murmurs  Abdomen: Soft, mild tender, Nondistended; Bowel sounds present  Extremities: No cyanosis, No edema    LABS:                        10.0   4.29  )-----------( 215      ( 09 May 2022 06:55 )             30.5     05-09    137  |  105  |  36  ----------------------------<  131  5.0   |  18  |  0.8    Ca    8.3      09 May 2022 06:55  Phos  4.1     05-09  Mg     2.0     05-09    TPro  5.1  /  Alb  2.4  /  TBili  0.2  /  DBili  x   /  AST  43  /  ALT  35  /  AlkPhos  104  05-09              05-04 Chol -- LDL -- HDL -- Trig 78 mg/dL                      COVID-19 PCR: NotDetec (05-07-22 @ 19:55)  COVID-19 PCR: NotDetec (05-01-22 @ 05:24)  COVID-19 PCR: NotDetec (04-25-22 @ 07:30)  COVID-19 PCR: NotDetec (04-21-22 @ 18:38)      RADIOLOGY & ADDITIONAL TESTS:    Care Discussed with Consultants/Other Providers:

## 2022-05-09 NOTE — CONSULT NOTE ADULT - NS ATTEND AMEND GEN_ALL_CORE FT
Patient seen at bedside  She notes no abdominal pain at time of visit and that her pain responds to tylenol  Denies ever having anxiety  Reached to patient's family above, and palliative care introduced  Will continue to be available for GOC discussions as appropriate

## 2022-05-09 NOTE — CONSULT NOTE ADULT - PROBLEM SELECTOR RECOMMENDATION 2
cannot tolerate oral  SBO  on TPN  Nutrition following for GOC cannot tolerate oral  SBO  on TPN  Nutrition following for GOC  Continue Tylenol PRN for abdominal pain

## 2022-05-09 NOTE — CONSULT NOTE ADULT - SUBJECTIVE AND OBJECTIVE BOX
BEKAH NELSON          MRN-[id number]              HPI:    81-year-old female history of hypertension and CAD PE with IVC filter on Xarelto uterine adenocarcinoma status post SHAAN with recurrent ascites on Keytruda recent admission for partial small bowel obstruction presenting for evaluation of diffuse abdominal pain with associated nausea and vomiting.  No fevers or chills.  States that since discharge yesterday pain has gotten progressively worse.  Also complaining of constipation, not currently passing gas.  Symptoms gradual onset, moderate, no exacerbating or alleviating factors.    Surgery following and recommends NG tube. (01 May 2022 11:08)      PAST MEDICAL & SURGICAL HISTORY:  Uterine cancer  s/p SHAAN and chemo    Hypertension    High cholesterol    Cataract of right eye    Glaucoma    Coronary artery disease  s/p CABG X2    CAD (coronary artery disease)    HTN (hypertension)    HLD (hyperlipidemia)    H/O total hysterectomy    Athscl CABG, unsp, w angina pectoris w documented spasm    History of coronary artery bypass surgery        FAMILY HISTORY:   Reviewed and found non contributory in mother or father    SOCIAL HISTORY:   Tobacco/etoh/illicit drug use use reported. Yes [ ]  _________  No [ ]  Pt resides at: home [X ]  facility [ ]  other [ ] _______  Lives with family     ROS:	              Dyspnea (Dianne 0-10): 0                       N/V (Y/N): No                             Secretions (Y/N) : No                                          Agitation(Y/N): No                              Pain (Y/N): No                                 -Provocation/Palliation: N/A  -Quality/Quantity: N/A  -Radiating: N/A  -Severity: No pain  -Timing/Frequency: N/A  -Impact on ADLs: N/A    General:  Denied  HEENT:    Denied  Neck:  Denied  CVS:  Denied  Resp:  Denied  GI:  Denied    :  Denied  Musc:  Denied  Neuro:  Denied  Psych:  Denied  Skin:  Denied  Lymph:  Denied    Last BM: 5/3 per flowsheet       Allergies    chloroquine (Hives)  quinine (Urticaria)    Intolerances      -iStop reviewed (Y/N):   Ref#:          This report was requested by: Linnea Phillips | Reference #: [id number]  Patient Name: Bekah NelsonBirth Date: 1940  Address: [address] [place], [place] [id number]: Female  Rx Written	Rx Dispensed	Drug	Quantity	Days Supply	Prescriber Name	Prescriber Florencia #	Payment Method	Dispenser  04/23/2022	04/23/2022	oxycodone hcl (ir) 5 mg tablet	12	3	[facility]	[id number]	Medicare	[id number]  02/19/2022	02/19/2022	tramadol hcl 50 mg tablet	28	7	Cisco Lanza MD	[id number]	Medicare	[id number]  01/03/2022	01/12/2022	clonazepam 0.5 mg tablet	30	30	[facility]	[id number]	Medicare	[id number]  10/25/2021	11/21/2021	clonazepam 0.5 mg tablet	15	30	[facility]	[id number]	Medicare	[id number]  09/27/2021	10/12/2021	clonazepam 0.5 mg tablet	15	30	[facility]	[id number]	Medicare	[id number]  08/30/2021	09/10/2021	clonazepam 0.5 mg tablet	15	7	Cindy Everett (DO)	[id number]	Medicare	[id number]  06/29/2021	07/11/2021	clonazepam 0.5 mg tablet	15	30	[facility]	[id number]	Medicare	[id number]  05/27/2021	06/03/2021	clonazepam 0.5 mg tablet	15	15	[facility]	[id number]	Medicare	[id number]          Labs:	    CBC:                        10.0   4.29  )-----------( 215      ( 09 May 2022 06:55 )             30.5     CMP:    05-09    137  |  105  |  36<H>  ----------------------------<  131<H>  5.0   |  18  |  0.8    Ca    8.3<L>      09 May 2022 06:55  Phos  4.1     05-09  Mg     2.0     05-09    TPro  5.1<L>  /  Alb  2.4<L>  /  TBili  0.2  /  DBili  x   /  AST  43<H>  /  ALT  35  /  AlkPhos  104  05-09         Radiology:	     < from: CT Abdomen and Pelvis w/ IV Cont (05.06.22 @ 20:06) >  IMPRESSION:      Findings again consistent with small bowel obstruction with transition   point in the left hemiabdomen; findings not significant changed when   compared to prior scan of 5/1/2022.    Slightly increased left pleural effusion and abdominal ascites.    --- End of Report ---    < end of copied text >      EKG:	    < from: 12 Lead ECG (05.01.22 @ 02:07) >    Ventricular Rate 88 BPM    Atrial Rate 88 BPM    P-R Interval 116 ms    QRS Duration 84 ms    Q-T Interval 354 ms    QTC Calculation(Bazett) 428 ms    P Axis 47 degrees    R Axis 16 degrees    T Axis 26 degrees    Diagnosis Line Normal sinus rhythm  Nonspecific T wave abnormality  Abnormal ECG    Confirmed by Kirill Rodríguez ([id number]) on 5/1/2022 3:21:01 PM    < end of copied text >    Imaging Personally Reviewed:  [ X] YES  [ ] NO    Consultant(s) Notes Reviewed:  [X ] YES  [ ] NO  Care Discussed with Consultants/Other Providers [ X] YES  [ ] NO    PEx:	  T(C): 36.7 (05-09-22 @ 05:32), Max: 37.2 (05-08-22 @ 20:36)  HR: 69 (05-09-22 @ 05:32) (69 - 89)  BP: 107/57 (05-09-22 @ 05:32) (107/57 - 119/63)  RR: 18 (05-09-22 @ 05:32) (18 - 18)  SpO2: 95% (05-08-22 @ 23:55) (95% - 95%)    General:  found in bed in NAD  Eyes:  PERRL EOMI Non icteric MOM  ENMT: no external oral ulcers, MMM, no thrush   CVS: RR S1S2 No M/G/R  Resp: Unlabored Non tachypneic No increased WOB  GI:  Soft NT ND BS+  :  Voiding / Rowley / PrimaFit  Musc: No C/C/E    Neuro: Follows commands No focal deficits  Psych: Calm Pleasant, AAOx3    Skin: Non jaundiced , no rash   Lymph: no adenopathy     Preadmit Karnofsky:  %           Current Karnofsky:     %      Medications:	      MEDICATIONS  (STANDING):  brimonidine 0.2% Ophthalmic Solution 1 Drop(s) Left EYE three times a day  chlorhexidine 4% Liquid 1 Application(s) Topical <User Schedule>  dorzolamide 2% Ophthalmic Solution 1 Drop(s) Left EYE every 8 hours  enoxaparin Injectable 70 milliGRAM(s) SubCutaneous every 12 hours  fat emulsion (Fish Oil and Plant Based) 20% Infusion 1.267 Gm/kG/Day (23.4 mL/Hr) IV Continuous <Continuous>  latanoprost 0.005% Ophthalmic Solution 1 Drop(s) Both EYES at bedtime  metoprolol succinate ER 25 milliGRAM(s) Oral daily  pantoprazole  Injectable 40 milliGRAM(s) IV Push daily  Parenteral Nutrition - Adult 1 Each (60 mL/Hr) TPN Continuous <Continuous>  timolol 0.5% Solution 1 Drop(s) Left EYE two times a day    MEDICATIONS  (PRN):  acetaminophen     Tablet .. 650 milliGRAM(s) Oral every 6 hours PRN Temp greater or equal to 38C (100.4F), Mild Pain (1 - 3)  aluminum hydroxide/magnesium hydroxide/simethicone Suspension 30 milliLiter(s) Oral every 6 hours PRN Dyspepsia  ketorolac   Injectable 10 milliGRAM(s) IV Push every 6 hours PRN Severe Pain (7 - 10)  LORazepam   Injectable 0.25 milliGRAM(s) IV Push every 8 hours PRN Anxiety  ondansetron Injectable 8 milliGRAM(s) IV Push every 8 hours PRN Nausea and/or Vomiting        Advanced Directives:	     DNR/DNI    Decision maker: The patient is able to participate in complex medical decision making conversations.   Legal surrogate: Dtr- Evod     GOALS OF CARE DISCUSSION	  - pt made herself DNR/DNI last week  - open to palliative care [facility] discussions     PSYCHOSOCIAL-SPIRITUAL ASSESSMENT:       Reviewed       See Palliative Care SW/ documentation        	    REFERRALS       Palliative Med        Unit SW/Case Mgmt        BEKAH NELSON          MRN-[id number]              HPI:    81-year-old female history of hypertension and CAD PE with IVC filter on Xarelto uterine adenocarcinoma status post SHAAN with recurrent ascites on Keytruda recent admission for partial small bowel obstruction presenting for evaluation of diffuse abdominal pain with associated nausea and vomiting.  No fevers or chills.  States that since discharge yesterday pain has gotten progressively worse.  Also complaining of constipation, not currently passing gas.  Symptoms gradual onset, moderate, no exacerbating or alleviating factors.    Surgery following and recommends NG tube. (01 May 2022 11:08)      PAST MEDICAL & SURGICAL HISTORY:  Uterine cancer  s/p SHAAN and chemo    Hypertension    High cholesterol    Cataract of right eye    Glaucoma    Coronary artery disease  s/p CABG X2    CAD (coronary artery disease)    HTN (hypertension)    HLD (hyperlipidemia)    H/O total hysterectomy    Athscl CABG, unsp, w angina pectoris w documented spasm    History of coronary artery bypass surgery        FAMILY HISTORY:   Reviewed and found non contributory in mother or father    SOCIAL HISTORY:   Tobacco/etoh/illicit drug use use reported. Yes [ ]  _________  No [ ]  Pt resides at: home [X ]  facility [ ]  other [ ] _______  Lives with family     ROS:	              Dyspnea (Dianne 0-10): 0                       N/V (Y/N): No                             Secretions (Y/N) : No                                          Agitation(Y/N): No, no anxiety right now                           Pain (Y/N): Not now  but she does get abdominal pain. Tylenol helps it.                  -Provocation/Palliation: N/A  -Quality/Quantity: N/A  -Radiating: N/A  -Severity: No pain  -Timing/Frequency: N/A  -Impact on ADLs: N/A    General:  Denied  HEENT:    Denied  Neck:  Denied  CVS:  Denied  Resp:  Denied  GI:  Denied    :  Denied  Musc:  Denied  Neuro:  Denied  Psych:  Denied  Skin:  Denied  Lymph:  Denied    Last BM: 5/3 per flowsheet       Allergies    chloroquine (Hives)  quinine (Urticaria)    Intolerances      -iStop reviewed (Y/N):   Ref#:          This report was requested by: Linnea Phillips | Reference #: [id number]  Patient Name: Bekah NelsonBirth Date: 1940  Address: [address] [place] [id number]: Female  Rx Written	Rx Dispensed	Drug	Quantity	Days Supply	Prescriber Name	Prescriber Florencia #	Payment Method	Dispenser  04/23/2022	04/23/2022	oxycodone hcl (ir) 5 mg tablet	12	3	[facility]	[id number]	Medicare	[id number]  02/19/2022	02/19/2022	tramadol hcl 50 mg tablet	28	7	Cisco Lanza MD	[id number]	Medicare	[id number]  01/03/2022	01/12/2022	clonazepam 0.5 mg tablet	30	30	[facility]	[id number]	Medicare	[id number]  10/25/2021	11/21/2021	clonazepam 0.5 mg tablet	15	30	[facility]	[id number]	Medicare	[id number]  09/27/2021	10/12/2021	clonazepam 0.5 mg tablet	15	30	[facility]	[id number]	Medicare	[id number]  08/30/2021	09/10/2021	clonazepam 0.5 mg tablet	15	7	Cindy Everett (DO)	[id number]	Medicare	[id number]  06/29/2021	07/11/2021	clonazepam 0.5 mg tablet	15	30	[facility]	[id number]	Medicare	[id number]  05/27/2021	06/03/2021	clonazepam 0.5 mg tablet	15	15	[facility]	[id number]	Medicare	[id number]          Labs:	    CBC:                        10.0   4.29  )-----------( 215      ( 09 May 2022 06:55 )             30.5     CMP:    05-09    137  |  105  |  36<H>  ----------------------------<  131<H>  5.0   |  18  |  0.8    Ca    8.3<L>      09 May 2022 06:55  Phos  4.1     05-09  Mg     2.0     05-09    TPro  5.1<L>  /  Alb  2.4<L>  /  TBili  0.2  /  DBili  x   /  AST  43<H>  /  ALT  35  /  AlkPhos  104  05-09         Radiology:	     < from: CT Abdomen and Pelvis w/ IV Cont (05.06.22 @ 20:06) >  IMPRESSION:      Findings again consistent with small bowel obstruction with transition   point in the left hemiabdomen; findings not significant changed when   compared to prior scan of 5/1/2022.    Slightly increased left pleural effusion and abdominal ascites.    --- End of Report ---    < end of copied text >      EKG:	    < from: 12 Lead ECG (05.01.22 @ 02:07) >    Ventricular Rate 88 BPM    Atrial Rate 88 BPM    P-R Interval 116 ms    QRS Duration 84 ms    Q-T Interval 354 ms    QTC Calculation(Bazett) 428 ms    P Axis 47 degrees    R Axis 16 degrees    T Axis 26 degrees    Diagnosis Line Normal sinus rhythm  Nonspecific T wave abnormality  Abnormal ECG    Confirmed by Kirill Rodríguez ([id number]) on 5/1/2022 3:21:01 PM    < end of copied text >    Imaging Personally Reviewed:  [ X] YES  [ ] NO    Consultant(s) Notes Reviewed:  [X ] YES  [ ] NO  Care Discussed with Consultants/Other Providers [ X] YES  [ ] NO    PEx:	  T(C): 36.7 (05-09-22 @ 05:32), Max: 37.2 (05-08-22 @ 20:36)  HR: 69 (05-09-22 @ 05:32) (69 - 89)  BP: 107/57 (05-09-22 @ 05:32) (107/57 - 119/63)  RR: 18 (05-09-22 @ 05:32) (18 - 18)  SpO2: 95% (05-08-22 @ 23:55) (95% - 95%)    General:  found in bed in NAD, ill appearing   Eyes:  PERRL EOMI Non icteric MOM  ENMT: no external oral ulcers, MMM, no thrush   CVS: RR, no edema   Resp: Unlabored Non tachypneic No increased WOB  GI:  Soft NT ND   :  Voiding  Musc: No C/C/E    Neuro: Follows commands No focal deficits  Psych: Calm Pleasant, AAOx3    Skin: Non jaundiced , no rash       Preadmit Karnofsky:   %           Current Karnofsky:   50  %      Medications:	      MEDICATIONS  (STANDING):  brimonidine 0.2% Ophthalmic Solution 1 Drop(s) Left EYE three times a day  chlorhexidine 4% Liquid 1 Application(s) Topical <User Schedule>  dorzolamide 2% Ophthalmic Solution 1 Drop(s) Left EYE every 8 hours  enoxaparin Injectable 70 milliGRAM(s) SubCutaneous every 12 hours  fat emulsion (Fish Oil and Plant Based) 20% Infusion 1.267 Gm/kG/Day (23.4 mL/Hr) IV Continuous <Continuous>  latanoprost 0.005% Ophthalmic Solution 1 Drop(s) Both EYES at bedtime  metoprolol succinate ER 25 milliGRAM(s) Oral daily  pantoprazole  Injectable 40 milliGRAM(s) IV Push daily  Parenteral Nutrition - Adult 1 Each (60 mL/Hr) TPN Continuous <Continuous>  timolol 0.5% Solution 1 Drop(s) Left EYE two times a day    MEDICATIONS  (PRN):  acetaminophen     Tablet .. 650 milliGRAM(s) Oral every 6 hours PRN Temp greater or equal to 38C (100.4F), Mild Pain (1 - 3)  aluminum hydroxide/magnesium hydroxide/simethicone Suspension 30 milliLiter(s) Oral every 6 hours PRN Dyspepsia  ketorolac   Injectable 10 milliGRAM(s) IV Push every 6 hours PRN Severe Pain (7 - 10)  LORazepam   Injectable 0.25 milliGRAM(s) IV Push every 8 hours PRN Anxiety  ondansetron Injectable 8 milliGRAM(s) IV Push every 8 hours PRN Nausea and/or Vomiting        Advanced Directives:	     DNR/DNI    Decision maker: The patient is able to participate in complex medical decision making conversations.   Legal surrogate: Dtr- Evod     GOALS OF CARE DISCUSSION	  - pt made herself DNR/DNI last week  - she is OK with us speaking to her family     PSYCHOSOCIAL-SPIRITUAL ASSESSMENT:       Reviewed       See Palliative Care SW/ documentation        	    REFERRALS       Palliative Med        Unit SW/Case Mgmt        BEKAH NELSON          MRN-[id number]                CC: abdominal pain    HPI:    81-year-old female history of hypertension and CAD PE with IVC filter on Xarelto uterine adenocarcinoma status post SHAAN with recurrent ascites on Keytruda recent admission for partial small bowel obstruction presenting for evaluation of diffuse abdominal pain with associated nausea and vomiting.  No fevers or chills.  States that since discharge yesterday pain has gotten progressively worse.  Also complaining of constipation, not currently passing gas.  Symptoms gradual onset, moderate, no exacerbating or alleviating factors.    Surgery following and recommends NG tube. (01 May 2022 11:08)      PAST MEDICAL & SURGICAL HISTORY:  Uterine cancer  s/p SHAAN and chemo    Hypertension    High cholesterol    Cataract of right eye    Glaucoma    Coronary artery disease  s/p CABG X2    CAD (coronary artery disease)    HTN (hypertension)    HLD (hyperlipidemia)    H/O total hysterectomy    Athscl CABG, unsp, w angina pectoris w documented spasm    History of coronary artery bypass surgery        FAMILY HISTORY:   Reviewed and found non contributory in mother or father    SOCIAL HISTORY:   Tobacco/etoh/illicit drug use use reported. Yes [ ]  _________  No [ ]  Pt resides at: home [X ]  facility [ ]  other [ ] _______  Lives with family     ROS:	              Dyspnea (Dianne 0-10): 0                       N/V (Y/N): No                             Secretions (Y/N) : No                                          Agitation(Y/N): No, no anxiety right now                           Pain (Y/N): Not now  but she does get abdominal pain. Tylenol helps it.                  -Provocation/Palliation: N/A  -Quality/Quantity: N/A  -Radiating: N/A  -Severity: No pain  -Timing/Frequency: N/A  -Impact on ADLs: N/A    General:  Denied  HEENT:    Denied  Neck:  Denied  CVS:  Denied  Resp:  Denied  GI:  Denied    :  Denied  Musc:  Denied  Neuro:  Denied  Psych:  Denied  Skin:  Denied  Lymph:  Denied    Last BM: 5/3 per flowsheet       Allergies    chloroquine (Hives)  quinine (Urticaria)    Intolerances      -iStop reviewed (Y/N):   Ref#:          This report was requested by: Linnea Phillips | Reference #: [id number]  Patient Name: Bekah NelsonBirth Date: 1940  Address: [address] [place] [id number]: Female  Rx Written	Rx Dispensed	Drug	Quantity	Days Supply	Prescriber Name	Prescriber Florencia #	Payment Method	Dispenser  04/23/2022	04/23/2022	oxycodone hcl (ir) 5 mg tablet	12	3	[facility]	[id number]	Medicare	[id number]  02/19/2022	02/19/2022	tramadol hcl 50 mg tablet	28	7	Cisco Lanza MD	[id number]	Medicare	[id number]  01/03/2022	01/12/2022	clonazepam 0.5 mg tablet	30	30	[facility]	[id number]	Medicare	[facility][id number]  10/25/2021	11/21/2021	clonazepam 0.5 mg tablet	15	30	[facility]	[id number]	Medicare	[id number]  09/27/2021	10/12/2021	clonazepam 0.5 mg tablet	15	30	[facility]	[id number]	Medicare	[id number]  08/30/2021	09/10/2021	clonazepam 0.5 mg tablet	15	7	Cindy Everett (DO)	[id number]	Medicare	[facility][id number]  06/29/2021	07/11/2021	clonazepam 0.5 mg tablet	15	30	[facility]	[id number]	Medicare	[id number]  05/27/2021	06/03/2021	clonazepam 0.5 mg tablet	15	15	[facility]	[id number]	Medicare	[facility][id number]          Labs:	    CBC:                        10.0   4.29  )-----------( 215      ( 09 May 2022 06:55 )             30.5     CMP:    05-09    137  |  105  |  36<H>  ----------------------------<  131<H>  5.0   |  18  |  0.8    Ca    8.3<L>      09 May 2022 06:55  Phos  4.1     05-09  Mg     2.0     05-09    TPro  5.1<L>  /  Alb  2.4<L>  /  TBili  0.2  /  DBili  x   /  AST  43<H>  /  ALT  35  /  AlkPhos  104  05-09         Radiology:	     < from: CT Abdomen and Pelvis w/ IV Cont (05.06.22 @ 20:06) >  IMPRESSION:      Findings again consistent with small bowel obstruction with transition   point in the left hemiabdomen; findings not significant changed when   compared to prior scan of 5/1/2022.    Slightly increased left pleural effusion and abdominal ascites.    --- End of Report ---    < end of copied text >      EKG:	    < from: 12 Lead ECG (05.01.22 @ 02:07) >    Ventricular Rate 88 BPM    Atrial Rate 88 BPM    P-R Interval 116 ms    QRS Duration 84 ms    Q-T Interval 354 ms    QTC Calculation(Bazett) 428 ms    P Axis 47 degrees    R Axis 16 degrees    T Axis 26 degrees    Diagnosis Line Normal sinus rhythm  Nonspecific T wave abnormality  Abnormal ECG    Confirmed by Kirill Rodríguez ([id number]) on 5/1/2022 3:21:01 PM    < end of copied text >    Imaging Personally Reviewed:  [ X] YES  [ ] NO    Consultant(s) Notes Reviewed:  [X ] YES  [ ] NO  Care Discussed with Consultants/Other Providers [ X] YES  [ ] NO    PEx:	  T(C): 36.7 (05-09-22 @ 05:32), Max: 37.2 (05-08-22 @ 20:36)  HR: 69 (05-09-22 @ 05:32) (69 - 89)  BP: 107/57 (05-09-22 @ 05:32) (107/57 - 119/63)  RR: 18 (05-09-22 @ 05:32) (18 - 18)  SpO2: 95% (05-08-22 @ 23:55) (95% - 95%)    General:  found in bed in NAD, ill appearing   Eyes:  PERRL EOMI Non icteric MOM  ENMT: no external oral ulcers, MMM, no thrush   CVS: RR, no edema   Resp: Unlabored Non tachypneic No increased WOB  GI:  Soft NT ND   :  Voiding  Musc: No C/C/E    Neuro: Follows commands No focal deficits  Psych: Calm Pleasant, AAOx3    Skin: Non jaundiced , no rash       Preadmit Karnofsky:   50%           Current Karnofsky:   50  %      Medications:	      MEDICATIONS  (STANDING):  brimonidine 0.2% Ophthalmic Solution 1 Drop(s) Left EYE three times a day  chlorhexidine 4% Liquid 1 Application(s) Topical <User Schedule>  dorzolamide 2% Ophthalmic Solution 1 Drop(s) Left EYE every 8 hours  enoxaparin Injectable 70 milliGRAM(s) SubCutaneous every 12 hours  fat emulsion (Fish Oil and Plant Based) 20% Infusion 1.267 Gm/kG/Day (23.4 mL/Hr) IV Continuous <Continuous>  latanoprost 0.005% Ophthalmic Solution 1 Drop(s) Both EYES at bedtime  metoprolol succinate ER 25 milliGRAM(s) Oral daily  pantoprazole  Injectable 40 milliGRAM(s) IV Push daily  Parenteral Nutrition - Adult 1 Each (60 mL/Hr) TPN Continuous <Continuous>  timolol 0.5% Solution 1 Drop(s) Left EYE two times a day    MEDICATIONS  (PRN):  acetaminophen     Tablet .. 650 milliGRAM(s) Oral every 6 hours PRN Temp greater or equal to 38C (100.4F), Mild Pain (1 - 3)  aluminum hydroxide/magnesium hydroxide/simethicone Suspension 30 milliLiter(s) Oral every 6 hours PRN Dyspepsia  ketorolac   Injectable 10 milliGRAM(s) IV Push every 6 hours PRN Severe Pain (7 - 10)  LORazepam   Injectable 0.25 milliGRAM(s) IV Push every 8 hours PRN Anxiety  ondansetron Injectable 8 milliGRAM(s) IV Push every 8 hours PRN Nausea and/or Vomiting        Advanced Directives:	     DNR/DNI    Decision maker: The patient is able to participate in complex medical decision making conversations.   Legal surrogate: Dtr- Evod     GOALS OF CARE DISCUSSION	  - pt made herself DNR/DNI last week  - she is OK with us speaking to her family     PSYCHOSOCIAL-SPIRITUAL ASSESSMENT:       Reviewed       See Palliative Care SW/ documentation        	    REFERRALS       Palliative Med        Unit SW/Case Mgmt

## 2022-05-09 NOTE — CONSULT NOTE ADULT - ASSESSMENT
81yFemale with PMH including hypertension and CAD PE with IVC filter on Xarelto uterine adenocarcinoma status post SHAAN with recurrent ascites on Keytruda, being evaluated for GOC in the setting of re-admission for SBO.       MEDD (morphine equivalent daily dose):      See Recs below.    Please call [phone] with questions or concerns 24/7.   We will continue to follow.     Discussed with primary MD.   81yFemale with PMH including hypertension and CAD PE with IVC filter on Xarelto uterine adenocarcinoma status post SHAAN with recurrent ascites on Keytruda, being evaluated for GOC in the setting of re-admission for SBO. Currently patient is receiving TPN, and plan is to continue medical management. Patient did make herself DNR/DNI alongside family this past week.     On exam, patient is in no distress, with no symptoms. She is eager to get home.   Spoke with dtr and grandson on phone- they are trying to figure out nutrition options to get the patient home. They are not sure if she will be following up for further cancer tx.     MEDD (morphine equivalent daily dose): 0      See Recs below.    Please call [phone] with questions or concerns 24/7.   We will continue to follow.     Discussed with primary MD.

## 2022-05-09 NOTE — PROGRESS NOTE ADULT - ASSESSMENT
Patient is a 81 female PMH recurrent uterine adenocarcinoma s/p SHAAN/BSO (2016), recurrent malignant ascites, CAD s/p CABG, HTN, depression, PE (Xarelto) and IVC filter 2020 presenting with multiple day history of LUQ abdominal pain since prior to discharge, x1 episode of NBNB emesis day prior to arrival, none in the ED, passing gas on day of arrival with BM day prior to arrival. On workup patient found to have persistent large volume ascites (malignant), with small bowel dilation up to 4.2cm with abrupt transition point to collapsed small bowel in left mid abdomen. The first NG tube placement attempt failed, patient removed it herself - will consider a second attempt today, if needed.    #Small Bowel Obstruction  - No need for NGT for decompression now   - pt started on clear fluids 5/4 -> increased pain and distention_> return to NPO 5/5  -Strict I&O's Q4 hrs.  - Daily KUB   - Surgery consult appreciated. Surgery a very high risk candidate for surgery and do not want to offer surgical intervention   - obtain heme/onc consult to ascertain patient's status at surgery's request  - trend am labs  - TPN via port continued   - patient having BMs, abdomen is soft. Will try advancing to clear liquid diet. If vomiting occurs or unable to tolerate, make NPO.   - 5/7 pt with emesis abd pain -attempt to pass NGT failed by medicine and ent  - pt unable to swallow - toradol and IV ativan ordered for palliation   - 5/6 CT abd ordered - SBO redemonstrated     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 s/p weekly Carbo/Taxol (8/14/20-1/28/2021)   - PET Scan from 2/25/21 shows good response to chemo (declines any additional cancer directed therapy)  - CT A/P from 11/2021 (abd pain) shows no new disease; CTa Chest from 09/2021   - CT A/P from 02/2022 showed ascites which showed recurrent adenocarcinoma of mullerian origin:  is high  - Completed [facility] Liquid Biopsy Testing (03/01/2022) shows no targetable mutation  - chemoport placed by IR   - patient was started on Keytruda + Lenvima q3 weeks but lenvima was discontinued due to poor tolerance  - heme onc will consider possibly resuming lenvima once more medically stable    - Chemotherapy scheduled for 5/10 but is on hold due to sbo    #Hypertension  - Patient is on Metoprolol 25mg daily  - continue monitoring BP    #CAD  - Patient is on Xarelto and has an IVC filter    #Glaucoma  - continue taking medications    #GOC  - Pt and family report that daughter should be given all medical information and making decisions on behalf of pt  - 5/6 meeting with Avila (daughter) and grandson Octavious discuss prognosis - agree to DNR/DNI want to pursue all other medical treatments for SBO  - agree to palliative care consult  - 5/7 family meeting with extended family - options given - remove TPN and hospice, home with TPN, nursing home with TPN-family to discuss   - 5/9 palliative care consult appreciated     #Misc   - DVT ppx: therapeutic lvnx   - Diet: NPO; TPN  - Code status: full   - Pending (specify):  sbo resolution, investigate possibility of tpn at home   - Disposition: acute  - Family: daughter Avila updated at bedside 5/9

## 2022-05-09 NOTE — PROGRESS NOTE ADULT - SUBJECTIVE AND OBJECTIVE BOX
----------Daily Progress Note----------    HISTORY OF PRESENT ILLNESS:  Patient is a 81y old Female who presents with a chief complaint of SBO (09 May 2022 08:47)    Currently admitted to medicine with the primary diagnosis of Abdominal pain       Today is hospital day 8d.     INTERVAL HOSPITAL COURSE / OVERNIGHT EVENTS:    Patient was examined and seen at bedside. This morning patient reports to be uncomfortable in bed asking to be repositioned; no overnight events.     Review of Systems: Otherwise unremarkable     <<<<<PAST MEDICAL & SURGICAL HISTORY>>>>>  Uterine cancer  s/p SHAAN and chemo    Hypertension    High cholesterol    Cataract of right eye    Glaucoma    Coronary artery disease  s/p CABG X2    CAD (coronary artery disease)    HTN (hypertension)    HLD (hyperlipidemia)    H/O total hysterectomy    Athscl CABG, unsp, w angina pectoris w documented spasm    History of coronary artery bypass surgery      ALLERGIES  chloroquine (Hives)  quinine (Urticaria)      Home Medications:  acetaminophen 325 mg oral tablet: 2 tab(s) orally every 6 hours, As needed, Temp greater or equal to 38C (100.4F), Mild Pain (1 - 3) (28 Apr 2022 15:30)  brimonidine 0.2% ophthalmic solution: 1 drop(s) in the left eye 3 times a day (28 Apr 2022 15:30)  busPIRone 15 mg oral tablet: 1 tab(s) orally 2 times a day (28 Apr 2022 15:30)  dorzolamide-timolol 2.23%-0.68% ophthalmic solution: 1 drop(s) in the left eye 2 times a day (28 Apr 2022 15:30)  esomeprazole 20 mg oral delayed release capsule: 1 cap(s) orally once a day (28 Apr 2022 15:30)  gabapentin 100 mg oral capsule: 1 cap(s) orally 3 times a day (28 Apr 2022 15:30)  latanoprost 0.005% ophthalmic solution: 1 drop(s) to each affected eye 2 times a day (28 Apr 2022 15:30)  metoprolol succinate 25 mg oral tablet, extended release: 1 tab(s) orally once a day (28 Apr 2022 15:30)  oxyCODONE 5 mg oral tablet: 1 tab(s) orally every 6 hours, As Needed for pain (28 Apr 2022 15:30)  rosuvastatin 40 mg oral tablet: 1 tab(s) orally once a day (28 Apr 2022 15:30)  Xarelto 20 mg oral tablet: 1 tab(s) orally once a day (in the evening) (28 Apr 2022 15:30)        MEDICATIONS  STANDING MEDICATIONS  brimonidine 0.2% Ophthalmic Solution 1 Drop(s) Left EYE three times a day  chlorhexidine 4% Liquid 1 Application(s) Topical <User Schedule>  dorzolamide 2% Ophthalmic Solution 1 Drop(s) Left EYE every 8 hours  enoxaparin Injectable 70 milliGRAM(s) SubCutaneous every 12 hours  fat emulsion (Fish Oil and Plant Based) 20% Infusion 1.267 Gm/kG/Day IV Continuous <Continuous>  latanoprost 0.005% Ophthalmic Solution 1 Drop(s) Both EYES at bedtime  metoprolol succinate ER 25 milliGRAM(s) Oral daily  pantoprazole  Injectable 40 milliGRAM(s) IV Push daily  Parenteral Nutrition - Adult 1 Each TPN Continuous <Continuous>  timolol 0.5% Solution 1 Drop(s) Left EYE two times a day    PRN MEDICATIONS  acetaminophen     Tablet .. 650 milliGRAM(s) Oral every 6 hours PRN  aluminum hydroxide/magnesium hydroxide/simethicone Suspension 30 milliLiter(s) Oral every 6 hours PRN  ketorolac   Injectable 10 milliGRAM(s) IV Push every 6 hours PRN  LORazepam   Injectable 0.25 milliGRAM(s) IV Push every 8 hours PRN  ondansetron Injectable 8 milliGRAM(s) IV Push every 8 hours PRN    VITALS:  T(F): 98  HR: 69  BP: 107/57  RR: 18  SpO2: 95%    <<<<<LABS>>>>>                        10.0   4.29  )-----------( 215      ( 09 May 2022 06:55 )             30.5     05-09    137  |  105  |  36<H>  ----------------------------<  131<H>  5.0   |  18  |  0.8    Ca    8.3<L>      09 May 2022 06:55  Phos  4.1     05-09  Mg     2.0     05-09    TPro  5.1<L>  /  Alb  2.4<L>  /  TBili  0.2  /  DBili  x   /  AST  43<H>  /  ALT  35  /  AlkPhos  104  05-09            [id number]        <<<<<RADIOLOGY>>>>>    <<<<<PHYSICAL EXAM>>>>>  GENERAL: NAD  HEENT: Normocephalic, atraumatic,   PULMONARY: Clear to auscultation bilaterally. No rales, rhonchi, or wheezing.  CARDIOVASCULAR: Regular rate and rhythm, S1-S2, no murmurs  GASTROINTESTINAL: Soft, non-tender, non-distended  NEUROLOGIC/MUSCULOSKELETAL: AOx4, grossly moving all extremities      -----------------------------------------------------------------------------------------------------------------------------------------------------------------------------------------------

## 2022-05-09 NOTE — CHART NOTE - NSCHARTNOTEFT_GEN_A_CORE
Pt c/o abd pain earlier today  No vomiting. Remains NPO  Abd distended  TPN infusing via port  d/w pt and family at bedside as well as medical resident, RN    T(F): 99.3 (05-09-22 @ 13:36), Max: 99.3 (05-09-22 @ 13:36)  HR: 87 (05-09-22 @ 13:36) (69 - 89)  BP: 114/54 (05-09-22 @ 13:36) (107/57 - 119/63)  RR: 18 (05-09-22 @ 13:36) (18 - 18)  SpO2: 95% (05-08-22 @ 23:55) (95% - 95%)    I&O's Detail    08 May 2022 07:01  -  09 May 2022 07:00  --------------------------------------------------------  IN:    Fat Emulsion (Fish Oil &amp; Plant Based) 20% Infusion: 257.4 mL    TPN (Total Parenteral Nutrition): 1440 mL  Total IN: 1697.4 mL    OUT:  Total OUT: 0 mL    Total NET: 1697.4 mL    05-09    137  |  105  |  36<H>  ----------------------------<  131<H>  5.0   |  18  |  0.8    Ca    8.3<L>      09 May 2022 06:55  Phos  4.1     05-09  Mg     2.0     05-09    TPro  5.1<L>  /  Alb  2.4<L>  /  TBili  0.2  /  DBili  x   /  AST  43<H>  /  ALT  35  /  AlkPhos  104  05-09                        10.0   4.29  )-----------( 215      ( 09 May 2022 06:55 )             30.5     Diet, NPO:   With Ice Chips/Sips of Water (05-05-22 @ 08:26)    ASSESSMENT  Patient is a 81 female PMH recurrent uterine adenocarcinoma s/p SHAAN/BSO (2016), recurrent malignant ascites, CAD s/p CABG, HTN, depression, PE (Xarelto) and IVC filter 2020 presenting with multiple day history of LUQ abdominal pain since prior to discharge, x1 episode of NBNB emesis day prior to arrival, none in the ED, passing gas on day of arrival with BM day prior to arrival. On workup patient found to have persistent large volume ascites (malignant), with small bowel dilation up to 4.2cm with abrupt transition point to collapsed small bowel in left mid abdomen. The first NG tube placement attempt failed, patient removed it herself    - Uterine Adenocarcinoma Mullerian origin, malignant ascites diagnosed on 8/8/2020  - SBO  - HTN  - CAD, hyperlipidemia  - glaucoma  - hypokalemia, hypomagnesemia  - wt loss, ~10% since 11/2020  - risk for refeeding syndrome    PLAN  - cont TPN tonight via port    - daily bmp, in phos, mg   - need tx plans for primary disease, IF pt further tx being offered by heme/onc no then TPN will be futile  - home TPN may not be an option due to current shortage of [facility] currently not accepting referrals for home TPN patients  - will follow Pt c/o abd pain earlier today  No vomiting. Remains NPO  Abd distended  TPN infusing via port  d/w pt and family at bedside as well as medical resident, RN - & later with     T(F): 99.3 (05-09-22 @ 13:36), Max: 99.3 (05-09-22 @ 13:36)  HR: 87 (05-09-22 @ 13:36) (69 - 89)  BP: 114/54 (05-09-22 @ 13:36) (107/57 - 119/63)  RR: 18 (05-09-22 @ 13:36) (18 - 18)  SpO2: 95% (05-08-22 @ 23:55) (95% - 95%)    I&O's Detail    08 May 2022 07:01  -  09 May 2022 07:00  --------------------------------------------------------  IN:    Fat Emulsion (Fish Oil &amp; Plant Based) 20% Infusion: 257.4 mL    TPN (Total Parenteral Nutrition): 1440 mL  Total IN: 1697.4 mL    OUT:  Total OUT: 0 mL----  ??? - + u.o. but just not documented    Total NET: 1697.4 mL    05-09    137  |  105  |  36<H>  ----------------------------<  131<H>  5.0   |  18  |  0.8    Ca    8.3<L>      09 May 2022 06:55  Phos  4.1     05-09  Mg     2.0     05-09    TPro  5.1<L>  /  Alb  2.4<L>  /  TBili  0.2  /  DBili  x   /  AST  43<H>  /  ALT  35  /  AlkPhos  104  05-09                        10.0   4.29  )-----------( 215      ( 09 May 2022 06:55 )             30.5     Diet, NPO:   With Ice Chips/Sips of Water (05-05-22 @ 08:26)    ASSESSMENT  Patient is a 81 female PMH recurrent uterine adenocarcinoma s/p SHAAN/BSO (2016), recurrent malignant ascites, CAD s/p CABG, HTN, depression, PE (Xarelto) and IVC filter 2020 presenting with multiple day history of LUQ abdominal pain since prior to discharge, x1 episode of NBNB emesis day prior to arrival, none in the ED, passing gas on day of arrival with BM day prior to arrival. On workup patient found to have persistent large volume ascites (malignant), with small bowel dilation up to 4.2cm with abrupt transition point to collapsed small bowel in left mid abdomen. The first NG tube placement attempt failed, patient removed it herself    - Uterine Adenocarcinoma Mullerian origin, malignant ascites diagnosed on 8/8/2020  - SBO  - HTN  - CAD, hyperlipidemia  - glaucoma  - hypokalemia, hypomagnesemia  - wt loss, ~10% since 11/2020  - risk for refeeding syndrome    PLAN  - cont TPN tonight via port    - daily bmp, in phos, mg   - need tx plans for primary disease, IF no further active Oncology tx being offered by heme/onc, then TPN will be futile  - home TPN may not be an option due to current shortage of [facility] currently not accepting referrals for home TPN patients  - will follow

## 2022-05-09 NOTE — PROGRESS NOTE ADULT - ASSESSMENT
Patient is a 81 female PMH recurrent uterine adenocarcinoma s/p SHAAN/BSO (2016), recurrent malignant ascites, CAD s/p CABG, HTN, depression, PE (Xarelto) and IVC filter 2020 presenting with multiple day history of LUQ abdominal pain since prior to discharge, x1 episode of NBNB found to have persistent large volume ascites (malignant), with small bowel dilation up to 4.2cm with abrupt transition point to collapsed small bowel in left mid abdomen. The first NG tube placement attempt failed/unsuccessful x 2.     #Small Bowel Obstruction  - No need for NGT for decompression now   - pt started on clear fluids 5/4 -> increased pain and distention_> return to NPO 5/5  -Strict I&O's Q4 hrs.  - Daily KUB   - Surgery consult appreciated. Surgery a very high risk candidate for surgery and do not want to offer surgical intervention   - obtain heme/onc consult to ascertain patient's status at surgery's request  - trend am labs  - TPN via port started   - patient having BMs, abdomen is soft. Will try advancing to clear liquid diet. If vomiting occurs or unable to tolerate, make NPO.   - 5/7 pt with emesis abd pain -attempt to pass NGT failed by medicine and ent  - pt unable to swallow - toradol and IV ativan ordered for palliation   - 5/6 CT abd ordered - SBO redemonstrated   - Patient unable to tolerated NG tube  - pending nutrition follow up for possible TPN on discharge: family wanting to hire private aid and concerned if TPN can be administered at night for 12 hours and if patient can be without TPN fo remaining 8 hours.     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 s/p weekly Carbo/Taxol (8/14/20-1/28/2021)   - PET Scan from 2/25/21 shows good response to chemo (declines any additional cancer directed therapy)  - CT A/P from 11/2021 (abd pain) shows no new disease; CTa Chest from 09/2021   - CT A/P from 02/2022 showed ascites which showed recurrent adenocarcinoma of mullerian origin:  is high  - Completed [facility] Liquid Biopsy Testing (03/01/2022) shows no targetable mutation  - chemoport placed by IR   - patient was started on Keytruda + Lenvima q3 weeks but lenvima was discontinued due to poor tolerance  - heme onc will consider possibly resuming lenvima once more medically stable    - Chemotherapy scheduled for 5/10    #Hypertension  - Patient is on Metoprolol 25mg daily  - continue monitoring BP    #CAD  - Patient is on Xarelto and has an IVC filter    #Glaucoma  - continue taking medications    #GOC  - Pt and family report that daughter should be given all medical information and making decisions on behalf of pt  - 5/6 meeting with Lolyod (daughter) and grandson Octavious discuss prognosis - agree to DNR/DNI want to pursue all other medical treatments for SBO  - agree to palliative care consult  - 5/7 family meeting with extended family - options given - remove TPN and hospice, home with TPN, nursing home with TPN-family to discuss   - pending nutrition follow up for possible TPN on discharge: family wanting to hire private aid and concerned if TPN can be administered at night for 12 hours and if patient can be without TPN fo remaining 8 hours.     #Misc   - DVT ppx: therapeutic lvnx   - Diet: NPO; TPN  - Code status: full   - Pending (specify):  sbo resolution, nutrition follow up for possibility of tpn at home family wanting to hire private aid and concerned if TPN can be administered at night for 12 hours and if patient can be without TPN fo remaining 8 hours.   - Disposition: acute   Patient is a 81 female PMH recurrent uterine adenocarcinoma s/p SHAAN/BSO (2016), recurrent malignant ascites, CAD s/p CABG, HTN, depression, PE (Xarelto) and IVC filter 2020 presenting with multiple day history of LUQ abdominal pain since prior to discharge, x1 episode of NBNB found to have persistent large volume ascites (malignant), with small bowel dilation up to 4.2cm with abrupt transition point to collapsed small bowel in left mid abdomen. The first NG tube placement attempt failed/unsuccessful x 2.     #Small Bowel Obstruction  - No need for NGT for decompression now   - pt started on clear fluids 5/4 -> increased pain and distention_> return to NPO 5/5  -Strict I&O's Q4 hrs.  - Daily KUB   - Surgery consult appreciated. Surgery a very high risk candidate for surgery and do not want to offer surgical intervention   - obtain heme/onc consult to ascertain patient's status at surgery's request  - trend am labs  - TPN via port started   - patient having BMs, abdomen is soft. Will try advancing to clear liquid diet. If vomiting occurs or unable to tolerate, make NPO.   - 5/7 pt with emesis abd pain -attempt to pass NGT failed by medicine and ent  - pt unable to swallow - toradol and IV ativan ordered for palliation   - 5/6 CT abd ordered - SBO redemonstrated   - Patient unable to tolerated NG tube  - pending nutrition follow up for possible TPN on discharge: family wanting to hire private aid and concerned if TPN can be administered at night for 12 hours and if patient can be without TPN fo remaining 8 hours.     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 s/p weekly Carbo/Taxol (8/14/20-1/28/2021)   - PET Scan from 2/25/21 shows good response to chemo (declines any additional cancer directed therapy)  - CT A/P from 11/2021 (abd pain) shows no new disease; CTa Chest from 09/2021   - CT A/P from 02/2022 showed ascites which showed recurrent adenocarcinoma of mullerian origin:  is high  - Completed [facility] Liquid Biopsy Testing (03/01/2022) shows no targetable mutation  - chemoport placed by IR   - patient was started on Keytruda + Lenvima q3 weeks but lenvima was discontinued due to poor tolerance  - heme onc will consider possibly resuming lenvima once more medically stable    - Chemotherapy scheduled for 5/10    #Hypertension  - Patient is on Metoprolol 25mg daily  - continue monitoring BP    #CAD  - Patient is on Xarelto and has an IVC filter    #Glaucoma  - continue taking medications    #GOC  - Pt and family report that daughter should be given all medical information and making decisions on behalf of pt  - 5/6 meeting with Avila (daughter) and grandson Octavious discuss prognosis - agree to DNR/DNI want to pursue all other medical treatments for SBO  - agree to palliative care consult  - 5/7 family meeting with extended family - options given - remove TPN and hospice, home with TPN, nursing home with TPN-family to discuss   - pending nutrition follow up for possible TPN on discharge: family wanting to hire private aid and concerned if TPN can be administered at night for 12 hours and if patient can be without TPN fo remaining 8 hours.     #Misc   - DVT ppx: therapeutic lvnx   - Diet: NPO; TPN  - Code status: DNR/DNI  - Pending (specify):  sbo resolution, nutrition follow up for possibility of tpn at home family wanting to hire private aid and concerned if TPN can be administered at night for 12 hours and if patient can be without TPN fo remaining 8 hours.   - Disposition: acute   Patient is a 81 female PMH recurrent uterine adenocarcinoma s/p SHAAN/BSO (2016), recurrent malignant ascites, CAD s/p CABG, HTN, depression, PE (Xarelto) and IVC filter 2020 presenting with multiple day history of LUQ abdominal pain since prior to discharge, x1 episode of NBNB found to have persistent large volume ascites (malignant), with small bowel dilation up to 4.2cm with abrupt transition point to collapsed small bowel in left mid abdomen. The first NG tube placement attempt failed/unsuccessful x 2.     #Small Bowel Obstruction  - No need for NGT for decompression now   - pt started on clear fluids 5/4 -> increased pain and distention_> return to NPO 5/5  -Strict I&O's Q4 hrs.  - Daily KUB   - Surgery consult appreciated. Surgery a very high risk candidate for surgery and do not want to offer surgical intervention   - obtain heme/onc consult to ascertain patient's status at surgery's request  - trend am labs  - TPN via port started   - patient having BMs, abdomen is soft. Will try advancing to clear liquid diet. If vomiting occurs or unable to tolerate, make NPO.   - 5/7 pt with emesis abd pain -attempt to pass NGT failed by medicine and ent  - pt unable to swallow - toradol and IV ativan ordered for palliation   - 5/6 CT abd ordered - SBO redemonstrated   - Patient unable to tolerated NG tube  - pending nutrition follow up for possible TPN on discharge: family wanting to hire private aid and concerned if TPN can be administered at night for 12 hours and if patient can be without TPN fo remaining 8 hours which is not an issue as TPN is 12 on an 12 off however TPN is currently on back order.    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 s/p weekly Carbo/Taxol (8/14/20-1/28/2021)   - PET Scan from 2/25/21 shows good response to chemo (declines any additional cancer directed therapy)  - CT A/P from 11/2021 (abd pain) shows no new disease; CTa Chest from 09/2021   - CT A/P from 02/2022 showed ascites which showed recurrent adenocarcinoma of mullerian origin:  is high  - Completed [facility] Liquid Biopsy Testing (03/01/2022) shows no targetable mutation  - chemoport placed by IR   - patient was started on Keytruda + Lenvima q3 weeks but lenvima was discontinued due to poor tolerance  - heme onc will consider possibly resuming lenvima once more medically stable    - Chemotherapy scheduled for 5/10    #Hypertension  - Patient is on Metoprolol 25mg daily  - continue monitoring BP    #CAD  - Patient is on Xarelto and has an IVC filter    #Glaucoma  - continue taking medications    #GOC  - Pt and family report that daughter should be given all medical information and making decisions on behalf of pt  - 5/6 meeting with Evod (daughter) and grandson Octavious discuss prognosis - agree to DNR/DNI want to pursue all other medical treatments for SBO  - agree to palliative care consult  - 5/7 family meeting with extended family - options given - remove TPN and hospice, home with TPN, nursing home with TPN-family to discuss   - pending nutrition follow up for possible TPN on discharge: family wanting to hire private aid and concerned if TPN can be administered at night for 12 hours and if patient can be without TPN fo remaining 8 hours.     #Misc   - DVT ppx: therapeutic lvnx   - Diet: NPO; TPN  - Code status: DNR/DNI  - Pending (specify):  sbo resolution, TPN back order resolution from CM and nutrition services

## 2022-05-10 NOTE — PROGRESS NOTE ADULT - SUBJECTIVE AND OBJECTIVE BOX
----------Daily Progress Note----------    HISTORY OF PRESENT ILLNESS:  Patient is a 81y old Female who presents with a chief complaint of SBO (10 May 2022 09:51)    Currently admitted to medicine with the primary diagnosis of Abdominal pain       Today is hospital day 9d.     INTERVAL HOSPITAL COURSE / OVERNIGHT EVENTS:    Patient was examined and seen at bedside. This morning she is resting comfortably in bed and reports no new issues or overnight events; plan to get keytruda today at beside.  Review of Systems: Otherwise unremarkable     <<<<<PAST MEDICAL & SURGICAL HISTORY>>>>>  Uterine cancer  s/p SHAAN and chemo    Hypertension    High cholesterol    Cataract of right eye    Glaucoma    Coronary artery disease  s/p CABG X2    CAD (coronary artery disease)    HTN (hypertension)    HLD (hyperlipidemia)    H/O total hysterectomy    Athscl CABG, unsp, w angina pectoris w documented spasm    History of coronary artery bypass surgery      ALLERGIES  chloroquine (Hives)  quinine (Urticaria)      Home Medications:  acetaminophen 325 mg oral tablet: 2 tab(s) orally every 6 hours, As needed, Temp greater or equal to 38C (100.4F), Mild Pain (1 - 3) (28 Apr 2022 15:30)  brimonidine 0.2% ophthalmic solution: 1 drop(s) in the left eye 3 times a day (28 Apr 2022 15:30)  busPIRone 15 mg oral tablet: 1 tab(s) orally 2 times a day (28 Apr 2022 15:30)  dorzolamide-timolol 2.23%-0.68% ophthalmic solution: 1 drop(s) in the left eye 2 times a day (28 Apr 2022 15:30)  esomeprazole 20 mg oral delayed release capsule: 1 cap(s) orally once a day (28 Apr 2022 15:30)  gabapentin 100 mg oral capsule: 1 cap(s) orally 3 times a day (28 Apr 2022 15:30)  latanoprost 0.005% ophthalmic solution: 1 drop(s) to each affected eye 2 times a day (28 Apr 2022 15:30)  metoprolol succinate 25 mg oral tablet, extended release: 1 tab(s) orally once a day (28 Apr 2022 15:30)  oxyCODONE 5 mg oral tablet: 1 tab(s) orally every 6 hours, As Needed for pain (28 Apr 2022 15:30)  rosuvastatin 40 mg oral tablet: 1 tab(s) orally once a day (28 Apr 2022 15:30)  Xarelto 20 mg oral tablet: 1 tab(s) orally once a day (in the evening) (28 Apr 2022 15:30)        MEDICATIONS  STANDING MEDICATIONS  brimonidine 0.2% Ophthalmic Solution 1 Drop(s) Left EYE three times a day  chlorhexidine 4% Liquid 1 Application(s) Topical <User Schedule>  dorzolamide 2% Ophthalmic Solution 1 Drop(s) Left EYE every 8 hours  enoxaparin Injectable 70 milliGRAM(s) SubCutaneous every 12 hours  latanoprost 0.005% Ophthalmic Solution 1 Drop(s) Both EYES at bedtime  metoprolol succinate ER 25 milliGRAM(s) Oral daily  pantoprazole  Injectable 40 milliGRAM(s) IV Push daily  Parenteral Nutrition - Adult 1 Each TPN Continuous <Continuous>  pembrolizumab IVPB (eMAR) 200 milliGRAM(s) IV Intermittent once  timolol 0.5% Solution 1 Drop(s) Left EYE two times a day    PRN MEDICATIONS  acetaminophen     Tablet .. 650 milliGRAM(s) Oral every 6 hours PRN  aluminum hydroxide/magnesium hydroxide/simethicone Suspension 30 milliLiter(s) Oral every 6 hours PRN  ketorolac   Injectable 10 milliGRAM(s) IV Push every 6 hours PRN  LORazepam   Injectable 0.25 milliGRAM(s) IV Push every 8 hours PRN  ondansetron Injectable 8 milliGRAM(s) IV Push every 8 hours PRN    VITALS:  T(F): 98.6  HR: 98  BP: 118/74  RR: 18  SpO2: 99%    <<<<<LABS>>>>>                        10.0   5.56  )-----------( 248      ( 10 May 2022 07:36 )             30.1     05-10    137  |  104  |  37<H>  ----------------------------<  133<H>  4.1   |  21  |  0.8    Ca    8.3<L>      10 May 2022 07:36  Phos  4.0     05-10  Mg     1.9     05-10    TPro  4.8<L>  /  Alb  2.6<L>  /  TBili  0.2  /  DBili  x   /  AST  36  /  ALT  41  /  AlkPhos  129<H>  05-10            [id number]        <<<<<PHYSICAL EXAM>>>>>  GENERAL: NAD  HEENT: Normocephalic, atraumatic,   PULMONARY: Clear to auscultation bilaterally. No rales, rhonchi, or wheezing.  CARDIOVASCULAR: Regular rate and rhythm, S1-S2, no murmurs  GASTROINTESTINAL: Soft, non-tender, non-distended  NEUROLOGIC/MUSCULOSKELETAL: AOx4, grossly moving all extremities      -----------------------------------------------------------------------------------------------------------------------------------------------------------------------------------------------

## 2022-05-10 NOTE — PROGRESS NOTE ADULT - ASSESSMENT
Patient is a 81 female PMH recurrent uterine adenocarcinoma s/p SHAAN/BSO (2016), recurrent malignant ascites, CAD s/p CABG, HTN, depression, PE (Xarelto) and IVC filter 2020 presenting with multiple day history of LUQ abdominal pain since prior to discharge, x1 episode of NBNB found to have persistent large volume ascites (malignant), with small bowel dilation up to 4.2cm with abrupt transition point to collapsed small bowel in left mid abdomen. The first NG tube placement attempt failed/unsuccessful x 2.     #Small Bowel Obstruction  - No need for NGT for decompression now   - pt started on clear fluids 5/4 -> increased pain and distention_> return to NPO 5/5  -Strict I&O's Q4 hrs.  - Daily KUB   - Surgery consult appreciated. Surgery a very high risk candidate for surgery and do not want to offer surgical intervention   - obtain heme/onc consult to ascertain patient's status at surgery's request  - trend am labs  - TPN via port started   - patient having BMs, abdomen is soft. Will try advancing to clear liquid diet. If vomiting occurs or unable to tolerate, make NPO.   - 5/7 pt with emesis abd pain -attempt to pass NGT failed by medicine and ent  - pt unable to swallow - toradol and IV ativan ordered for palliation   - 5/6 CT abd ordered - SBO redemonstrated   - Patient unable to tolerated NG tube  - pending nutrition follow up for possible TPN on discharge: family wanting to hire private aid and concerned if TPN can be administered at night for 12 hours and if patient can be without TPN fo remaining 8 hours which is not an issue as TPN is 12 on an 12 off however TPN is currently on back order.    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 s/p weekly Carbo/Taxol (8/14/20-1/28/2021)   - PET Scan from 2/25/21 shows good response to chemo (declines any additional cancer directed therapy)  - CT A/P from 11/2021 (abd pain) shows no new disease; CTa Chest from 09/2021   - CT A/P from 02/2022 showed ascites which showed recurrent adenocarcinoma of mullerian origin:  is high  - Completed [facility] Liquid Biopsy Testing (03/01/2022) shows no targetable mutation  - chemoport placed by IR   - patient was started on Keytruda + Lenvima q3 weeks but lenvima was discontinued due to poor tolerance  - heme onc will consider possibly resuming lenvima once more medically stable    - Planned to get keytruda today at bedside     #Hypertension  - Patient is on Metoprolol 25mg daily  - continue monitoring BP    #CAD  - Patient is on Xarelto and has an IVC filter    #Glaucoma  - continue taking medications    #GOC  - Pt and family report that daughter should be given all medical information and making decisions on behalf of pt  - 5/6 meeting with Evod (daughter) and grandson Octavious discuss prognosis - agree to DNR/DNI want to pursue all other medical treatments for SBO  - agree to palliative care consult  - 5/7 family meeting with extended family - options given - remove TPN and hospice, home with TPN, nursing home with TPN-family to discuss   - pending nutrition follow up for possible TPN on discharge: family wanting to hire private aid and concerned if TPN can be administered at night for 12 hours and if patient can be without TPN fo remaining 8 hours which is not an issue as TPN is 12 on an 12 off however TPN is currently on back order.    #Misc   - DVT ppx: therapeutic lvnx   - Diet: NPO; TPN  - Code status: DNR/DNI  - Pending (specify):  sbo resolution, TPN back order resolution from CM and nutrition services

## 2022-05-10 NOTE — CHART NOTE - NSCHARTNOTEFT_GEN_A_CORE
Patient discussed that she was feeling depressed due to being hospitalized.  Support rendered.  Patient reported that she was not in pain.

## 2022-05-10 NOTE — PROGRESS NOTE ADULT - ATTENDING COMMENTS
Patient also seen by myself. I agree with Dr. Almanzar's note above. Situation discussed with him and the patient. All questions answered.
Patient seen and examined with surgery PA on floor and discussed management plans. Patient lying in bed induces self vomiting because of nausea, denies any abd pain. + BM today will not reinsert NG now. Patient has been on chemo fir recurrence. spoke to med resident and should have FU with med onc re input re prognosis and followup. No need for surgery at this time.
Patient is a 81 female PMH recurrent uterine adenocarcinoma s/p SHAAN/BSO (2016), recurrent malignant ascites, CAD s/p CABG, HTN, depression, PE (Xarelto) and IVC filter 2020 presenting with multiple day history of LUQ abdominal pain since prior to discharge, x1 episode of NBNB emesis day prior to arrival, none in the ED, passing gas on day of arrival with BM day prior to arrival. On workup patient found to have persistent large volume ascites (malignant), with small bowel dilation up to 4.2cm with abrupt transition point to collapsed small bowel in left mid abdomen. The first NG tube placement attempt failed - will try second attempt today.     # SBO  - CT Abdomen and Pelvis w/ Oral Cont and w/ IV Cont (05.01.22 @ 03:21) >Since April 25, 2022:Segment of abnormally dilated small bowel, up to 4.2 cm with abrupt transition to collapsed bowel within left mid abdomen, similar location of prior study. Findings compatible with small bowel obstruction. Decreased large ascites.  **Pt's last BM was on 4/30/22  -Surgery eval: NGT for decompression, leave on low continuous suction, strict NPO for now  -Pt removed NGT overnight, will replace today   -Strict NPO  -Strict I&O's Q4 hrs.  -IVF  -Repeat KUB today appears to be mildly improved     # Uterine adenocarcinoma s/p SHAAN/BSO with  recurrence 02/2022  # Recurrent malignant ascites  - pt on keytruda q 3w - last received on  4/19, next due on 5/10  - S/p paracentesis3.4L of rian color fluid removed on 4/28  - As per discussion with onc - No need for pigtail for recurrence . Patient has been  recently started on new therapy and will need to assess for response.    # H/o CAD s/p CABG  # Hypertension  - c/w metoprolol, statin  - DC xarelto. 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

## 2022-05-10 NOTE — PROGRESS NOTE ADULT - ASSESSMENT
Patient is a 81 female PMH recurrent uterine adenocarcinoma s/p SHAAN/BSO (2016), recurrent malignant ascites, CAD s/p CABG, HTN, depression, PE (Xarelto) and IVC filter 2020 presenting with multiple day history of LUQ abdominal pain since prior to discharge, x1 episode of NBNB emesis day prior to arrival, none in the ED, passing gas on day of arrival with BM day prior to arrival. On workup patient found to have persistent large volume ascites (malignant), with small bowel dilation up to 4.2cm with abrupt transition point to collapsed small bowel in left mid abdomen. The first NG tube placement attempt failed, patient removed it herself - will consider a second attempt today, if needed.    #Small Bowel Obstruction  - No need for NGT for decompression now   - pt started on clear fluids 5/4 -> increased pain and distention_> return to NPO 5/5  -Strict I&O's Q4 hrs.  - Daily KUB   - Surgery consult appreciated. Surgery a very high risk candidate for surgery and do not want to offer surgical intervention   - obtain heme/onc consult to ascertain patient's status at surgery's request  - trend am labs  - TPN via port continued   - patient having BMs, abdomen is soft. Will try advancing to clear liquid diet. If vomiting occurs or unable to tolerate, make NPO.   - 5/7 pt with emesis abd pain -attempt to pass NGT failed by medicine and ent  - pt unable to swallow - toradol and IV ativan ordered for palliation   - 5/6 CT abd ordered - SBO redemonstrated     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 s/p weekly Carbo/Taxol (8/14/20-1/28/2021)   - PET Scan from 2/25/21 shows good response to chemo (declines any additional cancer directed therapy)  - CT A/P from 11/2021 (abd pain) shows no new disease; CTa Chest from 09/2021   - CT A/P from 02/2022 showed ascites which showed recurrent adenocarcinoma of mullerian origin:  is high  - Completed [facility] Liquid Biopsy Testing (03/01/2022) shows no targetable mutation  - chemoport placed by IR   - patient was started on Keytruda + Lenvima q3 weeks but lenvima was discontinued due to poor tolerance  - heme onc will consider possibly resuming lenvima once more medically stable    -5/10 Keytruda given - next is scheduled for 3 weeks from now     #Hypertension  - Patient is on Metoprolol 25mg daily  - continue monitoring BP    #CAD  - Patient is on Xarelto and has an IVC filter    #Glaucoma  - continue taking medications    #GOC  - Pt and family report that daughter should be given all medical information and making decisions on behalf of pt  - 5/6 meeting with Avila (daughter) and grandson Octavious discuss prognosis - agree to DNR/DNI want to pursue all other medical treatments for SBO  - agree to palliative care consult  - 5/7 family meeting with extended family - options given - remove TPN and hospice, home with TPN, nursing home with TPN-family to discuss   - 5/9 palliative care consult appreciated     #Misc   - DVT ppx: therapeutic lvnx   - Diet: NPO; TPN  - Code status: full   - Pending (specify):  sbo resolution, investigate possibility of tpn at home   - Disposition: acute, discharge plan home with tpn   - Family: daughter Avila updated at bedside 5/9

## 2022-05-10 NOTE — PROGRESS NOTE ADULT - ASSESSMENT
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 CT AP shows moderate to large volume ascites with no other significant changes. Oncology consulted for management.    # SBO  - c/w conservative management  - unable to determine if SBO is due to tumor burden or due to underlying ascites  - c/w care per surgery/primary team,    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 in 2020 s/p weekly Carbo/Taxol (8/14/20-1/28/2021)   - PET Scan from 2/25/21 shows good response to chemo (declines any additional cancer directed therapy)  - CT A/P from 11/2021 (abd pain) shows no new disease; CTa Chest from 09/2021   - CT A/P from 02/2022 showed ascites which showed recurrent adenocarcinoma of mullerian origin  - Completed [facility] Liquid Biopsy Testing (03/01/2022) shows no targetable mutation  - chemoport placed by IR   - patient was started on Keytruda + Lenvima q3 weeks but lenvima was discontinued due to poor tolerance   - Ca 125: 156-->60 after starting keytruda, good biochemically response to treatment  - will give cycle 3 keytruda treatment today 5/10/22. Next keytruda will be given on 5/31/22, which pt can receive as outpatient   - Will consider possibly resuming Lenvima once more medically stable and SBO resolves and more medically optimized.     # Malignant ascites required high volume paracentesis   - s/p therapeutic paracentesis by IR, patient will need scheduled therapeutic paracentesis routinely for reaccumulation.     # PE in the setting of malignancy diagnosed on 8/6/2020  - c/w Lovenox for now, switch to Xarelto upon discharge 80 yo F with PMH of uterine cancer s/p SHAAN-BSO with adjuvant chemotherapy in 2016, recurrent adenocarcinoma of mullerian origin w/ malignant ascites, s/p Carbo/Taxol (Chemo completed 1/2021) with good response, CAD s/p CABG, HTN, depression, PE on Xarelto, presents to the hospital for increasing abdominal distention x 4 weeks. CT AP shows moderate to large volume ascites with no other significant changes. Oncology consulted for management.    # SBO  - c/w conservative management  - unable to determine if SBO is due to tumor burden or due to underlying ascites and adhesions.  - c/w care per surgery/primary team,    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, malignant ascites diagnosed on 8/8/2020  - Uterine cancer s/p SHAAN-BSO with adjuvant chemotherapy in 2016  - Recurrent adenocarcinoma of mullerian origin w/ malignant ascites in 2020 s/p weekly Carbo/Taxol (8/14/20-1/28/2021)   - PET Scan from 2/25/21 shows good response to chemo (declines any additional cancer directed therapy)  - CT A/P from 11/2021 (abd pain) shows no new disease; CTa Chest from 09/2021   - CT A/P from 02/2022 showed ascites which showed recurrent adenocarcinoma of mullerian origin  - Completed [facility] Liquid Biopsy Testing (03/01/2022) shows no targetable mutation  - Chemoport placed by IR   - Patient was started on Keytruda + Lenvima q3 weeks but lenvima was discontinued due to poor tolerance   - Ca 125: 156-->60 after starting keytruda, good biochemically response to treatment  - Will give cycle 3 Keytruda treatment today 5/10/22. Next Keytruda will be given on 5/31/22, which pt can receive as outpatient   - Will consider possibly resuming Lenvima once more medically stable and SBO resolves and more medically optimized.     # Malignant ascites required high volume paracentesis   - s/p therapeutic paracentesis by IR, patient will need scheduled therapeutic paracentesis routinely for reaccumulation.     # PE in the setting of malignancy diagnosed on 8/6/2020  - c/w Lovenox for now, switch to Xarelto upon discharge

## 2022-05-10 NOTE — CHART NOTE - NSCHARTNOTEFT_GEN_A_CORE
c/o abd pain today  NPO with TPN infusing via port  Oncology appreciated, re-starting keytruda today    T(F): 98.1 (05-10-22 @ 13:21), Max: 98.6 (05-10-22 @ 05:03)  HR: 80 (05-10-22 @ 13:21) (80 - 98)  BP: 106/54 (05-10-22 @ 13:21) (106/54 - 120/60)  RR: 18 (05-10-22 @ 13:21) (18 - 18)  SpO2: 99% (05-10-22 @ 05:03) (99% - 99%)    I&O's Detail    09 May 2022 07:01  -  10 May 2022 07:00  --------------------------------------------------------  IN:    TPN (Total Parenteral Nutrition): 660 mL  Total IN: 660 mL    OUT:  Total OUT: 0 mL    Total NET: 660 mL    05-10    137  |  104  |  37<H>  ----------------------------<  133<H>  4.1   |  21  |  0.8    Ca    8.3<L>      10 May 2022 07:36  Phos  4.0     05-10  Mg     1.9     05-10    TPro  4.8<L>  /  Alb  2.6<L>  /  TBili  0.2  /  DBili  x   /  AST  36  /  ALT  41  /  AlkPhos  129<H>  05-10                        10.0   5.56  )-----------( 248      ( 10 May 2022 07:36 )             30.1      Diet, NPO:   With Ice Chips/Sips of Water (05-05-22 @ 08:26)    ASSESSMENT  Patient is a 81 female PMH recurrent uterine adenocarcinoma s/p SHAAN/BSO (2016), recurrent malignant ascites, CAD s/p CABG, HTN, depression, PE (Xarelto) and IVC filter 2020 presenting with multiple day history of LUQ abdominal pain since prior to discharge, x1 episode of NBNB emesis day prior to arrival, none in the ED, passing gas on day of arrival with BM day prior to arrival. On workup patient found to have persistent large volume ascites (malignant), with small bowel dilation up to 4.2cm with abrupt transition point to collapsed small bowel in left mid abdomen. The first NG tube placement attempt failed, patient removed it herself    - Uterine Adenocarcinoma Mullerian origin, malignant ascites diagnosed on 8/8/2020  - SBO  - HTN  - CAD, hyperlipidemia  - glaucoma  - hypokalemia, hypomagnesemia  - wt loss, ~10% since 11/2020  - risk for refeeding syndrome    PLAN  - cont TPN tonight via port, will begin cycling TPN tonight X 18hrs (d/w RN)  - daily bmp, in phos, mg   - if keytruda must be given via central line then TPN will require tapering X 1 hour then d/c- d/w RN  - home TPN may not be an option due to current shortage of [facility] currently not accepting referrals for home TPN patients  - will follow

## 2022-05-10 NOTE — PROGRESS NOTE ADULT - SUBJECTIVE AND OBJECTIVE BOX
BEKAH NELSON 81y Female  MRN#: [id number]     SUBJECTIVE  Patient is a 81y old Female who presents with a chief complaint of SBO (09 May 2022 17:35)    Today is hospital day 9d, and this morning she is lying in bed without distress.   No acute overnight events.     OBJECTIVE  PAST MEDICAL & SURGICAL HISTORY  Uterine cancer  s/p SHAAN and chemo    Hypertension    High cholesterol    Cataract of right eye    Glaucoma    Coronary artery disease  s/p CABG X2    CAD (coronary artery disease)    HTN (hypertension)    HLD (hyperlipidemia)    H/O total hysterectomy    Athscl CABG, unsp, w angina pectoris w documented spasm    History of coronary artery bypass surgery      ALLERGIES:  chloroquine (Hives)  quinine (Urticaria)    MEDICATIONS:  STANDING MEDICATIONS  brimonidine 0.2% Ophthalmic Solution 1 Drop(s) Left EYE three times a day  chlorhexidine 4% Liquid 1 Application(s) Topical <User Schedule>  dorzolamide 2% Ophthalmic Solution 1 Drop(s) Left EYE every 8 hours  enoxaparin Injectable 70 milliGRAM(s) SubCutaneous every 12 hours  latanoprost 0.005% Ophthalmic Solution 1 Drop(s) Both EYES at bedtime  metoprolol succinate ER 25 milliGRAM(s) Oral daily  pantoprazole  Injectable 40 milliGRAM(s) IV Push daily  Parenteral Nutrition - Adult 1 Each TPN Continuous <Continuous>  timolol 0.5% Solution 1 Drop(s) Left EYE two times a day    PRN MEDICATIONS  acetaminophen     Tablet .. 650 milliGRAM(s) Oral every 6 hours PRN  aluminum hydroxide/magnesium hydroxide/simethicone Suspension 30 milliLiter(s) Oral every 6 hours PRN  ketorolac   Injectable 10 milliGRAM(s) IV Push every 6 hours PRN  LORazepam   Injectable 0.25 milliGRAM(s) IV Push every 8 hours PRN  ondansetron Injectable 8 milliGRAM(s) IV Push every 8 hours PRN    HOME MEDICATIONS  Home Medications:  acetaminophen 325 mg oral tablet: 2 tab(s) orally every 6 hours, As needed, Temp greater or equal to 38C (100.4F), Mild Pain (1 - 3) (28 Apr 2022 15:30)  brimonidine 0.2% ophthalmic solution: 1 drop(s) in the left eye 3 times a day (28 Apr 2022 15:30)  busPIRone 15 mg oral tablet: 1 tab(s) orally 2 times a day (28 Apr 2022 15:30)  dorzolamide-timolol 2.23%-0.68% ophthalmic solution: 1 drop(s) in the left eye 2 times a day (28 Apr 2022 15:30)  esomeprazole 20 mg oral delayed release capsule: 1 cap(s) orally once a day (28 Apr 2022 15:30)  gabapentin 100 mg oral capsule: 1 cap(s) orally 3 times a day (28 Apr 2022 15:30)  latanoprost 0.005% ophthalmic solution: 1 drop(s) to each affected eye 2 times a day (28 Apr 2022 15:30)  metoprolol succinate 25 mg oral tablet, extended release: 1 tab(s) orally once a day (28 Apr 2022 15:30)  oxyCODONE 5 mg oral tablet: 1 tab(s) orally every 6 hours, As Needed for pain (28 Apr 2022 15:30)  rosuvastatin 40 mg oral tablet: 1 tab(s) orally once a day (28 Apr 2022 15:30)  Xarelto 20 mg oral tablet: 1 tab(s) orally once a day (in the evening) (28 Apr 2022 15:30)      VITAL SIGNS: Last 24 Hours  T(C): 37 (10 May 2022 05:03), Max: 37.4 (09 May 2022 13:36)  T(F): 98.6 (10 May 2022 05:03), Max: 99.3 (09 May 2022 13:36)  HR: 98 (10 May 2022 05:03) (87 - 98)  BP: 118/74 (10 May 2022 05:03) (114/54 - 120/60)  BP(mean): --  RR: 18 (10 May 2022 05:03) (18 - 18)  SpO2: 99% (10 May 2022 05:03) (99% - 99%)    05-09-22 @ 07:01  -  05-10-22 @ 07:00  --------------------------------------------------------  IN: 660 mL / OUT: 0 mL / NET: 660 mL        LABS:                        10.0   5.56  )-----------( 248      ( 10 May 2022 07:36 )             30.1     05-09    137  |  105  |  36<H>  ----------------------------<  131<H>  5.0   |  18  |  0.8    Ca    8.3<L>      09 May 2022 06:55  Phos  4.1     05-09  Mg     2.0     05-09    TPro  5.1<L>  /  Alb  2.4<L>  /  TBili  0.2  /  DBili  x   /  AST  43<H>  /  ALT  35  /  AlkPhos  104  05-09    LIVER FUNCTIONS - ( 09 May 2022 06:55 )  Alb: 2.4 g/dL / Pro: 5.1 g/dL / ALK PHOS: 104 U/L / ALT: 35 U/L / AST: 43 U/L / GGT: x             CAPILLARY BLOOD GLUCOSE          RADIOLOGY:    PHYSICAL EXAM:  GENERAL: NAD, AAO x 4, 81y F  HEAD:  Atraumatic, Normocephalic  EYES: EOMI, conjunctiva clear and sclera white  NECK: Supple, No JVD  CHEST/LUNG: Clear to auscultation bilaterally; No wheeze; No crackles; No accessory muscles used  HEART: Regular rate and rhythm; No murmurs;   ABDOMEN: Soft, Nontender, Nondistended; Bowel sounds present; No guarding  EXTREMITIES:  2+ Peripheral Pulses, No cyanosis or edema  NEUROLOGY: non-focal    ADMISSION SUMMARY  Patient is a 81y old Female who presents with a chief complaint of SBO (09 May 2022 17:35)    BEKAH NELSON 81y Female  MRN#: [id number]     SUBJECTIVE  Patient is a 81y old Female who presents with a chief complaint of SBO (09 May 2022 17:35)    Today is hospital day 9, and this morning she is lying in bed without distress.   No acute overnight events.     OBJECTIVE  PAST MEDICAL & SURGICAL HISTORY  Uterine cancer  s/p SHAAN and chemo    Hypertension    High cholesterol    Cataract of right eye    Glaucoma    Coronary artery disease  s/p CABG X2    CAD (coronary artery disease)    HTN (hypertension)    HLD (hyperlipidemia)    H/O total hysterectomy    Athscl CABG, unsp, w angina pectoris w documented spasm    History of coronary artery bypass surgery      ALLERGIES:  chloroquine (Hives)  quinine (Urticaria)    MEDICATIONS:  STANDING MEDICATIONS  brimonidine 0.2% Ophthalmic Solution 1 Drop(s) Left EYE three times a day  chlorhexidine 4% Liquid 1 Application(s) Topical <User Schedule>  dorzolamide 2% Ophthalmic Solution 1 Drop(s) Left EYE every 8 hours  enoxaparin Injectable 70 milliGRAM(s) SubCutaneous every 12 hours  latanoprost 0.005% Ophthalmic Solution 1 Drop(s) Both EYES at bedtime  metoprolol succinate ER 25 milliGRAM(s) Oral daily  pantoprazole  Injectable 40 milliGRAM(s) IV Push daily  Parenteral Nutrition - Adult 1 Each TPN Continuous <Continuous>  timolol 0.5% Solution 1 Drop(s) Left EYE two times a day    PRN MEDICATIONS  acetaminophen     Tablet .. 650 milliGRAM(s) Oral every 6 hours PRN  aluminum hydroxide/magnesium hydroxide/simethicone Suspension 30 milliLiter(s) Oral every 6 hours PRN  ketorolac   Injectable 10 milliGRAM(s) IV Push every 6 hours PRN  LORazepam   Injectable 0.25 milliGRAM(s) IV Push every 8 hours PRN  ondansetron Injectable 8 milliGRAM(s) IV Push every 8 hours PRN    HOME MEDICATIONS  Home Medications:  acetaminophen 325 mg oral tablet: 2 tab(s) orally every 6 hours, As needed, Temp greater or equal to 38C (100.4F), Mild Pain (1 - 3) (28 Apr 2022 15:30)  brimonidine 0.2% ophthalmic solution: 1 drop(s) in the left eye 3 times a day (28 Apr 2022 15:30)  busPIRone 15 mg oral tablet: 1 tab(s) orally 2 times a day (28 Apr 2022 15:30)  dorzolamide-timolol 2.23%-0.68% ophthalmic solution: 1 drop(s) in the left eye 2 times a day (28 Apr 2022 15:30)  esomeprazole 20 mg oral delayed release capsule: 1 cap(s) orally once a day (28 Apr 2022 15:30)  gabapentin 100 mg oral capsule: 1 cap(s) orally 3 times a day (28 Apr 2022 15:30)  latanoprost 0.005% ophthalmic solution: 1 drop(s) to each affected eye 2 times a day (28 Apr 2022 15:30)  metoprolol succinate 25 mg oral tablet, extended release: 1 tab(s) orally once a day (28 Apr 2022 15:30)  oxyCODONE 5 mg oral tablet: 1 tab(s) orally every 6 hours, As Needed for pain (28 Apr 2022 15:30)  rosuvastatin 40 mg oral tablet: 1 tab(s) orally once a day (28 Apr 2022 15:30)  Xarelto 20 mg oral tablet: 1 tab(s) orally once a day (in the evening) (28 Apr 2022 15:30)      VITAL SIGNS: Last 24 Hours  T(C): 37 (10 May 2022 05:03), Max: 37.4 (09 May 2022 13:36)  T(F): 98.6 (10 May 2022 05:03), Max: 99.3 (09 May 2022 13:36)  HR: 98 (10 May 2022 05:03) (87 - 98)  BP: 118/74 (10 May 2022 05:03) (114/54 - 120/60)  BP(mean): --  RR: 18 (10 May 2022 05:03) (18 - 18)  SpO2: 99% (10 May 2022 05:03) (99% - 99%)    05-09-22 @ 07:01  -  05-10-22 @ 07:00  --------------------------------------------------------  IN: 660 mL / OUT: 0 mL / NET: 660 mL        LABS:                        10.0   5.56  )-----------( 248      ( 10 May 2022 07:36 )             30.1     05-09    137  |  105  |  36<H>  ----------------------------<  131<H>  5.0   |  18  |  0.8    Ca    8.3<L>      09 May 2022 06:55  Phos  4.1     05-09  Mg     2.0     05-09    TPro  5.1<L>  /  Alb  2.4<L>  /  TBili  0.2  /  DBili  x   /  AST  43<H>  /  ALT  35  /  AlkPhos  104  05-09    LIVER FUNCTIONS - ( 09 May 2022 06:55 )  Alb: 2.4 g/dL / Pro: 5.1 g/dL / ALK PHOS: 104 U/L / ALT: 35 U/L / AST: 43 U/L / GGT: x             CAPILLARY BLOOD GLUCOSE          RADIOLOGY:    PHYSICAL EXAM:  GENERAL: NAD, AAO x 4, 81y F  HEAD:  Atraumatic, Normocephalic  EYES: EOMI, conjunctiva clear and sclera white  NECK: Supple, No JVD  CHEST/LUNG: Clear to auscultation bilaterally; No wheeze; No crackles; No accessory muscles used  HEART: Regular rate and rhythm; No murmurs;   ABDOMEN: Soft, Nontender, Nondistended; Bowel sounds present; No guarding  EXTREMITIES:  2+ Peripheral Pulses, No cyanosis or edema  NEUROLOGY: non-focal    ADMISSION SUMMARY  Patient is a 81y old Female who presents with a chief complaint of SBO (09 May 2022 17:35)

## 2022-05-10 NOTE — PROGRESS NOTE ADULT - SUBJECTIVE AND OBJECTIVE BOX
Patient is a 81y old  Female who presents with a chief complaint of SBO (10 May 2022 11:54)      Patient seen and examined at bedside.    ALLERGIES:  chloroquine (Hives)  quinine (Urticaria)    MEDICATIONS:  acetaminophen     Tablet .. 650 milliGRAM(s) Oral every 6 hours PRN  aluminum hydroxide/magnesium hydroxide/simethicone Suspension 30 milliLiter(s) Oral every 6 hours PRN  brimonidine 0.2% Ophthalmic Solution 1 Drop(s) Left EYE three times a day  chlorhexidine 4% Liquid 1 Application(s) Topical <User Schedule>  dorzolamide 2% Ophthalmic Solution 1 Drop(s) Left EYE every 8 hours  enoxaparin Injectable 70 milliGRAM(s) SubCutaneous every 12 hours  fat emulsion (Fish Oil and Plant Based) 20% Infusion 1.267 Gm/kG/Day IV Continuous <Continuous>  latanoprost 0.005% Ophthalmic Solution 1 Drop(s) Both EYES at bedtime  LORazepam   Injectable 0.25 milliGRAM(s) IV Push every 8 hours PRN  metoprolol succinate ER 25 milliGRAM(s) Oral daily  ondansetron Injectable 8 milliGRAM(s) IV Push every 8 hours PRN  pantoprazole  Injectable 40 milliGRAM(s) IV Push daily  Parenteral Nutrition - Adult 1 Each TPN Continuous <Continuous>  Parenteral Nutrition - Adult 1 Each TPN Continuous <Continuous>  timolol 0.5% Solution 1 Drop(s) Left EYE two times a day    Vital Signs Last 24 Hrs  T(F): 98.1 (10 May 2022 13:21), Max: 98.6 (10 May 2022 05:03)  HR: 80 (10 May 2022 13:21) (80 - 98)  BP: 106/54 (10 May 2022 13:21) (106/54 - 120/60)  RR: 18 (10 May 2022 13:21) (18 - 18)  SpO2: 99% (10 May 2022 05:03) (99% - 99%)  I&O's Summary    09 May 2022 07:01  -  10 May 2022 07:00  --------------------------------------------------------  IN: 660 mL / OUT: 0 mL / NET: 660 mL        PHYSICAL EXAM:  General: NAD, A/O x 3  ENT: MMM  Neck: Supple, No JVD  Lungs: Clear to auscultation bilaterally  Cardio: RRR, S1/S2, No murmurs  Abdomen: Soft, Nontender, mildly distended; Bowel sounds present  Extremities: No cyanosis, No edema    LABS:                        10.0   5.56  )-----------( 248      ( 10 May 2022 07:36 )             30.1     05-10    137  |  104  |  37  ----------------------------<  133  4.1   |  21  |  0.8    Ca    8.3      10 May 2022 07:36  Phos  4.0     05-10  Mg     1.9     05-10    TPro  4.8  /  Alb  2.6  /  TBili  0.2  /  DBili  x   /  AST  36  /  ALT  41  /  AlkPhos  129  05-10              05-04 Chol -- LDL -- HDL -- Trig 78 mg/dL                      COVID-19 PCR: NotDetec (05-07-22 @ 19:55)  COVID-19 PCR: NotDetec (05-01-22 @ 05:24)  COVID-19 PCR: NotDetec (04-25-22 @ 07:30)  COVID-19 PCR: NotDetec (04-21-22 @ 18:38)      RADIOLOGY & ADDITIONAL TESTS:  < from: Xray Kidney Ureter Bladder (05.09.22 @ 06:06) >  IMPRESSION:    Unchanged study.    < end of copied text >    Care Discussed with Consultants/Other Providers:

## 2022-05-11 NOTE — PROGRESS NOTE ADULT - ASSESSMENT
Patient is a 81 female PMH recurrent uterine adenocarcinoma s/p SHAAN/BSO (2016), recurrent malignant ascites, CAD s/p CABG, HTN, depression, PE (Xarelto) and IVC filter 2020 presenting with multiple day history of LUQ abdominal pain since prior to discharge, x1 episode of NBNB emesis day prior to arrival, none in the ED, passing gas on day of arrival with BM day prior to arrival. On workup patient found to have persistent large volume ascites (malignant), with small bowel dilation up to 4.2cm with abrupt transition point to collapsed small bowel in left mid abdomen. The first NG tube placement attempt failed, patient removed it herself - will consider a second attempt today, if needed.    #Small Bowel Obstruction  - No need for NGT for decompression now   - pt started on clear fluids 5/4 -> increased pain and distention_> return to NPO 5/5 - surgery follow up and start clear liquid diet as tolerated (discussed in MDR this am) - check KUB  -Strict I&O's Q4 hrs.  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 s/p weekly Carbo/Taxol (8/14/20-1/28/2021)   - PET Scan from 2/25/21 shows good response to chemo (declines any additional cancer directed therapy)  - CT A/P from 11/2021 (abd pain) shows no new disease; CTa Chest from 09/2021   - CT A/P from 02/2022 showed ascites which showed recurrent adenocarcinoma of mullerian origin:  is high  - Completed [facility] Liquid Biopsy Testing (03/01/2022) shows no targetable mutation  - chemoport placed by IR   - patient was started on Keytruda + Lenvima q3 weeks but lenvima was discontinued due to poor tolerance  - heme onc will consider possibly resuming lenvima once more medically stable    -5/10 Keytruda given - next is scheduled for 3 weeks from now     #Hypertension  - Patient is on Metoprolol 25mg daily  - continue monitoring BP    #CAD  - Patient is on Xarelto and has an IVC filter    #Glaucoma  - continue taking medications    #GOC  - Pt and family report that daughter should be given all medical information and making decisions on behalf of pt  - 5/6 meeting with Evod (daughter) and grandson Octavious discuss prognosis - agree to DNR/DNI want to pursue all other medical treatments for SBO  - agree to palliative care consult  - 5/7 family meeting with extended family - options given - remove TPN and hospice, home with TPN, nursing home with TPN-family to discuss   - 5/9 palliative care consult appreciated       # Progress Note Handoff  PENDING as follows  consults: CM for d/c planning   Family discussion: staff updated family   Disposition: home with services once TPN arranged       Attending Physician Dr. Kasey Guzman # [id number]

## 2022-05-11 NOTE — PROGRESS NOTE ADULT - SUBJECTIVE AND OBJECTIVE BOX
Patient is a 81y old  Female who presents with a chief complaint of SBO (10 May 2022 11:54)      Patient seen and examined at bedside.  - no acute events notified to me   - d/c planning once TPN arranged -  working on it     ALLERGIES:  chloroquine (Hives)  quinine (Urticaria)    Vital Signs Last 24 Hrs  T(C): 36.2 (11 May 2022 05:20), Max: 36.7 (10 May 2022 13:21)  T(F): 97.2 (11 May 2022 05:20), Max: 98.1 (10 May 2022 13:21)  HR: 85 (11 May 2022 05:20) (80 - 85)  BP: 131/60 (11 May 2022 05:20) (106/54 - 131/60)  BP(mean): --  RR: 18 (11 May 2022 05:20) (18 - 18)  SpO2: --      PHYSICAL EXAM:  General: NAD, A/O x 3  ENT: MMM  Neck: Supple, No JVD  Lungs: Clear to auscultation bilaterally  Cardio: RRR, S1/S2, No murmurs  Abdomen: Soft, Nontender, mildly distended; Bowel sounds present  Extremities: No cyanosis, No edema    LABS:    no new labs                10.0   5.56  )-----------( 248      ( 10 May 2022 07:36 )             30.1     05-10    137  |  104  |  37  ----------------------------<  133  4.1   |  21  |  0.8    Ca    8.3      10 May 2022 07:36  Phos  4.0     05-10  Mg     1.9     05-10    TPro  4.8  /  Alb  2.6  /  TBili  0.2  /  DBili  x   /  AST  36  /  ALT  41  /  AlkPhos  129  05-10    COVID-19 PCR: NotDetec (05-07-22 @ 19:55)  COVID-19 PCR: NotDetec (05-01-22 @ 05:24)  COVID-19 PCR: NotDetec (04-25-22 @ 07:30)  COVID-19 PCR: NotDetec (04-21-22 @ 18:38)      RADIOLOGY & ADDITIONAL TESTS:  < from: Xray Kidney Ureter Bladder (05.09.22 @ 06:06) >  IMPRESSION:    Unchanged study.    < end of copied text >    Care Discussed with Consultants/Other Providers: yes     MEDICATIONS  (STANDING):  brimonidine 0.2% Ophthalmic Solution 1 Drop(s) Left EYE three times a day  chlorhexidine 4% Liquid 1 Application(s) Topical <User Schedule>  dorzolamide 2% Ophthalmic Solution 1 Drop(s) Left EYE every 8 hours  enoxaparin Injectable 70 milliGRAM(s) SubCutaneous every 12 hours  fat emulsion (Fish Oil and Plant Based) 20% Infusion 1.267 Gm/kG/Day (23.4 mL/Hr) IV Continuous <Continuous>  latanoprost 0.005% Ophthalmic Solution 1 Drop(s) Both EYES at bedtime  metoprolol succinate ER 25 milliGRAM(s) Oral daily  pantoprazole  Injectable 40 milliGRAM(s) IV Push daily  Parenteral Nutrition - Adult 1 Each TPN Continuous <Continuous>  Parenteral Nutrition - Adult 1 Each TPN Continuous <Continuous>  timolol 0.5% Solution 1 Drop(s) Left EYE two times a day    MEDICATIONS  (PRN):  acetaminophen     Tablet .. 650 milliGRAM(s) Oral every 6 hours PRN Temp greater or equal to 38C (100.4F), Mild Pain (1 - 3)  aluminum hydroxide/magnesium hydroxide/simethicone Suspension 30 milliLiter(s) Oral every 6 hours PRN Dyspepsia  LORazepam   Injectable 0.25 milliGRAM(s) IV Push every 8 hours PRN Anxiety  ondansetron Injectable 8 milliGRAM(s) IV Push every 8 hours PRN Nausea and/or Vomiting

## 2022-05-11 NOTE — PROGRESS NOTE ADULT - SUBJECTIVE AND OBJECTIVE BOX
----------Daily Progress Note----------    HISTORY OF PRESENT ILLNESS:  Patient is a 81y old Female who presents with a chief complaint of SBO (10 May 2022 16:16)    Currently admitted to medicine with the primary diagnosis of Abdominal pain       Today is hospital day 10d.     INTERVAL HOSPITAL COURSE / OVERNIGHT EVENTS:    Patient was examined and seen at bedside. This morning she is resting comfortably in bed and reports no new issues or overnight events.     Review of Systems: Otherwise unremarkable     <<<<<PAST MEDICAL & SURGICAL HISTORY>>>>>  Uterine cancer  s/p SHAAN and chemo    Hypertension    High cholesterol    Cataract of right eye    Glaucoma    Coronary artery disease  s/p CABG X2    CAD (coronary artery disease)    HTN (hypertension)    HLD (hyperlipidemia)    H/O total hysterectomy    Athscl CABG, unsp, w angina pectoris w documented spasm    History of coronary artery bypass surgery      ALLERGIES  chloroquine (Hives)  quinine (Urticaria)    MEDICATIONS  STANDING MEDICATIONS  brimonidine 0.2% Ophthalmic Solution 1 Drop(s) Left EYE three times a day  chlorhexidine 4% Liquid 1 Application(s) Topical <User Schedule>  dorzolamide 2% Ophthalmic Solution 1 Drop(s) Left EYE every 8 hours  enoxaparin Injectable 70 milliGRAM(s) SubCutaneous every 12 hours  fat emulsion (Fish Oil and Plant Based) 20% Infusion 1.267 Gm/kG/Day IV Continuous <Continuous>  latanoprost 0.005% Ophthalmic Solution 1 Drop(s) Both EYES at bedtime  metoprolol succinate ER 25 milliGRAM(s) Oral daily  pantoprazole  Injectable 40 milliGRAM(s) IV Push daily  Parenteral Nutrition - Adult 1 Each TPN Continuous <Continuous>  timolol 0.5% Solution 1 Drop(s) Left EYE two times a day    PRN MEDICATIONS  acetaminophen     Tablet .. 650 milliGRAM(s) Oral every 6 hours PRN  aluminum hydroxide/magnesium hydroxide/simethicone Suspension 30 milliLiter(s) Oral every 6 hours PRN  LORazepam   Injectable 0.25 milliGRAM(s) IV Push every 8 hours PRN  ondansetron Injectable 8 milliGRAM(s) IV Push every 8 hours PRN    VITALS:  T(F): 97.2  HR: 85  BP: 131/60  RR: 18  SpO2: --    <<<<<LABS>>>>>                        10.0   5.56  )-----------( 248      ( 10 May 2022 07:36 )             30.1     05-10    137  |  104  |  37<H>  ----------------------------<  133<H>  4.1   |  21  |  0.8    Ca    8.3<L>      10 May 2022 07:36  Phos  4.0     05-10  Mg     1.9     05-10    TPro  4.8<L>  /  Alb  2.6<L>  /  TBili  0.2  /  DBili  x   /  AST  36  /  ALT  41  /  AlkPhos  129<H>  05-10            [id number]        <<<<<RADIOLOGY>>>>>    <<<<<PHYSICAL EXAM>>>>>  GENERAL: NAD  HEENT: Normocephalic, atraumatic,   PULMONARY: Clear to auscultation bilaterally. No rales, rhonchi, or wheezing.  CARDIOVASCULAR: Regular rate and rhythm, S1-S2, no murmurs  GASTROINTESTINAL: Soft, non-tender, non-distended  NEUROLOGIC/MUSCULOSKELETAL: AOx4, grossly moving all extremities      -----------------------------------------------------------------------------------------------------------------------------------------------------------------------------------------------

## 2022-05-12 NOTE — PROGRESS NOTE ADULT - SUBJECTIVE AND OBJECTIVE BOX
----------Daily Progress Note----------    HISTORY OF PRESENT ILLNESS:  Patient is a 81y old Female who presents with a chief complaint of SBO (11 May 2022 13:13)    Currently admitted to medicine with the primary diagnosis of Abdominal pain       Today is hospital day 11d.     INTERVAL HOSPITAL COURSE / OVERNIGHT EVENTS:    Patient was examined and seen at bedside. This morning she is resting comfortably in bed and reports no new issues; on TPN via port.    Review of Systems: Otherwise unremarkable     <<<<<PAST MEDICAL & SURGICAL HISTORY>>>>>  Uterine cancer  s/p SHAAN and chemo    Hypertension    High cholesterol    Cataract of right eye    Glaucoma    Coronary artery disease  s/p CABG X2    CAD (coronary artery disease)    HTN (hypertension)    HLD (hyperlipidemia)    H/O total hysterectomy    Athscl CABG, unsp, w angina pectoris w documented spasm    History of coronary artery bypass surgery      ALLERGIES  chloroquine (Hives)  quinine (Urticaria)      Home Medications:  acetaminophen 325 mg oral tablet: 2 tab(s) orally every 6 hours, As needed, Temp greater or equal to 38C (100.4F), Mild Pain (1 - 3) (28 Apr 2022 15:30)  brimonidine 0.2% ophthalmic solution: 1 drop(s) in the left eye 3 times a day (28 Apr 2022 15:30)  busPIRone 15 mg oral tablet: 1 tab(s) orally 2 times a day (28 Apr 2022 15:30)  dorzolamide-timolol 2.23%-0.68% ophthalmic solution: 1 drop(s) in the left eye 2 times a day (28 Apr 2022 15:30)  esomeprazole 20 mg oral delayed release capsule: 1 cap(s) orally once a day (28 Apr 2022 15:30)  gabapentin 100 mg oral capsule: 1 cap(s) orally 3 times a day (28 Apr 2022 15:30)  latanoprost 0.005% ophthalmic solution: 1 drop(s) to each affected eye 2 times a day (28 Apr 2022 15:30)  metoprolol succinate 25 mg oral tablet, extended release: 1 tab(s) orally once a day (28 Apr 2022 15:30)  oxyCODONE 5 mg oral tablet: 1 tab(s) orally every 6 hours, As Needed for pain (28 Apr 2022 15:30)  rosuvastatin 40 mg oral tablet: 1 tab(s) orally once a day (28 Apr 2022 15:30)  Xarelto 20 mg oral tablet: 1 tab(s) orally once a day (in the evening) (28 Apr 2022 15:30)        MEDICATIONS  STANDING MEDICATIONS  brimonidine 0.2% Ophthalmic Solution 1 Drop(s) Left EYE three times a day  chlorhexidine 4% Liquid 1 Application(s) Topical <User Schedule>  dorzolamide 2% Ophthalmic Solution 1 Drop(s) Left EYE every 8 hours  enoxaparin Injectable 70 milliGRAM(s) SubCutaneous every 12 hours  latanoprost 0.005% Ophthalmic Solution 1 Drop(s) Both EYES at bedtime  metoprolol succinate ER 25 milliGRAM(s) Oral daily  pantoprazole  Injectable 40 milliGRAM(s) IV Push daily  Parenteral Nutrition - Adult 1 Each TPN Continuous <Continuous>  timolol 0.5% Solution 1 Drop(s) Left EYE two times a day    PRN MEDICATIONS  acetaminophen     Tablet .. 650 milliGRAM(s) Oral every 6 hours PRN  aluminum hydroxide/magnesium hydroxide/simethicone Suspension 30 milliLiter(s) Oral every 6 hours PRN  ketorolac   Injectable 15 milliGRAM(s) IV Push every 8 hours PRN  LORazepam   Injectable 0.25 milliGRAM(s) IV Push every 8 hours PRN  ondansetron Injectable 8 milliGRAM(s) IV Push every 8 hours PRN    VITALS:  T(F): 97.3  HR: 87  BP: 120/64  RR: 18  SpO2: --    <<<<<LABS>>>>>                  [id number]        <<<<<RADIOLOGY>>>>>    <<<<<PHYSICAL EXAM>>>>>  GENERAL: NAD  HEENT: Normocephalic, atraumatic,   PULMONARY: Clear to auscultation bilaterally. No rales, rhonchi, or wheezing.  CARDIOVASCULAR: Regular rate and rhythm, S1-S2, no murmurs  GASTROINTESTINAL: Soft, non-tender, non-distended  NEUROLOGIC/MUSCULOSKELETAL: AOx4, grossly moving all extremities      -----------------------------------------------------------------------------------------------------------------------------------------------------------------------------------------------

## 2022-05-12 NOTE — PROGRESS NOTE ADULT - SUBJECTIVE AND OBJECTIVE BOX
Patient is a 81y old  Female who presents with a chief complaint of SBO (10 May 2022 11:54)      Patient seen and examined at bedside.  - no acute events notified to me   - d/c planning once TPN arranged -  working on it     ALLERGIES:  chloroquine (Hives)  quinine (Urticaria)    Vital Signs Last 24 Hrs  T(C): 36.8 (12 May 2022 14:09), Max: 36.9 (11 May 2022 20:55)  T(F): 98.2 (12 May 2022 14:09), Max: 98.5 (11 May 2022 20:55)  HR: 81 (12 May 2022 14:09) (80 - 87)  BP: 126/58 (12 May 2022 14:09) (120/64 - 126/64)  RR: 18 (12 May 2022 14:09) (18 - 18)    PHYSICAL EXAM:  General: NAD, A/O x 3  ENT: MMM  Neck: Supple, No JVD  Lungs: Clear to auscultation bilaterally  Cardio: RRR, S1/S2, No murmurs  Abdomen: Soft, Nontender, mildly distended; Bowel sounds present - no guarding   Extremities: No cyanosis, No edema    LABS:    no new labs                10.0   5.56  )-----------( 248      ( 10 May 2022 07:36 )             30.1     05-10    137  |  104  |  37  ----------------------------<  133  4.1   |  21  |  0.8    Ca    8.3      10 May 2022 07:36  Phos  4.0     05-10  Mg     1.9     05-10    TPro  4.8  /  Alb  2.6  /  TBili  0.2  /  DBili  x   /  AST  36  /  ALT  41  /  AlkPhos  129  05-10    COVID-19 PCR: NotDetec (05-07-22 @ 19:55)  COVID-19 PCR: NotDetec (05-01-22 @ 05:24)  COVID-19 PCR: NotDetec (04-25-22 @ 07:30)  COVID-19 PCR: NotDetec (04-21-22 @ 18:38)      RADIOLOGY & ADDITIONAL TESTS:  < from: Xray Kidney Ureter Bladder (05.09.22 @ 06:06) >  IMPRESSION:    Unchanged study.    < end of copied text >    Care Discussed with Consultants/Other Providers: yes     MEDICATIONS  (STANDING):  brimonidine 0.2% Ophthalmic Solution 1 Drop(s) Left EYE three times a day  chlorhexidine 4% Liquid 1 Application(s) Topical <User Schedule>  dorzolamide 2% Ophthalmic Solution 1 Drop(s) Left EYE every 8 hours  enoxaparin Injectable 70 milliGRAM(s) SubCutaneous every 12 hours  fat emulsion (Fish Oil and Plant Based) 20% Infusion 1.267 Gm/kG/Day (23.4 mL/Hr) IV Continuous <Continuous>  latanoprost 0.005% Ophthalmic Solution 1 Drop(s) Both EYES at bedtime  metoprolol succinate ER 25 milliGRAM(s) Oral daily  pantoprazole  Injectable 40 milliGRAM(s) IV Push daily  Parenteral Nutrition - Adult 1 Each TPN Continuous <Continuous>  Parenteral Nutrition - Adult 1 Each TPN Continuous <Continuous>  timolol 0.5% Solution 1 Drop(s) Left EYE two times a day    MEDICATIONS  (PRN):  acetaminophen     Tablet .. 650 milliGRAM(s) Oral every 6 hours PRN Temp greater or equal to 38C (100.4F), Mild Pain (1 - 3)  aluminum hydroxide/magnesium hydroxide/simethicone Suspension 30 milliLiter(s) Oral every 6 hours PRN Dyspepsia  ketorolac   Injectable 15 milliGRAM(s) IV Push every 8 hours PRN Moderate Pain (4 - 6)  LORazepam   Injectable 0.25 milliGRAM(s) IV Push every 8 hours PRN Anxiety  ondansetron Injectable 8 milliGRAM(s) IV Push every 8 hours PRN Nausea and/or Vomiting

## 2022-05-12 NOTE — CHART NOTE - NSCHARTNOTEFT_GEN_A_CORE
T(F): 97.3 (05-12-22 @ 04:51), Max: 98.7 (05-11-22 @ 14:32)  HR: 87 (05-12-22 @ 04:51) (80 - 87)  BP: 120/64 (05-12-22 @ 04:51) (106/63 - 126/64)  RR: 18 (05-12-22 @ 04:51) (18 - 18)    Diet, Clear Liquid (05-12-22 @ 08:17)    ASSESSMENT  Patient is a 81 female PMH recurrent uterine adenocarcinoma s/p SHAAN/BSO (2016), recurrent malignant ascites, CAD s/p CABG, HTN, depression, PE (Xarelto) and IVC filter 2020 presenting with multiple day history of LUQ abdominal pain since prior to discharge, x1 episode of NBNB emesis day prior to arrival, none in the ED, passing gas on day of arrival with BM day prior to arrival. On workup patient found to have persistent large volume ascites (malignant), with small bowel dilation up to 4.2cm with abrupt transition point to collapsed small bowel in left mid abdomen. The first NG tube placement attempt failed, patient removed it herself    - Uterine Adenocarcinoma Mullerian origin, malignant ascites diagnosed on 8/8/2020  - SBO  - HTN  - CAD, hyperlipidemia  - glaucoma  - hypokalemia, hypomagnesemia  - wt loss, ~10% since 11/2020  - risk for refeeding syndrome    PLAN  - cont TPN tonight via port, cont to cycle TPN   - daily bmp, in phos, mg   - home TPN may not be an option due to current shortage of [facility] currently not accepting referrals for home TPN patients  - will follow No new complaints, reports pain manageable and not worse  NPO, denies N/V  TPN infusing via left CW port, cycled over 16hrs now  d/w RN, family at beside and     T(F): 97.3 (05-12-22 @ 04:51), Max: 98.7 (05-11-22 @ 14:32)  HR: 87 (05-12-22 @ 04:51) (80 - 87)  BP: 120/64 (05-12-22 @ 04:51) (106/63 - 126/64)  RR: 18 (05-12-22 @ 04:51) (18 - 18)    Diet, Clear Liquid (05-12-22 @ 08:17)    ASSESSMENT  Patient is a 81 female PMH recurrent uterine adenocarcinoma s/p SHAAN/BSO (2016), recurrent malignant ascites, CAD s/p CABG, HTN, depression, PE (Xarelto) and IVC filter 2020 presenting with multiple day history of LUQ abdominal pain since prior to discharge, x1 episode of NBNB emesis day prior to arrival, none in the ED, passing gas on day of arrival with BM day prior to arrival. On workup patient found to have persistent large volume ascites (malignant), with small bowel dilation up to 4.2cm with abrupt transition point to collapsed small bowel in left mid abdomen. The first NG tube placement attempt failed, patient removed it herself    - Uterine Adenocarcinoma Mullerian origin, malignant ascites diagnosed on 8/8/2020  - SBO  - HTN  - CAD, hyperlipidemia  - glaucoma  - hypokalemia, hypomagnesemia  - wt loss, ~10% since 11/2020  - risk for refeeding syndrome    PLAN  - cont TPN tonight via port, cont to cycle TPN   - daily bmp, in phos, mg   - home TPN when authorized, [facility] pending  - will follow No new complaints, reports pain manageable and not worse  NPO, denies N/V, abd with some distention & discomfort but no pain at present  TPN infusing via left CW port, cycled over 16hrs now  d/w RN, family at beside and     T(F): 97.3 (05-12-22 @ 04:51), Max: 98.7 (05-11-22 @ 14:32)  HR: 87 (05-12-22 @ 04:51) (80 - 87)  BP: 120/64 (05-12-22 @ 04:51) (106/63 - 126/64)  RR: 18 (05-12-22 @ 04:51) (18 - 18)    Diet, Clear Liquid (05-12-22 @ 08:17)    ASSESSMENT  Patient is a 81 female PMH recurrent uterine adenocarcinoma s/p SHAAN/BSO (2016), recurrent malignant ascites, CAD s/p CABG, HTN, depression, PE (Xarelto) and IVC filter 2020 presenting with multiple day history of LUQ abdominal pain since prior to discharge, x1 episode of NBNB emesis day prior to arrival, none in the ED, passing gas on day of arrival with BM day prior to arrival. On workup patient found to have persistent large volume ascites (malignant), with small bowel dilation up to 4.2cm with abrupt transition point to collapsed small bowel in left mid abdomen. The first NG tube placement attempt failed, patient removed it herself    - Uterine Adenocarcinoma Mullerian origin, malignant ascites diagnosed on 8/8/2020  - SBO  - HTN  - CAD, hyperlipidemia  - glaucoma  - hypokalemia, hypomagnesemia  - wt loss, ~10% since 11/2020  - risk for refeeding syndrome    PLAN  - cont TPN tonight via port, cont to cycle TPN   - daily bmp, in phos, mg   - home TPN when authorized, [facility] pending  - family members who will be responsible for TPN infusions at home will need to be educated (by home care vendor?)  - will follow

## 2022-05-12 NOTE — CHART NOTE - NSCHARTNOTEFT_GEN_A_CORE
Daughter and grandson were at bedside  Patient reported that she was not in pain.  Patient/family request PT prior to discharge.  Writer left message for resident.  Patient expressed frustration with reduced physical functioning.  Support rendered.

## 2022-05-12 NOTE — PROGRESS NOTE ADULT - ASSESSMENT
Patient is a 81 female PMH recurrent uterine adenocarcinoma s/p SHAAN/BSO (2016), recurrent malignant ascites, CAD s/p CABG, HTN, depression, PE (Xarelto) and IVC filter 2020 presenting with multiple day history of LUQ abdominal pain since prior to discharge, x1 episode of NBNB emesis day prior to arrival, none in the ED, passing gas on day of arrival with BM day prior to arrival. On workup patient found to have persistent large volume ascites (malignant), with small bowel dilation up to 4.2cm with abrupt transition point to collapsed small bowel in left mid abdomen. The first NG tube placement attempt failed, patient removed it herself - will consider a second attempt today, if needed.    #Small Bowel Obstruction - clinically improved   - No need for NGT for decompression now   - pt started on clear fluids 5/4 -> increased pain and distention_> return to NPO 5/5 - surgery follow up and start clear liquid diet as tolerated (RN to monitor oral intake today)    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 s/p weekly Carbo/Taxol (8/14/20-1/28/2021)   - PET Scan from 2/25/21 shows good response to chemo (declines any additional cancer directed therapy)  - CT A/P from 11/2021 (abd pain) shows no new disease; CTa Chest from 09/2021   - CT A/P from 02/2022 showed ascites which showed recurrent adenocarcinoma of mullerian origin:  is high  - Completed [facility] Liquid Biopsy Testing (03/01/2022) shows no targetable mutation  - chemoport placed by IR   - patient was started on Keytruda + Lenvima q3 weeks but lenvima was discontinued due to poor tolerance  - heme onc will consider possibly resuming lenvima once more medically stable    -5/10 Keytruda given - next is scheduled for 3 weeks from now     #Hypertension  - Patient is on Metoprolol 25mg daily  - continue monitoring BP    #CAD  - Patient is on Lovenox and has an IVC filter    #Glaucoma  - continue taking medications    #GOC  - Pt and family report that daughter should be given all medical information and making decisions on behalf of pt  - 5/6 meeting with Evod (daughter) and grandson Octavious discuss prognosis - agree to DNR/DNI want to pursue all other medical treatments for SBO  - agree to palliative care consult  - 5/7 family meeting with extended family - options given - remove TPN and hospice, home with TPN, nursing home with TPN-family to discuss   - 5/9 palliative care consult appreciated - follow ups noted         # Progress Note Handoff  PENDING as follows  consults: CM for d/c planning   Family discussion: staff updated family   Disposition: home with services once TPN arranged - discussed with CM in rounds on a daily basis       Attending Physician Dr. Kasey Guzman # [id number]

## 2022-05-13 NOTE — PROGRESS NOTE ADULT - SUBJECTIVE AND OBJECTIVE BOX
Patient is a 81y old  Female who presents with a chief complaint of SBO (10 May 2022 11:54)      Patient seen and examined at bedside early this morning and in the afternoon and note is written in the evening   -broad spectrum IV abx - upgrade to SDU; pancultures, ID eval    ALLERGIES:  chloroquine (Hives)  quinine (Urticaria)    Vital Signs Last 24 Hrs  T(C): 38.7 (13 May 2022 12:40), Max: 38.7 (13 May 2022 12:40)  T(F): 101.7 (13 May 2022 12:40), Max: 101.7 (13 May 2022 12:40)  HR: 131 (13 May 2022 12:40) (89 - 131)  BP: 92/53 (13 May 2022 12:40) (92/53 - 115/71)  BP(mean): 78 (13 May 2022 05:33) (78 - 78)  RR: 18 (13 May 2022 12:40) (18 - 18)  SpO2: 96% (12 May 2022 20:21) (96% - 96%)    PHYSICAL EXAM:  General: NAD, A/O x 3  ENT: MMM  Neck: Supple, No JVD  Lungs: Clear to auscultation bilaterally  Cardio: RRR, S1/S2, No murmurs  Abdomen: Soft, Nontender, mildly distended; Bowel sounds present - no guarding   Extremities: No cyanosis, No edema    LABS:               10.4   2.88  )-----------( 262      ( 13 May 2022 15:31 )             31.8     05-13    134<L>  |  103  |  41<H>  ----------------------------<  94  3.0<L>   |  16<L>  |  0.9    Ca    7.7<L>      13 May 2022 15:34  Phos  1.3     05-13  Mg     1.7     05-13    TPro  4.5<L>  /  Alb  2.1<L>  /  TBili  0.4  /  DBili  x   /  AST  20  /  ALT  17  /  AlkPhos  113  05-13    COVID-19 PCR: NotDetec (05-07-22 @ 19:55)  COVID-19 PCR: NotDetec (05-01-22 @ 05:24)  COVID-19 PCR: NotDetec (04-25-22 @ 07:30)  COVID-19 PCR: NotDetec (04-21-22 @ 18:38)      RADIOLOGY & ADDITIONAL TESTS:  < from: Xray Kidney Ureter Bladder (05.09.22 @ 06:06) >  IMPRESSION:    Unchanged study.    < end of copied text >    Care Discussed with Consultants/Other Providers: yes     MEDICATIONS  (STANDING):  brimonidine 0.2% Ophthalmic Solution 1 Drop(s) Left EYE three times a day  cefTRIAXone   IVPB 1000 milliGRAM(s) IV Intermittent every 24 hours  chlorhexidine 4% Liquid 1 Application(s) Topical <User Schedule>  dorzolamide 2% Ophthalmic Solution 1 Drop(s) Left EYE every 8 hours  enoxaparin Injectable 70 milliGRAM(s) SubCutaneous every 12 hours  lactated ringers. 1000 milliLiter(s) (150 mL/Hr) IV Continuous <Continuous>  latanoprost 0.005% Ophthalmic Solution 1 Drop(s) Both EYES at bedtime  magnesium sulfate  IVPB 2 Gram(s) IV Intermittent once  norepinephrine Infusion 0.05 MICROgram(s)/kG/Min (5.55 mL/Hr) IV Continuous <Continuous>  pantoprazole  Injectable 40 milliGRAM(s) IV Push daily  Parenteral Nutrition - Adult 1 Each TPN Continuous <Continuous>  Parenteral Nutrition - Adult 1 Each TPN Continuous <Continuous>  potassium chloride  20 mEq/100 mL IVPB 20 milliEquivalent(s) IV Intermittent every 2 hours  timolol 0.5% Solution 1 Drop(s) Left EYE two times a day  vancomycin  IVPB 750 milliGRAM(s) IV Intermittent every 12 hours    MEDICATIONS  (PRN):  acetaminophen     Tablet .. 650 milliGRAM(s) Oral every 6 hours PRN Temp greater or equal to 38C (100.4F), Mild Pain (1 - 3)  aluminum hydroxide/magnesium hydroxide/simethicone Suspension 30 milliLiter(s) Oral every 6 hours PRN Dyspepsia  ketorolac   Injectable 15 milliGRAM(s) IV Push every 8 hours PRN Moderate Pain (4 - 6)  LORazepam   Injectable 0.25 milliGRAM(s) IV Push every 8 hours PRN Anxiety  ondansetron Injectable 8 milliGRAM(s) IV Push every 8 hours PRN Nausea and/or Vomiting

## 2022-05-13 NOTE — CHART NOTE - NSCHARTNOTEFT_GEN_A_CORE
T(F): 97.8 (05-13-22 @ 05:33), Max: 98.2 (05-12-22 @ 14:09)  HR: 95 (05-13-22 @ 05:33) (81 - 95)  BP: 101/61 (05-13-22 @ 05:33) (101/61 - 126/58)  RR: 18 (05-13-22 @ 05:33) (18 - 18)  SpO2: 96% (05-12-22 @ 20:21) (96% - 96%)    MEDICATIONS  (STANDING):  brimonidine 0.2% Ophthalmic Solution 1 Drop(s) Left EYE three times a day  chlorhexidine 4% Liquid 1 Application(s) Topical <User Schedule>  dorzolamide 2% Ophthalmic Solution 1 Drop(s) Left EYE every 8 hours  enoxaparin Injectable 70 milliGRAM(s) SubCutaneous every 12 hours  fat emulsion (Fish Oil and Plant Based) 20% Infusion 1.267 Gm/kG/Day (23.4 mL/Hr) IV Continuous <Continuous>  latanoprost 0.005% Ophthalmic Solution 1 Drop(s) Both EYES at bedtime  metoprolol succinate ER 25 milliGRAM(s) Oral daily  pantoprazole  Injectable 40 milliGRAM(s) IV Push daily  Parenteral Nutrition - Adult 1 Each TPN Continuous <Continuous>  timolol 0.5% Solution 1 Drop(s) Left EYE two times a day    MEDICATIONS  (PRN):  acetaminophen     Tablet .. 650 milliGRAM(s) Oral every 6 hours PRN Temp greater or equal to 38C (100.4F), Mild Pain (1 - 3)  aluminum hydroxide/magnesium hydroxide/simethicone Suspension 30 milliLiter(s) Oral every 6 hours PRN Dyspepsia  ketorolac   Injectable 15 milliGRAM(s) IV Push every 8 hours PRN Moderate Pain (4 - 6)  LORazepam   Injectable 0.25 milliGRAM(s) IV Push every 8 hours PRN Anxiety  ondansetron Injectable 8 milliGRAM(s) IV Push every 8 hours PRN Nausea and/or Vomiting    05-12    132<L>  |  104  |  36<H>  ----------------------------<  133<H>  4.9   |  16<L>  |  0.8    Ca    8.1<L>      12 May 2022 08:24  Phos  2.9     05-12  Mg     2.1     05-12    CAPILLARY BLOOD GLUCOSE  POCT Blood Glucose.: 113 mg/dL (12 May 2022 13:44)  POCT Blood Glucose.: 121 mg/dL (12 May 2022 11:30)     Diet, NPO (05-13-22 @ 08:37)    ASSESSMENT  Patient is a 81 female PMH recurrent uterine adenocarcinoma s/p SHAAN/BSO (2016), recurrent malignant ascites, CAD s/p CABG, HTN, depression, PE (Xarelto) and IVC filter 2020 presenting with multiple day history of LUQ abdominal pain since prior to discharge, x1 episode of NBNB emesis day prior to arrival, none in the ED, passing gas on day of arrival with BM day prior to arrival. On workup patient found to have persistent large volume ascites (malignant), with small bowel dilation up to 4.2cm with abrupt transition point to collapsed small bowel in left mid abdomen. The first NG tube placement attempt failed, patient removed it herself    - Uterine Adenocarcinoma Mullerian origin, malignant ascites diagnosed on 8/8/2020  - SBO  - HTN  - CAD, hyperlipidemia  - glaucoma  - hypokalemia, hypomagnesemia  - wt loss, ~10% since 11/2020  - risk for refeeding syndrome    PLAN  - cont TPN tonight via port, cycled   - daily bmp, in phos, mg   - home TPN when authorized, [facility]  - family members who will be responsible for TPN infusions at home will need to be educated (by home care vendor?)  - will follow Started on clear liquids yesterday then vomited after having a few sips of water  +nausea and wretching overnight with worsening abd pain the am   TPN cycled X 14hrs, awaiting home care arrangements    T(F): 97.8 (05-13-22 @ 05:33), Max: 98.2 (05-12-22 @ 14:09)  HR: 95 (05-13-22 @ 05:33) (81 - 95)  BP: 101/61 (05-13-22 @ 05:33) (101/61 - 126/58)  RR: 18 (05-13-22 @ 05:33) (18 - 18)  SpO2: 96% (05-12-22 @ 20:21) (96% - 96%)    MEDICATIONS  (STANDING):  brimonidine 0.2% Ophthalmic Solution 1 Drop(s) Left EYE three times a day  chlorhexidine 4% Liquid 1 Application(s) Topical <User Schedule>  dorzolamide 2% Ophthalmic Solution 1 Drop(s) Left EYE every 8 hours  enoxaparin Injectable 70 milliGRAM(s) SubCutaneous every 12 hours  fat emulsion (Fish Oil and Plant Based) 20% Infusion 1.267 Gm/kG/Day (23.4 mL/Hr) IV Continuous <Continuous>  latanoprost 0.005% Ophthalmic Solution 1 Drop(s) Both EYES at bedtime  metoprolol succinate ER 25 milliGRAM(s) Oral daily  pantoprazole  Injectable 40 milliGRAM(s) IV Push daily  Parenteral Nutrition - Adult 1 Each TPN Continuous <Continuous>  timolol 0.5% Solution 1 Drop(s) Left EYE two times a day    MEDICATIONS  (PRN):  acetaminophen     Tablet .. 650 milliGRAM(s) Oral every 6 hours PRN Temp greater or equal to 38C (100.4F), Mild Pain (1 - 3)  aluminum hydroxide/magnesium hydroxide/simethicone Suspension 30 milliLiter(s) Oral every 6 hours PRN Dyspepsia  ketorolac   Injectable 15 milliGRAM(s) IV Push every 8 hours PRN Moderate Pain (4 - 6)  LORazepam   Injectable 0.25 milliGRAM(s) IV Push every 8 hours PRN Anxiety  ondansetron Injectable 8 milliGRAM(s) IV Push every 8 hours PRN Nausea and/or Vomiting    05-12    132<L>  |  104  |  36<H>  ----------------------------<  133<H>  4.9   |  16<L>  |  0.8    Ca    8.1<L>      12 May 2022 08:24  Phos  2.9     05-12  Mg     2.1     05-12    CAPILLARY BLOOD GLUCOSE  POCT Blood Glucose.: 113 mg/dL (12 May 2022 13:44)  POCT Blood Glucose.: 121 mg/dL (12 May 2022 11:30)     Diet, NPO (05-13-22 @ 08:37)    ASSESSMENT  Patient is a 81 female PMH recurrent uterine adenocarcinoma s/p SHAAN/BSO (2016), recurrent malignant ascites, CAD s/p CABG, HTN, depression, PE (Xarelto) and IVC filter 2020 presenting with multiple day history of LUQ abdominal pain since prior to discharge, x1 episode of NBNB emesis day prior to arrival, none in the ED, passing gas on day of arrival with BM day prior to arrival. On workup patient found to have persistent large volume ascites (malignant), with small bowel dilation up to 4.2cm with abrupt transition point to collapsed small bowel in left mid abdomen. The first NG tube placement attempt failed, patient removed it herself    - Uterine Adenocarcinoma Mullerian origin, malignant ascites diagnosed on 8/8/2020  - SBO  - HTN  - CAD, hyperlipidemia  - glaucoma  - hypokalemia, hypomagnesemia  - wt loss, ~10% since 11/2020  - risk for refeeding syndrome    PLAN  - cont TPN tonight via port, cycled   - daily bmp, in phos, mg   - home TPN when authorized, [facility] pending  - family members who will be responsible for TPN infusions at home will need to be educated (by home care vendor?)  - will follow Started on clear liquids yesterday then vomited after having a few sips of water  +nausea and retching overnight with worsening abd pain the am   TPN cycled X 14hrs, awaiting home care arrangements    T(F): 97.8 (05-13-22 @ 05:33), Max: 98.2 (05-12-22 @ 14:09)  HR: 95 (05-13-22 @ 05:33) (81 - 95)  BP: 101/61 (05-13-22 @ 05:33) (101/61 - 126/58)  RR: 18 (05-13-22 @ 05:33) (18 - 18)  SpO2: 96% (05-12-22 @ 20:21) (96% - 96%)    MEDICATIONS  (STANDING):  brimonidine 0.2% Ophthalmic Solution 1 Drop(s) Left EYE three times a day  chlorhexidine 4% Liquid 1 Application(s) Topical <User Schedule>  dorzolamide 2% Ophthalmic Solution 1 Drop(s) Left EYE every 8 hours  enoxaparin Injectable 70 milliGRAM(s) SubCutaneous every 12 hours  fat emulsion (Fish Oil and Plant Based) 20% Infusion 1.267 Gm/kG/Day (23.4 mL/Hr) IV Continuous <Continuous>  latanoprost 0.005% Ophthalmic Solution 1 Drop(s) Both EYES at bedtime  metoprolol succinate ER 25 milliGRAM(s) Oral daily  pantoprazole  Injectable 40 milliGRAM(s) IV Push daily  Parenteral Nutrition - Adult 1 Each TPN Continuous <Continuous>  timolol 0.5% Solution 1 Drop(s) Left EYE two times a day    MEDICATIONS  (PRN):  acetaminophen     Tablet .. 650 milliGRAM(s) Oral every 6 hours PRN Temp greater or equal to 38C (100.4F), Mild Pain (1 - 3)  aluminum hydroxide/magnesium hydroxide/simethicone Suspension 30 milliLiter(s) Oral every 6 hours PRN Dyspepsia  ketorolac   Injectable 15 milliGRAM(s) IV Push every 8 hours PRN Moderate Pain (4 - 6)  LORazepam   Injectable 0.25 milliGRAM(s) IV Push every 8 hours PRN Anxiety  ondansetron Injectable 8 milliGRAM(s) IV Push every 8 hours PRN Nausea and/or Vomiting    05-12    132<L>  |  104  |  36<H>  ----------------------------<  133<H>  4.9   |  16<L>  |  0.8    Ca    8.1<L>      12 May 2022 08:24  Phos  2.9     05-12  Mg     2.1     05-12    CAPILLARY BLOOD GLUCOSE  POCT Blood Glucose.: 113 mg/dL (12 May 2022 13:44)  POCT Blood Glucose.: 121 mg/dL (12 May 2022 11:30)     Diet, NPO (05-13-22 @ 08:37)    ASSESSMENT  Patient is a 81 female PMH recurrent uterine adenocarcinoma s/p SHAAN/BSO (2016), recurrent malignant ascites, CAD s/p CABG, HTN, depression, PE (Xarelto) and IVC filter 2020 presenting with multiple day history of LUQ abdominal pain since prior to discharge, x1 episode of NBNB emesis day prior to arrival, none in the ED, passing gas on day of arrival with BM day prior to arrival. On workup patient found to have persistent large volume ascites (malignant), with small bowel dilation up to 4.2cm with abrupt transition point to collapsed small bowel in left mid abdomen. The first NG tube placement attempt failed, patient removed it herself    - Uterine Adenocarcinoma Mullerian origin, malignant ascites diagnosed on 8/8/2020  - SBO  - HTN  - CAD, hyperlipidemia  - glaucoma  - hypokalemia, hypomagnesemia  - wt loss, ~10% since 11/2020  - risk for refeeding syndrome    PLAN  - cont TPN tonight via port, cycled - lyte/phos content adjusted  - daily bmp, in phos, mg   - home TPN when authorized, [facility] pending  - family members who will be responsible for TPN infusions at home will need to be educated (by home care vendor?)  - will follow

## 2022-05-13 NOTE — CHART NOTE - NSCHARTNOTEFT_GEN_A_CORE
Transfer from: 3B  Transfer to:  (X  ) Medicine    (  ) Telemetry    (  ) RCU    (  ) Palliative    (  ) Stroke Unit    (  ) _SDU______________      Patient is a 81 female PMH recurrent uterine adenocarcinoma s/p SHAAN/BSO (2016), recurrent malignant ascites, CAD s/p CABG, HTN, depression, PE (Xarelto) and IVC filter 2020 presenting with multiple day history of LUQ abdominal pain since prior to discharge, x1 episode of NBNB found to have persistent large volume ascites (malignant), with small bowel dilation up to 4.2cm with abrupt transition point to collapsed small bowel in left mid abdomen. The first NG tube placement attempt failed/unsuccessful x 2. Surgey consulted on 5/4 in which patient refused NG tube and lying in bed induces self vomiting because of nausea having BM today and was not reinserted; patient monitored  SBO was improving both clinically and imaging (KUB daily). Given patients inability to tolerate PO due to ongoing abdominal pain and fear patient was started on TPN and plan was to discharge patient home on TPN however TPN was backorder and disposition was pending.  5/13: patient was septic (temp 101, , BP 70/50) with diffuse abd pain; KUB negative for worsening SBO or perforation. Patient given 2 L of of LR and started on maintenance fluids and low dose levo. Patient AAO x 3, no foci of infection (port appears clean); spoke to Dr. Walker hold TPN for now and reaccess clinical status until patient no longer febrile. Patient upgraded to stepdown unit due to septic shock. Family at bedside aware; patient currently DNR/DNI.     #Small Bowel Obstruction (resolving)  - No need for NGT for decompression now   - pt started on clear fluids 5/4 -> increased pain and distention_> return to NPO 5/5  -Strict I&O's Q4 hrs.  - Surgery consult appreciated. Surgery a very high risk candidate for surgery and do not want to offer surgical intevention  - 5/12: started on a trial of clear liquid diet with very minimal intake however unable to tolerate as she was reportedly retching at night  - KUB daily 5/13: SBO improving; no perforation; as patient unable to tolerate clear liquid return back to NPO  - c/w pain control and zofran prn   - Dr. Dey team aware of patient being septic; currently hold off TPN and reaccess until patient stable and no longer febrile  - f/u nutrition recommendations; Keep NPO    #Septic shock 2/2 unknown source/immunocompromised on active chemo/immunotherapy r/o port infection vs bacteremia   - 5/13: patient in septic shock (temp 101, , BP 70/50) with diffuse abd pain; KUB negative for worsening SBO or perforation. Patient given 2 L of of LR and started on maintenance fluids and low dose levo. Patient AAO x 3, no foci of infection (port appears clean); spoke to Dr. Walker hold TPN for now and reaccess clinical status until patient no longer febrile.  - KUB 5/13: Nonobstructive bowel gas pattern. No significant change since 2 days earlier.  - CXR 5/13: No radiographic evidence of acute cardiopulmonary disease.  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 s/p weekly Carbo/Taxol (8/14/20-1/28/2021)   - PET Scan from 2/25/21 shows good response to chemo (declines any additional cancer directed therapy)  - CT A/P from 11/2021 (abd pain) shows no new disease; CTa Chest from 09/2021   - CT A/P from 02/2022 showed ascites which showed recurrent adenocarcinoma of mullerian origin:  is high  - chemoport placed by IR   - patient was started on Keytruda + Lenvima q3 weeks but lenvima was discontinued due to poor tolerance  - heme onc will consider possibly resuming lenvima once more medically stable outpatient  - s/p keytruda at bedside 5/10; next session in 3 weeks   - f/u onc recommendations     #Hypertension   - Patient is on Metoprolol 25mg daily (currently on hold) restart once sepsis resolves   - continue monitoring BP    #CAD  - Patient is on Xarelto at home and has an IVC filter   - c/w with lovenox 70 bid     #Glaucoma  - continue taking medications    #GOC  - Pt and family report that daughter should be given all medical information and making decisions on behalf of pt  - 5/6 meeting with Avila (daughter) and grandson Octavious discuss prognosis - agree to DNR/DNI want to pursue all other medical treatments for SBO  - agree to palliative care consult  - 5/7 family meeting with extended family - options given - remove TPN and hospice, home with TPN, nursing home with TPN-family to discuss and decided for patient to be discharged home with TPN services     #Misc   - DVT ppx: therapeutic lvnx   - Diet: NPO  - Code status: DNR/DNI        For Follow-Up:  - f/u blood cultures, UA, procal  - started on empiric abx (ceftriaxone and vanc)  - f/u ID recommendations   - f/u nutrition recommendations   - wean of pressors         Vital Signs Last 24 Hrs  T(C): 38.7 (13 May 2022 12:40), Max: 38.7 (13 May 2022 12:40)  T(F): 101.7 (13 May 2022 12:40), Max: 101.7 (13 May 2022 12:40)  HR: 131 (13 May 2022 12:40) (89 - 131)  BP: 92/53 (13 May 2022 12:40) (92/53 - 115/71)  BP(mean): 78 (13 May 2022 05:33) (78 - 78)  RR: 18 (13 May 2022 12:40) (18 - 18)  SpO2: 96% (12 May 2022 20:21) (96% - 96%)  I&O's Summary        MEDICATIONS  (STANDING):  brimonidine 0.2% Ophthalmic Solution 1 Drop(s) Left EYE three times a day  cefTRIAXone   IVPB 1000 milliGRAM(s) IV Intermittent every 24 hours  chlorhexidine 4% Liquid 1 Application(s) Topical <User Schedule>  dorzolamide 2% Ophthalmic Solution 1 Drop(s) Left EYE every 8 hours  enoxaparin Injectable 70 milliGRAM(s) SubCutaneous every 12 hours  lactated ringers. 1000 milliLiter(s) (100 mL/Hr) IV Continuous <Continuous>  latanoprost 0.005% Ophthalmic Solution 1 Drop(s) Both EYES at bedtime  metoprolol succinate ER 25 milliGRAM(s) Oral daily  norepinephrine Infusion 0.05 MICROgram(s)/kG/Min (5.55 mL/Hr) IV Continuous <Continuous>  pantoprazole  Injectable 40 milliGRAM(s) IV Push daily  Parenteral Nutrition - Adult 1 Each TPN Continuous <Continuous>  Parenteral Nutrition - Adult 1 Each TPN Continuous <Continuous>  timolol 0.5% Solution 1 Drop(s) Left EYE two times a day  vancomycin  IVPB 750 milliGRAM(s) IV Intermittent every 12 hours    MEDICATIONS  (PRN):  acetaminophen     Tablet .. 650 milliGRAM(s) Oral every 6 hours PRN Temp greater or equal to 38C (100.4F), Mild Pain (1 - 3)  aluminum hydroxide/magnesium hydroxide/simethicone Suspension 30 milliLiter(s) Oral every 6 hours PRN Dyspepsia  ketorolac   Injectable 15 milliGRAM(s) IV Push every 8 hours PRN Moderate Pain (4 - 6)  LORazepam   Injectable 0.25 milliGRAM(s) IV Push every 8 hours PRN Anxiety  ondansetron Injectable 8 milliGRAM(s) IV Push every 8 hours PRN Nausea and/or Vomiting        LABS                                            9.8                   Neurophils% (auto):   72.1   (05-13 @ 07:48):    4.94 )-----------(262          Lymphocytes% (auto):  17.2                                          28.6                   Eosinphils% (auto):   1.0      Manual%: Neutrophils x    ; Lymphocytes x    ; Eosinophils x    ; Bands%: x    ; Blasts x                                    134    |  101    |  40                  Calcium: 8.3   / iCa: x      (05-13 @ 07:48)    ----------------------------<  187       Magnesium: 2.0                              3.4     |  20     |  0.7              Phosphorous: 2.7      TPro  5.0    /  Alb  2.5    /  TBili  0.2    /  DBili  x      /  AST  21     /  ALT  20     /  AlkPhos  123    13 May 2022 07:48          For Follow-Up:  - f/u blood cultures, UA, procal  - started on empiric abx (ceftriaxone and vanc)  - f/u ID recommendations   - f/u nutrition recommendations   - wean of pressors

## 2022-05-13 NOTE — PROGRESS NOTE ADULT - ASSESSMENT
Patient is a 81 female PMH recurrent uterine adenocarcinoma s/p SHAAN/BSO (2016), recurrent malignant ascites, CAD s/p CABG, HTN, depression, PE (Xarelto) and IVC filter 2020 presenting with multiple day history of LUQ abdominal pain since prior to discharge, x1 episode of NBNB emesis day prior to arrival, none in the ED, passing gas on day of arrival with BM day prior to arrival. On workup patient found to have persistent large volume ascites (malignant), with small bowel dilation up to 4.2cm with abrupt transition point to collapsed small bowel in left mid abdomen. The first NG tube placement attempt failed, patient removed it herself - will consider a second attempt today, if needed.    #Septic shock  - unknown etiology  -broad spectrum antibiotics; pancultures, IVF, levophed drip  -upgrade to SDU   -ID evaluation     #Small Bowel Obstruction - clinically improved   - No need for NGT for decompression now   - pt started on clear fluids 5/4 -> increased pain and distention_> return to NPO 5/5 - clear liquid initiated 5/12/22 - abdominal discomfort today; strict NPO status   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 s/p weekly Carbo/Taxol (8/14/20-1/28/2021)   - PET Scan from 2/25/21 shows good response to chemo (declines any additional cancer directed therapy)  - CT A/P from 11/2021 (abd pain) shows no new disease; CTa Chest from 09/2021   - CT A/P from 02/2022 showed ascites which showed recurrent adenocarcinoma of mullerian origin:  is high  - Completed [facility] Liquid Biopsy Testing (03/01/2022) shows no targetable mutation  - chemoport placed by IR   - patient was started on Keytruda + Lenvima q3 weeks but lenvima was discontinued due to poor tolerance  - heme onc will consider possibly resuming lenvima once more medically stable    -5/10 Keytruda given - next is scheduled for 3 weeks from now     #Hypertension  - Patient is on Metoprolol 25mg daily  - continue monitoring BP    #CAD  - Patient is on Lovenox and has an IVC filter    #Glaucoma  - continue taking medications    # Progress Note Handoff  PENDING as follows  consults: ID eval  Family discussion: staff updated family   Disposition: not discharge ready  -  acute     DNR/DNI with overall poor prognosis (family aware)      Attending Physician Dr. Kasey Guzman # [id number]

## 2022-05-13 NOTE — PROGRESS NOTE ADULT - ASSESSMENT
Patient is a 81 female PMH recurrent uterine adenocarcinoma s/p SHAAN/BSO (2016), recurrent malignant ascites, CAD s/p CABG, HTN, depression, PE (Xarelto) and IVC filter 2020 presenting with multiple day history of LUQ abdominal pain since prior to discharge, x1 episode of NBNB found to have persistent large volume ascites (malignant), with small bowel dilation up to 4.2cm with abrupt transition point to collapsed small bowel in left mid abdomen. The first NG tube placement attempt failed/unsuccessful x 2.     #Small Bowel Obstruction  - No need for NGT for decompression now   - pt started on clear fluids 5/4 -> increased pain and distention_> return to NPO 5/5  -Strict I&O's Q4 hrs.  - Surgery consult appreciated. Surgery a very high risk candidate for surgery and do not want to offer surgical   - TPN via port started   - 5/12: started on a trial of clear liquid diet with very minimal intake however unable to tolerate as she was reportedly retching at night  - 5/13 today AM patient appears to be uncomfortable, reports of abdominal pain and retching; hemodynamically stable on tpn     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 s/p weekly Carbo/Taxol (8/14/20-1/28/2021)   - PET Scan from 2/25/21 shows good response to chemo (declines any additional cancer directed therapy)  - CT A/P from 11/2021 (abd pain) shows no new disease; CTa Chest from 09/2021   - CT A/P from 02/2022 showed ascites which showed recurrent adenocarcinoma of mullerian origin:  is high  - Completed [facility] Liquid Biopsy Testing (03/01/2022) shows no targetable mutation  - chemoport placed by IR   - patient was started on Keytruda + Lenvima q3 weeks but lenvima was discontinued due to poor tolerance  - heme onc will consider possibly resuming lenvima once more medically stable outpatient  - s/p keytruda at bedside 5/10; next session in 3 weeks     #Hypertension  - Patient is on Metoprolol 25mg daily  - continue monitoring BP    #CAD  - Patient is on Xarelto and has an IVC filter    #Glaucoma  - continue taking medications    #GOC  - Pt and family report that daughter should be given all medical information and making decisions on behalf of pt  - 5/6 meeting with Avila (daughter) and grandson Octavious discuss prognosis - agree to DNR/DNI want to pursue all other medical treatments for SBO  - agree to palliative care consult  - 5/7 family meeting with extended family - options given - remove TPN and hospice, home with TPN, nursing home with TPN-family to discuss   - family requesting  for patient to be discharged home with TPN services     #Misc   - DVT ppx: therapeutic lvnx   - Diet: NPO; TPN  - Code status: DNR/DNI  - Pending (specify):  sbo resolution; TPN back order resolution from CM and nutrition services/surgery f/u            Patient is a 81 female PMH recurrent uterine adenocarcinoma s/p SHAAN/BSO (2016), recurrent malignant ascites, CAD s/p CABG, HTN, depression, PE (Xarelto) and IVC filter 2020 presenting with multiple day history of LUQ abdominal pain since prior to discharge, x1 episode of NBNB found to have persistent large volume ascites (malignant), with small bowel dilation up to 4.2cm with abrupt transition point to collapsed small bowel in left mid abdomen. The first NG tube placement attempt failed/unsuccessful x 2.     #Small Bowel Obstruction  - No need for NGT for decompression now   - pt started on clear fluids 5/4 -> increased pain and distention_> return to NPO 5/5  -Strict I&O's Q4 hrs.  - Surgery consult appreciated. Surgery a very high risk candidate for surgery and do not want to offer surgical   - TPN via port started   - 5/12: started on a trial of clear liquid diet with very minimal intake however unable to tolerate as she was reportedly retching at night  - 5/13 today AM patient appears to be uncomfortable, reports of abdominal pain and retching; hemodynamically stable on tpn   - pending repeat KUB and surgery f/u  - c/w pain control and zofran prn     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 s/p weekly Carbo/Taxol (8/14/20-1/28/2021)   - PET Scan from 2/25/21 shows good response to chemo (declines any additional cancer directed therapy)  - CT A/P from 11/2021 (abd pain) shows no new disease; CTa Chest from 09/2021   - CT A/P from 02/2022 showed ascites which showed recurrent adenocarcinoma of mullerian origin:  is high  - Completed [facility] Liquid Biopsy Testing (03/01/2022) shows no targetable mutation  - chemoport placed by IR   - patient was started on Keytruda + Lenvima q3 weeks but lenvima was discontinued due to poor tolerance  - heme onc will consider possibly resuming lenvima once more medically stable outpatient  - s/p keytruda at bedside 5/10; next session in 3 weeks     #Hypertension  - Patient is on Metoprolol 25mg daily  - continue monitoring BP    #CAD  - Patient is on Xarelto and has an IVC filter    #Glaucoma  - continue taking medications    #GOC  - Pt and family report that daughter should be given all medical information and making decisions on behalf of pt  - 5/6 meeting with Avila (daughter) and grandson Octavious discuss prognosis - agree to DNR/DNI want to pursue all other medical treatments for SBO  - agree to palliative care consult  - 5/7 family meeting with extended family - options given - remove TPN and hospice, home with TPN, nursing home with TPN-family to discuss   - family requesting  for patient to be discharged home with TPN services     #Misc   - DVT ppx: therapeutic lvnx   - Diet: NPO; TPN  - Code status: DNR/DNI  - Pending (specify):  sbo resolution; TPN back order resolution from CM and nutrition services/surgery f/u

## 2022-05-13 NOTE — PROGRESS NOTE ADULT - SUBJECTIVE AND OBJECTIVE BOX
----------Daily Progress Note----------    HISTORY OF PRESENT ILLNESS:  Patient is a 81y old Female who presents with a chief complaint of SBO (12 May 2022 17:13)    Currently admitted to medicine with the primary diagnosis of Abdominal pain       Today is hospital day 12d.     INTERVAL HOSPITAL COURSE / OVERNIGHT EVENTS:    Patient was examined and seen at bedside. Yesterday patient had a trial of clear liquid diet with very minimal intake however unable to tolerate as she was reportedly retching at night; today AM patient appears to be uncomfortable, reports of abdominal pain and retching; hemodynamically stable on tpn     Review of Systems: Otherwise unremarkable     <<<<<PAST MEDICAL & SURGICAL HISTORY>>>>>  Uterine cancer  s/p SHAAN and chemo    Hypertension    High cholesterol    Cataract of right eye    Glaucoma    Coronary artery disease  s/p CABG X2    CAD (coronary artery disease)    HTN (hypertension)    HLD (hyperlipidemia)    H/O total hysterectomy    Athscl CABG, unsp, w angina pectoris w documented spasm    History of coronary artery bypass surgery      ALLERGIES  chloroquine (Hives)  quinine (Urticaria)      Home Medications:  acetaminophen 325 mg oral tablet: 2 tab(s) orally every 6 hours, As needed, Temp greater or equal to 38C (100.4F), Mild Pain (1 - 3) (28 Apr 2022 15:30)  brimonidine 0.2% ophthalmic solution: 1 drop(s) in the left eye 3 times a day (28 Apr 2022 15:30)  busPIRone 15 mg oral tablet: 1 tab(s) orally 2 times a day (28 Apr 2022 15:30)  dorzolamide-timolol 2.23%-0.68% ophthalmic solution: 1 drop(s) in the left eye 2 times a day (28 Apr 2022 15:30)  esomeprazole 20 mg oral delayed release capsule: 1 cap(s) orally once a day (28 Apr 2022 15:30)  gabapentin 100 mg oral capsule: 1 cap(s) orally 3 times a day (28 Apr 2022 15:30)  latanoprost 0.005% ophthalmic solution: 1 drop(s) to each affected eye 2 times a day (28 Apr 2022 15:30)  metoprolol succinate 25 mg oral tablet, extended release: 1 tab(s) orally once a day (28 Apr 2022 15:30)  oxyCODONE 5 mg oral tablet: 1 tab(s) orally every 6 hours, As Needed for pain (28 Apr 2022 15:30)  rosuvastatin 40 mg oral tablet: 1 tab(s) orally once a day (28 Apr 2022 15:30)  Xarelto 20 mg oral tablet: 1 tab(s) orally once a day (in the evening) (28 Apr 2022 15:30)        MEDICATIONS  STANDING MEDICATIONS  brimonidine 0.2% Ophthalmic Solution 1 Drop(s) Left EYE three times a day  chlorhexidine 4% Liquid 1 Application(s) Topical <User Schedule>  dorzolamide 2% Ophthalmic Solution 1 Drop(s) Left EYE every 8 hours  enoxaparin Injectable 70 milliGRAM(s) SubCutaneous every 12 hours  latanoprost 0.005% Ophthalmic Solution 1 Drop(s) Both EYES at bedtime  metoprolol succinate ER 25 milliGRAM(s) Oral daily  pantoprazole  Injectable 40 milliGRAM(s) IV Push daily  Parenteral Nutrition - Adult 1 Each TPN Continuous <Continuous>  timolol 0.5% Solution 1 Drop(s) Left EYE two times a day    PRN MEDICATIONS  acetaminophen     Tablet .. 650 milliGRAM(s) Oral every 6 hours PRN  aluminum hydroxide/magnesium hydroxide/simethicone Suspension 30 milliLiter(s) Oral every 6 hours PRN  ketorolac   Injectable 15 milliGRAM(s) IV Push every 8 hours PRN  LORazepam   Injectable 0.25 milliGRAM(s) IV Push every 8 hours PRN  ondansetron Injectable 8 milliGRAM(s) IV Push every 8 hours PRN    VITALS:  T(F): 97.8  HR: 95  BP: 101/61  RR: 18  SpO2: 96%    <<<<<LABS>>>>>                        9.8    4.94  )-----------( 262      ( 13 May 2022 07:48 )             28.6     05-13    134<L>  |  101  |  40<H>  ----------------------------<  187<H>  3.4<L>   |  20  |  0.7    Ca    8.3<L>      13 May 2022 07:48  Phos  2.7     05-13  Mg     2.0     05-13    TPro  5.0<L>  /  Alb  2.5<L>  /  TBili  0.2  /  DBili  x   /  AST  21  /  ALT  20  /  AlkPhos  123<H>  05-13            [id number]        <<<<<RADIOLOGY>>>>>    <<<<<PHYSICAL EXAM>>>>>  GENERAL: Well developed, well nourished and in no acute distress. Resting comfortably in bed.  HEENT: Normocephalic, atraumatic, mucous membranes moist, EOMI, PERRLA, bilateral sclera anicteric, no conjunctival injection  Neck: Supple, non-tender, no lymphadenopathy.  PULMONARY: Clear to auscultation bilaterally. No rales, rhonchi, or wheezing.  CARDIOVASCULAR: Regular rate and rhythm, S1-S2, no murmurs  GASTROINTESTINAL: Soft, non-tender, non-distended, no guarding.  RENAL: No CVA tenderness.  SKIN/EXTREMITIES: No clubbing or edema  NEUROLOGIC/MUSCULOSKELETAL: AOx4, grossly moving all extremities, no focal deficits.      ----------------------------------------------------------------------------------------------------------------------------------------------------------------------------------------------- ----------Daily Progress Note----------    HISTORY OF PRESENT ILLNESS:  Patient is a 81y old Female who presents with a chief complaint of SBO (12 May 2022 17:13)    Currently admitted to medicine with the primary diagnosis of Abdominal pain       Today is hospital day 12d.     INTERVAL HOSPITAL COURSE / OVERNIGHT EVENTS:    Patient was examined and seen at bedside. Yesterday patient had a trial of clear liquid diet with very minimal intake however unable to tolerate as she was reportedly retching at night; today AM patient appears to be uncomfortable, reports of abdominal pain and retching; hemodynamically stable on tpn     Review of Systems: Otherwise unremarkable     <<<<<PAST MEDICAL & SURGICAL HISTORY>>>>>  Uterine cancer  s/p SHAAN and chemo    Hypertension    High cholesterol    Cataract of right eye    Glaucoma    Coronary artery disease  s/p CABG X2    CAD (coronary artery disease)    HTN (hypertension)    HLD (hyperlipidemia)    H/O total hysterectomy    Athscl CABG, unsp, w angina pectoris w documented spasm    History of coronary artery bypass surgery      ALLERGIES  chloroquine (Hives)  quinine (Urticaria)      Home Medications:  acetaminophen 325 mg oral tablet: 2 tab(s) orally every 6 hours, As needed, Temp greater or equal to 38C (100.4F), Mild Pain (1 - 3) (28 Apr 2022 15:30)  brimonidine 0.2% ophthalmic solution: 1 drop(s) in the left eye 3 times a day (28 Apr 2022 15:30)  busPIRone 15 mg oral tablet: 1 tab(s) orally 2 times a day (28 Apr 2022 15:30)  dorzolamide-timolol 2.23%-0.68% ophthalmic solution: 1 drop(s) in the left eye 2 times a day (28 Apr 2022 15:30)  esomeprazole 20 mg oral delayed release capsule: 1 cap(s) orally once a day (28 Apr 2022 15:30)  gabapentin 100 mg oral capsule: 1 cap(s) orally 3 times a day (28 Apr 2022 15:30)  latanoprost 0.005% ophthalmic solution: 1 drop(s) to each affected eye 2 times a day (28 Apr 2022 15:30)  metoprolol succinate 25 mg oral tablet, extended release: 1 tab(s) orally once a day (28 Apr 2022 15:30)  oxyCODONE 5 mg oral tablet: 1 tab(s) orally every 6 hours, As Needed for pain (28 Apr 2022 15:30)  rosuvastatin 40 mg oral tablet: 1 tab(s) orally once a day (28 Apr 2022 15:30)  Xarelto 20 mg oral tablet: 1 tab(s) orally once a day (in the evening) (28 Apr 2022 15:30)        MEDICATIONS  STANDING MEDICATIONS  brimonidine 0.2% Ophthalmic Solution 1 Drop(s) Left EYE three times a day  chlorhexidine 4% Liquid 1 Application(s) Topical <User Schedule>  dorzolamide 2% Ophthalmic Solution 1 Drop(s) Left EYE every 8 hours  enoxaparin Injectable 70 milliGRAM(s) SubCutaneous every 12 hours  latanoprost 0.005% Ophthalmic Solution 1 Drop(s) Both EYES at bedtime  metoprolol succinate ER 25 milliGRAM(s) Oral daily  pantoprazole  Injectable 40 milliGRAM(s) IV Push daily  Parenteral Nutrition - Adult 1 Each TPN Continuous <Continuous>  timolol 0.5% Solution 1 Drop(s) Left EYE two times a day    PRN MEDICATIONS  acetaminophen     Tablet .. 650 milliGRAM(s) Oral every 6 hours PRN  aluminum hydroxide/magnesium hydroxide/simethicone Suspension 30 milliLiter(s) Oral every 6 hours PRN  ketorolac   Injectable 15 milliGRAM(s) IV Push every 8 hours PRN  LORazepam   Injectable 0.25 milliGRAM(s) IV Push every 8 hours PRN  ondansetron Injectable 8 milliGRAM(s) IV Push every 8 hours PRN    VITALS:  T(F): 97.8  HR: 95  BP: 101/61  RR: 18  SpO2: 96%    <<<<<LABS>>>>>                        9.8    4.94  )-----------( 262      ( 13 May 2022 07:48 )             28.6     05-13    134<L>  |  101  |  40<H>  ----------------------------<  187<H>  3.4<L>   |  20  |  0.7    Ca    8.3<L>      13 May 2022 07:48  Phos  2.7     05-13  Mg     2.0     05-13    TPro  5.0<L>  /  Alb  2.5<L>  /  TBili  0.2  /  DBili  x   /  AST  21  /  ALT  20  /  AlkPhos  123<H>  05-13            [id number]        <<<<<RADIOLOGY>>>>>      <<<<<PHYSICAL EXAM>>>>>  GENERAL: uncomfortable appear in bed   HEENT: Normocephalic, atraumatic,   PULMONARY: Clear to auscultation bilaterally. No rales, rhonchi, or wheezing.  CARDIOVASCULAR: Regular rate and rhythm, S1-S2, no murmurs  GASTROINTESTINAL: Soft, diffuse abd tenderness, no guarding/rigidity   NEUROLOGICAL: AOx3        -----------------------------------------------------------------------------------------------------------------------------------------------------------------------------------------------

## 2022-05-13 NOTE — PROGRESS NOTE ADULT - TIME BILLING
direct patient and chart review   -coordinated current plan of care with medical staff on MDR
direct patient care  -coordinated current plan of care with medical staff on MDR
direct patient care and chart review   -coordinated current plan of care with medical staff on MDR

## 2022-05-13 NOTE — CHART NOTE - NSCHARTNOTESELECT_GEN_ALL_CORE
Nutrition Support/Nutrition Services
Palliative Care SW Note/Event Note
Palliative Care SW Note/Event Note
Palliative care/Event Note
Transfer Note
ENT/Event Note
Event Note
Nutrition Support/Nutrition Services
Palliative Care SW Note/Event Note

## 2022-05-14 NOTE — PHYSICAL THERAPY INITIAL EVALUATION ADULT - HEALTH SCREEN CRITERIA
Call [facility] orthopedics tomorrow to schedule follow-up for further evaluation.  Return to the ED for any changing or worsening symptoms, new concerns.  
no
no

## 2022-05-14 NOTE — PHYSICAL THERAPY INITIAL EVALUATION ADULT - SPECIFY REASON(S)
there are no activity orders for this patient at this time.
Hold PT at this time as per ESTEBAN Kyle secondary pt is hypotensive BP 88/61, . PT will f/u when appropriate.

## 2022-05-14 NOTE — CONSULT NOTE ADULT - ASSESSMENT
ASSESSMENT  81 female PMH recurrent uterine adenocarcinoma s/p SHAAN/BSO (2016), recurrent malignant ascites, CAD s/p CABG, HTN, depression, PE (Xarelto) and IVC filter 2020 presenting with multiple day history of LUQ abdominal pain since prior to discharge, x1 episode of NBNB found to have persistent large volume ascites (malignant), with small bowel dilation    IMPRESSION  #Small Bowel Obstruction  - on TPN    #Septic Shock 5/13 - rule out bacteremia/fungemia   - KUB 5/13 without perforation/obstruction  - CXR 5/13 No acute infiltrates    #Uterine Adenocarcinoma s/p SHAAN-BSO, chemo, on Keytruda    #CAD  #Abx allergy: chloroquine (Hives)  quinine (Urticaria)    Antibiotics  Vancomycin 5/13  Ceftriaxone 5/13    RECOMMENDATIONS  - caspofungin 70 mg x1, followed by 50 mg daily as patient on TPN  - check fungitell   - continue vancomycin 750 mg - decrease to q 24 hours (CrCl 28)   - broaden ceftriaxone to cefepime 1g q 24 hours while awaiting blood cx   - follow-up blood cx  - monitor abdominal exam -- repeat KUB and RUQ US if worsening   - wean pressors as tolerated    Please call or message on [employer] Teams if with any questions.  Spectra [phone]

## 2022-05-14 NOTE — PROGRESS NOTE ADULT - PROVIDER SPECIALTY LIST ADULT
Hospitalist
Internal Medicine
Internal Medicine
Hospitalist
Internal Medicine
Surgery
Heme/Onc
Hospitalist
Internal Medicine
Internal Medicine
Hospitalist
Hospitalist
Internal Medicine
Nutrition Support
Hospitalist
Hospitalist

## 2022-05-14 NOTE — DISCHARGE NOTE FOR THE EXPIRED PATIENT - HOSPITAL COURSE
Patient is a 81 female PMH recurrent uterine adenocarcinoma s/p SHAAN/BSO (2016), recurrent malignant ascites, CAD s/p CABG, HTN, depression, PE (Xarelto) and IVC filter 2020 presenting with multiple day history of LUQ abdominal pain since prior to discharge, x1 episode of NBNB found to have persistent large volume ascites (malignant), with small bowel dilation up to 4.2cm with abrupt transition point to collapsed small bowel in left mid abdomen. The first NG tube placement attempt failed/unsuccessful x 2. Ac consulted on 5/4 in which patient refused NG tube and lying in bed induces self vomiting because of nausea having BM today and was not reinserted; patient monitored  SBO was improving both clinically and imaging (KUB daily). Given patients inability to tolerate PO due to ongoing abdominal pain and fear patient was started on TPN and plan was to discharge patient home on TPN however TPN was backorder and disposition was pending.  5/13: patient was septic (temp 101, , BP 70/50) with diffuse abd pain; KUB negative for worsening SBO or perforation. Patient given 2 L of of LR and started on maintenance fluids and low dose levo. Patient AAO x 3, no foci of infection (port appears clean); spoke to Dr. Walker hold TPN for now and reaccess clinical status until patient no longer febrile. Patient upgraded to stepdown unit due to septic shock. Family at bedside aware; patient currently DNR/DNI.Pt was hypotensive on levo on 5/14/22, she was started on IVF w/ improvements. She began desaturating and we were unable to intubate or put her on BiPaP    #Small Bowel Obstruction (resolving)  - No need for NGT for decompression now   - pt started on clear fluids 5/4 -> increased pain and distention_> return to NPO 5/5  -Strict I&O's Q4 hrs.  - Surgery consult appreciated. Surgery a very high risk candidate for surgery and do not want to offer surgical intevention  - 5/12: started on a trial of clear liquid diet with very minimal intake however unable to tolerate as she was reportedly retching at night  - KUB daily 5/13: SBO improving; no perforation; as patient unable to tolerate clear liquid return back to NPO  - c/w pain control and zofran prn   - Dr. Dey team aware of patient being septic; currently hold off TPN and reaccess until patient stable and no longer febrile  - f/u nutrition recommendations; Keep NPO    #Septic shock 2/2 unknown source/immunocompromised on active chemo/immunotherapy r/o port infection vs bacteremia   - 5/13: patient in septic shock (temp 101, , BP 70/50) with diffuse abd pain; KUB negative for worsening SBO or perforation. Patient given 2 L of of LR and started on maintenance fluids and low dose levo. Patient AAO x 3, no foci of infection (port appears clean); spoke to Dr. Walker hold TPN for now and reaccess clinical status until patient no longer febrile.  - KUB 5/13: Nonobstructive bowel gas pattern. No significant change since 2 days earlier.  - CXR 5/13: No radiographic evidence of acute cardiopulmonary disease.  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 s/p weekly Carbo/Taxol (8/14/20-1/28/2021)   - PET Scan from 2/25/21 shows good response to chemo (declines any additional cancer directed therapy)  - CT A/P from 11/2021 (abd pain) shows no new disease; CTa Chest from 09/2021   - CT A/P from 02/2022 showed ascites which showed recurrent adenocarcinoma of mullerian origin:  is high  - chemoport placed by IR   - patient was started on Keytruda + Lenvima q3 weeks but lenvima was discontinued due to poor tolerance  - heme onc will consider possibly resuming lenvima once more medically stable outpatient  - s/p keytruda at bedside 5/10; next session in 3 weeks   - f/u onc recommendations     #Hypertension   - Patient is on Metoprolol 25mg daily (currently on hold) restart once sepsis resolves   - continue monitoring BP    #CAD  - Patient is on Xarelto at home and has an IVC filter   - c/w with lovenox 70 bid     #Glaucoma  - continue taking medications    #GOC  - Pt and family report that daughter should be given all medical information and making decisions on behalf of pt  - 5/6 meeting with Lolyod (daughter) and grandson Octavious discuss prognosis - agree to DNR/DNI want to pursue all other medical treatments for SBO  - agree to palliative care consult  - 5/7 family meeting with extended family - options given - remove TPN and hospice, home with TPN, nursing home with TPN-family to discuss and decided for patient to be discharged home with TPN services     #Misc   - DVT ppx: therapeutic lvnx   - Diet: NPO  - Code status: DNR/DNI   Patient is a 81 female PMH recurrent uterine adenocarcinoma s/p SHAAN/BSO (2016), recurrent malignant ascites, CAD s/p CABG, HTN, depression, PE (Xarelto) and IVC filter 2020 presenting with multiple day history of LUQ abdominal pain since prior to discharge, x1 episode of NBNB found to have persistent large volume ascites (malignant), with small bowel dilation up to 4.2cm with abrupt transition point to collapsed small bowel in left mid abdomen. The first NG tube placement attempt failed/unsuccessful x 2. Ac consulted on 5/4 in which patient refused NG tube and lying in bed induces self vomiting because of nausea having BM today and was not reinserted; patient monitored  SBO was improving both clinically and imaging (KUB daily). Given patients inability to tolerate PO due to ongoing abdominal pain and fear patient was started on TPN and plan was to discharge patient home on TPN however TPN was backorder and disposition was pending.  5/13: patient was septic (temp 101, , BP 70/50) with diffuse abd pain; KUB negative for worsening SBO or perforation. Patient given 2 L of of LR and started on maintenance fluids and low dose levo. Patient AAO x 3, no foci of infection (port appears clean); spoke to Dr. Walker hold TPN for now and reaccess clinical status until patient no longer febrile. Patient upgraded to stepdown unit due to septic shock. Family at bedside aware; patient currently DNR/DNI.Pt was hypotensive on levo on 5/14/22, she was started on IVF w/ improvements. She began desaturating and we were unable to intubate or put her on BiPaP 2/2 AMS and DNR/DNI.     #Small Bowel Obstruction (resolving)  - No need for NGT for decompression now   - pt started on clear fluids 5/4 -> increased pain and distention_> return to NPO 5/5  -Strict I&O's Q4 hrs.  - Surgery consult appreciated. Surgery a very high risk candidate for surgery and do not want to offer surgical intevention  - 5/12: started on a trial of clear liquid diet with very minimal intake however unable to tolerate as she was reportedly retching at night  - KUB daily 5/13: SBO improving; no perforation; as patient unable to tolerate clear liquid return back to NPO  - c/w pain control and zofran prn   - Dr. Dey team aware of patient being septic; currently hold off TPN and reaccess until patient stable and no longer febrile  - f/u nutrition recommendations; Keep NPO    #Septic shock 2/2 unknown source/immunocompromised on active chemo/immunotherapy r/o port infection vs bacteremia   - 5/13: patient in septic shock (temp 101, , BP 70/50) with diffuse abd pain; KUB negative for worsening SBO or perforation. Patient given 2 L of of LR and started on maintenance fluids and low dose levo. Patient AAO x 3, no foci of infection (port appears clean); spoke to Dr. Walker hold TPN for now and reaccess clinical status until patient no longer febrile.  - KUB 5/13: Nonobstructive bowel gas pattern. No significant change since 2 days earlier.  - CXR 5/13: No radiographic evidence of acute cardiopulmonary disease.  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 s/p weekly Carbo/Taxol (8/14/20-1/28/2021)   - PET Scan from 2/25/21 shows good response to chemo (declines any additional cancer directed therapy)  - CT A/P from 11/2021 (abd pain) shows no new disease; CTa Chest from 09/2021   - CT A/P from 02/2022 showed ascites which showed recurrent adenocarcinoma of mullerian origin:  is high  - chemoport placed by IR   - patient was started on Keytruda + Lenvima q3 weeks but lenvima was discontinued due to poor tolerance  - heme onc will consider possibly resuming lenvima once more medically stable outpatient  - s/p keytruda at bedside 5/10; next session in 3 weeks   - f/u onc recommendations     #Hypertension   - Patient is on Metoprolol 25mg daily (currently on hold) restart once sepsis resolves   - continue monitoring BP    #CAD  - Patient is on Xarelto at home and has an IVC filter   - c/w with lovenox 70 bid     #Glaucoma  - continue taking medications    #GOC  - Pt and family report that daughter should be given all medical information and making decisions on behalf of pt  - 5/6 meeting with Evod (daughter) and grandson Octavious discuss prognosis - agree to DNR/DNI want to pursue all other medical treatments for SBO  - agree to palliative care consult  - 5/7 family meeting with extended family - options given - remove TPN and hospice, home with TPN, nursing home with TPN-family to discuss and decided for patient to be discharged home with TPN services     #Misc   - DVT ppx: therapeutic lvnx   - Diet: NPO  - Code status: DNR/DNI

## 2022-05-14 NOTE — PROGRESS NOTE ADULT - ASSESSMENT
Patient is a 81 female PMH recurrent uterine adenocarcinoma s/p SHAAN/BSO (2016), recurrent malignant ascites, CAD s/p CABG, HTN, depression, PE (Xarelto) and IVC filter 2020 presenting with multiple day history of LUQ abdominal pain since prior to discharge, x1 episode of NBNB found to have persistent large volume ascites (malignant), with small bowel dilation up to 4.2cm with abrupt transition point to collapsed small bowel in left mid abdomen. The first NG tube placement attempt failed/unsuccessful x 2. Surgey consulted on 5/4 in which patient refused NG tube and lying in bed induces self vomiting because of nausea having BM today and was not reinserted; patient monitored  SBO was improving both clinically and imaging (KUB daily). Given patients inability to tolerate PO due to ongoing abdominal pain and fear patient was started on TPN and plan was to discharge patient home on TPN however TPN was backorder and disposition was pending.  5/13: patient was septic (temp 101, , BP 70/50) with diffuse abd pain; KUB negative for worsening SBO or perforation. Patient given 2 L of of LR and started on maintenance fluids and low dose levo. Patient AAO x 3, no foci of infection (port appears clean); spoke to Dr. Walker hold TPN for now and reaccess clinical status until patient no longer febrile. Patient upgraded to stepdown unit due to septic shock. Family at bedside aware; patient currently DNR/DNI.     #Small Bowel Obstruction   - pt started on clear fluids 5/4 -> increased pain and distention_> return to NPO 5/5  -Strict I&O's Q4 hrs.  - Surgery consult appreciated. Surgery a very high risk candidate for surgery and do not want to offer surgical intevention  - 5/12: started on a trial of clear liquid diet with very minimal intake however unable to tolerate as she was reportedly retching at night  - KUB daily 5/13: SBO improving; no perforation; as patient unable to tolerate clear liquid return back to NPO  - c/w pain control and zofran prn   - Dr. Dey team aware of patient being septic; currently hold off TPN and reaccess until patient stable and no longer febrile  - f/u nutrition recommendations; currently on TPN    #Septic shock 2/2 unknown source/immunocompromised on active chemo/immunotherapy r/o port infection vs bacteremia   - 5/13: patient in septic shock (temp 101, , BP 70/50) with diffuse abd pain; KUB negative for worsening SBO or perforation. Patient given 2 L of of LR and started on maintenance fluids and low dose levo. Patient AAO x 3, no foci of infection (port appears clean);   - KUB 5/13: Nonobstructive bowel gas pattern. No significant change since 2 days earlier.  - CXR 5/13: No radiographic evidence of acute cardiopulmonary disease.  - EKG: sinus tachycardia   - c/w with IV hydration and low dose levo; wean pressor as tolerated   - f/u blood cultures, UA, procal 9.82  - started on empiric abx (ceftriaxone and vanc)  - ID recommendations : started   as per ID - caspofungin 70 mg x1, followed by 50 mg daily as patient on TPN  - check fungitell   - continue vancomycin 750 mg - decrease to q 24 hours (CrCl 28)   - broaden ceftriaxone to cefepime 1g q 24 hours while awaiting blood cx   - follow-up blood cx  - monitor abdominal exam -- repeat KUB and RUQ US if worsening   - wean pressors as tolerated      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 s/p weekly Carbo/Taxol (8/14/20-1/28/2021)   - PET Scan from 2/25/21 shows good response to chemo (declines any additional cancer directed therapy)  - CT A/P from 11/2021 (abd pain) shows no new disease; CTa Chest from 09/2021   - CT A/P from 02/2022 showed ascites which showed recurrent adenocarcinoma of mullerian origin:  is high  - chemoport placed by IR   - patient was started on Keytruda + Lenvima q3 weeks but lenvima was discontinued due to poor tolerance  - heme onc will consider possibly resuming lenvima once more medically stable outpatient  - s/p keytruda at bedside 5/10; next session in 3 weeks   - f/u onc recommendations     #Hypertension   - Patient is on Metoprolol 25mg daily (currently on hold) restart once sepsis resolves   - continue monitoring BP    #CAD  - Patient is on Xarelto at home and has an IVC filter   - c/w with lovenox 70 bid     #Glaucoma  - continue taking medications    #GOC  - Pt and family report that daughter should be given all medical information and making decisions on behalf of pt  - 5/6 meeting with Evod (daughter) and grandson Octavious discuss prognosis - agree to DNR/DNI want to pursue all other medical treatments for SBO  - agree to palliative care consult  - 5/7 family meeting with extended family - options given - remove TPN and hospice, home with TPN, nursing home with TPN-family to discuss and decided for patient to be discharged home with TPN services     #Misc   - DVT ppx: therapeutic lvnx   - Diet: NPO  - Code status: DNR/DNI      Patient is critically sick and high risk of demise. currently on levophed. continue care in SICU    Bhavesh Byrd MD  Hospitalist

## 2022-05-14 NOTE — CONSULT NOTE ADULT - SUBJECTIVE AND OBJECTIVE BOX
BEKAH NELSON  81y, Female  Allergy: chloroquine (Hives)  quinine (Urticaria)      CHIEF COMPLAINT: SBO (13 May 2022 11:30)      LOS  13d    HPI:  81-year-old female history of hypertension and CAD PE with IVC filter on Xarelto uterine adenocarcinoma status post SHAAN with recurrent ascites on Keytruda recent admission for partial small bowel obstruction presenting for evaluation of diffuse abdominal pain with associated nausea and vomiting.  No fevers or chills.  States that since discharge yesterday pain has gotten progressively worse.  Also complaining of constipation, not currently passing gas.  Symptoms gradual onset, moderate, no exacerbating or alleviating factors.    Surgery following and recommends NG tube. (01 May 2022 11:08)    INFECTIOUS DISEASE HISTORY:  History as above.  Initially found to have small bowel obstructionand large volume ascities.  Patient with cllinical improvement, and started on TPN.  On , found to be septic with hypotension.  Upgrade to SDU.      PAST MEDICAL & SURGICAL HISTORY:  Uterine cancer  s/p SHAAN and chemo      Hypertension      High cholesterol      Cataract of right eye      Glaucoma      Coronary artery disease  s/p CABG X2      CAD (coronary artery disease)      HTN (hypertension)      HLD (hyperlipidemia)      H/O total hysterectomy      Athscl CABG, unsp, w angina pectoris w documented spasm      History of coronary artery bypass surgery          FAMILY HISTORY  No pertinent family history in first degree relatives    No pertinent family history in first degree relatives        SOCIAL HISTORY  Social History:  Alcohol: denies  Tobacco: never smoker  Drugs: never  Home: lives w grandson, niece, daughter (2022 09:13)        ROS  General: Denies rigors, nightsweats  HEENT: Denies headache, rhinorrhea, sore throat, eye pain  CV: Denies CP, palpitations  PULM: Denies wheezing, hemoptysis  GI: Denies hematemesis, hematochezia, melena  : Denies discharge, hematuria  MSK: Denies arthralgias, myalgias  SKIN: Denies rash, lesions  NEURO: Denies paresthesias, weakness  PSYCH: Denies depression, anxiety    VITALS:  T(F): 98.9, Max: 101.7 (22 @ 12:40)  HR: 112  BP: 96/60  RR: 16Vital Signs Last 24 Hrs  T(C): 37.2 (13 May 2022 15:40), Max: 38.7 (13 May 2022 12:40)  T(F): 98.9 (13 May 2022 15:40), Max: 101.7 (13 May 2022 12:40)  HR: 112 (14 May 2022 02:00) (106 - 131)  BP: 96/60 (14 May 2022 02:00) (64/43 - 129/74)  BP(mean): --  RR: 16 (13 May 2022 15:40) (16 - 18)  SpO2: 97% (14 May 2022 02:00) (95% - 98%)    PHYSICAL EXAM:  Gen: NAD, resting in bed  HEENT: Normocephalic, atraumatic  Neck: supple, no lymphadenopathy  CV: Regular rate & regular rhythm  Lungs: decreased BS at bases, no fremitus  Abdomen: Soft, BS present  Ext: Warm, well perfused  Neuro: non focal, awake  Skin: no rash, no erythema  Lines: no phlebitis    TESTS & MEASUREMENTS:                        11.3  5.24  )-----------( 267      ( 14 May 2022 06:02 )             35.0    05-13    134<L>  |  103  |  41<H>  ----------------------------<  94  3.0<L>   |  16<L>  |  0.9    Ca    7.7<L>      13 May 2022 15:34  Phos  1.3     05-13  Mg     1.7     05-13    TPro  4.5<L>  /  Alb  2.1<L>  /  TBili  0.4  /  DBili  x   /  AST  20  /  ALT  17  /  AlkPhos  113  05-13      LIVER FUNCTIONS - ( 13 May 2022 15:34 )  Alb: 2.1 g/dL / Pro: 4.5 g/dL / ALK PHOS: 113 U/L / ALT: 17 U/L / AST: 20 U/L / GGT: x          Urinalysis Basic - ( 13 May 2022 16:23 )    Color: Yellow / Appearance: Clear / S.025 / pH: x  Gluc: x / Ketone: Negative  / Bili: Negative / Urobili: 3 mg/dL  Blood: x / Protein: Trace / Nitrite: Negative  Leuk Esterase: Negative / RBC: x / WBC x  Sq Epi: x / Non Sq Epi: x / Bacteria: x        Culture - Urine (collected 22 @ 06:59)  Source: Clean Catch Clean Catch (Midstream)  Final Report (22 @ 16:27):    >=3 organisms. Probable collection contamination.    Culture - Body Fluid with Gram Stain (collected 22 @ 18:52)  Source: .Body Fluid Ascites Fluid  Gram Stain (22 @ 04:50):    polymorphonuclear leukocytes seen    No organisms seen    by cytocentrifuge  Final Report (22 @ 19:55):    No growth at 5 days    Culture - Urine (collected 22 @ 08:07)  Source: Clean Catch Clean Catch (Midstream)  Final Report (22 @ 23:18):    >=3 organisms. Probable collection contamination.        Lactate, Blood: <0.2 mmol/L (22 @ 15:39)      INFECTIOUS DISEASES TESTING  Procalcitonin, Serum: 9.82 ng/mL (22 @ 15:37)  COVID-19 PCR: NotDetec (22 @ 05:50)  COVID-19 PCR: NotDetec (22 @ 19:55)  COVID-19 PCR: NotDetec (22 @ 05:24)  COVID-19 PCR: NotDetec (22 @ 07:30)  COVID-19 PCR: NotDetec (22 @ 18:38)  COVID-19 PCR: NotDetec (22 @ 02:31)  COVID-19 PCR: NotDetec (22 @ 05:59)  COVID-19 PCR: NotDetec (22 @ 15:02)  COVID-19 PCR: NotDetec (21 @ 10:15)      RADIOLOGY & ADDITIONAL TESTS:  I have personally reviewed the last Chest xray  CXR      CT      CARDIOLOGY TESTING  12 Lead ECG:  Ventricular Rate 126 BPM    Atrial Rate 126 BPM    P-R Interval 124 ms    QRS Duration 74 ms    Q-T Interval 278 ms    QTC Calculation(Bazett) 402 ms    P Axis 32 degrees    R Axis 6 degrees    T Axis 12 degrees    Diagnosis Line Sinus tachycardia  Low voltage QRS  Cannot rule out Anterior infarct , age undetermined  Abnormal ECG    Confirmed by GONZÁLEZ YATES MD ([id number]) on 2022 1:21:29 PM (22 @ 12:47)  12 Lead ECG:  Ventricular Rate 88 BPM    Atrial Rate 88 BPM    P-R Interval 116 ms    QRS Duration 84 ms    Q-T Interval 354 ms    QTC Calculation(Bazett) 428 ms    P Axis 47 degrees    R Axis 16 degrees    T Axis 26 degrees    Diagnosis Line Normal sinus rhythm  Nonspecific T wave abnormality  Abnormal ECG    Confirmed by Kirill Rodríguez ([id number]) on 2022 3:21:01 PM (22 @ 02:07)      MEDICATIONS  brimonidine 0.2% Ophthalmic Solution 1 Left EYE three times a day  cefTRIAXone   IVPB 1000 IV Intermittent every 24 hours  chlorhexidine 4% Liquid 1 Topical <User Schedule>  dorzolamide 2% Ophthalmic Solution 1 Left EYE every 8 hours  enoxaparin Injectable 70 SubCutaneous every 12 hours  lactated ringers Bolus 500 IV Bolus once  lactated ringers. 1000 IV Continuous <Continuous>  latanoprost 0.005% Ophthalmic Solution 1 Both EYES at bedtime  magnesium sulfate  IVPB 2 IV Intermittent once  norepinephrine Infusion 0.05 IV Continuous <Continuous>  pantoprazole  Injectable 40 IV Push daily  Parenteral Nutrition - Adult 1 TPN Continuous <Continuous>  potassium chloride  20 mEq/100 mL IVPB 20 IV Intermittent every 2 hours  timolol 0.5% Solution 1 Left EYE two times a day  vancomycin  IVPB 750 IV Intermittent every 12 hours      Weight  Weight (kg): 59.2 (22 @ 16:10)    ANTIBIOTICS:  cefTRIAXone   IVPB 1000 milliGRAM(s) IV Intermittent every 24 hours  vancomycin  IVPB 750 milliGRAM(s) IV Intermittent every 12 hours      ALLERGIES:  chloroquine (Hives)  quinine (Urticaria)

## 2022-05-14 NOTE — PROGRESS NOTE ADULT - SUBJECTIVE AND OBJECTIVE BOX
BEKAH NELSON  81y, Female  Allergy: chloroquine (Hives)  quinine (Urticaria)    Hospital Day: 13d    Patient seen and examined earlier today.     PMH/PSH:  PAST MEDICAL & SURGICAL HISTORY:  Uterine cancer  s/p SHAAN and chemo      Hypertension      High cholesterol      Cataract of right eye      Glaucoma      Coronary artery disease  s/p CABG X2      CAD (coronary artery disease)      HTN (hypertension)      HLD (hyperlipidemia)      H/O total hysterectomy      Athscl CABG, unsp, w angina pectoris w documented spasm      History of coronary artery bypass surgery          LAST 24-Hr EVENTS:    VITALS:  T(F): 96.6 (22 @ 07:30), Max: 100.5 (22 @ 13:25)  HR: 126 (22 @ 11:00)  BP: 100/51 (22 @ 11:00) (64/43 - 129/74)  RR: 16 (22 @ 15:40)  SpO2: 97% (22 @ 11:00)          TESTS & MEASUREMENTS:  Weight/BMI      22 @ 07:01  -  22 @ 12:54  --------------------------------------------------------  IN: 1410.5 mL / OUT: 20 mL / NET: 1390.5 mL                            11.3   5.24  )-----------( 267      ( 14 May 2022 06:02 )             35.0     PT/INR - ( 13 May 2022 15:31 )   PT: 13.30 sec;   INR: 1.16 ratio           INR: 1.16 ratio (22 @ 15:31)        135  |  101  |  47<H>  ----------------------------<  89  3.1<L>   |  13<L>  |  1.5    Ca    7.6<L>      14 May 2022 06:02  Phos  5.3       Mg     1.8         TPro  4.9<L>  /  Alb  2.1<L>  /  TBili  0.3  /  DBili  x   /  AST  39  /  ALT  23  /  AlkPhos  106  05-14    LIVER FUNCTIONS - ( 14 May 2022 06:02 )  Alb: 2.1 g/dL / Pro: 4.9 g/dL / ALK PHOS: 106 U/L / ALT: 23 U/L / AST: 39 U/L / GGT: x           CARDIAC MARKERS ( 13 May 2022 15:39 )  x     / <0.01 ng/mL / x     / x     / x      CARDIAC MARKERS ( 13 May 2022 15:34 )  x     / x     / 25 U/L / x     / x            Urinalysis Basic - ( 13 May 2022 16:23 )    Color: Yellow / Appearance: Clear / S.025 / pH: x  Gluc: x / Ketone: Negative  / Bili: Negative / Urobili: 3 mg/dL   Blood: x / Protein: Trace / Nitrite: Negative   Leuk Esterase: Negative / RBC: x / WBC x   Sq Epi: x / Non Sq Epi: x / Bacteria: x      Procalcitonin, Serum: 9.82 ng/mL (22 @ 15:37)          COVID-19 PCR: NotDetec (22 @ 05:50)  COVID-19 PCR: NotDetec (22 @ 19:55)        A1C with Estimated Average Glucose Result: 5.5 % (22 @ 06:39)      Indwelling Urethral Catheter:     Connect To:  Straight Drainage/[place]    Indication:  Urinary Retention / Obstruction (22 @ 03:03) (not performed)      RADIOLOGY, ECG, & ADDITIONAL TESTS:  12 Lead ECG:   Ventricular Rate 126 BPM    Atrial Rate 126 BPM    P-R Interval 124 ms    QRS Duration 74 ms    Q-T Interval 278 ms    QTC Calculation(Bazett) 402 ms    P Axis 32 degrees    R Axis 6 degrees    T Axis 12 degrees    Diagnosis Line Sinus tachycardia  Low voltage QRS  Cannot rule out Anterior infarct , age undetermined  Abnormal ECG    Confirmed by GONZÁLEZ YATES MD ([id number]) on 2022 1:21:29 PM (22 @ 12:47)      RECENT DIAGNOSTIC ORDERS:  Fungitell: Routine (22 @ 12:47)  Xray Chest 1 View- PORTABLE-Urgent: Urgent   Indication: sob  Transport: Portable  Exam in Progress (22 @ 11:48)  Blood Gas Arterial - Lytes,iCa,Lact: STAT (22 @ 11:48)  Xray Kidney Ureter Bladder: Urgent   Indication: No Predefined Suggestions  Transport: Stretcher-Crib  Exam Completed (22 @ 08:12)  Culture - Blood: Routine  Specimen Source: Blood-Peripheral  Addl Info: Peripheral Site 2 (22 @ 15:22)      MEDICATIONS:  MEDICATIONS  (STANDING):  brimonidine 0.2% Ophthalmic Solution 1 Drop(s) Left EYE three times a day  caspofungin IVPB      cefepime  Injectable.      chlorhexidine 4% Liquid 1 Application(s) Topical <User Schedule>  dorzolamide 2% Ophthalmic Solution 1 Drop(s) Left EYE every 8 hours  enoxaparin Injectable 70 milliGRAM(s) SubCutaneous every 12 hours  lactated ringers. 1000 milliLiter(s) (150 mL/Hr) IV Continuous <Continuous>  latanoprost 0.005% Ophthalmic Solution 1 Drop(s) Both EYES at bedtime  magnesium sulfate  IVPB 2 Gram(s) IV Intermittent once  norepinephrine Infusion 0.05 MICROgram(s)/kG/Min (5.55 mL/Hr) IV Continuous <Continuous>  pantoprazole  Injectable 40 milliGRAM(s) IV Push daily  Parenteral Nutrition - Adult 1 Each TPN Continuous <Continuous>  potassium chloride  20 mEq/100 mL IVPB 20 milliEquivalent(s) IV Intermittent every 2 hours  timolol 0.5% Solution 1 Drop(s) Left EYE two times a day  vancomycin  IVPB 750 milliGRAM(s) IV Intermittent every 24 hours    MEDICATIONS  (PRN):  acetaminophen     Tablet .. 650 milliGRAM(s) Oral every 6 hours PRN Temp greater or equal to 38C (100.4F), Mild Pain (1 - 3)  aluminum hydroxide/magnesium hydroxide/simethicone Suspension 30 milliLiter(s) Oral every 6 hours PRN Dyspepsia  ketorolac   Injectable 15 milliGRAM(s) IV Push every 8 hours PRN Moderate Pain (4 - 6)  LORazepam   Injectable 0.25 milliGRAM(s) IV Push every 8 hours PRN Anxiety  ondansetron Injectable 8 milliGRAM(s) IV Push every 8 hours PRN Nausea and/or Vomiting      HOME MEDICATIONS:  acetaminophen 325 mg oral tablet ()  brimonidine 0.2% ophthalmic solution ()  busPIRone 15 mg oral tablet ()  ClearLax oral powder for reconstitution ()  dorzolamide-timolol 2.23%-0.68% ophthalmic solution ()  esomeprazole 20 mg oral delayed release capsule ()  gabapentin 100 mg oral capsule ()  latanoprost 0.005% ophthalmic solution ()  metoprolol succinate 25 mg oral tablet, extended release ()  oxyCODONE 5 mg oral tablet ()  rosuvastatin 40 mg oral tablet ()  senna oral tablet ()  Xarelto 20 mg oral tablet ()      PHYSICAL EXAM:  GENERAL: Patient lying on bed, comfoprtable   CHEST/LUNG: NVB, no wehezing   HEART: R1+R2, RRR  ABDOMEN: Soft. non tender, BS positive  EXTREMITIES:  no edema, Bruising  CNS: AAAx4. No cranial nerves deficit.              BEKAH NELSON  81y, Female  Allergy: chloroquine (Hives)  quinine (Urticaria)    Hospital Day: 13d    Patient seen and examined earlier today. on levophed    PMH/PSH:  PAST MEDICAL & SURGICAL HISTORY:  Uterine cancer  s/p SHAAN and chemo      Hypertension      High cholesterol      Cataract of right eye      Glaucoma      Coronary artery disease  s/p CABG X2      CAD (coronary artery disease)      HTN (hypertension)      HLD (hyperlipidemia)      H/O total hysterectomy      Athscl CABG, unsp, w angina pectoris w documented spasm      History of coronary artery bypass surgery          LAST 24-Hr EVENTS:    VITALS:  T(F): 96.6 (22 @ 07:30), Max: 100.5 (22 @ 13:25)  HR: 126 (22 @ 11:00)  BP: 100/51 (22 @ 11:00) (64/43 - 129/74)  RR: 16 (22 @ 15:40)  SpO2: 97% (22 @ 11:00)          TESTS & MEASUREMENTS:  Weight/BMI      22 @ 07:01  -  22 @ 12:54  --------------------------------------------------------  IN: 1410.5 mL / OUT: 20 mL / NET: 1390.5 mL                            11.3   5.24  )-----------( 267      ( 14 May 2022 06:02 )             35.0     PT/INR - ( 13 May 2022 15:31 )   PT: 13.30 sec;   INR: 1.16 ratio           INR: 1.16 ratio (22 @ 15:31)        135  |  101  |  47<H>  ----------------------------<  89  3.1<L>   |  13<L>  |  1.5    Ca    7.6<L>      14 May 2022 06:02  Phos  5.3       Mg     1.8         TPro  4.9<L>  /  Alb  2.1<L>  /  TBili  0.3  /  DBili  x   /  AST  39  /  ALT  23  /  AlkPhos  106  05-14    LIVER FUNCTIONS - ( 14 May 2022 06:02 )  Alb: 2.1 g/dL / Pro: 4.9 g/dL / ALK PHOS: 106 U/L / ALT: 23 U/L / AST: 39 U/L / GGT: x           CARDIAC MARKERS ( 13 May 2022 15:39 )  x     / <0.01 ng/mL / x     / x     / x      CARDIAC MARKERS ( 13 May 2022 15:34 )  x     / x     / 25 U/L / x     / x            Urinalysis Basic - ( 13 May 2022 16:23 )    Color: Yellow / Appearance: Clear / S.025 / pH: x  Gluc: x / Ketone: Negative  / Bili: Negative / Urobili: 3 mg/dL   Blood: x / Protein: Trace / Nitrite: Negative   Leuk Esterase: Negative / RBC: x / WBC x   Sq Epi: x / Non Sq Epi: x / Bacteria: x      Procalcitonin, Serum: 9.82 ng/mL (22 @ 15:37)          COVID-19 PCR: NotDetec (22 @ 05:50)  COVID-19 PCR: NotDetec (22 @ 19:55)        A1C with Estimated Average Glucose Result: 5.5 % (22 @ 06:39)      Indwelling Urethral Catheter:     Connect To:  Straight Drainage/[place]    Indication:  Urinary Retention / Obstruction (22 @ 03:03) (not performed)      RADIOLOGY, ECG, & ADDITIONAL TESTS:  12 Lead ECG:   Ventricular Rate 126 BPM    Atrial Rate 126 BPM    P-R Interval 124 ms    QRS Duration 74 ms    Q-T Interval 278 ms    QTC Calculation(Bazett) 402 ms    P Axis 32 degrees    R Axis 6 degrees    T Axis 12 degrees    Diagnosis Line Sinus tachycardia  Low voltage QRS  Cannot rule out Anterior infarct , age undetermined  Abnormal ECG    Confirmed by GONZÁLEZ YATES MD ([id number]) on 2022 1:21:29 PM (22 @ 12:47)      RECENT DIAGNOSTIC ORDERS:  Fungitell: Routine (22 @ 12:47)  Xray Chest 1 View- PORTABLE-Urgent: Urgent   Indication: sob  Transport: Portable  Exam in Progress (22 @ 11:48)  Blood Gas Arterial - Lytes,iCa,Lact: STAT (22 @ 11:48)  Xray Kidney Ureter Bladder: Urgent   Indication: No Predefined Suggestions  Transport: Stretcher-Crib  Exam Completed (22 @ 08:12)  Culture - Blood: Routine  Specimen Source: Blood-Peripheral  Addl Info: Peripheral Site 2 (22 @ 15:22)      MEDICATIONS:  MEDICATIONS  (STANDING):  brimonidine 0.2% Ophthalmic Solution 1 Drop(s) Left EYE three times a day  caspofungin IVPB      cefepime  Injectable.      chlorhexidine 4% Liquid 1 Application(s) Topical <User Schedule>  dorzolamide 2% Ophthalmic Solution 1 Drop(s) Left EYE every 8 hours  enoxaparin Injectable 70 milliGRAM(s) SubCutaneous every 12 hours  lactated ringers. 1000 milliLiter(s) (150 mL/Hr) IV Continuous <Continuous>  latanoprost 0.005% Ophthalmic Solution 1 Drop(s) Both EYES at bedtime  magnesium sulfate  IVPB 2 Gram(s) IV Intermittent once  norepinephrine Infusion 0.05 MICROgram(s)/kG/Min (5.55 mL/Hr) IV Continuous <Continuous>  pantoprazole  Injectable 40 milliGRAM(s) IV Push daily  Parenteral Nutrition - Adult 1 Each TPN Continuous <Continuous>  potassium chloride  20 mEq/100 mL IVPB 20 milliEquivalent(s) IV Intermittent every 2 hours  timolol 0.5% Solution 1 Drop(s) Left EYE two times a day  vancomycin  IVPB 750 milliGRAM(s) IV Intermittent every 24 hours    MEDICATIONS  (PRN):  acetaminophen     Tablet .. 650 milliGRAM(s) Oral every 6 hours PRN Temp greater or equal to 38C (100.4F), Mild Pain (1 - 3)  aluminum hydroxide/magnesium hydroxide/simethicone Suspension 30 milliLiter(s) Oral every 6 hours PRN Dyspepsia  ketorolac   Injectable 15 milliGRAM(s) IV Push every 8 hours PRN Moderate Pain (4 - 6)  LORazepam   Injectable 0.25 milliGRAM(s) IV Push every 8 hours PRN Anxiety  ondansetron Injectable 8 milliGRAM(s) IV Push every 8 hours PRN Nausea and/or Vomiting      HOME MEDICATIONS:  acetaminophen 325 mg oral tablet ()  brimonidine 0.2% ophthalmic solution ()  busPIRone 15 mg oral tablet ()  ClearLax oral powder for reconstitution ()  dorzolamide-timolol 2.23%-0.68% ophthalmic solution ()  esomeprazole 20 mg oral delayed release capsule ()  gabapentin 100 mg oral capsule ()  latanoprost 0.005% ophthalmic solution ()  metoprolol succinate 25 mg oral tablet, extended release ()  oxyCODONE 5 mg oral tablet ()  rosuvastatin 40 mg oral tablet ()  senna oral tablet ()  Xarelto 20 mg oral tablet ()      PHYSICAL EXAM:  GENERAL: Patient lying on bed,   CHEST/LUNG: NVB, no wheezing   HEART: R1+R2, RRR  ABDOMEN: Soft. non tender, BS positive  EXTREMITIES:  no edema,

## 2022-05-16 LAB — GLUCOSE BLDC GLUCOMTR-MCNC: 116 MG/DL — HIGH (ref 70–99)

## 2022-05-18 DIAGNOSIS — Z90.710 ACQUIRED ABSENCE OF BOTH CERVIX AND UTERUS: ICD-10-CM

## 2022-05-18 DIAGNOSIS — F41.9 ANXIETY DISORDER, UNSPECIFIED: ICD-10-CM

## 2022-05-18 DIAGNOSIS — R65.21 SEVERE SEPSIS WITH SEPTIC SHOCK: ICD-10-CM

## 2022-05-18 DIAGNOSIS — I25.10 ATHEROSCLEROTIC HEART DISEASE OF NATIVE CORONARY ARTERY WITHOUT ANGINA PECTORIS: ICD-10-CM

## 2022-05-18 DIAGNOSIS — E83.42 HYPOMAGNESEMIA: ICD-10-CM

## 2022-05-18 DIAGNOSIS — E78.5 HYPERLIPIDEMIA, UNSPECIFIED: ICD-10-CM

## 2022-05-18 DIAGNOSIS — A41.9 SEPSIS, UNSPECIFIED ORGANISM: ICD-10-CM

## 2022-05-18 DIAGNOSIS — E87.6 HYPOKALEMIA: ICD-10-CM

## 2022-05-18 DIAGNOSIS — Z92.21 PERSONAL HISTORY OF ANTINEOPLASTIC CHEMOTHERAPY: ICD-10-CM

## 2022-05-18 DIAGNOSIS — Z66 DO NOT RESUSCITATE: ICD-10-CM

## 2022-05-18 DIAGNOSIS — R18.0 MALIGNANT ASCITES: ICD-10-CM

## 2022-05-18 DIAGNOSIS — I12.9 HYPERTENSIVE CHRONIC KIDNEY DISEASE WITH STAGE 1 THROUGH STAGE 4 CHRONIC KIDNEY DISEASE, OR UNSPECIFIED CHRONIC KIDNEY DISEASE: ICD-10-CM

## 2022-05-18 DIAGNOSIS — N18.32 CHRONIC KIDNEY DISEASE, STAGE 3B: ICD-10-CM

## 2022-05-18 DIAGNOSIS — C57.7 MALIGNANT NEOPLASM OF OTHER SPECIFIED FEMALE GENITAL ORGANS: ICD-10-CM

## 2022-05-18 DIAGNOSIS — K56.609 UNSPECIFIED INTESTINAL OBSTRUCTION, UNSPECIFIED AS TO PARTIAL VERSUS COMPLETE OBSTRUCTION: ICD-10-CM

## 2022-05-18 DIAGNOSIS — Z86.711 PERSONAL HISTORY OF PULMONARY EMBOLISM: ICD-10-CM

## 2022-05-18 DIAGNOSIS — D84.9 IMMUNODEFICIENCY, UNSPECIFIED: ICD-10-CM

## 2022-05-18 DIAGNOSIS — H40.9 UNSPECIFIED GLAUCOMA: ICD-10-CM

## 2022-05-18 DIAGNOSIS — Z95.1 PRESENCE OF AORTOCORONARY BYPASS GRAFT: ICD-10-CM

## 2022-05-18 DIAGNOSIS — C54.9 MALIGNANT NEOPLASM OF CORPUS UTERI, UNSPECIFIED: ICD-10-CM

## 2022-05-18 LAB
CULTURE RESULTS: SIGNIFICANT CHANGE UP
SPECIMEN SOURCE: SIGNIFICANT CHANGE UP

## 2022-05-27 NOTE — ED CDU PROVIDER INITIAL DAY NOTE - PHYSICAL EXAMINATION
Constitutional: Well developed, well nourished. NAD  Head: Normocephalic, atraumatic.  Eyes: PERRL, EOMI.  ENT: No nasal discharge. Mucous membranes dry.  Neck: Supple. Painless ROM.  Cardiovascular: Normal S1, S2. Regular rate and rhythm.    Pulmonary: Lungs clear to auscultation bilaterally. No wheezing, rales, or rhonchi.  Abdominal: Soft. Nondistended. No rebound, guarding, rigidity.  Extremities. Pelvis stable. No lower extremity edema, symmetric calves.  Skin: No rashes, cyanosis.  Neuro: AAOx3. No focal neurological deficits.  Psych: Normal mood. Normal affect. 1.85

## 2022-05-27 NOTE — ED ADULT NURSE NOTE - NSIMPLEMENTINTERV_GEN_ALL_ED
patient pmh: BPH, CHF, CAD, HTN. s/p cath in R groin. Implemented All Fall Risk Interventions:  [place] to call system. Call bell, personal items and telephone within reach. Instruct patient to call for assistance. Room bathroom lighting operational. Non-slip footwear when patient is off stretcher. Physically safe environment: no spills, clutter or unnecessary equipment. Stretcher in lowest position, wheels locked, appropriate side rails in place. Provide visual cue, wrist band, yellow gown, etc. Monitor gait and stability. Monitor for mental status changes and reorient to person, place, and time. Review medications for side effects contributing to fall risk. Reinforce activity limits and safety measures with patient and family.

## 2022-05-31 ENCOUNTER — APPOINTMENT (OUTPATIENT)
Dept: HEMATOLOGY ONCOLOGY | Facility: CLINIC | Age: 82
End: 2022-05-31

## 2022-05-31 ENCOUNTER — APPOINTMENT (OUTPATIENT)
Dept: INFUSION THERAPY | Facility: CLINIC | Age: 82
End: 2022-05-31

## 2022-07-08 NOTE — PATIENT PROFILE ADULT - IS PATIENT POST-MENOPAUSAL?
MEDICATIONS  (PRN):  bisacodyl 5 milliGRAM(s) Oral every 12 hours PRN Constipation  cyclobenzaprine 10 milliGRAM(s) Oral every 12 hours PRN Muscle Spasm  LORazepam     Tablet 1 milliGRAM(s) Oral every 6 hours PRN anxiety/agitation  LORazepam   Injectable 2 milliGRAM(s) IntraMuscular once PRN severe agitation  melatonin. 3 milliGRAM(s) Oral at bedtime PRN Insomnia  traZODone 50 milliGRAM(s) Oral at bedtime PRN insomnia   yes

## 2022-07-14 NOTE — ED ADULT NURSE NOTE - SUICIDE SCREENING QUESTION 2
[FreeTextEntry1] : Chau is a 15 year old boy here today to be evaluated for a chest wall deformity. \par \par He first noticed a bony protrusion of his left chest wall 1 month ago. He denies any chest pain but does admit to some discomfort. He denies any symptoms of exercise intolerance or shortness of breath. He denies any overlying skin changes. No imaging was reported. \par \par He also has complaints of another bony protrusion in his right shoulder that was first noted 1 week after the chest wall deformity. At baseline the protrusion does not cause him any symptoms but he reports that he is unable to have full range of motion with his right arm. When he rotates his arm, there is a point when he begin complaining of pain in the shoulder. He denies any trauma to the area. 
No

## 2022-07-27 NOTE — ED PROCEDURE NOTE - PROCEDURE DATE TIME, MLM
20-Mar-2022 05:47 H Plasty Text: Given the location of the defect, shape of the defect and the proximity to free margins a H-plasty was deemed most appropriate for repair.  Using a sterile surgical marker, the appropriate advancement arms of the H-plasty were drawn incorporating the defect and placing the expected incisions within the relaxed skin tension lines where possible. The area thus outlined was incised deep to adipose tissue with a #15 scalpel blade. The skin margins were undermined to an appropriate distance in all directions utilizing iris scissors.  The opposing advancement arms were then advanced into place in opposite direction and anchored with interrupted buried subcutaneous sutures.

## 2022-08-15 NOTE — H&P PST ADULT - OTHER CARE PROVIDERS
Medication requested:Sprintec 28 0.25-35 MG-MCG per tablet   Last refill: 9/28/22  Last Office Visit: 3/19/21  Next office Visit: none- LM to schedule   Last labs: none  Refill per standing orders       
dr anderson 2 months ago ( sees annually), dr madyson arias 2 weeks ago onc

## 2022-08-27 NOTE — ED ADULT NURSE NOTE - PAIN RATING/NUMBER SCALE (0-10): REST
FAMILY HISTORY:  Father  Still living? No  FH: CAD (coronary artery disease), Age at diagnosis: Age Unknown     6

## 2022-08-28 NOTE — PATIENT PROFILE ADULT - TRANSPORTATION
[facility])  Obstetrics & Gynecology  Progress Note    Patient Name: Dipti Villafana  MRN: [id number]  Admission Date: 8/24/2022  Primary Care Provider: Primary Doctor No  Principal Problem: <principal problem not specified>    Subjective:     Interval History: Patient reports continued improvement in SOB this morning. Reports only short of breath with ambulation, denies SOB at rest. She denies any nausea, vomiting, fevers or chills. She denies any headaches, visual changes, chest pain or RUQ pain. States she is feeling better overall. Reports she is tired.     Scheduled Meds:   albuterol sulfate  2.5 mg Nebulization Q6H WAKE    docusate sodium  100 mg Oral BID    enoxaparin  40 mg Subcutaneous Q12H    ferrous sulfate  1 tablet Oral Daily    furosemide (LASIX) injection  20 mg Intravenous Daily    ibuprofen  800 mg Oral Q8H    labetaloL  200 mg Oral Q12H    piperacillin-tazobactam (ZOSYN) IVPB  4.5 g Intravenous Q8H     Continuous Infusions:  PRN Meds:benzonatate, calcium gluconate, diphenhydrAMINE, diphenhydrAMINE, glycerin adult, HYDROcodone-acetaminophen, lactated ringers, ondansetron, prochlorperazine, senna-docusate 8.6-50 mg, simethicone, sodium chloride 0.9%    Review of patient's allergies indicates:  No Known Allergies    Objective:     Vital Signs (Most Recent):  Temp: 97.9 °F (36.6 °C) (08/28/22 0421)  Pulse: 88 (08/28/22 0421)  Resp: 20 (08/28/22 0421)  BP: 132/79 (08/28/22 0421)  SpO2: 95 % (08/28/22 0421) Vital Signs (24h Range):  Temp:  [97.9 °F (36.6 °C)-98.5 °F (36.9 °C)] 97.9 °F (36.6 °C)  Pulse:  [85-98] 88  Resp:  [16-20] 20  SpO2:  [95 %-98 %] 95 %  BP: (124-132)/(66-83) 132/79     Weight: 102.2 kg (225 lb 5 oz)  Body mass index is 44 kg/m².  Patient's last menstrual period was 11/25/2021.    I&O (Last 24H):    Intake/Output Summary (Last 24 hours) at 8/28/2022 0545  Last data filed at 8/27/2022 1710  Gross per 24 hour   Intake 1540 ml   Output 3200 ml   Net -1660 ml            Physical Exam:   Constitutional: She is oriented to person, place, and time. She appears well-developed and well-nourished.    HENT:   Head: Normocephalic and atraumatic.   NC in place      Cardiovascular:  Normal rate.             Pulmonary/Chest: Effort normal. No respiratory distress. Right breast exhibits no mass, no nipple discharge, no skin change, no tenderness and no swelling. Left breast exhibits no mass, no nipple discharge, no skin change, no tenderness and no swelling.   Right sided crackles. Left lung field clear to auscultation         Abdominal: Soft. She exhibits no distension. There is abdominal tenderness (appropriate post-op tenderness). There is no rebound and no guarding.   No fundal tenderness             Musculoskeletal: Normal range of motion.      Right lower leg: She exhibits no tenderness. No edema.      Left lower leg: She exhibits no tenderness. No edema.       Neurological: She is alert and oriented to person, place, and time.    Skin: Skin is warm and dry.    Psychiatric: She has a normal mood and affect.     Laboratory:  CBC:   Recent Labs   Lab 08/27/22  0457   WBC 11.58   RBC 3.60*   HGB 9.0*   HCT 28.7*      MCV 80*   MCH 25.0*   MCHC 31.4*       CMP:   Recent Labs   Lab 08/27/22 0457   *   CALCIUM 7.9*   ALBUMIN 2.0*   PROT 5.8*      K 3.6   CO2 24      BUN 9   CREATININE 0.7   ALKPHOS 116   ALT 19   AST 19   BILITOT 0.6           Assessment/Plan:     Active Diagnoses:    Diagnosis Date Noted POA    Pre-eclampsia in postpartum period [O14.95] 08/24/2022 Unknown      Problems Resolved During this Admission:       1. Pre-Eclampsia with Severe Features with Pulmonary Edema   - Patient diagnosed with pre-eclampsia with severe features based on severe range blood pressures and pulmonary edema on CXR  - VSS  - Patient currently on 2L oxygen mask, continue to wean as appropriate  - Pulmonary exam: crackles in right lung field, L lung clear to  auscultation  - Pre-E labs: AST/ALT 26/31>19/19, Cr 0.7, Plt 416>400  - s/p magnesium for seizure prophylaxis  - Continue labetalol 200 mg BID  - Lasix 20 mg PO daily for diuresis in setting of pulmonary edema  - Strict I/O  - Urine output: 0.90cc/kg/hr  - Echo: mild concentric hypertrophy of LV, EF of 55%, grade II LV diastolic function, normal RV size with normal RV systolic function  - BNP from 213 to 77 yesterday    2. Suspected pneumonia  - T last 100.6 on 8/26 @ 2300   - Right sided crackles noted on lung exam this morning, stable and consistent with previous exams  - CXR on admission concerning for right sided pulmonary edema vs pneumonia   - Sputum cultures prelim result with normal respiratory ashley  - Continue Pip/tazo for suspected PNA  - CTA chest ordered, results below    Impression:  1. No convincing evidence of acute pulmonary embolism.  2. Extensive airspace consolidation in the right lung with volume loss.  Less pronounced findings in the left lung.  Findings concerning for multifocal infection versus noninfectious inflammatory etiology.  Component of alveolar edema and/or hemorrhage additionally considered in the appropriate clinical context.  3. Additional details, as provided in the body of report.  -- Assessment of the lungs is limited due to respiratory motion.  Dense airspace consolidation and ground-glass opacities noted throughout the majority of the right lung.  Atelectasis is noted on the right as well.  Few patchy solid ground-glass opacities are noted predominantly demonstrated peribronchovascular distribution in the left lung as well  -incentive spirometry and breathing treatments ordered      2. Maternal Obesity:   - Continue Lovenox 40 mg BID while inpatient   - SCDs in place  - Encourage ambulation when able     3. Depression:   - No home meds  - Scheduled for postpartum mood check on 09/02/2022      4. POD#10 s/p pLTCS and BTL:   - Continue postpartum management   - Continue  glycerin suppository and senna-docusate.   - Patient not breast feeding.   - Pain well controlled          Adriana Walker MD  Obstetrics & Gynecology  [facility]   no

## 2022-10-15 NOTE — ED ADULT NURSE NOTE - NS ED NURSE RECORD ANOTHER HT AND WT
Problem: Discharge Planning  Goal: Discharge to home or other facility with appropriate resources  10/14/2022 2253 by Jana Whatley RN  Outcome: Progressing  Flowsheets  Taken 10/14/2022 2253  Discharge to home or other facility with appropriate resources:   Identify barriers to discharge with patient and caregiver   Arrange for needed discharge resources and transportation as appropriate   Identify discharge learning needs (meds, wound care, etc)  Taken 10/14/2022 2022  Discharge to home or other facility with appropriate resources:   Identify barriers to discharge with patient and caregiver   Arrange for needed discharge resources and transportation as appropriate   Identify discharge learning needs (meds, wound care, etc)     Problem: Pain  Goal: Verbalizes/displays adequate comfort level or baseline comfort level  10/14/2022 2253 by Jana Whatley RN  Outcome: Progressing  Flowsheets (Taken 10/14/2022 2253)  Verbalizes/displays adequate comfort level or baseline comfort level:   Encourage patient to monitor pain and request assistance   Assess pain using appropriate pain scale   Administer analgesics based on type and severity of pain and evaluate response   Implement non-pharmacological measures as appropriate and evaluate response     Problem: ABCDS Injury Assessment  Goal: Absence of physical injury  10/14/2022 2253 by Jana Whatley RN  Outcome: Progressing  Flowsheets  Taken 10/14/2022 2253 by Jana Whatley RN  Absence of Physical Injury: Implement safety measures based on patient assessment  Taken 10/14/2022 1259 by Lisandro Tristan RN  Absence of Physical Injury: Implement safety measures based on patient assessment     Problem: Cardiovascular - Adult  Goal: Maintains optimal cardiac output and hemodynamic stability  10/14/2022 2253 by Jana Whatley RN  Outcome: Progressing  Flowsheets  Taken 10/14/2022 2253  Maintains optimal cardiac output and hemodynamic stability:   Monitor blood pressure and heart rate   Monitor urine output and notify Licensed Independent Practitioner for values outside of normal range  Taken 10/14/2022 2022  Maintains optimal cardiac output and hemodynamic stability: Monitor blood pressure and heart rate     Problem: Skin/Tissue Integrity - Adult  Goal: Skin integrity remains intact  10/14/2022 2253 by Clemente Jordan RN  Outcome: Progressing  Flowsheets  Taken 10/14/2022 2253 by Clemente Jordan RN  Skin Integrity Remains Intact:   Monitor for areas of redness and/or skin breakdown   Assess vascular access sites hourly   Every 4-6 hours minimum: Change oxygen saturation probe site  Taken 10/14/2022 2022 by Clemente Jordan RN  Skin Integrity Remains Intact:   Monitor for areas of redness and/or skin breakdown   Assess vascular access sites hourly  Taken 10/14/2022 1300 by Essie Rdz RN  Skin Integrity Remains Intact: Monitor for areas of redness and/or skin breakdown     Problem: Skin/Tissue Integrity - Adult  Goal: Incisions, wounds, or drain sites healing without S/S of infection  Outcome: Progressing  Flowsheets (Taken 10/14/2022 1300 by Essie Rdz RN)  Incisions, Wounds, or Drain Sites Healing Without Sign and Symptoms of Infection: TWICE DAILY: Assess and document skin integrity     Problem: Musculoskeletal - Adult  Goal: Return mobility to safest level of function  10/14/2022 2253 by Clemente Jordan RN  Outcome: Progressing  Flowsheets  Taken 10/14/2022 2253  Return Mobility to Safest Level of Function:   Assess patient stability and activity tolerance for standing, transferring and ambulating with or without assistive devices   Assist with transfers and ambulation using safe patient handling equipment as needed  Taken 10/14/2022 2022  Return Mobility to Safest Level of Function: Assess patient stability and activity tolerance for standing, transferring and ambulating with or without assistive devices     Problem: Genitourinary - Adult  Goal: Absence of urinary retention  10/14/2022 2253 by Vishnu Vega RN  Outcome: Progressing  Flowsheets (Taken 10/14/2022 2253)  Absence of urinary retention: Assess patients ability to void and empty bladder     Problem: Infection - Adult  Goal: Absence of infection during hospitalization  10/14/2022 2253 by Vishnu Vega RN  Outcome: Progressing  Flowsheets  Taken 10/14/2022 2253  Absence of infection during hospitalization:   Assess and monitor for signs and symptoms of infection   Monitor lab/diagnostic results  Taken 10/14/2022 2022  Absence of infection during hospitalization: Assess and monitor for signs and symptoms of infection     Problem: Hematologic - Adult  Goal: Maintains hematologic stability  10/14/2022 2253 by Vishnu Vega RN  Outcome: Progressing  Flowsheets  Taken 10/14/2022 2253  Maintains hematologic stability: Assess for signs and symptoms of bleeding or hemorrhage  Taken 10/14/2022 2022  Maintains hematologic stability: Assess for signs and symptoms of bleeding or hemorrhage  Care plan reviewed with patient. Patient verbalize understanding of the plan of care and contribute to goal setting. Yes

## 2022-10-17 NOTE — H&P PST ADULT - NSICDXPASTMEDICALHX_GEN_ALL_CORE_FT
Assessment & Plan   Problem List Items Addressed This Visit    None  Visit Diagnoses     Metatarsalgia of left foot    -  Primary         - Advised use of athletic shoes only when at work since his job involves a lot of walking on concrete. Use ice after work. NSAID such as ibuprofen 200 mg BID x 7 days recommended. If symptoms persist, could consider an orthotic with forefoot cushioning.   - tdap and influenza vaccines given today. He will consider the HPV vaccine series but declines today       Return in about 4 weeks (around 11/14/2022) for Routine preventive with PCP .    Gill Snider NP  [facility] MARY Escalante is a 27 year old, presenting for the following health issues:  Musculoskeletal Problem (Pt states left foot)    He started to have pain in the left bottom of the feet in the past 1 week. 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 Typically works 2-7. He wears athletic shoes most of the time, his shoes are about 6 month old but occasionally wears a flat, thin sandal to work and notices the pain is worse the with the latter footwear. He has not started any new exercise regimen. He has not tried any OTC medication.  Ice seems to temporarily numb the pain.     History of Present Illness       Reason for visit:  Foot pain  Symptom onset:  3-7 days ago  Symptoms include:  Dull pain on ball of foot  Symptom intensity:  Moderate  Symptom progression:  Staying the same  Had these symptoms before:  No  What makes it worse:  Walking/standing on for too long  What makes it better:  Ice kind of    He eats 2-3 servings of fruits and vegetables daily.He consumes 0 sweetened beverage(s) daily.He exercises with enough effort to increase his heart rate 20 to 29 minutes per day.  He exercises with enough effort to increase his heart rate 3 or less days per week. He is missing 4 dose(s) of medications per week.  He is not taking prescribed medications regularly due to  "remembering to take.    Today's PHQ-9         PHQ-9 Total Score: 10    PHQ-9 Q9 Thoughts of better off dead/self-harm past 2 weeks :   Not at all    How difficult have these problems made it for you to do your work, take care of things at home, or get along with other people: Somewhat difficult           Objective    /74 (BP Location: Right arm, Patient Position: Sitting, Cuff Size: Adult Regular)   Pulse 72   Temp 98.9  F (37.2  C) (Oral)   Resp 20   Ht 1.803 m (5' 11\")   Wt 61.9 kg (136 lb 6.4 oz)   SpO2 97%   BMI 19.02 kg/m    Body mass index is 19.02 kg/m .  Physical Exam     CV: DP pulses are 2+  ABDOMEN: soft, nontender, no hepatosplenomegaly, no masses and bowel sounds normal  MS: Normal gait. Full foot ROM. Left foot tenderness over the plantar surface distal metatarsals 3-4. No ecchymosis, bruising, swelling. No tenderness over the dorsum of the foot.                   " PAST MEDICAL HISTORY:  CAD (coronary artery disease)     Cataract of right eye     Coronary artery disease s/p CABG X2    Glaucoma     High cholesterol     HLD (hyperlipidemia)     HTN (hypertension)     Hypertension     Uterine cancer s/p SHAAN and chemo

## 2022-11-06 NOTE — ED PROVIDER NOTE - PHYSICAL EXAMINATION
59 Vital Signs: Reviewed  GEN: alert, NAD, speaks full sentences  HEAD:  normocephalic, atraumatic  EYES:  PERRLA; conjunctivae without injection, drainage or discharge  ENMT:  nasal mucosa moist; mouth moist without ulcerations or lesions; throat moist without erythema, exudate, ulcerations or lesions  NECK:  supple, +tenderness C5-C7  CARDIAC:  regular rate, normal S1 and S2, no murmurs  RESP:  respiratory rate and effort appear normal for age; lungs are clear to auscultation bilaterally; no rales or wheezes  ABDOMEN:  soft, nontender, nondistended  MUSCULOSKELETAL/NEURO: RT knee suprapatellar tenderness; no chest wall tenderness; pelvis stable  SKIN:  normal skin color for age and race, well-perfused; warm and dry

## 2022-11-18 NOTE — ED ADULT TRIAGE NOTE - CADM TRG TX PRIOR TO ARRIVAL
Hospitalist Progress Note      Synopsis: Patient admitted on 11/16/2022     Patient presented to the emergency with nausea, vomiting. There was also possible hematemesis. There was abdominal pain. This is generalized. Felt like a cramping. He did have low-grade fevers. He then presented to the emergency room. CT of the abdomen pelvis was unremarkable. Patient was noted to have elevated white cell count, lactic acidosis. He was also noted to have acute kidney injury. Patient was then admitted for further evaluation treatment. He was also borderline bradycardic    Subjective    Patient was seen earlier on at bedside. Denies any new complaints. Reported initially-persistent vomiting muscle aches. Smokes 1 to 2 cigarettes daily. Uses marijuana occasionally. Does not take steroids at home. No sick contacts. Exam:  /70   Pulse (!) 41   Temp 97.7 °F (36.5 °C) (Temporal)   Resp 16   Ht 6' 1\" (1.854 m)   Wt 165 lb (74.8 kg)   SpO2 99%   BMI 21.77 kg/m²   General appearance: No apparent distress, appears stated age and cooperative. HEENT: Pupils equal, round, and reactive to light. Conjunctivae/corneas clear. Neck: Supple. No jugular venous distention. Trachea midline. Respiratory:  Normal respiratory effort. Clear to auscultation, bilaterally without Rales/Wheezes/Rhonchi. Cardiovascular: Regular rate and rhythm with normal S1/S2 without murmurs, rubs or gallops. Abdomen: Soft, non-tender, non-distended with normal bowel sounds. Musculoskeletal: No clubbing, cyanosis or edema bilaterally. Brisk capillary refill. 2+ lower extremity pulses (dorsalis pedis).    Skin:  No rashes    Neurologic: awake, alert and following commands     Medications:  Reviewed    Infusion Medications    sodium chloride      sodium chloride 125 mL/hr at 11/18/22 0934     Scheduled Medications    pantoprazole  40 mg Oral QAM AC    sodium chloride flush  10 mL IntraVENous 2 times per day    enoxaparin  40 mg SubCUTAneous Daily    cefTRIAXone (ROCEPHIN) IV  1,000 mg IntraVENous Q24H     PRN Meds: dicyclomine, sodium chloride flush, sodium chloride, promethazine **OR** ondansetron, polyethylene glycol, acetaminophen **OR** acetaminophen, trimethobenzamide    I/O    Intake/Output Summary (Last 24 hours) at 11/18/2022 1605  Last data filed at 11/18/2022 0527  Gross per 24 hour   Intake 2803 ml   Output --   Net 2803 ml       Labs:   Recent Labs     11/16/22  1622 11/17/22  0449   WBC 21.1* 19.6*   HGB 17.6* 13.7   HCT 52.9 41.8    227       Recent Labs     11/16/22  1622 11/17/22  0449    141   K 4.6 4.3    109*   CO2 19* 23   BUN 20 20   CREATININE 2.4* 1.4*   CALCIUM 12.8* 9.3       Recent Labs     11/16/22  1622 11/17/22  0449   PROT 10.5* 6.5   ALKPHOS 106 65   ALT 18 14   AST 26 36   BILITOT 0.8 0.7   LIPASE 14  --        No results for input(s): INR in the last 72 hours. Recent Labs     11/16/22 1622   CKTOTAL 232*       Chronic labs:  No results found for: CHOL, TRIG, HDL, LDLCALC, TSH, PSA, INR, LABA1C    Radiology:  Imaging studies reviewed today. ASSESSMENT:  Acute kidney injury  Lactic acidosis [resolved]  Leukocytosis    PLAN:  Continue patient admission  IV hydration  Monitor BMP  Can continue antibiotics for now. Obtain CBC, BMP and procalcitonin today. Check CK. Advance diet  Possible discharge tomorrow    Diet: ADULT DIET; Clear Liquid  Code Status: Full Code  PT/OT Eval Status:   Not required  DVT Prophylaxis:   Lovenox  Recommended disposition at discharge: Likely discharge home tomorrow    +++++++++++++++++++++++++++++++++++++++++++++++++  Seferino Gallegos MD   [facility].  +++++++++++++++++++++++++++++++++++++++++++++++++  NOTE: This report was transcribed using voice recognition software. Every effort was made to ensure accuracy; however, inadvertent computerized transcription errors may be present. none

## 2022-11-23 NOTE — ED ADULT NURSE NOTE - NS ED NURSE RECORD ANOTHER HT AND WT
Yes
3y7m male presents to ED with c/o cough, nasal congestion x3 days, fever x 2days, [id number], was given Tylenol and Motrin with improvement. Eating his regular diet, father denies any vomiting, or diarrhea.  Dad was concerned because he appeared fatigued with persistent congestion in chest and nose. Attends . No sick contact at home.  [facility]
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
## 2023-02-02 NOTE — ED PROVIDER NOTE - CARE PROVIDERS DIRECT ADDRESSES
,DirectAddress_Unknown,[email or web address] Back Pain

## 2023-02-23 NOTE — ED PROVIDER NOTE - CHIEF COMPLAINT
The patient is a 81y Female complaining of breast pain. normal/clear to auscultation bilaterally/no wheezes/no rales/no rhonchi

## 2023-02-28 NOTE — ED ADULT TRIAGE NOTE - SOURCE OF INFORMATION
February 28, 2023      [facility]  [address] [facility] [address]  Phone: [phone]  Fax: [phone]       Patient: Laci Preciado   YOB: 2005  Date of Visit: 02/28/2023    To Whom It May Concern:    Maggi Preciado  was at [facility] on 02/28/2023. The patient may return to work/school on 3/1/2023 with no restrictions. If you have any questions or concerns, or if I can be of further assistance, please do not hesitate to contact me.    Sincerely,    True Miranda LPN/ Dr Andre MD     
Patient

## 2023-03-09 NOTE — PATIENT PROFILE ADULT - NSPROEXTENSIONSOFSELF_GEN_A_NUR
Ketoconazole Pregnancy And Lactation Text: This medication is Pregnancy Category C and it isn't know if it is safe during pregnancy. It is also excreted in breast milk and breast feeding isn't recommended. none

## 2023-03-31 NOTE — H&P PST ADULT - NSSUBSTANCEUSE_GEN_ALL_CORE_SD
You can access the FollowMyHealth Patient Portal offered by [facility] by registering at the following website: [email or web address]. By joining [employer]’s FollowMyHealth portal, you will also be able to view your health information using other applications (apps) compatible with our system.
never used

## 2023-05-31 NOTE — ED ADULT TRIAGE NOTE - MODE OF ARRIVAL
Walk in Finasteride Pregnancy And Lactation Text: This medication is absolutely contraindicated during pregnancy. It is unknown if it is excreted in breast milk.

## 2023-07-04 NOTE — H&P PST ADULT - ADDITIONAL PE
There are no Wet Read(s) to document. There is 1 Wet Read(s) to document. port a cath left chest wall

## 2023-07-11 NOTE — ED PROVIDER NOTE - CLINICAL SUMMARY MEDICAL DECISION MAKING FREE TEXT BOX
Please see the attached refill request.  
80 Y/O F WITH ANXIETY AND DEPRESSION PRESENTED WITH WORSENING SXS OF DEPRESSION AFTER RECENT PSYCHIATRIC MEDICATION CHANGE. PT EVALUATED BY PSYCHIATRY AND MEDICATION ADJUSTED. PT GIVEN OUTPT MENTAL HEALTH CLINIC FOLLOW UP AND STRICT RETURN INSTRUCTIONS.

## 2023-08-08 NOTE — ED PROVIDER NOTE - NS_EDPROVIDERDISPOUSERTYPE_ED_A_ED
vincent khalil  YOB: 1951  Exported from [employer] Trends: Aug 8, 2023    Reporting Period: Jul 26 - Aug 8, 2023    Avg. Daily Time In Range (mg/dL)  >250     21% (4h 57m)  180-250  42% (9h 58m)     37% (9h)  54-70    0.3% (5m)  <54      0.03% (22s)    Avg. Glucose (CGM): 204 mg/dL    Sensor Usage: 94%    Avg. Daily Insulin Ratio  Basal  30.7U  Bolus  28.8U    GMI (CGM): 8.2%    Std. Deviation (CGM): 62 mg/dLvincent khalil  YOB: 1951  Exported from [employer] Device Settings: Aug 8, 2023    Insulin Settings  Max Basal Rate              3 U/hr  Maximum Bolus               20 U  Duration of Insulin Action  4 hrs    Basal 1  Start time  Value  12:00 am    1.250  1:30 am     1.400  9:00 pm     1.300  Total       33.075    Correction factor mg/dL/U  Start time  Value  12:00 am    30    Target BG mg/dL  Start time  Target  Correct Above  12:00 am    125     150    IC ratio g/U  Start time  Value  12:00 am    6  9:00 pm     10      CV (CGM): 31%    BG Extents (CGM) (mg/dL)  Min BG  48  Max BG  401     Attending Attestation (For Attendings USE Only)...

## 2023-08-08 NOTE — ED ADULT NURSE NOTE - PAIN RATING/NUMBER SCALE (0-10): ACTIVITY
[FreeTextEntry1] : 33 y/o Pt here for ALVAREZ   -Pap and HPV done today  -Urine culture  -Discussed fertility options and IVF  -Ask Dr. Conn about the Polyp and see where it is located --d/w both pt and samantha pt to have polyp removed prior to IUI -RTO in one year  5

## 2023-08-14 NOTE — ED ADULT NURSE NOTE - NSFALLRSKHRMRISKTYPE_ED_ALL_ED
Reviewed and accepted note.  Alert and cooperative      -burning, -itching, -tearing, -flashes/+floater, -headache, -diplopia      New to me   Had macular hole repair right eye   PCIOL   Could benefit from YAG OS     Compound myopic astigmatism cc presbyopia.  Explained accommodation and advised bifocal.    PCIOL, OU   Mild membrane OS              coagulation(Bleeding disorder R/T clinical cond/anti-coags)

## 2023-09-21 NOTE — ED ADULT NURSE NOTE - CAS TRG GEN SKIN COLOR
You should be getting cardiovascular exercise 3-5 times per week for 30-45 minutes.  This includes exercises such as running, brisk walking, biking or swimming.       It is important to actively try to reduce your weight to become healthier.  There are several things you can do to try and lose weight.  You should be getting 30-45 minutes of cardiovascular exercise 3-5 times per week, activities such as running and jogging treadmill swimming and brisk walking are helpful.  You will also need to watch your portion control, decrease calorie intake overall.  Following a low carbohydrate diet is the most successful way to lose weight and take it off long-term.  In order to achieve weight loss it is important that you track exactly what your calorie intake is during the day.  I usually recommend doing some sort of formal program or Michelle. there are lot of good Apps out there to help you follow your calorie intake such as weight watchers, Noom, Fitbit.  It is recommended that you reduce the intake of sweets, sugar, reduce carbohydrate intake.  Healthy diets with more whole grains, vegetables, fruits, nuts and seeds with plant oils are healthier diets than animal-based fats.  Reduce your intake of white bread, grains, potatoes, processed foods.     Hypertension Plan:  You should check your blood pressures 2-3 times per month.  Your goal should be systolic (upper number ) < 140, and diastolic (bottom number) < 90.  Please periodically inform office of your BP numbers.  You should follow a low salt diet and exercise routinely.  It is important that you keep your weight under control.  With hypertension you should be seen in the office at least twice per year.     Discussed watching mood, watching for signs of depression anxiety  Caregiver support  Consideration of home health or additional help at home to alleviate some your stress  
Normal for race

## 2023-10-24 NOTE — ED PROVIDER NOTE - NS_EDPROVIDERDISPOUSERTYPE_ED_A_ED
within functional limits
within functional limits
Attending Attestation (For Attendings USE Only)...

## 2023-11-09 NOTE — ED ADULT NURSE NOTE - PERIPHERAL VASCULAR WDL
----- Message from Prisca Ferro MD sent at 11/9/2023  7:48 AM CST -----  Ask patient to come in for lab follow-up, few abnormalities we need to recheck some things and discuss  Glucose elevated, bilirubin high, low white count and red count  
No answer and no voicemail to leave a message   
Pulses equal bilaterally, no edema present.

## 2023-11-16 NOTE — ED ADULT NURSE NOTE - PMH
CAD (coronary artery disease)    Cataract of right eye    Coronary artery disease  s/p CABG X2  Glaucoma    High cholesterol    HLD (hyperlipidemia)    HTN (hypertension)    Hypertension    Uterine cancer  s/p SHAAN and chemo   show

## 2024-01-05 NOTE — PROCEDURE NOTE - NSTIMEOUT_GEN_A_CORE
H&P reviewed. The patient was examined and there are no changes to the H&P.   Patient's first and last name, , procedure, and correct site confirmed prior to the start of procedure.

## 2024-01-19 NOTE — ED ADULT NURSE NOTE - BRAND OF FIRST COVID-19 BOOSTER
Admission Reconciliation is Completed  Discharge Reconciliation is Not Complete Admission Reconciliation is Completed  Discharge Reconciliation is Completed Moderna

## 2024-01-25 NOTE — ED ADULT TRIAGE NOTE - NS ED NOTE AC HIGH RISK COUNTRIES
Problem: Adult Inpatient Plan of Care  Goal: Plan of Care Review  Outcome: Ongoing, Progressing  Flowsheets (Taken 1/25/2024 1006)  Plan of Care Reviewed With:   patient   daughter  Goal: Patient-Specific Goal (Individualized)  Outcome: Ongoing, Progressing  Flowsheets (Taken 1/25/2024 1006)  Anxieties, Fears or Concerns: none voiced  Individualized Care Needs: recliner, education, conversation, daughter at chairside, wheelchair, blanket, ice to injection sites, home food, pillow     Problem: Fatigue  Goal: Improved Activity Tolerance  Outcome: Ongoing, Progressing  Intervention: Promote Improved Energy  Flowsheets (Taken 1/25/2024 1006)  Fatigue Management:   paced activity encouraged   frequent rest breaks encouraged   fatigue-related activity identified  Sleep/Rest Enhancement:   therapeutic touch utilized   family presence promoted   noise level reduced   relaxation techniques promoted   natural light exposure provided   consistent schedule promoted  Activity Management:   Up in chair - L3   Ambulated to bathroom - L4     Problem: Fall Injury Risk  Goal: Absence of Fall and Fall-Related Injury  Outcome: Ongoing, Progressing  Intervention: Promote Injury-Free Environment  Flowsheets (Taken 1/25/2024 1006)  Safety Promotion/Fall Prevention:   instructed to call staff for mobility   supervised activity   high risk medications identified   Fall Risk reviewed with patient/family   in recliner, wheels locked   lighting adjusted   medications reviewed   room near unit station      No

## 2024-03-04 NOTE — ED ADULT NURSE NOTE - NSFALLRSKUNASSIST_ED_ALL_ED
Outreach to pt to follow-up on recommendations from  [facility]  Pt states he spoke with Dr Candy Coelho yesterday and understands PET has been ordered and pathology sent out for review to [facility]'s  Informed him Dr Candy Coelho reached out to Dr Alysa Mandel at [facility]  and provided update on the plan  Pt states he has had an increase in back since diagnosis and continues to have issues with anxiety and sleeping  Saw PCP yesterday who made some changes to his meds and he is scheduled to see Palliative Care tomorrow  Provided support and informed him I would attend his visit on Friday with Dr Silverio Lopez, but to call if he has any additional concerns  Informed him of PET appt for 4/19/23 and advised I will be working to move it up once it has been authorized by insurance  Outreach to [facility] PET and awaiting call back for sooner appt  no 2 = A lot of assistance

## 2024-04-01 NOTE — ED CDU PROVIDER INITIAL DAY NOTE - MEDICAL DECISION MAKING DETAILS
Pt presents with chest pain. Hx of known CAD. Hx of DVT on anticoagulation.  CE neg.  Pending is stress testing,
within normal limits

## 2024-04-22 NOTE — ED ADULT NURSE NOTE - HIV OFFER
FMLA or Disability Forms were received and faxed to the Forms Completion Department today at [phone]. (Please include all appropriate authorization forms with your fax).    Did you have the patient complete (in full) and sign the “Authorization For Disclosure of Health Information Forms Completion” form? NO    DUE TO VERY HIGH FORM VOLUMES, please communicate to the patient that the Forms Completion team estimates their form may take longer than our usual 14 business day turnaround goal.    If you have questions about this encounter, please contact the Forms Completion Dept at [phone], Option 3.     Previously Declined (within the last year)

## 2024-05-16 NOTE — ED ADULT TRIAGE NOTE - SOURCE OF INFORMATION
Central Venous Line:    Date/Time: 5/16/2024 8:31 AM    A central venous line was placed in the OR for the following indication(s): central venous access and CVP monitoring.  Staffing  Performed: resident   Authorized by: Aaron Colindres MD    Performed by: Kalpesh Perez MD    Sterility preparation included the following: provider hand hygiene performed prior to central venous catheter insertion, all 5 sterile barriers used (gloves, gown, cap, mask, large sterile drape) during central venous catheter insertion, antiseptic used during central venous catheter insertion and skin prep agent completely dried prior to procedure.  Medical reason for not performing maximal sterile barrier technique: no  The patient was placed in Trendelenburg position.    Left internal jugular vein was prepped.    The site was prepped with Chlorhexidine.  Size: 9 Fr (8 Fr)   Length: 11.5  Catheter type: introducer   Number of Lumens: double lumen    This catheter was not an oximetric catheter.    During the procedure, the following specific steps were taken: target vein identified, needle advanced into vein and blood aspirated and guidewire advanced into vein.  Seldinger technique used.  Procedure performed using ultrasound guidance.  Sterile gel and probe cover used in ultrasound-guided central venous catheter insertion.    Intravenous verification was obtained by ultrasound, venous blood return and manometry.      Post insertion care included: all ports aspirated, all ports flushed easily, guidewire removed intact, Biopatch applied, line sutured in place and dressing applied.    During the procedure the patient experienced: patient tolerated procedure well with no complications.           images stored in chartA non-oximetric, 7.5 (size) Pulmonary Artery Catheter (PAC) was placed through the Introducer CVL in the left internal jugular vein.  The PAC placement was confirmed by pressure tracing changes.  The patient experienced the  following events during the procedure: patient tolerated procedure well with no complications.           Patient

## 2024-08-02 NOTE — PHYSICAL THERAPY INITIAL EVALUATION ADULT - ADDITIONAL COMMENTS
Grand Canyon, AZ [address]  [phone]    You will need to  
as per pt., she was independent with all activities of daily living

## 2024-10-02 NOTE — ED PROVIDER NOTE - PRINCIPAL DIAGNOSIS
Care After Your General Surgery  Dr. Jesus    NO HEAVY LIFTING > 15 POUNDS FOR 6 WEEKS POST-OP   Keep binder on preferably at all times but at least when out of bed for 2 weeks   Empty drain daily and as needed and record daily output fluid amount on jog sheet  Sleep with multiple pillows, keep sitting up at 30 degrees or higher, we don't want that skin to stretch too much yet.     Activity  For the next 24 hours do not stay alone, drive a vehicle, operate machinery, drink alcohol, smoke or sign legal papers  You may start normal activity as tolerated (unless otherwise instructed).    Rest is very important to recovery...Rest today     Bathing  You may shower in 24 hours.    Do not let shower spray on your incision as this may cause pain.    Diet  Nausea may occur after anesthesia.  Begin with clear liquids and progress your diet as tolerated or as instructed by your doctor.    Care of your dressing/incision  Keep your incisions clean and dry when not showering.    There is surgical skin glue over your incision. This will dissolve with time  It is ok to shower over the skin glue     Medications  Gabapentin 300mg tablet every 8 hours SCHEDULED until bottle complete  Tylenol 1000mg (two 500mg tablets) every 8 hours SCHEDULED until bottle complete   Oxycodone 1-2 tablets ONLY as needed for pain      Special Instructions  Bruising and numbness around the incision are normal and will gradually decrease.    Firmness under the wound is normal after 1 week and may last up to 4-6 weeks until the scar softens.  If there is excessive swelling or hardness under the incision, please contact your surgeon.    If you have bleeding from your incision, apply mild pressure to the site for 10-15 minutes.  If the bleeding continues, call Dr. Jesus.    Call Dr. Jesus if you have:  Fever over 101 degrees.  Excessive pain, redness, swelling at your incision, or pus-like drainage from your incision.    HOME INSTRUCTION SHEET      Medication: Scopolamine Patch   You have a Scopolamine Patch placed behind your ear to decrease the chance of vomiting after your surgery.     Please follow the instructions listed below for care of the patch.   Do not touch the patch unless you are going to remove it.   Do not rub your eyes after touching the patch, as eye irritation and increase in your pupil size may result.   Remove the patch 24-72 hours after surgery or when instructed by your Anesthesiologist. Wash your hands IMMEDIATELY after removing patch.   Do not drink alcohol while wearing the patch.   This medicine may cause drowsiness. Do not drive a car, use machinery or do dangerous jobs while the patch is on.   This medicine may make you sweat less causing your body to overheat. Care should be taken when in hot weather, exercising, or if using a sauna or whirlpool. The medicine may also cause a dry mouth.     Call the [facility]  @ [phone] and ask for the Hospital supervisor if you have any of the following:    Severe skin rash or hives    Confusion or memory loss    Cannot go to the bathroom (urinate)    Severe eye pain           Want to Say “Thank You” to a Nurse?  The [employer] Award® was created in memory of ED Still by his family to say thank you to nurses who provide an outstanding level of care.    Submit a nomination using any method below.     OR    [email or web address]  Or visit the Resource section   on your [employer] dimitris     Ascites

## 2024-10-03 NOTE — ED PROVIDER NOTE - NS ED MD DISPO ISOLATION TYPES
"Subjective   Patient ID: Nadine Chan is a 89 y.o. female who presents for Follow-up (6 month follow up ).    HPI patient today with daughter for follow-up overall is doing well denies any major new acute complaints.    Review of Systems   Constitutional:  Negative for chills and fever.   HENT:  Negative for congestion and ear pain.    Eyes:  Negative for visual disturbance.   Respiratory:  Negative for chest tightness and shortness of breath.    Cardiovascular:  Negative for chest pain and palpitations.   Gastrointestinal:  Negative for abdominal pain.   Musculoskeletal:  Negative for arthralgias.   Skin:  Negative for pallor.   Psychiatric/Behavioral:  Negative for confusion.        Objective   /72   Pulse 73   Temp 36.8 °C (98.3 °F) (Temporal)   Resp 16   Ht 1.676 m (5' 6\")   Wt 45.7 kg (100 lb 12.8 oz)   SpO2 96%   BMI 16.27 kg/m²     Physical Exam  Vitals and nursing note reviewed.   Constitutional:       General: She is not in acute distress.     Appearance: Normal appearance. She is not ill-appearing.   HENT:      Head: Normocephalic and atraumatic.      Right Ear: Tympanic membrane, ear canal and external ear normal.      Left Ear: Tympanic membrane, ear canal and external ear normal.      Mouth/Throat:      Pharynx: Oropharynx is clear.   Eyes:      Extraocular Movements: Extraocular movements intact.   Cardiovascular:      Rate and Rhythm: Normal rate and regular rhythm.      Pulses: Normal pulses.      Heart sounds: Normal heart sounds.   Pulmonary:      Effort: Pulmonary effort is normal.      Breath sounds: Normal breath sounds.   Abdominal:      General: Abdomen is flat. Bowel sounds are normal.      Palpations: Abdomen is soft.      Tenderness: There is no abdominal tenderness.   Musculoskeletal:         General: Normal range of motion.      Cervical back: Neck supple.   Skin:     General: Skin is warm.   Neurological:      Mental Status: She is alert and oriented to person, place, " and time. Mental status is at baseline.   Psychiatric:         Mood and Affect: Mood normal.          Recent Results (from the past 1008 hour(s))   CBC    Collection Time: 09/30/24 10:13 AM   Result Value Ref Range    WBC 8.1 4.4 - 11.3 x10*3/uL    nRBC 0.0 0.0 - 0.0 /100 WBCs    RBC 3.83 (L) 4.00 - 5.20 x10*6/uL    Hemoglobin 12.1 12.0 - 16.0 g/dL    Hematocrit 38.3 36.0 - 46.0 %     80 - 100 fL    MCH 31.6 26.0 - 34.0 pg    MCHC 31.6 (L) 32.0 - 36.0 g/dL    RDW 13.2 11.5 - 14.5 %    Platelets 226 150 - 450 x10*3/uL   Comprehensive Metabolic Panel    Collection Time: 09/30/24 10:13 AM   Result Value Ref Range    Glucose 100 (H) 74 - 99 mg/dL    Sodium 138 136 - 145 mmol/L    Potassium 4.5 3.5 - 5.3 mmol/L    Chloride 105 98 - 107 mmol/L    Bicarbonate 25 21 - 32 mmol/L    Anion Gap 13 10 - 20 mmol/L    Urea Nitrogen 18 6 - 23 mg/dL    Creatinine 0.92 0.50 - 1.05 mg/dL    eGFR 60 (L) >60 mL/min/1.73m*2    Calcium 9.0 8.6 - 10.3 mg/dL    Albumin 4.5 3.4 - 5.0 g/dL    Alkaline Phosphatase 58 33 - 136 U/L    Total Protein 7.2 6.4 - 8.2 g/dL    AST 16 9 - 39 U/L    Bilirubin, Total 0.6 0.0 - 1.2 mg/dL    ALT 7 7 - 45 U/L   Lipid Panel    Collection Time: 09/30/24 10:13 AM   Result Value Ref Range    Cholesterol 157 0 - 199 mg/dL    HDL-Cholesterol 58.4 mg/dL    Cholesterol/HDL Ratio 2.7     LDL Calculated 74 <=99 mg/dL    VLDL 24 0 - 40 mg/dL    Triglycerides 122 0 - 149 mg/dL    Non HDL Cholesterol 99 0 - 149 mg/dL   Vitamin B12    Collection Time: 09/30/24 10:13 AM   Result Value Ref Range    Vitamin B12 669 211 - 911 pg/mL     Recent labs reviewed overall numbers are stable she will continue current medications along with a low-fat low-cholesterol diet.    Already received her flu vaccine    Reviewed recommendations on other vaccines for age.    Return to office 6 months with repeat fasting labs    Assessment/Plan   Problem List Items Addressed This Visit             ICD-10-CM    Benign essential hypertension  I10     Stable continue current antihypertensive medication         Relevant Orders    Comprehensive Metabolic Panel    Follow Up In Primary Care - Established    Hyperlipidemia E78.5     Stable continue atorvastatin 20 mg daily         Relevant Orders    Comprehensive Metabolic Panel    Lipid Panel    Follow Up In Primary Care - Established    Vitamin B12 deficiency E53.8     Stable continue to monitor and supplement         Relevant Orders    CBC    Vitamin B12    Follow Up In Primary Care - Established    Vitamin D deficiency E55.9     Stable continue to monitor and supplement         Relevant Orders    Vitamin D 25-Hydroxy,Total (for eval of Vitamin D levels)    Thyroid disorder screen Z13.29     TSH with reflex         Relevant Orders    TSH with reflex to Free T4 if abnormal    Protein-calorie malnutrition, unspecified severity (Multi) E46     Weight is holding steady continue to work on dietary modification and consider protein supplemental shakes such as boost or Ensure.         Stage 3a chronic kidney disease (Multi) - Primary N18.31     GFR slightly improved continue to monitor         Relevant Orders    Comprehensive Metabolic Panel           None

## 2024-10-16 NOTE — H&P PST ADULT - FALL HARM RISK - PT AGE POPULATION HIDDEN
Adult
Coagulation: Bleeding disorder either through use of anticoagulants or underlying clinical condition(s)

## 2025-01-07 NOTE — ASU PREOP CHECKLIST - HEIGHT IN INCHES
3

## 2025-01-09 NOTE — DISCHARGE NOTE PROVIDER - NSDCQMPCI_CARD_ALL_CORE
Refill Routing Note   Medication(s) are not appropriate for processing by [facility] for the following reason(s):        New or recently adjusted medication    [facility] action(s):  Defer             Appointments  past 12m or future 3m with PCP    Date Provider   Last Visit   12/17/2024 Catrachito Rahman III, MD   Next Visit   Visit date not found Catrachito Rahman III, MD   ED visits in past 90 days: 0        Note composed:11:20 AM 01/09/2025           No

## 2025-01-13 NOTE — ED PROCEDURE NOTE - ATTENDING CONTRIBUTION TO CARE
Antibiotic use:   An antibiotic was ordered to help fight the bacterial infection that was acquired.  You need to take this medication exactly as prescribed.  DO NOT stop taking this medication until the prescribed end date, even if you are feeling better.  This way the affecting bacteria in your body are killed off.   You should eat probiotic yogurt (with live cultures) or Kefir (similar to yogurt) while taking antibiotic to promote regrowth of good bacteria in your digestive tract, which can be depleted by antibiotics.  Birth control and antibiotics (if applicable):   Birth control pills contain estrogens. Some antibiotics cause the enzymes in the liver to increase the break-down of estrogens and thereby can decrease the levels of estrogens in the body and the effectiveness of the birth control medications.  This can result in unwanted pregnancy.  To be safe it is wise to use a back-up-method of birth control while you take, and for 7 days after finishing the antibiotic.  Coumadin and antibiotics (if applicable):   Finally, if on coumadin, let your coumadin prescriber know. You likely need to have your INR checked by your Primary Care Provider in 2-3 days. Contact your clinic for an appointment.        Medication Warnings (Cyclobenzaprine):   Do not drive, operate machinery, or do work that could harm people when under the influence of this medication.  It can impair perception, reaction time, motor skills, and attention in ways that make it dangerous to drive, operate machinery, or engage in any activity at home or at work that could harm others or cause professional malpractice.  Just how long such impairments will last for a particular individual taking a particular type and dose is unknown, but it is at least several hours.  Drinking alcohol while taking this medication makes impairment much worse, so abstain for alcohol use.       Pain control:   If your past medical conditions, allergies, current 
medications, or current status does not prevent you from using acetaminophen and/or ibuprofen, use the following:   Acetaminophen 650-1000 mg every 6 hours as needed for pain in addition to ibuprofen 400-600 mg every 6 hours as needed for pain.  Take these two medications together if wanted.    Remember that these are for AS NEEDED.  If not needed, do not take.     
I personally supervised the study. I reviewed the images and agree with the interpretation and have edited where appropriate.
I personally supervised the study. I reviewed the images and agree with the interpretation and have edited where appropriate.

## 2025-04-11 NOTE — ED PROVIDER NOTE - PRINCIPAL DIAGNOSIS
PTA medications updated by Medication Scribe prior to surgery via phone call with patient (last doses completed by Nurse)     Medication history sources: Patient and H&P  In the past week, patient estimated taking medication this percent of the time: Greater than 90%      Significant changes made to the medication list:  None      Additional medication history information:   None    Medication reconciliation completed by provider prior to medication history? No    Time spent in this activity:  30 minutes    The information provided in this note is only as accurate as the sources available at the time of update(s)      Prior to Admission medications    Medication Sig Last Dose Taking? Auth Provider Long Term End Date   atorvastatin (LIPITOR) 20 MG tablet Take 20 mg by mouth daily. Evening Yes Reported, Patient Yes    bisacodyl (DULCOLAX) 5 MG EC tablet Take 5 mg by mouth daily as needed for constipation.  Yes Reported, Patient     hydrOXYzine HCl (ATARAX) 25 MG tablet Take 25 mg by mouth every 6 hours as needed for itching.  Yes Reported, Patient     losartan (COZAAR) 100 MG tablet Take 100 mg by mouth daily. Evening Yes Reported, Patient Yes    meclizine (ANTIVERT) 25 MG tablet Take 25 mg by mouth 3 times daily as needed for dizziness.  Yes Reported, Patient     metFORMIN (GLUCOPHAGE) 1000 MG tablet Take 1,000 mg by mouth 2 times daily (with meals). Morning Yes Reported, Patient Yes    metoprolol succinate ER (TOPROL XL) 50 MG 24 hr tablet Take 50 mg by mouth daily. Morning Yes Reported, Patient Yes    multivitamin w/minerals (MULTI-VITAMIN) tablet Take 1 tablet by mouth daily. 4/8/2025 Morning Yes Reported, Patient     ondansetron (ZOFRAN) 4 MG tablet Take 4 mg by mouth every 6 hours as needed for nausea.  Yes Reported, Patient     prochlorperazine (COMPAZINE) 10 MG tablet Take 10 mg by mouth every 6 hours as needed for nausea or vomiting.  Yes Reported, Patient     semaglutide (OZEMPIC) 2 MG/3ML pen Inject 0.5 mg  subcutaneously every 7 days. 4/6/2025 Morning Yes Reported, Patient     valACYclovir (VALTREX) 1000 mg tablet Take 1,000 mg by mouth 2 times daily as needed (for 1 day).  Yes Reported, Patient Yes    AMLODIPINE BESYLATE PO Take by mouth daily Morning Yes Reported, Patient Yes        Medication history completed by: Clyde Jacome      Generalized abdominal pain

## 2025-06-19 NOTE — PROGRESS NOTE ADULT - PROVIDER SPECIALTY LIST ADULT
[facility]    [address]  [place] [address]  Phone: [phone] - Fax: [phone]              Alexander Prasad  [place] [address]     Date: 06/19/25      Dear Alexander Prasad,    I am a Cancer Nurse Navigator, a certified oncology nurse. My role is to assess any needs you may have with education, guidance and support. I am available to you and your family through any cancer treatment you may need at [facility].       I am available to address your needs during your journey with the following services:     Care Coordination  I can assist you in facilitating communication between your cancer care treatment team to ensure timely treatment and follow-up.  I can also assist with transition of care back to your primary care provider, or other specialist, as needed.  My goal is to bridge gaps for you throughout the course of your active treatment.       Education Services  Understanding the recommended treatment course by your physician is key. I can provide educational resources personalized to your cancer diagnosis to help you understand your diagnosis and treatment. Please let me know if you would like to receive information about your diagnosis and treatment plan.  I am here to help.     Support Services/Resource Information  [facility] we offer a full scope of support services.  I can assist you with referral information to:  Cancer Clinical Trials & Research  Nutrition counseling  Support groups  Complementary Therapies such as Mind-Body Techniques Meditation  Patient Financial Advocates    [facility], an [employer] affiliate office, our volunteers can assist you with accessing our [employer] library, support services information, head coverings and comfort items  Community and national resources, included eligibility based moshe assistance and pharmaceutical access programs, if you are in need of additional  Hospitalist information.     [facility] offers services that include:  Behavioral Health  Genetic counseling & testing  Acupuncture  Lymphedema prevention/treatment program  Palliative care services.        I hope you have an excellent patient experience.  Please feel free to share with me your comments regarding the care you have received- we value your feedback.    Sincerely,     Makayla Echeverria R.N.  Cancer Nurse Navigator    Office: [phone]  Main:  [phone]   Email:  [email or web address]

## 2025-06-25 NOTE — ED ADULT TRIAGE NOTE - NS ED NURSE BANDS TYPE
What Type Of Note Output Would You Prefer (Optional)?: Standard Output Hpi Title: Evaluation of Skin Lesions How Severe Are Your Spot(S)?: mild Have Your Spot(S) Been Treated In The Past?: has not been treated Name band;

## 2025-06-29 NOTE — ED ADULT TRIAGE NOTE - CHIEF COMPLAINT QUOTE
Pt has c/o right sided back pain, Tylenol taken at home with no relief. Denies any strenuous activity.
steady

## 2025-07-07 NOTE — ED ADULT NURSE NOTE - CAS TRG GENERAL AIRWAY, MLM
Pt presents to ED with sister for depression. Pt had lung surgery a month ago. Pt has been depressed. Sister states pt hasn't been eating/drinking or sleeping for the past few weeks. Pt reports dry mouth. Denies SI/HI. Pt cannot recall the last time she had a bowel movement.  
Patent
